# Patient Record
Sex: FEMALE | Race: WHITE | NOT HISPANIC OR LATINO | Employment: OTHER | URBAN - METROPOLITAN AREA
[De-identification: names, ages, dates, MRNs, and addresses within clinical notes are randomized per-mention and may not be internally consistent; named-entity substitution may affect disease eponyms.]

---

## 2017-01-03 ENCOUNTER — AMBULATORY - HEALTHEAST (OUTPATIENT)
Dept: LAB | Facility: CLINIC | Age: 82
End: 2017-01-03

## 2017-01-03 ENCOUNTER — COMMUNICATION - HEALTHEAST (OUTPATIENT)
Dept: FAMILY MEDICINE | Facility: CLINIC | Age: 82
End: 2017-01-03

## 2017-01-03 DIAGNOSIS — E87.6 HYPOKALEMIA: ICD-10-CM

## 2017-01-20 ENCOUNTER — COMMUNICATION - HEALTHEAST (OUTPATIENT)
Dept: FAMILY MEDICINE | Facility: CLINIC | Age: 82
End: 2017-01-20

## 2017-01-20 DIAGNOSIS — G47.00 INSOMNIA: ICD-10-CM

## 2017-02-17 ENCOUNTER — COMMUNICATION - HEALTHEAST (OUTPATIENT)
Dept: FAMILY MEDICINE | Facility: CLINIC | Age: 82
End: 2017-02-17

## 2017-02-17 DIAGNOSIS — I10 HTN (HYPERTENSION): ICD-10-CM

## 2017-02-20 ENCOUNTER — COMMUNICATION - HEALTHEAST (OUTPATIENT)
Dept: FAMILY MEDICINE | Facility: CLINIC | Age: 82
End: 2017-02-20

## 2017-02-20 DIAGNOSIS — G47.00 INSOMNIA: ICD-10-CM

## 2017-03-06 ENCOUNTER — OFFICE VISIT - HEALTHEAST (OUTPATIENT)
Dept: FAMILY MEDICINE | Facility: CLINIC | Age: 82
End: 2017-03-06

## 2017-03-06 DIAGNOSIS — J11.1 INFLUENZA-LIKE ILLNESS: ICD-10-CM

## 2017-03-06 ASSESSMENT — MIFFLIN-ST. JEOR: SCORE: 1133.59

## 2017-03-14 ENCOUNTER — COMMUNICATION - HEALTHEAST (OUTPATIENT)
Dept: FAMILY MEDICINE | Facility: CLINIC | Age: 82
End: 2017-03-14

## 2017-03-16 ENCOUNTER — OFFICE VISIT - HEALTHEAST (OUTPATIENT)
Dept: FAMILY MEDICINE | Facility: CLINIC | Age: 82
End: 2017-03-16

## 2017-03-16 DIAGNOSIS — I10 UNSPECIFIED ESSENTIAL HYPERTENSION: ICD-10-CM

## 2017-03-16 DIAGNOSIS — J32.9 SINUSITIS: ICD-10-CM

## 2017-03-16 DIAGNOSIS — Z09 HOSPITAL DISCHARGE FOLLOW-UP: ICD-10-CM

## 2017-03-16 ASSESSMENT — MIFFLIN-ST. JEOR: SCORE: 1134.84

## 2017-04-04 ENCOUNTER — RECORDS - HEALTHEAST (OUTPATIENT)
Dept: ADMINISTRATIVE | Facility: OTHER | Age: 82
End: 2017-04-04

## 2017-04-18 ENCOUNTER — COMMUNICATION - HEALTHEAST (OUTPATIENT)
Dept: FAMILY MEDICINE | Facility: CLINIC | Age: 82
End: 2017-04-18

## 2017-04-18 ENCOUNTER — AMBULATORY - HEALTHEAST (OUTPATIENT)
Dept: FAMILY MEDICINE | Facility: CLINIC | Age: 82
End: 2017-04-18

## 2017-04-18 DIAGNOSIS — R10.2 PELVIC PAIN: ICD-10-CM

## 2017-04-19 ENCOUNTER — HOSPITAL ENCOUNTER (OUTPATIENT)
Dept: ULTRASOUND IMAGING | Facility: HOSPITAL | Age: 82
Discharge: HOME | End: 2017-04-19
Attending: FAMILY MEDICINE

## 2017-04-19 DIAGNOSIS — R10.2 PELVIC PAIN: ICD-10-CM

## 2017-04-20 ENCOUNTER — COMMUNICATION - HEALTHEAST (OUTPATIENT)
Dept: FAMILY MEDICINE | Facility: CLINIC | Age: 82
End: 2017-04-20

## 2017-04-25 ENCOUNTER — OFFICE VISIT - HEALTHEAST (OUTPATIENT)
Dept: FAMILY MEDICINE | Facility: CLINIC | Age: 82
End: 2017-04-25

## 2017-04-25 DIAGNOSIS — R10.31 RLQ ABDOMINAL PAIN: ICD-10-CM

## 2017-04-25 DIAGNOSIS — R10.2 PELVIC PAIN: ICD-10-CM

## 2017-04-25 DIAGNOSIS — R10.2 VAGINAL PAIN: ICD-10-CM

## 2017-04-27 ENCOUNTER — RECORDS - HEALTHEAST (OUTPATIENT)
Dept: ADMINISTRATIVE | Facility: OTHER | Age: 82
End: 2017-04-27

## 2017-04-27 ENCOUNTER — HOSPITAL ENCOUNTER (OUTPATIENT)
Dept: CT IMAGING | Facility: HOSPITAL | Age: 82
Discharge: HOME | End: 2017-04-27
Attending: FAMILY MEDICINE

## 2017-04-27 DIAGNOSIS — R10.31 RLQ ABDOMINAL PAIN: ICD-10-CM

## 2017-04-27 DIAGNOSIS — R10.2 PELVIC PAIN: ICD-10-CM

## 2017-05-09 ENCOUNTER — OFFICE VISIT - HEALTHEAST (OUTPATIENT)
Dept: FAMILY MEDICINE | Facility: CLINIC | Age: 82
End: 2017-05-09

## 2017-05-09 DIAGNOSIS — R10.2 PELVIC PAIN: ICD-10-CM

## 2017-05-09 DIAGNOSIS — N32.81 OVERACTIVE BLADDER: ICD-10-CM

## 2017-05-09 DIAGNOSIS — I10 UNSPECIFIED ESSENTIAL HYPERTENSION: ICD-10-CM

## 2017-05-09 ASSESSMENT — MIFFLIN-ST. JEOR: SCORE: 1107.62

## 2017-08-14 ENCOUNTER — COMMUNICATION - HEALTHEAST (OUTPATIENT)
Dept: FAMILY MEDICINE | Facility: CLINIC | Age: 82
End: 2017-08-14

## 2017-08-14 DIAGNOSIS — G47.00 INSOMNIA: ICD-10-CM

## 2017-08-22 ENCOUNTER — COMMUNICATION - HEALTHEAST (OUTPATIENT)
Dept: FAMILY MEDICINE | Facility: CLINIC | Age: 82
End: 2017-08-22

## 2017-08-22 DIAGNOSIS — I10 HTN (HYPERTENSION): ICD-10-CM

## 2017-09-08 ENCOUNTER — RECORDS - HEALTHEAST (OUTPATIENT)
Dept: ADMINISTRATIVE | Facility: OTHER | Age: 82
End: 2017-09-08

## 2017-10-09 ENCOUNTER — OFFICE VISIT - HEALTHEAST (OUTPATIENT)
Dept: FAMILY MEDICINE | Facility: CLINIC | Age: 82
End: 2017-10-09

## 2017-10-09 DIAGNOSIS — Z00.00 ANNUAL PHYSICAL EXAM: ICD-10-CM

## 2017-10-09 DIAGNOSIS — E87.6 HYPOKALEMIA: ICD-10-CM

## 2017-10-09 DIAGNOSIS — R10.2 PELVIC PAIN: ICD-10-CM

## 2017-10-09 DIAGNOSIS — N32.81 OVERACTIVE BLADDER: ICD-10-CM

## 2017-10-09 DIAGNOSIS — G47.00 INSOMNIA: ICD-10-CM

## 2017-10-09 DIAGNOSIS — E55.9 VITAMIN D DEFICIENCY: ICD-10-CM

## 2017-10-09 DIAGNOSIS — I10 ESSENTIAL HYPERTENSION: ICD-10-CM

## 2017-10-09 ASSESSMENT — MIFFLIN-ST. JEOR: SCORE: 1097.19

## 2017-10-10 ENCOUNTER — COMMUNICATION - HEALTHEAST (OUTPATIENT)
Dept: FAMILY MEDICINE | Facility: CLINIC | Age: 82
End: 2017-10-10

## 2017-11-09 ENCOUNTER — RECORDS - HEALTHEAST (OUTPATIENT)
Dept: ADMINISTRATIVE | Facility: OTHER | Age: 82
End: 2017-11-09

## 2018-01-08 ENCOUNTER — COMMUNICATION - HEALTHEAST (OUTPATIENT)
Dept: FAMILY MEDICINE | Facility: CLINIC | Age: 83
End: 2018-01-08

## 2018-01-08 DIAGNOSIS — G47.00 INSOMNIA: ICD-10-CM

## 2018-01-30 ENCOUNTER — RECORDS - HEALTHEAST (OUTPATIENT)
Dept: ADMINISTRATIVE | Facility: OTHER | Age: 83
End: 2018-01-30

## 2018-02-27 ENCOUNTER — RECORDS - HEALTHEAST (OUTPATIENT)
Dept: ADMINISTRATIVE | Facility: OTHER | Age: 83
End: 2018-02-27

## 2018-02-27 ENCOUNTER — RECORDS - HEALTHEAST (OUTPATIENT)
Dept: LAB | Facility: HOSPITAL | Age: 83
End: 2018-02-27

## 2018-02-27 LAB
C REACTIVE PROTEIN LHE: 3 MG/DL (ref 0–0.8)
ERYTHROCYTE [DISTWIDTH] IN BLOOD BY AUTOMATED COUNT: 13.6 % (ref 11–14.5)
ERYTHROCYTE [SEDIMENTATION RATE] IN BLOOD BY WESTERGREN METHOD: 53 MM/HR (ref 0–20)
HCT VFR BLD AUTO: 44.1 % (ref 35–47)
HGB BLD-MCNC: 14.8 G/DL (ref 12–16)
MCH RBC QN AUTO: 30.1 PG (ref 27–34)
MCHC RBC AUTO-ENTMCNC: 33.6 G/DL (ref 32–36)
MCV RBC AUTO: 90 FL (ref 80–100)
PLATELET # BLD AUTO: 239 THOU/UL (ref 140–440)
PMV BLD AUTO: 11.1 FL (ref 8.5–12.5)
RBC # BLD AUTO: 4.91 MILL/UL (ref 3.8–5.4)
WBC: 7.4 THOU/UL (ref 4–11)

## 2018-03-05 ENCOUNTER — RECORDS - HEALTHEAST (OUTPATIENT)
Dept: LAB | Facility: HOSPITAL | Age: 83
End: 2018-03-05

## 2018-03-05 LAB
C REACTIVE PROTEIN LHE: 0.8 MG/DL (ref 0–0.8)
ERYTHROCYTE [DISTWIDTH] IN BLOOD BY AUTOMATED COUNT: 13.3 % (ref 11–14.5)
ERYTHROCYTE [SEDIMENTATION RATE] IN BLOOD BY WESTERGREN METHOD: 38 MM/HR (ref 0–20)
HCT VFR BLD AUTO: 43.9 % (ref 35–47)
HGB BLD-MCNC: 14.7 G/DL (ref 12–16)
MCH RBC QN AUTO: 30.1 PG (ref 27–34)
MCHC RBC AUTO-ENTMCNC: 33.5 G/DL (ref 32–36)
MCV RBC AUTO: 90 FL (ref 80–100)
PLATELET # BLD AUTO: 270 THOU/UL (ref 140–440)
PMV BLD AUTO: 11 FL (ref 8.5–12.5)
RBC # BLD AUTO: 4.88 MILL/UL (ref 3.8–5.4)
WBC: 6.4 THOU/UL (ref 4–11)

## 2018-04-30 ENCOUNTER — RECORDS - HEALTHEAST (OUTPATIENT)
Dept: ADMINISTRATIVE | Facility: OTHER | Age: 83
End: 2018-04-30

## 2018-04-30 ENCOUNTER — COMMUNICATION - HEALTHEAST (OUTPATIENT)
Dept: FAMILY MEDICINE | Facility: CLINIC | Age: 83
End: 2018-04-30

## 2018-04-30 DIAGNOSIS — G47.00 INSOMNIA: ICD-10-CM

## 2018-05-04 ENCOUNTER — RECORDS - HEALTHEAST (OUTPATIENT)
Dept: ADMINISTRATIVE | Facility: OTHER | Age: 83
End: 2018-05-04

## 2018-05-04 ENCOUNTER — COMMUNICATION - HEALTHEAST (OUTPATIENT)
Dept: FAMILY MEDICINE | Facility: CLINIC | Age: 83
End: 2018-05-04

## 2018-07-05 ENCOUNTER — COMMUNICATION - HEALTHEAST (OUTPATIENT)
Dept: FAMILY MEDICINE | Facility: CLINIC | Age: 83
End: 2018-07-05

## 2018-07-23 ENCOUNTER — COMMUNICATION - HEALTHEAST (OUTPATIENT)
Dept: FAMILY MEDICINE | Facility: CLINIC | Age: 83
End: 2018-07-23

## 2018-07-23 DIAGNOSIS — G47.00 INSOMNIA: ICD-10-CM

## 2018-08-23 ENCOUNTER — RECORDS - HEALTHEAST (OUTPATIENT)
Dept: ADMINISTRATIVE | Facility: OTHER | Age: 83
End: 2018-08-23

## 2018-08-23 ENCOUNTER — OFFICE VISIT - HEALTHEAST (OUTPATIENT)
Dept: FAMILY MEDICINE | Facility: CLINIC | Age: 83
End: 2018-08-23

## 2018-08-23 ENCOUNTER — COMMUNICATION - HEALTHEAST (OUTPATIENT)
Dept: FAMILY MEDICINE | Facility: CLINIC | Age: 83
End: 2018-08-23

## 2018-08-23 DIAGNOSIS — R79.89 ELEVATED SERUM CREATININE: ICD-10-CM

## 2018-08-23 DIAGNOSIS — N95.2 VAGINAL ATROPHY: ICD-10-CM

## 2018-08-23 DIAGNOSIS — I10 ESSENTIAL HYPERTENSION: ICD-10-CM

## 2018-08-23 DIAGNOSIS — R35.1 NOCTURIA: ICD-10-CM

## 2018-08-23 DIAGNOSIS — R19.7 DIARRHEA: ICD-10-CM

## 2018-08-23 DIAGNOSIS — N32.81 OVERACTIVE BLADDER: ICD-10-CM

## 2018-08-23 DIAGNOSIS — R39.15 URINARY URGENCY: ICD-10-CM

## 2018-08-23 DIAGNOSIS — G47.00 INSOMNIA: ICD-10-CM

## 2018-08-23 DIAGNOSIS — Z09 HOSPITAL DISCHARGE FOLLOW-UP: ICD-10-CM

## 2018-08-23 LAB
ANION GAP SERPL CALCULATED.3IONS-SCNC: 12 MMOL/L (ref 5–18)
BUN SERPL-MCNC: 17 MG/DL (ref 8–28)
CALCIUM SERPL-MCNC: 10.4 MG/DL (ref 8.5–10.5)
CHLORIDE BLD-SCNC: 106 MMOL/L (ref 98–107)
CO2 SERPL-SCNC: 22 MMOL/L (ref 22–31)
CREAT SERPL-MCNC: 0.92 MG/DL (ref 0.6–1.1)
GFR SERPL CREATININE-BSD FRML MDRD: 58 ML/MIN/1.73M2
GLUCOSE BLD-MCNC: 112 MG/DL (ref 70–125)
POTASSIUM BLD-SCNC: 4 MMOL/L (ref 3.5–5)
SODIUM SERPL-SCNC: 140 MMOL/L (ref 136–145)

## 2018-08-23 RX ORDER — ESTRADIOL 0.1 MG/G
CREAM VAGINAL
Qty: 42.5 G | Refills: 1 | Status: SHIPPED
Start: 2018-08-23 | End: 2021-08-17

## 2018-08-23 ASSESSMENT — MIFFLIN-ST. JEOR: SCORE: 1109.89

## 2018-08-27 ENCOUNTER — COMMUNICATION - HEALTHEAST (OUTPATIENT)
Dept: FAMILY MEDICINE | Facility: CLINIC | Age: 83
End: 2018-08-27

## 2018-09-18 ENCOUNTER — RECORDS - HEALTHEAST (OUTPATIENT)
Dept: ADMINISTRATIVE | Facility: OTHER | Age: 83
End: 2018-09-18

## 2018-10-11 ENCOUNTER — OFFICE VISIT - HEALTHEAST (OUTPATIENT)
Dept: FAMILY MEDICINE | Facility: CLINIC | Age: 83
End: 2018-10-11

## 2018-10-11 DIAGNOSIS — N95.2 VAGINAL ATROPHY: ICD-10-CM

## 2018-10-11 DIAGNOSIS — G47.00 INSOMNIA: ICD-10-CM

## 2018-10-11 DIAGNOSIS — Z00.00 ROUTINE GENERAL MEDICAL EXAMINATION AT A HEALTH CARE FACILITY: ICD-10-CM

## 2018-10-11 DIAGNOSIS — M19.079 ARTHROSIS OF FOOT: ICD-10-CM

## 2018-10-11 DIAGNOSIS — I10 ESSENTIAL HYPERTENSION: ICD-10-CM

## 2018-10-11 DIAGNOSIS — Z23 NEED FOR VACCINATION: ICD-10-CM

## 2018-10-11 DIAGNOSIS — Z66 DNR (DO NOT RESUSCITATE): ICD-10-CM

## 2018-10-11 ASSESSMENT — MIFFLIN-ST. JEOR: SCORE: 1112.73

## 2018-10-29 ENCOUNTER — OFFICE VISIT - HEALTHEAST (OUTPATIENT)
Dept: PODIATRY | Facility: CLINIC | Age: 83
End: 2018-10-29

## 2018-10-29 DIAGNOSIS — M19.079: ICD-10-CM

## 2018-10-29 ASSESSMENT — MIFFLIN-ST. JEOR: SCORE: 1108.64

## 2018-11-14 ENCOUNTER — COMMUNICATION - HEALTHEAST (OUTPATIENT)
Dept: FAMILY MEDICINE | Facility: CLINIC | Age: 83
End: 2018-11-14

## 2018-11-14 ENCOUNTER — COMMUNICATION - HEALTHEAST (OUTPATIENT)
Dept: SCHEDULING | Facility: CLINIC | Age: 83
End: 2018-11-14

## 2018-11-14 DIAGNOSIS — M19.079 ARTHROSIS OF FOOT: ICD-10-CM

## 2018-12-05 ENCOUNTER — COMMUNICATION - HEALTHEAST (OUTPATIENT)
Dept: FAMILY MEDICINE | Facility: CLINIC | Age: 83
End: 2018-12-05

## 2018-12-05 DIAGNOSIS — I10 ESSENTIAL HYPERTENSION: ICD-10-CM

## 2018-12-05 DIAGNOSIS — G47.00 INSOMNIA: ICD-10-CM

## 2018-12-05 DIAGNOSIS — R39.15 URINARY URGENCY: ICD-10-CM

## 2018-12-05 DIAGNOSIS — M19.079 ARTHROSIS OF FOOT: ICD-10-CM

## 2018-12-07 ENCOUNTER — AMBULATORY - HEALTHEAST (OUTPATIENT)
Dept: FAMILY MEDICINE | Facility: CLINIC | Age: 83
End: 2018-12-07

## 2018-12-07 DIAGNOSIS — G47.00 INSOMNIA: ICD-10-CM

## 2018-12-07 DIAGNOSIS — R39.15 URINARY URGENCY: ICD-10-CM

## 2018-12-09 ENCOUNTER — COMMUNICATION - HEALTHEAST (OUTPATIENT)
Dept: FAMILY MEDICINE | Facility: CLINIC | Age: 83
End: 2018-12-09

## 2018-12-09 DIAGNOSIS — G47.00 INSOMNIA: ICD-10-CM

## 2018-12-09 DIAGNOSIS — R39.15 URINARY URGENCY: ICD-10-CM

## 2019-01-03 ENCOUNTER — RECORDS - HEALTHEAST (OUTPATIENT)
Dept: ADMINISTRATIVE | Facility: OTHER | Age: 84
End: 2019-01-03

## 2019-01-07 ENCOUNTER — RECORDS - HEALTHEAST (OUTPATIENT)
Dept: ADMINISTRATIVE | Facility: OTHER | Age: 84
End: 2019-01-07

## 2019-01-07 ENCOUNTER — COMMUNICATION - HEALTHEAST (OUTPATIENT)
Dept: FAMILY MEDICINE | Facility: CLINIC | Age: 84
End: 2019-01-07

## 2019-01-07 DIAGNOSIS — M19.079 ARTHROSIS OF FOOT: ICD-10-CM

## 2019-01-08 ENCOUNTER — OFFICE VISIT - HEALTHEAST (OUTPATIENT)
Dept: FAMILY MEDICINE | Facility: CLINIC | Age: 84
End: 2019-01-08

## 2019-01-08 DIAGNOSIS — I10 ESSENTIAL HYPERTENSION: ICD-10-CM

## 2019-01-08 DIAGNOSIS — Z79.899 CONTROLLED SUBSTANCE AGREEMENT SIGNED: ICD-10-CM

## 2019-01-08 DIAGNOSIS — Z09 HOSPITAL DISCHARGE FOLLOW-UP: ICD-10-CM

## 2019-01-08 DIAGNOSIS — R04.0 FREQUENT NOSEBLEEDS: ICD-10-CM

## 2019-01-08 DIAGNOSIS — I49.9 IRREGULAR HEART RHYTHM: ICD-10-CM

## 2019-01-08 DIAGNOSIS — M19.071 ARTHRITIS OF MIDTARSAL JOINT OF RIGHT FOOT: ICD-10-CM

## 2019-01-08 DIAGNOSIS — G89.29 OTHER CHRONIC PAIN: ICD-10-CM

## 2019-01-08 DIAGNOSIS — M19.079 ARTHROSIS OF FOOT: ICD-10-CM

## 2019-01-08 DIAGNOSIS — R60.0 LOWER EXTREMITY EDEMA: ICD-10-CM

## 2019-01-08 LAB
ALBUMIN SERPL-MCNC: 3.5 G/DL (ref 3.5–5)
ALBUMIN UR-MCNC: NEGATIVE MG/DL
ALP SERPL-CCNC: 154 U/L (ref 45–120)
ALT SERPL W P-5'-P-CCNC: 31 U/L (ref 0–45)
AMPHETAMINES UR QL SCN: NORMAL
ANION GAP SERPL CALCULATED.3IONS-SCNC: 10 MMOL/L (ref 5–18)
APPEARANCE UR: ABNORMAL
AST SERPL W P-5'-P-CCNC: 26 U/L (ref 0–40)
ATRIAL RATE - MUSE: 58 BPM
BARBITURATES UR QL: NORMAL
BASOPHILS # BLD AUTO: 0 THOU/UL (ref 0–0.2)
BASOPHILS NFR BLD AUTO: 1 % (ref 0–2)
BENZODIAZ UR QL: NORMAL
BILIRUB SERPL-MCNC: 0.7 MG/DL (ref 0–1)
BILIRUB UR QL STRIP: ABNORMAL
BUN SERPL-MCNC: 14 MG/DL (ref 8–28)
CALCIUM SERPL-MCNC: 9.6 MG/DL (ref 8.5–10.5)
CANNABINOIDS UR QL SCN: NORMAL
CHLORIDE BLD-SCNC: 107 MMOL/L (ref 98–107)
CO2 SERPL-SCNC: 25 MMOL/L (ref 22–31)
COCAINE UR QL: NORMAL
COLOR UR AUTO: YELLOW
CREAT SERPL-MCNC: 0.88 MG/DL (ref 0.6–1.1)
CREAT UR-MCNC: 295.3 MG/DL
DIASTOLIC BLOOD PRESSURE - MUSE: NORMAL MMHG
EOSINOPHIL # BLD AUTO: 0.2 THOU/UL (ref 0–0.4)
EOSINOPHIL NFR BLD AUTO: 2 % (ref 0–6)
ERYTHROCYTE [DISTWIDTH] IN BLOOD BY AUTOMATED COUNT: 11.9 % (ref 11–14.5)
GFR SERPL CREATININE-BSD FRML MDRD: >60 ML/MIN/1.73M2
GLUCOSE BLD-MCNC: 97 MG/DL (ref 70–125)
GLUCOSE UR STRIP-MCNC: NEGATIVE MG/DL
HCT VFR BLD AUTO: 40.3 % (ref 35–47)
HGB BLD-MCNC: 13.5 G/DL (ref 12–16)
HGB UR QL STRIP: NEGATIVE
INTERPRETATION ECG - MUSE: NORMAL
KETONES UR STRIP-MCNC: ABNORMAL MG/DL
LEUKOCYTE ESTERASE UR QL STRIP: ABNORMAL
LYMPHOCYTES # BLD AUTO: 1.7 THOU/UL (ref 0.8–4.4)
LYMPHOCYTES NFR BLD AUTO: 22 % (ref 20–40)
MCH RBC QN AUTO: 30.3 PG (ref 27–34)
MCHC RBC AUTO-ENTMCNC: 33.5 G/DL (ref 32–36)
MCV RBC AUTO: 90 FL (ref 80–100)
MONOCYTES # BLD AUTO: 0.9 THOU/UL (ref 0–0.9)
MONOCYTES NFR BLD AUTO: 11 % (ref 2–10)
NEUTROPHILS # BLD AUTO: 5.2 THOU/UL (ref 2–7.7)
NEUTROPHILS NFR BLD AUTO: 65 % (ref 50–70)
NITRATE UR QL: NEGATIVE
OPIATES UR QL SCN: NORMAL
OXYCODONE UR QL: NORMAL
P AXIS - MUSE: 68 DEGREES
PCP UR QL SCN: NORMAL
PH UR STRIP: 6 [PH] (ref 5–8)
PLATELET # BLD AUTO: 291 THOU/UL (ref 140–440)
PMV BLD AUTO: 8.6 FL (ref 7–10)
POTASSIUM BLD-SCNC: 3.9 MMOL/L (ref 3.5–5)
PR INTERVAL - MUSE: 144 MS
PROT SERPL-MCNC: 6.8 G/DL (ref 6–8)
QRS DURATION - MUSE: 78 MS
QT - MUSE: 424 MS
QTC - MUSE: 416 MS
R AXIS - MUSE: 29 DEGREES
RBC # BLD AUTO: 4.46 MILL/UL (ref 3.8–5.4)
SODIUM SERPL-SCNC: 142 MMOL/L (ref 136–145)
SP GR UR STRIP: 1.02 (ref 1–1.03)
SYSTOLIC BLOOD PRESSURE - MUSE: NORMAL MMHG
T AXIS - MUSE: 23 DEGREES
UROBILINOGEN UR STRIP-ACNC: ABNORMAL
VENTRICULAR RATE- MUSE: 58 BPM
WBC: 8.1 THOU/UL (ref 4–11)

## 2019-01-08 ASSESSMENT — MIFFLIN-ST. JEOR: SCORE: 1069.53

## 2019-01-09 LAB — BACTERIA SPEC CULT: NO GROWTH

## 2019-01-10 ENCOUNTER — RECORDS - HEALTHEAST (OUTPATIENT)
Dept: ADMINISTRATIVE | Facility: OTHER | Age: 84
End: 2019-01-10

## 2019-01-12 LAB
O-DESMETHYLTRAMADOL (CONVERSION): NORMAL NG/ML
TRAMADOL (CONVERSION): NORMAL NG/ML

## 2019-01-14 ENCOUNTER — HOSPITAL ENCOUNTER (OUTPATIENT)
Dept: CARDIOLOGY | Facility: HOSPITAL | Age: 84
Discharge: HOME | End: 2019-01-14
Attending: FAMILY MEDICINE

## 2019-01-14 DIAGNOSIS — R60.0 LOWER EXTREMITY EDEMA: ICD-10-CM

## 2019-01-14 DIAGNOSIS — I49.9 IRREGULAR HEART RHYTHM: ICD-10-CM

## 2019-01-14 ASSESSMENT — MIFFLIN-ST. JEOR: SCORE: 1069.53

## 2019-01-15 LAB
AORTIC ROOT: 2.6 CM
ASCENDING AORTA: 2.9 CM
BSA FOR ECHO PROCEDURE: 1.75 M2
CV ECHO HEIGHT: 61 IN
CV ECHO WEIGHT: 157 LBS
DOP CALC LVOT AREA: 2.27 CM2
DOP CALC LVOT DIAMETER: 1.7 CM
DOP CALC LVOT PEAK VEL: 82.8 CM/S
DOP CALC LVOT STROKE VOLUME: 47.2 CM3
DOP CALCLVOT PEAK VEL VTI: 20.8 CM
ECHO EJECTION FRACTION ESTIMATED: 55 %
EJECTION FRACTION: 46 % (ref 55–75)
FRACTIONAL SHORTENING: 29 % (ref 28–44)
INTERVENTRICULAR SEPTUM IN END DIASTOLE: 1.01 CM (ref 0.6–0.9)
IVS/PW RATIO: 0.8
LA AREA 1: 26 CM2
LA AREA 2: 23 CM2
LEFT ATRIUM LENGTH: 5.8 CM
LEFT ATRIUM SIZE: 3.7 CM
LEFT ATRIUM VOLUME INDEX: 50.1 ML/M2
LEFT ATRIUM VOLUME: 87.6 ML
LEFT VENTRICLE DIASTOLIC VOLUME INDEX: 37.3 CM3/M2 (ref 29–61)
LEFT VENTRICLE DIASTOLIC VOLUME: 65.3 CM3 (ref 46–106)
LEFT VENTRICLE HEART RATE: 51 BPM
LEFT VENTRICLE HEART RATE: 51 BPM
LEFT VENTRICLE MASS INDEX: 107.7 G/M2
LEFT VENTRICLE SYSTOLIC VOLUME INDEX: 20.1 CM3/M2 (ref 8–24)
LEFT VENTRICLE SYSTOLIC VOLUME: 35.1 CM3 (ref 14–42)
LEFT VENTRICULAR INTERNAL DIMENSION IN DIASTOLE: 4.55 CM (ref 3.8–5.2)
LEFT VENTRICULAR INTERNAL DIMENSION IN SYSTOLE: 3.23 CM (ref 2.2–3.5)
LEFT VENTRICULAR MASS: 188.5 G
LEFT VENTRICULAR OUTFLOW TRACT MEAN GRADIENT: 1 MMHG
LEFT VENTRICULAR OUTFLOW TRACT MEAN VELOCITY: 54.6 CM/S
LEFT VENTRICULAR OUTFLOW TRACT PEAK GRADIENT: 3 MMHG
LEFT VENTRICULAR POSTERIOR WALL IN END DIASTOLE: 1.28 CM (ref 0.6–0.9)
LV STROKE VOLUME INDEX: 27 ML/M2
MITRAL REGURGITANT VELOCITY TIME INTEGRAL: 128 CM
MITRAL VALVE DECELERATION SLOPE: 7740 MM/S2
MITRAL VALVE E/A RATIO: 1.6
MITRAL VALVE PRESSURE HALF-TIME: 40 MS
MR FLOW: 26 CM3
MR MEAN GRADIENT: 33 MMHG
MR MEAN VELOCITY: 268 CM/S
MR PEAK GRADIENT: 49 MMHG
MR PISA EROA: 0.2 CM2
MR PISA RADIUS: 0.6 CM
MR PISA VN NYQUIST: 30.8 CM/S
MV AVERAGE E/E' RATIO: 17.5 CM/S
MV DECELERATION TIME: 137 MS
MV E'TISSUE VEL-LAT: 6.48 CM/S
MV E'TISSUE VEL-MED: 5.61 CM/S
MV LATERAL E/E' RATIO: 16.4
MV MEDIAL E/E' RATIO: 18.9
MV PEAK A VELOCITY: 67.4 CM/S
MV PEAK E VELOCITY: 106 CM/S
MV REGURGITANT VOLUME: 25.5 CC
MV VALVE AREA PRESSURE 1/2 METHOD: 5.5 CM2
NUC REST DIASTOLIC VOLUME INDEX: 2512 LBS
NUC REST SYSTOLIC VOLUME INDEX: 61 IN
PISA MR PEAK VEL: 350 CM/S
PR MAX PG: 3 MMHG
PR PEAK VELOCITY: 85.8 CM/S
TRICUSPID REGURGITATION PEAK PRESSURE GRADIENT: 28.3 MMHG
TRICUSPID VALVE ANULAR PLANE SYSTOLIC EXCURSION: 2 CM
TRICUSPID VALVE PEAK REGURGITANT VELOCITY: 266 CM/S

## 2019-01-16 ENCOUNTER — RECORDS - HEALTHEAST (OUTPATIENT)
Dept: ADMINISTRATIVE | Facility: OTHER | Age: 84
End: 2019-01-16

## 2019-01-17 ENCOUNTER — RECORDS - HEALTHEAST (OUTPATIENT)
Dept: ADMINISTRATIVE | Facility: OTHER | Age: 84
End: 2019-01-17

## 2019-01-17 ENCOUNTER — OFFICE VISIT - HEALTHEAST (OUTPATIENT)
Dept: FAMILY MEDICINE | Facility: CLINIC | Age: 84
End: 2019-01-17

## 2019-01-17 DIAGNOSIS — R60.0 LOWER EXTREMITY EDEMA: ICD-10-CM

## 2019-01-17 DIAGNOSIS — M19.071 ARTHRITIS OF MIDTARSAL JOINT OF RIGHT FOOT: ICD-10-CM

## 2019-01-17 DIAGNOSIS — I10 ESSENTIAL HYPERTENSION: ICD-10-CM

## 2019-01-17 DIAGNOSIS — R04.0 FREQUENT NOSEBLEEDS: ICD-10-CM

## 2019-01-17 DIAGNOSIS — R74.8 ELEVATED ALKALINE PHOSPHATASE LEVEL: ICD-10-CM

## 2019-01-17 DIAGNOSIS — E55.9 VITAMIN D DEFICIENCY: ICD-10-CM

## 2019-01-17 DIAGNOSIS — I50.32 CHRONIC DIASTOLIC CONGESTIVE HEART FAILURE (H): ICD-10-CM

## 2019-01-17 DIAGNOSIS — M19.079 ARTHROSIS OF FOOT, UNSPECIFIED LATERALITY: ICD-10-CM

## 2019-01-17 DIAGNOSIS — R00.1 SINUS BRADYCARDIA: ICD-10-CM

## 2019-01-17 ASSESSMENT — MIFFLIN-ST. JEOR: SCORE: 1084.95

## 2019-01-18 LAB — 25(OH)D3 SERPL-MCNC: 22 NG/ML (ref 30–80)

## 2019-01-21 ENCOUNTER — AMBULATORY - HEALTHEAST (OUTPATIENT)
Dept: FAMILY MEDICINE | Facility: CLINIC | Age: 84
End: 2019-01-21

## 2019-01-21 ENCOUNTER — COMMUNICATION - HEALTHEAST (OUTPATIENT)
Dept: FAMILY MEDICINE | Facility: CLINIC | Age: 84
End: 2019-01-21

## 2019-01-21 DIAGNOSIS — E55.9 VITAMIN D DEFICIENCY: ICD-10-CM

## 2019-01-22 ENCOUNTER — RECORDS - HEALTHEAST (OUTPATIENT)
Dept: ADMINISTRATIVE | Facility: OTHER | Age: 84
End: 2019-01-22

## 2019-01-24 ENCOUNTER — OFFICE VISIT - HEALTHEAST (OUTPATIENT)
Dept: FAMILY MEDICINE | Facility: CLINIC | Age: 84
End: 2019-01-24

## 2019-01-24 DIAGNOSIS — R04.0 FREQUENT NOSEBLEEDS: ICD-10-CM

## 2019-01-24 DIAGNOSIS — I50.32 CHRONIC DIASTOLIC CONGESTIVE HEART FAILURE (H): ICD-10-CM

## 2019-01-24 DIAGNOSIS — I10 ESSENTIAL HYPERTENSION: ICD-10-CM

## 2019-01-24 DIAGNOSIS — Z09 HOSPITAL DISCHARGE FOLLOW-UP: ICD-10-CM

## 2019-01-24 DIAGNOSIS — R93.5 ABNORMAL ULTRASOUND OF ABDOMEN: ICD-10-CM

## 2019-01-24 ASSESSMENT — MIFFLIN-ST. JEOR: SCORE: 1072.37

## 2019-01-29 ENCOUNTER — OFFICE VISIT - HEALTHEAST (OUTPATIENT)
Dept: CARDIOLOGY | Facility: CLINIC | Age: 84
End: 2019-01-29

## 2019-01-29 ENCOUNTER — HOSPITAL ENCOUNTER (OUTPATIENT)
Dept: CT IMAGING | Facility: CLINIC | Age: 84
Discharge: HOME | End: 2019-01-29
Attending: FAMILY MEDICINE

## 2019-01-29 DIAGNOSIS — E78.00 PURE HYPERCHOLESTEROLEMIA: ICD-10-CM

## 2019-01-29 DIAGNOSIS — Z09 HOSPITAL DISCHARGE FOLLOW-UP: ICD-10-CM

## 2019-01-29 DIAGNOSIS — I10 ESSENTIAL HYPERTENSION: ICD-10-CM

## 2019-01-29 ASSESSMENT — MIFFLIN-ST. JEOR: SCORE: 1073.76

## 2019-01-31 ENCOUNTER — OFFICE VISIT - HEALTHEAST (OUTPATIENT)
Dept: FAMILY MEDICINE | Facility: CLINIC | Age: 84
End: 2019-01-31

## 2019-01-31 DIAGNOSIS — N28.0 RENAL ARTERY THROMBOSIS (H): ICD-10-CM

## 2019-01-31 DIAGNOSIS — I50.32 CHRONIC DIASTOLIC CONGESTIVE HEART FAILURE (H): ICD-10-CM

## 2019-01-31 DIAGNOSIS — M19.079 ARTHROSIS OF FOOT, UNSPECIFIED LATERALITY: ICD-10-CM

## 2019-01-31 DIAGNOSIS — E55.9 VITAMIN D DEFICIENCY: ICD-10-CM

## 2019-01-31 DIAGNOSIS — I72.2 RENAL ARTERIAL ANEURYSM (H): ICD-10-CM

## 2019-01-31 DIAGNOSIS — I10 ESSENTIAL HYPERTENSION: ICD-10-CM

## 2019-01-31 ASSESSMENT — MIFFLIN-ST. JEOR: SCORE: 1077.82

## 2019-02-05 ENCOUNTER — COMMUNICATION - HEALTHEAST (OUTPATIENT)
Dept: FAMILY MEDICINE | Facility: CLINIC | Age: 84
End: 2019-02-05

## 2019-02-11 ENCOUNTER — COMMUNICATION - HEALTHEAST (OUTPATIENT)
Dept: FAMILY MEDICINE | Facility: CLINIC | Age: 84
End: 2019-02-11

## 2019-02-11 DIAGNOSIS — R93.5 ABNORMAL ULTRASOUND OF ABDOMEN: ICD-10-CM

## 2019-02-11 DIAGNOSIS — I10 ESSENTIAL HYPERTENSION: ICD-10-CM

## 2019-02-11 DIAGNOSIS — Z09 HOSPITAL DISCHARGE FOLLOW-UP: ICD-10-CM

## 2019-02-18 ENCOUNTER — OFFICE VISIT - HEALTHEAST (OUTPATIENT)
Dept: VASCULAR SURGERY | Facility: CLINIC | Age: 84
End: 2019-02-18

## 2019-02-18 DIAGNOSIS — I72.2 ANEURYSM OF LEFT RENAL ARTERY (H): ICD-10-CM

## 2019-02-27 ENCOUNTER — COMMUNICATION - HEALTHEAST (OUTPATIENT)
Dept: FAMILY MEDICINE | Facility: CLINIC | Age: 84
End: 2019-02-27

## 2019-02-27 DIAGNOSIS — G47.00 INSOMNIA: ICD-10-CM

## 2019-02-27 DIAGNOSIS — R39.15 URINARY URGENCY: ICD-10-CM

## 2019-03-01 ENCOUNTER — AMBULATORY - HEALTHEAST (OUTPATIENT)
Dept: CARE COORDINATION | Facility: CLINIC | Age: 84
End: 2019-03-01

## 2019-03-01 DIAGNOSIS — I82.4Z9 DEEP VEIN THROMBOSIS (DVT) OF DISTAL VEIN OF LOWER EXTREMITY, UNSPECIFIED CHRONICITY, UNSPECIFIED LATERALITY (H): ICD-10-CM

## 2019-03-01 DIAGNOSIS — I26.99 OTHER PULMONARY EMBOLISM WITHOUT ACUTE COR PULMONALE, UNSPECIFIED CHRONICITY (H): ICD-10-CM

## 2019-03-01 DIAGNOSIS — J18.9 PNEUMONIA: ICD-10-CM

## 2019-03-04 ENCOUNTER — HOSPITAL ENCOUNTER (OUTPATIENT)
Dept: CT IMAGING | Facility: HOSPITAL | Age: 84
Discharge: HOME | End: 2019-03-04
Attending: FAMILY MEDICINE

## 2019-03-04 ENCOUNTER — COMMUNICATION - HEALTHEAST (OUTPATIENT)
Dept: NURSING | Facility: CLINIC | Age: 84
End: 2019-03-04

## 2019-03-04 ENCOUNTER — OFFICE VISIT - HEALTHEAST (OUTPATIENT)
Dept: FAMILY MEDICINE | Facility: CLINIC | Age: 84
End: 2019-03-04

## 2019-03-04 DIAGNOSIS — I82.4Z9 DEEP VEIN THROMBOSIS (DVT) OF DISTAL VEIN OF LOWER EXTREMITY, UNSPECIFIED CHRONICITY, UNSPECIFIED LATERALITY (H): ICD-10-CM

## 2019-03-04 DIAGNOSIS — R07.9 CHEST PAIN, UNSPECIFIED TYPE: ICD-10-CM

## 2019-03-04 DIAGNOSIS — Z09 HOSPITAL DISCHARGE FOLLOW-UP: ICD-10-CM

## 2019-03-04 DIAGNOSIS — M19.079 ARTHROSIS OF FOOT: ICD-10-CM

## 2019-03-04 DIAGNOSIS — M19.079 ARTHROSIS OF FOOT, UNSPECIFIED LATERALITY: ICD-10-CM

## 2019-03-04 DIAGNOSIS — M19.071 ARTHRITIS OF MIDTARSAL JOINT OF RIGHT FOOT: ICD-10-CM

## 2019-03-04 DIAGNOSIS — Z79.899 CONTROLLED SUBSTANCE AGREEMENT SIGNED: ICD-10-CM

## 2019-03-04 DIAGNOSIS — G47.00 PERSISTENT INSOMNIA: ICD-10-CM

## 2019-03-04 DIAGNOSIS — I26.90 ACUTE SEPTIC PULMONARY EMBOLISM WITHOUT ACUTE COR PULMONALE (H): ICD-10-CM

## 2019-03-04 DIAGNOSIS — I10 ESSENTIAL HYPERTENSION: ICD-10-CM

## 2019-03-04 DIAGNOSIS — R04.2 HEMOPTYSIS: ICD-10-CM

## 2019-03-04 DIAGNOSIS — E83.42 HYPOMAGNESEMIA: ICD-10-CM

## 2019-03-04 LAB
ALBUMIN SERPL-MCNC: 3.4 G/DL (ref 3.5–5)
ALP SERPL-CCNC: 130 U/L (ref 45–120)
ALT SERPL W P-5'-P-CCNC: 32 U/L (ref 0–45)
ANION GAP SERPL CALCULATED.3IONS-SCNC: 15 MMOL/L (ref 5–18)
AST SERPL W P-5'-P-CCNC: 18 U/L (ref 0–40)
BASOPHILS # BLD AUTO: 0.1 THOU/UL (ref 0–0.2)
BASOPHILS NFR BLD AUTO: 1 % (ref 0–2)
BILIRUB SERPL-MCNC: 0.3 MG/DL (ref 0–1)
BUN SERPL-MCNC: 33 MG/DL (ref 8–28)
CALCIUM SERPL-MCNC: 10.2 MG/DL (ref 8.5–10.5)
CHLORIDE BLD-SCNC: 102 MMOL/L (ref 98–107)
CO2 SERPL-SCNC: 19 MMOL/L (ref 22–31)
CREAT SERPL-MCNC: 1.07 MG/DL (ref 0.6–1.1)
EOSINOPHIL # BLD AUTO: 0.2 THOU/UL (ref 0–0.4)
EOSINOPHIL NFR BLD AUTO: 2 % (ref 0–6)
ERYTHROCYTE [DISTWIDTH] IN BLOOD BY AUTOMATED COUNT: 12.2 % (ref 11–14.5)
GFR SERPL CREATININE-BSD FRML MDRD: 48 ML/MIN/1.73M2
GLUCOSE BLD-MCNC: 97 MG/DL (ref 70–125)
HCT VFR BLD AUTO: 41 % (ref 35–47)
HGB BLD-MCNC: 13.5 G/DL (ref 12–16)
LYMPHOCYTES # BLD AUTO: 2.4 THOU/UL (ref 0.8–4.4)
LYMPHOCYTES NFR BLD AUTO: 23 % (ref 20–40)
MAGNESIUM SERPL-MCNC: 2.1 MG/DL (ref 1.8–2.6)
MCH RBC QN AUTO: 28.9 PG (ref 27–34)
MCHC RBC AUTO-ENTMCNC: 32.9 G/DL (ref 32–36)
MCV RBC AUTO: 88 FL (ref 80–100)
MONOCYTES # BLD AUTO: 1 THOU/UL (ref 0–0.9)
MONOCYTES NFR BLD AUTO: 10 % (ref 2–10)
NEUTROPHILS # BLD AUTO: 6.6 THOU/UL (ref 2–7.7)
NEUTROPHILS NFR BLD AUTO: 65 % (ref 50–70)
PHOSPHATE SERPL-MCNC: 4.3 MG/DL (ref 2.5–4.5)
PLATELET # BLD AUTO: 409 THOU/UL (ref 140–440)
PMV BLD AUTO: 7.9 FL (ref 7–10)
POTASSIUM BLD-SCNC: 4.6 MMOL/L (ref 3.5–5)
PROT SERPL-MCNC: 7 G/DL (ref 6–8)
RBC # BLD AUTO: 4.67 MILL/UL (ref 3.8–5.4)
SODIUM SERPL-SCNC: 136 MMOL/L (ref 136–145)
WBC: 10.3 THOU/UL (ref 4–11)

## 2019-03-04 ASSESSMENT — MIFFLIN-ST. JEOR: SCORE: 1071.79

## 2019-03-05 ENCOUNTER — COMMUNICATION - HEALTHEAST (OUTPATIENT)
Dept: NURSING | Facility: CLINIC | Age: 84
End: 2019-03-05

## 2019-03-05 ENCOUNTER — OFFICE VISIT - HEALTHEAST (OUTPATIENT)
Dept: FAMILY MEDICINE | Facility: CLINIC | Age: 84
End: 2019-03-05

## 2019-03-05 DIAGNOSIS — E83.42 HYPOMAGNESEMIA: ICD-10-CM

## 2019-03-05 DIAGNOSIS — I26.99 OTHER PULMONARY EMBOLISM WITHOUT ACUTE COR PULMONALE, UNSPECIFIED CHRONICITY (H): ICD-10-CM

## 2019-03-05 DIAGNOSIS — R91.8 PULMONARY NODULES: ICD-10-CM

## 2019-03-05 DIAGNOSIS — J18.9 COMMUNITY ACQUIRED PNEUMONIA OF RIGHT LOWER LOBE OF LUNG: ICD-10-CM

## 2019-03-05 DIAGNOSIS — R07.89 ATYPICAL CHEST PAIN: ICD-10-CM

## 2019-03-05 DIAGNOSIS — Z09 HOSPITAL DISCHARGE FOLLOW-UP: ICD-10-CM

## 2019-03-05 DIAGNOSIS — I82.4Z9 DEEP VEIN THROMBOSIS (DVT) OF DISTAL VEIN OF LOWER EXTREMITY, UNSPECIFIED CHRONICITY, UNSPECIFIED LATERALITY (H): ICD-10-CM

## 2019-03-05 LAB
ATRIAL RATE - MUSE: 66 BPM
DIASTOLIC BLOOD PRESSURE - MUSE: NORMAL MMHG
INTERPRETATION ECG - MUSE: NORMAL
P AXIS - MUSE: 16 DEGREES
PR INTERVAL - MUSE: 136 MS
QRS DURATION - MUSE: 78 MS
QT - MUSE: 402 MS
QTC - MUSE: 421 MS
R AXIS - MUSE: 32 DEGREES
SYSTOLIC BLOOD PRESSURE - MUSE: NORMAL MMHG
T AXIS - MUSE: 28 DEGREES
VENTRICULAR RATE- MUSE: 66 BPM

## 2019-03-05 ASSESSMENT — MIFFLIN-ST. JEOR: SCORE: 1075.42

## 2019-03-19 ENCOUNTER — OFFICE VISIT - HEALTHEAST (OUTPATIENT)
Dept: FAMILY MEDICINE | Facility: CLINIC | Age: 84
End: 2019-03-19

## 2019-03-19 DIAGNOSIS — R05.9 COUGH: ICD-10-CM

## 2019-03-19 DIAGNOSIS — M94.0 COSTOCHONDRITIS: ICD-10-CM

## 2019-03-19 DIAGNOSIS — I26.99 OTHER PULMONARY EMBOLISM WITHOUT ACUTE COR PULMONALE, UNSPECIFIED CHRONICITY (H): ICD-10-CM

## 2019-03-19 DIAGNOSIS — R91.8 PULMONARY NODULES: ICD-10-CM

## 2019-03-19 DIAGNOSIS — J18.9 COMMUNITY ACQUIRED PNEUMONIA OF RIGHT LOWER LOBE OF LUNG: ICD-10-CM

## 2019-03-19 DIAGNOSIS — R04.0 BLOODY NOSE: ICD-10-CM

## 2019-03-19 DIAGNOSIS — R07.89 ATYPICAL CHEST PAIN: ICD-10-CM

## 2019-03-19 ASSESSMENT — MIFFLIN-ST. JEOR: SCORE: 1079.18

## 2019-04-23 ENCOUNTER — COMMUNICATION - HEALTHEAST (OUTPATIENT)
Dept: FAMILY MEDICINE | Facility: CLINIC | Age: 84
End: 2019-04-23

## 2019-04-23 DIAGNOSIS — R39.15 URINARY URGENCY: ICD-10-CM

## 2019-04-23 DIAGNOSIS — M19.079 ARTHROSIS OF FOOT: ICD-10-CM

## 2019-04-23 DIAGNOSIS — Z09 HOSPITAL DISCHARGE FOLLOW-UP: ICD-10-CM

## 2019-04-23 DIAGNOSIS — G47.00 INSOMNIA: ICD-10-CM

## 2019-04-23 DIAGNOSIS — M19.071 ARTHRITIS OF MIDTARSAL JOINT OF RIGHT FOOT: ICD-10-CM

## 2019-04-29 ENCOUNTER — OFFICE VISIT - HEALTHEAST (OUTPATIENT)
Dept: FAMILY MEDICINE | Facility: CLINIC | Age: 84
End: 2019-04-29

## 2019-04-29 DIAGNOSIS — M19.079 ARTHROSIS OF FOOT, UNSPECIFIED LATERALITY: ICD-10-CM

## 2019-04-29 DIAGNOSIS — I26.99 OTHER PULMONARY EMBOLISM WITHOUT ACUTE COR PULMONALE, UNSPECIFIED CHRONICITY (H): ICD-10-CM

## 2019-04-29 DIAGNOSIS — I10 ESSENTIAL HYPERTENSION: ICD-10-CM

## 2019-04-29 DIAGNOSIS — M19.071 ARTHRITIS OF MIDTARSAL JOINT OF RIGHT FOOT: ICD-10-CM

## 2019-04-29 DIAGNOSIS — Z79.899 CONTROLLED SUBSTANCE AGREEMENT SIGNED: ICD-10-CM

## 2019-04-29 ASSESSMENT — MIFFLIN-ST. JEOR: SCORE: 1083.71

## 2019-05-03 ENCOUNTER — COMMUNICATION - HEALTHEAST (OUTPATIENT)
Dept: FAMILY MEDICINE | Facility: CLINIC | Age: 84
End: 2019-05-03

## 2019-05-21 ENCOUNTER — COMMUNICATION - HEALTHEAST (OUTPATIENT)
Dept: FAMILY MEDICINE | Facility: CLINIC | Age: 84
End: 2019-05-21

## 2019-05-21 DIAGNOSIS — G47.00 INSOMNIA: ICD-10-CM

## 2019-05-21 DIAGNOSIS — M19.079 ARTHROSIS OF FOOT: ICD-10-CM

## 2019-05-21 DIAGNOSIS — Z09 HOSPITAL DISCHARGE FOLLOW-UP: ICD-10-CM

## 2019-05-21 DIAGNOSIS — M19.071 ARTHRITIS OF MIDTARSAL JOINT OF RIGHT FOOT: ICD-10-CM

## 2019-05-21 DIAGNOSIS — R39.15 URINARY URGENCY: ICD-10-CM

## 2019-05-24 ENCOUNTER — HOSPITAL ENCOUNTER (OUTPATIENT)
Dept: PALLIATIVE MEDICINE | Facility: OTHER | Age: 84
Discharge: HOME | End: 2019-05-24
Attending: FAMILY MEDICINE

## 2019-05-24 DIAGNOSIS — M79.672 FOOT PAIN, BILATERAL: ICD-10-CM

## 2019-05-24 DIAGNOSIS — M79.671 FOOT PAIN, BILATERAL: ICD-10-CM

## 2019-05-24 DIAGNOSIS — G89.4 CHRONIC PAIN SYNDROME: ICD-10-CM

## 2019-05-24 DIAGNOSIS — F11.90 CHRONIC, CONTINUOUS USE OF OPIOIDS: ICD-10-CM

## 2019-05-24 ASSESSMENT — MIFFLIN-ST. JEOR: SCORE: 1071.79

## 2019-05-27 LAB
6MAM UR QL: NOT DETECTED
7AMINOCLONAZEPAM UR QL: NOT DETECTED
A-OH ALPRAZ UR QL: NOT DETECTED
ALPRAZ UR QL: NOT DETECTED
AMPHET UR QL SCN: NOT DETECTED
BARBITURATES UR QL: NOT DETECTED
BUPRENORPHINE UR QL: NOT DETECTED
BZE UR QL: NOT DETECTED
CARBOXYTHC UR QL: NOT DETECTED
CARISOPRODOL UR QL: NOT DETECTED
CLONAZEPAM UR QL: NOT DETECTED
CODEINE UR QL: NOT DETECTED
CREAT UR-MCNC: 95.4 MG/DL (ref 20–400)
DIAZEPAM UR QL: NOT DETECTED
ETHYL GLUCURONIDE UR QL: NOT DETECTED
FENTANYL UR QL: NOT DETECTED
HYDROCODONE UR QL: NOT DETECTED
HYDROMORPHONE UR QL: NOT DETECTED
LORAZEPAM UR QL: NOT DETECTED
MDA UR QL: NOT DETECTED
MDEA UR QL: NOT DETECTED
MDMA UR QL: NOT DETECTED
ME-PHENIDATE UR QL: NOT DETECTED
MEPERIDINE UR QL: NOT DETECTED
METHADONE UR QL: NOT DETECTED
METHAMPHET UR QL: NOT DETECTED
MIDAZOLAM UR QL SCN: NOT DETECTED
MORPHINE UR QL: NOT DETECTED
NORBUPRENORPHINE UR QL CFM: NOT DETECTED
NORDIAZEPAM UR QL: NOT DETECTED
NORFENTANYL UR QL: NOT DETECTED
NORHYDROCODONE UR QL CFM: NOT DETECTED
NOROXYCODONE UR QL CFM: NOT DETECTED
NOROXYMORPHONE UR QL SCN: NOT DETECTED
OXAZEPAM UR QL: NOT DETECTED
OXYCODONE UR QL: NOT DETECTED
OXYMORPHONE UR QL: NOT DETECTED
PATHOLOGY STUDY: NORMAL
PCP UR QL: NOT DETECTED
PHENTERMINE UR QL: NOT DETECTED
PROPOXYPH UR QL: NOT DETECTED
SERVICE CMNT-IMP: NORMAL
TAPENTADOL UR QL SCN: NOT DETECTED
TAPENTADOL UR QL SCN: NOT DETECTED
TEMAZEPAM UR QL: NOT DETECTED
TRAMADOL UR QL: PRESENT
ZOLPIDEM UR QL: PRESENT

## 2019-05-28 ENCOUNTER — AMBULATORY - HEALTHEAST (OUTPATIENT)
Dept: PALLIATIVE MEDICINE | Facility: OTHER | Age: 84
End: 2019-05-28

## 2019-05-28 ENCOUNTER — COMMUNICATION - HEALTHEAST (OUTPATIENT)
Dept: PALLIATIVE MEDICINE | Facility: OTHER | Age: 84
End: 2019-05-28

## 2019-05-28 DIAGNOSIS — M79.672 PAIN IN BOTH FEET: ICD-10-CM

## 2019-05-28 DIAGNOSIS — M79.672 FOOT PAIN, BILATERAL: ICD-10-CM

## 2019-05-28 DIAGNOSIS — M79.671 FOOT PAIN, BILATERAL: ICD-10-CM

## 2019-05-28 DIAGNOSIS — M79.671 PAIN IN BOTH FEET: ICD-10-CM

## 2019-05-29 ENCOUNTER — COMMUNICATION - HEALTHEAST (OUTPATIENT)
Dept: PALLIATIVE MEDICINE | Facility: OTHER | Age: 84
End: 2019-05-29

## 2019-05-30 ENCOUNTER — OFFICE VISIT - HEALTHEAST (OUTPATIENT)
Dept: FAMILY MEDICINE | Facility: CLINIC | Age: 84
End: 2019-05-30

## 2019-05-30 DIAGNOSIS — M89.8X9 BONE PAIN: ICD-10-CM

## 2019-05-30 DIAGNOSIS — Z79.899 CONTROLLED SUBSTANCE AGREEMENT SIGNED: ICD-10-CM

## 2019-05-30 DIAGNOSIS — E55.9 VITAMIN D DEFICIENCY: ICD-10-CM

## 2019-05-30 DIAGNOSIS — M79.621 PAIN IN BOTH UPPER ARMS: ICD-10-CM

## 2019-05-30 DIAGNOSIS — I26.99 OTHER PULMONARY EMBOLISM WITHOUT ACUTE COR PULMONALE, UNSPECIFIED CHRONICITY (H): ICD-10-CM

## 2019-05-30 DIAGNOSIS — R07.89 CHEST WALL PAIN: ICD-10-CM

## 2019-05-30 DIAGNOSIS — M19.071 ARTHRITIS OF MIDTARSAL JOINT OF RIGHT FOOT: ICD-10-CM

## 2019-05-30 DIAGNOSIS — I10 ESSENTIAL HYPERTENSION: ICD-10-CM

## 2019-05-30 DIAGNOSIS — R52 BODY ACHES: ICD-10-CM

## 2019-05-30 DIAGNOSIS — M19.079 ARTHROSIS OF FOOT, UNSPECIFIED LATERALITY: ICD-10-CM

## 2019-05-30 DIAGNOSIS — R91.8 PULMONARY NODULES: ICD-10-CM

## 2019-05-30 DIAGNOSIS — M79.622 PAIN IN BOTH UPPER ARMS: ICD-10-CM

## 2019-05-30 ASSESSMENT — MIFFLIN-ST. JEOR: SCORE: 1076.46

## 2019-05-31 LAB — 25(OH)D3 SERPL-MCNC: 33.3 NG/ML (ref 30–80)

## 2019-06-03 ENCOUNTER — COMMUNICATION - HEALTHEAST (OUTPATIENT)
Dept: FAMILY MEDICINE | Facility: CLINIC | Age: 84
End: 2019-06-03

## 2019-06-05 ENCOUNTER — COMMUNICATION - HEALTHEAST (OUTPATIENT)
Dept: PALLIATIVE MEDICINE | Facility: CLINIC | Age: 84
End: 2019-06-05

## 2019-06-05 DIAGNOSIS — G89.4 CHRONIC PAIN SYNDROME: ICD-10-CM

## 2019-06-06 ENCOUNTER — COMMUNICATION - HEALTHEAST (OUTPATIENT)
Dept: FAMILY MEDICINE | Facility: CLINIC | Age: 84
End: 2019-06-06

## 2019-06-13 ENCOUNTER — HOSPITAL ENCOUNTER (OUTPATIENT)
Dept: PHARMACY | Facility: OTHER | Age: 84
Discharge: HOME | End: 2019-06-13
Attending: PHARMACIST

## 2019-06-13 DIAGNOSIS — I10 ESSENTIAL HYPERTENSION: ICD-10-CM

## 2019-06-13 DIAGNOSIS — G47.00 PERSISTENT INSOMNIA: ICD-10-CM

## 2019-06-13 DIAGNOSIS — I50.32 CHRONIC DIASTOLIC CONGESTIVE HEART FAILURE (H): ICD-10-CM

## 2019-06-13 DIAGNOSIS — M17.10 PRIMARY LOCALIZED OSTEOARTHROSIS OF LOWER LEG, UNSPECIFIED LATERALITY: ICD-10-CM

## 2019-06-13 DIAGNOSIS — I82.4Z9 DEEP VEIN THROMBOSIS (DVT) OF DISTAL VEIN OF LOWER EXTREMITY, UNSPECIFIED CHRONICITY, UNSPECIFIED LATERALITY (H): ICD-10-CM

## 2019-06-13 DIAGNOSIS — N95.2 VAGINAL ATROPHY: ICD-10-CM

## 2019-06-13 DIAGNOSIS — E55.9 VITAMIN D DEFICIENCY: ICD-10-CM

## 2019-06-13 ASSESSMENT — MIFFLIN-ST. JEOR: SCORE: 1062.72

## 2019-06-17 ENCOUNTER — COMMUNICATION - HEALTHEAST (OUTPATIENT)
Dept: FAMILY MEDICINE | Facility: CLINIC | Age: 84
End: 2019-06-17

## 2019-06-17 DIAGNOSIS — M19.079 ARTHROSIS OF FOOT: ICD-10-CM

## 2019-06-17 DIAGNOSIS — Z09 HOSPITAL DISCHARGE FOLLOW-UP: ICD-10-CM

## 2019-06-17 DIAGNOSIS — G47.00 INSOMNIA: ICD-10-CM

## 2019-06-17 DIAGNOSIS — M19.071 ARTHRITIS OF MIDTARSAL JOINT OF RIGHT FOOT: ICD-10-CM

## 2019-06-17 DIAGNOSIS — R39.15 URINARY URGENCY: ICD-10-CM

## 2019-06-19 ENCOUNTER — HOSPITAL ENCOUNTER (OUTPATIENT)
Dept: CT IMAGING | Facility: HOSPITAL | Age: 84
Discharge: HOME | End: 2019-06-19
Attending: FAMILY MEDICINE

## 2019-06-19 DIAGNOSIS — I26.99 OTHER PULMONARY EMBOLISM WITHOUT ACUTE COR PULMONALE, UNSPECIFIED CHRONICITY (H): ICD-10-CM

## 2019-06-20 ENCOUNTER — COMMUNICATION - HEALTHEAST (OUTPATIENT)
Dept: FAMILY MEDICINE | Facility: CLINIC | Age: 84
End: 2019-06-20

## 2019-07-02 ENCOUNTER — HOSPITAL ENCOUNTER (OUTPATIENT)
Dept: PALLIATIVE MEDICINE | Facility: OTHER | Age: 84
Discharge: HOME | End: 2019-07-02
Attending: PHYSICIAN ASSISTANT

## 2019-07-02 DIAGNOSIS — F11.90 CHRONIC, CONTINUOUS USE OF OPIOIDS: ICD-10-CM

## 2019-07-02 DIAGNOSIS — M19.079 ARTHROSIS OF FOOT, UNSPECIFIED LATERALITY: ICD-10-CM

## 2019-07-02 DIAGNOSIS — G89.4 CHRONIC PAIN SYNDROME: ICD-10-CM

## 2019-07-02 ASSESSMENT — MIFFLIN-ST. JEOR: SCORE: 1062.72

## 2019-07-10 ENCOUNTER — COMMUNICATION - HEALTHEAST (OUTPATIENT)
Dept: FAMILY MEDICINE | Facility: CLINIC | Age: 84
End: 2019-07-10

## 2019-07-10 DIAGNOSIS — I82.4Z9 DEEP VEIN THROMBOSIS (DVT) OF DISTAL VEIN OF LOWER EXTREMITY, UNSPECIFIED CHRONICITY, UNSPECIFIED LATERALITY (H): ICD-10-CM

## 2019-07-10 DIAGNOSIS — Z09 HOSPITAL DISCHARGE FOLLOW-UP: ICD-10-CM

## 2019-07-15 ENCOUNTER — COMMUNICATION - HEALTHEAST (OUTPATIENT)
Dept: FAMILY MEDICINE | Facility: CLINIC | Age: 84
End: 2019-07-15

## 2019-07-15 DIAGNOSIS — Z09 HOSPITAL DISCHARGE FOLLOW-UP: ICD-10-CM

## 2019-07-15 DIAGNOSIS — M19.071 ARTHRITIS OF MIDTARSAL JOINT OF RIGHT FOOT: ICD-10-CM

## 2019-07-15 DIAGNOSIS — G47.00 INSOMNIA: ICD-10-CM

## 2019-07-15 DIAGNOSIS — R39.15 URINARY URGENCY: ICD-10-CM

## 2019-07-15 DIAGNOSIS — M19.079 ARTHROSIS OF FOOT: ICD-10-CM

## 2019-07-29 ENCOUNTER — OFFICE VISIT - HEALTHEAST (OUTPATIENT)
Dept: FAMILY MEDICINE | Facility: CLINIC | Age: 84
End: 2019-07-29

## 2019-07-29 DIAGNOSIS — I10 ESSENTIAL HYPERTENSION: ICD-10-CM

## 2019-07-29 DIAGNOSIS — E55.9 VITAMIN D DEFICIENCY: ICD-10-CM

## 2019-07-29 DIAGNOSIS — M19.071 ARTHRITIS OF MIDTARSAL JOINT OF RIGHT FOOT: ICD-10-CM

## 2019-07-29 DIAGNOSIS — M19.079 ARTHROSIS OF FOOT, UNSPECIFIED LATERALITY: ICD-10-CM

## 2019-07-29 DIAGNOSIS — I26.99 OTHER PULMONARY EMBOLISM WITHOUT ACUTE COR PULMONALE, UNSPECIFIED CHRONICITY (H): ICD-10-CM

## 2019-07-29 DIAGNOSIS — R91.8 PULMONARY NODULES: ICD-10-CM

## 2019-07-29 ASSESSMENT — MIFFLIN-ST. JEOR: SCORE: 1087.29

## 2019-08-12 ENCOUNTER — COMMUNICATION - HEALTHEAST (OUTPATIENT)
Dept: PALLIATIVE MEDICINE | Facility: OTHER | Age: 84
End: 2019-08-12

## 2019-08-12 ENCOUNTER — COMMUNICATION - HEALTHEAST (OUTPATIENT)
Dept: FAMILY MEDICINE | Facility: CLINIC | Age: 84
End: 2019-08-12

## 2019-08-12 DIAGNOSIS — Z09 HOSPITAL DISCHARGE FOLLOW-UP: ICD-10-CM

## 2019-08-12 DIAGNOSIS — G47.00 INSOMNIA: ICD-10-CM

## 2019-08-12 DIAGNOSIS — M17.10 PRIMARY LOCALIZED OSTEOARTHROSIS OF LOWER LEG, UNSPECIFIED LATERALITY: ICD-10-CM

## 2019-08-12 DIAGNOSIS — M19.071 ARTHRITIS OF MIDTARSAL JOINT OF RIGHT FOOT: ICD-10-CM

## 2019-08-12 DIAGNOSIS — M19.079 ARTHROSIS OF FOOT: ICD-10-CM

## 2019-08-12 DIAGNOSIS — R39.15 URINARY URGENCY: ICD-10-CM

## 2019-09-09 ENCOUNTER — COMMUNICATION - HEALTHEAST (OUTPATIENT)
Dept: FAMILY MEDICINE | Facility: CLINIC | Age: 84
End: 2019-09-09

## 2019-09-09 ENCOUNTER — AMBULATORY - HEALTHEAST (OUTPATIENT)
Dept: FAMILY MEDICINE | Facility: CLINIC | Age: 84
End: 2019-09-09

## 2019-09-09 DIAGNOSIS — M19.079 ARTHROSIS OF FOOT: ICD-10-CM

## 2019-09-09 DIAGNOSIS — Z09 HOSPITAL DISCHARGE FOLLOW-UP: ICD-10-CM

## 2019-09-09 DIAGNOSIS — M19.071 ARTHRITIS OF MIDTARSAL JOINT OF RIGHT FOOT: ICD-10-CM

## 2019-09-09 DIAGNOSIS — G47.00 INSOMNIA: ICD-10-CM

## 2019-09-09 DIAGNOSIS — R39.15 URINARY URGENCY: ICD-10-CM

## 2019-09-10 ENCOUNTER — OFFICE VISIT - HEALTHEAST (OUTPATIENT)
Dept: CARDIOLOGY | Facility: CLINIC | Age: 84
End: 2019-09-10

## 2019-09-10 ENCOUNTER — COMMUNICATION - HEALTHEAST (OUTPATIENT)
Dept: CARDIOLOGY | Facility: CLINIC | Age: 84
End: 2019-09-10

## 2019-09-10 DIAGNOSIS — R00.2 PALPITATIONS: ICD-10-CM

## 2019-09-10 DIAGNOSIS — I49.9 IRREGULAR HEART RHYTHM: ICD-10-CM

## 2019-09-10 DIAGNOSIS — I10 ESSENTIAL HYPERTENSION: ICD-10-CM

## 2019-09-10 DIAGNOSIS — I50.32 CHRONIC DIASTOLIC CONGESTIVE HEART FAILURE (H): ICD-10-CM

## 2019-09-10 DIAGNOSIS — E78.00 PURE HYPERCHOLESTEROLEMIA: ICD-10-CM

## 2019-09-10 ASSESSMENT — MIFFLIN-ST. JEOR: SCORE: 1085.4

## 2019-09-13 ENCOUNTER — HOSPITAL ENCOUNTER (OUTPATIENT)
Dept: CARDIOLOGY | Facility: HOSPITAL | Age: 84
Discharge: HOME | End: 2019-09-13
Attending: INTERNAL MEDICINE

## 2019-09-13 DIAGNOSIS — I49.9 IRREGULAR HEART RHYTHM: ICD-10-CM

## 2019-09-13 DIAGNOSIS — R00.2 PALPITATIONS: ICD-10-CM

## 2019-09-19 ENCOUNTER — COMMUNICATION - HEALTHEAST (OUTPATIENT)
Dept: CARDIOLOGY | Facility: CLINIC | Age: 84
End: 2019-09-19

## 2019-09-19 DIAGNOSIS — I49.9 IRREGULAR HEART RHYTHM: ICD-10-CM

## 2019-10-01 ENCOUNTER — HOSPITAL ENCOUNTER (OUTPATIENT)
Dept: PALLIATIVE MEDICINE | Facility: OTHER | Age: 84
Discharge: HOME | End: 2019-10-01
Attending: PHYSICIAN ASSISTANT

## 2019-10-01 DIAGNOSIS — F11.90 CHRONIC, CONTINUOUS USE OF OPIOIDS: ICD-10-CM

## 2019-10-01 DIAGNOSIS — M79.672 FOOT PAIN, BILATERAL: ICD-10-CM

## 2019-10-01 DIAGNOSIS — G89.4 CHRONIC PAIN SYNDROME: ICD-10-CM

## 2019-10-01 DIAGNOSIS — M17.10 PRIMARY LOCALIZED OSTEOARTHROSIS OF LOWER LEG, UNSPECIFIED LATERALITY: ICD-10-CM

## 2019-10-01 DIAGNOSIS — M79.671 FOOT PAIN, BILATERAL: ICD-10-CM

## 2019-10-01 ASSESSMENT — MIFFLIN-ST. JEOR: SCORE: 1085.4

## 2019-10-07 ENCOUNTER — COMMUNICATION - HEALTHEAST (OUTPATIENT)
Dept: PALLIATIVE MEDICINE | Facility: OTHER | Age: 84
End: 2019-10-07

## 2019-10-07 ENCOUNTER — COMMUNICATION - HEALTHEAST (OUTPATIENT)
Dept: FAMILY MEDICINE | Facility: CLINIC | Age: 84
End: 2019-10-07

## 2019-10-07 DIAGNOSIS — M19.071 ARTHRITIS OF MIDTARSAL JOINT OF RIGHT FOOT: ICD-10-CM

## 2019-10-07 DIAGNOSIS — M19.079 ARTHROSIS OF FOOT: ICD-10-CM

## 2019-10-07 DIAGNOSIS — M17.10 PRIMARY LOCALIZED OSTEOARTHROSIS OF LOWER LEG, UNSPECIFIED LATERALITY: ICD-10-CM

## 2019-10-07 DIAGNOSIS — R39.15 URINARY URGENCY: ICD-10-CM

## 2019-10-07 DIAGNOSIS — G47.00 INSOMNIA: ICD-10-CM

## 2019-10-08 ENCOUNTER — OFFICE VISIT - HEALTHEAST (OUTPATIENT)
Dept: CARDIOLOGY | Facility: CLINIC | Age: 84
End: 2019-10-08

## 2019-10-08 ENCOUNTER — COMMUNICATION - HEALTHEAST (OUTPATIENT)
Dept: FAMILY MEDICINE | Facility: CLINIC | Age: 84
End: 2019-10-08

## 2019-10-08 DIAGNOSIS — I49.9 IRREGULAR HEART RHYTHM: ICD-10-CM

## 2019-10-08 DIAGNOSIS — I10 ESSENTIAL HYPERTENSION: ICD-10-CM

## 2019-10-08 DIAGNOSIS — R00.1 SINUS BRADYCARDIA: ICD-10-CM

## 2019-10-08 DIAGNOSIS — E78.00 PURE HYPERCHOLESTEROLEMIA: ICD-10-CM

## 2019-10-08 DIAGNOSIS — I26.99 OTHER PULMONARY EMBOLISM WITHOUT ACUTE COR PULMONALE, UNSPECIFIED CHRONICITY (H): ICD-10-CM

## 2019-10-08 DIAGNOSIS — I50.32 CHRONIC DIASTOLIC CONGESTIVE HEART FAILURE (H): ICD-10-CM

## 2019-10-08 ASSESSMENT — MIFFLIN-ST. JEOR: SCORE: 1085.4

## 2019-10-14 ENCOUNTER — OFFICE VISIT - HEALTHEAST (OUTPATIENT)
Dept: FAMILY MEDICINE | Facility: CLINIC | Age: 84
End: 2019-10-14

## 2019-10-14 DIAGNOSIS — R91.8 PULMONARY NODULES: ICD-10-CM

## 2019-10-14 DIAGNOSIS — I50.32 CHRONIC DIASTOLIC CONGESTIVE HEART FAILURE (H): ICD-10-CM

## 2019-10-14 DIAGNOSIS — I49.9 IRREGULAR HEART RHYTHM: ICD-10-CM

## 2019-10-14 DIAGNOSIS — Z79.899 CONTROLLED SUBSTANCE AGREEMENT SIGNED: ICD-10-CM

## 2019-10-14 DIAGNOSIS — R00.1 SINUS BRADYCARDIA: ICD-10-CM

## 2019-10-14 DIAGNOSIS — I10 ESSENTIAL HYPERTENSION: ICD-10-CM

## 2019-10-14 DIAGNOSIS — Z23 NEED FOR TD VACCINE: ICD-10-CM

## 2019-10-14 DIAGNOSIS — Z23 NEED FOR IMMUNIZATION AGAINST INFLUENZA: ICD-10-CM

## 2019-10-14 DIAGNOSIS — E55.9 VITAMIN D DEFICIENCY: ICD-10-CM

## 2019-10-14 ASSESSMENT — MIFFLIN-ST. JEOR: SCORE: 1095.96

## 2019-10-28 ENCOUNTER — HOSPITAL ENCOUNTER (OUTPATIENT)
Dept: CT IMAGING | Facility: HOSPITAL | Age: 84
Discharge: HOME | End: 2019-10-28
Attending: FAMILY MEDICINE

## 2019-10-28 DIAGNOSIS — R91.8 PULMONARY NODULES: ICD-10-CM

## 2019-10-29 ENCOUNTER — COMMUNICATION - HEALTHEAST (OUTPATIENT)
Dept: FAMILY MEDICINE | Facility: CLINIC | Age: 84
End: 2019-10-29

## 2019-11-04 ENCOUNTER — COMMUNICATION - HEALTHEAST (OUTPATIENT)
Dept: FAMILY MEDICINE | Facility: CLINIC | Age: 84
End: 2019-11-04

## 2019-11-04 DIAGNOSIS — R39.15 URINARY URGENCY: ICD-10-CM

## 2019-11-04 DIAGNOSIS — G47.00 INSOMNIA: ICD-10-CM

## 2019-11-05 ENCOUNTER — COMMUNICATION - HEALTHEAST (OUTPATIENT)
Dept: FAMILY MEDICINE | Facility: CLINIC | Age: 84
End: 2019-11-05

## 2019-11-05 DIAGNOSIS — M19.079 ARTHROSIS OF FOOT: ICD-10-CM

## 2019-11-05 DIAGNOSIS — M19.071 ARTHRITIS OF MIDTARSAL JOINT OF RIGHT FOOT: ICD-10-CM

## 2019-11-18 ENCOUNTER — COMMUNICATION - HEALTHEAST (OUTPATIENT)
Dept: PALLIATIVE MEDICINE | Facility: OTHER | Age: 84
End: 2019-11-18

## 2019-11-18 DIAGNOSIS — M79.671 FOOT PAIN, BILATERAL: ICD-10-CM

## 2019-11-18 DIAGNOSIS — M79.672 FOOT PAIN, BILATERAL: ICD-10-CM

## 2019-11-19 ENCOUNTER — COMMUNICATION - HEALTHEAST (OUTPATIENT)
Dept: SCHEDULING | Facility: CLINIC | Age: 84
End: 2019-11-19

## 2019-11-19 ENCOUNTER — OFFICE VISIT - HEALTHEAST (OUTPATIENT)
Dept: FAMILY MEDICINE | Facility: CLINIC | Age: 84
End: 2019-11-19

## 2019-11-19 DIAGNOSIS — R19.7 DIARRHEA, UNSPECIFIED TYPE: ICD-10-CM

## 2019-11-19 ASSESSMENT — MIFFLIN-ST. JEOR: SCORE: 1081.9

## 2019-11-20 ENCOUNTER — AMBULATORY - HEALTHEAST (OUTPATIENT)
Dept: LAB | Facility: CLINIC | Age: 84
End: 2019-11-20

## 2019-11-20 DIAGNOSIS — R19.7 DIARRHEA, UNSPECIFIED TYPE: ICD-10-CM

## 2019-11-20 LAB
C DIFF TOX B STL QL: NEGATIVE
RIBOTYPE 027/NAP1/BI: NORMAL

## 2019-11-21 ENCOUNTER — COMMUNICATION - HEALTHEAST (OUTPATIENT)
Dept: SCHEDULING | Facility: CLINIC | Age: 84
End: 2019-11-21

## 2019-11-21 LAB
SHIGA TOXIN 1: NEGATIVE
SHIGA TOXIN 2: NEGATIVE

## 2019-11-23 LAB — BACTERIA SPEC CULT: NORMAL

## 2019-11-25 ENCOUNTER — COMMUNICATION - HEALTHEAST (OUTPATIENT)
Dept: CARE COORDINATION | Facility: CLINIC | Age: 84
End: 2019-11-25

## 2019-12-02 ENCOUNTER — RECORDS - HEALTHEAST (OUTPATIENT)
Dept: ADMINISTRATIVE | Facility: OTHER | Age: 84
End: 2019-12-02

## 2019-12-02 ENCOUNTER — COMMUNICATION - HEALTHEAST (OUTPATIENT)
Dept: FAMILY MEDICINE | Facility: CLINIC | Age: 84
End: 2019-12-02

## 2019-12-02 ENCOUNTER — OFFICE VISIT - HEALTHEAST (OUTPATIENT)
Dept: FAMILY MEDICINE | Facility: CLINIC | Age: 84
End: 2019-12-02

## 2019-12-02 DIAGNOSIS — T50.905A ADVERSE EFFECT OF DRUG, INITIAL ENCOUNTER: ICD-10-CM

## 2019-12-02 DIAGNOSIS — Z79.899 CONTROLLED SUBSTANCE AGREEMENT SIGNED: ICD-10-CM

## 2019-12-02 DIAGNOSIS — I25.119 ATHEROSCLEROSIS OF NATIVE CORONARY ARTERY WITH ANGINA PECTORIS, UNSPECIFIED WHETHER NATIVE OR TRANSPLANTED HEART (H): ICD-10-CM

## 2019-12-02 DIAGNOSIS — R19.7 DIARRHEA, UNSPECIFIED TYPE: ICD-10-CM

## 2019-12-02 DIAGNOSIS — E87.6 HYPOKALEMIA: ICD-10-CM

## 2019-12-02 DIAGNOSIS — M19.071 ARTHRITIS OF MIDTARSAL JOINT OF RIGHT FOOT: ICD-10-CM

## 2019-12-02 DIAGNOSIS — Z09 HOSPITAL DISCHARGE FOLLOW-UP: ICD-10-CM

## 2019-12-02 DIAGNOSIS — M19.079 ARTHROSIS OF FOOT: ICD-10-CM

## 2019-12-02 DIAGNOSIS — G47.00 INSOMNIA: ICD-10-CM

## 2019-12-02 LAB
ALBUMIN SERPL-MCNC: 3.5 G/DL (ref 3.5–5)
ALP SERPL-CCNC: 150 U/L (ref 45–120)
ALT SERPL W P-5'-P-CCNC: 30 U/L (ref 0–45)
ANION GAP SERPL CALCULATED.3IONS-SCNC: 9 MMOL/L (ref 5–18)
AST SERPL W P-5'-P-CCNC: 21 U/L (ref 0–40)
BILIRUB SERPL-MCNC: 0.5 MG/DL (ref 0–1)
BUN SERPL-MCNC: 30 MG/DL (ref 8–28)
CALCIUM SERPL-MCNC: 9.9 MG/DL (ref 8.5–10.5)
CHLORIDE BLD-SCNC: 107 MMOL/L (ref 98–107)
CO2 SERPL-SCNC: 25 MMOL/L (ref 22–31)
CREAT SERPL-MCNC: 1.35 MG/DL (ref 0.6–1.1)
GFR SERPL CREATININE-BSD FRML MDRD: 37 ML/MIN/1.73M2
GLUCOSE BLD-MCNC: 99 MG/DL (ref 70–125)
POTASSIUM BLD-SCNC: 4.8 MMOL/L (ref 3.5–5)
PROT SERPL-MCNC: 6.8 G/DL (ref 6–8)
SODIUM SERPL-SCNC: 141 MMOL/L (ref 136–145)

## 2019-12-02 ASSESSMENT — MIFFLIN-ST. JEOR: SCORE: 1071.01

## 2019-12-16 ENCOUNTER — RECORDS - HEALTHEAST (OUTPATIENT)
Dept: ADMINISTRATIVE | Facility: OTHER | Age: 84
End: 2019-12-16

## 2019-12-30 ENCOUNTER — COMMUNICATION - HEALTHEAST (OUTPATIENT)
Dept: FAMILY MEDICINE | Facility: CLINIC | Age: 84
End: 2019-12-30

## 2019-12-30 DIAGNOSIS — G47.00 INSOMNIA: ICD-10-CM

## 2019-12-30 DIAGNOSIS — M19.071 ARTHRITIS OF MIDTARSAL JOINT OF RIGHT FOOT: ICD-10-CM

## 2019-12-30 DIAGNOSIS — Z79.899 CONTROLLED SUBSTANCE AGREEMENT SIGNED: ICD-10-CM

## 2019-12-30 DIAGNOSIS — M19.079 ARTHROSIS OF FOOT: ICD-10-CM

## 2020-01-27 ENCOUNTER — COMMUNICATION - HEALTHEAST (OUTPATIENT)
Dept: FAMILY MEDICINE | Facility: CLINIC | Age: 85
End: 2020-01-27

## 2020-01-27 DIAGNOSIS — M19.071 ARTHRITIS OF MIDTARSAL JOINT OF RIGHT FOOT: ICD-10-CM

## 2020-01-27 DIAGNOSIS — Z79.899 CONTROLLED SUBSTANCE AGREEMENT SIGNED: ICD-10-CM

## 2020-01-27 DIAGNOSIS — M19.079 ARTHROSIS OF FOOT: ICD-10-CM

## 2020-01-30 ENCOUNTER — OFFICE VISIT - HEALTHEAST (OUTPATIENT)
Dept: FAMILY MEDICINE | Facility: CLINIC | Age: 85
End: 2020-01-30

## 2020-01-30 DIAGNOSIS — I25.119 ATHEROSCLEROSIS OF NATIVE CORONARY ARTERY WITH ANGINA PECTORIS, UNSPECIFIED WHETHER NATIVE OR TRANSPLANTED HEART (H): ICD-10-CM

## 2020-01-30 DIAGNOSIS — I10 BENIGN ESSENTIAL HYPERTENSION: ICD-10-CM

## 2020-01-30 DIAGNOSIS — Z00.01 ENCOUNTER FOR GENERAL ADULT MEDICAL EXAMINATION WITH ABNORMAL FINDINGS: ICD-10-CM

## 2020-01-30 DIAGNOSIS — M19.079 ARTHROSIS OF FOOT, UNSPECIFIED LATERALITY: ICD-10-CM

## 2020-01-30 DIAGNOSIS — Z66 DNR (DO NOT RESUSCITATE): ICD-10-CM

## 2020-01-30 DIAGNOSIS — B35.4 TINEA CORPORIS: ICD-10-CM

## 2020-01-30 DIAGNOSIS — I48.0 PAROXYSMAL ATRIAL FIBRILLATION (H): ICD-10-CM

## 2020-01-30 DIAGNOSIS — E55.9 VITAMIN D DEFICIENCY: ICD-10-CM

## 2020-01-30 DIAGNOSIS — Z79.899 CONTROLLED SUBSTANCE AGREEMENT SIGNED: ICD-10-CM

## 2020-01-30 DIAGNOSIS — F51.04 PSYCHOPHYSIOLOGICAL INSOMNIA: ICD-10-CM

## 2020-01-30 DIAGNOSIS — E04.1 THYROID NODULE: ICD-10-CM

## 2020-01-30 DIAGNOSIS — I50.32 CHRONIC DIASTOLIC CONGESTIVE HEART FAILURE (H): ICD-10-CM

## 2020-01-30 DIAGNOSIS — I26.99 OTHER PULMONARY EMBOLISM WITHOUT ACUTE COR PULMONALE, UNSPECIFIED CHRONICITY (H): ICD-10-CM

## 2020-01-30 ASSESSMENT — PATIENT HEALTH QUESTIONNAIRE - PHQ9: SUM OF ALL RESPONSES TO PHQ QUESTIONS 1-9: 4

## 2020-01-30 ASSESSMENT — ANXIETY QUESTIONNAIRES
1. FEELING NERVOUS, ANXIOUS, OR ON EDGE: NOT AT ALL
2. NOT BEING ABLE TO STOP OR CONTROL WORRYING: NOT AT ALL

## 2020-01-30 ASSESSMENT — MIFFLIN-ST. JEOR: SCORE: 1095.48

## 2020-02-24 ENCOUNTER — COMMUNICATION - HEALTHEAST (OUTPATIENT)
Dept: FAMILY MEDICINE | Facility: CLINIC | Age: 85
End: 2020-02-24

## 2020-02-24 DIAGNOSIS — G47.00 INSOMNIA: ICD-10-CM

## 2020-02-24 DIAGNOSIS — I10 ESSENTIAL HYPERTENSION: ICD-10-CM

## 2020-02-24 DIAGNOSIS — Z09 HOSPITAL DISCHARGE FOLLOW-UP: ICD-10-CM

## 2020-02-24 DIAGNOSIS — Z79.899 CONTROLLED SUBSTANCE AGREEMENT SIGNED: ICD-10-CM

## 2020-03-02 ENCOUNTER — COMMUNICATION - HEALTHEAST (OUTPATIENT)
Dept: FAMILY MEDICINE | Facility: CLINIC | Age: 85
End: 2020-03-02

## 2020-03-02 DIAGNOSIS — M19.079 ARTHROSIS OF FOOT: ICD-10-CM

## 2020-03-02 DIAGNOSIS — Z79.899 CONTROLLED SUBSTANCE AGREEMENT SIGNED: ICD-10-CM

## 2020-03-02 DIAGNOSIS — M19.071 ARTHRITIS OF MIDTARSAL JOINT OF RIGHT FOOT: ICD-10-CM

## 2020-03-18 ENCOUNTER — COMMUNICATION - HEALTHEAST (OUTPATIENT)
Dept: FAMILY MEDICINE | Facility: CLINIC | Age: 85
End: 2020-03-18

## 2020-03-18 DIAGNOSIS — G47.00 INSOMNIA: ICD-10-CM

## 2020-03-18 DIAGNOSIS — Z79.899 CONTROLLED SUBSTANCE AGREEMENT SIGNED: ICD-10-CM

## 2020-03-31 ENCOUNTER — COMMUNICATION - HEALTHEAST (OUTPATIENT)
Dept: FAMILY MEDICINE | Facility: CLINIC | Age: 85
End: 2020-03-31

## 2020-03-31 DIAGNOSIS — M19.071 ARTHRITIS OF MIDTARSAL JOINT OF RIGHT FOOT: ICD-10-CM

## 2020-03-31 DIAGNOSIS — M19.079 ARTHROSIS OF FOOT: ICD-10-CM

## 2020-03-31 DIAGNOSIS — Z79.899 CONTROLLED SUBSTANCE AGREEMENT SIGNED: ICD-10-CM

## 2020-04-22 ENCOUNTER — COMMUNICATION - HEALTHEAST (OUTPATIENT)
Dept: FAMILY MEDICINE | Facility: CLINIC | Age: 85
End: 2020-04-22

## 2020-04-22 DIAGNOSIS — G47.00 INSOMNIA: ICD-10-CM

## 2020-04-22 DIAGNOSIS — Z79.899 CONTROLLED SUBSTANCE AGREEMENT SIGNED: ICD-10-CM

## 2020-04-30 ENCOUNTER — COMMUNICATION - HEALTHEAST (OUTPATIENT)
Dept: FAMILY MEDICINE | Facility: CLINIC | Age: 85
End: 2020-04-30

## 2020-04-30 DIAGNOSIS — Z79.899 CONTROLLED SUBSTANCE AGREEMENT SIGNED: ICD-10-CM

## 2020-04-30 DIAGNOSIS — M19.071 ARTHRITIS OF MIDTARSAL JOINT OF RIGHT FOOT: ICD-10-CM

## 2020-04-30 DIAGNOSIS — M19.079 ARTHROSIS OF FOOT: ICD-10-CM

## 2020-05-20 ENCOUNTER — COMMUNICATION - HEALTHEAST (OUTPATIENT)
Dept: FAMILY MEDICINE | Facility: CLINIC | Age: 85
End: 2020-05-20

## 2020-05-20 DIAGNOSIS — G47.00 INSOMNIA: ICD-10-CM

## 2020-05-20 DIAGNOSIS — Z79.899 CONTROLLED SUBSTANCE AGREEMENT SIGNED: ICD-10-CM

## 2020-05-28 ENCOUNTER — COMMUNICATION - HEALTHEAST (OUTPATIENT)
Dept: FAMILY MEDICINE | Facility: CLINIC | Age: 85
End: 2020-05-28

## 2020-05-28 DIAGNOSIS — Z79.899 CONTROLLED SUBSTANCE AGREEMENT SIGNED: ICD-10-CM

## 2020-05-28 DIAGNOSIS — M19.079 ARTHROSIS OF FOOT: ICD-10-CM

## 2020-05-28 DIAGNOSIS — M19.071 ARTHRITIS OF MIDTARSAL JOINT OF RIGHT FOOT: ICD-10-CM

## 2020-06-18 ENCOUNTER — COMMUNICATION - HEALTHEAST (OUTPATIENT)
Dept: FAMILY MEDICINE | Facility: CLINIC | Age: 85
End: 2020-06-18

## 2020-06-18 DIAGNOSIS — G47.00 INSOMNIA: ICD-10-CM

## 2020-06-18 DIAGNOSIS — Z79.899 CONTROLLED SUBSTANCE AGREEMENT SIGNED: ICD-10-CM

## 2020-06-25 ENCOUNTER — COMMUNICATION - HEALTHEAST (OUTPATIENT)
Dept: FAMILY MEDICINE | Facility: CLINIC | Age: 85
End: 2020-06-25

## 2020-06-25 DIAGNOSIS — M19.071 ARTHRITIS OF MIDTARSAL JOINT OF RIGHT FOOT: ICD-10-CM

## 2020-06-25 DIAGNOSIS — M19.079 ARTHROSIS OF FOOT: ICD-10-CM

## 2020-06-25 DIAGNOSIS — Z79.899 CONTROLLED SUBSTANCE AGREEMENT SIGNED: ICD-10-CM

## 2020-07-16 ENCOUNTER — OFFICE VISIT - HEALTHEAST (OUTPATIENT)
Dept: FAMILY MEDICINE | Facility: CLINIC | Age: 85
End: 2020-07-16

## 2020-07-16 DIAGNOSIS — J32.9 SINUSITIS, UNSPECIFIED CHRONICITY, UNSPECIFIED LOCATION: ICD-10-CM

## 2020-07-16 DIAGNOSIS — M17.12 PRIMARY OSTEOARTHRITIS OF LEFT KNEE: ICD-10-CM

## 2020-07-16 DIAGNOSIS — M19.079 ARTHROSIS OF FOOT, UNSPECIFIED LATERALITY: ICD-10-CM

## 2020-07-16 DIAGNOSIS — Z79.899 CONTROLLED SUBSTANCE AGREEMENT SIGNED: ICD-10-CM

## 2020-07-16 DIAGNOSIS — M17.12 PRIMARY LOCALIZED OSTEOARTHROSIS OF LEFT LOWER LEG: ICD-10-CM

## 2020-07-16 DIAGNOSIS — G89.29 OTHER CHRONIC PAIN: ICD-10-CM

## 2020-07-16 DIAGNOSIS — G47.00 INSOMNIA: ICD-10-CM

## 2020-07-24 ENCOUNTER — COMMUNICATION - HEALTHEAST (OUTPATIENT)
Dept: FAMILY MEDICINE | Facility: CLINIC | Age: 85
End: 2020-07-24

## 2020-07-24 DIAGNOSIS — M19.079 ARTHROSIS OF FOOT: ICD-10-CM

## 2020-07-24 DIAGNOSIS — Z79.899 CONTROLLED SUBSTANCE AGREEMENT SIGNED: ICD-10-CM

## 2020-07-24 DIAGNOSIS — M19.071 ARTHRITIS OF MIDTARSAL JOINT OF RIGHT FOOT: ICD-10-CM

## 2020-07-31 ENCOUNTER — COMMUNICATION - HEALTHEAST (OUTPATIENT)
Dept: FAMILY MEDICINE | Facility: CLINIC | Age: 85
End: 2020-07-31

## 2020-07-31 DIAGNOSIS — M19.079 ARTHROSIS OF FOOT: ICD-10-CM

## 2020-07-31 DIAGNOSIS — Z79.899 CONTROLLED SUBSTANCE AGREEMENT SIGNED: ICD-10-CM

## 2020-07-31 DIAGNOSIS — M19.071 ARTHRITIS OF MIDTARSAL JOINT OF RIGHT FOOT: ICD-10-CM

## 2020-08-25 ENCOUNTER — COMMUNICATION - HEALTHEAST (OUTPATIENT)
Dept: FAMILY MEDICINE | Facility: CLINIC | Age: 85
End: 2020-08-25

## 2020-08-25 DIAGNOSIS — M19.071 ARTHRITIS OF MIDTARSAL JOINT OF RIGHT FOOT: ICD-10-CM

## 2020-08-25 DIAGNOSIS — M19.079 ARTHROSIS OF FOOT, UNSPECIFIED LATERALITY: ICD-10-CM

## 2020-08-25 DIAGNOSIS — Z79.899 CONTROLLED SUBSTANCE AGREEMENT SIGNED: ICD-10-CM

## 2020-08-25 DIAGNOSIS — M19.079 ARTHROSIS OF FOOT: ICD-10-CM

## 2020-08-27 ENCOUNTER — COMMUNICATION - HEALTHEAST (OUTPATIENT)
Dept: FAMILY MEDICINE | Facility: CLINIC | Age: 85
End: 2020-08-27

## 2020-08-27 DIAGNOSIS — Z79.899 CONTROLLED SUBSTANCE AGREEMENT SIGNED: ICD-10-CM

## 2020-08-27 DIAGNOSIS — M19.071 ARTHRITIS OF MIDTARSAL JOINT OF RIGHT FOOT: ICD-10-CM

## 2020-08-27 DIAGNOSIS — M19.079 ARTHROSIS OF FOOT: ICD-10-CM

## 2020-08-27 RX ORDER — DULOXETIN HYDROCHLORIDE 60 MG/1
CAPSULE, DELAYED RELEASE ORAL
Qty: 180 CAPSULE | Refills: 3 | Status: SHIPPED | OUTPATIENT
Start: 2020-08-27 | End: 2021-10-12

## 2020-09-02 ENCOUNTER — COMMUNICATION - HEALTHEAST (OUTPATIENT)
Dept: CARDIOLOGY | Facility: CLINIC | Age: 85
End: 2020-09-02

## 2020-09-03 ENCOUNTER — OFFICE VISIT - HEALTHEAST (OUTPATIENT)
Dept: CARDIOLOGY | Facility: CLINIC | Age: 85
End: 2020-09-03

## 2020-09-03 DIAGNOSIS — I10 BENIGN ESSENTIAL HYPERTENSION: ICD-10-CM

## 2020-09-03 DIAGNOSIS — I48.0 PAROXYSMAL ATRIAL FIBRILLATION (H): ICD-10-CM

## 2020-09-03 DIAGNOSIS — I26.99 OTHER PULMONARY EMBOLISM WITHOUT ACUTE COR PULMONALE, UNSPECIFIED CHRONICITY (H): ICD-10-CM

## 2020-09-03 DIAGNOSIS — I50.33 ACUTE ON CHRONIC HEART FAILURE WITH PRESERVED EJECTION FRACTION (H): ICD-10-CM

## 2020-09-03 DIAGNOSIS — E78.00 PURE HYPERCHOLESTEROLEMIA: ICD-10-CM

## 2020-09-03 ASSESSMENT — MIFFLIN-ST. JEOR: SCORE: 1126.22

## 2020-09-14 ENCOUNTER — COMMUNICATION - HEALTHEAST (OUTPATIENT)
Dept: CARDIOLOGY | Facility: CLINIC | Age: 85
End: 2020-09-14

## 2020-09-14 DIAGNOSIS — Z09 HOSPITAL DISCHARGE FOLLOW-UP: ICD-10-CM

## 2020-09-14 DIAGNOSIS — I10 ESSENTIAL HYPERTENSION: ICD-10-CM

## 2020-09-20 ENCOUNTER — COMMUNICATION - HEALTHEAST (OUTPATIENT)
Dept: FAMILY MEDICINE | Facility: CLINIC | Age: 85
End: 2020-09-20

## 2020-09-20 DIAGNOSIS — I10 ESSENTIAL HYPERTENSION: ICD-10-CM

## 2020-10-21 ENCOUNTER — COMMUNICATION - HEALTHEAST (OUTPATIENT)
Dept: FAMILY MEDICINE | Facility: CLINIC | Age: 85
End: 2020-10-21

## 2020-10-21 DIAGNOSIS — Z79.899 CONTROLLED SUBSTANCE AGREEMENT SIGNED: ICD-10-CM

## 2020-10-21 DIAGNOSIS — M19.079 ARTHROSIS OF FOOT: ICD-10-CM

## 2020-10-21 DIAGNOSIS — M19.071 ARTHRITIS OF MIDTARSAL JOINT OF RIGHT FOOT: ICD-10-CM

## 2020-10-22 ENCOUNTER — AMBULATORY - HEALTHEAST (OUTPATIENT)
Dept: FAMILY MEDICINE | Facility: CLINIC | Age: 85
End: 2020-10-22

## 2020-10-22 DIAGNOSIS — K21.9 GASTROESOPHAGEAL REFLUX DISEASE WITHOUT ESOPHAGITIS: ICD-10-CM

## 2020-10-22 RX ORDER — OMEPRAZOLE 20 MG/1
20 TABLET, DELAYED RELEASE ORAL DAILY PRN
Qty: 30 TABLET | Refills: 3 | Status: SHIPPED | OUTPATIENT
Start: 2020-10-22 | End: 2021-10-04 | Stop reason: ALTCHOICE

## 2020-11-05 ENCOUNTER — COMMUNICATION - HEALTHEAST (OUTPATIENT)
Dept: FAMILY MEDICINE | Facility: CLINIC | Age: 85
End: 2020-11-05

## 2020-11-05 DIAGNOSIS — G47.00 INSOMNIA: ICD-10-CM

## 2020-11-05 DIAGNOSIS — Z79.899 CONTROLLED SUBSTANCE AGREEMENT SIGNED: ICD-10-CM

## 2020-11-10 ENCOUNTER — COMMUNICATION - HEALTHEAST (OUTPATIENT)
Dept: FAMILY MEDICINE | Facility: CLINIC | Age: 85
End: 2020-11-10

## 2020-11-10 DIAGNOSIS — Z09 HOSPITAL DISCHARGE FOLLOW-UP: ICD-10-CM

## 2020-11-10 DIAGNOSIS — I10 ESSENTIAL HYPERTENSION: ICD-10-CM

## 2020-11-14 RX ORDER — LISINOPRIL 40 MG/1
TABLET ORAL
Qty: 90 TABLET | Refills: 2 | Status: SHIPPED | OUTPATIENT
Start: 2020-11-14 | End: 2021-08-09

## 2020-11-14 RX ORDER — HYDROCHLOROTHIAZIDE 25 MG/1
25 TABLET ORAL DAILY
Qty: 90 TABLET | Refills: 2 | Status: SHIPPED | OUTPATIENT
Start: 2020-11-14 | End: 2021-07-25

## 2020-11-27 ENCOUNTER — COMMUNICATION - HEALTHEAST (OUTPATIENT)
Dept: FAMILY MEDICINE | Facility: CLINIC | Age: 85
End: 2020-11-27

## 2020-11-27 DIAGNOSIS — M19.079 ARTHROSIS OF FOOT: ICD-10-CM

## 2020-11-27 DIAGNOSIS — M19.071 ARTHRITIS OF MIDTARSAL JOINT OF RIGHT FOOT: ICD-10-CM

## 2020-11-27 DIAGNOSIS — Z79.899 CONTROLLED SUBSTANCE AGREEMENT SIGNED: ICD-10-CM

## 2020-12-07 ENCOUNTER — COMMUNICATION - HEALTHEAST (OUTPATIENT)
Dept: ADMINISTRATIVE | Facility: CLINIC | Age: 85
End: 2020-12-07

## 2020-12-07 ENCOUNTER — COMMUNICATION - HEALTHEAST (OUTPATIENT)
Dept: FAMILY MEDICINE | Facility: CLINIC | Age: 85
End: 2020-12-07

## 2020-12-07 DIAGNOSIS — Z79.899 CONTROLLED SUBSTANCE AGREEMENT SIGNED: ICD-10-CM

## 2020-12-07 DIAGNOSIS — G47.00 INSOMNIA: ICD-10-CM

## 2020-12-08 ENCOUNTER — COMMUNICATION - HEALTHEAST (OUTPATIENT)
Dept: FAMILY MEDICINE | Facility: CLINIC | Age: 85
End: 2020-12-08

## 2020-12-09 ENCOUNTER — COMMUNICATION - HEALTHEAST (OUTPATIENT)
Dept: FAMILY MEDICINE | Facility: CLINIC | Age: 85
End: 2020-12-09

## 2020-12-17 ENCOUNTER — COMMUNICATION - HEALTHEAST (OUTPATIENT)
Dept: CARDIOLOGY | Facility: CLINIC | Age: 85
End: 2020-12-17

## 2020-12-17 DIAGNOSIS — I49.9 IRREGULAR HEART RHYTHM: ICD-10-CM

## 2020-12-17 RX ORDER — APIXABAN 2.5 MG/1
TABLET, FILM COATED ORAL
Qty: 180 TABLET | Refills: 1 | Status: SHIPPED | OUTPATIENT
Start: 2020-12-17 | End: 2021-08-17

## 2020-12-30 ENCOUNTER — COMMUNICATION - HEALTHEAST (OUTPATIENT)
Dept: FAMILY MEDICINE | Facility: CLINIC | Age: 85
End: 2020-12-30

## 2020-12-30 DIAGNOSIS — M19.079 ARTHROSIS OF FOOT: ICD-10-CM

## 2020-12-30 DIAGNOSIS — Z79.899 CONTROLLED SUBSTANCE AGREEMENT SIGNED: ICD-10-CM

## 2020-12-30 DIAGNOSIS — M19.071 ARTHRITIS OF MIDTARSAL JOINT OF RIGHT FOOT: ICD-10-CM

## 2021-01-07 ENCOUNTER — COMMUNICATION - HEALTHEAST (OUTPATIENT)
Dept: FAMILY MEDICINE | Facility: CLINIC | Age: 86
End: 2021-01-07

## 2021-01-07 DIAGNOSIS — G47.00 INSOMNIA: ICD-10-CM

## 2021-01-07 DIAGNOSIS — Z79.899 CONTROLLED SUBSTANCE AGREEMENT SIGNED: ICD-10-CM

## 2021-01-20 ENCOUNTER — AMBULATORY - HEALTHEAST (OUTPATIENT)
Dept: PALLIATIVE MEDICINE | Facility: OTHER | Age: 86
End: 2021-01-20

## 2021-01-20 ENCOUNTER — HOSPITAL ENCOUNTER (OUTPATIENT)
Dept: PALLIATIVE MEDICINE | Facility: OTHER | Age: 86
Discharge: HOME | End: 2021-01-20
Attending: PHYSICIAN ASSISTANT

## 2021-01-20 ENCOUNTER — COMMUNICATION - HEALTHEAST (OUTPATIENT)
Dept: PALLIATIVE MEDICINE | Facility: OTHER | Age: 86
End: 2021-01-20

## 2021-01-20 ENCOUNTER — HOSPITAL ENCOUNTER (OUTPATIENT)
Dept: RADIOLOGY | Facility: HOSPITAL | Age: 86
Discharge: HOME | End: 2021-01-20
Attending: PHYSICIAN ASSISTANT

## 2021-01-20 DIAGNOSIS — M17.11 PRIMARY OSTEOARTHRITIS OF RIGHT KNEE: ICD-10-CM

## 2021-01-20 DIAGNOSIS — M25.561 CHRONIC PAIN OF RIGHT KNEE: ICD-10-CM

## 2021-01-20 DIAGNOSIS — M25.552 HIP PAIN, LEFT: ICD-10-CM

## 2021-01-20 DIAGNOSIS — G89.29 CHRONIC PAIN OF RIGHT KNEE: ICD-10-CM

## 2021-01-20 DIAGNOSIS — G89.4 CHRONIC PAIN SYNDROME: ICD-10-CM

## 2021-01-20 ASSESSMENT — MIFFLIN-ST. JEOR: SCORE: 1126.22

## 2021-01-27 ENCOUNTER — COMMUNICATION - HEALTHEAST (OUTPATIENT)
Dept: CARDIOLOGY | Facility: CLINIC | Age: 86
End: 2021-01-27

## 2021-01-27 DIAGNOSIS — I10 ESSENTIAL HYPERTENSION: ICD-10-CM

## 2021-01-29 RX ORDER — AMLODIPINE BESYLATE 5 MG/1
2.5 TABLET ORAL DAILY
Qty: 90 TABLET | Refills: 3 | Status: SHIPPED
Start: 2021-01-29 | End: 2022-01-11

## 2021-02-08 ENCOUNTER — COMMUNICATION - HEALTHEAST (OUTPATIENT)
Dept: PALLIATIVE MEDICINE | Facility: OTHER | Age: 86
End: 2021-02-08

## 2021-02-08 ENCOUNTER — COMMUNICATION - HEALTHEAST (OUTPATIENT)
Dept: FAMILY MEDICINE | Facility: CLINIC | Age: 86
End: 2021-02-08

## 2021-02-08 DIAGNOSIS — G47.00 INSOMNIA: ICD-10-CM

## 2021-02-08 DIAGNOSIS — Z79.899 CONTROLLED SUBSTANCE AGREEMENT SIGNED: ICD-10-CM

## 2021-02-09 ENCOUNTER — HOSPITAL ENCOUNTER (OUTPATIENT)
Dept: PALLIATIVE MEDICINE | Facility: OTHER | Age: 86
Discharge: HOME | End: 2021-02-09
Attending: PHYSICIAN ASSISTANT | Admitting: PAIN MEDICINE

## 2021-02-09 DIAGNOSIS — M25.561 RIGHT KNEE PAIN: ICD-10-CM

## 2021-02-09 DIAGNOSIS — M17.11 PRIMARY OSTEOARTHRITIS OF RIGHT KNEE: ICD-10-CM

## 2021-02-10 ENCOUNTER — COMMUNICATION - HEALTHEAST (OUTPATIENT)
Dept: PALLIATIVE MEDICINE | Facility: OTHER | Age: 86
End: 2021-02-10

## 2021-02-15 ENCOUNTER — COMMUNICATION - HEALTHEAST (OUTPATIENT)
Dept: PALLIATIVE MEDICINE | Facility: OTHER | Age: 86
End: 2021-02-15

## 2021-02-16 ENCOUNTER — HOSPITAL ENCOUNTER (OUTPATIENT)
Dept: PALLIATIVE MEDICINE | Facility: OTHER | Age: 86
Discharge: HOME | End: 2021-02-16
Attending: PAIN MEDICINE | Admitting: PAIN MEDICINE

## 2021-02-16 DIAGNOSIS — M25.561 RIGHT KNEE PAIN: ICD-10-CM

## 2021-02-16 ASSESSMENT — MIFFLIN-ST. JEOR: SCORE: 1126.22

## 2021-02-17 ENCOUNTER — COMMUNICATION - HEALTHEAST (OUTPATIENT)
Dept: PALLIATIVE MEDICINE | Facility: OTHER | Age: 86
End: 2021-02-17

## 2021-02-23 ENCOUNTER — HOSPITAL ENCOUNTER (OUTPATIENT)
Dept: PALLIATIVE MEDICINE | Facility: OTHER | Age: 86
Discharge: HOME | End: 2021-02-23
Attending: PAIN MEDICINE | Admitting: PAIN MEDICINE

## 2021-02-23 DIAGNOSIS — M25.561 RIGHT KNEE PAIN: ICD-10-CM

## 2021-02-24 ENCOUNTER — COMMUNICATION - HEALTHEAST (OUTPATIENT)
Dept: ADMINISTRATIVE | Facility: CLINIC | Age: 86
End: 2021-02-24

## 2021-03-02 ENCOUNTER — COMMUNICATION - HEALTHEAST (OUTPATIENT)
Dept: CARDIOLOGY | Facility: CLINIC | Age: 86
End: 2021-03-02

## 2021-03-03 ENCOUNTER — OFFICE VISIT - HEALTHEAST (OUTPATIENT)
Dept: CARDIOLOGY | Facility: CLINIC | Age: 86
End: 2021-03-03

## 2021-03-03 DIAGNOSIS — I48.0 PAROXYSMAL ATRIAL FIBRILLATION (H): ICD-10-CM

## 2021-03-03 DIAGNOSIS — I50.32 CHRONIC DIASTOLIC CONGESTIVE HEART FAILURE (H): ICD-10-CM

## 2021-03-03 DIAGNOSIS — I25.119 ATHEROSCLEROSIS OF NATIVE CORONARY ARTERY WITH ANGINA PECTORIS, UNSPECIFIED WHETHER NATIVE OR TRANSPLANTED HEART (H): ICD-10-CM

## 2021-03-03 DIAGNOSIS — I26.99 OTHER PULMONARY EMBOLISM WITHOUT ACUTE COR PULMONALE, UNSPECIFIED CHRONICITY (H): ICD-10-CM

## 2021-03-03 DIAGNOSIS — E78.00 PURE HYPERCHOLESTEROLEMIA: ICD-10-CM

## 2021-03-03 DIAGNOSIS — I10 BENIGN ESSENTIAL HYPERTENSION: ICD-10-CM

## 2021-03-03 LAB — BNP SERPL-MCNC: 177 PG/ML (ref 0–167)

## 2021-03-08 ENCOUNTER — COMMUNICATION - HEALTHEAST (OUTPATIENT)
Dept: FAMILY MEDICINE | Facility: CLINIC | Age: 86
End: 2021-03-08

## 2021-03-08 DIAGNOSIS — Z79.899 CONTROLLED SUBSTANCE AGREEMENT SIGNED: ICD-10-CM

## 2021-03-08 DIAGNOSIS — G47.00 INSOMNIA: ICD-10-CM

## 2021-03-10 ENCOUNTER — HOSPITAL ENCOUNTER (OUTPATIENT)
Dept: CARDIOLOGY | Facility: HOSPITAL | Age: 86
Discharge: HOME | End: 2021-03-10
Attending: INTERNAL MEDICINE

## 2021-03-10 DIAGNOSIS — I48.0 PAROXYSMAL ATRIAL FIBRILLATION (H): ICD-10-CM

## 2021-03-13 ENCOUNTER — COMMUNICATION - HEALTHEAST (OUTPATIENT)
Dept: FAMILY MEDICINE | Facility: CLINIC | Age: 86
End: 2021-03-13

## 2021-03-13 DIAGNOSIS — Z79.899 CONTROLLED SUBSTANCE AGREEMENT SIGNED: ICD-10-CM

## 2021-03-13 DIAGNOSIS — G47.00 INSOMNIA: ICD-10-CM

## 2021-03-16 ENCOUNTER — COMMUNICATION - HEALTHEAST (OUTPATIENT)
Dept: CARDIOLOGY | Facility: CLINIC | Age: 86
End: 2021-03-16

## 2021-03-25 ENCOUNTER — HOSPITAL ENCOUNTER (OUTPATIENT)
Dept: PALLIATIVE MEDICINE | Facility: OTHER | Age: 86
Discharge: HOME | End: 2021-03-25
Attending: PHYSICIAN ASSISTANT

## 2021-03-25 DIAGNOSIS — M19.079 ARTHROSIS OF FOOT, UNSPECIFIED LATERALITY: ICD-10-CM

## 2021-03-25 DIAGNOSIS — G89.4 CHRONIC PAIN SYNDROME: ICD-10-CM

## 2021-03-25 DIAGNOSIS — M17.11 PRIMARY OSTEOARTHRITIS OF RIGHT KNEE: ICD-10-CM

## 2021-03-25 DIAGNOSIS — M16.12 PRIMARY OSTEOARTHRITIS OF LEFT HIP: ICD-10-CM

## 2021-03-25 ASSESSMENT — MIFFLIN-ST. JEOR: SCORE: 1112.61

## 2021-03-26 ENCOUNTER — COMMUNICATION - HEALTHEAST (OUTPATIENT)
Dept: CARDIOLOGY | Facility: CLINIC | Age: 86
End: 2021-03-26

## 2021-03-26 DIAGNOSIS — I48.0 PAROXYSMAL ATRIAL FIBRILLATION (H): ICD-10-CM

## 2021-04-12 ENCOUNTER — COMMUNICATION - HEALTHEAST (OUTPATIENT)
Dept: FAMILY MEDICINE | Facility: CLINIC | Age: 86
End: 2021-04-12

## 2021-04-12 DIAGNOSIS — M19.079 ARTHROSIS OF FOOT: ICD-10-CM

## 2021-04-12 DIAGNOSIS — G47.00 INSOMNIA: ICD-10-CM

## 2021-04-12 DIAGNOSIS — Z79.899 CONTROLLED SUBSTANCE AGREEMENT SIGNED: ICD-10-CM

## 2021-04-12 DIAGNOSIS — M19.071 ARTHRITIS OF MIDTARSAL JOINT OF RIGHT FOOT: ICD-10-CM

## 2021-04-12 RX ORDER — TRAMADOL HYDROCHLORIDE 50 MG/1
TABLET ORAL
Qty: 90 TABLET | Refills: 0 | Status: SHIPPED | OUTPATIENT
Start: 2021-04-12 | End: 2021-07-26

## 2021-04-19 ENCOUNTER — OFFICE VISIT - HEALTHEAST (OUTPATIENT)
Dept: CARDIOLOGY | Facility: CLINIC | Age: 86
End: 2021-04-19

## 2021-04-19 DIAGNOSIS — E78.00 PURE HYPERCHOLESTEROLEMIA: ICD-10-CM

## 2021-04-19 DIAGNOSIS — I50.32 CHRONIC DIASTOLIC CONGESTIVE HEART FAILURE (H): ICD-10-CM

## 2021-04-19 DIAGNOSIS — I26.99 OTHER PULMONARY EMBOLISM WITHOUT ACUTE COR PULMONALE, UNSPECIFIED CHRONICITY (H): ICD-10-CM

## 2021-04-19 DIAGNOSIS — I82.4Z9 DEEP VEIN THROMBOSIS (DVT) OF DISTAL VEIN OF LOWER EXTREMITY, UNSPECIFIED CHRONICITY, UNSPECIFIED LATERALITY (H): ICD-10-CM

## 2021-04-19 DIAGNOSIS — I48.0 PAROXYSMAL ATRIAL FIBRILLATION (H): ICD-10-CM

## 2021-04-19 DIAGNOSIS — I10 BENIGN ESSENTIAL HYPERTENSION: ICD-10-CM

## 2021-04-19 LAB
ANION GAP SERPL CALCULATED.3IONS-SCNC: 9 MMOL/L (ref 5–18)
ATRIAL RATE - MUSE: 63 BPM
BUN SERPL-MCNC: 26 MG/DL (ref 8–28)
CALCIUM SERPL-MCNC: 9.3 MG/DL (ref 8.5–10.5)
CHLORIDE BLD-SCNC: 104 MMOL/L (ref 98–107)
CO2 SERPL-SCNC: 28 MMOL/L (ref 22–31)
CREAT SERPL-MCNC: 0.97 MG/DL (ref 0.6–1.1)
DIASTOLIC BLOOD PRESSURE - MUSE: NORMAL
GFR SERPL CREATININE-BSD FRML MDRD: 54 ML/MIN/1.73M2
GLUCOSE BLD-MCNC: 86 MG/DL (ref 70–125)
INTERPRETATION ECG - MUSE: NORMAL
P AXIS - MUSE: 72 DEGREES
POTASSIUM BLD-SCNC: 4.1 MMOL/L (ref 3.5–5)
PR INTERVAL - MUSE: 154 MS
QRS DURATION - MUSE: 140 MS
QT - MUSE: 456 MS
QTC - MUSE: 466 MS
R AXIS - MUSE: -42 DEGREES
SODIUM SERPL-SCNC: 141 MMOL/L (ref 136–145)
SYSTOLIC BLOOD PRESSURE - MUSE: NORMAL
T AXIS - MUSE: 74 DEGREES
VENTRICULAR RATE- MUSE: 63 BPM

## 2021-04-19 RX ORDER — ACETAMINOPHEN 325 MG/1
650 TABLET ORAL EVERY 6 HOURS PRN
Status: SHIPPED | COMMUNITY
Start: 2021-04-19 | End: 2023-03-04

## 2021-04-19 ASSESSMENT — MIFFLIN-ST. JEOR: SCORE: 1130.76

## 2021-04-21 ENCOUNTER — COMMUNICATION - HEALTHEAST (OUTPATIENT)
Dept: PALLIATIVE MEDICINE | Facility: OTHER | Age: 86
End: 2021-04-21

## 2021-04-22 LAB — FLECAINIDE SERPL-MCNC: 0.21 UG/ML (ref 0.2–1)

## 2021-04-27 ENCOUNTER — HOSPITAL ENCOUNTER (OUTPATIENT)
Dept: PALLIATIVE MEDICINE | Facility: OTHER | Age: 86
Discharge: HOME | End: 2021-04-27
Attending: PHYSICIAN ASSISTANT | Admitting: PAIN MEDICINE

## 2021-04-27 DIAGNOSIS — M16.12 PRIMARY OSTEOARTHRITIS OF LEFT HIP: ICD-10-CM

## 2021-04-27 DIAGNOSIS — M19.90 OSTEOARTHRITIS: ICD-10-CM

## 2021-04-27 ASSESSMENT — MIFFLIN-ST. JEOR: SCORE: 1121.69

## 2021-04-28 ENCOUNTER — COMMUNICATION - HEALTHEAST (OUTPATIENT)
Dept: PALLIATIVE MEDICINE | Facility: OTHER | Age: 86
End: 2021-04-28

## 2021-05-13 ENCOUNTER — COMMUNICATION - HEALTHEAST (OUTPATIENT)
Dept: FAMILY MEDICINE | Facility: CLINIC | Age: 86
End: 2021-05-13

## 2021-05-13 DIAGNOSIS — G47.00 INSOMNIA: ICD-10-CM

## 2021-05-13 DIAGNOSIS — Z79.899 CONTROLLED SUBSTANCE AGREEMENT SIGNED: ICD-10-CM

## 2021-05-13 RX ORDER — ZOLPIDEM TARTRATE 6.25 MG/1
TABLET, FILM COATED, EXTENDED RELEASE ORAL
Qty: 45 TABLET | Refills: 0 | Status: SHIPPED | OUTPATIENT
Start: 2021-05-13 | End: 2021-08-17

## 2021-05-17 ENCOUNTER — COMMUNICATION - HEALTHEAST (OUTPATIENT)
Dept: PALLIATIVE MEDICINE | Facility: OTHER | Age: 86
End: 2021-05-17

## 2021-05-18 ENCOUNTER — HOSPITAL ENCOUNTER (OUTPATIENT)
Dept: PALLIATIVE MEDICINE | Facility: OTHER | Age: 86
Discharge: HOME | End: 2021-05-18
Attending: PAIN MEDICINE | Admitting: PAIN MEDICINE
Payer: COMMERCIAL

## 2021-05-18 ENCOUNTER — RECORDS - HEALTHEAST (OUTPATIENT)
Dept: ADMINISTRATIVE | Facility: OTHER | Age: 86
End: 2021-05-18

## 2021-05-18 DIAGNOSIS — M19.90 OSTEOARTHRITIS: ICD-10-CM

## 2021-05-18 ASSESSMENT — MIFFLIN-ST. JEOR: SCORE: 1110.8

## 2021-05-19 ENCOUNTER — COMMUNICATION - HEALTHEAST (OUTPATIENT)
Dept: PALLIATIVE MEDICINE | Facility: OTHER | Age: 86
End: 2021-05-19

## 2021-05-19 ENCOUNTER — COMMUNICATION - HEALTHEAST (OUTPATIENT)
Dept: CARDIOLOGY | Facility: CLINIC | Age: 86
End: 2021-05-19

## 2021-05-19 DIAGNOSIS — I48.0 PAROXYSMAL ATRIAL FIBRILLATION (H): ICD-10-CM

## 2021-05-19 RX ORDER — FLECAINIDE ACETATE 50 MG/1
25 TABLET ORAL 2 TIMES DAILY
Qty: 30 TABLET | Refills: 1 | Status: SHIPPED | OUTPATIENT
Start: 2021-05-19 | End: 2021-07-15

## 2021-05-27 ENCOUNTER — RECORDS - HEALTHEAST (OUTPATIENT)
Dept: ADMINISTRATIVE | Facility: CLINIC | Age: 86
End: 2021-05-27

## 2021-05-27 VITALS — HEIGHT: 62 IN | BODY MASS INDEX: 29.92 KG/M2 | WEIGHT: 162.6 LBS

## 2021-05-27 ASSESSMENT — PATIENT HEALTH QUESTIONNAIRE - PHQ9: SUM OF ALL RESPONSES TO PHQ QUESTIONS 1-9: 4

## 2021-05-28 NOTE — TELEPHONE ENCOUNTER
She has a referral for the pain clinic as we discussed at her last appt. She just needs to call the pain clinic to make an appt.     Please give her the  pain clinic number.       Thanks,   Dr. Lore Martin  5/6/2019

## 2021-05-28 NOTE — TELEPHONE ENCOUNTER
Controlled Substance Refill Request  Medication:   Requested Prescriptions     Pending Prescriptions Disp Refills     zolpidem (AMBIEN CR) 6.25 MG CR tablet [Pharmacy Med Name: Zolpidem Tartrate ER Oral Tablet Extended Release 6.25 MG] 30 tablet 0     Sig: Take 1 tablet (6.25 mg total) by mouth at bedtime as needed for sleep.     traMADol (ULTRAM) 50 mg tablet [Pharmacy Med Name: traMADol HCl Oral Tablet 50 MG] 90 tablet 0     Sig: TAKE 2 TABLETS BY MOUTH IN THE MORNING AND 1 TABLET THE AFTERNOON AND  BEFORE BED     Date Last Fill: 3/4/19  [facility]   Submit electronically to pharmacy  Controlled Substance Agreement on File:   Encounter-Level CSA Scan Date:    There are no encounter-level csa scan date.       Last office visit: Last office visit pertaining to requested medication was 3/19/19.

## 2021-05-28 NOTE — TELEPHONE ENCOUNTER
Pain clinic mailed out In-take packet yesterday after speaking to patient.  Once patient returns it to them they will review and schedule patient.  Pain clinic # is [phone]

## 2021-05-28 NOTE — TELEPHONE ENCOUNTER
CMT left detailed message for the patient to advise her per MD note below. Please check status of referral and help patient coordinate appointment. Thank you.

## 2021-05-28 NOTE — PROGRESS NOTES
"HPI - 89 yo female here with bilateral foot pain     She has seen several podiatrist and told that multiple midfoot arthrosis.   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      apixaban (ELIQUIS) 5 mg Tab tablet Take 1 tablet (5 mg total) by mouth 2 (two) times a day.      estradiol (ESTRACE) 0.01 % (0.1 mg/gram) vaginal cream 2 x per week per OB/Gyn (Patient taking differently: Insert 2 g into the vagina 2 (two) times a week. 2 x per week per OB/Gyn      ) 2/21/2019: Wednesday and Sunday     hydroCHLOROthiazide (HYDRODIURIL) 25 MG tablet Take 1 tablet (25 mg total) by mouth daily.      lisinopril (PRINIVIL,ZESTRIL) 40 MG tablet Take 1 tablet (40 mg total) by mouth daily.      melatonin 3 mg Tab tablet Take 3 mg by mouth at bedtime as needed. 10/11/2018: Patient reports taking qHS.      traMADol (ULTRAM) 50 mg tablet TAKE 2 TABLETS BY MOUTH IN THE MORNING AND 1 TABLET THE AFTERNOON AND  BEFORE BED      zolpidem (AMBIEN CR) 6.25 MG CR tablet Take 1 tablet (6.25 mg total) by mouth at bedtime as needed for sleep.      Vitals:    04/29/19 1052   BP: 100/60   Pulse: 77   Resp: 16   Temp: 97.1  F (36.2  C)   TempSrc: Oral   SpO2: 97%   Weight: 156 lb 9.6 oz (71 kg)   Height: 5' 2.01\" (1.575 m)     PHYSICAL EXAM   General Appearance: Awake and alert, in no acute " distress  HEENT: neck is supple  CV: regular rate  Resp: No respiratory distress. 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     Lung nodule < 6cm on CT  - CT Chest Without Contrast; Future    Recurrent nose bleeds resolved after cauterization    Spent 25 min face to face with patient with more the 50% spent in counseling, reviewing chart, and coordination of care and discussing problems listed above.

## 2021-05-28 NOTE — TELEPHONE ENCOUNTER
Question following Office Visit  When did you see your provider: 4/29/19  What is your question: Sister Glad[facility] would like to know, if Dr. Martin has mailed paperwork to he. Paper work is to see a podiatrist I believe.   Okay to leave a detailed message: Yes, please call patient to update her on status.

## 2021-05-29 ENCOUNTER — RECORDS - HEALTHEAST (OUTPATIENT)
Dept: ADMINISTRATIVE | Facility: CLINIC | Age: 86
End: 2021-05-29

## 2021-05-29 NOTE — TELEPHONE ENCOUNTER
Called and spoke with pt. She is aware  that she won't be able to start new medication until after her appt and she's ok with that.

## 2021-05-29 NOTE — TELEPHONE ENCOUNTER
Who is calling:  patient  Reason for Call:  Patient states she was supposed to call in today to speak with Dr Martin regarding her CT results. Please call patient when available, thank you.  Date of last appointment with primary care: 5-30-19  Okay to leave a detailed message: Yes

## 2021-05-29 NOTE — TELEPHONE ENCOUNTER
EXAM: CT CHEST WO CONTRAST  LOCATION: [facility]  DATE/TIME: 6/19/2019 10:24 AM     INDICATION: Lung nodule, <1cm recent PE (2/2019) and new lung nodule 4mm on 3/4/19 - needs f/u assessment  COMPARISON: CT chest 03/04/2019, 02/21/2019  IMPRESSION:   CONCLUSION:   1.  Near interval resolution of the previously seen right lower lobe infiltrates. A small residual right lower lobe subpleural nodular opacity measuring 9 mm may reflect scarring. As a precaution, suggest 3-6 month follow-up.  2.  Previously noted 4 mm right lower lobe nodule is stable and likely benign.

## 2021-05-29 NOTE — PATIENT INSTRUCTIONS - HE
Recommendations from today's MTM visit:                                                    MTM (medication therapy management) is a service provided by a clinical pharmacist designed to help you get the most of out of your medicines. and Today we reviewed what your medicines are for, how to know if they are working, that your medicines are safe and how to make your medicine regimen as easy as possible.     1. Start Duloxetine 30 mg once daily at bedtime. In 2 weeks (June 27) increase to 60 mg at bedtime.     2. Continue using Tramadol as you have been. If you notice some pain relief from Duloxetine, you can try to reduce the dose, as you feel comfortable.     3. Continue using the topical cream for pain and all of your other medication as you have been.     Next MTM visit: Schedule as needed     To schedule another MTM appointment, please call the clinic directly or you may call the MTM scheduling line at [phone] or toll-free at [phone].     My Clinical Pharmacist's contact information:                                                      It was a pleasure seeing you today!  Please feel free to contact me with any questions or concerns you have.      Abdifatah Clark, Arturo  Medication Therapy Management (MTM) Pharmacist  [facility]     You may receive a survey about the MTM services you received by email and/or US Mail.  I would appreciate your feedback to help me serve you better in the future. Your comments will be anonymous.

## 2021-05-29 NOTE — TELEPHONE ENCOUNTER
JODY - Status Update  Who is Calling: Patient  Update: Patient stated her pain doctor gave her Butrans patch. Patient stated she had this patch on for 2 days and her heart started racing. Patient stated she went to the ER at Midnight on Tuesday. Patient stated as soon the ER doctor took the patch off, her heart stopped racing. Patient also stated she had red blotches on her feet and now since being off the patches, the red blotches had disappeared. Please update the patient's allergy list.  Okay to leave a detailed message?:  No return call needed

## 2021-05-29 NOTE — TELEPHONE ENCOUNTER
Controlled Substance Refill Request  Medication:   Requested Prescriptions     Pending Prescriptions Disp Refills     zolpidem (AMBIEN CR) 6.25 MG CR tablet [Pharmacy Med Name: Zolpidem Tartrate ER Oral Tablet Extended Release 6.25 MG] 30 tablet 0     Sig: Take 1 tablet (6.25 mg total) by mouth at bedtime as needed for sleep.     traMADol (ULTRAM) 50 mg tablet [Pharmacy Med Name: traMADol HCl Oral Tablet 50 MG] 90 tablet 0     Sig: TAKE 2 TABLETS BY MOUTH IN THE MORNING AND 1 TABLET IN THE AFTERNOON AND BEFORE BED     Date Last Fill: 4/25/19  [facility]   Submit electronically to pharmacy  Controlled Substance Agreement on File:   Encounter-Level CSA Scan Date:    There are no encounter-level csa scan date.       Last office visit: Last office visit pertaining to requested medication was 4/29/19.

## 2021-05-29 NOTE — PATIENT INSTRUCTIONS - HE
Plan recommended today:    Follow up in 4 weeks with Aleja ARCEO    Continue your current regimen of alleviating factors as reviewed today    Please see your current provider for any refill of an opioid prescription; they may choose to provide a refill until we have your urine screen back. They will continue to manage your general health and have requested you see the pain center for pain management. Please discuss any health concerns with your primary care physician.    [place] Precautions are taken with every patient which includes a  report and drug screen. A urine drug screen will be taken today for baseline screening.  Once this returns as expected (soonest will be next Tuesday, 05/28) we will send the Butrans patch 5 mcg/hr dose to your pharmacy.  Apply 1 patch for 7 days, then remove patch and change skin site for a new patch.  Patient brochure given today. During this time, you may still need to take tramadol 50 mg up to 3 times a day.  If your pain is improved, you can reduce dose of Tramadol to 50 mg 1-2 times a day as needed.  I would expect to increase the dose of the patch as needed for pain in follow-up and then discontinue tramadol.    Physical and Occupational Therapy- referral to [facility] for evaluation and treatment for warm pool and land recommendations.  They can help with balance and finding exercises that are tolerated with your foot pain.    MTM visit with the pharmacist may be useful in the future to evaluate pain medications.  Non-opoid medical management includes- Trial of topical compounded medication with lidocaine 5%, ibuprofen 10%, and diclofenac 5% apply 1-2 grams to painful feet 3-4 times a day.  [facility] (previously [facility]  [address] [place] [address]  Phone # [phone]  [email or web address]   Make sure to ask them about out of pocket cost and delivery options.    May consider trial of CBD oil or medical cannabis at the dispensary in  the future.    Continue use of ice and orthotics    Education was provided today in regards to the patient's specific diagnosis

## 2021-05-29 NOTE — TELEPHONE ENCOUNTER
Prior Authorization Request  Who s requesting:  Aleja Pitts PA-C/ Tressa Rodriguez CMA  Pharmacy Name and Location: [facility]  Medication Name: Buprenorphine 5mcg patch  Insurance Plan: [employer]  Insurance Member ID Number:  [id number]  Informed patient that prior authorizations can take up to 10 business days for response:   Yes  Okay to leave a detailed message: Yes

## 2021-05-29 NOTE — TELEPHONE ENCOUNTER
Noted.  I cannot advise new medication until seen in visit.  She did the right thing by having urgent evaluation.  She may check to see if Abdifatah MayD has any sooner appointments to discuss other options.  Routing to scheduling if she would like to do that, referral placed for MTM.  Thanks.

## 2021-05-29 NOTE — TELEPHONE ENCOUNTER
Patient put butrans patch on Sunday June2. Last pm she developed a racing heart. Called paramedics. Went to ER last Pm at 11:30. Butrans patch removed at midnight and pulse returned to normal.l. Discharged at 4am to home. Contacted patient at 2 pm today and she is free of symptoms. Wondering what she can take for pain in the future. Does not have appointment to be seen until 7/2. Please advise.

## 2021-05-29 NOTE — TELEPHONE ENCOUNTER
Central PA team  [phone]  Pool: [facility] PA MED ([id number])          PA has been initiated.     Form manually faxed to [facility] [phone]  Marked for urgent review     Response will be received via fax and may take up to 5-10 business days depending on plan

## 2021-05-29 NOTE — TELEPHONE ENCOUNTER
Controlled Substance Refill Request  Medication:   Requested Prescriptions     Pending Prescriptions Disp Refills     zolpidem (AMBIEN CR) 6.25 MG CR tablet [Pharmacy Med Name: Zolpidem Tartrate ER Oral Tablet Extended Release 6.25 MG] 30 tablet 0     Sig: Take 1 tablet (6.25 mg total) by mouth at bedtime as needed for sleep.     traMADol (ULTRAM) 50 mg tablet [Pharmacy Med Name: traMADol HCl Oral Tablet 50 MG] 90 tablet 0     Sig: TAKE 2 TABLETS BY MOUTH IN THE MORNING AND 1 TABLET IN THE AFTERNOON AND BEFORE BED     Date Last Fill: 5/22/19  [facility]   Submit electronically to pharmacy  Controlled Substance Agreement on File:   Encounter-Level CSA Scan Date:    There are no encounter-level csa scan date.       Last office visit: Last office visit pertaining to requested medication was 5/30/19.

## 2021-05-29 NOTE — PROGRESS NOTES
MTM Initial Encounter  Assessment & Plan                                                     Medication Adherence/Access: Pt presented with all oral medications, forgot topicals at home. Has a good system for her medication and able to explain when she takes them throughout the day.     Chronic Pain/Arthritis: Needs improvement - pt has found some pain relief with orthotics and topical cream, but continues to have pain that makes it difficult to walk. Given timing of tachycardia after patch placement, likely adverse effect of Butrans. Will discontinue at this time. Witnessed diposal of patches in clinic. Given arthritis picture, unable to take NSAIDs with Eliquis, will trial Duloxetine which may also be beneficial for sleep. Pt to continue topical and tramadol at this time, okay to try decreasing tramadol if able.   -Removed Butrans from list   -Start Duloxetine 30 mg daily x 2 weeks, then increase to 60 mg daily    DVT/PE: Improved - pt continuing on anticoagulation. Typical duration of 3-6 months, so August would be appropriate per reported plan for discontinuing.   -Continue current therapy    CHF/Hypertension: BP at goal <130/80. Denies adverse effects of medications.   -Continue current therapy     Sleep: Improved- sleeping okay. Discussed safety concerns with opioids plus sedative hypnotics in older adults. Pt deneis any adverse effects. May benefit from addition of Duloxetine as noted above. Could discuss trial hold of Zolpidem once pt is at stable, therapeutic dose of Duloxetine.   -Continue current therapy      Low Vitamin D: Improved- Vit D level at low end of normal. Is appropriately using weekly supplements.   -Continue current therapy     Vaginal Atrophy: Improved - finds good pain relief from topical use, likely helping to reduce urinary frequency as well.   -Continue current therapy       Follow Up  No follow-ups on file.  Schedule as needed with PharmD     Subjective & Objective                                                      Gladys Ramirez is a 88 y.o. female coming in for an initial visit for Medication Therapy Management. She was referred to me from   Aleja Pitts PA-C.    Chief Complaint: Arthritis Pain     Medication Adherence/Access: Pt presented with all oral medications, forgot topicals at home. Has a good system for her medication and able to explain when she takes them throughout the day.     Chronic Pain/Arthritis: Patient currently prescribed Tramadol 50 mg 2 tabs AM/1 tab PM and bedtime, Lidocaine-ibuprofen-diclofenac 5/10/5% cream apply 1-2 g topically three times a day, and Butrans 5 mcg/hr patch weekly. Start Butrans on 6/2, presented to the ED on 6/5 with tachycardia which resolved after she removed the Butrans.     Per ED, thought to be either related to Butrans use or an episode of occult tachydysrhytmia.     Reports difficulty walking. Since last visit with Aleja Pitts PA-C, got orthotics which she found very helpful. Tried to go off Tramadol for a few days, found increased pain. States the tramadol does take the edge off and helps with sleep.     Describes pain as a big toothache in the feet. Ice packs are helpful. Is using the topical and thinks that is pretty helpful also.     DVT/PE: Using Eliquis 5 mg two times a day. Started on this in February. Going in next week for repeat CAT scan. Thinks doctor wants to continue medication until August. Denies bloody nose/bleeding/dark tarry stools.     CHF/Hypertension: Using amlodipine 5 mg daily, Hydrochlorothiazide 25 mg daily, lisinopril 40 mg daily. Does have some dizziness once every 3 weeks if getting up quickly.     1/14/19 Echo: The estimated left ventricular ejection fraction is 55%.     BP Readings from Last 3 Encounters:   06/13/19 130/69   06/05/19 127/65   05/30/19 104/56     Pulse Readings from Last 3 Encounters:   06/13/19 69   06/05/19 (!) 59   05/30/19 (!) 59       Results for orders placed or performed during  "the hospital encounter of 06/05/19   Basic Metabolic Panel   Result Value Ref Range    Sodium 139 136 - 145 mmol/L    Potassium 3.5 3.5 - 5.0 mmol/L    Chloride 105 98 - 107 mmol/L    CO2 23 22 - 31 mmol/L    Anion Gap, Calculation 11 5 - 18 mmol/L    Glucose 113 70 - 125 mg/dL    Calcium 9.7 8.5 - 10.5 mg/dL    BUN 37 (H) 8 - 28 mg/dL    Creatinine 1.71 (H) 0.60 - 1.10 mg/dL    GFR MDRD Af Amer 34 (L) >60 mL/min/1.73m2    GFR MDRD Non Af Amer 28 (L) >60 mL/min/1.73m2       Sleep: Using Zolpidem CR 6.25 mg daily and melatonin 3 mg daily. Sleeps pretty well. Gets up a few times at night to go to the bathroom. Does sit at edge of bed for a while before getting up. Denies any dizziness/drowsiness/morning grogginess. If having trouble sleeping, will heat milk and have a piece of toast and that is also very helpful.     Low Vitamin D: Currently using Vitamin D2 50,000 units once weekly.     Vitamin D, Total (25-Hydroxy)   Date Value Ref Range Status   05/30/2019 33.3 30.0 - 80.0 ng/mL Final     Vaginal Atrophy: Using estradiol 0.01% vaginal cream 2 g vagainally twice weekly. Had a lot of pain, finds the cream very helpful.     PMH: reviewed in EPIC   Allergies/ADRs: reviewed in EPIC   Alcohol:   Social History     Substance and Sexual Activity   Alcohol Use Yes    Comment: Rarely a glass of wine     Tobacco:   Social History     Tobacco Use   Smoking Status Never Smoker   Smokeless Tobacco Never Used   Tobacco Comment    no passive exposure     Today's Vitals:   Vitals:    06/13/19 1033   BP: 130/69   Patient Site: Right Arm   Patient Position: Sitting   Pulse: 69   Resp: 16   Weight: 152 lb (68.9 kg)   Height: 5' 2\" (1.575 m)     ----------------    Much or all of the text in this note was generated through the use of Dragon Dictate voice-to-text software. Errors in spelling or words which seem out of context are unintentional. Sound alike errors, in particular, may have escaped editing.    The patient was given a " summary of these recommendations as an after visit summary    I spent 30 minutes with this patient today.   All changes were made via collaborative practice agreement with   Aleja Pitts PA-C. A copy of the visit note was provided to the patient's provider.     Abdifatah Clark, YadiraD  Medication Therapy Management (MTM) Pharmacist  [facility]        Current Outpatient Medications   Medication Sig Dispense Refill     amLODIPine (NORVASC) 10 MG tablet Take 1 tablet (10 mg total) by mouth daily. 90 tablet 3     apixaban (ELIQUIS) 5 mg Tab tablet Take 1 tablet (5 mg total) by mouth 2 (two) times a day. 60 tablet 3     ergocalciferol (ERGOCALCIFEROL) 50,000 unit capsule Take 1 capsule (50,000 Units total) by mouth once a week. 4 capsule 3     hydroCHLOROthiazide (HYDRODIURIL) 25 MG tablet Take 1 tablet (25 mg total) by mouth daily. 90 tablet 3     lidocaine (bulk) 5 %, ibuprofen (bulk) 100 % 10 %, diclofenac sod, micro (bulk) 100 % 5 % Apply 1-2 g topically 3 (three) times a day. 60 g 1     lisinopril (PRINIVIL,ZESTRIL) 40 MG tablet Take 1 tablet (40 mg total) by mouth daily. 90 tablet 3     melatonin 3 mg Tab tablet Take 3 mg by mouth at bedtime as needed.       traMADol (ULTRAM) 50 mg tablet TAKE 2 TABLETS BY MOUTH IN THE MORNING AND 1 TABLET IN THE AFTERNOON AND BEFORE BED 90 tablet 0     zolpidem (AMBIEN CR) 6.25 MG CR tablet Take 1 tablet (6.25 mg total) by mouth at bedtime as needed for sleep. 30 tablet 0     DULoxetine (CYMBALTA) 30 MG capsule Take 1 capsule (30 mg total) by mouth daily. For 2 weeks, then increase to 60 mg daily. 60 capsule 1     estradiol (ESTRACE) 0.01 % (0.1 mg/gram) vaginal cream 2 x per week per OB/Gyn (Patient taking differently: Insert 2 g into the vagina 2 (two) times a week. 2 x per week per OB/Gyn      ) 42.5 g 1     No current facility-administered medications for this encounter.

## 2021-05-29 NOTE — PROGRESS NOTES
Destruction and discard of medication completed with and witnessed by:  The patient, Tressa Rodriguez, RAHEL and Abdifatah MayD  Medication discarded: Butran's  Dose of medication discarded 5mcg  Reason for discarding medication patient had reaction to the medication- palpatations  Appearance of medication consistent with the description on the medication bottle/package. yes  Number of pills/patches: 3 patches  Label removed from the medication bottle and destroyed yes  Discard Bag provided, instructions followed, the patient discarded the medication. yes

## 2021-05-29 NOTE — PROGRESS NOTES
HPI - 87 yo female here to f/u on several issue.       pulmonary embolism without acute cor pulmonale (H)  Related to RLL pneumonia  Asx  Repeat CT Chest Without Contrast in June 2019  Continue Eliquis for a total of 3-6 months     Lung nodule < 6cm on CT  - CT Chest Without Contrast - scheduled for 6/19/19  She will call to discuss the results the following day.     Chest wall pain on right  H/o costochondritis that resolved and pain returned about 3 weeks ago  Pain to touch is both upper arm, right breast, and right side chest wall  Per chart review:  In March she described:   Upper Right sided chest pain with constant and nagging that radiates to right arm and the Pain was worse with moving right arm. Per ER workup: No evidence cardiac or lung etiology on CT and EKG  In April, the pain had resolved.     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      apixaban (ELIQUIS) 5 mg Tab tablet Take 1 tablet (5 mg total) by mouth 2 (two) times a day.      buprenorphine (BUTRANS) 5 mcg/hour PTWK patch Place 1 patch on the skin every 7 days.      estradiol (ESTRACE) 0.01 % (0.1 mg/gram) vaginal cream 2 x per week per OB/Gyn (Patient taking differently: Insert 2 g into the vagina 2 (two) times a week. 2 x per week per OB/Gyn      ) 2/21/2019: Wednesday and Sunday     hydroCHLOROthiazide (HYDRODIURIL) 25 MG tablet Take 1 tablet (25 mg total) by mouth daily.      lidocaine (bulk) 5 %, ibuprofen (bulk) 100 % 10 %, diclofenac sod, micro (bulk) 100 % 5 % Apply 1-2 g topically 3  "(three) times a day.      lisinopril (PRINIVIL,ZESTRIL) 40 MG tablet Take 1 tablet (40 mg total) by mouth daily.      melatonin 3 mg Tab tablet Take 3 mg by mouth at bedtime as needed. 10/11/2018: Patient reports taking qHS.      traMADol (ULTRAM) 50 mg tablet TAKE 2 TABLETS BY MOUTH IN THE MORNING AND 1 TABLET IN THE AFTERNOON AND BEFORE BED      zolpidem (AMBIEN CR) 6.25 MG CR tablet Take 1 tablet (6.25 mg total) by mouth at bedtime as needed for sleep.      Vitals:    05/30/19 1341   BP: 104/56   Pulse: (!) 59   Resp: 14   Temp: 97  F (36.1  C)   TempSrc: Oral   SpO2: 97%   Weight: 155 lb (70.3 kg)   Height: 5' 2.01\" (1.575 m)     PHYSICAL EXAM   General Appearance: Awake and alert, in no acute distress  HEENT: neck is supple  CV: regular rate  Resp: No respiratory distress. Breathing comfortably  Musculoskeletal: diffuse tenderness to palpate - both arms, legs, ribs, chest wall  Skin: no rashes noted    A/P    H/o pulmonary embolism without acute cor pulmonale (H)  Repeat CT Chest Without Contrast in June 2019  Continue Eliquis for a total of 3-6 months     Lung nodule  - CT Chest Without Contrast - scheduled for 6/19/19  She will call to discuss the results the following day.     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     "

## 2021-05-30 ENCOUNTER — RECORDS - HEALTHEAST (OUTPATIENT)
Dept: ADMINISTRATIVE | Facility: CLINIC | Age: 86
End: 2021-05-30

## 2021-05-30 VITALS — WEIGHT: 168.5 LBS | HEIGHT: 62 IN | BODY MASS INDEX: 31.01 KG/M2

## 2021-05-30 VITALS — HEIGHT: 62 IN | BODY MASS INDEX: 29.79 KG/M2 | WEIGHT: 161.9 LBS

## 2021-05-30 VITALS — BODY MASS INDEX: 30.9 KG/M2 | HEIGHT: 62 IN | WEIGHT: 167.9 LBS

## 2021-05-30 VITALS — BODY MASS INDEX: 30.36 KG/M2 | WEIGHT: 166 LBS

## 2021-05-30 NOTE — TELEPHONE ENCOUNTER
RN cannot approve Refill Request    RN can NOT refill this medication med is not covered by policy/route to provider.       Kika Johnson, [facility] Triage/Med Refill 7/10/2019    Requested Prescriptions   Pending Prescriptions Disp Refills     ELIQUIS 5 mg Tab tablet [Pharmacy Med Name: Eliquis Oral Tablet 5 MG] 60 tablet 2     Sig: Take 1 tablet (5 mg total) by mouth 2 (two) times a day.       Apixaban/Rivaroxaban/Dabigatran Refill Protocol Failed - 7/10/2019  7:01 AM        Failed - Route to appropriate pool/provider     Last Anticoagulation Summary:           Passed - Renal function test in last year     Creatinine   Date Value Ref Range Status   06/05/2019 1.71 (H) 0.60 - 1.10 mg/dL Final             Passed - PCP or prescribing provider visit in past 12 months       Last office visit with prescriber/PCP: 5/30/2019 Lore Martin MD OR same dept: 5/30/2019 Lore Martin MD OR same specialty: 5/30/2019 Lore Martin MD  Last physical: 10/11/2018 Last MTM visit: Visit date not found   Next visit within 3 mo: Visit date not found  Next physical within 3 mo: Visit date not found  Prescriber OR PCP: Lore Martin MD  Last diagnosis associated with med order: 1. Deep vein thrombosis (DVT) of distal vein of lower extremity, unspecified chronicity, unspecified laterality (H)  - ELIQUIS 5 mg Tab tablet [Pharmacy Med Name: Eliquis Oral Tablet 5 MG]; Take 1 tablet (5 mg total) by mouth 2 (two) times a day.  Dispense: 60 tablet; Refill: 2    2. Hospital discharge follow-up  - ELIQUIS 5 mg Tab tablet [Pharmacy Med Name: Eliquis Oral Tablet 5 MG]; Take 1 tablet (5 mg total) by mouth 2 (two) times a day.  Dispense: 60 tablet; Refill: 2    If protocol passes may refill for 12 months if within 3 months of last provider visit (or a total of 15 months).

## 2021-05-30 NOTE — TELEPHONE ENCOUNTER
Controlled Substance Refill Request  Medication:   Requested Prescriptions     Pending Prescriptions Disp Refills     traMADol (ULTRAM) 50 mg tablet [Pharmacy Med Name: traMADol HCl Oral Tablet 50 MG] 90 tablet 0     Sig: TAKE 2 TABLETS BY MOUTH IN THE MORNING AND 1 TABLET IN THE AFTERNOON AND BEFORE BED     zolpidem (AMBIEN CR) 6.25 MG CR tablet [Pharmacy Med Name: Zolpidem Tartrate ER Oral Tablet Extended Release 6.25 MG] 30 tablet 0     Sig: TAKE 1 TABLET (6.25 MG TOTAL) BY MOUTH AT BEDTIME AS NEEDED FOR SLEEP.     Date Last Fill: 6/18/19  [facility]   Submit electronically to pharmacy  Controlled Substance Agreement on File:   Encounter-Level CSA Scan Date:    There are no encounter-level csa scan date.       Last office visit: Last office visit pertaining to requested medication was 5/30/19.

## 2021-05-30 NOTE — PATIENT INSTRUCTIONS - HE
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

## 2021-05-30 NOTE — TELEPHONE ENCOUNTER
Medication Question or Clarification  Who is calling: Pharmacy: [facility]  What medication are you calling about? (include dose and sig)    Disp Refills Start End   traMADol (ULTRAM) 50 mg tablet 90 tablet 0 7/16/2019    Sig: TAKE 2 TABLETS BY MOUTH IN THE MORNING AND 1 TABLET IN THE AFTERNOON AND BEFORE BED   Sent to pharmacy as: traMADol (ULTRAM) 50 mg tablet   E-Prescribing Status: Receipt confirmed by pharmacy (7/16/2019 12:25 PM CDT)     Who prescribed the medication?: Lore Martin MD  What is your question/concern?: [facility] is trying to determine if patient's foot arthritis is chronic or acute. Reviewed pain clinic note from 7/2/19. Patient has chronic arthritis of her feet with a painful right midtarsal joint, that she had tried and failed multiple treatments. Relayed this is a chronic condition after many years on her feet. No further questions at this time.  Pharmacy: [facility]  Okay to leave a detailed message?: No call back requested  Site CMT - Please call the pharmacy to obtain any additional needed information.    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

## 2021-05-30 NOTE — PATIENT INSTRUCTIONS - HE
Continue Tramadol doses 100 mg in the afternoon and 50 mg before bedtime - refills through Dr. Martin at this time.  Patient attempting to take less as pain improves.      Continue duloxetine 30 mg try taking 1 dose in the morning and 1 in the evening to avoid side effects. Discuss if this is not tolerated, call our nurse line and we may consider a different dosing schedule such as 20 mg two times a day or 40 mg in the morning.  Request refills through your pharmacy when needed.  Continue topical compound cream three times a day as this is helpful - refill at [facility].  Continue use of inserts/new shoes for support.   Attempt to restart walking program for exercise - discussed starting with 1/3 to 1/2 a mile distance and increasing as tolerated.  Follow-up in 3 months with Aleja ARCEO to evaluate the above plan of care.  You may return sooner if any concerns or pain is not well managed.

## 2021-05-30 NOTE — PROGRESS NOTES
HPI - 89 yo female here to f/u.       H/o pulmonary embolism without acute cor pulmonale (H)  Repeat CT Chest Without Contrast in June 2019 -   PE resolved  Still on Eliquis for a total 6 months     Lung nodule  - CT Chest Without Contrast -6/19/19  1.  Near interval resolution of the previously seen right lower lobe infiltrates. A small residual right lower lobe subpleural nodular opacity measuring 9 mm may reflect scarring. As a precaution, suggest 3-6 month follow-up.  2.  Previously noted 4 mm right lower lobe nodule is stable and likely benign.       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      DULoxetine (CYMBALTA) 30 MG capsule Take 1 capsule (30 mg total) by mouth daily. For 2 weeks, then increase to 60 mg daily. 7/29/2019: Reports taking one in AM and one in PM     ELIQUIS 5 mg Tab tablet Take 1 tablet (5 mg total) by mouth 2 (two) times a day.      ergocalciferol (ERGOCALCIFEROL) 50,000 unit capsule Take 1 capsule (50,000 Units total) by mouth once a week.      estradiol (ESTRACE) 0.01 % (0.1 mg/gram) vaginal cream 2 x per week per OB/Gyn (Patient taking differently: Insert 2 g into the vagina 2 (two) times a week. 2 x per week per OB/Gyn      ) 7/29/2019: Patient denies symptoms and requests to stop or reduce use.     hydroCHLOROthiazide (HYDRODIURIL) 25 MG tablet Take 1 tablet (25 mg total) by mouth daily.      lisinopril  "(PRINIVIL,ZESTRIL) 40 MG tablet Take 1 tablet (40 mg total) by mouth daily.      melatonin 3 mg Tab tablet Take 3 mg by mouth at bedtime as needed. 7/29/2019: Reports taking every night.     traMADol (ULTRAM) 50 mg tablet TAKE 2 TABLETS BY MOUTH IN THE MORNING AND 1 TABLET IN THE AFTERNOON AND BEFORE BED 7/29/2019: Taking differently.  Two at 2 PM and one at HS     zolpidem (AMBIEN CR) 6.25 MG CR tablet TAKE 1 TABLET (6.25 MG TOTAL) BY MOUTH AT BEDTIME AS NEEDED FOR SLEEP. 7/29/2019: Reporting taking every HS     lidocaine (bulk) 5 %, ibuprofen (bulk) 100 % 10 %, diclofenac sod, micro (bulk) 100 % 5 % Apply 1-2 g topically 3 (three) times a day.      Vitals:    07/29/19 1337 07/29/19 1345 07/29/19 1351   BP: 94/54 (!) 88/58 (!) 84/48   Patient Site: Left Arm Right Arm    Patient Position: Sitting Sitting    Cuff Size: Adult Regular Adult Regular    Pulse: (!) 109     Temp: 97.1  F (36.2  C)     TempSrc: Oral     SpO2: 98%     Weight: 156 lb 11.2 oz (71.1 kg)     Height: 5' 2.21\" (1.58 m)       OBJECTIVE:  Vitals listed above within normal limits  General appearance: well groomed, pleasant, well hydrated, nontoxic appearing  ENT: PERRL, throat clear  Neck: neck supple, no lymphadenopathy, no thyromegaly  Lungs: lungs clear to auscultation bilaterally, no wheezes or rhonchi  Heart: regular rate and rhythm, no murmurs, rubs or gallops  Abdomen: soft, nontender  Neuro: no focal deficits, CN II-XII grossly intact, alert and oriented  Psych:  mood stable, appears to have good insight and judgment    A/P  H/o pulmonary embolism without acute cor pulmonale (H)  PE resolved  Stop Eliquis     Lung nodule  - CT Chest Without Contrast -repeat in 3-6 months (Sept- Dec 2019)     Diffuse body aches and bone pain  Sx much improved     H/o Vit D deficiency - wnl  Change dose from ergo weekly to daily Vit D      Hypertension - low BP today  BP meds:    Continue HCTZ, lisinopril  Amlodipine dereased to 1/2 tab for 5 mg /day  Given BP " log     Arthrosis of foot bilaterally  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

## 2021-05-30 NOTE — PROGRESS NOTES
PAIN CENTER PROGRESS NOTE    Subjective:   Gladys Ramirez is a 88 y.o. 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  Consult on 05/24/2019.    Consult on Major issues:  1. Chronic pain syndrome    2. Chronic, continuous use of opioids    3. Arthrosis of foot, unspecified laterality      Pain location and description: 5-6/10 like a toothache in bilateral feet.  At worst, pain rated 8/10.  Function rated 5.  Radiation of pain: Denies  Exacerbating factors: walking, stairs  Alleviating factors: medication, new orthotics/shoes  Associated symptoms: Denies pain at night, numbness, weakness, bowel or bladder incontinence, fever/chills, unexplained weight loss.  Adverse effects of medications: States Butrans caused palpitations (see below) so discontinued.  Diarrhea on higher dose of duloxetine 60 mg.  Current treatment efficacy: Good.  States improvement with compound cream and duloxetine 30 mg daily.  Current treatment compliance: New to pain center.  Following recommendations and compliant with medications.      Patient was started on Butrans 5 mcg/hr patch last visit for around the clock pain control.  Reports it caused palpitations and shortness of breath and presented to ED on  06/05/19:    MEDICAL DECISION MAKING    88-year-old female with a history of CHF, DVT, PE on Eliquis, chronic pain on buprenorphine patches and irregular heart rhythm but no report of atrial fibrillation who presents the emergency department today with acute palpitations.  Patient denies any significant chest pain shortness of breath or presyncope.  Etiology concerning for an A. fib RVR episode     EKG on arrival shows this rhythm with PACs and no ischemic evidence     Screening laboratory evaluation shows no significant electrolyte derangements and negative troponin.  In the absence of chest pain or shortness of breath and do not suspect an acute cardiogenic concerning cause.     Patient otherwise  "remained stable hemodynamics through ED course without significant tachycardia no hypotension or fever.  Patient concerned this may be related to buprenorphine adverse reaction.  This may be the case.  Additionally this may have been an episode of an occult tachydysrhythmia.  As the patient is asymptomatic with reassuring lab studies and sinus rhythm with out evidence of dysrhythmia in the ED and she desires to go home I do believe she is safe to do so.     Patient otherwise asymptomatic and given reassuring lab work-up at this time she is comfortable with the plan for discharge with interval follow-up to primary care clinician.     At the conclusion of the encounter I discussed the results of all of the tests and the disposition. All questions were answered. The patient acknowledged understanding and was involved in the decision making regarding the overall care plan. I discussed with patient the utility, limitations and findings of the exam/interventions/studies done during this visit as well as the list of differential diagnosis and symptoms for which to monitor/return to ED. Verbalizes understanding.      FINAL IMPRESSION    1. Palpitations       She discontinued patch and symptoms resolved.  She had our staff dispose of remaining patches at MTM visit.  She was encouraged to have MTM visit with Abdifatah Clark PharmD in follow-up on 06/13/19:  \"MTM Initial Encounter  Assessment & Plan                                                     Medication Adherence/Access: Pt presented with all oral medications, forgot topicals at home. Has a good system for her medication and able to explain when she takes them throughout the day.      Chronic Pain/Arthritis: Needs improvement - pt has found some pain relief with orthotics and topical cream, but continues to have pain that makes it difficult to walk. Given timing of tachycardia after patch placement, likely adverse effect of Butrans. Will discontinue at this time. Witnessed " "diposal of patches in clinic. Given arthritis picture, unable to take NSAIDs with Eliquis, will trial Duloxetine which may also be beneficial for sleep. Pt to continue topical and tramadol at this time, okay to try decreasing tramadol if able.   -Removed Марина from list   -Start Duloxetine 30 mg daily x 2 weeks, then increase to 60 mg daily     DVT/PE: Improved - pt continuing on anticoagulation. Typical duration of 3-6 months, so August would be appropriate per reported plan for discontinuing.   -Continue current therapy     CHF/Hypertension: BP at goal <130/80. Denies adverse effects of medications.   -Continue current therapy      Sleep: Improved- sleeping okay. Discussed safety concerns with opioids plus sedative hypnotics in older adults. Pt deneis any adverse effects. May benefit from addition of Duloxetine as noted above. Could discuss trial hold of Zolpidem once pt is at stable, therapeutic dose of Duloxetine.   -Continue current therapy       Low Vitamin D: Improved- Vit D level at low end of normal. Is appropriately using weekly supplements.   -Continue current therapy      Vaginal Atrophy: Improved - finds good pain relief from topical use, likely helping to reduce urinary frequency as well.   -Continue current therapy      Follow Up  No follow-ups on file.  Schedule as needed with PharmD\"     She reports duloxetine 30 mg was tolerating well in the morning and then increased to 60 mg she started last Friday 06/28/19.  She states she thought 1 hour after taking she was sweating and she called her pharmacist Saturday and was advised to take again.  She did take the same way and had diarrhea which she describes as \"explosive for 3 hours.\"  She states since then she reduced back to 30 mg in the morning until she could speak with provider today.  States seems to help with current dose.      Recommended warm pool PT and she states the locations in [facility] were too far.  She hasn't started walking " "again but would like to as she is able to walk the halls of her apartment 3 times is 1 mile. Had orthotics readjusted and went to [employer] and purchased good shoes which helps a lot.  Thinks the compounded cream is helping a lot, applying three times a day.  She states she is not up at night anymore and once in awhile she can go up steps regularly.  Pain is not as severe and states she can handle current pain level better. She states her foot pain is improved and she is sleeping through the night.      Tramadol 50 mg was taking 4 a day, reduced to 3 a day.  Takes 100 mg dose at 1400 and 50 mg dose at bedtime.  She reports Dr. Martin continues to refill this and she calls every 30 days for refill.  Does not want our pain center to take over at this time.    Review of Systems  Constitutional: Denies fever, chills, night sweats, lethargy, weight loss, weight gain, sleep disturbance.  Musculoskeletal: Positive for muscle pain, weakness, spine pain, joint pain.    Gastrointestional: Denies difficulty swallowing, change in appetite, abdominal pain, constipation, nausea, vomiting, diarrhea, GERD, fecal incontinence.  Genitourinary: Denies urinary incontinence, dysuria, hematuria, UTI, frequency, hesitancy, change in libido.  Neurologic: Denies confusion, seizure, weakness, changes in balance, changes in speech.  Psychiatric: Denies depression, anxiety, memory loss, psychoses, suicidal ideation, substance use/abuse.     Objective:     Vitals:    07/02/19 0811   BP: 103/57   Pulse: 66   Resp: 16   Weight: 152 lb (68.9 kg)   Height: 5' 2\" (1.575 m)   PainSc:   6     Physical Exam  Constitutional- General appearance: Normal.  Well developed, comfortable, overweight and appearance reflects stated age.  No acute distress or pain behaviors noted.  Presents alone today.  Psychiatric- Judgment and insight: Normal  Speech: Normal rhythm.  Thought process: Normal.  No abnormal thoughts reported. Alert & Oriented to person, " place, and time.  Recent and remote memory: Normal.  Mood and affect: Normal.  Observed mood: pleasant, appropriate.   Respiratory- Breathing is non-labored; normal rhythm and rate.  Cardiovascular- Extremities warm and well perfused, no peripheral edema or varicosities.  Dermatologic- Exposed skin is clean, dry, and intact to inspection and palpation.  Musculoskeletal- Gait and station: Normal.  Gait evaluation demonstrates ambulating independently, wearing tennis shoes.  Patient does not have difficulty rising from a seated position.     Lab:  Last UDS on 05/24/19 was reviewed and was appropriate.    [place]  Reviewed on 07/02/19 as expected    Imaging:  None new    Assessment:   Gladys Ramirez is a 88 y.o. 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 She has had numerous injections and surgical consults with several podiatrists and is not pursuing surgery at this time.  She has had improvement in pain with topical compounded cream three times a day, reduction in Tramadol, and start of duloxetine.  She was having difficulty tolerating 60 mg dose (caused diarrhea) but she is advised to trial 30 mg two times a day to see if more tolerable.  Otherwise, will reduce dose to 20 mg to take two times a day or 40 mg daily may be a smaller dose increase that would be better tolerated.  She will continue compound as prescribed.  She has lessened her tramadol daily dose from 200 mg to 150 mg daily, goal to continue reduction as tolerated - Dr. Martin is currently prescribing, we can manage but patient prefers not to change at this time.  She failed Butrans patch due to tachycardia requiring ED visit, now listed as allergy.  She is encouraged to restart walking program for exercise as her pain has improved.  She will return in 3 months or sooner as needed for pain recommendations.      Plan:   Continue Tramadol doses 100 mg in the afternoon and 50 mg before bedtime - refills  through Dr. Martin at this time.  Patient attempting to take less as pain improves.      Continue duloxetine 30 mg try taking 1 dose in the morning and 1 in the evening to avoid side effects. Discuss if this is not tolerated, call our nurse line and we may consider a different dosing schedule such as 20 mg two times a day or 40 mg in the morning.  Request refills through your pharmacy when needed.  Continue topical compound cream three times a day as this is helpful - refill at [facility].  Continue use of inserts/new shoes for support.   Attempt to restart walking program for exercise - discussed starting with 1/3 to 1/2 a mile distance and increasing as tolerated.  Follow-up in 3 months with Aleja ARCEO to evaluate the above plan of care.  You may return sooner if any concerns or pain is not well managed.    Aleja Pitts PA-C  [facility]  [address]  [place] [address]  Ph: [phone]  Fax: [phone]

## 2021-05-31 VITALS — WEIGHT: 159.6 LBS | BODY MASS INDEX: 29.37 KG/M2 | HEIGHT: 62 IN

## 2021-05-31 NOTE — TELEPHONE ENCOUNTER
Controlled Substance Refill Request  Medication:   Requested Prescriptions     Pending Prescriptions Disp Refills     zolpidem (AMBIEN CR) 6.25 MG CR tablet [Pharmacy Med Name: Zolpidem Tartrate ER Oral Tablet Extended Release 6.25 MG] 30 tablet 0     Sig: TAKE 1 TABLET (6.25 MG TOTAL) BY MOUTH AT BEDTIME AS NEEDED FOR SLEEP.     traMADol (ULTRAM) 50 mg tablet [Pharmacy Med Name: traMADol HCl Oral Tablet 50 MG] 90 tablet 0     Sig: TAKE 2 TABLETS BY MOUTH IN THE MORNING AND 1 TABLET IN THE AFTERNOON AND BEFORE BED     Date Last Fill: 7.16.19  [facility]   Submit electronically to pharmacy  Controlled Substance Agreement on File:   Encounter-Level CSA Scan Date:    There are no encounter-level csa scan date.       Last office visit: Last office visit pertaining to requested medication was 7.29.19.

## 2021-05-31 NOTE — TELEPHONE ENCOUNTER
Medication being requested: cymbalta 60mg a day  Last visit date: 7/2/19 with Aleja  Next visit date: 10/1/19 with Aleja  Expected follow up: 3 months  Pertinent between visit information about requested medication (telephone, mychart, prior authorization, concerns, comments): med due now  Script being sent to provider by nurse- dates and quantity:   Requested Prescriptions     Pending Prescriptions Disp Refills     DULoxetine (CYMBALTA) 30 MG capsule [Pharmacy Med Name: DULoxetine HCl Oral Capsule Delayed Release Particles 30 MG] 60 capsule 1     Sig: Take 1 capsule (30 mg total) by mouth daily. Take 2 capsules daily.     [facility]acy cued: [facility] [place]  Standing orders for withdrawal protocol implemented: fredis

## 2021-06-01 VITALS — HEIGHT: 62 IN | BODY MASS INDEX: 29.88 KG/M2 | WEIGHT: 162.4 LBS

## 2021-06-01 NOTE — TELEPHONE ENCOUNTER
Medication Question or Clarification  Who is calling: [facility]: [facility]  What medication are you calling about? (include dose and sig) Tramadol 50 mg, 2 in the morning, 1 at midway and 1 at bedtime.  Who prescribed the medication?: Lore Martin MD   What is your question/concern?: Is this for chronic pain?  Please send in a new prescription with the use noted on the prescription.  Ph[facility]: [facility]  Okay to leave a detailed message?: Yes  Site CMT - Please call the pharmacy to obtain any additional needed information.

## 2021-06-01 NOTE — TELEPHONE ENCOUNTER
Sister Gladys called back. Explained purpose of event monitor. Transferred to scheduling to have arranged. -[facility]

## 2021-06-01 NOTE — PATIENT INSTRUCTIONS - HE
Continue Tramadol doses 100 mg in the afternoon and 50 mg before bedtime - refills through Dr. Martin at this time.  Patient attempting to take less as pain improves.      Continue duloxetine 60 mg in the morning.  Finish out your 30 mg capsules by taking 2 in the morning, then when ready for refill I have sent a 60 mg capsule to the pharmacy for 90 day supply so just take ONE capsule.  Continue topical compound cream three times a day as this is helpful - refills at [facility].  You may ask Dr. Martin if she would like to manage all of the above refills - she can reach out to me by staff message or phone call with any questions.  Continue use of inserts/new shoes for support.   Continue use of CBD topical and oil as discussed  May repeat right knee synvisc series with [facility] (they offer SynviscOne now) or may see Dr. Oliva here for series of 3 injections.  Continue walking program for exercise - up to 1 mile daily, great job!  Follow-up in 1 year with Aleja ARCEO to evaluate the above plan of care.  You may return sooner if any concerns or pain is not well managed.  Happy belated birthday!  Enjoyed seeing you today!

## 2021-06-01 NOTE — TELEPHONE ENCOUNTER
Controlled Substance Refill Request x 2  Medication:   Requested Prescriptions     Pending Prescriptions Disp Refills     zolpidem (AMBIEN CR) 6.25 MG CR tablet [Pharmacy Med Name: Zolpidem Tartrate ER Oral Tablet Extended Release 6.25 MG] 30 tablet 0     Sig: TAKE 1 TABLET (6.25 MG TOTAL) BY MOUTH AT BEDTIME AS NEEDED FOR SLEEP.     traMADol (ULTRAM) 50 mg tablet [Pharmacy Med Name: traMADol HCl Oral Tablet 50 MG] 90 tablet 0     Sig: TAKE 2 TABLETS BY MOUTH IN THE MORNING AND 1 TABLET IN THE AFTERNOON AND BEFORE BED     Date Last Fill: 8/13/19  [facility]acy[facility]   Submit electronically to pharmacy  Controlled Substance Agreement on File:   Encounter-Level CSA Scan Date:    There are no encounter-level csa scan date.       Last office visit: 7/29/2019 Lore Martin MD Jill Thielen, RN  Triage Nurse Advisor

## 2021-06-01 NOTE — PROGRESS NOTES
[facility] Follow-up Note    Assessment & Plan        1. Hypertension -under excellent control on lisinopril, lower dose amlodipine 5 mg, and HCTZ.  Given her age if she developed some lightheadedness or syncope or fatigue would lower her lisinopril down to 20 mg.   2. Pure hypercholesterolemia -no recent lipids on chart.   3. Irregular heart rhythm -occasional brief skips according to patient, in sinus rhythm when I examined her.  If multiple skips are noted would consider ACT monitor and if atrial fibrillation seen then anticoagulant.   4. Chronic diastolic congestive heart failure (H) - EF 55%, Grade II (moderate) left ventricular diastolic dysfunction per echo 1/ 2018 -no significant signs or symptoms or recurrence noted.   5. Lung nodule < 6cm on CT -1 and right side and one on left side and getting repeat CT scan.     Plan  1.  We will arrange for ACT monitor.  2.  Follow-up with me in about 9 months or sooner if needed.    Subjective  CC: 88-year-old white female being seen with her friends Doreen, personal friend of my family, for follow-up today.  She still lives alone independently, is having 12 gas for dinner and 2 nights, is still driving.  States she occasionally feels a skipped when she feels her pulse but there is no chest pains, significant internal palpitations, shortness breath, PND, orthopnea or peripheral edema.    Medications  Current Outpatient Medications   Medication Sig Note     amLODIPine (NORVASC) 10 MG tablet Take 0.5 tablets (5 mg total) by mouth daily.      cholecalciferol, vitamin D3, (VITAMIN D3) 2,000 unit Tab Take 1 tablet (2,000 Units total) by mouth daily.      DULoxetine (CYMBALTA) 30 MG capsule Take 1 capsule (30 mg total) by mouth daily. Take 2 capsules daily.      estradiol (ESTRACE) 0.01 % (0.1 mg/gram) vaginal cream 2 x per week per OB/Gyn (Patient taking differently: Insert 2 g into the vagina 2 (two) times a week. 2 x per week per OB/Gyn      ) 7/29/2019:  "Patient denies symptoms and requests to stop or reduce use.     hydroCHLOROthiazide (HYDRODIURIL) 25 MG tablet Take 1 tablet (25 mg total) by mouth daily.      lisinopril (PRINIVIL,ZESTRIL) 40 MG tablet Take 1 tablet (40 mg total) by mouth daily.      melatonin 3 mg Tab tablet Take 3 mg by mouth at bedtime as needed. 7/29/2019: Reports taking every night.     traMADol (ULTRAM) 50 mg tablet TAKE 2 TABLETS BY MOUTH IN THE MORNING AND 1 TABLET IN THE AFTERNOON AND BEFORE BED DX: midfoot arthrosis and arthritis joint pain      zolpidem (AMBIEN CR) 6.25 MG CR tablet TAKE 1 TABLET (6.25 MG TOTAL) BY MOUTH AT BEDTIME AS NEEDED FOR SLEEP.      lidocaine (bulk) 5 %, ibuprofen (bulk) 100 % 10 %, diclofenac sod, micro (bulk) 100 % 5 % Apply 1-2 g topically 3 (three) times a day.        Objective  /68 (Patient Site: Left Arm, Patient Position: Sitting)   Pulse 80   Ht 5' 2\" (1.575 m)   Wt 157 lb (71.2 kg)   BMI 28.72 kg/m      General Appearance:    Alert, cooperative, no distress, appears stated age   Head:    Normocephalic, without obvious abnormality, atraumatic   Throat:   Lips, mucosa, and tongue normal; teeth and gums normal   Neck:   Supple, symmetrical, trachea midline, no adenopathy;        thyroid:  No enlargement/tenderness/nodules; no carotid    bruit or JVD   Back:     Symmetric, no curvature, ROM normal, no CVA tenderness   Lungs:     Clear to auscultation bilaterally, respirations unlabored   Chest wall:    No tenderness or deformity   Heart:    Regular rate and rhythm, S1 and S2 normal, no murmur, rub   or gallop   Abdomen:     Soft, non-tender, bowel sounds active all four quadrants,     no masses, no organomegaly   Extremities:   Normal, atraumatic, no cyanosis or edema   Pulses:   2+ and symmetric all extremities   Skin:   Skin color, texture, turgor normal, no rashes or lesions     Results    Lab Results personally reviewed   Lab Results   Component Value Date    CHOL 179 03/12/2015    CHOL 179 " 09/30/2013     Lab Results   Component Value Date    HDL 64 03/12/2015    HDL 64 09/30/2013     Lab Results   Component Value Date    LDLCALC 80 03/12/2015    LDLCALC 84 09/30/2013     Lab Results   Component Value Date    TRIG 176 (H) 03/12/2015    TRIG 155 (H) 09/30/2013     Lab Results   Component Value Date    WBC 8.5 06/05/2019    HGB 12.3 06/05/2019    HCT 35.5 06/05/2019     06/05/2019     Lab Results   Component Value Date    CREATININE 1.71 (H) 06/05/2019    BUN 37 (H) 06/05/2019     06/05/2019    K 3.5 06/05/2019    CO2 23 06/05/2019     Review of Systems:   General: WNL  Eyes: WNL  Ears/Nose/Throat: WNL  Lungs: Cough  Heart: WNL  Stomach: WNL  Bladder: WNL  Muscle/Joints: WNL  Skin: WNL  Nervous System: WNL  Mental Health: WNL     Blood: WNL

## 2021-06-01 NOTE — TELEPHONE ENCOUNTER
----- Message from GUY Huston sent at 9/19/2019 10:41 AM CDT -----  Regarding: Abelardo RAND Notify  Contact: [phone]  Hello,    We received an MD Notify for a pt of Dr. Holley at 2:52 this morning for A Fib at a rate of 109.  The EKG strips have been posted to the [facility].

## 2021-06-01 NOTE — PROGRESS NOTES
PAIN CENTER PROGRESS NOTE    Subjective:   Gladys Ramirez is a 89 y.o. 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  Consult on 05/24/2019.    Consult on Major issues:  1. Chronic pain syndrome    2. Primary localized osteoarthrosis of lower leg, unspecified laterality    3. Chronic, continuous use of opioids    4. Foot pain, bilateral      Pain location and description: 4-5/10 sore like a toothache in bilateral feet (top).  Function rated 4.  At best, pain rated 4/10.  At worst, pain rated 5/10.  On average pain rated 4-5/10.     Radiation of pain: Denies  Exacerbating factors: walking, stairs, taking shoes off.  Alleviating factors: medication, new orthotics/shoes, elevating feet.  Associated symptoms: Denies pain at night, numbness, weakness, bowel or bladder incontinence, fever/chills, unexplained weight loss.  Functional Symptoms: Pain interferes with sleep and walking.  Function has improved with current pain treatments.  Adverse effects of medications:  None currently.  Current treatment efficacy: Good.  States improvement with compound cream and duloxetine 60 mg daily.  Current treatment compliance: New to pain center.  Following recommendations and compliant with medications.      Since the last Pain Center visit, the patient denies any use of anticoagulant medications. Denies any new diagnostic testing, new treatments or new medical conditions, any changes in medications, or any ED/urgent care visits.  Patient denies the chance of being pregnant or wanting to become pregnant.  She reports pain is 75% better with current plan.  She denies new pain symptoms, falls or injury.  She denies questions today.     She is now duloxetine 60 mg in morning.  Denies side effects, previously dose caused diarrhea but has not experienced this since waiting longer to titrate up.      Applying topical 3 times a day - states sometimes when out and about misses her afternoon dose  but always using morning and night.  Denies skin irritation, rash, itching or side effects.  Thinks this is a good combination for her pain.     CBD oil concentration 500 mg topical two times a day and oil 250 once a day.  She reports she stopped for awhile when on Eliquis, but has since restarted it and has not been advised by cardiology that she must stop.  She knows there is not research available on some drug interactions and weighing risks/benefits.      Walking the halls of her apartment for exercise 3 times is 1 mile. States she walks at least 2,000 feet per day.  Had orthotics readjusted and went to [employer] and purchased good shoes which helps a lot.  She states her foot pain is improved and she is sleeping through the night.      Continues tramadol 50 mg was taking 4 a day, reduced to 3 a day.  Takes 100 mg dose at 1400 and 50 mg dose at bedtime.  Sometimes able to skip a tablet but not consistently, still refilling #90 tabs every 30 days.  She reports Dr. Martin continues to refill this and she calls every 30 days for refill.  Does not want our pain center to take over at this time.     She does reporting right knee OA.  She states sometimes she takes tramadol for this and it is also helpful for knee pain.  Had left knee replaced in 2007, reports PT afterwards was very difficult and not looking to do another surgery.  Had synvisc series of 3 in January, may consider repeating this.  Knee surgeon Dr. Andrew [facility].    She states all week she has celebrated her 89th birthday. She met her sister in [place] for lunch this weekend and did a fall color tour with her niece.    Review of Systems  Constitutional: Denies fever, chills, night sweats, lethargy, weight loss, weight gain, sleep disturbance.  Musculoskeletal: Positive for joint pain.  Denies spine pain, muscle spasm/pain, weakness.  Denies recent falls.  Gastrointestional: Denies difficulty swallowing, change in appetite, abdominal  "pain, constipation, nausea, vomiting, diarrhea, GERD, fecal incontinence.  Genitourinary: Denies urinary incontinence, dysuria, hematuria, UTI, frequency, hesitancy, change in libido.  Neurologic: Denies confusion, seizure, weakness, changes in balance, changes in speech.  Psychiatric: Denies depression, anxiety, memory loss, psychoses, suicidal ideation, substance use/abuse.     Objective:     Vitals:    10/01/19 0941   BP: 103/55   Pulse: 68   Weight: 157 lb (71.2 kg)   Height: 5' 2\" (1.575 m)   PainSc:   5   PainLoc: Foot     Physical Exam  Constitutional- General appearance: Normal.  Well developed, comfortable, overweight and appearance reflects stated age.  No acute distress or pain behaviors noted.  Presents alone today.  Psychiatric- Judgment and insight: Normal  Speech: Normal rhythm.  Thought process: Normal.  No abnormal thoughts reported. Alert & Oriented to person, place, and time.  Recent and remote memory: Normal.  Mood and affect: Normal.  Observed mood: pleasant, appropriate.   Respiratory- Breathing is non-labored; normal rhythm and rate.  Cardiovascular- Extremities warm and well perfused, no peripheral edema or varicosities.  Dermatologic- Exposed skin is clean, dry, and intact to inspection and palpation.  Musculoskeletal- Gait and station: Normal.  Gait evaluation demonstrates ambulating independently, wearing tennis shoes.  Patient does not have difficulty rising from a seated position.     Lab:  Last UDS on 05/24/19 was appropriate.    [place]  Reviewed on 10/01/19 as expected    Imaging:  None new    Assessment:   Gladys Ramirez is a 89 y.o. 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 She has had numerous injections and surgical consults with several podiatrists and is not pursuing surgery at this time.  She has had improvement in pain with topical compounded cream three times a day, reduction in Tramadol, and titration of duloxetine.  She " is now tolerating 60 mg daily, discuss ordering this way for 90 day increments for convenience/cost benefits for the patient.  She has lessened her tramadol daily dose from 200 mg to 150 mg daily, goal to continue reduction as tolerated - Dr. Martin is currently prescribing, we can manage but patient prefers not to change at this time.  She failed Butrans patch due to tachycardia requiring ED visit, now listed as allergy.  She is encouraged to continue walking program for exercise as her pain has improved.  She will return in 12 months or sooner as needed for pain recommendations.  She may also discuss having PCP take over her pain plan as it is non narcotic/non controlled and she is tolerating well.      Plan:   Continue Tramadol doses 100 mg in the afternoon and 50 mg before bedtime - refills through Dr. Martin at this time.  Patient attempting to take less as pain improves.      Continue duloxetine 60 mg in the morning.  Finish out your 30 mg capsules by taking 2 in the morning, then when ready for refill I have sent a 60 mg capsule to the pharmacy for 90 day supply so just take ONE capsule.  Continue topical compound cream three times a day as this is helpful - refills at [facility].  You may ask Dr. Martin if she would like to manage all of the above refills - she can reach out to me by staff message or phone call with any questions.  Continue use of inserts/new shoes for support.   Continue use of CBD topical and oil as discussed  May repeat right knee synvisc series with [facility] (they offer SynviscOne now) or may see Dr. Oliva here for series of 3 injections.  Continue walking program for exercise - up to 1 mile daily, great job!  Follow-up in 1 year with Aleja ARCEO to evaluate the above plan of care.  You may return sooner if any concerns or pain is not well managed.  Happy belated birthday!  Enjoyed seeing you today!    Aleja Pitts PA-C  [facility]  [address]  [place] [address]  Ph: [phone]  Fax: [phone]

## 2021-06-01 NOTE — TELEPHONE ENCOUNTER
"Dr. Holley, received a notification from [facility] [employer] [facility].  Pt went into a fib early this morning with a rate of 109. Strip available in [address] under \"Masood Frost. (they misspelled her last name on the folder title). Any new recommendations?   "

## 2021-06-01 NOTE — PROGRESS NOTES
"Pt is being seen today by Aleja Pitts, PAC, for refill and f/u of 5-intermittent \"sore like a toothache', bilat foot pain.   F4  "

## 2021-06-01 NOTE — TELEPHONE ENCOUNTER
Got in touch with Sister Gladys. She received VM messages and has picked up the Eliquis from her pharmacy. Pt denies any palpitations, shortness of breath, or any a fib related symptoms. Pt will wear heart monitor for another week. Will call pt with results and if no issues with Eliquis send in 90 day supply for pt. Pt verbalized understanding, advised her to call if with any new symptoms. No further questions. -kcl

## 2021-06-01 NOTE — TELEPHONE ENCOUNTER
From: Elizabeth Holley MD  Sent: 9/19/2019   1:00 PM  To: Kathleen Chavez, RN  Please call sister and tell her we have seen abnormal heartbeats, given this would want to start blood thinners prevent stroke, tell her would like to use Eliquis 2.5 mg by mouth twice a day if not too expensive please.  Can restart it for about 10 days with maybe 1 refill to make sure she tolerates it without side effects and then give her a 90-day supply.  She did not come in and see me again to discuss further.        Called and LM for pt to call back to discuss findings and recommendations. -kcl    Called again and left second message. Informed pt that her heart monitor did show some abnormal beats and Dr. Holley would like her to be on a new prescription that was sent to her pharmacy. Advised her to call back when she receives this message to make sure she understands and to ask any questions she may have. Med ordered per protocol and sent to listed pharmacy. -kcl

## 2021-06-01 NOTE — TELEPHONE ENCOUNTER
----- Message from Elizabeth Holley MD sent at 9/10/2019  2:47 PM CDT -----  Let patient go, reviewing her records I think I would like a 14-day ACT monitor to rule out A. fib.  Can you please contact her, apologize for us having chit chating and me forgetting to order ACT monitor, explained I just want to make sure she is not going in and out of atrial fibrillation, at risk for stroke and possibly needing anticoagulation.  LF          Called patient and left a detailed message outlining that need for 14 day heart monitor. Apologized for any inconvenience. Left scheduling and my call back number with questions. Msg also sent to scheduling to arrange. -ramon

## 2021-06-01 NOTE — PATIENT INSTRUCTIONS - HE
Ms Gladys Ramirez,  I enjoyed visiting with you again today.  I am glad to hear you are doing well.  Per our conversation if blood pressure low or fatigue consider cutting the LISINOPRIL in 1/2 for only 20 mg a day or as you get older would cut this and HCTZ.  I will plan on seeing you 9 months or sooner if needed.  Estrada Holley

## 2021-06-02 ENCOUNTER — RECORDS - HEALTHEAST (OUTPATIENT)
Dept: ADMINISTRATIVE | Facility: CLINIC | Age: 86
End: 2021-06-02

## 2021-06-02 VITALS — WEIGHT: 157 LBS | HEIGHT: 61 IN | BODY MASS INDEX: 29.64 KG/M2

## 2021-06-02 VITALS — WEIGHT: 155.3 LBS | HEIGHT: 62 IN | BODY MASS INDEX: 28.58 KG/M2

## 2021-06-02 VITALS — WEIGHT: 154.4 LBS | HEIGHT: 62 IN | BODY MASS INDEX: 28.41 KG/M2

## 2021-06-02 VITALS — WEIGHT: 155.6 LBS | BODY MASS INDEX: 28.63 KG/M2 | HEIGHT: 62 IN

## 2021-06-02 VITALS — HEIGHT: 62 IN | WEIGHT: 163 LBS | BODY MASS INDEX: 30 KG/M2

## 2021-06-02 VITALS — WEIGHT: 154 LBS | HEIGHT: 62 IN | BODY MASS INDEX: 28.34 KG/M2

## 2021-06-02 VITALS — HEIGHT: 62 IN | WEIGHT: 163.9 LBS | BODY MASS INDEX: 30.16 KG/M2

## 2021-06-02 VITALS — WEIGHT: 160.4 LBS | HEIGHT: 61 IN | BODY MASS INDEX: 30.29 KG/M2

## 2021-06-02 VITALS — WEIGHT: 154.1 LBS | HEIGHT: 62 IN | BODY MASS INDEX: 28.36 KG/M2

## 2021-06-02 VITALS — BODY MASS INDEX: 28.49 KG/M2 | HEIGHT: 62 IN | WEIGHT: 154.8 LBS

## 2021-06-02 NOTE — TELEPHONE ENCOUNTER
Controlled Substance Refill Request  Medication:   Requested Prescriptions     Pending Prescriptions Disp Refills     zolpidem (AMBIEN CR) 6.25 MG CR tablet [Pharmacy Med Name: Zolpidem Tartrate ER Oral Tablet Extended Release 6.25 MG] 30 tablet 0     Sig: TAKE 1 TABLET (6.25 MG TOTAL) BY MOUTH AT BEDTIME AS NEEDED FOR SLEEP.     traMADol (ULTRAM) 50 mg tablet [Pharmacy Med Name: traMADol HCl Oral Tablet 50 MG] 90 tablet 0     Sig: TAKE 2 TABLETS BY MOUTH IN THE MORNING AND 1 TABLET IN THE AFTERNOON AND BEFORE BED.     Date Last Fill: 9.9.19  Pharmacy: [facility]   Submit electronically to pharmacy  Controlled Substance Agreement on File:   Encounter-Level CSA Scan Date:    There are no encounter-level csa scan date.       Last office visit: Last office visit pertaining to requested medication was 7.29.19.

## 2021-06-02 NOTE — PROGRESS NOTES
[facility] Follow-up Note    Assessment & Plan        1. Sinus bradycardia -stable at this point time, asymptomatic, possibly an element of sick sinus syndrome.  Briefly discussed need for pacemaker at this point time not needed but might need one in the future.   2. Irregular heart rhythm -atrial fibrillation, asymptomatic paroxysmal and up to 38 hours or 15% on her ACT monitor.  Now on Eliquis 2.5 twice daily secondary to advanced CHADS 2 VASC score of 4.   3. Other pulmonary embolism without acute cor pulmonale, unspecified chronicity (H) -now on Eliquis.   4. Hypertension -under good control but if tends to run a little low and she gets orthostatic will need to back off on amlodipine.   5. Pure hypercholesterolemia -no recent lipids in chart.   6. Chronic diastolic congestive heart failure (H) - EF 55%, Grade II (moderate) left ventricular diastolic dysfunction per echo 1/ 2018 -no significant signs or symptoms currently.  Echo shows ejection fraction of 55%.     Plan  1.  Continue current medications.  2.  Follow-up with me in about 6 months or sooner if symptoms dictate.    Subjective  CC: 89-year-old white female here for follow-up with her friends Doreen torres.  She is still living independently, getting along well with an occasional skip but no major profound skips, syncope, dizziness, chest discomfort, shortness breath, PND, orthopnea or peripheral edema.    Medications  Current Outpatient Medications   Medication Sig Note     amLODIPine (NORVASC) 10 MG tablet Take 0.5 tablets (5 mg total) by mouth daily.      apixaban (ELIQUIS) 2.5 mg Tab tablet Take 1 tablet (2.5 mg total) by mouth 2 (two) times a day.      cholecalciferol, vitamin D3, (VITAMIN D3) 2,000 unit Tab Take 1 tablet (2,000 Units total) by mouth daily.      [START ON 10/12/2019] DULoxetine (CYMBALTA) 60 MG capsule Take 1 capsule (60 mg total) by mouth daily.      estradiol (ESTRACE) 0.01 % (0.1 mg/gram) vaginal cream 2 x per  "week per OB/Gyn (Patient taking differently: Insert 2 g into the vagina once a week. 2 x per week per OB/Gyn      ) 7/29/2019: Patient denies symptoms and requests to stop or reduce use.     hydroCHLOROthiazide (HYDRODIURIL) 25 MG tablet Take 1 tablet (25 mg total) by mouth daily.      lidocaine (bulk) 5 %, ibuprofen (bulk) 100 % 10 %, diclofenac sod, micro (bulk) 100 % 5 % Apply 1-2 g topically 3 (three) times a day.      lisinopril (PRINIVIL,ZESTRIL) 40 MG tablet Take 1 tablet (40 mg total) by mouth daily.      traMADol (ULTRAM) 50 mg tablet TAKE 2 TABLETS BY MOUTH IN THE MORNING AND 1 TABLET IN THE AFTERNOON AND BEFORE BED DX: midfoot arthrosis and arthritis joint pain      zolpidem (AMBIEN CR) 6.25 MG CR tablet TAKE 1 TABLET (6.25 MG TOTAL) BY MOUTH AT BEDTIME AS NEEDED FOR SLEEP.      melatonin 3 mg Tab tablet Take 3 mg by mouth at bedtime as needed. 7/29/2019: Reports taking every night.       Objective  /68 (Patient Site: Left Arm, Patient Position: Sitting, Cuff Size: Adult Regular)   Pulse 64   Resp 16   Ht 5' 2\" (1.575 m)   Wt 157 lb (71.2 kg)   BMI 28.72 kg/m      General Appearance:    Alert, cooperative, no distress, appears stated age   Head:    Normocephalic, without obvious abnormality, atraumatic   Skin:   Skin color, texture, turgor normal, no rashes or lesions     Results    Lab Results personally reviewed   Lab Results   Component Value Date    CHOL 179 03/12/2015    CHOL 179 09/30/2013     Lab Results   Component Value Date    HDL 64 03/12/2015    HDL 64 09/30/2013     Lab Results   Component Value Date    LDLCALC 80 03/12/2015    LDLCALC 84 09/30/2013     Lab Results   Component Value Date    TRIG 176 (H) 03/12/2015    TRIG 155 (H) 09/30/2013     Lab Results   Component Value Date    WBC 8.5 06/05/2019    HGB 12.3 06/05/2019    HCT 35.5 06/05/2019     06/05/2019     Lab Results   Component Value Date    CREATININE 1.71 (H) 06/05/2019    BUN 37 (H) 06/05/2019     " 06/05/2019    K 3.5 06/05/2019    CO2 23 06/05/2019     Review of Systems:   General: WNL  Eyes: WNL  Ears/Nose/Throat: WNL  Lungs: Cough  Heart: Shortness of Breath with activity, Irregular Heartbeat  Stomach: WNL  Bladder: Frequent Urination at Night  Muscle/Joints: WNL  Skin: WNL  Nervous System: WNL  Mental Health: WNL     Blood: WNL

## 2021-06-02 NOTE — TELEPHONE ENCOUNTER
Medication Question or Clarification  Who is calling: Pharmacy: Robert from [facility] in [place]  What medication are you calling about? (include dose and sig)     traMADol (ULTRAM) 50 mg tablet 90 tablet 0 10/8/2019     Sig: TAKE 2 TABLETS BY MOUTH IN THE MORNING AND 1 TABLET IN THE AFTERNOON AND BEFORE BED.      Who prescribed the medication?: pcp  What is your question/concern?: Pharmacy asking if this medication is for chronic pain? Please advise!  Pharmacy: [facility] in [place]  Okay to leave a detailed message?: Yes  Site CMT - Please call the pharmacy to obtain any additional needed information.

## 2021-06-02 NOTE — PATIENT INSTRUCTIONS - HE
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  I am glad to hear you are doing well.  Per our conversation continue the ELIQUIS but if bleeding let me know.  I will plan on seeing you as scheduled.  Estrada Holley

## 2021-06-02 NOTE — TELEPHONE ENCOUNTER
CMT reviewed chart. Please see recent prescriber notes below. Thanks.   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

## 2021-06-02 NOTE — PROGRESS NOTES
HPI - 89 yo female here to f/u on BP and flu shot    Flu shot given       Hypertension  BP meds: amlodipine 5mg, HCTZ 25, lisinopril 40mg  Home -137/66- 95       H/o pulmonary embolism without acute cor pulmonale (H)  Repeat CT Chest Without Contrast in June 2019 -   on Eliquis     Lung nodule  Repeat CT scheduled for 10/28/19     Sinus bradycardia and paroxysmal A fib and CHF  On eliquis for stroke prevention  Chronic diastolic congestive heart failure (H) - EF 55%, Grade II (moderate) left ventricular diastolic dysfunction per echo 1/ 2018 -no significant signs or symptoms currently.  Echo shows ejection fraction of 55%.  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      apixaban (ELIQUIS) 2.5 mg Tab tablet Take 1 tablet (2.5 mg total) by mouth 2 (two) times a day.      cholecalciferol, vitamin D3, (VITAMIN D3) 2,000 unit Tab Take 1 tablet (2,000 Units total) by mouth daily.      DULoxetine (CYMBALTA) 60 MG capsule Take 1 capsule (60 mg total) by mouth daily.      estradiol (ESTRACE) 0.01 % (0.1 mg/gram) vaginal cream 2 x per week per OB/Gyn (Patient taking differently: Insert 2 g into the vagina once a week. 2 x per week per OB/Gyn      ) 10/14/2019: Taking once a week per GYN     hydroCHLOROthiazide (HYDRODIURIL) 25 MG tablet Take 1 tablet (25 mg total) by mouth daily.      lidocaine (bulk) 5 %, ibuprofen (bulk)  "100 % 10 %, diclofenac sod, micro (bulk) 100 % 5 % Apply 1-2 g topically 3 (three) times a day.      lisinopril (PRINIVIL,ZESTRIL) 40 MG tablet Take 1 tablet (40 mg total) by mouth daily.      melatonin 3 mg Tab tablet Take 3 mg by mouth at bedtime as needed. 7/29/2019: Reports taking every night.     traMADol (ULTRAM) 50 mg tablet TAKE 2 TABLETS BY MOUTH IN THE MORNING AND 1 TABLET IN THE AFTERNOON AND BEFORE BED.      zolpidem (AMBIEN CR) 6.25 MG CR tablet TAKE 1 TABLET (6.25 MG TOTAL) BY MOUTH AT BEDTIME AS NEEDED FOR SLEEP.      Vitals:    10/14/19 1118   BP: 130/60   Pulse: 73   Resp: 14   Temp: 97.4  F (36.3  C)   TempSrc: Oral   SpO2: 97%   Weight: 159 lb 4.8 oz (72.3 kg)   Height: 5' 2.01\" (1.575 m)     PHYSICAL EXAM   General Appearance: Awake and alert, in no acute distress  HEENT: neck is supple  CV: regular rate  Resp: No respiratory distress. Breathing comfortably  Musculoskeletal: moving limbs comfortably with not deficits or deformities  Skin: no rashes noted    A/P  HTN- currently well controlled with current regimen  New rx for amlodipine 5mg (rather then cutting pills in 1/2)  Continue HCTZ 25, lisinopril 40mg  Continue to monitor    H/o pulmonary embolism without acute cor pulmonale (H)  Resolved and still Eliquis     Lung nodule  Repeat CT scheduled for 10/28/19     Sinus bradycardia and paroxysmal A fib and CHF  Continue eliquis  F/u with cardiology as scheduled       H/o Vit D deficiency -   continue ergo weekly        Arthrosis of foot bilaterally  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

## 2021-06-03 VITALS
BODY MASS INDEX: 28.34 KG/M2 | HEIGHT: 62 IN | HEIGHT: 62 IN | WEIGHT: 154 LBS | BODY MASS INDEX: 28.52 KG/M2 | WEIGHT: 155 LBS

## 2021-06-03 VITALS — BODY MASS INDEX: 27.97 KG/M2 | WEIGHT: 152 LBS | HEIGHT: 62 IN

## 2021-06-03 VITALS
HEIGHT: 62 IN | HEART RATE: 86 BPM | OXYGEN SATURATION: 97 % | RESPIRATION RATE: 14 BRPM | SYSTOLIC BLOOD PRESSURE: 120 MMHG | WEIGHT: 156.2 LBS | BODY MASS INDEX: 28.74 KG/M2 | TEMPERATURE: 98.1 F | DIASTOLIC BLOOD PRESSURE: 70 MMHG

## 2021-06-03 VITALS
OXYGEN SATURATION: 97 % | TEMPERATURE: 97.4 F | BODY MASS INDEX: 29.32 KG/M2 | RESPIRATION RATE: 14 BRPM | WEIGHT: 159.3 LBS | HEIGHT: 62 IN | SYSTOLIC BLOOD PRESSURE: 130 MMHG | HEART RATE: 73 BPM | DIASTOLIC BLOOD PRESSURE: 60 MMHG

## 2021-06-03 VITALS
SYSTOLIC BLOOD PRESSURE: 124 MMHG | BODY MASS INDEX: 28.89 KG/M2 | WEIGHT: 157 LBS | RESPIRATION RATE: 16 BRPM | HEART RATE: 64 BPM | DIASTOLIC BLOOD PRESSURE: 68 MMHG | HEIGHT: 62 IN

## 2021-06-03 VITALS — HEIGHT: 62 IN | WEIGHT: 152 LBS | BODY MASS INDEX: 27.97 KG/M2

## 2021-06-03 VITALS — WEIGHT: 156.6 LBS | HEIGHT: 62 IN | BODY MASS INDEX: 28.82 KG/M2

## 2021-06-03 VITALS
DIASTOLIC BLOOD PRESSURE: 68 MMHG | WEIGHT: 157 LBS | BODY MASS INDEX: 28.89 KG/M2 | SYSTOLIC BLOOD PRESSURE: 104 MMHG | HEIGHT: 62 IN | HEART RATE: 80 BPM

## 2021-06-03 VITALS — BODY MASS INDEX: 28.89 KG/M2 | HEIGHT: 62 IN | WEIGHT: 157 LBS

## 2021-06-03 VITALS — BODY MASS INDEX: 28.84 KG/M2 | WEIGHT: 156.7 LBS | HEIGHT: 62 IN

## 2021-06-03 NOTE — TELEPHONE ENCOUNTER
"    Call from pt       CC:   Diarrhea / loose stools X 14 days       > At least 5 times / day   > No blood / nothing black in BMs   > No fever   > No vomiting   > Appetite down   > \"terribel cramps\"      > Has been working to stay hydrated with 7up and gatorade  And taking pepto and imodium      > No recent ABX   > Last ABX prob >1 yrs ago      > Recalls something similar after clindamycin after knee surgery         A/P:   > Agree with at home care thus far       Did look at appts - nothing at usual clinic       She asked that we message PCP to see if able to fit her in today       Pt tele# [phone]      Will message provider urgently - call back if not hear from them within 30-40 min        Sebastien Dowell, RN   Triage and Medication Refills      Reason for Disposition    MODERATE diarrhea (e.g., 4-6 times / day more than normal) and present > 48 hours (2 days)    Protocols used: DIARRHEA-A-OH      "

## 2021-06-03 NOTE — TELEPHONE ENCOUNTER
"According to last ov with RW:  Continue duloxetine 60 mg in the morning.  Finish out your 30 mg capsules by taking 2 in the morning, then when ready for refill I have sent a 60 mg capsule to the pharmacy for 90 day supply so just take ONE capsule.  Call placed to patient:  Currently taking 60 mg daily, been taking this for several months.  Patient would like to try going off this medication to see if really needed.  Patient reports that she would like to try just using the topical ointment   \"Maybe this will be enough for me\"  Patient is also requesting a refill of lidocaine compounded cream.  Will cue this.  Requested Prescriptions     Pending Prescriptions Disp Refills     lidocaine (bulk) 5 %, ibuprofen (bulk) 100 % 10 %, diclofenac sod, micro (bulk) 100 % 5 % 60 g 3     Sig: Apply 1-2 g topically 3 (three) times a day.     [facility]  "

## 2021-06-03 NOTE — TELEPHONE ENCOUNTER
Spoke with PCP who can DB pt today at 120 pm.  .  Called pt to relay and she may need to wait.  Pt ok with waiting. Apt scheduled. Thanks.

## 2021-06-03 NOTE — PATIENT INSTRUCTIONS - HE
Diarrhea -   Taper off cymbalta:  Tapering off from 60mg:   Take 40 mg (2 tabs) daily x 1 week   Then 20 mg daily for 1 week   Then 20mg daily every other day   Then stop    Also - take azithromycin   Day 1 take 2 tabs  Then 1 tab daily for 4 days

## 2021-06-03 NOTE — TELEPHONE ENCOUNTER
Medication Question or Clarification  Who is calling: Patient  What medication are you calling about? (include dose and sig)   azithromycin (ZITHROMAX Z-KELLEY) 250 MG tablet           Summary: Take 2 tablets (500 mg) on Day 1, followed by 1 tablet (250 mg) once daily on Days 2 through 5., Normal   Start: 12/2/2019  End: 12/7/2019  Ord/Sold: 12/2/2019 (O)  Report  Adh:   Taking:   Long-term        DULoxetine (CYMBALTA) 20 MG capsule           Summary: Tapering off from 60mg: Take 40 mg (2 tabs) daily x 1 week Then 20 mg daily for 1 week Then 20mg daily every other day Then stop, Normal        zolpidem (AMBIEN CR) 6.25 MG CR tablet  6.25 mg, Oral, Bedtime PRN         Summary: Take 1 tablet (6.25 mg total) by mouth at bedtime as needed for sleep., Starting Mon 12/2/2019, Normal        traMADol (ULTRAM) 50 mg tablet           Summary: TAKE 2 TABLETS IN THE AFTERNOON AND ONE BEFORE BED., Normal      Who prescribed the medication?: Dr Martin  What is your question/concern?: The pharmacy is telling patient they have no orders when three were sent with confirmation.  Writer internally faxed orders to pharmacy with request to fill.   Please note..   Pharmacy: [facility]  Okay to leave a detailed message?: Yes [phone]     Site CMT - Please call the pharmacy to obtain any additional needed information.

## 2021-06-03 NOTE — TELEPHONE ENCOUNTER
Previously doing well with ambien with Control substance contract  The last few months the ambien is not working, falling a sleep at 2am until 8am - NOTED from last OV note.  Thanks.

## 2021-06-03 NOTE — TELEPHONE ENCOUNTER
Left message for provider, wondering about going off of Duloxatine. Foot pain is less than #3, so wondering if should try not taking med.  Will need to call to discuss.

## 2021-06-03 NOTE — PROGRESS NOTES
"HPI - 88 yo female with diarrhea that started 2 weeks ago    Diarrhea that is water and \"like a blast\" and not able to make it to the bathroom  She can have diarrhea 5-7 x per day  No blood  No recent travel, no ew meds  Feels like c diff that she had years ago when she had clindamycin  Last antibiotics Feb 2019 (9 months ago)   Sick contacts - none  Tried pepto-bismol, loperamide 4x per day  Trying to stay hydrated with water and 7up and gatorade  Lost 3# since last month      Current Outpatient Medications   Medication Sig Note     amLODIPine (NORVASC) 5 MG tablet Take 1 tablet (5 mg total) by mouth daily.      apixaban (ELIQUIS) 2.5 mg Tab tablet Take 1 tablet (2.5 mg total) by mouth 2 (two) times a day.      cholecalciferol, vitamin D3, (VITAMIN D3) 2,000 unit Tab Take 1 tablet (2,000 Units total) by mouth daily.      DULoxetine (CYMBALTA) 60 MG capsule Take 1 capsule (60 mg total) by mouth daily.      estradiol (ESTRACE) 0.01 % (0.1 mg/gram) vaginal cream 2 x per week per OB/Gyn (Patient taking differently: Insert 2 g into the vagina once a week. 2 x per week per OB/Gyn      ) 10/14/2019: Taking once a week per GYN     hydroCHLOROthiazide (HYDRODIURIL) 25 MG tablet Take 1 tablet (25 mg total) by mouth daily.      lidocaine (bulk) 5 %, ibuprofen (bulk) 100 % 10 %, diclofenac sod, micro (bulk) 100 % 5 % Apply 1-2 g topically 3 (three) times a day.      lisinopril (PRINIVIL,ZESTRIL) 40 MG tablet Take 1 tablet (40 mg total) by mouth daily.      melatonin 3 mg Tab tablet Take 3 mg by mouth at bedtime as needed. 7/29/2019: Reports taking every night.     traMADol (ULTRAM) 50 mg tablet TAKE 2 TABLETS BY MOUTH IN THE MORNING AND 1 TABLET IN THE AFTERNOON AND BEFORE BED.      zolpidem (AMBIEN CR) 6.25 MG CR tablet TAKE 1 TABLET (6.25 MG TOTAL) BY MOUTH AT BEDTIME AS NEEDED FOR SLEEP.      Vitals:    11/19/19 1348   BP: 120/70   Pulse: 86   Resp: 14   Temp: 98.1  F (36.7  C)   TempSrc: Oral   SpO2: 97%   Weight: 156 lb " "3.2 oz (70.9 kg)   Height: 5' 2.01\" (1.575 m)     OBJECTIVE:  Vitals listed above within normal limits  General appearance: well groomed, pleasant, well hydrated, nontoxic appearing  ENT: PERRL, throat clear  Neck: neck supple, no lymphadenopathy, no thyromegaly  Lungs: lungs clear to auscultation bilaterally, no wheezes or rhonchi  Heart: regular rate and rhythm, no murmurs, rubs or gallops  Abdomen: soft, nontender  Neuro: no focal deficits, CN II-XII grossly intact, alert and oriented  Psych:  mood stable, appears to have good insight and judgment    A/P  Diarrhea  Check C diff and stool cx  Restart probiotic with Kefir            "

## 2021-06-03 NOTE — TELEPHONE ENCOUNTER
Controlled Substance Refill Request  Medication:   Requested Prescriptions     Pending Prescriptions Disp Refills     zolpidem (AMBIEN CR) 6.25 MG CR tablet [Pharmacy Med Name: Zolpidem Tartrate ER Oral Tablet Extended Release 6.25 MG] 30 tablet 0     Sig: TAKE 1 TABLET (6.25 MG TOTAL) BY MOUTH AT BEDTIME AS NEEDED FOR SLEEP.     Date Last Fill: 10/8/19  Pharmacy:  [facility]  Submit electronically to pharmacy  Controlled Substance Agreement on File:   Encounter-Level CSA Scan Date:    There are no encounter-level csa scan date.       Last office visit: Last office visit pertaining to requested medication was 10/14/19.

## 2021-06-03 NOTE — TELEPHONE ENCOUNTER
Approved topical refill.  If she wants to wean off duloxetine, will need reduction to 30 mg x 7 days and then can stop.  Does she have enough to do that?  Will need to monitor nerve pain and notify me if it worsens off oral medications.

## 2021-06-03 NOTE — PROGRESS NOTES
"TCM DISCHARGE FOLLOW UP CALL    Discharge Date:  11/21/2019  Reason for hospital stay (discharge diagnosis)::  Chest pain  Are you feeling better, the same or worse since your discharge?:  Patient is feeling better (Tired.  \"Explosive diarrhea\" continued through yesterday evening, last bout was at 10:30pm; slept through the night w/o waking w/diarrhea.  Denies CPs.)  Do you feel like you have a plan in the event of a health emergency?: Yes (has a Health Alert button, 911, 'Sister Yandy')    As part of your discharge plan, were  home care services ordered for you?: No    Did you receive any new medications, or was there a change to your medications?: Yes    Are you taking those medications, or do you have any established regiment?:  Pt states she's taking new medications, omeprazole 20 mg a half hour before supper and potassium 1 tab (10 mEq) twice a day, as prescribed.  States the estradiol vaginal cream she uses once a week (on Sundays), dose was decreased a couple of weeks ago by her OB.  She applies the combination topical cream to her feet twice a day, morning & night, instead of three times a day; aware she can use it three times a day but usually forgets it in the afternoon and has good pain relief w/twice a day.  She states she's cut down her tramadol from 4 tabs to 1-3 tabs a day, doesn't take it in the morning, if needed will take 2 tabs ~ 2pm, and always takes 1 tab at HS, scheduled; \"I take it when I need it.\"  States she plans to discuss stopping duloxetine, as her pain has improved and questions if she still needs it.  RN advised pt continue taking duloxetine as prescribed until f/u appt w/PCP, 12/2/19, at which time she can discuss changes; pt agrees.  Do you have any follow up visits scheduled with your PCP or Specialist?:  Yes, with PCP  (RN) Is PCP appt scheduled soon enough (within 14 days of discharge date)?: Yes (Dr. Martin 12/2/19)        Natalya Moreno RN Care Manager, Population Health    "

## 2021-06-03 NOTE — PROGRESS NOTES
HPI - 89 yo female here for hospital f/u and diarrhea.       Admitted 11/20/19 - 11/21/19 for atypical chest pain likely due to GERD  Per D/C summary:  Serial troponins were negative  EKG was unremarkable  Echocardiogram was unchanged  Cta Chest [place]  Tx: omeprazole x 4 weeks  Hypokalemia and elevated CR noted    Diarrhea persists -   Watery stool that started 11/5/19  She has been having watery stools 4-6 x per day  Tried imodium, kefir with probiotic, kayopectake, peptobismal, loperamide   Stool culture and c diff neg  meds reviewed   cymbalta - 30mg fille 6/12/19 and 8/12/19 and then 60mg 10/1/19  Colonoscopy 7/16/2007      Foot pain -  Seen at pain clinic and started on cymbalta    Current Outpatient Medications   Medication Sig Note     amLODIPine (NORVASC) 5 MG tablet Take 1 tablet (5 mg total) by mouth daily.      apixaban (ELIQUIS) 2.5 mg Tab tablet Take 1 tablet (2.5 mg total) by mouth 2 (two) times a day.      cholecalciferol, vitamin D3, (VITAMIN D3) 2,000 unit Tab Take 1 tablet (2,000 Units total) by mouth daily.      DULoxetine (CYMBALTA) 60 MG capsule Take 1 capsule (60 mg total) by mouth daily.      estradiol (ESTRACE) 0.01 % (0.1 mg/gram) vaginal cream 2 x per week per OB/Gyn (Patient taking differently: Insert 2 g into the vagina once a week. 2 x per week per OB/Gyn      ) 11/20/2019: Sundays     hydroCHLOROthiazide (HYDRODIURIL) 25 MG tablet Take 1 tablet (25 mg total) by mouth daily.      lidocaine (bulk) 5 %, ibuprofen (bulk) 100 % 10 %, diclofenac sod, micro (bulk) 100 % 5 % Apply 1-2 g topically 3 (three) times a day. (Patient taking differently: Apply 1-2 g topically 3 (three) times a day as needed.       ) 11/20/2019: Just represcribed, not filled yet     lisinopril (PRINIVIL,ZESTRIL) 40 MG tablet Take 1 tablet (40 mg total) by mouth daily.      melatonin 3 mg Tab tablet Take 3 mg by mouth at bedtime.             omeprazole (PRILOSEC) 20 MG capsule Take 1 capsule (20 mg total) by mouth  "daily before supper.      traMADol (ULTRAM) 50 mg tablet TAKE 2 TABLETS BY MOUTH IN THE MORNING AND 1 TABLET IN THE AFTERNOON AND BEFORE BED. (Patient taking differently: Take  mg by mouth 3 (three) times a day as needed for pain. TAKE 2 TABLETS BY MOUTH IN THE MORNING AND 1 TABLET IN THE AFTERNOON AND BEFORE BED.      ) 11/20/2019: Not taking scheduled, only bedtime and sometimes afternoon.     zolpidem (AMBIEN CR) 6.25 MG CR tablet TAKE 1 TABLET (6.25 MG TOTAL) BY MOUTH AT BEDTIME AS NEEDED FOR SLEEP.      Vitals:    12/02/19 0851   BP: 106/60   Pulse: 71   Resp: 14   Temp: 97.7  F (36.5  C)   TempSrc: Oral   SpO2: 96%   Weight: 153 lb 12.8 oz (69.8 kg)   Height: 5' 2.01\" (1.575 m)     PHYSICAL EXAM   General Appearance: Awake and alert, in no acute distress  HEENT: neck is supple  CV: regular rate  Resp: No respiratory distress. Breathing comfortably  Musculoskeletal: moving limbs comfortably with not deficits or deformities  Skin: no rashes noted    A/P   Hospital discharge follow-up  - Comprehensive Metabolic Panel  D/c summary and labs reviewed with patient     Hypokalemia  - Comprehensive Metabolic Panel    Diarrhea, unspecified type  ---Will treat empirically for infectious diarrhea with azithromycin, despite negative stool samples, b/c of duration of 4 weeks of extensive diarrhea despite multiple OTC meds.   --wean off cymbalta as possible cause  -encouraged staying hydyrated with gatorade and continuing kefir probiotics  ---referral for GI for further w/u as indicated  - azithromycin (ZITHROMAX Z-KELLEY) 250 MG tablet; Take 2 tablets (500 mg) on  Day 1,  followed by 1 tablet (250 mg) once daily on Days 2 through 5.  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  Dispense: 90 tablet; Refill: 0     Insomnia  Continue melatonin and ambien  - zolpidem (AMBIEN CR) 6.25 MG CR tablet; Take 1 tablet (6.25 mg total) by mouth at bedtime as needed for sleep.  Dispense: 30 tablet; Refill: 0       Controlled substance agreement signed

## 2021-06-03 NOTE — TELEPHONE ENCOUNTER
[facility] calls, diclofenac refill request.  Chart review: appears there are remaining refills on this compounded cream.  Call placed to pharmacy, voicemail to call back.

## 2021-06-03 NOTE — TELEPHONE ENCOUNTER
Called pharmacy who did not receive meds.  Called in meds to pharmacy. Called pt to relay meds called in.  Thanks.

## 2021-06-04 VITALS
DIASTOLIC BLOOD PRESSURE: 60 MMHG | BODY MASS INDEX: 29.3 KG/M2 | RESPIRATION RATE: 20 BRPM | SYSTOLIC BLOOD PRESSURE: 116 MMHG | WEIGHT: 159.19 LBS | TEMPERATURE: 97.5 F | HEIGHT: 62 IN | HEART RATE: 64 BPM

## 2021-06-04 VITALS
DIASTOLIC BLOOD PRESSURE: 60 MMHG | SYSTOLIC BLOOD PRESSURE: 106 MMHG | WEIGHT: 153.8 LBS | TEMPERATURE: 97.7 F | HEIGHT: 62 IN | HEART RATE: 71 BPM | OXYGEN SATURATION: 96 % | BODY MASS INDEX: 28.3 KG/M2 | RESPIRATION RATE: 14 BRPM

## 2021-06-04 VITALS
HEART RATE: 77 BPM | BODY MASS INDEX: 30.55 KG/M2 | RESPIRATION RATE: 14 BRPM | WEIGHT: 166 LBS | DIASTOLIC BLOOD PRESSURE: 73 MMHG | HEIGHT: 62 IN | SYSTOLIC BLOOD PRESSURE: 118 MMHG

## 2021-06-04 NOTE — TELEPHONE ENCOUNTER
Controlled Substance Refill Request  Medication:   Requested Prescriptions     Pending Prescriptions Disp Refills     zolpidem (AMBIEN CR) 6.25 MG CR tablet [Pharmacy Med Name: Zolpidem Tartrate ER Oral Tablet Extended Release 6.25 MG] 30 tablet 0     Sig: TAKE ONE TABLET BY MOUTH AT BEDTIME AS NEEDED FOR SLEEP     traMADol (ULTRAM) 50 mg tablet [Pharmacy Med Name: traMADol HCl Oral Tablet 50 MG] 90 tablet 0     Sig: TAKE 2 TABLETS BY MOUTH AT NOON, THEN 1 TABLET BY MOUTH AT BEDTIME     Date Last Fill: 12.2.19  Pharmacy: [facility]   Submit electronically to pharmacy  Controlled Substance Agreement on File:   Encounter-Level CSA Scan Date - 01/08/2019:    Scan on 1/14/2019 10:21 AM       Last office visit: Last office visit pertaining to requested medication was 12.2.19.

## 2021-06-05 VITALS
RESPIRATION RATE: 14 BRPM | HEIGHT: 62 IN | HEART RATE: 78 BPM | BODY MASS INDEX: 30.73 KG/M2 | DIASTOLIC BLOOD PRESSURE: 70 MMHG | WEIGHT: 167 LBS | SYSTOLIC BLOOD PRESSURE: 122 MMHG

## 2021-06-05 VITALS — BODY MASS INDEX: 30.55 KG/M2 | HEIGHT: 62 IN | WEIGHT: 166 LBS

## 2021-06-05 VITALS — BODY MASS INDEX: 30.36 KG/M2 | WEIGHT: 165 LBS | HEIGHT: 62 IN

## 2021-06-05 VITALS
SYSTOLIC BLOOD PRESSURE: 128 MMHG | WEIGHT: 163.4 LBS | RESPIRATION RATE: 16 BRPM | OXYGEN SATURATION: 94 % | BODY MASS INDEX: 29.89 KG/M2 | HEART RATE: 52 BPM | DIASTOLIC BLOOD PRESSURE: 68 MMHG

## 2021-06-05 VITALS — HEIGHT: 62 IN | BODY MASS INDEX: 30 KG/M2 | WEIGHT: 163 LBS

## 2021-06-05 VITALS — WEIGHT: 166 LBS | BODY MASS INDEX: 30.55 KG/M2 | HEIGHT: 62 IN

## 2021-06-05 VITALS — WEIGHT: 166 LBS | BODY MASS INDEX: 30.36 KG/M2

## 2021-06-05 NOTE — TELEPHONE ENCOUNTER
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  Dispense: 90 tablet; Refill: 0

## 2021-06-05 NOTE — TELEPHONE ENCOUNTER
Controlled Substance Refill Request  Medication Name:   Requested Prescriptions     Pending Prescriptions Disp Refills     traMADol (ULTRAM) 50 mg tablet [Pharmacy Med Name: traMADol HCl Oral Tablet 50 MG] 90 tablet 0     Sig: TAKE 2 TABLETS (100 MG) BY MOUTH AT NOON, THEN 1 TABLET (50 MG) BY MOUTH AT BEDTIME     Date Last Fill: 1/2/20  [facility]ed [facility]  Submit electronically to pharmacy  Controlled Substance Agreement on file:   Encounter-Level CSA Scan Date - 01/08/2019:    Scan on 1/14/2019 10:21 AM        Last office visit:  12/2/19

## 2021-06-05 NOTE — PATIENT INSTRUCTIONS - HE
Advance Directive  Patient s advance directive was discussed and I am comfortable with the patient s wishes.  Patient Education   Personalized Prevention Plan  You are due for the preventive services outlined below.  Your care team is available to assist you in scheduling these services.  If you have already completed any of these items, please share that information with your care team to update in your medical record.  Health Maintenance   Topic Date Due     HEART FAILURE ACTION PLAN  09/29/1930     DXA SCAN  09/29/1995     ZOSTER VACCINES (2 of 3) 11/19/2012     LIPID  03/12/2016     MEDICARE ANNUAL WELLNESS VISIT  10/11/2019     BMP  06/02/2020     FALL RISK ASSESSMENT  10/14/2020     CBC  11/20/2020     ALT  12/02/2020     ADVANCE CARE PLANNING  10/11/2023     TD 18+ HE  10/14/2029     TSH  Completed     PNEUMOCOCCAL IMMUNIZATION 65+ LOW/MEDIUM RISK  Completed     INFLUENZA VACCINE RULE BASED  Completed

## 2021-06-05 NOTE — PROGRESS NOTES
Assessment and Plan:   Annual wellness visit      tinea corporis -   rx clotrimazole      H/o diarrhea from mir - colitis  Resolved    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, unspecified whether native or transplanted heart (H)  A Fib  F/u with cardiology in April 2020  Continue current regimen       Arthrosis of foot - bilateral  Advised to restart cymbalta 60mg daily  Advised tylenol for pain   tramadol for break through pain, trying to minimize  Control substance contract on file and updated today  She does not escalate use      Insomnia  Continue melatonin and ambien  Tried many other sleep remedies and seen by sleep clinic, and low dose ambien is the only med that works. She does not escalate use and control substance contract on file.     Vaginal atrophy -sx improved with estrace once weekly    The patient's current medical problems were reviewed.    I have had an Advance Directives discussion with the patient.  The following health maintenance schedule was reviewed with the patient and provided in printed form in the after visit summary:   Health Maintenance   Topic Date Due     HEART FAILURE ACTION PLAN  09/29/1930     DXA SCAN  09/29/1995     ZOSTER VACCINES (2 of 3) 11/19/2012     LIPID  03/12/2016     MEDICARE ANNUAL WELLNESS VISIT  10/11/2019     BMP  06/02/2020     FALL RISK ASSESSMENT  10/14/2020     CBC  11/20/2020     ALT  12/02/2020     ADVANCE CARE PLANNING  10/11/2023     TD 18+ HE  10/14/2029     TSH  Completed     PNEUMOCOCCAL IMMUNIZATION 65+ LOW/MEDIUM RISK  Completed     INFLUENZA VACCINE RULE BASED  Completed        Subjective:   Chief Complaint: Gladys Ramirez is an 89 y.o. female here for an Annual Wellness visit.   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 Sinus bradycardia -stable at this point time, asymptomatic, possibly an element of sick sinus syndrome.  Briefly discussed need for pacemaker at this point time not needed but might need one in the future.   2. Irregular heart rhythm -atrial fibrillation, asymptomatic paroxysmal and up to 38 hours or 15% on her ACT monitor.  Now on Eliquis 2.5 twice daily secondary to advanced CHADS 2 VASC score of 4.   3. Other pulmonary embolism without acute cor pulmonale, unspecified chronicity (H) -now on Eliquis.   4. Hypertension -under good control but if tends to run a little low and she gets orthostatic will need to back off on amlodipine.   5. Pure hypercholesterolemia -no recent lipids in chart.   6. Chronic diastolic congestive heart failure (H) - EF 55%, Grade II (moderate) left ventricular diastolic dysfunction per echo 1/ 2018 -no significant signs or symptoms currently.  Echo shows ejection fraction of 55%.           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 She has now restarted it with her prior rx.   She not using tylenol for pain b/c worried about eliquis and tramadol for break through pain     Insomnia  Continue melatonin and ambien    Vaginal atrophy   No itching or irritation  On estrace once weekly    Thyroid nodules on CT   No change bilateral thyroid lobe nodules. These should be further evaluated with ultrasound if not previously performed.  TSH wnl 2.05 on 2/21/19    Stable lung nodules on chest CTA 11/20/19    Review of Systems:    Please see above.  The rest of the review of systems are negative for all systems.    Patient Care Team:  Mario  Lore Mack MD as PCP - General  Lore Martin MD as Assigned PCP  Abdifatah Clark, PharmD as Pharmacist (Pharmacist)     Patient Active Problem List   Diagnosis     Localized Primary Osteoarthritis Of The Left Knee     Benign essential hypertension     Serum Enzyme Levels - Alkaline Phosphatase Elevated     Hyperlipidemia     Cataract     Insomnia     Hypokalemia     BCC (basal cell carcinoma of skin)     Sinus bradycardia     Left knee DJD     DNR (do not resuscitate)     Arthrosis of foot - bilateral     Lower extremity edema     Frequent nosebleeds     Irregular heart rhythm     Controlled substance agreement signed - for tramadole and ambien     Chronic diastolic congestive heart failure (H) - EF 55%, Grade II (moderate) left ventricular diastolic dysfunction per echo 1/ 2018     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     Vitamin D deficiency     Elevated alkaline phosphatase level     Deep vein thrombosis (DVT) of distal vein of lower extremity, unspecified chronicity, unspecified laterality (H)     Other pulmonary embolism without acute cor pulmonale, unspecified chronicity (H)     Hemoptysis     Hypomagnesemia     Lung nodule < 6cm on CT     Chest pain     (HFpEF) heart failure with preserved ejection fraction (H)     Paroxysmal atrial fibrillation (H)     Diarrhea     Chronic pain     Normocytic anemia     Gastroesophageal reflux disease without esophagitis     Past Medical History:   Diagnosis Date     Angina pectoris (H)      Chest pain 3/9/2017     Cough      Hyperlipidemia      Hypertension      Osteoarthritis      Pneumonia 2/21/2019      Past Surgical History:   Procedure Laterality Date     APPENDECTOMY       BASAL CELL CARCINOMA EXCISION       DIAGNOSTIC LAPAROSCOPY N/A 11/4/2014    Procedure: LAPAROSCOPY BILATERAL SALPINGO-OOPHORECTOMY ;  Surgeon: Sofia Harper MD;  Location: [facility];  Service:      JOINT REPLACEMENT Left     TKA     Laparoscopy Salpingo-oophorectomy  Bilateral 11/04/2014     [place] APPENDECTOMY      Description: Appendectomy;  Recorded: 01/18/2012;     [place] REMOVAL OF TONSILS,<11 Y/O      Description: Tonsillectomy;  Recorded: 01/18/2012;     [place] TOTAL KNEE ARTHROPLASTY      Description: Total Knee Arthroplasty;  Recorded: 11/18/2013;     TONSILLECTOMY        Family History   Problem Relation Age of Onset     Heart disease Mother      Rheumatic Heart Disease Mother      No Medical Problems Father      Cancer Sister      Cancer Brother       Social History     Socioeconomic History     Marital status: Single     Spouse name: Not on file     Number of children: Not on file     Years of education: Not on file     Highest education level: Not on file   Occupational History     Not on file   Social Needs     Financial resource strain: Not on file     Food insecurity:     Worry: Not on file     Inability: Not on file     Transportation needs:     Medical: Not on file     Non-medical: Not on file   Tobacco Use     Smoking status: Never Smoker     Smokeless tobacco: Never Used     Tobacco comment: no passive exposure   Substance and Sexual Activity     Alcohol use: Yes     Comment: Rarely a glass of wine     Drug use: No     Sexual activity: Not on file   Lifestyle     Physical activity:     Days per week: Not on file     Minutes per session: Not on file     Stress: Not on file   Relationships     Social connections:     Talks on phone: Not on file     Gets together: Not on file     Attends Baptism service: Not on file     Active member of club or organization: Not on file     Attends meetings of clubs or organizations: Not on file     Relationship status: Not on file     Intimate partner violence:     Fear of current or ex partner: Not on file     Emotionally abused: Not on file     Physically abused: Not on file     Forced sexual activity: Not on file   Other Topics Concern     Not on file   Social History Narrative    The patient is a nun.      Current Outpatient  "Medications   Medication Sig Dispense Refill     amLODIPine (NORVASC) 5 MG tablet Take 1 tablet (5 mg total) by mouth daily. 30 tablet 11     apixaban (ELIQUIS) 2.5 mg Tab tablet Take 1 tablet (2.5 mg total) by mouth 2 (two) times a day. 180 tablet 4     cholecalciferol, vitamin D3, (VITAMIN D3) 2,000 unit Tab Take 1 tablet (2,000 Units total) by mouth daily. 90 tablet 3     DULoxetine (CYMBALTA) 20 MG capsule Tapering off from 60mg: Take 40 mg (2 tabs) daily x 1 week Then 20 mg daily for 1 week Then 20mg daily every other day Then stop 60 capsule 2     estradiol (ESTRACE) 0.01 % (0.1 mg/gram) vaginal cream 2 x per week per OB/Gyn (Patient taking differently: Insert 2 g into the vagina once a week. 2 x per week per OB/Gyn      ) 42.5 g 1     hydroCHLOROthiazide (HYDRODIURIL) 25 MG tablet Take 1 tablet (25 mg total) by mouth daily. 90 tablet 3     lidocaine (bulk) 5 %, ibuprofen (bulk) 100 % 10 %, diclofenac sod, micro (bulk) 100 % 5 % Apply 1-2 g topically 3 (three) times a day. (Patient taking differently: Apply 1-2 g topically 3 (three) times a day as needed.       ) 60 g 3     lisinopril (PRINIVIL,ZESTRIL) 40 MG tablet Take 1 tablet (40 mg total) by mouth daily. 90 tablet 3     melatonin 3 mg Tab tablet Take 3 mg by mouth at bedtime.              omeprazole (PRILOSEC) 20 MG capsule Take 1 capsule (20 mg total) by mouth daily before supper. 30 capsule 0     traMADol (ULTRAM) 50 mg tablet TAKE 2 TABLETS (100 MG) BY MOUTH AT NOON, THEN 1 TABLET (50 MG) BY MOUTH AT BEDTIME 90 tablet 0     zolpidem (AMBIEN CR) 6.25 MG CR tablet TAKE ONE TABLET BY MOUTH AT BEDTIME AS NEEDED FOR SLEEP 30 tablet 0     No current facility-administered medications for this visit.       Objective:   Vital Signs:   Visit Vitals  /60   Pulse 64   Temp 97.5  F (36.4  C) (Oral)   Resp 20   Ht 5' 2.01\" (1.575 m)   Wt 159 lb 3 oz (72.2 kg)   BMI 29.11 kg/m         VisionScreening:  No exam data present     PHYSICAL EXAM  PHYSICAL EXAM "   General Appearance: Awake and alert, in no acute distress  HEENT: neck is supple  CV: regular rate  Resp: No respiratory distress. Breathing comfortably  Musculoskeletal: moving limbs comfortably with not deficits or deformities  Skin: 2 patches with raised edges and central clearing on each side of spine of lower back      Assessment Results 1/30/2020   Activities of Daily Living No help needed   Instrumental Activities of Daily Living No help needed   Get Up and Go Score -   Mini Cog Total Score 5   Some recent data might be hidden     A Mini-Cog score of 0-2 suggests the possibility of dementia, score of 3-5 suggests no dementia    Identified Health Risks:     Patient's advanced directive was discussed and I am comfortable with the patient's wishes.

## 2021-06-06 NOTE — TELEPHONE ENCOUNTER
Controlled Substance Refill Request  Medication Name:   Requested Prescriptions     Pending Prescriptions Disp Refills     zolpidem (AMBIEN CR) 6.25 MG CR tablet [Pharmacy Med Name: Zolpidem Tartrate ER Oral Tablet Extended Release 6.25 MG] 30 tablet 0     Sig: TAKE ONE TABLET BY MOUTH AT BEDTIME AS NEEDED FOR SLEEP     hydroCHLOROthiazide (HYDRODIURIL) 25 MG tablet [Pharmacy Med Name: hydroCHLOROthiazide Oral Tablet 25 MG] 90 tablet 2     Sig: Take 1 tablet (25 mg total) by mouth daily.     lisinopriL (PRINIVIL,ZESTRIL) 40 MG tablet [Pharmacy Med Name: Lisinopril Oral Tablet 40 MG] 90 tablet 2     Sig: Take 1 tablet (40 mg total) by mouth daily.     Date Last Fill: 1/2/20  [facility]  Submit electronically to pharmacy  Controlled Substance Agreement on file:   Encounter-Level CSA Scan Date - 01/08/2019:    Scan on 1/14/2019 10:21 AM        Last office visit:  1/30/20         Rx renewed per Medication Renewal policy  Hctz, lisinopril  Lab Results   Component Value Date    CREATININE 1.35 (H) 12/02/2019    BUN 30 (H) 12/02/2019     12/02/2019    K 4.8 12/02/2019     12/02/2019    CO2 25 12/02/2019

## 2021-06-06 NOTE — TELEPHONE ENCOUNTER
Insomnia addressed 12/2019. See below.     Insomnia  Continue melatonin and ambien  - zolpidem (AMBIEN CR) 6.25 MG CR tablet; Take 1 tablet (6.25 mg total) by mouth at bedtime as needed for sleep.  Dispense: 30 tablet; Refill: 0

## 2021-06-06 NOTE — TELEPHONE ENCOUNTER
Controlled Substance Refill Request  Medication Name:   Requested Prescriptions     Pending Prescriptions Disp Refills     traMADoL (ULTRAM) 50 mg tablet [Pharmacy Med Name: traMADol HCl Oral Tablet 50 MG] 90 tablet 0     Sig: TAKE 2 TABLETS (100 MG) BY MOUTH AT NOON, THEN 1 TABLET (50 MG) BY MOUTH AT BEDTIME     Date Last Fill: 1/28/20  [facility]  Submit electronically to pharmacy  Controlled Substance Agreement on file:   Encounter-Level CSA Scan Date - 01/08/2019:    Scan on 1/14/2019 10:21 AM        Last office visit:  1/30/20

## 2021-06-07 NOTE — TELEPHONE ENCOUNTER
Controlled Substance Refill Request  Medication Name:   Requested Prescriptions     Pending Prescriptions Disp Refills     traMADoL (ULTRAM) 50 mg tablet [Pharmacy Med Name: traMADol HCl Oral Tablet 50 MG] 90 tablet 0     Sig: TAKE 2 TABLETS (100 MG) BY MOUTH AT NOON, THEN 1 TABLET (50 MG) BY MOUTH AT BEDTIME.     Date Last Fill: 4/2/20  [facility]  Submit electronically to pharmacy  Controlled Substance Agreement on file:   Encounter-Level CSA Scan Date - 01/08/2019:    Scan on 1/14/2019 10:21 AM        Last office visit:  1/30/20

## 2021-06-07 NOTE — TELEPHONE ENCOUNTER
Last office visit: 12/02/2019  Last refill: 03/19/2020 #30  Last lab check: N/A   Next appointment: 07/16/2020 Dr. Martin     Chart reviewed. Please review findings below.     Insomnia  Continue melatonin and ambien  - zolpidem (AMBIEN CR) 6.25 MG CR tablet; Take 1 tablet (6.25 mg total) by mouth at bedtime as needed for sleep.  Dispense: 30 tablet; Refill: 0

## 2021-06-07 NOTE — TELEPHONE ENCOUNTER
Last office visit: 12/02/2019  Last refill: 04/02/2020  Last lab check: N/A   Next appointment: 07/16/2020    Chart reviewed. Please review findings below.     A/P   Hospital discharge follow-up  - Comprehensive Metabolic Panel  D/c summary and labs reviewed with patient      Hypokalemia  - Comprehensive Metabolic Panel     Diarrhea, unspecified type  ---Will treat empirically for infectious diarrhea with azithromycin, despite negative stool samples, b/c of duration of 4 weeks of extensive diarrhea despite multiple OTC meds.   --wean off cymbalta as possible cause  -encouraged staying hydyrated with gatorade and continuing kefir probiotics  ---referral for GI for further w/u as indicated  - azithromycin (ZITHROMAX Z-KELLEY) 250 MG tablet; Take 2 tablets (500 mg) on  Day 1,  followed by 1 tablet (250 mg) once daily on Days 2 through 5.  Dispense: 6 tablet; Refill: 0  - DULoxetine (CYMBALTA) 20 MG capsule; Tapering off from 60mg: Take 40 mg (2 tabs) daily x 1 week Then 20 mg daily for 1 week Then 20mg daily every other day Then stop  Dispense: 60 capsule; Refill: 2  - Ambulatory referral to Gastroenterology

## 2021-06-07 NOTE — TELEPHONE ENCOUNTER
Called #   Telephone Information:   Mobile [phone]     Pt was advised ok to take pain relievers with allopurinol. Per Micromedex there are no interactions.  Patient stated an understanding and agreed with plan.      Micky Daly RN   [facility]     Controlled Substance Refill Request  Medication Name:   Requested Prescriptions     Pending Prescriptions Disp Refills     traMADoL (ULTRAM) 50 mg tablet [Pharmacy Med Name: traMADol HCl Oral Tablet 50 MG] 90 tablet 0     Sig: TAKE 2 TABLETS (100 MG) BY MOUTH AT NOON, THEN 1 TABLET (50 MG) BY MOUTH AT BEDTIME     Date Last Fill: 3/3/20  [facility]  Submit electronically to pharmacy  Controlled Substance Agreement on file:   Encounter-Level CSA Scan Date - 01/08/2019:    Scan on 1/14/2019 10:21 AM        Last office visit:  1/30/20

## 2021-06-07 NOTE — TELEPHONE ENCOUNTER
Controlled Substance Refill Request  Medication Name:   Requested Prescriptions     Pending Prescriptions Disp Refills     zolpidem (AMBIEN CR) 6.25 MG CR tablet [Pharmacy Med Name: Zolpidem Tartrate ER Oral Tablet Extended Release 6.25 MG] 30 tablet 0     Sig: TAKE ONE TABLET BY MOUTH AT BEDTIME AS NEEDED FOR SLEEP     Date Last Fill: 2/25/20  [facility]  Submit electronically to pharmacy  Controlled Substance Agreement on file:   Encounter-Level CSA Scan Date - 01/08/2019:    Scan on 1/14/2019 10:21 AM        Last office visit:  1/30/20

## 2021-06-08 NOTE — TELEPHONE ENCOUNTER
Controlled Substance Refill Request  Medication Name:   Requested Prescriptions     Pending Prescriptions Disp Refills     traMADoL (ULTRAM) 50 mg tablet [Pharmacy Med Name: traMADol HCl Oral Tablet 50 MG] 90 tablet 0     Sig: TAKE 2 TABLETS (100 MG) BY MOUTH AT NOON, THEN 1 TABLET (50 MG) BY MOUTH AT BEDTIME.     Date Last Fill: 5/1/20  [facility]  Submit electronically to pharmacy  Controlled Substance Agreement on file:   Encounter-Level CSA Scan Date - 01/08/2019:    Scan on 1/14/2019 10:21 AM        Last office visit:  1/30/20

## 2021-06-08 NOTE — TELEPHONE ENCOUNTER
Controlled Substance Refill Request  Medication Name:   Requested Prescriptions     Pending Prescriptions Disp Refills     zolpidem (AMBIEN CR) 6.25 MG CR tablet [Pharmacy Med Name: Zolpidem Tartrate ER Oral Tablet Extended Release 6.25 MG] 30 tablet 0     Sig: TAKE ONE TABLET BY MOUTH AT BEDTIME AS NEEDED for sleep     Date Last Fill: 4/22/20  [facility]  Submit electronically to pharmacy  Controlled Substance Agreement on file:   Encounter-Level CSA Scan Date - 01/08/2019:    Scan on 1/14/2019 10:21 AM        Last office visit:  1/30/20

## 2021-06-09 NOTE — PROGRESS NOTES
"  Chief Complaint   Patient presents with     Diarrhea     Cough         HPI:   Gladys Ramirez is a 86 y.o. female c/o cough for the last week, productive.  One day of diarrhea. No vomiting  Two days of fever-5 days ago.  Had sweats and chills, now resolved.  Has had sore throat.  Has had noisy breathing with laying down.  Poor appetite.  Drinking fluids.  Has used cheratussin.  Quite fatigued.  Had flu and pneumononia vaccinations.    ROS:  Constitutional: as per HPI  Eyes: negative   ENT: as per HPI  Respiratory: as per HPI   CV: no exertional chest pain, orthopnea, PND  GI: as per HPI  : negative   SKIN: negative   MS: unchanged  NEURO: negative      Medications:  Current Outpatient Prescriptions on File Prior to Visit   Medication Sig Dispense Refill     NIFEDICAL XL 60 mg 24 hr tablet TAKE 1 TABLET BY MOUTH EVERY MORNING 90 tablet 2     zolpidem (AMBIEN) 5 MG tablet TAKE 1 TABLET BY MOUTH EVERY NIGHT AT BEDTIME FOR SLEEP. 30 tablet 5     [DISCONTINUED] amoxicillin (AMOXIL) 500 MG capsule   2     No current facility-administered medications on file prior to visit.          Social History:  Social History   Substance Use Topics     Smoking status: Never Smoker     Smokeless tobacco: Never Used     Alcohol use Yes      Comment: Rarely         Physical Exam:   Vitals:    03/06/17 1338   BP: 120/78   Patient Site: Left Arm   Patient Position: Sitting   Cuff Size: Adult Regular   Pulse: 72   Resp: 24   Temp: 97.7  F (36.5  C)   TempSrc: Oral   SpO2: 95%   Weight: 168 lb 8 oz (76.4 kg)   Height: 5' 1.75\" (1.568 m)       GENERAL:   Alert. Oriented.  EYES: Clear  HENT:  Ears: R TM pearly gray. Normal landmarks. L TM pearly gray.  Normal landmarks  Nose: Clear.  Sinuses: Nontender.  Oropharynx:  No erythema. No exudate.  NECK: Supple. No adenopathy.  LUNGS: Clear to ascultation.  No crackles.  No wheezing  HEART: RRR  SKIN:  No rash.         Assessment/Plan:    1. Influenza-like illness  benzonatate (TESSALON) 100 " MG capsule      Patient is improving.  Has normal lung exam and no respiratory distress.  May use cheratussin as needed and/or benzonatate.  Good fluid intake.  Activity as tolerated.  Not contagious as this time.  Recheck if develops fever, increasing cough or shortness of breath.  Expect continued improvement over the next 1-2 weeks.      The following portions of the patient's history were reviewed and updated as appropriate: allergies, current medications, past family history, past medical history, past social history, past surgical history and problem list.    Anya Del Cid MD      3/6/2017

## 2021-06-09 NOTE — TELEPHONE ENCOUNTER
PDMP reviewed, appropriate. Last tramadol refilled on 5/29/2020.   Chart reviewed, CSA on file.   Patient last seen in clinic 1/3/2020.      Tramadol refilled.         Estrada Forte MD

## 2021-06-09 NOTE — TELEPHONE ENCOUNTER
Controlled Substance Refill Request  Medication Name:   Requested Prescriptions     Pending Prescriptions Disp Refills     zolpidem (AMBIEN CR) 6.25 MG CR tablet [Pharmacy Med Name: Zolpidem Tartrate ER Oral Tablet Extended Release 6.25 MG] 30 tablet 0     Sig: TAKE ONE TABLET BY MOUTH AT BEDTIME AS NEEDED for sleep.     Date Last Fill: 5/23/20  [facility]  Submit electronically to pharmacy  Controlled Substance Agreement on file:   Encounter-Level CSA Scan Date - 01/08/2019:    Scan on 1/14/2019 10:21 AM        Last office visit:  1/30/20

## 2021-06-09 NOTE — PROGRESS NOTES
HPI - 87 yo female here for hospital followup.     She was admitted 3/9-3/10/17.     She is breathing better and coughing less but still coughing. She is feeling really fatigued and dizzy with position changes.   She has sinus pressure, postnasal drip and yellow phlegm for 2 weeks.   Chest pain resolved.   Eating and drinking ok.     Per call from hospitalist:  Shelli Wilson dictating a quick note to Dr. Lore Martin with [facility] on Gladys Ramirez.  1930. MRN # [id number]. 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 It sounded possibly anginal as she helped push friend in her wheel chair quite some distance in their apartment building, and upon returning back to her apartment, had this episode of pain. Serial troponin's were negative and her stress test was negative for ischemia as well. Her blood pressure is fairly well controlled. Not overly controlled. She did not have hypotension. Her heart rate at times was dipping into the 50's, and occassionally the 40's, but she had absolutely no symptoms with this. She did not want to have her calcium channel blocker dose decreased, and I advised she follow up with you to discuss. She also declined an event monitor. I've asked her to follow up with you in the next week or so, and my discharge summary if available for your review. Feel friend to send me a staff message through Epic with any concerns or feedback. She speaks very highly of you, and appreciates your care a great deal. Thanks for the chance to care for your patients, and I hope you are well.         Current Outpatient Prescriptions   Medication Sig     NIFEdipine (PROCARDIA XL) 60 MG 24 hr tablet Take 60 mg by mouth daily.     VITAMIN D2 50,000 unit capsule Take 50,000 Units by mouth once a week. Thursday     zolpidem (AMBIEN) 5 MG tablet Take 5 mg by mouth  "bedtime.     guaiFENesin ER (MUCINEX) 600 mg 12 hr tablet Take 600 mg by mouth 2 (two) times a day as needed for congestion. congestion     MV-MN/VITC/ASBNA/GLU/CORAL/ (AIRBORNE, ASCORBATE SODIUM, ORAL) Take 1 tablet by mouth daily as needed. Vitamin supplement, Airborne     Vitals:    03/16/17 1108   BP: 120/64   Pulse: 60   Resp: 14   Temp: 97.4  F (36.3  C)   TempSrc: Oral   SpO2: 97%   Weight: 167 lb 14.4 oz (76.2 kg)   Height: 5' 2\" (1.575 m)     OBJECTIVE:  Vitals listed above within normal limits  General appearance: well groomed, pleasant, well hydrated, nontoxic appearing  ENT: PERRL, throat clear, mild tenderness to palpate frontal and maxillary sinus  Neck: neck supple, mild lymphadenopathy, no thyromegaly  Lungs: lungs clear to auscultation bilaterally, no wheezes or rhonchi  Heart: regular rate and rhythm, no murmurs, rubs or gallops  Abdomen: soft, nontender  Neuro: no focal deficits, CN II-XII grossly intact, alert and oriented  Psych:  mood stable, appears to have good insight and judgment    A/P  Hospital followup  Doing better but still symptomatic  Reviewed d/c summary, CXR, EKG and labs    HTN - BP well controlled    Sinusitis -   rx azithromycin  Given h/o yeast infections with antibiotics, encouraged eating yogurt/kefir while on antibiotics and to call if she gets vaginal sx.   "

## 2021-06-09 NOTE — PROGRESS NOTES
"Gladys Ramirez is a 89 y.o. female who is being evaluated via a billable telephone visit.      The patient has been notified of following:     \"This telephone visit will be conducted via a call between you and your physician/provider. We have found that certain health care needs can be provided without the need for a physical exam.  This service lets us provide the care you need with a short phone conversation.  If a prescription is necessary we can send it directly to your pharmacy.  If lab work is needed we can place an order for that and you can then stop by our lab to have the test done at a later time.    Telephone visits are billed at different rates depending on your insurance coverage. During this emergency period, for some insurers they may be billed the same as an in-person visit.  Please reach out to your insurance provider with any questions.    If during the course of the call the physician/provider feels a telephone visit is not appropriate, you will not be charged for this service.\"    Patient has given verbal consent to a Telephone visit? Yes    What phone number would you like to be contacted at? [phone]    Patient would like to receive their AVS by none    HPI - 88 yo female with phone visit to f/u.     She has concerns of sinus infection and cough up \"globs of yellow mucus)  Tried flonase and mucinex and delsym and saline nasal spray     Insomnia  Takes ambien 6.25 x 1 +  1/4 tabs (b/c 6.25 is not helping enough)  Stopped melatonin  Tried many other sleep remedies and seen by sleep clinic, and low dose ambien is the only med that works. She does not escalate use and control substance contract on file.        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, unspecified whether native or transplanted heart (H)  A Fib  F/u with cardiology in April 2020  Continue current regimen         Arthrosis of foot - bilateral  Advised to restart cymbalta 60mg daily  Advised tylenol for " pain   tramadol for break through pain, trying to minimize  Control substance contract on file and updated today  She does not escalate use     Left hip and knee - are painful     Vaginal atrophy -sx improved with estrace once weekly      Patient Active Problem List   Diagnosis     Localized Primary Osteoarthritis Of The Left Knee     Benign essential hypertension     Serum Enzyme Levels - Alkaline Phosphatase Elevated     Hyperlipidemia     Cataract     Insomnia     BCC (basal cell carcinoma of skin)     Sinus bradycardia     Left knee DJD     DNR (do not resuscitate)     Arthrosis of foot - bilateral     Lower extremity edema     Frequent nosebleeds     Irregular heart rhythm     Controlled substance agreement signed - for tramadol and ambien signed 1/30/20     Chronic diastolic congestive heart failure (H) - EF 55%, Grade II (moderate) left ventricular diastolic dysfunction per echo 1/ 2018     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     Vitamin D deficiency     Elevated alkaline phosphatase level     Deep vein thrombosis (DVT) of distal vein of lower extremity, unspecified chronicity, unspecified laterality (H)     Other pulmonary embolism without acute cor pulmonale, unspecified chronicity (H)     Hemoptysis     Hypomagnesemia     Lung nodule < 6cm on CT     (HFpEF) heart failure with preserved ejection fraction (H)     Paroxysmal atrial fibrillation (H)     Chronic pain     Normocytic anemia     Gastroesophageal reflux disease without esophagitis     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, unspecified whether native or transplanted heart (H)     Thyroid nodule     Current Outpatient Medications   Medication Sig Note     amLODIPine (NORVASC) 5 MG tablet Take 1 tablet (5 mg total) by mouth daily.      apixaban (ELIQUIS) 2.5 mg Tab tablet Take 1 tablet (2.5 mg total) by mouth 2 (two) times a day.      cholecalciferol, vitamin D3, (VITAMIN D3) 2,000 unit Tab Take 1 tablet (2,000 Units  total) by mouth daily.      DULoxetine (CYMBALTA) 60 MG capsule Take 1 capsule (60 mg total) by mouth daily.      hydroCHLOROthiazide (HYDRODIURIL) 25 MG tablet Take 1 tablet (25 mg total) by mouth daily.      lisinopriL (PRINIVIL,ZESTRIL) 40 MG tablet Take 1 tablet (40 mg total) by mouth daily.      traMADoL (ULTRAM) 50 mg tablet TAKE 2 TABLETS (100 MG) BY MOUTH AT NOON, THEN 1 TABLET (50 MG) BY MOUTH AT BEDTIME.      zolpidem (AMBIEN CR) 6.25 MG CR tablet TAKE ONE TABLET BY MOUTH AT BEDTIME AS NEEDED for sleep.      clotrimazole (LOTRIMIN) 1 % cream Apply two times a day to rash lower back as needed      estradiol (ESTRACE) 0.01 % (0.1 mg/gram) vaginal cream 2 x per week per OB/Gyn (Patient taking differently: Insert 2 g into the vagina once a week. 2 x per week per OB/Gyn      ) 11/20/2019: Sundays     lidocaine (bulk) 5 %, ibuprofen (bulk) 100 % 10 %, diclofenac sod, micro (bulk) 100 % 5 % Apply 1-2 g topically 3 (three) times a day. (Patient taking differently: Apply 1-2 g topically 3 (three) times a day as needed.       ) 11/20/2019: Just represcribed, not filled yet     melatonin 3 mg Tab tablet Take 3 mg by mouth at bedtime.             omeprazole (PRILOSEC) 20 MG capsule Take 1 capsule (20 mg total) by mouth daily before supper.          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 Lore Martin  7/16/2020

## 2021-06-10 NOTE — PROGRESS NOTES
HPI -  85 yo female with worsening RLQ/pelvic pain/pressure and vaginal stabbing sharp pain    Right pelvic pain that has worsened over the last 6 weeks.   Using old vicodin pills (from surgery in the past) b/c pain is bad at time  Tylenol, ibuprofen, naproxen not helping  S/p oophroectomy  She is also reporting the feeling of razors cutting/blade burning feeling in vagina  Normal BM's  Gyn/OB  3 weeks ago - at [facility] - JADA signed and told not gyn issue  Given replens cream and previously given lidocaine gel    US PELVIS WITH TRANSVAGINAL NON OB 4/19/2017   INDICATION: pelvic pain   CONCLUSION: 1.  Multiple small masses are seen in the uterus most consistent with benign fibroids. 2.  Neither ovary could be visualized.    Abdo/Pelvic CT 11/12/14  CONCLUSION:  1. Patient has findings of a port site infection. Fluid and air is limited to the subcutaneous fat. Findings discussed with Sandro Ma at 1758. I and D. is suggested.  2. Interval resection of the right dermoid. No pelvic hemorrhage or abscess.  3. Extensive sigmoid diverticulosis noted.  4. 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    Frequent nocturia  Gyn did UA and was neg      Current Outpatient Prescriptions   Medication Sig     NIFEdipine (PROCARDIA XL) 60 MG 24 hr tablet Take 60 mg by mouth daily.     zolpidem (AMBIEN) 5 MG tablet Take 5 mg by mouth bedtime.     Vitals:    04/25/17 1129   BP: 146/80   Pulse: 80   Weight: 166 lb (75.3 kg)     PHYSICAL EXAM   General Appearance: Awake and alert, in no acute distress  HEENT: neck is supple  CV: regular rate  Resp: No respiratory distress. Breathing comfortably  Musculoskeletal: moving limbs comfortably with not deficits or deformities  Skin: no rashes noted    A/P  1. Pelvic pain/RLQ paiin  Unclear etiology. Nothing on pelvic exam by myself last visit and recent Gyn visit.   No GI sx, s/p LAPAROSCOPY BILATERAL  SALPINGO-OOPHORECTOMY and s/p appy  - CT Abdomen Pelvis With Oral With IV Contrast; Future  - naproxen (EC NAPROSYN) 500 MG EC tablet; Take 1 tablet (500 mg total) by mouth 2 (two) times a day with meals.  Dispense: 60 tablet; Refill: 0  - HYDROcodone-acetaminophen (NORCO )  mg per tablet; Take 1 tablet by mouth every 8 (eight) hours as needed for pain (break through pain).  Dispense: 5 tablet; Refill: 0  - acetaminophen (TYLENOL) 500 MG tablet; Take 2 tablets (1,000 mg total) by mouth 2 (two) times a day as needed for pain.  Dispense: 100 tablet; Refill: 3      2. Vaginal pain  - hydrocortisone 0.5 % cream; Apply to vagina daily as needed  Dispense: 30 g; Refill: 0    DATA REVIEWED:  Additional History from Old Records Summarized (2): reviewed old imaging, chart notes, labs  Decision to Obtain Records (1): JADA sign for [facility]  Radiology Tests Summarized or Ordered (1): abdo/pelvi CT  Labs Reviewed or Ordered (1): reviewed recent labs  Medicine Test Summarized or Ordered (1):discussed recent u/s  Independent Review of EKG or X-RAY(2 each): none    Spent 25 min face to face with patient with more the 50% spent in counseling, reviewing chart, and coordination of care and discussing problems listed above.

## 2021-06-10 NOTE — TELEPHONE ENCOUNTER
Controlled Substance Refill Request  Medication Name:   Requested Prescriptions     Pending Prescriptions Disp Refills     traMADoL (ULTRAM) 50 mg tablet [Pharmacy Med Name: traMADol HCl Oral Tablet 50 MG] 90 tablet 0     Sig: Take 2 tablets (100 mg) by mouth at noon, 1 tablet (50 mg) by mouth at bedtime.     Date Last Fill: 7/31/20  [facility]  Submit electronically to pharmacy  Controlled Substance Agreement on file:   Encounter-Level CSA Scan Date - 01/08/2019:    Scan on 1/14/2019 10:21 AM        Last office visit:  7/16/20

## 2021-06-10 NOTE — TELEPHONE ENCOUNTER
Last office visit: 07/16/23020  Last ordered: 07/31/2020  Last lab check: N/A  Next appointment: None    Chart reviewed. Please review findings below.     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 Lore Martin  7/16/2020

## 2021-06-10 NOTE — PROGRESS NOTES
"HPI - 85 yo female here for f/u.     abdo pain and pressure resolved.   CT and U/s unrevealing    Us Pelvis With Transvaginal Non Ob  Result Date: 4/19/2017  CONCLUSION: 1.  Multiple small masses are seen in the uterus most consistent with benign fibroids. 2.  Neither ovary could be visualized.    Ct Abdomen Pelvis With Oral With Iv Contrast  CONCLUSION: 1.  No specific abnormality to explain the patient's pain. 2.  Diastasis of the rectus abdominis muscle with infraumbilical hernia containing fat.       HTN - BP good with nifedipine    H/o interstitial cystitis  Avoiding food as advised by Gyn  Significant nocturia      Current Outpatient Prescriptions   Medication Sig     acetaminophen (TYLENOL) 500 MG tablet Take 2 tablets (1,000 mg total) by mouth 2 (two) times a day as needed for pain.     HYDROcodone-acetaminophen (NORCO )  mg per tablet Take 1 tablet by mouth every 8 (eight) hours as needed for pain (break through pain).     hydrocortisone 0.5 % cream Apply to vagina daily as needed     naproxen (EC NAPROSYN) 500 MG EC tablet Take 1 tablet (500 mg total) by mouth 2 (two) times a day with meals.     NIFEdipine (PROCARDIA XL) 60 MG 24 hr tablet Take 60 mg by mouth daily.     zolpidem (AMBIEN) 5 MG tablet Take 5 mg by mouth bedtime.     Vitals:    05/09/17 1119   BP: 130/80   Pulse: 61   Resp: 14   Temp: 97.4  F (36.3  C)   TempSrc: Oral   SpO2: 96%   Weight: 161 lb 14.4 oz (73.4 kg)   Height: 5' 2\" (1.575 m)     PHYSICAL EXAM   General Appearance: Awake and alert, in no acute distress  HEENT: neck is supple  CV: regular rate  Resp: No respiratory distress. Breathing comfortably  Musculoskeletal: moving limbs comfortably with not deficits or deformities  Skin: no rashes noted    A/P  Abdo pain - resolved    HTN - BP good with nifedipine    H/o interstitial cystitis   advised to f/u with  Gyn  Significant nocturia  rx for ditropan XL for overactive bladder      "

## 2021-06-10 NOTE — TELEPHONE ENCOUNTER
Controlled Substance Refill Request  Medication Name:   Requested Prescriptions     Pending Prescriptions Disp Refills     traMADoL (ULTRAM) 50 mg tablet [Pharmacy Med Name: traMADol HCl Oral Tablet 50 MG] 90 tablet 0     Sig: TAKE 2 TABLETS (100 MG) BY MOUTH AT NOON, THEN 1 TABLET (50 MG) BY MOUTH AT BEDTIME.     Date Last Fill: 6/25/20  [facility]  Submit electronically to pharmacy  Controlled Substance Agreement on file:   Encounter-Level CSA Scan Date - 01/08/2019:    Scan on 1/14/2019 10:21 AM        Last office visit:  7/16/20

## 2021-06-10 NOTE — TELEPHONE ENCOUNTER
Refill Approved    Rx renewed per Medication Renewal Policy. Medication was last renewed on 7/16/20.    Kika Johnson, [facility] Triage/Med Refill 8/27/2020     Requested Prescriptions   Pending Prescriptions Disp Refills     DULoxetine (CYMBALTA) 60 MG capsule [Pharmacy Med Name: DULoxetine HCl Oral Capsule Delayed Release Particles 60 MG] 30 capsule 0     Sig: TAKE ONE CAPSULE BY MOUTH TWICE DAILY       Tricyclics/Misc Antidepressant/Antianxiety Meds Refill Protocol Passed - 8/25/2020  2:01 AM        Passed - PCP or prescribing provider visit in last year     Last office visit with prescriber/PCP: 12/2/2019 Lore Martin MD OR same dept: 12/2/2019 Lore Martin MD OR same specialty: 12/2/2019 Lore Martin MD  Last physical: 1/30/2020 Last MTM visit: Visit date not found   Next visit within 3 mo: Visit date not found  Next physical within 3 mo: Visit date not found  Prescriber OR PCP: Lore Martin MD  Last diagnosis associated with med order: 1. Arthrosis of foot, unspecified laterality  - DULoxetine (CYMBALTA) 60 MG capsule [Pharmacy Med Name: DULoxetine HCl Oral Capsule Delayed Release Particles 60 MG]; TAKE ONE CAPSULE BY MOUTH TWICE DAILY   Dispense: 30 capsule; Refill: 0    If protocol passes may refill for 12 months if within 3 months of last provider visit (or a total of 15 months).

## 2021-06-11 NOTE — PATIENT INSTRUCTIONS - HE
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  I am glad to hear you are doing well.  Per our conversation cut the LISINOPRIL in 1/2 for a 20 mg tablet a day.  Check some blood pressures after a week or so and call to me at [phone].  I will plan on seeing you 6 months or so.  Estrada Holley

## 2021-06-11 NOTE — TELEPHONE ENCOUNTER
----- Message -----  From: Elizabeth Holley MD  Sent: 9/14/2020   3:15 PM CDT  To: Sissy Interiano RN    If patient is symptomatically improved with less lightheadedness, we can call in a 20 mg prescription for her to replace the 40 mg tablets if she so desires.  LF  -------------------------------------------------------------------------------------------------------------    Return call to patient. She notes that she has plenty of Lisinopril 40 mg tablets and does not mind splitting the pills.   Med list updated.   Patient aware of plan for follow-up with [facility] in March 2021. -ejb

## 2021-06-11 NOTE — TELEPHONE ENCOUNTER
"Dr. Holley please review update from patient below. -ejb  -------------------------------------------------------------------------------------------------------------    Patient calling in with status update after decreasing her Lisinopril by half. Patient monitored BP as instructed by Dr. Holley. She reports that her average BP has been 117/77 mmHg. Patient states she is feeling \"really good\".  "

## 2021-06-11 NOTE — TELEPHONE ENCOUNTER
RN cannot approve Refill Request    RN can NOT refill this medication med is not covered by policy/route to provider. Last office visit: Visit date not found Last Physical: Visit date not found Last MTM visit: Visit date not found Last visit same specialty: 12/2/2019 Lore Martin MD.  Next visit within 3 mo: Visit date not found  Next physical within 3 mo: Visit date not found      Eulalia Mahajan, [facility] Triage/Med Refill 8/31/2020    Requested Prescriptions   Pending Prescriptions Disp Refills     traMADoL (ULTRAM) 50 mg tablet [Pharmacy Med Name: traMADol HCl Oral Tablet 50 MG] 90 tablet 0     Sig: Take 2 tablets (100 mg) by mouth at noon, 1 tablet (50 mg) by mouth at bedtime.       Controlled Substances Refill Protocol Failed - 8/27/2020  3:57 PM        Failed - Route all Controlled Substance Requests to Provider        Passed - Patient has controlled substance agreement in past 12 months     Encounter-Level CSA Scan Date - 01/08/2019:    Scan on 1/14/2019 10:21 AM               Passed - Visit with PCP or prescribing provider visit in past 12 months      Last office visit with prescriber/PCP: Visit date not found OR same dept: 12/2/2019 Lore Martin MD OR same specialty: 12/2/2019 Lore Martin MD Last physical: Visit date not found Last MTM visit: Visit date not found    Next visit within 3 mo: Visit date not found  Next physical within 3 mo: Visit date not found  Prescriber OR PCP: Alee Dia MD  Last diagnosis associated with med order: 1. Arthrosis of foot - bilateral  - traMADoL (ULTRAM) 50 mg tablet [Pharmacy Med Name: traMADol HCl Oral Tablet 50 MG]; Take 2 tablets (100 mg) by mouth at noon, 1 tablet (50 mg) by mouth at bedtime.  Dispense: 90 tablet; Refill: 0    2. 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  Dispense: 90 tablet; Refill: 0    3. Controlled substance agreement signed  - traMADoL (ULTRAM) 50 mg tablet [Pharmacy Med Name: traMADol HCl Oral Tablet 50 MG]; Take 2 tablets (100 mg) by mouth at noon, 1 tablet (50 mg) by mouth at bedtime.  Dispense: 90 tablet; Refill: 0

## 2021-06-11 NOTE — TELEPHONE ENCOUNTER
Wellness Screening Tool  Symptom Screening:  Do you have one of the following NEW symptoms:    Fever (subjective or >100.0)?  No    A new cough?  No    Shortness of breath?  No     Chills? No     New loss of taste or smell? No     Generalized body aches? No     New persistent headache? No     New sore throat? No     Nausea, vomiting, or diarrhea?  No    Within the past 2 weeks, have you been exposed to someone with a known positive illness below:    COVID-19 (known or suspected)?  No    Chicken pox?  No    Mealses?  No    Pertussis?  No    Patient notified of visitor policy- They may have one person accompany them to their appointment, but they will need to wear a mask and will be screened upon arrival for symptoms: Yes  Pt informed to wear a mask: Yes  Pt notified if they develop any symptoms listed above, prior to their appointment, they are to call the clinic directly at [phone] for further instructions.  Yes  Patient's appointment status: Patient will be seen in clinic as scheduled on 9/3/20

## 2021-06-11 NOTE — TELEPHONE ENCOUNTER
Refill Approved    Rx renewed per Medication Renewal Policy. Medication was last renewed on 10/14/19.    Mari North, [facility] Triage/Med Refill 9/21/2020     Requested Prescriptions   Pending Prescriptions Disp Refills     amLODIPine (NORVASC) 5 MG tablet [Pharmacy Med Name: amLODIPine Besylate Oral Tablet 5 MG] 30 tablet 0     Sig: Take 1 tablet (5 mg total) by mouth daily.       Calcium-Channel Blockers Protocol Passed - 9/20/2020  2:00 AM        Passed - PCP or prescribing provider visit in past 12 months or next 3 months     Last office visit with prescriber/PCP: 12/2/2019 Lore Martin MD OR same dept: 12/2/2019 Lore Martin MD OR same specialty: 12/2/2019 Lore Martin MD  Last physical: 1/30/2020 Last MTM visit: Visit date not found   Next visit within 3 mo: Visit date not found  Next physical within 3 mo: Visit date not found  Prescriber OR PCP: Lore Martin MD  Last diagnosis associated with med order: 1. Hypertension  - amLODIPine (NORVASC) 5 MG tablet [Pharmacy Med Name: amLODIPine Besylate Oral Tablet 5 MG]; Take 1 tablet (5 mg total) by mouth daily.  Dispense: 30 tablet; Refill: 0    If protocol passes may refill for 12 months if within 3 months of last provider visit (or a total of 15 months).             Passed - Blood pressure filed in past 12 months     BP Readings from Last 1 Encounters:   09/03/20 118/73

## 2021-06-12 NOTE — TELEPHONE ENCOUNTER
Controlled Substance Refill Request  Medication Name:   Requested Prescriptions     Pending Prescriptions Disp Refills     zolpidem (AMBIEN CR) 6.25 MG CR tablet [Pharmacy Med Name: Zolpidem Tartrate ER Oral Tablet Extended Release 6.25 MG] 45 tablet 0     Sig: take 1 and 1/4 tablet by mouth at bedtime     Date Last Fill: 7/16/20  [facility]  Submit electronically to pharmacy  Controlled Substance Agreement on file:   Encounter-Level CSA Scan Date - 01/08/2019:    Scan on 1/14/2019 10:21 AM        Last office visit:  7/16/20

## 2021-06-12 NOTE — TELEPHONE ENCOUNTER
The patient is notified that the medication was sent. The patient expressed her gratitude for the provider assistance. Thanks.

## 2021-06-12 NOTE — TELEPHONE ENCOUNTER
Controlled Substance Refill Request  Medication Name:   Requested Prescriptions     Pending Prescriptions Disp Refills     traMADoL (ULTRAM) 50 mg tablet [Pharmacy Med Name: traMADol HCl Oral Tablet 50 MG] 90 tablet 0     Sig: Take 2 tablets (100 mg) by mouth at noon and 1 tablet (50 mg) by mouth at bedtime.     Date Last Fill: 8/31/20  [facility]ed [facility]  Submit electronically to pharmacy  Controlled Substance Agreement on file:   Encounter-Level CSA Scan Date - 01/08/2019:    Scan on 1/14/2019 10:21 AM        Last office visit:  7/16/20

## 2021-06-13 NOTE — TELEPHONE ENCOUNTER
Refill Approved    Rx renewed per Medication Renewal Policy. Medication was last renewed on 9/14/20, last OV 7/16/20.    Iesha Lux, [facility] Triage/Med Refill 11/14/2020     Requested Prescriptions   Pending Prescriptions Disp Refills     lisinopriL (PRINIVIL,ZESTRIL) 40 MG tablet [Pharmacy Med Name: Lisinopril Oral Tablet 40 MG] 90 tablet 0     Sig: Take 1 tablet (40 mg total) by mouth daily.       Ace Inhibitors Refill Protocol Passed - 11/10/2020  2:00 AM        Passed - PCP or prescribing provider visit in past 12 months       Last office visit with prescriber/PCP: 12/2/2019 Lore Martin MD OR same dept: 12/2/2019 Lore Martin MD OR same specialty: 12/2/2019 Lore Martin MD  Last physical: 1/30/2020 Last MTM visit: Visit date not found   Next visit within 3 mo: Visit date not found  Next physical within 3 mo: Visit date not found  Prescriber OR PCP: Lore Martin MD  Last diagnosis associated with med order: 1. Hypertension  - lisinopriL (PRINIVIL,ZESTRIL) 40 MG tablet [Pharmacy Med Name: Lisinopril Oral Tablet 40 MG]; Take 1 tablet (40 mg total) by mouth daily.  Dispense: 90 tablet; Refill: 0  - hydroCHLOROthiazide (HYDRODIURIL) 25 MG tablet [Pharmacy Med Name: hydroCHLOROthiazide Oral Tablet 25 MG]; Take 1 tablet (25 mg total) by mouth daily.  Dispense: 90 tablet; Refill: 0    2. Hospital discharge follow-up  - lisinopriL (PRINIVIL,ZESTRIL) 40 MG tablet [Pharmacy Med Name: Lisinopril Oral Tablet 40 MG]; Take 1 tablet (40 mg total) by mouth daily.  Dispense: 90 tablet; Refill: 0  - hydroCHLOROthiazide (HYDRODIURIL) 25 MG tablet [Pharmacy Med Name: hydroCHLOROthiazide Oral Tablet 25 MG]; Take 1 tablet (25 mg total) by mouth daily.  Dispense: 90 tablet; Refill: 0    If protocol passes may refill for 12 months if within 3 months of last provider visit (or a total of 15 months).             Passed - Serum Potassium in past 12 months     Lab Results   Component Value Date     Potassium 4.8 12/02/2019             Passed - Blood pressure filed in past 12 months     BP Readings from Last 1 Encounters:   09/03/20 118/73             Passed - Serum Creatinine in past 12 months     Creatinine   Date Value Ref Range Status   12/02/2019 1.35 (H) 0.60 - 1.10 mg/dL Final                hydroCHLOROthiazide (HYDRODIURIL) 25 MG tablet [Pharmacy Med Name: hydroCHLOROthiazide Oral Tablet 25 MG] 90 tablet 0     Sig: Take 1 tablet (25 mg total) by mouth daily.       Diuretics/Combination Diuretics Refill Protocol  Passed - 11/10/2020  2:00 AM        Passed - Visit with PCP or prescribing provider visit in past 12 months     Last office visit with prescriber/PCP: 12/2/2019 Lore Martin MD OR same dept: 12/2/2019 Lore Martin MD OR same specialty: 12/2/2019 Lore Martin MD  Last physical: 1/30/2020 Last MTM visit: Visit date not found   Next visit within 3 mo: Visit date not found  Next physical within 3 mo: Visit date not found  Prescriber OR PCP: Lore Martin MD  Last diagnosis associated with med order: 1. Hypertension  - lisinopriL (PRINIVIL,ZESTRIL) 40 MG tablet [Pharmacy Med Name: Lisinopril Oral Tablet 40 MG]; Take 1 tablet (40 mg total) by mouth daily.  Dispense: 90 tablet; Refill: 0  - hydroCHLOROthiazide (HYDRODIURIL) 25 MG tablet [Pharmacy Med Name: hydroCHLOROthiazide Oral Tablet 25 MG]; Take 1 tablet (25 mg total) by mouth daily.  Dispense: 90 tablet; Refill: 0    2. Hospital discharge follow-up  - lisinopriL (PRINIVIL,ZESTRIL) 40 MG tablet [Pharmacy Med Name: Lisinopril Oral Tablet 40 MG]; Take 1 tablet (40 mg total) by mouth daily.  Dispense: 90 tablet; Refill: 0  - hydroCHLOROthiazide (HYDRODIURIL) 25 MG tablet [Pharmacy Med Name: hydroCHLOROthiazide Oral Tablet 25 MG]; Take 1 tablet (25 mg total) by mouth daily.  Dispense: 90 tablet; Refill: 0    If protocol passes may refill for 12 months if within 3 months of last provider visit (or a total of 15 months).              Passed - Serum Potassium in past 12 months      Lab Results   Component Value Date    Potassium 4.8 12/02/2019             Passed - Serum Sodium in past 12 months      Lab Results   Component Value Date    Sodium 141 12/02/2019             Passed - Blood pressure on file in past 12 months     BP Readings from Last 1 Encounters:   09/03/20 118/73             Passed - Serum Creatinine in past 12 months      Creatinine   Date Value Ref Range Status   12/02/2019 1.35 (H) 0.60 - 1.10 mg/dL Final

## 2021-06-13 NOTE — TELEPHONE ENCOUNTER
Chart reviewed. Please review findings below.     Last order is 11/6/2020 for #45 with no refills filled by Dr. Villasenor in Dr. Martin absence.,

## 2021-06-13 NOTE — TELEPHONE ENCOUNTER
Controlled Substance Refill Request  Medication Name:   Requested Prescriptions     Pending Prescriptions Disp Refills     traMADoL (ULTRAM) 50 mg tablet [Pharmacy Med Name: traMADol HCl Oral Tablet 50 MG] 90 tablet 0     Sig: Take 2 tablets (100 mg) by mouth at noon and 1 tablet (50 mg) by mouth at bedtime.     Date Last Fill: 10/26/20  [facility]  Submit electronically to pharmacy  Controlled Substance Agreement on file:   Encounter-Level CSA Scan Date - 01/08/2019:    Scan on 1/14/2019 10:21 AM        Last office visit:  7/16/20  Iesha Lux RN, MA  [facility]    Triage Nurse Advisor

## 2021-06-13 NOTE — TELEPHONE ENCOUNTER
Pt LM on specialty 's voicemail regarding med refill for: zolpidem (AMBIEN CR) 6.25 MG CR tablet. States her pharmacy only gave her 7 tablets and Dr. Martin usually orders her 45 tablets. Pt states Dr. Martin might need to send more to [facility]. She only has 2 tablets left. Please call pt to clarify.

## 2021-06-13 NOTE — PROGRESS NOTES
Subjective:      Gladys Ramirez is a 87 y.o. female who presents for an annual exam.     Concerns:   Vaginal/pelvic pain -   She was seen by ObGyn and given estrace and vistaril resolved sx in a few days.    Leg cramps   Tonic water with quinine nightly is helping    Chronic Insomnia -   On Ambien 5mg (max dose appropriate for age) only workly partially  Tried multiple meds and sleep consult  No caffeine    Nocturia - seen by gyn and told to avoid acid, diary,  and potassium (avoiding bananas, nuts, tomatoes) via Interstitial cystitis Diet  Monitor K+     H/o Vit D deficiency - no longer on vitatm  Elevated alk phos - likely related to Vit D deficiency     GI sx resolfed and Kefir probiotic works well      HTN - BP good with nifedical    Immunization History   Administered Date(s) Administered     Influenza high dose, seasonal 2015, 10/10/2016     Influenza, inj, historic 2011, 2013, 2014     Influenza, seasonal,quad inj 6-35 mos 2012, 2014     Pneumo Conj 13-V (2010&after) 10/06/2015     Pneumo Polysac 23-V 10/29/2003     Tdap 10/30/2009     ZOSTER 2012         Gynecologic History  No LMP recorded. Patient is postmenopausal.  Last Pap: never sexually active and past age for screening  Last mammogram: past age of screening    OB History      Para Term  AB Living    0 0 0 0 0 0    SAB TAB Ectopic Multiple Live Births    0 0 0 0 0          Current Outpatient Prescriptions   Medication Sig Dispense Refill     NIFEdipine (PROCARDIA XL) 60 MG 24 hr tablet Take 60 mg by mouth daily.       oxybutynin (DITROPAN XL) 5 MG ER tablet Take 1 tablet (5 mg total) by mouth daily. 30 tablet 3     zolpidem (AMBIEN) 5 MG tablet TAKE ONE TABLET BY MOUTH AT BEDTIME AS NEEDED FOR SLEEP 30 tablet 4     acetaminophen (TYLENOL) 500 MG tablet Take 2 tablets (1,000 mg total) by mouth 2 (two) times a day as needed for pain. 100 tablet 3     hydrocortisone 0.5 % cream Apply to  vagina daily as needed 30 g 0     No current facility-administered medications for this visit.      Past Medical History:   Diagnosis Date     Angina pectoris      Hyperlipidemia      Hypertension      Osteoarthritis      Past Surgical History:   Procedure Laterality Date     APPENDECTOMY       BASAL CELL CARCINOMA EXCISION       DIAGNOSTIC LAPAROSCOPY N/A 11/4/2014    Procedure: LAPAROSCOPY BILATERAL SALPINGO-OOPHORECTOMY ;  Surgeon: Sofia Harper MD;  [place] [facility] [place]  Service:      JOINT REPLACEMENT Left     TKA     Laparoscopy Salpingo-oophorectomy Bilateral 11/04/2014     CA APPENDECTOMY      Description: Appendectomy;  Recorded: 01/18/2012;     CA REMOVAL OF TONSILS,<11 Y/O      Description: Tonsillectomy;  Recorded: 01/18/2012;     CA TOTAL KNEE ARTHROPLASTY      Description: Total Knee Arthroplasty;  Recorded: 11/18/2013;     TONSILLECTOMY       Trazodone; Clindamycin; and Temazepam  Family History   Problem Relation Age of Onset     Heart disease Mother      Rheumatic Heart Disease Mother      No Medical Problems Father      Cancer Sister      Cancer Brother      Social History     Social History     Marital status: Single     Spouse name: N/A     Number of children: N/A     Years of education: N/A     Occupational History     Not on file.     Social History Main Topics     Smoking status: Never Smoker     Smokeless tobacco: Never Used     Alcohol use Yes      Comment: Rarely     Drug use: No     Sexual activity: Not on file     Other Topics Concern     Not on file     Social History Narrative    The patient is a nun.         REVIEW OF SYSTEMS  General: no weight changes, fatigue  HEENT:  no HA,  no vision changes, URI sx  Respiratory:  no cough, dyspnea  Cardiovascular: no chest pain, palpitations  Gastrointestinal: no nausea/vomiting, diarrhea/constipation, melena  : no dysuria, no vaginal d/c  Neurologic: no seizures, focal weakness, tremors  Skin: no rashes        Objective:     "     Vitals:    10/09/17 1037   BP: 132/80   Pulse: 64   Resp: 16   Temp: 97.3  F (36.3  C)   TempSrc: Oral   SpO2: 98%   Weight: 159 lb 9.6 oz (72.4 kg)   Height: 5' 2\" (1.575 m)         OBJECTIVE:  /80  Pulse 64  Temp 97.3  F (36.3  C) (Oral)   Resp 16  Ht 5' 2\" (1.575 m)  Wt 159 lb 9.6 oz (72.4 kg)  SpO2 98%  BMI 29.19 kg/m2  Body mass index is 29.19 kg/(m^2).  General appearance: well groomed, pleasant, well hydrated, nontoxic appearing  ENT: PERRL, throat clear  Neck: neck supple, no lymphadenopathy, no thyromegaly  Lungs: lungs clear to auscultation bilaterally, no wheezes or rhonchi  Heart: regular rate and rhythm, no murmurs, rubs or gallops  Abdomen: soft, nontender  Neuro: no focal deficits, CN II-XII grossly intact, alert and oriented  Psych:  mood stable, appears to have good insight and judgment  Pelvic exam:   Deferred since seen by ob/gyn        Assessment / Plan:      Healthy female exam.       Px   Shots - flu shot will get at pharmacy    Leg cramps  check CMP  Continue quinine prn    Vaginal/pelvic pain -   She was seen by ObGyn and given estrace and vistaril resolved sx in a few days.     Chronic insomnia -   On Ambien 5mg (max dose appropriate for age per FDA) only workly partially  Advised to try melatonin to augment   Nocturia - try oxybutin at night    H/o Vit D deficiency - no longer on vitatm  Elevated alk phos - likely related to Vit D deficiency     GI sx resolfed and Kefir probiotic works well      HTN - BP good with nifedical    Orders Placed This Encounter   Procedures     Vitamin D, Total (25-Hydroxy)     Comprehensive Metabolic Panel       "

## 2021-06-14 NOTE — PATIENT INSTRUCTIONS - HE
Continue Tramadol doses 100 mg in the afternoon - refills through Dr. Martin at this time.    Ok to continue Tylenol 1,000 mg at bedtime.  Max daily dose is 3,000 mg per day  Continue duloxetine 120 mg daily as prescribed   Trial anti-inflammatory supplement for pain.  You can obtain turmeric or curcumin supplement to take 2-3 times per day for 1 month to see if helpful.  Other options include take Infla-Guard, A.I. Formula, or Zyflamend two tablets daily which can be purchased online or through a health store.  Refill of topical compound cream apply 4 grams to knee and hip three times a day as this is helpful - refills sent for 120 grams to [facility].  Ordered right knee and left hip x-rays to evaluate pain - can do at [place].  Schedule right knee Euflexxa series with Dr. Oliva here for series of 3 injections.  This may improve the left hip also if it is a compensatory pain - otherwise will consider left hip bursa injection or joint injection if the x-ray shows arthritis.  Continue walking program for exercise - also discussed having PT evaluation as needed to strengthen muscles that support your joints.  Also may consider obtaining a custom knee joint as needed for additional support during activities.  Follow-up with Aleja in 10 weeks OVL to evaluate above plan.

## 2021-06-14 NOTE — TELEPHONE ENCOUNTER
Controlled Substance Refill Request  Medication Name:   Requested Prescriptions     Pending Prescriptions Disp Refills     traMADoL (ULTRAM) 50 mg tablet [Pharmacy Med Name: traMADol HCl Oral Tablet 50 MG] 90 tablet 0     Sig: Take 2 tablets (100 mg) by mouth at noon and 1 tablet (50 mg) by mouth at bedtime.     Date Last Fill: 11/30/20  [facility]  Submit electronically to pharmacy  Controlled Substance Agreement on file:   Encounter-Level CSA Scan Date - 01/08/2019:    Scan on 1/14/2019 10:21 AM        Last office visit:  7/16/20  Iesha Lux RN, MA  [facility]    Triage Nurse Advisor

## 2021-06-14 NOTE — PROGRESS NOTES
Patient presents to the clinic today for a follow up with Aleja Pitts PA-C.    Pain score: 7  What does your pain feel like: constant sore  Does the pain interfere with:  Work: no  Walking/distance: yes  Sleep: yes  Daily activities: yes  Relationships/social life: no  Mood: yes  F= 7

## 2021-06-14 NOTE — TELEPHONE ENCOUNTER
Last fill noted below.     zolpidem (AMBIEN CR) 6.25 MG CR tablet 45 tablet 0 12/8/2020  No   Sig: take 1 and 1/4 tablet by mouth at bedtime   Sent to pharmacy as: zolpidem ER 6.25 mg tablet,extended release,multiphase (AMBIEN CR)   E-Prescribing Status: Receipt confirmed by pharmacy (12/8/2020 10:01 AM CST)     Next visit: None

## 2021-06-14 NOTE — TELEPHONE ENCOUNTER
I contacted the patient. She states that she does not need a refill yet. She has a half bottle but requested due to it being the end of the month.

## 2021-06-14 NOTE — PROGRESS NOTES
"PAIN CENTER PROGRESS NOTE    Subjective:   Gladys Ramirez is a 89 y.o. 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  Consult on 05/24/2019.    Consult on Major issues:  1. Chronic pain of right knee    2. Chronic pain syndrome    3. Hip pain, left      Pain location and description: 7/10 constant sore pain.  Function rated 7.   Radiation of pain: Denies  Exacerbating factors: walking, stairs, taking shoes off.  Alleviating factors: medication, new orthotics/shoes, elevating feet.  Associated symptoms: Denies pain at night, numbness, weakness, bowel or bladder incontinence, fever/chills, unexplained weight loss.  Functional Symptoms: See CMA note  Adverse effects of medications:  None currently.  Current treatment efficacy: Good.  States improvement with compound cream and duloxetine 120 mg daily.  Current treatment compliance:  Following recommendations and compliant with medications.      Returning today regarding increased pain in right knee and left hip.   She reports the pain in her feet has greatly improved since last seen and she is able to walk 1500 steps per day.  Prior to treatment, was unable to walk 1/4 block to the [place].     She reports her knee pain is without new injury or trauma.  Has been increasing over past year  She reports right lateral knee and posterior knee most painful. Sometimes feeling the bones are \"cracking\" and \"shifting\" with weight bearing, states feels her leg will give out but hasn't fallen.  She states over past 2 weeks her left hip also is painful.  Some pain into groin.  She denies other joint pain including left knee (history of total knee arthroplasty), right hip, bilateral ankles, shoulders, elbows.    Denies using knee brace, tried one from [employer] was a knee sleeve and didn't fit well.  She denies PT evaluation or treatment.  Denies chiropractor, massage, acupuncture.  Uses hot packs which help the most.  She denies use of " "supplements for joint pain, unable to take oral NSAIDS.  She denies knee swelling, erythema, warmth.  Denies fevers, chills, weight loss, other systemic symptoms.  She saw Dr. Bautista 2 years ago at [facility] but hasn't been recently.  Had steroid injections in the past which never worked and had both synvisc and Euflexxa was helpful for awhile, January 2020 started again and she discussed with PCP returning to pain center.  Left knee replacement 2012.  Joint X-rays 5 years ago    Increased duloxetine to 120 mg states hard to stay if helpful yet, denies side effects.  Takes 2 tramadol 50 mg around 1400 and 2 tylenol 1,000 mg at bedtime.  She states the pain relief is good.  Would like to take less oral medication if possible.      She states she doesn't sleep well, gets up at night and uses heating pad.  She wakes 1-2 times per night.  Takes 2 hours to fall asleep with Ambien.  She gets around 5 hours of sleep.  She was using topical on knee and hip, didn't have much left.  She wants to refill.  Hasnt used on feet in about a month as pain has lessened there.    She can dress and shower with bench independently.  She states standing prolonged is difficult.  Housework causes her to rest in between.  Emptying  has to sit down half way through.  Sitting down cannot get back up.  Has fall alert system.    She has meals on wheels twice a week and has meal delivered every Monday.      Since the last Pain Center visit, saw cardiologist Dr. Holley on 09/3/2020 as she had cardiac hospitalization:  \"  [facility]  Heart Care Clinic Follow-up Note     Assessment & Plan        1. Paroxysmal atrial fibrillation (H) -atrial fibrillation, asymptomatic paroxysmal and up to 38 hours or 15% on her ACT monitor.  Now on Eliquis 2.5 twice daily secondary to advanced CHADS 2 VASC score of 4.  Does not feel it internally, she feels her pulse she can feel it racing.  No antiarrhythmic therapy just yet.  She probably " has an element of sick sinus syndrome and we have discussed pacemaker yet again, if symptoms persist despite lower blood pressure pills I may need to have her wear heart monitor to document rhythm disturbance yet again.   2. Other pulmonary embolism without acute cor pulmonale, unspecified chronicity (H) -on chronic Eliquis 2.5 mg p.o. twice daily, in addition has had DVTs in the past.  Creatinine is not greater than 1.5 but she is older than 80 and thus a lower dose.   3. Pure hypercholesterolemia -cholesterol 179 with LDL of 80 and we have chosen dietary therapy given her age.   4. Benign essential hypertension -under good control and if anything possibly orthostatic.  We will have her lower dose of lisinopril to 20 mg a day, she will call blood pressures into us, and if do not go up too much we will then give her 20 mg lisinopril prescription.   5. Acute on chronic heart failure with preserved ejection fraction (H) -no significant signs or symptoms currently in the face of preserved ejection fraction of 58%.   6.  Near syncopal spell-she feels this was due to the fact that she got up quickly and was half asleep.  She will not get up to eat anymore at nighttime.  I am concerned about the orthostasis and will back off on lisinopril to 20 mg a day.  In addition, could potentially be an element of sick sinus syndrome with sinus bradycardia and if symptoms persist despite lower dose of lisinopril I will arrange for 30-day event monitor or Holter monitor.     Plan  1.  Lower dose of lisinopril to 20 mg a day, she will call and let us know what blood pressures are.  If good will give her a 20 mg prescription so she does not have to continue cutting her 40 mg in half.  2.  If symptoms persist of lightheadedness despite good blood pressure, will obtain an event monitor, if symptomatic bradycardia will need pacemaker.  3.  She will call blood pressures in a week to 2 weeks but plan on follow-up with me in 6  "months.\"    Will see again in March.       She saw Dr. Martin on 07/16/2020 and discussed worsened pain, advised:  \" Arthrosis of foot - bilateral  Advised to restart cymbalta 60mg daily  Advised tylenol for pain   tramadol for break through pain, trying to minimize  Control substance contract on file and updated today  She does not escalate use   Left hip and knee - are painful\"    Review of Systems  Constitutional: Sleep interrupted.  Denies fever, chills, night sweats, lethargy, weight loss, weight gain  Musculoskeletal: Positive for joint pain.  Denies spine pain, muscle spasm/pain, weakness.  Denies recent falls.  Gastrointestional: Denies difficulty swallowing, change in appetite, abdominal pain, constipation, nausea, vomiting, diarrhea, GERD, fecal incontinence.  Genitourinary: Denies urinary incontinence, dysuria, hematuria, UTI, frequency, hesitancy, change in libido.  Neurologic: Denies confusion, seizure, weakness, changes in balance, changes in speech.  Psychiatric: Denies depression, anxiety, memory loss, psychoses, suicidal ideation, substance use/abuse.     Objective:     Vitals:    01/20/21 0851   BP: 120/66   Pulse: 87   Resp: 14   Weight: 166 lb (75.3 kg)   Height: 5' 2\" (1.575 m)   PainSc:   7     Physical Exam  Constitutional- General appearance: Normal.  Well developed, comfortable, overweight and appearance reflects stated age.  No acute distress or pain behaviors noted.  Presents alone today.  Psychiatric- Judgment and insight: Normal  Speech: Normal rhythm.  Thought process: Normal.  No abnormal thoughts reported. Alert & Oriented to person, place, and time.  Recent and remote memory: Normal.  Mood and affect: Normal.  Observed mood: pleasant, appropriate.   Respiratory- Breathing is non-labored; normal rhythm and rate.  Cardiovascular- Extremities warm and well perfused, no peripheral edema or varicosities.  Dermatologic- Exposed skin is clean, dry, and intact to inspection and " palpation.  Musculoskeletal- Gait and station: Normal.  Gait evaluation demonstrates ambulating independently, wearing tennis shoes.  Patient does not have difficulty rising from a seated position. She is able to tip toe and heel walk, tandem walk with slight difficulty.  She has normal AROM of right and left hip; abduction of left hip is painful.  Has tenderness to palpation to left greater trochanteric bursa, denies to pain to palpation left SI joint, right SI joint, or right hip joint.  Right knee is mildly swollen, has pain to palpation right lateral/posterior knee, some crepitus.  Normal AROM with bilateral knees, has pain with internal rotation right knee.  Strength +5/5 equal bilaterally in LE.  Normal sensation to light touch equal bilaterally.    [place]  Reviewed on 01/20/2021 as expected.     Imaging:  None new    Assessment:   Gladys Ramirez is a 89 y.o. 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  She is having increased pain in right knee with history of DJD x 1 year, has had good success in the past with Euflexxa injections.  Had left knee arthroplasty, doesn't want to return to see surgeon at this point.  She also has left hip pain x 2 weeks without injury, able to weight bear, good AROM.  May be bursitis developing due to knee pain and compensation. Will check x-rays for both joints to evaluate and pursue injections as indicated. Discussed repeating Euflexxa with possible risks/benefits of the injection reviewed today as she is failing home conservative measures (heat/ice/topicals/bracing) and also oral medications.  She had over 1 year of pain relief with this was done 2 years ago through [facility]. She is also to continue walking for exercise and discussed a PT/bracing program as needed.  She will continue medication plan with renewal of topical to use more aggressively to hip and knee  joint and also consider herbal anti-inflammatories and anti-inflammatory diet discussed today as she is unable to take oral NSAIDs.  She is in agreement with the plan of care and denies further questions.     Plan:   Continue Tramadol doses 100 mg in the afternoon - refills through Dr. Martin at this time.    Ok to continue Tylenol 1,000 mg at bedtime.  Max daily dose is 3,000 mg per day  Continue duloxetine 120 mg daily as prescribed   Trial anti-inflammatory supplement for pain.  You can obtain turmeric or curcumin supplement to take 2-3 times per day for 1 month to see if helpful.  Other options include take Infla-Guard, A.I. Formula, or Zyflamend two tablets daily which can be purchased online or through a health store.  Refill of topical compound cream apply 4 grams to knee and hip three times a day as this is helpful - refills sent for 120 grams to [facility].  Ordered right knee and left hip x-rays to evaluate pain - can do at [facility].  Schedule right knee Euflexxa series with Dr. Oliva here for series of 3 injections.  This may improve the left hip also if it is a compensatory pain - otherwise will consider left hip bursa injection or joint injection if the x-ray shows arthritis.  Continue walking program for exercise - also discussed having PT evaluation as needed to strengthen muscles that support your joints.  Also may consider obtaining a custom knee joint as needed for additional support during activities.  Follow-up with Aleja in 10 weeks OVL to evaluate above plan.    Aleja Pitts PA-C  [facility]  [address]  [place] [address]  Ph: [phone]  Fax: [phone]    52 minutes spent on the date of the encounter doing chart review, history and exam, documentation, and further activities.

## 2021-06-14 NOTE — TELEPHONE ENCOUNTER
Patient notified that MD will refill. The patient verbalizes understanding of provider/CSS instructions for follow-up and continued care per provider message.

## 2021-06-14 NOTE — TELEPHONE ENCOUNTER
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 She states that she has been on the lower dose of Lisinopril, 20 mg, since seeing [facility] in September. She hadn't been checking her blood pressure that often but lately had been feeling lightheaded so she has been checking it for the last 3 weeks. She usually ranges around 110-117 systolic. She wonders if her dose should be even lower or if she needs to cut back on Amlodipine. She usually runs a higher blood pressure so this normal blood pressure causes her to feel more lightheaded. Will send to [facility] and update Sister Gladys with his recommendations. -[facility] [place]      Dr. 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 Systolic range was provided and she states that she is usually between 110-117. She wonders if she should lower her lisinopril further or cut Norvasc. Any recommendations?  Thanks,  Mal

## 2021-06-14 NOTE — TELEPHONE ENCOUNTER
----- Message from Vicky Marin sent at 1/27/2021 11:44 AM CST -----    Caller: Patient  Primary cardiologist: Dr. Holley    Detailed reason for call: Patient is still having low BP what are some suggestions?    Best phone number: [phone]  Best time to contact: today  Ok to leave a detailed message? yes  Device? No    Additional Info:        Attempted to call x2-line busy.Will try again. -[facility] [place] [place]

## 2021-06-14 NOTE — TELEPHONE ENCOUNTER
Controlled Substance Refill Request  Medication Name:   Requested Prescriptions     Pending Prescriptions Disp Refills     zolpidem (AMBIEN CR) 6.25 MG CR tablet [Pharmacy Med Name: Zolpidem Tartrate ER Oral Tablet Extended Release 6.25 MG] 45 tablet 0     Sig: take 1 and 1/4 tablet by mouth at bedtime     Date Last Fill: 12/8/20  [facility]  Submit electronically to pharmacy  Controlled Substance Agreement on file:   Encounter-Level CSA Scan Date - 01/08/2019:    Scan on 1/14/2019 10:21 AM        Last office visit:  7/16/20  Iesha Lux RN, MA  [facility]    Triage Nurse Advisor

## 2021-06-15 ENCOUNTER — OFFICE VISIT - HEALTHEAST (OUTPATIENT)
Dept: FAMILY MEDICINE | Facility: CLINIC | Age: 86
End: 2021-06-15

## 2021-06-15 DIAGNOSIS — Z00.00 ENCOUNTER FOR ANNUAL WELLNESS VISIT (AWV) IN MEDICARE PATIENT: ICD-10-CM

## 2021-06-15 DIAGNOSIS — Z79.899 CONTROLLED SUBSTANCE AGREEMENT SIGNED: ICD-10-CM

## 2021-06-15 DIAGNOSIS — Z86.2 HISTORY OF ANEMIA: ICD-10-CM

## 2021-06-15 DIAGNOSIS — E55.9 VITAMIN D DEFICIENCY: ICD-10-CM

## 2021-06-15 DIAGNOSIS — I48.0 PAROXYSMAL ATRIAL FIBRILLATION (H): ICD-10-CM

## 2021-06-15 DIAGNOSIS — Z86.39 HISTORY OF ELEVATED GLUCOSE: ICD-10-CM

## 2021-06-15 DIAGNOSIS — G89.29 OTHER CHRONIC PAIN: ICD-10-CM

## 2021-06-15 DIAGNOSIS — Z66 DNR (DO NOT RESUSCITATE): ICD-10-CM

## 2021-06-15 DIAGNOSIS — L98.9 LESION OF SKIN OF NOSE: ICD-10-CM

## 2021-06-15 LAB
ERYTHROCYTE [DISTWIDTH] IN BLOOD BY AUTOMATED COUNT: 14.3 % (ref 11–14.5)
HBA1C MFR BLD: 6 %
HCT VFR BLD AUTO: 37.4 % (ref 35–47)
HGB BLD-MCNC: 12.5 G/DL (ref 12–16)
MCH RBC QN AUTO: 30.1 PG (ref 27–34)
MCHC RBC AUTO-ENTMCNC: 33.4 G/DL (ref 32–36)
MCV RBC AUTO: 90 FL (ref 80–100)
PLATELET # BLD AUTO: 289 THOU/UL (ref 140–440)
PMV BLD AUTO: 10.1 FL (ref 7–10)
RBC # BLD AUTO: 4.15 MILL/UL (ref 3.8–5.4)
WBC: 7 THOU/UL (ref 4–11)

## 2021-06-15 ASSESSMENT — MIFFLIN-ST. JEOR: SCORE: 1096.64

## 2021-06-15 ASSESSMENT — ANXIETY QUESTIONNAIRES
2. NOT BEING ABLE TO STOP OR CONTROL WORRYING: NOT AT ALL
1. FEELING NERVOUS, ANXIOUS, OR ON EDGE: NOT AT ALL

## 2021-06-15 NOTE — TELEPHONE ENCOUNTER
Controlled Substance Refill Request  Medication Name:   Requested Prescriptions     Pending Prescriptions Disp Refills     zolpidem (AMBIEN CR) 6.25 MG CR tablet [Pharmacy Med Name: Zolpidem Tartrate ER Oral Tablet Extended Release 6.25 MG] 45 tablet 0     Sig: take 1 and 1/4 tablet by mouth at bedtime.     Date Last Fill: 2/8/21  [facility]  Submit electronically to pharmacy  Controlled Substance Agreement on file:   Encounter-Level CSA Scan Date - 01/08/2019:    Scan on 1/14/2019 10:21 AM        Last office visit:  7/16/20

## 2021-06-15 NOTE — TELEPHONE ENCOUNTER

## 2021-06-15 NOTE — PATIENT INSTRUCTIONS - HE
POST PROCEDURE INSTRUCTIONS      PAIN CENTER  PROCEDURE DONE TODAY: RIGHT KNEE INJECTION WITH ORTHOVISC #3  Rest today. Resume activity tomorrow as able.  Patient demonstrates the ability to ambulate independently with a steady gait at the time of discharge.      It is not unusual to have a Temporary increase in your pain after a procedure.  You can use ice to the area 24-48 hrs for 15 minutes at a time.    You did not receive a steroid.    Low back or SI joint(sacroiliac) injection:  You may experience numbness in one or both legs.  Please walk with help.  This is temporary and will wear off in a few hours. Do not drive if you have tingling, numbness or weakness in your hands/arms or legs/feet.    Fever : call if fever 100 or over, redness/warmth/swelling over injections site.    No public hot tubs/pools for a couple days.    Physical therapy:  Check with Pain Center provider regarding resuming therapy.    Monitor you response to the injection and report this at your next appointment.    If you have been referred for a procedure only, please call your referring provider for a follow up.    Radio Frequency: may take up to 6 weeks before you will feel the full effects of this procedure.    CALL IF ANY QUESTIONS [phone]

## 2021-06-15 NOTE — TELEPHONE ENCOUNTER
Controlled Substance Refill Request  Medication Name:   Requested Prescriptions     Pending Prescriptions Disp Refills     zolpidem (AMBIEN CR) 6.25 MG CR tablet [Pharmacy Med Name: Zolpidem Tartrate ER Oral Tablet Extended Release 6.25 MG] 45 tablet 0     Sig: take 1 and 1/4 tablet by mouth at bedtime     Date Last Fill: 1/8/21  [facility]  Submit electronically to pharmacy  Controlled Substance Agreement on file:   Encounter-Level CSA Scan Date - 01/08/2019:    Scan on 1/14/2019 10:21 AM        Last office visit:  7/16/20

## 2021-06-15 NOTE — TELEPHONE ENCOUNTER
Controlled Substance Refill Request  Medication Name:   Requested Prescriptions     Pending Prescriptions Disp Refills     zolpidem (AMBIEN CR) 6.25 MG CR tablet [Pharmacy Med Name: Zolpidem Tartrate ER Oral Tablet Extended Release 6.25 MG] 45 tablet 0     Sig: take 1 and 1/4 tablet by mouth at bedtime.     Date Last Fill: 3/8/21  [facility]  Submit electronically to pharmacy  Controlled Substance Agreement on file:   Encounter-Level CSA Scan Date - 01/08/2019:    Scan on 1/14/2019 10:21 AM        Last office visit:  7/16/20  Iesha Lux RN, MA  [facility]    Triage Nurse Advisor

## 2021-06-15 NOTE — PATIENT INSTRUCTIONS - HE
POST PROCEDURE INSTRUCTIONS      PAIN CENTER  PROCEDURE DONE TODAY: RIGHT KNEE INJECTION WITH ORTHOVISC #2  Rest today. Resume activity tomorrow as able.  Patient demonstrates the ability to ambulate independently with a steady gait at the time of discharge.      It is not unusual to have a Temporary increase in your pain after a procedure.  You can use ice to the area 24-48 hrs for 15 minutes at a time.    You did not receive a steroid.    Low back or SI joint(sacroiliac) injection:  You may experience numbness in one or both legs.  Please walk with help.  This is temporary and will wear off in a few hours. Do not drive if you have tingling, numbness or weakness in your hands/arms or legs/feet.    Fever : call if fever 100 or over, redness/warmth/swelling over injections site.    No public hot tubs/pools for a couple days.    Physical therapy:  Check with Pain Center provider regarding resuming therapy.    Monitor you response to the injection and report this at your next appointment.    If you have been referred for a procedure only, please call your referring provider for a follow up.    Radio Frequency: may take up to 6 weeks before you will feel the full effects of this procedure.    CALL IF ANY QUESTIONS [phone]

## 2021-06-15 NOTE — TELEPHONE ENCOUNTER
[facility]  1st dose schedule: 2/26  2nd dose: not schedule yet  Lot #: unknown

## 2021-06-15 NOTE — PATIENT INSTRUCTIONS - HE
POST PROCEDURE INSTRUCTIONS      PAIN CENTER  PROCEDURE DONE TODAY: RIGHT KNEE INJECTION WITH ORTHOVISC #1  Rest today. Resume activity tomorrow as able.  Patient demonstrates the ability to ambulate independently with a steady gait at the time of discharge.      It is not unusual to have a Temporary increase in your pain after a procedure.  You can use ice to the area 24-48 hrs for 15 minutes at a time.    You did not receive a steroid.    Low back or SI joint(sacroiliac) injection:  You may experience numbness in one or both legs.  Please walk with help.  This is temporary and will wear off in a few hours. Do not drive if you have tingling, numbness or weakness in your hands/arms or legs/feet.    Fever : call if fever 100 or over, redness/warmth/swelling over injections site.    No public hot tubs/pools for a couple days.    Physical therapy:  Check with Pain Center provider regarding resuming therapy.    Monitor you response to the injection and report this at your next appointment.    If you have been referred for a procedure only, please call your referring provider for a follow up.    Radio Frequency: may take up to 6 weeks before you will feel the full effects of this procedure.    CALL IF ANY QUESTIONS [phone]

## 2021-06-15 NOTE — PATIENT INSTRUCTIONS - HE
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  I am a little concerned about the shortness of breath 3 times a week.    Per our conversation I will check the blood work for fluid as well as the heart monitor.  We will let you know the results.  I will plan on seeing you 6 months or sooner if needed.  Tell Doreen I sent my wishes.  Estrada Holley

## 2021-06-16 PROBLEM — R60.0 LOWER EXTREMITY EDEMA: Status: ACTIVE | Noted: 2019-01-08

## 2021-06-16 PROBLEM — E04.1 THYROID NODULE: Status: ACTIVE | Noted: 2020-01-30

## 2021-06-16 PROBLEM — R74.8 ELEVATED ALKALINE PHOSPHATASE LEVEL: Status: ACTIVE | Noted: 2019-01-17

## 2021-06-16 PROBLEM — E55.9 VITAMIN D DEFICIENCY: Status: ACTIVE | Noted: 2019-01-17

## 2021-06-16 PROBLEM — M19.071 ARTHRITIS OF MIDTARSAL JOINT OF RIGHT FOOT: Status: ACTIVE | Noted: 2019-01-17

## 2021-06-16 PROBLEM — R04.0 FREQUENT NOSEBLEEDS: Status: ACTIVE | Noted: 2019-01-08

## 2021-06-16 PROBLEM — M19.079 ARTHROSIS OF FOOT: Status: ACTIVE | Noted: 2018-10-11

## 2021-06-16 PROBLEM — D64.9 NORMOCYTIC ANEMIA: Status: ACTIVE | Noted: 2019-11-20

## 2021-06-16 PROBLEM — I48.0 PAROXYSMAL ATRIAL FIBRILLATION (H): Status: ACTIVE | Noted: 2019-11-20

## 2021-06-16 PROBLEM — Z79.899 CONTROLLED SUBSTANCE AGREEMENT SIGNED: Status: ACTIVE | Noted: 2019-01-08

## 2021-06-16 PROBLEM — I50.32 CHRONIC DIASTOLIC CONGESTIVE HEART FAILURE (H): Status: ACTIVE | Noted: 2019-01-15

## 2021-06-16 PROBLEM — Z66 DNR (DO NOT RESUSCITATE): Status: ACTIVE | Noted: 2018-10-11

## 2021-06-16 PROBLEM — G89.29 CHRONIC PAIN: Status: ACTIVE | Noted: 2019-11-20

## 2021-06-16 PROBLEM — E83.42 HYPOMAGNESEMIA: Status: ACTIVE | Noted: 2019-03-04

## 2021-06-16 LAB — 25(OH)D3 SERPL-MCNC: 52.7 NG/ML (ref 30–80)

## 2021-06-16 NOTE — TELEPHONE ENCOUNTER
Controlled Substance Refill Request  Medication Name:   Requested Prescriptions     Pending Prescriptions Disp Refills     traMADoL (ULTRAM) 50 mg tablet [Pharmacy Med Name: traMADol HCl Oral Tablet 50 MG] 90 tablet 0     Sig: Take 2 tablets (100 mg) by mouth at noon and 1 tablet (50 mg) by mouth at bedtime.     zolpidem (AMBIEN CR) 6.25 MG CR tablet [Pharmacy Med Name: Zolpidem Tartrate ER Oral Tablet Extended Release 6.25 MG] 45 tablet 0     Sig: take 1 and 1/4 tablet by mouth at bedtime.     Date Last Fill: 12/31/20, 3/8/21  [facility]  Submit electronically to pharmacy  Controlled Substance Agreement on file:   Encounter-Level CSA Scan Date - 01/08/2019:    Scan on 1/14/2019 10:21 AM        Last office visit:  7/16/20

## 2021-06-16 NOTE — PATIENT INSTRUCTIONS - HE
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  I am glad to hear you are doing well.  Per our conversation keep up the same meds ELIQUIS and TAMBOCOR two times a day. We will check those levels today.  I will plan on seeing you 6 months.  Estrada Holley

## 2021-06-16 NOTE — TELEPHONE ENCOUNTER
Last office visit: 07/16/2020 virtual visit   Last ordered: 12/31/20, 03/08/2021  Last lab check:   Next appointment: NONE, appointed with other specialties

## 2021-06-16 NOTE — PROGRESS NOTES
"PAIN CENTER PROGRESS NOTE    Subjective:   Gladys Ramirez is a 89 y.o. 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  Consult on 05/24/2019.    Major issues:  1. Primary osteoarthritis of right knee    2. Chronic pain syndrome    3. Primary osteoarthritis of left hip    4. Arthrosis of foot, unspecified laterality      Pain location and description: See CMA note  Radiation of pain: Denies  Exacerbating factors: walking, stairs, taking shoes off.  Alleviating factors: medication, new orthotics/shoes, elevating feet.  Associated symptoms: Denies pain at night, numbness, weakness, bowel or bladder incontinence, fever/chills, unexplained weight loss.  Functional Symptoms: See CMA note  Adverse effects of medications:  None currently.  Current treatment efficacy: Good.  States improvement with compound cream and duloxetine 120 mg daily.  Current treatment compliance:  Following recommendations and compliant with medications.      Returning since last visit regarding increased pain in right knee and left hip.   She reports the pain in her feet has greatly improved since last seen and she is able to walk 1500 steps per day.  Prior to treatment, was unable to walk 1/4 block to the [employer].  Ordered right knee and hip x-rays which are reviewed today with patient (see imaging below), also consideration for Euflexxa repeat for right knee.  She had these injections completed 02/09/21, 02/16/21, and 02/23/21 and reports a great deal of relief over 75% and is very happy with her progress.  She states she almost didn't keep appointment today as she felt so good.  States she doesn't think she needs a left hip injection yet but wants to have ordered in case.  She is also wondering about reducing her duloxetine back to 60 mg daily.    Since last visit, had follow-up with cardiologist Dr. Holley on 03/03/21:  \"Assessment & Plan        1. Chronic diastolic congestive heart failure (H) - EF " 55%, Grade II (moderate) left ventricular diastolic dysfunction per echo 1/ 2018-having some shortness of breath about 3 times a week when getting up walking.  Do not hear anything major on exam but will check BNP today and if elevated will switch HCTZ over to furosemide.   2. Paroxysmal atrial fibrillation (H) - asymptomatic paroxysmal and up to 38 hours or 15% on her ACT monitor.  Now on Eliquis 2.5 twice daily secondary to advanced CHADS 2 VASC score of 4.  Does not feel it internally, she feels her pulse racing.  No antiarrhythmic therapy just yet.  She probably has an element of sick sinus syndrome and we have discussed pacemaker yet again.given the shortness of breath on arising, 3 times a week, wonder if this is paroxysmal atrial fibrillation and will have her wear the event monitor for 2 weeks again, if symptoms correlate with atrial fibrillation will then need antiarrhythmic therapy.     3. Other pulmonary embolism without acute cor pulmonale, unspecified chronicity (H)  -on chronic Eliquis 2.5 mg p.o. twice daily, in addition has had DVTs in the past.  Creatinine is not greater than 1.5 but she is older than 80 and thus a lower dose   4. Pure hypercholesterolemia-cholesterol 179 from 2015 and given age we are not starting specific meds for this.   5. Benign essential hypertension-under good control with lower dose of amlodipine and lisinopril.   6. 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, unspecified whether native or transplanted heart (H)-nuclear stress test March 2017 normal and thus no obvious epicardial coronary artery disease.      Plan  1.  Check BNP and if elevated switch HCTZ over to furosemide.  2.  Check 14-day ACT monitor to see if atrial fibrillation recurs and correlates with symptoms, in which case we will use antiarrhythmic such as Tambocor.  3.  Follow-up me in about 6 months or sooner if needed.     She states she doesn't sleep well but this is not due to her pain;  "she feels she is just too \"hyper.\"  Takes 2 hours to fall asleep with Ambien.  She gets around 5 hours of sleep. She does take 2 tylenol before bedtime.  Has cut down on tramadol quite a bit; took 1-2 weeks ago took for hip pain.  Hasn't taken daily for weeks.  She states she doesn't get drowsy from it.  For sleep, has tried aromatherapy at night, black out curtain over window. Has covered up lights/clock and doesn't have TV in bedroom.  She likes to use adult coloring books at night to relax her. Denies naps during the day.  She went to sleep clinic and no diagnosis.  She is taking Vitamin D only 1,000 and was advised to increase back to 2,000 units.    She can dress and shower with bench independently.  She states standing prolonged is difficult.  Housework causes her to rest in between.  Emptying  has to sit down half way through.  Sitting down cannot get back up.  Has fall alert system.  She has meals on wheels twice a week and has meal delivered every Monday.       Review of Systems  Constitutional: Sleep interrupted not due to pain.  Denies fever, chills, night sweats, lethargy, weight loss, weight gain  Musculoskeletal: Positive for joint pain.  Denies spine pain, muscle spasm/pain, weakness.  Denies recent falls.  Gastrointestional: Denies difficulty swallowing, change in appetite, abdominal pain, constipation, nausea, vomiting, diarrhea, GERD, fecal incontinence.  Genitourinary: Denies urinary incontinence, dysuria, hematuria, UTI, frequency, hesitancy, change in libido.  Neurologic: Denies confusion, seizure, weakness, changes in balance, changes in speech.  Psychiatric: Denies depression, anxiety, memory loss, psychoses, suicidal ideation, substance use/abuse.     Objective:     Vitals:    03/25/21 1508   BP: 119/69   Pulse: 68   Resp: 14   Weight: 163 lb (73.9 kg)   Height: 5' 2\" (1.575 m)   PainSc:   3     Physical Exam  Constitutional- General appearance: Normal.  Well developed, comfortable, " overweight and appearance reflects stated age.  No acute distress or pain behaviors noted.  Presents alone today.  Psychiatric- Judgment and insight: Normal  Speech: Normal rhythm.  Thought process: Normal.  No abnormal thoughts reported. Alert & Oriented to person, place, and time.  Recent and remote memory: Normal.  Mood and affect: Normal.  Observed mood: pleasant, appropriate.   Respiratory- Breathing is non-labored; normal rhythm and rate.  Cardiovascular- Extremities warm and well perfused, no peripheral edema or varicosities.  Dermatologic- Exposed skin is clean, dry, and intact to inspection and palpation.  Musculoskeletal- Gait and station: Normal.  Gait evaluation demonstrates ambulating independently    [place]  Reviewed on 03/25/2021 as expected.     Imaging:  EXAM: XR KNEE RIGHT 1 OR 2 VWS  LOCATION: [facility]  DATE/TIME: 1/20/2021 2:24 PM     INDICATION: right knee pain  COMPARISON: None.     IMPRESSION:   Moderate primary osteoarthritis all 3 compartments but most prominent in the lateral compartment. Knee otherwise negative. No fractures. No joint effusion.    EXAM: XR HIP LEFT 2 OR MORE VWS  LOCATION: [facility]  DATE/TIME: 1/20/2021 2:24 PM     INDICATION: left hip pain, rule out DJD  COMPARISON: None.     IMPRESSION:   Mild primary degenerative narrowing both hip joints. Mild generalized degenerative change base of the spine and both SI joints.     Pelvis and left hip otherwise negative. No fractures. No dislocations    Assessment:   Gladys Ramirez is a 89 y.o. 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  She is having improved pain in right knee with history of DJD x 1 with over 75% pain reduction with Orthovisc injections. She is aware she may repeat this every 6 months as needed. Had left knee arthroplasty, doesn't want to return to see  surgeon at this point.  She also has left hip pain without injury, x-rays demonstrate mild DJD.  She is interested in left hip steroid injection only if symptoms worsen; she has been educated on this procedure including risks and benefits and will order in case of need.  She is also to continue walking for exercise and discussed a PT/bracing program as needed.  She will continue medication plan with attempt to reduce duloxetine back to previous dosing of 60 mg daily as her pain is well controlled.     Plan:   Continue Tramadol doses 100 mg as needed in the afternoon - ok that you have reduced dose as you are feeling better.    Ok to continue Tylenol 1,000 mg at bedtime.  Max daily dose is 3,000 mg per day  Continue duloxetine with attempt at reducing dose back to 60 mg a day - try to alternate days taking 120 mg (2 capsule) in the morning and then the next day take 60 mg (1 capsule) for 2 weeks.  If you don't notice any change, ok to then reduce just to 60 mg daily.  Continue use of topical compound cream apply 4 grams to knee and hip three times a day as this is helpful - 1 refill available at [facility]. When that is gone, Please call our phone # [phone] and choose option #4 to request a medication refill seven days in advance for your refill to be sent electronically to your pharmacy.  We will not return a phone call when the refill is sent to your pharmacy, but expect you to communicate with your pharmacy to ensure it is received and ready by the date needed.  Continue walking program for exercise   I have ordered a left hip steroid injection with Dr. Oliva to use as needed.  You also may repeat orthovisc series of 3 injections for right knee as needed in 6 months (end of August) if pain returns.  You will call the  to schedule these, orders stay in the chart for 1 year.  Follow-up with Aleja in 6 months OVL to evaluate above plan.  If you are not needing a visit at that time, ok to  cancel.  Also if symptoms worsen ok to come in sooner.  As always, great to see you!    Aleja Pitts PA-C  [facility]  [address]  [place] [address]  Ph: [phone]  Fax: [phone] 31 minutes spent on the date of the encounter doing chart review, history and exam, documentation, and further activities.

## 2021-06-16 NOTE — PATIENT INSTRUCTIONS - HE
Continue Tramadol doses 100 mg as needed in the afternoon - ok that you have reduced dose as you are feeling better.    Ok to continue Tylenol 1,000 mg at bedtime.  Max daily dose is 3,000 mg per day  Continue duloxetine with attempt at reducing dose back to 60 mg a day - try to alternate days taking 120 mg (2 capsule) in the morning and then the next day take 60 mg (1 capsule) for 2 weeks.  If you don't notice any change, ok to then reduce just to 60 mg daily.  Continue use of topical compound cream apply 4 grams to knee and hip three times a day as this is helpful - 1 refill available at [facility]. When that is gone, Please call our phone # [phone] and choose option #4 to request a medication refill seven days in advance for your refill to be sent electronically to your pharmacy.  We will not return a phone call when the refill is sent to your pharmacy, but expect you to communicate with your pharmacy to ensure it is received and ready by the date needed.  Continue walking program for exercise   I have ordered a left hip steroid injection with Dr. Oliva to use as needed.  You also may repeat orthovisc series of 3 injections for right knee as needed in 6 months (end of August) if pain returns.  You will call the  to schedule these, orders stay in the chart for 1 year.  Follow-up with Aleja in 6 months OVL to evaluate above plan.  If you are not needing a visit at that time, ok to cancel.  Also if symptoms worsen ok to come in sooner.  As always, great to see you!

## 2021-06-16 NOTE — TELEPHONE ENCOUNTER
Telephone Encounter by Aleja Pitts PA-C at 5/28/2019  1:00 PM     Author: Aleja Pitts PA-C Service: -- Author Type: Physician Assistant    Filed: 5/28/2019  1:00 PM Encounter Date: 5/28/2019 Status: Signed    : Aleja Pitts PA-C (Physician Assistant)       Refused as med list has this sent to [facility]:

## 2021-06-16 NOTE — PROGRESS NOTES
Patient presents to the clinic today for a follow up with Aleaj Pitts PA-C.     Pain score: 3  What does your pain feel like: intermittent sore  Does the pain interfere with:  Work: no  Walking/distance: yes  Sleep: yes  Daily activities: yes  Relationships/social life: no  Mood: yes  F= 7

## 2021-06-16 NOTE — TELEPHONE ENCOUNTER
Telephone Encounter by Katerina Garcia at 5/29/2019  2:41 PM     Author: Katerina Garcia Service: -- Author Type: --    Filed: 5/29/2019  2:41 PM Encounter Date: 5/29/2019 Status: Signed    : Katerina Garcia APPROVED:    Approval start date:2/28/19  Approval end date: 5/28/2020    Pharmacy has been notified of approval and will contact patient when medication is ready for pickup.

## 2021-06-16 NOTE — TELEPHONE ENCOUNTER
----- Message from Elizabeth Holley MD sent at 3/25/2021  4:24 PM CDT -----  I believe I reviewed this lady's transmissions while I was on vacation a week or 2 ago.  Please confirm with sister that she does have atrial fibrillation, upwards of half a day, with 27% of her heartbeats atrial fibrillation.  Given that, we need to continue anticoagulation.  In addition, I wanted to make sure she started Tambocor 25 mg p.o. twice daily.  Can we confirm that sister did this?  Hopefully with the Tambocor her symptoms have improved.  LF        Called patient and updated on test results. She verbalized understanding and we discussed how her symptoms correlate with Afib with rapid rates. She will start her medication- previously had discussed with another RN and she denied symptoms of Afib. She documented instances of dizziness and lighteheadness and shortness of breath with her event diary. Therefore, Tambocor 25 mg two times a day was sent in to pharmacy on file and we discussed medication instructions and potential side effects. Result letter mailed for her record. Attempted to transfer patient to scheduling to arrange follow up appt at her request. She wants to see LBF to make sure the medication is working for her and discuss it further. Reassured patient that this is perfectly acceptable. Long wait times and patient hung up- message sent to  pool to please arranged. -[facility]

## 2021-06-17 ENCOUNTER — COMMUNICATION - HEALTHEAST (OUTPATIENT)
Dept: FAMILY MEDICINE | Facility: CLINIC | Age: 86
End: 2021-06-17

## 2021-06-17 NOTE — PATIENT INSTRUCTIONS - HE
Patient Instructions by Lore Martin MD at 3/19/2019 11:20 AM     Author: Lore Martin MD Service: -- Author Type: Physician    Filed: 3/19/2019 12:18 PM Encounter Date: 3/19/2019 Status: Signed    : Lore Martin MD (Physician)       Patient Education     Costochondritis    Costochondritis is inflammation of a rib or the cartilage that connects a rib to your breastbone (sternum). It causes tenderness, and sometimes chest pain may be sharp or aching, or it may feel like pressure. Pain may get worse with deep breathing, movement, or exercise. In some cases, the pain is mistaken for a heart attack. Despite this, the condition is not serious. Read on to learn more about the condition and how it can be treated.  What causes costochondritis?  The cause of costochondritis is not completely clear, but it may happen after a chest injury, chest infection, or coughing episode. Some physical activities can sometimes lead to costochondritis. Large-breasted women may be more likely to have the condition. Often, the reason for the inflammation is unknown.  Diagnosing costochondritis  There is no test for costochondritis. The condition is diagnosed by the symptoms you have. Your healthcare provider will perform a physical exam. He or she will ask you about your symptoms and examine your chest for tenderness. In some cases, tests are done to rule out more serious problems. These tests may include imaging tests such as chest X-ray, CT scan, or an ECG.  Treating costochondritis  If an underlying cause is found, treatment for that will likely relieve the problem. Costochondritis often goes away on its own. The course of the condition varies from person to person. It usually lasts from weeks to months. In some cases, mild symptoms continue for months to years. To ease symptoms:    Take medicine as directed. These relieve pain and swelling. Ibuprofen or other NSAIDs are often recommended. In some cases, you  may be given prescription medicine, such as muscle relaxants.    Avoid activities that put stress on the chest or spine.    Apply a heating pad (set to warm, not too high, heat) to the breastbone several times a day.    Perform stretching exercises as directed.  Call the healthcare provider right away if you have any of the following:    Pain that is not relieved by medicine    Shortness of breath    Lightheadedness, dizziness, or fainting    Feeling of irregular heartbeat or fast pulse  Anyone with chest pain should see a healthcare provider, especially those who are older and may be at risk for heart disease.   Date Last Reviewed: 10/1/2016    0974-6207 [employer]. [address] [place] [address], [place] [address]. All rights reserved. This information is not intended as a substitute for professional medical care. Always follow your healthcare professional's instructions.

## 2021-06-17 NOTE — TELEPHONE ENCOUNTER
Telephone Encounter by Kathleen Chavez RN at 3/16/2021 11:16 AM     Author: Kathleen Chavez RN Service: -- Author Type: Registered Nurse    Filed: 3/16/2021 11:20 AM Encounter Date: 3/16/2021 Status: Addendum    : Kathleen Chavez RN (Registered Nurse)    Related Notes: Original Note by Kathleen Chavez RN (Registered Nurse) filed at 3/16/2021 11:16 AM       Elizabeth Holley MD Larsen, Kellie C, RN   Caller: Unspecified (Today,  7:13 AM)             If she trulely did not have shortness of breath with the a fib then no changes in tx, however if does have shortness of breath then will need to start tambocor 25 mg two times a day.   LF      Spoke with patient, she did not have any symptoms of a fib. She states she did have some diarrhea last night. But denies shortness of breath, palpitations or chest pain. She will continue to monitor and return call if she develops any new symptoms. No medication changes made at this time. Will follow-up once results of monitor are made available-kcl

## 2021-06-17 NOTE — TELEPHONE ENCOUNTER
Telephone Encounter by Chelo Lopez at 12/9/2020  3:48 PM     Author: Chelo Lopez Service: -- Author Type: Patient Access    Filed: 12/9/2020  3:50 PM Encounter Date: 12/9/2020 Status: Signed    : Chelo Lopez (Patient Access)     Summary: Quantity limit Approval      [place] APPROVED:    Approval start date: 11/09/20  Approval end date:  12/09/21    Pharmacy has been notified of approval and will ship the medication out to the patient.

## 2021-06-17 NOTE — TELEPHONE ENCOUNTER
Telephone Encounter by Kathleen Chavez RN at 3/16/2021  8:40 AM     Author: Kathleen Chavez RN Service: -- Author Type: Registered Nurse    Filed: 3/16/2021  8:49 AM Encounter Date: 3/16/2021 Status: Addendum    : Kathleen Chavez RN (Registered Nurse)    Related Notes: Original Note by Kathleen Chavez RN (Registered Nurse) filed at 3/16/2021  8:49 AM       ----- Message from GUY Parr sent at 3/16/2021  7:13 AM CDT -----  Regarding: MD Notify  MD Notify saved to [employer]. On 03/16/2021 at 00:04:05, an auto-triggered event recorded New Onset Atrial Fibrillation with IVCD, .        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 She confirms she is taking Eliquis as prescribed.     Dr. Holley, I copied just the first page of the MD notification above. Pt denies any palpitations or shortness of breath. She confirms she is taking her Eliquis medication. Any further recs?

## 2021-06-17 NOTE — PATIENT INSTRUCTIONS - HE
POST PROCEDURE INSTRUCTIONS  PROCEDURE DONE TODAY: RIGHT KNEE INJECTION    Rest today. Resume activity tomorrow as able.  Patient demonstrates the ability to ambulate independently with a steady gait at the time of discharge.      It is not unusual to have a temporary increase in your pain after a procedure. You can ice the area 24-48 hrs. 15 min at a time.      You received a steroid injection. This medication can take 2-7 days to take effect. Some temporary side effects include:    Heartburn    Flushed-red face    Insomnia-trouble sleeping-jitters    Diabetics may see a rise in blood sugar for a few days    Low back or SI joint (sacroiliac) injection: you may experience numbness in one or both legs. Please walk with help. This is temporary and will wear off in a few hours. Do not drive if you are tingling, numbness or weakness in your hands/arms or legs/feet.    Headache:  If you experience a headache after a lumbar epidural injection, lie down and drink fluids (especially caffeine). The headache will go away gradually. If it persists notify our clinic.    Fever: call if fever 100 or over, redness/warmth/swelling over the injection site.    No public hot tubs/pools for a couple days    Physical therapy: check with pain center provider regarding resuming therapy.    Monitor your response to the injection and report this at your next visit.    If you have been referred for a procedure only, please call your referring provider for a follow up.    IF YOU PLAN TO GET A FLU SHOT YOU MUST WAIT 2 WEEKS AFTER THIS INJECTION.      CALL IF ANY QUESTIONS [phone]

## 2021-06-17 NOTE — TELEPHONE ENCOUNTER
Telephone Encounter by Chelo Lopez at 12/8/2020  2:32 PM     Author: Chelo Lopez Service: -- Author Type: Patient Access    Filed: 12/8/2020  2:34 PM Encounter Date: 12/8/2020 Status: Signed    : Chelo Lopez (Patient Access)       Central PA team  [phone]  Pool: HE PA MED ([id number])          PA has been initiated.       PA form completed and faxed insurance via Cover My Meds     Key:  Question set received.     Medication:  Zolpidem CR 6.25 mg    Insurance:  [employer]        Response will be received via fax and may take up to 5-10 business days depending on plan

## 2021-06-17 NOTE — PATIENT INSTRUCTIONS - HE
POST PROCEDURE INSTRUCTIONS  PROCEDURE DONE TODAY: LEFT HIP INJECTION    Rest today. Resume activity tomorrow as able.  Patient demonstrates the ability to ambulate independently with a steady gait at the time of discharge.      It is not unusual to have a temporary increase in your pain after a procedure. You can ice the area 24-48 hrs. 15 min at a time.      You received a steroid injection. This medication can take 2-7 days to take effect. Some temporary side effects include:    Heartburn    Flushed-red face    Insomnia-trouble sleeping-jitters    Diabetics may see a rise in blood sugar for a few days    Low back or SI joint (sacroiliac) injection: you may experience numbness in one or both legs. Please walk with help. This is temporary and will wear off in a few hours. Do not drive if you are tingling, numbness or weakness in your hands/arms or legs/feet.    Headache:  If you experience a headache after a lumbar epidural injection, lie down and drink fluids (especially caffeine). The headache will go away gradually. If it persists notify our clinic.    Fever: call if fever 100 or over, redness/warmth/swelling over the injection site.    No public hot tubs/pools for a couple days    Physical therapy: check with pain center provider regarding resuming therapy.    Monitor your response to the injection and report this at your next visit.    If you have been referred for a procedure only, please call your referring provider for a follow up.    IF YOU PLAN TO GET A FLU SHOT YOU MUST WAIT 2 WEEKS AFTER THIS INJECTION.      CALL IF ANY QUESTIONS [phone]

## 2021-06-17 NOTE — TELEPHONE ENCOUNTER
Telephone Encounter by Stacy Noriega RN at 1/29/2021 10:13 AM     Author: Stacy Noriega RN Service: -- Author Type: Registered Nurse    Filed: 1/29/2021 10:33 AM Encounter Date: 1/27/2021 Status: Signed    : Stacy Noriega RN (Registered Nurse)       Message  Received: Today  Message Contents   Elizabeth Holley MD Caswell, Mallory J, RN   Caller: Unspecified (2 days ago, 12:15 PM)             I suspect she is still taking lisinopril 20 mg as well as amlodipine 5 mg as well as HCTZ 25 mg?  With that said and not cutting anything in half currently I would suggest she cut the amlodipine in half and only take 200 mg a day.  Have her call us in about a week to 2 and let us know blood pressures are running and if still running below 140 I would back off on amlodipine and discontinue altogether.            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 She is able to split them herself- rx list simply updated. She will call back directly if this does not help. -[facility] [place]

## 2021-06-17 NOTE — TELEPHONE ENCOUNTER
Telephone Encounter by Chelo Lopez at 12/9/2020  3:01 PM     Author: Chelo Lopez Service: -- Author Type: Patient Access    Filed: 12/9/2020  3:03 PM Encounter Date: 12/9/2020 Status: Signed    : Chelo Lopez (Patient Access)       Central PA team  [phone]  Pool:  PA MED ([id number])          PA has been initiated.       PA form completed and faxed insurance via Cover My Meds     Key:  [id number]     Medication:  Zolpidem CR 6.25mg quantity limit, plan covers 1 tablet daily, 45/30    Insurance:  [employer]        Response will be received via fax and may take up to 5-10 business days depending on plan

## 2021-06-17 NOTE — TELEPHONE ENCOUNTER
Telephone Encounter by Chelo Lopez at 12/9/2020  2:47 PM     Author: Chelo Lopez Service: -- Author Type: Patient Access    Filed: 12/9/2020  3:03 PM Encounter Date: 12/8/2020 Status: Signed    : Chelo Lopez (Patient Access)     Summary: DRUG APPROVAL       [place] APPROVED: DRUG APPROVAL    Approval start date: 11/08/2020  Approval end date: 12/08/2021     Pharmacy has been notified of approval and will contact patient when medication is ready for pickup.

## 2021-06-18 NOTE — LETTER
Letter by Lore Martin MD at      Author: Lore Martin MD Service: -- Author Type: --    Filed:  Encounter Date: 3/4/2019 Status: (Other)         [facility] MEDICINE/OB   03/04/19    Patient: Gladys Ramirez  YOB: 1930  Medical Record Number: [id number]                                                                  Opioid / Opioid Plus Controlled Substance Agreement    I understand that my care provider has prescribed an opioid (narcotic) controlled substance to help manage my condition(s). I am taking this medicine to help me function or work. I know this is strong medicine, and that it can cause serious side effects. Opioid medicine can be sedating, addicting and may cause a dependency on the drug. They can affect my ability to drive or think, and cause depression. They need to be taken exactly as prescribed. Combining opioids with certain medicines or chemicals (such as cocaine, sedatives and tranquilizers, sleeping pills, meth) can be dangerous or even fatal. Also, if I stop opioids suddenly, I may have severe withdrawal symptoms. Last, I understand that opioids do not work for all types of pain nor for all patients. If not helpful, I may be asked to stop them.        The risks, benefits, and side effects of these medicine(s) were explained to me. I agree that:    1. I will take part in other treatments as advised by my care team. This may be psychiatry or counseling, physical therapy, behavioral therapy, group treatment or a referral to a pain clinic. I will reduce or stop my medicine when my care team tells me to do so.  2. I will take my medicines as prescribed. I will not change the dose or schedule unless my care team tells me to. There will be no refills if I run out early.  I may be contactedwithout warning and asked to complete a urine drug test or pill count at any time.   3. I will keep all my appointments, and understand this is part of the monitoring of opioids. My  care team may require an office visit for EVERY opioid/controlled substance refill. If I miss appointments or dont follow instructions, my care team may stop my medicine.  4. I will not ask other providers to prescribe controlled substances, and I will not accept controlled substances from other people. If I need another prescribed controlled substance for a new reason, I will tell my care team within 1 business day.  5. I will use one pharmacy to fill all of my controlled substance prescriptions, and it is up to me to make sure that I do not run out of my medicines on weekends or holidays. If my care team is willing to refill my opioid prescription without a visit, I must request refills only during office hours, refills may take up to 3 days to process, and it may take up to 5 to 7 days for my medicine to be mailed and ready at my pharmacy. Prescriptions will not be mailed anywhere except my pharmacy.        [id number]  Rev 12/18         Registration to scan to EHR                             Page 1 of 2               Controlled Substance Agreement Opioid        [facility] MEDICINE/OB   03/04/19  Patient: Gladys Ramirez  YOB: 1930  Medical Record Number: [id number]                                                                  6. I am responsible for my prescriptions. If the medicine/prescription is lost or stolen, it will not be replaced. I also agree not to share controlled substance medicines with anyone.  7. I agree to not use ANY illegal or recreational drugs. This includes marijuana, cocaine, bath salts or other drugs. I agree not to use alcohol unless my care team says I may.          I agree to give urine samples whenever asked. If I dont give a urine sample, the care team may stop my medicine.    8. If I enroll in the [place] Medical Marijuana program, I will tell my care team. I will also sign an agreement to share my medical records with my care team.   9. I will bring in my list of  medicines (or my medicine bottles) each time I come to the clinic.   10. I will tell my care team right away if I become pregnant or have a new medical problem treated outside of my regular clinic.  11. I understand that this medicine can affect my thinking and judgment. It may be unsafe for me to drive, use machinery and do dangerous tasks. I will not do any of these things until I know how the medicine affects me. If my dose changes, I will wait to see how it affects me. I will contact my care team if I have concerns about medicine side effects.    I understand that if I do not follow any of the conditions above, my prescriptions or treatment may be stopped.      I agree that my provider, clinic care team, and pharmacy may work with any city, state or federal law enforcement agency that investigates the misuse, sale, or other diversion of my controlled medicine. I will allow my provider to discuss my care with or share a copy of this agreement with any other treating provider, pharmacy or emergency room where I receive care. I agree to give up (waive) any right of privacy or confidentiality with respect to these consents.     I have read this agreement and have asked questions about anything I did not understand.      ________________________________________________________________________  Patient signature - Date/Time -  Gladys Ramirez                                      ________________________________________________________________________  Witness signature                                                            ________________________________________________________________________  Provider signature - Lore Martin MD      [id number]  Rev 12/18         Registration to scan to EHR                         Page 2 of 2                   Controlled Substance Agreement Opioid           Page 1 of 2  Opioid Pain Medicines (also known as Narcotics)  What You Need to Know    What are opioids?   Opioids  are pain medicines that must be prescribed by a doctor.  They are also known as narcotics.    Examples are:     morphine (MS Contin, Jessie)    oxycodone (Oxycontin)    oxycodone and acetaminophen (Percocet)    hydrocodone and acetaminophen (Vicodin, Norco)     fentanyl patch (Duragesic)     hydromorphone (Dilaudid)     methadone     What do opioids do well?   Opioids are best for short-term pain after a surgery or injury. They also work well for cancer pain. Unlike other pain medicines, they do not cause liver or kidney failure or ulcers. They may help some people with long-lasting (chronic) pain.     What do opioids NOT do well?   Opioids never get rid of pain entirely, and they do not work well for most patients with chronic pain. Opioids do not reduce swelling, one of the causes of pain. They also dont work well for nerve pain.                           For informational purposes only.  Not to replace the advice of your care provider.  Copyright [facility]. All right reserved. [employer] 733629-Gsf 02/18.      Page 2 of 2    Risks and side effects   Talk to your doctor before you start or decide to keep taking one of these medicines. Side effects include:    Lowering your breathing rate enough to cause death    Overdose, including death, especially if taking higher than prescribed doses    Long-term opioid use    Worse depression symptoms; less pleasure in things you usually enjoy    Feeling tired or sluggish    Slower thoughts or cloudy thinking    Being more sensitive to pain over time; pain is harder to control    Trouble sleeping or restless sleep    Changes in hormone levels (for example, less testosterone)    Changes in sex drive or ability to have sex    Constipation    Unsafe driving    Itching and sweating    Feeling dizzy    Nausea, vomiting and dry mouth    What else should I know about opioids?  When someone takes opioids for too long or too often, they become dependent. This means  that if you stop or reduce the medicine too quickly, you will have withdrawal symptoms.    Dependence is not the same as addiction. Addiction is when people keep using a substance that harms their body, their mind or their relations with others. If you have a history of drug or alcohol abuse, taking opioids can cause a relapse.    Over time, opioids dont work as well. Most people will need higher and higher doses. The higher the dose, the more serious the side effects. We dont know the long-term effects of opioids.      Prescribed opioids aren't the best way to manage chronic pain    Other ways to manage pain include:      Ibuprofen or acetaminophen.  You should always try this first.      Treat health problems that may be causing pain.      acupuncture or massage, deep breathing, meditation, visual imagery, aromatherapy.      Use heat or ice at the pain site      Physical therapy and exercise      Stop smoking      See a counselor or therapist                                                  People who have used opioids for a long time may have a lower quality of life, worse depression, higher levels of pain and more visits to doctors.    Never share your opioids with others. Be sure to store opioids in a secure place, locked if possible.Young children can easily swallow them and overdose.     You can overdose on opioids.  Signs of overdose include decrease or loss of consciousness, slowed breathing, trouble waking and blue lips.  If someone is worried about overdose, they should call 911.    If you are at risk for overdose, you may get naloxone (Narcan, a medicine that reverses the effects of opioids.  If you overdose, a friend or family member can give you Narcan while waiting for the ambulance.  They need to know the signs of overdose and how to give Narcan.    While you're taking opioids:    Don't use alcohol or street drugs. Taking them together can cause death.    Don't take any of these medicines unless your  doctor says its okay.  Taking these with opioids can cause death.    Benzodiazepines (such as lorazepam         or diazepam)    Muscle relaxers (such as cyclobenzaprine)    sleeping pills    other opioids    Safe disposal of opioids  Find your area drug take-back program, your pharmacy mail-back program, buy a special disposal bag (such as Deterra) from your pharmacy or flush them down the toilet.  Use the guidelines at:  www.fda.gov/drugs/resourcesforyou

## 2021-06-18 NOTE — LETTER
Letter by Lore Martin MD at      Author: Lore Martin MD Service: -- Author Type: --    Filed:  Encounter Date: 3/4/2019 Status: (Other)         [facility] MEDICINE/OB   03/04/19    Patient: Gladys Ramirez  YOB: 1930  Medical Record Number: [id number]  CSN: [id number]                                                                              Non-opioid Controlled Substance Agreement    I understand that my care provider has prescribed a controlled substance to help manage my condition(s). I am taking this medicine to help me function or work. I know this is strong medicine, and that it can cause serious side effects. Controlled substances can be sedating, addicting and may cause a dependency on the drug. They can affect my ability to drive or think, and cause depression. They need to be taken exactly as prescribed. Combining controlled substances with certain medicines or chemicals (such as cocaine, sedatives and tranquilizers, sleeping pills, meth) can be dangerous or even fatal. Also, if I stop controlled substances suddenly, I may have severe withdrawal symptoms.  If not helpful, I may be asked to stop them.    The risks, benefits, and side effects of these medicine(s) were explained to me. I agree that:    1. I will take part in other treatments as advised by my care team. This may be psychiatry or counseling, physical therapy, behavioral therapy, group treatment or a referral to a pain clinic. I will reduce or stop my medicine when my care team tells me to do so.  2. I will take my medicines as prescribed. I will not change the dose or schedule unless my care team tells me to. There will be no refills if I run out early.  I may be contactedwithout warning and asked to complete a urine drug test or pill count at any time.   3. I will keep all my appointments, and understand this is part of the monitoring of controlled substances. My care team may require an office visit for  EVERY controlled substance refill. If I miss appointments or dont follow instructions, my care team may stop my medicine.  4. I will not ask other providers to prescribe controlled substances, and I will not accept controlled substances from other people. If I need another prescribed controlled substance for a new reason, I will tell my care team within 1 business day.  5. I will use one pharmacy to fill all of my controlled substance prescriptions, and it is up to me to make sure that I do not run out of my medicines on weekends or holidays. If my care team is willing to refill my controlled substance prescription without a visit, I must request refills only during office hours, refills may take up to 3 days to process, and it may take up to 5 to 7 days for my medicine to be mailed and ready at my pharmacy. Prescriptions will not be mailed anywhere except my pharmacy.    6. I am responsible for my prescriptions. If the medicine/prescription is lost or stolen, it will not be replaced. I also agree not to share controlled substance medicines with anyone.          [facility] MEDICINE/OB   03/04/19  Patient:  Gladys Ramirez  YOB: 1930  Medical Record Number: [id number]  CSN: [id number]    7. I agree to not use ANY illegal or recreational drugs. This includes marijuana, cocaine, bath salts or other drugs. I agree not to use alcohol unless my care team says I may. I agree to give urine samples whenever asked. If I dont give a urine sample, the care team may stop my medicine.    8. If I enroll in the [place] Medical Marijuana program, I will tell my care team. I will also sign an agreement to share my medical records with my care team.    9. I will bring in my list of medicines (or my medicine bottles) each time I come to the clinic.   10. I will tell my care team right away if I become pregnant or have a new medical problem treated outside of my regular clinic.  11. I understand that this medicine can  affect my thinking and judgment. It may be unsafe for me to drive, use machinery and do dangerous tasks. I will not do any of these things until I know how the medicine affects me. If my dose changes, I will wait to see how it affects me. I will contact my care team if I have concerns about medicine side effects.    I understand that if I do not follow any of the conditions above, my prescriptions or treatment may be stopped.      I agree that my provider, clinic care team, and pharmacy may work with any city, state or federal law enforcement agency that investigates the misuse, sale, or other diversion of my controlled medicine. I will allow my provider to discuss my care with or share a copy of this agreement with any other treating provider, pharmacy or emergency room where I receive care. I agree to give up (waive) any right of privacy or confidentiality with respect to these consents.   I have read this agreement and have asked questions about anything I did not understand.    ___________________________________________________________________________  Patient signature - Date/Time  -Gladys Ramirez                                      ___________________________________________________________________________  Witness signature                                                                    ___________________________________________________________________________  Provider signature- Lore Martin MD

## 2021-06-19 NOTE — LETTER
Letter by Lore Martin MD at      Author: Lore Martin MD Service: -- Author Type: --    Filed:  Encounter Date: 10/29/2019 Status: Signed         Gladys Ramirez  [address]  [place] [address]             October 29, 2019         Dear MsJeffrey Ramirez,    Below are the results from your recent visit:    Resulted Orders   CT Chest Without Contrast    Narrative    EXAM: CT CHEST WO CONTRAST  LOCATION: [facility]  DATE/TIME: 10/28/2019 11:05 AM    INDICATION: Lung nodule, < 1 cm, low risk F/U nodule seen on CT 6/2019  in 3-6 months.  COMPARISON: 06/19/2019.  TECHNIQUE: CT chest without IV contrast. Multiplanar reformats were obtained. Dose reduction techniques were used.  CONTRAST: None.    FINDINGS:   LUNGS AND PLEURA: Similar appearance of the biapical pleural and parenchymal scarring. Slight decrease in the residual area of nodular airspace opacity in the right costophrenic angle on image 92 of series 3 as seen laterally measuring 0.7 x 0.8 cm   today, previously 0.9 x 0.9 cm. Stable subpleural 4 mm right lower lobe nodule on image 91 of series 3. Benign-appearing calcified granuloma right lower lobe image 84. Stable intrafissural lymph node right middle lobe on image 67. No new or enlarging   suspicious lung lesion. No infiltrate or pleural effusion.    MEDIASTINUM/AXILLAE: Probable cyst in the right thyroid lobe is unchanged. Normal caliber thoracic aorta. The main pulmonary artery is prominent. The heart is stable in size. No pericardial effusion. There is some mild coronary arterial calcifications.   There is a small hiatal hernia. No suspicious mediastinal or hilar lymphadenopathy.    UPPER ABDOMEN: No significant finding.    MUSCULOSKELETAL: Mild degenerative change in the spine. There is a scoliosis.      Impression    1.  Slight decrease in the previously seen residual area of nodular airspace opacity in the right lateral costophrenic angle. No new or enlarging  suspicious lung lesion. Consider follow-up CT scan of the chest without contrast in June 2020.         Your CT results are stable/normal from previous studies.     Please plan to have a follow up reading in June 2020.     Please call with questions or contact us using [employer].    Sincerely,        Electronically signed by Lore Martin MD

## 2021-06-19 NOTE — LETTER
Letter by Lore Martin MD at      Author: Lore Martin MD Service: -- Author Type: --    Filed:  Encounter Date: 6/3/2019 Status: (Other)         Gladys REINALDO Ramirez  [address]  [place] [address]             Luz 3, 2019         Dear Ms. Ramirez,    Below are the results from your recent visit:    Resulted Orders   Vitamin D, Total (25-Hydroxy)   Result Value Ref Range    Vitamin D, Total (25-Hydroxy) 33.3 30.0 - 80.0 ng/mL    Narrative    Deficiency <10.0 ng/mL  Insufficiency 10.0-29.9 ng/mL  Sufficiency 30.0-80.0 ng/mL  Toxicity (possible) >100.0 ng/mL       Please call with questions or contact us using [employer].    Sincerely,        Electronically signed by Lore Martin MD

## 2021-06-20 NOTE — LETTER
Letter by Lore Martin MD at      Author: Lore Martin MD Service: -- Author Type: --    Filed:  Encounter Date: 1/30/2020 Status: (Other)         [facility] MEDICINE/OB   01/30/20    Patient: Gladys Ramirez  YOB: 1930  Medical Record Number: [id number]                                                                  Opioid / Opioid Plus Controlled Substance Agreement    I understand that my care provider has prescribed an opioid (narcotic) controlled substance to help manage my condition(s). I am taking this medicine to help me function or work. I know this is strong medicine, and that it can cause serious side effects. Opioid medicine can be sedating, addicting and may cause a dependency on the drug. They can affect my ability to drive or think, and cause depression. They need to be taken exactly as prescribed. Combining opioids with certain medicines or chemicals (such as cocaine, sedatives and tranquilizers, sleeping pills, meth) can be dangerous or even fatal. Also, if I stop opioids suddenly, I may have severe withdrawal symptoms. Last, I understand that opioids do not work for all types of pain nor for all patients. If not helpful, I may be asked to stop them.    Tramadol and Ambien      The risks, benefits, and side effects of these medicine(s) were explained to me. I agree that:    1. I will take part in other treatments as advised by my care team. This may be psychiatry or counseling, physical therapy, behavioral therapy, group treatment or a referral to a pain clinic. I will reduce or stop my medicine when my care team tells me to do so.  2. I will take my medicines as prescribed. I will not change the dose or schedule unless my care team tells me to. There will be no refills if I run out early.  I may be contactedwithout warning and asked to complete a urine drug test or pill count at any time.   3. I will keep all my appointments, and understand this is part of the  monitoring of opioids. My care team may require an office visit for EVERY opioid/controlled substance refill. If I miss appointments or dont follow instructions, my care team may stop my medicine.  4. I will not ask other providers to prescribe controlled substances, and I will not accept controlled substances from other people. If I need another prescribed controlled substance for a new reason, I will tell my care team within 1 business day.  5. I will use one pharmacy to fill all of my controlled substance prescriptions, and it is up to me to make sure that I do not run out of my medicines on weekends or holidays. If my care team is willing to refill my opioid prescription without a visit, I must request refills only during office hours, refills may take up to 3 days to process, and it may take up to 5 to 7 days for my medicine to be mailed and ready at my pharmacy. Prescriptions will not be mailed anywhere except my pharmacy.        [id number]  Rev 12/18         Registration to scan to EHR                             Page 1 of 2               Controlled Substance Agreement Opioid        [facility] MEDICINE/OB   01/30/20  Patient: Gladys Ramirez  YOB: 1930  Medical Record Number: [id number]                                                                  6. I am responsible for my prescriptions. If the medicine/prescription is lost or stolen, it will not be replaced. I also agree not to share controlled substance medicines with anyone.  7. I agree to not use ANY illegal or recreational drugs. This includes marijuana, cocaine, bath salts or other drugs. I agree not to use alcohol unless my care team says I may.          I agree to give urine samples whenever asked. If I dont give a urine sample, the care team may stop my medicine.    8. If I enroll in the [place] Medical Marijuana program, I will tell my care team. I will also sign an agreement to share my medical records with my care team.   9. I  will bring in my list of medicines (or my medicine bottles) each time I come to the clinic.   10. I will tell my care team right away if I become pregnant or have a new medical problem treated outside of my regular clinic.  11. I understand that this medicine can affect my thinking and judgment. It may be unsafe for me to drive, use machinery and do dangerous tasks. I will not do any of these things until I know how the medicine affects me. If my dose changes, I will wait to see how it affects me. I will contact my care team if I have concerns about medicine side effects.    I understand that if I do not follow any of the conditions above, my prescriptions or treatment may be stopped.      I agree that my provider, clinic care team, and pharmacy may work with any city, state or federal law enforcement agency that investigates the misuse, sale, or other diversion of my controlled medicine. I will allow my provider to discuss my care with or share a copy of this agreement with any other treating provider, pharmacy or emergency room where I receive care. I agree to give up (waive) any right of privacy or confidentiality with respect to these consents.     I have read this agreement and have asked questions about anything I did not understand.      ________________________________________________________________________  Patient signature - Date/Time -  Gladys Ramirez                                      ________________________________________________________________________  Witness signature                                                            ________________________________________________________________________  Provider signature - Lore Martin MD      [id number]  Rev 12/18         Registration to scan to EHR                         Page 2 of 2                   Controlled Substance Agreement Opioid           Page 1 of 2  Opioid Pain Medicines (also known as Narcotics)  What You Need to Know    What  are opioids?   Opioids are pain medicines that must be prescribed by a doctor.  They are also known as narcotics.    Examples are:     morphine (MS Contin, Jessie)    oxycodone (Oxycontin)    oxycodone and acetaminophen (Percocet)    hydrocodone and acetaminophen (Vicodin, Norco)     fentanyl patch (Duragesic)     hydromorphone (Dilaudid)     methadone     What do opioids do well?   Opioids are best for short-term pain after a surgery or injury. They also work well for cancer pain. Unlike other pain medicines, they do not cause liver or kidney failure or ulcers. They may help some people with long-lasting (chronic) pain.     What do opioids NOT do well?   Opioids never get rid of pain entirely, and they do not work well for most patients with chronic pain. Opioids do not reduce swelling, one of the causes of pain. They also dont work well for nerve pain.                           For informational purposes only.  Not to replace the advice of your care provider.  Copyright 201 [facility]. All right reserved. [employer] [id number]-Etr 02/18.      Page 2 of 2    Risks and side effects   Talk to your doctor before you start or decide to keep taking one of these medicines. Side effects include:    Lowering your breathing rate enough to cause death    Overdose, including death, especially if taking higher than prescribed doses    Long-term opioid use    Worse depression symptoms; less pleasure in things you usually enjoy    Feeling tired or sluggish    Slower thoughts or cloudy thinking    Being more sensitive to pain over time; pain is harder to control    Trouble sleeping or restless sleep    Changes in hormone levels (for example, less testosterone)    Changes in sex drive or ability to have sex    Constipation    Unsafe driving    Itching and sweating    Feeling dizzy    Nausea, vomiting and dry mouth    What else should I know about opioids?  When someone takes opioids for too long or too often, they become  dependent. This means that if you stop or reduce the medicine too quickly, you will have withdrawal symptoms.    Dependence is not the same as addiction. Addiction is when people keep using a substance that harms their body, their mind or their relations with others. If you have a history of drug or alcohol abuse, taking opioids can cause a relapse.    Over time, opioids dont work as well. Most people will need higher and higher doses. The higher the dose, the more serious the side effects. We dont know the long-term effects of opioids.      Prescribed opioids aren't the best way to manage chronic pain    Other ways to manage pain include:      Ibuprofen or acetaminophen.  You should always try this first.      Treat health problems that may be causing pain.      acupuncture or massage, deep breathing, meditation, visual imagery, aromatherapy.      Use heat or ice at the pain site      Physical therapy and exercise      Stop smoking      See a counselor or therapist                                                  People who have used opioids for a long time may have a lower quality of life, worse depression, higher levels of pain and more visits to doctors.    Never share your opioids with others. Be sure to store opioids in a secure place, locked if possible.Young children can easily swallow them and overdose.     You can overdose on opioids.  Signs of overdose include decrease or loss of consciousness, slowed breathing, trouble waking and blue lips.  If someone is worried about overdose, they should call 911.    If you are at risk for overdose, you may get naloxone (Narcan, a medicine that reverses the effects of opioids.  If you overdose, a friend or family member can give you Narcan while waiting for the ambulance.  They need to know the signs of overdose and how to give Narcan.    While you're taking opioids:    Don't use alcohol or street drugs. Taking them together can cause death.    Don't take any of these  medicines unless your doctor says its okay.  Taking these with opioids can cause death.    Benzodiazepines (such as lorazepam         or diazepam)    Muscle relaxers (such as cyclobenzaprine)    sleeping pills    other opioids    Safe disposal of opioids  Find your area drug take-back program, your pharmacy mail-back program, buy a special disposal bag (such as Deterra) from your pharmacy or flush them down the toilet.  Use the guidelines at:  www.fda.gov/drugs/resourcesforyou

## 2021-06-20 NOTE — PROGRESS NOTES
Assessment and Plan:   Annual exam  Shots up to date, except Flu shot given today and Shingrix discussed to get at pharmacy    Insomnia - new dose of zolpidem 6.25mg is helping with melatonin, sleeping much better    HTN - on nifedipine for years and controls BP well    Foot pain - mid foot arthrosis  She has orthotics  Advised to limit tylenol to 3000mg daily  Refill tramadol for 1 month  Referral for podiatry    Overactive bladder -   Nocturia has improved from 8-> 4x per night with zolpidem    Vaginal atrophy  On estrace by ob/gyn      The patient's current medical problems were reviewed.    I have had an Advance Directives discussion with the patient.  The following health maintenance schedule was reviewed with the patient and provided in printed form in the after visit summary:   Health Maintenance   Topic Date Due     DXA SCAN  09/29/1995     ADVANCE DIRECTIVES DISCUSSED WITH PATIENT  01/18/2017     INFLUENZA VACCINE RULE BASED (1) 08/01/2018     FALL RISK ASSESSMENT  08/23/2019     TD 18+ HE  10/30/2019     PNEUMOCOCCAL POLYSACCHARIDE VACCINE AGE 65 AND OVER  Completed     PNEUMOCOCCAL CONJUGATE VACCINE FOR ADULTS (PCV13 OR PREVNAR)  Completed     ZOSTER VACCINE  Completed        Subjective:   Chief Complaint: Gladys Ramirez is an 88 y.o. female here for an Annual Wellness visit.   HPI:      Insomnia - new dose of zolpidem 6.25mg is helping with melatonin, sleeping much better    HTN - on nifedipine for years and controls BP well    Foot pain - mid foot arthrosis  Seen by [facility] several times, last apt was 9/18/18 dx'd with mid food arthrosis, tried several corticosteroid injections. The celebrex two times a day (stopped this 4 weeks ago) and tylenol (taking 1300mg three times a day) did not help and the tramadol 2-4 x per day does help  Tried gabapentin, naproxen, tylenol, several topical creams, cold packs and hot packs that  did not help       Overactive bladder -   Nocturia has improved from  8-> 4x per night with zolpidem      Vaginal atrophy  On estrace by ob/gyn    Review of Systems:    Please see above.  The rest of the review of systems are negative for all systems.    Patient Care Team:  Lore Martin MD as PCP - General     Patient Active Problem List   Diagnosis     Localized Primary Osteoarthritis Of The Left Knee     Hypertension     Serum Enzyme Levels - Alkaline Phosphatase Elevated     Hyperlipidemia     Cataract     Insomnia     BCC (basal cell carcinoma of skin)     Chest pain     Cough     Sinus bradycardia     Past Medical History:   Diagnosis Date     Angina pectoris (H)      Hyperlipidemia      Hypertension      Osteoarthritis       Past Surgical History:   Procedure Laterality Date     APPENDECTOMY       BASAL CELL CARCINOMA EXCISION       DIAGNOSTIC LAPAROSCOPY N/A 11/4/2014    Procedure: LAPAROSCOPY BILATERAL SALPINGO-OOPHORECTOMY ;  Surgeon: Sofia Harper MD;  Location: [facility];  Service:      JOINT REPLACEMENT Left     TKA     Laparoscopy Salpingo-oophorectomy Bilateral 11/04/2014     MS APPENDECTOMY      Description: Appendectomy;  Recorded: 01/18/2012;     MS REMOVAL OF TONSILS,<13 Y/O      Description: Tonsillectomy;  Recorded: 01/18/2012;     MS TOTAL KNEE ARTHROPLASTY      Description: Total Knee Arthroplasty;  Recorded: 11/18/2013;     TONSILLECTOMY        Family History   Problem Relation Age of Onset     Heart disease Mother      Rheumatic Heart Disease Mother      No Medical Problems Father      Cancer Sister      Cancer Brother       Social History     Social History     Marital status: Single     Spouse name: N/A     Number of children: N/A     Years of education: N/A     Occupational History     Not on file.     Social History Main Topics     Smoking status: Never Smoker     Smokeless tobacco: Never Used     Alcohol use Yes      Comment: Rarely     Drug use: No     Sexual activity: Not on file     Other Topics Concern     Not on file  "    Social History Narrative    The patient is a nun.      Current Outpatient Prescriptions   Medication Sig Dispense Refill     melatonin 3 mg Tab tablet Take 3 mg by mouth at bedtime as needed.       NIFEdipine (PROCARDIA XL) 60 MG 24 hr tablet Take 1 tablet (60 mg total) by mouth daily. 90 tablet 4     traMADol (ULTRAM) 50 mg tablet Take 50 mg by mouth. Take 1 -2 tabs every 4-6 hours as needed for pain       zolpidem (AMBIEN CR) 6.25 MG CR tablet Take 1 tablet (6.25 mg total) by mouth at bedtime as needed for sleep. 30 tablet 3     celecoxib (CELEBREX) 100 MG capsule Take 100 mg by mouth 2 (two) times a day.       estradiol (ESTRACE) 0.01 % (0.1 mg/gram) vaginal cream 2 x per week per OB/Gyn 42.5 g 1     hydrocortisone 0.5 % cream Apply to vagina daily as needed 30 g 0     oxybutynin (DITROPAN XL) 5 MG ER tablet Take 1 tablet (5 mg total) by mouth daily. (Patient taking differently: Take 5 mg by mouth daily. ) 30 tablet 11     No current facility-administered medications for this visit.       Objective:   Vital Signs:   Visit Vitals     /74 (Patient Site: Left Arm)     Pulse 76     Temp 97.5  F (36.4  C) (Oral)     Ht 5' 1.75\" (1.568 m)     Wt 163 lb 14.4 oz (74.3 kg)     SpO2 97%     BMI 30.22 kg/m2        VisionScreening:  No exam data present     PHYSICAL EXAM  OBJECTIVE:  Vitals listed above within normal limits  General appearance: well groomed, pleasant, well hydrated, nontoxic appearing  ENT: PERRL, throat clear  Neck: neck supple, no lymphadenopathy, no thyromegaly  Lungs: lungs clear to auscultation bilaterally, no wheezes or rhonchi  Heart: regular rate and rhythm, no murmurs, rubs or gallops  Abdomen: soft, nontender  Neuro: no focal deficits, CN II-XII grossly intact, alert and oriented  Psych:  mood stable, appears to have good insight and judgment        Assessment Results 10/11/2018   Activities of Daily Living No help needed   Instrumental Activities of Daily Living 2-4 - Moderate impairment "   Get Up and Go Score Less than 12 seconds   Mini Cog Total Score 3   Some recent data might be hidden     A Mini-Cog score of 0-2 suggests the possibility of dementia, score of 3-5 suggests no dementia    Identified Health Risks:     The patient reports that she has difficulty with instrumental activities of daily living.  She has access to support services, especially help with cleaning. She has groceries delivered for the heavy foods.   She is at risk for falling and has been provided with information to reduce the risk of falling at home.  Patient's advanced directive was discussed and I am comfortable with the patient's wishes.

## 2021-06-20 NOTE — PROGRESS NOTES
HPI - 86 yo female here for ER followup.     She had explosive diarrhea for hours and went to ER on 8/20/18.     Per ER note 8/20/18 -   Dx'd with diarrhea and colitis   Likely viral but given patient's age and comorbidities  CT scan consistent with mild colitis  given Imodium in the emergency department   BMP - wnl except CR 1.24  Lactic acid 2.6  UA neg  CBC wnl       Today, diarrhea gone and no BM x 3 days  No abdo pain  Drinking 6-8 glasses of fluids   Eating small amount with rice, potatoe, oatmeal  Avoiding acids, spicy foods, chocolate, caffeine    Overactive bladder -   Tried oxybuyntin - did not help  She has significant nocturia - 8 x per night  She tried higher dose of ambien 10mg and only needed to get up once    Insomnia -   Using ambien 5mg and melatonin    HTN - on nifedipine    Foot pain - mid foot arthrosis  Seen by [facility] 4/30/18 dx'd with mid food arthrosis, tried corticosteroid injections  Currently using celebrex two times a day, and tramadol 2-4 x per day now mostly two times a day, tylenol prn    Vaginal atrophy  On estrace      Current Outpatient Prescriptions   Medication Sig     celecoxib (CELEBREX) 100 MG capsule Take 100 mg by mouth 2 (two) times a day.     melatonin 3 mg Tab tablet Take 3 mg by mouth at bedtime as needed.     NIFEdipine (PROCARDIA XL) 60 MG 24 hr tablet Take 1 tablet (60 mg total) by mouth daily.     traMADol (ULTRAM) 50 mg tablet Take 50 mg by mouth. Take 1 -2 tabs every 4-6 hours as needed for pain     zolpidem (AMBIEN) 5 MG tablet TAKE ONE TABLET BY MOUTH EVERY NIGHT AT BEDTIME AS NEEDED FOR SLEEP     hydrocortisone 0.5 % cream Apply to vagina daily as needed     oxybutynin (DITROPAN XL) 5 MG ER tablet Take 1 tablet (5 mg total) by mouth daily. (Patient taking differently: Take 5 mg by mouth daily. )     Vitals:    08/23/18 1122   BP: 116/70   Pulse: 69   Resp: 16   Temp: 97.9  F (36.6  C)   TempSrc: Oral   SpO2: 97%   Weight: 162 lb 6.4 oz (73.7 kg)   Height:  "5' 2\" (1.575 m)     OBJECTIVE:  Vitals listed above within normal limits  General appearance: well groomed, pleasant, well hydrated, nontoxic appearing  ENT: PERRL, throat clear  Neck: neck supple, no lymphadenopathy, no thyromegaly  Lungs: lungs clear to auscultation bilaterally, no wheezes or rhonchi  Heart: regular rate and rhythm, no murmurs, rubs or gallops  Abdomen: soft, nontender  Neuro: no focal deficits, CN II-XII grossly intact, alert and oriented  Psych:  mood stable, appears to have good insight and judgment    A/P  ER follow up -Dx'd with diarrhea and colitis  Sx resolved    Elevated CR - likely dehydration  Will recheck    Overactive bladder -   Possibly related to atrophy of urethra and increased urge to urination  Stop oxybutin  Try extended release ambien for nocturia       Insomnia -   Using ambien 5mg and melatonin    HTN - on nifedipine    Foot pain - mid foot arthrosis  Seen by [facility] 4/30/18 dx'd with mid food arthrosis, tried corticosteroid injections and suggested [id number]  Advised the following regimen:  celebrex two times a day  Tylenol 650mg three times a day   Tramadol for breakthrough   F/u with Ortho next month       Spent 25 min face to face with patient with more the 50% spent in counseling, reviewing chart, and coordination of care and discussing problems listed above.     "

## 2021-06-21 ENCOUNTER — COMMUNICATION - HEALTHEAST (OUTPATIENT)
Dept: FAMILY MEDICINE | Facility: CLINIC | Age: 86
End: 2021-06-21

## 2021-06-21 DIAGNOSIS — Z79.899 CONTROLLED SUBSTANCE AGREEMENT SIGNED: ICD-10-CM

## 2021-06-21 DIAGNOSIS — G47.00 INSOMNIA: ICD-10-CM

## 2021-06-21 NOTE — LETTER
Letter by Aleja Pitts PA-C at      Author: Aleja Pitts PA-C Service: -- Author Type: --    Filed:  Encounter Date: 1/20/2021 Status: (Other)         Gladys Ramirez  [address]  [place] [address]             January 20, 2021         Dear MsJeffrey Ramirez,    Below are the results from your recent visit:    Resulted Orders   XR Knee Right 1 or 2 VWS    Narrative    EXAM: XR KNEE RIGHT 1 OR 2 VWS  LOCATION: [facility]  DATE/TIME: 1/20/2021 2:24 PM    INDICATION: right knee pain  COMPARISON: None.      Impression    Moderate primary osteoarthritis all 3 compartments but most prominent in the lateral compartment. Knee otherwise negative. No fractures. No joint effusion.     The test results show that your knee joint has arthritis in all 3 compartments but the worst on the outside (lateral) as we discussed in office.  Plan to move forward with the Euflexxa injections with Dr. Oliva.    Please call with questions or contact us using [employer].    Sincerely,        Electronically signed by Aleja Pitts PA-C

## 2021-06-21 NOTE — LETTER
Letter by Stacy Noriega RN at      Author: Stacy Noriega RN Service: -- Author Type: --    Filed:  Encounter Date: 3/26/2021 Status: (Other)         Gladys Ramirez  [address]  [place] [facility] [place] [address]              March 26, 2021         Dear Ms. Ramirez,    Below are the results from your recent visit:    Resulted Orders   JANI Monitor Hook-Up    Narrative    Cardiac event monitor report:    Indication: Paroxysmal atrial fibrillation.  Ordering physician: Elizabeth Holley MD.  Prescribed monitoring time: 14 days.  Effective monitoring times: 13 days and 1 hours.    Patient was monitored for 15 days and 1 hour between March 10 and March 23.   Baseline rhythm: Sinus rhythm with intermittent and rate dependent   left bundle branch block.   The average heart rate was 85 beats minute,   the lowest heart rate was 42 and the max was 156 beats minute on March 17   at 1309.       No prolonged pauses.   While the AV conduction per se is normal,   His-Purkinje conduction and dysfunction suggested by intermittent and rate   dependent left bundle block.   No high-grade AV block seen.     Moderately increased burden of atrial ectopy estimated 30% of total beats.    Significantly increased burden of atrial fibrillation estimated to 27% of   total beats.  The ventricular rates during atrial fibrillation were mildly   increased averaged 99 bpm and the longest atrial fibrillation episode   lasted for 12 hours and 45 minutes with a maximal heart rate of 106 beats   minute on March 17.         In addition to atrial fibrillation episodes, several atrial runs noted.    The interpretation software estimation for the PVC burden was 20%.  This   is probably an overestimation due to frequent beats with left bundle   aberrancy mislabeled as PVCs.   Based on review of available rhythm   strips, the PVCs burden appears to be low.     Patient reported's 6 symptomatic transmissions with symptoms of   lightheadedness,  shortness of breath, dizziness and symptoms of skipped   beats consistently correlating with atrial fibrillation with rapid   ventricular rates ranging between 115 to 140 bpm.       Conclusion:    Highly abnormal event monitor demonstrating intermittent and rate   dependent left bundle-branch block with high burden of atrial ectopy and   high burden of atrial fibrillation with mildly increased ventricular   rates.    Patient reported symptoms correlated consistently with AF with RVR.        The test results show that your event monitor demonstrated intermittent Atrial Fibrillation with high heart rates. Based on this, Dr. Holley prescribed Tambocor to help control your heart rate and try to keep you in a normal sinus rhythm. I will include his message below.      Elizabeth Holley MD [place], Stacy NAJERA RN             I believe I reviewed sister's transmissions while I was on vacation a week or 2 ago.   Please confirm with sister that she does have atrial fibrillation, upwards of half a day, with 27% of her heartbeats atrial fibrillation.   Given that, we need to continue anticoagulation.   In addition, I wanted to make sure she started Tambocor 25 mg p.o. twice daily.  Can we confirm that sister did this?   Hopefully with the Tambocor her symptoms have improved.   LF            Please call with questions or contact us using [employer].    Sincerely,    Stacy GUALLPA

## 2021-06-21 NOTE — PROGRESS NOTES
Admission History & Physical  Gladys Ramirez, 9/29/1930, [id number]    [facility]  Lore Martina Martin MD, [phone]    Extended Emergency Contact Information  Primary Emergency Contact: Yandy Guido   [facility]  Home Phone: [phone]  Relation: Friend  Secondary Emergency Contact: Father Edgardo Boles   [facility]  Home Phone: [phone]  Relation: Other     Assessment and Plan:   Assessment: Degenerative joint disease bilateral feet  Plan: I recommended OTC anti-inflammatory medication and continued use of her orthotics.   Active Problems:    * No active hospital problems. *      Chief Complaint:  Bilateral foot pain     HPI:    Gladys Ramirez is a 88 y.o. old female who presented to the clinic today complaining of pain on the top of both feet.  The patient stated that her right foot is more painful than the left.  She has had this problem for several months.  She was seen by orthopedics.  Orthopedics informed her that she had arthritis.  They recommended orthotics.  The patient stated that the pain is an aching type pain which is aggravated with weightbearing and ambulation.  She stated that her orthotics to give her some relief.  She denies any redness or swelling with her pain.  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     Hypertension     Serum Enzyme Levels - Alkaline Phosphatase Elevated     Hyperlipidemia     Cataract     Insomnia     Left knee DJD     Past Medical History:   Diagnosis Date     Angina pectoris (H)      Hyperlipidemia      Hypertension      Osteoarthritis      Patient Active Problem List    Diagnosis Date Noted     DNR (do not resuscitate) 10/11/2018     Arthrosis of  foot - bilateral 10/11/2018     Sinus bradycardia      Chest pain 03/09/2017     Cough      BCC (basal cell carcinoma of skin) 08/06/2015     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   Past Surgical History:   Procedure Laterality Date     APPENDECTOMY       BASAL CELL CARCINOMA EXCISION       DIAGNOSTIC LAPAROSCOPY N/A 11/4/2014    Procedure: LAPAROSCOPY BILATERAL SALPINGO-OOPHORECTOMY ;  Surgeon: Sofia Harper MD;  Location: [facility];  Service:      JOINT REPLACEMENT Left     TKA     Laparoscopy Salpingo-oophorectomy Bilateral 11/04/2014     [place] APPENDECTOMY      Description: Appendectomy;  Recorded: 01/18/2012;     [place] REMOVAL OF TONSILS,<11 Y/O      Description: Tonsillectomy;  Recorded: 01/18/2012;     [place] TOTAL KNEE ARTHROPLASTY      Description: Total Knee Arthroplasty;  Recorded: 11/18/2013;     TONSILLECTOMY      Social History  Reviewed, and she  reports that she has never smoked. She has never used smokeless tobacco. She reports that she drinks alcohol. She reports that she does not use illicit drugs.  Social History   Substance Use Topics     Smoking status: Never Smoker     Smokeless tobacco: Never Used     Alcohol use Yes      Comment: Rarely      Allergies  Allergies   Allergen Reactions     Trazodone Shortness Of Breath and Unknown     Allergic to trazodone and deriv.  Other: trouble swallowing       Clindamycin Diarrhea     C-diff     Temazepam Other (See Comments)     Annotation: Nightmares    "   Family History  Reviewed, and family history includes Cancer in her brother and sister; Heart disease in her mother; No Medical Problems in her father; Rheumatic Heart Disease in her mother.   Psychosocial Needs  Social History     Social History Narrative    The patient is a [employer].     Additional psychosocial needs reviewed per nursing assessment.       Prior to Admission Medications     (Not in a hospital admission)        Review of Systems - Negative     /62 (Patient Site: Right Arm, Patient Position: Sitting, Cuff Size: Adult Large)  Pulse 78  Ht 5' 1.75\" (1.568 m)  Wt 163 lb (73.9 kg)  SpO2 95%  Breastfeeding? No  BMI 30.05 kg/m2    Objective findings: General: The patient is alert and in no acute distress      Integument: Nails bilateral feet are normal length and color.  The skin bilateral feet warm supple and intact.      Vascular: DP and PT pulses palpable bilaterally.  Capillary refill less than 2 seconds bilaterally.      Neurologic: Negative clonus, negative Babinski bilateral feet.        Musculoskeletal: Range of motion within normal limits bilateral feet.  Muscle power +5/5 bilaterally in all compartments.  There is a large firm subcutaneous mass on the dorsal aspect of the base of the first metatarsocuneiform joint right foot.  There is pain on range of motion of the midtarsal joint bilateral feet.      Assessment: Degenerative joint disease bilateral feet      Plan: I informed the patient that with this type of deformity she would have some moderate to severe pain.  Her options were to continue to take anti-inflammatory medication with orthotics.  I told the patient that if the pain was severe she would require an arthrodesis particularly of the base of the first metatarsocuneiform joint right foot.  The patient stated she was not interested in the surgical option.  She is to return to the clinic as needed.  "

## 2021-06-21 NOTE — LETTER
Letter by Epic User at      Author: Epic, User Service: -- Author Type: --    Filed:  Encounter Date: 1/20/2021 Status: (Other)         Gladys Ramirez  [address]  [place] [address]         January 20, 2021         Dear Ms. Ramirez,    Below are the results from your recent visit:  EXAM: XR HIP LEFT 2 OR MORE VWS  LOCATION: [facility]  DATE/TIME: 1/20/2021 2:24 PM     INDICATION: left hip pain, rule out DJD  COMPARISON: None.     IMPRESSION:   Mild primary degenerative narrowing both hip joints. Mild generalized degenerative change base of the spine and both SI joints.     Pelvis and left hip otherwise negative. No fractures. No dislocations.     The x-ray shows mild arthritis in the left hip.  I would first treat the knee pain as we discussed, and if the hip pain persists we can try an injection there as well but it may improve with the topical and getting the pain under control in the right knee.      Please call with questions or contact us using [employer].    Sincerely,        Electronically signed by Aleja Pitts PA-C

## 2021-06-22 RX ORDER — ZOLPIDEM TARTRATE 6.25 MG/1
TABLET, FILM COATED, EXTENDED RELEASE ORAL
Qty: 45 TABLET | Refills: 0 | Status: SHIPPED | OUTPATIENT
Start: 2021-06-22 | End: 2021-08-17

## 2021-06-22 NOTE — PATIENT INSTRUCTIONS - HE
Nosebleeds:  Check CBC  Start nasal saline spray and vaseline  If nosebleed -used gauze soaked with afrin and clamp and referral to ENT    HTN -   stop nifedipine  Start lisinopril 40mg   Check CMP  RTC 1 week    LE edema /leg swelling -   Check CMP, UA, cardiac echo  Start compression stocking  Continue elevating legs     mid foot arthrosis - foot pain   Daily diclofenac once daily  Daily tylenol 625mg two times a day  Tramadol - 4 tabs per day  Ice as needed    Irregular heart rate - EKG

## 2021-06-22 NOTE — TELEPHONE ENCOUNTER
Controlled Substance Refill Request  Medication:   Requested Prescriptions     Pending Prescriptions Disp Refills     traMADol (ULTRAM) 50 mg tablet [Pharmacy Med Name: TraMADol HCl Oral Tablet 50 MG] 90 tablet 0     Sig: TAKE 2 TABLETS BY MOUTH IN THE MORNING AND 1 TABLET BEFORE BED.     Date Last Fill: 12/6/18  [facility]: [facility]   Submit electronically to pharmacy  Controlled Substance Agreement on File:   Encounter-Level CSA Scan Date:    There are no encounter-level csa scan date.       Last office visit: Last office visit pertaining to requested medication was 10/11/18.

## 2021-06-22 NOTE — PROGRESS NOTES
HPI - 89 yo female here for ER f/u.      Seen in ER on 12/31/18, 1/5/19  and 1/7/19 for epistaxis  Per ER records:  12/31/18 - rightsided nose bleed and HTN - /93 and 182/84  Tx/d with Affrin   (now has a humidifier)    1/5/19 - epistaxis and HTN - /90  ER med phenylephrine 0.5 % nasal spray 2 spray (SHANTELL-SYNEPHRINE) and cautery and applied silver nitrate after topical anesthesia which stopped the bleeding altogether    1/7/19 - ED at [facility] for edema, hypokalemia  She was evaluated by her orthopedic doctor and was referred for ultrasound to rule out DVT  Laboratory:  BMP: K - 3.2 (L), GLUC RAND - 112 (H), GFR NOT AF - 59 (L), o/w WNL (CREAT - 0.90).   Protime- INR: 1.1 (L)  Imaging:   US Venous Lower Extremity Bilateral:  CONCLUSION:  1.  Bilateral leg veins are negative for DVT.        Foot pain - mid foot arthrosis  She has orthotics  Seen by podiatry and surgery best treatment but would be complicated  Stopped tylenol and ibuprofen b/c not helping  tramado 2 tabs at 10am and 1 at bedtime but or safes the night dose to have 2 tabs to take at 2pm  Using ice packs and elevating legs  Tried chiropractor and tried acupuncture - not helpful    Overactive bladder -   Nocturia has improved from 8-> 4x per night with zolpidem     Vaginal atrophy  On estrace by ob/gyn    Current Outpatient Medications   Medication Sig Note     estradiol (ESTRACE) 0.01 % (0.1 mg/gram) vaginal cream 2 x per week per OB/Gyn      melatonin 3 mg Tab tablet Take 3 mg by mouth at bedtime as needed. 10/11/2018: Patient reports taking qHS.      NIFEdipine (ADALAT CC) 60 MG 24 hr tablet Take 1 tablet (60 mg total) by mouth daily.      traMADol (ULTRAM) 50 mg tablet TAKE 2 TABLETS BY MOUTH IN THE MORNING AND 1 TABLET BEFORE BED.      hydrocortisone 0.5 % cream Apply to vagina daily as needed      zolpidem (AMBIEN CR) 6.25 MG CR tablet Take 1 tablet (6.25 mg total) by mouth at bedtime as needed for sleep.      Vitals:    01/08/19 0900  "01/08/19 0919   BP: 148/88 146/80   Patient Site: Right Arm Right Arm   Patient Position: Sitting Sitting   Cuff Size: Adult Large Adult Large   Pulse: 87    Temp: 97.4  F (36.3  C)    TempSrc: Oral    SpO2: 97%    Weight: 157 lb (71.2 kg)    Height: 5' 1\" (1.549 m)      OBJECTIVE:  Vitals listed above within normal limits  General appearance: well groomed, pleasant, well hydrated, nontoxic appearing  ENT: PERRL, throat clear  Neck: neck supple, no lymphadenopathy, no thyromegaly  Lungs: lungs clear to auscultation bilaterally, no wheezes or rhonchi  Heart: irregular HR, no murmurs, rubs or gallops  Abdomen: soft, nontender  Neuro: no focal deficits, CN II-XII grossly intact, alert and oriented  Psych:  mood stable, appears to have good insight and judgment  Le EDEMA 2+ R>L    A/P  Hospital f/u  Reviewed ER records x 3    Nosebleeds:  Check CBC  Start nasal saline spray and vaseline  If nosebleed -used gauze soaked with afrin and clamp and referral to ENT    HTN -   stop nifedipine  Start lisinopril 40mg   Check CMP  RTC 1 week    LE edema /leg swelling -   Check CMP, UA, cardiac echo  Start compression stocking  Continue elevating legs    mid foot arthrosis - foot pain   Daily diclofenac  Daily tylenol 500mg two times a day  Tramadol - 4 tabs per day  Ice as needed    Irregular heart rate - EKG    Control substance contract for tramadol signed today and utox ordered    Spent 40 min face to face with patient with more the 50% spent in counseling, reviewing chart, and coordination of care and discussing problems listed above.           "

## 2021-06-23 NOTE — TELEPHONE ENCOUNTER
Please help pt schedule apt for same day or hospital f/u apt?     Thanks,   Dr. Lore Martin  1/21/2019

## 2021-06-23 NOTE — TELEPHONE ENCOUNTER
I think 2/25/19 should be fine.   That f/u is not urgent, so one more will to review the consult will be ok.   I have needed those reserve slots for urgent sick folks.     Can you help her make the apt for 2/25?     Thanks,   Dr. Lore Martin  2/5/2019

## 2021-06-23 NOTE — PROGRESS NOTES
HPI - 87 yo female     HTN - home bp   /60-92 but mostly    Vit D deficiency -   Level was 22.0 on 1/17/19  Started ergo weekly       Cardiology consult on 10/29/19  1. Hypertension -under excellent control on lisinopril, amlodipine, and HCTZ.  Continue current medications.   2. Pure hypercholesterolemia -LDL has not been checked since 2015 when he was 80.  Given lack of coronary artery disease and her age probably would not address.   3.  Significant left atrial chamber enlargement-most likely secondary to age and advanced diastolic dysfunction.  Did tell her most likely blood pressure control it would not get any worse but did warn her there is a chance for atrial fibrillation at some time in the future.  Given this plan on seeing her again in 6 months or sooner if needed.      CTA abdo /pelvis - 1/29/19 for concerns about renal stenosis  CONCLUSION:  1.  No significant renal artery stenosis is seen. 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 This is unchanged from 4/27/2017. There   is a very small amount of thrombus noted along the superior aspect of the aneurysm. Given the mild areas of renal cortical thinning, which are not changed, the aneurysm itself may be a source of thromboembolic disease involving the left kidney.  2.  Hepatic steatosis.  3.  Diverticulosis.  4.  Hiatal hernia.      Foot Arthrosis -   Seen by [facility] and given injection   Using tramadol prn    Current Outpatient Medications   Medication Sig Note     amLODIPine (NORVASC) 5 MG tablet Take 1 tablet (5 mg total) by mouth every evening.      ergocalciferol (ERGOCALCIFEROL) 50,000 unit capsule Take 1 capsule (50,000 Units total) by mouth once a week for 12 doses.      estradiol (ESTRACE) 0.01 % (0.1 mg/gram) vaginal cream 2 x per week per OB/Gyn      hydroCHLOROthiazide (HYDRODIURIL) 25 MG tablet Take 1 tablet  "(25 mg total) by mouth daily.      lisinopril (PRINIVIL,ZESTRIL) 40 MG tablet Take 1 tablet (40 mg total) by mouth daily.      melatonin 3 mg Tab tablet Take 3 mg by mouth at bedtime as needed. 10/11/2018: Patient reports taking qHS.      traMADol (ULTRAM) 50 mg tablet TAKE 2 TABLETS BY MOUTH IN THE MORNING AND 1 TABLET THE AFTERNOON AND  BEFORE BED      zolpidem (AMBIEN CR) 6.25 MG CR tablet Take 1 tablet (6.25 mg total) by mouth at bedtime as needed for sleep.      hydrocortisone 0.5 % cream Apply to vagina daily as needed (Patient not taking: Reported on 1/31/2019      ) 1/17/2019: Hasn't used in a long time      Vitals:    01/31/19 1108   BP: 110/60   Pulse: 78   Resp: 14   Temp: 97.5  F (36.4  C)   SpO2: 98%   Weight: 155 lb 4.8 oz (70.4 kg)   Height: 5' 2.01\" (1.575 m)     PHYSICAL EXAM   General Appearance: Awake and alert, in no acute distress  HEENT: neck is supple  CV: regular rate  Resp: No respiratory distress. Breathing comfortably  Musculoskeletal: moving limbs comfortably with not deficits or deformities  Skin: no rashes noted    A/P  Renal artery thrombosis and aneurysm  Referral to vascular surgery for evaluation and recommendations    HTN - well controlled with current regimen  Continue to monitor      Significant left atrial chamber enlargement   Monitor for A fib      Vit D deficiency -   Started ergo weekly       Foot Arthrosis -   Seen by [facility] and given injection   Using tramadol prn    Insomnia - using Ambien to help with sleep    Spent 25 min face to face with patient with more the 50% spent in counseling, reviewing chart, and coordination of care and discussing problems listed above.     "

## 2021-06-23 NOTE — TELEPHONE ENCOUNTER
Patient asking to schedule appointment 1 week after vascular surgery consult on 02/11/19. PCP has same day on 02/18/19 at 08:40 AM or 03:40 PM. CMT asking if it is okay to use one of these slots to get the patient in, otherwise 02/25/2019 is the first real opening in schedule. Thank you.

## 2021-06-23 NOTE — PROGRESS NOTES
[facility] Heart Care Office Consult     Assessment:     1. Hypertension -under excellent control on lisinopril, amlodipine, and HCTZ.  Continue current medications.   2. Pure hypercholesterolemia -LDL has not been checked since 2015 when he was 80.  Given lack of coronary artery disease and her age probably would not address.   3.  Significant left atrial chamber enlargement-most likely secondary to age and advanced diastolic dysfunction.  Did tell her most likely blood pressure control it would not get any worse but did warn her there is a chance for atrial fibrillation at some time in the future.  Given this plan on seeing her again in 6 months or sooner if needed.     Plan:   1.  Follow-up with me in 6 months or sooner if needed.  2.  Await results of abdominal CT scan looking for renal artery stenosis.    History of Present Illness:   Thank you for asking the [facility] Heart Care team to see Gladys Ramirez a 88 y.o.  female  in consultation  to evaluate concerning systemic hypertension.   Patient was in her usual state of health until about a month or so ago when she started developing nosebleeds.  She presented to the emergency department was noted significant systemic hypertension.  She had amlodipine added to her HCTZ and lisinopril and since then has been doing very well.  She denies any chest discomfort, palpitations, shortness of breath, PND, orthopnea, headaches or significant increased peripheral edema.  Past Medical History:     Past Medical History:   Diagnosis Date     Angina pectoris (H)      Hyperlipidemia      Hypertension      Osteoarthritis      Past history is negative for cancer, tuberculosis, diabetes mellitus, myocardial infarction,  rheumatic fever,  cerebrovascular accident, chronic kidney disease, peptic ulcer disease, chronic obstructive pulmonary disease, or thyroid disorder.    Past Surgical History:     Past Surgical History:   Procedure Laterality Date     APPENDECTOMY        BASAL CELL CARCINOMA EXCISION       DIAGNOSTIC LAPAROSCOPY N/A 11/4/2014    Procedure: LAPAROSCOPY BILATERAL SALPINGO-OOPHORECTOMY ;  Surgeon: Sofia Harper MD;  Location: [facility];  Service:      JOINT REPLACEMENT Left     TKA     Laparoscopy Salpingo-oophorectomy Bilateral 11/04/2014     AR APPENDECTOMY      Description: Appendectomy;  Recorded: 01/18/2012;     AR REMOVAL OF TONSILS,<11 Y/O      Description: Tonsillectomy;  Recorded: 01/18/2012;     AR TOTAL KNEE ARTHROPLASTY      Description: Total Knee Arthroplasty;  Recorded: 11/18/2013;     TONSILLECTOMY       Family History:   Negative for coronary artery disease.    Social History:   She is a [employer], used to work as had a school, reports that  has never smoked. she has never used smokeless tobacco. She reports that she drinks alcohol. She reports that she does not use drugs. The primary care physician is Lore Martin MD    Meds:   Scheduled Meds:  Current Outpatient Medications   Medication Sig Dispense Refill     amLODIPine (NORVASC) 5 MG tablet Take 1 tablet (5 mg total) by mouth every evening. 14 tablet 0     ergocalciferol (ERGOCALCIFEROL) 50,000 unit capsule Take 1 capsule (50,000 Units total) by mouth once a week for 12 doses. 12 capsule 0     estradiol (ESTRACE) 0.01 % (0.1 mg/gram) vaginal cream 2 x per week per OB/Gyn 42.5 g 1     hydroCHLOROthiazide (HYDRODIURIL) 25 MG tablet Take 1 tablet (25 mg total) by mouth daily. 30 tablet 1     hydrocortisone 0.5 % cream Apply to vagina daily as needed 30 g 0     lisinopril (PRINIVIL,ZESTRIL) 40 MG tablet Take 1 tablet (40 mg total) by mouth daily. 60 tablet 1     melatonin 3 mg Tab tablet Take 3 mg by mouth at bedtime as needed.       zolpidem (AMBIEN CR) 6.25 MG CR tablet Take 1 tablet (6.25 mg total) by mouth at bedtime as needed for sleep. 30 tablet 2     traMADol (ULTRAM) 50 mg tablet TAKE 2 TABLETS BY MOUTH IN THE MORNING AND 1 TABLET THE AFTERNOON AND  BEFORE BED 90  "tablet 0     No current facility-administered medications for this visit.      PRN Meds:.    Allergies:   Trazodone; Clindamycin; Gabapentin; and Temazepam    Objective:      Physical Exam  154 lb 6.4 oz (70 kg)  5' 2.01\" (1.575 m)  Body mass index is 28.23 kg/m .  /60 (Patient Site: Left Arm, Patient Position: Sitting, Cuff Size: Adult Large)   Pulse 80   Resp 20   Ht 5' 2.01\" (1.575 m)   Wt 154 lb 6.4 oz (70 kg)   BMI 28.23 kg/m      General Appearance:   Alert, cooperative and in no acute distress.   HEENT:  No scleral icterus; the mucous membranes were pink and moist.   Neck: JVP normal. No thyromegaly. No HJR   Chest: The spine was straight. The chest was symmetric.   Lungs:   Respirations unlabored; the lungs are clear to auscultation.   Cardiovascular:   S1 and S2 without murmur, clicks or rubs. Brachial, radial, carotid and posterior tibial pulses are intact and symetrical.  No carotid bruits noted   Abdomen:  No organomegaly, masses, bruits, or tenderness. Bowels sounds are present   Extremities: No cyanosis, clubbing, or edema.   Skin: No xanthelasma.   Neurologic: Mood and affect are appropriate.         Lab Reviewed Personally by myself  Lab Results   Component Value Date     01/19/2019    K 3.9 01/19/2019     (H) 01/19/2019    CO2 20 (L) 01/19/2019    BUN 18 01/19/2019    CREATININE 0.89 01/19/2019    CALCIUM 9.6 01/19/2019     Lab Results   Component Value Date    WBC 12.1 (H) 01/19/2019    WBC 7.9 01/17/2015    HGB 14.4 01/19/2019    HCT 42.0 01/19/2019    MCV 89 01/19/2019     01/19/2019     Lab Results   Component Value Date    CHOL 179 03/12/2015    TRIG 176 (H) 03/12/2015    HDL 64 03/12/2015     No results found for: BNP    ECG personally reviewed by myself shows sinus rhythm with occasional PAC and no acute changes.     Review of Systems:     Review of Systems:   General: WNL  Eyes: WNL  Ears/Nose/Throat: Nosebleeds  Lungs: WNL  Heart: Shortness of Breath with " activity, Irregular Heartbeat, Leg Swelling  Stomach: WNL  Bladder: Frequent Urination at Night  Muscle/Joints: WNL  Skin: WNL  Nervous System: WNL  Mental Health: WNL     Blood: WNL

## 2021-06-23 NOTE — PATIENT INSTRUCTIONS - HE
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  Per our conversation you have high blood pressure and we will await the cat scan of the kidneys to make sure they are not causing it.   I will plan on seeing you in 6 months or sooner if need be.  Estrada Holley

## 2021-06-23 NOTE — TELEPHONE ENCOUNTER
New Appointment Needed  What is the reason for the visit:    follow up after consult about procedure  Provider Preference: PCP only  How soon do you need to be seen?: 2/18  Waitlist offered?: No  Okay to leave a detailed message:  Yes

## 2021-06-23 NOTE — PATIENT INSTRUCTIONS - HE
Renal artery thrombosis and aneurysm  Referral to vascular surgery for evaluation and recommendations    HTN - well controlled with current regimen  Continue to monitor      Significant left cardiac atrial chamber enlargement   Monitor for A fib - irregular heart beat      Vit D deficiency -   Started ergocalciferol weekly       Foot Arthrosis -   Seen by [facility] and given injections 1/15/19  Using tramadol prn    Insomnia - using Ambien to help with sleep

## 2021-06-23 NOTE — TELEPHONE ENCOUNTER
Called pt and relayed PCP message but no apt open now on 2/25 other than reserved and SD.  Scheduled for 2/26/19 at 1 pm.  Thanks.

## 2021-06-23 NOTE — PROGRESS NOTES
HPI - 89 yo female here for hospital f/u.       Seen in ER on 19/19 for epitaxis and HTN.   Per ER note:  -Started norvasc 5mg   -Ultrasound of the kidneys were obtained to rule out renal artery stenosis but this was equivocal.  Patient may follow-up with her primary care physician for hypertension management and CT angiogram.    Seen 2 days ago by [place] [facility] Dr. Kasper at [facility] for cauterization    HTN -   12/31/18 - rightsided nose bleed and HTN - /93 and 182/84  BP 1/8/19 =146/88 stop nifedipine b/c LE edema and Start lisinopril 40mg   1/17/19 = 180/60 and 160/90 added HCTZ 25mg to lisinopril 40mg  1/19/19= 183/83 in ER started on Norvasc mg   Today 1/24/19 = 106 /60      Grade II (moderate) left ventricular diastolic dysfunction.   New dx on echo 1/8/19  Apt with cardiology 1/29/19    Current Outpatient Medications   Medication Sig Note     amLODIPine (NORVASC) 5 MG tablet Take 1 tablet (5 mg total) by mouth every evening.      ergocalciferol (ERGOCALCIFEROL) 50,000 unit capsule Take 1 capsule (50,000 Units total) by mouth once a week for 12 doses.      estradiol (ESTRACE) 0.01 % (0.1 mg/gram) vaginal cream 2 x per week per OB/Gyn      hydroCHLOROthiazide (HYDRODIURIL) 25 MG tablet Take 1 tablet (25 mg total) by mouth daily.      lisinopril (PRINIVIL,ZESTRIL) 40 MG tablet Take 1 tablet (40 mg total) by mouth daily.      melatonin 3 mg Tab tablet Take 3 mg by mouth at bedtime as needed. 10/11/2018: Patient reports taking qHS.      traMADol (ULTRAM) 50 mg tablet TAKE 2 TABLETS BY MOUTH IN THE MORNING AND 1 TABLET THE AFTERNOON AND  BEFORE BED      zolpidem (AMBIEN CR) 6.25 MG CR tablet Take 1 tablet (6.25 mg total) by mouth at bedtime as needed for sleep.      hydrocortisone 0.5 % cream Apply to vagina daily as needed (Patient not taking: Reported on 1/24/2019      ) 1/17/2019: Hasn't used in a long time      Vitals:    01/24/19 0945   BP: 106/60   Pulse: 99   Resp: 16   Temp: 97.2  F (36.2  C)  "  TempSrc: Oral   SpO2: 92%   Weight: 154 lb 1.6 oz (69.9 kg)   Height: 5' 2.01\" (1.575 m)     PHYSICAL EXAM   General Appearance: Awake and alert, in no acute distress  HEENT: neck is supple  CV: regular rate  Resp: No respiratory distress. Breathing comfortably  Musculoskeletal: moving limbs comfortably with not deficits or deformities  Skin: no rashes noted    A/P  ER follow up - reviewed chart, images, labs    Recurrent epitaxis  Seen 2 days ago by [place] [facility] Dr. Kasper at [facility] for cauterization  JADA signed    HTN -   Several med changes in the last few weeks  Continue lisinopril (started 1/8/19)  and HCTZ(started 1/17/19) and amlodipine (started 1/19/19)    Track BP daily x 1 week with chart and bring to clinic to review trends  Check abdo CT Angio to eval renal arteries given equivocal u/s    Grade II (moderate) left ventricular diastolic dysfunction.   New dx on echo 1/8/19  Apt with cardiology 1/29/19      Spent 25 min face to face with patient with more the 50% spent in counseling, reviewing chart, and coordination of care and discussing problems listed above.                   "

## 2021-06-23 NOTE — TELEPHONE ENCOUNTER
New Appointment Needed  What is the reason for the visit:    Inpatient/ED Follow Up Appt Request  At what hospital or facility were you seen?: [facility]   What is the reason you were seen?: RENAL ARTERY WITH KIDNEY BILATERAL   What date were you admitted?: date: 1/19/19  What date were you discharged?: date: 1/19/19  What was the recommended timeframe for your follow up appointment?: By Wednesday 1/23/19  Provider Preference: PCP only  How soon do you need to be seen?: today or tomorrow  Waitlist offered?: No  Okay to leave a detailed message:  Yes

## 2021-06-23 NOTE — PROGRESS NOTES
HPI - 87yo female here to f/u on labs and tests.       Nosebleeds: none since last visit   CBC wnl , plts wnl  Started nasal saline spray and vaseline /aquaphor     HTN -   Stopped last week nifedipine b/c LE edema  Started lisinopril 40mg   CMP wnl except alk phos elevated ( possibly related to Vit D deficiency_       LE edema /leg swelling -   CMP wnl  UA = no protein and small bili   Start compression stocking  Continue elevating legs  cardiac echo 1/14/19:     When compared to the previous study dated 4/20/2012,    Normal central venous pressure.    Normal left ventricular size.The estimated left ventricular ejection fraction is 55%. This represents a normal ejection fraction. Mild hypertrophy noted. Grade II (moderate) left ventricular diastolic dysfunction. E/e' > 15, suggesting elevated LV filling pressures.    Left atrial volume is severely increased.    Normal right ventricular size and systolic function.    No hemodynamically significant valvular heart abnormalities.     mid foot arthrosis - foot pain   Last week given rx for  Diclofenac, tylenol 500mg two times a day, and Tramadol - 4 tabs per day    Seen yesterday by [facility] - given injection in feet  and advised to stop naproxen and tramadol    Knee arthritis -   Seen by [place] [facility] - given intraarticular shot with Euflexxa on 1/10/19      Irregular heart rate - EKG 1/8/19   Sinus bradycardia with sinus arrhythmia   Normal ECG   When compared with ECG of 15-NOV-2014 08:17,   No significant change was found           Insomnia -   Doing well with ambien 6.25mg is working     Reviewed labs:  Results for YNES BHARAT J (MRN [id number]) as of 1/17/2019 17:40   Ref. Range 1/8/2019 10:30   Sodium Latest Ref Range: 136 - 145 mmol/L 142   Potassium Latest Ref Range: 3.5 - 5.0 mmol/L 3.9   Chloride Latest Ref Range: 98 - 107 mmol/L 107   CO2 Latest Ref Range: 22 - 31 mmol/L 25   Anion Gap, Calculation Latest Ref Range: 5 - 18 mmol/L 10   BUN Latest  Ref Range: 8 - 28 mg/dL 14   Creatinine Latest Ref Range: 0.60 - 1.10 mg/dL 0.88   GFR MDRD Af Amer Latest Ref Range: >60 mL/min/1.73m2 >60   GFR MDRD Non Af Amer Latest Ref Range: >60 mL/min/1.73m2 >60   Calcium Latest Ref Range: 8.5 - 10.5 mg/dL 9.6   AST Latest Ref Range: 0 - 40 U/L 26   ALT Latest Ref Range: 0 - 45 U/L 31   ALBUMIN Latest Ref Range: 3.5 - 5.0 g/dL 3.5   Protein, Total Latest Ref Range: 6.0 - 8.0 g/dL 6.8   Alkaline Phosphatase Latest Ref Range: 45 - 120 U/L 154 (H)   Bilirubin, Total Latest Ref Range: 0.0 - 1.0 mg/dL 0.7   Glucose Latest Ref Range: 70 - 125 mg/dL 97   WBC Latest Ref Range: 4.0 - 11.0 thou/uL 8.1   RBC Latest Ref Range: 3.80 - 5.40 mill/uL 4.46   Hemoglobin Latest Ref Range: 12.0 - 16.0 g/dL 13.5   Hematocrit Latest Ref Range: 35.0 - 47.0 % 40.3   MCV Latest Ref Range: 80 - 100 fL 90   MCH Latest Ref Range: 27.0 - 34.0 pg 30.3   MCHC Latest Ref Range: 32.0 - 36.0 g/dL 33.5   RDW Latest Ref Range: 11.0 - 14.5 % 11.9   Platelets Latest Ref Range: 140 - 440 thou/uL 291   MPV Latest Ref Range: 7.0 - 10.0 fL 8.6   Neutrophils % Latest Ref Range: 50 - 70 % 65   Lymphocytes % Latest Ref Range: 20 - 40 % 22   Monocytes % Latest Ref Range: 2 - 10 % 11 (H)   Eosinophils % Latest Ref Range: 0 - 6 % 2   Basophils % Latest Ref Range: 0 - 2 % 1   Neutrophils Absolute Latest Ref Range: 2.0 - 7.7 thou/uL 5.2   Lymphocytes Absolute Latest Ref Range: 0.8 - 4.4 thou/uL 1.7   Monocytes Absolute Latest Ref Range: 0.0 - 0.9 thou/uL 0.9   Eosinophils Absolute Latest Ref Range: 0.0 - 0.4 thou/uL 0.2   Basophils Absolute Latest Ref Range: 0.0 - 0.2 thou/uL 0.0     Results for BHARAT QUICK (MRN [id number]) as of 1/17/2019 17:40   Ref. Range 1/8/2019 10:29   Color, UA Latest Ref Range: Colorless, Yellow, Straw, Light Yellow  Yellow   Clarity, UA Latest Ref Range: Clear  Slightly Cloudy (!)   Blood, UA Latest Ref Range: Negative  Negative   Bilirubin, UA Latest Ref Range: Negative  Small (!)  "  Urobilinogen, UA Latest Ref Range: 0.2 E.U./dL, 1.0 E.U./dL  0.2 E.U./dL   Ketones, UA Latest Ref Range: Negative  Trace (!)   Glucose, UA Latest Ref Range: Negative  Negative   Protein, UA Latest Ref Range: Negative mg/dL Negative   Nitrite, UA Latest Ref Range: Negative  Negative   Leukocytes, UA Latest Ref Range: Negative  Trace (!)   pH, UA Latest Ref Range: 5.0 - 8.0  6.0   Specific Gravity, UA Latest Ref Range: 1.005 - 1.030  1.020     Current Outpatient Medications   Medication Sig Note     acetaminophen (TYLENOL EXTRA STRENGTH) 500 MG tablet Take 1 tablet (500 mg total) by mouth 2 (two) times a day for 10 days.      diclofenac (VOLTAREN) 75 MG EC tablet Take 1 tablet (75 mg total) by mouth daily.      estradiol (ESTRACE) 0.01 % (0.1 mg/gram) vaginal cream 2 x per week per OB/Gyn      lisinopril (PRINIVIL,ZESTRIL) 40 MG tablet Take 1 tablet (40 mg total) by mouth daily.      melatonin 3 mg Tab tablet Take 3 mg by mouth at bedtime as needed. 10/11/2018: Patient reports taking qHS.      traMADol (ULTRAM) 50 mg tablet TAKE 2 TABLETS BY MOUTH IN THE MORNING AND 1 TABLET THE AFTERNOON AND  BEFORE BED      zolpidem (AMBIEN CR) 6.25 MG CR tablet Take 1 tablet (6.25 mg total) by mouth at bedtime as needed for sleep.      hydrocortisone 0.5 % cream Apply to vagina daily as needed 1/17/2019: Hasn't used in a long time      Vitals:    01/17/19 1418 01/17/19 1421   BP: 180/80 160/90   Pulse: 78    Resp: 16    Temp: 97.4  F (36.3  C)    TempSrc: Oral    SpO2: 97%    Weight: 160 lb 6.4 oz (72.8 kg)    Height: 5' 1\" (1.549 m)      PHYSICAL EXAM   General Appearance: Awake and alert, in no acute distress  HEENT: neck is supple  CV: regular rate  Resp: No respiratory distress. Breathing comfortably  Musculoskeletal: moving limbs comfortably with not deficits or deformities  Skin: no rashes noted    A/P  Nosebleeds: resolved and continuenasal saline spray and vaseline /aquaphor     HTN -   Continue lisinopril 40mg   Start " HCTZ 25mg   Recheck in 2-3 weeks and consider combined pill if BP well controlled     LE edema /leg swelling - improved    CHF with preserved EF  cardiac echo 1/14/19:   On ACEI as of last week  Will treat BP  Referral  CHF clinic consult     mid foot arthrosis - foot pain   Hold for now the pain meds: Diclofenac, tylenol , and Tramadol   Seen yesterday by [facility] - given injection in feet  and advised to stop naproxen and tramadol    Knee arthritis -   Seen by [place] [facility] - given intraarticular shot with Euflexxa on 1/10/19      Spent 25 min face to face with patient with more the 50% spent in counseling, reviewing chart, and coordination of care and discussing problems listed above.

## 2021-06-24 NOTE — PROGRESS NOTES
VASCULAR SURGERY CLINIC CONSULTATION    VASCULAR SURGEON: Antione Long MD    LOCATION:  [facility]    Gladys Ramirez   Medical Record #:  [id number]  YOB: 1930  Age:  88 y.o.     Date of Service: 2/18/2019    PRIMARY CARE PROVIDER: Lore Martin MD      Reason for visit:  Left renal artery aneuyrsm    IMPRESSION: 88-year-old female with a known centimeter in size and unchanged past 5 years.    RECOMMENDATION: Today in clinic we discussed the findings of the CT scan in detail and I reviewed the images with the patient in clinic.  We compared it to her scan from 2014 which shows that the aneurysm was the same side with calcification and is unchanged from that timeframe.  I discussed with her that there is no indication for treatment at this time the aneurysm has not changed in growth and is not causing her any pain or discomfort.  Recommendations are just continue close management of her blood pressure with a target systolic blood pressure less than 130 mmHg.  I have also recommended that at this time there is no indication for further follow-up given the aneurysm has not changed over the last 5 years.  She has any significant abdominal pain in the suspect this aneurysm to change his history.  Indication for treatment would be if the aneurysm grew to 2.0 - 2.5 cm or was symptomatic.  Follow up is as needed with vascular surgery.    HPI:  Gladys Ramirez is a 88 y.o. female who was seen today in consultation as requested by Dr. 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  The patient has at least a 5-year history when reviewing the imaging of identification of this aneurysm which is at the bifurcation of the main renal artery and to the anterior and posterior divisions.  It is unchanged in size over the last 5 years as measured at 1 cm in size 5 years ago.  There is a calcified rim and only a small amount of mural thrombus noted on review of the  imaging today.  She is otherwise asymptomatic denies any abdominal or flank pain at this time and she is outside childbearing years.  Her only complaints are what is described as urge incontinence and this is primarily a urologic issue but not related to the kidney.     PHH:    Past Medical History:   Diagnosis Date     Angina pectoris (H)      Hyperlipidemia      Hypertension      Osteoarthritis         Past Surgical History:   Procedure Laterality Date     APPENDECTOMY       BASAL CELL CARCINOMA EXCISION       DIAGNOSTIC LAPAROSCOPY N/A 11/4/2014    Procedure: LAPAROSCOPY BILATERAL SALPINGO-OOPHORECTOMY ;  Surgeon: Sofia Harper MD;  Location: [facility];  Service:      JOINT REPLACEMENT Left     TKA     Laparoscopy Salpingo-oophorectomy Bilateral 11/04/2014     FL APPENDECTOMY      Description: Appendectomy;  Recorded: 01/18/2012;     FL REMOVAL OF TONSILS,<11 Y/O      Description: Tonsillectomy;  Recorded: 01/18/2012;     FL TOTAL KNEE ARTHROPLASTY      Description: Total Knee Arthroplasty;  Recorded: 11/18/2013;     TONSILLECTOMY         ALLERGIES:  Trazodone; Clindamycin; Gabapentin; and Temazepam    MEDS:    Current Outpatient Medications:      amLODIPine (NORVASC) 5 MG tablet, Take 1 tablet (5 mg total) by mouth every evening., Disp: 30 tablet, Rfl: 11     ergocalciferol (ERGOCALCIFEROL) 50,000 unit capsule, Take 1 capsule (50,000 Units total) by mouth once a week for 12 doses., Disp: 12 capsule, Rfl: 0     estradiol (ESTRACE) 0.01 % (0.1 mg/gram) vaginal cream, 2 x per week per OB/Gyn, Disp: 42.5 g, Rfl: 1     hydrocortisone 0.5 % cream, Apply to vagina daily as needed, Disp: 30 g, Rfl: 0     lisinopril (PRINIVIL,ZESTRIL) 40 MG tablet, Take 1 tablet (40 mg total) by mouth daily., Disp: 60 tablet, Rfl: 1     melatonin 3 mg Tab tablet, Take 3 mg by mouth at bedtime as needed., Disp: , Rfl:      traMADol (ULTRAM) 50 mg tablet, TAKE 2 TABLETS BY MOUTH IN THE MORNING AND 1 TABLET THE AFTERNOON  AND  BEFORE BED, Disp: 90 tablet, Rfl: 0     zolpidem (AMBIEN CR) 6.25 MG CR tablet, Take 1 tablet (6.25 mg total) by mouth at bedtime as needed for sleep., Disp: 30 tablet, Rfl: 2     hydroCHLOROthiazide (HYDRODIURIL) 25 MG tablet, Take 1 tablet (25 mg total) by mouth daily., Disp: 30 tablet, Rfl: 1    SOCIAL HABITS:    Social History     Tobacco Use   Smoking Status Never Smoker   Smokeless Tobacco Never Used   Tobacco Comment    no passive exposure       Social History     Substance and Sexual Activity   Alcohol Use Yes    Comment: Rarely       Social History     Substance and Sexual Activity   Drug Use No       FAMILY HISTORY:    Family History   Problem Relation Age of Onset     Heart disease Mother      Rheumatic Heart Disease Mother      No Medical Problems Father      Cancer Sister      Cancer Brother        REVIEW OF SYSTEMS:    A 12 point ROS was reviewed and except for what is listed in the HPI above, all others are negative    PE:  /75   Pulse 62   Resp 14   Wt Readings from Last 1 Encounters:   01/31/19 155 lb 4.8 oz (70.4 kg)     There is no height or weight on file to calculate BMI.    EXAM:  GENERAL: This is a well-developed 88 y.o. female who appears her stated age  EYES: Grossly normal.  MOUTH: Buccal mucosa normal   CARDIAC:  NS1 S2, No Murmur  CHEST/LUNG:  Clear lung fields bilaterally   GASTROINTESINAL (ABDOMEN): Soft, non-tender, B/S present, no pulsatile mass  MUSCULOSKELETAL: Grossly normal and both lower extremities are intact.  HEME/LYMPH: No lymphedema  NEUROLOGIC: Focally intact, Alert and oriented x 3.   PSYCH: appropriate affect  INTEGUMENT: No open lesions or ulcers  Pulse Exam:     Radial: Left +2   Right  +2    DP: Left +2   Right  +2     PT:   Left +2   Right  +2          DIAGNOSTIC STUDIES:     Images:  Us Renal Artery With Kidney Bilateral    Result Date: 1/19/2019  US RENAL ARTERY WITH KIDNEY BILATERAL 1/19/2019 7:52 PM INDICATION: HTN. Evaluate for renal artery stenosis.  TECHNIQUE: Routine kidneys and bladder with grayscale and duplex including color and spectral imaging. . COMPARISON: CT 08/21/2018. FINDINGS: Right kidney measures 9.8 x 4.1 x 4.3 cm. No hydronephrosis. No mass. Normal renal echotexture. Left kidney measures 8.8 x 4.6 x 4.3 cm. No hydronephrosis. No mass. Normal renal echotexture. Bladder is normal. Dense hepatic steatosis. RENAL VELOCITIES (cm/s) RIGHT SYSTEM Proximal Renal Artery: Velocity 236, Ratio 2.0 Mid Renal Artery: Velocity 187, Ratio 1.6 Distal Renal Artery: Velocity 115, Ratio 1 Upper Arc[place] Artery: Velocity 33.2, RI 0.80,  Middle Arc[place] Artery: Velocity 21.8, RI 0.58 Lower [place] Artery: Velocity 32.8, RI 0.77,  Normal Ratio <3.5, RI <0.75 LEFT SYSTEM Mid Renal Artery: Velocity 103, Ratio 0.9 Distal Renal Artery: Velocity 94.1, Ratio 0.8 Upper [place] Artery: Velocity 38.8, RI 0.81,  Middle Arc[place] Artery: Velocity 19.2, RI 0.74,  Lower Arcuate Artery: Velocity 31.2, RI 0.79,  Normal Ratio <3.5, RI <0.75     CONCLUSION: 1.  Normal ultrasound of kidneys. 2.  Findings equivocal for a significant renal artery stenosis with borderline mildly abnormal resistive indices bilaterally. CT angiogram would be helpful for further evaluation. 3.  Dense hepatic steatosis.    Cta Abdomen Pelvis    Result Date: 1/29/2019  CTA ABDOMEN PELVIS 1/29/2019 1:10 PM INDICATION: Arterial stricture / occlusion, aorta, hypertension with concern for renal artery stenosis TECHNIQUE: Helical acquisition through the abdomen and pelvis was performed during the arterial phase of contrast enhancement. 2D and 3D reconstructions were performed by the CT technologist. Dose reduction techniques were used. IV CONTRAST: Iohexol (Omni) 100 mL COMPARISON: 8/21/2018, 4/27/2017 FINDINGS: ANGIOGRAM ABDOMEN/PELVIS: Abdominal aorta and pelvic arteries appear widely patent with normal caliber. No significant stenosis or dissection. There is a single right renal artery  which appears widely patent without significant stenosis. There is a single left renal artery without significant stenosis seen although there is a complex peripherally calcified aneurysm measures 9 x 10 mm. The aneurysm demonstrates a small amount of mural thrombus along the superior margin. The aneurysm arises from the distal renal artery at the level of the anterior/posterior divisions bifurcation. This finding appears unchanged from 4/27/2017. Some areas of mild left renal cortical thinning suggests potential thromboembolic disease from which this aneurysm is a likely source. LUNG BASES: Heart is not enlarged. Trace pericardial effusion. Mild bibasilar atelectasis. No pleural effusion or pneumothorax. Unchanged right basilar nodules from 8/21/2018. ABDOMEN: Suboptimal evaluation of solid organs due to the arterial phase of contrast enhancement. Hepatic steatosis. Hypodensity lower pole the right kidney, likely a simple renal cyst is unchanged from the prior study. Hiatal hernia. Remainder of the abdomen is grossly unremarkable. PELVIS: Diverticulosis. No abnormally dilated loops of bowel. No free fluid or free air. MUSCULOSKELETAL: Degenerative disease of the hips bilaterally. Unchanged mixed sclerotic/lucent lesion involving the right initial tuberosity. Significant degenerative disease of the lower lumbar spine.     CONCLUSION: 1.  No significant renal artery stenosis is seen. 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 This is unchanged from 4/27/2017. There  is a very small amount of thrombus noted along the superior aspect of the aneurysm. Given the mild areas of renal cortical thinning, which are not changed, the aneurysm itself may be a source of thromboembolic disease involving the left kidney. 2.  Hepatic steatosis. 3.  Diverticulosis. 4.  Hiatal hernia.      I personally reviewed the  images and my interpretation is a stable left renal artery 1.0 cm x 0.9 cm aneurysm at the first bifurcation.  It has a calcified rim and is unchanged in size as compared to her imaging from 2014.       LABS:      Sodium   Date Value Ref Range Status   01/19/2019 140 136 - 145 mmol/L Final   01/08/2019 142 136 - 145 mmol/L Final   08/23/2018 140 136 - 145 mmol/L Final     Potassium   Date Value Ref Range Status   01/19/2019 3.9 3.5 - 5.0 mmol/L Final   01/08/2019 3.9 3.5 - 5.0 mmol/L Final   08/23/2018 4.0 3.5 - 5.0 mmol/L Final     Chloride   Date Value Ref Range Status   01/19/2019 108 (H) 98 - 107 mmol/L Final   01/08/2019 107 98 - 107 mmol/L Final   08/23/2018 106 98 - 107 mmol/L Final     BUN   Date Value Ref Range Status   01/19/2019 18 8 - 28 mg/dL Final   01/08/2019 14 8 - 28 mg/dL Final   08/23/2018 17 8 - 28 mg/dL Final     Creatinine   Date Value Ref Range Status   01/19/2019 0.89 0.60 - 1.10 mg/dL Final   01/08/2019 0.88 0.60 - 1.10 mg/dL Final   08/23/2018 0.92 0.60 - 1.10 mg/dL Final     Hemoglobin   Date Value Ref Range Status   01/19/2019 14.4 12.0 - 16.0 g/dL Final   01/08/2019 13.5 12.0 - 16.0 g/dL Final   08/21/2018 15.0 12.0 - 16.0 g/dL Final     Platelets   Date Value Ref Range Status   01/19/2019 299 140 - 440 thou/uL Final   01/08/2019 291 140 - 440 thou/uL Final   08/21/2018 263 140 - 440 thou/uL Final     INR   Date Value Ref Range Status   01/19/2019 1.03 0.90 - 1.10 Final   11/13/2014 1.29 (H) 0.90 - 1.10 Final   01/17/2011 1.07 0.90 - 1.10 Final     Comment:                                                     INR Therapeutic Ranges                                                  Mech. Valve 2.5-3.5                                                  Post surg.  2.0-3.0                                                  DVT/PE      2.0-3.0       Antione Long MD  VASCULAR SURGERY

## 2021-06-24 NOTE — PROGRESS NOTES
HPI -  87 yo female here for on CT and labs.     She had a hospital followup apt yesterday after admission for PE and pneumonia.   She was having new rightsided chest pain, so labs, EKG, and CT were done and she is here to discuss results.       EXAM: CTA CHEST PE RUN  LOCATION: [facility] DATE/TIME: 3/4/2019 4:49 PM  CONCLUSION:  1.  No evidence for pulmonary emboli.  2.  Right lower lobe infiltrates have markedly improved. There remains a focal area of residual airspace opacity within the peripheral aspect of the right lower lobe. Continued follow-up recommended to ensure resolution.   3. 4 mm nodule right patient should also be reassessed at that time.    EKG official report:  Sinus rhythm with Premature atrial complexes   Otherwise normal ECG   When compared with ECG of 21-FEB-2019     Recent Results (from the past 1008 hour(s))   Comprehensive Metabolic Panel    Collection Time: 02/21/19  6:48 AM   Result Value Ref Range    Sodium 133 (L) 136 - 145 mmol/L    Potassium 3.4 (L) 3.5 - 5.0 mmol/L    Chloride 99 98 - 107 mmol/L    CO2 22 22 - 31 mmol/L    Anion Gap, Calculation 12 5 - 18 mmol/L    Glucose 155 (H) 70 - 125 mg/dL    BUN 17 8 - 28 mg/dL    Creatinine 0.98 0.60 - 1.10 mg/dL    GFR MDRD Af Amer >60 >60 mL/min/1.73m2    GFR MDRD Non Af Amer 54 (L) >60 mL/min/1.73m2    Bilirubin, Total 0.8 0.0 - 1.0 mg/dL    Calcium 9.4 8.5 - 10.5 mg/dL    Protein, Total 6.6 6.0 - 8.0 g/dL    Albumin 2.9 (L) 3.5 - 5.0 g/dL    Alkaline Phosphatase 120 45 - 120 U/L    AST 12 0 - 40 U/L    ALT 15 0 - 45 U/L   D-dimer, Quantitative    Collection Time: 02/21/19  6:48 AM   Result Value Ref Range    D-Dimer, Quant 1.30 (H) <=0.50 FEU ug/mL   Lipase    Collection Time: 02/21/19  6:48 AM   Result Value Ref Range    Lipase <9 0 - 52 U/L   Magnesium    Collection Time: 02/21/19  6:48 AM   Result Value Ref Range    Magnesium 1.6 (L) 1.8 - 2.6 mg/dL   Thyroid Stimulating Hormone (TSH)    Collection Time: 02/21/19  6:48 AM    Result Value Ref Range    TSH 2.05 0.30 - 5.00 uIU/mL   Troponin I    Collection Time: 02/21/19  6:48 AM   Result Value Ref Range    Troponin I <0.01 0.00 - 0.29 ng/mL   INR    Collection Time: 02/21/19  6:48 AM   Result Value Ref Range    INR 1.20 (H) 0.90 - 1.10   HM1 (CBC with Diff)    Collection Time: 02/21/19  6:48 AM   Result Value Ref Range    WBC 13.8 (H) 4.0 - 11.0 thou/uL    RBC 4.31 3.80 - 5.40 mill/uL    Hemoglobin 12.9 12.0 - 16.0 g/dL    Hematocrit 37.3 35.0 - 47.0 %    MCV 87 80 - 100 fL    MCH 29.9 27.0 - 34.0 pg    MCHC 34.6 32.0 - 36.0 g/dL    RDW 13.0 11.0 - 14.5 %    Platelets 242 140 - 440 thou/uL    MPV 10.6 8.5 - 12.5 fL    Neutrophils % 78 (H) 50 - 70 %    Lymphocytes % 10 (L) 20 - 40 %    Monocytes % 11 (H) 2 - 10 %    Eosinophils % 0 0 - 6 %    Basophils % 0 0 - 2 %    Neutrophils Absolute 10.8 (H) 2.0 - 7.7 thou/uL    Lymphocytes Absolute 1.4 0.8 - 4.4 thou/uL    Monocytes Absolute 1.5 (H) 0.0 - 0.9 thou/uL    Eosinophils Absolute 0.1 0.0 - 0.4 thou/uL    Basophils Absolute 0.0 0.0 - 0.2 thou/uL   ECG 12 lead nursing unit performed    Collection Time: 02/21/19  6:52 AM   Result Value Ref Range    SYSTOLIC BLOOD PRESSURE  mmHg    DIASTOLIC BLOOD PRESSURE  mmHg    VENTRICULAR RATE 79 BPM    ATRIAL RATE 79 BPM    P-R INTERVAL 148 ms    QRS DURATION 82 ms    Q-T INTERVAL 362 ms    QTC CALCULATION (BEZET) 415 ms    P Axis 16 degrees    R AXIS 13 degrees    T AXIS 5 degrees    MUSE DIAGNOSIS       Sinus rhythm with Premature atrial complexes  Otherwise normal ECG  When compared with ECG of 19-JAN-2019 18:25,  No significant change was found  Confirmed by LY TINOCO MD LOC: ([id number]) on 2/21/2019 2:50:30 PM     BNP(B-type Natriuretic Peptide)    Collection Time: 02/21/19  7:13 AM   Result Value Ref Range    BNP 95 0 - 167 pg/mL   Basic Metabolic Panel    Collection Time: 02/21/19  8:02 AM   Result Value Ref Range    Sodium 134 (L) 136 - 145 mmol/L    Potassium 3.4 (L) 3.5 - 5.0 mmol/L     Chloride 99 98 - 107 mmol/L    CO2 23 22 - 31 mmol/L    Anion Gap, Calculation 12 5 - 18 mmol/L    Glucose 156 (H) 70 - 125 mg/dL    Calcium 9.5 8.5 - 10.5 mg/dL    BUN 17 8 - 28 mg/dL    Creatinine 0.93 0.60 - 1.10 mg/dL    GFR MDRD Af Amer >60 >60 mL/min/1.73m2    GFR MDRD Non Af Amer 57 (L) >60 mL/min/1.73m2   Magnesium    Collection Time: 02/21/19  8:02 AM   Result Value Ref Range    Magnesium 1.8 1.8 - 2.6 mg/dL   Phosphorus    Collection Time: 02/21/19  8:02 AM   Result Value Ref Range    Phosphorus 2.6 2.5 - 4.5 mg/dL   Procalcitonin    Collection Time: 02/21/19  8:47 AM   Result Value Ref Range    Procalcitonin 0.09 0.00 - 0.49 ng/mL   Creatinine    Collection Time: 02/21/19  8:47 AM   Result Value Ref Range    Creatinine 0.91 0.60 - 1.10 mg/dL    GFR MDRD Af Amer >60 >60 mL/min/1.73m2    GFR MDRD Non Af Amer 58 (L) >60 mL/min/1.73m2   Urinalysis-UC if Indicated    Collection Time: 02/21/19 10:08 AM   Result Value Ref Range    Color, UA Yellow Colorless, Yellow, Straw, Light Yellow    Clarity, UA Cloudy (!) Clear    Glucose, UA Negative Negative    Bilirubin, UA Negative Negative    Ketones, UA Negative Negative, 60 mg/dL    Specific Gravity, UA 1.019 1.001 - 1.030    Blood, UA Negative Negative    pH, UA 6.0 4.5 - 8.0    Protein, UA 70 mg/dL (!) Negative mg/dL    Urobilinogen, UA <2.0 E.U./dL <2.0 E.U./dL, 2.0 E.U./dL    Nitrite, UA Negative Negative    Leukocytes, UA Negative Negative    Bacteria, UA Moderate (!) None Seen hpf    RBC, UA 0-2 None Seen, 0-2 hpf    WBC, UA 0-5 None Seen, 0-5 hpf    Squam Epithel, UA >100 (!) None Seen, 0-5 lpf    Amorphous, UA Few (!) None Seen    Mucus, UA Moderate (!) None Seen lpf    Granular Casts, UA 0-5 (!) None Seen lpf    Hyaline Casts, UA 10-25 (!) 0-5, None Seen lpf   Culture, Urine    Collection Time: 02/21/19 10:08 AM   Result Value Ref Range    Culture Mixture of urogenital organisms    Influenza A/B Rapid Test    Collection Time: 02/21/19 11:33 AM   Result  Value Ref Range    Influenza  A, Rapid Antigen No Influenza A antigen detected No Influenza A antigen detected    Influenza B, Rapid Antigen No Influenza B antigen detected No Influenza B antigen detected   Troponin I    Collection Time: 02/21/19 12:31 PM   Result Value Ref Range    Troponin I <0.01 0.00 - 0.29 ng/mL   Culture, Blood    Collection Time: 02/21/19 12:31 PM   Result Value Ref Range    Anaerobic Blood Culture Bottle No Growth No Growth, No organisms seen, bottle returned to instrument, Specimen not received    Aerobic Blood Culture Bottle No Growth No Growth, No organisms seen, bottle returned to instrument, Specimen not received   Platelet Count    Collection Time: 02/21/19 12:31 PM   Result Value Ref Range    Platelets 230 140 - 440 thou/uL   Culture, Blood    Collection Time: 02/21/19 12:31 PM   Result Value Ref Range    Anaerobic Blood Culture Bottle No Growth No Growth, No organisms seen, bottle returned to instrument, Specimen not received    Aerobic Blood Culture Bottle No Growth No Growth, No organisms seen, bottle returned to instrument, Specimen not received   HM2 (CBC W/O DIFF)    Collection Time: 02/21/19  1:01 PM   Result Value Ref Range    WBC 14.1 (H) 4.0 - 11.0 thou/uL    RBC 4.26 3.80 - 5.40 mill/uL    Hemoglobin 12.6 12.0 - 16.0 g/dL    Hematocrit 37.2 35.0 - 47.0 %    MCV 87 80 - 100 fL    MCH 29.6 27.0 - 34.0 pg    MCHC 33.9 32.0 - 36.0 g/dL    RDW 13.1 11.0 - 14.5 %    Platelets 240 140 - 440 thou/uL    MPV 10.5 8.5 - 12.5 fL   aPTT    Collection Time: 02/21/19  1:01 PM   Result Value Ref Range    PTT 32 24 - 37 seconds   Troponin I    Collection Time: 02/21/19  7:33 PM   Result Value Ref Range    Troponin I <0.01 0.00 - 0.29 ng/mL   Anti-Xa Heparin Level    Collection Time: 02/21/19  7:33 PM   Result Value Ref Range    Anti-Xa Heparin Assay >=1.10 (HH) 0.30 - 0.70 IU/mL   Potassium    Collection Time: 02/21/19  7:33 PM   Result Value Ref Range    Potassium 3.7 3.5 - 5.0 mmol/L    Anti-Xa Heparin Level    Collection Time: 02/22/19  3:43 AM   Result Value Ref Range    Anti-Xa Heparin Assay 0.66 0.30 - 0.70 IU/mL   Potassium    Collection Time: 02/22/19  3:43 AM   Result Value Ref Range    Potassium 3.7 3.5 - 5.0 mmol/L   Anti-Xa Heparin Level    Collection Time: 02/22/19 10:25 AM   Result Value Ref Range    Anti-Xa Heparin Assay 0.53 0.30 - 0.70 IU/mL   C. difficile Toxigenic by PCR    Collection Time: 02/22/19  8:30 PM   Result Value Ref Range    C.Difficile Toxigenic by PCR Negative Negative    Ribotype 027/NAP1/B1 Presumptive Negative Presumptive Negative   Culture, Stool    Collection Time: 02/22/19  8:30 PM   Result Value Ref Range    Culture       No Salmonella, Shigella, Yersinia, or Campylobacter.   Potassium - Next AM    Collection Time: 02/23/19  6:08 AM   Result Value Ref Range    Potassium 3.9 3.5 - 5.0 mmol/L   White Blood Count (WBC)    Collection Time: 02/23/19  6:08 AM   Result Value Ref Range    WBC 9.3 4.0 - 11.0 thou/uL   Magnesium    Collection Time: 02/23/19  6:08 AM   Result Value Ref Range    Magnesium 1.7 (L) 1.8 - 2.6 mg/dL   Hemoglobin    Collection Time: 02/23/19  6:08 AM   Result Value Ref Range    Hemoglobin 11.6 (L) 12.0 - 16.0 g/dL   Platelet Count - every other day x 3    Collection Time: 02/23/19  6:08 AM   Result Value Ref Range    Platelets 259 140 - 440 thou/uL   Potassium - Next AM    Collection Time: 02/24/19  6:20 AM   Result Value Ref Range    Potassium 4.0 3.5 - 5.0 mmol/L   Hemoglobin    Collection Time: 02/24/19  6:20 AM   Result Value Ref Range    Hemoglobin 11.9 (L) 12.0 - 16.0 g/dL   Creatinine    Collection Time: 02/24/19  3:03 PM   Result Value Ref Range    Creatinine 1.06 0.60 - 1.10 mg/dL    GFR MDRD Af Amer 59 (L) >60 mL/min/1.73m2    GFR MDRD Non Af Amer 49 (L) >60 mL/min/1.73m2   Magnesium    Collection Time: 02/24/19  3:03 PM   Result Value Ref Range    Magnesium 1.7 (L) 1.8 - 2.6 mg/dL   Potassium - Next AM    Collection Time: 02/25/19   5:56 AM   Result Value Ref Range    Potassium 4.1 3.5 - 5.0 mmol/L   Platelet Count - every other day x 3    Collection Time: 02/25/19  5:56 AM   Result Value Ref Range    Platelets 305 140 - 440 thou/uL   HM1 (CBC with Diff)    Collection Time: 02/25/19  5:56 AM   Result Value Ref Range    WBC 9.7 4.0 - 11.0 thou/uL    RBC 4.11 3.80 - 5.40 mill/uL    Hemoglobin 12.3 12.0 - 16.0 g/dL    Hematocrit 36.2 35.0 - 47.0 %    MCV 88 80 - 100 fL    MCH 29.9 27.0 - 34.0 pg    MCHC 34.0 32.0 - 36.0 g/dL    RDW 13.3 11.0 - 14.5 %    Platelets 308 140 - 440 thou/uL    MPV 10.5 8.5 - 12.5 fL    Neutrophils % 70 50 - 70 %    Lymphocytes % 18 (L) 20 - 40 %    Monocytes % 9 2 - 10 %    Eosinophils % 3 0 - 6 %    Basophils % 1 0 - 2 %    Neutrophils Absolute 6.7 2.0 - 7.7 thou/uL    Lymphocytes Absolute 1.8 0.8 - 4.4 thou/uL    Monocytes Absolute 0.9 0.0 - 0.9 thou/uL    Eosinophils Absolute 0.2 0.0 - 0.4 thou/uL    Basophils Absolute 0.1 0.0 - 0.2 thou/uL   Magnesium    Collection Time: 02/25/19 10:03 AM   Result Value Ref Range    Magnesium 1.9 1.8 - 2.6 mg/dL   Renal Function Profile    Collection Time: 02/25/19 10:03 AM   Result Value Ref Range    Albumin 2.6 (L) 3.5 - 5.0 g/dL    Calcium 9.7 8.5 - 10.5 mg/dL    Phosphorus 3.6 2.5 - 4.5 mg/dL    Glucose 186 (H) 70 - 125 mg/dL    BUN 16 8 - 28 mg/dL    Creatinine 0.85 0.60 - 1.10 mg/dL    Sodium 138 136 - 145 mmol/L    Potassium 4.0 3.5 - 5.0 mmol/L    Chloride 102 98 - 107 mmol/L    CO2 25 22 - 31 mmol/L    Anion Gap, Calculation 11 5 - 18 mmol/L    GFR MDRD Af Amer >60 >60 mL/min/1.73m2    GFR MDRD Non Af Amer >60 >60 mL/min/1.73m2   Procalcitonin    Collection Time: 02/25/19 10:03 AM   Result Value Ref Range    Procalcitonin 0.12 0.00 - 0.49 ng/mL   Renal Function Profile    Collection Time: 02/26/19  5:34 AM   Result Value Ref Range    Albumin 2.5 (L) 3.5 - 5.0 g/dL    Calcium 9.5 8.5 - 10.5 mg/dL    Phosphorus 3.7 2.5 - 4.5 mg/dL    Glucose 98 70 - 125 mg/dL    BUN 20 8 - 28  mg/dL    Creatinine 0.81 0.60 - 1.10 mg/dL    Sodium 139 136 - 145 mmol/L    Potassium 4.2 3.5 - 5.0 mmol/L    Chloride 104 98 - 107 mmol/L    CO2 26 22 - 31 mmol/L    Anion Gap, Calculation 9 5 - 18 mmol/L    GFR MDRD Af Amer >60 >60 mL/min/1.73m2    GFR MDRD Non Af Amer >60 >60 mL/min/1.73m2   Magnesium    Collection Time: 02/26/19  5:34 AM   Result Value Ref Range    Magnesium 2.0 1.8 - 2.6 mg/dL   Procalcitonin    Collection Time: 02/26/19  5:34 AM   Result Value Ref Range    Procalcitonin 0.10 0.00 - 0.49 ng/mL   HM1 (CBC with Diff)    Collection Time: 02/26/19  5:34 AM   Result Value Ref Range    WBC 9.3 4.0 - 11.0 thou/uL    RBC 4.21 3.80 - 5.40 mill/uL    Hemoglobin 12.5 12.0 - 16.0 g/dL    Hematocrit 37.4 35.0 - 47.0 %    MCV 89 80 - 100 fL    MCH 29.7 27.0 - 34.0 pg    MCHC 33.4 32.0 - 36.0 g/dL    RDW 13.3 11.0 - 14.5 %    Platelets 332 140 - 440 thou/uL    MPV 10.2 8.5 - 12.5 fL    Neutrophils % 71 (H) 50 - 70 %    Lymphocytes % 17 (L) 20 - 40 %    Monocytes % 9 2 - 10 %    Eosinophils % 3 0 - 6 %    Basophils % 1 0 - 2 %    Neutrophils Absolute 6.5 2.0 - 7.7 thou/uL    Lymphocytes Absolute 1.6 0.8 - 4.4 thou/uL    Monocytes Absolute 0.8 0.0 - 0.9 thou/uL    Eosinophils Absolute 0.2 0.0 - 0.4 thou/uL    Basophils Absolute 0.1 0.0 - 0.2 thou/uL   Comprehensive Metabolic Panel    Collection Time: 03/04/19  1:01 PM   Result Value Ref Range    Sodium 136 136 - 145 mmol/L    Potassium 4.6 3.5 - 5.0 mmol/L    Chloride 102 98 - 107 mmol/L    CO2 19 (L) 22 - 31 mmol/L    Anion Gap, Calculation 15 5 - 18 mmol/L    Glucose 97 70 - 125 mg/dL    BUN 33 (H) 8 - 28 mg/dL    Creatinine 1.07 0.60 - 1.10 mg/dL    GFR MDRD Af Amer 59 (L) >60 mL/min/1.73m2    GFR MDRD Non Af Amer 48 (L) >60 mL/min/1.73m2    Bilirubin, Total 0.3 0.0 - 1.0 mg/dL    Calcium 10.2 8.5 - 10.5 mg/dL    Protein, Total 7.0 6.0 - 8.0 g/dL    Albumin 3.4 (L) 3.5 - 5.0 g/dL    Alkaline Phosphatase 130 (H) 45 - 120 U/L    AST 18 0 - 40 U/L    ALT 32 0  - 45 U/L   Magnesium    Collection Time: 03/04/19  1:01 PM   Result Value Ref Range    Magnesium 2.1 1.8 - 2.6 mg/dL   Phosphorus    Collection Time: 03/04/19  1:01 PM   Result Value Ref Range    Phosphorus 4.3 2.5 - 4.5 mg/dL   HM1 (CBC with Diff)    Collection Time: 03/04/19  1:01 PM   Result Value Ref Range    WBC 10.3 4.0 - 11.0 thou/uL    RBC 4.67 3.80 - 5.40 mill/uL    Hemoglobin 13.5 12.0 - 16.0 g/dL    Hematocrit 41.0 35.0 - 47.0 %    MCV 88 80 - 100 fL    MCH 28.9 27.0 - 34.0 pg    MCHC 32.9 32.0 - 36.0 g/dL    RDW 12.2 11.0 - 14.5 %    Platelets 409 140 - 440 thou/uL    MPV 7.9 7.0 - 10.0 fL    Neutrophils % 65 50 - 70 %    Lymphocytes % 23 20 - 40 %    Monocytes % 10 2 - 10 %    Eosinophils % 2 0 - 6 %    Basophils % 1 0 - 2 %    Neutrophils Absolute 6.6 2.0 - 7.7 thou/uL    Lymphocytes Absolute 2.4 0.8 - 4.4 thou/uL    Monocytes Absolute 1.0 (H) 0.0 - 0.9 thou/uL    Eosinophils Absolute 0.2 0.0 - 0.4 thou/uL    Basophils Absolute 0.1 0.0 - 0.2 thou/uL     Current Outpatient Medications   Medication Sig Note     amLODIPine (NORVASC) 10 MG tablet Take 1 tablet (10 mg total) by mouth daily.      apixaban (ELIQUIS) 5 mg Tab tablet Take 1 tablet (5 mg total) by mouth 2 (two) times a day.      ergocalciferol (ERGOCALCIFEROL) 50,000 unit capsule Take 1 capsule (50,000 Units total) by mouth once a week for 12 doses. 2/21/2019: Tuesdays     estradiol (ESTRACE) 0.01 % (0.1 mg/gram) vaginal cream 2 x per week per OB/Gyn (Patient taking differently: Insert 2 g into the vagina 2 (two) times a week. 2 x per week per OB/Gyn      ) 2/21/2019: Wednesday and Nacho     hydroCHLOROthiazide (HYDRODIURIL) 25 MG tablet Take 1 tablet (25 mg total) by mouth daily.      lisinopril (PRINIVIL,ZESTRIL) 40 MG tablet Take 1 tablet (40 mg total) by mouth daily.      melatonin 3 mg Tab tablet Take 3 mg by mouth at bedtime as needed. 10/11/2018: Patient reports taking qHS.      traMADol (ULTRAM) 50 mg tablet TAKE 2 TABLETS BY MOUTH IN  "THE MORNING AND 1 TABLET THE AFTERNOON AND  BEFORE BED      zolpidem (AMBIEN CR) 6.25 MG CR tablet Take 1 tablet (6.25 mg total) by mouth at bedtime as needed for sleep.      Vitals:    03/05/19 0940   BP: 118/56   Pulse: 83   Resp: 18   Temp: 97.2  F (36.2  C)   TempSrc: Oral   SpO2: 98%   Weight: 154 lb 12.8 oz (70.2 kg)   Height: 5' 2\" (1.575 m)     OBJECTIVE:  Vitals listed above within normal limits  General appearance: well groomed, pleasant, well hydrated, nontoxic appearing  ENT: PERRL, throat clear  Neck: neck supple, no lymphadenopathy, no thyromegaly  Lungs: lungs clear to auscultation bilaterally, no wheezes or rhonchi  Heart: regular rate and rhythm, no murmurs, rubs or gallops  Abdomen: soft, nontender  Neuro: no focal deficits, CN II-XII grossly intact, alert and oriented  Psych:  mood stable, appears to have good insight and judgment    A/P  Reviewed labs/CT/EKG personally and with patient    PE - continue eliquis   Resolved on CT    Pneumonia - improving on CT    Lung nodule 4mm seen on CT 3/4/19 and not seen on CT on 2/21/19  Repeat chest CT in 3 months    Labs improved and reviewed with patient      rightsided chest pain - sx improved after taking pain med  No evidence cardiac or lung etiology  Will continue to monitor    Spent 25 min face to face with patient with more the 50% spent in counseling, reviewing chart, and coordination of care and discussing problems listed above.         "

## 2021-06-24 NOTE — PROGRESS NOTES
HPI - 87 yo female here for hospital f/u.       Admitted from 2/21/19-2/26/19 for pneumonia and PE  Per chart review:   CT scan=Small amount of right lower lobe subsegmental pulmonary artery embolism.   EKG was normal (echo 1/15/19)  Started on Eliquis - 10mg two times a day x 3 days and then 5mg two times a day  HTN - increased norvasc 10mg   F/u needed per d/c summary: CBC, CMP, mag, phos and f/u abnl imaging and anticoag    Today reporting:  She has new Right sided chest pain that radiates to shoulder, upper back and right upper arm that is an achey pain not the sharp pain she had while in the hospital - this started the day after discharge   This achiness comes and goes and last for 2 hours  Hemoptysis now resolved    Current Outpatient Medications   Medication Sig Note     amLODIPine (NORVASC) 10 MG tablet Take 1 tablet (10 mg total) by mouth every evening.      apixaban (ELIQUIS) 5 mg Tab tablet Take 1 tablet (5 mg total) by mouth 2 (two) times a day.      ergocalciferol (ERGOCALCIFEROL) 50,000 unit capsule Take 1 capsule (50,000 Units total) by mouth once a week for 12 doses. 2/21/2019: Tuesdays     estradiol (ESTRACE) 0.01 % (0.1 mg/gram) vaginal cream 2 x per week per OB/Gyn (Patient taking differently: Insert 2 g into the vagina 2 (two) times a week. 2 x per week per OB/Gyn      ) 2/21/2019: Wednesday and Sunday     hydroCHLOROthiazide (HYDRODIURIL) 25 MG tablet Take 1 tablet (25 mg total) by mouth daily.      lisinopril (PRINIVIL,ZESTRIL) 40 MG tablet Take 1 tablet (40 mg total) by mouth daily.      melatonin 3 mg Tab tablet Take 3 mg by mouth at bedtime as needed. 10/11/2018: Patient reports taking qHS.      traMADol (ULTRAM) 50 mg tablet TAKE 2 TABLETS BY MOUTH IN THE MORNING AND 1 TABLET THE AFTERNOON AND  BEFORE BED (Patient taking differently: Take 50 mg by mouth every 6 (six) hours as needed for pain.       ) 2/21/2019: Was taking just in AM and Bedtime but more recently taking 4x/day.     zolpidem  "(AMBIEN CR) 6.25 MG CR tablet Take 1 tablet (6.25 mg total) by mouth at bedtime as needed for sleep.      apixaban (ELIQUIS) 5 mg Tab tablet Take 2 tablets (10 mg total) by mouth 2 (two) times a day for 3 days.      Vitals:    03/04/19 1139   BP: 118/68   Pulse: 67   Resp: 18   Temp: 98.2  F (36.8  C)   TempSrc: Oral   SpO2: 97%   Weight: 154 lb (69.9 kg)   Height: 5' 2\" (1.575 m)     OBJECTIVE:  Vitals listed above within normal limits  General appearance: well groomed, pleasant, well hydrated, nontoxic appearing  ENT: PERRL, throat clear  Neck: neck supple, no lymphadenopathy, no thyromegaly  Lungs: lungs clear to auscultation bilaterally, no wheezes or rhonchi  Heart: regular rate and rhythm, no murmurs, rubs or gallops  Abdomen: soft, nontender  Neuro: no focal deficits, CN II-XII grossly intact, alert and oriented  Psych:  mood stable, appears to have good insight and judgment    A/P  Hospital f/u for Admission  from 2/21/19-2/26/19 for pneumonia and PE  Reviewed chart and d/c summary with patient.   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

## 2021-06-24 NOTE — PROGRESS NOTES
Attempt 1: Care Guide placed a call to the patient and left a message regarding enrolling in the [facility] program and requested a call back at the provided phone number. If this patient is returning my call please send to ext [phone].

## 2021-06-24 NOTE — PROGRESS NOTES
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 This is unchanged from 4/27/2017.Dr. Mario tidwell. [facility].

## 2021-06-24 NOTE — TELEPHONE ENCOUNTER
Controlled Substance Refill Request  Medication:   Requested Prescriptions     Pending Prescriptions Disp Refills     zolpidem (AMBIEN CR) 6.25 MG CR tablet [Pharmacy Med Name: Zolpidem Tartrate ER Oral Tablet Extended Release 6.25 MG] 30 tablet 1     Sig: Take 1 tablet (6.25 mg total) by mouth at bedtime as needed for sleep.     Date Last Fill: 12/10/18  Pharmacy: [facility]   Submit electronically to pharmacy  Controlled Substance Agreement on File:   Encounter-Level CSA Scan Date:    There are no encounter-level csa scan date.       Last office visit: Last office visit pertaining to requested medication was 1/31/19.

## 2021-06-24 NOTE — PROGRESS NOTES
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 She reports she is aware of [facility] [place].     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    No further follow up will be completed by [facility] at this time.  "

## 2021-06-24 NOTE — TELEPHONE ENCOUNTER
Patient Returning Call  Reason for call:  Patient  Information relayed to patient:  Informed that this has not been reviewed by the provider as of yet.  Patient states she is out of the medication and needs this done today.  Patient has additional questions:  Yes  If YES, what are your questions/concerns:  As above.  Okay to leave a detailed message?: Yes

## 2021-06-25 NOTE — PROGRESS NOTES
HPI - 89 yo female here for f/u on right chest wall pain.    Upper Right sided chest pain -   No evidence cardiac or lung etiology on CT and EKG  Constant and nagging that radiates to right arm  Pain is worse with moving right arm    Episodes of dizziness, weakness and cold sweat -  Occurring daily and lasts a few minutes and self resolves or with a cold pack behind her neck  No clear trigger  Started last week, daily for 8 days  TSH, BMP, CBC, EKG wnl within last few months    PE in [facility] related to pnemonia - continue eliquis for 6months  Resolved on CT     Pneumonia - improving on CT     Lung nodule 4mm seen on CT 3/4/19 and not seen on CT on 2/21/19  Repeat chest CT in 3 months     Cough -   Postnasal drip    Blood noses  S/p cauterization by ENT  8 weeks ago       Current Outpatient Medications   Medication Sig Note     amLODIPine (NORVASC) 10 MG tablet Take 1 tablet (10 mg total) by mouth daily.      apixaban (ELIQUIS) 5 mg Tab tablet Take 1 tablet (5 mg total) by mouth 2 (two) times a day.      ergocalciferol (ERGOCALCIFEROL) 50,000 unit capsule Take 1 capsule (50,000 Units total) by mouth once a week for 12 doses. 2/21/2019: Tuesdays     estradiol (ESTRACE) 0.01 % (0.1 mg/gram) vaginal cream 2 x per week per OB/Gyn (Patient taking differently: Insert 2 g into the vagina 2 (two) times a week. 2 x per week per OB/Gyn      ) 2/21/2019: Wednesday and Sunday     hydroCHLOROthiazide (HYDRODIURIL) 25 MG tablet Take 1 tablet (25 mg total) by mouth daily.      lisinopril (PRINIVIL,ZESTRIL) 40 MG tablet Take 1 tablet (40 mg total) by mouth daily.      melatonin 3 mg Tab tablet Take 3 mg by mouth at bedtime as needed. 10/11/2018: Patient reports taking qHS.      traMADol (ULTRAM) 50 mg tablet TAKE 2 TABLETS BY MOUTH IN THE MORNING AND 1 TABLET THE AFTERNOON AND  BEFORE BED      zolpidem (AMBIEN CR) 6.25 MG CR tablet Take 1 tablet (6.25 mg total) by mouth at bedtime as needed for sleep.      Vitals:    03/19/19 1132   BP:  "102/60   Pulse: 76   Resp: 16   Temp: 97  F (36.1  C)   TempSrc: Oral   SpO2: 97%   Weight: 155 lb 9.6 oz (70.6 kg)   Height: 5' 2.01\" (1.575 m)     OBJECTIVE:  Vitals listed above within normal limits  General appearance: well groomed, pleasant, well hydrated, nontoxic appearing  ENT: PERRL, throat clear  Neck: neck supple, no lymphadenopathy, no thyromegaly  Lungs: lungs clear to auscultation bilaterally, no wheezes or rhonchi  Heart: regular rate and rhythm, no murmurs, rubs or gallops, reproducible chest wall pain on right sternal border  Abdomen: soft, nontender  Neuro: no focal deficits, CN II-XII grossly intact, alert and oriented  Psych:  mood stable, appears to have good insight and judgment       A/P  Costochondritis  Handout given  Reassurance that cardiac and lung tests were wnl  Unable to treat with NSAID b/c on Eliquis  Advised tylenol and/or tramadol prn for pain and to continue hot packs    Episodes of dizziness, weakness and cold sweat -  Advised keeping a symptom journal and to bring at next appt    PE in [facility] related to pnemonia - continue eliquis for 6months  Resolved on CT        Lung nodule 4mm seen on CT 3/4/19 and not seen on CT on 2/21/19  Repeat chest CT in 3 months     Cough - likely related to Postnasal drip for which she uses nasal saline    Blood noses resolved after cauterization by ENT  8 weeks ago     Spent 25 min face to face with patient with more the 50% spent in counseling, reviewing chart, and coordination of care and discussing problems listed above.         "

## 2021-06-26 NOTE — TELEPHONE ENCOUNTER
Controlled Substance Refill Request  Medication Name:   Requested Prescriptions     Pending Prescriptions Disp Refills     zolpidem (AMBIEN CR) 6.25 MG CR tablet [Pharmacy Med Name: Zolpidem Tartrate ER Oral Tablet Extended Release 6.25 MG] 45 tablet 0     Sig: take 1 and 1/4 tablet by mouth at bedtime.     Date Last Fill: 5/13/21  [facility]ed [facility]  Submit electronically to pharmacy  Controlled Substance Agreement on file:   Encounter-Level CSA Scan Date - 01/08/2019:    Scan on 1/14/2019 10:21 AM        Last office visit:  6/15/21           Kayla Anderson RN, BSN Nurse Triage Advisor 12:36 PM 6/21/2021

## 2021-06-26 NOTE — PROGRESS NOTES
Assessment and Plan:   1. Encounter for annual wellness visit (AWV) in Medicare patient  She is doing remarkably well and very cognitively intact and very mobile and living independently.  She is doing amazingly well for being 89 years old.  Vaccines are all up-to-date including her Covid vaccine.  She has had her Zostrix but has not had Shingrix.  She is not sure if she wants to pursue this but she has the information and knows to check insurance about getting it at the pharmacy if she decides to pursue this.    2. History of anemia  We will check CBC to see if her anemia has resolved  - HM2(CBC w/o Differential)    3. Vitamin D deficiency  We will check vitamin D levels  - Vitamin D, Total (25-Hydroxy)    4. History of elevated glucose  We will check A1c given her history of borderline elevated A1c a few years ago.  I do not anticipate any aggressive treatment even if she does qualify for diabetes but want to make sure we are aware of any risks of hypoglycemic or hyperglycemic episodes given her age  - Glycosylated Hemoglobin A1c    5. Other chronic pain  Pain is improved dramatically with injections from the pain clinic in multiple joints as well as current pain regimen.  She is a follow-up pain clinic appointment in a few months.    6. Controlled substance agreement signed  Controlled substance contract has been signed for her use of tramadol that she uses at night and as needed.  This has not escalated for years and helps with her sleeping.    7. Paroxysmal atrial fibrillation (H)  Audiology consult note reviewed.  Patient on flecainide and Eliquis.  She has a follow-up appointment with cardiology in October.  She is currently in normal sinus rhythm.    8. Lesion of skin of nose  The new lesion on the right side of her nares that she has had for couple of weeks.  She has a history of basal cell carcinoma status post Mohs surgery but says that this looks different.  She will try warm compresses and keeping it  clean.  She will continue to monitor.  She is not sure she wants to pursue dermatology consult given her age.    9. DNR (do not resuscitate)  Reviewed that she is DO NOT RESUSCITATE DO NOT INTUBATE which is consistent with her prior wishes and is already documented in the chart    The patient's current medical problems were reviewed.    I have had an Advance Directives discussion with the patient.  The following health maintenance schedule was reviewed with the patient and provided in printed form in the after visit summary:   Health Maintenance   Topic Date Due     HEART FAILURE ACTION PLAN  Never done     ZOSTER VACCINES (2 of 3) 11/19/2012     LIPID  03/12/2016     CBC  11/20/2020     ALT  12/02/2020     INFLUENZA VACCINE RULE BASED (Season Ended) 08/01/2021     BMP  10/19/2021     MEDICARE ANNUAL WELLNESS VISIT  06/15/2022     FALL RISK ASSESSMENT  06/15/2022     ADVANCE CARE PLANNING  01/30/2025     TD 18+ HE  10/14/2029     TSH  Completed     Pneumococcal Vaccine: Pediatrics (0 to 5 Years) and At-Risk Patients (6 to 64 Years)  Completed     Pneumococcal Vaccine: 65+ Years  Completed     COVID-19 Vaccine  Completed     HEPATITIS B VACCINES  Aged Out       Subjective:   Chief Complaint: Gladys Ramirez is an 89 y.o. female here for an Annual Wellness visit.   HPI:    Annual Wellness  Vaccines up to date - covid vaccine done  shingrix is only one due    Fell yesterday when she was trying to get something on her shelf and she was on her tippy toes.  She reports no injuries.       Afib   Card consult 4/19/21  Her cardiologist started flecainide for rhythm control and she is also on Eliquis.  She has a follow-up appointment in 6 months for routine check of flecainide levels and BMP which were normal in April 2021    HTN-   BP meds: amlodipine, hydrochlorothiazide, lisinopril      Pain Clinic consult:   Pain meds: duloxetine two times a day, tylenol  and tramadol at bedtime and prn   Pain is very well controlled  with current medications after the joint injections.    1. Primary osteoarthritis of right knee    2. Chronic pain syndrome    3. Primary osteoarthritis of left hip    4. Arthrosis of foot, unspecified laterality      Left hip injection 4/27/21  Right knee injection 5/18/21  She is staying active and going for walks with walking stick     Vaginal dryness and on estrace cream - using every 2-3 weeks  She feels that this is now resolved and would like to cut back on the use of the cream as she has no longer has any symptoms    Insomnia -   Managed with ambien 6.25 mg at 1-1/4 tablets  She has tried multiple other sleep aids and been to a sleep clinic and this is the only medication that helps her.  She has had no complications with Ambien.  She is aware of the risks associated with this medication particular than the in the elderly but feels like this is the only thing that really helps her sleep so we have maintained this medication.    H/o anemia  hgb 11.9 on 11/2019    H/o Vit D deficiency  And borderline A1c at 6.2 a few years ago    New skin lesion on left side of nares for 2 weeks that is firm  Derm consult 5/2018 s/p Moh's surgery for basal cell carcinoma      Review of Systems:   Please see above.  The rest of the review of systems are negative for all systems.    Patient Care Team:  Lore Martin MD as PCP - General  Lore Martin MD as Assigned PCP  Abdifatah Clark, PharmD as Pharmacist (Pharmacist)  Elizabeth Holley MD as Assigned Heart and Vascular Provider     Patient Active Problem List   Diagnosis     Localized Primary Osteoarthritis Of The Left Knee     Benign essential hypertension     Serum Enzyme Levels - Alkaline Phosphatase Elevated     Hyperlipidemia     Cataract     Insomnia     BCC (basal cell carcinoma of skin)     Sinus bradycardia     DNR (do not resuscitate)     Arthrosis of foot - bilateral     Lower extremity edema     Frequent nosebleeds     Controlled substance agreement  signed - for tramadol and ambien signed 1/30/20     Chronic diastolic congestive heart failure (H) - EF 55%, Grade II (moderate) left ventricular diastolic dysfunction per echo 1/ 2018     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     Vitamin D deficiency     Elevated alkaline phosphatase level     Deep vein thrombosis (DVT) of distal vein of lower extremity, unspecified chronicity, unspecified laterality (H)     Other pulmonary embolism without acute cor pulmonale, unspecified chronicity (H)     Hemoptysis     Hypomagnesemia     Lung nodule < 6cm on CT     Paroxysmal atrial fibrillation (H)     Chronic pain     Normocytic anemia     Gastroesophageal reflux disease without esophagitis     Thyroid nodule     Past Medical History:   Diagnosis Date     Angina pectoris (H)      Chest pain 3/9/2017     Cough      Diarrhea 11/20/2019     Hyperlipidemia      Hypertension      Hypokalemia     Created by Conversion      Osteoarthritis      Pneumonia 2/21/2019      Past Surgical History:   Procedure Laterality Date     APPENDECTOMY       BASAL CELL CARCINOMA EXCISION       DIAGNOSTIC LAPAROSCOPY N/A 11/04/2014    Procedure: LAPAROSCOPY BILATERAL SALPINGO-OOPHORECTOMY ;  Surgeon: Sofia Harper MD;  [place] [facility] [place] [place]:      [place] TOTAL KNEE ARTHROPLASTY Left      TONSILLECTOMY        Family History   Problem Relation Age of Onset     Heart disease Mother      Rheumatic Heart Disease Mother      No Medical Problems Father      Cancer Sister      Cancer Brother       Social History     Socioeconomic History     Marital status: Single     Spouse name: Not on file     Number of children: Not on file     Years of education: Not on file     Highest education level: Not on file   Occupational History     Not on file   Social Needs     Financial resource strain: Not on file     Food insecurity     Worry: Not on file     Inability: Not on file     Transportation needs     Medical: Not on file     Non-medical:  Not on file   Tobacco Use     Smoking status: Never Smoker     Smokeless tobacco: Never Used     Tobacco comment: no passive exposure   Substance and Sexual Activity     Alcohol use: Yes     Comment: Rarely a glass of wine     Drug use: No     Sexual activity: Not on file   Lifestyle     Physical activity     Days per week: Not on file     Minutes per session: Not on file     Stress: Not on file   Relationships     Social connections     Talks on phone: Not on file     Gets together: Not on file     Attends Zoroastrian service: Not on file     Active member of club or organization: Not on file     Attends meetings of clubs or organizations: Not on file     Relationship status: Not on file     Intimate partner violence     Fear of current or ex partner: Not on file     Emotionally abused: Not on file     Physically abused: Not on file     Forced sexual activity: Not on file   Other Topics Concern     Not on file   Social History Narrative    The patient is a nun.      Current Outpatient Medications   Medication Sig Dispense Refill     acetaminophen (TYLENOL) 325 MG tablet Take 650 mg by mouth every 6 (six) hours as needed for pain.       amLODIPine (NORVASC) 5 MG tablet Take 0.5 tablets (2.5 mg total) by mouth daily. 90 tablet 3     cholecalciferol, vitamin D3, (VITAMIN D3) 2,000 unit Tab Take 1 tablet (2,000 Units total) by mouth daily. 90 tablet 3     diclofenac sod, micro (bulk) 100 % 5 %, ibuprofen (bulk) 100 % 10 %, lidocaine (bulk) 5 % Apply 4 g topically 4 (four) times a day as needed. 120 g 1     DULoxetine (CYMBALTA) 60 MG capsule TAKE ONE CAPSULE BY MOUTH TWICE DAILY  180 capsule 3     ELIQUIS 2.5 mg Tab tablet Take 1 tablet (2.5 mg total) by mouth 2 (two) times a day. 180 tablet 1     estradiol (ESTRACE) 0.01 % (0.1 mg/gram) vaginal cream 2 x per week per OB/Gyn (Patient taking differently: Insert 2 g into the vagina once a week. 2 x per week per OB/Gyn      ) 42.5 g 1     flecainide (TAMBOCOR) 50 MG tablet  "Take 0.5 tablets (25 mg total) by mouth 2 (two) times a day. 30 tablet 1     hydroCHLOROthiazide (HYDRODIURIL) 25 MG tablet Take 1 tablet (25 mg total) by mouth daily. 90 tablet 2     lisinopriL (PRINIVIL,ZESTRIL) 40 MG tablet Take 1 tablet (40 mg total) by mouth daily. 90 tablet 2     omeprazole (PRILOSEC OTC) 20 MG tablet Take 1 tablet (20 mg total) by mouth daily as needed. 30 tablet 3     traMADoL (ULTRAM) 50 mg tablet Take 2 tablets (100 mg) by mouth at noon and 1 tablet (50 mg) by mouth at bedtime. 90 tablet 0     zolpidem (AMBIEN CR) 6.25 MG CR tablet take 1 and 1/4 tablet by mouth at bedtime. 45 tablet 0     No current facility-administered medications for this visit.       Objective:   Vital Signs:   Visit Vitals  /60   Pulse 69   Temp 97.9  F (36.6  C) (Oral)   Resp 12   Ht 5' 0.39\" (1.534 m)   Wt 165 lb 1.6 oz (74.9 kg)   SpO2 96%   BMI 31.82 kg/m           VisionScreening:  No exam data present     PHYSICAL EXAM  OBJECTIVE:  Vitals listed above within normal limits  General appearance: well groomed, pleasant, well hydrated, nontoxic appearing  ENT: PERRL, throat clear  Neck: neck supple, no lymphadenopathy, no thyromegaly  Lungs: lungs clear to auscultation bilaterally, no wheezes or rhonchi  Heart: regular rate and rhythm, no murmurs, rubs or gallops  Abdomen: soft, nontender  Neuro: no focal deficits, CN II-XII grossly intact, alert and oriented  Psych:  mood stable, appears to have good insight and judgment        Assessment Results 6/15/2021   Activities of Daily Living No help needed   Instrumental Activities of Daily Living 1 - Full function   Get Up and Go Score -   Mini Cog Total Score 5   Some recent data might be hidden     A Mini-Cog score of 0-2 suggests the possibility of dementia, score of 3-5 suggests no dementia    Identified Health Risks:     The patient was counseled and encouraged to consider modifying their diet and eating habits. She was provided with information on recommended " healthy diet options.  The patient reports that she has difficulty with instrumental activities of daily living.    The patient was provided with written information regarding signs of hearing loss.  She is at risk for falling and has been provided with information to reduce the risk of falling at home.  Patient's advanced directive was discussed and I am comfortable with the patient's wishes.

## 2021-06-27 NOTE — PROGRESS NOTES
"Progress Notes by Aleja Pitts PA-C at 5/24/2019 11:59 PM     Author: Aleja Pitts PA-C Service: -- Author Type: Physician Assistant    Filed: 5/31/2019  7:52 AM Date of Service: 5/24/2019 11:59 PM Status: Signed    : Aleja Pitts PA-C (Physician Assistant)       [facility]  New Patient Consultation        HPI  Gladys Ramirez 88 y.o. is here today, sent to me by  to discuss   Chief Complaint   Patient presents with   ? Foot Pain     Bilateral- On top      Comorbid conditions include PE currently on Eliquis x 6 months (started 02/2019), HTN, lung nodule monitor with CT scans    Pain Story:     She reports bones on top of feet are \"filled with arthritis\" She reports has been gradually progressive since Fall of 2016 at which time she started injections until 12/2018 when they were no longer effective.  She reports bilateral feet are affected equally.  Denies joint erythema, warmth, redness, color or temperature changes.   Reports foot swelling later in the day. She does have right knee OA and was treated with Euflexxa injection on 01/17/2019.  Denies other joint pains or spine pain.    Reports pain as constant burning, aching, localized, grinding and interferes with daily activities, sleep, work, recreational activities. States pain sometimes radiates into toes or ankles but that is \"Not the main problem.\"  She denies any pain in her heel or plantar feet.  She obtained new orthotics by [employer] last week, slightly better as it gives more cushion.  She states this helps her to walk.  She denies falls.  She states she ties ice pack on her feet when she goes to bed at night.      Follows Ellis Peters DPM at [facility] most recent visit 01/16/19:      She has also seen Dr. Karon Hughes at [facility] on 01/07/19:      She also saw Dr. Castillo podiatrist who advised surgery and Dr. Ruiz and was advised no surgery.  She bought CBD oil but was told not to take it with " "her Eliquis.    Was walking 1 hour every day until January and cannot do any more.  She walks about 2,000 feet broken up through the day with her walking stick.      Alleviating factors: medications, cold, elevating feet with ice packs on them  Aggravating factors: standing, walking, going up and down stairs, weather changes, getting out of the car.  Pain level today: On a scale of 1-10, the patient rates their pain at a 8.  Pain ratings vary between 7/10 and 8/10.  Function Ratin meaning pain affects the following:     3 - Enjoy     4 - Work, Enjoy     5 - Active, Mood, Work, Enjoy     6 - Sleep, Active, Mood, Work, Enjoy     7 - Walk, Sleep Active, Mood, Work, Enjoy     8 - Relate, Walk, Sleep, Active, Mood, Work, Enjoy  Associated Symptoms: Denies weight loss, weight gain, fever/chills, rash, swelling.  Previous Diagnostics & Treatments for Pain:  Surgery - Reports she had mixed opinion on surgery from 2 different podiatrists, has not had surgery  Injections - [facility] injections x 8 from 2016 - 2018  She states the first 5 injections helped and the last 4 didn't help at all.    Imaging - see below x-ray 2016  Physical Therapy - Denies  Chiropractor/Acupuncture - Chiropractor 5 treatments in 2018 which \"augmented the pain.\"  Group Acupuncture once but \"couldn't stand it\" as she had to lay for too long  Massage - Denies  TENS or bracing - Denies  Pain Psychology or biofeedback - Denies   Other - cold packs, orthotics, heat makes it worse.    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 She lives alone in an apartment.  She states support from Yandy Guido and Jemma He.  She reports she has help with housework and heavy shopping for groceries.  She states she uses a walking stick as it is painful to walk and she is fearful of falling.  She uses stairs slowly and carefully.      Patient set goals today in the office to achieve with the pain centers help. They " "are:    1. \"I would love it if I cold just have less pain.  I would like to be able to stay on my feet and not be confined to a wheelchair.\"  2. \"I would love to be able to walk without difficulty of pain I used to walk every day.  Now I'm [place] to be able to walk up and down the apartment halls as my bad feet make me fearful of falling.\"    Past Medical History:   Diagnosis Date   ? Angina pectoris (H)    ? Chest pain 3/9/2017   ? Cough    ? Hyperlipidemia    ? Hypertension    ? Osteoarthritis    ? Pneumonia 2/21/2019     Past Surgical History:   Procedure Laterality Date   ? APPENDECTOMY     ? BASAL CELL CARCINOMA EXCISION     ? DIAGNOSTIC LAPAROSCOPY N/A 11/4/2014    Procedure: LAPAROSCOPY BILATERAL SALPINGO-OOPHORECTOMY ;  Surgeon: Sofia Harper MD;  Location: [facility];  Service:    ? JOINT REPLACEMENT Left     TKA   ? Laparoscopy Salpingo-oophorectomy Bilateral 11/04/2014   ? CA APPENDECTOMY      Description: Appendectomy;  Recorded: 01/18/2012;   ? CA REMOVAL OF TONSILS,<13 Y/O      Description: Tonsillectomy;  Recorded: 01/18/2012;   ? CA TOTAL KNEE ARTHROPLASTY      Description: Total Knee Arthroplasty;  Recorded: 11/18/2013;   ? TONSILLECTOMY         Social  Family History   Problem Relation Age of Onset   ? Heart disease Mother    ? Rheumatic Heart Disease Mother    ? No Medical Problems Father    ? Cancer Sister    ? Cancer Brother      Social History     Tobacco Use   Smoking Status Never Smoker   Smokeless Tobacco Never Used   Tobacco Comment    no passive exposure     Social History     Substance and Sexual Activity   Alcohol Use Yes    Comment: Rarely a glass of wine     Social History     Substance and Sexual Activity   Drug Use No     Social History     Substance and Sexual Activity   Sexual Activity Not on file       Chemical Dependency History: Patient Denies tobacco use, alcohol use, illicit substance use including THC.  Denies any chemical dependency evaluation or treatment " in the past.  Denies any legal issues related to substance use.    Psychiatric History:  Patient reports no current psychiatrist or psychologist.  Denies any suicidal ideation.  Denies any hospitalizations for mental illness.    Pertinent Pain Medications:  She currently takes Tramadol 50 mg 2 tabs with breakfast, 1 at 1400 and 1 before bedtime.  One does nothing and 2 helps a bit.  Ambien 6.25 mg at bedtime, melatonin 3 mg at bedtime.     Previously tried medications:    NSAIDs: Ibuprofen, Aleve, Celebrex, Diclofenac, Naproxen 500 mg BID not helpful, Toradol 30 mg IM    Acetaminophen: Tylenol 500 mg not helpful    Antidepressants: Amitriptyline 25 mg at bedtime     Gabapentinoids: Gabapentin caused internal tremors    Opioids: Vicodin 5/325 mg in 2014, Percocet 5/325 mg in 2014    Topicals: Salon Pas and topical creams OTC, [place] Dream    Supplements: Denies    Muscle Relaxants: Flexeril 10 mg, Tizanidine 2 mg    Other: Vitamin D 50,000 units      Current Outpatient Medications:   ?  amLODIPine (NORVASC) 10 MG tablet, Take 1 tablet (10 mg total) by mouth daily., Disp: 90 tablet, Rfl: 3  ?  apixaban (ELIQUIS) 5 mg Tab tablet, Take 1 tablet (5 mg total) by mouth 2 (two) times a day., Disp: 60 tablet, Rfl: 3  ?  estradiol (ESTRACE) 0.01 % (0.1 mg/gram) vaginal cream, 2 x per week per OB/Gyn (Patient taking differently: Insert 2 g into the vagina 2 (two) times a week. 2 x per week per OB/Gyn   ), Disp: 42.5 g, Rfl: 1  ?  hydroCHLOROthiazide (HYDRODIURIL) 25 MG tablet, Take 1 tablet (25 mg total) by mouth daily., Disp: 90 tablet, Rfl: 3  ?  lisinopril (PRINIVIL,ZESTRIL) 40 MG tablet, Take 1 tablet (40 mg total) by mouth daily., Disp: 90 tablet, Rfl: 3  ?  melatonin 3 mg Tab tablet, Take 3 mg by mouth at bedtime as needed., Disp: , Rfl:   ?  traMADol (ULTRAM) 50 mg tablet, TAKE 2 TABLETS BY MOUTH IN THE MORNING AND 1 TABLET IN THE AFTERNOON AND BEFORE BED, Disp: 90 tablet, Rfl: 0  ?  zolpidem (AMBIEN CR) 6.25 MG CR  "tablet, Take 1 tablet (6.25 mg total) by mouth at bedtime as needed for sleep., Disp: 30 tablet, Rfl: 0    Review of Systems:  12 point systems were reviewed with pt as documented on pt health form of 5/24/2019. ROS was positive for fatigue, difficulty sleeping due to pain, decreased hearing, glasses, sinus pain, dry mouth, cough, urinary frequency, swelling in legs  the rest of the systems were pertinent negative.  (General, Cardiac, Pulmonary, Integumentary, Musculoskeletal, Neurological,GI, , GYN, Endocrine, Hematological and Psychological)    Exam  Vitals:    05/24/19 1335   BP: 117/63   Patient Site: Right Arm   Patient Position: Sitting   Pulse: 75   Resp: 16   Weight: 154 lb (69.9 kg)   Height: 5' 2\" (1.575 m)       Constitutional-General:  Well nourished, well developed, well groomed, healthy appearing individual.  Weight is over weight, appears younger than stated age and presents alone.  Psych-Mental Status: A & O in no acute distress. Speech is fluent. Thought process normal. Recent and remote memory are intact.  Attention span and concentration are normal. Displays appropriate mood and affect.   H,E,N,T- Symmetrical, Eyes- Perrla, Nares- patents bilaterally, throat- trachea is midline, airway is patent, unlabored respiratory effort.  Lymph-cervical lymph system (anterior and posterior) without palpable nodules or tenderness throughout. Supraclavicular chain without palpable nodules or tenderness throughout.   Cardiovascular- Bilateral legs and feet are warm.  Pulses are palpable and grade 2 at the posterior tibial arteries bilaterally, no edema noted.   Musckuloskeletal  Gait:  Gait is normal.  Ambulates independently.    Gross Motor: Toe walking unable due to pain, heel walking ok, and tandem walk and Romberg tests for balance are normal.   Strength:  Bilateral upper and lower extremity strength testing (see below). No atrophy or tone abnormalities noted.   Muscles   Right  Left   Hip Flex   5 5  Hip " Abd   5 5    Quads    5 5   Hamstrings   5 5  Dorsiflex   5 5  Plantarflex  5 5  Toe ext   5 5  Toe flex   5 5    Sensation:  No loss of sensation noted to light touch in both upper and lower extremities. No dermatomal abnormal distributions noted.  Joint Palpation: Inspection and palpation examination of the bilateral feet. Tenderness is noted in the dorsal aspect bilateral feet.  No bony deformities, swelling, ecchymosis noted.   Neuro:  Bilateral upper and lower extremity coordination and muscle stretch reflexes are physiologic and symmetric 2/4.  Babinski responses are downgoing.     Integumentary: no rashes or breaks in the skin, no open wounds. Exposed skin is clean, dry, intact to inspection and palpation.      CRPS exam Negative for bilateral foot:  Changes in skin temperature (warmer or cooler)  Changes in skin color   Hypersensitivity to touch   Changes in hair/nail growth.  Bone and muscle loss/changes, atrophy/weakness.   Swelling and stiffness in effected joints.  Tremors.  Problems moving the effected extremity/body part.     Lab:  Last UDS on 5/23/2019 was taken today and is pending.      Imaging:    :  Dated 5/23/2019 was reviewed with the patient    ORT: 0    Assessment :  After reviewing the patients chart and physical findings I agree that the patient would benefit from our pain center multidisciplinary treatment approach.   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  She has had numerous injections and surgical consults with several podiatrists and is not pursuing surgery at this time. We reviewed the natural progression of this diagnosis.  We discussed possible benefits and detriments of physical therapy, integrative care such as acupuncture/chiropractor, medication management, behavorial therapy, and other alternative treatments including supplements and diet.       Interventions: No further recommended as she has had  multiple foot injections under fluoroscopy which initially provided relief but reduced benefit over time.  Physical Medicine and rehabilitations: I am referring the patient for a physical therapy evaluation and treatment for pain control, improvement of function, and assisting the patient to develop a daily routine to support achievement and, where necessary, readjustment of habits and roles according to the patient capacity and life situation.  Will trial warm pool as she is looking for a tolerable exercise that does not aggravate her foot pain as walking does.  Medication Management: We discussed medication options to manage the patient. After discussion of potential adverse effects versus benefits, the patient agreed to proceed with a trial of topical compound with lidocaine and anti-inflammatory medications. Also discussed other options for extended release pain control including Butrans patch for 24 hour pain coverage, safety with scheduled 3 opioid less risk of respiratory depression and renal concerns and safer for elderly population.  She is educated in detail about this medication and proper use, disposal, and possible risks and side effects.  The patient understands that opiate/controlled pain medication is not prescribed during the initial patient consultation at the pain center. If the patient is on pain medications for chronic pain, the PCP or prescribing provider may choose  to prescribe until the patient presents for follow up at the pain center. Medication prescriptions provided by the pain center will depend on the UA results, safe prescribing practices established by the CDC guidelines and patient response to their current treatment regimen at the follow up visit.      Plan recommended today:    Follow up in 4 weeks with Aleja ARCEO    Continue your current regimen of alleviating factors as reviewed today    Please see your current provider for any refill of an opioid prescription; they may choose  to provide a refill until we have your urine screen back. They will continue to manage your general health and have requested you see the pain center for pain management. Please discuss any health concerns with your primary care physician.    [place] Precautions are taken with every patient which includes a  report and drug screen. A urine drug screen will be taken today for baseline screening.  Once this returns as expected (soonest will be next Tuesday, 05/28) we will send the Butrans patch 5 mcg/hr dose to your pharmacy.  Apply 1 patch for 7 days, then remove patch and change skin site for a new patch.  Patient brochure given today. During this time, you may still need to take tramadol 50 mg up to 3 times a day.  If your pain is improved, you can reduce dose of Tramadol to 50 mg 1-2 times a day as needed.  I would expect to increase the dose of the patch as needed for pain in follow-up and then discontinue tramadol.    Physical and Occupational Therapy- referral to [facility] for evaluation and treatment for warm pool and land recommendations.  They can help with balance and finding exercises that are tolerated with your foot pain.    MTM visit with the pharmacist may be useful in the future to evaluate pain medications.  Non-opoid medical management includes- Trial of topical compounded medication with lidocaine 5%, ibuprofen 10%, and diclofenac 5% apply 1-2 grams to painful feet 3-4 times a day.  [facility] (previously named [facility])  [address] [place] [address]  Phone # [phone]  [email or web address]   Make sure to ask them about out of pocket cost and delivery options.    May consider trial of CBD oil or medical cannabis at the dispensary in the future.    Continue use of ice and orthotics    Education was provided today in regards to the patient's specific diagnosis     Diagnosis  1. Chronic pain syndrome    2. Foot pain, bilateral    3. Chronic, continuous use of  opioids      Patient reminders:   Diagnostics: UDT/SWAB collected 5/24/2019 and results are pending.  UDT/SWAB:  Patient required a random Urine Drug Testing, due to the need to comply with [place] Policy Guidelines and CDC Guideline for the use of any controlled substances. This is to ensure that patient is compliant with treatment, and monitor for risks such as diversion, abuse, or any other aberrant behaviors. Patient is either being considered for or taking a controlled substance. Unexpected findings will be discussed and treatment decision may be adjusted. Testing is being implemented across the board randomly w/o bias related to age, race, gender, socioeconomic status or Protestant affiliation.     SAFETY REMINDERS  No alcohol while taking controlled substances. Alcohol is not an illegal substance, it is unsafe to use in combination. It is a build up of substances in the body that can be extremely hazardous and may cause respirations to slow to a dangerous rate resulting in hospitalization, brain damage, or death.    Opioid medications have been associated with sharp rise in unintentional overdose and death.  Overdose is a condition characterized by the consumption in excess of a particular drug causing adverse effects. This can happen b/c you are sick, accidentally or intentionally took an extra dose, are on multiple medication that can interact. Someone took your medication and they are not use to the medication.  Symptoms of overdose include:   !breathing slow and shallow, erratic or not at all  !pinpoint pupils, hallucinations  !confusion  !muscle jerks, slack muscles   !extreme sleepiness or loss of alertness   !awake but not able to talk   !face pale or clammy, vomiting, for lighter skinned people, the skin tone turns bluish purple, for darker skinned people, it turns grayish or ashen   If in a situation where overdose is a concern engage the emergency response system (dial 911).    In one study  it was noted that 80% of unintentional overdoses occurred in people who were taking a combination of opioids and benzodiazepines.    Do not sell, loan, borrow or share your opioid medication with anyone. Deaths have occurred as a result of this practice. It is illegal and patients are being prosecuted.     Prevent unexpected access/loss of medication: Keep medication locked. Only carry what you need with you.    The patient agrees to the plan and has no further questions, if questions arise the patient knows to call [phone].     Thank you for this consult and opportunity to assist with this patients care.    Greater than 50% of this 45 minute visit spent in face to face discussion of patient pain symptoms, pain history and treatments, and recommendations for multidisciplinary care approaches including medications, PT/OT, Behavioral Health, integrative care.    Aleja Pitts PA-C  [facility]  [address]  [place] [address]  Ph: [phone]  Fax: [phone]

## 2021-06-29 NOTE — PROGRESS NOTES
Progress Notes by Elizabeth Holley MD at 9/3/2020 11:10 AM     Author: Elizabeth Holely MD Service: -- Author Type: Physician    Filed: 9/3/2020 11:51 AM Encounter Date: 9/3/2020 Status: Signed    : Elizabeth Holley MD (Physician)           [facility]  Heart Care Clinic Follow-up Note    Assessment & Plan        1. Paroxysmal atrial fibrillation (H) -atrial fibrillation, asymptomatic paroxysmal and up to 38 hours or 15% on her ACT monitor.  Now on Eliquis 2.5 twice daily secondary to advanced CHADS 2 VASC score of 4.  Does not feel it internally, she feels her pulse she can feel it racing.  No antiarrhythmic therapy just yet.  She probably has an element of sick sinus syndrome and we have discussed pacemaker yet again, if symptoms persist despite lower blood pressure pills I may need to have her wear heart monitor to document rhythm disturbance yet again.   2. Other pulmonary embolism without acute cor pulmonale, unspecified chronicity (H) -on chronic Eliquis 2.5 mg p.o. twice daily, in addition has had DVTs in the past.  Creatinine is not greater than 1.5 but she is older than 80 and thus a lower dose.   3. Pure hypercholesterolemia -cholesterol 179 with LDL of 80 and we have chosen dietary therapy given her age.   4. Benign essential hypertension -under good control and if anything possibly orthostatic.  We will have her lower dose of lisinopril to 20 mg a day, she will call blood pressures into us, and if do not go up too much we will then give her 20 mg lisinopril prescription.   5. Acute on chronic heart failure with preserved ejection fraction (H) -no significant signs or symptoms currently in the face of preserved ejection fraction of 58%.   6.  Near syncopal spell-she feels this was due to the fact that she got up quickly and was half asleep.  She will not get up to eat anymore at nighttime.  I am concerned about the orthostasis and will back off on lisinopril to 20 mg a day.  In addition,  could potentially be an element of sick sinus syndrome with sinus bradycardia and if symptoms persist despite lower dose of lisinopril I will arrange for 30-day event monitor or Holter monitor.    Plan  1.  Lower dose of lisinopril to 20 mg a day, she will call and let us know what blood pressures are.  If good will give her a 20 mg prescription so she does not have to continue cutting her 40 mg in half.  2.  If symptoms persist of lightheadedness despite good blood pressure, will obtain an event monitor, if symptomatic bradycardia will need pacemaker.  3.  She will call blood pressures in a week to 2 weeks but plan on follow-up with me in 6 months.    Subjective  CC: 89-year-old white female being seen in follow-up today.  Since I saw her she was hospitalized for chest discomfort and felt to have reflux which is doing well with omeprazole.  This past Saturday night, she got up from sleep to get something to eat, called herself extremely lightheaded and slumped to the ground.  There was no true syncope.  She states that may be once every 2 months she does have an orthostatic episode where she needs to sit down immediately.  There is no true syncope.  There is no true palpitation or shortness of breath or PND or orthopnea.  She states that she feels her pulse sometimes she will get palpitations.    Medications  Current Outpatient Medications   Medication Sig Note   ? amLODIPine (NORVASC) 5 MG tablet Take 1 tablet (5 mg total) by mouth daily.    ? apixaban (ELIQUIS) 2.5 mg Tab tablet Take 1 tablet (2.5 mg total) by mouth 2 (two) times a day.    ? cholecalciferol, vitamin D3, (VITAMIN D3) 2,000 unit Tab Take 1 tablet (2,000 Units total) by mouth daily.    ? DULoxetine (CYMBALTA) 60 MG capsule TAKE ONE CAPSULE BY MOUTH TWICE DAILY     ? estradiol (ESTRACE) 0.01 % (0.1 mg/gram) vaginal cream 2 x per week per OB/Gyn (Patient taking differently: Insert 2 g into the vagina once a week. 2 x per week per OB/Gyn      )  "11/20/2019: Sundays   ? hydroCHLOROthiazide (HYDRODIURIL) 25 MG tablet Take 1 tablet (25 mg total) by mouth daily.    ? lisinopriL (PRINIVIL,ZESTRIL) 40 MG tablet Take 1 tablet (40 mg total) by mouth daily.    ? traMADoL (ULTRAM) 50 mg tablet Take 2 tablets (100 mg) by mouth at noon, 1 tablet (50 mg) by mouth at bedtime.    ? zolpidem (AMBIEN CR) 6.25 MG CR tablet Take 1 full tab and 1/4 tab at bedtime        Objective  /73 (Patient Site: Left Arm, Patient Position: Sitting, Cuff Size: Adult Regular)   Pulse 77   Resp 14   Ht 5' 2\" (1.575 m)   Wt 166 lb (75.3 kg)   BMI 30.36 kg/m      General Appearance:    Alert, cooperative, no distress, appears stated age   Head:    Normocephalic, without obvious abnormality, atraumatic   Throat:   Lips, mucosa, and tongue normal; teeth and gums normal   Neck:   Supple, symmetrical, trachea midline, no adenopathy;        thyroid:  No enlargement/tenderness/nodules; no carotid    bruit or JVD   Back:     Symmetric, no curvature, ROM normal, no CVA tenderness   Lungs:     Clear to auscultation bilaterally, respirations unlabored   Chest wall:    No tenderness or deformity   Heart:    Regular rate and rhythm, S1 and S2 normal, no murmur, rub   or gallop   Abdomen:     Soft, non-tender, bowel sounds active all four quadrants,     no masses, no organomegaly   Extremities:   Normal, atraumatic, no cyanosis or edema   Pulses:   2+ and symmetric all extremities   Skin:   Skin color, texture, turgor normal, no rashes or lesions     Results    Lab Results personally reviewed   Lab Results   Component Value Date    CHOL 179 03/12/2015    CHOL 179 09/30/2013     Lab Results   Component Value Date    HDL 64 03/12/2015    HDL 64 09/30/2013     Lab Results   Component Value Date    LDLCALC 80 03/12/2015    LDLCALC 84 09/30/2013     Lab Results   Component Value Date    TRIG 176 (H) 03/12/2015    TRIG 155 (H) 09/30/2013     Lab Results   Component Value Date    WBC 10.2 11/20/2019    " HGB 11.9 (L) 11/20/2019    HCT 35.3 11/20/2019     11/20/2019     Lab Results   Component Value Date    CREATININE 1.35 (H) 12/02/2019    BUN 30 (H) 12/02/2019     12/02/2019    K 4.8 12/02/2019    CO2 25 12/02/2019     Review of Systems:   General: WNL  Eyes: WNL  Ears/Nose/Throat: WNL  Lungs: Cough  Heart: Irregular Heartbeat  Stomach: Diarrhea, Heartburn  Bladder: WNL  Muscle/Joints: WNL  Skin: WNL  Nervous System: WNL  Mental Health: WNL     Blood: WNL

## 2021-06-30 ENCOUNTER — RECORDS - HEALTHEAST (OUTPATIENT)
Dept: ADMINISTRATIVE | Facility: OTHER | Age: 86
End: 2021-06-30

## 2021-06-30 DIAGNOSIS — I49.9 IRREGULAR HEART RHYTHM: ICD-10-CM

## 2021-06-30 RX ORDER — APIXABAN 2.5 MG/1
TABLET, FILM COATED ORAL
Qty: 180 TABLET | Refills: 1 | Status: SHIPPED | OUTPATIENT
Start: 2021-06-30 | End: 2021-12-15

## 2021-06-30 NOTE — PROGRESS NOTES
Progress Notes by Elizabeth Holley MD at 3/3/2021 11:30 AM     Author: Elizabeth Holley MD Service: -- Author Type: Physician    Filed: 3/3/2021 12:30 PM Encounter Date: 3/3/2021 Status: Signed    : Elizabeth Holley MD (Physician)           [facility]  Heart Care Clinic Follow-up Note    Assessment & Plan        1. Chronic diastolic congestive heart failure (H) - EF 55%, Grade II (moderate) left ventricular diastolic dysfunction per echo 1/ 2018-having some shortness of breath about 3 times a week when getting up walking.  Do not hear anything major on exam but will check BNP today and if elevated will switch HCTZ over to furosemide.   2. Paroxysmal atrial fibrillation (H) - asymptomatic paroxysmal and up to 38 hours or 15% on her ACT monitor.  Now on Eliquis 2.5 twice daily secondary to advanced CHADS 2 VASC score of 4.  Does not feel it internally, she feels her pulse racing.  No antiarrhythmic therapy just yet.  She probably has an element of sick sinus syndrome and we have discussed pacemaker yet again.given the shortness of breath on arising, 3 times a week, wonder if this is paroxysmal atrial fibrillation and will have her wear the event monitor for 2 weeks again, if symptoms correlate with atrial fibrillation will then need antiarrhythmic therapy.     3. Other pulmonary embolism without acute cor pulmonale, unspecified chronicity (H)  -on chronic Eliquis 2.5 mg p.o. twice daily, in addition has had DVTs in the past.  Creatinine is not greater than 1.5 but she is older than 80 and thus a lower dose   4. Pure hypercholesterolemia-cholesterol 179 from 2015 and given age we are not starting specific meds for this.   5. Benign essential hypertension-under good control with lower dose of amlodipine and lisinopril.   6. 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, unspecified whether native or transplanted heart (H)-nuclear stress test March 2017 normal and thus no obvious  epicardial coronary artery disease.     Plan  1.  Check BNP and if elevated switch HCTZ over to furosemide.  2.  Check 14-day ACT monitor to see if atrial fibrillation recurs and correlates with symptoms, in which case we will use antiarrhythmic such as Tambocor.  3.  Follow-up me in about 6 months or sooner if needed.    Subjective  CC: 89-year-old white female being seen in follow-up today.  She is usually here with our friend Doreen who apparently is at home sick.  The patient tells me maybe 3 times a week or so when she gets up to start walking she does feel short of breath and this eventually gets better.  There is no PND, orthopnea, chest discomfort, sustained palpitations, syncope, dizziness or peripheral edema or increased weight.    Medications  Current Outpatient Medications   Medication Sig Note   ? amLODIPine (NORVASC) 5 MG tablet Take 0.5 tablets (2.5 mg total) by mouth daily.    ? cholecalciferol, vitamin D3, (VITAMIN D3) 2,000 unit Tab Take 1 tablet (2,000 Units total) by mouth daily.    ? diclofenac sod, micro (bulk) 100 % 5 %, ibuprofen (bulk) 100 % 10 %, lidocaine (bulk) 5 % Apply 4 g topically 4 (four) times a day as needed.    ? DULoxetine (CYMBALTA) 60 MG capsule TAKE ONE CAPSULE BY MOUTH TWICE DAILY     ? ELIQUIS 2.5 mg Tab tablet Take 1 tablet (2.5 mg total) by mouth 2 (two) times a day.    ? estradiol (ESTRACE) 0.01 % (0.1 mg/gram) vaginal cream 2 x per week per OB/Gyn (Patient taking differently: Insert 2 g into the vagina once a week. 2 x per week per OB/Gyn      ) 11/20/2019: Sundays   ? hydroCHLOROthiazide (HYDRODIURIL) 25 MG tablet Take 1 tablet (25 mg total) by mouth daily.    ? lisinopriL (PRINIVIL,ZESTRIL) 40 MG tablet Take 1 tablet (40 mg total) by mouth daily. (Patient taking differently: 20 mg. )    ? omeprazole (PRILOSEC OTC) 20 MG tablet Take 1 tablet (20 mg total) by mouth daily as needed.    ? traMADoL (ULTRAM) 50 mg tablet Take 2 tablets (100 mg) by mouth at noon and 1 tablet  (50 mg) by mouth at bedtime.    ? zolpidem (AMBIEN CR) 6.25 MG CR tablet take 1 and 1/4 tablet by mouth at bedtime        Objective  /68 (Patient Site: Left Arm, Patient Position: Sitting, Cuff Size: Adult Regular)   Pulse (!) 52   Resp 16   Wt 163 lb 6.4 oz (74.1 kg)   SpO2 94%   BMI 29.89 kg/m      General Appearance:    Alert, cooperative, no distress, appears stated age   Head:    Normocephalic, without obvious abnormality, atraumatic   Throat:   Lips, mucosa, and tongue normal; teeth and gums normal   Neck:   Supple, symmetrical, trachea midline, no adenopathy;        thyroid:  No enlargement/tenderness/nodules; no carotid    bruit or JVD   Back:     Symmetric, no curvature, ROM normal, no CVA tenderness   Lungs:     Clear to auscultation bilaterally, respirations unlabored   Chest wall:    No tenderness or deformity   Heart:    Regular rate and rhythm, S1 and S2 normal, no murmur, rub   or gallop   Abdomen:     Soft, non-tender, bowel sounds active all four quadrants,     no masses, no organomegaly   Extremities:   Normal, atraumatic, no cyanosis or edema   Pulses:   2+ and symmetric all extremities   Skin:   Skin color, texture, turgor normal, no rashes or lesions     Results    Lab Results personally reviewed   Lab Results   Component Value Date    CHOL 179 03/12/2015    CHOL 179 09/30/2013     Lab Results   Component Value Date    HDL 64 03/12/2015    HDL 64 09/30/2013     Lab Results   Component Value Date    LDLCALC 80 03/12/2015    LDLCALC 84 09/30/2013     Lab Results   Component Value Date    TRIG 176 (H) 03/12/2015    TRIG 155 (H) 09/30/2013     Lab Results   Component Value Date    WBC 10.2 11/20/2019    HGB 11.9 (L) 11/20/2019    HCT 35.3 11/20/2019     11/20/2019     Lab Results   Component Value Date    CREATININE 1.35 (H) 12/02/2019    BUN 30 (H) 12/02/2019     12/02/2019    K 4.8 12/02/2019    CO2 25 12/02/2019       Review of Systems:   General: WNL  Eyes:  WNL  Ears/Nose/Throat: WNL  Lungs: Shortness of Breath  Heart: Shortness of Breath with activity, Irregular Heartbeat  Stomach: WNL  Bladder: WNL  Muscle/Joints: WNL  Skin: WNL  Nervous System: WNL  Mental Health: WNL     Blood: WNL

## 2021-06-30 NOTE — PROGRESS NOTES
Progress Notes by Elizabeth Holley MD at 4/19/2021 10:30 AM     Author: Elizabeth Holley MD Service: -- Author Type: Physician    Filed: 4/19/2021 11:58 AM Encounter Date: 4/19/2021 Status: Signed    : Elizabeth Holley MD (Physician)           [facility]  [facility] Follow-up Note    Assessment & Plan        1. Paroxysmal atrial fibrillation (H) -symptomatic, not valvular, advanced [id number] score of 4 giving her a 4.8% chance of stroke per year.  Currently on Eliquis 2.5 mg p.o. twice daily given her age of 89 and renal dysfunction, also on Tambocor low-dose, 25 mg p.o. twice daily.  Given her age and renal dysfunction concerned about toxicity and checking Tambocor blood level today as well as creatinine.  Would like these tested every 6 months.  If she has breakthrough arrhythmias might need to consider other therapy but a little hesitant to pursue ablation in a 89-year-old woman.   2. Benign essential hypertension-under good control currently.   3. Chronic diastolic congestive heart failure (H) - EF 55%, Grade II (moderate) left ventricular diastolic dysfunction per echo 1/ 2018-no significant signs or symptoms currently.   4. Deep vein thrombosis (DVT) of distal vein of lower extremity, unspecified chronicity, unspecified laterality (H)-on Eliquis due to this.   5. Pure hypercholesterolemia-179 total with 80 LDL and this is from 2015 and not currently on treatment given her age.   6. Other pulmonary embolism without acute cor pulmonale, unspecified chronicity (H)-anticoagulation as above.     Plan  1.  Check creatinine given that she has chronic kidney disease and not checked in over a year as well as Tambocor blood level.  Address if needed.  2.  Follow-up with me in 6 months given these issues with blood levels or sooner if needed.    Subjective  CC: 89-year-old white female here for follow-up today.  Since I saw her her event monitor has come back with 27% atrial fibrillation.   "This was symptomatic with shortness of breath and heart racing.  Since on the Tambocor she has had none of this, getting along well living independently in senior housing still cooking.  No significant palpitations, chest discomfort, shortness of breath, PND, orthopnea, syncope, dizziness or peripheral edema.    Medications  Current Outpatient Medications   Medication Sig Note   ? acetaminophen (TYLENOL) 325 MG tablet Take 650 mg by mouth every 6 (six) hours as needed for pain.    ? amLODIPine (NORVASC) 5 MG tablet Take 0.5 tablets (2.5 mg total) by mouth daily.    ? cholecalciferol, vitamin D3, (VITAMIN D3) 2,000 unit Tab Take 1 tablet (2,000 Units total) by mouth daily.    ? diclofenac sod, micro (bulk) 100 % 5 %, ibuprofen (bulk) 100 % 10 %, lidocaine (bulk) 5 % Apply 4 g topically 4 (four) times a day as needed.    ? DULoxetine (CYMBALTA) 60 MG capsule TAKE ONE CAPSULE BY MOUTH TWICE DAILY     ? ELIQUIS 2.5 mg Tab tablet Take 1 tablet (2.5 mg total) by mouth 2 (two) times a day.    ? estradiol (ESTRACE) 0.01 % (0.1 mg/gram) vaginal cream 2 x per week per OB/Gyn (Patient taking differently: Insert 2 g into the vagina once a week. 2 x per week per OB/Gyn      ) 11/20/2019: Sundays   ? flecainide (TAMBOCOR) 50 MG tablet Take 0.5 tablets (25 mg total) by mouth 2 (two) times a day.    ? hydroCHLOROthiazide (HYDRODIURIL) 25 MG tablet Take 1 tablet (25 mg total) by mouth daily.    ? lisinopriL (PRINIVIL,ZESTRIL) 40 MG tablet Take 1 tablet (40 mg total) by mouth daily.    ? omeprazole (PRILOSEC OTC) 20 MG tablet Take 1 tablet (20 mg total) by mouth daily as needed.    ? traMADoL (ULTRAM) 50 mg tablet Take 2 tablets (100 mg) by mouth at noon and 1 tablet (50 mg) by mouth at bedtime.    ? zolpidem (AMBIEN CR) 6.25 MG CR tablet take 1 and 1/4 tablet by mouth at bedtime.        Objective  /70 (Patient Site: Left Arm, Patient Position: Sitting, Cuff Size: Adult Large)   Pulse 78   Resp 14   Ht 5' 2\" (1.575 m)   Wt " 167 lb (75.8 kg)   BMI 30.54 kg/m      General Appearance:    Alert, cooperative, no distress, appears stated age   Head:    Normocephalic, without obvious abnormality, atraumatic   Throat:   Lips, mucosa, and tongue normal; teeth and gums normal   Neck:   Supple, symmetrical, trachea midline, no adenopathy;        thyroid:  No enlargement/tenderness/nodules; no carotid    bruit or JVD   Back:     Symmetric, no curvature, ROM normal, no CVA tenderness   Lungs:     Clear to auscultation bilaterally, respirations unlabored   Chest wall:    No tenderness or deformity   Heart:    Regular rate and rhythm, S1 and S2 normal, no murmur, rub   or gallop   Abdomen:     Soft, non-tender, bowel sounds active all four quadrants,     no masses, no organomegaly   Extremities:   Normal, atraumatic, no cyanosis or edema   Pulses:   2+ and symmetric all extremities   Skin:   Skin color, texture, turgor normal, no rashes or lesions     Results    Lab Results personally reviewed   Lab Results   Component Value Date    CHOL 179 03/12/2015    CHOL 179 09/30/2013     Lab Results   Component Value Date    HDL 64 03/12/2015    HDL 64 09/30/2013     Lab Results   Component Value Date    LDLCALC 80 03/12/2015    LDLCALC 84 09/30/2013     Lab Results   Component Value Date    TRIG 176 (H) 03/12/2015    TRIG 155 (H) 09/30/2013     Lab Results   Component Value Date    WBC 10.2 11/20/2019    HGB 11.9 (L) 11/20/2019    HCT 35.3 11/20/2019     11/20/2019     Lab Results   Component Value Date    CREATININE 1.35 (H) 12/02/2019    BUN 30 (H) 12/02/2019     12/02/2019    K 4.8 12/02/2019    CO2 25 12/02/2019     Reviewed electrocardiogram sinus rhythm with a left bundle branch block pattern, corrected QT interval of only 466 ms.    Review of Systems:   General: WNL  Eyes: WNL  Ears/Nose/Throat: WNL  Lungs: Shortness of Breath  Heart: Irregular Heartbeat  Stomach: WNL  Bladder: WNL  Muscle/Joints: Joint Pain  Skin: WNL  Nervous System:  WNL  Mental Health: WNL     Blood: WNL

## 2021-07-03 NOTE — ADDENDUM NOTE
Addendum Note by Hannah Blunt CMA at 8/17/2017  9:27 AM     Author: Hannah Blunt CMA Service: -- Author Type: Certified Medical Assistant    Filed: 8/17/2017  9:27 AM Encounter Date: 8/14/2017 Status: Signed    : Hannah Blunt CMA (Certified Medical Assistant)    Addended by: HANNAH BLUNT on: 8/17/2017 09:27 AM        Modules accepted: Orders

## 2021-07-03 NOTE — ADDENDUM NOTE
Addendum Note by Pretty Jarrell MD at 1/8/2019  9:00 AM     Author: Pretty Jarrell MD Service: -- Author Type: Physician    Filed: 1/8/2019  5:12 PM Encounter Date: 1/8/2019 Status: Signed    : Pretty Jarrell MD (Physician)    Addended by: PRETTY JARRELL on: 1/8/2019 05:12 PM        Modules accepted: Orders

## 2021-07-03 NOTE — ADDENDUM NOTE
Addendum Note by Aleja Brandt LPN at 8/16/2017  1:53 PM     Author: Aleja Brandt LPN Service: -- Author Type: Licensed Nurse    Filed: 8/16/2017  1:53 PM Encounter Date: 8/14/2017 Status: Signed    : Aleja Brandt LPN (Licensed Nurse)    Addended by: ALEJA BRANDT on: 8/16/2017 01:53 PM        Modules accepted: Orders

## 2021-07-03 NOTE — ADDENDUM NOTE
Addendum Note by Prasanth Hill, RN at 9/14/2020  3:29 PM     Author: Prasanth Hill RN Service: -- Author Type: Registered Nurse    Filed: 9/14/2020  3:29 PM Encounter Date: 9/14/2020 Status: Signed    : Prasanth Hill RN (Registered Nurse)    Addended by: PRASANTH HILL on: 9/14/2020 03:29 PM        Modules accepted: Orders

## 2021-07-04 NOTE — LETTER
Letter by Lore Martin MD at      Author: Lore Martin MD Service: -- Author Type: --    Filed:  Encounter Date: 6/17/2021 Status: (Other)         Gladys Ramirez  [address]  [facility] [address]       June 17, 2021       Dear Ms. Ramirez,    Below are the results from your recent visit:    Resulted Orders   HM2(CBC w/o Differential)   Result Value Ref Range    WBC 7.0 4.0 - 11.0 thou/uL    RBC 4.15 3.80 - 5.40 mill/uL    Hemoglobin 12.5 12.0 - 16.0 g/dL    Hematocrit 37.4 35.0 - 47.0 %    MCV 90 80 - 100 fL    MCH 30.1 27.0 - 34.0 pg    MCHC 33.4 32.0 - 36.0 g/dL    RDW 14.3 11.0 - 14.5 %    Platelets 289 140 - 440 thou/uL    MPV 10.1 (H) 7.0 - 10.0 fL   Vitamin D, Total (25-Hydroxy)   Result Value Ref Range    Vitamin D, Total (25-Hydroxy) 52.7 30.0 - 80.0 ng/mL    Narrative    Deficiency <10.0 ng/mL  Insufficiency 10.0-29.9 ng/mL  Sufficiency 30.0-80.0 ng/mL  Toxicity (possible) >100.0 ng/mL   Glycosylated Hemoglobin A1c   Result Value Ref Range    Hemoglobin A1c 6.0 (H) <=5.6 %     Your labs look great!  Take care and have a good summer.     Please call with questions or contact us using Pull.    Sincerely,    Electronically signed by Lore Martin MD

## 2021-07-04 NOTE — PATIENT INSTRUCTIONS - HE
Patient Instructions by Lore Martin MD at 6/15/2021  8:00 AM     Author: Lore Martin MD Service: -- Author Type: Physician    Filed: 6/15/2021  8:24 AM Encounter Date: 6/15/2021 Status: Signed    : Lore Martin MD (Physician)         Patient Education   Understanding USDA MyPlate  The USDA (US Department of Agriculture) has guidelines to help you make healthy food choices. These are called MyPlate. MyPlate shows the food groups that make up healthy meals using the image of a place setting. Before you eat, think about the healthiest choices for what to put onto your plate or into your cup or bowl. To learn more about building a healthy plate, visit www.choosemyplate.gov.       The Food Groups    Fruits: Any fruit or 100% fruit juice counts as part of the Fruit Group. Fruits may be fresh, canned, frozen, or dried, and may be whole, cut-up, or pureed. Make half your plate fruits and vegetables.    Vegetables: Any vegetable or 100% vegetable juice counts as a member of the Vegetable Group. Vegetables may be fresh, frozen, canned, or dried. They can be served raw or cooked and may be whole, cut-up, or mashed. Make half your plate fruits and vegetables.     Grains: All foods made from grains are part of the Grains Group. These include wheat, rice, oats, cornmeal, and barley such as bread, pasta, oatmeal, cereal, tortillas, and grits. Grains should be no more than a quarter of your plate. At least half of your grains should be whole grains.    Protein: This group includes meat, poultry, seafood, beans and peas, eggs, processed soy products (like tofu), nuts (including nut butters), and seeds. Make protein choices no more than a quarter of your plate. Meat and poultry choices should be lean or low fat.    Dairy: All fluid milk products and foods made from milk that contain calcium, like yogurt and cheese are part of the Dairy Group. (Foods that have little calcium, such as cream, butter, and  cream cheese, are not part of the group.) Most dairy choices should be low-fat or fat-free.    Oils: These are fats that are liquid at room temperature. They include canola, corn, olive, soybean, and sunflower oil. Foods that are mainly oil include mayonnaise, certain salad dressings, and soft margarines. You should have only 5 to 7 teaspoons of oils a day. You probably already get this much from the food you eat.  Use Med Access to Help Build Your Meals  The [employer] can help you plan and track your meals and activity. You can look up individual foods to see or compare their nutritional value. You can get guidelines for what and how much you should eat. You can compare your food choices. And you can assess personal physical activities and see ways you can improve. Go to www.[employer].gov/[employer]/.    1147-7389 The [employer]. [address] [place] [address]. All rights reserved. This information is not intended as a substitute for professional medical care. Always follow your healthcare professional's instructions.           Patient Education   Instrumental Activities of Daily Living  Your Health Risk Assessment indicates you have difficulties with instrumental activities of daily living which include laundry, housekeeping, banking, shopping, using the telephone, food preparation, transportation, or taking your own medications. Please make a follow up appointment for us to address this issue in more detail.    [employer] has resources available on the following website: https://www.healthAdCamp.org/caregivers.html     Also, here is a local agency that provides help with meals and other assistance:   [facility] Line: [phone]     Patient Education   Signs of Hearing Loss  Hearing loss is a problem shared by many people. In fact, it is one of the most common health conditions, particularly as people age. Most people over age 65 have some hearing loss, and by age 80, almost  everyone does. Because hearing loss usually occurs slowly over the years, you may not realize your hearing ability has gotten worse.       Have your hearing checked  Contact your [facility] care provider if you:    Have to strain to hear normal conversation.    Have to watch other peoples faces very carefully to follow what theyre saying.    Need to ask people to repeat what theyve said.    Often misunderstand what people are saying.    Turn the volume of the television or radio up so high that others complain.    Feel that people are mumbling when theyre talking to you.    Find that the effort to hear leaves you feeling tired and irritated.    Notice, when using the phone, that you hear better with 1 ear than the other.    1960-2703 [employer]. [address] [place] [address], [place] [address]. All rights reserved. This information is not intended as a substitute for professional medical care. Always follow your healthcare professional's instructions.         Patient Education   Preventing Falls in the Home  As you get older, falls are more likely. Thats because your reaction time slows. Your muscles and joints may also get stiffer, making them less flexible. Illness, medications, and vision changes can also affect your balance. A fall could leave you unable to live on your own. To make your home safer, follow these tips:    Floors    Put nonskid pads under area rugs.    Remove throw rugs.    Replace worn floor coverings.    Tack carpets firmly to each step on carpeted stairs. Put nonskid strips on the edges of uncarpeted stairs.    Keep floors and stairs free of clutter and cords.    Arrange furniture so there are clear pathways.    Clean up any spills right away.    Bathrooms    Install grab bars in the tub or shower.    Apply nonskid strips or put a nonskid rubber mat in the tub or shower.    Sit on a bath chair to bathe.    Use bathmats with nonskid backing.    Lighting    Keep a flashlight in each  room.    Put a nightlight along the pathway between the bedroom and the bathroom.    4866-6473 [employer]. [address] [place] [address], [place] [address]. All rights reserved. This information is not intended as a substitute for professional medical care. Always follow your healthcare professional's instructions.           Advance Directive  Patients advance directive was discussed and I am comfortable with the patients wishes.  Patient Education   Personalized Prevention Plan  You are due for the preventive services outlined below.  Your care team is available to assist you in scheduling these services.  If you have already completed any of these items, please share that information with your care team to update in your medical record.  Health Maintenance Due   Topic Date Due   ? HEART FAILURE ACTION PLAN  Never done   ? ZOSTER VACCINES (2 of 3) 11/19/2012   ? LIPID  03/12/2016   ? CBC  11/20/2020   ? ALT  12/02/2020

## 2021-07-06 VITALS
TEMPERATURE: 97.9 F | HEART RATE: 69 BPM | BODY MASS INDEX: 32.41 KG/M2 | DIASTOLIC BLOOD PRESSURE: 60 MMHG | WEIGHT: 165.1 LBS | RESPIRATION RATE: 12 BRPM | HEIGHT: 60 IN | OXYGEN SATURATION: 96 % | SYSTOLIC BLOOD PRESSURE: 120 MMHG

## 2021-07-14 PROBLEM — I25.119 ATHEROSCLEROSIS OF NATIVE CORONARY ARTERY WITH ANGINA PECTORIS, UNSPECIFIED WHETHER NATIVE OR TRANSPLANTED HEART (H): Status: RESOLVED | Noted: 2020-01-30 | Resolved: 2021-04-19

## 2021-07-14 PROBLEM — I50.30 (HFPEF) HEART FAILURE WITH PRESERVED EJECTION FRACTION (H): Status: RESOLVED | Noted: 2019-11-20 | Resolved: 2021-03-03

## 2021-07-15 DIAGNOSIS — I48.0 PAROXYSMAL ATRIAL FIBRILLATION (H): ICD-10-CM

## 2021-07-15 RX ORDER — FLECAINIDE ACETATE 50 MG/1
25 TABLET ORAL 2 TIMES DAILY
Qty: 90 TABLET | Refills: 1 | Status: SHIPPED | OUTPATIENT
Start: 2021-07-15 | End: 2021-12-15

## 2021-07-21 DIAGNOSIS — I10 ESSENTIAL HYPERTENSION: ICD-10-CM

## 2021-07-21 DIAGNOSIS — Z09 HOSPITAL DISCHARGE FOLLOW-UP: ICD-10-CM

## 2021-07-24 DIAGNOSIS — M19.071 ARTHRITIS OF MIDTARSAL JOINT OF RIGHT FOOT: ICD-10-CM

## 2021-07-24 DIAGNOSIS — M79.673 PAIN OF FOOT, UNSPECIFIED LATERALITY: Primary | ICD-10-CM

## 2021-07-24 DIAGNOSIS — M19.079 ARTHROSIS OF FOOT: ICD-10-CM

## 2021-07-24 DIAGNOSIS — Z79.899 CONTROLLED SUBSTANCE AGREEMENT SIGNED: ICD-10-CM

## 2021-07-25 RX ORDER — HYDROCHLOROTHIAZIDE 25 MG/1
25 TABLET ORAL DAILY
Qty: 90 TABLET | Refills: 3 | Status: SHIPPED | OUTPATIENT
Start: 2021-07-25 | End: 2022-07-20

## 2021-07-25 NOTE — TELEPHONE ENCOUNTER
"Last Written Prescription Date:  11/14/20  Last Fill Quantity: 90,  # refills: 2   Last office visit provider:  6/15/21     Requested Prescriptions   Pending Prescriptions Disp Refills     hydrochlorothiazide (HYDRODIURIL) 25 MG tablet [Pharmacy Med Name: hydroCHLOROthiazide Oral Tablet 25 MG] 90 tablet 0     Sig: TAKE ONE TABLET BY MOUTH ONE TIME DAILY       Diuretics (Including Combos) Protocol Passed - 7/21/2021  2:01 AM        Passed - Blood pressure under 140/90 in past 12 months     BP Readings from Last 3 Encounters:   06/15/21 120/60   04/19/21 122/70   03/03/21 128/68                 Passed - Recent (12 mo) or future (30 days) visit within the authorizing provider's specialty     Patient has had an office visit with the authorizing provider or a provider within the authorizing providers department within the previous 12 mos or has a future within next 30 days. See \"Patient Info\" tab in inbasket, or \"Choose Columns\" in Meds & Orders section of the refill encounter.              Passed - Medication is active on med list        Passed - Patient is age 18 or older        Passed - No active pregancy on record        Passed - Normal serum creatinine on file in past 12 months     Recent Labs   Lab Test 04/19/21  1211   CR 0.97              Passed - Normal serum potassium on file in past 12 months     Recent Labs   Lab Test 04/19/21  1211   POTASSIUM 4.1                    Passed - Normal serum sodium on file in past 12 months     Recent Labs   Lab Test 04/19/21  1211                 Passed - No positive pregnancy test in past 12 months             onur saeed RN 07/25/21 1:11 PM  "

## 2021-07-25 NOTE — TELEPHONE ENCOUNTER
Routing refill request to provider for review/approval because:  Drug not on the [facility] refill protocol   ___________________________________________________________    Copy of Last Rx:        Last office visit with provider:  6/15/21   ___________________________________________________________    Requested Prescriptions   Pending Prescriptions Disp Refills     traMADol (ULTRAM) 50 MG tablet [Pharmacy Med Name: traMADol HCl Oral Tablet 50 MG] 90 tablet 0     Sig: Take 2 tablets (100 mg) by mouth at noon and 1 tablet (50 mg) by mouth at bedtime.       There is no refill protocol information for this order        zolpidem ER (AMBIEN CR) 6.25 MG CR tablet [Pharmacy Med Name: Zolpidem Tartrate ER Oral Tablet Extended Release 6.25 MG] 45 tablet 0     Sig: take 1 and 1/4 tablet by mouth at bedtime.       There is no refill protocol information for this order          Evi Tafoya RN 07/25/21 11:47 AM

## 2021-07-26 RX ORDER — TRAMADOL HYDROCHLORIDE 50 MG/1
TABLET ORAL
Qty: 90 TABLET | Refills: 0 | Status: SHIPPED | OUTPATIENT
Start: 2021-07-26 | End: 2021-10-15

## 2021-07-30 RX ORDER — ZOLPIDEM TARTRATE 6.25 MG/1
TABLET, FILM COATED, EXTENDED RELEASE ORAL
Qty: 45 TABLET | Refills: 0 | Status: SHIPPED | OUTPATIENT
Start: 2021-07-30 | End: 2021-09-08

## 2021-08-05 DIAGNOSIS — M16.12 PRIMARY OSTEOARTHRITIS OF LEFT HIP: Primary | ICD-10-CM

## 2021-08-08 DIAGNOSIS — Z09 HOSPITAL DISCHARGE FOLLOW-UP: ICD-10-CM

## 2021-08-08 DIAGNOSIS — I10 ESSENTIAL HYPERTENSION: ICD-10-CM

## 2021-08-09 RX ORDER — LISINOPRIL 40 MG/1
TABLET ORAL
Qty: 90 TABLET | Refills: 0 | Status: SHIPPED | OUTPATIENT
Start: 2021-08-09 | End: 2022-04-28

## 2021-08-12 ENCOUNTER — TELEPHONE (OUTPATIENT)
Dept: PALLIATIVE MEDICINE | Facility: OTHER | Age: 86
End: 2021-08-12

## 2021-08-17 ENCOUNTER — ANCILLARY PROCEDURE (OUTPATIENT)
Dept: PALLIATIVE MEDICINE | Facility: OTHER | Age: 86
End: 2021-08-17
Attending: PHYSICIAN ASSISTANT
Payer: COMMERCIAL

## 2021-08-17 VITALS
DIASTOLIC BLOOD PRESSURE: 70 MMHG | HEART RATE: 77 BPM | TEMPERATURE: 96.8 F | BODY MASS INDEX: 31.81 KG/M2 | WEIGHT: 165 LBS | RESPIRATION RATE: 16 BRPM | SYSTOLIC BLOOD PRESSURE: 138 MMHG

## 2021-08-17 DIAGNOSIS — M16.12 PRIMARY OSTEOARTHRITIS OF LEFT HIP: ICD-10-CM

## 2021-08-17 DIAGNOSIS — M25.561 RIGHT KNEE PAIN, UNSPECIFIED CHRONICITY: Primary | ICD-10-CM

## 2021-08-17 PROCEDURE — 250N000009 HC RX 250: Performed by: PAIN MEDICINE

## 2021-08-17 PROCEDURE — 250N000011 HC RX IP 250 OP 636: Performed by: PAIN MEDICINE

## 2021-08-17 PROCEDURE — 20610 DRAIN/INJ JOINT/BURSA W/O US: CPT | Mod: LT | Performed by: PAIN MEDICINE

## 2021-08-17 PROCEDURE — 77002 NEEDLE LOCALIZATION BY XRAY: CPT | Mod: 26 | Performed by: PAIN MEDICINE

## 2021-08-17 RX ORDER — BETAMETHASONE SODIUM PHOSPHATE AND BETAMETHASONE ACETATE 3; 3 MG/ML; MG/ML
INJECTION, SUSPENSION INTRA-ARTICULAR; INTRALESIONAL; INTRAMUSCULAR; SOFT TISSUE
Status: COMPLETED | OUTPATIENT
Start: 2021-08-17 | End: 2021-08-17

## 2021-08-17 RX ORDER — LIDOCAINE HYDROCHLORIDE 10 MG/ML
INJECTION, SOLUTION EPIDURAL; INFILTRATION; INTRACAUDAL; PERINEURAL
Status: COMPLETED | OUTPATIENT
Start: 2021-08-17 | End: 2021-08-17

## 2021-08-17 RX ORDER — BUPIVACAINE HYDROCHLORIDE 2.5 MG/ML
INJECTION, SOLUTION EPIDURAL; INFILTRATION; INTRACAUDAL
Status: COMPLETED | OUTPATIENT
Start: 2021-08-17 | End: 2021-08-17

## 2021-08-17 RX ADMIN — LIDOCAINE HYDROCHLORIDE 5 ML: 10 INJECTION, SOLUTION EPIDURAL; INFILTRATION; INTRACAUDAL; PERINEURAL at 13:38

## 2021-08-17 RX ADMIN — BUPIVACAINE HYDROCHLORIDE 3 ML: 2.5 INJECTION, SOLUTION EPIDURAL; INFILTRATION; INTRACAUDAL at 13:38

## 2021-08-17 RX ADMIN — BETAMETHASONE SODIUM PHOSPHATE AND BETAMETHASONE ACETATE 6 MG: 3; 3 INJECTION, SUSPENSION INTRA-ARTICULAR; INTRALESIONAL; INTRAMUSCULAR at 13:39

## 2021-08-17 ASSESSMENT — PAIN SCALES - GENERAL
PAINLEVEL: NO PAIN (0)
PAINLEVEL: EXTREME PAIN (8)

## 2021-08-17 NOTE — PATIENT INSTRUCTIONS
POST PROCEDURE INSTRUCTIONS  PROCEDURE DONE TODAY: LEFT HIP INJECTION    Rest today. Resume activity tomorrow as able.  Patient demonstrates the ability to ambulate independently with a steady gait at the time of discharge.      It is not unusual to have a temporary increase in your pain after a procedure. You can ice the area 24-48 hrs. 15 min at a time.      You received a steroid injection. This medication can take 2-7 days to take effect. Some temporary side effects include:    Heartburn    Flushed-red face    Insomnia-trouble sleeping-jitters    Diabetics may see a rise in blood sugar for a few days    Low back or SI joint (sacroiliac) injection: you may experience numbness in one or both legs. Please walk with help. This is temporary and will wear off in a few hours. Do not drive if you are tingling, numbness or weakness in your hands/arms or legs/feet.    Headache:  If you experience a headache after a lumbar epidural injection, lie down and drink fluids (especially caffeine). The headache will go away gradually. If it persists notify our clinic.    Fever: call if fever 100 or over, redness/warmth/swelling over the injection site.    No public hot tubs/pools for a couple days    Physical therapy: check with pain center provider regarding resuming therapy.    Monitor your response to the injection and report this at your next visit.    If you have been referred for a procedure only, please call your referring provider for a follow up.    IF YOU PLAN TO GET A FLU SHOT YOU MUST WAIT 2 WEEKS AFTER THIS INJECTION.      CALL IF ANY QUESTIONS [phone]

## 2021-08-18 ENCOUNTER — TELEPHONE (OUTPATIENT)
Dept: PALLIATIVE MEDICINE | Facility: OTHER | Age: 86
End: 2021-08-18

## 2021-08-31 ENCOUNTER — MEDICAL CORRESPONDENCE (OUTPATIENT)
Dept: HEALTH INFORMATION MANAGEMENT | Facility: CLINIC | Age: 86
End: 2021-08-31

## 2021-08-31 ENCOUNTER — ANCILLARY PROCEDURE (OUTPATIENT)
Dept: PALLIATIVE MEDICINE | Facility: OTHER | Age: 86
End: 2021-08-31
Attending: PAIN MEDICINE
Payer: COMMERCIAL

## 2021-08-31 VITALS
TEMPERATURE: 96 F | WEIGHT: 161.2 LBS | DIASTOLIC BLOOD PRESSURE: 68 MMHG | HEART RATE: 83 BPM | BODY MASS INDEX: 29.66 KG/M2 | HEIGHT: 62 IN | SYSTOLIC BLOOD PRESSURE: 115 MMHG

## 2021-08-31 DIAGNOSIS — M25.561 RIGHT KNEE PAIN, UNSPECIFIED CHRONICITY: ICD-10-CM

## 2021-08-31 PROCEDURE — 250N000011 HC RX IP 250 OP 636: Performed by: PAIN MEDICINE

## 2021-08-31 PROCEDURE — 255N000002 HC RX 255 OP 636: Performed by: PAIN MEDICINE

## 2021-08-31 PROCEDURE — 20610 DRAIN/INJ JOINT/BURSA W/O US: CPT | Mod: RT | Performed by: PAIN MEDICINE

## 2021-08-31 PROCEDURE — 77002 NEEDLE LOCALIZATION BY XRAY: CPT | Mod: 26 | Performed by: PAIN MEDICINE

## 2021-08-31 PROCEDURE — 250N000009 HC RX 250: Mod: JW | Performed by: PAIN MEDICINE

## 2021-08-31 RX ORDER — LIDOCAINE HYDROCHLORIDE 10 MG/ML
INJECTION, SOLUTION EPIDURAL; INFILTRATION; INTRACAUDAL; PERINEURAL
Status: COMPLETED | OUTPATIENT
Start: 2021-08-31 | End: 2021-08-31

## 2021-08-31 RX ORDER — BETAMETHASONE SODIUM PHOSPHATE AND BETAMETHASONE ACETATE 3; 3 MG/ML; MG/ML
INJECTION, SUSPENSION INTRA-ARTICULAR; INTRALESIONAL; INTRAMUSCULAR; SOFT TISSUE
Status: COMPLETED | OUTPATIENT
Start: 2021-08-31 | End: 2021-08-31

## 2021-08-31 RX ORDER — BUPIVACAINE HYDROCHLORIDE 2.5 MG/ML
INJECTION, SOLUTION EPIDURAL; INFILTRATION; INTRACAUDAL
Status: COMPLETED | OUTPATIENT
Start: 2021-08-31 | End: 2021-08-31

## 2021-08-31 RX ADMIN — IOHEXOL 3 ML: 300 INJECTION, SOLUTION INTRAVENOUS at 11:03

## 2021-08-31 RX ADMIN — LIDOCAINE HYDROCHLORIDE 3 ML: 10 INJECTION, SOLUTION EPIDURAL; INFILTRATION; INTRACAUDAL; PERINEURAL at 10:59

## 2021-08-31 RX ADMIN — BETAMETHASONE SODIUM PHOSPHATE AND BETAMETHASONE ACETATE 6 MG: 3; 3 INJECTION, SUSPENSION INTRA-ARTICULAR; INTRALESIONAL; INTRAMUSCULAR at 11:01

## 2021-08-31 RX ADMIN — BUPIVACAINE HYDROCHLORIDE 3 ML: 2.5 INJECTION, SOLUTION EPIDURAL; INFILTRATION; INTRACAUDAL at 11:00

## 2021-08-31 ASSESSMENT — PAIN SCALES - GENERAL
PAINLEVEL: NO PAIN (0)
PAINLEVEL: MILD PAIN (2)

## 2021-08-31 ASSESSMENT — MIFFLIN-ST. JEOR: SCORE: 1104.45

## 2021-08-31 NOTE — PATIENT INSTRUCTIONS
POST PROCEDURE INSTRUCTIONS  PROCEDURE DONE TODAY: RIGHT KNEE INJECTION    Rest today. Resume activity tomorrow as able.  Patient demonstrates the ability to ambulate independently with a steady gait at the time of discharge.      It is not unusual to have a temporary increase in your pain after a procedure. You can ice the area 24-48 hrs. 15 min at a time.      You received a steroid injection. This medication can take 2-7 days to take effect. Some temporary side effects include:    Heartburn    Flushed-red face    Insomnia-trouble sleeping-jitters    Diabetics may see a rise in blood sugar for a few days    Low back or SI joint (sacroiliac) injection: you may experience numbness in one or both legs. Please walk with help. This is temporary and will wear off in a few hours. Do not drive if you are tingling, numbness or weakness in your hands/arms or legs/feet.    Headache:  If you experience a headache after a lumbar epidural injection, lie down and drink fluids (especially caffeine). The headache will go away gradually. If it persists notify our clinic.    Fever: call if fever 100 or over, redness/warmth/swelling over the injection site.    No public hot tubs/pools for a couple days    Physical therapy: check with pain center provider regarding resuming therapy.    Monitor your response to the injection and report this at your next visit.    If you have been referred for a procedure only, please call your referring provider for a follow up.    IF YOU PLAN TO GET A FLU SHOT YOU MUST WAIT 2 WEEKS AFTER THIS INJECTION.      CALL IF ANY QUESTIONS [phone]

## 2021-09-08 DIAGNOSIS — Z79.899 CONTROLLED SUBSTANCE AGREEMENT SIGNED: ICD-10-CM

## 2021-09-08 NOTE — TELEPHONE ENCOUNTER
Last office visit: 06/05/2021 DR JARRELL   Applicable meds (last ordered): 07/30/2021 #45, R-00  Last lab check: N/A   Next Appt  With Family Practice (Lore Jarrell MD)  10/04/2021 at 11:40 AM    Chart reviewed. Please review findings below.     6. Controlled substance agreement signed  Controlled substance contract has been signed for her use of tramadol that she uses at night and as needed.  This has not escalated for years and helps with her sleeping.

## 2021-09-09 RX ORDER — ZOLPIDEM TARTRATE 6.25 MG/1
TABLET, FILM COATED, EXTENDED RELEASE ORAL
Qty: 45 TABLET | Refills: 0 | Status: SHIPPED | OUTPATIENT
Start: 2021-09-09 | End: 2021-10-12

## 2021-09-21 NOTE — PROGRESS NOTES
PAIN CENTER PROGRESS NOTE    Subjective:   Gladys Ramirez presents for evaluation of chronic pain due to arthritis in bilateral feet, knees, and hip.  Consult on 05/24/2019.    Major issues:  1. Primary osteoarthritis of right knee    2. Chronic pain syndrome    3. Primary osteoarthritis of left hip    4. Arthrosis of foot, unspecified laterality      Pain location and description: See CMA note  Radiation of pain: Denies  Exacerbating factors: walking, stairs, taking shoes off.  Alleviating factors: medication, new orthotics/shoes, elevating feet.  Associated symptoms: Denies pain at night, numbness, weakness, bowel or bladder incontinence, fever/chills, unexplained weight loss.  Functional Symptoms: See CMA note  Adverse effects of medications:  None currently.  Current treatment efficacy: Good.  States improvement with compound cream and duloxetine 120 mg daily.  Current treatment compliance:  Following recommendations and compliant with medications.      Since last visit, she had right knee injection with Dr. Oliva on 08/31/2021 and reports 100% pain relief, now feels it is wearing off and having some pain especially at night.  She states she gets up to take APAP and use compounded cream.  She states in the morning she takes 2 duloxetine and around 2 pm she takes tylenol 1,000 mg again.  She asks about taking APAP 650 for arthritis.  She takes 100 mg Tramadol prn up to 3 times a week depending.  Helpful and denies side effects.  She refills #90 tabs and lasts her at last 3 months.  She also had left hip joint injection on 08/17/2021 80% she states this was helpful but wears off, she is trying to wait 3 months in between.   She denies any steroid side effects or any complications from the procedures.    She saw Dr. Hannah and ordering a wrap for the knee.  She hasn't received it yet.  She plans to wear it with activity.  She denies seeing orthopedics as the injections here are helpful.  Previous Euflexxa  "for right knee completed 02/09/21, 02/16/21, and 02/23/21 and reports a great deal of relief over 75% and is very happy with her progress    She reports the pain in her feet has greatly improved since last seen and she is able to walk 1500 steps per day.  Prior to treatment, was unable to walk 1/4 block to the [facility].  States regarding her foot pain, its \"almost perfect, my feet are great.\" Compound cream use is always at night and sometimes once during the day, requesting a refill.      Since last visit she had annual wellness visit with Dr. Martin on 06/15/2021:  \"   Assessment and Plan:   1. Encounter for annual wellness visit (AWV) in Medicare patient  She is doing remarkably well and very cognitively intact and very mobile and living independently.  She is doing amazingly well for being 89 years old.  Vaccines are all up-to-date including her Covid vaccine.  She has had her Zostrix but has not had Shingrix.  She is not sure if she wants to pursue this but she has the information and knows to check insurance about getting it at the pharmacy if she decides to pursue this.     2. History of anemia  We will check CBC to see if her anemia has resolved  - HM2(CBC w/o Differential)     3. Vitamin D deficiency  We will check vitamin D levels  - Vitamin D, Total (25-Hydroxy)     4. History of elevated glucose  We will check A1c given her history of borderline elevated A1c a few years ago.  I do not anticipate any aggressive treatment even if she does qualify for diabetes but want to make sure we are aware of any risks of hypoglycemic or hyperglycemic episodes given her age  - Glycosylated Hemoglobin A1c     5. Other chronic pain  Pain is improved dramatically with injections from the pain clinic in multiple joints as well as current pain regimen.  She is a follow-up pain clinic appointment in a few months.     6. Controlled substance agreement signed  Controlled substance contract has been signed for her use of " "tramadol that she uses at night and as needed.  This has not escalated for years and helps with her sleeping.     7. Paroxysmal atrial fibrillation (H)  Audiology consult note reviewed.  Patient on flecainide and Eliquis.  She has a follow-up appointment with cardiology in October.  She is currently in normal sinus rhythm.     8. Lesion of skin of nose  The new lesion on the right side of her nares that she has had for couple of weeks.  She has a history of basal cell carcinoma status post Mohs surgery but says that this looks different.  She will try warm compresses and keeping it clean.  She will continue to monitor.  She is not sure she wants to pursue dermatology consult given her age.     9. DNR (do not resuscitate)  Reviewed that she is DO NOT RESUSCITATE DO NOT INTUBATE which is consistent with her prior wishes and is already documented in the chart     The patient's current medical problems were reviewed.\"      She states she doesn't sleep well but this is not due to her pain; she feels she is just too \"hyper.\"  Takes 2 hours to fall asleep with Ambien.  She gets around 5 hours of sleep. She does take 2 tylenol before bedtime.  For sleep, has tried aromatherapy at night, black out curtain over window. Has covered up lights/clock and doesn't have TV in bedroom.  She likes to use adult coloring books at night to relax her. Denies naps during the day.  She went to sleep clinic and no diagnosis. She is taking Vitamin D only 1,000 and was advised to increase back to 2,000 units.    She is still independent in ADLs.  She reports later this month the [employer] is hosting a party for her birthday and years of service to the [employer] along with their .         Review of Systems  Constitutional: Sleep interrupted not due to pain.  Denies fever, chills, night sweats, lethargy, weight loss, weight gain  Musculoskeletal: Positive for joint pain.  Denies spine pain, muscle spasm/pain, weakness.  Denies recent " "falls.  Gastrointestional: Denies difficulty swallowing, change in appetite, abdominal pain, constipation, nausea, vomiting, diarrhea, GERD, fecal incontinence.  Genitourinary: Denies urinary incontinence, dysuria, hematuria, UTI, frequency, hesitancy, change in libido.  Neurologic: Denies confusion, seizure, weakness, changes in balance, changes in speech.  Psychiatric: Denies depression, anxiety, memory loss, psychoses, suicidal ideation, substance use/abuse.     Objective:   /70   Pulse 82   Ht 1.575 m (5' 2\")   Wt 73 kg (161 lb)   BMI 29.45 kg/m      Physical Exam  Constitutional- General appearance: Normal.  Well developed, comfortable, overweight and appearance reflects stated age.  No acute distress or pain behaviors noted.  Presents alone today.  Psychiatric- Judgment and insight: Normal  Speech: Normal rhythm.  Thought process: Normal.  No abnormal thoughts reported. Alert & Oriented to person, place, and time.  Recent and remote memory: Normal.  Mood and affect: Normal.  Observed mood: pleasant, appropriate.   Respiratory- Breathing is non-labored; normal rhythm and rate.  Cardiovascular- Extremities warm and well perfused, no peripheral edema or varicosities.  Dermatologic- Exposed skin is clean, dry, and intact to inspection and palpation.  Musculoskeletal- Gait and station: Normal.  Gait evaluation demonstrates ambulating independently    [place]  Reviewed on 09/22/2021 as expected.     Imaging:  EXAM: XR KNEE RIGHT 1 OR 2 VWS  LOCATION: [facility]  DATE/TIME: 1/20/2021 2:24 PM     INDICATION: right knee pain  COMPARISON: None.     IMPRESSION:   Moderate primary osteoarthritis all 3 compartments but most prominent in the lateral compartment. Knee otherwise negative. No fractures. No joint effusion.    EXAM: XR HIP LEFT 2 OR MORE VWS  LOCATION: [facility]  DATE/TIME: 1/20/2021 2:24 PM     INDICATION: left hip pain, rule out DJD  COMPARISON: " None.     IMPRESSION:   Mild primary degenerative narrowing both hip joints. Mild generalized degenerative change base of the spine and both SI joints.     Pelvis and left hip otherwise negative. No fractures. 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  She is having improved pain in right knee with history of DJD x 1 with over 75% pain reduction with Orthovisc injections and up to 100% pain reduction with steroid, lasted about a month.  She will continue to repeat steroid every 3 months as needed and orthovisc every 6 months as needed.  Had left knee arthroplasty, doesn't want to return to see surgeon at this point.  She also has left hip pain without injury, x-rays demonstrate mild DJD.  She is interested in left hip steroid injection every 3 months as needed; she has been educated on this procedure including risks and benefits and will order for future.  She is also to continue walking for exercise and discussed she is obtaining a knee brace from [facility].  She will continue medication plan as prescribed as her pain is well controlled and good daily function.  Her PCP may continue to manage Tramadol as low MME/risk and she is only refilling about every 3 months.     Plan:   Continue Tramadol 100 mg as needed in the afternoon - ok that you have reduced dose as you are feeling better.  Continue refills as needed through primary doctor.   Ok to continue Tylenol 1,000 mg twice a day as needed.  Ok if you want to try tylenol arthritis which is 650 mg. Max daily dose is 3,000 mg per day  Continue duloxetine 120 mg daily    Continue use of topical compound cream apply 4 grams to knee and hip three times a day as this is helpful - will send a refill to the pharmacy of lidocaine 5%, ibuprofen 10%, and diclofenac 5% apply 1-2 grams to painful feet 3-4 times a day.  [facility]  (previously named [facility])  [address] [place] [address]  Phone # [phone]  [email or web address]   Will order repeat right knee and left hip joint injections to do as needed with Dr. Oliva at the end of the year.  Follow-up with Aleja in 6 months OVL to evaluate above plan.  If you are not needing a visit at that time, ok to cancel.  Also if symptoms worsen ok to come in sooner.  As always, great to see you and enjoy your birthday celebration!    Aleja Pitts PA-C  [facility]  [address]  [place] [address]  Ph: [phone]  Fax: [phone]    30 minutes spent on the date of the encounter doing chart review, history and exam, documentation, and further activities.

## 2021-09-22 ENCOUNTER — OFFICE VISIT (OUTPATIENT)
Dept: PALLIATIVE MEDICINE | Facility: OTHER | Age: 86
End: 2021-09-22
Payer: COMMERCIAL

## 2021-09-22 VITALS
WEIGHT: 161 LBS | DIASTOLIC BLOOD PRESSURE: 70 MMHG | HEART RATE: 82 BPM | SYSTOLIC BLOOD PRESSURE: 139 MMHG | HEIGHT: 62 IN | BODY MASS INDEX: 29.63 KG/M2

## 2021-09-22 DIAGNOSIS — M17.11 PRIMARY OSTEOARTHRITIS OF RIGHT KNEE: Primary | ICD-10-CM

## 2021-09-22 DIAGNOSIS — M16.12 PRIMARY OSTEOARTHRITIS OF LEFT HIP: ICD-10-CM

## 2021-09-22 DIAGNOSIS — G89.4 CHRONIC PAIN SYNDROME: ICD-10-CM

## 2021-09-22 DIAGNOSIS — M79.671 BILATERAL FOOT PAIN: ICD-10-CM

## 2021-09-22 DIAGNOSIS — M17.11 PRIMARY OSTEOARTHRITIS OF RIGHT KNEE: ICD-10-CM

## 2021-09-22 DIAGNOSIS — M79.672 BILATERAL FOOT PAIN: ICD-10-CM

## 2021-09-22 PROCEDURE — G0463 HOSPITAL OUTPT CLINIC VISIT: HCPCS

## 2021-09-22 PROCEDURE — 99214 OFFICE O/P EST MOD 30 MIN: CPT | Performed by: PHYSICIAN ASSISTANT

## 2021-09-22 ASSESSMENT — MIFFLIN-ST. JEOR: SCORE: 1103.54

## 2021-09-22 ASSESSMENT — PAIN SCALES - GENERAL: PAINLEVEL: MODERATE PAIN (5)

## 2021-09-22 NOTE — PATIENT INSTRUCTIONS
Continue Tramadol 100 mg as needed in the afternoon - ok that you have reduced dose as you are feeling better.  Continue refills as needed through primary doctor.   Ok to continue Tylenol 1,000 mg twice a day as needed.  Ok if you want to try tylenol arthritis which is 650 mg. Max daily dose is 3,000 mg per day  Continue duloxetine 120 mg daily    Continue use of topical compound cream apply 4 grams to knee and hip three times a day as this is helpful - will send a refill to the pharmacy of lidocaine 5%, ibuprofen 10%, and diclofenac 5% apply 1-2 grams to painful feet 3-4 times a day.  [facility] (previously named [facility]  [address] [place] [address]  Phone # [phone]  [email or web address]   Will order repeat right knee and left hip joint injections to do as needed with Dr. Oliva at the end of the year.  Follow-up with Aleja in 6 months OVL to evaluate above plan.  If you are not needing a visit at that time, ok to cancel.  Also if symptoms worsen ok to come in sooner.  As always, great to see you and enjoy your birthday celebration!

## 2021-09-22 NOTE — PROGRESS NOTES
Pt is scheduled for follow-up for her bilateral knee pain, Rt > Lt.    Pain score: 5  Constant   What does your pain feel like: Aching at lateral right knee. Tacos at times, 4 times since 06/2021  Does the pain interfere with:  Work: N/A  Walking/distance: Yes  Sleep:Yes  Daily activities: Yes  Relationships/social life: No  Mood: No  F= 6

## 2021-09-22 NOTE — LETTER
9/22/2021         RE: Gladys Ramirez  [address]  [facility] [address]        Dear Colleague,    Thank you for referring your patient, Gladys Ramirez, to the [facility]. Please see a copy of my visit note below.    PAIN CENTER PROGRESS NOTE    Subjective:   Gladys Ramirez presents for evaluation of chronic pain due to arthritis in bilateral feet, knees, and hip.  Consult on 05/24/2019.    Major issues:  1. Primary osteoarthritis of right knee    2. Chronic pain syndrome    3. Primary osteoarthritis of left hip    4. Arthrosis of foot, unspecified laterality      Pain location and description: See CMA note  Radiation of pain: Denies  Exacerbating factors: walking, stairs, taking shoes off.  Alleviating factors: medication, new orthotics/shoes, elevating feet.  Associated symptoms: Denies pain at night, numbness, weakness, bowel or bladder incontinence, fever/chills, unexplained weight loss.  Functional Symptoms: See CMA note  Adverse effects of medications:  None currently.  Current treatment efficacy: Good.  States improvement with compound cream and duloxetine 120 mg daily.  Current treatment compliance:  Following recommendations and compliant with medications.      Since last visit, she had right knee injection with Dr. Oliva on 08/31/2021 and reports 100% pain relief, now feels it is wearing off and having some pain especially at night.  She states she gets up to take APAP and use compounded cream.  She states in the morning she takes 2 duloxetine and around 2 pm she takes tylenol 1,000 mg again.  She asks about taking APAP 650 for arthritis.  She takes 100 mg Tramadol prn up to 3 times a week depending.  Helpful and denies side effects.  She refills #90 tabs and lasts her at last 3 months.  She also had left hip joint injection on 08/17/2021 80% she states this was helpful but wears off, she is trying to wait 3 months in between.   She denies any steroid side  "effects or any complications from the procedures.    She saw Dr. Hannah and ordering a wrap for the knee.  She hasn't received it yet.  She plans to wear it with activity.  She denies seeing orthopedics as the injections here are helpful.  Previous Euflexxa for right knee completed 02/09/21, 02/16/21, and 02/23/21 and reports a great deal of relief over 75% and is very happy with her progress    She reports the pain in her feet has greatly improved since last seen and she is able to walk 1500 steps per day.  Prior to treatment, was unable to walk 1/4 block to the [employer].  States regarding her foot pain, its \"almost perfect, my feet are great.\" Compound cream use is always at night and sometimes once during the day, requesting a refill.      Since last visit she had annual wellness visit with Dr. Martin on 06/15/2021:  \"   Assessment and Plan:   1. Encounter for annual wellness visit (AWV) in Medicare patient  She is doing remarkably well and very cognitively intact and very mobile and living independently.  She is doing amazingly well for being 89 years old.  Vaccines are all up-to-date including her Covid vaccine.  She has had her Zostrix but has not had Shingrix.  She is not sure if she wants to pursue this but she has the information and knows to check insurance about getting it at the pharmacy if she decides to pursue this.     2. History of anemia  We will check CBC to see if her anemia has resolved  - HM2(CBC w/o Differential)     3. Vitamin D deficiency  We will check vitamin D levels  - Vitamin D, Total (25-Hydroxy)     4. History of elevated glucose  We will check A1c given her history of borderline elevated A1c a few years ago.  I do not anticipate any aggressive treatment even if she does qualify for diabetes but want to make sure we are aware of any risks of hypoglycemic or hyperglycemic episodes given her age  - Glycosylated Hemoglobin A1c     5. Other chronic pain  Pain is improved dramatically with " "injections from the pain clinic in multiple joints as well as current pain regimen.  She is a follow-up pain clinic appointment in a few months.     6. Controlled substance agreement signed  Controlled substance contract has been signed for her use of tramadol that she uses at night and as needed.  This has not escalated for years and helps with her sleeping.     7. Paroxysmal atrial fibrillation (H)  Audiology consult note reviewed.  Patient on flecainide and Eliquis.  She has a follow-up appointment with cardiology in October.  She is currently in normal sinus rhythm.     8. Lesion of skin of nose  The new lesion on the right side of her nares that she has had for couple of weeks.  She has a history of basal cell carcinoma status post Mohs surgery but says that this looks different.  She will try warm compresses and keeping it clean.  She will continue to monitor.  She is not sure she wants to pursue dermatology consult given her age.     9. DNR (do not resuscitate)  Reviewed that she is DO NOT RESUSCITATE DO NOT INTUBATE which is consistent with her prior wishes and is already documented in the chart     The patient's current medical problems were reviewed.\"      She states she doesn't sleep well but this is not due to her pain; she feels she is just too \"hyper.\"  Takes 2 hours to fall asleep with Ambien.  She gets around 5 hours of sleep. She does take 2 tylenol before bedtime.  For sleep, has tried aromatherapy at night, black out curtain over window. Has covered up lights/clock and doesn't have TV in bedroom.  She likes to use adult coloring books at night to relax her. Denies naps during the day.  She went to sleep clinic and no diagnosis. She is taking Vitamin D only 1,000 and was advised to increase back to 2,000 units.    She is still independent in ADLs.  She reports later this month the [employer] is hosting a party for her birthday and years of service to the [employer] along with their .         Review " "of Systems  Constitutional: Sleep interrupted not due to pain.  Denies fever, chills, night sweats, lethargy, weight loss, weight gain  Musculoskeletal: Positive for joint pain.  Denies spine pain, muscle spasm/pain, weakness.  Denies recent falls.  Gastrointestional: Denies difficulty swallowing, change in appetite, abdominal pain, constipation, nausea, vomiting, diarrhea, GERD, fecal incontinence.  Genitourinary: Denies urinary incontinence, dysuria, hematuria, UTI, frequency, hesitancy, change in libido.  Neurologic: Denies confusion, seizure, weakness, changes in balance, changes in speech.  Psychiatric: Denies depression, anxiety, memory loss, psychoses, suicidal ideation, substance use/abuse.     Objective:   /70   Pulse 82   Ht 1.575 m (5' 2\")   Wt 73 kg (161 lb)   BMI 29.45 kg/m      Physical Exam  Constitutional- General appearance: Normal.  Well developed, comfortable, overweight and appearance reflects stated age.  No acute distress or pain behaviors noted.  Presents alone today.  Psychiatric- Judgment and insight: Normal  Speech: Normal rhythm.  Thought process: Normal.  No abnormal thoughts reported. Alert & Oriented to person, place, and time.  Recent and remote memory: Normal.  Mood and affect: Normal.  Observed mood: pleasant, appropriate.   Respiratory- Breathing is non-labored; normal rhythm and rate.  Cardiovascular- Extremities warm and well perfused, no peripheral edema or varicosities.  Dermatologic- Exposed skin is clean, dry, and intact to inspection and palpation.  Musculoskeletal- Gait and station: Normal.  Gait evaluation demonstrates ambulating independently    [place]  Reviewed on 09/22/2021 as expected.     Imaging:  EXAM: XR KNEE RIGHT 1 OR 2 VWS  LOCATION: [facility]  DATE/TIME: 1/20/2021 2:24 PM     INDICATION: right knee pain  COMPARISON: None.     IMPRESSION:   Moderate primary osteoarthritis all 3 compartments but most prominent in the lateral " compartment. Knee otherwise negative. No fractures. No joint effusion.    EXAM: XR HIP LEFT 2 OR MORE VWS  LOCATION: [facility]  DATE/TIME: 1/20/2021 2:24 PM     INDICATION: left hip pain, rule out DJD  COMPARISON: None.     IMPRESSION:   Mild primary degenerative narrowing both hip joints. Mild generalized degenerative change base of the spine and both SI joints.     Pelvis and left hip otherwise negative. No fractures. 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  She is having improved pain in right knee with history of DJD x 1 with over 75% pain reduction with Orthovisc injections and up to 100% pain reduction with steroid, lasted about a month.  She will continue to repeat steroid every 3 months as needed and orthovisc every 6 months as needed.  Had left knee arthroplasty, doesn't want to return to see surgeon at this point.  She also has left hip pain without injury, x-rays demonstrate mild DJD.  She is interested in left hip steroid injection every 3 months as needed; she has been educated on this procedure including risks and benefits and will order for future.  She is also to continue walking for exercise and discussed she is obtaining a knee brace from [facility].  She will continue medication plan as prescribed as her pain is well controlled and good daily function.  Her PCP may continue to manage Tramadol as low MME/risk and she is only refilling about every 3 months.     Plan:   Continue Tramadol 100 mg as needed in the afternoon - ok that you have reduced dose as you are feeling better.  Continue refills as needed through primary doctor.   Ok to continue Tylenol 1,000 mg twice a day as needed.  Ok if you want to try tylenol arthritis which is 650 mg. Max daily dose is 3,000 mg per day  Continue duloxetine 120 mg daily    Continue use of  topical compound cream apply 4 grams to knee and hip three times a day as this is helpful - will send a refill to the pharmacy of lidocaine 5%, ibuprofen 10%, and diclofenac 5% apply 1-2 grams to painful feet 3-4 times a day.  [facility] (previously named [facility])  [address] [place] [address]  Phone # [phone]  [email or web address]   Will order repeat right knee and left hip joint injections to do as needed with Dr. Oliva at the end of the year.  Follow-up with Aleja in 6 months OVL to evaluate above plan.  If you are not needing a visit at that time, ok to cancel.  Also if symptoms worsen ok to come in sooner.  As always, great to see you and enjoy your birthday celebration!    Aleja Pitts PA-C  [facility]  [address]  [place] [address]  Ph: [phone]  Fax: [phone]    30 minutes spent on the date of the encounter doing chart review, history and exam, documentation, and further activities.      Pt is scheduled for follow-up for her bilateral knee pain, Rt > Lt.    Pain score: 5  Constant   What does your pain feel like: Aching at lateral right knee. Tacos at times, 4 times since 06/2021  Does the pain interfere with:  Work: N/A  Walking/distance: Yes  Sleep:Yes  Daily activities: Yes  Relationships/social life: No  Mood: No  F= 6            Again, thank you for allowing me to participate in the care of your patient.        Sincerely,        Aleja Pitts PA-C

## 2021-10-01 ENCOUNTER — TELEPHONE (OUTPATIENT)
Dept: CARDIOLOGY | Facility: CLINIC | Age: 86
End: 2021-10-01

## 2021-10-01 ENCOUNTER — OFFICE VISIT (OUTPATIENT)
Dept: CARDIOLOGY | Facility: CLINIC | Age: 86
End: 2021-10-01
Payer: COMMERCIAL

## 2021-10-01 VITALS
SYSTOLIC BLOOD PRESSURE: 122 MMHG | BODY MASS INDEX: 30.54 KG/M2 | WEIGHT: 167 LBS | HEART RATE: 83 BPM | RESPIRATION RATE: 16 BRPM | DIASTOLIC BLOOD PRESSURE: 80 MMHG

## 2021-10-01 DIAGNOSIS — I48.0 PAROXYSMAL ATRIAL FIBRILLATION (H): Primary | ICD-10-CM

## 2021-10-01 DIAGNOSIS — E78.00 PURE HYPERCHOLESTEROLEMIA: ICD-10-CM

## 2021-10-01 DIAGNOSIS — I26.99 OTHER PULMONARY EMBOLISM WITHOUT ACUTE COR PULMONALE, UNSPECIFIED CHRONICITY (H): ICD-10-CM

## 2021-10-01 DIAGNOSIS — I10 BENIGN ESSENTIAL HYPERTENSION: ICD-10-CM

## 2021-10-01 DIAGNOSIS — I50.32 CHRONIC DIASTOLIC CONGESTIVE HEART FAILURE (H): ICD-10-CM

## 2021-10-01 PROCEDURE — 99214 OFFICE O/P EST MOD 30 MIN: CPT | Performed by: INTERNAL MEDICINE

## 2021-10-01 NOTE — PROGRESS NOTES
[facility]  Heart Care Clinic Follow-up Note    Assessment & Plan        (I48.0) Paroxysmal atrial fibrillation (H)  (primary encounter diagnosis)  Comment: symptomatic, not valvular, advanced [id number] score of 4 giving her a 4.8% chance of stroke per year.  Currently on Eliquis 2.5 mg p.o. twice daily given her age of 89 and renal dysfunction, also on Tambocor low-dose, 25 mg p.o. twice daily.  Given her age and renal dysfunction concerned about toxicity and checking   If she has breakthrough arrhythmias might need to consider other therapy but a little hesitant to pursue ablation in a 89-year-old woman.    (I10) Benign essential hypertension  Comment: under good control currently.  If anything a little low, given her age I would like a high systolic and will back off on the amlodipine altogether when it runs out to discontinue.    (E78.00) Pure hypercholesterolemia  Comment: So noted, total cholesterol of 179 with LDL of 80 and not on treatment given her age.    (I50.32) Chronic diastolic congestive heart failure (H) - EF 55%, Grade II (moderate) left ventricular diastolic dysfunction per echo 1/ 2018  Comment: No significant signs or symptoms currently.    (I26.99) Other pulmonary embolism without acute cor pulmonale, unspecified chronicity (H)  Comment: So noted with no recurrences and no cor pulmonale recently and on anticoagulation.    Left bundle branch block pattern-on prior EKG, new since prior his ECG.  We will go ahead and recheck ECG.    Plan  1.  Let amlodipine run out and do not fill.  2.  Follow-up with me in 6 to 8 months or sooner if needed.  3.  Recheck ECG in [facility] blood level.    Subjective  CC: 89-year-old white female being seen in follow-up today.  She is still independently living with seniors around her, she is also still driving.  Gets along well without any syncope, presyncope, chest discomfort, palpitations, shortness of breath, PND, softener peripheral  edema.    Medications  Current Outpatient Medications   Medication Sig Dispense Refill     acetaminophen (TYLENOL) 325 MG tablet [ACETAMINOPHEN (TYLENOL) 325 MG TABLET] Take 650 mg by mouth every 6 (six) hours as needed for pain.       amLODIPine (NORVASC) 5 MG tablet [AMLODIPINE (NORVASC) 5 MG TABLET] Take 0.5 tablets (2.5 mg total) by mouth daily. 90 tablet 3     cholecalciferol, vitamin D3, (VITAMIN D3) 2,000 unit Tab [CHOLECALCIFEROL, VITAMIN D3, (VITAMIN D3) 2,000 UNIT TAB] Take 1 tablet (2,000 Units total) by mouth daily. 90 tablet 3     COMPOUNDED NON-CONTROLLED SUBSTANCE (CMPD RX) - PHARMACY TO MIX COMPOUNDED MEDICATION lidocaine 5%, ibuprofen 10%, and diclofenac 5% apply 1-2 grams to painful feet 3-4 times a day 60 g 3     DULoxetine (CYMBALTA) 60 MG capsule [DULOXETINE (CYMBALTA) 60 MG CAPSULE] TAKE ONE CAPSULE BY MOUTH TWICE DAILY  180 capsule 3     ELIQUIS 2.5 mg Tab tablet [ELIQUIS 2.5 MG TAB TABLET] TAKE ONE TABLET BY MOUTH TWICE DAILY  180 tablet 1     flecainide (TAMBOCOR) 50 MG tablet Take 0.5 tablets (25 mg) by mouth 2 times daily 90 tablet 1     hydrochlorothiazide (HYDRODIURIL) 25 MG tablet Take 1 tablet (25 mg) by mouth daily 90 tablet 3     lisinopril (ZESTRIL) 40 MG tablet TAKE ONE TABLET BY MOUTH ONE TIME DAILY  90 tablet 0     omeprazole (PRILOSEC OTC) 20 MG tablet [OMEPRAZOLE (PRILOSEC OTC) 20 MG TABLET] Take 1 tablet (20 mg total) by mouth daily as needed. 30 tablet 3     traMADol (ULTRAM) 50 MG tablet Take 2 tablets (100 mg) by mouth at noon and 1 tablet (50 mg) by mouth at bedtime. 90 tablet 0     zolpidem ER (AMBIEN CR) 6.25 MG CR tablet take 1 and 1/4 tablet by mouth at bedtime. 45 tablet 0       Objective  /80 (BP Location: Left arm, Patient Position: Sitting, Cuff Size: Adult Large)   Pulse 83   Resp 16   Wt 75.8 kg (167 lb)   BMI 30.54 kg/m      General Appearance:    Alert, cooperative, no distress, appears stated age   Head:    Normocephalic, without obvious abnormality,  atraumatic   Throat:   Lips, mucosa, and tongue normal; teeth and gums normal   Neck:   Supple, symmetrical, trachea midline, no adenopathy;        thyroid:  No enlargement/tenderness/nodules; no carotid    bruit or JVD   Back:     Symmetric, no curvature, ROM normal, no CVA tenderness   Lungs:     Clear to auscultation bilaterally, respirations unlabored   Chest wall:    No tenderness or deformity   Heart:    Regular rate and rhythm, S1 and S2 normal, no murmur, rub   or gallop   Abdomen:     Soft, non-tender, bowel sounds active all four quadrants,     no masses, no organomegaly   Extremities:   Normal, atraumatic, no cyanosis or edema   Pulses:   2+ and symmetric all extremities   Skin:   Skin color, texture, turgor normal, no rashes or lesions     Results    Lab Results personally reviewed   Lab Results   Component Value Date    CHOL 179 03/12/2015    CHOL 179 09/30/2013     Lab Results   Component Value Date    HDL 64 03/12/2015    HDL 64 09/30/2013     No components found for: LDLCALC  Lab Results   Component Value Date    TRIG 176 (H) 03/12/2015    TRIG 155 (H) 09/30/2013     Lab Results   Component Value Date    WBC 7.0 06/15/2021    HGB 12.5 06/15/2021    HCT 37.4 06/15/2021     06/15/2021     Lab Results   Component Value Date    BUN 26 04/19/2021     04/19/2021    CO2 28 04/19/2021       Reviewed electrocardiogram sinus rhythm with a left bundle branch block pattern.

## 2021-10-01 NOTE — TELEPHONE ENCOUNTER
----- Message from Estrada Holley MD sent at 10/1/2021 12:04 PM CDT -----  Little help, I got carried away discussing things with sister and did not order an ECG 12-lead.  Can we bring her back for ECG 12-lead and Tambocor blood level please?LF          Order placed for EKG and FLEC level; added to appt notes and routed to PMD.Pt was called and made aware of post-appt order request from [facility] and that we will try to combine these orders to save her a trip. She verbalized understanding. -[facility]

## 2021-10-01 NOTE — LETTER
10/1/2021    Lore Martin MD  [address] [place] [address] 1  [place] [address]    RE: Gladys Ramirez       Dear Colleague,    I had the pleasure of seeing Gladys Ramirez in the [facility].        [facility]  Heart Care Clinic Follow-up Note    Assessment & Plan        (I48.0) Paroxysmal atrial fibrillation (H)  (primary encounter diagnosis)  Comment: symptomatic, not valvular, advanced [id number] score of 4 giving her a 4.8% chance of stroke per year.  Currently on Eliquis 2.5 mg p.o. twice daily given her age of 89 and renal dysfunction, also on Tambocor low-dose, 25 mg p.o. twice daily.  Given her age and renal dysfunction concerned about toxicity and checking   If she has breakthrough arrhythmias might need to consider other therapy but a little hesitant to pursue ablation in a 89-year-old woman.    (I10) Benign essential hypertension  Comment: under good control currently.  If anything a little low, given her age I would like a high systolic and will back off on the amlodipine altogether when it runs out to discontinue.    (E78.00) Pure hypercholesterolemia  Comment: So noted, total cholesterol of 179 with LDL of 80 and not on treatment given her age.    (I50.32) Chronic diastolic congestive heart failure (H) - EF 55%, Grade II (moderate) left ventricular diastolic dysfunction per echo 1/ 2018  Comment: No significant signs or symptoms currently.    (I26.99) Other pulmonary embolism without acute cor pulmonale, unspecified chronicity (H)  Comment: So noted with no recurrences and no cor pulmonale recently and on anticoagulation.    Left bundle branch block pattern-on prior EKG, new since prior his ECG.  We will go ahead and recheck ECG.    Plan  1.  Let amlodipine run out and do not fill.  2.  Follow-up with me in 6 to 8 months or sooner if needed.  3.  Recheck ECG in Tambocor blood level.    Subjective  CC: 89-year-old white female being  seen in follow-up today.  She is still independently living with seniors around her, she is also still driving.  Gets along well without any syncope, presyncope, chest discomfort, palpitations, shortness of breath, PND, softener peripheral edema.    Medications  Current Outpatient Medications   Medication Sig Dispense Refill     acetaminophen (TYLENOL) 325 MG tablet [ACETAMINOPHEN (TYLENOL) 325 MG TABLET] Take 650 mg by mouth every 6 (six) hours as needed for pain.       amLODIPine (NORVASC) 5 MG tablet [AMLODIPINE (NORVASC) 5 MG TABLET] Take 0.5 tablets (2.5 mg total) by mouth daily. 90 tablet 3     cholecalciferol, vitamin D3, (VITAMIN D3) 2,000 unit Tab [CHOLECALCIFEROL, VITAMIN D3, (VITAMIN D3) 2,000 UNIT TAB] Take 1 tablet (2,000 Units total) by mouth daily. 90 tablet 3     COMPOUNDED NON-CONTROLLED SUBSTANCE (CMPD RX) - PHARMACY TO MIX COMPOUNDED MEDICATION lidocaine 5%, ibuprofen 10%, and diclofenac 5% apply 1-2 grams to painful feet 3-4 times a day 60 g 3     DULoxetine (CYMBALTA) 60 MG capsule [DULOXETINE (CYMBALTA) 60 MG CAPSULE] TAKE ONE CAPSULE BY MOUTH TWICE DAILY  180 capsule 3     ELIQUIS 2.5 mg Tab tablet [ELIQUIS 2.5 MG TAB TABLET] TAKE ONE TABLET BY MOUTH TWICE DAILY  180 tablet 1     flecainide (TAMBOCOR) 50 MG tablet Take 0.5 tablets (25 mg) by mouth 2 times daily 90 tablet 1     hydrochlorothiazide (HYDRODIURIL) 25 MG tablet Take 1 tablet (25 mg) by mouth daily 90 tablet 3     lisinopril (ZESTRIL) 40 MG tablet TAKE ONE TABLET BY MOUTH ONE TIME DAILY  90 tablet 0     omeprazole (PRILOSEC OTC) 20 MG tablet [OMEPRAZOLE (PRILOSEC OTC) 20 MG TABLET] Take 1 tablet (20 mg total) by mouth daily as needed. 30 tablet 3     traMADol (ULTRAM) 50 MG tablet Take 2 tablets (100 mg) by mouth at noon and 1 tablet (50 mg) by mouth at bedtime. 90 tablet 0     zolpidem ER (AMBIEN CR) 6.25 MG CR tablet take 1 and 1/4 tablet by mouth at bedtime. 45 tablet 0       Objective  /80 (BP Location: Left arm, Patient  Position: Sitting, Cuff Size: Adult Large)   Pulse 83   Resp 16   Wt 75.8 kg (167 lb)   BMI 30.54 kg/m      General Appearance:    Alert, cooperative, no distress, appears stated age   Head:    Normocephalic, without obvious abnormality, atraumatic   Throat:   Lips, mucosa, and tongue normal; teeth and gums normal   Neck:   Supple, symmetrical, trachea midline, no adenopathy;        thyroid:  No enlargement/tenderness/nodules; no carotid    bruit or JVD   Back:     Symmetric, no curvature, ROM normal, no CVA tenderness   Lungs:     Clear to auscultation bilaterally, respirations unlabored   Chest wall:    No tenderness or deformity   Heart:    Regular rate and rhythm, S1 and S2 normal, no murmur, rub   or gallop   Abdomen:     Soft, non-tender, bowel sounds active all four quadrants,     no masses, no organomegaly   Extremities:   Normal, atraumatic, no cyanosis or edema   Pulses:   2+ and symmetric all extremities   Skin:   Skin color, texture, turgor normal, no rashes or lesions     Results    Lab Results personally reviewed   Lab Results   Component Value Date    CHOL 179 03/12/2015    CHOL 179 09/30/2013     Lab Results   Component Value Date    HDL 64 03/12/2015    HDL 64 09/30/2013     No components found for: LDLCALC  Lab Results   Component Value Date    TRIG 176 (H) 03/12/2015    TRIG 155 (H) 09/30/2013     Lab Results   Component Value Date    WBC 7.0 06/15/2021    HGB 12.5 06/15/2021    HCT 37.4 06/15/2021     06/15/2021     Lab Results   Component Value Date    BUN 26 04/19/2021     04/19/2021    CO2 28 04/19/2021       Reviewed electrocardiogram sinus rhythm with a left bundle branch block pattern.              Thank you for allowing me to participate in the care of your patient.      Sincerely,     MARISA SANZ MD     [facility]  cc:   Lore Martin MD  [address] [place] [address]

## 2021-10-01 NOTE — PATIENT INSTRUCTIONS
Ms Gladys Ramirez,  I enjoyed visiting with you again today.  I am glad to hear you are doing well.  Per our conversation finish the AMLODIPINE and when runs out do not reill.  I will plan on seeing you 6-7 months.  Estrada Holley

## 2021-10-04 ENCOUNTER — OFFICE VISIT (OUTPATIENT)
Dept: FAMILY MEDICINE | Facility: CLINIC | Age: 86
End: 2021-10-04
Payer: COMMERCIAL

## 2021-10-04 VITALS
WEIGHT: 163.8 LBS | RESPIRATION RATE: 14 BRPM | SYSTOLIC BLOOD PRESSURE: 112 MMHG | HEART RATE: 83 BPM | HEIGHT: 62 IN | OXYGEN SATURATION: 96 % | DIASTOLIC BLOOD PRESSURE: 70 MMHG | TEMPERATURE: 98.2 F | BODY MASS INDEX: 30.14 KG/M2

## 2021-10-04 DIAGNOSIS — G89.4 CHRONIC PAIN SYNDROME: ICD-10-CM

## 2021-10-04 DIAGNOSIS — Z23 NEED FOR IMMUNIZATION AGAINST INFLUENZA: ICD-10-CM

## 2021-10-04 DIAGNOSIS — M19.079 ARTHROSIS OF FOOT, UNSPECIFIED LATERALITY: ICD-10-CM

## 2021-10-04 DIAGNOSIS — K21.9 GASTROESOPHAGEAL REFLUX DISEASE WITHOUT ESOPHAGITIS: ICD-10-CM

## 2021-10-04 DIAGNOSIS — E55.9 VITAMIN D DEFICIENCY: ICD-10-CM

## 2021-10-04 DIAGNOSIS — F51.01 PRIMARY INSOMNIA: ICD-10-CM

## 2021-10-04 DIAGNOSIS — I10 BENIGN ESSENTIAL HYPERTENSION: ICD-10-CM

## 2021-10-04 DIAGNOSIS — I48.0 PAROXYSMAL ATRIAL FIBRILLATION (H): Primary | ICD-10-CM

## 2021-10-04 DIAGNOSIS — M17.12 PRIMARY OSTEOARTHRITIS OF LEFT KNEE: ICD-10-CM

## 2021-10-04 DIAGNOSIS — I10 PRIMARY HYPERTENSION: ICD-10-CM

## 2021-10-04 PROCEDURE — 93005 ELECTROCARDIOGRAM TRACING: CPT | Performed by: FAMILY MEDICINE

## 2021-10-04 PROCEDURE — 93010 ELECTROCARDIOGRAM REPORT: CPT | Mod: 77 | Performed by: INTERNAL MEDICINE

## 2021-10-04 PROCEDURE — 99214 OFFICE O/P EST MOD 30 MIN: CPT | Performed by: FAMILY MEDICINE

## 2021-10-04 PROCEDURE — 36415 COLL VENOUS BLD VENIPUNCTURE: CPT | Performed by: FAMILY MEDICINE

## 2021-10-04 PROCEDURE — 99000 SPECIMEN HANDLING OFFICE-LAB: CPT | Performed by: FAMILY MEDICINE

## 2021-10-04 PROCEDURE — 80181 DRUG ASSAY FLECAINIDE: CPT | Mod: 90 | Performed by: FAMILY MEDICINE

## 2021-10-04 PROCEDURE — 90662 IIV NO PRSV INCREASED AG IM: CPT | Performed by: FAMILY MEDICINE

## 2021-10-04 PROCEDURE — G0008 ADMIN INFLUENZA VIRUS VAC: HCPCS | Performed by: FAMILY MEDICINE

## 2021-10-04 PROCEDURE — 93000 ELECTROCARDIOGRAM COMPLETE: CPT | Performed by: FAMILY MEDICINE

## 2021-10-04 ASSESSMENT — MIFFLIN-ST. JEOR: SCORE: 1111.37

## 2021-10-04 NOTE — TELEPHONE ENCOUNTER
Lore Martin MD  You 11 minutes ago (2:06 PM)     SS    No problem.   Got them both done today.   Thanks for taking care of her.   Dr. Lore Martin   10/4/2021    Message text      You  Lore Martin MD 4 hours ago (10:07 AM)     [facility] Dr. Kishan Jones ordered a flecainide level and EKG to be done that he thought of in hindsight after Sister Gladys's appointment on Friday. Would your office be able to complete this orders at her appointment today to save her a trip? I have placed orders and added to appt notes.   Thank you,   Stacy GUALLPA for Dr. Holley

## 2021-10-04 NOTE — PROGRESS NOTES
Assessment & Plan     Paroxysmal atrial fibrillation (H)  Reviewed cardiology consult note from last week.  Order flecainide level and EKG Per cardiology recommendation  Continue current medications  - Flecainide level  - EKG 12-lead complete w/read - Clinics    Primary hypertension  Benign essential hypertension  Blood pressure well controlled.  Per cardiology consult notes she is to finish out the bottle of amlodipine that she has and then just not refill it any longer    Primary insomnia  Doing ell and stable with the Ambien    Chronic pain syndrome  Arthrosis of foot, unspecified laterality  Primary osteoarthritis of left knee  Awaiting the new brace for her knee.      Vitamin D deficiency  Continue daily supplements    Gastroesophageal reflux disease without esophagitis  Omeprazole no longer needed so was removed from the medication list.  She uses Tums just as needed which is not very often    Need for immunization against influenza  - INFLUENZA, QUAD, HD, PF, 65+ (FLUZONE HD)    Lore Martin MD  [facility]     Gladys Ramirez is a 89 year old female who presents today for the following   health issues:    Paroxysmal atrial fibrillation (H)  Cardiology  consult note reviewed. Needs EKG and labs from cardiology  - Paroxysmal atrial fibrillation (H)  (primary encounter diagnosis)  Comment: symptomatic, not valvular, advanced [id number] score of 4 giving her a 4.8% chance of stroke per year.  Currently on Eliquis 2.5 mg p.o. twice daily given her age of 89 and renal dysfunction, also on Tambocor low-dose, 25 mg p.o. twice daily.  Given her age and renal dysfunction concerned about toxicity and checking   If she has breakthrough arrhythmias might need to consider other therapy but a little hesitant to pursue ablation in a 89-year-old woman.  -Benign essential hypertension  Comment: under good control currently.  If anything a little low, given her age I would like a  high systolic and will back off on the amlodipine altogether when it runs out to discontinue.  - Pure hypercholesterolemia  Comment: So noted, total cholesterol of 179 with LDL of 80 and not on treatment given her age.  -Chronic diastolic congestive heart failure (H) - EF 55%, Grade II (moderate) left ventricular diastolic dysfunction per echo 1/ 2018  Comment: No significant signs or symptoms currently.   --Other pulmonary embolism without acute cor pulmonale, unspecified chronicity (H)  Comment: So noted with no recurrences and no cor pulmonale recently and on anticoagulation.  Plan from Cardiology  1.  Let amlodipine run out and do not fill.  2.  Follow-up with me in 6 to 8 months or sooner if needed.  3.  Recheck ECG in [facility] blood level.    History of anemia  hgb 12.5 on 6/15/21      Vitamin D deficiency  Last level 52.7 on 6/15/21   Taking 2000 units daily      History of elevated glucose  A1c 6.0 on 6/15/21 = prediabetes      Other chronic pain - left knee pain and mid foot arthrosis  Pain is improved dramatically with injections from the pain clinic in multiple joints as well as current pain regimen.  pain clinic consult note 9/22/21 reviewed:   She saw Dr. Hannah and ordering a wrap for the knee.  Compounded topical cream as needed: lidocaine 5%, ibuprofen 10%, and diclofenac 5% apply 1-2 grams to painful feet 3-4 times a day     Post nasal drip and cough  Ran out of flonase      GERD -   No longer need omeprazole and only uses TUMS as needed    Insomnia  Doing well with ambien. She tried other meds and tx and met with a sleep doctor but this is the only that works and she has no side effects.     Review of Systems     Please see above.  The rest of the review of systems are negative for all systems.    Current Outpatient Medications   Medication Instructions     acetaminophen (TYLENOL) 650 mg, EVERY 6 HOURS PRN     amLODIPine (NORVASC) 2.5 mg, Oral, DAILY     cholecalciferol, vitamin D3, (VITAMIN D3)  "2,000 unit Tab 1 tablet, Oral, DAILY     COMPOUNDED NON-CONTROLLED SUBSTANCE (CMPD RX) - PHARMACY TO MIX COMPOUNDED MEDICATION lidocaine 5%, ibuprofen 10%, and diclofenac 5% apply 1-2 grams to painful feet 3-4 times a day     DULoxetine (CYMBALTA) 60 MG capsule [DULOXETINE (CYMBALTA) 60 MG CAPSULE] TAKE ONE CAPSULE BY MOUTH TWICE DAILY      ELIQUIS 2.5 mg Tab tablet [ELIQUIS 2.5 MG TAB TABLET] TAKE ONE TABLET BY MOUTH TWICE DAILY      flecainide (TAMBOCOR) 25 mg, Oral, 2 TIMES DAILY     hydrochlorothiazide (HYDRODIURIL) 25 mg, Oral, DAILY     lisinopril (ZESTRIL) 40 MG tablet TAKE ONE TABLET BY MOUTH ONE TIME DAILY      omeprazole (PRILOSEC OTC) 20 mg, Oral, DAILY PRN     traMADol (ULTRAM) 50 MG tablet Take 2 tablets (100 mg) by mouth at noon and 1 tablet (50 mg) by mouth at bedtime.     zolpidem ER (AMBIEN CR) 6.25 MG CR tablet take 1 and 1/4 tablet by mouth at bedtime.         Objective   Vitals:    10/04/21 1142   BP: 112/70   Pulse: 83   Resp: 14   Temp: 98.2  F (36.8  C)   TempSrc: Oral   SpO2: 96%   Weight: 74.3 kg (163 lb 12.8 oz)   Height: 1.575 m (5' 2.01\")       Body mass index is 29.95 kg/m .    Physical Exam    OBJECTIVE:  Vitals listed above within normal limits  General appearance: well groomed, pleasant, well hydrated, nontoxic appearing  ENT: PERRL, throat clear  Neck: neck supple, no lymphadenopathy, no thyromegaly  Lungs: lungs clear to auscultation bilaterally, no wheezes or rhonchi  Heart: regular rate and rhythm, no murmurs, rubs or gallops  Abdomen: soft, nontender  Neuro: no focal deficits, CN II-XII grossly intact, alert and oriented  Psych:  mood stable, appears to have good insight and judgment             "

## 2021-10-05 NOTE — TELEPHONE ENCOUNTER
===View-only below this line===  ----- Message -----  From: Estrada Holley MD  Sent: 10/4/2021   2:59 PM CDT  To: Stacy Noriega RN    Thank you, please let sister know that ECG looks unchanged, she does have a new left bundle branch block which is there for about a year or 2 now.  I await results of Tambocor blood level.  LF        Noted; await FLEC level. -[facility]

## 2021-10-07 LAB
ATRIAL RATE - MUSE: 73 BPM
DIASTOLIC BLOOD PRESSURE - MUSE: NORMAL MMHG
INTERPRETATION ECG - MUSE: NORMAL
P AXIS - MUSE: 65 DEGREES
PR INTERVAL - MUSE: 152 MS
QRS DURATION - MUSE: 140 MS
QT - MUSE: 454 MS
QTC - MUSE: 500 MS
R AXIS - MUSE: -15 DEGREES
SYSTOLIC BLOOD PRESSURE - MUSE: NORMAL MMHG
T AXIS - MUSE: 63 DEGREES
VENTRICULAR RATE- MUSE: 73 BPM

## 2021-10-08 LAB — FLECAINIDE SERPL-MCNC: 0.27 UG/ML

## 2021-10-11 DIAGNOSIS — M19.079 ARTHROSIS OF FOOT, UNSPECIFIED LATERALITY: ICD-10-CM

## 2021-10-11 DIAGNOSIS — Z79.899 CONTROLLED SUBSTANCE AGREEMENT SIGNED: ICD-10-CM

## 2021-10-12 RX ORDER — DULOXETIN HYDROCHLORIDE 60 MG/1
60 CAPSULE, DELAYED RELEASE ORAL 2 TIMES DAILY
Qty: 180 CAPSULE | Refills: 3 | Status: ON HOLD | OUTPATIENT
Start: 2021-10-12 | End: 2022-08-28

## 2021-10-12 RX ORDER — ZOLPIDEM TARTRATE 6.25 MG/1
TABLET, FILM COATED, EXTENDED RELEASE ORAL
Qty: 45 TABLET | Refills: 0 | Status: SHIPPED | OUTPATIENT
Start: 2021-10-12 | End: 2021-11-17

## 2021-10-12 NOTE — TELEPHONE ENCOUNTER
"Routing refill request to provider for review/approval because:  Drug interaction warning  High dose warning    Last Written Prescription Date:  8/27/20  Last Fill Quantity: 180,  # refills: 3   Last office visit provider:  10/4/21     Requested Prescriptions   Pending Prescriptions Disp Refills     DULoxetine (CYMBALTA) 60 MG capsule [Pharmacy Med Name: DULoxetine HCl Oral Capsule Delayed Release Particles 60 MG] 180 capsule 0     Sig: TAKE ONE CAPSULE BY MOUTH TWICE DAILY       Serotonin-Norepinephrine Reuptake Inhibitors  Passed - 10/11/2021  8:54 AM        Passed - Blood pressure under 140/90 in past 12 months     BP Readings from Last 3 Encounters:   10/04/21 112/70   10/01/21 122/80   09/22/21 139/70                 Passed - Recent (12 mo) or future (30 days) visit within the authorizing provider's specialty     Patient has had an office visit with the authorizing provider or a provider within the authorizing providers department within the previous 12 mos or has a future within next 30 days. See \"Patient Info\" tab in inbasket, or \"Choose Columns\" in Meds & Orders section of the refill encounter.              Passed - Medication is active on med list        Passed - Patient is age 18 or older        Passed - No active pregnancy on record        Passed - No positive pregnancy test in past 12 months           zolpidem ER (AMBIEN CR) 6.25 MG CR tablet [Pharmacy Med Name: Zolpidem Tartrate ER Oral Tablet Extended Release 6.25 MG] 45 tablet 0     Sig: take 1 and 1/4 tablet by mouth at bedtime.       There is no refill protocol information for this order          Praneeth Jade RN 10/12/21 7:33 AM  "

## 2021-10-12 NOTE — TELEPHONE ENCOUNTER
"Routing refill request to provider for review/approval because:  Controlled substance request    Last Written Prescription Date:  9/9/21  Last Fill Quantity: 45,  # refills: 0   Last office visit provider:  10/4/21     Requested Prescriptions   Pending Prescriptions Disp Refills     DULoxetine (CYMBALTA) 60 MG capsule [Pharmacy Med Name: DULoxetine HCl Oral Capsule Delayed Release Particles 60 MG] 180 capsule 3     Sig: Take 1 capsule (60 mg) by mouth 2 times daily       Serotonin-Norepinephrine Reuptake Inhibitors  Passed - 10/11/2021  8:54 AM        Passed - Blood pressure under 140/90 in past 12 months     BP Readings from Last 3 Encounters:   10/04/21 112/70   10/01/21 122/80   09/22/21 139/70                 Passed - Recent (12 mo) or future (30 days) visit within the authorizing provider's specialty     Patient has had an office visit with the authorizing provider or a provider within the authorizing providers department within the previous 12 mos or has a future within next 30 days. See \"Patient Info\" tab in inbasket, or \"Choose Columns\" in Meds & Orders section of the refill encounter.              Passed - Medication is active on med list        Passed - Patient is age 18 or older        Passed - No active pregnancy on record        Passed - No positive pregnancy test in past 12 months           zolpidem ER (AMBIEN CR) 6.25 MG CR tablet [Pharmacy Med Name: Zolpidem Tartrate ER Oral Tablet Extended Release 6.25 MG] 45 tablet 0     Sig: take 1 and 1/4 tablet by mouth at bedtime.       There is no refill protocol information for this order          Praneeth Jade RN 10/12/21 7:34 AM  "

## 2021-10-15 DIAGNOSIS — M19.071 ARTHRITIS OF MIDTARSAL JOINT OF RIGHT FOOT: ICD-10-CM

## 2021-10-15 DIAGNOSIS — Z79.899 CONTROLLED SUBSTANCE AGREEMENT SIGNED: ICD-10-CM

## 2021-10-15 DIAGNOSIS — Z76.0 ENCOUNTER FOR MEDICATION REFILL: Primary | ICD-10-CM

## 2021-10-15 DIAGNOSIS — M79.673 PAIN OF FOOT, UNSPECIFIED LATERALITY: ICD-10-CM

## 2021-10-19 RX ORDER — TRAMADOL HYDROCHLORIDE 50 MG/1
TABLET ORAL
Qty: 90 TABLET | Refills: 0 | Status: SHIPPED | OUTPATIENT
Start: 2021-10-19 | End: 2021-12-15

## 2021-11-15 DIAGNOSIS — Z79.899 CONTROLLED SUBSTANCE AGREEMENT SIGNED: ICD-10-CM

## 2021-11-15 DIAGNOSIS — Z76.0 ENCOUNTER FOR MEDICATION REFILL: Primary | ICD-10-CM

## 2021-11-17 ENCOUNTER — TELEPHONE (OUTPATIENT)
Dept: PALLIATIVE MEDICINE | Facility: OTHER | Age: 86
End: 2021-11-17
Payer: COMMERCIAL

## 2021-11-17 RX ORDER — ZOLPIDEM TARTRATE 6.25 MG/1
TABLET, FILM COATED, EXTENDED RELEASE ORAL
Qty: 45 TABLET | Refills: 0 | Status: SHIPPED | OUTPATIENT
Start: 2021-11-17 | End: 2021-12-16

## 2021-11-18 ENCOUNTER — TELEPHONE (OUTPATIENT)
Dept: PALLIATIVE MEDICINE | Facility: OTHER | Age: 86
End: 2021-11-18
Payer: COMMERCIAL

## 2021-11-18 ENCOUNTER — ANCILLARY PROCEDURE (OUTPATIENT)
Dept: PALLIATIVE MEDICINE | Facility: OTHER | Age: 86
End: 2021-11-18
Payer: COMMERCIAL

## 2021-11-18 VITALS — TEMPERATURE: 96 F | SYSTOLIC BLOOD PRESSURE: 120 MMHG | HEART RATE: 94 BPM | DIASTOLIC BLOOD PRESSURE: 66 MMHG

## 2021-11-18 DIAGNOSIS — M16.12 PRIMARY OSTEOARTHRITIS OF LEFT HIP: Primary | ICD-10-CM

## 2021-11-18 PROCEDURE — 20610 DRAIN/INJ JOINT/BURSA W/O US: CPT | Mod: LT | Performed by: PAIN MEDICINE

## 2021-11-18 PROCEDURE — 77002 NEEDLE LOCALIZATION BY XRAY: CPT | Mod: 26 | Performed by: PAIN MEDICINE

## 2021-11-18 PROCEDURE — 255N000002 HC RX 255 OP 636: Performed by: PAIN MEDICINE

## 2021-11-18 PROCEDURE — 250N000011 HC RX IP 250 OP 636: Performed by: PAIN MEDICINE

## 2021-11-18 PROCEDURE — 250N000009 HC RX 250: Performed by: PAIN MEDICINE

## 2021-11-18 RX ORDER — BUPIVACAINE HYDROCHLORIDE 2.5 MG/ML
INJECTION, SOLUTION EPIDURAL; INFILTRATION; INTRACAUDAL
Status: COMPLETED | OUTPATIENT
Start: 2021-11-18 | End: 2021-11-18

## 2021-11-18 RX ORDER — BETAMETHASONE SODIUM PHOSPHATE AND BETAMETHASONE ACETATE 3; 3 MG/ML; MG/ML
INJECTION, SUSPENSION INTRA-ARTICULAR; INTRALESIONAL; INTRAMUSCULAR; SOFT TISSUE
Status: COMPLETED | OUTPATIENT
Start: 2021-11-18 | End: 2021-11-18

## 2021-11-18 RX ORDER — LIDOCAINE HYDROCHLORIDE 10 MG/ML
INJECTION, SOLUTION EPIDURAL; INFILTRATION; INTRACAUDAL; PERINEURAL
Status: COMPLETED | OUTPATIENT
Start: 2021-11-18 | End: 2021-11-18

## 2021-11-18 RX ADMIN — LIDOCAINE HYDROCHLORIDE 3 ML: 10 INJECTION, SOLUTION EPIDURAL; INFILTRATION; INTRACAUDAL; PERINEURAL at 14:20

## 2021-11-18 RX ADMIN — BETAMETHASONE SODIUM PHOSPHATE AND BETAMETHASONE ACETATE 6 MG: 3; 3 INJECTION, SUSPENSION INTRA-ARTICULAR; INTRALESIONAL; INTRAMUSCULAR at 14:22

## 2021-11-18 RX ADMIN — BUPIVACAINE HYDROCHLORIDE 3 ML: 2.5 INJECTION, SOLUTION EPIDURAL; INFILTRATION; INTRACAUDAL at 14:21

## 2021-11-18 RX ADMIN — IOHEXOL 5 ML: 300 INJECTION, SOLUTION INTRAVENOUS at 14:24

## 2021-11-18 ASSESSMENT — PAIN SCALES - GENERAL
PAINLEVEL: WORST PAIN (10)
PAINLEVEL: NO PAIN (0)

## 2021-11-18 NOTE — LETTER
11/18/2021         RE: Gladys Ramirez  [address]  [facility] [address]        Dear Colleague,    Thank you for referring your patient, Gladys Ramirez, to the [facility]. Please see a copy of my visit note below.    Patient here for left hip injection, she is rating her pain a 10/10.      Again, thank you for allowing me to participate in the care of your patient.        Sincerely,        Corey PAN MD

## 2021-12-02 ENCOUNTER — TELEPHONE (OUTPATIENT)
Dept: FAMILY MEDICINE | Facility: CLINIC | Age: 86
End: 2021-12-02
Payer: COMMERCIAL

## 2021-12-02 NOTE — TELEPHONE ENCOUNTER
Prior Authorization Approval- Renewal     Authorization Effective Date: 11/2/2021  Authorization Expiration Date: 12/2/2022  Medication: Zolpidem Tartrate ER 6.25MG er tablets  Approved Dose/Quantity:    Reference #:     Insurance Company: [employer] - Phone [phone] Fax [phone]  Expected CoPay:       CoPay Card Available:      Foundation Assistance Needed:    Which Pharmacy is filling the prescription (Not needed for infusion/clinic administered): [id number] [[address]  Pharmacy Notified: No  Patient Notified: No

## 2021-12-02 NOTE — TELEPHONE ENCOUNTER
Central Prior Authorization Team   Phone: [phone]    PA Initiation    Medication: Zolpidem Tartrate ER 6.25MG er tablets  Insurance Company: [employer] - Phone [phone] Fax [phone]  Pharmacy Filling the Rx: [id number] [[address]  Filling Pharmacy Phone: [phone]  Filling Pharmacy Fax:    Start Date: 12/2/2021

## 2021-12-14 ENCOUNTER — ANCILLARY PROCEDURE (OUTPATIENT)
Dept: PALLIATIVE MEDICINE | Facility: OTHER | Age: 86
End: 2021-12-14
Attending: PHYSICIAN ASSISTANT
Payer: COMMERCIAL

## 2021-12-14 VITALS
DIASTOLIC BLOOD PRESSURE: 75 MMHG | WEIGHT: 163.5 LBS | OXYGEN SATURATION: 95 % | HEART RATE: 92 BPM | BODY MASS INDEX: 29.9 KG/M2 | SYSTOLIC BLOOD PRESSURE: 140 MMHG

## 2021-12-14 DIAGNOSIS — M17.11 PRIMARY OSTEOARTHRITIS OF RIGHT KNEE: ICD-10-CM

## 2021-12-14 PROCEDURE — 20610 DRAIN/INJ JOINT/BURSA W/O US: CPT | Mod: RT | Performed by: PAIN MEDICINE

## 2021-12-14 PROCEDURE — 250N000009 HC RX 250: Mod: JW | Performed by: PAIN MEDICINE

## 2021-12-14 PROCEDURE — 255N000002 HC RX 255 OP 636: Performed by: PAIN MEDICINE

## 2021-12-14 PROCEDURE — 77002 NEEDLE LOCALIZATION BY XRAY: CPT | Mod: 26 | Performed by: PAIN MEDICINE

## 2021-12-14 PROCEDURE — 250N000011 HC RX IP 250 OP 636: Performed by: PAIN MEDICINE

## 2021-12-14 RX ORDER — BETAMETHASONE SODIUM PHOSPHATE AND BETAMETHASONE ACETATE 3; 3 MG/ML; MG/ML
INJECTION, SUSPENSION INTRA-ARTICULAR; INTRALESIONAL; INTRAMUSCULAR; SOFT TISSUE
Status: COMPLETED | OUTPATIENT
Start: 2021-12-14 | End: 2021-12-14

## 2021-12-14 RX ORDER — BUPIVACAINE HYDROCHLORIDE 2.5 MG/ML
INJECTION, SOLUTION EPIDURAL; INFILTRATION; INTRACAUDAL
Status: COMPLETED | OUTPATIENT
Start: 2021-12-14 | End: 2021-12-14

## 2021-12-14 RX ORDER — LIDOCAINE HYDROCHLORIDE 10 MG/ML
INJECTION, SOLUTION EPIDURAL; INFILTRATION; INTRACAUDAL; PERINEURAL
Status: COMPLETED | OUTPATIENT
Start: 2021-12-14 | End: 2021-12-14

## 2021-12-14 RX ADMIN — IOHEXOL 4 ML: 300 INJECTION, SOLUTION INTRAVENOUS at 14:34

## 2021-12-14 RX ADMIN — BUPIVACAINE HYDROCHLORIDE 3 ML: 2.5 INJECTION, SOLUTION EPIDURAL; INFILTRATION; INTRACAUDAL at 14:31

## 2021-12-14 RX ADMIN — BETAMETHASONE SODIUM PHOSPHATE AND BETAMETHASONE ACETATE 6 MG: 3; 3 INJECTION, SUSPENSION INTRA-ARTICULAR; INTRALESIONAL; INTRAMUSCULAR at 14:32

## 2021-12-14 RX ADMIN — LIDOCAINE HYDROCHLORIDE 3 ML: 10 INJECTION, SOLUTION EPIDURAL; INFILTRATION; INTRACAUDAL; PERINEURAL at 14:31

## 2021-12-14 ASSESSMENT — PAIN SCALES - GENERAL
PAINLEVEL: MODERATE PAIN (4)
PAINLEVEL: NO PAIN (0)

## 2021-12-14 NOTE — PATIENT INSTRUCTIONS
[facility]   Procedure Discharge Instructions    Today you saw:    Dr. Oliva    You had a Right Knee Injection  Medications used:  Lidocaine   Bupivacaine   Omnipaque  Kenalog            If you were holding your blood thinning medication, please restart taking it: Not applicable    Be cautious when walking. Numbness and/or weakness in the lower extremities may occur for up to 6-8 hours after the procedure due to effect of the local anesthetic    Do not drive for 6 hours. The effect of the local anesthetic could slow your reflexes.     You may resume your regular activities after 24 hours    Avoid strenuous activity for the first 24 hours    You may shower, however avoid swimming, tub baths or hot tubs for 24 hours following your procedure    You may have a mild to moderate increase in pain for several days following the injection.    It may take up to 14 days for the steroid medication to start working although you may feel the effect as early as a few days after the procedure.       You may use ice packs for 10-15 minutes, 3 to 4 times a day at the injection site for comfort    Do not use heat to painful areas for 6 to 8 hours. This will give the local anesthetic time to wear off and prevent you from accidentally burning your skin.     Unless you have been directed to avoid the use of anti-inflammatory medications (NSAIDS), you may use medications such as ibuprofen, Aleve or Tylenol for pain control if needed.     If you were fasting, you may resume your normal diet and medications after the procedure    If you have diabetes, check your blood sugar more frequently than usual as your blood sugar may be higher than normal for 10-14 days following a steroid injection. Contact your doctor who manages your diabetes if your blood sugar is higher than usual    Possible side effects of steroids that you may experience include flushing, elevated blood pressure, increased  appetite, mild headaches and restlessness.  All of these symptoms will get better with time.    If you experience any of the following, call the Pain Clinic at [phone]:  -Fever over 100 degree F  -Swelling, bleeding, redness, drainage, warmth at the injection site  -Progressive weakness or numbness in your legs or arms  -Loss of bowel or bladder function  -Unusual headache that is not relieved by Tylenol or other pain reliever  -Unusual new onset of pain that is not improving

## 2021-12-14 NOTE — PROGRESS NOTES
Pre-procedure Intake  If YES to any questions or NO to having a   Please complete laminated checklist and leave on the computer keyboard for Provider, verbally inform provider if able.    For SCS Trial, RFA's or any sedation procedure:  Have you been fasting? No     If yes, for how long?     Are you taking any any blood thinners such as Coumadin, Warfarin, Jantoven, Pradaxa Xarelto, Eliquis, Edoxaban, Enoxaparin, Lovenox, Heparin, Arixtra, Fondaparinux, or Fragmin? OR Antiplatelet medication such as Plavix, Brilinta, or Effient?   Yes -   Other blood thinners     If yes, when did you take your last dose? Elaquest at 9:00 am    Do you take aspirin?  No    If cervical procedure, have you held aspirin for 6 days?       Do you have any allergies to contrast dye, iodine, steroid and/or numbing medications?  NO    Are you currently taking antibiotics or have an active infection?  NO    Have you had a fever/elevated temperature within the past week? Not Applicable    Are you currently taking oral steroids? NO    Do you have a ? NO    Are you pregnant or breastfeeding?  NO    Have you received the COVID-19 vaccine? Yes    If yes, was it your 1st, 2nd or only dose needed? 11/28/2021, 3/26/2021, 2/26/2021    Date of most recent vaccine: 3/26/2021    Notify provider and RNs if systolic BP >170, diastolic BP >100, P >100 or O2 sats < 90%    2

## 2021-12-14 NOTE — LETTER
12/14/2021         RE: lGadys Ramirez  [address]  [facility] [address]        Dear Colleague,    Thank you for referring your patient, Gladys Ramirez, to the [facility]. Please see a copy of my visit note below.      Pre-procedure Intake  If YES to any questions or NO to having a   Please complete laminated checklist and leave on the computer keyboard for Provider, verbally inform provider if able.    For SCS Trial, RFA's or any sedation procedure:  Have you been fasting? No     If yes, for how long?     Are you taking any any blood thinners such as Coumadin, Warfarin, Jantoven, Pradaxa Xarelto, Eliquis, Edoxaban, Enoxaparin, Lovenox, Heparin, Arixtra, Fondaparinux, or Fragmin? OR Antiplatelet medication such as Plavix, Brilinta, or Effient?   Yes -   Other blood thinners     If yes, when did you take your last dose? Elaquest at 9:00 am    Do you take aspirin?  No    If cervical procedure, have you held aspirin for 6 days?       Do you have any allergies to contrast dye, iodine, steroid and/or numbing medications?  NO    Are you currently taking antibiotics or have an active infection?  NO    Have you had a fever/elevated temperature within the past week? Not Applicable    Are you currently taking oral steroids? NO    Do you have a ? NO    Are you pregnant or breastfeeding?  NO    Have you received the COVID-19 vaccine? Yes    If yes, was it your 1st, 2nd or only dose needed? 11/28/2021, 3/26/2021, 2/26/2021    Date of most recent vaccine: 3/26/2021    Notify provider and RNs if systolic BP >170, diastolic BP >100, P >100 or O2 sats < 90%    2      Again, thank you for allowing me to participate in the care of your patient.        Sincerely,        Corey PAN MD

## 2021-12-15 DIAGNOSIS — Z76.0 ENCOUNTER FOR MEDICATION REFILL: Primary | ICD-10-CM

## 2021-12-16 RX ORDER — ZOLPIDEM TARTRATE 6.25 MG/1
TABLET, FILM COATED, EXTENDED RELEASE ORAL
Qty: 45 TABLET | Refills: 0 | Status: SHIPPED | OUTPATIENT
Start: 2021-12-16 | End: 2022-01-20

## 2021-12-16 RX ORDER — TRAMADOL HYDROCHLORIDE 50 MG/1
TABLET ORAL
Qty: 90 TABLET | Refills: 0 | Status: SHIPPED | OUTPATIENT
Start: 2021-12-16 | End: 2022-02-23

## 2021-12-20 ENCOUNTER — TELEPHONE (OUTPATIENT)
Dept: CARDIOLOGY | Facility: CLINIC | Age: 86
End: 2021-12-20
Payer: COMMERCIAL

## 2021-12-20 NOTE — TELEPHONE ENCOUNTER
----- Message from Leslie Domínguez sent at 12/20/2021  1:15 PM CST -----  Regarding: [facility]  General phone call:    Caller: Gladys  Primary cardiologist: SMITHA  Detailed reason for call: Pt is calling and states she is fatigued all the time. Pt is currently scheduled to see SMITHA on 03/04    Best phone number: [phone]  Best time to contact: anytime  Ok to leave a detailedmessage? yes  Device? 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 She states that she has felt a lot of fatigue since about November. She denies weight gain or LE swelling. She denies feeling like her heart is racing. She denies dizziness or lightheadedness but reports that her blood pressure has been on the lower side- low 100's systolic. She chart note from [facility] 10/1. She was supposed to stop her Amlodipine when she runs out. She has a lot of supply. Writer encouraged her to consider just stopping it now or holding it for a few days and see if she feels better as [facility] wanted this stopped anyway. It could be the cause of her symptoms- low blood pressure causing her significant fatigue.     She realized this could be and will do this. She will monitor her blood pressure and update writer on numbers. Will check in on patient in a week to see if this has helped. -[facility] [place]

## 2022-01-05 ENCOUNTER — NURSE TRIAGE (OUTPATIENT)
Dept: NURSING | Facility: CLINIC | Age: 87
End: 2022-01-05
Payer: COMMERCIAL

## 2022-01-05 NOTE — TELEPHONE ENCOUNTER
Triage Call:     Pt calling to report that she has a red spot in the middle of her shoulder blades since 12/23/2021  Itches, throbs, hurts and is slightly swollen  Red-purple in color  Size of a quarter    Has not changed in size since she first noticed it    Pain: Can not lay on this area because it is painful    Pt unsure of cause and it is in a location that is hard for patient to reach and see    Disposition: Go to office now. Pt was given care advice and transferred to scheduling to see if an appt is available for today. If no appts, patient will go to the [facility].     Dyan Lezama RN  [facility] Nurse Advisor 2:54 PM 1/5/2022    Reason for Disposition    Looks like a boil, infected sore, deep ulcer, or other infected rash (spreading redness, pus)    Additional Information    Negative: Sounds like a life-threatening emergency to the triager    Negative: Possible contact with poison ivy or oak    Negative: Insect bite(s) suspected    Negative: Athlete's Foot suspected (i.e., itchy rash between the toes)    Negative: Jock Itch suspected (i.e., itchy rash on inner thighs near genital area)    Negative: Wound infection suspected (i.e., pain, spreading redness, or pus; in a cut, puncture, scrape or sutured wound)    Negative: Rash of external female genital area (vulva)    Negative: Rash of penis or scrotum    Negative: Small spot, skin growth, or mole    Negative: Fever and localized purple or blood-colored spots or dots that are not from injury or friction    Negative: Fever and localized rash is very painful    Negative: Patient sounds very sick or weak to the triager    Protocols used: RASH OR REDNESS - [id number]    COVID 19 Nurse Triage Plan/Patient Instructions    Please be aware that novel coronavirus (COVID-19) may be circulating in the community. If you develop symptoms such as fever, cough, or SOB or if you have concerns about the presence of another infection including coronavirus  (COVID-19), please contact your health care provider or visit [email or web address].     Disposition/Instructions    In-Person Visit with provider recommended. Reference Visit Selection Guide.    Thank you for taking steps to prevent the spread of this virus.  o Limit your contact with others.  o Wear a simple mask to cover your cough.  o Wash your hands well and often.    Resources    [facility]: About COVID-19: [email or web address]    CDC: What to Do If You're Sick: www.cdc.gov/coronavirus/2019-ncov/about/steps-when-sick.html    CDC: Ending Home Isolation: www.cdc.gov/coronavirus/2019-ncov/hcp/disposition-in-home-patients.html     CDC: Caring for Someone: www.cdc.gov/coronavirus/2019-ncov/if-you-are-sick/care-for-someone.html     [facility]: Interim Guidance for Hospital Discharge to Home: www.[facility].mn./diseases/coronavirus/hcp/hospdischarge.pdf    [facility] clinical trials (COVID-19 research studies): [email or web address].[facility]-clinical-trials     Below are the COVID-19 hotlines at the [place] [facility] ([facility]). Interpreters are available.   o For health questions: Call 628-099-7933 or 1-951.903.6567 (7 a.m. to 7 p.m.)  o For questions about schools and childcare: Call 353-560-3974 or 1-119.301.8795 (7 a.m. to 7 p.m.)

## 2022-01-11 ENCOUNTER — TELEPHONE (OUTPATIENT)
Dept: CARDIOLOGY | Facility: CLINIC | Age: 87
End: 2022-01-11
Payer: COMMERCIAL

## 2022-01-11 NOTE — TELEPHONE ENCOUNTER
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   Best phone number: [phone]  Best time to contact: any  Ok to leave a detailedmessage? yes  Device? no    Additional Info:          ==called back Sister Gladys to address her concerns. She states that her blood pressure is averaging around 117/70 with taking Amlodipine every other night.     See visit note from SMITHA on 10/1/2021:  (I10) Benign essential hypertension  Comment: under good control currently.  If anything a little low, given her age I would like a high systolic and will back off on the amlodipine altogether when it runs out to discontinue.     Reminded Sister that she can just stop it and keep an eye on her pressures. She will do this and medication was removed from her medication list. She was just continuing it because she had recently refilled in back in the Fall. -[facility] [place]

## 2022-01-17 ENCOUNTER — OFFICE VISIT (OUTPATIENT)
Dept: FAMILY MEDICINE | Facility: CLINIC | Age: 87
End: 2022-01-17
Payer: COMMERCIAL

## 2022-01-17 VITALS
DIASTOLIC BLOOD PRESSURE: 76 MMHG | BODY MASS INDEX: 31.72 KG/M2 | TEMPERATURE: 98 F | HEIGHT: 61 IN | SYSTOLIC BLOOD PRESSURE: 120 MMHG | HEART RATE: 83 BPM | OXYGEN SATURATION: 98 % | WEIGHT: 168 LBS

## 2022-01-17 DIAGNOSIS — L02.91 ABSCESS: Primary | ICD-10-CM

## 2022-01-17 PROCEDURE — 99213 OFFICE O/P EST LOW 20 MIN: CPT | Performed by: FAMILY MEDICINE

## 2022-01-17 ASSESSMENT — MIFFLIN-ST. JEOR: SCORE: 1118.38

## 2022-01-17 NOTE — PROGRESS NOTES
"OUTPATIENT VISIT NOTE                                                   Date of Visit: 1/17/2022     Chief Complaint   Patient presents with:  Back Problem: THINKS ITS A BOIL MIDDLE OF BACK            History of Present Illness   Gladys Ramirez is a 89 year old female HAS sore on her back sinceChristmas.  Large, swollen, painful.  Drained itself a week ago.  Still having some drainage  No fever       MEDICATIONS   Current Outpatient Medications   Medication     acetaminophen (TYLENOL) 325 MG tablet     cholecalciferol, vitamin D3, (VITAMIN D3) 2,000 unit Tab     COMPOUNDED NON-CONTROLLED SUBSTANCE (CMPD RX) - PHARMACY TO MIX COMPOUNDED MEDICATION     DULoxetine (CYMBALTA) 60 MG capsule     ELIQUIS ANTICOAGULANT 2.5 MG tablet     flecainide (TAMBOCOR) 50 MG tablet     hydrochlorothiazide (HYDRODIURIL) 25 MG tablet     lisinopril (ZESTRIL) 40 MG tablet     traMADol (ULTRAM) 50 MG tablet     zolpidem ER (AMBIEN CR) 6.25 MG CR tablet     No current facility-administered medications for this visit.         SOCIAL HISTORY   Social History     Tobacco Use     Smoking status: Never Smoker     Smokeless tobacco: Never Used     Tobacco comment: no passive exposure   Substance Use Topics     Alcohol use: Yes     Comment: Alcoholic Drinks/day: Rarely a glass of wine           Physical Exam   Vitals:    01/17/22 1333   BP: 120/76   BP Location: Left arm   Patient Position: Sitting   Cuff Size: Adult Large   Pulse: 83   Temp: 98  F (36.7  C)   TempSrc: Oral   SpO2: 98%   Weight: 76.2 kg (168 lb)   Height: 1.556 m (5' 1.25\")        GEN:  NAD  BACK:  Area of hyperpigmentation left upper back.  Nontender.  No noduled         Assessment and Plan     Abscess  Has drained and is healing.  Dress as necessary.  Discussed signs/symptoms warranting recheck                   Discussed signs / symptoms that warrant urgent / emergent medical attention.     Recheck if worsening or not improving.       Anya Del Cid MD          Pertinent " History     The following portions of the patient's history were reviewed and updated as appropriate: allergies, current medications, past family history, past medical history, past social history, past surgical history and problem list.

## 2022-01-20 DIAGNOSIS — Z76.0 ENCOUNTER FOR MEDICATION REFILL: Primary | ICD-10-CM

## 2022-01-20 RX ORDER — ZOLPIDEM TARTRATE 6.25 MG/1
TABLET, FILM COATED, EXTENDED RELEASE ORAL
Qty: 45 TABLET | Refills: 0 | Status: SHIPPED | OUTPATIENT
Start: 2022-01-20 | End: 2022-02-24

## 2022-02-23 DIAGNOSIS — Z76.0 ENCOUNTER FOR MEDICATION REFILL: Primary | ICD-10-CM

## 2022-02-24 RX ORDER — ZOLPIDEM TARTRATE 6.25 MG/1
TABLET, FILM COATED, EXTENDED RELEASE ORAL
Qty: 45 TABLET | Refills: 0 | Status: SHIPPED | OUTPATIENT
Start: 2022-02-24 | End: 2022-03-29

## 2022-02-24 RX ORDER — TRAMADOL HYDROCHLORIDE 50 MG/1
TABLET ORAL
Qty: 90 TABLET | Refills: 0 | Status: SHIPPED | OUTPATIENT
Start: 2022-02-24 | End: 2022-05-27

## 2022-03-02 ENCOUNTER — TELEPHONE (OUTPATIENT)
Dept: PALLIATIVE MEDICINE | Facility: OTHER | Age: 87
End: 2022-03-02
Payer: COMMERCIAL

## 2022-03-02 ENCOUNTER — TELEPHONE (OUTPATIENT)
Dept: FAMILY MEDICINE | Facility: CLINIC | Age: 87
End: 2022-03-02
Payer: COMMERCIAL

## 2022-03-02 DIAGNOSIS — M79.673 PAIN OF FOOT, UNSPECIFIED LATERALITY: Primary | ICD-10-CM

## 2022-03-02 DIAGNOSIS — M19.071 ARTHRITIS OF MIDTARSAL JOINT OF RIGHT FOOT: ICD-10-CM

## 2022-03-02 DIAGNOSIS — M19.079 ARTHROSIS OF FOOT, UNSPECIFIED LATERALITY: ICD-10-CM

## 2022-03-02 DIAGNOSIS — G89.4 CHRONIC PAIN SYNDROME: ICD-10-CM

## 2022-03-02 DIAGNOSIS — M17.12 PRIMARY OSTEOARTHRITIS OF LEFT KNEE: ICD-10-CM

## 2022-03-02 NOTE — TELEPHONE ENCOUNTER
Reason for Call: Request for an order or referral:    Order or referral being requested: Pain clinic    Date needed: before 3/10/22    Has the patient been seen by the PCP for this problem? YES    Additional comments: Patient states she is seeing a new provider at pain clinic - Dr. Corey Person on 3/10/22 for right knee injection. Her previous provider Dr. Corey Oliva is no longer there. Patient was told to call PCP to enter a new order for the new provider.    Can Dr. Martni accommodate patient request?    Phone number Patient can be reached at:  Home number on file [phone] (home)    Best Time:  any    Can we leave a detailed message on this number?  YES    Call taken on 3/2/2022 at 4:05 PM by Quiana Cottrell

## 2022-03-04 ENCOUNTER — OFFICE VISIT (OUTPATIENT)
Dept: CARDIOLOGY | Facility: CLINIC | Age: 87
End: 2022-03-04
Payer: COMMERCIAL

## 2022-03-04 VITALS
WEIGHT: 162 LBS | BODY MASS INDEX: 30.58 KG/M2 | DIASTOLIC BLOOD PRESSURE: 70 MMHG | HEIGHT: 61 IN | SYSTOLIC BLOOD PRESSURE: 116 MMHG | HEART RATE: 80 BPM | RESPIRATION RATE: 16 BRPM

## 2022-03-04 DIAGNOSIS — E78.00 PURE HYPERCHOLESTEROLEMIA: ICD-10-CM

## 2022-03-04 DIAGNOSIS — I82.4Z9 DEEP VEIN THROMBOSIS (DVT) OF DISTAL VEIN OF LOWER EXTREMITY, UNSPECIFIED CHRONICITY, UNSPECIFIED LATERALITY (H): ICD-10-CM

## 2022-03-04 DIAGNOSIS — I48.0 PAROXYSMAL ATRIAL FIBRILLATION (H): Primary | ICD-10-CM

## 2022-03-04 DIAGNOSIS — I10 BENIGN ESSENTIAL HYPERTENSION: ICD-10-CM

## 2022-03-04 DIAGNOSIS — I50.32 CHRONIC DIASTOLIC CONGESTIVE HEART FAILURE (H): ICD-10-CM

## 2022-03-04 PROCEDURE — 99214 OFFICE O/P EST MOD 30 MIN: CPT | Performed by: INTERNAL MEDICINE

## 2022-03-04 NOTE — LETTER
3/4/2022    Lore Martin MD  [address] [place] [address] 1  [place] [address]    RE: Gladys Ramirez       Dear Colleague,     I had the pleasure of seeing Gladys Ramirez in the [facility].      [facility]  Heart Care Clinic Follow-up Note    Assessment & Plan        (I48.0) Paroxysmal atrial fibrillation (H)  (primary encounter diagnosis)  Comment: Symptomatic, not valvular, advanced [id number] score of 4 giving her a 4.8% chance of stroke per year.  Currently on Eliquis 2.5 mg p.o. twice daily given her age of 89 and renal dysfunction, also on Tambocor low-dose, 25 mg p.o. twice daily with a low normal level.  Given vague shortness of breath will check event monitor, if she has breakthrough arrhythmias might need to consider other therapy or increased dose of flecainide but a little hesitant to pursue ablation in a 89-year-old woman.    (E78.00) Pure hypercholesterolemia  Comment: Total cholesterol of 179 with LDL of 80 and not on treatment given her age.    (I10) Benign essential hypertension  Comment: Under good control with less orthostasis of amlodipine.  Given that she is still orthostatic at times will lower lisinopril which she thinks she is taking half a 40 mg tablet for 20 mg down to 10 mg when she runs out.    (I50.32) Chronic diastolic congestive heart failure (H) - EF 55%, Grade II (moderate) left ventricular diastolic dysfunction per echo 1/ 2018  Comment: No significant signs or symptoms currently.  On HCTZ.    (I82.4Z9) Deep vein thrombosis (DVT) of distal vein of lower extremity, unspecified chronicity, unspecified laterality (H)  Comment: History of pulmonary embolism and with DVT on chronic Eliquis.    Left bundle branch block-chronic.    Plan  1.  7-day event monitor given shortness of breath after eating and if breakthrough atrial fibrillation seen will increase dose of Tambocor to 50 mg twice a day.  2.  Follow-up with me in about 8 months or sooner if  needed.  3.  When lisinopril 40 mg tablets which she is cutting in half or 20 mg runs out we will renew her 10 mg prescription.    Subjective  CC: 89-year-old white female here for follow-up visit.  She is here alone.  Still living independently in senior housing.  Tells me occasionally when she gets up after dinner to get some dessert she is short of breath.  Otherwise no fatigue, syncope, major orthostasis, chest discomfort, palpitations, PND, orthopnea or peripheral edema.    Medications  Current Outpatient Medications   Medication Sig Dispense Refill     acetaminophen (TYLENOL) 325 MG tablet [ACETAMINOPHEN (TYLENOL) 325 MG TABLET] Take 650 mg by mouth every 6 (six) hours as needed for pain.       cholecalciferol, vitamin D3, (VITAMIN D3) 2,000 unit Tab [CHOLECALCIFEROL, VITAMIN D3, (VITAMIN D3) 2,000 UNIT TAB] Take 1 tablet (2,000 Units total) by mouth daily. 90 tablet 3     COMPOUNDED NON-CONTROLLED SUBSTANCE (CMPD RX) - PHARMACY TO MIX COMPOUNDED MEDICATION lidocaine 5%, ibuprofen 10%, and diclofenac 5% apply 1-2 grams to painful feet 3-4 times a day 60 g 3     DULoxetine (CYMBALTA) 60 MG capsule Take 1 capsule (60 mg) by mouth 2 times daily 180 capsule 3     ELIQUIS ANTICOAGULANT 2.5 MG tablet TAKE ONE TABLET BY MOUTH TWICE DAILY 180 tablet 1     flecainide (TAMBOCOR) 50 MG tablet Take 0.5 tablets (25 mg) by mouth 2 times daily 90 tablet 1     hydrochlorothiazide (HYDRODIURIL) 25 MG tablet Take 1 tablet (25 mg) by mouth daily 90 tablet 3     lisinopril (ZESTRIL) 40 MG tablet TAKE ONE TABLET BY MOUTH ONE TIME DAILY  90 tablet 0     traMADol (ULTRAM) 50 MG tablet Take 2 tablets (100 mg) by mouth at noon and 1 tablet (50 mg) by mouth at bedtime (Patient taking differently: daily as needed Take 2 tablets (100 mg) by mouth at noon and 1 tablet (50 mg) by mouth at bedtime.) 90 tablet 0     zolpidem ER (AMBIEN CR) 6.25 MG CR tablet take 1 and 1/4 tablet by mouth at bedtime 45 tablet 0       Objective  /70 (BP  "Location: Left arm, Patient Position: Sitting, Cuff Size: Adult Large)   Pulse 80   Resp 16   Ht 1.556 m (5' 1.25\")   Wt 73.5 kg (162 lb)   BMI 30.36 kg/m      General Appearance:    Alert, cooperative, no distress, appears stated age   Head:    Normocephalic, without obvious abnormality, atraumatic   Throat:   Lips, mucosa, and tongue normal; teeth and gums normal   Neck:   Supple, symmetrical, trachea midline, no adenopathy;        thyroid:  No enlargement/tenderness/nodules; no carotid    bruit or JVD   Back:     Symmetric, no curvature, ROM normal, no CVA tenderness   Lungs:     Clear to auscultation bilaterally, respirations unlabored   Chest wall:    No tenderness or deformity   Heart:    Regular rate and rhythm, S1 and S2 normal, no murmur, rub   or gallop   Abdomen:     Soft, non-tender, bowel sounds active all four quadrants,     no masses, no organomegaly   Extremities:   Normal, atraumatic, no cyanosis or edema   Pulses:   2+ and symmetric all extremities   Skin:   Skin color, texture, turgor normal, no rashes or lesions     Results    Lab Results personally reviewed   Lab Results   Component Value Date    CHOL 179 03/12/2015    CHOL 179 09/30/2013     Lab Results   Component Value Date    HDL 64 03/12/2015    HDL 64 09/30/2013     No components found for: LDLCALC  Lab Results   Component Value Date    TRIG 176 (H) 03/12/2015    TRIG 155 (H) 09/30/2013     Lab Results   Component Value Date    WBC 7.0 06/15/2021    HGB 12.5 06/15/2021    HCT 37.4 06/15/2021     06/15/2021     Lab Results   Component Value Date    BUN 26 04/19/2021     04/19/2021    CO2 28 04/19/2021       Reviewed electrocardiogram sinus rhythm occasional PAC, left bundle branch block pattern. October 2021.              Thank you for allowing me to participate in the care of your patient.      Sincerely,   MARISA SANZ MD   [facility]  cc: No referring provider " defined for this encounter.

## 2022-03-04 NOTE — PROGRESS NOTES
[facility]  Heart Care Clinic Follow-up Note    Assessment & Plan        (I48.0) Paroxysmal atrial fibrillation (H)  (primary encounter diagnosis)  Comment: Symptomatic, not valvular, advanced [id number] score of 4 giving her a 4.8% chance of stroke per year.  Currently on Eliquis 2.5 mg p.o. twice daily given her age of 89 and renal dysfunction, also on Tambocor low-dose, 25 mg p.o. twice daily with a low normal level.  Given vague shortness of breath will check event monitor, if she has breakthrough arrhythmias might need to consider other therapy or increased dose of flecainide but a little hesitant to pursue ablation in a 89-year-old woman.    (E78.00) Pure hypercholesterolemia  Comment: Total cholesterol of 179 with LDL of 80 and not on treatment given her age.    (I10) Benign essential hypertension  Comment: Under good control with less orthostasis of amlodipine.  Given that she is still orthostatic at times will lower lisinopril which she thinks she is taking half a 40 mg tablet for 20 mg down to 10 mg when she runs out.    (I50.32) Chronic diastolic congestive heart failure (H) - EF 55%, Grade II (moderate) left ventricular diastolic dysfunction per echo 1/ 2018  Comment: No significant signs or symptoms currently.  On HCTZ.    (I82.4Z9) Deep vein thrombosis (DVT) of distal vein of lower extremity, unspecified chronicity, unspecified laterality (H)  Comment: History of pulmonary embolism and with DVT on chronic Eliquis.    Left bundle branch block-chronic.    Plan  1.  7-day event monitor given shortness of breath after eating and if breakthrough atrial fibrillation seen will increase dose of Tambocor to 50 mg twice a day.  2.  Follow-up with me in about 8 months or sooner if needed.  3.  When lisinopril 40 mg tablets which she is cutting in half or 20 mg runs out we will renew her 10 mg prescription.    Subjective  CC: 89-year-old white female here for follow-up visit.  She is here alone.   "Still living independently in senior housing.  Tells me occasionally when she gets up after dinner to get some dessert she is short of breath.  Otherwise no fatigue, syncope, major orthostasis, chest discomfort, palpitations, PND, orthopnea or peripheral edema.    Medications  Current Outpatient Medications   Medication Sig Dispense Refill     acetaminophen (TYLENOL) 325 MG tablet [ACETAMINOPHEN (TYLENOL) 325 MG TABLET] Take 650 mg by mouth every 6 (six) hours as needed for pain.       cholecalciferol, vitamin D3, (VITAMIN D3) 2,000 unit Tab [CHOLECALCIFEROL, VITAMIN D3, (VITAMIN D3) 2,000 UNIT TAB] Take 1 tablet (2,000 Units total) by mouth daily. 90 tablet 3     COMPOUNDED NON-CONTROLLED SUBSTANCE (CMPD RX) - PHARMACY TO MIX COMPOUNDED MEDICATION lidocaine 5%, ibuprofen 10%, and diclofenac 5% apply 1-2 grams to painful feet 3-4 times a day 60 g 3     DULoxetine (CYMBALTA) 60 MG capsule Take 1 capsule (60 mg) by mouth 2 times daily 180 capsule 3     ELIQUIS ANTICOAGULANT 2.5 MG tablet TAKE ONE TABLET BY MOUTH TWICE DAILY 180 tablet 1     flecainide (TAMBOCOR) 50 MG tablet Take 0.5 tablets (25 mg) by mouth 2 times daily 90 tablet 1     hydrochlorothiazide (HYDRODIURIL) 25 MG tablet Take 1 tablet (25 mg) by mouth daily 90 tablet 3     lisinopril (ZESTRIL) 40 MG tablet TAKE ONE TABLET BY MOUTH ONE TIME DAILY  90 tablet 0     traMADol (ULTRAM) 50 MG tablet Take 2 tablets (100 mg) by mouth at noon and 1 tablet (50 mg) by mouth at bedtime (Patient taking differently: daily as needed Take 2 tablets (100 mg) by mouth at noon and 1 tablet (50 mg) by mouth at bedtime.) 90 tablet 0     zolpidem ER (AMBIEN CR) 6.25 MG CR tablet take 1 and 1/4 tablet by mouth at bedtime 45 tablet 0       Objective  /70 (BP Location: Left arm, Patient Position: Sitting, Cuff Size: Adult Large)   Pulse 80   Resp 16   Ht 1.556 m (5' 1.25\")   Wt 73.5 kg (162 lb)   BMI 30.36 kg/m      General Appearance:    Alert, cooperative, no " distress, appears stated age   Head:    Normocephalic, without obvious abnormality, atraumatic   Throat:   Lips, mucosa, and tongue normal; teeth and gums normal   Neck:   Supple, symmetrical, trachea midline, no adenopathy;        thyroid:  No enlargement/tenderness/nodules; no carotid    bruit or JVD   Back:     Symmetric, no curvature, ROM normal, no CVA tenderness   Lungs:     Clear to auscultation bilaterally, respirations unlabored   Chest wall:    No tenderness or deformity   Heart:    Regular rate and rhythm, S1 and S2 normal, no murmur, rub   or gallop   Abdomen:     Soft, non-tender, bowel sounds active all four quadrants,     no masses, no organomegaly   Extremities:   Normal, atraumatic, no cyanosis or edema   Pulses:   2+ and symmetric all extremities   Skin:   Skin color, texture, turgor normal, no rashes or lesions     Results    Lab Results personally reviewed   Lab Results   Component Value Date    CHOL 179 03/12/2015    CHOL 179 09/30/2013     Lab Results   Component Value Date    HDL 64 03/12/2015    HDL 64 09/30/2013     No components found for: LDLCALC  Lab Results   Component Value Date    TRIG 176 (H) 03/12/2015    TRIG 155 (H) 09/30/2013     Lab Results   Component Value Date    WBC 7.0 06/15/2021    HGB 12.5 06/15/2021    HCT 37.4 06/15/2021     06/15/2021     Lab Results   Component Value Date    BUN 26 04/19/2021     04/19/2021    CO2 28 04/19/2021       Reviewed electrocardiogram sinus rhythm occasional PAC, left bundle branch block pattern. October 2021.

## 2022-03-04 NOTE — PATIENT INSTRUCTIONS
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  I am glad to hear you are doing well.  Per our conversation make sure you are not taking the AMLODIPINE.  Finish out the LISINOPRIL and confirm that you are cutting in 1/2 the 40 mg tablets.  When getting close to needing LISINOPRIL refill call me at [phone] and we will get you 10 mg tablets.  I will plan on seeing you 6-8 months.  Estrada Holley

## 2022-03-09 ENCOUNTER — TELEPHONE (OUTPATIENT)
Dept: PALLIATIVE MEDICINE | Facility: OTHER | Age: 87
End: 2022-03-09
Payer: COMMERCIAL

## 2022-03-09 NOTE — TELEPHONE ENCOUNTER
Writer called patient and there was no answer and VM at all.      Call complete.     Natalya.RAHEL Carter ([facility])............. March 9, 2022  4:30 PM

## 2022-03-10 ENCOUNTER — RADIOLOGY INJECTION OFFICE VISIT (OUTPATIENT)
Dept: PALLIATIVE MEDICINE | Facility: OTHER | Age: 87
End: 2022-03-10
Attending: PAIN MEDICINE
Payer: COMMERCIAL

## 2022-03-10 VITALS
HEART RATE: 75 BPM | OXYGEN SATURATION: 98 % | DIASTOLIC BLOOD PRESSURE: 71 MMHG | SYSTOLIC BLOOD PRESSURE: 130 MMHG | TEMPERATURE: 97.5 F

## 2022-03-10 DIAGNOSIS — M25.561 CHRONIC PAIN OF RIGHT KNEE: ICD-10-CM

## 2022-03-10 DIAGNOSIS — M17.12 PRIMARY OSTEOARTHRITIS OF LEFT KNEE: ICD-10-CM

## 2022-03-10 DIAGNOSIS — G89.29 CHRONIC PAIN OF RIGHT KNEE: ICD-10-CM

## 2022-03-10 DIAGNOSIS — G89.4 CHRONIC PAIN SYNDROME: ICD-10-CM

## 2022-03-10 DIAGNOSIS — M19.079 ARTHROSIS OF FOOT, UNSPECIFIED LATERALITY: ICD-10-CM

## 2022-03-10 DIAGNOSIS — M79.673 PAIN OF FOOT, UNSPECIFIED LATERALITY: ICD-10-CM

## 2022-03-10 DIAGNOSIS — M19.071 ARTHRITIS OF MIDTARSAL JOINT OF RIGHT FOOT: ICD-10-CM

## 2022-03-10 DIAGNOSIS — M71.21 BAKER'S CYST OF KNEE, RIGHT: ICD-10-CM

## 2022-03-10 DIAGNOSIS — M17.11 ARTHRITIS OF RIGHT KNEE: Primary | ICD-10-CM

## 2022-03-10 PROCEDURE — 77002 NEEDLE LOCALIZATION BY XRAY: CPT | Mod: 26 | Performed by: PHYSICAL MEDICINE & REHABILITATION

## 2022-03-10 PROCEDURE — 250N000011 HC RX IP 250 OP 636: Performed by: PHYSICAL MEDICINE & REHABILITATION

## 2022-03-10 PROCEDURE — 20610 DRAIN/INJ JOINT/BURSA W/O US: CPT | Mod: RT | Performed by: PHYSICAL MEDICINE & REHABILITATION

## 2022-03-10 PROCEDURE — 255N000002 HC RX 255 OP 636: Performed by: PHYSICAL MEDICINE & REHABILITATION

## 2022-03-10 RX ORDER — TRIAMCINOLONE ACETONIDE 40 MG/ML
40 INJECTION, SUSPENSION INTRA-ARTICULAR; INTRAMUSCULAR ONCE
Status: COMPLETED | OUTPATIENT
Start: 2022-03-10 | End: 2022-03-10

## 2022-03-10 RX ADMIN — TRIAMCINOLONE ACETONIDE 40 MG: 40 INJECTION, SUSPENSION INTRA-ARTICULAR; INTRAMUSCULAR at 13:16

## 2022-03-10 RX ADMIN — IOHEXOL 3 ML: 300 INJECTION, SOLUTION INTRAVENOUS at 11:36

## 2022-03-10 ASSESSMENT — PAIN SCALES - GENERAL
PAINLEVEL: WORST PAIN (10)
PAINLEVEL: NO PAIN (0)

## 2022-03-10 NOTE — PROGRESS NOTES
Pre-procedure Intake  If YES to any questions or NO to having a   Please complete laminated checklist and leave on the computer keyboard for Provider, verbally inform provider if able.    For SCS Trial, RFA's or any sedation procedure:  Have you been fasting? NA    If yes, for how long?     Are you taking any any blood thinners such as Coumadin, Warfarin, Jantoven, Pradaxa Xarelto, Eliquis, Edoxaban, Enoxaparin, Lovenox, Heparin, Arixtra, Fondaparinux, or Fragmin? OR Antiplatelet medication such as Plavix, Brilinta, or Effient?   Yes -   Other blood thinners     If yes, when did you take your last dose? ELIQUIS ANTICOAGULANT 2.5 MG tablet- last taken at 9:00 am   flecainide (TAMBOCOR) 50 MG tablet- last taken at 9:00 am     Do you take aspirin?  No    If cervical procedure, have you held aspirin for 6 days?   NA    Do you have any allergies to contrast dye, iodine, steroid and/or numbing medications?  NO    Are you currently taking antibiotics or have an active infection?  NO    Have you had a fever/elevated temperature within the past week? NO    Are you currently taking oral steroids? NO    Do you have a ? No     Are you pregnant or breastfeeding?  Not Applicable    Have you received the COVID-19 vaccine? Yes    If yes, was it your 1st, 2nd or only dose needed? 2 dose + booster    Date of most recent vaccine: 11/28/2021    Notify provider and RNs if systolic BP >170, diastolic BP >100, P >100 or O2 sats < 90%    Tressa Trevino MA  [facility] [place]

## 2022-03-10 NOTE — PROGRESS NOTES
"[facility] - Procedure Note    Date of Visit: 3/10/2022    Procedure performed:  Right intra-articular knee injection  Diagnosis: Right Knee osteoarthritis  : Corey Person DO  Anesthesia: none  Complications: none    Indications: Gladys Ramirez is a 89 year old female who is seen at the request of Dr. Dia for a intraarticular knee injection. The patient describes chronic right sided knee pain and swelling. Symptoms have been persistent, disabling, and intermittently severe. The patient reports minimal improvement with conservative treatment, including medications and PT.    Allergies:      Allergies   Allergen Reactions     Trazodone Shortness Of Breath and Unknown     Allergic to trazodone and deriv., Other: trouble swallowing       Clindamycin Diarrhea     C-diff     Gabapentin Other (See Comments)     \"Internal tremors\"     Temazepam Other (See Comments)     Annotation: Nightmares       Buprenorphine Palpitations     Tried patch        Vitals:  /71   Pulse 75   Temp 97.5  F (36.4  C)   SpO2 98%     Review of Systems: The patient denies recent fever, chills, illness, use of antibiotics or anticoagulants. All other 10-point review of systems negative.     Procedure:  The procedure and risks were explained, and informed written consent was obtained from the patient. Risks include but are not limited to: infection, bleeding, increased pain, and damage to soft tissue, nerve, muscle, and vasculature structures. After getting informed consent, the patient was taken to the fluoroscopy suite.    The patient was placed supine on the x-ray table.  The right knee was prepped sterilely. The right knee joint space was visualized on fluoroscopy. 3ml of lidocaine 1% was administered as local anesthetic in the lateral portion of the knee. Next a 18 gauge 1 1/2 inch needle was inserted.  This was advanced into the joint space.  Upon entering the joint space, 1 ml of " Omnipaque 300 was injected.  There was good spread seen in the joint space. 3cc of clear straw color joint fluid was aspirated.    After negative aspiration for blood a solution containing 40mg of kenolog and 3mL of 0.25% Bupivicaine was injected.  There were no complications. The patient tolerated the procedure well. There was negligible bleeding. A band aid was placed over the injection site.     Pre-procedure pain score: 10/10   Post-procedure pain score: 0/10    Assessment/Plan: Gladys Ramirez is a 89 year old female s/p right intra-articular knee steroid injection today for knee pain/osteoarthritis.     1. Following today's procedure, the patient was advised to contact the [facility] for any of the following:   Fever, chills, or night sweats   New onset of pain, swelling, redness, numbness, or weakness   Any questions/concerns regarding the procedure  If unable to contact the Pain Center, the patient was instructed to go to a local Emergency Room for any complications.   2. The patient was instructed to ice the knees and refrain from overuse over the next 3 days.    3. Follow-up with their referring provider in 2-4 weeks for post-procedure evaluation.      Corey Person DO  [facility]

## 2022-03-10 NOTE — PATIENT INSTRUCTIONS
[facility] [place]   Procedure Discharge Instructions    Today you saw:  Dr. Person,     You had a right knee injection    Medications used:  Lidocaine   Bupivacaine Omnipaque  Kenalog              If you were holding your blood thinning medication, please restart taking it: N/A    Be cautious when walking. Numbness and/or weakness in the lower extremities may occur for up to 6-8 hours after the procedure due to effect of the local anesthetic    Do not drive for 6 hours. The effect of the local anesthetic could slow your reflexes.     You may resume your regular activities after 24 hours    Avoid strenuous activity for the first 24 hours    You may shower, however avoid swimming, tub baths or hot tubs for 24 hours following your procedure    You may have a mild to moderate increase in pain for several days following the injection.    It may take up to 14 days for the steroid medication to start working although you may feel the effect as early as a few days after the procedure.       You may use ice packs for 10-15 minutes, 3 to 4 times a day at the injection site for comfort    Do not use heat to painful areas for 6 to 8 hours. This will give the local anesthetic time to wear off and prevent you from accidentally burning your skin.     Unless you have been directed to avoid the use of anti-inflammatory medications (NSAIDS), you may use medications such as ibuprofen, Aleve or Tylenol for pain control if needed.     If you were fasting, you may resume your normal diet and medications after the procedure    If you have diabetes, check your blood sugar more frequently than usual as your blood sugar may be higher than normal for 10-14 days following a steroid injection. Contact your doctor who manages your diabetes if your blood sugar is higher than usual    Possible side effects of steroids that you may experience include flushing, elevated blood pressure, increased appetite, mild  headaches and restlessness.  All of these symptoms will get better with time.    If you experience any of the following, call the Pain Clinic at [phone]:  -Fever over 100 degree F  -Swelling, bleeding, redness, drainage, warmth at the injection site  -Progressive weakness or numbness in your legs or arms  -Loss of bowel or bladder function  -Unusual headache that is not relieved by Tylenol or other pain reliever  -Unusual new onset of pain that is not improving

## 2022-03-11 ENCOUNTER — HOSPITAL ENCOUNTER (OUTPATIENT)
Dept: CARDIOLOGY | Facility: HOSPITAL | Age: 87
Discharge: HOME | End: 2022-03-11
Attending: INTERNAL MEDICINE | Admitting: INTERNAL MEDICINE
Payer: COMMERCIAL

## 2022-03-11 DIAGNOSIS — I48.0 PAROXYSMAL ATRIAL FIBRILLATION (H): ICD-10-CM

## 2022-03-11 PROCEDURE — 93270 REMOTE 30 DAY ECG REV/REPORT: CPT

## 2022-03-14 ENCOUNTER — TELEPHONE (OUTPATIENT)
Dept: CARDIOLOGY | Facility: CLINIC | Age: 87
End: 2022-03-14
Payer: COMMERCIAL

## 2022-03-14 NOTE — TELEPHONE ENCOUNTER
----- Message from SUKHWINDER Dudley sent at 3/14/2022  8:10 AM CDT -----  MD Notify for your review. Thanks!

## 2022-03-14 NOTE — TELEPHONE ENCOUNTER
Noted Jeane RAND notification occurred 3-12-22 @ 0823 - AF @ 110 bpm.    Please review Jeane RAND notification transmission below - follow-up pending 11/2022 - any new orders at this time?  mg

## 2022-03-14 NOTE — TELEPHONE ENCOUNTER
Per Dr. Holley's 3-4-22 visit note:  Paroxysmal atrial fibrillation (H)  (primary encounter diagnosis)  Comment: Symptomatic, not valvular, advanced [id number] score of 4 giving her a 4.8% chance of stroke per year.  Currently on Eliquis 2.5 mg p.o. twice daily given her age of 89 and renal dysfunction, also on Tambocor low-dose, 25 mg p.o. twice daily with a low normal level.  Given vague shortness of breath will check event monitor, if she has breakthrough arrhythmias might need to consider other therapy or increased dose of flecainide but a little hesitant to pursue ablation in a 89-year-old woman.

## 2022-03-15 NOTE — TELEPHONE ENCOUNTER
Msg rec'd 3-14-22 @ 1704:  Estrada Holley MD Gorshe, Maureen, RN  I can not see these strips all too well, but if they show a fib confirm with a partner since I am off and if a fib increase tambocor to 50 bid and check tambocor level after 2 weeks of higher dose.   LF

## 2022-03-15 NOTE — TELEPHONE ENCOUNTER
"Reviewed rhythm strips with Jed ESPITIA CNP in clinic who stated transmission \"inconclusive and she would not recommend increasing Tambocor based on rhythm strips\" - update forwarded to Dr. Holley as JUDY  mg  "

## 2022-03-21 PROCEDURE — 93272 ECG/REVIEW INTERPRET ONLY: CPT | Performed by: INTERNAL MEDICINE

## 2022-03-22 ENCOUNTER — TELEPHONE (OUTPATIENT)
Dept: CARDIOLOGY | Facility: CLINIC | Age: 87
End: 2022-03-22
Payer: COMMERCIAL

## 2022-03-22 DIAGNOSIS — I48.0 PAROXYSMAL ATRIAL FIBRILLATION (H): Primary | ICD-10-CM

## 2022-03-22 RX ORDER — FLECAINIDE ACETATE 50 MG/1
50 TABLET ORAL 2 TIMES DAILY
Qty: 180 TABLET | Refills: 3 | Status: SHIPPED | OUTPATIENT
Start: 2022-03-22 | End: 2022-08-24

## 2022-03-22 NOTE — TELEPHONE ENCOUNTER
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 She verbalized understanding of increased Afib on monitor with higher rates. It was explained that we are going to increase her medication, Flecainide, to get this under control. Blood test also ordered for in 2 weeks. Transferred to schedulers to have this arranged. Rx sent to [facility]. [facility] [place]

## 2022-03-23 ENCOUNTER — LAB (OUTPATIENT)
Dept: LAB | Facility: HOSPITAL | Age: 87
End: 2022-03-23
Payer: COMMERCIAL

## 2022-03-23 DIAGNOSIS — I48.0 PAROXYSMAL ATRIAL FIBRILLATION (H): ICD-10-CM

## 2022-03-23 PROCEDURE — 80181 DRUG ASSAY FLECAINIDE: CPT

## 2022-03-23 PROCEDURE — 36415 COLL VENOUS BLD VENIPUNCTURE: CPT

## 2022-03-26 LAB — FLECAINIDE SERPL-MCNC: 0.26 UG/ML

## 2022-03-28 DIAGNOSIS — Z76.0 ENCOUNTER FOR MEDICATION REFILL: Primary | ICD-10-CM

## 2022-03-29 RX ORDER — ZOLPIDEM TARTRATE 6.25 MG/1
TABLET, FILM COATED, EXTENDED RELEASE ORAL
Qty: 45 TABLET | Refills: 0 | Status: SHIPPED | OUTPATIENT
Start: 2022-03-29 | End: 2022-05-27

## 2022-03-30 ENCOUNTER — TELEPHONE (OUTPATIENT)
Dept: CARDIOLOGY | Facility: CLINIC | Age: 87
End: 2022-03-30
Payer: COMMERCIAL

## 2022-03-30 DIAGNOSIS — I48.0 PAROXYSMAL ATRIAL FIBRILLATION (H): ICD-10-CM

## 2022-03-30 DIAGNOSIS — Z51.81 ENCOUNTER FOR THERAPEUTIC DRUG MONITORING: Primary | ICD-10-CM

## 2022-03-30 NOTE — TELEPHONE ENCOUNTER
----- Message from Estrada Holley MD sent at 3/30/2022 11:22 AM CDT -----  Yes, thank you, which certainly scheduled to be redrawn in about 2 weeks.  ----- Message -----  From: Stacy Noriega RN  Sent: 3/30/2022  10:35 AM CDT  To: Estrada Holley MD    Called sister and passed along message and update from [facility]. Noted that this was ordered to be drawn in 2 weeks after increased dose of Flecainide and it was drawn entirely too early despite the order reflecting the appropriate draw date. Will update [facility]. -[facility] [place] [facility]      Dr. Holley,.  Sister thanks you for your message. I updated her on the results, however, she was scheduled to have this drawn 2 weeks too early. This was supposed to be drawn to reflect recent increase in flecainide and it was drawn 2 days later! I transferred her out to scheduling myself and told them in 2 weeks, so unsure as to how it got arranged for 2 days later, but either way, this is too early and I apologize. Can we/should we reorder this and arrange for appropriate timeline?  Thanks,  Mal

## 2022-03-31 NOTE — TELEPHONE ENCOUNTER
Flecainide level ordered. Will update patient and have her come in during out 2 week phone update. -[facility] [place]

## 2022-04-26 ENCOUNTER — TELEPHONE (OUTPATIENT)
Dept: FAMILY MEDICINE | Facility: CLINIC | Age: 87
End: 2022-04-26
Payer: COMMERCIAL

## 2022-04-26 NOTE — TELEPHONE ENCOUNTER
Please let patient know that the pain clinic does their own orders.   She needs to talk with the pain clinic about this request.     Dr. Lore Martin  4/26/2022

## 2022-04-26 NOTE — TELEPHONE ENCOUNTER
Reason for Call: Request for an order or referral:    Order or referral being requested: knee injection and left hip injection    Date needed: as soon as possible    Has the patient been seen by the PCP for this problem? Not Applicable    Additional comments: Patient called to request for Dr. Martin to put in order for left hip and right knee injection and fax order to Pain Clinic. Writer check and there is a knee injection order, but nothing for left hip injection.  Patient request for a return call when orders are put in and fax to pain clinic to inform patient so patient can schedule for her injections.    Phone number Patient can be reached at:  Home number on file [phone] (home)    Best Time:  Anytime    Can we leave a detailed message on this number?  YES    Call taken on 4/26/2022 at 10:01 AM by Radha Olvera

## 2022-04-26 NOTE — TELEPHONE ENCOUNTER
Can we leave a detailed message on this number?  YES    CMA left detailed message with MD response as noted below.

## 2022-04-27 NOTE — TELEPHONE ENCOUNTER
Patient returned call and states she just spoke to Pain clinic and was told to call clinic back as they don't have the orders. Writer relay provider's message below. Patient verbalizes understanding and will call pain clinic back.

## 2022-04-27 NOTE — TELEPHONE ENCOUNTER
Pain clinic Called, The provider that use to place order at the Pain Clinic is no longer there and since [facility] took over (former  Facility), protocol has changed. PCP of Pt now has to place order.

## 2022-04-28 ENCOUNTER — TELEPHONE (OUTPATIENT)
Dept: CARDIOLOGY | Facility: CLINIC | Age: 87
End: 2022-04-28
Payer: COMMERCIAL

## 2022-04-28 ENCOUNTER — TELEPHONE (OUTPATIENT)
Dept: NURSING | Facility: CLINIC | Age: 87
End: 2022-04-28
Payer: COMMERCIAL

## 2022-04-28 DIAGNOSIS — Z09 HOSPITAL DISCHARGE FOLLOW-UP: ICD-10-CM

## 2022-04-28 DIAGNOSIS — M16.12 PRIMARY OSTEOARTHRITIS OF LEFT HIP: Primary | ICD-10-CM

## 2022-04-28 DIAGNOSIS — I10 ESSENTIAL HYPERTENSION: ICD-10-CM

## 2022-04-28 RX ORDER — LISINOPRIL 10 MG/1
10 TABLET ORAL DAILY
Qty: 90 TABLET | Refills: 3 | Status: SHIPPED | OUTPATIENT
Start: 2022-04-28 | End: 2022-07-07

## 2022-04-28 NOTE — TELEPHONE ENCOUNTER
msg to schedulers to arrange repeat flec level as previous was drawn too soon. Noted patient is scheduled. -[facility] [place]

## 2022-04-28 NOTE — TELEPHONE ENCOUNTER
Clinic Action Needed:Yes, Please call patient when referral has been placed [phone] (home)     Reason for Call:  Patient calling requesting status of referral to Pain Clinic.    Patient stating she will need referral to receive 2nd injection for Left Hip Pain. Reporting protocol has changed and referral is now required.     Leslie Moctezuma RN  [facility]

## 2022-04-28 NOTE — TELEPHONE ENCOUNTER
Per 3/4/22 OV note with LBF:    (I10) Benign essential hypertension  Comment: Under good control with less orthostasis of amlodipine.  Given that she is still orthostatic at times will lower lisinopril which she thinks she is taking half a 40 mg tablet for 20 mg down to 10 mg when she runs out.           Rx sent in at updated dose of 10 mg daily. LM with sister that this was done. [place] [place]

## 2022-04-28 NOTE — TELEPHONE ENCOUNTER
----- Message from Arianna Carpio sent at 4/28/2022 10:34 AM CDT -----  Regarding: RE: lab  She is scheduled - she has a question though.    Lisinopril when it gets to a lower amount left (she has 6 pills left) he will prescribe 10 mg. - she has been taking 20 mg. - [facility]     ([id number])          ----- Message -----  From: Stacy Noriega RN  Sent: 4/28/2022   9:38 AM CDT  To: [facility] Scheduling Registration Pool - [facility]  Subject: lab                                              Please call sister and arrange for repeat flecainide level- lab only appt. Order in.  Efren Pineda

## 2022-05-02 ENCOUNTER — LAB (OUTPATIENT)
Dept: CARDIOLOGY | Facility: CLINIC | Age: 87
End: 2022-05-02
Payer: COMMERCIAL

## 2022-05-02 DIAGNOSIS — I48.0 PAROXYSMAL ATRIAL FIBRILLATION (H): ICD-10-CM

## 2022-05-02 DIAGNOSIS — Z51.81 ENCOUNTER FOR THERAPEUTIC DRUG MONITORING: ICD-10-CM

## 2022-05-02 PROCEDURE — 36415 COLL VENOUS BLD VENIPUNCTURE: CPT | Performed by: INTERNAL MEDICINE

## 2022-05-02 PROCEDURE — 99000 SPECIMEN HANDLING OFFICE-LAB: CPT | Performed by: INTERNAL MEDICINE

## 2022-05-02 PROCEDURE — 80181 DRUG ASSAY FLECAINIDE: CPT | Mod: 90 | Performed by: INTERNAL MEDICINE

## 2022-05-05 LAB — FLECAINIDE SERPL-MCNC: 0.52 UG/ML

## 2022-05-11 ENCOUNTER — VIRTUAL VISIT (OUTPATIENT)
Dept: FAMILY MEDICINE | Facility: CLINIC | Age: 87
End: 2022-05-11
Payer: COMMERCIAL

## 2022-05-11 DIAGNOSIS — U07.1 INFECTION DUE TO 2019 NOVEL CORONAVIRUS: Primary | ICD-10-CM

## 2022-05-11 DIAGNOSIS — I26.99 OTHER PULMONARY EMBOLISM WITHOUT ACUTE COR PULMONALE, UNSPECIFIED CHRONICITY (H): ICD-10-CM

## 2022-05-11 DIAGNOSIS — I48.0 PAROXYSMAL ATRIAL FIBRILLATION (H): ICD-10-CM

## 2022-05-11 DIAGNOSIS — I10 BENIGN ESSENTIAL HYPERTENSION: ICD-10-CM

## 2022-05-11 DIAGNOSIS — I50.32 CHRONIC DIASTOLIC CONGESTIVE HEART FAILURE (H): ICD-10-CM

## 2022-05-11 PROCEDURE — 99214 OFFICE O/P EST MOD 30 MIN: CPT | Mod: 95 | Performed by: INTERNAL MEDICINE

## 2022-05-11 NOTE — PROGRESS NOTES
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      What phone number would you like to be contacted at? [phone]  How would you like to obtain your AVS? MyChart    Assessment & Plan     Infection due to 2019 novel coronavirus  Filled out MNRAP questionnaire with the patient however she did not qualify due to duration of symptoms 7 days.   Patient reports her symptoms are improving as well.     Other pulmonary embolism without acute cor pulmonale, unspecified chronicity (H)  On anticoagulation.    Benign essential hypertension  On medication    Chronic diastolic congestive heart failure (H) - EF 55%, Grade II (moderate) left ventricular diastolic dysfunction per echo 1/ 2018  Did not address this problem    Paroxysmal atrial fibrillation (H)  On medication.        BMI:   Estimated body mass index is 30.36 kg/m  as calculated from the following:    Height as of 3/4/22: 1.556 m (5' 1.25\").    Weight as of 3/4/22: 73.5 kg (162 lb).   Weight management plan: Patient was referred to their PCP to discuss a diet and exercise plan.    COVID-19 positive patient.  Encounter for consideration of medication intervention. Patient does not qualify for a prescription. Full discussion with patient including medication options, risks and benefits. Potential drug interactions reviewed with patient.     Treatment Planned does not qualify for paxlovid or monoclonal antibodies due to duration of symptoms 7 days      Estimated body mass index is 30.36 kg/m  as calculated from the following:    Height as of 3/4/22: 1.556 m (5' 1.25\").    Weight as of 3/4/22: 73.5 kg (162 lb).  GFR Estimate   Date Value Ref Range Status   04/19/2021 54 (L) >60 mL/min/1.73m2 Final     No results found for: [id number]    Return in about 3 months (around 8/11/2022) for Follow up, Routine preventive, with any available provider.    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   Not coughing, sneezing, and sore throat has improved  Sob - not new   Symptoms started 5/5/2022    COVID-19 Symptom Review  How many days ago did these symptoms start? Last Thursday, tested negative on Friday and positive on Tuesday  Are any of the following symptoms significant for you?    New or worsening difficulty breathing? No    Worsening cough? No    Fever or chills? No    Headache: YES- on friday    Sore throat: YES- better now    Chest pain: no    Diarrhea: no    Body aches? no    What treatments has patient tried? Acetaminophen   Does patient live in a nursing home, group home, or shelter? Senior apartment  Does patient have a way to get food/medications during quarantined? Yes, I have a friend or family member who can help me.    Review of Systems       Objective         Vitals:  No vitals were obtained today due to virtual visit.    Physical Exam   GEN: No acute distress  RESP: No audible increased work of breathing. Patient speaking in full sentences without distress.  PSYCH: pleasant  Exam otherwise limited due to virtual platform'    Phone call duration: 20 minutes

## 2022-05-23 ENCOUNTER — TRANSFERRED RECORDS (OUTPATIENT)
Dept: HEALTH INFORMATION MANAGEMENT | Facility: CLINIC | Age: 87
End: 2022-05-23
Payer: COMMERCIAL

## 2022-05-23 DIAGNOSIS — Z76.0 ENCOUNTER FOR MEDICATION REFILL: Primary | ICD-10-CM

## 2022-05-27 RX ORDER — TRAMADOL HYDROCHLORIDE 50 MG/1
100 TABLET ORAL DAILY PRN
Qty: 90 TABLET | Refills: 0 | Status: SHIPPED | OUTPATIENT
Start: 2022-05-27 | End: 2022-07-20

## 2022-05-27 RX ORDER — ZOLPIDEM TARTRATE 6.25 MG/1
TABLET, FILM COATED, EXTENDED RELEASE ORAL
Qty: 45 TABLET | Refills: 0 | Status: SHIPPED | OUTPATIENT
Start: 2022-05-27 | End: 2022-06-27

## 2022-06-15 ENCOUNTER — TRANSFERRED RECORDS (OUTPATIENT)
Dept: HEALTH INFORMATION MANAGEMENT | Facility: CLINIC | Age: 87
End: 2022-06-15
Payer: COMMERCIAL

## 2022-06-24 ENCOUNTER — MEDICAL CORRESPONDENCE (OUTPATIENT)
Dept: HEALTH INFORMATION MANAGEMENT | Facility: CLINIC | Age: 87
End: 2022-06-24

## 2022-06-27 ENCOUNTER — RADIOLOGY INJECTION OFFICE VISIT (OUTPATIENT)
Dept: PALLIATIVE MEDICINE | Facility: OTHER | Age: 87
End: 2022-06-27
Attending: FAMILY MEDICINE
Payer: COMMERCIAL

## 2022-06-27 ENCOUNTER — TELEPHONE (OUTPATIENT)
Dept: PALLIATIVE MEDICINE | Facility: OTHER | Age: 87
End: 2022-06-27

## 2022-06-27 VITALS — DIASTOLIC BLOOD PRESSURE: 66 MMHG | OXYGEN SATURATION: 97 % | HEART RATE: 82 BPM | SYSTOLIC BLOOD PRESSURE: 122 MMHG

## 2022-06-27 DIAGNOSIS — G47.00 INSOMNIA: ICD-10-CM

## 2022-06-27 DIAGNOSIS — Z76.0 ENCOUNTER FOR MEDICATION REFILL: Primary | ICD-10-CM

## 2022-06-27 DIAGNOSIS — M16.9 OSTEOARTHRITIS OF HIP: Primary | ICD-10-CM

## 2022-06-27 DIAGNOSIS — M16.12 PRIMARY OSTEOARTHRITIS OF LEFT HIP: Primary | ICD-10-CM

## 2022-06-27 DIAGNOSIS — M16.9 OSTEOARTHRITIS OF HIP: ICD-10-CM

## 2022-06-27 PROCEDURE — 255N000002 HC RX 255 OP 636: Performed by: PHYSICAL MEDICINE & REHABILITATION

## 2022-06-27 PROCEDURE — 77002 NEEDLE LOCALIZATION BY XRAY: CPT | Mod: 26 | Performed by: PHYSICAL MEDICINE & REHABILITATION

## 2022-06-27 PROCEDURE — 20610 DRAIN/INJ JOINT/BURSA W/O US: CPT | Mod: LT | Performed by: PHYSICAL MEDICINE & REHABILITATION

## 2022-06-27 PROCEDURE — 77002 NEEDLE LOCALIZATION BY XRAY: CPT | Performed by: PHYSICAL MEDICINE & REHABILITATION

## 2022-06-27 PROCEDURE — 250N000011 HC RX IP 250 OP 636: Performed by: PHYSICAL MEDICINE & REHABILITATION

## 2022-06-27 RX ORDER — ZOLPIDEM TARTRATE 6.25 MG/1
TABLET, FILM COATED, EXTENDED RELEASE ORAL
Qty: 45 TABLET | Refills: 0 | Status: SHIPPED | OUTPATIENT
Start: 2022-06-27 | End: 2022-07-20

## 2022-06-27 RX ADMIN — TRIAMCINOLONE ACETONIDE 40 MG: 40 INJECTION, SUSPENSION INTRA-ARTICULAR; INTRAMUSCULAR at 10:00

## 2022-06-27 RX ADMIN — IOHEXOL 0.5 ML: 300 INJECTION, SOLUTION INTRAVENOUS at 10:13

## 2022-06-27 ASSESSMENT — PAIN SCALES - GENERAL
PAINLEVEL: SEVERE PAIN (6)
PAINLEVEL: NO PAIN (0)

## 2022-06-27 NOTE — PROGRESS NOTES
"[facility] - Procedure Note    Date of Service: 6/27/2022    Pre procedure Diagnosis: Hip arthropathy   Post procedure Diagnosis: Same  Procedure performed: Left intra-articular hip injection  Anesthesia: None  Operators: Duyen Neal MD    Indications:   Gladys Ramirez is a 89 year old female was sent by Dr.Shana Martin for a  hip joint injection. They have a history of hip pain and arthropathy. The patient describes chronic left sided hip pain. This is a repeat injection. Last one completed on 11/18/2021.    Allergies:      Allergies   Allergen Reactions     Trazodone Shortness Of Breath and Unknown     Allergic to trazodone and deriv., Other: trouble swallowing       Clindamycin Diarrhea     C-diff     Gabapentin Other (See Comments)     \"Internal tremors\"     Temazepam Other (See Comments)     Annotation: Nightmares       Buprenorphine Palpitations     Tried patch        Vitals:  /61   Pulse 84   SpO2 97%     Medications were reviewed.    Procedure:  Options/alternatives, benefits and risks were discussed with the patient. Risks include but are not limited to: infection, bleeding, increased pain, and damage to soft tissue, nerve, muscle, and vasculature structures. After getting informed consent, patient was brought into the procedure suite and was placed in a lateral position on the procedure table.   A Pause for the Cause was performed.  Patient was prepped and draped in sterile fashion.   The hip joint was identified using AP fluoroscopy. A 22 gauge 5 inch spinal needle was advanced under intermittent fluoroscopy until it was felt to enter the hip joint   A total of 0.5 mL of Omnipaque-300 was injected, showing appropriate location, with spread into the intraarticular space and no intravascular uptake noted. 9.5mL of contrast was wasted.  A mixture containing, 3 mL of 0.25% bupivacaine and 40 mg of triamcinolone was injected. The needle was removed. Hemostasis was " achieved, the area was cleaned, and bandaids were placed when appropriate. Images were saved to PACS.  The patient tolerated the procedure well, and was taken to the recovery room, and there was no evidence of procedural complications. No new sensory or motor deficits were noted following the procedure. The patient was stable and able to ambulate on discharge home. Post-procedure instructions were provided.     Pre-procedure pain score: 6/10  Post-procedure pain score: 0/10    Assessment/Plan: Gladys Ramirez is a 89 year old female s/p left hip joint injection today for hip pain and arthropathy.     1. Following today's procedure, the patient was advised to contact the [facility] for any of the following:   Fever, chills, or night sweats   New onset of pain, numbness, or weakness   Any questions/concerns regarding the procedure  If unable to contact the Pain Center, the patient was instructed to go to a local Emergency Room for any complications.   2. The patient should follow-up with the referring provider for post-procedure evaluation.      MD ANTONETTE Bradshaw [facility] [place]

## 2022-06-27 NOTE — PATIENT INSTRUCTIONS
[facility]   Procedure Discharge Instructions    Today you saw:    Dr. Duyen Neal,         You had a Left Hip Injection      Medications used:  Lidocaine   Bupivacaine  Omnipaque  Kenalog         If you were holding your blood thinning medication, please restart taking it: N/A  Be cautious when walking. Numbness and/or weakness in the lower extremities may occur for up to 6-8 hours after the procedure due to effect of the local anesthetic  Do not drive for 6 hours. The effect of the local anesthetic could slow your reflexes.   You may resume your regular activities after 24 hours  Avoid strenuous activity for the first 24 hours  You may shower, however avoid swimming, tub baths or hot tubs for 24 hours following your procedure  You may have a mild to moderate increase in pain for several days following the injection.  It may take up to 14 days for the steroid medication to start working although you may feel the effect as early as a few days after the procedure.     You may use ice packs for 10-15 minutes, 3 to 4 times a day at the injection site for comfort  Do not use heat to painful areas for 6 to 8 hours. This will give the local anesthetic time to wear off and prevent you from accidentally burning your skin.   Unless you have been directed to avoid the use of anti-inflammatory medications (NSAIDS), you may use medications such as ibuprofen, Aleve or Tylenol for pain control if needed.   If you were fasting, you may resume your normal diet and medications after the procedure  If you have diabetes, check your blood sugar more frequently than usual as your blood sugar may be higher than normal for 10-14 days following a steroid injection. Contact your doctor who manages your diabetes if your blood sugar is higher than usual  Possible side effects of steroids that you may experience include flushing, elevated blood pressure, increased appetite, mild headaches and restlessness.  All  of these symptoms will get better with time.  If you experience any of the following, call the Pain Clinic during work hours (Mon-Friday 8-4:30 pm) at [phone] or the Provider Line after hours at [phone]:  -Fever over 100 degree F  -Swelling, bleeding, redness, drainage, warmth at the injection site  -Progressive weakness or numbness in your legs or arms  -Loss of bowel or bladder function  -Unusual headache that is not relieved by Tylenol or other pain reliever  -Unusual new onset of pain that is not improving

## 2022-06-27 NOTE — NURSING NOTE
Pre-procedure Intake  If YES to any questions or NO to having a   Please complete laminated checklist and leave on the computer keyboard for Provider, verbally inform provider if able.    For SCS Trial, RFA's or any sedation procedure:  Have you been fasting? NA    If yes, for how long?     Are you taking any any blood thinners such as Coumadin, Warfarin, Jantoven, Pradaxa Xarelto, Eliquis, Edoxaban, Enoxaparin, Lovenox, Heparin, Arixtra, Fondaparinux, or Fragmin? OR Antiplatelet medication such as Plavix, Brilinta, or Effient?   Yes -   Other blood thinners     If yes, when did you take your last dose? 9:00 am     Do you take aspirin?  No    If cervical procedure, have you held aspirin for 6 days?   NA    Do you have any allergies to contrast dye, iodine, steroid and/or numbing medications?  NO    Are you currently taking antibiotics or have an active infection?  NO    Have you had a fever/elevated temperature within the past week? NO    Are you currently taking oral steroids? NO    Do you have a ? No     Are you pregnant or breastfeeding?  NO    Have you received the COVID-19 vaccine? Yes    If yes, was it your 1st, 2nd or only dose needed? 2nd dose and booster  Date of most recent vaccine: 04/27/2022  Notify provider and RNs if systolic BP >170, diastolic BP >100, P >100 or O2 sats < 90%    Tressa Salvador MA  [facility] [place]

## 2022-06-28 ENCOUNTER — TELEPHONE (OUTPATIENT)
Dept: CARDIOLOGY | Facility: CLINIC | Age: 87
End: 2022-06-28

## 2022-06-28 DIAGNOSIS — Z09 HOSPITAL DISCHARGE FOLLOW-UP: ICD-10-CM

## 2022-06-28 DIAGNOSIS — I10 ESSENTIAL HYPERTENSION: ICD-10-CM

## 2022-06-28 RX ORDER — TRIAMCINOLONE ACETONIDE 40 MG/ML
40 INJECTION, SUSPENSION INTRA-ARTICULAR; INTRAMUSCULAR ONCE
Status: COMPLETED | OUTPATIENT
Start: 2022-06-27 | End: 2022-06-27

## 2022-07-07 RX ORDER — LISINOPRIL 5 MG/1
10 TABLET ORAL DAILY
Qty: 30 TABLET | Refills: 0 | Status: ON HOLD
Start: 2022-07-07 | End: 2022-08-28

## 2022-07-07 NOTE — TELEPHONE ENCOUNTER
Health Call Center    Phone Message    May a detailed message be left on voicemail: yes     Reason for Call: Other: Patient called today stating her dentist from [facility] needs to speak with  about some upcoming dental work. Please call  at [facility] to discuss further, thank you.    Dental office: [phone]    Action Taken: Message routed to:  Other: Cardiology    Travel Screening: Not Applicable                                                                ==Attempted to call the Dentist- the Dentist was in an office with a patient. Writer will try again later. Called Sister Gladys and she stated that the Dentist   
----- Message -----  From: Estrada Holley MD  Sent: 7/6/2022   2:45 PM CDT  To: Sissy Interiano RN    Yes, can hold it, might need to hold the evening afterwards if significant bleeding.  LF    ==  Reviewed response and recommendations with patient. She verbalized understanding and agreement with plan. Patient mentioned that her Lisinopril dose had been decreased by Dr. Holley and BP still running on the lower side of normal but never about 120 systolic. Patient wonders if Dr. Holley thinks she should cut the dose in half (to 5 mg). -joseb  
----- Message -----  From: Estrada Holley MD  Sent: 7/6/2022   4:57 PM CDT  To: Sissy Interiano RN    Yes, would decrease dose of lisinopril further to 5 mg and monitor blood pressures.  LF      ==  Response and recommendations reviewed with patient. He verbalized understanding and agreement with plan.   Med list updated. -ejb    
Left message with dental office that patient is on DOAC and INR is not affected by this medication. INR will have no value.   Provided direct contact information for dentist to call if questions/concerns. -bradley    
Sister Gladys called back to inform Dr Holley that her dentist is asking that she has INR labs done two days before her tooth extraction on 07.28.22. She is scheduled for labs on 07.26.22 at 9:30. Please place any necessary orders and call Sister Gladys with any further questions or concerns. Thank you!  
jose maria Henry for patient to hold Eliquis on morning of tooth extraction? -ejb    ==  Received call back from dental office. Request from dentist for patient to hold Eliquis on morning of extraction.   No need for INR.   
(2) malignancy (present or previous)

## 2022-07-08 ENCOUNTER — MEDICAL CORRESPONDENCE (OUTPATIENT)
Dept: HEALTH INFORMATION MANAGEMENT | Facility: CLINIC | Age: 87
End: 2022-07-08

## 2022-07-16 ENCOUNTER — HEALTH MAINTENANCE LETTER (OUTPATIENT)
Age: 87
End: 2022-07-16

## 2022-07-20 DIAGNOSIS — Z76.0 ENCOUNTER FOR MEDICATION REFILL: Primary | ICD-10-CM

## 2022-07-20 DIAGNOSIS — Z09 HOSPITAL DISCHARGE FOLLOW-UP: ICD-10-CM

## 2022-07-20 DIAGNOSIS — G47.00 INSOMNIA: ICD-10-CM

## 2022-07-20 DIAGNOSIS — I10 ESSENTIAL HYPERTENSION: ICD-10-CM

## 2022-07-20 RX ORDER — TRAMADOL HYDROCHLORIDE 50 MG/1
TABLET ORAL
Qty: 90 TABLET | Refills: 0 | Status: ON HOLD | OUTPATIENT
Start: 2022-07-20 | End: 2022-08-28

## 2022-07-20 RX ORDER — HYDROCHLOROTHIAZIDE 25 MG/1
TABLET ORAL
Qty: 90 TABLET | Refills: 3 | Status: SHIPPED | OUTPATIENT
Start: 2022-07-20 | End: 2022-08-17

## 2022-07-20 RX ORDER — ZOLPIDEM TARTRATE 6.25 MG/1
TABLET, FILM COATED, EXTENDED RELEASE ORAL
Qty: 45 TABLET | Refills: 0 | Status: SHIPPED | OUTPATIENT
Start: 2022-07-20 | End: 2022-08-29

## 2022-07-21 ENCOUNTER — TELEPHONE (OUTPATIENT)
Dept: CARDIOLOGY | Facility: CLINIC | Age: 87
End: 2022-07-21

## 2022-07-21 NOTE — TELEPHONE ENCOUNTER
[facility]    Phone Message    May a detailed message be left on voicemail: yes     Reason for Call: Other: Patient BP in now in the 120s over 78/76 since starting lisinopril     Action Taken: Other: routed to cardiology    Travel Screening: Not Applicable                                                                    Noted- BP looks good. See telephone encounter dated 6/28. She is down to 5 mg of Lisinopril and BP is holding steady; she was updated that numbers look good and she will continue her current treatment plan. -[facility] [place]

## 2022-08-15 ENCOUNTER — NURSE TRIAGE (OUTPATIENT)
Dept: CARDIOLOGY | Facility: CLINIC | Age: 87
End: 2022-08-15

## 2022-08-15 DIAGNOSIS — R06.09 DYSPNEA ON EXERTION: ICD-10-CM

## 2022-08-15 DIAGNOSIS — I48.0 PAROXYSMAL ATRIAL FIBRILLATION (H): Primary | ICD-10-CM

## 2022-08-15 NOTE — TELEPHONE ENCOUNTER
Called Sister to address her concerns. She notes over the last few weeks that she has had more episodes of shortness of breath. Occasionally it will happen just with conversation. She was with family up in [place] and they noticed she was more short of breath with activity. She says she just gets so fatigued going from her parking lot to her apartment.    She denies chest pain or the sensation of a racing heart beat at the time when she is short of breath. She denies weight gain or leg swelling. She has noticed more erratic heart rates lately but most days it is well-controlled however. She believes the episodes only last a minute or two.     We discussed that Sister should be seen in the ER should the episodes occur again. She says she was feeling pretty good today and has been calm. Will update LBF and also secure RAC slot on Wednesday as there is only one left. She agreed to the plan and knows when to seek out medical attention. Transferred to scheduling. -[facility] [place]      Dr. Holley,  Sister has had more shortness of breath lately- seems like more with exertion but sometimes just with conversation. She denies feeling like her heart is racing during these episodes and feels this has been fairly well-controlled lately. This has been on-going for 2-3 weeks. I got her into RAC Wednesday with Dr. Cannon and encouraged her to go to the Er should her symptoms worsen or persist. Any other recs?  Thanks,  Mal

## 2022-08-15 NOTE — TELEPHONE ENCOUNTER
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 She said they come on all of a sudden. Sometimes she feels like she is going to faint. She will sit down and have some OJ. She said these episodes last a short period of time. She said other times she will be SOB and get a cold sweat, then she has to lie down and it takes longer for the episode to go away. She said her BP has been good in the 120's. Her pulse is at times normal and other times it is so erratic that she can't count it at all. She says she mainly sits and prays. She walks in the house with her walking stick and does a little dusting or cleans a bathroom. She thinks her balance is a little worse.  She denies any swelling or pain. When we were talking I could here that she was SOB but she could speak in complete sentences. She would like someone from Dr. Holley's office to call her today.    Reason for Disposition    MODERATE difficulty breathing (e.g., speaks in phrases, SOB even at rest, pulse 100-120) of new-onset or worse than normal    Longstanding difficulty breathing (e.g., CHF, COPD, emphysema) and worse than normal    Longstanding difficulty breathing and not responding to usual therapy    Additional Information    Negative: SEVERE difficulty breathing (e.g., struggling for each breath, speaks in single words, pulse > 120)    Negative: Breathing stopped and hasn't returned    Negative: Choking on something    Negative: Bluish (or gray) lips or face    Negative: Difficult to awaken or acting confused (e.g., disoriented, slurred speech)    Negative: Passed out (i.e., fainted, collapsed and was not responding)    Negative: Wheezing started suddenly after medicine, an allergic food, or bee sting    Negative: Stridor    Negative: Slow, shallow and weak breathing    Negative: Sounds like a life-threatening emergency to the triager    Negative: Chest pain    Negative: Wheezing (high pitched whistling sound) and  "previous asthma attacks or use of asthma medicines    Negative: Difficulty breathing and within 14 days of COVID-19 Exposure    Negative: Difficulty breathing and only present when coughing    Negative: Difficulty breathing and only from stuffy nose    Negative: Difficulty breathing and only from stuffy nose or runny nose from common cold    Negative: Oxygen level (e.g., pulse oximetry) 90 percent or lower    Negative: Wheezing can be heard across the room    Negative: Drooling or spitting out saliva (because can't swallow)    Negative: Any history of prior \"blood clot\" in leg or lungs    Negative: Illness requiring prolonged bedrest in past month (e.g., immobilization, long hospital stay)    Negative: Hip or leg fracture (broken bone) in past month (or had cast on leg or ankle in past month)    Negative: Major surgery in the past month    Negative: Long-distance travel in past month (e.g., car, bus, train, plane; with trip lasting 6 or more hours)    Negative: Cancer treatment in past six months (or has cancer now)    Negative: Extra heart beats OR irregular heart beating (i.e., \"palpitations\")    Negative: Fever > 103 F (39.4 C)    Negative: Fever > 101 F (38.3 C) and over 60 years of age    Negative: Fever > 100.0 F (37.8 C) and bedridden (e.g., nursing home patient, stroke, chronic illness, recovering from surgery)    Negative: Fever > 100.0 F (37.8 C) and diabetes mellitus or weak immune system (e.g., HIV positive, cancer chemo, splenectomy, organ transplant, chronic steroids)    Negative: Periods where breathing stops and then resumes normally and bedridden (e.g., nursing home patient, CVA)    Negative: Pregnant or postpartum (from 0 to 6 weeks after delivery)    Negative: Patient sounds very sick or weak to the triager    Negative: MILD difficulty breathing (e.g., minimal/no SOB at rest, SOB with walking, pulse < 100) of new-onset or worse than normal    Negative: Continuous (nonstop) coughing    Negative: " "Patient wants to be seen    Answer Assessment - Initial Assessment Questions  1. RESPIRATORY STATUS: \"Describe your breathing?\" (e.g., wheezing, shortness of breath, unable to speak, severe coughing)       Increasing episodes of SOB  2. ONSET: \"When did this breathing problem begin?\"       Past couple of weeks  3. PATTERN \"Does the difficult breathing come and go, or has it been constant since it started?\"      Comes and goes  4. SEVERITY: \"How bad is your breathing?\" (e.g., mild, moderate, severe)     - MILD: No SOB at rest, mild SOB with walking, speaks normally in sentences, can lie down, no retractions, pulse < 100.     - MODERATE: SOB at rest, SOB with minimal exertion and prefers to sit, cannot lie down flat, speaks in phrases, mild retractions, audible wheezing, pulse 100-120.     - SEVERE: Very SOB at rest, speaks in single words, struggling to breathe, sitting hunched forward, retractions, pulse > 120     Moderate  5. RECURRENT SYMPTOM: \"Have you had difficulty breathing before?\" If Yes, ask: \"When was the last time?\" and \"What happened that time?\"     Yes  6. CARDIAC HISTORY: \"Do you have any history of heart disease?\" (e.g., heart attack, angina, bypass surgery, angioplasty)      A-fib  7. LUNG HISTORY: \"Do you have any history of lung disease?\"  (e.g., pulmonary embolus, asthma, emphysema)      no  8. CAUSE: \"What do you think is causing the breathing problem?\"      Not sure  9. OTHER SYMPTOMS: \"Do you have any other symptoms? (e.g., dizziness, runny nose, cough, chest pain, fever)      Nearly faints, sometimes gets a cold sweat and has to lie down  10. O2 SATURATION MONITOR:  \"Do you use an oxygen saturation monitor (pulse oximeter) at home?\" If Yes, \"What is your reading (oxygen level) today?\" \"What is your usual oxygen saturation reading?\" (e.g., 95%)      no    Protocols used: BREATHING DIFFICULTY-A-OH      "

## 2022-08-16 NOTE — TELEPHONE ENCOUNTER
Estrada Holley MD Caswell, Mallory J, RN  Caller: Unspecified (Yesterday,  2:19 PM)  Given that she has a history of atrial fibrillation as well as heart failure with preserved ejection fraction would strongly recommend rapid access clinic on Wednesday, if unable to come in can we please get ECG as well as BNP and BMP today?   LF         ==noted- appt is already set for Wednesday. Orders place for labs as above + ecg. -[facility] [place]

## 2022-08-17 ENCOUNTER — OFFICE VISIT (OUTPATIENT)
Dept: CARDIOLOGY | Facility: CLINIC | Age: 87
End: 2022-08-17
Payer: COMMERCIAL

## 2022-08-17 VITALS
HEART RATE: 61 BPM | RESPIRATION RATE: 16 BRPM | HEIGHT: 61 IN | BODY MASS INDEX: 29.64 KG/M2 | SYSTOLIC BLOOD PRESSURE: 104 MMHG | WEIGHT: 157 LBS | DIASTOLIC BLOOD PRESSURE: 80 MMHG

## 2022-08-17 DIAGNOSIS — I48.0 PAROXYSMAL ATRIAL FIBRILLATION (H): Primary | ICD-10-CM

## 2022-08-17 DIAGNOSIS — R06.09 DYSPNEA ON EXERTION: ICD-10-CM

## 2022-08-17 DIAGNOSIS — R00.2 PALPITATIONS: Primary | ICD-10-CM

## 2022-08-17 DIAGNOSIS — R00.1 SINUS BRADYCARDIA: ICD-10-CM

## 2022-08-17 LAB
ALBUMIN SERPL-MCNC: 3.5 G/DL (ref 3.5–5)
ALP SERPL-CCNC: 110 U/L (ref 45–120)
ALT SERPL W P-5'-P-CCNC: 17 U/L (ref 0–45)
ANION GAP SERPL CALCULATED.3IONS-SCNC: 8 MMOL/L (ref 5–18)
AST SERPL W P-5'-P-CCNC: 19 U/L (ref 0–40)
BILIRUB SERPL-MCNC: 0.5 MG/DL (ref 0–1)
BUN SERPL-MCNC: 24 MG/DL (ref 8–28)
CALCIUM SERPL-MCNC: 10 MG/DL (ref 8.5–10.5)
CHLORIDE BLD-SCNC: 104 MMOL/L (ref 98–107)
CO2 SERPL-SCNC: 29 MMOL/L (ref 22–31)
CREAT SERPL-MCNC: 0.98 MG/DL (ref 0.6–1.1)
D DIMER PPP FEU-MCNC: 0.78 UG/ML FEU (ref 0–0.5)
ERYTHROCYTE [DISTWIDTH] IN BLOOD BY AUTOMATED COUNT: 13.3 % (ref 10–15)
GFR SERPL CREATININE-BSD FRML MDRD: 54 ML/MIN/1.73M2
GLUCOSE BLD-MCNC: 106 MG/DL (ref 70–125)
HCT VFR BLD AUTO: 41.2 % (ref 35–47)
HGB BLD-MCNC: 13.6 G/DL (ref 11.7–15.7)
MCH RBC QN AUTO: 31.2 PG (ref 26.5–33)
MCHC RBC AUTO-ENTMCNC: 33 G/DL (ref 31.5–36.5)
MCV RBC AUTO: 95 FL (ref 78–100)
NT-PROBNP SERPL-MCNC: 5101 PG/ML (ref 0–450)
PLATELET # BLD AUTO: 298 10E3/UL (ref 150–450)
POTASSIUM BLD-SCNC: 3.4 MMOL/L (ref 3.5–5)
PROT SERPL-MCNC: 6.9 G/DL (ref 6–8)
RBC # BLD AUTO: 4.36 10E6/UL (ref 3.8–5.2)
SODIUM SERPL-SCNC: 141 MMOL/L (ref 136–145)
TROPONIN I SERPL-MCNC: 0.04 NG/ML (ref 0–0.29)
WBC # BLD AUTO: 8 10E3/UL (ref 4–11)

## 2022-08-17 PROCEDURE — 99214 OFFICE O/P EST MOD 30 MIN: CPT | Performed by: INTERNAL MEDICINE

## 2022-08-17 PROCEDURE — 85027 COMPLETE CBC AUTOMATED: CPT | Performed by: INTERNAL MEDICINE

## 2022-08-17 PROCEDURE — 36415 COLL VENOUS BLD VENIPUNCTURE: CPT | Performed by: INTERNAL MEDICINE

## 2022-08-17 PROCEDURE — 80053 COMPREHEN METABOLIC PANEL: CPT | Performed by: INTERNAL MEDICINE

## 2022-08-17 PROCEDURE — 85379 FIBRIN DEGRADATION QUANT: CPT | Performed by: INTERNAL MEDICINE

## 2022-08-17 PROCEDURE — 84484 ASSAY OF TROPONIN QUANT: CPT | Performed by: INTERNAL MEDICINE

## 2022-08-17 PROCEDURE — 93000 ELECTROCARDIOGRAM COMPLETE: CPT | Performed by: INTERNAL MEDICINE

## 2022-08-17 PROCEDURE — 83880 ASSAY OF NATRIURETIC PEPTIDE: CPT | Performed by: INTERNAL MEDICINE

## 2022-08-17 RX ORDER — AMOXICILLIN 500 MG/1
2000 CAPSULE ORAL PRN
COMMUNITY
Start: 2022-05-16 | End: 2022-10-26

## 2022-08-17 NOTE — LETTER
"8/17/2022    Lore Martin MD  [address] [place] [address]    RE: Gladys Ramirez       Dear Colleague,     I had the pleasure of seeing Gladys Ramirez in the [facility].    Thank you, Dr. Holley, for asking the [facility] Heart Care team to see Ms. Gladys Ramirez to evaluate       Assessment/Recommendations   Assessment/Plan:  1. Dyspnea and dizzy spells on exertion - obtain baseline labs today CBC, CMP,BNP,  d-dimer, troponin, holter 24 hours, echo (given recent covid infection).  ECG today sinus [id number] with LBBB (no change inLBBB), sat 94%,          History of Present Illness/Subjective    Ms. Gladys Ramirez is a 89 year old female with afib, with dyspnea on exertion, dizzy spells and palpitaiotns in last couple weeks, sits down and had cold sweat, often out of breath, had covid in July,symptoms all throughout the day, no GI/ bleeding, no PND/orthopnea, she is most concerned about irregular heart beat with dizzy spells and dyspnea forcing her sit down.   With minimal exertion she has dyspnea and dizzy spells that has gotten worse.  Event monitor in March noted symptom correlated to atrial fibrillation        Physical Examination Review of Systems   /80 (BP Location: Right arm, Patient Position: Sitting, Cuff Size: Adult Regular)   Pulse 61   Resp 16   Ht 1.556 m (5' 1.25\")   Wt 71.2 kg (157 lb)   BMI 29.42 kg/m    Body mass index is 29.42 kg/m .  Wt Readings from Last 3 Encounters:   08/17/22 71.2 kg (157 lb)   03/04/22 73.5 kg (162 lb)   01/17/22 76.2 kg (168 lb)     [unfilled]  General Appearance:   no distress, normal body habitus   ENT/Mouth: membranes moist, no oral lesions or bleeding gums.      EYES:  no scleral icterus, normal conjunctivae   Neck: no carotid bruits or thyromegaly   Chest/Lungs:   lungs are clear to auscultation, no rales or wheezing,  sternal scar, equal chest wall expansion    Cardiovascular:   Regular. Normal first " and second heart sounds with no murmurs, rubs, or gallops; the carotid, radial and posterior tibial pulses are intact, Jugular venous pressure , edema bilaterally    Abdomen:  no organomegaly, masses, bruits, or tenderness; bowel sounds are present   Extremities: no cyanosis or clubbing   Skin: no xanthelasma, warm.    Neurologic: normal  bilateral, no tremors     Psychiatric: alert and oriented x3, calm     Review of Systems - 12 points nega other than above      Medical History  Surgical History Family History Social History   Past Medical History:   Diagnosis Date     Angina pectoris (H)      Chest pain 3/9/2017     Cough      Diarrhea 11/20/2019     Hyperlipidemia      Hypertension      Hypokalemia     Created by Conversion      Osteoarthritis      Pneumonia 2/21/2019    Past Surgical History:   Procedure Laterality Date     APPENDECTOMY       BASAL CELL CARCINOMA EXCISION       LAPAROSCOPY DIAGNOSTIC (GENERAL) N/A 11/04/2014    Procedure: LAPAROSCOPY BILATERAL SALPINGO-OOPHORECTOMY ;  Surgeon: Sofia Harper MD;  Location: [facility] [place];  Service:      TONSILLECTOMY       ZZC TOTAL KNEE ARTHROPLASTY Left     Family History   Problem Relation Age of Onset     Heart Disease Mother      Rheumatic Heart Disease Mother      No Known Problems Father      Cancer Sister      Cancer Brother     Social History     Socioeconomic History     Marital status: Single     Spouse name: Not on file     Number of children: Not on file     Years of education: Not on file     Highest education level: Not on file   Occupational History     Not on file   Tobacco Use     Smoking status: Never Smoker     Smokeless tobacco: Never Used     Tobacco comment: no passive exposure   Substance and Sexual Activity     Alcohol use: Yes     Comment: Alcoholic Drinks/day: Rarely a glass of wine     Drug use: No     Sexual activity: Not on file   Other Topics Concern     Not on file   Social History Narrative    The patient is a  "nun.     Social Determinants of Health     Financial Resource Strain: Not on file   Food Insecurity: Not on file   Transportation Needs: Not on file   Physical Activity: Not on file   Stress: Not on file   Social Connections: Not on file   Intimate Partner Violence: Not on file   Housing Stability: Not on file          Medications  Allergies   Scheduled Meds:  Continuous Infusions:  PRN Meds:. Allergies   Allergen Reactions     Trazodone Shortness Of Breath and Unknown     Allergic to trazodone and deriv., Other: trouble swallowing       Clindamycin Diarrhea     C-diff     Gabapentin Other (See Comments)     \"Internal tremors\"     Temazepam Other (See Comments)     Annotation: Nightmares       Buprenorphine Palpitations     Tried patch         Lab Results    Chemistry/lipid CBC Cardiac Enzymes/BNP/TSH/INR   Lab Results   Component Value Date    CHOL 179 03/12/2015    HDL 64 03/12/2015    TRIG 176 (H) 03/12/2015    BUN 26 04/19/2021     04/19/2021    CO2 28 04/19/2021    Lab Results   Component Value Date    WBC 7.0 06/15/2021    HGB 12.5 06/15/2021    HCT 37.4 06/15/2021    MCV 90 06/15/2021     06/15/2021    Lab Results   Component Value Date    TROPONINI 0.01 11/21/2019     (H) 03/03/2021    TSH 2.05 02/21/2019    INR 1.50 (H) 06/05/2019              Aayush Cannon MD  Interventional Cardiology  [facility]    Thank you for allowing me to participate in the care of your patient.      Sincerely,     Aayush Cannon MD     [facility]  cc:   Estrada Holley MD  [address], [address]  [place] [address]        "

## 2022-08-17 NOTE — PROGRESS NOTES
"  Thank you, Dr. Holley, for asking the [facility] Heart Care team to see Ms. Gladys Ramirez to evaluate       Assessment/Recommendations   Assessment/Plan:  1. Dyspnea and dizzy spells on exertion - obtain baseline labs today CBC, CMP,BNP,  d-dimer, troponin, holter 24 hours, echo (given recent covid infection).  ECG today sinus [id number] with LBBB (no change inLBBB), sat 94%,          History of Present Illness/Subjective    Ms. Gladys Ramirez is a 89 year old female with afib, with dyspnea on exertion, dizzy spells and palpitaiotns in last couple weeks, sits down and had cold sweat, often out of breath, had covid in July,symptoms all throughout the day, no GI/ bleeding, no PND/orthopnea, she is most concerned about irregular heart beat with dizzy spells and dyspnea forcing her sit down.   With minimal exertion she has dyspnea and dizzy spells that has gotten worse.  Event monitor in March noted symptom correlated to atrial fibrillation        Physical Examination Review of Systems   /80 (BP Location: Right arm, Patient Position: Sitting, Cuff Size: Adult Regular)   Pulse 61   Resp 16   Ht 1.556 m (5' 1.25\")   Wt 71.2 kg (157 lb)   BMI 29.42 kg/m    Body mass index is 29.42 kg/m .  Wt Readings from Last 3 Encounters:   08/17/22 71.2 kg (157 lb)   03/04/22 73.5 kg (162 lb)   01/17/22 76.2 kg (168 lb)     [unfilled]  General Appearance:   no distress, normal body habitus   ENT/Mouth: membranes moist, no oral lesions or bleeding gums.      EYES:  no scleral icterus, normal conjunctivae   Neck: no carotid bruits or thyromegaly   Chest/Lungs:   lungs are clear to auscultation, no rales or wheezing,  sternal scar, equal chest wall expansion    Cardiovascular:   Regular. Normal first and second heart sounds with no murmurs, rubs, or gallops; the carotid, radial and posterior tibial pulses are intact, Jugular venous pressure , edema bilaterally    Abdomen:  no organomegaly, masses, bruits, or " tenderness; bowel sounds are present   Extremities: no cyanosis or clubbing   Skin: no xanthelasma, warm.    Neurologic: normal  bilateral, no tremors     Psychiatric: alert and oriented x3, calm     Review of Systems - 12 points nega other than above      Medical History  Surgical History Family History Social History   Past Medical History:   Diagnosis Date     Angina pectoris (H)      Chest pain 3/9/2017     Cough      Diarrhea 11/20/2019     Hyperlipidemia      Hypertension      Hypokalemia     Created by Conversion      Osteoarthritis      Pneumonia 2/21/2019    Past Surgical History:   Procedure Laterality Date     APPENDECTOMY       BASAL CELL CARCINOMA EXCISION       LAPAROSCOPY DIAGNOSTIC (GENERAL) N/A 11/04/2014    Procedure: LAPAROSCOPY BILATERAL SALPINGO-OOPHORECTOMY ;  Surgeon: Sofia Harper MD;  Location: [facility];  Service:      TONSILLECTOMY       ZZC TOTAL KNEE ARTHROPLASTY Left     Family History   Problem Relation Age of Onset     Heart Disease Mother      Rheumatic Heart Disease Mother      No Known Problems Father      Cancer Sister      Cancer Brother     Social History     Socioeconomic History     Marital status: Single     Spouse name: Not on file     Number of children: Not on file     Years of education: Not on file     Highest education level: Not on file   Occupational History     Not on file   Tobacco Use     Smoking status: Never Smoker     Smokeless tobacco: Never Used     Tobacco comment: no passive exposure   Substance and Sexual Activity     Alcohol use: Yes     Comment: Alcoholic Drinks/day: Rarely a glass of wine     Drug use: No     Sexual activity: Not on file   Other Topics Concern     Not on file   Social History Narrative    The patient is a nun.     Social Determinants of Health     Financial Resource Strain: Not on file   Food Insecurity: Not on file   Transportation Needs: Not on file   Physical Activity: Not on file   Stress: Not on file   Social  "Connections: Not on file   Intimate Partner Violence: Not on file   Housing Stability: Not on file          Medications  Allergies   Scheduled Meds:  Continuous Infusions:  PRN Meds:. Allergies   Allergen Reactions     Trazodone Shortness Of Breath and Unknown     Allergic to trazodone and deriv., Other: trouble swallowing       Clindamycin Diarrhea     C-diff     Gabapentin Other (See Comments)     \"Internal tremors\"     Temazepam Other (See Comments)     Annotation: Nightmares       Buprenorphine Palpitations     Tried patch         Lab Results    Chemistry/lipid CBC Cardiac Enzymes/BNP/TSH/INR   Lab Results   Component Value Date    CHOL 179 03/12/2015    HDL 64 03/12/2015    TRIG 176 (H) 03/12/2015    BUN 26 04/19/2021     04/19/2021    CO2 28 04/19/2021    Lab Results   Component Value Date    WBC 7.0 06/15/2021    HGB 12.5 06/15/2021    HCT 37.4 06/15/2021    MCV 90 06/15/2021     06/15/2021    Lab Results   Component Value Date    TROPONINI 0.01 11/21/2019     (H) 03/03/2021    TSH 2.05 02/21/2019    INR 1.50 (H) 06/05/2019              Aayush Cannon MD  Interventional Cardiology  [facility]            "

## 2022-08-18 ENCOUNTER — TELEPHONE (OUTPATIENT)
Dept: CARDIOLOGY | Facility: CLINIC | Age: 87
End: 2022-08-18

## 2022-08-18 DIAGNOSIS — M79.89 LEG SWELLING: ICD-10-CM

## 2022-08-18 DIAGNOSIS — R79.89 ELEVATED D-DIMER: Primary | ICD-10-CM

## 2022-08-18 DIAGNOSIS — R06.09 DYSPNEA ON EXERTION: ICD-10-CM

## 2022-08-18 LAB
ATRIAL RATE - MUSE: 115 BPM
DIASTOLIC BLOOD PRESSURE - MUSE: NORMAL MMHG
INTERPRETATION ECG - MUSE: NORMAL
P AXIS - MUSE: NORMAL DEGREES
PR INTERVAL - MUSE: NORMAL MS
QRS DURATION - MUSE: 168 MS
QT - MUSE: 402 MS
QTC - MUSE: 538 MS
R AXIS - MUSE: -28 DEGREES
SYSTOLIC BLOOD PRESSURE - MUSE: NORMAL MMHG
T AXIS - MUSE: 127 DEGREES
VENTRICULAR RATE- MUSE: 108 BPM

## 2022-08-18 RX ORDER — POTASSIUM CHLORIDE 1500 MG/1
20 TABLET, EXTENDED RELEASE ORAL 2 TIMES DAILY
Qty: 30 TABLET | Refills: 0 | Status: SHIPPED | OUTPATIENT
Start: 2022-08-18 | End: 2022-08-22

## 2022-08-18 RX ORDER — FUROSEMIDE 20 MG
20 TABLET ORAL DAILY
Qty: 30 TABLET | Refills: 0 | Status: SHIPPED | OUTPATIENT
Start: 2022-08-18 | End: 2022-09-23

## 2022-08-18 NOTE — TELEPHONE ENCOUNTER
----- Message from Estrada Holley MD sent at 8/18/2022 12:28 PM CDT -----  Start Lasix 20 mg a day as well as potassium 20 mEq a day, do this for about 3 days and have her check in with us on Monday.  May also be needed to see heart failure nurse practitioners in 1 to 2 weeks.  LF  ----- Message -----  From: Stacy Noriega RN  Sent: 8/18/2022  11:49 AM CDT  To: Estrada Holley MD    Called Sister to discuss labs. We went over her pro-type BNP + her other lab results. She will increase her K in her diet. We went over these foods. Writer will order the ultrasound of LE to rule out DVT. We discuss rationale there. Holter is scheduled tomorrow but we yet need echo scheduled so will also coordinate that. Pt is not on a diuretic. Her hydrochlorothiazide is on hold as of yesterday but that was all she was on for primarily bp. Will update LBF. -[facility]    [place]. Holley.  I addressed the other labs +cjp recommendation for ultrasound of LE to rule out dvt. She is not on a diuretic. Updated recommendations?  Thanks,  Efren Cannon MD   8/17/2022  3:05 PM CDT         Age adjusted d-dimer normal 0.78, noted K 3.4, would ask eat a banana and orange juice in the next couple days, would order US lower legs to screen for DVT given sinus tachycardia, await echo and holter  thanks

## 2022-08-18 NOTE — TELEPHONE ENCOUNTER
Called Sister and we discussed continuing to hold hydrochlorothiazide. She will start Furosemide 20 mg + KCL 20 meq tomorrow at this point. Writer will call her on Monday to check in on this and update LBF accordingly. Transferred to schedulers to arrange for ultrasound LE to rule out dvt + echo as this is not yet scheduled + HF NP in 1-2 weeks. She repeated back instruction. -[facility] [place]

## 2022-08-19 ENCOUNTER — HOSPITAL ENCOUNTER (OUTPATIENT)
Dept: CARDIOLOGY | Facility: HOSPITAL | Age: 87
Discharge: HOME | End: 2022-08-19
Attending: INTERNAL MEDICINE
Payer: COMMERCIAL

## 2022-08-19 ENCOUNTER — HOSPITAL ENCOUNTER (OUTPATIENT)
Dept: ULTRASOUND IMAGING | Facility: HOSPITAL | Age: 87
Discharge: HOME | End: 2022-08-19
Attending: INTERNAL MEDICINE
Payer: COMMERCIAL

## 2022-08-19 DIAGNOSIS — R79.89 ELEVATED D-DIMER: ICD-10-CM

## 2022-08-19 DIAGNOSIS — M79.89 LEG SWELLING: ICD-10-CM

## 2022-08-19 DIAGNOSIS — R06.09 DYSPNEA ON EXERTION: ICD-10-CM

## 2022-08-19 DIAGNOSIS — R00.1 SINUS BRADYCARDIA: ICD-10-CM

## 2022-08-19 DIAGNOSIS — I48.0 PAROXYSMAL ATRIAL FIBRILLATION (H): ICD-10-CM

## 2022-08-19 PROCEDURE — 93970 EXTREMITY STUDY: CPT

## 2022-08-19 PROCEDURE — 93226 XTRNL ECG REC<48 HR SCAN A/R: CPT

## 2022-08-22 RX ORDER — POTASSIUM CHLORIDE 1500 MG/1
20 TABLET, EXTENDED RELEASE ORAL DAILY
Qty: 30 TABLET | Refills: 0
Start: 2022-08-22 | End: 2022-09-23

## 2022-08-22 NOTE — TELEPHONE ENCOUNTER
Received a call back from Sister. She states that she thinks she is doing better on Furosemide overall. She is down a pound or two by her report. Today she has felt well but yesterday she still had moments of breathlessness. She felt her heart was erratic at that time. She is on the furosemide 20 mg daily +KCL 20 meq. She will continue this unless she hears otherwise. Will route to [facility] for review + update.HF NP appt is scheduled out to 9/12. We will see about getting this in earlier. -[facility]          Dr. Holley,  Above update on Sister. Continue on furosemide 20 + KCL? She is aware you are out of the office and I will update and run this by Dr. Cannon if needed. HF appt is not until 9/12 unfortunately- I will see about getting her in sooner.  Thanks,  Efren

## 2022-08-23 ENCOUNTER — TELEPHONE (OUTPATIENT)
Dept: CARDIOLOGY | Facility: CLINIC | Age: 87
End: 2022-08-23

## 2022-08-23 ENCOUNTER — HOSPITAL ENCOUNTER (INPATIENT)
Facility: HOSPITAL | Age: 87
LOS: 4 days | Discharge: HOME | DRG: 291 | End: 2022-08-28
Attending: EMERGENCY MEDICINE | Admitting: INTERNAL MEDICINE
Payer: COMMERCIAL

## 2022-08-23 ENCOUNTER — APPOINTMENT (OUTPATIENT)
Dept: CT IMAGING | Facility: HOSPITAL | Age: 87
DRG: 291 | End: 2022-08-23
Attending: EMERGENCY MEDICINE
Payer: COMMERCIAL

## 2022-08-23 DIAGNOSIS — R06.09 EXERTIONAL DYSPNEA: ICD-10-CM

## 2022-08-23 DIAGNOSIS — G47.00 INSOMNIA: ICD-10-CM

## 2022-08-23 DIAGNOSIS — Z76.0 ENCOUNTER FOR MEDICATION REFILL: ICD-10-CM

## 2022-08-23 DIAGNOSIS — I50.9 ACUTE ON CHRONIC CONGESTIVE HEART FAILURE, UNSPECIFIED HEART FAILURE TYPE (H): ICD-10-CM

## 2022-08-23 DIAGNOSIS — Z09 HOSPITAL DISCHARGE FOLLOW-UP: ICD-10-CM

## 2022-08-23 DIAGNOSIS — I10 ESSENTIAL HYPERTENSION: ICD-10-CM

## 2022-08-23 DIAGNOSIS — M19.079 ARTHROSIS OF FOOT, UNSPECIFIED LATERALITY: ICD-10-CM

## 2022-08-23 DIAGNOSIS — G47.00 PERSISTENT INSOMNIA: ICD-10-CM

## 2022-08-23 DIAGNOSIS — Z79.01 ANTICOAGULATED BY ANTICOAGULATION TREATMENT: ICD-10-CM

## 2022-08-23 DIAGNOSIS — I48.91 ATRIAL FIBRILLATION WITH RVR (H): ICD-10-CM

## 2022-08-23 DIAGNOSIS — I48.0 PAROXYSMAL ATRIAL FIBRILLATION (H): Primary | ICD-10-CM

## 2022-08-23 LAB
ANION GAP SERPL CALCULATED.3IONS-SCNC: 9 MMOL/L (ref 5–18)
ATRIAL RATE - MUSE: 174 BPM
BASOPHILS # BLD AUTO: 0 10E3/UL (ref 0–0.2)
BASOPHILS NFR BLD AUTO: 1 %
BNP SERPL-MCNC: 1933 PG/ML (ref 0–167)
BUN SERPL-MCNC: 18 MG/DL (ref 8–28)
CALCIUM SERPL-MCNC: 9.5 MG/DL (ref 8.5–10.5)
CHLORIDE BLD-SCNC: 107 MMOL/L (ref 98–107)
CO2 SERPL-SCNC: 25 MMOL/L (ref 22–31)
CREAT SERPL-MCNC: 1.08 MG/DL (ref 0.6–1.1)
D DIMER PPP FEU-MCNC: 0.76 UG/ML FEU (ref 0–0.5)
DIASTOLIC BLOOD PRESSURE - MUSE: NORMAL MMHG
EOSINOPHIL # BLD AUTO: 0.1 10E3/UL (ref 0–0.7)
EOSINOPHIL NFR BLD AUTO: 1 %
ERYTHROCYTE [DISTWIDTH] IN BLOOD BY AUTOMATED COUNT: 14.1 % (ref 10–15)
FLUAV RNA SPEC QL NAA+PROBE: NEGATIVE
FLUBV RNA RESP QL NAA+PROBE: NEGATIVE
GFR SERPL CREATININE-BSD FRML MDRD: 48 ML/MIN/1.73M2
GLUCOSE BLD-MCNC: 133 MG/DL (ref 70–125)
HCT VFR BLD AUTO: 38.8 % (ref 35–47)
HGB BLD-MCNC: 12.7 G/DL (ref 11.7–15.7)
HOLD SPECIMEN: NORMAL
IMM GRANULOCYTES # BLD: 0 10E3/UL
IMM GRANULOCYTES NFR BLD: 0 %
INR PPP: 1.37 (ref 0.85–1.15)
INTERPRETATION ECG - MUSE: NORMAL
LYMPHOCYTES # BLD AUTO: 1.2 10E3/UL (ref 0.8–5.3)
LYMPHOCYTES NFR BLD AUTO: 18 %
MAGNESIUM SERPL-MCNC: 1.8 MG/DL (ref 1.8–2.6)
MCH RBC QN AUTO: 31.4 PG (ref 26.5–33)
MCHC RBC AUTO-ENTMCNC: 32.7 G/DL (ref 31.5–36.5)
MCV RBC AUTO: 96 FL (ref 78–100)
MONOCYTES # BLD AUTO: 0.6 10E3/UL (ref 0–1.3)
MONOCYTES NFR BLD AUTO: 9 %
NEUTROPHILS # BLD AUTO: 5 10E3/UL (ref 1.6–8.3)
NEUTROPHILS NFR BLD AUTO: 71 %
NRBC # BLD AUTO: 0 10E3/UL
NRBC BLD AUTO-RTO: 0 /100
P AXIS - MUSE: NORMAL DEGREES
PLATELET # BLD AUTO: 281 10E3/UL (ref 150–450)
POTASSIUM BLD-SCNC: 4.6 MMOL/L (ref 3.5–5)
PR INTERVAL - MUSE: NORMAL MS
QRS DURATION - MUSE: 160 MS
QT - MUSE: 418 MS
QTC - MUSE: 570 MS
R AXIS - MUSE: 81 DEGREES
RBC # BLD AUTO: 4.05 10E6/UL (ref 3.8–5.2)
RSV RNA SPEC NAA+PROBE: NEGATIVE
SARS-COV-2 RNA RESP QL NAA+PROBE: NEGATIVE
SODIUM SERPL-SCNC: 141 MMOL/L (ref 136–145)
SYSTOLIC BLOOD PRESSURE - MUSE: NORMAL MMHG
T AXIS - MUSE: 22 DEGREES
TROPONIN I SERPL-MCNC: 0.16 NG/ML (ref 0–0.29)
VENTRICULAR RATE- MUSE: 112 BPM
WBC # BLD AUTO: 6.9 10E3/UL (ref 4–11)

## 2022-08-23 PROCEDURE — 85025 COMPLETE CBC W/AUTO DIFF WBC: CPT | Performed by: EMERGENCY MEDICINE

## 2022-08-23 PROCEDURE — 83880 ASSAY OF NATRIURETIC PEPTIDE: CPT | Performed by: EMERGENCY MEDICINE

## 2022-08-23 PROCEDURE — 71275 CT ANGIOGRAPHY CHEST: CPT

## 2022-08-23 PROCEDURE — 250N000013 HC RX MED GY IP 250 OP 250 PS 637: Performed by: INTERNAL MEDICINE

## 2022-08-23 PROCEDURE — C9803 HOPD COVID-19 SPEC COLLECT: HCPCS

## 2022-08-23 PROCEDURE — 96374 THER/PROPH/DIAG INJ IV PUSH: CPT

## 2022-08-23 PROCEDURE — 93005 ELECTROCARDIOGRAM TRACING: CPT | Performed by: EMERGENCY MEDICINE

## 2022-08-23 PROCEDURE — 250N000011 HC RX IP 250 OP 636: Performed by: EMERGENCY MEDICINE

## 2022-08-23 PROCEDURE — 99285 EMERGENCY DEPT VISIT HI MDM: CPT | Mod: 25

## 2022-08-23 PROCEDURE — 36415 COLL VENOUS BLD VENIPUNCTURE: CPT | Performed by: EMERGENCY MEDICINE

## 2022-08-23 PROCEDURE — 99222 1ST HOSP IP/OBS MODERATE 55: CPT | Mod: 25 | Performed by: INTERNAL MEDICINE

## 2022-08-23 PROCEDURE — 87637 SARSCOV2&INF A&B&RSV AMP PRB: CPT | Performed by: EMERGENCY MEDICINE

## 2022-08-23 PROCEDURE — 83735 ASSAY OF MAGNESIUM: CPT | Performed by: EMERGENCY MEDICINE

## 2022-08-23 PROCEDURE — 84484 ASSAY OF TROPONIN QUANT: CPT | Performed by: EMERGENCY MEDICINE

## 2022-08-23 PROCEDURE — G0378 HOSPITAL OBSERVATION PER HR: HCPCS

## 2022-08-23 PROCEDURE — 82310 ASSAY OF CALCIUM: CPT | Performed by: EMERGENCY MEDICINE

## 2022-08-23 PROCEDURE — 85379 FIBRIN DEGRADATION QUANT: CPT | Performed by: EMERGENCY MEDICINE

## 2022-08-23 PROCEDURE — 99220 PR INITIAL OBSERVATION CARE,LEVEL III: CPT | Performed by: INTERNAL MEDICINE

## 2022-08-23 PROCEDURE — 85610 PROTHROMBIN TIME: CPT | Performed by: EMERGENCY MEDICINE

## 2022-08-23 PROCEDURE — 250N000009 HC RX 250: Performed by: INTERNAL MEDICINE

## 2022-08-23 RX ORDER — ZOLPIDEM TARTRATE 5 MG/1
5 TABLET ORAL
Status: DISCONTINUED | OUTPATIENT
Start: 2022-08-23 | End: 2022-08-28 | Stop reason: HOSPADM

## 2022-08-23 RX ORDER — IOPAMIDOL 755 MG/ML
75 INJECTION, SOLUTION INTRAVASCULAR ONCE
Status: COMPLETED | OUTPATIENT
Start: 2022-08-23 | End: 2022-08-23

## 2022-08-23 RX ORDER — AMOXICILLIN 250 MG
2 CAPSULE ORAL 2 TIMES DAILY PRN
Status: DISCONTINUED | OUTPATIENT
Start: 2022-08-23 | End: 2022-08-28 | Stop reason: HOSPADM

## 2022-08-23 RX ORDER — ACETAMINOPHEN 325 MG/1
650 TABLET ORAL EVERY 6 HOURS PRN
Status: DISCONTINUED | OUTPATIENT
Start: 2022-08-23 | End: 2022-08-28 | Stop reason: HOSPADM

## 2022-08-23 RX ORDER — LISINOPRIL 5 MG/1
5 TABLET ORAL DAILY
Status: DISCONTINUED | OUTPATIENT
Start: 2022-08-24 | End: 2022-08-28 | Stop reason: HOSPADM

## 2022-08-23 RX ORDER — FUROSEMIDE 10 MG/ML
20 INJECTION INTRAMUSCULAR; INTRAVENOUS EVERY 12 HOURS
Status: DISCONTINUED | OUTPATIENT
Start: 2022-08-24 | End: 2022-08-24

## 2022-08-23 RX ORDER — POTASSIUM CHLORIDE 1500 MG/1
20 TABLET, EXTENDED RELEASE ORAL DAILY
Status: DISCONTINUED | OUTPATIENT
Start: 2022-08-24 | End: 2022-08-28 | Stop reason: HOSPADM

## 2022-08-23 RX ORDER — LIDOCAINE 40 MG/G
CREAM TOPICAL
Status: DISCONTINUED | OUTPATIENT
Start: 2022-08-23 | End: 2022-08-28 | Stop reason: HOSPADM

## 2022-08-23 RX ORDER — FUROSEMIDE 10 MG/ML
20 INJECTION INTRAMUSCULAR; INTRAVENOUS ONCE
Status: COMPLETED | OUTPATIENT
Start: 2022-08-23 | End: 2022-08-23

## 2022-08-23 RX ORDER — FUROSEMIDE 20 MG
20 TABLET ORAL DAILY
Status: DISCONTINUED | OUTPATIENT
Start: 2022-08-24 | End: 2022-08-23

## 2022-08-23 RX ORDER — METOPROLOL TARTRATE 25 MG/1
25 TABLET, FILM COATED ORAL 2 TIMES DAILY
Status: DISCONTINUED | OUTPATIENT
Start: 2022-08-23 | End: 2022-08-23

## 2022-08-23 RX ORDER — FLECAINIDE ACETATE 50 MG/1
50 TABLET ORAL 2 TIMES DAILY
Status: DISCONTINUED | OUTPATIENT
Start: 2022-08-23 | End: 2022-08-24

## 2022-08-23 RX ORDER — AMOXICILLIN 250 MG
1 CAPSULE ORAL 2 TIMES DAILY PRN
Status: DISCONTINUED | OUTPATIENT
Start: 2022-08-23 | End: 2022-08-28 | Stop reason: HOSPADM

## 2022-08-23 RX ORDER — METOPROLOL TARTRATE 1 MG/ML
5 INJECTION, SOLUTION INTRAVENOUS EVERY 6 HOURS PRN
Status: DISCONTINUED | OUTPATIENT
Start: 2022-08-23 | End: 2022-08-28 | Stop reason: HOSPADM

## 2022-08-23 RX ORDER — DULOXETIN HYDROCHLORIDE 60 MG/1
60 CAPSULE, DELAYED RELEASE ORAL DAILY
Status: DISCONTINUED | OUTPATIENT
Start: 2022-08-24 | End: 2022-08-28 | Stop reason: HOSPADM

## 2022-08-23 RX ORDER — TRAMADOL HYDROCHLORIDE 50 MG/1
100 TABLET ORAL AT BEDTIME
Status: DISCONTINUED | OUTPATIENT
Start: 2022-08-23 | End: 2022-08-28 | Stop reason: HOSPADM

## 2022-08-23 RX ORDER — METOPROLOL TARTRATE 25 MG/1
25 TABLET, FILM COATED ORAL EVERY 6 HOURS
Status: DISCONTINUED | OUTPATIENT
Start: 2022-08-23 | End: 2022-08-24

## 2022-08-23 RX ORDER — CALCIUM CARBONATE 500 MG/1
1000 TABLET, CHEWABLE ORAL 4 TIMES DAILY PRN
Status: DISCONTINUED | OUTPATIENT
Start: 2022-08-23 | End: 2022-08-28 | Stop reason: HOSPADM

## 2022-08-23 RX ADMIN — APIXABAN 2.5 MG: 2.5 TABLET, FILM COATED ORAL at 21:46

## 2022-08-23 RX ADMIN — FLECAINIDE ACETATE 50 MG: 50 TABLET ORAL at 21:45

## 2022-08-23 RX ADMIN — ZOLPIDEM TARTRATE 5 MG: 5 TABLET ORAL at 21:46

## 2022-08-23 RX ADMIN — METOPROLOL TARTRATE 25 MG: 25 TABLET, FILM COATED ORAL at 21:45

## 2022-08-23 RX ADMIN — TRAMADOL HYDROCHLORIDE 100 MG: 50 TABLET, COATED ORAL at 21:45

## 2022-08-23 RX ADMIN — IOPAMIDOL 75 ML: 755 INJECTION, SOLUTION INTRAVENOUS at 14:41

## 2022-08-23 RX ADMIN — METOPROLOL TARTRATE 25 MG: 25 TABLET, FILM COATED ORAL at 15:42

## 2022-08-23 RX ADMIN — FUROSEMIDE 20 MG: 10 INJECTION, SOLUTION INTRAMUSCULAR; INTRAVENOUS at 14:02

## 2022-08-23 ASSESSMENT — ACTIVITIES OF DAILY LIVING (ADL)
ADLS_ACUITY_SCORE: 35
DEPENDENT_IADLS:: INDEPENDENT

## 2022-08-23 ASSESSMENT — ENCOUNTER SYMPTOMS
DIARRHEA: 0
SHORTNESS OF BREATH: 1
LIGHT-HEADEDNESS: 1
CHILLS: 0
FEVER: 0
HEMATURIA: 0
DYSURIA: 0
VOMITING: 0
NAUSEA: 0
ABDOMINAL PAIN: 0

## 2022-08-23 NOTE — ED NOTES
Bed: JNEDP-03  Expected date:   Expected time:   Means of arrival:   Comments:  Hold for possible boarder

## 2022-08-23 NOTE — CONSULTS
HEART CARE CONSULTATON NOTE        Assessment/Recommendations   Assessment:  1.  Atrial fibrillation: Now persistent and symptomatic.  Elevated ventricular response and will work on rate control at this time.  Hesitant to try sotalol given prolonged QT and kidney disease.  2.  Anticoagulation: On Eliquis for anticoagulation  3.  Hypertension: Poorly controlled.  Increasing dose of metoprolol for improved rate control  4.  Heart failure with preserved ejection fraction      Plan:  1.  Continue Eliquis for anticoagulation  2.  Increase dose of metoprolol for improved rate control  3.  Continue IV Lasix  4.  Echocardiogram pending  Primary cardiologist Dr. Holley     History of Present Illness/Subjective    HPI: Gladys Ramirez is a 89 year old female with history of atrial fibrillation, left bundle branch block, hypertension, chronic kidney disease, DVT, chronic heart failure with preserved ejection fraction who was admitted to the hospital with shortness of breath.  She had COVID in July.  More recently started noticing racing heart with palpitations and worsening shortness of breath for the past couple weeks.  Breathing has significantly worsened and now presented to the ED.  No complaints of chest pain or edema.  BNP markedly elevated at 1933.  Twelve-lead EKG shows atrial fibrillation at 112 bpm with left bundle branch block.  She had been maintained on a low-dose of flecainide for rhythm control previously.    Echocardiogram 11/21/2019  Narrative & Impression    The calculated left ventricular ejection fraction is 58%. This represents a normal ejection fraction. Mild hypertrophy noted.    The right ventricle is mildly dilated. The systolic function is mildly reduced.    No hemodynamically significant valvular heart abnormalities.    When compared to the previous study dated 1/14/2019, no significant change    Cardiac event monitoring from 3/11/2022 to 3/17/2022 (monitored duration 6d 6h 34m).  Predominant  "underlying rhythm was sinus rhythm.  Overall heart rate range 50 to 144bpm, average 84bpm  Paroxysmal atrial fibrillation recorded with rapid ventricular rates.  There were no pauses noted.  Symptom triggers (1, dyspnea) correlated to atrial fibrillation.     Impression  Predominantly sinus rhythm with paroxysmal atrial fibrillation associated with rapid ventricular rates.  This monitoring modality does not allow for further atrial fibrillation characterization.        Electronically signed by Saray Coyle MD  3/21/2022  1:32 PM         Physical Examination  Review of Systems   VITALS: BP (!) 160/90   Pulse 91   Temp 98.3  F (36.8  C) (Oral)   Resp (!) 34   Ht 1.588 m (5' 2.5\")   Wt 70.8 kg (156 lb)   SpO2 94%   BMI 28.08 kg/m    BMI: Body mass index is 28.08 kg/m .  Wt Readings from Last 3 Encounters:   08/23/22 70.8 kg (156 lb)   08/17/22 71.2 kg (157 lb)   03/04/22 73.5 kg (162 lb)     No intake or output data in the 24 hours ending 08/23/22 1743  General Appearance:   no distress, normal body habitus   ENT/Mouth: membranes moist, no oral lesions or bleeding gums.      EYES:  no scleral icterus, normal conjunctivae   Neck: no carotid bruits or thyromegaly   Chest/Lungs:   lungs are clear to auscultation   Cardiovascular:   irregular. Normal first and second heart sounds with no murmurs   no edema bilaterally    Abdomen:  no organomegaly, masses, bruits, or tenderness; bowel sounds are present   Extremities: no cyanosis or clubbing   Skin: no xanthelasma, warm.    Neurologic: normal  bilateral, no tremors     Psychiatric: alert and oriented x3, calm     Review Of Systems  Skin: negative  Eyes: negative  Ears/Nose/Throat: negative  Respiratory: Shortness of breath  Cardiovascular: negative  Gastrointestinal: negative  Genitourinary: negative  Musculoskeletal: negative  Neurologic: negative  Psychiatric: negative  Hematologic/Lymphatic/Immunologic: negative  Endocrine: negative          Lab " Results    Chemistry/lipid CBC Cardiac Enzymes/BNP/TSH/INR   Recent Labs   Lab Test 03/12/15  1230   CHOL 179   HDL 64   LDL 80   TRIG 176*     Recent Labs   Lab Test 03/12/15  1230   LDL 80     Recent Labs   Lab Test 08/23/22  1154      POTASSIUM 4.6   CHLORIDE 107   CO2 25   *   BUN 18   CR 1.08   GFRESTIMATED 48*   MARLENE 9.5     Recent Labs   Lab Test 08/23/22  1154 08/17/22  1215 04/19/21  1211   CR 1.08 0.98 0.97     Recent Labs   Lab Test 06/15/21  0910   A1C 6.0*          Recent Labs   Lab Test 08/23/22  1154   WBC 6.9   HGB 12.7   HCT 38.8   MCV 96        Recent Labs   Lab Test 08/23/22  1154 08/17/22  1215 06/15/21  0910   HGB 12.7 13.6 12.5    Recent Labs   Lab Test 08/23/22  1154 08/17/22  1215 11/21/19  0451   TROPONINI 0.16 0.04 0.01     Recent Labs   Lab Test 08/23/22  1154 08/17/22  1215 03/03/21  1222 02/21/19  0713   BNP 1,933*  --  177* 95   NTBNP  --  5,101*  --   --      Recent Labs   Lab Test 02/21/19  0648   TSH 2.05     Recent Labs   Lab Test 08/23/22  1154 06/05/19  0052 02/21/19  0648   INR 1.37* 1.50* 1.20*        Medical History  Surgical History Family History Social History   Past Medical History:   Diagnosis Date     Angina pectoris (H)      Chest pain 3/9/2017     Cough      Diarrhea 11/20/2019     Hyperlipidemia      Hypertension      Hypokalemia     Created by Conversion      Osteoarthritis      Pneumonia 2/21/2019     Past Surgical History:   Procedure Laterality Date     APPENDECTOMY       BASAL CELL CARCINOMA EXCISION       LAPAROSCOPY DIAGNOSTIC (GENERAL) N/A 11/04/2014    Procedure: LAPAROSCOPY BILATERAL SALPINGO-OOPHORECTOMY ;  Surgeon: Sofia Harper MD;  Location: [facility] [place];  Service:      TONSILLECTOMY       ZZC TOTAL KNEE ARTHROPLASTY Left      Family History   Problem Relation Age of Onset     Heart Disease Mother      Rheumatic Heart Disease Mother      No Known Problems Father      Cancer Sister      Cancer Brother         Social  History     Socioeconomic History     Marital status: Single     Spouse name: Not on file     Number of children: Not on file     Years of education: Not on file     Highest education level: Not on file   Occupational History     Not on file   Tobacco Use     Smoking status: Never Smoker     Smokeless tobacco: Never Used     Tobacco comment: no passive exposure   Substance and Sexual Activity     Alcohol use: Yes     Comment: Alcoholic Drinks/day: Rarely a glass of wine     Drug use: No     Sexual activity: Not on file   Other Topics Concern     Not on file   Social History Narrative    The patient is a nun.     Social Determinants of Health     Financial Resource Strain: Not on file   Food Insecurity: Not on file   Transportation Needs: Not on file   Physical Activity: Not on file   Stress: Not on file   Social Connections: Not on file   Intimate Partner Violence: Not on file   Housing Stability: Not on file         Medications  Allergies   Current Outpatient Medications   Medication Sig Dispense Refill     acetaminophen (TYLENOL) 325 MG tablet [ACETAMINOPHEN (TYLENOL) 325 MG TABLET] Take 650 mg by mouth every 6 (six) hours as needed for pain.       amoxicillin (AMOXIL) 500 MG capsule Take 2,000 mg by mouth as needed Prior to dental procedures.       cholecalciferol, vitamin D3, (VITAMIN D3) 2,000 unit Tab [CHOLECALCIFEROL, VITAMIN D3, (VITAMIN D3) 2,000 UNIT TAB] Take 1 tablet (2,000 Units total) by mouth daily. 90 tablet 3     COMPOUNDED NON-CONTROLLED SUBSTANCE (CMPD RX) - PHARMACY TO MIX COMPOUNDED MEDICATION lidocaine 5%, ibuprofen 10%, and diclofenac 5% apply 1-2 grams to painful feet 3-4 times a day (Patient taking differently: Apply 1-2 g topically 4 times daily as needed lidocaine 5%, ibuprofen 10%, and diclofenac 5% apply 1-2 grams to painful feet/knee 3-4 times a day) 60 g 3     DULoxetine (CYMBALTA) 60 MG capsule Take 1 capsule (60 mg) by mouth 2 times daily (Patient taking differently: Take 60 mg by  "mouth daily) 180 capsule 3     ELIQUIS ANTICOAGULANT 2.5 MG tablet TAKE ONE TABLET BY MOUTH TWICE DAILY 180 tablet 1     flecainide (TAMBOCOR) 50 MG tablet Take 1 tablet (50 mg) by mouth 2 times daily 180 tablet 3     furosemide (LASIX) 20 MG tablet Take 1 tablet (20 mg) by mouth daily 30 tablet 0     lisinopril (ZESTRIL) 5 MG tablet Take 2 tablets (10 mg) by mouth daily (Patient taking differently: Take 5 mg by mouth daily) 30 tablet 0     potassium chloride ER (KLOR-CON M) 20 MEQ CR tablet Take 1 tablet (20 mEq) by mouth daily 30 tablet 0     traMADol (ULTRAM) 50 MG tablet TAKE AS NEEDED 2 TABLETS (100 MG) BY MOUTH AT NOON AND 1 TABLET (50 MG) BY MOUTH AT BEDTIME. (Patient taking differently: Take 100 mg by mouth At Bedtime) 90 tablet 0     zolpidem ER (AMBIEN CR) 6.25 MG CR tablet take 1 and 1/4 tablet by mouth at bedtime (Patient taking differently: Take 6.25 mg by mouth At Bedtime) 45 tablet 0        Allergies   Allergen Reactions     Trazodone Shortness Of Breath and Unknown     Allergic to trazodone and deriv., Other: trouble swallowing       Clindamycin Diarrhea     C-diff     Gabapentin Other (See Comments)     \"Internal tremors\"     Temazepam Other (See Comments)     Annotation: Nightmares       Buprenorphine Palpitations     Tried patch         Kristina Velasquez MD    "

## 2022-08-23 NOTE — CONSULTS
Care Management Initial Consult    General Information  Assessment completed with: Patient,    Type of CM/SW Visit: Initial Assessment    Primary Care Provider verified and updated as needed: Yes   Readmission within the last 30 days: no previous admission in last 30 days      Reason for Consult: discharge planning  Advance Care Planning:            Communication Assessment  Patient's communication style: spoken language (English or Bilingual)             Cognitive  Cognitive/Neuro/Behavioral: WDL                      Living Environment:   People in home: alone     Current living Arrangements: apartment      Able to return to prior arrangements: yes       Family/Social Support:  Care provided by: self  Provides care for: no one  Marital Status: Single             Description of Support System:           Current Resources:   Patient receiving home care services: No     Community Resources: None  Equipment currently used at home: cane, straight  Supplies currently used at home: Incontinence Supplies    Employment/Financial:  Employment Status:          Financial Concerns:             Lifestyle & Psychosocial Needs:  Social Determinants of Health     Tobacco Use: Low Risk      Smoking Tobacco Use: Never Smoker     Smokeless Tobacco Use: Never Used   Alcohol Use: Not on file   Financial Resource Strain: Not on file   Food Insecurity: Not on file   Transportation Needs: Not on file   Physical Activity: Not on file   Stress: Not on file   Social Connections: Not on file   Intimate Partner Violence: Not on file   Depression: Not at risk     PHQ-2 Score: 0   Housing Stability: Not on file       Functional Status:  Prior to admission patient needed assistance:   Dependent ADLs:: Ambulation-cane  Dependent IADLs:: Independent  Assesssment of Functional Status: At functional baseline    Mental Health Status:          Chemical Dependency Status:                Values/Beliefs:  Spiritual, Cultural Beliefs, Adventist Practices,  "Values that affect care:                 Additional Information:  AIDET completed. Writer met with Pt. Pt lives alone in apartment. 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 She has food delivered X1 a month from [employer]. She is also working with [employer] care coordinator to arrange a cleaning service.    MOON and Observation status given and explained, pt and family verbalized understanding.      Anticipate pt will DC back home without needs vs HC depending on progression of care.     Friend will transport upon DC. Informed that CM will follow progression of care.   Shelbi Cheung RN, CM, JULIAN        "

## 2022-08-23 NOTE — TELEPHONE ENCOUNTER
Estrada Holley MD [place], Mallory J RN  Caller: Unspecified (5 days ago,  1:29 PM)  Given her age can we get her into rac? Sept is too late.   LF               ==called Sister to try to set up earlier appt. She reports that she is still having such shortness of breath and feeling like her heart is racing. She wonders if she should go to the ER. We discussed that if this is her gut reaction, she should go with this. We have tried oral furosemide with KCL replacement and it has helped minimally. She notes that she feels weak. Writer reassured Sister and we decided that she should call EMS and present to [facility]. She agrees with plan and will do this. Update to [facility]. -[facility] Dr. Holley,  I spoke with Sister this morning and now she feels weak and keeps having episodes of shortness of breath. She sounded more breathless this morning and actually sounded a lot better yesterday. She had a gut reaction that she needs to present to the ER and I think this is best based on how she sounds. She will call 911 and present to [facility]. I will keep an eye on her chart.  Mal

## 2022-08-23 NOTE — H&P
Admission History and Physical   Gladys Ramirez,  1930, MRN [id number]    [facility]  No admission diagnoses are documented for this encounter.    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 fib, chronic LBBB, COVID-19 2022, hypertension, hyperlipidemia presenting for evaluation of dyspnea  exertion and near syncope    1.  Paroxysmal Afib RVR.  Suspect the main cause of patient's symptoms.  Resume flecainide.  Metoprolol 25 mg twice daily for rate control.  Cardiology consult for further recommendations.  Patient is on Eliquis for CVA prevention  2.  Dyspnea.  Suspect due to A. fib RVR, possible acute CHF component.  CTA chest is pending although doubt PE given that patient has been anticoagulated.  S/p Lasix 20 mg IV x1 in the ER.  Resume PTA oral Lasix 20 mg daily tomorrow.  Monitor daily weights and I's and O's  3.  Prolonged QTC.  Avoid QTC prolonging medications  4.  History of PE/DVT on Eliquis   5.  COVID-19 infection in 2022  6.  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 fib, chronic LBBB, COVID-19 2022, hypertension, hyperlipidemia presenting for evaluation of dyspnea with exertion and near syncope  History is provided by patient and medical records  Patient reports episodes of palpitations, near syncope and dyspnea with exertion over the  "past 3 weeks, worsening over the last couple of days.  Patient denies having any chest pain.  She denies any lower extremity edema or weight gain.  She was started on Lasix 20 mg daily 3 days ago however has not noted any change in her symptoms.  She had event monitor on 8/19/2022, however results are not available yet.  On initial evaluation in the ER /65, pulse 119, O2 sats 93 to 95% on room air.  Labs significant for elevated BNP 1933, elevated D-dimer 1.76, normal troponin 0.16.  Telemetry shows A. fib with heart rates going up to 150s to 160s while patient is up and ambulating.       Medical History  Past Medical History:   Diagnosis Date     Angina pectoris (H)      Chest pain 3/9/2017     Cough      Diarrhea 11/20/2019     Hyperlipidemia      Hypertension      Hypokalemia     Created by Conversion      Osteoarthritis      Pneumonia 2/21/2019        Surgical History  She  has a past surgical history that includes TOTAL KNEE ARTHROPLASTY (Left); appendectomy; Tonsillectomy; Laparoscopy diagnostic (general) (N/A, 11/04/2014); and Basal Cell Carcinoma Excision.       Social History  Reviewed, and she  reports that she has never smoked. She has never used smokeless tobacco. She reports current alcohol use. She reports that she does not use drugs.   Social History     Tobacco Use     Smoking status: Never Smoker     Smokeless tobacco: Never Used     Tobacco comment: no passive exposure   Substance Use Topics     Alcohol use: Yes     Comment: Alcoholic Drinks/day: Rarely a glass of wine          Allergies  Allergies   Allergen Reactions     Trazodone Shortness Of Breath and Unknown     Allergic to trazodone and deriv., Other: trouble swallowing       Clindamycin Diarrhea     C-diff     Gabapentin Other (See Comments)     \"Internal tremors\"     Temazepam Other (See Comments)     Annotation: Nightmares       Buprenorphine Palpitations     Tried patch    Family History  Reviewed, and   Family History   Problem " "Relation Age of Onset     Heart Disease Mother      Rheumatic Heart Disease Mother      No Known Problems Father      Cancer Sister      Cancer Brother              Prior to Admission Medications   (Not in a hospital admission)         Review of Systems:  A 12 point comprehensive review of systems was negative except as noted. Physical Exam:  Temp:  [98.3  F (36.8  C)] 98.3  F (36.8  C)  Pulse:  [119] 119  Resp:  [18] 18  BP: (123)/(65) 123/65  SpO2:  [93 %] 93 %    /65   Pulse 119   Temp 98.3  F (36.8  C) (Oral)   Resp 18   Ht 1.588 m (5' 2.5\")   Wt 70.8 kg (156 lb)   SpO2 93%   BMI 28.08 kg/m      General Appearance:   No acute distress   Head:    Normocephalic, without obvious abnormality, atraumatic   Eyes:    PERRL, conjunctiva/corneas clear, EOM's intact,both eyes    Ears:    Normal external ear canals no drainage or erythema bilat.   Nose:   Nares normal by gross inspection,  mucosa normal, no drainage or sinus tenderness   Throat:   Lips, mucosa, and tongue normal; teeth and gums normal   Neck:   Supple, symmetrical, trachea midline, no adenopathy;        thyroid:  No enlargement/tenderness/nodules   Back:     Symmetric, no curvature, ROM normal, no CVA tenderness   Lungs:    Coarse breath sounds bilaterally   Chest wall:    No tenderness or deformity   Heart:   Irregularly irregular, S1 and S2 normal, no murmur, no rubs, no JVD, no edema   Abdomen:     Soft, non-tender, bowel sounds active all four quadrants,     no masses, no hepatosplenomegaly   Musculoskeletal:   Extremities are warm and non-tender, atraumatic, no joint swelling or tenderness   Pulses:   2+ and symmetric all extremities   Skin:   no rashes or lesions on exposed areas, please see nursing assessment for full skin assessment   Neurologic:  Awake, alert, grossly nonfocal        Pertinent Labs  Lab Results: personally reviewed.   Recent Labs   Lab 08/23/22  1154 08/17/22  1215    141   CO2 25 29   BUN 18 24   ALBUMIN  --  " 3.5   ALKPHOS  --  110   ALT  --  17   AST  --  19     Recent Labs   Lab 08/23/22  1154 08/17/22  1215   WBC 6.9 8.0   HGB 12.7 13.6   HCT 38.8 41.2    298     Recent Labs   Lab 08/23/22  1154 08/17/22  1215   TROPONINI 0.16 0.04       MOST RECENT A1c, Iron, TIBC, Coags, TFTs  Lab Results   Component Value Date    INR 1.37 (H) 08/23/2022    PTT 32 02/21/2019     No results found for: IRON  Lab Results   Component Value Date    TSH 2.05 02/21/2019         Pertinent Radiology  No results found for this visit on 08/23/22.    EKG Results: A. fib 112 bpm, RBBB, prolonged  ms  Telemetry: A. Fib with RVR up to 150s 160s    Advanced Care Planning  Treatment and discharge Planning discussed with patient  Anticipated Length of Stay in midnights (including a midnight in the Emergency Department after triage if applicable): At least 1 midnight for evaluation and treatment of acute CHF      Dominique Navarro MD  Internal Medicine Hospitalist  8/23/2022

## 2022-08-23 NOTE — PHARMACY-ADMISSION MEDICATION HISTORY
Pharmacy Note - Admission Medication History    Pertinent Provider Information: N/A     ______________________________________________________________________    Prior To Admission (PTA) med list completed and updated in EMR.       PTA Med List   Medication Sig Last Dose     acetaminophen (TYLENOL) 325 MG tablet [ACETAMINOPHEN (TYLENOL) 325 MG TABLET] Take 650 mg by mouth every 6 (six) hours as needed for pain. Unknown at Unknown time     amoxicillin (AMOXIL) 500 MG capsule Take 2,000 mg by mouth as needed Prior to dental procedures. Unknown at Unknown time     cholecalciferol, vitamin D3, (VITAMIN D3) 2,000 unit Tab [CHOLECALCIFEROL, VITAMIN D3, (VITAMIN D3) 2,000 UNIT TAB] Take 1 tablet (2,000 Units total) by mouth daily. 8/23/2022 at AM     COMPOUNDED NON-CONTROLLED SUBSTANCE (CMPD RX) - PHARMACY TO MIX COMPOUNDED MEDICATION lidocaine 5%, ibuprofen 10%, and diclofenac 5% apply 1-2 grams to painful feet 3-4 times a day (Patient taking differently: Apply 1-2 g topically 4 times daily as needed lidocaine 5%, ibuprofen 10%, and diclofenac 5% apply 1-2 grams to painful feet/knee 3-4 times a day) 8/22/2022 at can bring     DULoxetine (CYMBALTA) 60 MG capsule Take 1 capsule (60 mg) by mouth 2 times daily (Patient taking differently: Take 60 mg by mouth daily) 8/23/2022 at AM     ELIQUIS ANTICOAGULANT 2.5 MG tablet TAKE ONE TABLET BY MOUTH TWICE DAILY 8/23/2022 at AM     flecainide (TAMBOCOR) 50 MG tablet Take 1 tablet (50 mg) by mouth 2 times daily 8/23/2022 at AM     furosemide (LASIX) 20 MG tablet Take 1 tablet (20 mg) by mouth daily 8/23/2022 at AM     lisinopril (ZESTRIL) 5 MG tablet Take 2 tablets (10 mg) by mouth daily (Patient taking differently: Take 5 mg by mouth daily) 8/23/2022 at AM     potassium chloride ER (KLOR-CON M) 20 MEQ CR tablet Take 1 tablet (20 mEq) by mouth daily 8/23/2022 at AM     traMADol (ULTRAM) 50 MG tablet TAKE AS NEEDED 2 TABLETS (100 MG) BY MOUTH AT NOON AND 1 TABLET (50 MG) BY MOUTH AT  BEDTIME. (Patient taking differently: Take 100 mg by mouth At Bedtime) 8/22/2022 at PM     zolpidem ER (AMBIEN CR) 6.25 MG CR tablet take 1 and 1/4 tablet by mouth at bedtime (Patient taking differently: Take 6.25 mg by mouth At Bedtime) 8/22/2022 at PM       Information source(s): Patient and CareEverywhere/SureScripts  Method of interview communication: in-person    Summary of Changes to PTA Med List  New: N/A  Discontinued: N/A  Changed: compounded cream prn, duloxetine freq, tramadol freq, Ambien dose    Patient was asked about OTC/herbal products specifically.  PTA med list reflects this.    In the past week, patient estimated taking medication this percent of the time:  greater than 90%.    Allergies were reviewed, assessed, and updated with the patient.      Patient does not use any multi-dose medications prior to admission.    The information provided in this note is only as accurate as the sources available at the time of the update(s).    Thank you for the opportunity to participate in the care of this patient.    Sandro Zelaya [facility]  8/23/2022 2:20 PM

## 2022-08-23 NOTE — ED TRIAGE NOTES
Presents via [facility] from alone private living (senior apt) for c/o SOB and known Afib.  Hx of Afib, on Eliquis.  Had 12 episodes of palpitations yesterday, 4 today.  Called PMD office, advised to call 911.  Having 3 days of SOB.  LSCTA, sats are 94-98%.  HR range .  JONES.  Pt met FIRE at door.  Steady on feet.  Denies CP.         Triage Assessment     Row Name 08/23/22 1117       Triage Assessment (Adult)    Airway WDL WDL       Respiratory WDL    Respiratory WDL WDL       Skin Circulation/Temperature WDL    Skin Circulation/Temperature WDL WDL       Cardiac WDL    Cardiac WDL X;rhythm    Cardiac Rhythm apical pulse irregular       Peripheral/Neurovascular WDL    Peripheral Neurovascular WDL WDL       Cognitive/Neuro/Behavioral WDL    Cognitive/Neuro/Behavioral WDL WDL

## 2022-08-23 NOTE — TELEPHONE ENCOUNTER
----- Message from Lilia Metzger sent at 8/23/2022 10:14 AM CDT -----  Regarding: LBF PATIENT  General phone call:    Caller: PATIENT    Primary cardiologist: SMITHA    Detailed reason for call: PLEASE CALL PATIENT, SHE WANTS TO TALK TO YOU RE: LASIX AND HEART MONITOR RESULTS.     Best phone number: [phone]    Best time to contact: ANY    Ok to leave a detailedmessage? YES    Device? NO    Additional Info:          =see 8/18/22 telephone encounter-shahnaz

## 2022-08-23 NOTE — ED PROVIDER NOTES
EMERGENCY DEPARTMENT ENCOUNTER      NAME: Gladys Ramirez  AGE: 89 year old female  YOB: 1930  MRN: [id number]  EVALUATION DATE & TIME: 8/23/2022 11:16 AM    PCP: Lore Martin    ED PROVIDER: Stacy Ocasio M.D.      CHIEF COMPLAINT     Chief Complaint   Patient presents with     Atrial Fib     Shortness of Breath         FINAL IMPRESSION:     1. Acute on chronic congestive heart failure, unspecified heart failure type (H)    2. Exertional dyspnea    3. Atrial fibrillation with RVR (H)    4. Anticoagulated by anticoagulation treatment          MEDICAL DECISION MAKING:       Pertinent Labs & Imaging studies reviewed. (See chart for details)    89 year old female presents to the Emergency Department for evaluation of exertional dyspnea near syncope atrial fibrillation    ED Course as of 08/23/22 1427   Tue Aug 23, 2022   1414 Mrs. Ramirez is a 89-year-old female present via EMS.  She has noted progressively shortness of breath associated with near syncope over the last 3 days.  She states while she is at rest she has no symptoms but when she walks she feels quite short of breath.  This is not associated with chest pain.  She is being followed by Dr. Holley and has seen Dr. Cannon.  She has a previous history of A. fib and is not anticoagulated.  Denies any recent falls.   1414 She denies any recent illnesses no vomiting diarrhea dysuria hematuria had a history of cervical swelling on the    1414 19th this month ultrasound of the lower extremities of was done no evidence of DVT.   1415 On examination she appears younger than stated age.  Cooperative and pleasant.  Slightly hard of hearing.  She appears to have an irregular rhythm.  Appreciate any murmurs.  No significant crackles on auscultation and she does not have any evidence of lower extremity edema.   1415 Differential diagnoses include but not limited to acute coronary syndrome CHF pneumonia pneumothorax COVID among others.   1415 EKG  reveals a left bundle branch block.  Patient has had this before.   1416   The rhythm is irregular.  Rate of 112.  QTc is 570.  No STEMI criteria by EKG.   1416 Troponin sent hyperemotional more 0.16.  Magnesium is normal at 1.8 and potassium is 4.6.   1416 Given the exertional nature of her symptoms there is an evaluation for lower extremity DVT because of elevated D-dimer a D-dimer was done this is mildly elevated given age but she has no evidence of volume overload therefore after discussion with hospitalist a CT chest was ordered   1416 Patient recently had an Holter monitor.  Unable to review those records.   1417 BNP elevated at 1933.  Most recent 1 was last year and it was less than 200.  We will give 20 mg of Lasix IV.   1417 Patient updated.  She is in agreement with plan of staying.  Recommending symptomatic was just laying down.  CT chest pending at the time of the dictation and signout to Dr. Lobato   1422 D-Dimer Quantitative(!): 0.76   1422 % Lymphocytes: 18   1426 It is noted the patient have a pro terminal and BNP and that was greater than 5000.  The ranges are different.    Not Sure how to compare this to test but if they are the same somewhat her numbers are improving but I think there are different units.          Component Ref Range & Units 6 d ago     N Terminal Pro BNP Outpatient 0 - 450 pg/mL 5,101 High     Comment: Reference range shown and results flagged as abnormal are for the outpatient, non acute settings. Establishing a baseline value for each individual patient is useful for follow-up.     Suggested inpatient cut points for confirming diagnosis of CHF in an acute setting are:   >450 pg/mL (age 18 to less than 50)   >900 pg/mL (age 50 to less than 75)   >1800 pg/mL (75 yrs and older)     An inpatient or emergency department NT-proPBNP <300 pg/mL effectively rules out acute CHF, with 99% negative predictive value.             Vital Signs: Tachycardia  EKG: Left bundle branch block A.  fib  Imaging: CT chest pending  Home Meds: Reviewed  ED meds/abx: Lasix 20 mg  Fluids: None    Labs  K 4.6  Cr 1.08  Wbc 6.9  Hgb 12.7  Platelets 281    Troponin 0.16 D-dimer 0.76 BNP 1933  It is noted that patient had in terminal proBNP on the 17 and this was 5100.  The range for that test is 0-450.  Our test the range is 0-167    Review of Previous Records    8/17/2022   Cardiology Appointment  1. Dyspnea and dizzy spells on exertion - obtain baseline labs today CBC, CMP,BNP,  d-dimer, troponin, holter 24 hours, echo (given recent covid infection).  ECG today sinus [id number] with LBBB (no change inLBBB), sat 94%. The patient's D-dimer was slightly elevated at 0.78, and her BNP was 5,101. The patient was prescribed lasix, and scheduled to wear a halter monitor for 24 hours.     8/19/2022  EXAM: US LOWER EXTREMITY VENOUS DUPLEX BILATERAL  IMPRESSION:  1.  No deep venous thrombosis in the bilateral lower extremities.  2.  Small left popliteal fossa cyst.    Consults  Hospitalist: Dr. Navarro      ED COURSE     11:39 AM I met with the patient for the initial interview and physical examination. Discussed plan for treatment and workup in the ED. PPE: Provider wore gloves, N95 mask, and surgical cap.     1:30 PM I rechecked and updated the patient.      1:34 PM Paged Hospitalist.     1:48 PM I rechecked and updated the patient.     1:50 PM I discussed the case with hospitalist, Dr Navarro, who accepts the patient CardiacTele Observation.      2:08 PM patient in agreement with plan.  All questions answered.  Awaiting CT Dr. Lobato  to follow.      At the conclusion of the encounter I discussed the results of all of the tests and the disposition. The questions were answered. The patient acknowledged understanding and was agreeable with the care plan.         MEDICATIONS GIVEN IN THE EMERGENCY:     Medications   furosemide (LASIX) injection 20 mg (20 mg Intravenous Given 8/23/22 1402)       NEW PRESCRIPTIONS  STARTED AT TODAY'S ER VISIT     New Prescriptions    No medications on file          =================================================================    HPI     Patient information was obtained from: the patient     Use of : N/A      Gladys Ramirez is a 89 year old female with a history of hyperlipidemia, hypertension, diastolic congestive heart failure, DVT, and PE who presents by EMS for evaluation of shortness of breath.     The patient reports the onset of shortness of breath and palpitations with exertion for the last three weeks. She states that she becomes short of breath after taking a couple of steps. She endorses lightheadedness with this as well. A palliating factor is laying down.     She states that she has been working on this issue with her cardiologist Dr. Holley, and did a 24 hour heart monitor, which she turned in on 8/20 and has not gotten the report on. She was started on lasix on 8/18, and potassium chloride supplements yesterday (8/22). She has been taking her eliquis as prescribed.     The patient denies chest pain, syncope, fever, chills, ear/nose/throat problems, abdominal pain, nausea, vomiting, diarrhea, dysuria, hematuria, and any other symptoms or complaints at this time.     REVIEW OF SYSTEMS   Review of Systems   Constitutional: Negative for chills and fever.   HENT: Negative.    Respiratory: Positive for shortness of breath.    Cardiovascular: Positive for leg swelling. Negative for chest pain.   Gastrointestinal: Negative for abdominal pain, diarrhea, nausea and vomiting.   Genitourinary: Negative for dysuria and hematuria.   Neurological: Positive for light-headedness.   All other systems reviewed and are negative.      PAST MEDICAL HISTORY:     Past Medical History:   Diagnosis Date     Angina pectoris (H)      Chest pain 3/9/2017     Cough      Diarrhea 11/20/2019     Hyperlipidemia      Hypertension      Hypokalemia     Created by Conversion      Osteoarthritis   "    Pneumonia 2/21/2019       PAST SURGICAL HISTORY:     Past Surgical History:   Procedure Laterality Date     APPENDECTOMY       BASAL CELL CARCINOMA EXCISION       LAPAROSCOPY DIAGNOSTIC (GENERAL) N/A 11/04/2014    Procedure: LAPAROSCOPY BILATERAL SALPINGO-OOPHORECTOMY ;  Surgeon: Sofia Harper MD;  Location: [facility];  Service:      TONSILLECTOMY       ZZC TOTAL KNEE ARTHROPLASTY Left          CURRENT MEDICATIONS:   acetaminophen (TYLENOL) 325 MG tablet  amoxicillin (AMOXIL) 500 MG capsule  cholecalciferol, vitamin D3, (VITAMIN D3) 2,000 unit Tab  COMPOUNDED NON-CONTROLLED SUBSTANCE (CMPD RX) - PHARMACY TO MIX COMPOUNDED MEDICATION  DULoxetine (CYMBALTA) 60 MG capsule  ELIQUIS ANTICOAGULANT 2.5 MG tablet  flecainide (TAMBOCOR) 50 MG tablet  furosemide (LASIX) 20 MG tablet  lisinopril (ZESTRIL) 5 MG tablet  potassium chloride ER (KLOR-CON M) 20 MEQ CR tablet  traMADol (ULTRAM) 50 MG tablet  zolpidem ER (AMBIEN CR) 6.25 MG CR tablet         ALLERGIES:     Allergies   Allergen Reactions     Trazodone Shortness Of Breath and Unknown     Allergic to trazodone and deriv., Other: trouble swallowing       Clindamycin Diarrhea     C-diff     Gabapentin Other (See Comments)     \"Internal tremors\"     Temazepam Other (See Comments)     Annotation: Nightmares       Buprenorphine Palpitations     Tried patch       FAMILY HISTORY:     Family History   Problem Relation Age of Onset     Heart Disease Mother      Rheumatic Heart Disease Mother      No Known Problems Father      Cancer Sister      Cancer Brother        SOCIAL HISTORY:     Social History     Socioeconomic History     Marital status: Single   Tobacco Use     Smoking status: Never Smoker     Smokeless tobacco: Never Used     Tobacco comment: no passive exposure   Substance and Sexual Activity     Alcohol use: Yes     Comment: Alcoholic Drinks/day: Rarely a glass of wine     Drug use: No   Social History Narrative    The patient is a nun. " "      VITALS:   BP (!) 135/101   Pulse 107   Temp 98.3  F (36.8  C) (Oral)   Resp 26   Ht 1.588 m (5' 2.5\")   Wt 70.8 kg (156 lb)   SpO2 95%   BMI 28.08 kg/m      PHYSICAL EXAM     Physical Exam  Vitals and nursing note reviewed.   Constitutional:       Appearance: She is well-developed.   Cardiovascular:      Rate and Rhythm: Tachycardia present. Rhythm irregular.   Pulmonary:      Effort: Pulmonary effort is normal.      Breath sounds: Normal breath sounds.   Musculoskeletal:      Right lower leg: No edema.      Left lower leg: No edema.   Neurological:      Mental Status: She is alert.         Physical Exam   Constitutional: Well appearing, cooperative, pleasant    Head: Atraumatic.     Nose: Nose normal.     Mouth/Throat: Oropharynx is clear and moist.     Eyes: EOM are normal. Pupils are equal, round, and reactive to light.     Ears: TMs pearly white    Neck: Normal range of motion. Neck supple.     Cardiovascular: Normal rate, irregular rhythm and normal heart sounds.      Pulmonary/Chest: Normal effort  and breath sounds normal.     Abdominal: Belly protuberant.     Musculoskeletal: Normal range of motion.     Neurological: Alert and oriented x 3     Lymphatics: No edema    Skin: Skin is warm and dry.     Psychiatric: Normal mood and affect. Behavior is normal.       LAB:     All pertinent labs reviewed and interpreted.  Labs Ordered and Resulted from Time of ED Arrival to Time of ED Departure   BASIC METABOLIC PANEL - Abnormal       Result Value    Sodium 141      Potassium 4.6      Chloride 107      Carbon Dioxide (CO2) 25      Anion Gap 9      Urea Nitrogen 18      Creatinine 1.08      Calcium 9.5      Glucose 133 (*)     GFR Estimate 48 (*)    INR - Abnormal    INR 1.37 (*)    B-TYPE NATRIURETIC PEPTIDE ([facility] ONLY) - Abnormal    BNP 1,933 (*)    D DIMER QUANTITATIVE - Abnormal    D-Dimer Quantitative 0.76 (*)    MAGNESIUM - Normal    Magnesium 1.8     TROPONIN I - Normal    Troponin I 0.16   "   CBC WITH PLATELETS AND DIFFERENTIAL    WBC Count 6.9      RBC Count 4.05      Hemoglobin 12.7      Hematocrit 38.8      MCV 96      MCH 31.4      MCHC 32.7      RDW 14.1      Platelet Count 281      % Neutrophils 71      % Lymphocytes 18      % Monocytes 9      % Eosinophils 1      % Basophils 1      % Immature Granulocytes 0      NRBCs per 100 WBC 0      Absolute Neutrophils 5.0      Absolute Lymphocytes 1.2      Absolute Monocytes 0.6      Absolute Eosinophils 0.1      Absolute Basophils 0.0      Absolute Immature Granulocytes 0.0      Absolute NRBCs 0.0     INFLUENZA A/B & SARS-COV2 PCR MULTIPLEX        RADIOLOGY:     Reviewed all pertinent imaging. Please see official radiology report.  CT Chest Pulmonary Embolism w Contrast    (Results Pending)        EKG:     EKG #1  Atrial fibrillation with rapid ventricular response poor anterior progression normal axis left bundle branch block.    Time:780862    Ventricular rate 112 bmp  Axis normal  DC interval ms  QRS duration 160 ms  QT/ZWM364/570  ms    Compared to previous EKG on August 17, 2022    I have independently reviewed and interpreted the EKG(s) documented above.      PROCEDURES:     Procedures      I, Leslie Shikha, am serving as a scribe to document services personally performed by Dr. Ocasio based on my observation and the provider's statements to me. I, Stacy Ocasio MD attest that Leslie Shikha is acting in a scribe capacity, has observed my performance of the services and has documented them in accordance with my direction.    Stacy Ocasio M.D.  Emergency Medicine  [facility] EMERGENCY DEPARTMENT  [facility] [facility] [id number] [phone]  Dept: [phone]      Stacy Ocasio MD  08/23/22 7199

## 2022-08-24 ENCOUNTER — APPOINTMENT (OUTPATIENT)
Dept: CARDIOLOGY | Facility: HOSPITAL | Age: 87
DRG: 291 | End: 2022-08-24
Attending: INTERNAL MEDICINE
Payer: COMMERCIAL

## 2022-08-24 ENCOUNTER — APPOINTMENT (OUTPATIENT)
Dept: OCCUPATIONAL THERAPY | Facility: HOSPITAL | Age: 87
DRG: 291 | End: 2022-08-24
Attending: INTERNAL MEDICINE
Payer: COMMERCIAL

## 2022-08-24 LAB
ANION GAP SERPL CALCULATED.3IONS-SCNC: 11 MMOL/L (ref 5–18)
ATRIAL RATE - MUSE: 65 BPM
BI-PLANE LVEF ECHO: NORMAL
BUN SERPL-MCNC: 18 MG/DL (ref 8–28)
CALCIUM SERPL-MCNC: 9.5 MG/DL (ref 8.5–10.5)
CHLORIDE BLD-SCNC: 101 MMOL/L (ref 98–107)
CO2 SERPL-SCNC: 27 MMOL/L (ref 22–31)
CREAT SERPL-MCNC: 0.96 MG/DL (ref 0.6–1.1)
DIASTOLIC BLOOD PRESSURE - MUSE: 96 MMHG
ERYTHROCYTE [DISTWIDTH] IN BLOOD BY AUTOMATED COUNT: 14.2 % (ref 10–15)
GFR SERPL CREATININE-BSD FRML MDRD: 56 ML/MIN/1.73M2
GLUCOSE BLD-MCNC: 118 MG/DL (ref 70–125)
HCT VFR BLD AUTO: 39.4 % (ref 35–47)
HGB BLD-MCNC: 12.8 G/DL (ref 11.7–15.7)
HOLD SPECIMEN: NORMAL
INTERPRETATION ECG - MUSE: NORMAL
MCH RBC QN AUTO: 31.1 PG (ref 26.5–33)
MCHC RBC AUTO-ENTMCNC: 32.5 G/DL (ref 31.5–36.5)
MCV RBC AUTO: 96 FL (ref 78–100)
P AXIS - MUSE: 81 DEGREES
PLATELET # BLD AUTO: 302 10E3/UL (ref 150–450)
POTASSIUM BLD-SCNC: 3.7 MMOL/L (ref 3.5–5)
PR INTERVAL - MUSE: 186 MS
QRS DURATION - MUSE: 158 MS
QT - MUSE: 512 MS
QTC - MUSE: 532 MS
R AXIS - MUSE: 90 DEGREES
RBC # BLD AUTO: 4.11 10E6/UL (ref 3.8–5.2)
SODIUM SERPL-SCNC: 139 MMOL/L (ref 136–145)
SYSTOLIC BLOOD PRESSURE - MUSE: 164 MMHG
T AXIS - MUSE: -34 DEGREES
VENTRICULAR RATE- MUSE: 65 BPM
WBC # BLD AUTO: 7.7 10E3/UL (ref 4–11)

## 2022-08-24 PROCEDURE — 250N000013 HC RX MED GY IP 250 OP 250 PS 637: Performed by: INTERNAL MEDICINE

## 2022-08-24 PROCEDURE — 250N000011 HC RX IP 250 OP 636: Performed by: INTERNAL MEDICINE

## 2022-08-24 PROCEDURE — 80048 BASIC METABOLIC PNL TOTAL CA: CPT | Performed by: INTERNAL MEDICINE

## 2022-08-24 PROCEDURE — 99233 SBSQ HOSP IP/OBS HIGH 50: CPT | Mod: 25 | Performed by: INTERNAL MEDICINE

## 2022-08-24 PROCEDURE — 97166 OT EVAL MOD COMPLEX 45 MIN: CPT | Mod: GO

## 2022-08-24 PROCEDURE — 93005 ELECTROCARDIOGRAM TRACING: CPT | Performed by: INTERNAL MEDICINE

## 2022-08-24 PROCEDURE — G0378 HOSPITAL OBSERVATION PER HR: HCPCS

## 2022-08-24 PROCEDURE — 99233 SBSQ HOSP IP/OBS HIGH 50: CPT | Performed by: INTERNAL MEDICINE

## 2022-08-24 PROCEDURE — 97535 SELF CARE MNGMENT TRAINING: CPT | Mod: GO

## 2022-08-24 PROCEDURE — 210N000001 HC R&B IMCU HEART CARE

## 2022-08-24 PROCEDURE — 93306 TTE W/DOPPLER COMPLETE: CPT | Mod: 26 | Performed by: GENERAL ACUTE CARE HOSPITAL

## 2022-08-24 PROCEDURE — 255N000002 HC RX 255 OP 636: Performed by: INTERNAL MEDICINE

## 2022-08-24 PROCEDURE — 85027 COMPLETE CBC AUTOMATED: CPT | Performed by: INTERNAL MEDICINE

## 2022-08-24 PROCEDURE — 96376 TX/PRO/DX INJ SAME DRUG ADON: CPT

## 2022-08-24 PROCEDURE — 36415 COLL VENOUS BLD VENIPUNCTURE: CPT | Performed by: INTERNAL MEDICINE

## 2022-08-24 RX ORDER — METOPROLOL TARTRATE 25 MG/1
25 TABLET, FILM COATED ORAL 2 TIMES DAILY
Status: DISCONTINUED | OUTPATIENT
Start: 2022-08-24 | End: 2022-08-24

## 2022-08-24 RX ORDER — FUROSEMIDE 20 MG
20 TABLET ORAL
Status: DISCONTINUED | OUTPATIENT
Start: 2022-08-24 | End: 2022-08-28 | Stop reason: HOSPADM

## 2022-08-24 RX ORDER — AMIODARONE HYDROCHLORIDE 200 MG/1
200 TABLET ORAL 2 TIMES DAILY
Status: DISCONTINUED | OUTPATIENT
Start: 2022-08-24 | End: 2022-08-26

## 2022-08-24 RX ADMIN — TRAMADOL HYDROCHLORIDE 100 MG: 50 TABLET, COATED ORAL at 21:32

## 2022-08-24 RX ADMIN — LISINOPRIL 5 MG: 5 TABLET ORAL at 10:05

## 2022-08-24 RX ADMIN — DULOXETINE HYDROCHLORIDE 60 MG: 60 CAPSULE, DELAYED RELEASE PELLETS ORAL at 10:05

## 2022-08-24 RX ADMIN — APIXABAN 2.5 MG: 2.5 TABLET, FILM COATED ORAL at 10:05

## 2022-08-24 RX ADMIN — APIXABAN 2.5 MG: 2.5 TABLET, FILM COATED ORAL at 21:30

## 2022-08-24 RX ADMIN — POTASSIUM CHLORIDE 20 MEQ: 1500 TABLET, EXTENDED RELEASE ORAL at 10:04

## 2022-08-24 RX ADMIN — METOPROLOL TARTRATE 25 MG: 25 TABLET, FILM COATED ORAL at 04:44

## 2022-08-24 RX ADMIN — PERFLUTREN 2 ML: 6.52 INJECTION, SUSPENSION INTRAVENOUS at 12:00

## 2022-08-24 RX ADMIN — AMIODARONE HYDROCHLORIDE 200 MG: 200 TABLET ORAL at 21:29

## 2022-08-24 RX ADMIN — FLECAINIDE ACETATE 50 MG: 50 TABLET ORAL at 10:04

## 2022-08-24 RX ADMIN — FUROSEMIDE 20 MG: 20 TABLET ORAL at 18:47

## 2022-08-24 RX ADMIN — FUROSEMIDE 20 MG: 10 INJECTION, SOLUTION INTRAMUSCULAR; INTRAVENOUS at 04:45

## 2022-08-24 ASSESSMENT — ACTIVITIES OF DAILY LIVING (ADL)
ADLS_ACUITY_SCORE: 35
CONCENTRATING,_REMEMBERING_OR_MAKING_DECISIONS_DIFFICULTY: NO
ADLS_ACUITY_SCORE: 35
TOILETING_ISSUES: NO
WALKING_OR_CLIMBING_STAIRS_DIFFICULTY: NO
WEAR_GLASSES_OR_BLIND: YES
DOING_ERRANDS_INDEPENDENTLY_DIFFICULTY: OTHER (SEE COMMENTS)
ADLS_ACUITY_SCORE: 35
ADLS_ACUITY_SCORE: 37
ADLS_ACUITY_SCORE: 35
ADLS_ACUITY_SCORE: 35
EQUIPMENT_CURRENTLY_USED_AT_HOME: CANE, QUAD;OTHER (SEE COMMENTS)
VISION_MANAGEMENT: GLASSES
ADLS_ACUITY_SCORE: 35
ADLS_ACUITY_SCORE: 26
DIFFICULTY_EATING/SWALLOWING: NO
CHANGE_IN_FUNCTIONAL_STATUS_SINCE_ONSET_OF_CURRENT_ILLNESS/INJURY: YES
FALL_HISTORY_WITHIN_LAST_SIX_MONTHS: YES
DRESSING/BATHING_DIFFICULTY: NO
ADLS_ACUITY_SCORE: 35
ADLS_ACUITY_SCORE: 35
NUMBER_OF_TIMES_PATIENT_HAS_FALLEN_WITHIN_LAST_SIX_MONTHS: 4
ADLS_ACUITY_SCORE: 37
ADLS_ACUITY_SCORE: 35

## 2022-08-24 NOTE — PROGRESS NOTES
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 fib, chronic LBBB, COVID-19 5/2022, hypertension, hyperlipidemia presenting for evaluation of dyspnea  exertion and near syncope.  She was found to be in A. fib RVR and new decline in LV systolic function     Assessment and plan:  1.  Paroxysmal Afib RVR.   Back to sinus rhythm.  Flecainide switched to amiodarone by cardiology.  Metoprolol added 8/23 for ventricular rate control - dose decreased today due to symptomatic sinus bradycardia.  Cardiology consult appreciated.  Patient is on Eliquis for CVA prevention  2.  Dyspnea.  Suspect due to A. fib RVR and acute CHF.  CTA chest does show pulmonary venous hypertension but no PE.  Continue IV Lasix.    Monitor daily weights and I's and O's  3.  Severe cardiomyopathy: New decline in LVEF 28% from 58% in 2019.  Could be related to chronic LBBB however cannot exclude ischemia.  Will likely need ischemia evaluation prior to discharge   4.  History of PE/DVT on Eliquis   5.  COVID-19 infection in 5/2022      Disposition Plan   Expected discharge in 2  days to prior living arrangement once euvolemic and ischemia evaluation completed     Entered: Dominique Navarro MD 08/24/2022, 9:24 AM         Subjective  Patient reports having another 2 \"episodes\" while walking to the bathroom and another one while trying to sit up on the edge of the bed when she became lightheaded, diaphoretic.  Episodes quickly resolved and patient has been in normal sinus rhythm heart rate 60s to 70s.  At this time patient denies having any chest discomfort or dyspnea.    Objective    Vital signs in last 24 hours  Temp:  [97.9  F (36.6  C)-98.4  F (36.9  C)] 97.9  F (36.6  C)  Pulse:  [] 66  Resp:  [18-38] 24  BP: (106-202)/() 164/96  SpO2:  [75 %-95 %] 95 % @LASTSAO2(12)@ O2 Device: None (Room air)    Weight:   Wt Readings from Last 3 Encounters: "   08/23/22 70.8 kg (156 lb)   08/17/22 71.2 kg (157 lb)   03/04/22 73.5 kg (162 lb)      Weight change:     Intake/Output last 3 shifts  I/O last 3 completed shifts:  In: -   Out: 250 [Urine:250]  Body mass index is 28.08 kg/m .    Physical Exam    General Appearance:    Alert, cooperative, no distress, appears stated age   Lungs:     Clear bilaterally    Cardiovascular:    Regular rate ands rhythm.  Normal S1, S2.  No murmur, rub or gallop.  No edema   Abdomen:     Soft, non-tender, bowel sounds active all four quadrants,     no masses, no organomegaly   Neurologic:   Awake, alert, oriented x 3.  Grossly nonfocal      Pertinent Labs   Lab Results: personally reviewed.   Recent Labs   Lab 08/24/22 0718 08/23/22  1154 08/17/22  1215    141 141   CO2 27 25 29   BUN 18 18 24   ALBUMIN  --   --  3.5   ALKPHOS  --   --  110   ALT  --   --  17   AST  --   --  19     Recent Labs   Lab 08/24/22 0718 08/23/22  1154 08/17/22  1215   WBC 7.7 6.9 8.0   HGB 12.8 12.7 13.6   HCT 39.4 38.8 41.2    281 298     Recent Labs   Lab 08/23/22  1154 08/17/22  1215   TROPONINI 0.16 0.04     Invalid input(s): POCGLUFGR    Medications  Current Facility-Administered Medications   Medication     acetaminophen (TYLENOL) tablet 650 mg     apixaban ANTICOAGULANT (ELIQUIS) tablet 2.5 mg     calcium carbonate (TUMS) chewable tablet 1,000 mg     DULoxetine (CYMBALTA) DR capsule 60 mg     flecainide (TAMBOCOR) tablet 50 mg     furosemide (LASIX) injection 20 mg     lidocaine (LMX4) cream     lidocaine 1 % 0.1-1 mL     lisinopril (ZESTRIL) tablet 5 mg     melatonin tablet 1 mg     metoprolol (LOPRESSOR) injection 5 mg     metoprolol tartrate (LOPRESSOR) tablet 25 mg     Patient is already receiving anticoagulation with heparin, enoxaparin (LOVENOX), warfarin (COUMADIN)  or other anticoagulant medication     potassium chloride ER (KLOR-CON M) CR tablet 20 mEq     senna-docusate (SENOKOT-S/PERICOLACE) 8.6-50 MG per tablet 1 tablet    Or      senna-docusate (SENOKOT-S/PERICOLACE) 8.6-50 MG per tablet 2 tablet     sodium chloride (PF) 0.9% PF flush 3 mL     sodium chloride (PF) 0.9% PF flush 3 mL     traMADol (ULTRAM) tablet 100 mg     zolpidem (AMBIEN) tablet 5 mg     Current Outpatient Medications   Medication     acetaminophen (TYLENOL) 325 MG tablet     amoxicillin (AMOXIL) 500 MG capsule     cholecalciferol, vitamin D3, (VITAMIN D3) 2,000 unit Tab     COMPOUNDED NON-CONTROLLED SUBSTANCE (CMPD RX) - PHARMACY TO MIX COMPOUNDED MEDICATION     DULoxetine (CYMBALTA) 60 MG capsule     ELIQUIS ANTICOAGULANT 2.5 MG tablet     flecainide (TAMBOCOR) 50 MG tablet     furosemide (LASIX) 20 MG tablet     lisinopril (ZESTRIL) 5 MG tablet     potassium chloride ER (KLOR-CON M) 20 MEQ CR tablet     traMADol (ULTRAM) 50 MG tablet     zolpidem ER (AMBIEN CR) 6.25 MG CR tablet       Pertinent Radiology   Radiology Results: Personally reviewed   Results for orders placed or performed during the hospital encounter of 08/23/22   CT Chest Pulmonary Embolism w Contrast    Impression    IMPRESSION:  1.  Pulmonary arteries are normal caliber with no pulmonary emboli. Thoracic aorta is negative for dissection.  2.  Mild generalized cardiac enlargement, mild interstitial edema, small bilateral pleural effusions and mild posterior bibasilar atelectasis compatible with pulmonary venous hypertension secondary to CHF in the proper clinical setting.       Telemetry: Normal sinus rhythm with episodes of sinus bradycardia with heart rates in 40s    Advanced Care Planning:  Discharge Planning discussed with patient and nursing staff        Dominique Navarro MD  Internal Medicine Hospitalist  8/24/2022

## 2022-08-24 NOTE — PLAN OF CARE
Goal Outcome Evaluation:    Problem: Plan of Care - These are the overarching goals to be used throughout the patient stay.    Goal: Plan of Care Review/Shift Note  Description: The Plan of Care Review/Shift note should be completed every shift.  The Outcome Evaluation is a brief statement about your assessment that the patient is improving, declining, or no change.  This information will be displayed automatically on your shift note.  Outcome: Ongoing, Progressing     Pt has been resting comfortably through NOC. Voiding without difficulty. Unable to measure because pure wic failed  and pt was incontinent. Transferred to [facility] without difficulty. Pt was SOB on pm shift during ambulation to bathroom. No episodes of tachycardia noted.

## 2022-08-24 NOTE — ED NOTES
"[facility] ED Handoff Report    ED Chief Complaint:     Triage note:    \"Presents via MW FIRE from alone private living (senior apt) for c/o SOB and known Afib.  Hx of Afib, on Eliquis.  Had 12 episodes of palpitations yesterday, 4 today.  Called PMD office, advised to call 911.  Having 3 days of SOB.  LSCTA, sats are 94-98%.  HR range .  JONES.  Pt met FIRE at door.  Steady on feet.  Denies CP.\"       Triage Assessment     Row Name 08/23/22 1117       Triage Assessment (Adult)    Airway WDL WDL       Respiratory WDL    Respiratory WDL WDL       Skin Circulation/Temperature WDL    Skin Circulation/Temperature WDL WDL       Cardiac WDL    Cardiac WDL X;rhythm    Cardiac Rhythm apical pulse irregular       Peripheral/Neurovascular WDL    Peripheral Neurovascular WDL WDL       Cognitive/Neuro/Behavioral WDL    Cognitive/Neuro/Behavioral WDL WDL                  ED Diagnosis:  (I50.9) Acute on chronic congestive heart failure, unspecified heart failure type (H)  Comment:  ECHO completed, see results  Plan: IV lasix    (R06.00) Exertional dyspnea  Comment:   Plan:     (I48.91) Atrial fibrillation with RVR (H)  Comment: now in a NSR, rate controlled in the 70's but has episodes of bradycardia.  Metoprolol was initially increased and then decreased again due to bradycardia  Plan: possibility of getting a pacemaker, see's Dr. Holley outpatient    (Z79.01) Anticoagulated by anticoagulation treatment  Comment: on Eliquis  Plan:        PMH:    Past Medical History:   Diagnosis Date     Angina pectoris (H)      Chest pain 3/9/2017     Cough      Diarrhea 11/20/2019     Hyperlipidemia      Hypertension      Hypokalemia     Created by Conversion      Osteoarthritis      Pneumonia 2/21/2019        Code Status:  Full Code     Falls Risk: Yes Band: Not applicable    Current Living Situation/Residence: lives alone, apartment    Elimination Status: Continent: Yes     Activity Level: SBA to BSC, patient gets " "lightheaded/dizzy with activity related to bradycardia.       Patients Preferred Language:  English     Needed: No    Vital Signs:  /68 (BP Location: Left arm)   Pulse 63   Temp 97.7  F (36.5  C) (Oral)   Resp 21   Ht 1.588 m (5' 2.5\")   Wt 70.8 kg (156 lb)   SpO2 94%   BMI 28.08 kg/m       Cardiac Rhythm: NSR with BBB and occasional PVCs    Pain Score: 0/10    Is the Patient Confused:  No    Last Food or Drink: 08/24/22 at now, late lunch    Focused Assessment:  Patient is A&Ox4.  She is very [place] and doesn't have her hearing aids.  She has a history of CHF and came in with SOB, patient still endorses JONES.  Lungs are clear but diminished in the bases.  She is pain free at this time.  She is on RA and vitally she is stable.      Tests Performed: Done: Labs and Imaging    Treatments Provided:  Medications, see MAR    Family Dynamics/Concerns: No    Family Updated On Visitor Policy: Yes    Plan of Care Communicated to Family: Yes    Who Was Updated about Plan of Care: Patient    Belongings Checklist Done and Signed by Patient: No    Medications sent with patient: none    Covid: symptomatic, negative    Additional Information:     RN: Jessie Perea RN   8/24/2022 4:14 PM       "

## 2022-08-24 NOTE — UTILIZATION REVIEW
Admission Status; Secondary Review Determination   Under the authority of the Utilization Management Committee, the utilization review process indicated a secondary review on Gladys Ramirez. The review outcome is based on review of the medical records, discussions with staff, and applying clinical experience noted on the date of the review.   (x) Inpatient Status Appropriate - This patient's medical care is consistent with medical management for inpatient care and reasonable inpatient medical practice.     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  Found in Afib with RVR.  HR now improved but also at times very bradycardic.  IV diuresis continues.  New noted EF reduced at 28% which is new.     At the time of admission with the information available to the attending physician more than 2 nights Hospital complex care was anticipated, based on patient risk of adverse outcome if treated as outpatient and complex care required. Inpatient admission is appropriate based on the Medicare guidelines.   The information on this document is developed by the utilization review team in order for the business office to ensure compliance. This only denotes the appropriateness of proper admission status and does not reflect the quality of care rendered.   The definitions of Inpatient Status and Observation Status used in making the determination above are those provided in the CMS Coverage Manual, Chapter 1 and Chapter 6, section 70.4.   Sincerely,   Joann Ahmadi MD  Utilization Review  Physician Advisor  [facility]

## 2022-08-24 NOTE — PROGRESS NOTES
HEART CARE CONSULTATON NOTE        Assessment/Recommendations   Assessment:  1.  Atrial fibrillation: Now persistent and symptomatic.    She feels better in normal sinus rhythm.  She converted overnight.  Due to her heart failure with reduced ejection fraction will change her antiarrhythmic therapy to amiodarone.  2.  Anticoagulation: On Eliquis for anticoagulation  3.  Hypertension: Well-controlled  4.    Acute on chronic heart failure with reduced ejection fraction responding well to IV diuresis  5.  Cardiomyopathy: Left ventricular ejection fraction now reduced to 28% most likely secondary to the development of left bundle branch block since 2019 although cannot exclude ischemic disease  6.  Left bundle branch block  7.  Sinus bradycardia    Plan:  1.  Decrease metoprolol dosing to 12.5 mg twice daily with holding parameters  2.  Continue diuresis today and may be able to change to p.o. tomorrow she is feeling better with improved breathing  3.  Continue on Eliquis for anticoagulation   4.  Stop flecainide and start on low-dose amiodarone for rhythm control  5.  We will need to consider further ischemic work-up given decline in left ventricular ejection fraction  6.  May be candidate for CRT device placement       History of Present Illness/Subjective    Patient converted to normal sinus rhythm overnight.  She did have a period where her heart rate dipped to the 40s and she felt mildly lightheaded this morning.  Breathing improved compared to yesterday.  No complaints of chest pain.    Echocardiogram 8/24/2022  Interpretation Summary     1. Left ventricular size is mildly enlarged. Wall thickness is normal.  Systolic function is severely reduced with global hypokinesis and  intraventricular dyssynchrony due to the presence of a left bundle branch  block. The calculated left ventricular ejection fraction is 28%.  2. Right ventricular size is normal. Systolic function is mildly reduced.  3. Severe left atrial  "enlargement.  4. No hemodynamically significant valvular abnormalities.  5. Compared to the prior study dated 11/21/2019, a left bundle branch block is  present and the left ventricular ejection fraction is now severely reduced.       Physical Examination  Review of Systems   VITALS: /68 (BP Location: Left arm)   Pulse 63   Temp 97.7  F (36.5  C) (Oral)   Resp 21   Ht 1.588 m (5' 2.5\")   Wt 70.8 kg (156 lb)   SpO2 94%   BMI 28.08 kg/m    BMI: Body mass index is 28.08 kg/m .  Wt Readings from Last 3 Encounters:   08/23/22 70.8 kg (156 lb)   08/17/22 71.2 kg (157 lb)   03/04/22 73.5 kg (162 lb)       Intake/Output Summary (Last 24 hours) at 8/24/2022 1430  Last data filed at 8/24/2022 1300  Gross per 24 hour   Intake 420 ml   Output 700 ml   Net -280 ml     General Appearance:   no distress, normal body habitus   ENT/Mouth: membranes moist, no oral lesions or bleeding gums.      EYES:  no scleral icterus, normal conjunctivae   Neck: no carotid bruits or thyromegaly   Chest/Lungs:   lungs are clear to auscultation   Cardiovascular:   Regular. Normal first and second heart sounds with no murmurs  no edema bilaterally    Abdomen:  no organomegaly, masses, bruits, or tenderness; bowel sounds are present   Extremities: no cyanosis or clubbing   Skin: no xanthelasma, warm.    Neurologic: normal  bilateral, no tremors     Psychiatric: alert and oriented x3, calm     Review Of Systems  Skin: negative  Eyes: negative  Ears/Nose/Throat: negative  Respiratory: Shortness of breath-   Cardiovascular: negative  Gastrointestinal: negative  Genitourinary: negative  Musculoskeletal: negative  Neurologic: negative  Psychiatric: negative  Hematologic/Lymphatic/Immunologic: negative  Endocrine: negative          Lab Results    Chemistry/lipid CBC Cardiac Enzymes/BNP/TSH/INR   Recent Labs   Lab Test 03/12/15  1230   CHOL 179   HDL 64   LDL 80   TRIG 176*     Recent Labs   Lab Test 03/12/15  1230   LDL 80     Recent Labs "   Lab Test 08/24/22  0718      POTASSIUM 3.7   CHLORIDE 101   CO2 27      BUN 18   CR 0.96   GFRESTIMATED 56*   MARLENE 9.5     Recent Labs   Lab Test 08/24/22  0718 08/23/22  1154 08/17/22  1215   CR 0.96 1.08 0.98     Recent Labs   Lab Test 06/15/21  0910   A1C 6.0*          Recent Labs   Lab Test 08/24/22  0718   WBC 7.7   HGB 12.8   HCT 39.4   MCV 96        Recent Labs   Lab Test 08/24/22  0718 08/23/22  1154 08/17/22  1215   HGB 12.8 12.7 13.6    Recent Labs   Lab Test 08/23/22  1154 08/17/22  1215 11/21/19  0451   TROPONINI 0.16 0.04 0.01     Recent Labs   Lab Test 08/23/22  1154 08/17/22  1215 03/03/21  1222 02/21/19  0713   BNP 1,933*  --  177* 95   NTBNP  --  5,101*  --   --      Recent Labs   Lab Test 02/21/19  0648   TSH 2.05     Recent Labs   Lab Test 08/23/22  1154 06/05/19  0052 02/21/19  0648   INR 1.37* 1.50* 1.20*        Medical History  Surgical History Family History Social History   Past Medical History:   Diagnosis Date     Angina pectoris (H)      Chest pain 3/9/2017     Cough      Diarrhea 11/20/2019     Hyperlipidemia      Hypertension      Hypokalemia     Created by Conversion      Osteoarthritis      Pneumonia 2/21/2019     Past Surgical History:   Procedure Laterality Date     APPENDECTOMY       BASAL CELL CARCINOMA EXCISION       LAPAROSCOPY DIAGNOSTIC (GENERAL) N/A 11/04/2014    Procedure: LAPAROSCOPY BILATERAL SALPINGO-OOPHORECTOMY ;  Surgeon: Sofia Harper MD;  Location: [facility] OR;  Service:      TONSILLECTOMY       ZZC TOTAL KNEE ARTHROPLASTY Left      Family History   Problem Relation Age of Onset     Heart Disease Mother      Rheumatic Heart Disease Mother      No Known Problems Father      Cancer Sister      Cancer Brother         Social History     Socioeconomic History     Marital status: Single     Spouse name: Not on file     Number of children: Not on file     Years of education: Not on file     Highest education level: Not on file    Occupational History     Not on file   Tobacco Use     Smoking status: Never Smoker     Smokeless tobacco: Never Used     Tobacco comment: no passive exposure   Substance and Sexual Activity     Alcohol use: Yes     Comment: Alcoholic Drinks/day: Rarely a glass of wine     Drug use: No     Sexual activity: Not on file   Other Topics Concern     Not on file   Social History Narrative    The patient is a nun.     Social Determinants of Health     Financial Resource Strain: Not on file   Food Insecurity: Not on file   Transportation Needs: Not on file   Physical Activity: Not on file   Stress: Not on file   Social Connections: Not on file   Intimate Partner Violence: Not on file   Housing Stability: Not on file         Medications  Allergies   Current Outpatient Medications   Medication Sig Dispense Refill     acetaminophen (TYLENOL) 325 MG tablet [ACETAMINOPHEN (TYLENOL) 325 MG TABLET] Take 650 mg by mouth every 6 (six) hours as needed for pain.       amoxicillin (AMOXIL) 500 MG capsule Take 2,000 mg by mouth as needed Prior to dental procedures.       cholecalciferol, vitamin D3, (VITAMIN D3) 2,000 unit Tab [CHOLECALCIFEROL, VITAMIN D3, (VITAMIN D3) 2,000 UNIT TAB] Take 1 tablet (2,000 Units total) by mouth daily. 90 tablet 3     COMPOUNDED NON-CONTROLLED SUBSTANCE (CMPD RX) - PHARMACY TO MIX COMPOUNDED MEDICATION lidocaine 5%, ibuprofen 10%, and diclofenac 5% apply 1-2 grams to painful feet 3-4 times a day (Patient taking differently: Apply 1-2 g topically 4 times daily as needed lidocaine 5%, ibuprofen 10%, and diclofenac 5% apply 1-2 grams to painful feet/knee 3-4 times a day) 60 g 3     DULoxetine (CYMBALTA) 60 MG capsule Take 1 capsule (60 mg) by mouth 2 times daily (Patient taking differently: Take 60 mg by mouth daily) 180 capsule 3     ELIQUIS ANTICOAGULANT 2.5 MG tablet TAKE ONE TABLET BY MOUTH TWICE DAILY 180 tablet 1     flecainide (TAMBOCOR) 50 MG tablet Take 1 tablet (50 mg) by mouth 2 times daily 180  "tablet 3     furosemide (LASIX) 20 MG tablet Take 1 tablet (20 mg) by mouth daily 30 tablet 0     lisinopril (ZESTRIL) 5 MG tablet Take 2 tablets (10 mg) by mouth daily (Patient taking differently: Take 5 mg by mouth daily) 30 tablet 0     potassium chloride ER (KLOR-CON M) 20 MEQ CR tablet Take 1 tablet (20 mEq) by mouth daily 30 tablet 0     traMADol (ULTRAM) 50 MG tablet TAKE AS NEEDED 2 TABLETS (100 MG) BY MOUTH AT NOON AND 1 TABLET (50 MG) BY MOUTH AT BEDTIME. (Patient taking differently: Take 100 mg by mouth At Bedtime) 90 tablet 0     zolpidem ER (AMBIEN CR) 6.25 MG CR tablet take 1 and 1/4 tablet by mouth at bedtime (Patient taking differently: Take 6.25 mg by mouth At Bedtime) 45 tablet 0        Allergies   Allergen Reactions     Trazodone Shortness Of Breath and Unknown     Allergic to trazodone and deriv., Other: trouble swallowing       Clindamycin Diarrhea     C-diff     Gabapentin Other (See Comments)     \"Internal tremors\"     Temazepam Other (See Comments)     Annotation: Nightmares       Buprenorphine Palpitations     Tried patch         Kristina Velasquez MD    "

## 2022-08-24 NOTE — ED NOTES
"Pts call light on and pt wanting to have a bowel movement. Pt asked if feeling ok to walk across the andre to the bathroom. pt feeling comfortable to walk to bathroom.  Pt held onto tech while walking and feeling \"fine\".  Tech into bathroom with pt as stand by, pt feeling \"fine\" then pt completed and washed hands while tech opened door pt saying \"I need to sit down.\" pt finding shower chair and sat down. Pt appearing pale and complaining of shortness of breath and rapid heart rate. Pt told to sit and take time to recover. Pt feeling like she had caught her breath and pt asked how she was feeling and pt feeling she needed more time to sit.  Pt then feeling comfortable sitting while tech went to get bed to put outside of bathroom.  Pt able to walk to bed and assisted into bed. Primary nurse notified that pt would benefit from either a commode or bed pan for future bowel movements.  "

## 2022-08-24 NOTE — PROGRESS NOTES
08/24/22 0901   Quick Adds   Quick Adds Certification   Type of Visit Initial Occupational Therapy Evaluation   Living Environment   People in Home alone   Current Living Arrangements apartment  (senior)   Home Accessibility no concerns   Transportation Anticipated family or friend will provide   Self-Care   Current Activity Tolerance fair   Equipment Currently Used at Home cane, straight   Activity/Exercise/Self-Care Comment has been having episodes of feeling faint and sweating   Instrumental Activities of Daily Living (IADL)   IADL Comments indep. all ADLs/I ADLs, drives,   General Information   Onset of Illness/Injury or Date of Surgery 08/23/22   Referring Physician Dr. Dominique Navarro   Patient/Family Therapy Goal Statement (OT) none stated   Additional Occupational Profile Info/Pertinent History of Current Problem 89 year old old female with past medical history of PE/DVT on anticoagulation, HFpEF, paroxysmal A. fib, chronic LBBB, COVID-19 5/2022, hypertension, hyperlipidemia presenting for evaluation of dyspnea  exertion and near syncope   Existing Precautions/Restrictions fall   Limitations/Impairments safety/cognitive   Cognitive Status Examination   Orientation Status person;place;time   Cognitive Status Comments rec. futther screening   Visual Perception   Visual Impairment/Limitations corrective lenses full-time   Range of Motion Comprehensive   General Range of Motion no range of motion deficits identified   Strength Comprehensive (MMT)   General Manual Muscle Testing (MMT) Assessment no strength deficits identified  (WFL for ADLs)   Coordination   Upper Extremity Coordination No deficits were identified   Bed Mobility   Bed Mobility supine-sit;sit-supine;scooting/bridging;rolling left;rolling right   Rolling Left [place] (Bed Mobility) supervision   Rolling Right [place] (Bed Mobility) contact guard   Scooting/Bridging [place] (Bed Mobility) maximum assist (25% patient effort);2  person assist   Supine-Sit [place] (Bed Mobility) minimum assist (75% patient effort)   Sit-Supine [place] (Bed Mobility) minimum assist (75% patient effort)   Transfers   Transfers toilet transfer  (commode)   Toilet Transfer   Type (Toilet Transfer) sit-stand;stand-sit   [place] Level (Toilet Transfer) contact guard   Assistive Device (Toilet Transfer) commode chair   Balance   Balance Assessment standing balance: dynamic   Balance Comments WFL   Lower Body Dressing Assessment/Training   [place] Level (Lower Body Dressing) supervision   Comment, (Lower Body Dressing) doffed sock SBA, donned max A   Clinical Impression   Criteria for Skilled Therapeutic Interventions Met (OT) Yes, treatment indicated   OT Diagnosis decreased ADLs   OT Problem List-Impairments impacting ADL cognition;activity tolerance impaired;mobility   Assessment of Occupational Performance 1-3 Performance Deficits   Identified Performance Deficits trsfs, drsg,   Planned Therapy Interventions (OT) ADL retraining;cognition;transfer training   Clinical Decision Making Complexity (OT) moderate complexity   Anticipated Equipment Needs Upon Discharge (OT)   (cont.to assess)   Risk & Benefits of therapy have been explained evaluation/treatment results reviewed;care plan/treatment goals reviewed;risks/benefits reviewed;patient   OT Discharge Planning   OT Discharge Recommendation (DC Rec) home with assist;Transitional Care Facility   OT Rationale for DC Rec pending activity tolerance/heart rate, if d/c home may need daily checks for safety, Ax1 for mobility and ADLs   Therapy Certification   Start of Care Date 08/23/22   Certification date from 08/23/22   Certification date to 08/30/22   Medical Diagnosis a fib   Total Evaluation Time (Minutes)   Total Evaluation Time (Minutes) 7   OT Goals   Therapy Frequency (OT) Daily   OT Predicted Duration/Target Date for Goal Attainment 08/29/22   OT Goals Lower Body  Dressing;Transfers;Cognition   OT: Lower Body Dressing Supervision/stand-by assist   OT: Transfer Supervision/stand-by assist;with assistive device   OT: Cognitive Patient/caregiver will verbalize understanding of cognitive assessment results/recommendations as needed for safe discharge planning

## 2022-08-24 NOTE — PLAN OF CARE
[facility]      OUTPATIENT OCCUPATIONAL THERAPY  EVALUATION  PLAN OF TREATMENT FOR OUTPATIENT REHABILITATION  (COMPLETE FOR INITIAL CLAIMS ONLY)  Patient's Last Name, First Name, M.I.  YOB: 1930  Gladys Ramirez                          Provider's Name  [facility] Medical Record No.  [id number]                               Onset Date:  08/23/22   Start of Care Date:  08/23/22     Type:     ___PT   _X_OT   ___SLP Medical Diagnosis:  a fib                        OT Diagnosis:  decreased ADLs   Visits from SOC:  1   _________________________________________________________________________________  Plan of Treatment/Functional Goals    Planned Interventions: ADL retraining, cognition, transfer training   Goals: See Occupational Therapy Goals on Care Plan in [employer] electronic health record.    Therapy Frequency: Daily  Predicted Duration of Therapy Intervention: 08/29/22  _________________________________________________________________________________    I CERTIFY THE NEED FOR THESE SERVICES FURNISHED UNDER        THIS PLAN OF TREATMENT AND WHILE UNDER MY CARE     (Physician co-signature of this document indicates review and certification of the therapy plan).                Certification date from: 08/23/22, Certification date to: 08/30/22    Referring Physician: Dr. Dominique Navarro            Initial Assessment        See Occupational Therapy evaluation dated 08/23/22 in Epic electronic health record.              Goal Outcome Evaluation:

## 2022-08-24 NOTE — PROVIDER NOTIFICATION
"Patient's heart rate down to 45 on the monitor.  Went to check on patient and she stated she just had another \"episode\".  Patient said she was trying to sit up on the side of the bed and she became lightheaded, weak, and diaphoretic.  Resolved quickly and heart rate back in the 60-70's, NSR.  Dr. Velasquez and Dr. Navarro updated.    "

## 2022-08-24 NOTE — PLAN OF CARE
Problem: Dysrhythmia  Goal: Normalized Cardiac Rhythm  Outcome: Ongoing, Progressing   Goal Outcome Evaluation:  NSR on tele.  Denies pain.  Complaining of dyspnea with activity.  When patient was working with OT she had another episode where her heart rate went down to the 40's and the patient complained of getting light headed, weak, and diaphoretic.  Echo completed.

## 2022-08-25 ENCOUNTER — APPOINTMENT (OUTPATIENT)
Dept: PHYSICAL THERAPY | Facility: HOSPITAL | Age: 87
DRG: 291 | End: 2022-08-25
Attending: INTERNAL MEDICINE
Payer: COMMERCIAL

## 2022-08-25 ENCOUNTER — APPOINTMENT (OUTPATIENT)
Dept: CARDIOLOGY | Facility: HOSPITAL | Age: 87
DRG: 291 | End: 2022-08-25
Attending: INTERNAL MEDICINE
Payer: COMMERCIAL

## 2022-08-25 ENCOUNTER — APPOINTMENT (OUTPATIENT)
Dept: NUCLEAR MEDICINE | Facility: HOSPITAL | Age: 87
DRG: 291 | End: 2022-08-25
Attending: INTERNAL MEDICINE
Payer: COMMERCIAL

## 2022-08-25 ENCOUNTER — APPOINTMENT (OUTPATIENT)
Dept: OCCUPATIONAL THERAPY | Facility: HOSPITAL | Age: 87
DRG: 291 | End: 2022-08-25
Payer: COMMERCIAL

## 2022-08-25 LAB
ANION GAP SERPL CALCULATED.3IONS-SCNC: 7 MMOL/L (ref 5–18)
BUN SERPL-MCNC: 20 MG/DL (ref 8–28)
CALCIUM SERPL-MCNC: 9 MG/DL (ref 8.5–10.5)
CHLORIDE BLD-SCNC: 104 MMOL/L (ref 98–107)
CO2 SERPL-SCNC: 28 MMOL/L (ref 22–31)
CREAT SERPL-MCNC: 0.82 MG/DL (ref 0.6–1.1)
CV STRESS CURRENT BP HE: NORMAL
CV STRESS CURRENT HR HE: 66
CV STRESS CURRENT HR HE: 75
CV STRESS CURRENT HR HE: 79
CV STRESS CURRENT HR HE: 79
CV STRESS CURRENT HR HE: 82
CV STRESS CURRENT HR HE: 82
CV STRESS CURRENT HR HE: 83
CV STRESS CURRENT HR HE: 83
CV STRESS CURRENT HR HE: 84
CV STRESS CURRENT HR HE: 84
CV STRESS CURRENT HR HE: 85
CV STRESS CURRENT HR HE: 86
CV STRESS CURRENT HR HE: 87
CV STRESS DEVIATION TIME HE: NORMAL
CV STRESS ECHO PERCENT HR HE: NORMAL
CV STRESS EXERCISE STAGE HE: NORMAL
CV STRESS FINAL RESTING BP HE: NORMAL
CV STRESS FINAL RESTING HR HE: 84
CV STRESS MAX HR HE: 90
CV STRESS MAX TREADMILL GRADE HE: 0
CV STRESS MAX TREADMILL SPEED HE: 0
CV STRESS PEAK DIA BP HE: NORMAL
CV STRESS PEAK SYS BP HE: NORMAL
CV STRESS PHASE HE: NORMAL
CV STRESS PROTOCOL HE: NORMAL
CV STRESS RESTING PT POSITION HE: NORMAL
CV STRESS ST DEVIATION AMOUNT HE: NORMAL
CV STRESS ST DEVIATION ELEVATION HE: NORMAL
CV STRESS ST EVELATION AMOUNT HE: NORMAL
CV STRESS TEST TYPE HE: NORMAL
CV STRESS TOTAL STAGE TIME MIN 1 HE: NORMAL
GFR SERPL CREATININE-BSD FRML MDRD: 67 ML/MIN/1.73M2
GLUCOSE BLD-MCNC: 103 MG/DL (ref 70–125)
HOLD SPECIMEN: NORMAL
NUC STRESS EJECTION FRACTION: 52 %
POTASSIUM BLD-SCNC: 3.4 MMOL/L (ref 3.5–5)
POTASSIUM BLD-SCNC: 3.8 MMOL/L (ref 3.5–5)
RATE PRESSURE PRODUCT: NORMAL
SODIUM SERPL-SCNC: 139 MMOL/L (ref 136–145)
STRESS ECHO BASELINE DIASTOLIC HE: 64
STRESS ECHO BASELINE HR: 79
STRESS ECHO BASELINE SYSTOLIC BP: 116
STRESS ECHO CALCULATED PERCENT HR: 70 %
STRESS ECHO LAST STRESS DIASTOLIC BP: 70
STRESS ECHO LAST STRESS HR: 86
STRESS ECHO LAST STRESS SYSTOLIC BP: 145
STRESS ECHO TARGET HR: 129

## 2022-08-25 PROCEDURE — 84132 ASSAY OF SERUM POTASSIUM: CPT | Performed by: STUDENT IN AN ORGANIZED HEALTH CARE EDUCATION/TRAINING PROGRAM

## 2022-08-25 PROCEDURE — A9500 TC99M SESTAMIBI: HCPCS | Performed by: INTERNAL MEDICINE

## 2022-08-25 PROCEDURE — 36415 COLL VENOUS BLD VENIPUNCTURE: CPT | Performed by: INTERNAL MEDICINE

## 2022-08-25 PROCEDURE — 93018 CV STRESS TEST I&R ONLY: CPT | Performed by: GENERAL ACUTE CARE HOSPITAL

## 2022-08-25 PROCEDURE — 93017 CV STRESS TEST TRACING ONLY: CPT

## 2022-08-25 PROCEDURE — 210N000001 HC R&B IMCU HEART CARE

## 2022-08-25 PROCEDURE — 36415 COLL VENOUS BLD VENIPUNCTURE: CPT | Performed by: STUDENT IN AN ORGANIZED HEALTH CARE EDUCATION/TRAINING PROGRAM

## 2022-08-25 PROCEDURE — 99233 SBSQ HOSP IP/OBS HIGH 50: CPT | Mod: 25 | Performed by: INTERNAL MEDICINE

## 2022-08-25 PROCEDURE — 78452 HT MUSCLE IMAGE SPECT MULT: CPT | Mod: 26 | Performed by: GENERAL ACUTE CARE HOSPITAL

## 2022-08-25 PROCEDURE — 250N000013 HC RX MED GY IP 250 OP 250 PS 637: Performed by: STUDENT IN AN ORGANIZED HEALTH CARE EDUCATION/TRAINING PROGRAM

## 2022-08-25 PROCEDURE — 343N000001 HC RX 343: Performed by: INTERNAL MEDICINE

## 2022-08-25 PROCEDURE — 97116 GAIT TRAINING THERAPY: CPT | Mod: GP

## 2022-08-25 PROCEDURE — 99232 SBSQ HOSP IP/OBS MODERATE 35: CPT | Performed by: STUDENT IN AN ORGANIZED HEALTH CARE EDUCATION/TRAINING PROGRAM

## 2022-08-25 PROCEDURE — 80048 BASIC METABOLIC PNL TOTAL CA: CPT | Performed by: INTERNAL MEDICINE

## 2022-08-25 PROCEDURE — 250N000013 HC RX MED GY IP 250 OP 250 PS 637: Performed by: INTERNAL MEDICINE

## 2022-08-25 PROCEDURE — 250N000011 HC RX IP 250 OP 636: Performed by: INTERNAL MEDICINE

## 2022-08-25 PROCEDURE — 78452 HT MUSCLE IMAGE SPECT MULT: CPT

## 2022-08-25 PROCEDURE — 97535 SELF CARE MNGMENT TRAINING: CPT | Mod: GO

## 2022-08-25 PROCEDURE — 97162 PT EVAL MOD COMPLEX 30 MIN: CPT | Mod: GP

## 2022-08-25 PROCEDURE — 93016 CV STRESS TEST SUPVJ ONLY: CPT | Performed by: INTERNAL MEDICINE

## 2022-08-25 RX ORDER — POTASSIUM CHLORIDE 1500 MG/1
40 TABLET, EXTENDED RELEASE ORAL ONCE
Status: COMPLETED | OUTPATIENT
Start: 2022-08-25 | End: 2022-08-25

## 2022-08-25 RX ORDER — AMINOPHYLLINE 25 MG/ML
50 INJECTION, SOLUTION INTRAVENOUS
Status: ACTIVE | OUTPATIENT
Start: 2022-08-25 | End: 2022-08-25

## 2022-08-25 RX ORDER — REGADENOSON 0.08 MG/ML
0.4 INJECTION, SOLUTION INTRAVENOUS ONCE
Status: COMPLETED | OUTPATIENT
Start: 2022-08-25 | End: 2022-08-25

## 2022-08-25 RX ADMIN — APIXABAN 2.5 MG: 2.5 TABLET, FILM COATED ORAL at 09:25

## 2022-08-25 RX ADMIN — Medication 32 MILLICURIE: at 15:12

## 2022-08-25 RX ADMIN — ZOLPIDEM TARTRATE 5 MG: 5 TABLET ORAL at 21:21

## 2022-08-25 RX ADMIN — AMINOPHYLLINE 50 MG: 25 INJECTION, SOLUTION INTRAVENOUS at 13:37

## 2022-08-25 RX ADMIN — Medication 8.5 MCI.: at 12:36

## 2022-08-25 RX ADMIN — REGADENOSON 0.4 MG: 0.08 INJECTION, SOLUTION INTRAVENOUS at 13:33

## 2022-08-25 RX ADMIN — FUROSEMIDE 20 MG: 20 TABLET ORAL at 09:26

## 2022-08-25 RX ADMIN — POTASSIUM CHLORIDE 20 MEQ: 1500 TABLET, EXTENDED RELEASE ORAL at 09:25

## 2022-08-25 RX ADMIN — POTASSIUM CHLORIDE 40 MEQ: 1500 TABLET, EXTENDED RELEASE ORAL at 16:58

## 2022-08-25 RX ADMIN — TRAMADOL HYDROCHLORIDE 100 MG: 50 TABLET, COATED ORAL at 21:18

## 2022-08-25 RX ADMIN — AMIODARONE HYDROCHLORIDE 200 MG: 200 TABLET ORAL at 09:25

## 2022-08-25 RX ADMIN — AMIODARONE HYDROCHLORIDE 200 MG: 200 TABLET ORAL at 21:18

## 2022-08-25 RX ADMIN — FUROSEMIDE 20 MG: 20 TABLET ORAL at 16:58

## 2022-08-25 RX ADMIN — LISINOPRIL 5 MG: 5 TABLET ORAL at 09:28

## 2022-08-25 RX ADMIN — DULOXETINE HYDROCHLORIDE 60 MG: 60 CAPSULE, DELAYED RELEASE PELLETS ORAL at 09:25

## 2022-08-25 RX ADMIN — APIXABAN 5 MG: 5 TABLET, FILM COATED ORAL at 21:18

## 2022-08-25 ASSESSMENT — ACTIVITIES OF DAILY LIVING (ADL)
ADLS_ACUITY_SCORE: 28
ADLS_ACUITY_SCORE: 26
ADLS_ACUITY_SCORE: 28
ADLS_ACUITY_SCORE: 26
ADLS_ACUITY_SCORE: 28
ADLS_ACUITY_SCORE: 26

## 2022-08-25 NOTE — PLAN OF CARE
Goal Outcome Evaluation:    Plan of Care Reviewed With: patient     Overall Patient Progress: improving     Heart Failure Care Map  GOALS TO BE MET BEFORE DISCHARGE:    1. Decrease congestion and/or edema with diuretic therapy to achieve near optimal volume status.     Dyspnea improved: Yes, satisfactory for discharge.   Edema improved: Yes, satisfactory for discharge.        Net I/O and Weights since admission:   07/26 1500 - 08/25 1459  In: 1075 [P.O.:1075]  Out: 1100 [Urine:1100]  Net: -25     Vitals:    08/23/22 1127 08/24/22 1657 08/25/22 0624   Weight: 70.8 kg (156 lb) 70.7 kg (155 lb 14.4 oz) 70.3 kg (155 lb)       2.  O2 sats > 90% on room air, or at prior home O2 therapy level.      Able to wean O2 this shift to keep sats above 90%?: Yes, satisfactory for discharge.   Does patient use Home O2? No          Current oxygenation status:   SpO2: 92 %     O2 Device: None (Room air),      3.  Tolerates ambulation and mobility near baseline.     Ambulation:    Times patient ambulated with staff this shift: 1    Please review the Heart Failure Care Map for additional HF goal outcomes.    Magui Garrido RN  8/25/2022

## 2022-08-25 NOTE — PROGRESS NOTES
"  HEART CARE CONSULTATON NOTE        Assessment/Recommendations   Assessment:  1.  Atrial fibrillation: Now persistent and symptomatic.    She feels better in normal sinus rhythm.  She converted overnight.  Due to her heart failure with reduced ejection fraction will change her antiarrhythmic therapy to amiodarone.  2.  Anticoagulation: On Eliquis for anticoagulation  3.  Hypertension: Well-controlled  4.    Acute on chronic heart failure with reduced ejection fraction responded well to IV diuresis  5.  Cardiomyopathy: Left ventricular ejection fraction now reduced to 28% most likely secondary to the development of left bundle branch block since 2019 although cannot exclude ischemic disease  6.  Left bundle branch block  7.  Sinus bradycardia: Metoprolol held and no further episodes of bradycardia     Plan:  1.  Continue amiodarone 200 mg twice daily for the next few weeks and then decrease to once daily  2.    Continue p.o. Lasix  3.  Continue on Eliquis for anticoagulation   4.    Lexiscan nuclear stress test and if not high risk may discharge and follow-up with Dr. Jolie camejo.  If left ventricular ejection fraction remains depressed and she is symptomatic then would be candidate for CRT device       History of Present Illness/Subjective      Feeling much better today.  Remains in sinus rhythm without further episodes of bradycardia.  No chest pain or breathing difficulty     Physical Examination  Review of Systems   VITALS: /63 (BP Location: Left arm)   Pulse 81   Temp 97.7  F (36.5  C) (Oral)   Resp 20   Ht 1.588 m (5' 2.5\")   Wt 70.3 kg (155 lb)   SpO2 91%   BMI 27.90 kg/m    BMI: Body mass index is 27.9 kg/m .  Wt Readings from Last 3 Encounters:   08/25/22 70.3 kg (155 lb)   08/17/22 71.2 kg (157 lb)   03/04/22 73.5 kg (162 lb)       Intake/Output Summary (Last 24 hours) at 8/25/2022 1347  Last data filed at 8/25/2022 1145  Gross per 24 hour   Intake 1135 ml   Output 500 ml   Net 635 ml     General " Appearance:   no distress, normal body habitus   ENT/Mouth: membranes moist, no oral lesions or bleeding gums.      EYES:  no scleral icterus, normal conjunctivae   Neck: no carotid bruits or thyromegaly   Chest/Lungs:   lungs are clear to auscultation   Cardiovascular:   Regular. Normal first and second heart sounds with no murmurs no edema bilaterally    Abdomen:  no organomegaly, masses, bruits, or tenderness; bowel sounds are present   Extremities: no cyanosis or clubbing   Skin: no xanthelasma, warm.    Neurologic: normal  bilateral, no tremors     Psychiatric: alert and oriented x3, calm     Review Of Systems  Skin: negative  Eyes: negative  Ears/Nose/Throat: negative  Respiratory: No shortness of breath, dyspnea on exertion, cough, or hemoptysis  Cardiovascular: negative  Gastrointestinal: negative  Genitourinary: negative  Musculoskeletal: negative  Neurologic: negative  Psychiatric: negative  Hematologic/Lymphatic/Immunologic: negative  Endocrine: negative          Lab Results    Chemistry/lipid CBC Cardiac Enzymes/BNP/TSH/INR   Recent Labs   Lab Test 03/12/15  1230   CHOL 179   HDL 64   LDL 80   TRIG 176*     Recent Labs   Lab Test 03/12/15  1230   LDL 80     Recent Labs   Lab Test 08/25/22  0853      POTASSIUM 3.4*   CHLORIDE 104   CO2 28      BUN 20   CR 0.82   GFRESTIMATED 67   MARLENE 9.0     Recent Labs   Lab Test 08/25/22  0853 08/24/22  0718 08/23/22  1154   CR 0.82 0.96 1.08     Recent Labs   Lab Test 06/15/21  0910   A1C 6.0*          Recent Labs   Lab Test 08/24/22  0718   WBC 7.7   HGB 12.8   HCT 39.4   MCV 96        Recent Labs   Lab Test 08/24/22  0718 08/23/22  1154 08/17/22  1215   HGB 12.8 12.7 13.6    Recent Labs   Lab Test 08/23/22  1154 08/17/22  1215 11/21/19  0451   TROPONINI 0.16 0.04 0.01     Recent Labs   Lab Test 08/23/22  1154 08/17/22  1215 03/03/21  1222 02/21/19  0713   BNP 1,933*  --  177* 95   NTBNP  --  5,101*  --   --      Recent Labs   Lab Test  02/21/19  0648   TSH 2.05     Recent Labs   Lab Test 08/23/22  1154 06/05/19  0052 02/21/19  0648   INR 1.37* 1.50* 1.20*        Medical History  Surgical History Family History Social History   Past Medical History:   Diagnosis Date     Angina pectoris (H)      Chest pain 3/9/2017     Cough      Diarrhea 11/20/2019     Hyperlipidemia      Hypertension      Hypokalemia     Created by Conversion      Osteoarthritis      Pneumonia 2/21/2019     Past Surgical History:   Procedure Laterality Date     APPENDECTOMY       BASAL CELL CARCINOMA EXCISION       LAPAROSCOPY DIAGNOSTIC (GENERAL) N/A 11/04/2014    Procedure: LAPAROSCOPY BILATERAL SALPINGO-OOPHORECTOMY ;  Surgeon: Sofia Harper MD;  Location: [facility];  Service:      TONSILLECTOMY       ZZC TOTAL KNEE ARTHROPLASTY Left      Family History   Problem Relation Age of Onset     Heart Disease Mother      Rheumatic Heart Disease Mother      No Known Problems Father      Cancer Sister      Cancer Brother         Social History     Socioeconomic History     Marital status: Single     Spouse name: Not on file     Number of children: Not on file     Years of education: Not on file     Highest education level: Not on file   Occupational History     Not on file   Tobacco Use     Smoking status: Never Smoker     Smokeless tobacco: Never Used     Tobacco comment: no passive exposure   Substance and Sexual Activity     Alcohol use: Yes     Comment: Alcoholic Drinks/day: Rarely a glass of wine     Drug use: No     Sexual activity: Not on file   Other Topics Concern     Not on file   Social History Narrative    The patient is a nun.     Social Determinants of Health     Financial Resource Strain: Not on file   Food Insecurity: Not on file   Transportation Needs: Not on file   Physical Activity: Not on file   Stress: Not on file   Social Connections: Not on file   Intimate Partner Violence: Not on file   Housing Stability: Not on file         Medications   "Allergies   No current outpatient medications on file.        Allergies   Allergen Reactions     Trazodone Shortness Of Breath and Unknown     Allergic to trazodone and deriv., Other: trouble swallowing       Clindamycin Diarrhea     C-diff     Gabapentin Other (See Comments)     \"Internal tremors\"     Temazepam Other (See Comments)     Annotation: Nightmares       Buprenorphine Palpitations     Tried patch         Kristina Velasquez MD    "

## 2022-08-25 NOTE — PLAN OF CARE
Problem: Plan of Care - These are the overarching goals to be used throughout the patient stay.    Goal: Plan of Care Review/Shift Note  Description: The Plan of Care Review/Shift note should be completed every shift.  The Outcome Evaluation is a brief statement about your assessment that the patient is improving, declining, or no change.  This information will be displayed automatically on your shift note.  Outcome: Ongoing, Progressing   Goal Outcome Evaluation:      Pt alert and orientated X4, up with SBA in room. Denies pain or discomfort. Denies chest pain. Does have mild SOB when first getting up however tolerated walk in  andre without dyspnea. Appetite good. Voiding well. Pt down for stress test. Has been NSR with BBB.

## 2022-08-25 NOTE — PROGRESS NOTES
Care Management Follow Up    Length of Stay (days): 1    Expected Discharge Date: 08/26/2022     Concerns to be Addressed: discharge planning      Patient plan of care discussed at interdisciplinary rounds: Yes    Anticipated Discharge Disposition: home      Anticipated Discharge Services: none   Anticipated Discharge DME: walker     Additional Information:  SW met and introduced self to Pt to discuss discharge plan. Therapy is recommending home with assist. Pt lives alone in a senior living apartment. Pt states she does not feel she needs supervision or extra assistance. SW asked Pt if she had anyone that could check on her everyday. Pt states that her neighbor is a nurse and would be able to check on her. Pt denies any further needs from CM.  Pt's friend will transport. CM to follow.    ARNALDO Solo

## 2022-08-25 NOTE — PROGRESS NOTES
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 fib, chronic LBBB, COVID-19 5/2022, hypertension, hyperlipidemia presenting for evaluation of dyspnea  exertion and near syncope.  She was found to be in A. fib RVR and new decline in LV systolic function       Paroxysmal Afib RVR.     - Now in sinus rhythm.  Flecainide switched to amiodarone by cardiology.    - decrease Metoprolol to 12.5 mg twice daily with holding parameters   - Cardiology consult appreciated.    - Increased dose of eliquis to 5 mg po twice daily    Acute on chronic heart failure  - Severe cardiomyopathy: New decline in LVEF 28% from 58% in 2019.    - Could be related to chronic LBBB    -Nuclear stress test done on 8/25 is negative for inducible myocardial ischemia or infarction.  -Patient could likely be a candidate for CRT device placement     History of PE/DVT on Eliquis    COVID-19 infection in 5/2022       DVT PPX : Eliquis    Barriers to discharge: SOB    Anticipated Discharge date  : Likely tomorrow    Subjective  Patient new to me today.  Chart, labs and imaging reviewed.  Patient endorses mild shortness of breath.  Is on room air.  Management plan discussed with the patient and she expressed understanding.    Objective  Vital signs in last 24 hours  Temp:  [97.4  F (36.3  C)-98.2  F (36.8  C)] 97.7  F (36.5  C)  Pulse:  [71-83] 81  Resp:  [20-24] 20  BP: (104-134)/(51-73) 126/63  SpO2:  [91 %-94 %] 91 %    Input and Output in 24 hrs     Intake/Output Summary (Last 24 hours) at 8/25/2022 1642  Last data filed at 8/25/2022 1145  Gross per 24 hour   Intake 1135 ml   Output 500 ml   Net 635 ml       Physical Exam:  GEN: Alert and oriented. Not in acute distress  HEENT: Atraumatic    Sclera- anicteric   Mucous membrane- moist and pink  CHEST: Clear to auscultation bilaterally  HEART: S1S2 heard   ABDOMEN:  Soft. Non-tender, non-distended. No organomegaly. No guarding or rigidity. Bowel sounds- active  Extremities: trace pedal oedema  CNS: No focal neurological deficit. No involuntary movements  SKIN: No skin rash, no cyanosis or clubbing      Pertinent Labs   Lab Results: Personally Reviewed    Recent Results (from the past 24 hour(s))   Basic metabolic panel    Collection Time: 08/25/22  8:53 AM   Result Value Ref Range    Sodium 139 136 - 145 mmol/L    Potassium 3.4 (L) 3.5 - 5.0 mmol/L    Chloride 104 98 - 107 mmol/L    Carbon Dioxide (CO2) 28 22 - 31 mmol/L    Anion Gap 7 5 - 18 mmol/L    Urea Nitrogen 20 8 - 28 mg/dL    Creatinine 0.82 0.60 - 1.10 mg/dL    Calcium 9.0 8.5 - 10.5 mg/dL    Glucose 103 70 - 125 mg/dL    GFR Estimate 67 >60 mL/min/1.73m2   Extra Purple Top Tube    Collection Time: 08/25/22  9:03 AM   Result Value Ref Range    Hold Specimen JIC    NM Lexiscan stress test    Collection Time: 08/25/22  3:49 PM   Result Value Ref Range    Pharmacologic Protocol Lexiscan     Test Type Pharmacological     Baseline HR 79     Baseline Systolic      Baseline Diastolic BP 64     Last Stress HR 86     Last Stress Systolic      Last Stress Diastolic BP 70     Target      PERCENT HR 85%     ST Deviation Elevation V2 0.8mm     Deviation Time III -0.8mm     ST Elevation Amount V2 2.9mm     ST Deviation Amount he III -3.2mm     Final Resting /66     Final Resting HR 84     Max Treadmill Speed 0.0     Max Treadmill Grade 0.0     Peak Systolic /70     Peak Diastolic /70     Max HR  90     Stress Phase Resting     Stress Resting Pt Position MANUAL EVENT     Current HR 87     Current /70     Stress Phase Stress     Stage Minute EXE 00:00     Exercise Stage STAGE 2     Current HR 75     Current /64     Stress Phase Stress     Stage Minute EXE 01:00     Exercise Stage STAGE 3     Current HR 79     Current /64     Stress Phase Stress     Stage Minute EXE 01:03      Exercise Stage STAGE 3     Current HR 79     Current /64     Stress Phase Stress     Stage Minute EXE 01:18     Exercise Stage STAGE 3     Current HR 66     Current /64     Stress Phase Stress     Stage Minute EXE 01:42     Exercise Stage STAGE 3     Current HR 83     Current /70     Stress Phase Stress     Stage Minute EXE 02:00     Exercise Stage STAGE 4     Current HR 83     Current /70     Stress Phase Stress     Stage Minute EXE 02:50     Exercise Stage STAGE 4     Current HR 86     Current /70     Stress Phase Stress     Stage Minute EXE 03:00     Exercise Stage STAGE 5     Current HR 86     Current /70     Stress Phase Stress     Stage Minute EXE 04:00     Exercise Stage STAGE 6     Current HR 86     Current /70     Stress Phase Stress     Stage Minute EXE 04:00     Exercise Stage STAGE 6     Current HR 86     Current /70     Stress Phase Recovery     Stage Minute REC 00:24     Exercise Stage Recovery     Current HR 85     Current /70     Stress Phase Recovery     Stage Minute REC 00:59     Exercise Stage Recovery     Current HR 85     Current /70     Stress Phase Recovery     Stage Minute REC 01:01     Exercise Stage Recovery     Current HR 85     Current /70     Stress Phase Recovery     Stage Minute REC 01:59     Exercise Stage Recovery     Current HR 85     Current /70     Stress Phase Recovery     Stage Minute REC 02:30     Exercise Stage Recovery     Current HR 87     Current /70     Stress Phase Recovery     Stage Minute REC 02:34     Exercise Stage Recovery     Current HR 87     Current /70     Stress Phase Recovery     Stage Minute REC 02:54     Exercise Stage Recovery     Current HR 82     Current /66     Stress Phase Recovery     Stage Minute REC 02:59     Exercise Stage Recovery     Current HR 82     Current /66     Stress Phase Recovery     Stage Minute REC 03:59     Exercise Stage Recovery     Current  HR 84     Current /66     Stress Phase Recovery     Stage Minute REC 04:01     Exercise Stage Recovery     Current HR 84     Current /66     Max Predicted HR  70 %    Rate Pressure Product 13,050.0     Left Ventricular EF 52 %       Pertinent Radiology   Radiology Results reviewed      EKG Results reviewed       Advanced Care Planning      Farhan Neil MD   [facility]

## 2022-08-25 NOTE — PLAN OF CARE
Goal Outcome Evaluation:    Alert, oriented, appropriate and engaged in care.  Cardiac rhythm sinus rhythm to sinus dysrhythmia and inverted T waves.  On oral Amiodarone. Patient states feeling better than when she first came to hospital.  On diuretics. Room air. No falls. Call light in reach, bed alarm on and toileted.     Problem: Risk for Delirium  Goal: Improved Attention and Thought Clarity  Outcome: Ongoing, Progressing     Problem: Dysrhythmia  Goal: Normalized Cardiac Rhythm  Outcome: Ongoing, Progressing    Problem: Dysrhythmia (Heart Failure)  Goal: Stable Heart Rate and Rhythm  Outcome: Ongoing, Progressing     Problem: Plan of Care - These are the overarching goals to be used throughout the patient stay.    Goal: Absence of Hospital-Acquired Illness or Injury  Intervention: Identify and Manage Fall Risk  Recent Flowsheet Documentation  Taken 8/24/2022 2000 by Tammy Meza, RN  Safety Promotion/Fall Prevention:   bed alarm on   activity supervised   mobility aid in reach   lighting adjusted   nonskid shoes/slippers when out of bed   patient and family education  Taken 8/24/2022 1700 by Tammy Meza, RN  Safety Promotion/Fall Prevention:   bed alarm on   activity supervised   mobility aid in reach   lighting adjusted   nonskid shoes/slippers when out of bed   patient and family education

## 2022-08-25 NOTE — PROGRESS NOTES
Nuclear Lexiscan Stress Test  Lexiscan 0.4mg IV was injected over 15 seconds   Typical vasodilator side effects noted, including headache and stomach upset that was resolved by administration of aminophylline 50 mg IV. Brief period of both asymptomatic bradycardia (HR 40s) and tachycardia (HR 150s) noted.  Interpretation pending.

## 2022-08-25 NOTE — PLAN OF CARE
Problem: Plan of Care - These are the overarching goals to be used throughout the patient stay.    Goal: Plan of Care Review/Shift Note  Description: The Plan of Care Review/Shift note should be completed every shift.  The Outcome Evaluation is a brief statement about your assessment that the patient is improving, declining, or no change.  This information will be displayed automatically on your shift note.  8/25/2022 1811 by Mary Rey RN  Outcome: Ongoing, Progressing  8/25/2022 1313 by Mary Rey RN  Outcome: Ongoing, Progressing   Goal Outcome Evaluation:             Pt alert and orientated X4, up with SBA, gait steady. Pt reports improvement in SOB with activity. Denies chest pain/discomfort. Pt denies pain. Appetite good. Pt had stress test and tolerated well. Pt ok to discharge home tomorrow per CARDS. Pt continues in NSR with BBB and prolonged Qtc, Dr. Velasquez aware. Will continue to monitor.

## 2022-08-25 NOTE — PROGRESS NOTES
08/25/22 0951   Quick Adds   Type of Visit Initial PT Evaluation   Living Environment   Living Environment Comments see prior notes this section   Self-Care   Equipment Currently Used at Home   (furniture walks in apt; walking stick or cane out of apt.)   Fall history within last six months yes  (fell on curb, bending over, missed car step)   Number of times patient has fallen within last six months 4   Activity/Exercise/Self-Care Comment starting housekeeping help; some meals on wheels   General Information   Onset of Illness/Injury or Date of Surgery 08/23/22   Referring Physician amos reyes   Patient/Family Therapy Goals Statement (PT) breathe and go home   Existing Precautions/Restrictions   (tele)   Cognition   Orientation Status (Cognition) oriented x 4   Pain Assessment   Patient Currently in Pain   (bakers cyst R knee and severe OA; had just started PT before admission here)   Posture    Posture Comments slight incr. kyphosis; sl. R knee valgus   Range of Motion (ROM)   ROM Comment R knee ext. limited about 15   Strength (Manual Muscle Testing)   Strength (Manual Muscle Testing)   (LE's WFL except R Quad slightly.)   Bed Mobility   Comment, (Bed Mobility) indep with rail (has at home)   Transfers   Comment, (Transfers) indep.   Gait/Stairs (Locomotion)   Distance in Feet (Required for LE Total Joints) 140  (cues after first 20')   Comment, (Gait/Stairs) see treatment sheet   Balance   Balance Comments pedro luis. nudge, limited PF and DF, tight ANNELIESE insecure   Sensory Examination   Sensory Perception patient reports no sensory changes   Clinical Impression   Criteria for Skilled Therapeutic Intervention Yes, treatment indicated   PT Diagnosis (PT) impaired functional mobiilty   Influenced by the following impairments activity tolerance; unsteadiness, severe OA knee   Functional limitations due to impairments amb.   Clinical Presentation (PT Evaluation Complexity) Stable/Uncomplicated   Clinical Presentation  Rationale presents as medically diagnosed   Clinical Decision Making (Complexity) low complexity   Planned Therapy Interventions (PT) balance training;gait training   Anticipated Equipment Needs at Discharge (PT)   (possibly a walker)   Risk & Benefits of therapy have been explained evaluation/treatment results reviewed;patient   PT Discharge Planning   PT Discharge Recommendation (DC Rec) home with assist   PT Rationale for DC Rec has services; cog. intact; feels confident   Plan of Care Review   Plan of Care Reviewed With patient   Total Evaluation Time   Total Evaluation Time (Minutes) 22   Physical Therapy Goals   PT Frequency Daily   PT Predicted Duration/Target Date for Goal Attainment 08/29/22   PT Goals Gait   PT: Gait Modified independent;Greater than 200 feet  (SEC/walk stick or walker need?)

## 2022-08-25 NOTE — PROVIDER NOTIFICATION
Dr. Velasquez notified regarding prolonged Qtc and slight ST depression in lead II. Appears unchanged. Pt denies sx.

## 2022-08-26 ENCOUNTER — APPOINTMENT (OUTPATIENT)
Dept: OCCUPATIONAL THERAPY | Facility: HOSPITAL | Age: 87
DRG: 291 | End: 2022-08-26
Payer: COMMERCIAL

## 2022-08-26 LAB
ANION GAP SERPL CALCULATED.3IONS-SCNC: 9 MMOL/L (ref 5–18)
BUN SERPL-MCNC: 21 MG/DL (ref 8–28)
CALCIUM SERPL-MCNC: 9 MG/DL (ref 8.5–10.5)
CHLORIDE BLD-SCNC: 104 MMOL/L (ref 98–107)
CO2 SERPL-SCNC: 25 MMOL/L (ref 22–31)
CREAT SERPL-MCNC: 0.86 MG/DL (ref 0.6–1.1)
ERYTHROCYTE [DISTWIDTH] IN BLOOD BY AUTOMATED COUNT: 13.7 % (ref 10–15)
GFR SERPL CREATININE-BSD FRML MDRD: 63 ML/MIN/1.73M2
GLUCOSE BLD-MCNC: 97 MG/DL (ref 70–125)
HCT VFR BLD AUTO: 38 % (ref 35–47)
HGB BLD-MCNC: 12.6 G/DL (ref 11.7–15.7)
MAGNESIUM SERPL-MCNC: 1.7 MG/DL (ref 1.8–2.6)
MCH RBC QN AUTO: 31.6 PG (ref 26.5–33)
MCHC RBC AUTO-ENTMCNC: 33.2 G/DL (ref 31.5–36.5)
MCV RBC AUTO: 95 FL (ref 78–100)
PLATELET # BLD AUTO: 275 10E3/UL (ref 150–450)
POTASSIUM BLD-SCNC: 3.5 MMOL/L (ref 3.5–5)
RBC # BLD AUTO: 3.99 10E6/UL (ref 3.8–5.2)
SODIUM SERPL-SCNC: 138 MMOL/L (ref 136–145)
WBC # BLD AUTO: 6.7 10E3/UL (ref 4–11)

## 2022-08-26 PROCEDURE — 36415 COLL VENOUS BLD VENIPUNCTURE: CPT | Performed by: STUDENT IN AN ORGANIZED HEALTH CARE EDUCATION/TRAINING PROGRAM

## 2022-08-26 PROCEDURE — 83735 ASSAY OF MAGNESIUM: CPT | Performed by: STUDENT IN AN ORGANIZED HEALTH CARE EDUCATION/TRAINING PROGRAM

## 2022-08-26 PROCEDURE — 250N000011 HC RX IP 250 OP 636: Performed by: INTERNAL MEDICINE

## 2022-08-26 PROCEDURE — 258N000003 HC RX IP 258 OP 636: Performed by: INTERNAL MEDICINE

## 2022-08-26 PROCEDURE — 97110 THERAPEUTIC EXERCISES: CPT | Mod: GO

## 2022-08-26 PROCEDURE — 250N000013 HC RX MED GY IP 250 OP 250 PS 637: Performed by: INTERNAL MEDICINE

## 2022-08-26 PROCEDURE — 99233 SBSQ HOSP IP/OBS HIGH 50: CPT | Performed by: STUDENT IN AN ORGANIZED HEALTH CARE EDUCATION/TRAINING PROGRAM

## 2022-08-26 PROCEDURE — 97535 SELF CARE MNGMENT TRAINING: CPT | Mod: GO

## 2022-08-26 PROCEDURE — 97129 THER IVNTJ 1ST 15 MIN: CPT | Mod: GO

## 2022-08-26 PROCEDURE — 99233 SBSQ HOSP IP/OBS HIGH 50: CPT | Performed by: INTERNAL MEDICINE

## 2022-08-26 PROCEDURE — 80048 BASIC METABOLIC PNL TOTAL CA: CPT | Performed by: STUDENT IN AN ORGANIZED HEALTH CARE EDUCATION/TRAINING PROGRAM

## 2022-08-26 PROCEDURE — 85014 HEMATOCRIT: CPT | Performed by: STUDENT IN AN ORGANIZED HEALTH CARE EDUCATION/TRAINING PROGRAM

## 2022-08-26 PROCEDURE — 250N000013 HC RX MED GY IP 250 OP 250 PS 637: Performed by: STUDENT IN AN ORGANIZED HEALTH CARE EDUCATION/TRAINING PROGRAM

## 2022-08-26 PROCEDURE — 210N000001 HC R&B IMCU HEART CARE

## 2022-08-26 RX ADMIN — AMIODARONE HYDROCHLORIDE 150 MG: 1.5 INJECTION, SOLUTION INTRAVENOUS at 11:00

## 2022-08-26 RX ADMIN — TRAMADOL HYDROCHLORIDE 100 MG: 50 TABLET, COATED ORAL at 21:15

## 2022-08-26 RX ADMIN — DULOXETINE HYDROCHLORIDE 60 MG: 60 CAPSULE, DELAYED RELEASE PELLETS ORAL at 08:18

## 2022-08-26 RX ADMIN — ACETAMINOPHEN 650 MG: 325 TABLET ORAL at 21:15

## 2022-08-26 RX ADMIN — APIXABAN 5 MG: 5 TABLET, FILM COATED ORAL at 20:34

## 2022-08-26 RX ADMIN — APIXABAN 5 MG: 5 TABLET, FILM COATED ORAL at 08:18

## 2022-08-26 RX ADMIN — POTASSIUM CHLORIDE 20 MEQ: 1500 TABLET, EXTENDED RELEASE ORAL at 08:18

## 2022-08-26 RX ADMIN — ZOLPIDEM TARTRATE 5 MG: 5 TABLET ORAL at 21:15

## 2022-08-26 RX ADMIN — FUROSEMIDE 20 MG: 20 TABLET ORAL at 16:07

## 2022-08-26 RX ADMIN — LISINOPRIL 5 MG: 5 TABLET ORAL at 08:17

## 2022-08-26 RX ADMIN — FUROSEMIDE 20 MG: 20 TABLET ORAL at 08:18

## 2022-08-26 RX ADMIN — AMIODARONE HYDROCHLORIDE 1 MG/MIN: 50 INJECTION, SOLUTION INTRAVENOUS at 12:50

## 2022-08-26 RX ADMIN — AMIODARONE HYDROCHLORIDE 200 MG: 200 TABLET ORAL at 08:18

## 2022-08-26 ASSESSMENT — ACTIVITIES OF DAILY LIVING (ADL)
ADLS_ACUITY_SCORE: 28

## 2022-08-26 NOTE — PROGRESS NOTES
"  HEART CARE CONSULTATON NOTE        Assessment/Recommendations   Assessment:  1.  Atrial fibrillation: Admitted with persistent atrial fibrillation which eventually converted to normal sinus rhythm.  She is switched to amiodarone due to reduced ejection fraction and heart failure.  She went back into atrial fibrillation last night and does feel more short of breath this morning.  Heart rates running 110-120 bpm.  She never had a good amiodarone load and will give IV amiodarone with infusion today and attempt for conversion..  2.  Anticoagulation: On Eliquis for anticoagulation  3.  Hypertension: Well-controlled  4.    Acute on chronic heart failure with reduced ejection fraction responded well to IV diuresis  5.  Cardiomyopathy: Left ventricular ejection fraction now reduced to 28% on echocardiogram.  Lexiscan nuclear stress test was negative for inducible ischemia and showed an LVEF of 53%.  6.  Left bundle branch block  7.  Sinus bradycardia: Metoprolol held and no further episodes of bradycardia     Plan:  1.   Hold p.o. amiodarone and give an IV amiodarone bolus and infusion today  2.    Continue p.o. Lasix  3.  Continue on Eliquis for anticoagulation   4.      Will attempt rhythm control.  If heart rates are better controlled and she is feeling better can consider discharge over the weekend with close follow-up in our A. fib clinic.     History of Present Illness/Subjective    Feels more short of breath now that she is back in atrial fibrillation.  No complaints of chest pain       Physical Examination  Review of Systems   VITALS: /55 (BP Location: Left arm)   Pulse 94   Temp 97.6  F (36.4  C) (Oral)   Resp 18   Ht 1.588 m (5' 2.5\")   Wt 70.3 kg (155 lb)   SpO2 93%   BMI 27.90 kg/m    BMI: Body mass index is 27.9 kg/m .  Wt Readings from Last 3 Encounters:   08/26/22 70.3 kg (155 lb)   08/17/22 71.2 kg (157 lb)   03/04/22 73.5 kg (162 lb)       Intake/Output Summary (Last 24 hours) at 8/26/2022 " 1220  Last data filed at 8/25/2022 2333  Gross per 24 hour   Intake 720 ml   Output 200 ml   Net 520 ml     General Appearance:   no distress, normal body habitus   ENT/Mouth: membranes moist, no oral lesions or bleeding gums.      EYES:  no scleral icterus, normal conjunctivae   Neck: no carotid bruits or thyromegaly   Chest/Lungs:   lungs are clear to auscultation   Cardiovascular:   irregular. Normal first and second heart sounds with no murmurs   Abdomen:  no organomegaly, masses, bruits, or tenderness; bowel sounds are present   Extremities: no cyanosis or clubbing   Skin: no xanthelasma, warm.    Neurologic: normal  bilateral, no tremors     Psychiatric: alert and oriented x3, calm     Review Of Systems  Skin: negative  Eyes: negative  Ears/Nose/Throat: negative  Respiratory: Shortness of breath  Cardiovascular: negative  Gastrointestinal: negative  Genitourinary: negative  Musculoskeletal: negative  Neurologic: negative  Psychiatric: negative  Hematologic/Lymphatic/Immunologic: negative  Endocrine: negative          Lab Results    Chemistry/lipid CBC Cardiac Enzymes/BNP/TSH/INR   Recent Labs   Lab Test 03/12/15  1230   CHOL 179   HDL 64   LDL 80   TRIG 176*     Recent Labs   Lab Test 03/12/15  1230   LDL 80     Recent Labs   Lab Test 08/26/22  0723      POTASSIUM 3.5   CHLORIDE 104   CO2 25   GLC 97   BUN 21   CR 0.86   GFRESTIMATED 63   MARLENE 9.0     Recent Labs   Lab Test 08/26/22  0723 08/25/22  0853 08/24/22  0718   CR 0.86 0.82 0.96     Recent Labs   Lab Test 06/15/21  0910   A1C 6.0*          Recent Labs   Lab Test 08/26/22  0723   WBC 6.7   HGB 12.6   HCT 38.0   MCV 95        Recent Labs   Lab Test 08/26/22  0723 08/24/22  0718 08/23/22  1154   HGB 12.6 12.8 12.7    Recent Labs   Lab Test 08/23/22  1154 08/17/22  1215 11/21/19  0451   TROPONINI 0.16 0.04 0.01     Recent Labs   Lab Test 08/23/22  1154 08/17/22  1215 03/03/21  1222 02/21/19  0713   BNP 1,933*  --  177* 95   NTBNP  --   5,101*  --   --      Recent Labs   Lab Test 02/21/19  0648   TSH 2.05     Recent Labs   Lab Test 08/23/22  1154 06/05/19  0052 02/21/19  0648   INR 1.37* 1.50* 1.20*        Medical History  Surgical History Family History Social History   Past Medical History:   Diagnosis Date     Angina pectoris (H)      Chest pain 3/9/2017     Cough      Diarrhea 11/20/2019     Hyperlipidemia      Hypertension      Hypokalemia     Created by Conversion      Osteoarthritis      Pneumonia 2/21/2019     Past Surgical History:   Procedure Laterality Date     APPENDECTOMY       BASAL CELL CARCINOMA EXCISION       LAPAROSCOPY DIAGNOSTIC (GENERAL) N/A 11/04/2014    Procedure: LAPAROSCOPY BILATERAL SALPINGO-OOPHORECTOMY ;  Surgeon: Sofia Harper MD;  [place] [facility] [place]  Service:      TONSILLECTOMY       ZZC TOTAL KNEE ARTHROPLASTY Left      Family History   Problem Relation Age of Onset     Heart Disease Mother      Rheumatic Heart Disease Mother      No Known Problems Father      Cancer Sister      Cancer Brother         Social History     Socioeconomic History     Marital status: Single     Spouse name: Not on file     Number of children: Not on file     Years of education: Not on file     Highest education level: Not on file   Occupational History     Not on file   Tobacco Use     Smoking status: Never Smoker     Smokeless tobacco: Never Used     Tobacco comment: no passive exposure   Substance and Sexual Activity     Alcohol use: Yes     Comment: Alcoholic Drinks/day: Rarely a glass of wine     Drug use: No     Sexual activity: Not on file   Other Topics Concern     Not on file   Social History Narrative    The patient is a nun.     Social Determinants of Health     Financial Resource Strain: Not on file   Food Insecurity: Not on file   Transportation Needs: Not on file   Physical Activity: Not on file   Stress: Not on file   Social Connections: Not on file   Intimate Partner Violence: Not on file   Housing  "Stability: Not on file         Medications  Allergies   No current outpatient medications on file.        Allergies   Allergen Reactions     Trazodone Shortness Of Breath and Unknown     Allergic to trazodone and deriv., Other: trouble swallowing       Clindamycin Diarrhea     C-diff     Gabapentin Other (See Comments)     \"Internal tremors\"     Temazepam Other (See Comments)     Annotation: Nightmares       Buprenorphine Palpitations     Tried patch         Kristina Velasquez MD  "

## 2022-08-26 NOTE — PLAN OF CARE
Problem: Dysrhythmia (Heart Failure)  Goal: Stable Heart Rate and Rhythm  Outcome: Ongoing, Progressing   Denies palpitations nor shortness of breath. Ambien given per patient request. Excited to discharge home tomorrow.

## 2022-08-26 NOTE — PROGRESS NOTES
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 fib, chronic LBBB, COVID-19 5/2022, hypertension, hyperlipidemia presenting for evaluation of dyspnea  exertion and near syncope.  She was found to be in A. fib RVR and new decline in LV systolic function       Paroxysmal Afib RVR.     - 08/26: Patient got into A. fib with RVR in the morning.  Discharge plan to be cancelled.  - s/p amiodarone IV bolus given.  -Keep patient on telemetry monitor.  Continue amiodarone 200 mg oral twice daily.  - Flecainide switched to amiodarone by cardiology.    - Cardiology consult appreciated.    - continue eliquis to 5 mg po twice daily    Acute on chronic heart failure  - Severe cardiomyopathy: New decline in LVEF 28% from 58% in 2019.    - Could be related to chronic LBBB    -Nuclear stress test done on 8/25 is negative for inducible myocardial ischemia or infarction.  -Patient could likely be a candidate for CRT device placement     History of PE/DVT on Eliquis    COVID-19 infection in 5/2022     DVT PPX : Eliquis    Barriers to discharge: A. fib with RVR    Anticipated Discharge date  : 1-2 days    Subjective  No distress noted.  Patient prepared a list of questions about her medication and her illness which I tried to answer to the best of my ability.  Patient later on developed A. fib with RVR.  Management plan discussed with the patient and she expressed understanding.    Objective  Vital signs in last 24 hours  Temp:  [97.5  F (36.4  C)-98.1  F (36.7  C)] 97.6  F (36.4  C)  Pulse:  [74-93] 77  Resp:  [18-24] 18  BP: (106-127)/(52-64) 112/59  SpO2:  [90 %-95 %] 95 %    Input and Output in 24 hrs     Intake/Output Summary (Last 24 hours) at 8/25/2022 1642  Last data filed at 8/25/2022 1145  Gross per 24 hour   Intake 1135 ml   Output 500 ml   Net 635 ml       Physical Exam:  GEN: Alert and  oriented. Not in acute distress  HEENT: Atraumatic    Sclera- anicteric   Mucous membrane- moist and pink  CHEST: Clear to auscultation bilaterally  HEART: S1S2 heard   ABDOMEN: Soft. Non-tender, non-distended. No organomegaly. No guarding or rigidity. Bowel sounds- active  Extremities: trace pedal oedema  CNS: No focal neurological deficit. No involuntary movements  SKIN: No skin rash, no cyanosis or clubbing      Pertinent Labs   Lab Results: Personally Reviewed    Recent Results (from the past 24 hour(s))   NM Lexiscan stress test    Collection Time: 08/25/22  3:49 PM   Result Value Ref Range    Pharmacologic Protocol Lexiscan     Test Type Pharmacological     Baseline HR 79     Baseline Systolic      Baseline Diastolic BP 64     Last Stress HR 86     Last Stress Systolic      Last Stress Diastolic BP 70     Target      PERCENT HR 85%     ST Deviation Elevation V2 0.8mm     Deviation Time III -0.8mm     ST Elevation Amount V2 2.9mm     ST Deviation Amount he III -3.2mm     Final Resting /66     Final Resting HR 84     Max Treadmill Speed 0.0     Max Treadmill Grade 0.0     Peak Systolic /70     Peak Diastolic /70     Max HR  90     Stress Phase Resting     Stress Resting Pt Position MANUAL EVENT     Current HR 87     Current /70     Stress Phase Stress     Stage Minute EXE 00:00     Exercise Stage STAGE 2     Current HR 75     Current /64     Stress Phase Stress     Stage Minute EXE 01:00     Exercise Stage STAGE 3     Current HR 79     Current /64     Stress Phase Stress     Stage Minute EXE 01:03     Exercise Stage STAGE 3     Current HR 79     Current /64     Stress Phase Stress     Stage Minute EXE 01:18     Exercise Stage STAGE 3     Current HR 66     Current /64     Stress Phase Stress     Stage Minute EXE 01:42     Exercise Stage STAGE 3     Current HR 83     Current /70     Stress Phase Stress     Stage Minute EXE 02:00     Exercise  Stage STAGE 4     Current HR 83     Current /70     Stress Phase Stress     Stage Minute EXE 02:50     Exercise Stage STAGE 4     Current HR 86     Current /70     Stress Phase Stress     Stage Minute EXE 03:00     Exercise Stage STAGE 5     Current HR 86     Current /70     Stress Phase Stress     Stage Minute EXE 04:00     Exercise Stage STAGE 6     Current HR 86     Current /70     Stress Phase Stress     Stage Minute EXE 04:00     Exercise Stage STAGE 6     Current HR 86     Current /70     Stress Phase Recovery     Stage Minute REC 00:24     Exercise Stage Recovery     Current HR 85     Current /70     Stress Phase Recovery     Stage Minute REC 00:59     Exercise Stage Recovery     Current HR 85     Current /70     Stress Phase Recovery     Stage Minute REC 01:01     Exercise Stage Recovery     Current HR 85     Current /70     Stress Phase Recovery     Stage Minute REC 01:59     Exercise Stage Recovery     Current HR 85     Current /70     Stress Phase Recovery     Stage Minute REC 02:30     Exercise Stage Recovery     Current HR 87     Current /70     Stress Phase Recovery     Stage Minute REC 02:34     Exercise Stage Recovery     Current HR 87     Current /70     Stress Phase Recovery     Stage Minute REC 02:54     Exercise Stage Recovery     Current HR 82     Current /66     Stress Phase Recovery     Stage Minute REC 02:59     Exercise Stage Recovery     Current HR 82     Current /66     Stress Phase Recovery     Stage Minute REC 03:59     Exercise Stage Recovery     Current HR 84     Current /66     Stress Phase Recovery     Stage Minute REC 04:01     Exercise Stage Recovery     Current HR 84     Current /66     Max Predicted HR  70 %    Rate Pressure Product 13,050.0     Left Ventricular EF 52 %   Potassium    Collection Time: 08/25/22 10:42 PM   Result Value Ref Range    Potassium 3.8 3.5 - 5.0 mmol/L   CBC with  platelets    Collection Time: 08/26/22  7:23 AM   Result Value Ref Range    WBC Count 6.7 4.0 - 11.0 10e3/uL    RBC Count 3.99 3.80 - 5.20 10e6/uL    Hemoglobin 12.6 11.7 - 15.7 g/dL    Hematocrit 38.0 35.0 - 47.0 %    MCV 95 78 - 100 fL    MCH 31.6 26.5 - 33.0 pg    MCHC 33.2 31.5 - 36.5 g/dL    RDW 13.7 10.0 - 15.0 %    Platelet Count 275 150 - 450 10e3/uL   Basic metabolic panel    Collection Time: 08/26/22  7:23 AM   Result Value Ref Range    Sodium 138 136 - 145 mmol/L    Potassium 3.5 3.5 - 5.0 mmol/L    Chloride 104 98 - 107 mmol/L    Carbon Dioxide (CO2) 25 22 - 31 mmol/L    Anion Gap 9 5 - 18 mmol/L    Urea Nitrogen 21 8 - 28 mg/dL    Creatinine 0.86 0.60 - 1.10 mg/dL    Calcium 9.0 8.5 - 10.5 mg/dL    Glucose 97 70 - 125 mg/dL    GFR Estimate 63 >60 mL/min/1.73m2   Magnesium    Collection Time: 08/26/22  7:23 AM   Result Value Ref Range    Magnesium 1.7 (L) 1.8 - 2.6 mg/dL       Pertinent Radiology   Radiology Results reviewed      EKG Results reviewed       Advanced Care Planning      Farhan Neil MD   [facility]

## 2022-08-26 NOTE — PLAN OF CARE
Heart Failure Care Map  GOALS TO BE MET BEFORE DISCHARGE:    1. Decrease congestion and/or edema with diuretic therapy to achieve near optimal volume status.     Dyspnea improved: Yes, satisfactory for discharge.   Edema improved: Yes, satisfactory for discharge.        Net I/O and Weights since admission:   07/27 0700 - 08/26 0659  In: 2275 [P.O.:2275]  Out: 1400 [Urine:1400]  Net: 875     Vitals:    08/23/22 1127 08/24/22 1657 08/25/22 0624   Weight: 70.8 kg (156 lb) 70.7 kg (155 lb 14.4 oz) 70.3 kg (155 lb)       2.  O2 sats > 90% on room air, or at prior home O2 therapy level.      Able to wean O2 this shift to keep sats above 90%?: Yes, satisfactory for discharge.   Does patient use Home O2? No          Current oxygenation status:   SpO2: 90 %     O2 Device: None (Room air),      3.  Tolerates ambulation and mobility near baseline.     Ambulation: Yes, satisfactory for discharge.   Times patient ambulated with staff this shift: 2 to the bathroom    Please review the Heart Failure Care Map for additional HF goal outcomes.    Betzaida Andres RN  8/26/2022

## 2022-08-26 NOTE — PLAN OF CARE
Problem: Functional Ability Impaired (Heart Failure)  Goal: Optimal Functional Ability  Intervention: Optimize Functional Ability  Recent Flowsheet Documentation  Taken 8/26/2022 0900 by Kika Clemente, RN  Activity Management: activity adjusted per tolerance   Goal Outcome Evaluation:    Converted to Afib RVR HR up to 145 at 0910.  Texted Cardiologist.  Gave amiodarone bolus at 1100.  Pt still in afib HR better  at 1145. Started amiodarone IV continuous at 1400.  BP 99/52 HR 98.  Pt voided yellow urine with some red blood in it.  Bladder scan was 0.

## 2022-08-27 ENCOUNTER — APPOINTMENT (OUTPATIENT)
Dept: PHYSICAL THERAPY | Facility: HOSPITAL | Age: 87
DRG: 291 | End: 2022-08-27
Payer: COMMERCIAL

## 2022-08-27 LAB
HOLD SPECIMEN: NORMAL
MAGNESIUM SERPL-MCNC: 1.7 MG/DL (ref 1.8–2.6)
POTASSIUM BLD-SCNC: 3.5 MMOL/L (ref 3.5–5)

## 2022-08-27 PROCEDURE — 250N000013 HC RX MED GY IP 250 OP 250 PS 637: Performed by: INTERNAL MEDICINE

## 2022-08-27 PROCEDURE — 250N000013 HC RX MED GY IP 250 OP 250 PS 637: Performed by: STUDENT IN AN ORGANIZED HEALTH CARE EDUCATION/TRAINING PROGRAM

## 2022-08-27 PROCEDURE — 83735 ASSAY OF MAGNESIUM: CPT | Performed by: STUDENT IN AN ORGANIZED HEALTH CARE EDUCATION/TRAINING PROGRAM

## 2022-08-27 PROCEDURE — 97530 THERAPEUTIC ACTIVITIES: CPT | Mod: GP

## 2022-08-27 PROCEDURE — 210N000001 HC R&B IMCU HEART CARE

## 2022-08-27 PROCEDURE — 99233 SBSQ HOSP IP/OBS HIGH 50: CPT | Performed by: INTERNAL MEDICINE

## 2022-08-27 PROCEDURE — 36415 COLL VENOUS BLD VENIPUNCTURE: CPT | Performed by: STUDENT IN AN ORGANIZED HEALTH CARE EDUCATION/TRAINING PROGRAM

## 2022-08-27 PROCEDURE — 99232 SBSQ HOSP IP/OBS MODERATE 35: CPT | Performed by: STUDENT IN AN ORGANIZED HEALTH CARE EDUCATION/TRAINING PROGRAM

## 2022-08-27 PROCEDURE — 97116 GAIT TRAINING THERAPY: CPT | Mod: GP

## 2022-08-27 PROCEDURE — 84132 ASSAY OF SERUM POTASSIUM: CPT | Performed by: STUDENT IN AN ORGANIZED HEALTH CARE EDUCATION/TRAINING PROGRAM

## 2022-08-27 RX ORDER — MAGNESIUM OXIDE 400 MG/1
400 TABLET ORAL 2 TIMES DAILY
Status: DISCONTINUED | OUTPATIENT
Start: 2022-08-27 | End: 2022-08-28 | Stop reason: HOSPADM

## 2022-08-27 RX ORDER — AMIODARONE HYDROCHLORIDE 200 MG/1
200 TABLET ORAL 2 TIMES DAILY
Status: DISCONTINUED | OUTPATIENT
Start: 2022-08-27 | End: 2022-08-28 | Stop reason: HOSPADM

## 2022-08-27 RX ADMIN — TRAMADOL HYDROCHLORIDE 100 MG: 50 TABLET, COATED ORAL at 21:38

## 2022-08-27 RX ADMIN — ZOLPIDEM TARTRATE 5 MG: 5 TABLET ORAL at 21:39

## 2022-08-27 RX ADMIN — FUROSEMIDE 20 MG: 20 TABLET ORAL at 09:14

## 2022-08-27 RX ADMIN — LISINOPRIL 5 MG: 5 TABLET ORAL at 09:14

## 2022-08-27 RX ADMIN — APIXABAN 5 MG: 5 TABLET, FILM COATED ORAL at 09:14

## 2022-08-27 RX ADMIN — POTASSIUM CHLORIDE 20 MEQ: 1500 TABLET, EXTENDED RELEASE ORAL at 09:14

## 2022-08-27 RX ADMIN — AMIODARONE HYDROCHLORIDE 200 MG: 200 TABLET ORAL at 20:24

## 2022-08-27 RX ADMIN — ACETAMINOPHEN 650 MG: 325 TABLET ORAL at 21:39

## 2022-08-27 RX ADMIN — AMIODARONE HYDROCHLORIDE 200 MG: 200 TABLET ORAL at 12:25

## 2022-08-27 RX ADMIN — APIXABAN 5 MG: 5 TABLET, FILM COATED ORAL at 19:31

## 2022-08-27 RX ADMIN — DULOXETINE HYDROCHLORIDE 60 MG: 60 CAPSULE, DELAYED RELEASE PELLETS ORAL at 09:14

## 2022-08-27 RX ADMIN — Medication 400 MG: at 20:24

## 2022-08-27 RX ADMIN — FUROSEMIDE 20 MG: 20 TABLET ORAL at 16:09

## 2022-08-27 ASSESSMENT — ACTIVITIES OF DAILY LIVING (ADL)
ADLS_ACUITY_SCORE: 28

## 2022-08-27 NOTE — PLAN OF CARE
Problem: Dysrhythmia  Goal: Normalized Cardiac Rhythm  8/27/2022 0239 by Laura Carrasco RN  Outcome: Ongoing, Progressing      Problem: Cardiac Output Decreased (Heart Failure)  Goal: Optimal Cardiac Output  8/27/2022 0239 by Laura Carrasco RN  Outcome: Ongoing, Progressing     Problem: Fluid Imbalance (Heart Failure)  Goal: Fluid Balance  8/27/2022 0239 by Laura Carrasco RN  Outcome: Ongoing, Progressing      Problem: Oral Intake Inadequate (Heart Failure)  Goal: Optimal Nutrition Intake  Outcome: Ongoing, Progressing     Problem: Heart Failure Comorbidity  Goal: Maintenance of Heart Failure Symptom Control  Intervention: Maintain Heart Failure-Management  Recent Flowsheet Documentation  Taken 8/27/2022 0015 by Laura Carrasco RN  Medication Review/Management: medications reviewed       Goal Outcome Evaluation:      Pt is alert and oriented x 4, denies pain or SOB. Lung sounds clear, diminished, on RA. HR in the 80's to 90's, at rest, low 100's with activity, Afib with LBBB, with inverted TW. On Amiodarone drip ar 0.5 mg/mi.         Heart Failure Care Map  GOALS TO BE MET BEFORE DISCHARGE:    1. Decrease congestion and/or edema with diuretic therapy to achieve near optimal volume status.     Dyspnea improved: Yes, satisfactory for discharge.   Edema improved: Yes, satisfactory for discharge.        Net I/O and Weights since admission:   07/28 0700 - 08/27 0659  In: 3144.7 [P.O.:2755; I.V.:389.7]  Out: 2150 [Urine:2150]  Net: 994.7     Vitals:    08/23/22 1127 08/24/22 1657 08/25/22 0624 08/26/22 0325   Weight: 70.8 kg (156 lb) 70.7 kg (155 lb 14.4 oz) 70.3 kg (155 lb) 70.3 kg (155 lb)    08/27/22 0254   Weight: 70.4 kg (155 lb 3.2 oz)       2.  O2 sats > 90% on room air, or at prior home O2 therapy level.      Able to wean O2 this shift to keep sats above 90%?: Yes, satisfactory for discharge.   Does patient use Home O2? No          Current oxygenation status:   SpO2: 92 %      O2 Device: None (Room air),      3.  Tolerates ambulation and mobility near baseline.     Ambulation: No, further care required to meet this goal. Please explain pt sleeping   Times patient ambulated with staff this shift: 0    Please review the Heart Failure Care Map for additional HF goal outcomes.    Laura Carrasco RN  8/27/2022

## 2022-08-27 NOTE — PROGRESS NOTES
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 fib, chronic LBBB, COVID-19 5/2022, hypertension, hyperlipidemia presenting for evaluation of dyspnea  exertion and near syncope.  She was found to be in A. fib RVR and new decline in LV systolic function       Paroxysmal Afib RVR.     - 08/26: Patient got into A. fib with RVR in the morning of 08/26.    - s/p amiodarone IV bolus given followed by amiodarone gtt.  -Keep patient on telemetry monitor.  Continue amiodarone 200 mg oral twice daily.  - Flecainide switched to amiodarone by cardiology.    - Cardiology consult appreciated.    - continue eliquis to 5 mg po twice daily    Acute on chronic heart failure  - Severe cardiomyopathy: New decline in LVEF 28% from 58% in 2019.    - Could be related to chronic LBBB    -Nuclear stress test done on 8/25 is negative for inducible myocardial ischemia or infarction.  -Patient could likely be a candidate for CRT device placement  -Patient is to follow with Dr. Holley in a month     History of PE/DVT on Eliquis     Hypomagnesemia  -Replace per protocol     COVID-19 infection in 5/2022     DVT PPX : Eliquis    Barriers to discharge: A. fib with RVR    Anticipated Discharge date  : Likely tomorrow    Subjective  No distress noted.  Patient denies having shortness of breath , chest pain or palpitation.  management plan discussed with the patient and she expressed understanding.    Objective  Vital signs in last 24 hours  Temp:  [97.3  F (36.3  C)-98.3  F (36.8  C)] 98.3  F (36.8  C)  Pulse:  [] 77  Resp:  [18-20] 20  BP: ()/(63-89) 121/70  SpO2:  [92 %-95 %] 95 %    Input and Output in 24 hrs     Intake/Output Summary (Last 24 hours) at 8/25/2022 1642  Last data filed at 8/25/2022 1145  Gross per 24 hour   Intake 1135 ml   Output 500 ml   Net 635 ml       Physical Exam:  GEN:  Alert and oriented. Not in acute distress  HEENT: Atraumatic    Sclera- anicteric   Mucous membrane- moist and pink  CHEST: Clear to auscultation bilaterally  HEART: S1S2 heard   ABDOMEN: Soft. Non-tender, non-distended. No organomegaly. No guarding or rigidity. Bowel sounds- active  Extremities: trace pedal oedema  CNS: No focal neurological deficit. No involuntary movements  SKIN: No skin rash, no cyanosis or clubbing      Pertinent Labs   Lab Results: Personally Reviewed    Recent Results (from the past 24 hour(s))   Potassium    Collection Time: 08/27/22  5:08 AM   Result Value Ref Range    Potassium 3.5 3.5 - 5.0 mmol/L   Magnesium    Collection Time: 08/27/22  5:08 AM   Result Value Ref Range    Magnesium 1.7 (L) 1.8 - 2.6 mg/dL   Extra Purple Top Tube    Collection Time: 08/27/22  5:08 AM   Result Value Ref Range    Hold Specimen JIC        Pertinent Radiology   Radiology Results reviewed      EKG Results reviewed       Advanced Care Planning      Farhan Neil MD   [facility] [place]

## 2022-08-27 NOTE — PLAN OF CARE
Problem: Dysrhythmia  Goal: Normalized Cardiac Rhythm  Outcome: Ongoing, Progressing   Goal Outcome Evaluation:    Afib HR 70.  Stopped amiodarone gtt at 1300.  Gave p.o. amiodarone 200 mg.  Pt walks with SBA and a walker.  No Pain.  RA.

## 2022-08-27 NOTE — PLAN OF CARE
Problem: Dysrhythmia  Goal: Normalized Cardiac Rhythm  Outcome: Ongoing, Progressing     Problem: Adjustment to Illness (Heart Failure)  Goal: Optimal Coping  Outcome: Ongoing, Progressing     Problem: Hypertension Comorbidity  Goal: Blood Pressure in Desired Range  Intervention: Maintain Blood Pressure Management  Recent Flowsheet Documentation  Taken 8/26/2022 2047 by Laura Carrasco RN  Medication Review/Management: medications reviewed  Taken 8/26/2022 1600 by Laura Carrasco RN  Medication Review/Management: medications reviewed      Problem: Fluid Imbalance (Heart Failure)  Goal: Fluid Balance  Outcome: Ongoing, Progressing   Goal Outcome Evaluation:     Pt is alert and oriented x 4, c/o pain in the right knee. On scheduled tramadol and prn tylenol, pt verbalized it takes a while to work. She denies SOB. Lung sounds clear, diminished, on RA. HR in the low 100's to 110's at rest, 130's to 140's with activity, afib with LBBB , with inverted TW. On Amiodarone drip at 0.5 mg/min (16.67 ml/hr ). Pt verbalized she that her urine has some blood in it but for the last 3 x that she voided tonight it was peace in color.Ambulated in the hallway, tolerated activity well. Will continue to monitor.    Heart Failure Care Map  GOALS TO BE MET BEFORE DISCHARGE:    1. Decrease congestion and/or edema with diuretic therapy to achieve near optimal volume status.     Dyspnea improved: Yes, satisfactory for discharge.   Edema improved: Yes, satisfactory for discharge.        Net I/O and Weights since admission:   07/27 2300 - 08/26 2259  In: 2724 [P.O.:2515; I.V.:209]  Out: 1700 [Urine:1700]  Net: 1024     Vitals:    08/23/22 1127 08/24/22 1657 08/25/22 0624 08/26/22 0325   Weight: 70.8 kg (156 lb) 70.7 kg (155 lb 14.4 oz) 70.3 kg (155 lb) 70.3 kg (155 lb)       2.  O2 sats > 90% on room air, or at prior home O2 therapy level.      Able to wean O2 this shift to keep sats above 90%?: Yes, satisfactory for  discharge.   Does patient use Home O2? No          Current oxygenation status:   SpO2: 94 %     O2 Device: None (Room air),      3.  Tolerates ambulation and mobility near baseline.     Ambulation: Yes, satisfactory for discharge.   Times patient ambulated with staff this shift: 1    Please review the Heart Failure Care Map for additional HF goal outcomes.    Laura Carrasco RN  8/26/2022

## 2022-08-27 NOTE — PROGRESS NOTES
"Care Management Follow Up    Length of Stay (days): 3    Expected Discharge Date: 08/28/2022     Concerns to be Addressed:  Medical Workup    Patient plan of care discussed at interdisciplinary rounds: Yes    Anticipated Discharge Disposition:  Home with assist      Anticipated Discharge Services:  TBD  Anticipated Discharge DME:   TBD    Education Provided on the Discharge Plan:  Per Care Team  Patient/Family in Agreement with the Plan:  Yes     Referrals Placed by CM/SW:  None at this time  Private pay costs discussed: Not applicable    Additional Information:  Chart reviewed.     Per CM notes:   \"Therapy is recommending home with assist. Pt lives alone in a senior living apartment. Pt states she does not feel she needs supervision or extra assistance. SW asked Pt if she had anyone that could check on her everyday. Pt states that her neighbor is a nurse and would be able to check on her. Pt denies any further needs from CM.  Pt's friend will transport.\"    Newest therapy flowsheet from 8/25 stated, \"home with assist/no svcs, cog intact, pt feels confident.\"    CM will continue to follow plan of care, review recommendations, and assist with any discharge needs anticipated.     Doreen Carpio RN        "

## 2022-08-27 NOTE — PROGRESS NOTES
"  HEART CARE CONSULTATON NOTE        Assessment/Recommendations   Assessment:  1.  Atrial fibrillation: Admitted with persistent atrial fibrillation which eventually converted to normal sinus rhythm.  She is switched to amiodarone due to reduced ejection fraction and heart failure.  She went back into atrial fibrillation that is now well rate controlled after being given IV amiodarone with infusion.  2.  Anticoagulation: On Eliquis for anticoagulation  3.  Hypertension: Well-controlled  4.    Acute on chronic heart failure with reduced ejection fraction responded well to IV diuresis  5.  Cardiomyopathy: Left ventricular ejection fraction now reduced to 28% on echocardiogram.  Lexiscan nuclear stress test was negative for inducible ischemia and showed an LVEF of 53%.  6.  Left bundle branch block  7.  Sinus bradycardia: Metoprolol held and no further episodes of bradycardia    Plan:  1.  Stop IV amiodarone drip and start back on p.o. amiodarone 200 mg twice daily.  Plan for 2 more weeks of twice daily amiodarone followed by once daily.  2.  Follow-up in A. fib clinic in a week  3.  Follow-up with Dr. Holley in 1 to 2 months  4.  Continue Eliquis  5.  Possible discharge either later today or tomorrow       History of Present Illness/Subjective    Telemetry: Rate controlled atrial fibrillation  She reports overall feeling fairly well although she has not been out of bed yet today.  Breathing improved compared to admission no chest pain or palpitation       Physical Examination  Review of Systems   VITALS: /70 (BP Location: Right arm)   Pulse 77   Temp 98.3  F (36.8  C) (Oral)   Resp 20   Ht 1.588 m (5' 2.5\")   Wt 70.4 kg (155 lb 3.2 oz)   SpO2 95%   BMI 27.93 kg/m    BMI: Body mass index is 27.93 kg/m .  Wt Readings from Last 3 Encounters:   08/27/22 70.4 kg (155 lb 3.2 oz)   08/17/22 71.2 kg (157 lb)   03/04/22 73.5 kg (162 lb)       Intake/Output Summary (Last 24 hours) at 8/27/2022 1143  Last data " filed at 8/27/2022 0900  Gross per 24 hour   Intake 1109.7 ml   Output 850 ml   Net 259.7 ml     General Appearance:   no distress, normal body habitus   ENT/Mouth: membranes moist, no oral lesions or bleeding gums.      EYES:  no scleral icterus, normal conjunctivae   Neck: no carotid bruits or thyromegaly   Chest/Lungs:   lungs are clear to auscultation   Cardiovascular:   irregular. Normal first and second heart sounds with no murmursno edema bilaterally    Abdomen:  no organomegaly, masses, bruits, or tenderness; bowel sounds are present   Extremities: no cyanosis or clubbing   Skin: no xanthelasma, warm.    Neurologic: normal  bilateral, no tremors     Psychiatric: alert and oriented x3, calm     Review Of Systems  Skin: negative  Eyes: negative  Ears/Nose/Throat: negative  Respiratory: No shortness of breath, dyspnea on exertion, cough, or hemoptysis  Cardiovascular: negative  Gastrointestinal: negative  Genitourinary: negative  Musculoskeletal: negative  Neurologic: negative  Psychiatric: negative  Hematologic/Lymphatic/Immunologic: negative  Endocrine: negative          Lab Results    Chemistry/lipid CBC Cardiac Enzymes/BNP/TSH/INR   Recent Labs   Lab Test 03/12/15  1230   CHOL 179   HDL 64   LDL 80   TRIG 176*     Recent Labs   Lab Test 03/12/15  1230   LDL 80     Recent Labs   Lab Test 08/27/22  0508 08/26/22  0723   NA  --  138   POTASSIUM 3.5 3.5   CHLORIDE  --  104   CO2  --  25   GLC  --  97   BUN  --  21   CR  --  0.86   GFRESTIMATED  --  63   MARLENE  --  9.0     Recent Labs   Lab Test 08/26/22  0723 08/25/22  0853 08/24/22  0718   CR 0.86 0.82 0.96     Recent Labs   Lab Test 06/15/21  0910   A1C 6.0*          Recent Labs   Lab Test 08/26/22  0723   WBC 6.7   HGB 12.6   HCT 38.0   MCV 95        Recent Labs   Lab Test 08/26/22  0723 08/24/22  0718 08/23/22  1154   HGB 12.6 12.8 12.7    Recent Labs   Lab Test 08/23/22  1154 08/17/22  1215 11/21/19  0451   TROPONINI 0.16 0.04 0.01     Recent Labs    Lab Test 08/23/22  1154 08/17/22  1215 03/03/21  1222 02/21/19  0713   BNP 1,933*  --  177* 95   NTBNP  --  5,101*  --   --      Recent Labs   Lab Test 02/21/19  0648   TSH 2.05     Recent Labs   Lab Test 08/23/22  1154 06/05/19  0052 02/21/19  0648   INR 1.37* 1.50* 1.20*        Medical History  Surgical History Family History Social History   Past Medical History:   Diagnosis Date     Angina pectoris (H)      Chest pain 3/9/2017     Cough      Diarrhea 11/20/2019     Hyperlipidemia      Hypertension      Hypokalemia     Created by Conversion      Osteoarthritis      Pneumonia 2/21/2019     Past Surgical History:   Procedure Laterality Date     APPENDECTOMY       BASAL CELL CARCINOMA EXCISION       LAPAROSCOPY DIAGNOSTIC (GENERAL) N/A 11/04/2014    Procedure: LAPAROSCOPY BILATERAL SALPINGO-OOPHORECTOMY ;  Surgeon: Sofia Harper MD;  Location: [facility] [place];  Service:      TONSILLECTOMY       ZZC TOTAL KNEE ARTHROPLASTY Left      Family History   Problem Relation Age of Onset     Heart Disease Mother      Rheumatic Heart Disease Mother      No Known Problems Father      Cancer Sister      Cancer Brother         Social History     Socioeconomic History     Marital status: Single     Spouse name: Not on file     Number of children: Not on file     Years of education: Not on file     Highest education level: Not on file   Occupational History     Not on file   Tobacco Use     Smoking status: Never Smoker     Smokeless tobacco: Never Used     Tobacco comment: no passive exposure   Substance and Sexual Activity     Alcohol use: Yes     Comment: Alcoholic Drinks/day: Rarely a glass of wine     Drug use: No     Sexual activity: Not on file   Other Topics Concern     Not on file   Social History Narrative    The patient is a nun.     Social Determinants of Health     Financial Resource Strain: Not on file   Food Insecurity: Not on file   Transportation Needs: Not on file   Physical Activity: Not on file  "  Stress: Not on file   Social Connections: Not on file   Intimate Partner Violence: Not on file   Housing Stability: Not on file         Medications  Allergies   No current outpatient medications on file.        Allergies   Allergen Reactions     Trazodone Shortness Of Breath and Unknown     Allergic to trazodone and deriv., Other: trouble swallowing       Clindamycin Diarrhea     C-diff     Gabapentin Other (See Comments)     \"Internal tremors\"     Temazepam Other (See Comments)     Annotation: Nightmares       Buprenorphine Palpitations     Tried patch         Kristina Velasquez MD    "

## 2022-08-28 VITALS
HEIGHT: 63 IN | WEIGHT: 153.8 LBS | HEART RATE: 69 BPM | OXYGEN SATURATION: 91 % | BODY MASS INDEX: 27.25 KG/M2 | SYSTOLIC BLOOD PRESSURE: 139 MMHG | TEMPERATURE: 97.8 F | RESPIRATION RATE: 18 BRPM | DIASTOLIC BLOOD PRESSURE: 69 MMHG

## 2022-08-28 DIAGNOSIS — G47.00 INSOMNIA: ICD-10-CM

## 2022-08-28 DIAGNOSIS — Z76.0 ENCOUNTER FOR MEDICATION REFILL: ICD-10-CM

## 2022-08-28 LAB
ANION GAP SERPL CALCULATED.3IONS-SCNC: 10 MMOL/L (ref 5–18)
BUN SERPL-MCNC: 16 MG/DL (ref 8–28)
CALCIUM SERPL-MCNC: 9.1 MG/DL (ref 8.5–10.5)
CHLORIDE BLD-SCNC: 103 MMOL/L (ref 98–107)
CO2 SERPL-SCNC: 27 MMOL/L (ref 22–31)
CREAT SERPL-MCNC: 0.81 MG/DL (ref 0.6–1.1)
ERYTHROCYTE [DISTWIDTH] IN BLOOD BY AUTOMATED COUNT: 13.6 % (ref 10–15)
GFR SERPL CREATININE-BSD FRML MDRD: 68 ML/MIN/1.73M2
GLUCOSE BLD-MCNC: 95 MG/DL (ref 70–125)
HCT VFR BLD AUTO: 36.1 % (ref 35–47)
HGB BLD-MCNC: 12.1 G/DL (ref 11.7–15.7)
MAGNESIUM SERPL-MCNC: 1.8 MG/DL (ref 1.8–2.6)
MCH RBC QN AUTO: 31.1 PG (ref 26.5–33)
MCHC RBC AUTO-ENTMCNC: 33.5 G/DL (ref 31.5–36.5)
MCV RBC AUTO: 93 FL (ref 78–100)
PLATELET # BLD AUTO: 270 10E3/UL (ref 150–450)
POTASSIUM BLD-SCNC: 3.1 MMOL/L (ref 3.5–5)
RBC # BLD AUTO: 3.89 10E6/UL (ref 3.8–5.2)
SODIUM SERPL-SCNC: 140 MMOL/L (ref 136–145)
WBC # BLD AUTO: 6.2 10E3/UL (ref 4–11)

## 2022-08-28 PROCEDURE — 99239 HOSP IP/OBS DSCHRG MGMT >30: CPT | Performed by: STUDENT IN AN ORGANIZED HEALTH CARE EDUCATION/TRAINING PROGRAM

## 2022-08-28 PROCEDURE — 83735 ASSAY OF MAGNESIUM: CPT | Performed by: STUDENT IN AN ORGANIZED HEALTH CARE EDUCATION/TRAINING PROGRAM

## 2022-08-28 PROCEDURE — 250N000013 HC RX MED GY IP 250 OP 250 PS 637: Performed by: INTERNAL MEDICINE

## 2022-08-28 PROCEDURE — 250N000013 HC RX MED GY IP 250 OP 250 PS 637: Performed by: STUDENT IN AN ORGANIZED HEALTH CARE EDUCATION/TRAINING PROGRAM

## 2022-08-28 PROCEDURE — 36415 COLL VENOUS BLD VENIPUNCTURE: CPT | Performed by: STUDENT IN AN ORGANIZED HEALTH CARE EDUCATION/TRAINING PROGRAM

## 2022-08-28 PROCEDURE — 85014 HEMATOCRIT: CPT | Performed by: STUDENT IN AN ORGANIZED HEALTH CARE EDUCATION/TRAINING PROGRAM

## 2022-08-28 PROCEDURE — 80048 BASIC METABOLIC PNL TOTAL CA: CPT | Performed by: STUDENT IN AN ORGANIZED HEALTH CARE EDUCATION/TRAINING PROGRAM

## 2022-08-28 RX ORDER — MAGNESIUM OXIDE 400 MG/1
400 TABLET ORAL DAILY
Qty: 30 TABLET | Refills: 0 | Status: SHIPPED | OUTPATIENT
Start: 2022-08-28 | End: 2022-09-28

## 2022-08-28 RX ORDER — AMIODARONE HYDROCHLORIDE 200 MG/1
200 TABLET ORAL 2 TIMES DAILY
Qty: 60 TABLET | Refills: 0 | Status: SHIPPED | OUTPATIENT
Start: 2022-08-28 | End: 2022-08-28

## 2022-08-28 RX ORDER — LISINOPRIL 5 MG/1
5 TABLET ORAL DAILY
DISCHARGE
Start: 2022-08-28 | End: 2022-10-26

## 2022-08-28 RX ORDER — DULOXETIN HYDROCHLORIDE 60 MG/1
60 CAPSULE, DELAYED RELEASE ORAL DAILY
DISCHARGE
Start: 2022-08-28 | End: 2023-08-14

## 2022-08-28 RX ORDER — POTASSIUM CHLORIDE 1500 MG/1
40 TABLET, EXTENDED RELEASE ORAL ONCE
Status: COMPLETED | OUTPATIENT
Start: 2022-08-28 | End: 2022-08-28

## 2022-08-28 RX ORDER — AMIODARONE HYDROCHLORIDE 200 MG/1
TABLET ORAL
Qty: 60 TABLET | Refills: 0 | Status: SHIPPED | OUTPATIENT
Start: 2022-08-28 | End: 2022-09-23

## 2022-08-28 RX ADMIN — POTASSIUM CHLORIDE 20 MEQ: 1500 TABLET, EXTENDED RELEASE ORAL at 08:50

## 2022-08-28 RX ADMIN — FUROSEMIDE 20 MG: 20 TABLET ORAL at 08:50

## 2022-08-28 RX ADMIN — APIXABAN 5 MG: 5 TABLET, FILM COATED ORAL at 08:51

## 2022-08-28 RX ADMIN — AMIODARONE HYDROCHLORIDE 200 MG: 200 TABLET ORAL at 08:50

## 2022-08-28 RX ADMIN — DULOXETINE HYDROCHLORIDE 60 MG: 60 CAPSULE, DELAYED RELEASE PELLETS ORAL at 08:50

## 2022-08-28 RX ADMIN — LISINOPRIL 5 MG: 5 TABLET ORAL at 08:50

## 2022-08-28 RX ADMIN — POTASSIUM CHLORIDE 40 MEQ: 1500 TABLET, EXTENDED RELEASE ORAL at 08:51

## 2022-08-28 RX ADMIN — Medication 400 MG: at 08:50

## 2022-08-28 ASSESSMENT — ACTIVITIES OF DAILY LIVING (ADL)
ADLS_ACUITY_SCORE: 28

## 2022-08-28 NOTE — PLAN OF CARE
Physical Therapy Discharge Summary    Reason for therapy discharge:    Discharged to home with home therapy.    Progress towards therapy goal(s). See goals on Care Plan in [facility] electronic health record for goal details.  Goals partially met.  Barriers to achieving goals:   discharge from facility.    Therapy recommendation(s):    Continued therapy is recommended.  Rationale/Recommendations:  home care PT.

## 2022-08-28 NOTE — PLAN OF CARE
Problem: Dysrhythmia (Heart Failure)  Goal: Stable Heart Rate and Rhythm  Outcome: Adequate for Care Transition   Goal Outcome Evaluation:    NSR.  No pain. Went over discharge instructions with pt.

## 2022-08-28 NOTE — PLAN OF CARE
Occupational Therapy Discharge Summary    Reason for therapy discharge:    Discharged to home.    Progress towards therapy goal(s). See goals on Care Plan in [facility] electronic health record for goal details.  Goals met    Therapy recommendation(s):    No further therapy is recommended.

## 2022-08-28 NOTE — DISCHARGE SUMMARY
[facility] MEDICINE  DISCHARGE SUMMARY     Primary Care Physician: Lore Martin  Admission Date: 8/23/2022   Discharge Provider: Farhan Neil MD Discharge Date: 8/28/2022   Diet:   Active Diet and Nourishment Order   Procedures     Room Service     Combination Diet Low Saturated Fat Na <2400mg Diet, No Caffeine Diet     Diet       Code Status: Full Code   Activity: DCACTIVITY: Activity as tolerated        Condition at Discharge: Good     REASON FOR PRESENTATION(See Admission Note for Details)   Dyspnea     PRINCIPAL & ACTIVE DISCHARGE DIAGNOSES     Principal Problem:    Atrial fibrillation with RVR (H)  Active Problems:    Paroxysmal atrial fibrillation (H)    Exertional dyspnea    Acute on chronic congestive heart failure, unspecified heart failure type (H)    Anticoagulated by anticoagulation treatment      PENDING LABS     Unresulted Labs Ordered in the Past 30 Days of this Admission     No orders found from 7/24/2022 to 8/24/2022.            PROCEDURES ( this hospitalization only)          RECOMMENDATIONS TO OUTPATIENT PROVIDER FOR F/U VISIT     Follow-up Appointments     Follow-up and recommended labs and tests       Follow up with primary care provider, Lore Martin, within 7 days for   hospital follow- up.  The following labs/tests are recommended: bmp,   magnesium level in a week.  Follow-up in A. fib clinic in a week.    Follow-up with Dr. 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 fib, chronic LBBB, COVID-19 5/2022, hypertension, hyperlipidemia presenting for evaluation of dyspnea  exertion and near syncope.  She was found to be in A. fib RVR and new decline in LV systolic function     Paroxysmal Afib RVR.     -Patient did get into episodes of A. fib with RVR which was treated  with amiodarone gtt.  At around changed to oral therapy  - continue eliquis to 5 mg po twice daily   Acute on chronic heart failure  - Severe cardiomyopathy: New decline in LVEF 28% from 58% in 2019.    - Could be related to chronic LBBB    -Nuclear stress test done on 8/25 is negative for inducible myocardial ischemia or infarction.  -Patient could likely be a candidate for CRT device placement  -Patient is to follow with Dr. Holley in a month    History of PE/DVT on Eliquis    Hypomagnesemia  -Replace per protocol    Patient is stable enough to be discharged home today.  Cleared by cardiology for discharge.  Discharge Medications with Med changes:     Current Discharge Medication List      START taking these medications    Details   amiodarone (PACERONE) 200 MG tablet Take 1 tablet (200 mg) by mouth 2 times daily for 30 days  Qty: 60 tablet, Refills: 0    Associated Diagnoses: Paroxysmal atrial fibrillation (H)      magnesium oxide (MAG-OX) 400 MG tablet Take 1 tablet (400 mg) by mouth daily for 30 days  Qty: 30 tablet, Refills: 0    Associated Diagnoses: Atrial fibrillation with RVR (H)         CONTINUE these medications which have CHANGED    Details   apixaban ANTICOAGULANT (ELIQUIS) 5 MG tablet Take 1 tablet (5 mg) by mouth 2 times daily for 30 days  Qty: 60 tablet, Refills: 0    Associated Diagnoses: Paroxysmal atrial fibrillation (H)         CONTINUE these medications which have NOT CHANGED    Details   acetaminophen (TYLENOL) 325 MG tablet [ACETAMINOPHEN (TYLENOL) 325 MG TABLET] Take 650 mg by mouth every 6 (six) hours as needed for pain.      amoxicillin (AMOXIL) 500 MG capsule Take 2,000 mg by mouth as needed Prior to dental procedures.      cholecalciferol, vitamin D3, (VITAMIN D3) 2,000 unit Tab [CHOLECALCIFEROL, VITAMIN D3, (VITAMIN D3) 2,000 UNIT TAB] Take 1 tablet (2,000 Units total) by mouth daily.  Qty: 90 tablet, Refills: 3    Comments: Replaces weekly ergo 50,000 units  Associated Diagnoses:  Vitamin D deficiency      COMPOUNDED NON-CONTROLLED SUBSTANCE (CMPD RX) - PHARMACY TO MIX COMPOUNDED MEDICATION lidocaine 5%, ibuprofen 10%, and diclofenac 5% apply 1-2 grams to painful feet 3-4 times a day  Qty: 60 g, Refills: 3    Comments: Faxed to [facility] 9/22  Associated Diagnoses: Primary osteoarthritis of right knee; Chronic pain syndrome; Primary osteoarthritis of left hip      DULoxetine (CYMBALTA) 60 MG capsule Take 1 capsule (60 mg) by mouth 2 times daily  Qty: 180 capsule, Refills: 3    Associated Diagnoses: Arthrosis of foot, unspecified laterality      furosemide (LASIX) 20 MG tablet Take 1 tablet (20 mg) by mouth daily  Qty: 30 tablet, Refills: 0    Associated Diagnoses: Dyspnea on exertion      lisinopril (ZESTRIL) 5 MG tablet Take 2 tablets (10 mg) by mouth daily  Qty: 30 tablet, Refills: 0    Associated Diagnoses: Essential hypertension; Hospital discharge follow-up      potassium chloride ER (KLOR-CON M) 20 MEQ CR tablet Take 1 tablet (20 mEq) by mouth daily  Qty: 30 tablet, Refills: 0    Associated Diagnoses: Dyspnea on exertion      traMADol (ULTRAM) 50 MG tablet TAKE AS NEEDED 2 TABLETS (100 MG) BY MOUTH AT NOON AND 1 TABLET (50 MG) BY MOUTH AT BEDTIME.  Qty: 90 tablet, Refills: 0    Associated Diagnoses: Encounter for medication refill      zolpidem ER (AMBIEN CR) 6.25 MG CR tablet take 1 and 1/4 tablet by mouth at bedtime  Qty: 45 tablet, Refills: 0    Associated Diagnoses: Encounter for medication refill; Insomnia                   Rationale for medication changes:              Consults       CARDIOLOGY IP CONSULT  SOCIAL WORK IP CONSULT  PHYSICAL THERAPY ADULT IP CONSULT  OCCUPATIONAL THERAPY ADULT IP CONSULT    Immunizations given this encounter     Most Recent Immunizations   Administered Date(s) Administered     COVID-19,PF,Moderna 11/28/2021     COVID-19,PF,Pfizer 12+ Yrs (2022 and After) 04/27/2022     Flu, Unspecified 09/30/2014     Influenza (High Dose) 3 valent vaccine  10/14/2019     Influenza (IIV3) PF 09/30/2014     Influenza Vaccine, 6+MO IM (QUADRIVALENT W/PRESERVATIVES) 09/30/2014     Influenza, Quad, High Dose, Pf, 65yr+ (Fluzone HD) 10/04/2021     Pneumo Conj 13-V (2010&after) 10/06/2015     Pneumococcal 23 valent 10/29/2003     TD (ADULT, 7+) 10/14/2019     Tdap (Adacel,Boostrix) 10/30/2009     Zoster vaccine, live 09/24/2012           Anticoagulation Information      Recent INR results:   Recent Labs   Lab 08/23/22  1154   INR 1.37*     Warfarin doses (if applicable) or name of other anticoagulant:       SIGNIFICANT IMAGING FINDINGS     Results for orders placed or performed during the hospital encounter of 08/23/22   CT Chest Pulmonary Embolism w Contrast    Impression    IMPRESSION:  1.  Pulmonary arteries are normal caliber with no pulmonary emboli. Thoracic aorta is negative for dissection.  2.  Mild generalized cardiac enlargement, mild interstitial edema, small bilateral pleural effusions and mild posterior bibasilar atelectasis compatible with pulmonary venous hypertension secondary to CHF in the proper clinical setting.   Echocardiogram Complete   Result Value Ref Range    Biplane LVEF 28%    NM Lexiscan stress test   Result Value Ref Range    Pharmacologic Protocol Lexiscan     Test Type Pharmacological     Baseline HR 79     Baseline Systolic      Baseline Diastolic BP 64     Last Stress HR 86     Last Stress Systolic      Last Stress Diastolic BP 70     Target      PERCENT HR 85%     ST Deviation Elevation V2 0.8mm     Deviation Time III -0.8mm     ST Elevation Amount V2 2.9mm     ST Deviation Amount he III -3.2mm     Final Resting /66     Final Resting HR 84     Max Treadmill Speed 0.0     Max Treadmill Grade 0.0     Peak Systolic /70     Peak Diastolic /70     Max HR  90     Stress Phase Resting     Stress Resting Pt Position MANUAL EVENT     Current HR 87     Current /70     Stress Phase Stress     Stage Minute EXE  00:00     Exercise Stage STAGE 2     Current HR 75     Current /64     Stress Phase Stress     Stage Minute EXE 01:00     Exercise Stage STAGE 3     Current HR 79     Current /64     Stress Phase Stress     Stage Minute EXE 01:03     Exercise Stage STAGE 3     Current HR 79     Current /64     Stress Phase Stress     Stage Minute EXE 01:18     Exercise Stage STAGE 3     Current HR 66     Current /64     Stress Phase Stress     Stage Minute EXE 01:42     Exercise Stage STAGE 3     Current HR 83     Current /70     Stress Phase Stress     Stage Minute EXE 02:00     Exercise Stage STAGE 4     Current HR 83     Current /70     Stress Phase Stress     Stage Minute EXE 02:50     Exercise Stage STAGE 4     Current HR 86     Current /70     Stress Phase Stress     Stage Minute EXE 03:00     Exercise Stage STAGE 5     Current HR 86     Current /70     Stress Phase Stress     Stage Minute EXE 04:00     Exercise Stage STAGE 6     Current HR 86     Current /70     Stress Phase Stress     Stage Minute EXE 04:00     Exercise Stage STAGE 6     Current HR 86     Current /70     Stress Phase Recovery     Stage Minute REC 00:24     Exercise Stage Recovery     Current HR 85     Current /70     Stress Phase Recovery     Stage Minute REC 00:59     Exercise Stage Recovery     Current HR 85     Current /70     Stress Phase Recovery     Stage Minute REC 01:01     Exercise Stage Recovery     Current HR 85     Current /70     Stress Phase Recovery     Stage Minute REC 01:59     Exercise Stage Recovery     Current HR 85     Current /70     Stress Phase Recovery     Stage Minute REC 02:30     Exercise Stage Recovery     Current HR 87     Current /70     Stress Phase Recovery     Stage Minute REC 02:34     Exercise Stage Recovery     Current HR 87     Current /70     Stress Phase Recovery     Stage Minute REC 02:54     Exercise Stage Recovery      Current HR 82     Current /66     Stress Phase Recovery     Stage Minute REC 02:59     Exercise Stage Recovery     Current HR 82     Current /66     Stress Phase Recovery     Stage Minute REC 03:59     Exercise Stage Recovery     Current HR 84     Current /66     Stress Phase Recovery     Stage Minute REC 04:01     Exercise Stage Recovery     Current HR 84     Current /66     Max Predicted HR  70 %    Rate Pressure Product 13,050.0     Left Ventricular EF 52 %       SIGNIFICANT LABORATORY FINDINGS     Most Recent 3 CBC's:Recent Labs   Lab Test 08/28/22  0442 08/26/22  0723 08/24/22  0718   WBC 6.2 6.7 7.7   HGB 12.1 12.6 12.8   MCV 93 95 96    275 302     Most Recent 3 BMP's:Recent Labs   Lab Test 08/28/22 0442 08/27/22  0508 08/26/22  0723 08/25/22  2242 08/25/22  0853     --  138  --  139   POTASSIUM 3.1* 3.5 3.5   < > 3.4*   CHLORIDE 103  --  104  --  104   CO2 27  --  25  --  28   BUN 16  --  21  --  20   CR 0.81  --  0.86  --  0.82   ANIONGAP 10  --  9  --  7   MARLENE 9.1  --  9.0  --  9.0   GLC 95  --  97  --  103    < > = values in this interval not displayed.     Most Recent 2 LFT's:Recent Labs   Lab Test 08/17/22  1215 12/02/19  0942   AST 19 21   ALT 17 30   ALKPHOS 110 150*   BILITOTAL 0.5 0.5     Most Recent 3 INR's:Recent Labs   Lab Test 08/23/22  1154 06/05/19  0052 02/21/19  0648   INR 1.37* 1.50* 1.20*     Most Recent INR's and Anticoagulation Dosing History:  Anticoagulation Dose History     Recent Dosing and Labs Latest Ref Rng & Units 1/19/2019 2/21/2019 6/5/2019 8/23/2022    INR 0.85 - 1.15 1.03 1.20(H) 1.50(H) 1.37(H)        Most Recent 3 Creatinines:Recent Labs   Lab Test 08/28/22  0442 08/26/22  0723 08/25/22  0853   CR 0.81 0.86 0.82     Most Recent 3 Hemoglobins:Recent Labs   Lab Test 08/28/22  0442 08/26/22  0723 08/24/22  0718   HGB 12.1 12.6 12.8     Most Recent 3 Troponin's:No lab results found.        Discharge Orders        Reason for your hospital  stay    dyspnea     Follow-up and recommended labs and tests     Follow up with primary care provider, Lore Martin, within 7 days for hospital follow- up.  The following labs/tests are recommended: bmp, magnesium level in a week.  Follow-up in A. fib clinic in a week.  Follow-up with Dr. Holley clinic in 1- 2 months     Activity    Your activity upon discharge: activity as tolerated     Diet    Follow this diet upon discharge: Orders Placed This Encounter      Room Service      Combination Diet Low Saturated Fat Na <2400mg Diet, No Caffeine Diet       Examination   Physical Exam   Temp:  [97.5  F (36.4  C)-98.3  F (36.8  C)] 97.8  F (36.6  C)  Pulse:  [69-85] 69  Resp:  [18-20] 18  BP: (121-139)/(65-72) 139/69  SpO2:  [90 %-95 %] 91 %  Wt Readings from Last 1 Encounters:   08/28/22 69.8 kg (153 lb 12.8 oz)     GEN: Alert and oriented. Not in acute distress  HEENT: Atraumatic               Sclera- anicteric              Mucous membrane- moist and pink  CHEST: Clear to auscultation bilaterally  HEART: S1S2 heard   ABDOMEN: Soft. Non-tender, non-distended. No organomegaly. No guarding or rigidity. Bowel sounds- active  Extremities: trace pedal oedema  CNS: No focal neurological deficit. No involuntary movements  SKIN: No skin rash, no cyanosis or clubbing           Please see EMR for more detailed significant labs, imaging, consultant notes etc.    IFarhan MD, personally saw the patient today and spent greater than 30 minutes discharging this patient.    Farhan Neil MD  [facility]    CC:Sniffen, Lore L

## 2022-08-28 NOTE — PLAN OF CARE
Problem: Adjustment to Illness (Heart Failure)  Goal: Optimal Coping  Outcome: Ongoing, Progressing     Problem: Cardiac Output Decreased (Heart Failure)  Goal: Optimal Cardiac Output  Outcome: Ongoing, Progressing     Problem: Dysrhythmia (Heart Failure)  Goal: Stable Heart Rate and Rhythm  Outcome: Ongoing, Progressing     Problem: Heart Failure Comorbidity  Goal: Maintenance of Heart Failure Symptom Control  Intervention: Maintain Heart Failure-Management  Recent Flowsheet Documentation  Taken 8/27/2022 1934 by Laura Carrasco RN  Medication Review/Management: medications reviewed  Taken 8/27/2022 1615 by Laura Carrasco RN  Medication Review/Management: medications reviewed     Problem: Fall Injury Risk  Goal: Absence of Fall and Fall-Related Injury  Intervention: Identify and Manage Contributors  Recent Flowsheet Documentation  Taken 8/27/2022 1934 by Laura Carrasco RN  Medication Review/Management: medications reviewed  Taken 8/27/2022 1615 by Laura Carrasco RN  Medication Review/Management: medications reviewed  Intervention: Promote Injury-Free Environment  Recent Flowsheet Documentation  Taken 8/27/2022 1934 by Laura Carrasco RN  Safety Promotion/Fall Prevention:    activity supervised    bed alarm on    nonskid shoes/slippers when out of bed    safety round/check completed    fall prevention program maintained  Taken 8/27/2022 1615 by Laura Carrasco RN  Safety Promotion/Fall Prevention:    activity supervised    bed alarm on    nonskid shoes/slippers when out of bed    safety round/check completed    fall prevention program maintained   Goal Outcome Evaluation:    Pt is alert and oriented x 4, uses call light appropriately. Lung sounds clear, diminished, on RA. She converted to NSR around 1315 H and still in NSR with LBBB with inverted TW. Pt ambulated in the hallway x 1, tolerated about 250 ft, she denies SOB during the activity. Amiodarone drip was  discontinued this morning and started on oral Amiodarone. Offered shower this evening but pt refuse, she is hoping she can go home tomorrow. Possible discharge in AM.      Heart Failure Care Map  GOALS TO BE MET BEFORE DISCHARGE:    1. Decrease congestion and/or edema with diuretic therapy to achieve near optimal volume status.     Dyspnea improved: Yes, satisfactory for discharge.   Edema improved: Yes, satisfactory for discharge.        Net I/O and Weights since admission:   07/28 2300 - 08/27 2259  In: 3627.7 [P.O.:3235; I.V.:392.7]  Out: 2650 [Urine:2650]  Net: 977.7     Vitals:    08/23/22 1127 08/24/22 1657 08/25/22 0624 08/26/22 0325   Weight: 70.8 kg (156 lb) 70.7 kg (155 lb 14.4 oz) 70.3 kg (155 lb) 70.3 kg (155 lb)    08/27/22 0254   Weight: 70.4 kg (155 lb 3.2 oz)       2.  O2 sats > 90% on room air, or at prior home O2 therapy level.      Able to wean O2 this shift to keep sats above 90%?: Yes, satisfactory for discharge.   Does patient use Home O2? No          Current oxygenation status:   SpO2: 95 %     O2 Device: None (Room air),      3.  Tolerates ambulation and mobility near baseline.     Ambulation: Yes, satisfactory for discharge.   Times patient ambulated with staff this shift: 1    Please review the Heart Failure Care Map for additional HF goal outcomes.    Laura Carrasco RN  8/27/2022

## 2022-08-28 NOTE — PLAN OF CARE
Goal Outcome Evaluation:    Plan of Care Reviewed With: patient     Overall Patient Progress: improving    Outcome Evaluation: Pt reports no pain this shift. VSS on room air. NSR with BBB on tele. anticipated discharge today back to senior living. SBA up to the bathroom.

## 2022-08-29 DIAGNOSIS — I48.0 PAROXYSMAL ATRIAL FIBRILLATION (H): Primary | ICD-10-CM

## 2022-08-29 DIAGNOSIS — R00.1 SINUS BRADYCARDIA: ICD-10-CM

## 2022-08-29 DIAGNOSIS — R06.09 DYSPNEA ON EXERTION: ICD-10-CM

## 2022-08-29 RX ORDER — ZOLPIDEM TARTRATE 6.25 MG/1
TABLET, FILM COATED, EXTENDED RELEASE ORAL
Qty: 45 TABLET | Refills: 0 | Status: SHIPPED | OUTPATIENT
Start: 2022-08-29 | End: 2022-09-28

## 2022-08-30 ENCOUNTER — PATIENT OUTREACH (OUTPATIENT)
Dept: CARE COORDINATION | Facility: CLINIC | Age: 87
End: 2022-08-30

## 2022-08-30 NOTE — PROGRESS NOTES
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 fib, chronic LBBB, COVID-19 5/2022, hypertension, hyperlipidemia presenting for evaluation of dyspnea with exertion and near syncope  History is provided by patient and medical records  Patient reports episodes of palpitations, near syncope and dyspnea with exertion over the past 3 weeks, worsening over the last couple of days.  Patient denies having any chest pain.  She denies any lower extremity edema or weight gain.  She was started on Lasix 20 mg daily 3 days ago however has not noted any change in her symptoms.  She had event monitor on 8/19/2022, however results are not available yet.  On initial evaluation in the ER /65, pulse 119, O2 sats 93 to 95% on room air.  Labs significant for elevated BNP 1933, elevated D-dimer 1.76, normal troponin 0.16.  Telemetry shows A. fib with heart rates going up to 150s to 160s while patient is up and ambulating.    ASSESSMENT           Discharge Assessment  How are you doing now that you are home?: I am tired but I am doing okay. I am eating okay.  How are your symptoms? (Red Flag symptoms escalate to triage hotline per guidelines): Improved  Do you feel your condition is stable enough to be safe at home until your provider visit?: Yes  Does the patient have their discharge instructions? : Yes  Does the patient have questions regarding their discharge instructions? : No  Were you started on any new medications or were there changes to any of your  previous medications? : Yes  Does the patient have all of their medications?: Yes  Do you have questions regarding any of your medications? : No  Discharge follow-up appointment scheduled within 14 calendar days? : No (Patient is waiting to hear from PCP clinic about follow up.)  Is patient agreeable to assistance with scheduling? : No                  PLAN                                                      Outpatient Plan: Follow up with primary care provider, Lore Martin, within 7 days for  hospital follow- up. The following labs/tests are recommended: bmp,  magnesium level in a week. Follow-up in A. fib clinic in a week. Followup  with Dr. Kishan rosado in 1- 2 months    Future Appointments   Date Time Provider Department Center   9/9/2022  8:00 AM JN HC ECHO STAFF [facility]   9/12/2022  8:30 AM Karen Alvarez NP [facility]RS[facility]   9/28/2022  1:20 PM Margret Flores MD [facility]         For any urgent concerns, please contact our 24 hour nurse triage line: [phone] ([phone])         JUAN Fraser  [phone]  [facility]

## 2022-09-01 ENCOUNTER — TELEPHONE (OUTPATIENT)
Dept: CARDIOLOGY | Facility: CLINIC | Age: 87
End: 2022-09-01

## 2022-09-01 NOTE — TELEPHONE ENCOUNTER
[facility] Call Center    Phone Message    May a detailed message be left on voicemail: yes     Reason for Call: Other: Patient returning a call regarding her afib. Please call patient back to further discuss, thank you.     Action Taken: Message routed to:  Other: Cardiology    Travel Screening: Not Applicable                                                                    ==call was regarding scheduling. She was returning a call to schedulers. Transferred to have needs arranged.-[facility]

## 2022-09-06 ENCOUNTER — OFFICE VISIT (OUTPATIENT)
Dept: CARDIOLOGY | Facility: CLINIC | Age: 87
End: 2022-09-06
Attending: INTERNAL MEDICINE
Payer: COMMERCIAL

## 2022-09-06 VITALS
HEART RATE: 80 BPM | BODY MASS INDEX: 29.07 KG/M2 | DIASTOLIC BLOOD PRESSURE: 58 MMHG | WEIGHT: 154 LBS | SYSTOLIC BLOOD PRESSURE: 112 MMHG | RESPIRATION RATE: 18 BRPM | HEIGHT: 61 IN

## 2022-09-06 DIAGNOSIS — I44.7 LBBB (LEFT BUNDLE BRANCH BLOCK): ICD-10-CM

## 2022-09-06 DIAGNOSIS — R06.09 DYSPNEA ON EXERTION: ICD-10-CM

## 2022-09-06 DIAGNOSIS — R00.1 SINUS BRADYCARDIA: ICD-10-CM

## 2022-09-06 DIAGNOSIS — I42.9 SECONDARY CARDIOMYOPATHY (H): Primary | ICD-10-CM

## 2022-09-06 DIAGNOSIS — I50.22 CHRONIC SYSTOLIC HEART FAILURE (H): ICD-10-CM

## 2022-09-06 DIAGNOSIS — I48.19 PERSISTENT ATRIAL FIBRILLATION (H): ICD-10-CM

## 2022-09-06 PROCEDURE — 99215 OFFICE O/P EST HI 40 MIN: CPT | Performed by: INTERNAL MEDICINE

## 2022-09-06 NOTE — PATIENT INSTRUCTIONS
It was a pleasure to meet with you today.      Below is a summary of your visit.   Continue your medications without changes  Cancel your echocardiogram scheduled for later this week.  Schedule a MUGA scan to assess the pump function of your heart.  You have an appointment with Karen Callejas on 9/12.  Follow up with Dr. Holley in about a month or the next appointment available after that.    We will call you to inform you of your test or procedure results within 3 business days of the test being performed.  If you do not hear from our office with the test results within 1 week please do not hesitate to call asking for these results.     Please do not hesitate to call the [facility] with any questions or concerns at [phone].     Sincerely,

## 2022-09-06 NOTE — LETTER
9/6/2022    Lore Martin MD  [address] [place] [address] [address]  [place] [address]    RE: Gladys Ramirez       Dear Colleague,     I had the pleasure of seeing Gladys Ramirez in the [facility].    [facility]   [address], [place] [address]   [email or web address]   OFFICE: [phone]     CARDIOLOGY CLINIC NOTE     Thank you, Dr. Martin, Lore OLIVIA, for asking the [facility] Heart Care team to see Ms. Gladys Ramirez to evaluate Follow Up (Hospitalization)         Assessment/Recommendations   Assessment:    1. Paroxysmal atrial fibrillation - with RVR, on amiodarone and now in a regular rhythm. On eliquis for stroke prevention.  2. Cardiomyopathy - likely related to afib with RVR and LV dyssynchrony from her LBBB  3. Chronic systolic heart failure - currently compensated. LVEF assessment by NM stress test and echo is quite discrepant. The echo also was very technically limited so will need to re-evaluate the LVEF with another modality.  4. LBBB causing dyssynchrony.    Plan:  1. Continue medications without changes. Orthostatic light headedness and fall risk precludes medication titration.  2. Cancel upcoming echo and evaluate LV function with a MUGA scan  3. Follow up in HF clinic as scheduled and with Dr. Holley in 4-6 weeks.    Primary Cardiologist: Dr. Estrada Holley MD    A total of 45 minutes were spent on today's encounter.         History of Present Illness   Ms. Gladys Ramirez is a 89 year old female who presents for follow-up. She has intermittent atrial fibrillation and has been experiencing intermittent dyspnea on exertion, palpitations and dizziness for the past month. She was evaluated with an echocardiogram which revealed an LVEF of 28% and a stress test that was negative for ischemia or infarct with an LVEF of 52%. She was hospitalized from 8/23-8/28 for afib with RVR that was treated with amiodarone.     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 Her weight is down a couple of pounds. She still has some JONES, but no longer has the pre-syncope and sweating that she had when she presented. She is tolerating her medications. She has many questions regarding her medications, what they do for her and her heart related diagnoses.       Other than noted above, Ms. Ramirez denies any chest pain/pressure/tightness, shortness of breath at rest or with exertion, light headedness/dizziness, pre-syncope, syncope, lower extremity swelling, palpitations, paroxysmal nocturnal dyspnea (PND), or orthopnea.     Cardiac Problems and Cardiac Diagnostics     Most Recent Cardiac testing:  ECG dated 8/24/22 (personaly reviewed and interpreted): sinus rhythm with right axis deviation and LBBB    Holter monitor 8/19/22  Holter monitoring from 8/19/2022 to 8/20/2022 (duration 24hrs).   Predominant underlying rhythm was sinus rhythm.   Overall heart rate range 72 to 124bpm, average 89bpm.   Multiple brief episodes of nonsustained atrial tachycardia - longest recorded 7 beats. Most of these clustered around 1:40am.   Paroxysmal atrial fibrillation accounting for 7% of the monitored interval is reported - however, no recordings documenting atrial fibrillation are present.   IVCD present.   There were no pauses of greater than 3 seconds.   Occasional supraventricular ectopic beats (2%). Rare premature ventricular contractions (<1%). No symptoms recorded       ECHO (report reviewed):   TTE 8/24/22  Interpretation Summary     1. Left ventricular size is mildly enlarged. Wall thickness is normal.  Systolic function is severely reduced with global hypokinesis and  intraventricular dyssynchrony due to the presence of a left bundle branch  block. The calculated left ventricular ejection fraction is 28%.  2. Right ventricular size is normal. Systolic function is mildly reduced.  3. Severe left atrial enlargement.  4. No hemodynamically significant valvular  abnormalities.  5. Compared to the prior study dated 11/21/2019, a left bundle branch block is  present and the left ventricular ejection fraction is now severely reduced.    Stress test: dated 8/25/22 revealed        The regadenoson nuclear stress test is negative for inducible myocardial ischemia or infarction.     The left ventricular ejection fraction at stress is low normal at 52%.     The patient is at a low risk of future cardiac ischemic events.     A prior study was conducted on 3/10/2017.  The left ventricular ejection fraction is lower on the current study.         Medications  Allergies   Current Outpatient Medications   Medication Sig Dispense Refill     acetaminophen (TYLENOL) 325 MG tablet [ACETAMINOPHEN (TYLENOL) 325 MG TABLET] Take 650 mg by mouth every 6 (six) hours as needed for pain.       amiodarone (PACERONE) 200 MG tablet Take 1 tablet (200 mg) by mouth 2 times daily for 14 days, THEN 1 tablet (200 mg) daily for 14 days. 60 tablet 0     amoxicillin (AMOXIL) 500 MG capsule Take 2,000 mg by mouth as needed Prior to dental procedures.       apixaban ANTICOAGULANT (ELIQUIS) 5 MG tablet Take 1 tablet (5 mg) by mouth 2 times daily for 30 days 60 tablet 0     cholecalciferol, vitamin D3, (VITAMIN D3) 2,000 unit Tab [CHOLECALCIFEROL, VITAMIN D3, (VITAMIN D3) 2,000 UNIT TAB] Take 1 tablet (2,000 Units total) by mouth daily. 90 tablet 3     COMPOUNDED NON-CONTROLLED SUBSTANCE (CMPD RX) - PHARMACY TO MIX COMPOUNDED MEDICATION lidocaine 5%, ibuprofen 10%, and diclofenac 5% apply 1-2 grams to painful feet 3-4 times a day (Patient taking differently: Apply 1-2 g topically 4 times daily as needed lidocaine 5%, ibuprofen 10%, and diclofenac 5% apply 1-2 grams to painful feet/knee 3-4 times a day) 60 g 3     DULoxetine (CYMBALTA) 60 MG capsule Take 1 capsule (60 mg) by mouth daily       furosemide (LASIX) 20 MG tablet Take 1 tablet (20 mg) by mouth daily 30 tablet 0     lisinopril (ZESTRIL) 5 MG tablet Take 1  "tablet (5 mg) by mouth daily       magnesium oxide (MAG-OX) 400 MG tablet Take 1 tablet (400 mg) by mouth daily for 30 days 30 tablet 0     potassium chloride ER (KLOR-CON M) 20 MEQ CR tablet Take 1 tablet (20 mEq) by mouth daily 30 tablet 0     zolpidem ER (AMBIEN CR) 6.25 MG CR tablet take 1 and 1/4 tablet by mouth at bedtime 45 tablet 0      Allergies   Allergen Reactions     Trazodone Shortness Of Breath and Unknown     Allergic to trazodone and deriv., Other: trouble swallowing       Clindamycin Diarrhea     C-diff     Gabapentin Other (See Comments)     \"Internal tremors\"     Temazepam Other (See Comments)     Annotation: Nightmares       Buprenorphine Palpitations     Tried patch        Physical Examination Review of Systems   Vitals: /58 (BP Location: Right arm, Patient Position: Sitting, Cuff Size: Adult Regular)   Pulse 80   Resp 18   Ht 1.556 m (5' 1.25\")   Wt 69.9 kg (154 lb)   BMI 28.86 kg/m    BMI= Body mass index is 28.86 kg/m .  Wt Readings from Last 3 Encounters:   09/06/22 69.9 kg (154 lb)   08/28/22 69.8 kg (153 lb 12.8 oz)   08/17/22 71.2 kg (157 lb)       General Appearance:   Pleasant elderly female, appears stated age. no acute distress, overweight body habitus   ENT/Mouth: membranes moist, no apparent gingival bleeding.      EYES:  no scleral icterus, normal conjunctivae   Neck: no carotid bruits. supple   Respiratory:   lungs are clear to auscultation, no rales or wheezing, equal chest wall expansion    Cardiovascular:   Regular rhythm, normal rate. Normal first and second heart sounds with no murmurs, rubs, or gallops; Jugular venous pressure normal, no edema bilaterally    Extremities: no cyanosis or clubbing   Skin: no xanthelasma, warm.    Heme/lymph/ Immunology No apparent bleeding noted.   Neurologic: Alert and oriented. no tremors   Psychiatric: Pleasant, calm, appropriate affect.         Please refer above for cardiac ROS details.       Past History   Past Medical History: "   Past Medical History:   Diagnosis Date     Angina pectoris (H)      Chest pain 3/9/2017     Cough      Diarrhea 11/20/2019     Hyperlipidemia      Hypertension      Hypokalemia     Created by Conversion      Osteoarthritis      Pneumonia 2/21/2019        Past Surgical History:   Past Surgical History:   Procedure Laterality Date     APPENDECTOMY       BASAL CELL CARCINOMA EXCISION       LAPAROSCOPY DIAGNOSTIC (GENERAL) N/A 11/04/2014    Procedure: LAPAROSCOPY BILATERAL SALPINGO-OOPHORECTOMY ;  Surgeon: Sofia Harper MD;  Location: [facility];  Service:      TONSILLECTOMY       ZZC TOTAL KNEE ARTHROPLASTY Left         Family History:   Family History   Problem Relation Age of Onset     Heart Disease Mother      Rheumatic Heart Disease Mother      No Known Problems Father      Cancer Sister      Cancer Brother         Social History:   Social History     Socioeconomic History     Marital status: Single     Spouse name: Not on file     Number of children: Not on file     Years of education: Not on file     Highest education level: Not on file   Occupational History     Not on file   Tobacco Use     Smoking status: Never Smoker     Smokeless tobacco: Never Used     Tobacco comment: no passive exposure   Substance and Sexual Activity     Alcohol use: Yes     Comment: Alcoholic Drinks/day: Rarely a glass of wine     Drug use: No     Sexual activity: Not on file   Other Topics Concern     Not on file   Social History Narrative    The patient is a nun.     Social Determinants of Health     Financial Resource Strain: Not on file   Food Insecurity: Not on file   Transportation Needs: Not on file   Physical Activity: Not on file   Stress: Not on file   Social Connections: Not on file   Intimate Partner Violence: Not on file   Housing Stability: Not on file            Lab Results    Chemistry/lipid CBC Cardiac Enzymes/BNP/TSH/INR   Lab Results   Component Value Date    CHOL 179 03/12/2015    HDL 64  03/12/2015    TRIG 176 (H) 03/12/2015    BUN 16 08/28/2022     08/28/2022    CO2 27 08/28/2022    Lab Results   Component Value Date    WBC 6.2 08/28/2022    HGB 12.1 08/28/2022    HCT 36.1 08/28/2022    MCV 93 08/28/2022     08/28/2022    Lab Results   Component Value Date    TROPONINI 0.16 08/23/2022    BNP 1,933 (H) 08/23/2022    TSH 2.05 02/21/2019    INR 1.37 (H) 08/23/2022                Thank you for allowing me to participate in the care of your patient.      Sincerely,     Robert Corral MD     [facility]  cc:   Kristina Velasquez MD  [address] [facility] [address]  [place] [address]

## 2022-09-06 NOTE — PROGRESS NOTES
[facility]   [address], [place] [address]   [email or web address]   OFFICE: [phone]     CARDIOLOGY CLINIC NOTE     Thank you, Lore Jones, for asking the [facility] team to see Ms. Gladys Ramirez to evaluate Follow Up (Hospitalization)         Assessment/Recommendations   Assessment:    Paroxysmal atrial fibrillation - with RVR, on amiodarone and now in a regular rhythm. On eliquis for stroke prevention.  Cardiomyopathy - likely related to afib with RVR and LV dyssynchrony from her LBBB  Chronic systolic heart failure - currently compensated. LVEF assessment by NM stress test and echo is quite discrepant. The echo also was very technically limited so will need to re-evaluate the LVEF with another modality.  LBBB causing dyssynchrony.    Plan:  Continue medications without changes. Orthostatic light headedness and fall risk precludes medication titration.  Cancel upcoming echo and evaluate LV function with a MUGA scan  Follow up in HF clinic as scheduled and with Dr. Holley in 4-6 weeks.    Primary Cardiologist: Dr. Estrada Holley MD    A total of 45 minutes were spent on today's encounter.         History of Present Illness   Ms. Gladys Ramirez is a 89 year old female who presents for follow-up. She has intermittent atrial fibrillation and has been experiencing intermittent dyspnea on exertion, palpitations and dizziness for the past month. She was evaluated with an echocardiogram which revealed an LVEF of 28% and a stress test that was negative for ischemia or infarct with an LVEF of 52%. She was hospitalized from 8/23-8/28 for afib with RVR that was treated with amiodarone.     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 Her weight is down a couple of pounds. She still has some JONES, but no longer has the pre-syncope and sweating that she had when she presented. She is tolerating her medications. She has many  questions regarding her medications, what they do for her and her heart related diagnoses.       Other than noted above, Ms. Ramirez denies any chest pain/pressure/tightness, shortness of breath at rest or with exertion, light headedness/dizziness, pre-syncope, syncope, lower extremity swelling, palpitations, paroxysmal nocturnal dyspnea (PND), or orthopnea.     Cardiac Problems and Cardiac Diagnostics     Most Recent Cardiac testing:  ECG dated 8/24/22 (personaly reviewed and interpreted): sinus rhythm with right axis deviation and LBBB    Holter monitor 8/19/22  Holter monitoring from 8/19/2022 to 8/20/2022 (duration 24hrs).   Predominant underlying rhythm was sinus rhythm.   Overall heart rate range 72 to 124bpm, average 89bpm.   Multiple brief episodes of nonsustained atrial tachycardia - longest recorded 7 beats. Most of these clustered around 1:40am.   Paroxysmal atrial fibrillation accounting for 7% of the monitored interval is reported - however, no recordings documenting atrial fibrillation are present.   IVCD present.   There were no pauses of greater than 3 seconds.   Occasional supraventricular ectopic beats (2%). Rare premature ventricular contractions (<1%). No symptoms recorded       ECHO (report reviewed):   TTE 8/24/22  Interpretation Summary     1. Left ventricular size is mildly enlarged. Wall thickness is normal.  Systolic function is severely reduced with global hypokinesis and  intraventricular dyssynchrony due to the presence of a left bundle branch  block. The calculated left ventricular ejection fraction is 28%.  2. Right ventricular size is normal. Systolic function is mildly reduced.  3. Severe left atrial enlargement.  4. No hemodynamically significant valvular abnormalities.  5. Compared to the prior study dated 11/21/2019, a left bundle branch block is  present and the left ventricular ejection fraction is now severely reduced.    Stress test: dated 8/25/22 revealed        The  regadenoson nuclear stress test is negative for inducible myocardial ischemia or infarction.     The left ventricular ejection fraction at stress is low normal at 52%.     The patient is at a low risk of future cardiac ischemic events.     A prior study was conducted on 3/10/2017.  The left ventricular ejection fraction is lower on the current study.         Medications  Allergies   Current Outpatient Medications   Medication Sig Dispense Refill     acetaminophen (TYLENOL) 325 MG tablet [ACETAMINOPHEN (TYLENOL) 325 MG TABLET] Take 650 mg by mouth every 6 (six) hours as needed for pain.       amiodarone (PACERONE) 200 MG tablet Take 1 tablet (200 mg) by mouth 2 times daily for 14 days, THEN 1 tablet (200 mg) daily for 14 days. 60 tablet 0     amoxicillin (AMOXIL) 500 MG capsule Take 2,000 mg by mouth as needed Prior to dental procedures.       apixaban ANTICOAGULANT (ELIQUIS) 5 MG tablet Take 1 tablet (5 mg) by mouth 2 times daily for 30 days 60 tablet 0     cholecalciferol, vitamin D3, (VITAMIN D3) 2,000 unit Tab [CHOLECALCIFEROL, VITAMIN D3, (VITAMIN D3) 2,000 UNIT TAB] Take 1 tablet (2,000 Units total) by mouth daily. 90 tablet 3     COMPOUNDED NON-CONTROLLED SUBSTANCE (CMPD RX) - PHARMACY TO MIX COMPOUNDED MEDICATION lidocaine 5%, ibuprofen 10%, and diclofenac 5% apply 1-2 grams to painful feet 3-4 times a day (Patient taking differently: Apply 1-2 g topically 4 times daily as needed lidocaine 5%, ibuprofen 10%, and diclofenac 5% apply 1-2 grams to painful feet/knee 3-4 times a day) 60 g 3     DULoxetine (CYMBALTA) 60 MG capsule Take 1 capsule (60 mg) by mouth daily       furosemide (LASIX) 20 MG tablet Take 1 tablet (20 mg) by mouth daily 30 tablet 0     lisinopril (ZESTRIL) 5 MG tablet Take 1 tablet (5 mg) by mouth daily       magnesium oxide (MAG-OX) 400 MG tablet Take 1 tablet (400 mg) by mouth daily for 30 days 30 tablet 0     potassium chloride ER (KLOR-CON M) 20 MEQ CR tablet Take 1 tablet (20 mEq) by  "mouth daily 30 tablet 0     zolpidem ER (AMBIEN CR) 6.25 MG CR tablet take 1 and 1/4 tablet by mouth at bedtime 45 tablet 0      Allergies   Allergen Reactions     Trazodone Shortness Of Breath and Unknown     Allergic to trazodone and deriv., Other: trouble swallowing       Clindamycin Diarrhea     C-diff     Gabapentin Other (See Comments)     \"Internal tremors\"     Temazepam Other (See Comments)     Annotation: Nightmares       Buprenorphine Palpitations     Tried patch        Physical Examination Review of Systems   Vitals: /58 (BP Location: Right arm, Patient Position: Sitting, Cuff Size: Adult Regular)   Pulse 80   Resp 18   Ht 1.556 m (5' 1.25\")   Wt 69.9 kg (154 lb)   BMI 28.86 kg/m    BMI= Body mass index is 28.86 kg/m .  Wt Readings from Last 3 Encounters:   09/06/22 69.9 kg (154 lb)   08/28/22 69.8 kg (153 lb 12.8 oz)   08/17/22 71.2 kg (157 lb)       General Appearance:   Pleasant elderly female, appears stated age. no acute distress, overweight body habitus   ENT/Mouth: membranes moist, no apparent gingival bleeding.      EYES:  no scleral icterus, normal conjunctivae   Neck: no carotid bruits. supple   Respiratory:   lungs are clear to auscultation, no rales or wheezing, equal chest wall expansion    Cardiovascular:   Regular rhythm, normal rate. Normal first and second heart sounds with no murmurs, rubs, or gallops; Jugular venous pressure normal, no edema bilaterally    Extremities: no cyanosis or clubbing   Skin: no xanthelasma, warm.    Heme/lymph/ Immunology No apparent bleeding noted.   Neurologic: Alert and oriented. no tremors   Psychiatric: Pleasant, calm, appropriate affect.         Please refer above for cardiac ROS details.       Past History   Past Medical History:   Past Medical History:   Diagnosis Date     Angina pectoris (H)      Chest pain 3/9/2017     Cough      Diarrhea 11/20/2019     Hyperlipidemia      Hypertension      Hypokalemia     Created by Conversion      " Osteoarthritis      Pneumonia 2/21/2019        Past Surgical History:   Past Surgical History:   Procedure Laterality Date     APPENDECTOMY       BASAL CELL CARCINOMA EXCISION       LAPAROSCOPY DIAGNOSTIC (GENERAL) N/A 11/04/2014    Procedure: LAPAROSCOPY BILATERAL SALPINGO-OOPHORECTOMY ;  Surgeon: Sofia Harper MD;  Location: [facility];  Service:      TONSILLECTOMY       ZZC TOTAL KNEE ARTHROPLASTY Left         Family History:   Family History   Problem Relation Age of Onset     Heart Disease Mother      Rheumatic Heart Disease Mother      No Known Problems Father      Cancer Sister      Cancer Brother         Social History:   Social History     Socioeconomic History     Marital status: Single     Spouse name: Not on file     Number of children: Not on file     Years of education: Not on file     Highest education level: Not on file   Occupational History     Not on file   Tobacco Use     Smoking status: Never Smoker     Smokeless tobacco: Never Used     Tobacco comment: no passive exposure   Substance and Sexual Activity     Alcohol use: Yes     Comment: Alcoholic Drinks/day: Rarely a glass of wine     Drug use: No     Sexual activity: Not on file   Other Topics Concern     Not on file   Social History Narrative    The patient is a nun.     Social Determinants of Health     Financial Resource Strain: Not on file   Food Insecurity: Not on file   Transportation Needs: Not on file   Physical Activity: Not on file   Stress: Not on file   Social Connections: Not on file   Intimate Partner Violence: Not on file   Housing Stability: Not on file            Lab Results    Chemistry/lipid CBC Cardiac Enzymes/BNP/TSH/INR   Lab Results   Component Value Date    CHOL 179 03/12/2015    HDL 64 03/12/2015    TRIG 176 (H) 03/12/2015    BUN 16 08/28/2022     08/28/2022    CO2 27 08/28/2022    Lab Results   Component Value Date    WBC 6.2 08/28/2022    HGB 12.1 08/28/2022    HCT 36.1 08/28/2022    MCV 93  08/28/2022     08/28/2022    Lab Results   Component Value Date    TROPONINI 0.16 08/23/2022    BNP 1,933 (H) 08/23/2022    TSH 2.05 02/21/2019    INR 1.37 (H) 08/23/2022

## 2022-09-07 ENCOUNTER — OFFICE VISIT (OUTPATIENT)
Dept: FAMILY MEDICINE | Facility: CLINIC | Age: 87
End: 2022-09-07
Payer: COMMERCIAL

## 2022-09-07 VITALS
WEIGHT: 157 LBS | DIASTOLIC BLOOD PRESSURE: 68 MMHG | HEART RATE: 64 BPM | HEIGHT: 63 IN | BODY MASS INDEX: 27.82 KG/M2 | RESPIRATION RATE: 16 BRPM | SYSTOLIC BLOOD PRESSURE: 104 MMHG | OXYGEN SATURATION: 97 % | TEMPERATURE: 98 F

## 2022-09-07 DIAGNOSIS — I48.0 PAROXYSMAL ATRIAL FIBRILLATION (H): ICD-10-CM

## 2022-09-07 DIAGNOSIS — I10 BENIGN ESSENTIAL HYPERTENSION: ICD-10-CM

## 2022-09-07 DIAGNOSIS — E83.42 HYPOMAGNESEMIA: ICD-10-CM

## 2022-09-07 DIAGNOSIS — I50.9 ACUTE ON CHRONIC CONGESTIVE HEART FAILURE, UNSPECIFIED HEART FAILURE TYPE (H): ICD-10-CM

## 2022-09-07 DIAGNOSIS — Z79.899 ENCOUNTER FOR LONG-TERM (CURRENT) USE OF MEDICATIONS: Primary | ICD-10-CM

## 2022-09-07 LAB
ANION GAP SERPL CALCULATED.3IONS-SCNC: 11 MMOL/L (ref 7–15)
BUN SERPL-MCNC: 26.2 MG/DL (ref 8–23)
CALCIUM SERPL-MCNC: 10.3 MG/DL (ref 8.2–9.6)
CHLORIDE SERPL-SCNC: 100 MMOL/L (ref 98–107)
CREAT SERPL-MCNC: 1.14 MG/DL (ref 0.51–0.95)
DEPRECATED HCO3 PLAS-SCNC: 27 MMOL/L (ref 22–29)
GFR SERPL CREATININE-BSD FRML MDRD: 45 ML/MIN/1.73M2
GLUCOSE SERPL-MCNC: 75 MG/DL (ref 70–99)
MAGNESIUM SERPL-MCNC: 2.2 MG/DL (ref 1.7–2.3)
POTASSIUM SERPL-SCNC: 4.5 MMOL/L (ref 3.4–5.3)
SODIUM SERPL-SCNC: 138 MMOL/L (ref 136–145)

## 2022-09-07 PROCEDURE — 80048 BASIC METABOLIC PNL TOTAL CA: CPT | Performed by: FAMILY MEDICINE

## 2022-09-07 PROCEDURE — 36415 COLL VENOUS BLD VENIPUNCTURE: CPT | Performed by: FAMILY MEDICINE

## 2022-09-07 PROCEDURE — 83735 ASSAY OF MAGNESIUM: CPT | Performed by: FAMILY MEDICINE

## 2022-09-07 PROCEDURE — 99495 TRANSJ CARE MGMT MOD F2F 14D: CPT | Performed by: FAMILY MEDICINE

## 2022-09-07 NOTE — PROGRESS NOTES
Post Discharge Outreach 8/30/2022   Admission Date 8/23/2022   Reason for Admission dyspnea   Discharge Date 8/28/2022   Discharge Diagnosis dyspnea   How are you doing now that you are home? I am tired but I am doing okay. I am eating okay.   How are your symptoms? (Red Flag symptoms escalate to triage hotline per guidelines) Improved   Do you feel your condition is stable enough to be safe at home until your provider visit? Yes   Does the patient have their discharge instructions?  Yes   Does the patient have questions regarding their discharge instructions?  No   Were you started on any new medications or were there changes to any of your previous medications?  Yes   Does the patient have all of their medications? Yes   Do you have questions regarding any of your medications?  No   Discharge follow-up appointment scheduled within 14 calendar days?  No     Hospital Follow-up Visit:    Hospital/Nursing Home/ Rehab Facility: [facility]  Date of Admission: 8/23/2022  Date of Discharge: 8/28/2022  Reason(s) for Admission:   Dyspnea on exertion, paroxysmal atrial fibrillation, acute on chronic systolic heart failure weakness    Was your hospitalization related to COVID-19? No   Problems taking medications regularly:  None  Medication changes since discharge: None  Problems adhering to non-medication therapy:  None    Summary of hospitalization:  [facility] discharge summary reviewed  Diagnostic Tests/Treatments reviewed.  Follow up needed: none  Other Healthcare Providers Involved in Patient s Care:         Specialist appointment - cardiology  Update since discharge: improved.         Post Medication Reconciliation Status:        Plan of care communicated with patient           ASSESSMENT:  1. Encounter for long-term (current) use of medications      - Basic metabolic panel  (Ca, Cl, CO2, Creat, Gluc, K, Na, BUN); Future    2. Benign essential hypertension    Blood pressure  is well controlled currently no dizziness noted.  - Basic metabolic panel  (Ca, Cl, CO2, Creat, Gluc, K, Na, BUN); Future    3. Acute on chronic congestive heart failure, unspecified heart failure type (H)    Discussed recent test results from her hospitalization 1 echocardiogram showed a decreased LVEF of 28%, Lexiscan scan test done at 8/25/2022 showed an EF of 52% nuclear scan has been scheduled for next week  - Basic metabolic panel  (Ca, Cl, CO2, Creat, Gluc, K, Na, BUN); Future    4. Paroxysmal atrial fibrillation (H)    No shortness of breath no dyspnea noted she is asymptomatic and currently feels well  - Basic metabolic panel  (Ca, Cl, CO2, Creat, Gluc, K, Na, BUN); Future    5. Hypomagnesemia    On magnesium supplementation rechecking level today  - Magnesium; Future      There are no Patient Instructions on file for this visit.    Orders Placed This Encounter   Procedures     REVIEW OF HEALTH MAINTENANCE PROTOCOL ORDERS     Basic metabolic panel  (Ca, Cl, CO2, Creat, Gluc, K, Na, BUN)     Magnesium     There are no discontinued medications.    No follow-ups on file.    CHIEF COMPLAINT:  chief complaint follow-up from hospitalization for paroxysmal atrial fibrillation weakness and dyspnea on exertion with reduced heart function    HISTORY OF PRESENT ILLNESS:  Gladys is a 89 year old female   Who is here for follow-up she was hospitalized from 8/23/2028 at [facility].  She initially presented with weakness shortness of breath and irregular heartbeat.  She was treated in hospital with started on amiodarone and repeated test showed a worsening then improvement of her EF.  She is reports that she does not feel tired sweaty or fatigue.  She says she feels like she has more energy appetite is described as being normal she is already seen cardiology.  She has another cardiology visit scheduled in upcoming weeks and has a nuclear medicine study study  scheduled for next week.  She denies any  "headaches.    REVIEW OF SYSTEMS:     According to patient 10 point review of  All other systems are negative.    PFSH:    Social history reviewed    TOBACCO USE:  History   Smoking Status     Never Smoker   Smokeless Tobacco     Never Used     Comment: no passive exposure       VITALS:  Vitals:    09/07/22 1036   BP: 104/68   Pulse: 64   Resp: 16   Temp: 98  F (36.7  C)   TempSrc: Oral   SpO2: 97%   Weight: 71.2 kg (157 lb)   Height: 1.588 m (5' 2.5\")     Wt Readings from Last 3 Encounters:   09/07/22 71.2 kg (157 lb)   09/06/22 69.9 kg (154 lb)   08/28/22 69.8 kg (153 lb 12.8 oz)       PHYSICAL EXAM:    Interactive elderly female sitting comfortably exam room no acute distress  HEENT neck supple mucous members moist oral cavity shows no exudate no erythema  Respiratory system clear to auscultation equal breath sounds no wheezes no crackles  CVS regular rate and rhythm no murmurs no rubs no gallops no pedal edema  Musculoskeletal has no back pain noted  CNS cranial nerves II to XII intact gait is normal reflexes are brisk  Psych oriented x3 not agitated well-groomed  GI the abdomen soft there is no focal tenderness.    DATA REVIEWED:  Additional History from Old Records Summarized (2): 0  Decision to Obtain Records (1): 0  Radiology Tests Summarized or Ordered (1): 0  Labs Reviewed or Ordered (1): 0  Medicine Test Summarized or Ordered (1): 0  Independent Review of EKG or X-RAY(2 each): 0      MEDICATIONS:  Current Outpatient Medications   Medication Sig Dispense Refill     acetaminophen (TYLENOL) 325 MG tablet [ACETAMINOPHEN (TYLENOL) 325 MG TABLET] Take 650 mg by mouth every 6 (six) hours as needed for pain.       amiodarone (PACERONE) 200 MG tablet Take 1 tablet (200 mg) by mouth 2 times daily for 14 days, THEN 1 tablet (200 mg) daily for 14 days. 60 tablet 0     amoxicillin (AMOXIL) 500 MG capsule Take 2,000 mg by mouth as needed Prior to dental procedures.       apixaban ANTICOAGULANT (ELIQUIS) 5 MG tablet Take " 1 tablet (5 mg) by mouth 2 times daily for 30 days 60 tablet 0     cholecalciferol, vitamin D3, (VITAMIN D3) 2,000 unit Tab [CHOLECALCIFEROL, VITAMIN D3, (VITAMIN D3) 2,000 UNIT TAB] Take 1 tablet (2,000 Units total) by mouth daily. 90 tablet 3     COMPOUNDED NON-CONTROLLED SUBSTANCE (CMPD RX) - PHARMACY TO MIX COMPOUNDED MEDICATION lidocaine 5%, ibuprofen 10%, and diclofenac 5% apply 1-2 grams to painful feet 3-4 times a day (Patient taking differently: Apply 1-2 g topically 4 times daily as needed lidocaine 5%, ibuprofen 10%, and diclofenac 5% apply 1-2 grams to painful feet/knee 3-4 times a day) 60 g 3     DULoxetine (CYMBALTA) 60 MG capsule Take 1 capsule (60 mg) by mouth daily       furosemide (LASIX) 20 MG tablet Take 1 tablet (20 mg) by mouth daily 30 tablet 0     lisinopril (ZESTRIL) 5 MG tablet Take 1 tablet (5 mg) by mouth daily       magnesium oxide (MAG-OX) 400 MG tablet Take 1 tablet (400 mg) by mouth daily for 30 days 30 tablet 0     potassium chloride ER (KLOR-CON M) 20 MEQ CR tablet Take 1 tablet (20 mEq) by mouth daily 30 tablet 0     zolpidem ER (AMBIEN CR) 6.25 MG CR tablet take 1 and 1/4 tablet by mouth at bedtime 45 tablet 0

## 2022-09-08 NOTE — RESULT ENCOUNTER NOTE
Please inform patient that magnesium level is now normal she is still mildly dehydrated is she drinking enough water?

## 2022-09-12 ENCOUNTER — OFFICE VISIT (OUTPATIENT)
Dept: CARDIOLOGY | Facility: CLINIC | Age: 87
End: 2022-09-12
Attending: INTERNAL MEDICINE
Payer: COMMERCIAL

## 2022-09-12 VITALS
BODY MASS INDEX: 27.46 KG/M2 | HEIGHT: 63 IN | HEART RATE: 61 BPM | RESPIRATION RATE: 16 BRPM | WEIGHT: 155 LBS | DIASTOLIC BLOOD PRESSURE: 68 MMHG | SYSTOLIC BLOOD PRESSURE: 122 MMHG

## 2022-09-12 DIAGNOSIS — I48.91 ATRIAL FIBRILLATION WITH RVR (H): ICD-10-CM

## 2022-09-12 DIAGNOSIS — I48.0 PAROXYSMAL ATRIAL FIBRILLATION (H): ICD-10-CM

## 2022-09-12 DIAGNOSIS — I50.20 HFREF (HEART FAILURE WITH REDUCED EJECTION FRACTION) (H): Primary | ICD-10-CM

## 2022-09-12 DIAGNOSIS — R06.09 DYSPNEA ON EXERTION: ICD-10-CM

## 2022-09-12 PROCEDURE — 99215 OFFICE O/P EST HI 40 MIN: CPT | Performed by: NURSE PRACTITIONER

## 2022-09-12 NOTE — LETTER
9/12/2022    Lore Martin MD  [address] [place] [address]    RE: Gladys Ramirez       Dear Colleague,     I had the pleasure of seeing Gladys Ramirez in the [facility].  HEART CARE PROGRESS NOTE      [facility]  [phone]      Assessment/Recommendations   Assessment:    1.  Heart failure with reduced ejection fraction: She has a history of heart failure with preserved ejection fraction.  Echocardiogram showed ejection fraction of 28% and stress test 52%.  Decreased ejection fraction is thought to be related to atrial fibrillation or left bundle branch block.  She has no symptoms of acute heart failure. We reviewed heart failure diagnosis, medications, treatment plan, low sodium diet, weight monitoring, and symptom monitoring.      2.  Paroxysmal atrial fibrillation: Heart rate is regular today.  She is now taking amiodarone 200 mg daily and Eliquis.    3.  Hypertension: Blood pressure 122/68.    Plan:  1.   Heart failure medications:  - Beta blocker therapy with metoprolol was stopped during hospitalization due to bradycardia.  Heart rate 61 bpm today  - ACEI therapy with lisinopril 5 mg daily  - Diuretic therapy with furosemide  20 mg daily  2.  BMP and magnesium checked last week.  Reviewed  3.  Low-sodium diet and daily weights    Sr. Gladys will have Muga scan tomorrow.  If ejection fraction remains low, follow-up in heart failure clinic.  Follow-up with Dr. Holley on October 26.     History of Present Illness/Subjective    Ms. Gladys aRmirez is a 89 year old female seen at [facility] today for hospital follow-up.  She has a history of PE/DVT, heart failure with preserved ejection fraction, paroxysmal atrial fibrillation, left bundle branch block, hypertension, dyslipidemia, and COVID in May 2022.  She was hospitalized August 23 to August 28, 2022 with dyspnea on exertion and near syncope.  She was diagnosed with  "atrial fibrillation with rapid ventricular response and new heart failure with reduced ejection fraction.  Stress test was negative for ischemia and showed ejection fraction of 52%.  Echocardiogram showed ejection fraction of 28%.  Decreased ejection fraction is felt to be from atrial fibrillation or left bundle branch block.    She has no symptoms of acute heart failure.  She has some dyspnea on exertion but this has improved significantly.  She denies fatigue, lightheadedness, chest pain and lower extremity edema.      Her weight has been stable since discharge.  She is following a low-sodium diet.      ECHO:   Echo result w/o MOPS: Interpretation Summary 1. Left ventricular size is mildly enlarged. Wall thickness is normal.Systolic function is severely reduced with global hypokinesis andintraventricular dyssynchrony due to the presence of a left bundle branchblock. The calculated left ventricular ejection fraction is 28%.2. Right ventricular size is normal. Systolic function is mildly reduced.3. Severe left atrial enlargement.4. No hemodynamically significant valvular abnormalities.5. Compared to the prior study dated 11/21/2019, a left bundle branch block ispresent and the left ventricular ejection fraction is now severely reduced.          Physical Examination Review of Systems   Vitals: /68 (BP Location: Left arm, Patient Position: Sitting, Cuff Size: Adult Regular)   Pulse 61   Resp 16   Ht 1.588 m (5' 2.5\")   Wt 70.3 kg (155 lb)   BMI 27.90 kg/m    BMI= Body mass index is 27.9 kg/m .  Wt Readings from Last 3 Encounters:   09/12/22 70.3 kg (155 lb)   09/07/22 71.2 kg (157 lb)   09/06/22 69.9 kg (154 lb)       General Appearance:     Alert, cooperative and in no acute distress.   ENT/Mouth: membranes moist, no oral lesions or bleeding gums.      EYES:  no scleral icterus, normal conjunctivae   Chest/Lungs:   lungs are clear to auscultation, no rales or wheezing, respirations unlabored "   Cardiovascular:   Regular. Normal first and second heart sounds, no edema bilateral lower extremities    Abdomen:  Soft, nontender, nondistended, bowel sounds present   Extremities: no cyanosis or clubbing   Skin: warm, dry.    Neurologic: mood and affect are appropriate, alert and oriented x3         Please refer above for cardiac ROS details.      Medical History  Surgical History Family History Social History   Past Medical History:   Diagnosis Date     Angina pectoris (H)      Chest pain 3/9/2017     Cough      Diarrhea 11/20/2019     Hyperlipidemia      Hypertension      Hypokalemia     Created by Conversion      Osteoarthritis      Pneumonia 2/21/2019     Past Surgical History:   Procedure Laterality Date     APPENDECTOMY       BASAL CELL CARCINOMA EXCISION       LAPAROSCOPY DIAGNOSTIC (GENERAL) N/A 11/04/2014    Procedure: LAPAROSCOPY BILATERAL SALPINGO-OOPHORECTOMY ;  Surgeon: Sofia Harper MD;  [facility];  Service:      TONSILLECTOMY       ZZC TOTAL KNEE ARTHROPLASTY Left      Family History   Problem Relation Age of Onset     Heart Disease Mother      Rheumatic Heart Disease Mother      No Known Problems Father      Cancer Sister      Cancer Brother     Social History     Socioeconomic History     Marital status: Single     Spouse name: Not on file     Number of children: Not on file     Years of education: Not on file     Highest education level: Not on file   Occupational History     Not on file   Tobacco Use     Smoking status: Never Smoker     Smokeless tobacco: Never Used     Tobacco comment: no passive exposure   Substance and Sexual Activity     Alcohol use: Yes     Comment: Alcoholic Drinks/day: Rarely a glass of wine     Drug use: No     Sexual activity: Not on file   Other Topics Concern     Not on file   Social History Narrative    The patient is a nun.     Social Determinants of Health     Financial Resource Strain: Not on file   Food Insecurity: Not on file    Transportation Needs: Not on file   Physical Activity: Not on file   Stress: Not on file   Social Connections: Not on file   Intimate Partner Violence: Not on file   Housing Stability: Not on file          Medications  Allergies   Current Outpatient Medications   Medication Sig Dispense Refill     acetaminophen (TYLENOL) 325 MG tablet [ACETAMINOPHEN (TYLENOL) 325 MG TABLET] Take 650 mg by mouth every 6 (six) hours as needed for pain.       amiodarone (PACERONE) 200 MG tablet Take 1 tablet (200 mg) by mouth 2 times daily for 14 days, THEN 1 tablet (200 mg) daily for 14 days. 60 tablet 0     amoxicillin (AMOXIL) 500 MG capsule Take 2,000 mg by mouth as needed Prior to dental procedures.       apixaban ANTICOAGULANT (ELIQUIS) 5 MG tablet Take 1 tablet (5 mg) by mouth 2 times daily for 30 days 60 tablet 0     cholecalciferol, vitamin D3, (VITAMIN D3) 2,000 unit Tab [CHOLECALCIFEROL, VITAMIN D3, (VITAMIN D3) 2,000 UNIT TAB] Take 1 tablet (2,000 Units total) by mouth daily. 90 tablet 3     COMPOUNDED NON-CONTROLLED SUBSTANCE (CMPD RX) - PHARMACY TO MIX COMPOUNDED MEDICATION lidocaine 5%, ibuprofen 10%, and diclofenac 5% apply 1-2 grams to painful feet 3-4 times a day (Patient taking differently: Apply 1-2 g topically 4 times daily as needed lidocaine 5%, ibuprofen 10%, and diclofenac 5% apply 1-2 grams to painful feet/knee 3-4 times a day) 60 g 3     DULoxetine (CYMBALTA) 60 MG capsule Take 1 capsule (60 mg) by mouth daily       furosemide (LASIX) 20 MG tablet Take 1 tablet (20 mg) by mouth daily 30 tablet 0     lisinopril (ZESTRIL) 5 MG tablet Take 1 tablet (5 mg) by mouth daily       magnesium oxide (MAG-OX) 400 MG tablet Take 1 tablet (400 mg) by mouth daily for 30 days 30 tablet 0     potassium chloride ER (KLOR-CON M) 20 MEQ CR tablet Take 1 tablet (20 mEq) by mouth daily 30 tablet 0     zolpidem ER (AMBIEN CR) 6.25 MG CR tablet take 1 and 1/4 tablet by mouth at bedtime 45 tablet 0    Allergies   Allergen Reactions  "    Trazodone Shortness Of Breath and Unknown     Allergic to trazodone and deriv., Other: trouble swallowing       Clindamycin Diarrhea     C-diff     Gabapentin Other (See Comments)     \"Internal tremors\"     Temazepam Other (See Comments)     Annotation: Nightmares       Buprenorphine Palpitations     Tried patch         Lab Results    Chemistry/lipid CBC Cardiac Enzymes/BNP/TSH/INR   Recent Labs   Lab Test 03/12/15  1230   CHOL 179   HDL 64   LDL 80   TRIG 176*     Recent Labs   Lab Test 03/12/15  1230   LDL 80     Recent Labs   Lab Test 09/07/22  1112      POTASSIUM 4.5   CHLORIDE 100   CO2 27   GLC 75   BUN 26.2*   CR 1.14*   GFRESTIMATED 45*   MARLENE 10.3*     Recent Labs   Lab Test 09/07/22  1112 08/28/22  0442 08/26/22  0723   CR 1.14* 0.81 0.86     Recent Labs   Lab Test 06/15/21  0910   A1C 6.0*    Recent Labs   Lab Test 08/28/22  0442   WBC 6.2   HGB 12.1   HCT 36.1   MCV 93        Recent Labs   Lab Test 08/28/22  0442 08/26/22  0723 08/24/22  0718   HGB 12.1 12.6 12.8    Recent Labs   Lab Test 08/23/22  1154 08/17/22  1215 11/21/19  0451   TROPONINI 0.16 0.04 0.01     Recent Labs   Lab Test 08/23/22  1154 08/17/22  1215 03/03/21  1222 02/21/19  0713   BNP 1,933*  --  177* 95   NTBNP  --  5,101*  --   --      Recent Labs   Lab Test 02/21/19  0648   TSH 2.05     Recent Labs   Lab Test 08/23/22  1154 06/05/19  0052 02/21/19  0648   INR 1.37* 1.50* 1.20*        40 minutes spent on the date of encounter doing chart review, review of test results, patient visit and documentation.          Thank you for allowing me to participate in the care of your patient.      Sincerely,     Karen Alvarez NP     [facility]  cc:   Estrada Holley MD  [address] [place], [address]  [place] [address]        "

## 2022-09-12 NOTE — PATIENT INSTRUCTIONS
Gladys Ramirez,    It was a pleasure to see you today at the [facility].     My recommendations after this visit include:  - No changes to your medications.    - Continue to monitor for signs of retaining fluid (increasing weights, shortness of breath, swelling) and call with any concerns.   - If you have any questions or concerns, please call [phone] to talk with my nurse.    Karen Callejas, CNP

## 2022-09-12 NOTE — PROGRESS NOTES
HEART CARE PROGRESS NOTE      [facility]  [phone]      Assessment/Recommendations   Assessment:    1.  Heart failure with reduced ejection fraction: She has a history of heart failure with preserved ejection fraction.  Echocardiogram showed ejection fraction of 28% and stress test 52%.  Decreased ejection fraction is thought to be related to atrial fibrillation or left bundle branch block.  She has no symptoms of acute heart failure. We reviewed heart failure diagnosis, medications, treatment plan, low sodium diet, weight monitoring, and symptom monitoring.      2.  Paroxysmal atrial fibrillation: Heart rate is regular today.  She is now taking amiodarone 200 mg daily and Eliquis.    3.  Hypertension: Blood pressure 122/68.    Plan:  1.   Heart failure medications:  - Beta blocker therapy with metoprolol was stopped during hospitalization due to bradycardia.  Heart rate 61 bpm today  - ACEI therapy with lisinopril 5 mg daily  - Diuretic therapy with furosemide  20 mg daily  2.  BMP and magnesium checked last week.  Reviewed  3.  Low-sodium diet and daily weights    Sr. Gladys will have Muga scan tomorrow.  If ejection fraction remains low, follow-up in heart failure clinic.  Follow-up with Dr. Holley on October 26.     History of Present Illness/Subjective    Ms. Gladys Ramirez is a 89 year old female seen at [facility] today for hospital follow-up.  She has a history of PE/DVT, heart failure with preserved ejection fraction, paroxysmal atrial fibrillation, left bundle branch block, hypertension, dyslipidemia, and COVID in May 2022.  She was hospitalized August 23 to August 28, 2022 with dyspnea on exertion and near syncope.  She was diagnosed with atrial fibrillation with rapid ventricular response and new heart failure with reduced ejection fraction.  Stress test was negative for ischemia and showed ejection fraction of 52%.  Echocardiogram showed ejection  "fraction of 28%.  Decreased ejection fraction is felt to be from atrial fibrillation or left bundle branch block.    She has no symptoms of acute heart failure.  She has some dyspnea on exertion but this has improved significantly.  She denies fatigue, lightheadedness, chest pain and lower extremity edema.      Her weight has been stable since discharge.  She is following a low-sodium diet.      ECHO:   Echo result w/o MOPS: Interpretation Summary 1. Left ventricular size is mildly enlarged. Wall thickness is normal.Systolic function is severely reduced with global hypokinesis andintraventricular dyssynchrony due to the presence of a left bundle branchblock. The calculated left ventricular ejection fraction is 28%.2. Right ventricular size is normal. Systolic function is mildly reduced.3. Severe left atrial enlargement.4. No hemodynamically significant valvular abnormalities.5. Compared to the prior study dated 11/21/2019, a left bundle branch block ispresent and the left ventricular ejection fraction is now severely reduced.          Physical Examination Review of Systems   Vitals: /68 (BP Location: Left arm, Patient Position: Sitting, Cuff Size: Adult Regular)   Pulse 61   Resp 16   Ht 1.588 m (5' 2.5\")   Wt 70.3 kg (155 lb)   BMI 27.90 kg/m    BMI= Body mass index is 27.9 kg/m .  Wt Readings from Last 3 Encounters:   09/12/22 70.3 kg (155 lb)   09/07/22 71.2 kg (157 lb)   09/06/22 69.9 kg (154 lb)       General Appearance:     Alert, cooperative and in no acute distress.   ENT/Mouth: membranes moist, no oral lesions or bleeding gums.      EYES:  no scleral icterus, normal conjunctivae   Chest/Lungs:   lungs are clear to auscultation, no rales or wheezing, respirations unlabored   Cardiovascular:   Regular. Normal first and second heart sounds, no edema bilateral lower extremities    Abdomen:  Soft, nontender, nondistended, bowel sounds present   Extremities: no cyanosis or clubbing   Skin: warm, dry.  "   Neurologic: mood and affect are appropriate, alert and oriented x3         Please refer above for cardiac ROS details.      Medical History  Surgical History Family History Social History   Past Medical History:   Diagnosis Date     Angina pectoris (H)      Chest pain 3/9/2017     Cough      Diarrhea 11/20/2019     Hyperlipidemia      Hypertension      Hypokalemia     Created by Conversion      Osteoarthritis      Pneumonia 2/21/2019     Past Surgical History:   Procedure Laterality Date     APPENDECTOMY       BASAL CELL CARCINOMA EXCISION       LAPAROSCOPY DIAGNOSTIC (GENERAL) N/A 11/04/2014    Procedure: LAPAROSCOPY BILATERAL SALPINGO-OOPHORECTOMY ;  Surgeon: Sofia Harper MD;  Location: [facility];  Service:      TONSILLECTOMY       ZZC TOTAL KNEE ARTHROPLASTY Left      Family History   Problem Relation Age of Onset     Heart Disease Mother      Rheumatic Heart Disease Mother      No Known Problems Father      Cancer Sister      Cancer Brother     Social History     Socioeconomic History     Marital status: Single     Spouse name: Not on file     Number of children: Not on file     Years of education: Not on file     Highest education level: Not on file   Occupational History     Not on file   Tobacco Use     Smoking status: Never Smoker     Smokeless tobacco: Never Used     Tobacco comment: no passive exposure   Substance and Sexual Activity     Alcohol use: Yes     Comment: Alcoholic Drinks/day: Rarely a glass of wine     Drug use: No     Sexual activity: Not on file   Other Topics Concern     Not on file   Social History Narrative    The patient is a nun.     Social Determinants of Health     Financial Resource Strain: Not on file   Food Insecurity: Not on file   Transportation Needs: Not on file   Physical Activity: Not on file   Stress: Not on file   Social Connections: Not on file   Intimate Partner Violence: Not on file   Housing Stability: Not on file          Medications  Allergies  "  Current Outpatient Medications   Medication Sig Dispense Refill     acetaminophen (TYLENOL) 325 MG tablet [ACETAMINOPHEN (TYLENOL) 325 MG TABLET] Take 650 mg by mouth every 6 (six) hours as needed for pain.       amiodarone (PACERONE) 200 MG tablet Take 1 tablet (200 mg) by mouth 2 times daily for 14 days, THEN 1 tablet (200 mg) daily for 14 days. 60 tablet 0     amoxicillin (AMOXIL) 500 MG capsule Take 2,000 mg by mouth as needed Prior to dental procedures.       apixaban ANTICOAGULANT (ELIQUIS) 5 MG tablet Take 1 tablet (5 mg) by mouth 2 times daily for 30 days 60 tablet 0     cholecalciferol, vitamin D3, (VITAMIN D3) 2,000 unit Tab [CHOLECALCIFEROL, VITAMIN D3, (VITAMIN D3) 2,000 UNIT TAB] Take 1 tablet (2,000 Units total) by mouth daily. 90 tablet 3     COMPOUNDED NON-CONTROLLED SUBSTANCE (CMPD RX) - PHARMACY TO MIX COMPOUNDED MEDICATION lidocaine 5%, ibuprofen 10%, and diclofenac 5% apply 1-2 grams to painful feet 3-4 times a day (Patient taking differently: Apply 1-2 g topically 4 times daily as needed lidocaine 5%, ibuprofen 10%, and diclofenac 5% apply 1-2 grams to painful feet/knee 3-4 times a day) 60 g 3     DULoxetine (CYMBALTA) 60 MG capsule Take 1 capsule (60 mg) by mouth daily       furosemide (LASIX) 20 MG tablet Take 1 tablet (20 mg) by mouth daily 30 tablet 0     lisinopril (ZESTRIL) 5 MG tablet Take 1 tablet (5 mg) by mouth daily       magnesium oxide (MAG-OX) 400 MG tablet Take 1 tablet (400 mg) by mouth daily for 30 days 30 tablet 0     potassium chloride ER (KLOR-CON M) 20 MEQ CR tablet Take 1 tablet (20 mEq) by mouth daily 30 tablet 0     zolpidem ER (AMBIEN CR) 6.25 MG CR tablet take 1 and 1/4 tablet by mouth at bedtime 45 tablet 0    Allergies   Allergen Reactions     Trazodone Shortness Of Breath and Unknown     Allergic to trazodone and deriv., Other: trouble swallowing       Clindamycin Diarrhea     C-diff     Gabapentin Other (See Comments)     \"Internal tremors\"     Temazepam Other " (See Comments)     Annotation: Nightmares       Buprenorphine Palpitations     Tried patch         Lab Results    Chemistry/lipid CBC Cardiac Enzymes/BNP/TSH/INR   Recent Labs   Lab Test 03/12/15  1230   CHOL 179   HDL 64   LDL 80   TRIG 176*     Recent Labs   Lab Test 03/12/15  1230   LDL 80     Recent Labs   Lab Test 09/07/22  1112      POTASSIUM 4.5   CHLORIDE 100   CO2 27   GLC 75   BUN 26.2*   CR 1.14*   GFRESTIMATED 45*   MARLENE 10.3*     Recent Labs   Lab Test 09/07/22  1112 08/28/22  0442 08/26/22  0723   CR 1.14* 0.81 0.86     Recent Labs   Lab Test 06/15/21  0910   A1C 6.0*    Recent Labs   Lab Test 08/28/22  0442   WBC 6.2   HGB 12.1   HCT 36.1   MCV 93        Recent Labs   Lab Test 08/28/22  0442 08/26/22  0723 08/24/22  0718   HGB 12.1 12.6 12.8    Recent Labs   Lab Test 08/23/22  1154 08/17/22  1215 11/21/19  0451   TROPONINI 0.16 0.04 0.01     Recent Labs   Lab Test 08/23/22  1154 08/17/22  1215 03/03/21  1222 02/21/19  0713   BNP 1,933*  --  177* 95   NTBNP  --  5,101*  --   --      Recent Labs   Lab Test 02/21/19  0648   TSH 2.05     Recent Labs   Lab Test 08/23/22  1154 06/05/19  0052 02/21/19  0648   INR 1.37* 1.50* 1.20*        40 minutes spent on the date of encounter doing chart review, review of test results, patient visit and documentation.

## 2022-09-13 ENCOUNTER — HOSPITAL ENCOUNTER (OUTPATIENT)
Dept: NUCLEAR MEDICINE | Facility: HOSPITAL | Age: 87
Discharge: HOME | End: 2022-09-13
Attending: INTERNAL MEDICINE | Admitting: INTERNAL MEDICINE
Payer: COMMERCIAL

## 2022-09-13 DIAGNOSIS — I44.7 LBBB (LEFT BUNDLE BRANCH BLOCK): ICD-10-CM

## 2022-09-13 DIAGNOSIS — I48.0 PAROXYSMAL ATRIAL FIBRILLATION (H): Primary | ICD-10-CM

## 2022-09-13 DIAGNOSIS — I42.9 SECONDARY CARDIOMYOPATHY (H): ICD-10-CM

## 2022-09-13 LAB — MUGA LV EJECTION FRACTION: 37 %

## 2022-09-13 PROCEDURE — 78472 GATED HEART PLANAR SINGLE: CPT | Mod: 26 | Performed by: GENERAL ACUTE CARE HOSPITAL

## 2022-09-13 PROCEDURE — 343N000001 HC RX 343: Performed by: INTERNAL MEDICINE

## 2022-09-13 PROCEDURE — A9560 TC99M LABELED RBC: HCPCS | Performed by: INTERNAL MEDICINE

## 2022-09-13 PROCEDURE — 250N000011 HC RX IP 250 OP 636: Performed by: INTERNAL MEDICINE

## 2022-09-13 PROCEDURE — 78472 GATED HEART PLANAR SINGLE: CPT

## 2022-09-13 RX ORDER — HEPARIN SODIUM (PORCINE) LOCK FLUSH IV SOLN 100 UNIT/ML 100 UNIT/ML
30 SOLUTION INTRAVENOUS ONCE
Status: COMPLETED | OUTPATIENT
Start: 2022-09-13 | End: 2022-09-13

## 2022-09-13 RX ADMIN — Medication 30 UNITS: at 11:04

## 2022-09-13 RX ADMIN — Medication 27 MILLICURIE: at 11:04

## 2022-09-21 ENCOUNTER — TELEPHONE (OUTPATIENT)
Dept: CARDIOLOGY | Facility: CLINIC | Age: 87
End: 2022-09-21

## 2022-09-21 NOTE — TELEPHONE ENCOUNTER
Encounter created for documentation purposes. MD aware of message; pt to be on 5 mg of Eliquis BID. -[facility]

## 2022-09-21 NOTE — TELEPHONE ENCOUNTER
----- Message from Maryanne Bowens [facility] sent at 9/15/2022  2:21 PM CDT -----  Regarding: RE: Eliqiuis dose  Patient's recent labs and vitals:    sCr is 1.14 mg/dL   Estimated Creatinine Clearance: 29.9 mL/min (A) (based on SCr of 1.14 mg/dL (H)).    Wt Readings from Last 4 Encounters:  09/12/22 : 155 lb (70.3 kg)  09/07/22 : 157 lb (71.2 kg)  09/06/22 : 154 lb (69.9 kg)  08/28/22 : 153 lb 12.8 oz (69.8 kg)      If patient is 80 years of age or older AND either weighs 60 kg or less, OR has a serum creatinine 1.5 mg/dL or greater --> then reduce dose to 2.5 mg twice daily.         Recommendation:  Patient may continue on 5 mg twice daily based on her weight and creatinine.     Maryanne Bowens, PharmD  Medication Therapy Management Pharmacist      ----- Message -----  From: Stacy Noriega RN  Sent: 9/15/2022   2:12 PM CDT  To: [facility] Pharmacist Consult  Subject: Eliqiuis dose                                    Hi!  This patient of Dr. Holley's is 89 and is on Eliquis 5 mg BID. He wonders if she should be on the lower dose of 2.5 mg twice daily. Could anyone assist and I can pass along to him please?  Thanks!  Stacy GUALLPA - heart care       Estrada Holley MD   9/14/2022  2:33 PM CDT Back to Top    I called sister and let her know that EF by MUGA is 91%, better than the echo of 28% but warned her that this is comparing different imaging modalities.  I would like to keep on amiodarone 200 mg a day until she sees me, I like her on Eliquis 5 mg twice a day although given her age can we check a weight and if she is meeting the weight criteria we may want to lowered to 2.5 mg twice a day.  Can you please make sure she has enough of these as well as the Lasix and potassium every day until she sees me in October?  She has some questions aware she should send her prescriptions i.e. [facility] or [facility] and I defer that to you or her.  LF

## 2022-09-23 ENCOUNTER — TELEPHONE (OUTPATIENT)
Dept: CARDIOLOGY | Facility: CLINIC | Age: 87
End: 2022-09-23

## 2022-09-23 DIAGNOSIS — I48.0 PAROXYSMAL ATRIAL FIBRILLATION (H): ICD-10-CM

## 2022-09-23 DIAGNOSIS — R06.09 DYSPNEA ON EXERTION: ICD-10-CM

## 2022-09-23 RX ORDER — FUROSEMIDE 20 MG
20 TABLET ORAL DAILY
Qty: 90 TABLET | Refills: 0 | Status: SHIPPED | OUTPATIENT
Start: 2022-09-23 | End: 2022-12-16

## 2022-09-23 RX ORDER — AMIODARONE HYDROCHLORIDE 200 MG/1
200 TABLET ORAL DAILY
Qty: 90 TABLET | Refills: 0 | Status: SHIPPED | OUTPATIENT
Start: 2022-09-23 | End: 2022-12-07

## 2022-09-23 RX ORDER — POTASSIUM CHLORIDE 1500 MG/1
20 TABLET, EXTENDED RELEASE ORAL DAILY
Qty: 90 TABLET | Refills: 0 | Status: SHIPPED | OUTPATIENT
Start: 2022-09-23 | End: 2022-12-16

## 2022-09-23 NOTE — TELEPHONE ENCOUNTER
Please refer to MUGA scan note:                     Estrada Holley MD   9/14/2022  2:33 PM CDT        Back to Top     I called sister and let her know that EF by MUGA is 91%, better than the echo of 28% but warned her that this is comparing different imaging modalities.  I would like to keep on amiodarone 200 mg a day until she sees me, I like her on Eliquis 5 mg twice a day although given her age can we check a weight and if she is meeting the weight criteria we may want to lowered to 2.5 mg twice a day.  Can you please make sure she has enough of these as well as the Lasix and potassium every day until she sees me in October?  She has some questions aware she should send her prescriptions i.e. [facility] or [facility] and I defer that to you or her.  LF            ==called back Sister. We discussed continuing her current medicines and making sure Eliquis is 5 mg BID, Amiodarone 200 mg daily until she sees LBF and determines further. She was given refills for her Furosemide + KCL supplement. Everything was sent to [facility]. She verbalized understanding and had no further questions or concerns. -[facility] [place]

## 2022-09-23 NOTE — TELEPHONE ENCOUNTER
Health Call Center    Phone Message    May a detailed message be left on voicemail: no     Reason for Call: Other: Gladys called to speak with Stacy Noriega RN regarding her current Rx medications, please reach out to her at [phone].     Action Taken: Other: [facility]    Travel Screening: Not Applicable

## 2022-09-28 ENCOUNTER — OFFICE VISIT (OUTPATIENT)
Dept: FAMILY MEDICINE | Facility: CLINIC | Age: 87
End: 2022-09-28
Payer: COMMERCIAL

## 2022-09-28 VITALS
RESPIRATION RATE: 16 BRPM | SYSTOLIC BLOOD PRESSURE: 120 MMHG | BODY MASS INDEX: 27.33 KG/M2 | OXYGEN SATURATION: 96 % | HEART RATE: 62 BPM | TEMPERATURE: 97.9 F | HEIGHT: 63 IN | DIASTOLIC BLOOD PRESSURE: 80 MMHG | WEIGHT: 154.25 LBS

## 2022-09-28 DIAGNOSIS — M17.11 PRIMARY OSTEOARTHRITIS OF RIGHT KNEE: ICD-10-CM

## 2022-09-28 DIAGNOSIS — I50.32 CHRONIC DIASTOLIC CONGESTIVE HEART FAILURE (H): ICD-10-CM

## 2022-09-28 DIAGNOSIS — G89.29 OTHER CHRONIC PAIN: ICD-10-CM

## 2022-09-28 DIAGNOSIS — Z00.00 ENCOUNTER FOR MEDICAL EXAMINATION TO ESTABLISH CARE: ICD-10-CM

## 2022-09-28 DIAGNOSIS — Z79.899 ENCOUNTER FOR LONG-TERM (CURRENT) USE OF MEDICATIONS: Primary | ICD-10-CM

## 2022-09-28 DIAGNOSIS — I48.0 PAROXYSMAL ATRIAL FIBRILLATION (H): ICD-10-CM

## 2022-09-28 DIAGNOSIS — Z76.0 ENCOUNTER FOR MEDICATION REFILL: ICD-10-CM

## 2022-09-28 DIAGNOSIS — N18.2 CHRONIC KIDNEY DISEASE, STAGE 2 (MILD): ICD-10-CM

## 2022-09-28 DIAGNOSIS — I10 BENIGN ESSENTIAL HYPERTENSION: ICD-10-CM

## 2022-09-28 DIAGNOSIS — F51.01 PRIMARY INSOMNIA: ICD-10-CM

## 2022-09-28 DIAGNOSIS — C44.91 BASAL CELL CARCINOMA (BCC), UNSPECIFIED SITE: ICD-10-CM

## 2022-09-28 PROCEDURE — 99397 PER PM REEVAL EST PAT 65+ YR: CPT | Mod: 25 | Performed by: FAMILY MEDICINE

## 2022-09-28 PROCEDURE — 99213 OFFICE O/P EST LOW 20 MIN: CPT | Mod: 25 | Performed by: FAMILY MEDICINE

## 2022-09-28 PROCEDURE — 90662 IIV NO PRSV INCREASED AG IM: CPT | Performed by: FAMILY MEDICINE

## 2022-09-28 PROCEDURE — G0008 ADMIN INFLUENZA VIRUS VAC: HCPCS | Performed by: FAMILY MEDICINE

## 2022-09-28 RX ORDER — NYSTATIN 100000 [USP'U]/G
POWDER TOPICAL
COMMUNITY
Start: 2022-09-23 | End: 2023-01-19

## 2022-09-28 RX ORDER — ZOLPIDEM TARTRATE 6.25 MG/1
TABLET, FILM COATED, EXTENDED RELEASE ORAL
Qty: 45 TABLET | Refills: 5 | Status: ON HOLD | OUTPATIENT
Start: 2022-09-28 | End: 2023-03-08

## 2022-09-28 NOTE — PROGRESS NOTES
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      Are you in the first 12 months of your Medicare coverage?  No    Here today to establish care  PCP is no longer at [facility]    Hx HFrEF, paroxysmal afib, HTN- follows with cardiology. Recent hospitalization 8/23-8/28.    She lives independently- she does receive meals on wheels, but otherwise does her own shopping and cooking. She has noticed that she is more fatigued and dyspneic- this limits her ability to do things.      Hx of osteoarthritis R knee. Severe pain. Makes it difficult to sleep at night. She has seen orthopedics and pain clinic. She has tried many medications, including tramadol but nothing has helped. She has tried topical treatments and physical therapy and knee brace. Has tried steroid injections. She is not candidate for surgery. She is wondering what she can try to help manage her pain. She is specifically wondering about possible medical marijuana or a stronger opioid.     Healthy Habits:   PHQ-2 Total Score: 0    Do you feel safe in your environment? Yes    Have you ever done Advance Care Planning? (For example, a Health Directive, POLST, or a discussion with a medical provider or your loved ones about your wishes): Yes, advance care planning is on file.    Cognitive Screening   1) Repeat 3 items (Leader, Season, Table)    2) Clock draw: NORMAL  3) 3 item recall: Recalls 3 objects  Results: NORMAL clock, 1-2 items recalled: COGNITIVE IMPAIRMENT LESS LIKELY    Mini-CogTM Copyright GARRETT Bravo. Licensed by the author for use in [facility]; reprinted with permission (keiko@.[facility]). All rights reserved.      Do you have sleep apnea, excessive snoring or daytime drowsiness?: no    Reviewed and updated as needed this visit by clinical staff   Tobacco  Allergies  Meds                Reviewed and updated as needed this visit by Provider   Tobacco  Allergies  Meds  Problems  Med Hx  Surg Hx   "Fam Hx           Social History     Tobacco Use     Smoking status: Never Smoker     Smokeless tobacco: Never Used     Tobacco comment: no passive exposure   Substance Use Topics     Alcohol use: Yes     Comment: Alcoholic Drinks/day: Rarely a glass of wine         Current providers sharing in care for this patient include:   Patient Care Team:  Margret Flores MD as PCP - General (Family Medicine)  Abdifatah Clark, PharmD as Pharmacist (Pharmacist)  Estrada Holley MD as Assigned Heart and Vascular Provider  Aleja Pitts PA-C as Assigned Surgical Provider  Virgil Mcelroy MD as Assigned PCP    The following health maintenance items are reviewed in Epic and correct as of today:  Health Maintenance   Topic Date Due     HF ACTION PLAN  Never done     MICROALBUMIN  Never done     ZOSTER IMMUNIZATION (2 of 3) 11/19/2012     URINE DRUG SCREEN  05/24/2020     COVID-19 Vaccine (5 - Booster for Moderna series) 06/22/2022     BMP  03/07/2023     ALT  08/17/2023     CBC  08/28/2023     ANNUAL REVIEW OF HM ORDERS  09/07/2023     MEDICARE ANNUAL WELLNESS VISIT  09/28/2023     FALL RISK ASSESSMENT  09/28/2023     ADVANCE CARE PLANNING  09/28/2027     DTAP/TDAP/TD IMMUNIZATION (3 - Td or Tdap) 10/14/2029     TSH W/FREE T4 REFLEX  Completed     PHQ-2 (once per calendar year)  Completed     INFLUENZA VACCINE  Completed     Pneumococcal Vaccine: 65+ Years  Completed     URINALYSIS  Completed     IPV IMMUNIZATION  Aged Out     MENINGITIS IMMUNIZATION  Aged Out     HEPATITIS B IMMUNIZATION  Aged Out     LIPID  Discontinued       Review of Systems      OBJECTIVE:   /80 (BP Location: Left arm, Patient Position: Sitting, Cuff Size: Adult Regular)   Pulse 62   Temp 97.9  F (36.6  C) (Oral)   Resp 16   Ht 1.588 m (5' 2.5\")   Wt 70 kg (154 lb 4 oz)   SpO2 96%   BMI 27.76 kg/m   Estimated body mass index is 27.76 kg/m  as calculated from the following:    Height as of this encounter: 1.588 m (5' 2.5\").    Weight as of " this encounter: 70 kg (154 lb 4 oz).  Physical Exam  Gen: well appearing  Eyes: sclera clear  CV: RRR, no m/r/g  Resp: CTAB  Ext: no edema  MSK: R knee with deformity, decreased range of motion, pain with passive range of motion and joint line tenderness. Posterior tenderness. +crepitus.      ASSESSMENT / PLAN:     Gladys was seen today for physical.    Diagnoses and all orders for this visit:    Encounter for medical examination to establish care    Chronic kidney disease, stage 2 (mild)    Paroxysmal atrial fibrillation (H)  Benign essential hypertension  Chronic diastolic congestive heart failure (H) - EF 55%, Grade II (moderate) left ventricular diastolic dysfunction per echo 1/ 2018: Follows with cardiology. Reviewed recent note. Continue current medications.     Basal cell carcinoma (BCC), unspecified site: Follows with dermatology.    Primary insomnia: Longstanding use of ambien- tolerating well. I reviewed this is high risk medication and on [facility] list; however, due to patient being stable on this for years and has completed CSA with previous PCP, did agree to continue at current dose.   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 History of TKA of left knee with significant postoperative complications with delayed healing and infection. She has tried multiple modalities for pain management, including medications (tylenol, tramadol), topical treatments, physical therapy, injections. Due to comorbidities and patient's age, I referred to pain clinic for further recommendations regarding chronic pain management.    -     Pain Management  Referral; Future    Other orders  -     INFLUENZA, QUAD, HIGH DOSE, PF, 65YR + (FLUZONE HD)              COUNSELING:  Reviewed preventive health  "counseling, as reflected in patient instructions    Estimated body mass index is 27.76 kg/m  as calculated from the following:    Height as of this encounter: 1.588 m (5' 2.5\").    Weight as of this encounter: 70 kg (154 lb 4 oz).      She reports that she has never smoked. She has never used smokeless tobacco.      Appropriate preventive services were discussed with this patient, including applicable screening as appropriate for cardiovascular disease, diabetes, osteopenia/osteoporosis, and glaucoma.  As appropriate for age/gender, discussed screening for colorectal cancer, prostate cancer, breast cancer, and cervical cancer. Checklist reviewing preventive services available has been given to the patient.    Reviewed patients plan of care and provided an AVS. The Basic Care Plan (routine screening as documented in Health Maintenance) for Gladys meets the Care Plan requirement. This Care Plan has been established and reviewed with the Patient.    Counseling Resources:  ATP IV Guidelines  Pooled Cohorts Equation Calculator  Breast Cancer Risk Calculator  Breast Cancer: Medication to Reduce Risk  FRAX Risk Assessment  ICSI Preventive Guidelines  Dietary Guidelines for Americans, 2010  USDA's MyPlate  ASA Prophylaxis  Lung CA Screening    Margret Flores MD  [facility]    Identified Health Risks:  "

## 2022-10-04 PROBLEM — R91.1 SOLITARY PULMONARY NODULE: Status: ACTIVE | Noted: 2019-04-29

## 2022-10-26 ENCOUNTER — OFFICE VISIT (OUTPATIENT)
Dept: CARDIOLOGY | Facility: CLINIC | Age: 87
End: 2022-10-26
Payer: COMMERCIAL

## 2022-10-26 VITALS
HEIGHT: 63 IN | RESPIRATION RATE: 16 BRPM | WEIGHT: 159 LBS | DIASTOLIC BLOOD PRESSURE: 60 MMHG | SYSTOLIC BLOOD PRESSURE: 134 MMHG | HEART RATE: 82 BPM | BODY MASS INDEX: 28.17 KG/M2

## 2022-10-26 DIAGNOSIS — I10 BENIGN ESSENTIAL HYPERTENSION: ICD-10-CM

## 2022-10-26 DIAGNOSIS — I42.9 SECONDARY CARDIOMYOPATHY (H): ICD-10-CM

## 2022-10-26 DIAGNOSIS — I50.22 CHRONIC SYSTOLIC HEART FAILURE (H): ICD-10-CM

## 2022-10-26 DIAGNOSIS — I44.7 LBBB (LEFT BUNDLE BRANCH BLOCK): ICD-10-CM

## 2022-10-26 DIAGNOSIS — R00.1 SINUS BRADYCARDIA: ICD-10-CM

## 2022-10-26 DIAGNOSIS — M17.12 PRIMARY LOCALIZED OSTEOARTHROSIS OF LEFT LOWER LEG: ICD-10-CM

## 2022-10-26 DIAGNOSIS — N18.2 CHRONIC KIDNEY DISEASE, STAGE 2 (MILD): ICD-10-CM

## 2022-10-26 DIAGNOSIS — I48.19 PERSISTENT ATRIAL FIBRILLATION (H): Primary | ICD-10-CM

## 2022-10-26 PROBLEM — I48.91 ATRIAL FIBRILLATION WITH RVR (H): Status: RESOLVED | Noted: 2022-08-23 | Resolved: 2022-10-26

## 2022-10-26 PROBLEM — I48.0 PAROXYSMAL ATRIAL FIBRILLATION (H): Status: RESOLVED | Noted: 2019-11-20 | Resolved: 2022-10-26

## 2022-10-26 PROCEDURE — 99214 OFFICE O/P EST MOD 30 MIN: CPT | Performed by: INTERNAL MEDICINE

## 2022-10-26 RX ORDER — HYDROCHLOROTHIAZIDE 25 MG/1
25 TABLET ORAL DAILY
COMMUNITY
Start: 2022-10-17 | End: 2022-10-26

## 2022-10-26 RX ORDER — LISINOPRIL 2.5 MG/1
2.5 TABLET ORAL DAILY
Qty: 90 TABLET | Refills: 4 | Status: SHIPPED | OUTPATIENT
Start: 2022-10-26 | End: 2023-10-27

## 2022-10-26 NOTE — LETTER
10/26/2022    Margret Flores MD  [address] [place] [id number]    RE: Gladys Ramirez       Dear Colleague,     I had the pleasure of seeing Gladys Ramirez in the [facility].      [facility]  Heart Care Clinic Follow-up Note    Assessment & Plan        (I48.19) Persistent atrial fibrillation (H)  (primary encounter diagnosis)  Comment:  Symptomatic, not valvular, advanced [id number]-GWFb score of 4 giving her a 4.8% chance of stroke per year.  Currently on Eliquis 2.5 mg p.o. twice daily given her age of 89 and renal dysfunction, he had breakthrough on Tambocor twice a day with resultant heart failure, was placed on amiodarone August 2022.  Amiodarone blood work looks good but now having some visual issues.  Getting seen by eye doctor next week, concerned about optic neuritis and will let me know results.     (R00.1) Sinus bradycardia  Comment: Secondary to amiodarone, might consider backing off on dose to every other day.    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  We will recheck echo in about a month.    (I44.7) LBBB (left bundle branch block)  Comment: So noted to be chronic.    (I50.22) Chronic systolic heart failure (H)  Comment: No significant signs or symptoms currently.    (I10) Benign essential hypertension  Comment: Under good control, but given orthostasis we will back off on lisinopril to 2.5 mg a day.  Prescription sent.    (M17.12) Primary localized osteoarthrosis of left lower leg  Comment: Has not been seen by [place] [facility], told she has a Baker's cyst, takes acetaminophen to no records, told her she can try daily Advil 200 mg once a day with food.    (N18.2) Chronic kidney disease, stage 2 (mild)  Comment: GFR estimated at 45 and thus lower dose of Eliquis and thus watch kidney function with Advil.    Plan  1.  Lower dose of lisinopril to 2.5 mg a day.  2.  Arrange for  repeat limited echo for ejection fraction about a month.  3.  See eye doctor and rule out optic neuritis or other amiodarone issues.  4.  Consider lowering dose of amiodarone 200 mg a day.  5.  Taking occasional Advil 200 mg with food.  6.  Get second opinion on orthopedic procedure.  7.  Follow-up with me in several months given all these issues.    Subjective  CC: 89-year-old white female being seen in follow-up.  Since I seen her she has been seen by Dr. Valladares, Dr. Cannon and Dr. Corral heart failure and atrial fibrillation.  This morning she has some orthostasis to go urinate after midnight, was unable to get up after falling to the ground, paramedics were summoned, they noticed to have a blood pressure of 125/65 with a heart rate of 76 and 93% sat on room air with a sinus rhythm with left bundle branch block pattern with occasional PVC.  He was referred to the hospital but she declined.  She comes in tell me that her vision is a little to the left, she has been on amiodarone for 2 months.  She tells me she is a little abdominal discomfort most likely orthostasis.  She does not have any shortness of breath, PND, orthopnea, edema, chest pains, palpitations.    Medications  Current Outpatient Medications   Medication Sig Dispense Refill     acetaminophen (TYLENOL) 325 MG tablet [ACETAMINOPHEN (TYLENOL) 325 MG TABLET] Take 650 mg by mouth every 6 (six) hours as needed for pain.       amiodarone (PACERONE) 200 MG tablet Take 1 tablet (200 mg) by mouth daily 90 tablet 0     apixaban ANTICOAGULANT (ELIQUIS) 5 MG tablet Take 1 tablet (5 mg) by mouth 2 times daily 180 tablet 3     cholecalciferol, vitamin D3, (VITAMIN D3) 2,000 unit Tab [CHOLECALCIFEROL, VITAMIN D3, (VITAMIN D3) 2,000 UNIT TAB] Take 1 tablet (2,000 Units total) by mouth daily. 90 tablet 3     COMPOUNDED NON-CONTROLLED SUBSTANCE (CMPD RX) - PHARMACY TO MIX COMPOUNDED MEDICATION lidocaine 5%, ibuprofen 10%, and diclofenac 5% apply 1-2 grams to painful  "feet 3-4 times a day (Patient taking differently: Apply 1-2 g topically 4 times daily as needed lidocaine 5%, ibuprofen 10%, and diclofenac 5% apply 1-2 grams to painful feet/knee 3-4 times a day) 60 g 3     DULoxetine (CYMBALTA) 60 MG capsule Take 1 capsule (60 mg) by mouth daily       furosemide (LASIX) 20 MG tablet Take 1 tablet (20 mg) by mouth daily 90 tablet 0     lisinopril (ZESTRIL) 2.5 MG tablet Take 1 tablet (2.5 mg) by mouth daily 90 tablet 4     NYAMYC 933263 UNIT/GM external powder Apply topically to affected area(s) in the groin area twice daily       potassium chloride ER (KLOR-CON M) 20 MEQ CR tablet Take 1 tablet (20 mEq) by mouth daily 90 tablet 0     zolpidem ER (AMBIEN CR) 6.25 MG CR tablet Take 1 and 1/4 tablet by mouth at bedtime 45 tablet 5       Objective  /60 (BP Location: Right arm, Patient Position: Sitting, Cuff Size: Adult Regular)   Pulse 82   Resp 16   Ht 1.588 m (5' 2.5\")   Wt 72.1 kg (159 lb)   BMI 28.62 kg/m      General Appearance:    Alert, cooperative, no distress, appears stated age   Head:    Normocephalic, without obvious abnormality, atraumatic   Throat:   Lips, mucosa, and tongue normal; teeth and gums normal   Neck:   Supple, symmetrical, trachea midline, no adenopathy;        thyroid:  No enlargement/tenderness/nodules; no carotid    bruit or JVD   Back:     Symmetric, no curvature, ROM normal, no CVA tenderness   Lungs:     Clear to auscultation bilaterally, respirations unlabored   Chest wall:    No tenderness or deformity   Heart:    Regular rate and rhythm, S1 and S2 normal, no murmur, rub   or gallop   Abdomen:     Soft, non-tender, bowel sounds active all four quadrants,     no masses, no organomegaly   Extremities:   Normal, atraumatic, no cyanosis or edema   Pulses:   2+ and symmetric all extremities   Skin:   Skin color, texture, turgor normal, no rashes or lesions     Results    Lab Results personally reviewed   Lab Results   Component Value Date    " CHOL 179 03/12/2015    CHOL 179 09/30/2013     Lab Results   Component Value Date    HDL 64 03/12/2015    HDL 64 09/30/2013     No components found for: LDLCALC  Lab Results   Component Value Date    TRIG 176 (H) 03/12/2015    TRIG 155 (H) 09/30/2013     Lab Results   Component Value Date    WBC 6.2 08/28/2022    HGB 12.1 08/28/2022    HCT 36.1 08/28/2022     08/28/2022     Lab Results   Component Value Date    BUN 26.2 (H) 09/07/2022     09/07/2022    CO2 27 09/07/2022       Reviewed electrocardiogram sinus bradycardia, occasional PVC, left bundle branch block pattern.              Thank you for allowing me to participate in the care of your patient.      Sincerely,     MARISA SANZ MD     [facility]  cc:   Robert Corral MD  [address], [address]  [place] [address]

## 2022-10-26 NOTE — PATIENT INSTRUCTIONS
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  I am glad to hear you are doing well.  Per our conversation I will send off a lower dose of the LISINOPRIL for 2.5 mg a day. This should help with the lite headed.  Given age and kidney cut the ELIQUIS in 1/2 for 2.5 mg twice a day.  Let the eye doctor know I am worried about OPTIC NEURITIS and if issues call me at [phone].  Lastly let us check the heart function in about a month.  If vertigo comes back call me for head scan.  I will plan on seeing you 3-4 months.  Estrada Holley

## 2022-10-26 NOTE — PROGRESS NOTES
[facility]  Heart Care Clinic Follow-up Note    Assessment & Plan        (I48.19) Persistent atrial fibrillation (H)  (primary encounter diagnosis)  Comment:  Symptomatic, not valvular, advanced KCV3TR0-EHAv score of 4 giving her a 4.8% chance of stroke per year.  Currently on Eliquis 2.5 mg p.o. twice daily given her age of 89 and renal dysfunction, he had breakthrough on Tambocor twice a day with resultant heart failure, was placed on amiodarone August 2022.  Amiodarone blood work looks good but now having some visual issues.  Getting seen by eye doctor next week, concerned about optic neuritis and will let me know results.     (R00.1) Sinus bradycardia  Comment: Secondary to amiodarone, might consider backing off on dose to every other day.    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  We will recheck echo in about a month.    (I44.7) LBBB (left bundle branch block)  Comment: So noted to be chronic.    (I50.22) Chronic systolic heart failure (H)  Comment: No significant signs or symptoms currently.    (I10) Benign essential hypertension  Comment: Under good control, but given orthostasis we will back off on lisinopril to 2.5 mg a day.  Prescription sent.    (M17.12) Primary localized osteoarthrosis of left lower leg  Comment: Has not been seen by [place] [facility], told she has a Baker's cyst, takes acetaminophen to no records, told her she can try daily Advil 200 mg once a day with food.    (N18.2) Chronic kidney disease, stage 2 (mild)  Comment: GFR estimated at 45 and thus lower dose of Eliquis and thus watch kidney function with Advil.    Plan  1.  Lower dose of lisinopril to 2.5 mg a day.  2.  Arrange for repeat limited echo for ejection fraction about a month.  3.  See eye doctor and rule out optic neuritis or other amiodarone issues.  4.  Consider lowering dose of amiodarone 200 mg a day.  5.  Taking occasional  Advil 200 mg with food.  6.  Get second opinion on orthopedic procedure.  7.  Follow-up with me in several months given all these issues.    Subjective  CC: 89-year-old white female being seen in follow-up.  Since I seen her she has been seen by Dr. Valladares, Dr. Cannon and Dr. Corral heart failure and atrial fibrillation.  This morning she has some orthostasis to go urinate after midnight, was unable to get up after falling to the ground, paramedics were summoned, they noticed to have a blood pressure of 125/65 with a heart rate of 76 and 93% sat on room air with a sinus rhythm with left bundle branch block pattern with occasional PVC.  He was referred to the hospital but she declined.  She comes in tell me that her vision is a little to the left, she has been on amiodarone for 2 months.  She tells me she is a little abdominal discomfort most likely orthostasis.  She does not have any shortness of breath, PND, orthopnea, edema, chest pains, palpitations.    Medications  Current Outpatient Medications   Medication Sig Dispense Refill     acetaminophen (TYLENOL) 325 MG tablet [ACETAMINOPHEN (TYLENOL) 325 MG TABLET] Take 650 mg by mouth every 6 (six) hours as needed for pain.       amiodarone (PACERONE) 200 MG tablet Take 1 tablet (200 mg) by mouth daily 90 tablet 0     apixaban ANTICOAGULANT (ELIQUIS) 5 MG tablet Take 1 tablet (5 mg) by mouth 2 times daily 180 tablet 3     cholecalciferol, vitamin D3, (VITAMIN D3) 2,000 unit Tab [CHOLECALCIFEROL, VITAMIN D3, (VITAMIN D3) 2,000 UNIT TAB] Take 1 tablet (2,000 Units total) by mouth daily. 90 tablet 3     COMPOUNDED NON-CONTROLLED SUBSTANCE (CMPD RX) - PHARMACY TO MIX COMPOUNDED MEDICATION lidocaine 5%, ibuprofen 10%, and diclofenac 5% apply 1-2 grams to painful feet 3-4 times a day (Patient taking differently: Apply 1-2 g topically 4 times daily as needed lidocaine 5%, ibuprofen 10%, and diclofenac 5% apply 1-2 grams to painful feet/knee 3-4 times a day) 60 g 3      "DULoxetine (CYMBALTA) 60 MG capsule Take 1 capsule (60 mg) by mouth daily       furosemide (LASIX) 20 MG tablet Take 1 tablet (20 mg) by mouth daily 90 tablet 0     lisinopril (ZESTRIL) 2.5 MG tablet Take 1 tablet (2.5 mg) by mouth daily 90 tablet 4     NYAMYC 722304 UNIT/GM external powder Apply topically to affected area(s) in the groin area twice daily       potassium chloride ER (KLOR-CON M) 20 MEQ CR tablet Take 1 tablet (20 mEq) by mouth daily 90 tablet 0     zolpidem ER (AMBIEN CR) 6.25 MG CR tablet Take 1 and 1/4 tablet by mouth at bedtime 45 tablet 5       Objective  /60 (BP Location: Right arm, Patient Position: Sitting, Cuff Size: Adult Regular)   Pulse 82   Resp 16   Ht 1.588 m (5' 2.5\")   Wt 72.1 kg (159 lb)   BMI 28.62 kg/m      General Appearance:    Alert, cooperative, no distress, appears stated age   Head:    Normocephalic, without obvious abnormality, atraumatic   Throat:   Lips, mucosa, and tongue normal; teeth and gums normal   Neck:   Supple, symmetrical, trachea midline, no adenopathy;        thyroid:  No enlargement/tenderness/nodules; no carotid    bruit or JVD   Back:     Symmetric, no curvature, ROM normal, no CVA tenderness   Lungs:     Clear to auscultation bilaterally, respirations unlabored   Chest wall:    No tenderness or deformity   Heart:    Regular rate and rhythm, S1 and S2 normal, no murmur, rub   or gallop   Abdomen:     Soft, non-tender, bowel sounds active all four quadrants,     no masses, no organomegaly   Extremities:   Normal, atraumatic, no cyanosis or edema   Pulses:   2+ and symmetric all extremities   Skin:   Skin color, texture, turgor normal, no rashes or lesions     Results    Lab Results personally reviewed   Lab Results   Component Value Date    CHOL 179 03/12/2015    CHOL 179 09/30/2013     Lab Results   Component Value Date    HDL 64 03/12/2015    HDL 64 09/30/2013     No components found for: LDLCALC  Lab Results   Component Value Date    TRIG 176 " (H) 03/12/2015    TRIG 155 (H) 09/30/2013     Lab Results   Component Value Date    WBC 6.2 08/28/2022    HGB 12.1 08/28/2022    HCT 36.1 08/28/2022     08/28/2022     Lab Results   Component Value Date    BUN 26.2 (H) 09/07/2022     09/07/2022    CO2 27 09/07/2022       Reviewed electrocardiogram sinus bradycardia, occasional PVC, left bundle branch block pattern.

## 2022-11-06 NOTE — PROGRESS NOTES
Gladys Ramirez is a 89 year old female     This patient is being seen in consultation for evaluation of pain issues and recommendations for management. They are referred by Margret Flores MD from the primary care clinic for the following pain condition: Right severe osteoarthritis with knee deformity    Current controlled substance medications, if any, are being prescribed by PCP       No flowsheet data found.      Primary Care Provider is Margret Flores      CHIEF COMPLAINT:  Severe knee pain    HISTORY OF PRESENT ILLNESS:  Gladys Ramirez is a 89 year old female with history of knee pain   Onset/Progression: Has OA of right knee and a baker's cyst. Has had a steroid injection into the cyst with [facility]. Wears an OTC brace most of the time. Also has a custom [place] knee brace but too painful to wear more than two hours a day. Has had mild OA pain but in May 2022 pain became very severe. Sends shooting pain at lateral and medial knee down calf  Pain quality: Aching, Miserable, Shooting and Stabbing   Pain timing: Constant    Pain score today: Worst Pain (10)   Pain rating: intensity ranges from 10/10 to 10/10, and averages 10/10 on a 0-10 scale.  Aggravating factors include: Walking, stairs, getting up from a chair, getting in and out of a car.  Relieving factors include: Tylenol, ice/heat      Past Pain Treatments:   Pain Clinic:   Yes  Has seen Aleja Landmaria isabeljanice in this clinic from [id number]. Then care was transferred to PCP.    Physical therapy: Yes  Did not help and made pain worse   Psychologist: No   Chiropractor: No   Acupuncture: No   Pharmacotherapy:    Opioids: Yes     Non-opioids:  Yes  TENs Unit/electric stim: No   Injections/clinic procedures: Yes   6/27/22: Left hip intraarticular steroid injection: H 100% (Val)   3/10/22: Right knee steroid injection: H 60%  (Raza)   8/31/21: Right knee steroid injection: H, 100% (Pj)   8/17/21: Left hip intraarticular steroid injection:H  80%  21, 21, 21 Euflexxa for right knee H 75%    [place]: two injections 3 weeks apart: NH    Surgeries related to pain: Yes     : Left knee TKA    Annual Requirements last collected:  2022     Current Pain Relevant Medications:    Acetaminophen 325 M tabs QID every day  Compounded cream: Lidocaine, ibuprofen, diclofenac  Duloxetine 60 mg daily     Current Controlled Substance Medications:   Ambien 6.25 m.25 tabs at HS    Previous Pain Relevant Medications: (H--helped; HI--Helped initially; [facility]--Somewhat helpful; NH--No help; W--worse; SE--side effects; ?--Unsure if helpful)   Opiates: Tramadol: [facility], Buprenorphine:Allergic  NSAIDS: Can't take, on blood thinner  Migraine medications: N/A  Muscle Relaxants: none  Neuropathics: Gabapentin: SE  Anti-depressants for pain: Duloxetine: NH for pain  Anxiety medications: N/A  Topicals: Compounded cream: Lidocaine, ibuprofen, diclofenac:  OTC medications: Tylenol: takes 4000 mg/day  Sleep Medications: Temazepam: Allergy, Ambien:H  Other medications not covered above:     SUBSTANCE HISTORY:   Past or current illegal drug use: none  Past or current ETOH use: Rarely, glass of wine  Nicotine/tobacco use: none  Daily Caffeine intake: never    CURRENT FAMILY/SOCIAL SITUATION:  Past/Present occupation: Rastafari nun:   Housing status: apartment alone  Emotional/Physical support: Sister Yandy Reyes, neighbor who is an RN  Safety Concerns: falls risk   Current stressors: Pain    THE 4 As OF OPIOID MAINTENANCE ANALGESIA    Analgesia: Is pain relief clinically significant? NO   Activity: Is patient functional and able to perform Activities of Daily Living? N/A   Adverse effects: Is patient free from adverse side effects from opiates? N/A   Adherence to Rx protocol: Is patient adhering to Controlled Substance Agreement and taking medications ONLY as ordered? N/A    Is Narcan prescribed for opiate use >50 MME daily or concurrent use of opiates  and benzodiazepines? N/A    [place] Board of Pharmacy Data Base Reviewed:    YES; As expected, no concern for misuse/abuse of controlled medications based on this report. Reviewed [facility] November 6, 2022- no concerning fills.    PHYSICAL EXAM    BP (!) 156/72   Pulse 65   Wt 72.1 kg (159 lb)   SpO2 97%   BMI 28.62 kg/m       Appearance:   A&O. Patient is appropriate.   Patient is in NAD.      Gait pattern:   Patient has an antalgic gait pattern:YES  Gait favors the right side.  Mobility and/or assistive devices? YES  Uses a cane.     Physical exam: Right knee swollen large area of perhaps fluid cllected     DIRE Score for ongoing opioid management is calculated as follows:    Diagnosis = 2 pts (slowly progressive; moderate pain/objective findings)    Intractability = 3 pts (patient fully engaged but inadequate response to treatments)    Risk        Psych = 3 pts (no significant personality dysfunction/mental illness; good communication with clinic)         Chem Hlth = 3 pts (no history of chemical dependency; not drug-focused)       Reliability = 3 pts (highly reliable with meds, appointments, treatments)       Social = 2 pts (reduction in some relationships/life rolls)       (Psych + Chem hlth + Reliability + Social) = 16    Efficacy = 2 pts (moderate benefit/function; low med dose; too early/not tried meds)    DIRE Score = 18        7-13: likely NOT suitable candidate for long-term opioid analgesia       14-21: may be a suitable candidate for long-term opioid analgesia    DIAGNOSTIC RESULTS:    1/20/21: Left hip x-ray  IMPRESSION:  Mild primary degenerative narrowing both hip joints. Mild generalized degenerative change base of the spine and both SI joints.Pelvis and left hip otherwise negative. No fractures. No dislocations.    1/2021: XR KNEE RIGHT 1 OR 2 VWS   IMPRESSION:  Moderate primary osteoarthritis all 3 compartments but most prominent in the lateral compartment. Knee otherwise negative. No  fractures. No joint effusion.    PAIN RELAVENT CONDITIONS:   1.  OA: severe right knee, Baker's cyst.  2.  PMH: CKD Stage 2, A fib, CHF, primary insomnia    Hypertension: Sister Gladys to follow up with Primary Care provider regarding elevated blood pressure.       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 She has been told that she has a Baker's Cyst and that nothing more can be done. Her knee is very swollen to the point of deformity. She rates the pain at a constant 10/10. We will get an MRI to determine next steps.   Plan: MR Knee Right w/o Contrast,         HYDROcodone-acetaminophen (NORCO) 5-325 MG         tablet    (Z79.891) Long term (current) use of opiate analgesic    Comment: Consideration of prescribing controlled medications  Plan: Drug Confirmation Panel Urine with Creat       PATIENT INSTRUCTIONS:     Diagnosis reviewed, treatment option addressed, and risk/benefits discussed.  Self-care instructions given.  I am recommending a multidisciplinary treatment plan to help this patient better manage pain.    Remember to request ALL medication refills 5 days before you run out.     1. 30 minutes Clinic follow-up with SHASHA Simpson NP-SAMUEL on 12/15/22 at 8 am  2. Imaging: MRI of right knee: [facility]): [phone]   3. Procedures recommended: We will discuss after MRI   4. Medication Management :   1. Hydrocodone-APAP 5/325 mg: take 1/2 tablet three times a day if needed. If the pain is too severe, you can take a whole tablet.   2. Tylenol 650 mg: MAX of 4 tabs per day.     I have reviewed the note as documented above.  This accurately captures the substance of my conversation with the patient.  A total of 50 minutes of preparation, care, and consultation were spent on this visit today.     Valentine  SHASHA Kendall, NP-C  [facility]     (Information in italics and blue color are taken from previous pain and consulting medical providers notes and are documented as such)      [facility]): [phone]

## 2022-11-07 ENCOUNTER — MEDICAL CORRESPONDENCE (OUTPATIENT)
Dept: HEALTH INFORMATION MANAGEMENT | Facility: CLINIC | Age: 87
End: 2022-11-07

## 2022-11-08 ENCOUNTER — TELEPHONE (OUTPATIENT)
Dept: CARDIOLOGY | Facility: CLINIC | Age: 87
End: 2022-11-08

## 2022-11-08 NOTE — TELEPHONE ENCOUNTER
[facility] Call Center    Phone Message    May a detailed message be left on voicemail: yes     Reason for Call: Medication Question or concern regarding medication   Prescription Clarification  Name of Medication: furosemide (LASIX) 20 MG tablet  Prescribing Provider: Kishan   Pharmacy:   [id number] - [place] - [address] [place] [address] B   What on the order needs clarification? Patient called wanting to know if she should still be taking this medication. Please call patient back to further discuss, thank you.    Action Taken: Message routed to:  Other: Cardiology    Travel Screening: Not Applicable     Thank you!  [facility]

## 2022-11-09 NOTE — TELEPHONE ENCOUNTER
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 She verbalized understanding and does not need a refill at this time. She feels she is doing well heart-wise and is pleased about this. She will have her echo on 11/28 and follow up with [facility] in February. -[facility]

## 2022-11-11 ENCOUNTER — OFFICE VISIT (OUTPATIENT)
Dept: PALLIATIVE MEDICINE | Facility: OTHER | Age: 87
End: 2022-11-11
Attending: FAMILY MEDICINE
Payer: COMMERCIAL

## 2022-11-11 VITALS
HEART RATE: 65 BPM | OXYGEN SATURATION: 97 % | SYSTOLIC BLOOD PRESSURE: 156 MMHG | WEIGHT: 159 LBS | BODY MASS INDEX: 28.62 KG/M2 | DIASTOLIC BLOOD PRESSURE: 72 MMHG

## 2022-11-11 DIAGNOSIS — M17.11 PRIMARY OSTEOARTHRITIS OF RIGHT KNEE: Primary | ICD-10-CM

## 2022-11-11 DIAGNOSIS — G89.29 OTHER CHRONIC PAIN: ICD-10-CM

## 2022-11-11 DIAGNOSIS — M71.21 BAKER'S CYST, UNRUPTURED, RIGHT: ICD-10-CM

## 2022-11-11 DIAGNOSIS — Z79.891 LONG TERM (CURRENT) USE OF OPIATE ANALGESIC: ICD-10-CM

## 2022-11-11 LAB — CREAT UR-MCNC: 68 MG/DL

## 2022-11-11 PROCEDURE — 99215 OFFICE O/P EST HI 40 MIN: CPT | Performed by: NURSE PRACTITIONER

## 2022-11-11 PROCEDURE — 80307 DRUG TEST PRSMV CHEM ANLYZR: CPT | Performed by: NURSE PRACTITIONER

## 2022-11-11 PROCEDURE — G0463 HOSPITAL OUTPT CLINIC VISIT: HCPCS

## 2022-11-11 RX ORDER — HYDROCODONE BITARTRATE AND ACETAMINOPHEN 5; 325 MG/1; MG/1
.5-1 TABLET ORAL 3 TIMES DAILY PRN
Qty: 30 TABLET | Refills: 0 | Status: SHIPPED | OUTPATIENT
Start: 2022-11-11 | End: 2022-11-21

## 2022-11-11 ASSESSMENT — PAIN SCALES - GENERAL: PAINLEVEL: WORST PAIN (10)

## 2022-11-11 NOTE — PATIENT INSTRUCTIONS
After Visit Instructions:     Thank you for coming to [facility] [place] for your care. It is my goal to partner with you to help you reach your optimal state of health.   Continue daily self-care, identifying contributing factors, and monitoring variations in pain level. Continue to integrate self-care into your life.      30 minutes Clinic follow-up with SHASHA Simpson NP-C on 12/15/22 at 8 am  Imaging: MRI of right knee: [facility] ([place]): [phone]   Procedures recommended: We will discuss after MRI   Medication Management :   Hydrocodone-APAP 5/325 mg: take 1/2 tablet three times a day if needed. If the pain is too severe, you can take a whole tablet.   Tylenol 650 mg: MAX of 4 tabs per day.       SHASHA Domínguez NP-C  [facility] - Monday and Friday  [facility] - Tuesday  [place] - Thursday    Be sure to request ALL medication refills 5 days prior to the due date unless you will see your medical provider in an appointment before the due date.  This is YOUR responsibility. Do not expect same day refills. If you do not plan ahead you may run out of medications.   NO early refills are allowed. It is your responsibility to manage your medications responsibility and keep them safely stored. Lost or destroyed medications WILL NOT be replaced    Scheduling/Clinic telephone Number for ALL locations:  [phone]    After Hours On-Call Service:  [phone]    Call with any questions about your care and for scheduling assistance.   Calls are returned Monday through Friday between 8 AM and 4:00 PM. We usually get back to you within 2 business days depending on the issue/request.    If we are prescribing your medications:  For opioid medication refills, call the clinic or send a [employer] message 7 days in advance.  Please include:  Your name and date of birth.   Name of requested medication  Name of the pharmacy.  For non-opioid  medications, call your pharmacy directly to request a refill. Please allow 3-4 days to be processed.   Per [place] State Law:  All controlled substance prescriptions must be filled within 30 days of being written.    For those controlled substances allowing refills, pickup must occur within 30 days of last fill.      We believe regular attendance is key to your success in our program!    Any time you are unable to keep your appointment we ask that you call us at least 24 hours in advance to cancel.This will allow us to offer the appointment time to another patient.   Multiple missed appointments may lead to dismissal from the clinic.

## 2022-11-15 ENCOUNTER — HOSPITAL ENCOUNTER (OUTPATIENT)
Dept: MRI IMAGING | Facility: HOSPITAL | Age: 87
Discharge: HOME | End: 2022-11-15
Attending: NURSE PRACTITIONER | Admitting: NURSE PRACTITIONER
Payer: COMMERCIAL

## 2022-11-15 DIAGNOSIS — M71.21 BAKER'S CYST, UNRUPTURED, RIGHT: ICD-10-CM

## 2022-11-15 DIAGNOSIS — M17.11 PRIMARY OSTEOARTHRITIS OF RIGHT KNEE: ICD-10-CM

## 2022-11-15 PROCEDURE — 73721 MRI JNT OF LWR EXTRE W/O DYE: CPT | Mod: RT

## 2022-11-18 ENCOUNTER — TELEPHONE (OUTPATIENT)
Dept: FAMILY MEDICINE | Facility: CLINIC | Age: 87
End: 2022-11-18

## 2022-11-21 ENCOUNTER — TELEPHONE (OUTPATIENT)
Dept: PALLIATIVE MEDICINE | Facility: CLINIC | Age: 87
End: 2022-11-21

## 2022-11-21 DIAGNOSIS — M76.892 LEFT KNEE TENDONITIS: Primary | ICD-10-CM

## 2022-11-21 DIAGNOSIS — M71.21 BAKER'S CYST, UNRUPTURED, RIGHT: ICD-10-CM

## 2022-11-21 DIAGNOSIS — M15.0 PRIMARY OSTEOARTHRITIS INVOLVING MULTIPLE JOINTS: ICD-10-CM

## 2022-11-21 DIAGNOSIS — M17.11 PRIMARY OSTEOARTHRITIS OF RIGHT KNEE: ICD-10-CM

## 2022-11-21 DIAGNOSIS — S83.205A: ICD-10-CM

## 2022-11-21 DIAGNOSIS — M71.21 BAKER'S CYST OF KNEE, RIGHT: ICD-10-CM

## 2022-11-21 DIAGNOSIS — M16.12 PRIMARY OSTEOARTHRITIS OF LEFT HIP: ICD-10-CM

## 2022-11-21 NOTE — TELEPHONE ENCOUNTER
Central Prior Authorization Team   Phone: [phone]      PA Initiation    Medication: zolpidem ER (AMBIEN CR) 6.25 MG CR tablet  Insurance Company: [employer] - Phone [phone] Fax [phone]  Pharmacy Filling the Rx: [id number] - [place] [[address]  Filling Pharmacy Phone: [phone]  Filling Pharmacy Fax:    Start Date: 11/21/2022

## 2022-11-21 NOTE — TELEPHONE ENCOUNTER
Prior Authorization Approval    Authorization Effective Date: 10/22/2022  Authorization Expiration Date: 11/21/2023  Medication: zolpidem ER (AMBIEN CR) 6.25 MG CR tablet-APPROVED  Approved Dose/Quantity:   Reference #:     Insurance Company: [employer] - Phone [phone] Fax [phone]  Expected CoPay:       CoPay Card Available:      Foundation Assistance Needed:    Which Pharmacy is filling the prescription (Not needed for infusion/clinic administered): [id number] [[address]  Pharmacy Notified: No  Patient Notified: No

## 2022-11-21 NOTE — TELEPHONE ENCOUNTER
[facility] Call Center    Phone Message    May a detailed message be left on voicemail: yes     Reason for Call: Other: Patient is wondering if she can get something a bit stronger to help with her pain. Please call to discuss with patient.      Action Taken: Other: Pain    Travel Screening: Not Applicable

## 2022-11-21 NOTE — TELEPHONE ENCOUNTER
11/11/22 plan    1. 30 minutes Clinic follow-up with SHASHA Simpson NP-C on 12/15/22 at 8 am  2. Imaging: MRI of right knee: [facility] ([place]): [phone]   3. Procedures recommended: We will discuss after MRI   4. Medication Management :   1. Hydrocodone-APAP 5/325 mg: take 1/2 tablet three times a day if needed. If the pain is too severe, you can take a whole tablet.   2. Tylenol 650 mg: MAX of 4 tabs per day.   -------------------    - Follow up is scheduled for 12/15/22.  - MRI is completed. She would like the results of her MRI and next steps. Please review.  - The Norco provides 0% pain relief. Recommended she not refill it. She would like a stronger pain medication. Please review.  - The Tylenol is providing 0% pain relief. Recommended she discontinue it. She can't take NSAIDs orally but believes a topical would be okay. Recommended she try OTC Voltaren on the knee and continue rest, ice, compression, elevation.     We reviewed that Valentine Howard will be back in clinic next week and I would be in touch then with her response.    FRANCOISE CastañedaN, RN  Care Coordinator  [facility] [place]

## 2022-11-28 ENCOUNTER — HOSPITAL ENCOUNTER (OUTPATIENT)
Dept: CARDIOLOGY | Facility: HOSPITAL | Age: 87
Discharge: HOME | End: 2022-11-28
Attending: INTERNAL MEDICINE | Admitting: INTERNAL MEDICINE
Payer: COMMERCIAL

## 2022-11-28 DIAGNOSIS — I50.22 CHRONIC SYSTOLIC HEART FAILURE (H): ICD-10-CM

## 2022-11-28 LAB — LVEF ECHO: NORMAL

## 2022-11-28 PROCEDURE — 93325 DOPPLER ECHO COLOR FLOW MAPG: CPT | Mod: 26 | Performed by: INTERNAL MEDICINE

## 2022-11-28 PROCEDURE — 93308 TTE F-UP OR LMTD: CPT | Mod: 26 | Performed by: INTERNAL MEDICINE

## 2022-11-28 PROCEDURE — 93321 DOPPLER ECHO F-UP/LMTD STD: CPT

## 2022-11-28 PROCEDURE — 93321 DOPPLER ECHO F-UP/LMTD STD: CPT | Mod: 26 | Performed by: INTERNAL MEDICINE

## 2022-11-28 PROCEDURE — 93325 DOPPLER ECHO COLOR FLOW MAPG: CPT

## 2022-11-28 RX ORDER — HYDROCODONE BITARTRATE AND ACETAMINOPHEN 5; 325 MG/1; MG/1
.5-1 TABLET ORAL 3 TIMES DAILY PRN
Qty: 30 TABLET | Refills: 0 | Status: CANCELLED | OUTPATIENT
Start: 2022-11-28

## 2022-11-28 RX ORDER — OXYCODONE HYDROCHLORIDE 5 MG/1
5 TABLET ORAL 3 TIMES DAILY PRN
Qty: 21 TABLET | Refills: 0 | Status: SHIPPED | OUTPATIENT
Start: 2022-11-28 | End: 2022-12-05

## 2022-11-28 NOTE — TELEPHONE ENCOUNTER
Patient is requesting something stronger for pain  Patient will now also be out of hydrocodone 5/325 mg    Call placed to patient:  Reviewed status with patient  Reports pain has increased  She is taking tylenol which only helps somewhat and does not want to take too much of this.      Received call from patient requesting refill  Hydrocodone 5/325 mg     Last dispensed from pharmacy on     Patient's last office/virtual visit by prescribing provider on 11/11/22  1. Imaging: MRI of right knee: [facility]): [phone]   2. Procedures recommended: We will discuss after MRI   3. Medication Management :   1. Hydrocodone-APAP 5/325 mg: take 1/2 tablet three times a day if needed. If the pain is too severe, you can take a whole tablet.   2. Tylenol 650 mg: MAX of 4 tabs per day.   Next office/virtual appointment scheduled for     UDT/CSA: 11/11/22    Please review recent visit recommendations:  Hydrocodone was not effective for patient-this is not cued  Tramadol was stopped in the past due respiratory concerns  Tylenol is somewhat helpful however patient does not want to be taking high doses of this consistently  provider: Are the MRI results in and could patient have a procedure  Covering provider: could something be ordered in the meantime while provider is out

## 2022-11-28 NOTE — TELEPHONE ENCOUNTER
[facility] Call Center    Phone Message    May a detailed message be left on voicemail: yes     Reason for Call: Other: Patient called inquiring on the status of this request. Patient states she is now out of Hydrocodone-APAP 5/325 mg and is not sure what to do. Patient is requesting a call back ASAP     Action Taken: Other: Routed to Pain Support Pool     Travel Screening: Not Applicable

## 2022-11-30 NOTE — TELEPHONE ENCOUNTER
Spoke with patient and gave above message to her. Also printed this information and mailed it to her home address.  Kika Thao LPN  [facility]

## 2022-12-07 ENCOUNTER — TELEPHONE (OUTPATIENT)
Dept: CARDIOLOGY | Facility: CLINIC | Age: 87
End: 2022-12-07

## 2022-12-07 ENCOUNTER — TELEPHONE (OUTPATIENT)
Dept: PALLIATIVE MEDICINE | Facility: CLINIC | Age: 87
End: 2022-12-07

## 2022-12-07 DIAGNOSIS — I48.0 PAROXYSMAL ATRIAL FIBRILLATION (H): ICD-10-CM

## 2022-12-07 RX ORDER — AMIODARONE HYDROCHLORIDE 200 MG/1
100 TABLET ORAL DAILY
Qty: 45 TABLET | Refills: 3 | Status: SHIPPED | OUTPATIENT
Start: 2022-12-07 | End: 2024-02-20

## 2022-12-07 NOTE — TELEPHONE ENCOUNTER
Stacy Noriega RN   12/7/2022  4:18 PM CST Back to Top      See telephone encounter dated 12/7. [place]    Stacy Noriega RN   12/1/2022  1:31 PM CST       LM for sister with direct line for call back. -[facility]     Stacy Noriega RN   11/30/2022  2:18 PM CST       ----- Message -----  From: Estrada Holley MD  Sent: 11/28/2022   4:54 PM CST  To: Stacy Noriega RN     Please inform sister that ejection fraction is still low, as she heard back from eye doctor concerning possible optic neuritis?  In any event, I would favor decreasing dose of amiodarone to 100 mg a day or 200 mg every other day.  Does she have any fluid retention, just tell her to be careful with extra salt and water.  LF

## 2022-12-07 NOTE — TELEPHONE ENCOUNTER
[facility] Call Center    Phone Message    May a detailed message be left on voicemail: yes     Reason for Call: Other: Patient called stating she has been waiting for her MRI results, patient requests to be contacted as soon as possible for results stating her and her family are feeling concerned.     Action Taken: Message routed to:  Other: [facility]    Travel Screening: Not Applicable

## 2022-12-07 NOTE — TELEPHONE ENCOUNTER
Received a call back from Sister. She is not experiencing any symptoms of fluid retention and education was provided regarding fluid and salt restriction. She saw her eye doctor for a regular check up recently and has a film over the eye she had cataracts. She has a consult for this in January.     She will go down on her Amiodarone as ordered by Dr. Holley. She thinks she will do better with Amiodarone 100 mg daily as opposed to taking it every other day. She will split the tablet. New Rx sent in for her with updated instructions. She will follow up as scheduled with him in February. [place] [place]

## 2022-12-08 NOTE — TELEPHONE ENCOUNTER
From encounter on 11.30.22:  Oxycodone 5 mg 1 tab TID PRN ordered. Ok to take Tylenol 1000 mg between doses of Oxycodone. Last liver labs 8/2022 were normal.     - MRI show NO baker's cyst. If there was a cyst in the past, it likely ruptured on it's own and cyst contents reabsorbed.  She has extensive degenerative disease throughout the right knee including a meniscus tear, full thickness loss of cartilage in parts of the knee joint, tendonitis, full thickness tear of ACL (likely chronic). Recommend a second opinion from Ortho and urgent referral placed.    Representative [phone]     SHASHA Domínguez, NP-C  [facility]    Patient was notified of the above results and results also sent via mail to the patient.    Kika Thao     4:35 PM  Note     Spoke with patient and gave above message to her. Also printed this information and mailed it to her home address.  Kika Thao LPN  [facility]        Provider: were there further MRI results/more imaging ordered

## 2022-12-15 ENCOUNTER — OFFICE VISIT (OUTPATIENT)
Dept: PALLIATIVE MEDICINE | Facility: OTHER | Age: 87
End: 2022-12-15
Payer: COMMERCIAL

## 2022-12-15 VITALS — WEIGHT: 155 LBS | SYSTOLIC BLOOD PRESSURE: 176 MMHG | BODY MASS INDEX: 27.9 KG/M2 | DIASTOLIC BLOOD PRESSURE: 76 MMHG

## 2022-12-15 DIAGNOSIS — G89.29 CHRONIC PAIN OF RIGHT KNEE: Primary | ICD-10-CM

## 2022-12-15 DIAGNOSIS — S83.512D RUPTURE OF ANTERIOR CRUCIATE LIGAMENT OF LEFT KNEE, SUBSEQUENT ENCOUNTER: ICD-10-CM

## 2022-12-15 DIAGNOSIS — M15.0 PRIMARY OSTEOARTHRITIS INVOLVING MULTIPLE JOINTS: ICD-10-CM

## 2022-12-15 DIAGNOSIS — Z79.899 HIGH RISK MEDICATION USE: ICD-10-CM

## 2022-12-15 DIAGNOSIS — M25.561 CHRONIC PAIN OF RIGHT KNEE: Primary | ICD-10-CM

## 2022-12-15 DIAGNOSIS — M17.11 PRIMARY OSTEOARTHRITIS OF RIGHT KNEE: ICD-10-CM

## 2022-12-15 PROCEDURE — 99214 OFFICE O/P EST MOD 30 MIN: CPT | Performed by: NURSE PRACTITIONER

## 2022-12-15 PROCEDURE — G0463 HOSPITAL OUTPT CLINIC VISIT: HCPCS | Performed by: NURSE PRACTITIONER

## 2022-12-15 RX ORDER — DICLOFENAC EPOLAMINE 0.01 G/1
1 SYSTEM TOPICAL 2 TIMES DAILY
Qty: 60 PATCH | Refills: 3 | Status: SHIPPED | OUTPATIENT
Start: 2022-12-15 | End: 2023-01-19

## 2022-12-15 RX ORDER — OXYCODONE HYDROCHLORIDE 5 MG/1
5-10 TABLET ORAL 3 TIMES DAILY PRN
Qty: 150 TABLET | Refills: 0 | Status: SHIPPED | OUTPATIENT
Start: 2022-12-15 | End: 2023-01-19

## 2022-12-15 NOTE — PROGRESS NOTES
[facility]    CHIEF COMPLAINT: Chronic Pain.    INTERVAL HISTORY:  Last seen on 22.       Recommendations/plan at the last visit included:  1. 30 minutes Clinic follow-up with SHASHA Simpson NP-C on 12/15/22 at 8 am  2. Imaging: MRI of right knee: [facility]): [phone]   3. Procedures recommended: We will discuss after MRI   4. Medication Management :   1. Hydrocodone-APAP 5/325 mg: take 1/2 tablet three times a day if needed. If the pain is too severe, you can take a whole tablet.   2. Tylenol 650 mg: MAX of 4 tabs per day.     Since last visit:   - Continues to have severe right knee pain. Oxycodone helps low back and left knee chronic pain. Does not help severe right knee pain much at all.   - Has questions about recent MRIs    Pain Information today: 10/10. Location of pain: right knee.    Current Pain Relevant Medications:    Acetaminophen 325 M tabs QID every day  Compounded cream: Lidocaine, ibuprofen, diclofenac  Duloxetine 60 mg daily      Current Controlled Substance Medications:   Ambien 6.25 m.25 tabs at HS     Previous Pain Relevant Medications: (H--helped; HI--Helped initially; SWH--Somewhat helpful; NH--No help; W--worse; SE--side effects; ?--Unsure if helpful)   Opiates: Tramadol: [facility], Buprenorphine:Allergic  NSAIDS: Can't take, on blood thinner  Migraine medications: N/A  Muscle Relaxants: none  Neuropathics: Gabapentin: SE  Anti-depressants for pain: Duloxetine: NH for pain  Anxiety medications: N/A  Topicals: Compounded cream: Lidocaine, ibuprofen, diclofenac:  OTC medications: Tylenol: takes 4000 mg/day  Sleep Medications: Temazepam: Allergy, Ambien:H  Other medications not covered above:      SUBSTANCE HISTORY:   Past or current illegal drug use: none  Past or current ETOH use: Rarely, glass of wine  Nicotine/tobacco use: none  Daily Caffeine intake: never     CURRENT FAMILY/SOCIAL SITUATION:  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 NO   Activity: Is patient functional and able to perform Activities of Daily Living? N/A   Adverse effects: Is patient free from adverse side effects from opiates? N/A   Adherence to Rx protocol: Is patient adhering to Controlled Substance Agreement and taking medications ONLY as ordered? N/A     Is Narcan prescribed for opiate use >50 MME daily or concurrent use of opiates and benzodiazepines? N/A    [place] Board of Pharmacy Data Base Reviewed:    YES; No concern for abuse or misuse of controlled medications based on this report. Reviewed [facility] December 15, 2022- no concerning fills.      PHYSICAL EXAM    Vitals:    12/15/22 0757   BP: (!) 176/76   Weight: 70.3 kg (155 lb)       Constitutional: healthy, alert and mild distress A&O.   Patient is appropriate.  Psychiatric/mental status: Alert, without lethargy or stupor. Appropriate affect.     Neurologic exam:  CN:  Cranial nerves 2-12 are grossly normal.    MUSCULOSKELETAL:     Posture: Upright, shoulders and pelvis are leveled. No  Gait pattern: Yes,       Diagnosis = 2 pts (slowly progressive; moderate pain/objective findings)    Intractability = 3 pts (patient fully engaged but inadequate response to treatments)    Risk        Psych = 3 pts (no significant personality dysfunction/mental illness; good communication with clinic)         Chem Hlth = 3 pts (no history of chemical dependency; not drug-focused)       Reliability = 3 pts (highly reliable with meds, appointments, treatments)       Social = 2 pts (reduction in some relationships/life rolls)       (Psych + Chem hlth + Reliability + Social) = 16    Efficacy = 2 pts (moderate benefit/function; low med dose; too early/not tried meds)     DIRE Score = 18        7-13: likely NOT suitable candidate for long-term opioid analgesia       14-21: may be a suitable candidate for long-term opioid analgesia     DIAGNOSTIC RESULTS:     1/20/21: Left hip x-ray  IMPRESSION:  Mild primary degenerative narrowing both hip joints. Mild generalized degenerative change base of the spine and both SI joints.Pelvis and left hip otherwise negative. No fractures. No dislocations.     1/2021: XR KNEE RIGHT 1 OR 2 VWS   IMPRESSION:  Moderate primary osteoarthritis all 3 compartments but most prominent in the lateral compartment. Knee otherwise negative. No fractures. No joint effusion.     PAIN RELAVENT CONDITIONS:   1.  OA: severe right knee, Baker's cyst.  2.  PMH: CKD Stage 2, A fib, CHF, primary insomnia    DIAGNOSIS AND PLAN:     (M25.561,  G89.29) Chronic pain of right knee  (primary encounter diagnosis)  (M17.11) Primary osteoarthritis of right knee  (S83.512D) Rupture of anterior cruciate ligament of left knee, subsequent encounter  (M15.9) Primary osteoarthritis involving multiple joints  Comment: as noted, Oxycodone alone has not been effective for her severe left knee pain. Reviewed MRI and referring to Orthopedics, Dr Carlson. Will try Flector patch to see if this provides some site specific pain relief.   Plan: Orthopedic  Referral,   diclofenac (FLECTOR) 1.3 % patch  oxyCODONE (ROXICODONE) 5 MG tablet    (Z79.899) High risk medication use  Comment: Required for opiate use   Plan: naloxone (NARCAN) 4 MG/0.1ML nasal spray           Hypertension: Sister Gladys to follow up with Primary Care provider regarding elevated blood pressure.     PATIENT INSTRUCTIONS:     Diagnosis reviewed, treatment option addressed, and risk/benefits discussed.  Self-care instructions given.  I am recommending a multidisciplinary treatment plan to help this patient better manage pain.    Remember to request ALL medication refills 5 BUSINESS days before you run out.       1. 30  minutes Video or Clinic follow-up with SHASHA Simpson NP-C in 1 month . Ok to schedule up to three follow up appts at a time.   2. Procedures recommended: We will discuss after your appt with Dr Carlson   3. Referrals: Dr Carlson at the [facility]. [phone]  4. Medication Management :   1. Oxycodone 5 mg: continue 1-2 tabs up to 4 times per day. MAX 5 tabs per day.  2. Flector patch: Apply 1 patch to painful area, change patch 1 time per day.   3. Narcan nasal spray: NARCAN NASAL SPRAY: ANYTIME  NARCAN IS USED 911 MUST BE CALLED!  This medication is too be used in the case of accidental opiate overdose. If you are found unresponsive for ANY reason, the person who finds you should give one spray into your nostril AND CALL 911. We don't know why you are unresponsive so you must have medical attention. Narcan will also cause opiate withdrawal which also requires immediate medical attention. They should continue giving you another spray, alternating nostrils until you are conscious OR EMS ARRIVES. Everyone who is in your home regularly should know where this medication is kept and how it is to be used. Narcan should NEVER be locked away and should always be accessible to anyone. It can be helpful to put a note on the cabinet or drawer where it is kept so others can find it easily and help you.     Call [facility] as soon as you are safe to let us know that you required Narcan.     I have reviewed the note as documented above.  This accurately captures the substance of my conversation with the patient.  A total of 35 minutes of preparation, care, and consultation were spent on this visit today.     SHASHA Domínguez NP-C  [facility]    (Information in italics and blue color are taken from previous pain and consulting medical providers notes and are documented as such)

## 2022-12-15 NOTE — PATIENT INSTRUCTIONS
After Visit Instructions:     Thank you for coming to [facility] for your care. It is my goal to partner with you to help you reach your optimal state of health.   Continue daily self-care, identifying contributing factors, and monitoring variations in pain level. Continue to integrate self-care into your life.      30 minutes Video or Clinic follow-up with SHASHA Simpson NP-C in 1 month . Ok to schedule up to three follow up appts at a time.   Procedures recommended: We will discuss after your appt with Dr Carlson   Referrals: Dr Carlson at the [facility]. [phone]  Medication Management :   Oxycodone 5 mg: continue 1-2 tabs up to 4 times per day. MAX 5 tabs per day.  Flector patch: Apply 1 patch to painful area, change patch 1 time per day.   Narcan nasal spray: NARCAN NASAL SPRAY: ANYTIME  NARCAN IS USED 911 MUST BE CALLED!  This medication is too be used in the case of accidental opiate overdose. If you are found unresponsive for ANY reason, the person who finds you should give one spray into your nostril AND CALL 911. We don't know why you are unresponsive so you must have medical attention. Narcan will also cause opiate withdrawal which also requires immediate medical attention. They should continue giving you another spray, alternating nostrils until you are conscious OR EMS ARRIVES. Everyone who is in your home regularly should know where this medication is kept and how it is to be used. Narcan should NEVER be locked away and should always be accessible to anyone. It can be helpful to put a note on the cabinet or drawer where it is kept so others can find it easily and help you.     Call [facility] as soon as you are safe to let us know that you required Narcan.         SHASHA Domínguez, NP-C  [facility]  [facility] - Monday and Friday  [place] ([facility]) - Tuesday  [place] - Thursday    Be sure to request  ALL medication refills 5 days prior to the due date unless you will see your medical provider in an appointment before the due date.  This is YOUR responsibility. Do not expect same day refills. If you do not plan ahead you may run out of medications.   NO early refills are allowed. It is your responsibility to manage your medications responsibility and keep them safely stored. Lost or destroyed medications WILL NOT be replaced    Scheduling/Clinic telephone Number for ALL locations:  [phone]    After Hours On-Call Service:  [phone]    Call with any questions about your care and for scheduling assistance.   Calls are returned Monday through Friday between 8 AM and 4:00 PM. We usually get back to you within 2 business days depending on the issue/request.    If we are prescribing your medications:  For opioid medication refills, call the clinic or send a [employer] message 7 days in advance.  Please include:  Your name and date of birth.   Name of requested medication  Name of the pharmacy.  For non-opioid medications, call your pharmacy directly to request a refill. Please allow 3-4 days to be processed.   Per [place] State Law:  All controlled substance prescriptions must be filled within 30 days of being written.    For those controlled substances allowing refills, pickup must occur within 30 days of last fill.      We believe regular attendance is key to your success in our program!    Any time you are unable to keep your appointment we ask that you call us at least 24 hours in advance to cancel.This will allow us to offer the appointment time to another patient.   Multiple missed appointments may lead to dismissal from the clinic.

## 2022-12-16 DIAGNOSIS — R06.09 DYSPNEA ON EXERTION: ICD-10-CM

## 2022-12-16 RX ORDER — FUROSEMIDE 20 MG
20 TABLET ORAL DAILY
Qty: 90 TABLET | Refills: 0 | Status: SHIPPED | OUTPATIENT
Start: 2022-12-16 | End: 2023-04-04

## 2022-12-16 RX ORDER — POTASSIUM CHLORIDE 1500 MG/1
TABLET, EXTENDED RELEASE ORAL
Qty: 90 TABLET | Refills: 0 | Status: SHIPPED | OUTPATIENT
Start: 2022-12-16 | End: 2023-04-13

## 2022-12-20 ENCOUNTER — TELEPHONE (OUTPATIENT)
Dept: PALLIATIVE MEDICINE | Facility: CLINIC | Age: 87
End: 2022-12-20

## 2022-12-20 NOTE — TELEPHONE ENCOUNTER
Central Prior Authorization Team  Phone: [phone]    PA Initiation    Medication: Diclofenac Epolamine 1.3% patch   Insurance Company: [employer] - Phone [phone] Fax [phone]  Pharmacy Filling the Rx: [id number] [[address]  Filling Pharmacy Phone: [phone]  Filling Pharmacy Fax:    Start Date: 12/20/2022

## 2022-12-20 NOTE — TELEPHONE ENCOUNTER
PA Initiation : 12/20/2022  Medication: Diclofenac Epolamine 1.3% patch   Insurance Company: [employer]  Pharmacy Filling the Rx: [facility]  Filling Pharmacy Phone: [phone]  Filling Pharmacy Fax: [phone]  Start Date: 12/15/2022

## 2022-12-20 NOTE — TELEPHONE ENCOUNTER
Prior Authorization Approval    Authorization Effective Date: 11/20/2022  Authorization Expiration Date: 12/20/2023  Medication: Diclofenac Epolamine 1.3% patch- APPROVED   Approved Dose/Quantity:   Reference #:     Insurance Company: [employer] - Phone [phone] Fax [phone]  Expected CoPay:       CoPay Card Available:      Foundation Assistance Needed:    Which Pharmacy is filling the prescription (Not needed for infusion/clinic administered): [facility] [address] [place] [address] [[address]  Pharmacy Notified: Yes  Patient Notified:  **Instructed pharmacy to notify patient when script is ready to /ship.**

## 2022-12-22 ENCOUNTER — TELEPHONE (OUTPATIENT)
Dept: ORTHOPEDICS | Facility: CLINIC | Age: 87
End: 2022-12-22
Payer: COMMERCIAL

## 2022-12-22 DIAGNOSIS — M17.11 OSTEOARTHRITIS OF RIGHT KNEE, UNSPECIFIED OSTEOARTHRITIS TYPE: Primary | ICD-10-CM

## 2022-12-22 NOTE — TELEPHONE ENCOUNTER
Patient scheduled for appointment on 1/11/23 for discussion of viscosupplementation injection vs steroid injection of right knee.      Steroid  injection last completed 6/15/22 @ [facility].       Prior authorization referral for SynviscOne injection pended.     Please advise.    Jose Roberto Anderson ATC

## 2023-01-11 ENCOUNTER — OFFICE VISIT (OUTPATIENT)
Dept: ORTHOPEDICS | Facility: CLINIC | Age: 88
End: 2023-01-11
Payer: COMMERCIAL

## 2023-01-11 VITALS
SYSTOLIC BLOOD PRESSURE: 124 MMHG | BODY MASS INDEX: 27.9 KG/M2 | HEIGHT: 63 IN | DIASTOLIC BLOOD PRESSURE: 76 MMHG | HEART RATE: 80 BPM

## 2023-01-11 DIAGNOSIS — S83.511S RUPTURE OF ANTERIOR CRUCIATE LIGAMENT OF RIGHT KNEE, SEQUELA: ICD-10-CM

## 2023-01-11 DIAGNOSIS — G89.29 CHRONIC PAIN OF RIGHT KNEE: Primary | ICD-10-CM

## 2023-01-11 DIAGNOSIS — M17.11 PRIMARY OSTEOARTHRITIS OF RIGHT KNEE: ICD-10-CM

## 2023-01-11 DIAGNOSIS — M21.061 GENU VALGUM, ACQUIRED, RIGHT: ICD-10-CM

## 2023-01-11 DIAGNOSIS — M25.561 CHRONIC PAIN OF RIGHT KNEE: Primary | ICD-10-CM

## 2023-01-11 PROCEDURE — 99203 OFFICE O/P NEW LOW 30 MIN: CPT | Mod: 25 | Performed by: FAMILY MEDICINE

## 2023-01-11 PROCEDURE — 20611 DRAIN/INJ JOINT/BURSA W/US: CPT | Mod: RT | Performed by: FAMILY MEDICINE

## 2023-01-11 NOTE — PATIENT INSTRUCTIONS
"We tried a \"gel\" or \"rooster comb\" injection today, to try to help with pain  These injections typically give relief in 2-3 weeks, but can take up to one month to have their maximum effect  If not improved after one month, contact me or follow up in clinic  We would typically return to steroid injection if needed, which has worked for you in the past  If the gel injection is helpful, it can be repeated every 6 months, but not sooner based on insurance coverage  We can always perform a steroid injection in-between gel injections if needed  We placed an order today to meet with the orthotist to trial a lateral  knee brace, this can help with knee alignment and reduce pain  We also discussed the option for return to physical therapy in the future if needed  We also discussed the use of Voltaren gel up to 4 times daily if helpful, this is a safe medicine  We lastly discussed talking with a surgeon about knee replacement if nothing is helpful, but I am hopeful that this is a conversation that is not needed, and the surgeon may not offer surgery  If you are doing well from today's injection for many months, just reach out to us again when pain is returning  Let us know if you have any other questions any time!  "

## 2023-01-11 NOTE — LETTER
2023         RE: Gladys Ramirez  [address]  [facility] [id number]        Dear Colleague,    Thank you for referring your patient, Gladys Ramirez, to the [facility]. Please see a copy of my visit note below.    Gladys Ramirez  :  1930  DOS: 2023  MRN: [id number]    Sports Medicine Clinic Visit    PCP: Margret Flores    Gladys Ramirez is a 89 year old female who is seen in consultation at the request of  Valentine Howard C.N.P. presenting with right knee pain.     Injury: Gradual onset of pain over the past several year(s).  Pain located over right posterior and anterior knee, nonradiating.  Additional Features:  Positive: swelling.  Symptoms are better with rest, sitting.  Symptoms are worse with: anymovment.  Other evaluation and/or treatments so far consists of: oxycodone, tylenol, compounded cream (lidocaine/ibuprofen, diclofenac), flector patch, steroid injection 6/15/22 at [facility], Right knee steroid injection 3/10/22 by Dr. Person (pain), [place] in 2022.  Recent imaging completed: MRI completed 11/15/22, bilateral LE US completed 22, right knee xrays 21.  Prior History of related problems: chronic knee pain     Social History: [employer]     Review of Systems  Musculoskeletal: as above  Remainder of review of systems is negative including constitutional, CV, pulmonary, GI, Skin and Neurologic except as noted in HPI or medical history.    Past Medical History:   Diagnosis Date     Angina pectoris (H)      Chest pain 2017     Cough      Hyperlipidemia      Hypertension      Osteoarthritis      Past Surgical History:   Procedure Laterality Date     APPENDECTOMY       BASAL CELL CARCINOMA EXCISION      nose     LAPAROSCOPY DIAGNOSTIC (GENERAL) N/A 2014    Procedure: LAPAROSCOPY BILATERAL SALPINGO-OOPHORECTOMY ;  Surgeon: Sofia Harper MD;  Location: [facility];   "Service:      TONSILLECTOMY      10 years old     ZZC TOTAL KNEE ARTHROPLASTY Left     2011     Family History   Problem Relation Age of Onset     Heart Disease Mother      Rheumatic Heart Disease Mother      No Known Problems Father      Cancer Brother         brain     Lung Cancer Brother      Lung Cancer Brother      Cancer Sister         lung     Lung Cancer Sister        Objective  /76   Pulse 80   Ht 1.588 m (5' 2.5\")   BMI 27.90 kg/m        General: healthy, alert and in no distress      HEENT: no scleral icterus or conjunctival erythema     Skin: no suspicious lesions or rash. No jaundice.     CV: regular rhythm by palpation, 2+ distal pulses, no pedal edema      Resp: normal respiratory effort without conversational dyspnea     Psych: normal mood and affect      Gait: antalgic, appropriate coordination and balance     Neuro: normal light touch sensory exam of the extremities. Motor strength as noted below     Right Knee exam    ROM:        Mildly limited terminal active and passive ROM with flexion and extension    Inspection:       no visible ecchymosis        effusion noted small       Genu valgus on the right    Skin:       no visible deformities       well perfused       capillary refill brisk    Patellar Motion:        Lateral tilt noted in patella       Crepitus noted in the patellofemoral joint    Tender:        medial patellar border       lateral patellar border       medial joint line       lateral joint line most focal    Non Tender:         remainder of knee area    Special Tests:        neg (-) varus at 0 deg and 30 deg       neg (-) valgus at 0 deg and 30 deg    Evaluation of ipsilateral kinetic chain       Baseline modest strength with hip extension and abduction      Radiology  EXAM: MR KNEE RIGHT W/O CONTRAST  LOCATION: [facility]  DATE/TIME: 11/15/2022 11:38 AM     INDICATION:  Primary osteoarthritis of right knee, Baker's cyst, unruptured, " right.  COMPARISON: None.  TECHNIQUE: Unenhanced.     FINDINGS:     MEDIAL COMPARTMENT:   -Meniscus: Horizontal cleavage tear of the posteromedial corner of the medial meniscus.  -Cartilage: Grade 2 cartilage loss both sides of the joint.     LATERAL COMPARTMENT:  -Meniscus: Large portions of the lateral meniscal body are not identified and may be completely degenerated. Anterior and posterior subluxation of the anterior and posterior horn, respectively.   -Cartilage: Full-thickness cartilage loss on both sides of the compartment with bony remodeling of the lateral tibial plateau and extensive reactive subchondral edema.     PATELLOFEMORAL COMPARTMENT:   -Alignment: Patella midline. No subluxation or tilting.   -Cartilage: Grade 2 cartilage loss both retropatellar facets. Grade 2 cartilage loss both sides of the femoral trochlear sulcus.     CRUCIATE LIGAMENTS:   -ACL: Full-thickness disruption of the ACL but this is likely chronic.  -PCL: Grade 1 sprain of the PCL without tearing.     COLLATERAL LIGAMENTS:   -Medial collateral ligament: Superficial and deep fibers are normal.  -Lateral collateral ligament: Normal.     POSTEROMEDIAL CORNER:  -Semimembranosus and medial gastrocnemius tendinopathy without tearing.   -The pes anserinus tendons are intact.     POSTEROLATERAL CORNER:   -Popliteus tendinopathy without tearing.  -Biceps femoris tendon and posterolateral corner complex ligaments are intact.     EXTENSOR MECHANISM:   -Quadriceps tendon: Mild tendinopathy.  -Patellar tendon: Mild tendinopathy.  -Patellofemoral ligaments and retinacula: Intact.     JOINT:   -Small effusion.     BONES:  -No fracture or concerning marrow replacing lesion.     SOFT TISSUES:   -No popliteal cyst. No acute muscular injury or soft tissue mass.                                                                       IMPRESSION:  1.  Horizontal cleavage tear of the posteromedial corner of the meniscus.  2.  Grade 2 cartilage loss both  sides of the medial compartment.  3.  Full-thickness cartilage loss along the majority of both sides of the lateral compartment with bony remodeling of the lateral tibial plateau and extensive reactive edema.  4.  Large portions of the lateral meniscal body are not identified and may be completely degenerated. Anterior and posterior subluxation of the anterior and posterior horn, respectively.  5.  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  6.  Semimembranosus and medial gastrocnemius tendinopathy without tearing.  7.  Popliteus tendinopathy without tearing.  8.  Mild quadriceps and patellar tendinopathy without tearing.  9.  Small effusion.  10.  No evidence for acute fracture.    Large Joint Injection/Arthocentesis: R knee joint    Date/Time: 1/11/2023 11:00 AM  Performed by: Jose J Carlson DO  Authorized by: Jose J Carlson DO     Indications:  Osteoarthritis  Needle Size:  21 G  Guidance: ultrasound    Approach:  Superolateral  Location:  Knee      Medications:  48 mg hylan 48 MG/6ML  Aspirate amount (mL):  20  Aspirate:  Serous and yellow  Outcome:  Tolerated well, no immediate complications  Procedure discussed: discussed risks, benefits, and alternatives    Consent Given by:  Patient  Timeout: timeout called immediately prior to procedure    Prep: patient was prepped and draped in usual sterile fashion     Ultrasound images of procedure were permanently stored.        Assessment:  1. Chronic pain of right knee    2. Primary osteoarthritis of right knee    3. Genu valgum, acquired, right        Plan:  Discussed the assessment with the patient.  Follow up: prn based on clinical progress  Reviewed care so far, multiple CSI with good but temporary relief  Known severe DJD in lateral compartment, milder elsewhere, chronic ACL tear, meniscus tear  MR and XR images independently visualized and reviewed with patient today in clinic  Had good relief from Synvisc in the past on left knee  before TKA in 2012  Trial of US guided SynviscOne injection today, with aspiration, some initial relief from aspiration noted today  Oral Tylenol and topical Voltaren gel reviewed as safe OTC options, reviewed safe dosing strategies  Bracing options reviewed, has compression sleeves, order placed for O&P for lateral  trial  PT options reviewed as well as low impact activity strategies, order available anytime  Could offer orthopedic surgery referral for TKA discussion, but not an ideal candidate due to age/comorbidities  Expectations and limitations of synvisc were reviewed in detail  Often 4-6 weeks before full effect may be noticed  Usually covered up to every 6 months by insurance, but does not need to be repeated unless pain returns, at which point we would re-evaluate  Potential use of CSI in future for flares of pain reviewed in detail  Encouraged modified progressive pain-free activity as tolerated  HEP and Supportive care reviewed  All questions were answered today  Contact us with additional questions or concerns  Signs and sx of concern reviewed      Jose J Carlson DO, CAQ  Sports Medicine Physician  [facility]      Time spent in chart review, one-on-one evaluation, discussion with patient regarding: nature of problem, clinical course, prior treatments, therapeutic options, shared-decision making, potential procedures and referrals, and charting related to the visit: 33 minutes.  If applicable, time does not include time spent performing any procedure.              Disclaimer: This note consists of symbols derived from keyboarding, dictation and/or voice recognition software. As a result, there may be errors in the script that have gone undetected. Please consider this when interpreting information found in this chart.        Again, thank you for allowing me to participate in the care of your patient.        Sincerely,        Jose J Carlson DO

## 2023-01-11 NOTE — PROGRESS NOTES
Gladys Ramirez  :  1930  DOS: 2023  MRN: [id number]    Sports Medicine Clinic Visit    PCP: Margret Flores    Gladys Ramirez is a 89 year old female who is seen in consultation at the request of  Valentine Howard C.N.P. presenting with right knee pain.     Injury: Gradual onset of pain over the past several year(s).  Pain located over right posterior and anterior knee, nonradiating.  Additional Features:  Positive: swelling.  Symptoms are better with rest, sitting.  Symptoms are worse with: anymovment.  Other evaluation and/or treatments so far consists of: oxycodone, tylenol, compounded cream (lidocaine/ibuprofen, diclofenac), flector patch, steroid injection 6/15/22 at [facility], Right knee steroid injection 3/10/22 by Dr. Person (pain), [place] in 2022.  Recent imaging completed: MRI completed 11/15/22, bilateral LE US completed 22, right knee xrays 21.  Prior History of related problems: chronic knee pain     Social History: [employer]     Review of Systems  Musculoskeletal: as above  Remainder of review of systems is negative including constitutional, CV, pulmonary, GI, Skin and Neurologic except as noted in HPI or medical history.    Past Medical History:   Diagnosis Date     Angina pectoris (H)      Chest pain 2017     Cough      Hyperlipidemia      Hypertension      Osteoarthritis      Past Surgical History:   Procedure Laterality Date     APPENDECTOMY       BASAL CELL CARCINOMA EXCISION      nose     LAPAROSCOPY DIAGNOSTIC (GENERAL) N/A 2014    Procedure: LAPAROSCOPY BILATERAL SALPINGO-OOPHORECTOMY ;  Surgeon: Sofia Harper MD;  Location: [facility];  Service:      TONSILLECTOMY      10 years old     RUST TOTAL KNEE ARTHROPLASTY Left          Family History   Problem Relation Age of Onset     Heart Disease Mother      Rheumatic Heart Disease Mother      No Known Problems Father      Cancer Brother         brain     Lung  "Cancer Brother      Lung Cancer Brother      Cancer Sister         lung     Lung Cancer Sister        Objective  /76   Pulse 80   Ht 1.588 m (5' 2.5\")   BMI 27.90 kg/m        General: healthy, alert and in no distress      HEENT: no scleral icterus or conjunctival erythema     Skin: no suspicious lesions or rash. No jaundice.     CV: regular rhythm by palpation, 2+ distal pulses, no pedal edema      Resp: normal respiratory effort without conversational dyspnea     Psych: normal mood and affect      Gait: antalgic, appropriate coordination and balance     Neuro: normal light touch sensory exam of the extremities. Motor strength as noted below     Right Knee exam    ROM:        Mildly limited terminal active and passive ROM with flexion and extension    Inspection:       no visible ecchymosis        effusion noted small       Genu valgus on the right    Skin:       no visible deformities       well perfused       capillary refill brisk    Patellar Motion:        Lateral tilt noted in patella       Crepitus noted in the patellofemoral joint    Tender:        medial patellar border       lateral patellar border       medial joint line       lateral joint line most focal    Non Tender:         remainder of knee area    Special Tests:        neg (-) varus at 0 deg and 30 deg       neg (-) valgus at 0 deg and 30 deg    Evaluation of ipsilateral kinetic chain       Baseline modest strength with hip extension and abduction      Radiology  EXAM: MR KNEE RIGHT W/O CONTRAST  LOCATION: [facility]  DATE/TIME: 11/15/2022 11:38 AM     INDICATION:  Primary osteoarthritis of right knee, Baker's cyst, unruptured, right.  COMPARISON: None.  TECHNIQUE: Unenhanced.     FINDINGS:     MEDIAL COMPARTMENT:   -Meniscus: Horizontal cleavage tear of the posteromedial corner of the medial meniscus.  -Cartilage: Grade 2 cartilage loss both sides of the joint.     LATERAL COMPARTMENT:  -Meniscus: Large portions " of the lateral meniscal body are not identified and may be completely degenerated. Anterior and posterior subluxation of the anterior and posterior horn, respectively.   -Cartilage: Full-thickness cartilage loss on both sides of the compartment with bony remodeling of the lateral tibial plateau and extensive reactive subchondral edema.     PATELLOFEMORAL COMPARTMENT:   -Alignment: Patella midline. No subluxation or tilting.   -Cartilage: Grade 2 cartilage loss both retropatellar facets. Grade 2 cartilage loss both sides of the femoral trochlear sulcus.     CRUCIATE LIGAMENTS:   -ACL: Full-thickness disruption of the ACL but this is likely chronic.  -PCL: Grade 1 sprain of the PCL without tearing.     COLLATERAL LIGAMENTS:   -Medial collateral ligament: Superficial and deep fibers are normal.  -Lateral collateral ligament: Normal.     POSTEROMEDIAL CORNER:  -Semimembranosus and medial gastrocnemius tendinopathy without tearing.   -The pes anserinus tendons are intact.     POSTEROLATERAL CORNER:   -Popliteus tendinopathy without tearing.  -Biceps femoris tendon and posterolateral corner complex ligaments are intact.     EXTENSOR MECHANISM:   -Quadriceps tendon: Mild tendinopathy.  -Patellar tendon: Mild tendinopathy.  -Patellofemoral ligaments and retinacula: Intact.     JOINT:   -Small effusion.     BONES:  -No fracture or concerning marrow replacing lesion.     SOFT TISSUES:   -No popliteal cyst. No acute muscular injury or soft tissue mass.                                                                       IMPRESSION:  1.  Horizontal cleavage tear of the posteromedial corner of the meniscus.  2.  Grade 2 cartilage loss both sides of the medial compartment.  3.  Full-thickness cartilage loss along the majority of both sides of the lateral compartment with bony remodeling of the lateral tibial plateau and extensive reactive edema.  4.  Large portions of the lateral meniscal body are not identified and may be  completely degenerated. Anterior and posterior subluxation of the anterior and posterior horn, respectively.  5.  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  6.  Semimembranosus and medial gastrocnemius tendinopathy without tearing.  7.  Popliteus tendinopathy without tearing.  8.  Mild quadriceps and patellar tendinopathy without tearing.  9.  Small effusion.  10.  No evidence for acute fracture.    Large Joint Injection/Arthocentesis: R knee joint    Date/Time: 1/11/2023 11:00 AM  Performed by: Jose J Carlson DO  Authorized by: Jose J Carlson DO     Indications:  Osteoarthritis  Needle Size:  21 G  Guidance: ultrasound    Approach:  Superolateral  Location:  Knee      Medications:  48 mg hylan 48 MG/6ML  Aspirate amount (mL):  20  Aspirate:  Serous and yellow  Outcome:  Tolerated well, no immediate complications  Procedure discussed: discussed risks, benefits, and alternatives    Consent Given by:  Patient  Timeout: timeout called immediately prior to procedure    Prep: patient was prepped and draped in usual sterile fashion     Ultrasound images of procedure were permanently stored.        Assessment:  1. Chronic pain of right knee    2. Primary osteoarthritis of right knee    3. Genu valgum, acquired, right        Plan:  Discussed the assessment with the patient.  Follow up: prn based on clinical progress  Reviewed care so far, multiple CSI with good but temporary relief  Known severe DJD in lateral compartment, milder elsewhere, chronic ACL tear, meniscus tear  MR and XR images independently visualized and reviewed with patient today in clinic  Had good relief from Synvisc in the past on left knee before TKA in 2012  Trial of US guided SynviscOne injection today, with aspiration, some initial relief from aspiration noted today  Oral Tylenol and topical Voltaren gel reviewed as safe OTC options, reviewed safe dosing strategies  Bracing options reviewed, has compression sleeves,  order placed for O&P for lateral  trial  PT options reviewed as well as low impact activity strategies, order available anytime  Could offer orthopedic surgery referral for TKA discussion, but not an ideal candidate due to age/comorbidities  Expectations and limitations of synvisc were reviewed in detail  Often 4-6 weeks before full effect may be noticed  Usually covered up to every 6 months by insurance, but does not need to be repeated unless pain returns, at which point we would re-evaluate  Potential use of CSI in future for flares of pain reviewed in detail  Encouraged modified progressive pain-free activity as tolerated  HEP and Supportive care reviewed  All questions were answered today  Contact us with additional questions or concerns  Signs and sx of concern reviewed      Jose J Carlson DO, AURY  Sports Medicine Physician  [facility]      Time spent in chart review, one-on-one evaluation, discussion with patient regarding: nature of problem, clinical course, prior treatments, therapeutic options, shared-decision making, potential procedures and referrals, and charting related to the visit: 33 minutes.  If applicable, time does not include time spent performing any procedure.              Disclaimer: This note consists of symbols derived from keyboarding, dictation and/or voice recognition software. As a result, there may be errors in the script that have gone undetected. Please consider this when interpreting information found in this chart.

## 2023-01-17 NOTE — PROGRESS NOTES
[facility]    CHIEF COMPLAINT: Chronic Pain.    INTERVAL HISTORY:  Last seen on 12/15/22.       Recommendations/plan at the last visit included:  1. 30 minutes Video or Clinic follow-up with SHASHA Simpson NP-C in 1 month . Ok to schedule up to three follow up appts at a time.   2. Procedures recommended: We will discuss after your appt with Dr Carlson   3. Referrals: Dr Carlson at the [facility]. [phone]  4. Medication Management :   1. Oxycodone 5 mg: continue 1-2 tabs up to 4 times per day. MAX 5 tabs per day.  2. Flector patch: Apply 1 patch to painful area, change patch 1 time per day.   3. Narcan nasal spray: NARCAN NASAL SPRAY: ANYTIME  NARCAN IS USED 911 MUST BE CALLED!  This medication is too be used in the case of accidental opiate overdose. If you are found unresponsive for ANY reason, the person who finds you should give one spray into your nostril AND CALL 911. We don't know why you are unresponsive so you must have medical attention. Narcan will also cause opiate withdrawal which also requires immediate medical attention. They should continue giving you another spray, alternating nostrils until you are conscious OR EMS ARRIVES. Everyone who is in your home regularly should know where this medication is kept and how it is to be used. Narcan should NEVER be locked away and should always be accessible to anyone. It can be helpful to put a note on the cabinet or drawer where it is kept so others can find it easily and help you.      Call [facility] as soon as you are safe to let us know that you required Narcan.     Since last visit:   - 23: Saw Dr Carlson in Sports med. Did a fluid aspiration and Synvisc injection. She is supposed to follow up in 1 month for a custom brace     Pain Information today: Severe Pain (7)/10. Location of pain: right  knee .    Current Pain Relevant Medications:    Acetaminophen 325 M  tabs QID every day  Compounded cream: Lidocaine, ibuprofen, diclofenac  Duloxetine 60 mg daily      Current Controlled Substance Medications:   Ambien 6.25 m.25 tabs at HS     Previous Pain Relevant Medications: (H--helped; HI--Helped initially; SWH--Somewhat helpful; NH--No help; W--worse; SE--side effects; ?--Unsure if helpful)   Opiates: Tramadol: SWH, Buprenorphine:Allergic  NSAIDS: Can't take, on blood thinner  Migraine medications: N/A  Muscle Relaxants: none  Neuropathics: Gabapentin: SE  Anti-depressants for pain: Duloxetine: NH for pain  Anxiety medications: N/A  Topicals: Compounded cream: Lidocaine, ibuprofen, diclofenac:  OTC medications: Tylenol: takes 4000 mg/day  Sleep Medications: Temazepam: Allergy, Ambien:H  Other medications not covered above:      SUBSTANCE HISTORY:   Past or current illegal drug use: none  Past or current ETOH use: Rarely, glass of wine  Nicotine/tobacco use: none  Daily Caffeine intake: never     CURRENT FAMILY/SOCIAL SITUATION:  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 NO   Activity: Is patient functional and able to perform Activities of Daily Living? N/A   Adverse effects: Is patient free from adverse side effects from opiates? N/A   Adherence to Rx protocol: Is patient adhering to Controlled Substance Agreement and taking medications ONLY as ordered? N/A     Is Narcan prescribed for opiate use >50 MME daily or concurrent use of opiates and benzodiazepines? N/A    [place] Board of Pharmacy Data Base Reviewed:    YES; No concern for abuse or misuse of controlled medications based on this report. Reviewed [facility] 2023- no concerning fills.      PHYSICAL EXAM    Vitals:    23 1052   BP: (!) 144/78    Pulse: 79   Weight: 71.7 kg (158 lb)       Constitutional: healthy, alert and no distress A&O.   Patient is appropriate.  Psychiatric/mental status: Alert, without lethargy or stupor. Appropriate affect.     Neurologic exam:  CN:  Cranial nerves 2-12 are grossly normal.    MUSCULOSKELETAL:     Posture: Upright, shoulders and pelvis are leveled. No  Antalgic Gait Pattern?: Yes       DIRE Score for ongoing opioid management is calculated as follows:    Diagnosis = 2 pts (slowly progressive; moderate pain/objective findings)    Intractability = 3 pts (patient fully engaged but inadequate response to treatments)    Risk        Psych = 3 pts (no significant personality dysfunction/mental illness; good communication with clinic)         Chem Hlth = 3 pts (no history of chemical dependency; not drug-focused)       Reliability = 3 pts (highly reliable with meds, appointments, treatments)       Social = 2 pts (reduction in some relationships/life rolls)       (Psych + Chem hlth + Reliability + Social) = 16    Efficacy = 2 pts (moderate benefit/function; low med dose; too early/not tried meds)    DIRE Score = 18        7-13: likely NOT suitable candidate for long-term opioid analgesia       14-21: may be a suitable candidate for long-term opioid analgesia     DIAGNOSTIC RESULTS:    11/15/22: MRI right knew w/o contrast   IMPRESSION:  1.  Horizontal cleavage tear of the posteromedial corner of the meniscus.  2.  Grade 2 cartilage loss both sides of the medial compartment.  3.  Full-thickness cartilage loss along the majority of both sides of the lateral compartment with bony remodeling of the lateral tibial plateau and extensive reactive edema.  4.  Large portions of the lateral meniscal body are not identified and may be completely degenerated. Anterior and posterior subluxation of the anterior and posterior horn, respectively.  5.  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  6.  Semimembranosus and medial  gastrocnemius tendinopathy without tearing.  7.  Popliteus tendinopathy without tearing.  8.  Mild quadriceps and patellar tendinopathy without tearing.  9.  Small effusion.  10.  No evidence for acute fracture.     1/20/21: Left hip x-ray  IMPRESSION:  Mild primary degenerative narrowing both hip joints. Mild generalized degenerative change base of the spine and both SI joints.Pelvis and left hip otherwise negative. No fractures. No dislocations.     1/2021: XR KNEE RIGHT 1 OR 2 VWS   IMPRESSION:  Moderate primary osteoarthritis all 3 compartments but most prominent in the lateral compartment. Knee otherwise negative. No fractures. No joint effusion.     PAIN RELAVENT CONDITIONS:   1.  OA: severe right knee, Baker's cyst.  2.  PMH: CKD Stage 2, A fib, CHF, primary insomnia    DIAGNOSIS AND PLAN:     (G89.29) Chronic intractable pain  (primary encounter diagnosis)  (M15.9) Primary osteoarthritis involving multiple joints  (S83.511S) Rupture of anterior cruciate ligament of right knee, sequela  (M17.11) Primary osteoarthritis of right knee  Comment: Ortho plan is slowing providing pain relief. Will continue injections and  custom brace from Dr Carlson's office. We will add gentle pain PT.   Plan:   diclofenac (FLECTOR) 1.3 % patch   oxyCODONE (ROXICODONE) 5 MG tablet   Physical Therapy Referral     Hypertension: Sister Gladys to follow up with Primary Care provider regarding elevated blood pressure.       PATIENT INSTRUCTIONS:     Diagnosis reviewed, treatment option addressed, and risk/benefits discussed.  Self-care instructions given.  I am recommending a multidisciplinary treatment plan to help this patient better manage pain.    Remember to request ALL medication refills 5 BUSINESS days before you run out.     1. Physical therapy: YES They will call you to schedule an appointment   2. 30 minutes Clinic follow-up with SHASHA Simpson NP-C in 1-2 months . Ok to schedule up to three follow up appts at a  time.   3. Referrals: Please call Dr Carlson to make an appt for the 2nd week of February. [phone]  4. Medication Management :   1. Oxycodone 5 m-2 tablet 3-4 times per day. MAX 5 tabs per day.   2. OK to use two Flector patches per day. Can apply two at a time or can do 1 patch twice a day.     I have reviewed the note as documented above.  This accurately captures the substance of my conversation with the patient.  A total of 31 minutes of preparation, care, and consultation were spent on this visit today.     SHASHA Domínguez, NP-C  [facility]    (Information in italics and blue color are taken from previous pain and consulting medical providers notes and are documented as such)

## 2023-01-19 ENCOUNTER — OFFICE VISIT (OUTPATIENT)
Dept: PALLIATIVE MEDICINE | Facility: OTHER | Age: 88
End: 2023-01-19
Payer: COMMERCIAL

## 2023-01-19 VITALS
DIASTOLIC BLOOD PRESSURE: 78 MMHG | WEIGHT: 158 LBS | SYSTOLIC BLOOD PRESSURE: 144 MMHG | HEART RATE: 79 BPM | BODY MASS INDEX: 28.44 KG/M2

## 2023-01-19 DIAGNOSIS — G89.29 CHRONIC INTRACTABLE PAIN: Primary | ICD-10-CM

## 2023-01-19 DIAGNOSIS — S83.511S RUPTURE OF ANTERIOR CRUCIATE LIGAMENT OF RIGHT KNEE, SEQUELA: ICD-10-CM

## 2023-01-19 DIAGNOSIS — M17.11 PRIMARY OSTEOARTHRITIS OF RIGHT KNEE: ICD-10-CM

## 2023-01-19 DIAGNOSIS — M15.0 PRIMARY OSTEOARTHRITIS INVOLVING MULTIPLE JOINTS: ICD-10-CM

## 2023-01-19 PROCEDURE — 99214 OFFICE O/P EST MOD 30 MIN: CPT | Performed by: NURSE PRACTITIONER

## 2023-01-19 PROCEDURE — G0463 HOSPITAL OUTPT CLINIC VISIT: HCPCS

## 2023-01-19 RX ORDER — OXYCODONE HYDROCHLORIDE 5 MG/1
5-10 TABLET ORAL 3 TIMES DAILY PRN
Qty: 150 TABLET | Refills: 0 | Status: SHIPPED | OUTPATIENT
Start: 2023-01-19 | End: 2023-02-16

## 2023-01-19 RX ORDER — DICLOFENAC EPOLAMINE 0.01 G/1
1 SYSTEM TOPICAL 2 TIMES DAILY
Qty: 180 PATCH | Refills: 1 | Status: SHIPPED | OUTPATIENT
Start: 2023-01-19 | End: 2023-11-29

## 2023-01-19 ASSESSMENT — PAIN SCALES - GENERAL: PAINLEVEL: SEVERE PAIN (7)

## 2023-01-19 NOTE — PATIENT INSTRUCTIONS
After Visit Instructions:     Thank you for coming to [facility] for your care. It is my goal to partner with you to help you reach your optimal state of health.   Continue daily self-care, identifying contributing factors, and monitoring variations in pain level. Continue to integrate self-care into your life.      Physical therapy: YES They will call you to schedule an appointment   30 minutes Clinic follow-up with SHASHA Simpson NP-C in 1-2 months . Ok to schedule up to three follow up appts at a time.   Referrals: Please call Dr Carlson to make an appt for the  week of February. [phone]  Medication Management :   Oxycodone 5 m-2 tablet 3-4 times per day. MAX 5 tabs per day.   OK to use two Flector patches per day. Can apply two at a time or can do 1 patch twice a day.       SHASHA Domínguez NP-C  [facility] - Monday and Friday  [facility] - Tuesday  [place] - Thursday    Be sure to request ALL medication refills 5 days prior to the due date unless you will see your medical provider in an appointment before the due date.  This is YOUR responsibility. Do not expect same day refills. If you do not plan ahead you may run out of medications.   NO early refills are allowed. It is your responsibility to manage your medications responsibility and keep them safely stored. Lost or destroyed medications WILL NOT be replaced    Scheduling/Clinic telephone Number for ALL locations:  [phone]    After Hours On-Call Service:  [phone]    Call with any questions about your care and for scheduling assistance.   Calls are returned Monday through Friday between 8 AM and 4:00 PM. We usually get back to you within 2 business days depending on the issue/request.    If we are prescribing your medications:  For opioid medication refills, call the clinic or send a [employer] message 7 days in advance.  Please include:  Your name and date of  birth.   Name of requested medication  Name of the pharmacy.  For non-opioid medications, call your pharmacy directly to request a refill. Please allow 3-4 days to be processed.   Per [place] State Law:  All controlled substance prescriptions must be filled within 30 days of being written.    For those controlled substances allowing refills, pickup must occur within 30 days of last fill.      We believe regular attendance is key to your success in our program!    Any time you are unable to keep your appointment we ask that you call us at least 24 hours in advance to cancel.This will allow us to offer the appointment time to another patient.   Multiple missed appointments may lead to dismissal from the clinic.

## 2023-01-19 NOTE — PROGRESS NOTES
Patient presents to the clinic today for a follow up with SHASHA iSms CNP regarding Pain Management.      UDS/CSA-11.11.2022    Medications- Oxycodone     QUESTIONS:    Jemma WHITMAN [facility] Visit Facilitator

## 2023-02-08 ENCOUNTER — OFFICE VISIT (OUTPATIENT)
Dept: CARDIOLOGY | Facility: CLINIC | Age: 88
End: 2023-02-08
Attending: INTERNAL MEDICINE
Payer: COMMERCIAL

## 2023-02-08 ENCOUNTER — TELEPHONE (OUTPATIENT)
Dept: CARDIOLOGY | Facility: CLINIC | Age: 88
End: 2023-02-08

## 2023-02-08 VITALS
BODY MASS INDEX: 27.72 KG/M2 | HEART RATE: 69 BPM | WEIGHT: 154 LBS | SYSTOLIC BLOOD PRESSURE: 115 MMHG | DIASTOLIC BLOOD PRESSURE: 77 MMHG | RESPIRATION RATE: 20 BRPM

## 2023-02-08 DIAGNOSIS — I48.19 PERSISTENT ATRIAL FIBRILLATION (H): Primary | ICD-10-CM

## 2023-02-08 DIAGNOSIS — I26.99 OTHER PULMONARY EMBOLISM WITHOUT ACUTE COR PULMONALE, UNSPECIFIED CHRONICITY (H): ICD-10-CM

## 2023-02-08 DIAGNOSIS — I10 BENIGN ESSENTIAL HYPERTENSION: ICD-10-CM

## 2023-02-08 DIAGNOSIS — N18.2 CHRONIC KIDNEY DISEASE, STAGE 2 (MILD): ICD-10-CM

## 2023-02-08 DIAGNOSIS — E78.00 PURE HYPERCHOLESTEROLEMIA: ICD-10-CM

## 2023-02-08 DIAGNOSIS — I48.19 PERSISTENT ATRIAL FIBRILLATION (H): ICD-10-CM

## 2023-02-08 DIAGNOSIS — I50.20 HFREF (HEART FAILURE WITH REDUCED EJECTION FRACTION) (H): Primary | ICD-10-CM

## 2023-02-08 DIAGNOSIS — I42.9 SECONDARY CARDIOMYOPATHY (H): ICD-10-CM

## 2023-02-08 PROBLEM — I50.32 CHRONIC DIASTOLIC CONGESTIVE HEART FAILURE (H): Status: RESOLVED | Noted: 2019-01-15 | Resolved: 2023-02-08

## 2023-02-08 LAB
ANION GAP SERPL CALCULATED.3IONS-SCNC: 10 MMOL/L (ref 7–15)
BUN SERPL-MCNC: 20.6 MG/DL (ref 8–23)
CALCIUM SERPL-MCNC: 9.8 MG/DL (ref 8.2–9.6)
CHLORIDE SERPL-SCNC: 105 MMOL/L (ref 98–107)
CREAT SERPL-MCNC: 1.16 MG/DL (ref 0.51–0.95)
DEPRECATED HCO3 PLAS-SCNC: 27 MMOL/L (ref 22–29)
GFR SERPL CREATININE-BSD FRML MDRD: 44 ML/MIN/1.73M2
GLUCOSE SERPL-MCNC: 82 MG/DL (ref 70–99)
NT-PROBNP SERPL-MCNC: 5373 PG/ML (ref 0–1800)
POTASSIUM SERPL-SCNC: 4.2 MMOL/L (ref 3.4–5.3)
SODIUM SERPL-SCNC: 142 MMOL/L (ref 136–145)

## 2023-02-08 PROCEDURE — 99214 OFFICE O/P EST MOD 30 MIN: CPT | Performed by: INTERNAL MEDICINE

## 2023-02-08 PROCEDURE — 36415 COLL VENOUS BLD VENIPUNCTURE: CPT | Performed by: INTERNAL MEDICINE

## 2023-02-08 PROCEDURE — 80048 BASIC METABOLIC PNL TOTAL CA: CPT | Performed by: INTERNAL MEDICINE

## 2023-02-08 PROCEDURE — 83880 ASSAY OF NATRIURETIC PEPTIDE: CPT | Performed by: INTERNAL MEDICINE

## 2023-02-08 NOTE — TELEPHONE ENCOUNTER
----- Message from Estrada Holley MD sent at 2/8/2023  1:43 PM CST -----  Can we check with EP, 89-year-old with bad AF that is persistent, essentially asymptomatic left bundle branch block, what we think about possible CRT implant?  LF          ==noted message. Will route to EP team and see if chart review warranted or if we should just set up consult. -[facility] pamela,  Dr. Holley inquires about a chart review for this patient in regards to CRT. Is this possible or should I arrange for a consult?  Thanks,  Mal

## 2023-02-08 NOTE — PROGRESS NOTES
[facility]  [facility] Clinic Follow-up Note    Assessment & Plan        (I50.20) HFrEF (heart failure with reduced ejection fraction) (H)  (primary encounter diagnosis)  Comment: No significant signs or symptoms currently with ejection fraction of 25 to 30%.  However given orthopedics concerned with the right lower extremity edema we will check renal profile and BNP today.  If able we will then go up on dose of Lasix.  We will plan to recheck echo in about 6 months.    (I48.19) Persistent atrial fibrillation (H)  Comment: Symptomatic, not valvular, advanced [id number] score of 4 giving her a 4.8% chance of stroke per year.  Currently on Eliquis 2.5 mg p.o. twice daily given her age of 89 and renal dysfunction, she had breakthrough on Tambocor twice a day with resultant heart failure, was placed on amiodarone August 2022.  Amiodarone blood work looks good but had some visual issues.  Seen by eye doctor yesterday and no obvious optic neuritis.      (I10) Benign essential hypertension  Comment: Under good control currently.    (E78.00) Pure hypercholesterolemia  Comment: Total cholesterol 179 with an LDL from 2015 but given age we are not checking or treating.    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  Given her left bundle branch block we will follow-up with EP about possibly CRT device.    (I26.99) Other pulmonary embolism without acute cor pulmonale, unspecified chronicity (H)  Comment: Chronic anticoagulation, now on Eliquis 2.5 twice a day.    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  We will recheck creatinine today as has not been checked in 5 months.    Plan  1.  Check BNP and renal profile and address with increased diuretics if needed.  2.  Recheck echo in about 6 months.  3.  Connect with EP to see if she would  qualify for CRT device.  4.  Follow-up with me in 6 months or sooner if needed.    Subjective  CC: 89-year-old white female being seen in follow-up today.  Since I seen her she has seen orthopedics, they were concerned about right lower extremity edema being due to heart failure.  She has seen eye doctor and had no optic neuritis.  She comes in heart wise feeling well without any chest pains, palpitations, shortness of breath, PND, orthopnea, syncope, dizziness.  She does have some mild bipedal edema but by the end of the day her right knee is extremely swollen.    Medications  Current Outpatient Medications   Medication Sig Dispense Refill     acetaminophen (TYLENOL) 325 MG tablet [ACETAMINOPHEN (TYLENOL) 325 MG TABLET] Take 650 mg by mouth every 6 (six) hours as needed for pain.       amiodarone (PACERONE) 200 MG tablet Take 0.5 tablets (100 mg) by mouth daily 45 tablet 3     apixaban ANTICOAGULANT (ELIQUIS) 5 MG tablet Take 1 tablet (5 mg) by mouth 2 times daily (Patient taking differently: Take 5 mg by mouth 2 times daily Takes 2.5 (cuts pill in half)) 180 tablet 3     cholecalciferol, vitamin D3, (VITAMIN D3) 2,000 unit Tab [CHOLECALCIFEROL, VITAMIN D3, (VITAMIN D3) 2,000 UNIT TAB] Take 1 tablet (2,000 Units total) by mouth daily. 90 tablet 3     diclofenac (FLECTOR) 1.3 % patch Externally apply 1 patch topically 2 times daily 180 patch 1     DULoxetine (CYMBALTA) 60 MG capsule Take 1 capsule (60 mg) by mouth daily       furosemide (LASIX) 20 MG tablet Take 1 tablet (20 mg) by mouth daily. 90 tablet 0     KLOR-CON 20 MEQ CR tablet Take 1 tablet (20 mEq) by mouth daily. 90 tablet 0     lisinopril (ZESTRIL) 2.5 MG tablet Take 1 tablet (2.5 mg) by mouth daily 90 tablet 4     oxyCODONE (ROXICODONE) 5 MG tablet Take 1-2 tablets (5-10 mg) by mouth 3 times daily as needed for moderate to severe pain (MAX 5 tabs per day) Ok to fill 1/20/23 to start 1/21/23 150 tablet 0     zolpidem ER (AMBIEN CR) 6.25 MG CR tablet Take  1 and 1/4 tablet by mouth at bedtime 45 tablet 5     naloxone (NARCAN) 4 MG/0.1ML nasal spray Spray 1 spray (4 mg) into one nostril alternating nostrils as needed for opioid reversal every 2-3 minutes until assistance arrives (Patient not taking: Reported on 2/8/2023) 0.2 mL 0       Objective  /77 (BP Location: Left arm, Patient Position: Sitting, Cuff Size: Adult Large)   Pulse 69   Resp 20   Wt 69.9 kg (154 lb)   BMI 27.72 kg/m      General Appearance:    Alert, cooperative, no distress, appears stated age   Head:    Normocephalic, without obvious abnormality, atraumatic   Throat:   Lips, mucosa, and tongue normal; teeth and gums normal   Neck:   Supple, symmetrical, trachea midline, no adenopathy;        thyroid:  No enlargement/tenderness/nodules; no carotid    bruit or JVD   Back:     Symmetric, no curvature, ROM normal, no CVA tenderness   Lungs:     Clear to auscultation bilaterally, respirations unlabored   Chest wall:    No tenderness or deformity   Heart:    Regular rate and rhythm, S1 and S2 normal, no murmur, rub   or gallop   Abdomen:     Soft, non-tender, bowel sounds active all four quadrants,     no masses, no organomegaly   Extremities:   Normal, atraumatic, no cyanosis, bipedal right knee worse than left edema   Pulses:   2+ and symmetric all extremities   Skin:   Skin color, texture, turgor normal, no rashes or lesions     Results    Lab Results personally reviewed   Lab Results   Component Value Date    CHOL 179 03/12/2015    CHOL 179 09/30/2013     Lab Results   Component Value Date    HDL 64 03/12/2015    HDL 64 09/30/2013     No components found for: LDLCALC  Lab Results   Component Value Date    TRIG 176 (H) 03/12/2015    TRIG 155 (H) 09/30/2013     Lab Results   Component Value Date    WBC 6.2 08/28/2022    HGB 12.1 08/28/2022    HCT 36.1 08/28/2022     08/28/2022     Lab Results   Component Value Date    BUN 26.2 (H) 09/07/2022     09/07/2022    CO2 27 09/07/2022

## 2023-02-08 NOTE — LETTER
2/8/2023    Margret Flores MD  [address] [place] [id number]    RE: Gladys Ramirez       Dear Colleague,     I had the pleasure of seeing Gladys Ramirez in the [facility].      [facility]  Heart Care Clinic Follow-up Note    Assessment & Plan        (I50.20) HFrEF (heart failure with reduced ejection fraction) (H)  (primary encounter diagnosis)  Comment: No significant signs or symptoms currently with ejection fraction of 25 to 30%.  However given orthopedics concerned with the right lower extremity edema we will check renal profile and BNP today.  If able we will then go up on dose of Lasix.  We will plan to recheck echo in about 6 months.    (I48.19) Persistent atrial fibrillation (H)  Comment: Symptomatic, not valvular, advanced [id number] score of 4 giving her a 4.8% chance of stroke per year.  Currently on Eliquis 2.5 mg p.o. twice daily given her age of 89 and renal dysfunction, she had breakthrough on Tambocor twice a day with resultant heart failure, was placed on amiodarone August 2022.  Amiodarone blood work looks good but had some visual issues.  Seen by eye doctor yesterday and no obvious optic neuritis.      (I10) Benign essential hypertension  Comment: Under good control currently.    (E78.00) Pure hypercholesterolemia  Comment: Total cholesterol 179 with an LDL from 2015 but given age we are not checking or treating.    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  Given her left bundle branch block we will follow-up with EP about possibly CRT device.    (I26.99) Other pulmonary embolism without acute cor pulmonale, unspecified chronicity (H)  Comment: Chronic anticoagulation, now on Eliquis 2.5 twice a day.    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  We will recheck creatinine today  as has not been checked in 5 months.    Plan  1.  Check BNP and renal profile and address with increased diuretics if needed.  2.  Recheck echo in about 6 months.  3.  Connect with EP to see if she would qualify for CRT device.  4.  Follow-up with me in 6 months or sooner if needed.    Subjective  CC: 89-year-old white female being seen in follow-up today.  Since I seen her she has seen orthopedics, they were concerned about right lower extremity edema being due to heart failure.  She has seen eye doctor and had no optic neuritis.  She comes in heart wise feeling well without any chest pains, palpitations, shortness of breath, PND, orthopnea, syncope, dizziness.  She does have some mild bipedal edema but by the end of the day her right knee is extremely swollen.    Medications  Current Outpatient Medications   Medication Sig Dispense Refill     acetaminophen (TYLENOL) 325 MG tablet [ACETAMINOPHEN (TYLENOL) 325 MG TABLET] Take 650 mg by mouth every 6 (six) hours as needed for pain.       amiodarone (PACERONE) 200 MG tablet Take 0.5 tablets (100 mg) by mouth daily 45 tablet 3     apixaban ANTICOAGULANT (ELIQUIS) 5 MG tablet Take 1 tablet (5 mg) by mouth 2 times daily (Patient taking differently: Take 5 mg by mouth 2 times daily Takes 2.5 (cuts pill in half)) 180 tablet 3     cholecalciferol, vitamin D3, (VITAMIN D3) 2,000 unit Tab [CHOLECALCIFEROL, VITAMIN D3, (VITAMIN D3) 2,000 UNIT TAB] Take 1 tablet (2,000 Units total) by mouth daily. 90 tablet 3     diclofenac (FLECTOR) 1.3 % patch Externally apply 1 patch topically 2 times daily 180 patch 1     DULoxetine (CYMBALTA) 60 MG capsule Take 1 capsule (60 mg) by mouth daily       furosemide (LASIX) 20 MG tablet Take 1 tablet (20 mg) by mouth daily. 90 tablet 0     KLOR-CON 20 MEQ CR tablet Take 1 tablet (20 mEq) by mouth daily. 90 tablet 0     lisinopril (ZESTRIL) 2.5 MG tablet Take 1 tablet (2.5 mg) by mouth daily 90 tablet 4     oxyCODONE (ROXICODONE) 5 MG tablet Take  1-2 tablets (5-10 mg) by mouth 3 times daily as needed for moderate to severe pain (MAX 5 tabs per day) Ok to fill 1/20/23 to start 1/21/23 150 tablet 0     zolpidem ER (AMBIEN CR) 6.25 MG CR tablet Take 1 and 1/4 tablet by mouth at bedtime 45 tablet 5     naloxone (NARCAN) 4 MG/0.1ML nasal spray Spray 1 spray (4 mg) into one nostril alternating nostrils as needed for opioid reversal every 2-3 minutes until assistance arrives (Patient not taking: Reported on 2/8/2023) 0.2 mL 0       Objective  /77 (BP Location: Left arm, Patient Position: Sitting, Cuff Size: Adult Large)   Pulse 69   Resp 20   Wt 69.9 kg (154 lb)   BMI 27.72 kg/m      General Appearance:    Alert, cooperative, no distress, appears stated age   Head:    Normocephalic, without obvious abnormality, atraumatic   Throat:   Lips, mucosa, and tongue normal; teeth and gums normal   Neck:   Supple, symmetrical, trachea midline, no adenopathy;        thyroid:  No enlargement/tenderness/nodules; no carotid    bruit or JVD   Back:     Symmetric, no curvature, ROM normal, no CVA tenderness   Lungs:     Clear to auscultation bilaterally, respirations unlabored   Chest wall:    No tenderness or deformity   Heart:    Regular rate and rhythm, S1 and S2 normal, no murmur, rub   or gallop   Abdomen:     Soft, non-tender, bowel sounds active all four quadrants,     no masses, no organomegaly   Extremities:   Normal, atraumatic, no cyanosis, bipedal right knee worse than left edema   Pulses:   2+ and symmetric all extremities   Skin:   Skin color, texture, turgor normal, no rashes or lesions     Results    Lab Results personally reviewed   Lab Results   Component Value Date    CHOL 179 03/12/2015    CHOL 179 09/30/2013     Lab Results   Component Value Date    HDL 64 03/12/2015    HDL 64 09/30/2013     No components found for: LDLCALC  Lab Results   Component Value Date    TRIG 176 (H) 03/12/2015    TRIG 155 (H) 09/30/2013     Lab Results   Component Value  Date    WBC 6.2 08/28/2022    HGB 12.1 08/28/2022    HCT 36.1 08/28/2022     08/28/2022     Lab Results   Component Value Date    BUN 26.2 (H) 09/07/2022     09/07/2022    CO2 27 09/07/2022               Thank you for allowing me to participate in the care of your patient.      Sincerely,     MARISA HOLLEY MD     [facility]  cc:   Marisa Holley MD  [address], [address]  [place] [address]

## 2023-02-08 NOTE — PATIENT INSTRUCTIONS
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  I am glad to hear you are doing well except for the pain involving the right knee.  Per our conversation I will try to make this better and will check blood work to see if we need to go up on the dose of the furosemide and will let you know the results.  I will plan on seeing you 6 months with a repeat echo.  Estrada Holley

## 2023-02-09 DIAGNOSIS — I50.20 HFREF (HEART FAILURE WITH REDUCED EJECTION FRACTION) (H): Primary | ICD-10-CM

## 2023-02-09 NOTE — PROGRESS NOTES
----- Message -----  From: Estrada Holley MD  Sent: 2/9/2023  12:49 PM CST  To: Stacy Noriega RN    Excellent work, at that time let us get renal profile.  No need to be seen by heart failure nurse practitioners until then.  DINORAH KRISHNAMURTHY ordered for appt on 2/17. -[facility]

## 2023-02-09 NOTE — PROGRESS NOTES
Gladys Ramirez  :  1930  DOS: 2/10/2023  MRN: [id number]    Sports Medicine Clinic Visit    PCP: Margret Flores    Gladys Ramirez is a 89 year old female who is seen in consultation at the request of  Valentine Howard C.N.P. presenting with right knee pain.     Injury: Gradual onset of pain over the past several year(s).  Pain located over right posterior and anterior knee, nonradiating.  Additional Features:  Positive: swelling.  Symptoms are better with rest, sitting.  Symptoms are worse with: anymovment.  Other evaluation and/or treatments so far consists of: oxycodone, tylenol, compounded cream (lidocaine/ibuprofen, diclofenac), flector patch, steroid injection 6/15/22 at [facility], Right knee steroid injection 3/10/22 by Dr. Person (pain), [facility] in 2022.  Recent imaging completed: MRI completed 11/15/22, bilateral LE US completed 22, right knee xrays 21.  Prior History of related problems: chronic knee pain     Social History: [employer]      Interim History - February 10, 2023  Since last visit on 2023 patient has ongoing right knee pain.  Right knee SynviscOne injection completed on 2023 provided minimal relief.  No new injury in the interim.      Review of Systems  Musculoskeletal: as above  Remainder of review of systems is negative including constitutional, CV, pulmonary, GI, Skin and Neurologic except as noted in HPI or medical history.    Past Medical History:   Diagnosis Date     Angina pectoris (H)      Chest pain 2017     Cough      Hyperlipidemia      Hypertension      Osteoarthritis      Past Surgical History:   Procedure Laterality Date     APPENDECTOMY       BASAL CELL CARCINOMA EXCISION      nose     LAPAROSCOPY DIAGNOSTIC (GENERAL) N/A 2014    Procedure: LAPAROSCOPY BILATERAL SALPINGO-OOPHORECTOMY ;  Surgeon: Sofia Harper MD;  [facility];  Service:      TONSILLECTOMY      10 years old     [facility]  TOTAL KNEE ARTHROPLASTY Left     2011     Family History   Problem Relation Age of Onset     Heart Disease Mother      Rheumatic Heart Disease Mother      No Known Problems Father      Cancer Brother         brain     Lung Cancer Brother      Lung Cancer Brother      Cancer Sister         lung     Lung Cancer Sister        Objective  Pulse 73   Wt 69.9 kg (154 lb)   SpO2 96%   BMI 27.72 kg/m        General: healthy, alert and in no distress      HEENT: no scleral icterus or conjunctival erythema     Skin: no suspicious lesions or rash. No jaundice.     CV: regular rhythm by palpation, 2+ distal pulses, no pedal edema      Resp: normal respiratory effort without conversational dyspnea     Psych: normal mood and affect      Gait: antalgic, appropriate coordination and balance     Neuro: normal light touch sensory exam of the extremities. Motor strength as noted below     Right Knee exam    ROM:        Mildly limited terminal active and passive ROM with flexion and extension    Inspection:       no visible ecchymosis        effusion noted trace       Genu valgus on the right    Skin:       no visible deformities       well perfused       capillary refill brisk    Patellar Motion:        Lateral tilt noted in patella       Crepitus noted in the patellofemoral joint    Tender:        medial patellar border       lateral patellar border       medial joint line       lateral joint line most focal    Non Tender:         remainder of knee area    Special Tests:        neg (-) varus at 0 deg and 30 deg       neg (-) valgus at 0 deg and 30 deg    Evaluation of ipsilateral kinetic chain       Baseline modest strength with hip extension and abduction      Radiology  EXAM: MR KNEE RIGHT W/O CONTRAST  LOCATION: [facility]  DATE/TIME: 11/15/2022 11:38 AM     INDICATION:  Primary osteoarthritis of right knee, Baker's cyst, unruptured, right.  COMPARISON: None.  TECHNIQUE:  Unenhanced.     FINDINGS:     MEDIAL COMPARTMENT:   -Meniscus: Horizontal cleavage tear of the posteromedial corner of the medial meniscus.  -Cartilage: Grade 2 cartilage loss both sides of the joint.     LATERAL COMPARTMENT:  -Meniscus: Large portions of the lateral meniscal body are not identified and may be completely degenerated. Anterior and posterior subluxation of the anterior and posterior horn, respectively.   -Cartilage: Full-thickness cartilage loss on both sides of the compartment with bony remodeling of the lateral tibial plateau and extensive reactive subchondral edema.     PATELLOFEMORAL COMPARTMENT:   -Alignment: Patella midline. No subluxation or tilting.   -Cartilage: Grade 2 cartilage loss both retropatellar facets. Grade 2 cartilage loss both sides of the femoral trochlear sulcus.     CRUCIATE LIGAMENTS:   -ACL: Full-thickness disruption of the ACL but this is likely chronic.  -PCL: Grade 1 sprain of the PCL without tearing.     COLLATERAL LIGAMENTS:   -Medial collateral ligament: Superficial and deep fibers are normal.  -Lateral collateral ligament: Normal.     POSTEROMEDIAL CORNER:  -Semimembranosus and medial gastrocnemius tendinopathy without tearing.   -The pes anserinus tendons are intact.     POSTEROLATERAL CORNER:   -Popliteus tendinopathy without tearing.  -Biceps femoris tendon and posterolateral corner complex ligaments are intact.     EXTENSOR MECHANISM:   -Quadriceps tendon: Mild tendinopathy.  -Patellar tendon: Mild tendinopathy.  -Patellofemoral ligaments and retinacula: Intact.     JOINT:   -Small effusion.     BONES:  -No fracture or concerning marrow replacing lesion.     SOFT TISSUES:   -No popliteal cyst. No acute muscular injury or soft tissue mass.                                                                       IMPRESSION:  1.  Horizontal cleavage tear of the posteromedial corner of the meniscus.  2.  Grade 2 cartilage loss both sides of the medial compartment.  3.   Full-thickness cartilage loss along the majority of both sides of the lateral compartment with bony remodeling of the lateral tibial plateau and extensive reactive edema.  4.  Large portions of the lateral meniscal body are not identified and may be completely degenerated. Anterior and posterior subluxation of the anterior and posterior horn, respectively.  5.  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  6.  Semimembranosus and medial gastrocnemius tendinopathy without tearing.  7.  Popliteus tendinopathy without tearing.  8.  Mild quadriceps and patellar tendinopathy without tearing.  9.  Small effusion.  10.  No evidence for acute fracture.    Large Joint Injection/Arthocentesis: R knee joint    Date/Time: 2/10/2023 2:44 PM  Performed by: Jose J Carlson DO  Authorized by: Jose J Carlson DO     Indications:  Osteoarthritis and pain  Needle Size:  22 G  Guidance: landmark guided    Approach:  Anteromedial  Location:  Knee      Medications:  40 mg triamcinolone 40 MG/ML; 3 mL ropivacaine 5 MG/ML  Outcome:  Tolerated well, no immediate complications  Procedure discussed: discussed risks, benefits, and alternatives    Consent Given by:  Patient  Timeout: timeout called immediately prior to procedure    Prep: patient was prepped and draped in usual sterile fashion     Ultrasound images of procedure were permanently stored.         Assessment:  1. Primary osteoarthritis of right knee    2. Chronic pain of right knee    3. Genu valgum, acquired, right        Plan:  Discussed the assessment with the patient.  Follow up: prn based on clinical progress  Reviewed care so far, multiple CSI with good but temporary relief  Known severe DJD in lateral compartment, milder elsewhere, chronic ACL tear, meniscus tear  MR and XR images independently visualized and reviewed with patient today in clinic  Had good relief from Synvisc in the past on left knee before TKA in 2012  Trial of US guided SynviscOne  injection last visit not helpful, some initial relief from aspiration noted today  Oral Tylenol and topical Voltaren gel reviewed as safe OTC options, reviewed safe dosing strategies  Bracing options reviewed, has compression sleeves, order placed previously for O&P for lateral  trial  PT options reviewed as well as low impact activity strategies, order available anytime  Could offer orthopedic surgery referral for TKA discussion, but not an ideal candidate due to age/comorbidities  Expectations and goals of CSI reviewed  Often 2-3 days for steroid effect, and can take up to two weeks for maximum effect  We discussed modified progressive pain-free activity as tolerated  Do not overuse in first two weeks if feeling better due to concern for vulnerability while steroid is working  Supportive care reviewed  All questions were answered today  Contact us with additional questions or concerns  Signs and sx of concern reviewed      Jose J Carlson DO, AURY  Sports Medicine Physician  [facility]                Disclaimer: This note consists of symbols derived from keyboarding, dictation and/or voice recognition software. As a result, there may be errors in the script that have gone undetected. Please consider this when interpreting information found in this chart.

## 2023-02-10 ENCOUNTER — OFFICE VISIT (OUTPATIENT)
Dept: ORTHOPEDICS | Facility: CLINIC | Age: 88
End: 2023-02-10
Payer: COMMERCIAL

## 2023-02-10 VITALS — OXYGEN SATURATION: 96 % | BODY MASS INDEX: 27.72 KG/M2 | HEART RATE: 73 BPM | WEIGHT: 154 LBS

## 2023-02-10 DIAGNOSIS — M21.061 GENU VALGUM, ACQUIRED, RIGHT: ICD-10-CM

## 2023-02-10 DIAGNOSIS — M17.11 PRIMARY OSTEOARTHRITIS OF RIGHT KNEE: Primary | ICD-10-CM

## 2023-02-10 DIAGNOSIS — G89.29 CHRONIC PAIN OF RIGHT KNEE: ICD-10-CM

## 2023-02-10 DIAGNOSIS — M25.561 CHRONIC PAIN OF RIGHT KNEE: ICD-10-CM

## 2023-02-10 PROCEDURE — 20610 DRAIN/INJ JOINT/BURSA W/O US: CPT | Mod: RT | Performed by: FAMILY MEDICINE

## 2023-02-10 RX ADMIN — ROPIVACAINE HYDROCHLORIDE 3 ML: 5 INJECTION, SOLUTION EPIDURAL; INFILTRATION; PERINEURAL at 14:44

## 2023-02-10 RX ADMIN — TRIAMCINOLONE ACETONIDE 40 MG: 40 INJECTION, SUSPENSION INTRA-ARTICULAR; INTRAMUSCULAR at 14:44

## 2023-02-10 NOTE — LETTER
2/10/2023         RE: Gladys Ramirez  [address]  [facility] [address]        Dear Colleague,    Thank you for referring your patient, Gladys Ramirez, to the [facility] [place]. Please see a copy of my visit note below.    Gladys Ramirez  :  1930  DOS: 2/10/2023  MRN: [id number]    Sports Medicine Clinic Visit    PCP: Margret Flores    Gladys Ramirez is a 89 year old female who is seen in consultation at the request of  Valentine Howard C.N.P. presenting with right knee pain.     Injury: Gradual onset of pain over the past several year(s).  Pain located over right posterior and anterior knee, nonradiating.  Additional Features:  Positive: swelling.  Symptoms are better with rest, sitting.  Symptoms are worse with: anymovment.  Other evaluation and/or treatments so far consists of: oxycodone, tylenol, compounded cream (lidocaine/ibuprofen, diclofenac), flector patch, steroid injection 6/15/22 at [facility], Right knee steroid injection 3/10/22 by Dr. Person (pain), [facility] in 2022.  Recent imaging completed: MRI completed 11/15/22, bilateral LE US completed 22, right knee xrays 21.  Prior History of related problems: chronic knee pain     Social History: [facility] [employer] [facility]      Interim History - February 10, 2023  Since last visit on 2023 patient has ongoing right knee pain.  Right knee SynviscOne injection completed on 2023 provided minimal relief.  No new injury in the interim.      Review of Systems  Musculoskeletal: as above  Remainder of review of systems is negative including constitutional, CV, pulmonary, GI, Skin and Neurologic except as noted in HPI or medical history.    Past Medical History:   Diagnosis Date     Angina pectoris (H)      Chest pain 2017     Cough      Hyperlipidemia      Hypertension      Osteoarthritis      Past Surgical History:   Procedure Laterality Date     APPENDECTOMY       BASAL  CELL CARCINOMA EXCISION      nose     LAPAROSCOPY DIAGNOSTIC (GENERAL) N/A 11/04/2014    Procedure: LAPAROSCOPY BILATERAL SALPINGO-OOPHORECTOMY ;  Surgeon: Sofia Harper MD;  Location: [facility];  Service:      TONSILLECTOMY      10 years old     ZZC TOTAL KNEE ARTHROPLASTY Left     2011     Family History   Problem Relation Age of Onset     Heart Disease Mother      Rheumatic Heart Disease Mother      No Known Problems Father      Cancer Brother         brain     Lung Cancer Brother      Lung Cancer Brother      Cancer Sister         lung     Lung Cancer Sister        Objective  Pulse 73   Wt 69.9 kg (154 lb)   SpO2 96%   BMI 27.72 kg/m        General: healthy, alert and in no distress      HEENT: no scleral icterus or conjunctival erythema     Skin: no suspicious lesions or rash. No jaundice.     CV: regular rhythm by palpation, 2+ distal pulses, no pedal edema      Resp: normal respiratory effort without conversational dyspnea     Psych: normal mood and affect      Gait: antalgic, appropriate coordination and balance     Neuro: normal light touch sensory exam of the extremities. Motor strength as noted below     Right Knee exam    ROM:        Mildly limited terminal active and passive ROM with flexion and extension    Inspection:       no visible ecchymosis        effusion noted trace       Genu valgus on the right    Skin:       no visible deformities       well perfused       capillary refill brisk    Patellar Motion:        Lateral tilt noted in patella       Crepitus noted in the patellofemoral joint    Tender:        medial patellar border       lateral patellar border       medial joint line       lateral joint line most focal    Non Tender:         remainder of knee area    Special Tests:        neg (-) varus at 0 deg and 30 deg       neg (-) valgus at 0 deg and 30 deg    Evaluation of ipsilateral kinetic chain       Baseline modest strength with hip extension and  abduction      Radiology  EXAM: MR KNEE RIGHT W/O CONTRAST  LOCATION: [facility]  DATE/TIME: 11/15/2022 11:38 AM     INDICATION:  Primary osteoarthritis of right knee, Baker's cyst, unruptured, right.  COMPARISON: None.  TECHNIQUE: Unenhanced.     FINDINGS:     MEDIAL COMPARTMENT:   -Meniscus: Horizontal cleavage tear of the posteromedial corner of the medial meniscus.  -Cartilage: Grade 2 cartilage loss both sides of the joint.     LATERAL COMPARTMENT:  -Meniscus: Large portions of the lateral meniscal body are not identified and may be completely degenerated. Anterior and posterior subluxation of the anterior and posterior horn, respectively.   -Cartilage: Full-thickness cartilage loss on both sides of the compartment with bony remodeling of the lateral tibial plateau and extensive reactive subchondral edema.     PATELLOFEMORAL COMPARTMENT:   -Alignment: Patella midline. No subluxation or tilting.   -Cartilage: Grade 2 cartilage loss both retropatellar facets. Grade 2 cartilage loss both sides of the femoral trochlear sulcus.     CRUCIATE LIGAMENTS:   -ACL: Full-thickness disruption of the ACL but this is likely chronic.  -PCL: Grade 1 sprain of the PCL without tearing.     COLLATERAL LIGAMENTS:   -Medial collateral ligament: Superficial and deep fibers are normal.  -Lateral collateral ligament: Normal.     POSTEROMEDIAL CORNER:  -Semimembranosus and medial gastrocnemius tendinopathy without tearing.   -The pes anserinus tendons are intact.     POSTEROLATERAL CORNER:   -Popliteus tendinopathy without tearing.  -Biceps femoris tendon and posterolateral corner complex ligaments are intact.     EXTENSOR MECHANISM:   -Quadriceps tendon: Mild tendinopathy.  -Patellar tendon: Mild tendinopathy.  -Patellofemoral ligaments and retinacula: Intact.     JOINT:   -Small effusion.     BONES:  -No fracture or concerning marrow replacing lesion.     SOFT TISSUES:   -No popliteal cyst. No acute muscular  injury or soft tissue mass.                                                                       IMPRESSION:  1.  Horizontal cleavage tear of the posteromedial corner of the meniscus.  2.  Grade 2 cartilage loss both sides of the medial compartment.  3.  Full-thickness cartilage loss along the majority of both sides of the lateral compartment with bony remodeling of the lateral tibial plateau and extensive reactive edema.  4.  Large portions of the lateral meniscal body are not identified and may be completely degenerated. Anterior and posterior subluxation of the anterior and posterior horn, respectively.  5.  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  6.  Semimembranosus and medial gastrocnemius tendinopathy without tearing.  7.  Popliteus tendinopathy without tearing.  8.  Mild quadriceps and patellar tendinopathy without tearing.  9.  Small effusion.  10.  No evidence for acute fracture.    Large Joint Injection/Arthocentesis: R knee joint    Date/Time: 2/10/2023 2:44 PM  Performed by: Jose J Carlson DO  Authorized by: Jose J Carlson DO     Indications:  Osteoarthritis and pain  Needle Size:  22 G  Guidance: landmark guided    Approach:  Anteromedial  Location:  Knee      Medications:  40 mg triamcinolone 40 MG/ML; 3 mL ropivacaine 5 MG/ML  Outcome:  Tolerated well, no immediate complications  Procedure discussed: discussed risks, benefits, and alternatives    Consent Given by:  Patient  Timeout: timeout called immediately prior to procedure    Prep: patient was prepped and draped in usual sterile fashion     Ultrasound images of procedure were permanently stored.         Assessment:  1. Primary osteoarthritis of right knee    2. Chronic pain of right knee    3. Genu valgum, acquired, right        Plan:  Discussed the assessment with the patient.  Follow up: prn based on clinical progress  Reviewed care so far, multiple CSI with good but temporary relief  Known severe DJD in  lateral compartment, milder elsewhere, chronic ACL tear, meniscus tear  MR and XR images independently visualized and reviewed with patient today in clinic  Had good relief from Synvisc in the past on left knee before TKA in 2012  Trial of US guided SynviscOne injection last visit not helpful, some initial relief from aspiration noted today  Oral Tylenol and topical Voltaren gel reviewed as safe OTC options, reviewed safe dosing strategies  Bracing options reviewed, has compression sleeves, order placed previously for O&P for lateral  trial  PT options reviewed as well as low impact activity strategies, order available anytime  Could offer orthopedic surgery referral for TKA discussion, but not an ideal candidate due to age/comorbidities  Expectations and goals of CSI reviewed  Often 2-3 days for steroid effect, and can take up to two weeks for maximum effect  We discussed modified progressive pain-free activity as tolerated  Do not overuse in first two weeks if feeling better due to concern for vulnerability while steroid is working  Supportive care reviewed  All questions were answered today  Contact us with additional questions or concerns  Signs and sx of concern reviewed      Jose J Carlson DO, AURY  Sports Medicine Physician  [facility]                Disclaimer: This note consists of symbols derived from keyboarding, dictation and/or voice recognition software. As a result, there may be errors in the script that have gone undetected. Please consider this when interpreting information found in this chart.        Again, thank you for allowing me to participate in the care of your patient.        Sincerely,        Jose J Carlson DO

## 2023-02-16 ENCOUNTER — OFFICE VISIT (OUTPATIENT)
Dept: PALLIATIVE MEDICINE | Facility: OTHER | Age: 88
End: 2023-02-16
Payer: COMMERCIAL

## 2023-02-16 VITALS
HEART RATE: 77 BPM | DIASTOLIC BLOOD PRESSURE: 80 MMHG | OXYGEN SATURATION: 95 % | SYSTOLIC BLOOD PRESSURE: 160 MMHG | WEIGHT: 153 LBS | BODY MASS INDEX: 27.54 KG/M2

## 2023-02-16 DIAGNOSIS — M15.0 PRIMARY OSTEOARTHRITIS INVOLVING MULTIPLE JOINTS: ICD-10-CM

## 2023-02-16 DIAGNOSIS — S83.511S RUPTURE OF ANTERIOR CRUCIATE LIGAMENT OF RIGHT KNEE, SEQUELA: ICD-10-CM

## 2023-02-16 DIAGNOSIS — M17.11 PRIMARY OSTEOARTHRITIS OF RIGHT KNEE: ICD-10-CM

## 2023-02-16 DIAGNOSIS — G89.29 CHRONIC INTRACTABLE PAIN: Primary | ICD-10-CM

## 2023-02-16 PROCEDURE — G0463 HOSPITAL OUTPT CLINIC VISIT: HCPCS

## 2023-02-16 PROCEDURE — 99215 OFFICE O/P EST HI 40 MIN: CPT | Performed by: NURSE PRACTITIONER

## 2023-02-16 PROCEDURE — G0463 HOSPITAL OUTPT CLINIC VISIT: HCPCS | Performed by: NURSE PRACTITIONER

## 2023-02-16 RX ORDER — OXYCODONE HYDROCHLORIDE 5 MG/1
5-10 TABLET ORAL 3 TIMES DAILY PRN
Qty: 150 TABLET | Refills: 0 | Status: ON HOLD | OUTPATIENT
Start: 2023-02-16 | End: 2023-03-08

## 2023-02-16 ASSESSMENT — PAIN SCALES - GENERAL: PAINLEVEL: SEVERE PAIN (6)

## 2023-02-16 NOTE — PATIENT INSTRUCTIONS
After Visit Instructions:     Thank you for coming to [facility] for your care. It is my goal to partner with you to help you reach your optimal state of health.   Continue daily self-care, identifying contributing factors, and monitoring variations in pain level. Continue to integrate self-care into your life.      Physical therapy: YES [phone], call to set up an appointment   30 minutes Clinic follow-up with SHASHA Simpson NP-C as scheduled on 3/23/23 @ 10:15 AM  Procedures recommended: Valentine will look into a procedure called a Genicular Block. If Dr Carlson thinks it is a good idea to try, Valentine will order the procedure and let your know.   Medication Management : Oxycodone 5 mg sent to be filled on 2/19/22 and begin using on 2/20/22      SHASHA Domínguez, NP-C  [facility]  [facility] - Monday and Friday  [facility] - Tuesday  [place] - Thursday    Be sure to request ALL medication refills 5 days prior to the due date unless you will see your medical provider in an appointment before the due date.  This is YOUR responsibility. Do not expect same day refills. If you do not plan ahead you may run out of medications.   NO early refills are allowed. It is your responsibility to manage your medications responsibility and keep them safely stored. Lost or destroyed medications WILL NOT be replaced    Scheduling/Clinic telephone Number for ALL locations:  [phone]    After Hours On-Call Service:  [phone]    Call with any questions about your care and for scheduling assistance.   Calls are returned Monday through Friday between 8 AM and 4:00 PM. We usually get back to you within 2 business days depending on the issue/request.    If we are prescribing your medications:  For opioid medication refills, call the clinic or send a [employer] message 7 days in advance.  Please include:  Your name and date of birth.   Name of requested medication  Name of  the pharmacy.  For non-opioid medications, call your pharmacy directly to request a refill. Please allow 3-4 days to be processed.   Per [place] State Law:  All controlled substance prescriptions must be filled within 30 days of being written.    For those controlled substances allowing refills, pickup must occur within 30 days of last fill.      We believe regular attendance is key to your success in our program!    Any time you are unable to keep your appointment we ask that you call us at least 24 hours in advance to cancel.This will allow us to offer the appointment time to another patient.   Multiple missed appointments may lead to dismissal from the clinic.

## 2023-02-16 NOTE — PROGRESS NOTES
Patient presents to the clinic today for a follow up with SHASHA Sims CNP regarding Pain Management.          UDS/CSA- 11.11.2022    Medications- Oxy 5mg  02.16.2023 @9am  QUESTIONS:    Jemma WHITMAN [facility] Visit Facilitator

## 2023-02-16 NOTE — PROGRESS NOTES
[facility]    CHIEF COMPLAINT: Chronic Pain.    INTERVAL HISTORY:  Last seen on 23.       Recommendations/plan at the last visit included:  1. Physical therapy: YES They will call you to schedule an appointment   2. 30 minutes Clinic follow-up with SHASHA Simpson NP-C in 1-2 months . Ok to schedule up to three follow up appts at a time.   3. Referrals: Please call Dr Carlson to make an appt for the  week of February. [phone]  4. Medication Management :   1. Oxycodone 5 m-2 tablet 3-4 times per day. MAX 5 tabs per day.   2. OK to use two Flector patches per day. Can apply two at a time or can do 1 patch twice a day.        Since last visit:   - Loves her new leg brace, helps a great deal. When sitting has no pain. When up, walking pain escalates to 5-6/10. Has been walking 40 minutes per day!   - Takes Oxycodone 5 mg 4-5 tabs per day, occasionally adds a Tylenol .     Pain Information today: .Severe Pain (6)/10. Location of pain: right knee when she walks.    Annual requirements last collected:  22    Current Pain Relevant Medications:    Acetaminophen 325 M tabs QID every day  Compounded cream: Lidocaine, ibuprofen, diclofenac  Duloxetine 60 mg daily      Current Controlled Substance Medications:   Ambien 6.25 m.25 tabs at HS     Previous Pain Relevant Medications: (H--helped; HI--Helped initially; SWH--Somewhat helpful; NH--No help; W--worse; SE--side effects; ?--Unsure if helpful)   Opiates: Tramadol: S[facility], Buprenorphine:Allergic  NSAIDS: Can't take, on blood thinner  Migraine medications: N/A  Muscle Relaxants: none  Neuropathics: Gabapentin: SE  Anti-depressants for pain: Duloxetine: NH for pain  Anxiety medications: N/A  Topicals: Compounded cream: Lidocaine, ibuprofen, diclofenac:  OTC medications: Tylenol: takes 4000 mg/day  Sleep Medications: Temazepam: Allergy, Ambien:H  Other medications not covered above:      SUBSTANCE HISTORY:   Past or  current illegal drug use: none  Past or current ETOH use: Rarely, glass of wine  Nicotine/tobacco use: none  Daily Caffeine intake: never     CURRENT FAMILY/SOCIAL SITUATION:  Past/Present occupation: [employer]:   Housing status: apartment alone  Emotional/Physical support: Sister Yandy Reyes, neighbor who is an RN  Safety Concerns: falls risk   Current stressors: Pain     THE 4 As OF OPIOID MAINTENANCE ANALGESIA    Analgesia: Is pain relief clinically significant? NO   Activity: Is patient functional and able to perform Activities of Daily Living? N/A   Adverse effects: Is patient free from adverse side effects from opiates? N/A   Adherence to Rx protocol: Is patient adhering to Controlled Substance Agreement and taking medications ONLY as ordered? N/A     Is Narcan prescribed for opiate use >50 MME daily or concurrent use of opiates and benzodiazepines? N/A  [place] Board of Pharmacy Data Base Reviewed:    YES; No concern for abuse or misuse of controlled medications based on this report. Reviewed [facility] February 16, 2023- no concerning fills.      PHYSICAL EXAM    Vitals:    02/16/23 1048   BP: (!) 160/80   Pulse: 77   SpO2: 95%   Weight: 69.4 kg (153 lb)       Constitutional: healthy, alert and no distress A&O.   Patient is appropriate.  Psychiatric/mental status: Alert, without lethargy or stupor. Appropriate affect.      Neurologic exam:  CN:  Cranial nerves 2-12 are grossly normal.     MUSCULOSKELETAL:      Posture: Upright, shoulders and pelvis are leveled. No  Antalgic Gait Pattern?: Yes         DIRE Score for ongoing opioid management is calculated as follows:    Diagnosis = 2 pts (slowly progressive; moderate pain/objective findings)    Intractability = 3 pts (patient fully engaged but inadequate response to treatments)    Risk        Psych = 3 pts (no significant personality dysfunction/mental illness; good communication with clinic)         Chem Hlth = 3 pts (no history of chemical dependency;  not drug-focused)       Reliability = 3 pts (highly reliable with meds, appointments, treatments)       Social = 2 pts (reduction in some relationships/life rolls)       (Psych + Chem hlth + Reliability + Social) = 16    Efficacy = 2 pts (moderate benefit/function; low med dose; too early/not tried meds)    DIRE Score = 18        7-13: likely NOT suitable candidate for long-term opioid analgesia       14-21: may be a suitable candidate for long-term opioid analgesia     DIAGNOSTIC RESULTS:     11/15/22: MRI right knew w/o contrast   IMPRESSION:  1.  Horizontal cleavage tear of the posteromedial corner of the meniscus.  2.  Grade 2 cartilage loss both sides of the medial compartment.  3.  Full-thickness cartilage loss along the majority of both sides of the lateral compartment with bony remodeling of the lateral tibial plateau and extensive reactive edema.  4.  Large portions of the lateral meniscal body are not identified and may be completely degenerated. Anterior and posterior subluxation of the anterior and posterior horn, respectively.  5.  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  6.  Semimembranosus and medial gastrocnemius tendinopathy without tearing.  7.  Popliteus tendinopathy without tearing.  8.  Mild quadriceps and patellar tendinopathy without tearing.  9.  Small effusion.  10.  No evidence for acute fracture.     1/20/21: Left hip x-ray  IMPRESSION:  Mild primary degenerative narrowing both hip joints. Mild generalized degenerative change base of the spine and both SI joints.Pelvis and left hip otherwise negative. No fractures. No dislocations.     1/2021: XR KNEE RIGHT 1 OR 2 VWS   IMPRESSION:  Moderate primary osteoarthritis all 3 compartments but most prominent in the lateral compartment. Knee otherwise negative. No fractures. No joint effusion.     PAIN RELAVENT CONDITIONS:   1.  OA: severe right knee, Baker's cyst.  2.  PMH: CKD Stage 2, A fib, CHF, primary insomnia    DIAGNOSIS AND  PLAN:     (G89.29) Chronic intractable pain  (primary encounter diagnosis)  (M17.11) Primary osteoarthritis of right knee  (M15.9) Primary osteoarthritis involving multiple joints  (S83.511S) Rupture of anterior cruciate ligament of right knee, sequela  Comment: Doing VERY well with current medications and the new brace.   Plan: oxyCODONE (ROXICODONE) 5 MG tablet    Hypertension: Sister Gladys to follow up with Primary Care provider regarding elevated blood pressure.     PATIENT INSTRUCTIONS:     Diagnosis reviewed, treatment option addressed, and risk/benefits discussed.  Self-care instructions given.  I am recommending a multidisciplinary treatment plan to help this patient better manage pain.    Remember to request ALL medication refills 5 BUSINESS days before you run out.       1. Physical therapy: YES [phone], call to set up an appointment   2. 30 minutes Clinic follow-up with SHASHA Simpson NP-C as scheduled on 3/23/23 @ 10:15 AM  3. Procedures recommended: Valentine will look into a procedure called a Genicular Block. If Dr Carlson thinks it is a good idea to try, Valentine will order the procedure and let your know. Message sent to Dr Carlson.   4. Medication Management : Oxycodone 5 mg sent to be filled on 2/19/22 and begin using on 2/20/22    I have reviewed the note as documented above.  This accurately captures the substance of my conversation with the patient.  A total of 40 minutes of preparation, care, and consultation were spent on this visit today.     SHASHA Domínguez NPMontseC  [facility]    (Information in italics and blue color are taken from previous pain and consulting medical providers notes and are documented as such)

## 2023-02-17 ENCOUNTER — DOCUMENTATION ONLY (OUTPATIENT)
Dept: CARDIOLOGY | Facility: CLINIC | Age: 88
End: 2023-02-17

## 2023-02-17 ENCOUNTER — OFFICE VISIT (OUTPATIENT)
Dept: CARDIOLOGY | Facility: CLINIC | Age: 88
End: 2023-02-17
Payer: COMMERCIAL

## 2023-02-17 ENCOUNTER — PREP FOR PROCEDURE (OUTPATIENT)
Dept: CARDIOLOGY | Facility: CLINIC | Age: 88
End: 2023-02-17

## 2023-02-17 VITALS
HEART RATE: 62 BPM | RESPIRATION RATE: 16 BRPM | BODY MASS INDEX: 27.11 KG/M2 | HEIGHT: 63 IN | SYSTOLIC BLOOD PRESSURE: 126 MMHG | DIASTOLIC BLOOD PRESSURE: 64 MMHG | WEIGHT: 153 LBS

## 2023-02-17 DIAGNOSIS — I50.22 CHRONIC SYSTOLIC HEART FAILURE (H): ICD-10-CM

## 2023-02-17 DIAGNOSIS — I50.20 HFREF (HEART FAILURE WITH REDUCED EJECTION FRACTION) (H): Primary | ICD-10-CM

## 2023-02-17 DIAGNOSIS — I42.9 SECONDARY CARDIOMYOPATHY (H): ICD-10-CM

## 2023-02-17 DIAGNOSIS — I50.32 CHRONIC DIASTOLIC CONGESTIVE HEART FAILURE (H): ICD-10-CM

## 2023-02-17 DIAGNOSIS — I44.7 LBBB (LEFT BUNDLE BRANCH BLOCK): ICD-10-CM

## 2023-02-17 DIAGNOSIS — I44.7 LBBB (LEFT BUNDLE BRANCH BLOCK): Primary | ICD-10-CM

## 2023-02-17 DIAGNOSIS — R00.1 SINUS BRADYCARDIA: ICD-10-CM

## 2023-02-17 DIAGNOSIS — I26.99 OTHER PULMONARY EMBOLISM WITHOUT ACUTE COR PULMONALE, UNSPECIFIED CHRONICITY (H): ICD-10-CM

## 2023-02-17 DIAGNOSIS — I10 BENIGN ESSENTIAL HYPERTENSION: ICD-10-CM

## 2023-02-17 LAB
ANION GAP SERPL CALCULATED.3IONS-SCNC: 8 MMOL/L (ref 7–15)
BUN SERPL-MCNC: 21.5 MG/DL (ref 8–23)
CALCIUM SERPL-MCNC: 9.6 MG/DL (ref 8.2–9.6)
CHLORIDE SERPL-SCNC: 104 MMOL/L (ref 98–107)
CREAT SERPL-MCNC: 1.16 MG/DL (ref 0.51–0.95)
DEPRECATED HCO3 PLAS-SCNC: 29 MMOL/L (ref 22–29)
GFR SERPL CREATININE-BSD FRML MDRD: 44 ML/MIN/1.73M2
GLUCOSE SERPL-MCNC: 94 MG/DL (ref 70–99)
POTASSIUM SERPL-SCNC: 4.4 MMOL/L (ref 3.4–5.3)
SODIUM SERPL-SCNC: 141 MMOL/L (ref 136–145)

## 2023-02-17 PROCEDURE — 99205 OFFICE O/P NEW HI 60 MIN: CPT | Performed by: INTERNAL MEDICINE

## 2023-02-17 PROCEDURE — 80048 BASIC METABOLIC PNL TOTAL CA: CPT | Performed by: INTERNAL MEDICINE

## 2023-02-17 PROCEDURE — 36415 COLL VENOUS BLD VENIPUNCTURE: CPT | Performed by: INTERNAL MEDICINE

## 2023-02-17 RX ORDER — SODIUM CHLORIDE 9 MG/ML
100 INJECTION, SOLUTION INTRAVENOUS CONTINUOUS
Status: CANCELLED | OUTPATIENT
Start: 2023-02-17

## 2023-02-17 RX ORDER — CEFAZOLIN SODIUM 2 G/100ML
2 INJECTION, SOLUTION INTRAVENOUS
Status: CANCELLED | OUTPATIENT
Start: 2023-02-17

## 2023-02-17 RX ORDER — LIDOCAINE 40 MG/G
CREAM TOPICAL
Status: CANCELLED | OUTPATIENT
Start: 2023-02-17

## 2023-02-17 RX ORDER — DEXMEDETOMIDINE HYDROCHLORIDE 4 UG/ML
.1-1.5 INJECTION, SOLUTION INTRAVENOUS CONTINUOUS
Status: CANCELLED | OUTPATIENT
Start: 2023-02-17

## 2023-02-17 NOTE — PATIENT INSTRUCTIONS
[facility]  Cardiac Electrophysiology  [address]  [place] [address]   Office: [phone]  Fax: [phone]       Thank you for seeing us in clinic today - it is a pleasure to be a part of your care team.  Below is a summary of our plan from today's visit.      Cardiomyopathy  - CR- pacemaker discussed and offered  - Would like to get pacemaker in about 8 weeks    Please do not hesitate to be in touch with our office at [phone] with any questions that may arise.      Thank you for trusting us with your care,    Jeannie Rivera MD  Clinical Cardiac Electrophysiology  [facility]  [address]  [place] [address]   Office: [phone]  Fax: [phone]

## 2023-02-17 NOTE — LETTER
2/17/2023    Margret Flores MD  [address] [place] [id number]    RE: Gladys Ramirez       Dear Colleague,     I had the pleasure of seeing Gladys Ramirez in the [facility].    HEART CARE ENCOUNTER CONSULTATON NOTE      [facility]  [phone]      Assessment/Recommendations   Assessment/Plan:    Gladys Ramirez is a very pleasant 89 year old female with non ischemic cardiomyopathy(LVEF 25-30%), HTN, h/o PE, LBBB who presents today to discuss device therapy.    1. Non ischemic cardiomyopathy  - On GDMT  -  ms with LBBB  - Discussed indications for cardiac resynchronization therapy with a pacemaker, procedural details, possible complications and follow up.  - We also discussed possibility of defibrillator therapy as an option in detail but she would not like to pursue that.  - As she is currently recovering from her knee issues, she would like to schedule her pacemaker in about 8 weeks from now.    2. Atrial fibrillation  - On Amiodarone and Eliquis for anticoagulation  - On chronic anticoagulation for her h/p PE also, so Watchman not offered          Time spent: 62 minutes spent on the date of the encounter doing chart review, history and exam, documentation and further activities as noted above.       History of Present Illness/Subjective    HPI: Gladys Ramirez is a very pleasant 89 year old female with non ischemic cardiomyopathy(LVEF 25-30%), HTN, h/o PE, LBBB who presents today to discuss device therapy.    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    Discussed indications for cardiac resynchronization therapy with a pacemaker, procedural details, possible complications and follow up. We also discussed possibility of defibrillator therapy as an option but the she would not like to pursue that. As she is currently recovering from her knee issues,  "she would like to schedule her pacemaker in about 8 weeks from now.      Recent ECG(personally reviewed):  8/24/2022  NSR with LBBB    Recent Echocardiogram Results (personally reviewed):    November 2022    There is mild to moderate concentric left ventricular hypertrophy.  The visual ejection fraction is 25-30%.  There is severe global hypokinesia of the left ventricle.  Septal motion is consistent with conduction abnormality.  The right ventricle is normal in size and function.  No significant valve disease.  Compared to the prior study dated 8/24/2022, there have been no changes.      Labs below reviewed personally     Physical Examination  Review of Systems   Vitals: /64 (BP Location: Right arm, Patient Position: Sitting, Cuff Size: Adult Regular)   Pulse 62   Resp 16   Ht 1.588 m (5' 2.5\")   Wt 69.4 kg (153 lb)   BMI 27.54 kg/m    BMI= Body mass index is 27.54 kg/m .  Wt Readings from Last 3 Encounters:   02/17/23 69.4 kg (153 lb)   02/16/23 69.4 kg (153 lb)   02/10/23 69.9 kg (154 lb)       General Appearance:   no distress, normal body habitus   ENT/Mouth: membranes moist, no oral lesions or bleeding gums.      EYES:  no scleral icterus, normal conjunctivae   Neck: no carotid bruits or thyromegaly   Chest/Lungs:   lungs are clear to auscultation, no rales or wheezing, no sternal scar, equal chest wall expansion    Cardiovascular:   Regular. Normal first and second heart sounds with no murmurs, rubs, or gallops; the carotid, radial and posterior tibial pulses are intact, no edema bilaterally    Abdomen:  no organomegaly, masses, bruits, or tenderness; bowel sounds are present   Extremities: no cyanosis or clubbing. Left knee in brace   Skin: no xanthelasma, warm.    Neurologic: normal  bilateral, no tremors     Psychiatric: alert and oriented x3, calm        Please refer above for cardiac ROS details.        Medical History  Surgical History Family History Social History   Past Medical History: "   Diagnosis Date     Angina pectoris (H)      Chest pain 03/09/2017     Cough      Hyperlipidemia      Hypertension      Osteoarthritis      Past Surgical History:   Procedure Laterality Date     APPENDECTOMY       BASAL CELL CARCINOMA EXCISION      nose     LAPAROSCOPY DIAGNOSTIC (GENERAL) N/A 11/04/2014    Procedure: LAPAROSCOPY BILATERAL SALPINGO-OOPHORECTOMY ;  Surgeon: Sofia Harper MD;  Location: [facility];  Service:      TONSILLECTOMY      10 years old     ZZC TOTAL KNEE ARTHROPLASTY Left     2011     Family History   Problem Relation Age of Onset     Heart Disease Mother      Rheumatic Heart Disease Mother      No Known Problems Father      Cancer Brother         brain     Lung Cancer Brother      Lung Cancer Brother      Cancer Sister         lung     Lung Cancer Sister         Social History     Socioeconomic History     Marital status: Single     Spouse name: Not on file     Number of children: Not on file     Years of education: Not on file     Highest education level: Not on file   Occupational History     Not on file   Tobacco Use     Smoking status: Never     Smokeless tobacco: Never     Tobacco comments:     no passive exposure   Substance and Sexual Activity     Alcohol use: Yes     Comment: Alcoholic Drinks/day: Rarely a glass of wine     Drug use: No     Sexual activity: Not on file   Other Topics Concern     Not on file   Social History Narrative    The patient is a nun.     Social Determinants of Health     Financial Resource Strain: Not on file   Food Insecurity: Not on file   Transportation Needs: Not on file   Physical Activity: Not on file   Stress: Not on file   Social Connections: Not on file   Intimate Partner Violence: Not on file   Housing Stability: Not on file           Medications  Allergies   Current Outpatient Medications   Medication Sig Dispense Refill     acetaminophen (TYLENOL) 325 MG tablet [ACETAMINOPHEN (TYLENOL) 325 MG TABLET] Take 650 mg by mouth every 6  "(six) hours as needed for pain.       amiodarone (PACERONE) 200 MG tablet Take 0.5 tablets (100 mg) by mouth daily 45 tablet 3     apixaban ANTICOAGULANT (ELIQUIS) 5 MG tablet Take 1 tablet (5 mg) by mouth 2 times daily (Patient taking differently: Take 5 mg by mouth 2 times daily Takes 2.5 (cuts pill in half)) 180 tablet 3     cholecalciferol, vitamin D3, (VITAMIN D3) 2,000 unit Tab [CHOLECALCIFEROL, VITAMIN D3, (VITAMIN D3) 2,000 UNIT TAB] Take 1 tablet (2,000 Units total) by mouth daily. 90 tablet 3     diclofenac (FLECTOR) 1.3 % patch Externally apply 1 patch topically 2 times daily 180 patch 1     DULoxetine (CYMBALTA) 60 MG capsule Take 1 capsule (60 mg) by mouth daily       furosemide (LASIX) 20 MG tablet Take 1 tablet (20 mg) by mouth daily. 90 tablet 0     KLOR-CON 20 MEQ CR tablet Take 1 tablet (20 mEq) by mouth daily. 90 tablet 0     lisinopril (ZESTRIL) 2.5 MG tablet Take 1 tablet (2.5 mg) by mouth daily 90 tablet 4     naloxone (NARCAN) 4 MG/0.1ML nasal spray Spray 1 spray (4 mg) into one nostril alternating nostrils as needed for opioid reversal every 2-3 minutes until assistance arrives 0.2 mL 0     oxyCODONE (ROXICODONE) 5 MG tablet Take 1-2 tablets (5-10 mg) by mouth 3 times daily as needed for moderate to severe pain (MAX 5 tabs per day) Ok to fill 2/19/23 to start 2/20/23 150 tablet 0     zolpidem ER (AMBIEN CR) 6.25 MG CR tablet Take 1 and 1/4 tablet by mouth at bedtime 45 tablet 5       Allergies   Allergen Reactions     Trazodone Shortness Of Breath and Unknown     Allergic to trazodone and deriv., Other: trouble swallowing       Clindamycin Diarrhea     C-diff     Gabapentin Other (See Comments)     \"Internal tremors\"     Temazepam Other (See Comments)     Annotation: Nightmares       Buprenorphine Palpitations     Tried patch          Lab Results    Chemistry/lipid CBC Cardiac Enzymes/BNP/TSH/INR   Recent Labs   Lab Test 03/12/15  1230   CHOL 179   HDL 64   LDL 80   TRIG 176*     Recent Labs "   Lab Test 03/12/15  1230   LDL 80     Recent Labs   Lab Test 02/08/23  1345      POTASSIUM 4.2   CHLORIDE 105   CO2 27   GLC 82   BUN 20.6   CR 1.16*   GFRESTIMATED 44*   MARLENE 9.8*     Recent Labs   Lab Test 02/08/23  1345 09/07/22  1112 08/28/22  0442   CR 1.16* 1.14* 0.81     Recent Labs   Lab Test 06/15/21  0910   A1C 6.0*          Recent Labs   Lab Test 08/28/22  0442   WBC 6.2   HGB 12.1   HCT 36.1   MCV 93        Recent Labs   Lab Test 08/28/22  0442 08/26/22  0723 08/24/22  0718   HGB 12.1 12.6 12.8    Recent Labs   Lab Test 08/23/22  1154 08/17/22  1215 11/21/19  0451   TROPONINI 0.16 0.04 0.01     Recent Labs   Lab Test 02/08/23  1345 08/23/22  1154 08/17/22  1215 03/03/21  1222 02/21/19  0713   BNP  --  1,933*  --  177* 95   NTBNP 5,373*  --  5,101*  --   --      Recent Labs   Lab Test 02/21/19  0648   TSH 2.05     Recent Labs   Lab Test 08/23/22  1154 06/05/19  0052 02/21/19  0648   INR 1.37* 1.50* 1.20*        Jeannie Rivera MD                Thank you for allowing me to participate in the care of your patient.      Sincerely,     Jeannie Rivera MD     [facility]  cc:   No referring provider defined for this encounter.

## 2023-02-17 NOTE — PROGRESS NOTES
HEART CARE ENCOUNTER CONSULTATON NOTE      [facility]  [phone]      Assessment/Recommendations   Assessment/Plan:    Gladys Ramirez is a very pleasant 89 year old female with non ischemic cardiomyopathy(LVEF 25-30%), HTN, h/o PE, LBBB who presents today to discuss device therapy.    1. Non ischemic cardiomyopathy  - On GDMT  -  ms with LBBB  - Discussed indications for cardiac resynchronization therapy with a pacemaker, procedural details, possible complications and follow up.  - We also discussed possibility of defibrillator therapy as an option in detail but she would not like to pursue that.  - As she is currently recovering from her knee issues, she would like to schedule her pacemaker in about 8 weeks from now.    2. Atrial fibrillation  - On Amiodarone and Eliquis for anticoagulation  - On chronic anticoagulation for her h/p PE also, so Watchman not offered          Time spent: 62 minutes spent on the date of the encounter doing chart review, history and exam, documentation and further activities as noted above.       History of Present Illness/Subjective    HPI: Gladys Ramirez is a very pleasant 89 year old female with non ischemic cardiomyopathy(LVEF 25-30%), HTN, h/o PE, LBBB who presents today to discuss device therapy.    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    Discussed indications for cardiac resynchronization therapy with a pacemaker, procedural details, possible complications and follow up. We also discussed possibility of defibrillator therapy as an option but the she would not like to pursue that. As she is currently recovering from her knee issues, she would like to schedule her pacemaker in about 8 weeks from now.      Recent ECG(personally reviewed):  8/24/2022  NSR with LBBB    Recent Echocardiogram Results (personally reviewed):    November 2022    There  "is mild to moderate concentric left ventricular hypertrophy.  The visual ejection fraction is 25-30%.  There is severe global hypokinesia of the left ventricle.  Septal motion is consistent with conduction abnormality.  The right ventricle is normal in size and function.  No significant valve disease.  Compared to the prior study dated 8/24/2022, there have been no changes.      Labs below reviewed personally     Physical Examination  Review of Systems   Vitals: /64 (BP Location: Right arm, Patient Position: Sitting, Cuff Size: Adult Regular)   Pulse 62   Resp 16   Ht 1.588 m (5' 2.5\")   Wt 69.4 kg (153 lb)   BMI 27.54 kg/m    BMI= Body mass index is 27.54 kg/m .  Wt Readings from Last 3 Encounters:   02/17/23 69.4 kg (153 lb)   02/16/23 69.4 kg (153 lb)   02/10/23 69.9 kg (154 lb)       General Appearance:   no distress, normal body habitus   ENT/Mouth: membranes moist, no oral lesions or bleeding gums.      EYES:  no scleral icterus, normal conjunctivae   Neck: no carotid bruits or thyromegaly   Chest/Lungs:   lungs are clear to auscultation, no rales or wheezing, no sternal scar, equal chest wall expansion    Cardiovascular:   Regular. Normal first and second heart sounds with no murmurs, rubs, or gallops; the carotid, radial and posterior tibial pulses are intact, no edema bilaterally    Abdomen:  no organomegaly, masses, bruits, or tenderness; bowel sounds are present   Extremities: no cyanosis or clubbing. Left knee in brace   Skin: no xanthelasma, warm.    Neurologic: normal  bilateral, no tremors     Psychiatric: alert and oriented x3, calm        Please refer above for cardiac ROS details.        Medical History  Surgical History Family History Social History   Past Medical History:   Diagnosis Date     Angina pectoris (H)      Chest pain 03/09/2017     Cough      Hyperlipidemia      Hypertension      Osteoarthritis      Past Surgical History:   Procedure Laterality Date     APPENDECTOMY   "     BASAL CELL CARCINOMA EXCISION      nose     LAPAROSCOPY DIAGNOSTIC (GENERAL) N/A 11/04/2014    Procedure: LAPAROSCOPY BILATERAL SALPINGO-OOPHORECTOMY ;  Surgeon: Sofia Harper MD;  Location: [facility];  Service:      TONSILLECTOMY      10 years old     ZZC TOTAL KNEE ARTHROPLASTY Left     2011     Family History   Problem Relation Age of Onset     Heart Disease Mother      Rheumatic Heart Disease Mother      No Known Problems Father      Cancer Brother         brain     Lung Cancer Brother      Lung Cancer Brother      Cancer Sister         lung     Lung Cancer Sister         Social History     Socioeconomic History     Marital status: Single     Spouse name: Not on file     Number of children: Not on file     Years of education: Not on file     Highest education level: Not on file   Occupational History     Not on file   Tobacco Use     Smoking status: Never     Smokeless tobacco: Never     Tobacco comments:     no passive exposure   Substance and Sexual Activity     Alcohol use: Yes     Comment: Alcoholic Drinks/day: Rarely a glass of wine     Drug use: No     Sexual activity: Not on file   Other Topics Concern     Not on file   Social History Narrative    The patient is a nun.     Social Determinants of Health     Financial Resource Strain: Not on file   Food Insecurity: Not on file   Transportation Needs: Not on file   Physical Activity: Not on file   Stress: Not on file   Social Connections: Not on file   Intimate Partner Violence: Not on file   Housing Stability: Not on file           Medications  Allergies   Current Outpatient Medications   Medication Sig Dispense Refill     acetaminophen (TYLENOL) 325 MG tablet [ACETAMINOPHEN (TYLENOL) 325 MG TABLET] Take 650 mg by mouth every 6 (six) hours as needed for pain.       amiodarone (PACERONE) 200 MG tablet Take 0.5 tablets (100 mg) by mouth daily 45 tablet 3     apixaban ANTICOAGULANT (ELIQUIS) 5 MG tablet Take 1 tablet (5 mg) by mouth 2  "times daily (Patient taking differently: Take 5 mg by mouth 2 times daily Takes 2.5 (cuts pill in half)) 180 tablet 3     cholecalciferol, vitamin D3, (VITAMIN D3) 2,000 unit Tab [CHOLECALCIFEROL, VITAMIN D3, (VITAMIN D3) 2,000 UNIT TAB] Take 1 tablet (2,000 Units total) by mouth daily. 90 tablet 3     diclofenac (FLECTOR) 1.3 % patch Externally apply 1 patch topically 2 times daily 180 patch 1     DULoxetine (CYMBALTA) 60 MG capsule Take 1 capsule (60 mg) by mouth daily       furosemide (LASIX) 20 MG tablet Take 1 tablet (20 mg) by mouth daily. 90 tablet 0     KLOR-CON 20 MEQ CR tablet Take 1 tablet (20 mEq) by mouth daily. 90 tablet 0     lisinopril (ZESTRIL) 2.5 MG tablet Take 1 tablet (2.5 mg) by mouth daily 90 tablet 4     naloxone (NARCAN) 4 MG/0.1ML nasal spray Spray 1 spray (4 mg) into one nostril alternating nostrils as needed for opioid reversal every 2-3 minutes until assistance arrives 0.2 mL 0     oxyCODONE (ROXICODONE) 5 MG tablet Take 1-2 tablets (5-10 mg) by mouth 3 times daily as needed for moderate to severe pain (MAX 5 tabs per day) Ok to fill 2/19/23 to start 2/20/23 150 tablet 0     zolpidem ER (AMBIEN CR) 6.25 MG CR tablet Take 1 and 1/4 tablet by mouth at bedtime 45 tablet 5       Allergies   Allergen Reactions     Trazodone Shortness Of Breath and Unknown     Allergic to trazodone and deriv., Other: trouble swallowing       Clindamycin Diarrhea     C-diff     Gabapentin Other (See Comments)     \"Internal tremors\"     Temazepam Other (See Comments)     Annotation: Nightmares       Buprenorphine Palpitations     Tried patch          Lab Results    Chemistry/lipid CBC Cardiac Enzymes/BNP/TSH/INR   Recent Labs   Lab Test 03/12/15  1230   CHOL 179   HDL 64   LDL 80   TRIG 176*     Recent Labs   Lab Test 03/12/15  1230   LDL 80     Recent Labs   Lab Test 02/08/23  1345      POTASSIUM 4.2   CHLORIDE 105   CO2 27   GLC 82   BUN 20.6   CR 1.16*   GFRESTIMATED 44*   MARLENE 9.8*     Recent Labs   Lab " Test 02/08/23  1345 09/07/22  1112 08/28/22  0442   CR 1.16* 1.14* 0.81     Recent Labs   Lab Test 06/15/21  0910   A1C 6.0*          Recent Labs   Lab Test 08/28/22  0442   WBC 6.2   HGB 12.1   HCT 36.1   MCV 93        Recent Labs   Lab Test 08/28/22  0442 08/26/22  0723 08/24/22  0718   HGB 12.1 12.6 12.8    Recent Labs   Lab Test 08/23/22  1154 08/17/22  1215 11/21/19  0451   TROPONINI 0.16 0.04 0.01     Recent Labs   Lab Test 02/08/23  1345 08/23/22  1154 08/17/22  1215 03/03/21  1222 02/21/19  0713   BNP  --  1,933*  --  177* 95   NTBNP 5,373*  --  5,101*  --   --      Recent Labs   Lab Test 02/21/19  0648   TSH 2.05     Recent Labs   Lab Test 08/23/22  1154 06/05/19  0052 02/21/19  0648   INR 1.37* 1.50* 1.20*        Jeannie Rivera MD

## 2023-02-17 NOTE — PROGRESS NOTES
"H&P  PMD: [x]  Received [] Card OV: []  Date:  Teach  []   Orders  I [x] P  [x]  Letter []   AC: Eliquis- Hold 1 day prior All other AM Meds: Take All     9/29/1930  Home:[phone] (home) Cell:There is no such number on file (mobile).  Emergency Contact: YUDI العراقي   PCP: Margret Flores, [phone]      Important patient information for CSC/Cath Lab staff : None    [facility] EP Cath Lab Procedure Order     Device Implant/Revision:  Procedure: New Implant  Current Device/Device Co Needed for Procedure: [employer]IV [employer]  Ordering Provider: Dr Rivera  Ordering Date: 2/17/2023  Diagnosis:  ICM, LBBB  Scheduling Timeframe:  Per Dr Rivera schedule in 8 wks  Scheduling Restrictions: None  Scheduling Contact: Please contact pt to schedule, if you are unable to schedule date within the next 24 hours please contact pt to update on scheduling process  Scheduling Follow-up apt for NEW HIS/CRT Implants: Device follow-up with HF DEON, EF < 50%, &/or has a hx/dx of HF or CM  Pre-Procedural Testing needed: None  Anesthesia:  Conscious Sedation- CV RN to administer    [facility] EP Cath Lab Prep   H&P:  Pt to schedule with PMD to complete  Pre-op Labs: CBC, BMP, Beta HcG if appropriate, and INR if on Warfarin will be ordered AM of procedure and Review of most recent labs, WEL for procedure  T&S Pre-Procedure Review: T&S is not required for procedure  Medical Records Pertinent for Procedure:  Echo 11/28 EF 25-30%  Allergies: Reviewed allergies, no concerns regarding orders for procedure    Allergies   Allergen Reactions     Trazodone Shortness Of Breath and Unknown     Allergic to trazodone and deriv., Other: trouble swallowing       Clindamycin Diarrhea     C-diff     Gabapentin Other (See Comments)     \"Internal tremors\"     Temazepam Other (See Comments)     Annotation: Nightmares       Buprenorphine Palpitations     Tried patch       Current Outpatient Medications:      acetaminophen (TYLENOL) 325 MG tablet, " [ACETAMINOPHEN (TYLENOL) 325 MG TABLET] Take 650 mg by mouth every 6 (six) hours as needed for pain., Disp: , Rfl:      amiodarone (PACERONE) 200 MG tablet, Take 0.5 tablets (100 mg) by mouth daily, Disp: 45 tablet, Rfl: 3     apixaban ANTICOAGULANT (ELIQUIS) 5 MG tablet, Take 1 tablet (5 mg) by mouth 2 times daily (Patient taking differently: Take 5 mg by mouth 2 times daily Takes 2.5 (cuts pill in half)), Disp: 180 tablet, Rfl: 3     cholecalciferol, vitamin D3, (VITAMIN D3) 2,000 unit Tab, [CHOLECALCIFEROL, VITAMIN D3, (VITAMIN D3) 2,000 UNIT TAB] Take 1 tablet (2,000 Units total) by mouth daily., Disp: 90 tablet, Rfl: 3     diclofenac (FLECTOR) 1.3 % patch, Externally apply 1 patch topically 2 times daily, Disp: 180 patch, Rfl: 1     DULoxetine (CYMBALTA) 60 MG capsule, Take 1 capsule (60 mg) by mouth daily, Disp: , Rfl:      furosemide (LASIX) 20 MG tablet, Take 1 tablet (20 mg) by mouth daily., Disp: 90 tablet, Rfl: 0     KLOR-CON 20 MEQ CR tablet, Take 1 tablet (20 mEq) by mouth daily., Disp: 90 tablet, Rfl: 0     lisinopril (ZESTRIL) 2.5 MG tablet, Take 1 tablet (2.5 mg) by mouth daily, Disp: 90 tablet, Rfl: 4     naloxone (NARCAN) 4 MG/0.1ML nasal spray, Spray 1 spray (4 mg) into one nostril alternating nostrils as needed for opioid reversal every 2-3 minutes until assistance arrives, Disp: 0.2 mL, Rfl: 0     oxyCODONE (ROXICODONE) 5 MG tablet, Take 1-2 tablets (5-10 mg) by mouth 3 times daily as needed for moderate to severe pain (MAX 5 tabs per day) Ok to fill 2/19/23 to start 2/20/23, Disp: 150 tablet, Rfl: 0     zolpidem ER (AMBIEN CR) 6.25 MG CR tablet, Take 1 and 1/4 tablet by mouth at bedtime, Disp: 45 tablet, Rfl: 5    Documentation Date:2/17/2023 9:12 AM  Larissa Aguila RN

## 2023-02-21 RX ORDER — ROPIVACAINE HYDROCHLORIDE 5 MG/ML
3 INJECTION, SOLUTION EPIDURAL; INFILTRATION; PERINEURAL
Status: DISCONTINUED | OUTPATIENT
Start: 2023-02-10 | End: 2023-03-08

## 2023-02-21 RX ORDER — TRIAMCINOLONE ACETONIDE 40 MG/ML
40 INJECTION, SUSPENSION INTRA-ARTICULAR; INTRAMUSCULAR
Status: DISCONTINUED | OUTPATIENT
Start: 2023-02-10 | End: 2023-03-08

## 2023-02-27 ENCOUNTER — TELEPHONE (OUTPATIENT)
Dept: CARDIOLOGY | Facility: CLINIC | Age: 88
End: 2023-02-27
Payer: COMMERCIAL

## 2023-02-27 NOTE — TELEPHONE ENCOUNTER
[facility] Call Center    Phone Message    May a detailed message be left on voicemail: yes     Reason for Call: Other: Pt states she is having a pacemaker implanted prior to the request date on current echocardiogram order and she is wondering if that will still need to be scheduled being that she is going to have a pacemaker. Please return call to advise.      Action Taken: Other: Cardiology    Travel Screening: Not Applicable     Thank you!  [facility]

## 2023-03-04 ENCOUNTER — APPOINTMENT (OUTPATIENT)
Dept: RADIOLOGY | Facility: HOSPITAL | Age: 88
DRG: 522 | End: 2023-03-04
Attending: EMERGENCY MEDICINE
Payer: COMMERCIAL

## 2023-03-04 ENCOUNTER — APPOINTMENT (OUTPATIENT)
Dept: MRI IMAGING | Facility: HOSPITAL | Age: 88
DRG: 522 | End: 2023-03-04
Attending: EMERGENCY MEDICINE
Payer: COMMERCIAL

## 2023-03-04 ENCOUNTER — APPOINTMENT (OUTPATIENT)
Dept: CT IMAGING | Facility: HOSPITAL | Age: 88
DRG: 522 | End: 2023-03-04
Attending: EMERGENCY MEDICINE
Payer: COMMERCIAL

## 2023-03-04 ENCOUNTER — ANESTHESIA EVENT (OUTPATIENT)
Dept: SURGERY | Facility: HOSPITAL | Age: 88
DRG: 522 | End: 2023-03-04
Payer: COMMERCIAL

## 2023-03-04 ENCOUNTER — APPOINTMENT (OUTPATIENT)
Dept: CT IMAGING | Facility: HOSPITAL | Age: 88
DRG: 522 | End: 2023-03-04
Attending: FAMILY MEDICINE
Payer: COMMERCIAL

## 2023-03-04 ENCOUNTER — HOSPITAL ENCOUNTER (INPATIENT)
Facility: HOSPITAL | Age: 88
LOS: 4 days | Discharge: NURSING, REHAB OR LONG-TERM CARE | DRG: 522 | End: 2023-03-08
Attending: EMERGENCY MEDICINE | Admitting: FAMILY MEDICINE
Payer: COMMERCIAL

## 2023-03-04 DIAGNOSIS — R07.89 CHEST WALL PAIN: ICD-10-CM

## 2023-03-04 DIAGNOSIS — K59.03 THERAPEUTIC OPIOID INDUCED CONSTIPATION: ICD-10-CM
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
## 2023-03-04 DIAGNOSIS — Z76.0 ENCOUNTER FOR MEDICATION REFILL: ICD-10-CM

## 2023-03-04 DIAGNOSIS — I48.0 PAROXYSMAL ATRIAL FIBRILLATION (H): ICD-10-CM

## 2023-03-04 DIAGNOSIS — Z79.01 ANTICOAGULATED: ICD-10-CM

## 2023-03-04 DIAGNOSIS — T40.2X5A THERAPEUTIC OPIOID INDUCED CONSTIPATION: ICD-10-CM

## 2023-03-04 DIAGNOSIS — S72.001A HIP FRACTURE, RIGHT, CLOSED, INITIAL ENCOUNTER (H): Primary | ICD-10-CM

## 2023-03-04 LAB
ABO/RH(D): NORMAL
ALBUMIN SERPL BCG-MCNC: 3.5 G/DL (ref 3.5–5.2)
ALP SERPL-CCNC: 151 U/L (ref 35–104)
ALT SERPL W P-5'-P-CCNC: 16 U/L (ref 10–35)
ANION GAP SERPL CALCULATED.3IONS-SCNC: 9 MMOL/L (ref 7–15)
ANTIBODY SCREEN: NEGATIVE
AST SERPL W P-5'-P-CCNC: ABNORMAL U/L
BASOPHILS # BLD AUTO: 0 10E3/UL (ref 0–0.2)
BASOPHILS NFR BLD AUTO: 0 %
BILIRUB DIRECT SERPL-MCNC: <0.2 MG/DL (ref 0–0.3)
BILIRUB SERPL-MCNC: 0.4 MG/DL
BUN SERPL-MCNC: 15.5 MG/DL (ref 8–23)
CALCIUM SERPL-MCNC: 9.3 MG/DL (ref 8.2–9.6)
CHLORIDE SERPL-SCNC: 101 MMOL/L (ref 98–107)
CREAT SERPL-MCNC: 1.01 MG/DL (ref 0.51–0.95)
DEPRECATED HCO3 PLAS-SCNC: 28 MMOL/L (ref 22–29)
EOSINOPHIL # BLD AUTO: 0.1 10E3/UL (ref 0–0.7)
EOSINOPHIL NFR BLD AUTO: 1 %
ERYTHROCYTE [DISTWIDTH] IN BLOOD BY AUTOMATED COUNT: 13.2 % (ref 10–15)
GFR SERPL CREATININE-BSD FRML MDRD: 52 ML/MIN/1.73M2
GLUCOSE SERPL-MCNC: 103 MG/DL (ref 70–99)
HCT VFR BLD AUTO: 37.8 % (ref 35–47)
HGB BLD-MCNC: 12.5 G/DL (ref 11.7–15.7)
IMM GRANULOCYTES # BLD: 0 10E3/UL
IMM GRANULOCYTES NFR BLD: 0 %
INR PPP: 1.33 (ref 0.85–1.15)
LYMPHOCYTES # BLD AUTO: 0.9 10E3/UL (ref 0.8–5.3)
LYMPHOCYTES NFR BLD AUTO: 9 %
MCH RBC QN AUTO: 30 PG (ref 26.5–33)
MCHC RBC AUTO-ENTMCNC: 33.1 G/DL (ref 31.5–36.5)
MCV RBC AUTO: 91 FL (ref 78–100)
MONOCYTES # BLD AUTO: 0.6 10E3/UL (ref 0–1.3)
MONOCYTES NFR BLD AUTO: 6 %
NEUTROPHILS # BLD AUTO: 8.4 10E3/UL (ref 1.6–8.3)
NEUTROPHILS NFR BLD AUTO: 84 %
NRBC # BLD AUTO: 0 10E3/UL
NRBC BLD AUTO-RTO: 0 /100
PLATELET # BLD AUTO: 225 10E3/UL (ref 150–450)
POTASSIUM SERPL-SCNC: 4.2 MMOL/L (ref 3.4–5.3)
PROT SERPL-MCNC: 6.2 G/DL (ref 6.4–8.3)
RBC # BLD AUTO: 4.17 10E6/UL (ref 3.8–5.2)
SODIUM SERPL-SCNC: 138 MMOL/L (ref 136–145)
SPECIMEN EXPIRATION DATE: NORMAL
WBC # BLD AUTO: 10 10E3/UL (ref 4–11)

## 2023-03-04 PROCEDURE — 70450 CT HEAD/BRAIN W/O DYE: CPT | Mod: 77

## 2023-03-04 PROCEDURE — 82248 BILIRUBIN DIRECT: CPT | Performed by: EMERGENCY MEDICINE

## 2023-03-04 PROCEDURE — 70450 CT HEAD/BRAIN W/O DYE: CPT

## 2023-03-04 PROCEDURE — 73562 X-RAY EXAM OF KNEE 3: CPT | Mod: RT

## 2023-03-04 PROCEDURE — 73721 MRI JNT OF LWR EXTRE W/O DYE: CPT | Mod: RT

## 2023-03-04 PROCEDURE — 73552 X-RAY EXAM OF FEMUR 2/>: CPT | Mod: RT

## 2023-03-04 PROCEDURE — 36415 COLL VENOUS BLD VENIPUNCTURE: CPT | Performed by: EMERGENCY MEDICINE

## 2023-03-04 PROCEDURE — 120N000001 HC R&B MED SURG/OB

## 2023-03-04 PROCEDURE — 250N000013 HC RX MED GY IP 250 OP 250 PS 637: Performed by: FAMILY MEDICINE

## 2023-03-04 PROCEDURE — 250N000013 HC RX MED GY IP 250 OP 250 PS 637: Performed by: EMERGENCY MEDICINE

## 2023-03-04 PROCEDURE — 86901 BLOOD TYPING SEROLOGIC RH(D): CPT | Performed by: EMERGENCY MEDICINE

## 2023-03-04 PROCEDURE — 85610 PROTHROMBIN TIME: CPT | Performed by: EMERGENCY MEDICINE

## 2023-03-04 PROCEDURE — 72170 X-RAY EXAM OF PELVIS: CPT

## 2023-03-04 PROCEDURE — 93005 ELECTROCARDIOGRAM TRACING: CPT | Performed by: EMERGENCY MEDICINE

## 2023-03-04 PROCEDURE — 86850 RBC ANTIBODY SCREEN: CPT | Performed by: EMERGENCY MEDICINE

## 2023-03-04 PROCEDURE — 85025 COMPLETE CBC W/AUTO DIFF WBC: CPT | Performed by: EMERGENCY MEDICINE

## 2023-03-04 PROCEDURE — 99222 1ST HOSP IP/OBS MODERATE 55: CPT | Performed by: FAMILY MEDICINE

## 2023-03-04 PROCEDURE — 84155 ASSAY OF PROTEIN SERUM: CPT | Performed by: EMERGENCY MEDICINE

## 2023-03-04 PROCEDURE — 99285 EMERGENCY DEPT VISIT HI MDM: CPT

## 2023-03-04 PROCEDURE — 80048 BASIC METABOLIC PNL TOTAL CA: CPT | Performed by: EMERGENCY MEDICINE

## 2023-03-04 RX ORDER — HYDROMORPHONE HCL IN WATER/PF 6 MG/30 ML
0.2 PATIENT CONTROLLED ANALGESIA SYRINGE INTRAVENOUS
Status: DISCONTINUED | OUTPATIENT
Start: 2023-03-04 | End: 2023-03-08 | Stop reason: HOSPADM

## 2023-03-04 RX ORDER — FUROSEMIDE 20 MG
20 TABLET ORAL DAILY
Status: DISCONTINUED | OUTPATIENT
Start: 2023-03-05 | End: 2023-03-08 | Stop reason: HOSPADM

## 2023-03-04 RX ORDER — NALOXONE HYDROCHLORIDE 0.4 MG/ML
0.2 INJECTION, SOLUTION INTRAMUSCULAR; INTRAVENOUS; SUBCUTANEOUS
Status: DISCONTINUED | OUTPATIENT
Start: 2023-03-04 | End: 2023-03-08 | Stop reason: HOSPADM

## 2023-03-04 RX ORDER — BISACODYL 10 MG
10 SUPPOSITORY, RECTAL RECTAL DAILY PRN
Status: DISCONTINUED | OUTPATIENT
Start: 2023-03-04 | End: 2023-03-05

## 2023-03-04 RX ORDER — LIDOCAINE 40 MG/G
CREAM TOPICAL
Status: DISCONTINUED | OUTPATIENT
Start: 2023-03-04 | End: 2023-03-05

## 2023-03-04 RX ORDER — ZOLPIDEM TARTRATE 5 MG/1
5 TABLET ORAL
Status: DISCONTINUED | OUTPATIENT
Start: 2023-03-04 | End: 2023-03-08 | Stop reason: HOSPADM

## 2023-03-04 RX ORDER — HYDROMORPHONE HCL IN WATER/PF 6 MG/30 ML
0.2 PATIENT CONTROLLED ANALGESIA SYRINGE INTRAVENOUS
Status: DISCONTINUED | OUTPATIENT
Start: 2023-03-04 | End: 2023-03-06

## 2023-03-04 RX ORDER — LISINOPRIL 2.5 MG/1
2.5 TABLET ORAL DAILY
Status: DISCONTINUED | OUTPATIENT
Start: 2023-03-05 | End: 2023-03-08 | Stop reason: HOSPADM

## 2023-03-04 RX ORDER — OXYCODONE HYDROCHLORIDE 5 MG/1
10 TABLET ORAL EVERY 4 HOURS PRN
Status: DISCONTINUED | OUTPATIENT
Start: 2023-03-04 | End: 2023-03-08 | Stop reason: HOSPADM

## 2023-03-04 RX ORDER — ONDANSETRON 2 MG/ML
4 INJECTION INTRAMUSCULAR; INTRAVENOUS EVERY 6 HOURS PRN
Status: DISCONTINUED | OUTPATIENT
Start: 2023-03-04 | End: 2023-03-05

## 2023-03-04 RX ORDER — ONDANSETRON 4 MG/1
4 TABLET, ORALLY DISINTEGRATING ORAL EVERY 6 HOURS PRN
Status: DISCONTINUED | OUTPATIENT
Start: 2023-03-04 | End: 2023-03-05

## 2023-03-04 RX ORDER — OXYCODONE HYDROCHLORIDE 5 MG/1
5 TABLET ORAL EVERY 4 HOURS PRN
Status: DISCONTINUED | OUTPATIENT
Start: 2023-03-04 | End: 2023-03-08 | Stop reason: HOSPADM

## 2023-03-04 RX ORDER — PROCHLORPERAZINE 25 MG
12.5 SUPPOSITORY, RECTAL RECTAL EVERY 12 HOURS PRN
Status: DISCONTINUED | OUTPATIENT
Start: 2023-03-04 | End: 2023-03-04

## 2023-03-04 RX ORDER — NALOXONE HYDROCHLORIDE 0.4 MG/ML
0.4 INJECTION, SOLUTION INTRAMUSCULAR; INTRAVENOUS; SUBCUTANEOUS
Status: DISCONTINUED | OUTPATIENT
Start: 2023-03-04 | End: 2023-03-08 | Stop reason: HOSPADM

## 2023-03-04 RX ORDER — LIDOCAINE 4 G/G
1 PATCH TOPICAL ONCE
Status: COMPLETED | OUTPATIENT
Start: 2023-03-04 | End: 2023-03-05

## 2023-03-04 RX ORDER — ACETAMINOPHEN 500 MG
1000 TABLET ORAL DAILY PRN
Status: ON HOLD | COMMUNITY
End: 2023-03-08

## 2023-03-04 RX ORDER — AMIODARONE HYDROCHLORIDE 100 MG/1
100 TABLET ORAL DAILY
Status: DISCONTINUED | OUTPATIENT
Start: 2023-03-05 | End: 2023-03-08 | Stop reason: HOSPADM

## 2023-03-04 RX ORDER — PROCHLORPERAZINE MALEATE 5 MG
5 TABLET ORAL EVERY 6 HOURS PRN
Status: DISCONTINUED | OUTPATIENT
Start: 2023-03-04 | End: 2023-03-05

## 2023-03-04 RX ORDER — ACETAMINOPHEN 325 MG/1
975 TABLET ORAL EVERY 8 HOURS
Status: DISCONTINUED | OUTPATIENT
Start: 2023-03-04 | End: 2023-03-05

## 2023-03-04 RX ORDER — POTASSIUM CHLORIDE 1500 MG/1
20 TABLET, EXTENDED RELEASE ORAL DAILY
Status: DISCONTINUED | OUTPATIENT
Start: 2023-03-05 | End: 2023-03-08 | Stop reason: HOSPADM

## 2023-03-04 RX ORDER — PROCHLORPERAZINE MALEATE 5 MG
5 TABLET ORAL EVERY 6 HOURS PRN
Status: DISCONTINUED | OUTPATIENT
Start: 2023-03-04 | End: 2023-03-04

## 2023-03-04 RX ORDER — LANOLIN ALCOHOL/MO/W.PET/CERES
3 CREAM (GRAM) TOPICAL
Status: DISCONTINUED | OUTPATIENT
Start: 2023-03-04 | End: 2023-03-08 | Stop reason: HOSPADM

## 2023-03-04 RX ORDER — PROCHLORPERAZINE 25 MG
12.5 SUPPOSITORY, RECTAL RECTAL EVERY 12 HOURS PRN
Status: DISCONTINUED | OUTPATIENT
Start: 2023-03-04 | End: 2023-03-05

## 2023-03-04 RX ORDER — OXYCODONE HYDROCHLORIDE 5 MG/1
10 TABLET ORAL ONCE
Status: COMPLETED | OUTPATIENT
Start: 2023-03-04 | End: 2023-03-04

## 2023-03-04 RX ORDER — POLYETHYLENE GLYCOL 3350 17 G/17G
17 POWDER, FOR SOLUTION ORAL DAILY
Status: DISCONTINUED | OUTPATIENT
Start: 2023-03-05 | End: 2023-03-05

## 2023-03-04 RX ORDER — DIPHENHYDRAMINE HCL 12.5MG/5ML
12.5 LIQUID (ML) ORAL EVERY 6 HOURS PRN
Status: DISCONTINUED | OUTPATIENT
Start: 2023-03-04 | End: 2023-03-08 | Stop reason: HOSPADM

## 2023-03-04 RX ORDER — HYDROMORPHONE HYDROCHLORIDE 1 MG/ML
0.5 INJECTION, SOLUTION INTRAMUSCULAR; INTRAVENOUS; SUBCUTANEOUS
Status: DISCONTINUED | OUTPATIENT
Start: 2023-03-04 | End: 2023-03-08 | Stop reason: HOSPADM

## 2023-03-04 RX ORDER — DULOXETIN HYDROCHLORIDE 60 MG/1
60 CAPSULE, DELAYED RELEASE ORAL DAILY
Status: DISCONTINUED | OUTPATIENT
Start: 2023-03-05 | End: 2023-03-08 | Stop reason: HOSPADM

## 2023-03-04 RX ORDER — ACETAMINOPHEN 325 MG/1
975 TABLET ORAL ONCE
Status: COMPLETED | OUTPATIENT
Start: 2023-03-04 | End: 2023-03-04

## 2023-03-04 RX ORDER — DICLOFENAC EPOLAMINE 0.01 G/1
1 SYSTEM TOPICAL EVERY 12 HOURS
Status: DISCONTINUED | OUTPATIENT
Start: 2023-03-04 | End: 2023-03-04

## 2023-03-04 RX ADMIN — OXYCODONE HYDROCHLORIDE 10 MG: 5 TABLET ORAL at 20:54

## 2023-03-04 RX ADMIN — LIDOCAINE 1 PATCH: 246 PATCH TOPICAL at 14:58

## 2023-03-04 RX ADMIN — OXYCODONE HYDROCHLORIDE 10 MG: 5 TABLET ORAL at 14:54

## 2023-03-04 RX ADMIN — ACETAMINOPHEN 975 MG: 325 TABLET ORAL at 14:54

## 2023-03-04 RX ADMIN — ACETAMINOPHEN 975 MG: 325 TABLET ORAL at 20:55

## 2023-03-04 RX ADMIN — ZOLPIDEM TARTRATE 5 MG: 5 TABLET ORAL at 22:40

## 2023-03-04 ASSESSMENT — ENCOUNTER SYMPTOMS
SHORTNESS OF BREATH: 0
DIZZINESS: 0
NECK PAIN: 0
VOMITING: 0

## 2023-03-04 ASSESSMENT — ACTIVITIES OF DAILY LIVING (ADL)
ADLS_ACUITY_SCORE: 39
ADLS_ACUITY_SCORE: 35
ADLS_ACUITY_SCORE: 39

## 2023-03-04 NOTE — ED PROVIDER NOTES
"EMERGENCY DEPARTMENT ENCOUNTER      NAME: Gladys Ramirez  AGE: 89 year old female  YOB: 1930  MRN: [id number]  EVALUATION DATE & TIME: 3/4/2023  2:21 PM    PCP: Margret Flores    ED PROVIDER: Felix Gonzales M.D.      Chief Complaint   Patient presents with     Fall         IMPRESSION  1. Hip fracture, right, closed, initial encounter (H)    2. Fall from ground level    3. Anticoagulated        PLAN  - admit to hospitalist for further care with Orthopedic Surgery; med/surg admit    ED COURSE & MEDICAL DECISION MAKING    ED Course as of 03/04/23 1834   Sat Mar 04, 2023   1428 89yoF with history of a-fib (takes Eliquis), chronic right knee pain (torn meniscus/ACL), DNR code status, lives alone at home presenting per EMS for evaluation of right hip/thigh pain & head injury after fall. Reports chronic right knee pain & intermittent instability issues; today knee \"gave out\" when trying to get up from bed and patient fell on right hip. Hit head with no LOC. Resulting right hip/thigh pain so EMS called. No meds given en route. States she has been doing fine at home recently; no new symptoms or concerns.    BP 200s/90s with otherwise unremarkable vitals on presentation. Calm on exam with no scalp tenderness or visual evidence of trauma to head, no c-spine tenderness with painless active ROM intact, clear lungs bilaterally, no chest wall tenderness, no abdominal tenderness, no midline spinal tenderness. Does have right hip tenderness and pain with ROM attempts as well as mild right mid anterior thigh tenderness and mild diffuse knee tenderness;/swelling; distal CMS intact to RLE.    Most concern for right hip fracture clinically. Suspect knee findings are chronic for head. Will obtain CT head as well given head injury while on Eliquis. Will give oxycodone, Tylenol, Lidoderm, ice pack in the meantime for symptoms. Patient comfortable with this plan; no further questions at this time.   1832 CT head with no " ICH, skull fracture, acute abnormality, explanatory pathology. X-rays suggestive of femoral neck fracture and concern for this clinically; discussed with Orthopedic Surgery who recommends obtaining confirmatory MRI.    MRI obtained and confirms femoral neck fracture of right hip; no other injury noted. Patient calm on recheck with no ongoing pain while still in bed. Normal vitals. Consulted hospitalist for admission; they agreed. They did request preop EKG & blood work be obtained. Patient understood and agreed with the plan; no further questions at the time of admission.       --------------------------------------------------------------------------------   --------------------------------------------------------------------------------     2:29 PM I met with the patient for the initial interview and physical examination. Discussed plan for treatment and workup in the ED.  4:36 PM I spoke to Dr. Hughes with [facility] [place] regarding plan for care. Plan for MRI.  6:45 PM I spoke with Dr. Hughes of [facility] [place]; plans for OR tomorrow morning.      This patient involved a high degree of complexity in medical decision making, as significant risks were present and assessed. Recent encounters & results in medical record reviewed by me.    Broad differential considered for this patient presenting, including but not limited to:  Right hip fracture, skull fracture, ICH, other traumatic injury    I wore the following PPE during this patient encounter:  N95 mask, face shield w/ eye protection, gloves    See additional MDM below if interested.      MEDICATIONS GIVEN IN THE EMERGENCY DEPARTMENT  Medications   Lidocaine (LIDOCARE) 4 % Patch 1 patch (1 patch Transdermal $Patch/Med Applied 3/4/23 3876)   oxyCODONE (ROXICODONE) tablet 10 mg (10 mg Oral $Given 3/4/23 4721)   acetaminophen (TYLENOL) tablet 975 mg (975 mg Oral $Given 3/4/23 4137)           =================================================================      HPI  Patient  information was obtained from: Patient, EMS    Use of : N/A        Gladys Ramirez is a 89 year old female with a pertinent history of HFrEF, Afib on Eliquis, GERD, HLD, who presents to this ED via EMS for evaluation of fall.    Patient reports she had a fall while trying to get out of bed at her apartment earlier this morning. She attributes this fall to her chronic right knee issues which include chronic ACL disruption, medial meniscus tear, and cartilage loss. She states that she did hit her head during this fall, but denies any LOC. Patient is currently on Eliquis. Patient was reportedly able to bear weight following this fall but reported pain with some movements. Patient states she ambulates with a cane at baseline. In the ED, patient reports shooting pains to her right thigh. Denies any neck pain. Denies any recent sickness, vomiting, chest pain, shortness of breath, dizziness, or any other complaints.      REVIEW OF SYSTEMS   Review of Systems   Respiratory: Negative for shortness of breath.    Cardiovascular: Negative for chest pain.   Gastrointestinal: Negative for vomiting.   Musculoskeletal: Negative for neck pain.        Positive for right thigh pain   Neurological: Negative for dizziness and syncope.   All other systems reviewed and are negative.    All other systems reviewed and are negative except as noted above in HPI.        --------------- MEDICAL HISTORY ---------------  PAST MEDICAL HISTORY:  Reviewed by me.  Past Medical History:   Diagnosis Date     Angina pectoris (H)      Chest pain 03/09/2017     Cough      Hyperlipidemia      Hypertension      Osteoarthritis      Patient Active Problem List   Diagnosis     Localized Primary Osteoarthritis Of The Left Knee     Benign essential hypertension     Serum Enzyme Levels - Alkaline Phosphatase Elevated     Hyperlipidemia     Cataract     Insomnia     BCC (basal cell carcinoma of skin)     Sinus bradycardia     DNR (do not resuscitate)      Arthrosis of foot - bilateral     Lower extremity edema     Frequent nosebleeds     Controlled substance agreement signed - for tramadol and ambien signed 1/30/20     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     Vitamin D deficiency     Elevated alkaline phosphatase level     Deep vein thrombosis (DVT) of distal vein of lower extremity, unspecified chronicity, unspecified laterality (H)     Other pulmonary embolism without acute cor pulmonale, unspecified chronicity (H)     Hypomagnesemia     Lung nodule < 6cm on CT     Chronic pain     Normocytic anemia     Gastroesophageal reflux disease without esophagitis     Thyroid nodule     Infection due to 2019 novel coronavirus     Exertional dyspnea     Anticoagulated by anticoagulation treatment     HFrEF (heart failure with reduced ejection fraction) (H)     Chronic kidney disease, stage 2 (mild)     Persistent atrial fibrillation (H)       PAST SURGICAL HISTORY:  Reviewed by me.  Past Surgical History:   Procedure Laterality Date     APPENDECTOMY       BASAL CELL CARCINOMA EXCISION      nose     LAPAROSCOPY DIAGNOSTIC (GENERAL) N/A 11/04/2014    Procedure: LAPAROSCOPY BILATERAL SALPINGO-OOPHORECTOMY ;  Surgeon: Sofia Harper MD;  Location: [facility];  Service:      TONSILLECTOMY      10 years old     [facility] TOTAL KNEE ARTHROPLASTY Left     2011       CURRENT MEDICATIONS:    Reviewed by me.    Current Facility-Administered Medications:      Lidocaine (LIDOCARE) 4 % Patch 1 patch, 1 patch, Transdermal, Once, Felix Gonzales MD, 1 patch at 03/04/23 1458     ropivacaine (NAROPIN) injection 3 mL, 3 mL, , , Jose J Carlson DO, 3 mL at 02/10/23 1444     triamcinolone (KENALOG-40) injection 40 mg, 40 mg, , , Jose J Carlson DO, 40 mg at 02/10/23 1444    Current Outpatient Medications:      acetaminophen (TYLENOL) 325 MG tablet, [ACETAMINOPHEN (TYLENOL) 325 MG TABLET] Take 650 mg by mouth every 6 (six) hours as needed for pain., Disp:  ", Rfl:      amiodarone (PACERONE) 200 MG tablet, Take 0.5 tablets (100 mg) by mouth daily, Disp: 45 tablet, Rfl: 3     apixaban ANTICOAGULANT (ELIQUIS) 5 MG tablet, Take 1 tablet (5 mg) by mouth 2 times daily (Patient taking differently: Take 5 mg by mouth 2 times daily Takes 2.5 (cuts pill in half)), Disp: 180 tablet, Rfl: 3     cholecalciferol, vitamin D3, (VITAMIN D3) 2,000 unit Tab, [CHOLECALCIFEROL, VITAMIN D3, (VITAMIN D3) 2,000 UNIT TAB] Take 1 tablet (2,000 Units total) by mouth daily., Disp: 90 tablet, Rfl: 3     diclofenac (FLECTOR) 1.3 % patch, Externally apply 1 patch topically 2 times daily, Disp: 180 patch, Rfl: 1     DULoxetine (CYMBALTA) 60 MG capsule, Take 1 capsule (60 mg) by mouth daily, Disp: , Rfl:      furosemide (LASIX) 20 MG tablet, Take 1 tablet (20 mg) by mouth daily., Disp: 90 tablet, Rfl: 0     KLOR-CON 20 MEQ CR tablet, Take 1 tablet (20 mEq) by mouth daily., Disp: 90 tablet, Rfl: 0     lisinopril (ZESTRIL) 2.5 MG tablet, Take 1 tablet (2.5 mg) by mouth daily, Disp: 90 tablet, Rfl: 4     naloxone (NARCAN) 4 MG/0.1ML nasal spray, Spray 1 spray (4 mg) into one nostril alternating nostrils as needed for opioid reversal every 2-3 minutes until assistance arrives, Disp: 0.2 mL, Rfl: 0     oxyCODONE (ROXICODONE) 5 MG tablet, Take 1-2 tablets (5-10 mg) by mouth 3 times daily as needed for moderate to severe pain (MAX 5 tabs per day) Ok to fill 2/19/23 to start 2/20/23, Disp: 150 tablet, Rfl: 0     zolpidem ER (AMBIEN CR) 6.25 MG CR tablet, Take 1 and 1/4 tablet by mouth at bedtime, Disp: 45 tablet, Rfl: 5    ALLERGIES:  Reviewed by me.  Allergies   Allergen Reactions     Trazodone Shortness Of Breath and Unknown     Allergic to trazodone and deriv., Other: trouble swallowing       Clindamycin Diarrhea     C-diff     Gabapentin Other (See Comments)     \"Internal tremors\"     Temazepam Other (See Comments)     Annotation: Nightmares       Buprenorphine Palpitations     Tried patch       FAMILY " "HISTORY:  Reviewed by me.  Family History   Problem Relation Age of Onset     Heart Disease Mother      Rheumatic Heart Disease Mother      No Known Problems Father      Cancer Brother         brain     Lung Cancer Brother      Lung Cancer Brother      Cancer Sister         lung     Lung Cancer Sister        SOCIAL HISTORY:   Reviewed by me.  Social History     Socioeconomic History     Marital status: Single   Tobacco Use     Smoking status: Never     Smokeless tobacco: Never     Tobacco comments:     no passive exposure   Substance and Sexual Activity     Alcohol use: Yes     Comment: Alcoholic Drinks/day: Rarely a glass of wine     Drug use: No   Social History Narrative    The patient is a nun.       --------------- PHYSICAL EXAM ---------------  Nursing notes and vitals reviewed by me.  VITALS:  Vitals:    03/04/23 1433 03/04/23 1500 03/04/23 1630 03/04/23 1645   BP:  (!) 187/88 (!) 157/74    Pulse:  87     Resp:       Temp: 97.7  F (36.5  C)      TempSrc: Axillary      SpO2:  93%     Weight:    69.4 kg (153 lb)   Height:    1.588 m (5' 2.5\")       PHYSICAL EXAM:    General:  alert, interactive, no distress  Eyes:  conjunctivae clear, conjugate gaze, PERRL @ 3mm with EOMI  HENT:  atraumatic, nose with no rhinorrhea, oropharynx clear  Neck:  no meningismus, no c-spine tenderness with painless active ROM intact  Cardiovascular:  HR 80s during exam, regular rhythm, no murmurs, brisk cap refill  Chest:  no chest wall tenderness  Pulmonary:  no stridor, normal phonation, normal work of breathing, clear lungs bilaterally  Abdomen:  soft, nondistended, nontender  :  no CVA tenderness  Back:  no midline spinal tenderness  Musculoskeletal:    BUE:  atraumatic with painless active ROM intact, overlying skin intact, distal CMS intact  RLE: right hip pain with ROM, mild mid right femur tenderness, mild diffuse right knee tenderness with mild effusion and valgus deformity, distal CMS intact  LLE:  atraumatic with painless " active ROM intact, overlying skin intact, distal CMS intact  Skin:  warm, dry, no rash  Neuro:  awake, alert, answers questions appropriately, follows commands, moves all limbs, CN 2-12 intact, negative pronator drift, 5/5 strength to all extremities with sensation to light touch intact   Psych:  calm, normal affect      --------------- RESULTS ---------------  EKG:    Reviewed and interpreted by me.  - NSR at 72bpm, no ST or T wave changes, unchanged LBBB, QTc 521 (prior 532)  - unchanged from prior on 8/24/22  My read.      LAB:  Reviewed and interpreted by me.  Results for orders placed or performed during the hospital encounter of 03/04/23   CT Head w/o Contrast    Impression    IMPRESSION:     1. Senescent changes and sequelae of chronic microangiopathy without acute intracranial abnormality.   XR Femur Right 2 Views    Impression    IMPRESSION:     Subtle cortical irregularity at the lateral aspect of the femoral head neck junction, which could reflect a nondisplaced femoral neck fracture. MRI is recommended for further evaluation given osteopenia. Mild to moderate degenerative changes of the hips.   Additional degenerative changes of the lower lumbar spine and sacroiliac joints.      NOTE: ABNORMAL REPORT    THE DICTATION ABOVE DESCRIBES AN ABNORMALITY FOR WHICH FOLLOW-UP IS NEEDED.    XR Knee Right 3 Views    Impression    IMPRESSION: Moderate tricompartmental osteoarthrosis. Intra-articular loose bodies. No definite acute fracture within the limitations of osteopenia. Chondrocalcinosis.   XR Pelvis 1/2 Views    Impression    IMPRESSION:     Subtle cortical irregularity at the lateral aspect of the femoral head neck junction, which could reflect a nondisplaced femoral neck fracture. MRI is recommended for further evaluation given osteopenia. Mild to moderate degenerative changes of the hips.   Additional degenerative changes of the lower lumbar spine and sacroiliac joints.      NOTE: ABNORMAL REPORT    THE  DICTATION ABOVE DESCRIBES AN ABNORMALITY FOR WHICH FOLLOW-UP IS NEEDED.    MR Hip Right w/o Contrast    Impression    IMPRESSION:  1.  Acute nondisplaced right femoral neck fracture centered in the subcapital region.  2.  Moderate right hip joint effusion.  3.  Mild degenerative changes of the right hip.       Blood work:  pending at time of admission      RADIOLOGY:  Reviewed by me. Please see official radiology report.  Recent Results (from the past 24 hour(s))   CT Head w/o Contrast    Narrative    EXAM: CT HEAD W/O CONTRAST  LOCATION: [facility]  DATE/TIME: 3/4/2023 3:18 PM    INDICATION: Headache after trauma on blood thinners  COMPARISON: MRI brain dated 04/10/2015  TECHNIQUE: Routine CT Head without IV contrast. Multiplanar reformats. Dose reduction techniques were used.    FINDINGS:   INTRACRANIAL CONTENTS: No acute intracranial hemorrhage. No CT evidence of acute infarct. Sequelae of mild to moderate chronic microangiopathy. Mild cerebral volume loss without hydrocephalus. No extra-axial fluid collections.  Patent basal cisterns.     VISUALIZED ORBITS/SINUSES/MASTOIDS: Postoperative change of bilateral lenses, otherwise the orbits are unremarkable. The visualized paranasal sinuses and temporal bone structures are well-aerated. Pneumatized anterior clinoid processes. Bowing/spurring   of the nasal septum.    BONES/SOFT TISSUES: The calvarium and skull base are unremarkable.       Impression    IMPRESSION:     1. Senescent changes and sequelae of chronic microangiopathy without acute intracranial abnormality.   XR Femur Right 2 Views    Narrative    EXAM: XR PELVIS 1/2 VIEWS, XR FEMUR RIGHT 2 VIEWS  LOCATION: [facility]  DATE/TIME: 3/4/2023 3:42 PM    INDICATION: fall, right hip pain  COMPARISON: None.      Impression    IMPRESSION:     Subtle cortical irregularity at the lateral aspect of the femoral head neck junction, which could reflect a nondisplaced  femoral neck fracture. MRI is recommended for further evaluation given osteopenia. Mild to moderate degenerative changes of the hips.   Additional degenerative changes of the lower lumbar spine and sacroiliac joints.      NOTE: ABNORMAL REPORT    THE DICTATION ABOVE DESCRIBES AN ABNORMALITY FOR WHICH FOLLOW-UP IS NEEDED.    XR Pelvis 1/2 Views    Narrative    EXAM: XR PELVIS 1/2 VIEWS, XR FEMUR RIGHT 2 VIEWS  LOCATION: [facility]  DATE/TIME: 3/4/2023 3:42 PM    INDICATION: fall, right hip pain  COMPARISON: None.      Impression    IMPRESSION:     Subtle cortical irregularity at the lateral aspect of the femoral head neck junction, which could reflect a nondisplaced femoral neck fracture. MRI is recommended for further evaluation given osteopenia. Mild to moderate degenerative changes of the hips.   Additional degenerative changes of the lower lumbar spine and sacroiliac joints.      NOTE: ABNORMAL REPORT    THE DICTATION ABOVE DESCRIBES AN ABNORMALITY FOR WHICH FOLLOW-UP IS NEEDED.    XR Knee Right 3 Views    Narrative    EXAM: XR KNEE RIGHT 3 VIEWS  LOCATION: [facility]  DATE/TIME: 3/4/2023 3:43 PM    INDICATION: fall, right knee pain  COMPARISON: None.      Impression    IMPRESSION: Moderate tricompartmental osteoarthrosis. Intra-articular loose bodies. No definite acute fracture within the limitations of osteopenia. Chondrocalcinosis.   MR Hip Right w/o Contrast    Narrative    EXAM: MR HIP RIGHT W/O CONTRAST  LOCATION: [facility]  DATE/TIME: 3/4/2023 6:05 PM    INDICATION: Fall, right hip pain, question fracture.  COMPARISON: Same-day radiograph.  TECHNIQUE: Unenhanced.    FINDINGS:  BONES:     -There is acute nondisplaced right femoral neck fracture centered in the subcapital region with slight impaction.   -There is no additional fracture about the right hip.    RIGHT HIP:   -Labrum: Labral degeneration where there is probably a  superimposed degenerative anterosuperior labral tear.   -Cartilage: There is some low-grade cartilage loss along the posterior aspect of the hip and some mild cartilage changes along the anterior superior acetabulum. No full-thickness cartilage defects.  -Joint space: Moderate joint effusion.     MUSCLES AND TENDONS:   -There is no acute tendon abnormality involving the gluteal, proximal hamstring or distal iliopsoas tendons.    SOFT TISSUES:   -There is some mild surrounding edema about the femoral neck fracture without an intramuscular hematoma. Small amount of blood products in the lateral subcutaneous tissues of the right hip.    INTRA-PELVIC CONTENTS:  -Extensive colonic diverticulosis sigmoid colon.      Impression    IMPRESSION:  1.  Acute nondisplaced right femoral neck fracture centered in the subcapital region.  2.  Moderate right hip joint effusion.  3.  Mild degenerative changes of the right hip.                 --------------- ADDITIONAL MDM ---------------  History:  - Supplemental history from:       -- see above charting, if applicable: patient, EMS  - External Record(s) reviewed:       -- see above charting, if applicable       -- Inpatient/outpatient record (most recent clinic visit), prior labs (most recent creatinine), prior imaging (most recent knee joint injection imaging)    Workup:  - Chart documentation above includes differential considered and any EKGs or imaging independently interpreted by provider.  - In additional to work up documented, I considered the following work up:       -- see above charting, if applicable    External Consultation:  - Discussion of management with another provider:       -- see above charting, if applicable    Complicating Factors:  - Care impacted by chronic illness:       -- see above MDM, past medical history, & problem list  - Care affected by social determinants of health:       -- see above social history    Disposition Considerations:  - Admit         I,  Joel Medrano, am serving as a scribe to document services personally performed by Dr. Felix Gonzales based on my observation and the provider's statements to me. I, Felix Gonzales MD attest that Joel Medrano is acting in a scribe capacity, has observed my performance of the services and has documented them in accordance with my direction.      Felix Gonzales MD  03/04/23  Emergency Medicine  [facility] EMERGENCY DEPARTMENT  [address] [place] [id number] [phone]  Dept: [phone]       Felix Gonzales MD  03/04/23 2561       Felix Gonzales MD  03/04/23 5825

## 2023-03-04 NOTE — ED TRIAGE NOTES
Patient arrives from apartment where she lives alone.  Reported that she was trying to get out bed this AM- has chronic right knee issues which caused her to fall today.  Denies any dizziness or syncope prior to the fall.  Patient states she did strike her head- on Eliquis.  Trauma alert initiated.  Patient denies any LOC.  Denies any neck pain- no c-spine tenderness upon palpation.  Was able to bear weight onto right lower extremity but c/o right hip and leg pain with some movements.  Moves all other extremities.  GCS 15.      Triage Assessment       Row Name 03/04/23 1425       Triage Assessment (Adult)    Airway WDL WDL       Respiratory WDL    Respiratory WDL WDL       Skin Circulation/Temperature WDL    Skin Circulation/Temperature WDL WDL       Cardiac WDL    Cardiac WDL WDL       Peripheral/Neurovascular WDL    Peripheral Neurovascular WDL neurovascular assessment lower;X;capillary refill    Capillary Refill, General less than/equal to 3 secs    Capillary Refill, LUE less than/equal to 3 secs    Capillary Refill, RUE less than/equal to 3 secs    Capillary Refill, LLE less than/equal to 3 secs    Capillary Refill, RLE less than/equal to 3 secs       RLE Neurovascular Assessment    Temperature RLE temperature;warm    Color RLE no discoloration    Sensation RLE no numbness;no tingling       Cognitive/Neuro/Behavioral WDL    Cognitive/Neuro/Behavioral WDL WDL       [place] Coma Scale    Best Eye Response 4-->(E4) spontaneous    Best Motor Response 6-->(M6) obeys commands    Best Verbal Response 5-->(V5) oriented    [place] Coma Scale Score 15

## 2023-03-05 ENCOUNTER — APPOINTMENT (OUTPATIENT)
Dept: RADIOLOGY | Facility: HOSPITAL | Age: 88
DRG: 522 | End: 2023-03-05
Attending: PHYSICIAN ASSISTANT
Payer: COMMERCIAL

## 2023-03-05 ENCOUNTER — ANESTHESIA (OUTPATIENT)
Dept: SURGERY | Facility: HOSPITAL | Age: 88
DRG: 522 | End: 2023-03-05
Payer: COMMERCIAL

## 2023-03-05 ENCOUNTER — ANCILLARY PROCEDURE (OUTPATIENT)
Dept: ULTRASOUND IMAGING | Facility: HOSPITAL | Age: 88
End: 2023-03-05
Attending: ANESTHESIOLOGY
Payer: COMMERCIAL

## 2023-03-05 PROBLEM — I48.19 PERSISTENT ATRIAL FIBRILLATION (H): Status: ACTIVE | Noted: 2022-10-26

## 2023-03-05 PROBLEM — N18.31 STAGE 3A CHRONIC KIDNEY DISEASE (CKD) (H): Status: ACTIVE | Noted: 2022-09-28

## 2023-03-05 PROBLEM — U07.1 INFECTION DUE TO 2019 NOVEL CORONAVIRUS: Status: RESOLVED | Noted: 2022-05-11 | Resolved: 2023-03-05

## 2023-03-05 PROBLEM — I50.20 HFREF (HEART FAILURE WITH REDUCED EJECTION FRACTION) (H): Status: ACTIVE | Noted: 2022-09-12

## 2023-03-05 LAB
ANION GAP SERPL CALCULATED.3IONS-SCNC: 12 MMOL/L (ref 7–15)
BUN SERPL-MCNC: 15.9 MG/DL (ref 8–23)
CALCIUM SERPL-MCNC: 8.8 MG/DL (ref 8.2–9.6)
CHLORIDE SERPL-SCNC: 104 MMOL/L (ref 98–107)
CREAT SERPL-MCNC: 0.98 MG/DL (ref 0.51–0.95)
DEPRECATED HCO3 PLAS-SCNC: 22 MMOL/L (ref 22–29)
ERYTHROCYTE [DISTWIDTH] IN BLOOD BY AUTOMATED COUNT: 13.5 % (ref 10–15)
GFR SERPL CREATININE-BSD FRML MDRD: 54 ML/MIN/1.73M2
GLUCOSE SERPL-MCNC: 92 MG/DL (ref 70–99)
HCT VFR BLD AUTO: 42.9 % (ref 35–47)
HGB BLD-MCNC: 12.7 G/DL (ref 11.7–15.7)
MCH RBC QN AUTO: 30.2 PG (ref 26.5–33)
MCHC RBC AUTO-ENTMCNC: 29.6 G/DL (ref 31.5–36.5)
MCV RBC AUTO: 102 FL (ref 78–100)
PLATELET # BLD AUTO: 163 10E3/UL (ref 150–450)
POTASSIUM SERPL-SCNC: 3.9 MMOL/L (ref 3.4–5.3)
RBC # BLD AUTO: 4.21 10E6/UL (ref 3.8–5.2)
SODIUM SERPL-SCNC: 138 MMOL/L (ref 136–145)
WBC # BLD AUTO: 6 10E3/UL (ref 4–11)

## 2023-03-05 PROCEDURE — 258N000003 HC RX IP 258 OP 636: Performed by: ANESTHESIOLOGY

## 2023-03-05 PROCEDURE — 85014 HEMATOCRIT: CPT | Performed by: FAMILY MEDICINE

## 2023-03-05 PROCEDURE — C1776 JOINT DEVICE (IMPLANTABLE): HCPCS | Performed by: ORTHOPAEDIC SURGERY

## 2023-03-05 PROCEDURE — 0SRR019 REPLACEMENT OF RIGHT HIP JOINT, FEMORAL SURFACE WITH METAL SYNTHETIC SUBSTITUTE, CEMENTED, OPEN APPROACH: ICD-10-PCS | Performed by: ORTHOPAEDIC SURGERY

## 2023-03-05 PROCEDURE — 250N000011 HC RX IP 250 OP 636: Performed by: NURSE ANESTHETIST, CERTIFIED REGISTERED

## 2023-03-05 PROCEDURE — 258N000001 HC RX 258: Performed by: ORTHOPAEDIC SURGERY

## 2023-03-05 PROCEDURE — 360N000077 HC SURGERY LEVEL 4, PER MIN: Performed by: ORTHOPAEDIC SURGERY

## 2023-03-05 PROCEDURE — 250N000011 HC RX IP 250 OP 636: Performed by: ORTHOPAEDIC SURGERY

## 2023-03-05 PROCEDURE — 250N000011 HC RX IP 250 OP 636: Performed by: ANESTHESIOLOGY

## 2023-03-05 PROCEDURE — 999N000141 HC STATISTIC PRE-PROCEDURE NURSING ASSESSMENT: Performed by: ORTHOPAEDIC SURGERY

## 2023-03-05 PROCEDURE — 36415 COLL VENOUS BLD VENIPUNCTURE: CPT | Performed by: FAMILY MEDICINE

## 2023-03-05 PROCEDURE — 120N000001 HC R&B MED SURG/OB

## 2023-03-05 PROCEDURE — 250N000025 HC SEVOFLURANE, PER MIN: Performed by: ORTHOPAEDIC SURGERY

## 2023-03-05 PROCEDURE — 258N000003 HC RX IP 258 OP 636: Performed by: PHYSICIAN ASSISTANT

## 2023-03-05 PROCEDURE — C1713 ANCHOR/SCREW BN/BN,TIS/BN: HCPCS | Performed by: ORTHOPAEDIC SURGERY

## 2023-03-05 PROCEDURE — 710N000009 HC RECOVERY PHASE 1, LEVEL 1, PER MIN: Performed by: ORTHOPAEDIC SURGERY

## 2023-03-05 PROCEDURE — 99232 SBSQ HOSP IP/OBS MODERATE 35: CPT | Performed by: HOSPITALIST

## 2023-03-05 PROCEDURE — 250N000013 HC RX MED GY IP 250 OP 250 PS 637: Performed by: ORTHOPAEDIC SURGERY

## 2023-03-05 PROCEDURE — 258N000003 HC RX IP 258 OP 636: Performed by: NURSE ANESTHETIST, CERTIFIED REGISTERED

## 2023-03-05 PROCEDURE — 250N000011 HC RX IP 250 OP 636: Performed by: PHYSICIAN ASSISTANT

## 2023-03-05 PROCEDURE — 272N000001 HC OR GENERAL SUPPLY STERILE: Performed by: ORTHOPAEDIC SURGERY

## 2023-03-05 PROCEDURE — 999N000065 XR PELVIS AND HIP PORTABLE RIGHT 1 VIEW

## 2023-03-05 PROCEDURE — 250N000013 HC RX MED GY IP 250 OP 250 PS 637: Performed by: PHYSICIAN ASSISTANT

## 2023-03-05 PROCEDURE — 250N000009 HC RX 250: Performed by: ORTHOPAEDIC SURGERY

## 2023-03-05 PROCEDURE — 250N000009 HC RX 250: Performed by: NURSE ANESTHETIST, CERTIFIED REGISTERED

## 2023-03-05 PROCEDURE — 80048 BASIC METABOLIC PNL TOTAL CA: CPT | Performed by: FAMILY MEDICINE

## 2023-03-05 PROCEDURE — 370N000017 HC ANESTHESIA TECHNICAL FEE, PER MIN: Performed by: ORTHOPAEDIC SURGERY

## 2023-03-05 PROCEDURE — 250N000013 HC RX MED GY IP 250 OP 250 PS 637: Performed by: FAMILY MEDICINE

## 2023-03-05 DEVICE — ARTICUL/EZE FEMORAL HEAD DIAMETER 28MM +1.5 12/14 TAPER
Type: IMPLANTABLE DEVICE | Site: HIP | Status: FUNCTIONAL
Brand: ARTICUL/EZE

## 2023-03-05 DEVICE — SUMMIT FEMORAL STEM 12/14 TAPER BASIC CEMENTED SIZE 5 121MM
Type: IMPLANTABLE DEVICE | Site: HIP | Status: FUNCTIONAL
Brand: SUMMIT

## 2023-03-05 DEVICE — CEMENTRALIZER STEM CENTRALIZER 10.0MM CEMENTED
Type: IMPLANTABLE DEVICE | Site: HIP | Status: FUNCTIONAL
Brand: CEMENTRALIZER

## 2023-03-05 DEVICE — BONE CEMENT SIMPLEX W/TOBRAMYCIN 6197-9-001: Type: IMPLANTABLE DEVICE | Site: HIP | Status: FUNCTIONAL

## 2023-03-05 DEVICE — SELF CENTERING BI-POLAR HEAD 28MM ID 45MM OD
Type: IMPLANTABLE DEVICE | Site: HIP | Status: FUNCTIONAL
Brand: SELF CENTERING

## 2023-03-05 RX ORDER — HYDROMORPHONE HYDROCHLORIDE 1 MG/ML
0.4 INJECTION, SOLUTION INTRAMUSCULAR; INTRAVENOUS; SUBCUTANEOUS EVERY 5 MIN PRN
Status: DISCONTINUED | OUTPATIENT
Start: 2023-03-05 | End: 2023-03-05 | Stop reason: HOSPADM

## 2023-03-05 RX ORDER — CALCIUM CARBONATE 500 MG/1
500 TABLET, CHEWABLE ORAL 4 TIMES DAILY PRN
Status: DISCONTINUED | OUTPATIENT
Start: 2023-03-05 | End: 2023-03-08 | Stop reason: HOSPADM

## 2023-03-05 RX ORDER — OXYCODONE HYDROCHLORIDE 5 MG/1
5 TABLET ORAL EVERY 4 HOURS PRN
Status: DISCONTINUED | OUTPATIENT
Start: 2023-03-05 | End: 2023-03-05

## 2023-03-05 RX ORDER — SODIUM CHLORIDE, SODIUM LACTATE, POTASSIUM CHLORIDE, CALCIUM CHLORIDE 600; 310; 30; 20 MG/100ML; MG/100ML; MG/100ML; MG/100ML
INJECTION, SOLUTION INTRAVENOUS CONTINUOUS
Status: DISCONTINUED | OUTPATIENT
Start: 2023-03-05 | End: 2023-03-06

## 2023-03-05 RX ORDER — LIDOCAINE 40 MG/G
CREAM TOPICAL
Status: DISCONTINUED | OUTPATIENT
Start: 2023-03-05 | End: 2023-03-05 | Stop reason: HOSPADM

## 2023-03-05 RX ORDER — AMOXICILLIN 250 MG
1 CAPSULE ORAL 2 TIMES DAILY
Status: DISCONTINUED | OUTPATIENT
Start: 2023-03-05 | End: 2023-03-08 | Stop reason: HOSPADM

## 2023-03-05 RX ORDER — ONDANSETRON 4 MG/1
4 TABLET, ORALLY DISINTEGRATING ORAL EVERY 30 MIN PRN
Status: DISCONTINUED | OUTPATIENT
Start: 2023-03-05 | End: 2023-03-05 | Stop reason: HOSPADM

## 2023-03-05 RX ORDER — ONDANSETRON 2 MG/ML
INJECTION INTRAMUSCULAR; INTRAVENOUS PRN
Status: DISCONTINUED | OUTPATIENT
Start: 2023-03-05 | End: 2023-03-05

## 2023-03-05 RX ORDER — LIDOCAINE HYDROCHLORIDE 20 MG/ML
INJECTION, SOLUTION INFILTRATION; PERINEURAL PRN
Status: DISCONTINUED | OUTPATIENT
Start: 2023-03-05 | End: 2023-03-05

## 2023-03-05 RX ORDER — ONDANSETRON 2 MG/ML
4 INJECTION INTRAMUSCULAR; INTRAVENOUS EVERY 6 HOURS PRN
Status: DISCONTINUED | OUTPATIENT
Start: 2023-03-05 | End: 2023-03-08 | Stop reason: HOSPADM

## 2023-03-05 RX ORDER — SODIUM CHLORIDE, SODIUM LACTATE, POTASSIUM CHLORIDE, CALCIUM CHLORIDE 600; 310; 30; 20 MG/100ML; MG/100ML; MG/100ML; MG/100ML
INJECTION, SOLUTION INTRAVENOUS CONTINUOUS
Status: DISCONTINUED | OUTPATIENT
Start: 2023-03-05 | End: 2023-03-05 | Stop reason: HOSPADM

## 2023-03-05 RX ORDER — PROCHLORPERAZINE MALEATE 5 MG
5 TABLET ORAL EVERY 6 HOURS PRN
Status: DISCONTINUED | OUTPATIENT
Start: 2023-03-05 | End: 2023-03-08 | Stop reason: HOSPADM

## 2023-03-05 RX ORDER — ONDANSETRON 2 MG/ML
4 INJECTION INTRAMUSCULAR; INTRAVENOUS EVERY 30 MIN PRN
Status: DISCONTINUED | OUTPATIENT
Start: 2023-03-05 | End: 2023-03-05 | Stop reason: HOSPADM

## 2023-03-05 RX ORDER — ONDANSETRON 4 MG/1
4 TABLET, ORALLY DISINTEGRATING ORAL EVERY 6 HOURS PRN
Status: DISCONTINUED | OUTPATIENT
Start: 2023-03-05 | End: 2023-03-08 | Stop reason: HOSPADM

## 2023-03-05 RX ORDER — HYDROMORPHONE HYDROCHLORIDE 1 MG/ML
0.2 INJECTION, SOLUTION INTRAMUSCULAR; INTRAVENOUS; SUBCUTANEOUS EVERY 5 MIN PRN
Status: DISCONTINUED | OUTPATIENT
Start: 2023-03-05 | End: 2023-03-05 | Stop reason: HOSPADM

## 2023-03-05 RX ORDER — SODIUM CHLORIDE, SODIUM LACTATE, POTASSIUM CHLORIDE, CALCIUM CHLORIDE 600; 310; 30; 20 MG/100ML; MG/100ML; MG/100ML; MG/100ML
INJECTION, SOLUTION INTRAVENOUS CONTINUOUS PRN
Status: DISCONTINUED | OUTPATIENT
Start: 2023-03-05 | End: 2023-03-05

## 2023-03-05 RX ORDER — CEFAZOLIN SODIUM 1 G/3ML
1 INJECTION, POWDER, FOR SOLUTION INTRAMUSCULAR; INTRAVENOUS EVERY 8 HOURS
Status: COMPLETED | OUTPATIENT
Start: 2023-03-05 | End: 2023-03-06

## 2023-03-05 RX ORDER — FENTANYL CITRATE 50 UG/ML
50 INJECTION, SOLUTION INTRAMUSCULAR; INTRAVENOUS EVERY 5 MIN PRN
Status: DISCONTINUED | OUTPATIENT
Start: 2023-03-05 | End: 2023-03-05 | Stop reason: HOSPADM

## 2023-03-05 RX ORDER — FENTANYL CITRATE 50 UG/ML
INJECTION, SOLUTION INTRAMUSCULAR; INTRAVENOUS PRN
Status: DISCONTINUED | OUTPATIENT
Start: 2023-03-05 | End: 2023-03-05

## 2023-03-05 RX ORDER — BISACODYL 10 MG
10 SUPPOSITORY, RECTAL RECTAL DAILY PRN
Status: DISCONTINUED | OUTPATIENT
Start: 2023-03-05 | End: 2023-03-08 | Stop reason: HOSPADM

## 2023-03-05 RX ORDER — POLYETHYLENE GLYCOL 3350 17 G/17G
17 POWDER, FOR SOLUTION ORAL DAILY
Status: DISCONTINUED | OUTPATIENT
Start: 2023-03-06 | End: 2023-03-08 | Stop reason: HOSPADM

## 2023-03-05 RX ORDER — EPHEDRINE SULFATE 50 MG/ML
INJECTION, SOLUTION INTRAMUSCULAR; INTRAVENOUS; SUBCUTANEOUS PRN
Status: DISCONTINUED | OUTPATIENT
Start: 2023-03-05 | End: 2023-03-05

## 2023-03-05 RX ORDER — HYDROMORPHONE HYDROCHLORIDE 1 MG/ML
0.1 INJECTION, SOLUTION INTRAMUSCULAR; INTRAVENOUS; SUBCUTANEOUS
Status: DISCONTINUED | OUTPATIENT
Start: 2023-03-05 | End: 2023-03-05

## 2023-03-05 RX ORDER — HYDROMORPHONE HYDROCHLORIDE 1 MG/ML
0.2 INJECTION, SOLUTION INTRAMUSCULAR; INTRAVENOUS; SUBCUTANEOUS
Status: DISCONTINUED | OUTPATIENT
Start: 2023-03-05 | End: 2023-03-05

## 2023-03-05 RX ORDER — LIDOCAINE 40 MG/G
CREAM TOPICAL
Status: DISCONTINUED | OUTPATIENT
Start: 2023-03-05 | End: 2023-03-08 | Stop reason: HOSPADM

## 2023-03-05 RX ORDER — FENTANYL CITRATE 50 UG/ML
100 INJECTION, SOLUTION INTRAMUSCULAR; INTRAVENOUS
Status: DISCONTINUED | OUTPATIENT
Start: 2023-03-05 | End: 2023-03-05 | Stop reason: HOSPADM

## 2023-03-05 RX ORDER — PROPOFOL 10 MG/ML
INJECTION, EMULSION INTRAVENOUS PRN
Status: DISCONTINUED | OUTPATIENT
Start: 2023-03-05 | End: 2023-03-05

## 2023-03-05 RX ORDER — KETAMINE HYDROCHLORIDE 10 MG/ML
INJECTION INTRAMUSCULAR; INTRAVENOUS PRN
Status: DISCONTINUED | OUTPATIENT
Start: 2023-03-05 | End: 2023-03-05

## 2023-03-05 RX ORDER — ACETAMINOPHEN 325 MG/1
650 TABLET ORAL EVERY 4 HOURS PRN
Status: DISCONTINUED | OUTPATIENT
Start: 2023-03-08 | End: 2023-03-08 | Stop reason: HOSPADM

## 2023-03-05 RX ORDER — BUPIVACAINE HYDROCHLORIDE AND EPINEPHRINE 2.5; 5 MG/ML; UG/ML
INJECTION, SOLUTION EPIDURAL; INFILTRATION; INTRACAUDAL; PERINEURAL PRN
Status: DISCONTINUED | OUTPATIENT
Start: 2023-03-05 | End: 2023-03-05 | Stop reason: HOSPADM

## 2023-03-05 RX ORDER — ACETAMINOPHEN 325 MG/1
650 TABLET ORAL EVERY 4 HOURS PRN
Qty: 100 TABLET | Refills: 0 | Status: SHIPPED | OUTPATIENT
Start: 2023-03-05 | End: 2023-12-30

## 2023-03-05 RX ORDER — FENTANYL CITRATE 50 UG/ML
25 INJECTION, SOLUTION INTRAMUSCULAR; INTRAVENOUS EVERY 5 MIN PRN
Status: DISCONTINUED | OUTPATIENT
Start: 2023-03-05 | End: 2023-03-05 | Stop reason: HOSPADM

## 2023-03-05 RX ORDER — CEFAZOLIN SODIUM/WATER 2 G/20 ML
2 SYRINGE (ML) INTRAVENOUS
Status: COMPLETED | OUTPATIENT
Start: 2023-03-05 | End: 2023-03-05

## 2023-03-05 RX ORDER — CEFAZOLIN SODIUM/WATER 2 G/20 ML
2 SYRINGE (ML) INTRAVENOUS SEE ADMIN INSTRUCTIONS
Status: DISCONTINUED | OUTPATIENT
Start: 2023-03-05 | End: 2023-03-05 | Stop reason: HOSPADM

## 2023-03-05 RX ORDER — HYDRALAZINE HYDROCHLORIDE 20 MG/ML
5 INJECTION INTRAMUSCULAR; INTRAVENOUS EVERY 6 HOURS PRN
Status: DISCONTINUED | OUTPATIENT
Start: 2023-03-05 | End: 2023-03-08 | Stop reason: HOSPADM

## 2023-03-05 RX ORDER — OXYCODONE HYDROCHLORIDE 5 MG/1
2.5-5 TABLET ORAL EVERY 4 HOURS PRN
Qty: 26 TABLET | Refills: 0 | Status: SHIPPED | OUTPATIENT
Start: 2023-03-05 | End: 2023-07-20

## 2023-03-05 RX ORDER — TRANEXAMIC ACID 650 MG/1
1950 TABLET ORAL ONCE
Status: COMPLETED | OUTPATIENT
Start: 2023-03-05 | End: 2023-03-05

## 2023-03-05 RX ORDER — HYDROXYZINE HYDROCHLORIDE 10 MG/1
10 TABLET, FILM COATED ORAL EVERY 6 HOURS PRN
Status: DISCONTINUED | OUTPATIENT
Start: 2023-03-05 | End: 2023-03-08 | Stop reason: HOSPADM

## 2023-03-05 RX ORDER — AMOXICILLIN 250 MG
1-2 CAPSULE ORAL 2 TIMES DAILY PRN
Qty: 30 TABLET | Refills: 0 | Status: SHIPPED | OUTPATIENT
Start: 2023-03-05 | End: 2023-07-19

## 2023-03-05 RX ORDER — ACETAMINOPHEN 325 MG/1
975 TABLET ORAL EVERY 8 HOURS
Status: COMPLETED | OUTPATIENT
Start: 2023-03-05 | End: 2023-03-08

## 2023-03-05 RX ORDER — BUPIVACAINE HYDROCHLORIDE 2.5 MG/ML
INJECTION, SOLUTION EPIDURAL; INFILTRATION; INTRACAUDAL
Status: COMPLETED | OUTPATIENT
Start: 2023-03-05 | End: 2023-03-05

## 2023-03-05 RX ADMIN — ROCURONIUM BROMIDE 40 MG: 50 INJECTION, SOLUTION INTRAVENOUS at 13:58

## 2023-03-05 RX ADMIN — SUGAMMADEX 200 MG: 100 INJECTION, SOLUTION INTRAVENOUS at 15:29

## 2023-03-05 RX ADMIN — SODIUM CHLORIDE, POTASSIUM CHLORIDE, SODIUM LACTATE AND CALCIUM CHLORIDE: 600; 310; 30; 20 INJECTION, SOLUTION INTRAVENOUS at 18:00

## 2023-03-05 RX ADMIN — LIDOCAINE HYDROCHLORIDE 3 ML: 20 INJECTION, SOLUTION INFILTRATION; PERINEURAL at 13:58

## 2023-03-05 RX ADMIN — FENTANYL CITRATE 25 MCG: 50 INJECTION, SOLUTION INTRAMUSCULAR; INTRAVENOUS at 16:29

## 2023-03-05 RX ADMIN — ACETAMINOPHEN 975 MG: 325 TABLET ORAL at 08:03

## 2023-03-05 RX ADMIN — KETAMINE HYDROCHLORIDE 30 MG: 10 INJECTION, SOLUTION INTRAMUSCULAR; INTRAVENOUS at 13:58

## 2023-03-05 RX ADMIN — Medication 2 G: at 13:53

## 2023-03-05 RX ADMIN — ZOLPIDEM TARTRATE 5 MG: 5 TABLET ORAL at 22:31

## 2023-03-05 RX ADMIN — POTASSIUM CHLORIDE 20 MEQ: 1500 TABLET, EXTENDED RELEASE ORAL at 08:01

## 2023-03-05 RX ADMIN — SENNOSIDES AND DOCUSATE SODIUM 1 TABLET: 50; 8.6 TABLET ORAL at 19:35

## 2023-03-05 RX ADMIN — ACETAMINOPHEN 975 MG: 325 TABLET ORAL at 19:35

## 2023-03-05 RX ADMIN — DULOXETINE HYDROCHLORIDE 60 MG: 60 CAPSULE, DELAYED RELEASE PELLETS ORAL at 08:00

## 2023-03-05 RX ADMIN — SODIUM CHLORIDE, POTASSIUM CHLORIDE, SODIUM LACTATE AND CALCIUM CHLORIDE: 600; 310; 30; 20 INJECTION, SOLUTION INTRAVENOUS at 16:52

## 2023-03-05 RX ADMIN — FENTANYL CITRATE 25 MCG: 50 INJECTION, SOLUTION INTRAMUSCULAR; INTRAVENOUS at 16:38

## 2023-03-05 RX ADMIN — OXYCODONE HYDROCHLORIDE 5 MG: 5 TABLET ORAL at 02:32

## 2023-03-05 RX ADMIN — FENTANYL CITRATE 25 MCG: 50 INJECTION, SOLUTION INTRAMUSCULAR; INTRAVENOUS at 16:21

## 2023-03-05 RX ADMIN — CEFAZOLIN 1 G: 1 INJECTION, POWDER, FOR SOLUTION INTRAMUSCULAR; INTRAVENOUS at 22:08

## 2023-03-05 RX ADMIN — SODIUM CHLORIDE, POTASSIUM CHLORIDE, SODIUM LACTATE AND CALCIUM CHLORIDE: 600; 310; 30; 20 INJECTION, SOLUTION INTRAVENOUS at 13:53

## 2023-03-05 RX ADMIN — FENTANYL CITRATE 25 MCG: 50 INJECTION, SOLUTION INTRAMUSCULAR; INTRAVENOUS at 16:13

## 2023-03-05 RX ADMIN — FENTANYL CITRATE 50 MCG: 50 INJECTION, SOLUTION INTRAMUSCULAR; INTRAVENOUS at 14:34

## 2023-03-05 RX ADMIN — FUROSEMIDE 20 MG: 20 TABLET ORAL at 08:01

## 2023-03-05 RX ADMIN — PROPOFOL 50 MG: 10 INJECTION, EMULSION INTRAVENOUS at 13:58

## 2023-03-05 RX ADMIN — PHENYLEPHRINE HYDROCHLORIDE 200 MCG: 10 INJECTION INTRAVENOUS at 13:58

## 2023-03-05 RX ADMIN — OXYCODONE HYDROCHLORIDE 10 MG: 5 TABLET ORAL at 17:57

## 2023-03-05 RX ADMIN — ONDANSETRON 4 MG: 2 INJECTION INTRAMUSCULAR; INTRAVENOUS at 14:29

## 2023-03-05 RX ADMIN — AMIODARONE HYDROCHLORIDE 100 MG: 100 TABLET ORAL at 08:00

## 2023-03-05 RX ADMIN — Medication 25 MG: at 13:58

## 2023-03-05 RX ADMIN — LISINOPRIL 2.5 MG: 2.5 TABLET ORAL at 08:00

## 2023-03-05 RX ADMIN — FENTANYL CITRATE 50 MCG: 50 INJECTION, SOLUTION INTRAMUSCULAR; INTRAVENOUS at 13:58

## 2023-03-05 RX ADMIN — PHENYLEPHRINE HYDROCHLORIDE 100 MCG: 10 INJECTION INTRAVENOUS at 14:55

## 2023-03-05 RX ADMIN — FENTANYL CITRATE 50 MCG: 50 INJECTION, SOLUTION INTRAMUSCULAR; INTRAVENOUS at 12:12

## 2023-03-05 RX ADMIN — BUPIVACAINE HYDROCHLORIDE 30 ML: 2.5 INJECTION, SOLUTION EPIDURAL; INFILTRATION; INTRACAUDAL at 12:09

## 2023-03-05 RX ADMIN — OXYCODONE HYDROCHLORIDE 10 MG: 5 TABLET ORAL at 08:56

## 2023-03-05 RX ADMIN — TRANEXAMIC ACID 1950 MG: 650 TABLET ORAL at 11:54

## 2023-03-05 RX ADMIN — OXYCODONE HYDROCHLORIDE 10 MG: 5 TABLET ORAL at 22:31

## 2023-03-05 ASSESSMENT — ENCOUNTER SYMPTOMS
SEIZURES: 0
DYSRHYTHMIAS: 1

## 2023-03-05 ASSESSMENT — ACTIVITIES OF DAILY LIVING (ADL)
ADLS_ACUITY_SCORE: 32
ADLS_ACUITY_SCORE: 39
ADLS_ACUITY_SCORE: 39
ADLS_ACUITY_SCORE: 32
ADLS_ACUITY_SCORE: 39
ADLS_ACUITY_SCORE: 39
ADLS_ACUITY_SCORE: 32
ADLS_ACUITY_SCORE: 39
ADLS_ACUITY_SCORE: 43
ADLS_ACUITY_SCORE: 32

## 2023-03-05 NOTE — H&P
"[facility]    History and Physical - Hospitalist Service       Date of Admission:  3/4/2023    Assessment & Plan      Gladys Ramirez is a 89 year old female admitted on 3/4/2023.  With right subcapital hip fracture    Right femoral neck fracture  --- Secondary to fall  --- Patient is a moderate to high risk to undergo a moderate risk procedure  --- Hold apixaban  --- N.p.o. after midnight  --- Volume status closely secondary to cardiomyopathy with reduced EF  --- Orthopedic surgery consulted    Closed head injury  --- Initial CT negative  --- Repeat head CT in 6 hours    Atrial fibrillation  --- Continue amiodarone  --- Holding apixaban    Nonischemic cardiomyopathy  --- Follows with Dr. Jolie camejo  ----Recently saw EP and planning to schedule ICD placement  --- EF 25 to 30%  --- Monitor volume status closely after surgery  --- Telemetry bed requested  --- Continue home lisinopril, lasix  ---monitor I/O and weights CLOSELY after surgery    Osteoarthritis with chronic right knee pain  --- Monitor for sedation given oxycodone usage  ----Continue Cymbalta, diclofenac    Previous PE swathi  ---holdin apixaban per above     Diet: NPO per Anesthesia Guidelines for Procedure/Surgery Except for: Meds  Regular Diet Adult    DVT Prophylaxis: Pneumatic Compression Devices  Reyes Catheter: Not present  Lines: None     Cardiac Monitoring: None  Code Status:  Discussed CODE STATUS with her at length.  She wishes to remain full code    Clinically Significant Risk Factors Present on Admission               # Drug Induced Coagulation Defect: home medication list includes an anticoagulant medication    # Hypertension: home medication list includes antihypertensive(s)  # Chronic systolic heart failure: echo within the past year with EF <40%      # Overweight: Estimated body mass index is 27.54 kg/m  as calculated from the following:    Height as of this encounter: 1.588 m (5' 2.5\").    Weight as of this " encounter: 69.4 kg (153 lb).           Disposition Plan      Expected Discharge Date: 03/06/2023                  Sunshine Arnett MD  Hospitalist Service  [facility]  Securely message with [email or web address] (more info)  Text page via [employer] Paging/Directory     ______________________________________________________________________    Chief Complaint   Right leg pain    History is obtained from the patient    History of Present Illness   Gladys Ramirez is a 89 year old female with a history of nonischemic cardiomyopathy awaiting ICD placement as well as chronic right knee pain who came into the emergency room department after falling due to her right knee buckling.  She tells me that she has a history of significant arthritis and changes involving her right knee.  She usually ambulates fine although does use a cane walker but fell due to her right knee buckling with immediate pain proximal to her right knee.  She also hit her head.  She takes blood thinners for atrial fibrillation and previous history of PE.  She came to the emergency room department where she was found to have a femoral neck fracture and she is being admitted    Denies difficulty with anesthesia.  Denies recent chest pain or shortness of breath.  Although mobility mildly limited by arthritis she does get about 2700 steps in a day.  Ambulates with assistance of cane and walker per above.  No recent hospitalizations for CHF.  Has known nonischemic cardiomyopathy and follows with cardiology.      Past Medical History    Past Medical History:   Diagnosis Date     Angina pectoris (H)      Chest pain 03/09/2017     Cough      Hyperlipidemia      Hypertension      Osteoarthritis        Past Surgical History   Past Surgical History:   Procedure Laterality Date     APPENDECTOMY       BASAL CELL CARCINOMA EXCISION      nose     LAPAROSCOPY DIAGNOSTIC (GENERAL) N/A 11/04/2014    Procedure: LAPAROSCOPY BILATERAL SALPINGO-OOPHORECTOMY ;  Surgeon:  Sofia Harper MD;  Location: [facility];  Service:      TONSILLECTOMY      10 years old     ZZC TOTAL KNEE ARTHROPLASTY Left     2011       Prior to Admission Medications   Prior to Admission Medications   Prescriptions Last Dose Informant Patient Reported? Taking?   DULoxetine (CYMBALTA) 60 MG capsule   No No   Sig: Take 1 capsule (60 mg) by mouth daily   KLOR-CON 20 MEQ CR tablet   No No   Sig: Take 1 tablet (20 mEq) by mouth daily.   acetaminophen (TYLENOL) 325 MG tablet   Yes No   Sig: [ACETAMINOPHEN (TYLENOL) 325 MG TABLET] Take 650 mg by mouth every 6 (six) hours as needed for pain.   amiodarone (PACERONE) 200 MG tablet   No No   Sig: Take 0.5 tablets (100 mg) by mouth daily   apixaban ANTICOAGULANT (ELIQUIS) 5 MG tablet   No No   Sig: Take 1 tablet (5 mg) by mouth 2 times daily   Patient taking differently: Take 5 mg by mouth 2 times daily Takes 2.5 (cuts pill in half)   cholecalciferol, vitamin D3, (VITAMIN D3) 2,000 unit Tab   No No   Sig: [CHOLECALCIFEROL, VITAMIN D3, (VITAMIN D3) 2,000 UNIT TAB] Take 1 tablet (2,000 Units total) by mouth daily.   diclofenac (FLECTOR) 1.3 % patch   No No   Sig: Externally apply 1 patch topically 2 times daily   furosemide (LASIX) 20 MG tablet   No No   Sig: Take 1 tablet (20 mg) by mouth daily.   lisinopril (ZESTRIL) 2.5 MG tablet   No No   Sig: Take 1 tablet (2.5 mg) by mouth daily   naloxone (NARCAN) 4 MG/0.1ML nasal spray   No No   Sig: Spray 1 spray (4 mg) into one nostril alternating nostrils as needed for opioid reversal every 2-3 minutes until assistance arrives   oxyCODONE (ROXICODONE) 5 MG tablet   No No   Sig: Take 1-2 tablets (5-10 mg) by mouth 3 times daily as needed for moderate to severe pain (MAX 5 tabs per day) Ok to fill 2/19/23 to start 2/20/23   zolpidem ER (AMBIEN CR) 6.25 MG CR tablet   No No   Sig: Take 1 and 1/4 tablet by mouth at bedtime      Facility-Administered Medications Last Administration Doses Remaining   ropivacaine  (NAROPIN) injection 3 mL 2/10/2023  2:44 PM    triamcinolone (KENALOG-40) injection 40 mg 2/10/2023  2:44 PM            Review of Systems    The 5 point Review of Systems is negative other than noted in the HPI     Social History   I have reviewed this patient's social history and updated it with pertinent information if needed.  Social History     Tobacco Use     Smoking status: Never     Smokeless tobacco: Never     Tobacco comments:     no passive exposure   Substance Use Topics     Alcohol use: Yes     Comment: Alcoholic Drinks/day: Rarely a glass of wine     Drug use: No    Is a retired nun.  She lives by herself in an apartment    Physical Exam   Vital Signs: Temp: 97.7  F (36.5  C) Temp src: Axillary BP: (!) 157/74 Pulse: 87   Resp: 19 SpO2: 93 % O2 Device: None (Room air)    Weight: 153 lbs 0 oz    General Appearance: Pleasant female no apparent distress  Respiratory: Clear to auscultation bilaterally  Cardiovascular: Regular rate and rhythm but significant murmurs rubs or gallops  GI: Obese soft and nontender without hepatosplenomegaly  Skin: She has 1+ pitting edema on the right compared to the left  Other: Neurologically she is grossly tact without focal deficits appreciated    Medical Decision Making             Data      Admit EKG reviewed.  Shows patient to be in normal sinus rhythm with left bundle branch block which is unchanged.  QTc 521.    Echocardiogram 11/28/2022 reviewed showing LVH and EF 25 to 30% with severe global hypokinesis of the left ventricle.        I have personally reviewed the following data over the past 24 hrs:    10.0  \   12.5   / 225     138 101 15.5 /  103 (H)   4.2 28 1.01 (H) \       ALT: 16 AST: N/A AP: 151 (H) TBILI: 0.4   ALB: 3.5 TOT PROTEIN: 6.2 (L) LIPASE: N/A       INR:  1.33 (H) PTT:  N/A   D-dimer:  N/A Fibrinogen:  N/A       Imaging results reviewed over the past 24 hrs:   Recent Results (from the past 24 hour(s))   CT Head w/o Contrast    Narrative    EXAM: CT  HEAD W/O CONTRAST  LOCATION: [facility]  DATE/TIME: 3/4/2023 3:18 PM    INDICATION: Headache after trauma on blood thinners  COMPARISON: MRI brain dated 04/10/2015  TECHNIQUE: Routine CT Head without IV contrast. Multiplanar reformats. Dose reduction techniques were used.    FINDINGS:   INTRACRANIAL CONTENTS: No acute intracranial hemorrhage. No CT evidence of acute infarct. Sequelae of mild to moderate chronic microangiopathy. Mild cerebral volume loss without hydrocephalus. No extra-axial fluid collections.  Patent basal cisterns.     VISUALIZED ORBITS/SINUSES/MASTOIDS: Postoperative change of bilateral lenses, otherwise the orbits are unremarkable. The visualized paranasal sinuses and temporal bone structures are well-aerated. Pneumatized anterior clinoid processes. Bowing/spurring   of the nasal septum.    BONES/SOFT TISSUES: The calvarium and skull base are unremarkable.       Impression    IMPRESSION:     1. Senescent changes and sequelae of chronic microangiopathy without acute intracranial abnormality.   XR Femur Right 2 Views    Narrative    EXAM: XR PELVIS 1/2 VIEWS, XR FEMUR RIGHT 2 VIEWS  LOCATION: [facility]  DATE/TIME: 3/4/2023 3:42 PM    INDICATION: fall, right hip pain  COMPARISON: None.      Impression    IMPRESSION:     Subtle cortical irregularity at the lateral aspect of the femoral head neck junction, which could reflect a nondisplaced femoral neck fracture. MRI is recommended for further evaluation given osteopenia. Mild to moderate degenerative changes of the hips.   Additional degenerative changes of the lower lumbar spine and sacroiliac joints.      NOTE: ABNORMAL REPORT    THE DICTATION ABOVE DESCRIBES AN ABNORMALITY FOR WHICH FOLLOW-UP IS NEEDED.    XR Pelvis 1/2 Views    Narrative    EXAM: XR PELVIS 1/2 VIEWS, XR FEMUR RIGHT 2 VIEWS  LOCATION: [facility]  DATE/TIME: 3/4/2023 3:42 PM    INDICATION: fall, right hip  pain  COMPARISON: None.      Impression    IMPRESSION:     Subtle cortical irregularity at the lateral aspect of the femoral head neck junction, which could reflect a nondisplaced femoral neck fracture. MRI is recommended for further evaluation given osteopenia. Mild to moderate degenerative changes of the hips.   Additional degenerative changes of the lower lumbar spine and sacroiliac joints.      NOTE: ABNORMAL REPORT    THE DICTATION ABOVE DESCRIBES AN ABNORMALITY FOR WHICH FOLLOW-UP IS NEEDED.    XR Knee Right 3 Views    Narrative    EXAM: XR KNEE RIGHT 3 VIEWS  LOCATION: [facility]  DATE/TIME: 3/4/2023 3:43 PM    INDICATION: fall, right knee pain  COMPARISON: None.      Impression    IMPRESSION: Moderate tricompartmental osteoarthrosis. Intra-articular loose bodies. No definite acute fracture within the limitations of osteopenia. Chondrocalcinosis.   MR Hip Right w/o Contrast    Narrative    EXAM: MR HIP RIGHT W/O CONTRAST  LOCATION: [facility]  DATE/TIME: 3/4/2023 6:05 PM    INDICATION: Fall, right hip pain, question fracture.  COMPARISON: Same-day radiograph.  TECHNIQUE: Unenhanced.    FINDINGS:  BONES:     -There is acute nondisplaced right femoral neck fracture centered in the subcapital region with slight impaction.   -There is no additional fracture about the right hip.    RIGHT HIP:   -Labrum: Labral degeneration where there is probably a superimposed degenerative anterosuperior labral tear.   -Cartilage: There is some low-grade cartilage loss along the posterior aspect of the hip and some mild cartilage changes along the anterior superior acetabulum. No full-thickness cartilage defects.  -Joint space: Moderate joint effusion.     MUSCLES AND TENDONS:   -There is no acute tendon abnormality involving the gluteal, proximal hamstring or distal iliopsoas tendons.    SOFT TISSUES:   -There is some mild surrounding edema about the femoral neck fracture without  an intramuscular hematoma. Small amount of blood products in the lateral subcutaneous tissues of the right hip.    INTRA-PELVIC CONTENTS:  -Extensive colonic diverticulosis sigmoid colon.      Impression    IMPRESSION:  1.  Acute nondisplaced right femoral neck fracture centered in the subcapital region.  2.  Moderate right hip joint effusion.  3.  Mild degenerative changes of the right hip.

## 2023-03-05 NOTE — ANESTHESIA POSTPROCEDURE EVALUATION
All finished Dr. West.  Sorry for the delay on this! Patient: Gladys Ramirez    Procedure: Procedure(s):  HEMIARTHROPLASTY, HIP, BIPOLAR       Anesthesia Type:  General    Note:  Disposition: Inpatient   Postop Pain Control: Uneventful            Sign Out: Well controlled pain   PONV: No   Neuro/Psych: Uneventful            Sign Out: Acceptable/Baseline neuro status   Airway/Respiratory: Uneventful            Sign Out: Acceptable/Baseline resp. status   CV/Hemodynamics: Uneventful            Sign Out: Acceptable CV status; No obvious hypovolemia; No obvious fluid overload   Other NRE: NONE   DID A NON-ROUTINE EVENT OCCUR? No           Last vitals:  Vitals Value Taken Time   /72 03/05/23 1630   Temp 37.1  C (98.7  F) 03/05/23 1546   Pulse 61 03/05/23 1646   Resp 55 03/05/23 1644   SpO2 96 % 03/05/23 1646   Vitals shown include unvalidated device data.    Electronically Signed By: Umesh Dhaliwal MD  March 5, 2023  4:48 PM

## 2023-03-05 NOTE — ANESTHESIA CARE TRANSFER NOTE
Patient: Gladys Ramirez    Procedure: Procedure(s):  HEMIARTHROPLASTY, HIP, BIPOLAR       Diagnosis: Hip fracture, right, closed, initial encounter (H) [S72.001A]  Diagnosis Additional Information: No value filed.    Anesthesia Type:   General     Note:    Oropharynx: oropharynx clear of all foreign objects and spontaneously breathing  Level of Consciousness: drowsy  Oxygen Supplementation: face mask  Level of Supplemental Oxygen (L/min / FiO2): 6  Independent Airway: airway patency satisfactory and stable  Dentition: dentition unchanged  Vital Signs Stable: post-procedure vital signs reviewed and stable  Report to RN Given: handoff report given  Patient transferred to: PACU    Handoff Report: Identifed the Patient, Identified the Reponsible Provider, Reviewed the pertinent medical history, Discussed the surgical course, Reviewed Intra-OP anesthesia mangement and issues during anesthesia, Set expectations for post-procedure period and Allowed opportunity for questions and acknowledgement of understanding      Vitals:  Vitals Value Taken Time   /81 03/05/23 1546   Temp 37.1  C (98.7  F) 03/05/23 1546   Pulse 65 03/05/23 1551   Resp 22 03/05/23 1551   SpO2 100 % 03/05/23 1551   Vitals shown include unvalidated device data.    Electronically Signed By: SHASHA Reyna CRNA  March 5, 2023  3:53 PM

## 2023-03-05 NOTE — ANESTHESIA PREPROCEDURE EVALUATION
"Anesthesia Pre-Procedure Evaluation    Patient: Gladys Ramirez   MRN: [id number] : 1930        Procedure : Procedure(s):  HEMIARTHROPLASTY, HIP, BIPOLAR          Past Medical History:   Diagnosis Date     Angina pectoris (H)      Chest pain 2017     Cough      Hyperlipidemia      Hypertension      Osteoarthritis       Past Surgical History:   Procedure Laterality Date     APPENDECTOMY       BASAL CELL CARCINOMA EXCISION      nose     LAPAROSCOPY DIAGNOSTIC (GENERAL) N/A 2014    Procedure: LAPAROSCOPY BILATERAL SALPINGO-OOPHORECTOMY ;  Surgeon: Sofia Harper MD;  Location: [facility];  Service:      TONSILLECTOMY      10 years old     ZZC TOTAL KNEE ARTHROPLASTY Left           Allergies   Allergen Reactions     Trazodone Shortness Of Breath and Unknown     Allergic to trazodone and deriv., Other: trouble swallowing       Clindamycin Diarrhea     C-diff     Gabapentin Other (See Comments)     \"Internal tremors\"     Temazepam Other (See Comments)     Annotation: Nightmares       Buprenorphine Palpitations     Tried patch      Social History     Tobacco Use     Smoking status: Never     Smokeless tobacco: Never     Tobacco comments:     no passive exposure   Substance Use Topics     Alcohol use: Yes     Comment: Alcoholic Drinks/day: Rarely a glass of wine      Wt Readings from Last 1 Encounters:   23 69.4 kg (153 lb)        Anesthesia Evaluation            ROS/MED HX  ENT/Pulmonary:  - neg pulmonary ROS  (-) sleep apnea   Neurologic:  - neg neurologic ROS  (-) no seizures and no CVA   Cardiovascular: Comment: 2022 TTE  There is mild to moderate concentric left ventricular hypertrophy.  The visual ejection fraction is 25-30%.  There is severe global hypokinesia of the left ventricle.  Septal motion is consistent with conduction abnormality.  The right ventricle is normal in size and function.  No significant valve disease.  Compared to the prior study dated " 8/24/2022, there have been no changes.    (+) hypertension-----Taking blood thinners CHF dysrhythmias, a-fib,     METS/Exercise Tolerance:     Hematologic:  - neg hematologic  ROS   (+) anemia,     Musculoskeletal:  - neg musculoskeletal ROS     GI/Hepatic:     (+) GERD,     Renal/Genitourinary:     (+) renal disease, type: CRI,     Endo:     (+) thyroid problem, hypothyroidism,     Psychiatric/Substance Use:  - neg psychiatric ROS     Infectious Disease:  - neg infectious disease ROS     Malignancy:       Other:            Physical Exam    Airway  airway exam normal      Mallampati: II   TM distance: > 3 FB   Neck ROM: full   Mouth opening: > 3 cm    Respiratory Devices and Support         Dental     Comment: Lower partial    (+) Removable bridges or other hardware      Cardiovascular   cardiovascular exam normal       Rhythm and rate: irregular and normal     Pulmonary   pulmonary exam normal        breath sounds clear to auscultation           OUTSIDE LABS:  CBC:   Lab Results   Component Value Date    WBC 6.0 03/05/2023    WBC 10.0 03/04/2023    HGB 12.7 03/05/2023    HGB 12.5 03/04/2023    HCT 42.9 03/05/2023    HCT 37.8 03/04/2023     03/05/2023     03/04/2023     BMP:   Lab Results   Component Value Date     03/05/2023     03/04/2023    POTASSIUM 3.9 03/05/2023    POTASSIUM 4.2 03/04/2023    CHLORIDE 104 03/05/2023    CHLORIDE 101 03/04/2023    CO2 22 03/05/2023    CO2 28 03/04/2023    BUN 15.9 03/05/2023    BUN 15.5 03/04/2023    CR 0.98 (H) 03/05/2023    CR 1.01 (H) 03/04/2023    GLC 92 03/05/2023     (H) 03/04/2023     COAGS:   Lab Results   Component Value Date    PTT 32 02/21/2019    INR 1.33 (H) 03/04/2023     POC: No results found for: BGM, HCG, HCGS  HEPATIC:   Lab Results   Component Value Date    ALBUMIN 3.5 03/04/2023    PROTTOTAL 6.2 (L) 03/04/2023    ALT 16 03/04/2023    AST  03/04/2023      Comment:      Unsatisfactory specimen - hemolyzed    Specimen is  hemolyzed which can falsely elevate AST. Analysis of a non-hemolyzed specimen may result in a lower value.    ALKPHOS 151 (H) 03/04/2023    BILITOTAL 0.4 03/04/2023     OTHER:   Lab Results   Component Value Date    LACT 2.6 (H) 08/21/2018    A1C 6.0 (H) 06/15/2021    MARLENE 8.8 03/05/2023    PHOS 4.3 03/04/2019    MAG 2.2 09/07/2022    LIPASE <9 02/21/2019    TSH 2.05 02/21/2019    CRP 0.8 03/05/2018    SED 38 (H) 03/05/2018       Anesthesia Plan    ASA Status:  4      Anesthesia Type: General.     - Airway: ETT              Consents    Anesthesia Plan(s) and associated risks, benefits, and realistic alternatives discussed. Questions answered and patient/representative(s) expressed understanding.    - Discussed:     - Discussed with:  Patient         Postoperative Care    Pain management: Peripheral nerve block (Single Shot), Multi-modal analgesia.   PONV prophylaxis: Ondansetron (or other 5HT-3), Dexamethasone or Solumedrol     Comments:                Umesh Dhaliwal MD

## 2023-03-05 NOTE — ANESTHESIA PROCEDURE NOTES
"Fascia iliaca Procedure Note    Pre-Procedure   Staff -        Anesthesiologist:  Umesh Dhaliwal MD       Performed By: anesthesiologist       Location: pre-op       Procedure Start/Stop Times: 3/5/2023 12:09 PM and 3/5/2023 12:09 PM       Pre-Anesthestic Checklist: patient identified, IV checked, site marked, risks and benefits discussed, informed consent, monitors and equipment checked, pre-op evaluation, at physician/surgeon's request and post-op pain management  Timeout:       Correct Patient: Yes        Correct Procedure: Yes        Correct Site: Yes        Correct Position: Yes        Correct Laterality: Yes        Site Marked: Yes  Procedure Documentation  Procedure: Fascia iliaca       Laterality: right       Patient Position: supine       Skin prep: Chloraprep       Needle Type: short bevel       Needle Gauge: 21.        Needle Length (Inches): 4        Ultrasound guided       1. Ultrasound was used to identify targeted nerve, plexus, vascular marker, or fascial plane and place a needle adjacent to it in real-time.       2. Ultrasound was used to visualize the spread of anesthetic in close proximity to the above referenced structure.       3. A permanent image is entered into the patient's record.       4. The visualized anatomic structures appeared normal.       5. There were no apparent abnormal pathologic findings.    Assessment/Narrative         The placement was negative for: blood aspirated, painful injection and site bleeding       Paresthesias: No.       Bolus given via needle..        Secured via.        Insertion/Infusion Method: Single Shot       Complications: none    Medication(s) Administered   Bupivacaine 0.25% PF (Infiltration) - Infiltration   30 mL - 3/5/2023 12:09:00 PM  Medication Administration Time: 3/5/2023 12:09 PM      FOR [facility] ([facility]) ONLY:   Pain Team Contact information: please page the Pain Team Via [employer]. Search \"Pain\". During daytime hours, please page the attending " first. At night please page the resident first.

## 2023-03-05 NOTE — PHARMACY-ADMISSION MEDICATION HISTORY
Pharmacy Note - Admission Medication History    Pertinent Provider Information: Patient states current Eliquis dose is 2.5 mg BID. Patient states that she has been taking Duloxetine 60 mg but doesn't know why or if it is working for her. Last fill through insurance for #180 on 6/29/22.     Home oxycodone regimen: 10 mg in Am, 5 mg ~15:00 and 5-10 mg at bedtime. Patient states she often takes just 6.25 mg of the Zolpidem CR. A dose of 7.8125 mg is not recommended due to controlled release mechanism of tablet.    ______________________________________________________________________    Prior To Admission (PTA) med list completed and updated in EMR.       Current Facility-Administered Medications for the 3/4/23 encounter (Hospital Encounter)   Medication     ropivacaine (NAROPIN) injection 3 mL     triamcinolone (KENALOG-40) injection 40 mg     PTA Med List   Medication Sig Last Dose     acetaminophen (TYLENOL) 500 MG tablet Take 1,000 mg by mouth daily as needed for mild pain Past Week     amiodarone (PACERONE) 200 MG tablet Take 0.5 tablets (100 mg) by mouth daily 3/4/2023 at 09:00     apixaban ANTICOAGULANT (ELIQUIS) 5 MG tablet Take 1 tablet (5 mg) by mouth 2 times daily (Patient taking differently: Take 2.5 mg by mouth 2 times daily Takes 2.5 mg BID (cuts pill in half)) 3/4/2023 at 09:00     cholecalciferol, vitamin D3, (VITAMIN D3) 2,000 unit Tab [CHOLECALCIFEROL, VITAMIN D3, (VITAMIN D3) 2,000 UNIT TAB] Take 1 tablet (2,000 Units total) by mouth daily. 3/4/2023 at am     diclofenac (FLECTOR) 1.3 % patch Externally apply 1 patch topically 2 times daily 3/3/2023 at am     DULoxetine (CYMBALTA) 60 MG capsule Take 1 capsule (60 mg) by mouth daily 3/4/2023 at 09:00     furosemide (LASIX) 20 MG tablet Take 1 tablet (20 mg) by mouth daily. 3/4/2023 at 09:00     KLOR-CON 20 MEQ CR tablet Take 1 tablet (20 mEq) by mouth daily. 3/4/2023 at 09:00     lisinopril (ZESTRIL) 2.5 MG tablet Take 1 tablet (2.5 mg) by mouth daily  3/4/2023 at 09:00     oxyCODONE (ROXICODONE) 5 MG tablet Take 1-2 tablets (5-10 mg) by mouth 3 times daily as needed for moderate to severe pain (MAX 5 tabs per day) Ok to fill 2/19/23 to start 2/20/23 3/4/2023 at 10 mg taken at 10:00 am     zolpidem ER (AMBIEN CR) 6.25 MG CR tablet Take 1 and 1/4 tablet by mouth at bedtime 3/3/2023 at pm       Information source(s): Patient and CareEverywhere/SureScripts  Method of interview communication: in-person    Summary of Changes to PTA Med List  New:   Discontinued: hydrochlorothiazide   Changed: Eliquis (aj)    Patient was asked about OTC/herbal products specifically.  PTA med list reflects this.    In the past week, patient estimated taking medication this percent of the time:  greater than 90%.    Medication Affordability:  Not including over the counter (OTC) medications, was there a time in the past 12 months when you did not take your medications as prescribed because of cost?: No    Allergies were reviewed, assessed, and updated with the patient.      Patient does not use any multi-dose medications prior to admission.    The information provided in this note is only as accurate as the sources available at the time of the update(s).    Thank you for the opportunity to participate in the care of this patient.    SANTANA AGUILA, [facility]  3/4/2023 7:58 PM

## 2023-03-05 NOTE — ANESTHESIA PROCEDURE NOTES
Airway       Patient location during procedure: OR       Procedure Start/Stop Times: 3/5/2023 2:01 PM  Staff -        CRNA: Gloria Ceballos APRN CRNA       Performed By: CRNA  Consent for Airway        Urgency: elective  Indications and Patient Condition       Indications for airway management: deniz-procedural       Induction type:intravenous       Mask difficulty assessment: 1 - vent by mask    Final Airway Details       Final airway type: endotracheal airway       Successful airway: ETT - single  Endotracheal Airway Details        ETT size (mm): 7.5       Cuffed: yes       Successful intubation technique: direct laryngoscopy       DL Blade Type: MAC 3       Adjucts: stylet       Position: Right       Measured from: gums/teeth       Secured at (cm): 22       Bite block used: Soft    Post intubation assessment        Placement verified by: capnometry, equal breath sounds and chest rise        Number of attempts at approach: 1       Number of other approaches attempted: 0       Secured with: silk tape       Ease of procedure: easy       Dentition: Intact       Dental guard used and removed. Dental Guard Type: Proguard Red.    Medication(s) Administered   Medication Administration Time: 3/5/2023 2:01 PM

## 2023-03-05 NOTE — OP NOTE
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  Xrays obtained showed a displaced femoral neck fx.  The above procedure was recommended.  For full details please see my clinic notes.  The risks including, but not limited to, bleeding, infection, nerve injury, dvt, implant failure, implant wear, fracture, limb length inequality, anesthetic complications, heart attack, stroke, and even death were discussed.  Pt verbalized understanding.  Informed consent was obtained       DESCRIPTION OF PROCEDURE: The patient was seen and evaluated in the preop area. Discussion of the surgery and any further questions were answered with the patient and family using easily understandable non-medical terms. The correct site was identified with the patient, and I placed my initials at the surgical site. Pre-procedure verification was completed. Relevant information / documentation available, they were reviewed and properly matched to the patient.  I verified the consent was accurate and complete.  I verified that the proper surgical equipment and supplies were available. The patient was taken to the operating room and placed in position according to the procedure in consideration with all extremities well padded.  The patient received preoperative prophylactic antibiotics based on the patient s procedure, BMI, and allergy profile.  A Time  Out was conducted just prior to starting procedure to verify the eight required elements: 1-patient identity, 2-consent accurate and complete, 3-position, 4-correct side/site marked (if applicable), 5-procedure, 6-relevant images / results properly labeled and displayed (if applicable), 7-antibiotics / irrigation fluids (if applicable), 8-safety precautions.     The operative lower extremity was prepped and draped in sterile fashion. An  Ioban surgical drape was placed over the surgical field.  A standard mini posterior approach incision was made about the proximal femur.  Skin and subcutaneous tissues were incised with a scalpel.  Hemostasis was achieved with  electrocautery. The tensor fascia was split longitudinally with electrocautery.  Any bursa tissue was excised.  The gluteus dusty muscle was split bluntly for approximately 3 cm.  A deep east/west retractor was placed.  The hip was internally rotated.  The short external rotators and posterior hip capsule were taken down off the posterior femur.  They were tagged with #1 ethibond suture for later repair.  We then visualized the displaced femoral neck fracture.  An anterior retractor was placed over the anterior acetabulum.  I then made a new femoral neck cut with the oscillating saw.  Any bony fragments were removed.  I then removed the femoral head from the acetabulum.  The acetabulum was irrigated with antibiotic saline.   The femoral head was measured with the caliper.  The appropriate size was chosen and a trial head was placed into the acetabulum.  I felt it had the appropriate fit.  The trial head was then removed. Attention was then turned to femoral preparation.   A proximal femur retractor was placed and we placed a canal finder into the femur.  I then began my reaming.  We reamed up to a size with appropriate chatter on the cortical bone.  We then sequentially broached up to the appropriate size stem.  Once we achieved good fixation in the  proximal femur with the broach, a calcar planer was used to finalize the femoral neck.  A high offset neck was placed on the broach.  We then trialed various neck lengths and performed trial reductions of the hip.  With the appropriate neck length we put the hip through range of motion testing.   We were able to flex the hip to 90 degrees and internally rotate to 45 degrees with no instability.  Clinically the leg lengths were equal.  We felt this was the appropriate size of components.  We then removed the trial components.  The proximal femur was irrigated with antibiotic saline.  A cement restrictor was placed at the appropriate level of the femur based on the femoral stem length.  We then cemented the stem in the proximal femur using antibiotic cement, holding it at approx. 20 degrees of anteversion.  After the cement had cured, we removed any excess cement.  We then irrigated the acetabulum with antibiotic saline.  The chosen neck length and bipolar head were then placed onto the femoral stem.  The hip was reduced.  Clinically the leg lengths were equal.  We flexed the hip to 90 degrees and internally rotated 45 degrees.  There was no instability.  We felt we had a stable construct.  The hip joint was copiously irrigated with antibiotic saline.  The posterior hip capsule and short external rotators were repaired through drill holes in the proximal femur.  The Tensor fascia  and gluteus dusty split were repaired anatomically using #2 quill suture.  The subcutaneous layer was closed with 0 quill suture.  The skin was closed with skin staples.  A sterile dressing was placed on the incision.  An abductor pillow was placed on the legs.  The patient was brought to the post op recovery area in stable condition.    Post Op Plan:  1) Activity: WBAT lower extremity with a walker. Posterior Approach precautions  2) Infection prophylaxis:  Appropriate IV antibiotics x 24 hours  3) DVT Prophylaxis: OK to restart home dose  eliquis tomorrow. Mechanical devices.  4) Pain Control:  IV narcotics, transition to oral pain meds as bowel function returns  5) Incision/Dressing Care: Keep on, clean. Reniforce prn.  OK to shower.     6) PT/OT: Evaluate and treat as per Hip Fracture protocol  7) Primary medicine consult for medical cares  8)  consult for disposition.        Implant Name Type Inv. Item Serial No.  Lot No. LRB No. Used Action   BONE CEMENT SIMPLEX W/TOBRAMYCIN 6197-9-001 - [id number] Cement, Bone BONE CEMENT SIMPLEX W/TOBRAMYCIN 6197-9-001  [employer] [id number] Right 2 Implanted         @C(1)@  Jose G Martinez MD    @C(2)@  Margret Flores

## 2023-03-05 NOTE — ED NOTES
Pt's O2 sats noted to be 87-88% on room air while sleeping. Writer applied O2 at 2L per NC and will continue to monitor.

## 2023-03-05 NOTE — PLAN OF CARE
To surgery via cart.  Nurse to nurse report called.  Jacinto previously updated. Belongings sent including eye glasses, clothing, and rosery.  Luz Limon RN    Problem: Hip Fracture Medical Management  Goal: Optimal Coping with Change in Health Status  Outcome: Progressing  Goal: Absence of Bleeding  Outcome: Progressing  Goal: Effective Bowel Elimination  Outcome: Progressing  Goal: Baseline Cognitive Function Maintained  Outcome: Progressing  Goal: Absence of Embolism  Outcome: Progressing  Goal: Fracture Stability  Outcome: Progressing  Goal: Optimal Functional Performance  Outcome: Progressing  Intervention: Promote Optimal Functional Status  Recent Flowsheet Documentation  Taken 3/5/2023 0800 by Luz Limon, RN  Activity Management: (strict bedrest) --  Goal: Optimal Pain Control and Function  Outcome: Progressing  Goal: Effective Urinary Elimination  Outcome: Progressing

## 2023-03-05 NOTE — PROGRESS NOTES
[facility]    Medicine Progress Note - Hospitalist Service    Date of Admission:  3/4/2023    Assessment & Plan                Gladys Ramirez is a 89 year old female with history of nonischemic cardiomyopathy, awaiting ICD placement, chronic right knee pain presented for evaluation after a fall found to have right hip fracture. Hospital Day: 2     Right femoral neck fracture  --- Secondary to fall  -now postop day 0 from hemiarthroplasty by Dr Martinez, doing ok in PACU  --- Hold apixaban-- resume when ok by surgeon  --- Volume status closely secondary to cardiomyopathy with reduced EF     Closed head injury  --- Initial CT negative  --- Repeat head CT was ok     Atrial fibrillation  --- Continue amiodarone  --- Holding apixaban as above     Nonischemic cardiomyopathy  --- Follows with Dr. Jolie camejo  ----Recently saw EP and planning to schedule ICD placement  --- EF 25 to 30%  --- Monitor volume status closely after surgery  --- Telemetry for now  --- Continue home lisinopril, lasix  ---monitor I/O and weights CLOSELY after surgery     Osteoarthritis with chronic right knee pain  --- Monitor for sedation given oxycodone usage  ----Continue Cymbalta, diclofenac     Previous PE  ---holding apixaban per above, resume when able    CKD 3a  -avoid nephrotoxins    Postop pulmonary insufficiency  -likely due to sedation  -wean O2    HTN  -resume home BP meds when able         DVT Prophylaxis: High risk. Eliquis  Diet: Discharge Instruction - Regular Diet Adult    Reyes Catheter: Not present  Lines: None     Cardiac Monitoring: ACTIVE order. Indication: Procedural area  Code Status: Full Code      Clinically Significant Risk Factors Present on Admission               # Drug Induced Coagulation Defect: home medication list includes an anticoagulant medication    # Hypertension: home medication list includes antihypertensive(s)  # Chronic systolic heart failure: echo within the past year with EF <40%  "     # Overweight: Estimated body mass index is 27.54 kg/m  as calculated from the following:    Height as of this encounter: 1.588 m (5' 2.5\").    Weight as of this encounter: 69.4 kg (153 lb).           Disposition Plan   Disposition: TCU    Expected Discharge Date: 03/06/2023             Medically ready to discharge today: No     The patient's care was discussed with the Patient.    Shyla Babcock MD  Hospitalist Service  [facility]  Securely message with [employer] (more info)  Text page via "[employer]," Paging/Directory   ______________________________________________________________________      Physical Exam   Vital Signs: Temp: 98.7  F (37.1  C) Temp src: Temporal BP: (!) 161/72 Pulse: 60   Resp: 18 SpO2: 94 % O2 Device: Nasal cannula Oxygen Delivery: 2 LPM  Weight: 153 lbs 0 oz  General: in no apparent distress and non-toxic drowsy female lying in hospital bed, able to answer questions with brief answers, somewhat [place]  HEENT: Head normocephalic atraumatic, oral mucosa moist. Sclerae anicteric  CV: Regular rhythm, normal rate, no murmurs  Resp: No wheezes, no rales or rhonchi, no focal consolidations  GI: Belly soft, nondistended, nontender, bowel sounds present  Skin: No rashes or lesions  Extremities: Trace ankle edema bilaterally  Psych: Normal affect, mood euthymic  Neuro: CNII-XII grossly intact, moving all 4 extremities        Medical Decision Making               Data   Recent Results (from the past 12 hour(s))   Basic metabolic panel    Collection Time: 03/05/23  7:26 AM   Result Value Ref Range    Sodium 138 136 - 145 mmol/L    Potassium 3.9 3.4 - 5.3 mmol/L    Chloride 104 98 - 107 mmol/L    Carbon Dioxide (CO2) 22 22 - 29 mmol/L    Anion Gap 12 7 - 15 mmol/L    Urea Nitrogen 15.9 8.0 - 23.0 mg/dL    Creatinine 0.98 (H) 0.51 - 0.95 mg/dL    Calcium 8.8 8.2 - 9.6 mg/dL    Glucose 92 70 - 99 mg/dL    GFR Estimate 54 (L) >60 mL/min/1.73m2   CBC with platelets    Collection Time: 03/05/23  " 7:26 AM   Result Value Ref Range    WBC Count 6.0 4.0 - 11.0 10e3/uL    RBC Count 4.21 3.80 - 5.20 10e6/uL    Hemoglobin 12.7 11.7 - 15.7 g/dL    Hematocrit 42.9 35.0 - 47.0 %     (H) 78 - 100 fL    MCH 30.2 26.5 - 33.0 pg    MCHC 29.6 (L) 31.5 - 36.5 g/dL    RDW 13.5 10.0 - 15.0 %    Platelet Count 163 150 - 450 10e3/uL     Interval History   Patient seen in PACU. She is doing ok. Having some pain. Mostly wanting to keep eyes shut, not very talkative but will answer briefly to questions.

## 2023-03-05 NOTE — PLAN OF CARE
Problem: Plan of Care - These are the overarching goals to be used throughout the patient stay.    Goal: Optimal Comfort and Wellbeing  Outcome: Progressing     Problem: Hip Fracture Medical Management  Goal: Optimal Pain Control and Function  Outcome: Progressing     Problem: Pain Acute  Goal: Optimal Pain Control and Function  Outcome: Progressing   Goal Outcome Evaluation:    VSS on 2L oxygen when asleep. Prn oxycodone given for pain and ice applied on R hip. NPO for surgery today. Has external catheter in place.

## 2023-03-05 NOTE — CONSULTS
Consultation - Right subcapital hip fracture  Gladys Ramirez,  1930, MRN [id number]    Admitting Dx: Hip fracture, right, closed, initial encounter (H) [S72.001A]    PCP: Margret Flores, [phone]   Code status:  Prior        Extended Emergency Contact Information  Primary Emergency Contact: YUDI العراقي  Mobile Phone: [phone]  Relation: Sister  Secondary Emergency Contact: ELISEO LIAO  Mobile Phone: [phone]  Relation: Niece        Assessment and Plan   Right subcapital hip fracture.    Plan is to proceed with right hip hemiarthroplasty.  We did discuss the option of ORIF but given her advanced age I think that hemiarthroplasty would be appropriate.    Principal Problem:    Hip fracture, right, closed, initial encounter (H)  Active Problems:    Anticoagulated    Fall from ground level       Chief Complaint Hip fracture, right, closed, initial encounter (H)       HPI     Gladys Ramirez for  is a 89 year old year old female who suffered a ground level fall earlier today and was brought to ED complaining of right groin pain.  She denies any previous history of hip or groin pain but does have a significant history of right knee pain.  She recently obtained a brace for her right knee and that this has been helping with her pain.  [facility]  She has an elevator and no stairs.         Medical History  She  has a past medical history of Angina pectoris (H), Chest pain (2017), Cough, Hyperlipidemia, Hypertension, and Osteoarthritis.  Surgical History  She  has a past surgical history that includes TOTAL KNEE ARTHROPLASTY (Left); appendectomy; Tonsillectomy; Laparoscopy diagnostic (general) (N/A, 2014); and Basal Cell Carcinoma Excision.   Social History  Reviewed, and she  reports that she has never smoked. She has never used smokeless tobacco. She reports current alcohol use. She reports that she does not use drugs.   Allergies  Allergies  "  Allergen Reactions     Trazodone Shortness Of Breath and Unknown     Allergic to trazodone and deriv., Other: trouble swallowing       Clindamycin Diarrhea     C-diff     Gabapentin Other (See Comments)     \"Internal tremors\"     Temazepam Other (See Comments)     Annotation: Nightmares       Buprenorphine Palpitations     Tried patch    Family History  Reviewed, and family history includes Cancer in her brother and sister; Heart Disease in her mother; Lung Cancer in her brother, brother, and sister; No Known Problems in her father; Rheumatic Heart Disease in her mother.   Social History  She  reports that she has never smoked. She has never used smokeless tobacco. She reports current alcohol use. She reports that she does not use drugs.   Additional psychosocial needs reviewed per nursing assessment.     Prior to Admission Medications   (Not in a hospital admission)         Review of Systems:  Pertinent items are noted in HPI. Physical Exam:  Temp:  [97.7  F (36.5  C)] 97.7  F (36.5  C)  Pulse:  [82-87] 87  Resp:  [19] 19  BP: (157-204)/(74-93) 157/74  SpO2:  [93 %-94 %] 93 %    She is alert and oriented times three.  She is well appearing.  She has pain with ROM of right hip and effusion at the level of the right knee but minimal discomfort with rom of right knee.  No bruising or tenderness at the right knee.  No pain with ROM of left hip.  Pelvis stable to AP compression.  Abdomen soft and non tender.  NO midline spine tenderness.  NVI in both lower extremities.  +DP +PT pulses palpable.  Calves are soft and non tender.         Pertinent Labs  Lab Results: personally reviewed.   Office Visit on 02/17/2023   Component Date Value     Sodium 02/17/2023 141      Potassium 02/17/2023 4.4      Chloride 02/17/2023 104      Carbon Dioxide (CO2) 02/17/2023 29      Anion Gap 02/17/2023 8      Urea Nitrogen 02/17/2023 21.5      Creatinine 02/17/2023 1.16 (H)      Calcium 02/17/2023 9.6      Glucose 02/17/2023 94      GFR " Estimate 02/17/2023 44 (L)    Office Visit on 02/08/2023   Component Date Value     Sodium 02/08/2023 142      Potassium 02/08/2023 4.2      Chloride 02/08/2023 105      Carbon Dioxide (CO2) 02/08/2023 27      Anion Gap 02/08/2023 10      Urea Nitrogen 02/08/2023 20.6      Creatinine 02/08/2023 1.16 (H)      Calcium 02/08/2023 9.8 (H)      Glucose 02/08/2023 82      GFR Estimate 02/08/2023 44 (L)      N Terminal Pro BNP Outpa* 02/08/2023 5,373 (H)         Pertinent Radiology  Radiology Results: Right valgus impacted femoral neck fracture.      EXAM: MR HIP RIGHT W/O CONTRAST  LOCATION: [facility]  DATE/TIME: 3/4/2023 6:05 PM     INDICATION: Fall, right hip pain, question fracture.  COMPARISON: Same-day radiograph.  TECHNIQUE: Unenhanced.     FINDINGS:  BONES:      -There is acute nondisplaced right femoral neck fracture centered in the subcapital region with slight impaction.   -There is no additional fracture about the right hip.     RIGHT HIP:   -Labrum: Labral degeneration where there is probably a superimposed degenerative anterosuperior labral tear.   -Cartilage: There is some low-grade cartilage loss along the posterior aspect of the hip and some mild cartilage changes along the anterior superior acetabulum. No full-thickness cartilage defects.  -Joint space: Moderate joint effusion.      MUSCLES AND TENDONS:   -There is no acute tendon abnormality involving the gluteal, proximal hamstring or distal iliopsoas tendons.     SOFT TISSUES:   -There is some mild surrounding edema about the femoral neck fracture without an intramuscular hematoma. Small amount of blood products in the lateral subcutaneous tissues of the right hip.     INTRA-PELVIC CONTENTS:  -Extensive colonic diverticulosis sigmoid colon.                                                                      IMPRESSION:  1.  Acute nondisplaced right femoral neck fracture centered in the subcapital region.  2.  Moderate right  hip joint effusion.  3.  Mild degenerative changes of the right hip.        Karon Hughes MD

## 2023-03-05 NOTE — INTERVAL H&P NOTE
"I have reviewed the surgical (or preoperative) H&P that is linked to this encounter, and examined the patient. There are no significant changes    Clinical Conditions Present on Arrival:  Clinically Significant Risk Factors Present on Admission                  # Drug Induced Coagulation Defect: home medication list includes an anticoagulant medication   # Overweight: Estimated body mass index is 27.54 kg/m  as calculated from the following:    Height as of this encounter: 1.588 m (5' 2.5\").    Weight as of this encounter: 69.4 kg (153 lb).       "

## 2023-03-06 ENCOUNTER — APPOINTMENT (OUTPATIENT)
Dept: OCCUPATIONAL THERAPY | Facility: HOSPITAL | Age: 88
DRG: 522 | End: 2023-03-06
Payer: COMMERCIAL

## 2023-03-06 ENCOUNTER — APPOINTMENT (OUTPATIENT)
Dept: PHYSICAL THERAPY | Facility: HOSPITAL | Age: 88
DRG: 522 | End: 2023-03-06
Payer: COMMERCIAL

## 2023-03-06 LAB
GLUCOSE BLDC GLUCOMTR-MCNC: 135 MG/DL (ref 70–99)
HGB BLD-MCNC: 11.2 G/DL (ref 11.7–15.7)
HOLD SPECIMEN: NORMAL

## 2023-03-06 PROCEDURE — 97167 OT EVAL HIGH COMPLEX 60 MIN: CPT | Mod: GO

## 2023-03-06 PROCEDURE — 250N000011 HC RX IP 250 OP 636: Performed by: FAMILY MEDICINE

## 2023-03-06 PROCEDURE — 250N000011 HC RX IP 250 OP 636: Performed by: PHYSICIAN ASSISTANT

## 2023-03-06 PROCEDURE — 250N000013 HC RX MED GY IP 250 OP 250 PS 637: Performed by: PHYSICIAN ASSISTANT

## 2023-03-06 PROCEDURE — 97530 THERAPEUTIC ACTIVITIES: CPT | Mod: GP

## 2023-03-06 PROCEDURE — 97530 THERAPEUTIC ACTIVITIES: CPT | Mod: GO

## 2023-03-06 PROCEDURE — 97162 PT EVAL MOD COMPLEX 30 MIN: CPT | Mod: GP

## 2023-03-06 PROCEDURE — 120N000001 HC R&B MED SURG/OB

## 2023-03-06 PROCEDURE — 250N000013 HC RX MED GY IP 250 OP 250 PS 637: Performed by: FAMILY MEDICINE

## 2023-03-06 PROCEDURE — 36415 COLL VENOUS BLD VENIPUNCTURE: CPT | Performed by: PHYSICIAN ASSISTANT

## 2023-03-06 PROCEDURE — 85018 HEMOGLOBIN: CPT | Performed by: PHYSICIAN ASSISTANT

## 2023-03-06 PROCEDURE — 99232 SBSQ HOSP IP/OBS MODERATE 35: CPT | Performed by: HOSPITALIST

## 2023-03-06 RX ADMIN — POTASSIUM CHLORIDE 20 MEQ: 1500 TABLET, EXTENDED RELEASE ORAL at 10:00

## 2023-03-06 RX ADMIN — HYDROXYZINE HYDROCHLORIDE 10 MG: 10 TABLET ORAL at 01:09

## 2023-03-06 RX ADMIN — OXYCODONE HYDROCHLORIDE 5 MG: 5 TABLET ORAL at 18:16

## 2023-03-06 RX ADMIN — ACETAMINOPHEN 975 MG: 325 TABLET ORAL at 04:17

## 2023-03-06 RX ADMIN — POLYETHYLENE GLYCOL 3350 17 G: 17 POWDER, FOR SOLUTION ORAL at 09:59

## 2023-03-06 RX ADMIN — CEFAZOLIN 1 G: 1 INJECTION, POWDER, FOR SOLUTION INTRAMUSCULAR; INTRAVENOUS at 06:18

## 2023-03-06 RX ADMIN — OXYCODONE HYDROCHLORIDE 5 MG: 5 TABLET ORAL at 04:17

## 2023-03-06 RX ADMIN — OXYCODONE HYDROCHLORIDE 5 MG: 5 TABLET ORAL at 11:51

## 2023-03-06 RX ADMIN — ACETAMINOPHEN 975 MG: 325 TABLET ORAL at 11:51

## 2023-03-06 RX ADMIN — OXYCODONE HYDROCHLORIDE 5 MG: 5 TABLET ORAL at 18:11

## 2023-03-06 RX ADMIN — AMIODARONE HYDROCHLORIDE 100 MG: 100 TABLET ORAL at 09:59

## 2023-03-06 RX ADMIN — LISINOPRIL 2.5 MG: 2.5 TABLET ORAL at 09:59

## 2023-03-06 RX ADMIN — DULOXETINE HYDROCHLORIDE 60 MG: 60 CAPSULE, DELAYED RELEASE PELLETS ORAL at 09:59

## 2023-03-06 RX ADMIN — HYDROMORPHONE HYDROCHLORIDE 0.5 MG: 1 INJECTION, SOLUTION INTRAMUSCULAR; INTRAVENOUS; SUBCUTANEOUS at 08:11

## 2023-03-06 RX ADMIN — ACETAMINOPHEN 975 MG: 325 TABLET ORAL at 21:02

## 2023-03-06 RX ADMIN — APIXABAN 2.5 MG: 2.5 TABLET, FILM COATED ORAL at 21:02

## 2023-03-06 RX ADMIN — APIXABAN 2.5 MG: 2.5 TABLET, FILM COATED ORAL at 10:00

## 2023-03-06 RX ADMIN — SENNOSIDES AND DOCUSATE SODIUM 1 TABLET: 50; 8.6 TABLET ORAL at 10:00

## 2023-03-06 RX ADMIN — HYDROMORPHONE HYDROCHLORIDE 0.5 MG: 1 INJECTION, SOLUTION INTRAMUSCULAR; INTRAVENOUS; SUBCUTANEOUS at 01:08

## 2023-03-06 RX ADMIN — FUROSEMIDE 20 MG: 20 TABLET ORAL at 10:00

## 2023-03-06 RX ADMIN — ZOLPIDEM TARTRATE 5 MG: 5 TABLET ORAL at 21:02

## 2023-03-06 ASSESSMENT — ACTIVITIES OF DAILY LIVING (ADL)
ADLS_ACUITY_SCORE: 30
ADLS_ACUITY_SCORE: 32
ADLS_ACUITY_SCORE: 30
ADLS_ACUITY_SCORE: 30
ADLS_ACUITY_SCORE: 32
ADLS_ACUITY_SCORE: 30
ADLS_ACUITY_SCORE: 30
DEPENDENT_IADLS:: SHOPPING
ADLS_ACUITY_SCORE: 30

## 2023-03-06 NOTE — CONSULTS
Care Management Initial Consult    General Information  Assessment completed with: Patient, Sister Gladys  Type of CM/SW Visit: Initial Assessment    Primary Care Provider verified and updated as needed: Yes   Readmission within the last 30 days:        Reason for Consult: discharge planning  Advance Care Planning:            Communication Assessment  Patient's communication style: spoken language (English or Bilingual)    Hearing Difficulty or Deaf: yes   Wear Glasses or Blind: yes    Cognitive  Cognitive/Neuro/Behavioral: WDL     Arousal Level: opens eyes spontaneously  Orientation: oriented x 4        Speech: clear, logical    Living Environment:   People in home: alone     Current living Arrangements: apartment      Able to return to prior arrangements:         Family/Social Support:  Care provided by: self, other (see comments) (Jacinto Easton)  Provides care for: no one  Marital Status: Single  Other (specify) (Jacinto)          Description of Support System: Supportive, Involved         Current Resources:   Patient receiving home care services: No     Community Resources: None  Equipment currently used at home: walker, rolling, cane, straight  Supplies currently used at home: Incontinence Supplies    Employment/Financial:  Employment Status:          Financial Concerns:             Lifestyle & Psychosocial Needs:  Social Determinants of Health     Tobacco Use: Low Risk      Smoking Tobacco Use: Never     Smokeless Tobacco Use: Never     Passive Exposure: Not on file   Alcohol Use: Not on file   Financial Resource Strain: Not on file   Food Insecurity: Not on file   Transportation Needs: Not on file   Physical Activity: Not on file   Stress: Not on file   Social Connections: Not on file   Intimate Partner Violence: Not on file   Depression: Not at risk     PHQ-2 Score: 0   Housing Stability: Not on file       Functional Status:  Prior to admission patient needed assistance:   Dependent ADLs:: Ambulation-cane,  Ambulation-walker  Dependent IADLs:: Shopping       Mental Health Status:          Chemical Dependency Status:                Values/Beliefs:  Spiritual, Cultural Beliefs, Restoration Practices, Values that affect care:                 Additional Information:  RNCM met with patient at bedside. Explained Role. Initial assessment completed.     Patient lives in an apartment, lives alone. Patient uses a cane/ walker at home. Drives short distances, otherwise gets her groceries delivered to her home. Jacinto Easton aides with other errands as needed. No other services. No other DME.    RNCM explained PT/OT discharge recommendations for TCU. Patient agreeable to TCU, requests near [facility], stated [place] is too far.     Referrals sent.     CM will continue to follow care progression and aide in discharge planning as needed.      Rossana Dow RN

## 2023-03-06 NOTE — PROGRESS NOTES
03/06/23 0940   Appointment Info   Signing Clinician's Name / Credentials (OT) Cece Goss, OTR/L   Living Environment   People in Home alone   Current Living Arrangements independent living facility   Home Accessibility no concerns   Living Environment Comments Per pt, she uses an extended tub bench for showering in her tub shower.   Self-Care   Fall history within last six months yes   Number of times patient has fallen within last six months 1   Activity/Exercise/Self-Care Comment Independent with ADLs.   Instrumental Activities of Daily Living (IADL)   IADL Comments Per pt, independent with med management, cooking, cleaning, laundry. Pt does get 2-3 Meals on Wheels delivered per month and has friends that bring her meals, states she gets groceries delivered. Pt states she does drive, but very infrequently.   General Information   Onset of Illness/Injury or Date of Surgery 03/04/23   Referring Physician Naina Guzmán PA-C   Patient/Family Therapy Goal Statement (OT) to go to TCU   Additional Occupational Profile Info/Pertinent History of Current Problem Per chart review, pt is a 89 year old female with history of nonischemic cardiomyopathy, awaiting ICD placement, chronic right knee pain presented for evaluation after a fall found to have right hip fracture.   Existing Precautions/Restrictions fall;no hip IR;no active hip ABD;no hip ADD past midline;90 degree hip flexion   Right Lower Extremity (Weight-bearing Status) weight-bearing as tolerated (WBAT)   General Observations and Info Pt very [place], left hearing aids at home   Cognitive Status Examination   Orientation Status orientation to person, place and time  (A&Ox4)   Pain Assessment   Patient Currently in Pain Yes, see Vital Sign flowsheet  (8/10 pain reported with mobility)   Range of Motion Comprehensive   General Range of Motion no range of motion deficits identified   Strength Comprehensive (MMT)   General Manual Muscle Testing (MMT) Assessment no  strength deficits identified   Bed Mobility   Bed Mobility supine-sit   Supine-Sit [place] (Bed Mobility) moderate assist (50% patient effort);2 person assist   Assistive Device (Bed Mobility) draw sheet;bed rails   Transfers   Transfers bed-chair transfer;sit-stand transfer;toilet transfer   Transfer Skill: Bed to Chair/Chair to Bed   Bed-Chair [place] (Transfers) minimum assist (75% patient effort);2 person assist   Assistive Device (Bed-Chair Transfers) rolling walker   Transfer Comments extended time   Sit-Stand Transfer   Sit-Stand [place] (Transfers) minimum assist (75% patient effort);2 person assist   Assistive Device (Sit-Stand Transfers) walker, front-wheeled   Toilet Transfer   [place] Level (Toilet Transfer) not tested   Toilet Transfer Comments Based on bed-chair transfer, anticipate pt will require Ax2 for pivot transfers to [facility] for toileting at this time.   Balance   Balance Comments Pt required Min A at all times with standing/ambulation d/t impaired balance when weight placed in RLE.   Activities of Daily Living   BADL Assessment/Intervention lower body dressing;grooming   Lower Body Dressing Assessment/Training   Position (Lower Body Dressing) supine   [place] Level (Lower Body Dressing) maximum assist (25% patient effort)   Grooming Assessment/Training   Position (Grooming) supported sitting   [place] Level (Grooming) set up;supervision   Clinical Impression   Criteria for Skilled Therapeutic Interventions Met (OT) Yes, treatment indicated   OT Diagnosis Impaired ability to perform ADLs, IADLs, and functional mobility.   Influenced by the following impairments R hip fracture   OT Problem List-Impairments impacting ADL problems related to;activity tolerance impaired;balance;mobility;pain;post-surgical precautions   Assessment of Occupational Performance 5 or more Performance Deficits   Identified Performance Deficits bed mobility, transfers, toileting, functional  mobilty, standing G/H, lower body dressing   Planned Therapy Interventions (OT) ADL retraining;IADL retraining;balance training;bed mobility training;transfer training;home program guidelines;progressive activity/exercise;risk factor education;strengthening   Clinical Decision Making Complexity (OT) high complexity   Risk & Benefits of therapy have been explained evaluation/treatment results reviewed;care plan/treatment goals reviewed;risks/benefits reviewed;current/potential barriers reviewed;participants voiced agreement with care plan;participants included;patient   Clinical Impression Comments Pt would benefit from skilled OT services to promote ability to perform ADLs, IADLs, and functional mobility.   OT Total Evaluation Time   OT Eval, High Complexity Minutes (26998) 15   OT Goals   Therapy Frequency (OT) 2 times/day   OT Predicted Duration/Target Date for Goal Attainment 03/13/23   OT Goals Hygiene/Grooming;Lower Body Dressing;Bed Mobility;Transfers;Toilet Transfer/Toileting   OT: Hygiene/Grooming supervision/stand-by assist;within precautions;while standing   OT: Lower Body Dressing Supervision/stand-by assist;using adaptive equipment;within precautions;including set-up/clothing retrieval   OT: Bed Mobility Supervision/stand-by assist;supine to/from sitting;within precautions   OT: Transfer Supervision/stand-by assist;with assistive device;within precautions   OT: Toilet Transfer/Toileting Supervision/stand-by assist;toilet transfer;cleaning and garment management;using adaptive equipment;within precautions   OT Discharge Planning   OT Plan WBAT RLE and posterior precautions, lower body dressing with AE, close transfers to [facility] [place] [facility] and recliner, standing tolerance   OT Discharge Recommendation (DC Rec) Transitional Care Facility   OT Rationale for DC Rec Pt requires Min Ax1-2 for ADLs and functional mobility at this time. Pt lives alone in an ILF and does not have any daily assistance available.   OT Brief  overview of current status Min Ax2 close transfers, Mod Ax2 bed mobility, Max A lower body dressing   Total Session Time   Total Session Time (sum of timed and untimed services) 15

## 2023-03-06 NOTE — PLAN OF CARE
Problem: Plan of Care - These are the overarching goals to be used throughout the patient stay.    Goal: Absence of Hospital-Acquired Illness or Injury  Intervention: Prevent and Manage VTE (Venous Thromboembolism) Risk  Recent Flowsheet Documentation  Taken 3/5/2023 1800 by Miranda Akhtar RN  VTE Prevention/Management: SCDs (sequential compression devices) on  Goal: Optimal Comfort and Wellbeing  Outcome: Progressing    Problem: Risk for Delirium  Goal: Optimal Coping  Outcome: Progressing     Problem: Hip Fracture Medical Management  Goal: Optimal Pain Control and Function  Outcome: Progressing      Goal Outcome Evaluation:  Patient is alert and oriented x 4, patient is cooperative with all cares but hard of hearing. Patient up sitting at the edge of the bed and then to standing at the edge of the bed. Patient stated some dizziness with position change. Patient is currently on 2 LPM nasal cannula with 96% SpO2. Tried weaning her down to 1 LPM twice on shift and and SpO2 dropped to 87%. Patient has purewick in place. Bladder scanned twice, 409 and 373. PRN Ambien given before bed.

## 2023-03-06 NOTE — PROGRESS NOTES
"[facility]    Medicine Progress Note - Hospitalist Service    Date of Admission:  3/4/2023    Assessment & Plan              Gladys Ramirez is a 89 year old female with history of nonischemic cardiomyopathy, awaiting ICD placement, chronic right knee pain presented for evaluation after a fall found to have right hip fracture. Hospital Day: 3     Right femoral neck fracture  --- Secondary to fall  -now 1 Day Post-Op from hemiarthroplasty by Dr Martinez, doing well postop  --- per surgeon ok to resume apixaban  --- monitoring Volume status closely secondary to cardiomyopathy with reduced EF     Closed head injury  --- Initial CT negative  --- Repeat head CT was ok     Atrial fibrillation  --- Continue amiodarone  --- resumed apixaban as above     Nonischemic cardiomyopathy  --- Follows with Dr. Jolie camejo  ----Recently saw EP and planning to schedule ICD placement  --- EF 25 to 30%  --- Monitor volume status closely after surgery  --- ok to discontinue Telemetry  --- Continue home lisinopril, lasix  ---monitor I/O and weights CLOSELY after surgery     Osteoarthritis with chronic right knee pain  --- Monitor for sedation given oxycodone usage  ----Continue Cymbalta     Previous PE  ---apixaban per above    CKD 3a  -avoid nephrotoxins  -BMP in AM    Postop pulmonary insufficiency  -likely due to sedation  -wean O2, now down to 2L    HTN  -home lisinopril, lasix  -IV hydralazine PRN         DVT Prophylaxis: High risk. Eliquis  Diet: Advance Diet as Tolerated: Regular Diet Adult  Discharge Instruction - Regular Diet Adult    Reyes Catheter: Not present  Lines: None     Cardiac Monitoring: None  Code Status: Full Code      Clinically Significant Risk Factors                         # Overweight: Estimated body mass index is 27.54 kg/m  as calculated from the following:    Height as of this encounter: 1.588 m (5' 2.5\").    Weight as of this encounter: 69.4 kg (153 lb)., PRESENT ON ADMISSION     "     Disposition Plan   Disposition: TCU     Expected Discharge Date: 03/08/2023    Discharge Delays: OT Disposition recs needed         Medically ready to discharge today: No     The patient's care was discussed with the Patient.    Shyla Babcock MD  Hospitalist Service  [facility]  Securely message with PrintToPeer (more info)  Text page via eyefactive Paging/Directory   ______________________________________________________________________      Physical Exam   Vital Signs: Temp: 97.9  F (36.6  C) Temp src: Oral BP: (!) 140/65 Pulse: 67   Resp: 16 SpO2: 92 % O2 Device: Nasal cannula Oxygen Delivery: 2 LPM  Weight: 153 lbs 0 oz  General: in no apparent distress and non-toxic alert female lying in hospital bed, somewhat [place]  HEENT: Head normocephalic atraumatic, oral mucosa moist. Sclerae anicteric  Skin: No rashes or lesions  Extremities: Trace ankle edema bilaterally  Psych: Normal affect, mood euthymic  Neuro: CNII-XII grossly intact, moving all 4 extremities        Medical Decision Making               Data   Recent Results (from the past 12 hour(s))   Hemoglobin    Collection Time: 03/06/23  5:00 AM   Result Value Ref Range    Hemoglobin 11.2 (L) 11.7 - 15.7 g/dL   Extra Red Top Tube    Collection Time: 03/06/23  5:00 AM   Result Value Ref Range    Hold Specimen JIC    Glucose by meter    Collection Time: 03/06/23  6:18 AM   Result Value Ref Range    GLUCOSE BY METER POCT 135 (H) 70 - 99 mg/dL     Interval History   Patient is doing okay today.  Complains that she had severe pain in her right knee when she tries to move it, this is not new.  They have been working on this as an outpatient and she had been receiving injections and a brace.  Overall fairly comfortable lying in the bed.  She was able to sit at the side of the bed and stand up but not more than that.  Pleasant, no other complaints.  Not ready for discharge.

## 2023-03-06 NOTE — PROGRESS NOTES
"Orthopedic Progress Note      Assessment: 1 Day Post-Op  S/P Procedure(s):  HEMIARTHROPLASTY, HIP, BIPOLAR     Plan:   - Continue PT/OT  - Weightbearing status: WBAT  - Anticoagulation: restart home eliquis in addition to SCDs, whitney stockings and early ambulation.  - Pain management  - Discharge planning: TCU per OT, pending medical clearance.      Subjective:  Pain: moderate  Nausea, Vomiting:  No  Lightheadedness, Dizziness:  No  Neuro:  Patient denies new onset numbness or paresthesias  Fever, chills: No  Chest pain: No  SOB: No    Patient reports feeling well today. Patient reports pain is tolerable with current pain regimen. Patient eating and drinking well. Patient voiding and passing gas however no BM. Was up and ambulating with therapies, doing well. All questions/concerns answered.      Objective:  BP (!) 156/69 (BP Location: Left arm)   Pulse 58   Temp 97.9  F (36.6  C) (Oral)   Resp 16   Ht 1.588 m (5' 2.5\")   Wt 69.4 kg (153 lb)   SpO2 95%   BMI 27.54 kg/m    The patient is Alert and awake. Patient is sitting in bed and appears comfortable.   Sensation is intact.  Dorsiflexion and plantar flexion is intact.  Dorsalis pedis pulse intact. Skin warm and well perfused.   Compartments are soft and non-tender.   The incision is covered. Dressing C/D/I.    No drain in place    Pertinent Labs   Lab Results: personally reviewed.   Lab Results   Component Value Date    INR 1.33 (H) 03/04/2023    INR 1.37 (H) 08/23/2022    INR 1.50 (H) 06/05/2019     Lab Results   Component Value Date    WBC 6.0 03/05/2023    HGB 11.2 (L) 03/06/2023    HCT 42.9 03/05/2023     (H) 03/05/2023     03/05/2023     Lab Results   Component Value Date     03/05/2023    CO2 22 03/05/2023         Report completed by:  Kiana Obregon PA-C, BOLA  Date: 3/6/2023  Time: 12:41 PM  [facility]        "

## 2023-03-06 NOTE — PLAN OF CARE
Patient alert and oriented. Having some hip pain due to surgery. PRN dilaudid given x 1 and PRN oxycodone given x 1 as well as scheduled tylenol. They were effective. Patient was a 2 assist for transfers.  Used the commode x 1 and was up in the chair following PT/OT.  Larissa Ibarra RN     Problem: Plan of Care - These are the overarching goals to be used throughout the patient stay.    Goal: Absence of Hospital-Acquired Illness or Injury  Outcome: Progressing  Intervention: Identify and Manage Fall Risk  Recent Flowsheet Documentation  Taken 3/6/2023 0930 by Larissa Ibarra RN  Safety Promotion/Fall Prevention:   activity supervised   bed alarm on   chair alarm on   lighting adjusted   mobility aid in reach   nonskid shoes/slippers when out of bed  Goal: Optimal Comfort and Wellbeing  Outcome: Progressing     Problem: Risk for Delirium  Goal: Optimal Coping  Outcome: Progressing  Goal: Improved Behavioral Control  Outcome: Progressing  Intervention: Minimize Safety Risk  Recent Flowsheet Documentation  Taken 3/6/2023 0930 by Larissa Ibarra RN  Enhanced Safety Measures: bed alarm set  Goal: Improved Attention and Thought Clarity  Outcome: Progressing  Goal: Improved Sleep  Outcome: Progressing     Problem: Hip Fracture Medical Management  Goal: Optimal Coping with Change in Health Status  Outcome: Progressing  Goal: Absence of Bleeding  Outcome: Progressing  Goal: Effective Bowel Elimination  Outcome: Progressing  Goal: Baseline Cognitive Function Maintained  Outcome: Progressing  Goal: Absence of Embolism  Outcome: Progressing  Goal: Fracture Stability  Outcome: Progressing  Goal: Optimal Functional Performance  Outcome: Progressing  Intervention: Promote Optimal Functional Status  Recent Flowsheet Documentation  Taken 3/6/2023 0930 by Larissa Ibarra RN  Activity Management: activity adjusted per tolerance  Goal: Optimal Pain Control and Function  Outcome: Progressing  Goal: Effective Urinary  Elimination  Outcome: Progressing     Problem: Heart Failure Comorbidity  Goal: Maintenance of Heart Failure Symptom Control  Outcome: Progressing  Intervention: Maintain Heart Failure Management  Recent Flowsheet Documentation  Taken 3/6/2023 0930 by Larissa Ibarra RN  Medication Review/Management: medications reviewed     Problem: Hypertension Comorbidity  Goal: Blood Pressure in Desired Range  Outcome: Progressing  Intervention: Maintain Blood Pressure Management  Recent Flowsheet Documentation  Taken 3/6/2023 0930 by Larissa Ibarra RN  Medication Review/Management: medications reviewed   Goal Outcome Evaluation:

## 2023-03-06 NOTE — PLAN OF CARE
Problem: Risk for Delirium  Goal: Improved Sleep  Outcome: Progressing     Problem: Hip Fracture Medical Management  Goal: Effective Urinary Elimination  Outcome: Progressing     Problem: Pain Acute  Goal: Optimal Pain Control and Function  Outcome: Progressing  Intervention: Develop Pain Management Plan  Recent Flowsheet Documentation  Taken 3/6/2023 0417 by Toña Daley RN  Pain Management Interventions:   medication (see MAR)   repositioned  Taken 3/6/2023 0108 by Toña Daley RN  Pain Management Interventions:   medication (see MAR)   cold applied   repositioned  Intervention: Prevent or Manage Pain  Recent Flowsheet Documentation  Taken 3/6/2023 0500 by Toña Daley RN  Medication Review/Management: medications reviewed  Taken 3/6/2023 0108 by Toña Daley RN  Medication Review/Management: medications reviewed   Goal Outcome Evaluation:  Pt very painful at the start of the shift.  Reported burning in nature.  Dilaudid iv given x1 with good effect.  The rest of the night pain was tolerable and managed with prn oxycodone and tylenol.  Dressing to hip clean dry and intact.  Abductor wedge in place.  Pt voiding in small amounts with purewick, bladder scan 427.

## 2023-03-06 NOTE — PROGRESS NOTES
"Physical Therapy        03/06/23 7339   Appointment Info   Signing Clinician's Name / Credentials (PT) Margret Talbert DPDEMARCO   Living Environment   People in Home alone   Current Living Arrangements independent living facility   Home Accessibility no concerns   Living Environment Comments extended tub bench   Self-Care   Usual Activity Tolerance good   Current Activity Tolerance fair   Regular Exercise Yes   Activity/Exercise Type walking   Exercise Amount/Frequency   (\"2700 steps per day\")   Equipment Currently Used at Home walker, rolling;cane, straight  (cane in home, 4WW out of home)   Fall history within last six months yes   Number of times patient has fallen within last six months 1   Activity/Exercise/Self-Care Comment independent with ADLs, laundry, some cooking (friends also bring meals), has groceries delivered. pt still has a car, last drove about 2 months ago   General Information   Onset of Illness/Injury or Date of Surgery 03/04/23   Referring Physician Naina Guzmán PA-C   Patient/Family Therapy Goals Statement (PT) none stated   Pertinent History of Current Problem (include personal factors and/or comorbidities that impact the POC) right hip hemiarthroplasty after fracture from fall   Existing Precautions/Restrictions 90 degree hip flexion;no hip ADD past midline;no hip IR;no pivoting or twisting;fall   Weight-Bearing Status - RLE weight-bearing as tolerated   Cognition   Orientation Status (Cognition) oriented x 4   Pain Assessment   Patient Currently in Pain Yes, see Vital Sign flowsheet   Integumentary/Edema   Integumentary/Edema no deficits were identifed   Posture    Posture Protracted shoulders   Range of Motion (ROM)   Range of Motion ROM deficits secondary to surgical procedure   Strength (Manual Muscle Testing)   Strength (Manual Muscle Testing) Deficits observed during functional mobility   Bed Mobility   Comment, (Bed Mobility) modAx2 with step-by-step instructions for sequencing and hip " precautions   Transfers   Comment, (Transfers) minAx2 sit to stand   Gait/Stairs (Locomotion)   Comment, (Gait/Stairs) 1 step forward/back. pt unable to bear weight on RLE or clear left foot   Balance   Balance Comments requires UE support for standing balance   Sensory Examination   Sensory Perception patient reports no sensory changes   Coordination   Coordination no deficits were identified   Muscle Tone   Muscle Tone no deficits were identified   Clinical Impression   Criteria for Skilled Therapeutic Intervention Yes, treatment indicated   PT Diagnosis (PT) gait instability   Influenced by the following impairments pain, weakness, post-op restrictions   Functional limitations due to impairments limited household mobility   Clinical Presentation (PT Evaluation Complexity) Stable/Uncomplicated   Clinical Presentation Rationale presents as medically diagnosed   Clinical Decision Making (Complexity) moderate complexity   Planned Therapy Interventions (PT) balance training;bed mobility training;gait training;home exercise program;neuromuscular re-education;patient/family education;strengthening;transfer training;progressive activity/exercise   Anticipated Equipment Needs at Discharge (PT) walker, rolling   Risk & Benefits of therapy have been explained evaluation/treatment results reviewed;care plan/treatment goals reviewed;participants voiced agreement with care plan;participants included;patient   PT Total Evaluation Time   PT Eval, Moderate Complexity Minutes (02610) 10   Physical Therapy Goals   PT Frequency Daily   PT Predicted Duration/Target Date for Goal Attainment 03/13/23   PT Goals Bed Mobility;Transfers;Gait   PT: Bed Mobility Minimal assist;Supine to/from sit   PT: Transfers Supervision/stand-by assist;Sit to/from stand;Assistive device   PT: Gait Supervision/stand-by assist;25 feet;Rolling walker   Interventions   Interventions Quick Adds Therapeutic Activity   Therapeutic Activity   Therapeutic  Activities: dynamic activities to improve functional performance Minutes (92134) 10   Symptoms Noted During/After Treatment Increased pain   Treatment Detail/Skilled Intervention pt ed on hip precautions, positioning EOB. modA scooting forward on bed and backward on chair. minAx2 sit<>stand. Slowly pivoted from bed to chair with step-by-step cues for walker and foot placement, sequencing, WB   PT Discharge Planning   PT Plan hip protocol   PT Discharge Recommendation (DC Rec) Transitional Care Facility   PT Rationale for DC Rec assist of 2   PT Brief overview of current status minAx2, bed to chair   Total Session Time   Timed Code Treatment Minutes 10   Total Session Time (sum of timed and untimed services) 20       Pt was educated on the role of physical therapy in their care, and indicated understanding.      Margret Talbert, DPT 3/6/2023

## 2023-03-07 ENCOUNTER — APPOINTMENT (OUTPATIENT)
Dept: PHYSICAL THERAPY | Facility: HOSPITAL | Age: 88
DRG: 522 | End: 2023-03-07
Payer: COMMERCIAL

## 2023-03-07 ENCOUNTER — APPOINTMENT (OUTPATIENT)
Dept: OCCUPATIONAL THERAPY | Facility: HOSPITAL | Age: 88
DRG: 522 | End: 2023-03-07
Payer: COMMERCIAL

## 2023-03-07 LAB
ANION GAP SERPL CALCULATED.3IONS-SCNC: 6 MMOL/L (ref 7–15)
BUN SERPL-MCNC: 15.3 MG/DL (ref 8–23)
CALCIUM SERPL-MCNC: 8.9 MG/DL (ref 8.2–9.6)
CHLORIDE SERPL-SCNC: 100 MMOL/L (ref 98–107)
CREAT SERPL-MCNC: 0.84 MG/DL (ref 0.51–0.95)
DEPRECATED HCO3 PLAS-SCNC: 31 MMOL/L (ref 22–29)
GFR SERPL CREATININE-BSD FRML MDRD: 65 ML/MIN/1.73M2
GLUCOSE SERPL-MCNC: 123 MG/DL (ref 70–99)
HGB BLD-MCNC: 11.2 G/DL (ref 11.7–15.7)
POTASSIUM SERPL-SCNC: 4.3 MMOL/L (ref 3.4–5.3)
SODIUM SERPL-SCNC: 137 MMOL/L (ref 136–145)

## 2023-03-07 PROCEDURE — 250N000009 HC RX 250

## 2023-03-07 PROCEDURE — 97530 THERAPEUTIC ACTIVITIES: CPT | Mod: GO

## 2023-03-07 PROCEDURE — 250N000013 HC RX MED GY IP 250 OP 250 PS 637: Performed by: FAMILY MEDICINE

## 2023-03-07 PROCEDURE — 250N000013 HC RX MED GY IP 250 OP 250 PS 637: Performed by: PHYSICIAN ASSISTANT

## 2023-03-07 PROCEDURE — 36415 COLL VENOUS BLD VENIPUNCTURE: CPT | Performed by: PHYSICIAN ASSISTANT

## 2023-03-07 PROCEDURE — 99232 SBSQ HOSP IP/OBS MODERATE 35: CPT | Performed by: HOSPITALIST

## 2023-03-07 PROCEDURE — 80048 BASIC METABOLIC PNL TOTAL CA: CPT | Performed by: HOSPITALIST

## 2023-03-07 PROCEDURE — 120N000001 HC R&B MED SURG/OB

## 2023-03-07 PROCEDURE — 97116 GAIT TRAINING THERAPY: CPT | Mod: GP

## 2023-03-07 PROCEDURE — 85018 HEMOGLOBIN: CPT | Performed by: PHYSICIAN ASSISTANT

## 2023-03-07 PROCEDURE — 97530 THERAPEUTIC ACTIVITIES: CPT | Mod: GP

## 2023-03-07 RX ORDER — LIDOCAINE 40 MG/G
CREAM TOPICAL
Status: COMPLETED | OUTPATIENT
Start: 2023-03-07 | End: 2023-03-07

## 2023-03-07 RX ADMIN — Medication 3 MG: at 00:31

## 2023-03-07 RX ADMIN — ACETAMINOPHEN 975 MG: 325 TABLET ORAL at 11:39

## 2023-03-07 RX ADMIN — ACETAMINOPHEN 975 MG: 325 TABLET ORAL at 04:16

## 2023-03-07 RX ADMIN — OXYCODONE HYDROCHLORIDE 5 MG: 5 TABLET ORAL at 18:54

## 2023-03-07 RX ADMIN — FUROSEMIDE 20 MG: 20 TABLET ORAL at 08:47

## 2023-03-07 RX ADMIN — AMIODARONE HYDROCHLORIDE 100 MG: 100 TABLET ORAL at 08:47

## 2023-03-07 RX ADMIN — DULOXETINE HYDROCHLORIDE 60 MG: 60 CAPSULE, DELAYED RELEASE PELLETS ORAL at 08:47

## 2023-03-07 RX ADMIN — SENNOSIDES AND DOCUSATE SODIUM 1 TABLET: 50; 8.6 TABLET ORAL at 20:30

## 2023-03-07 RX ADMIN — SENNOSIDES AND DOCUSATE SODIUM 1 TABLET: 50; 8.6 TABLET ORAL at 08:47

## 2023-03-07 RX ADMIN — POTASSIUM CHLORIDE 20 MEQ: 1500 TABLET, EXTENDED RELEASE ORAL at 08:47

## 2023-03-07 RX ADMIN — ACETAMINOPHEN 975 MG: 325 TABLET ORAL at 20:30

## 2023-03-07 RX ADMIN — OXYCODONE HYDROCHLORIDE 5 MG: 5 TABLET ORAL at 00:31

## 2023-03-07 RX ADMIN — OXYCODONE HYDROCHLORIDE 5 MG: 5 TABLET ORAL at 09:38

## 2023-03-07 RX ADMIN — LIDOCAINE: 40 CREAM TOPICAL at 20:30

## 2023-03-07 RX ADMIN — OXYCODONE HYDROCHLORIDE 5 MG: 5 TABLET ORAL at 23:54

## 2023-03-07 RX ADMIN — APIXABAN 2.5 MG: 2.5 TABLET, FILM COATED ORAL at 08:47

## 2023-03-07 RX ADMIN — APIXABAN 2.5 MG: 2.5 TABLET, FILM COATED ORAL at 20:30

## 2023-03-07 RX ADMIN — LISINOPRIL 2.5 MG: 2.5 TABLET ORAL at 08:47

## 2023-03-07 RX ADMIN — POLYETHYLENE GLYCOL 3350 17 G: 17 POWDER, FOR SOLUTION ORAL at 08:47

## 2023-03-07 ASSESSMENT — ACTIVITIES OF DAILY LIVING (ADL)
ADLS_ACUITY_SCORE: 35
ADLS_ACUITY_SCORE: 34
ADLS_ACUITY_SCORE: 30
ADLS_ACUITY_SCORE: 33
ADLS_ACUITY_SCORE: 34
ADLS_ACUITY_SCORE: 35
ADLS_ACUITY_SCORE: 35
ADLS_ACUITY_SCORE: 30
ADLS_ACUITY_SCORE: 30
ADLS_ACUITY_SCORE: 32
ADLS_ACUITY_SCORE: 34
ADLS_ACUITY_SCORE: 30

## 2023-03-07 NOTE — PROGRESS NOTES
"Orthopedic Progress Note      Assessment: 2 Day Post-Op  S/P Procedure(s):  HEMIARTHROPLASTY, HIP, BIPOLAR     Plan:   - Continue PT/OT  - Weightbearing status: WBAT  - Anticoagulation: restart home eliquis in addition to SCDs, whitney stockings and early ambulation.  - Pain management  - Discharge planning: OK to discharge from orthopedic standpoint. Likely discharge to TCU today.       Subjective:  Pain: moderate  Nausea, Vomiting:  No  Lightheadedness, Dizziness:  No  Neuro:  Patient denies new onset numbness or paresthesias  Fever, chills: No  Chest pain: No  SOB: No    Patient reports feeling well today. Patient reports pain is tolerable with current pain regimen. Patient eating and drinking well. All questions/concerns answered.      Objective:  BP (!) 140/65 (BP Location: Right arm, Patient Position: Semi-Sears's, Cuff Size: Adult Regular)   Pulse 65   Temp 98  F (36.7  C) (Oral)   Resp 16   Ht 1.588 m (5' 2.5\")   Wt 72 kg (158 lb 11.7 oz)   SpO2 97%   BMI 28.57 kg/m    The patient is Alert and awake. Patient is sitting in bed and appears comfortable.   Sensation is intact.  Dorsiflexion and plantar flexion is intact.  Dorsalis pedis pulse intact. Skin warm and well perfused.   Compartments are soft and non-tender.   The incision is covered. Dressing C/D/I.    No drain in place    Pertinent Labs   Lab Results: personally reviewed.   Lab Results   Component Value Date    INR 1.33 (H) 03/04/2023    INR 1.37 (H) 08/23/2022    INR 1.50 (H) 06/05/2019     Lab Results   Component Value Date    WBC 6.0 03/05/2023    HGB 11.2 (L) 03/07/2023    HCT 42.9 03/05/2023     (H) 03/05/2023     03/05/2023     Lab Results   Component Value Date     03/07/2023    CO2 31 (H) 03/07/2023         Report completed by:  Kiana Obregon PA-C, BOLA  Date: 3/6/2023  Time: 12:41 PM  [facility]        "

## 2023-03-07 NOTE — PROGRESS NOTES
"SPIRITUAL HEALTH SERVICES Progress Note  [facility]     Saw pt Gladys Ramirez per unit staff request. Og Lauren requested.      Patient/Family Understanding of Illness and Goals of Care - Sr. Graham and her friend also from community, Sr. Kebede, are aware of the fall's injuries and the upcoming rehab.  Sr. Graham's mood is positive, ever energized and grateful.       Distress and Loss - Pt speaks of her fall as a prayer answered. She had been asking God to send someone to help her more easily walk. It wasn't Mikey or Shlomo, it was the medical team at [facility].      Strengths, Coping, and Resources - Jovanna and her Christian community are central to her hope and acceptance. She has long time companions to accompany her in physical therapy. Sr. Graham mentioned that when you get to her age and break a hip, often you never leave TCU or get home again. \"I'll just have to see how it goes.\"      Meaning, Beliefs, and Spirituality  - Sr. Graham's Jainism jovanna and her lifetime of experiencing God's presence create a solid foundation of \"Letting Go, Letting God.\"      Plan of Care - Spiritual Care is available as needed or requested.    Dominique Boudreaux      Office: [phone] (for non-urgent requests)  Please Vocera or page through [facility] for time-sensitive requests  "

## 2023-03-07 NOTE — PLAN OF CARE
Problem: Plan of Care - These are the overarching goals to be used throughout the patient stay.    Goal: Optimal Comfort and Wellbeing  Outcome: Progressing  Intervention: Monitor Pain and Promote Comfort  Recent Flowsheet Documentation  Taken 3/7/2023 0031 by Toña Daley RN  Pain Management Interventions:    medication (see MAR)    repositioned     Problem: Hip Fracture Medical Management  Goal: Effective Urinary Elimination  Outcome: Progressing     Problem: Pain Acute  Goal: Optimal Pain Control and Function  Intervention: Prevent or Manage Pain  Recent Flowsheet Documentation  Taken 3/7/2023 0032 by Toña Daley RN  Medication Review/Management: medications reviewed   Goal Outcome Evaluation:  Pt bed mobility much improved from previous night.  Pt able to help roll side to side.  Pain increases with movement but is relieved with rest.  Tylenol and oxycodone used for pain management this shift.  Pt voiding in larger amounts this shift.  Dressing clean dry and intact.  Pt on 1 liter oxygen overnight and sats were in the high 90's.  Pt inadvertently removed nasal cannula from nose while asleep.  Sats did decrease to 83% on room air while sleeping. Oximeter monitoring on for closer management of sats while asleep.

## 2023-03-07 NOTE — PROGRESS NOTES
[facility]    Medicine Progress Note - Hospitalist Service    Date of Admission:  3/4/2023    Assessment & Plan              Gladys Ramirez is a 89 year old female with history of nonischemic cardiomyopathy, awaiting ICD placement, chronic right knee pain presented for evaluation after a fall found to have right hip fracture. Hospital Day: 4     Right femoral neck fracture  --- Secondary to fall  -now 2 Days Post-Op from hemiarthroplasty by Dr Martinez, doing well postop  --- per surgeon ok to resume apixaban  --- monitoring Volume status closely secondary to cardiomyopathy with reduced EF     Closed head injury  --- Initial CT negative  --- Repeat head CT was ok     Atrial fibrillation  --- Continue amiodarone  --- back on apixaban as above     Nonischemic cardiomyopathy  --- Follows with Dr. Holley  ----Recently saw EP and planning to schedule ICD placement  --- EF 25 to 30%  --- Monitor volume status closely after surgery  --- Continue home lisinopril, lasix  ---monitor I/O and weights CLOSELY after surgery. Weight is up a bit, but edema seems stable, not hypoxic. Continue daily weights     Osteoarthritis with chronic right knee pain  --- Monitor for sedation given oxycodone usage  ----Continue Cymbalta     Previous PE  ---apixaban per above    CKD 3a  -avoid nephrotoxins  -Cr better than her usual baseline    Postop pulmonary insufficiency  -likely due to atelectasis  -wean O2, now on 1.5L NC  -Doing well with IS    HTN  -home lisinopril, lasix  -IV hydralazine PRN         DVT Prophylaxis: High risk. Eliquis  Diet: Advance Diet as Tolerated: Regular Diet Adult  Discharge Instruction - Regular Diet Adult  Diet    Reyes Catheter: Not present  Lines: None     Cardiac Monitoring: None  Code Status: Full Code      Clinically Significant Risk Factors                         # Overweight: Estimated body mass index is 28.57 kg/m  as calculated from the following:    Height as of this  "encounter: 1.588 m (5' 2.5\").    Weight as of this encounter: 72 kg (158 lb 11.7 oz)., PRESENT ON ADMISSION         Disposition Plan   Disposition: TCU     Expected Discharge Date: 03/08/2023, 11:00 AM  Discharge Delays: Insurance Authorization needed         Medically ready to discharge today: No     The patient's care was discussed with the Patient.    Shyla Babcock MD  Hospitalist Service  [facility]  Securely message with [employer] (more info)  Text page via [employer] Paging/Directory   ______________________________________________________________________      Physical Exam   Vital Signs: Temp: 98  F (36.7  C) Temp src: Oral BP: (!) 140/65 Pulse: 65   Resp: 16 SpO2: 97 % O2 Device: Nasal cannula Oxygen Delivery: 1.5 LPM  Weight: 158 lbs 11.7 oz  General: in no apparent distress and non-toxic alert female lying in hospital bed, somewhat [place]  HEENT: Head normocephalic atraumatic, oral mucosa moist. Sclerae anicteric  Skin: No rashes or lesions  Extremities: Trace ankle edema bilaterally  Psych: Normal affect, mood euthymic  Neuro: CNII-XII grossly intact, moving all 4 extremities        Medical Decision Making               Data   Recent Results (from the past 12 hour(s))   Hemoglobin    Collection Time: 03/07/23  5:06 AM   Result Value Ref Range    Hemoglobin 11.2 (L) 11.7 - 15.7 g/dL   Basic metabolic panel    Collection Time: 03/07/23  5:06 AM   Result Value Ref Range    Sodium 137 136 - 145 mmol/L    Potassium 4.3 3.4 - 5.3 mmol/L    Chloride 100 98 - 107 mmol/L    Carbon Dioxide (CO2) 31 (H) 22 - 29 mmol/L    Anion Gap 6 (L) 7 - 15 mmol/L    Urea Nitrogen 15.3 8.0 - 23.0 mg/dL    Creatinine 0.84 0.51 - 0.95 mg/dL    Calcium 8.9 8.2 - 9.6 mg/dL    Glucose 123 (H) 70 - 99 mg/dL    GFR Estimate 65 >60 mL/min/1.73m2     Interval History   Patient is doing okay today.  Complains of pain in her right hip and asked me to reposition her ice pack.  Patient has just received some Tylenol, waiting for " this to take effect.  Noting some ankle edema.  She is otherwise doing okay.  Plan is TCU tomorrow.

## 2023-03-07 NOTE — PLAN OF CARE
Problem: Plan of Care - These are the overarching goals to be used throughout the patient stay.    Goal: Plan of Care Review  Flowsheets (Taken 3/7/2023 9659)  Plan of Care Reviewed With: patient   Goal Outcome Evaluation:      Plan of Care Reviewed With: patient     Patient alert x4 with occasional periods of forgetfulness.  Transfers with the assist of (1) using walker and transfer belt. PT/OT scheduled. Right hip incision clean dry and intact. CMS+. Pain managed with scheduled tylenol and prn oxycodone.Ice packs applied. Up in chair for all meals. Regular diet with 100% intake. SAO2 95% 1.5L via nc. Lungs clear. Patient does her IS independently throughout the day=1500. Patient desats to 85% on r/a. Discharge to TCU on Wednesday at 11am.  Mari Coleman RN

## 2023-03-08 ENCOUNTER — APPOINTMENT (OUTPATIENT)
Dept: PHYSICAL THERAPY | Facility: HOSPITAL | Age: 88
DRG: 522 | End: 2023-03-08
Payer: COMMERCIAL

## 2023-03-08 VITALS
HEIGHT: 63 IN | OXYGEN SATURATION: 99 % | DIASTOLIC BLOOD PRESSURE: 80 MMHG | SYSTOLIC BLOOD PRESSURE: 175 MMHG | WEIGHT: 158.73 LBS | RESPIRATION RATE: 18 BRPM | HEART RATE: 79 BPM | BODY MASS INDEX: 28.12 KG/M2 | TEMPERATURE: 98 F

## 2023-03-08 DIAGNOSIS — F51.01 PRIMARY INSOMNIA: Primary | ICD-10-CM

## 2023-03-08 DIAGNOSIS — Z76.0 ENCOUNTER FOR MEDICATION REFILL: ICD-10-CM

## 2023-03-08 LAB
ALBUMIN UR-MCNC: 50 MG/DL
APPEARANCE UR: ABNORMAL
BACTERIA #/AREA URNS HPF: ABNORMAL /HPF
BILIRUB UR QL STRIP: NEGATIVE
COLOR UR AUTO: YELLOW
GLUCOSE UR STRIP-MCNC: NEGATIVE MG/DL
HGB UR QL STRIP: ABNORMAL
KETONES UR STRIP-MCNC: NEGATIVE MG/DL
LEUKOCYTE ESTERASE UR QL STRIP: NEGATIVE
NITRATE UR QL: NEGATIVE
PH UR STRIP: 6.5 [PH] (ref 5–7)
RBC URINE: >182 /HPF
SP GR UR STRIP: 1.01 (ref 1–1.03)
UROBILINOGEN UR STRIP-MCNC: <2 MG/DL
WBC URINE: 0 /HPF

## 2023-03-08 PROCEDURE — 97530 THERAPEUTIC ACTIVITIES: CPT | Mod: GP

## 2023-03-08 PROCEDURE — 250N000013 HC RX MED GY IP 250 OP 250 PS 637: Performed by: PHYSICIAN ASSISTANT

## 2023-03-08 PROCEDURE — 99239 HOSP IP/OBS DSCHRG MGMT >30: CPT | Performed by: HOSPITALIST

## 2023-03-08 PROCEDURE — 97116 GAIT TRAINING THERAPY: CPT | Mod: GP

## 2023-03-08 PROCEDURE — 81003 URINALYSIS AUTO W/O SCOPE: CPT | Performed by: HOSPITALIST

## 2023-03-08 PROCEDURE — 250N000013 HC RX MED GY IP 250 OP 250 PS 637: Performed by: FAMILY MEDICINE

## 2023-03-08 RX ORDER — ZOLPIDEM TARTRATE 6.25 MG/1
TABLET, FILM COATED, EXTENDED RELEASE ORAL
Qty: 10 TABLET | Refills: 0 | Status: SHIPPED | OUTPATIENT
Start: 2023-03-08 | End: 2023-03-08 | Stop reason: DRUGHIGH

## 2023-03-08 RX ORDER — ZOLPIDEM TARTRATE 6.25 MG/1
6.25 TABLET, FILM COATED, EXTENDED RELEASE ORAL AT BEDTIME
COMMUNITY
Start: 2023-03-08 | End: 2023-03-31

## 2023-03-08 RX ORDER — POLYETHYLENE GLYCOL 3350 17 G/17G
17 POWDER, FOR SOLUTION ORAL DAILY
Qty: 510 G | Refills: 0 | Status: SHIPPED | OUTPATIENT
Start: 2023-03-08 | End: 2023-09-18

## 2023-03-08 RX ORDER — LIDOCAINE 4 G/G
1 PATCH TOPICAL EVERY 24 HOURS
Qty: 10 PATCH | Refills: 0 | Status: SHIPPED | OUTPATIENT
Start: 2023-03-08 | End: 2023-12-20

## 2023-03-08 RX ADMIN — DULOXETINE HYDROCHLORIDE 60 MG: 60 CAPSULE, DELAYED RELEASE PELLETS ORAL at 08:47

## 2023-03-08 RX ADMIN — POLYETHYLENE GLYCOL 3350 17 G: 17 POWDER, FOR SOLUTION ORAL at 08:46

## 2023-03-08 RX ADMIN — ACETAMINOPHEN 975 MG: 325 TABLET ORAL at 12:59

## 2023-03-08 RX ADMIN — ACETAMINOPHEN 975 MG: 325 TABLET ORAL at 06:05

## 2023-03-08 RX ADMIN — AMIODARONE HYDROCHLORIDE 100 MG: 100 TABLET ORAL at 08:48

## 2023-03-08 RX ADMIN — FUROSEMIDE 20 MG: 20 TABLET ORAL at 08:47

## 2023-03-08 RX ADMIN — OXYCODONE HYDROCHLORIDE 5 MG: 5 TABLET ORAL at 06:05

## 2023-03-08 RX ADMIN — APIXABAN 2.5 MG: 2.5 TABLET, FILM COATED ORAL at 08:48

## 2023-03-08 RX ADMIN — LISINOPRIL 2.5 MG: 2.5 TABLET ORAL at 08:48

## 2023-03-08 RX ADMIN — POTASSIUM CHLORIDE 20 MEQ: 1500 TABLET, EXTENDED RELEASE ORAL at 08:47

## 2023-03-08 ASSESSMENT — ACTIVITIES OF DAILY LIVING (ADL)
ADLS_ACUITY_SCORE: 35
ADLS_ACUITY_SCORE: 33
ADLS_ACUITY_SCORE: 35
ADLS_ACUITY_SCORE: 33

## 2023-03-08 NOTE — PROGRESS NOTES
Care Management Discharge Note    Discharge Date: 03/08/2023       Discharge Disposition: Transitional Care    Discharge Services: Transportation Services    Discharge DME: None    Discharge Transportation: W/C transport     Private pay costs discussed: private room/amenity fees and transportation costs    PAS Confirmation Code:  ([id number])  Patient/family educated on Medicare website which has current facility and service quality ratings: no    Education Provided on the Discharge Plan:    Persons Notified of Discharge Plans: Pt, nurse, county   Patient/Family in Agreement with the Plan: yes    Handoff Referral Completed: Yes    Additional Information:  Pt medically cleared and ready to discharge.  Orders written and sent to accepting facility. Transportation set up and pt notified of costs for wheelchair transport to facility previously.  Pt will be going to [facility] at 1:30 pm today. Orders faxed via Eulalio Mc RN

## 2023-03-08 NOTE — PROGRESS NOTES
Pt ready for [place] [facility] [facility]. Discharge per Hugo Cullen at 13:30.

## 2023-03-08 NOTE — PLAN OF CARE
"  Problem: Plan of Care - These are the overarching goals to be used throughout the patient stay.    Goal: Plan of Care Review  Description: The Plan of Care Review/Shift note should be completed every shift.  The Outcome Evaluation is a brief statement about your assessment that the patient is improving, declining, or no change.  This information will be displayed automatically on your shift note.  Outcome: Progressing  Goal: Patient-Specific Goal (Individualized)  Description: You can add care plan individualizations to a care plan. Examples of Individualization might be:  \"Parent requests to be called daily at 9am for status\", \"I have a hard time hearing out of my right ear\", or \"Do not touch me to wake me up as it startles me\".  Outcome: Progressing  Goal: Absence of Hospital-Acquired Illness or Injury  Outcome: Progressing  Intervention: Identify and Manage Fall Risk  Recent Flowsheet Documentation  Taken 3/8/2023 0100 by Roxanne Gilman RN  Safety Promotion/Fall Prevention:   activity supervised   assistive device/personal items within reach   bed alarm on   lighting adjusted   nonskid shoes/slippers when out of bed   patient and family education  Intervention: Prevent Skin Injury  Recent Flowsheet Documentation  Taken 3/8/2023 0100 by Roxanne Gilman RN  Body Position: position changed independently  Goal: Optimal Comfort and Wellbeing  Outcome: Progressing  Goal: Readiness for Transition of Care  Outcome: Progressing     Problem: Risk for Delirium  Goal: Optimal Coping  Outcome: Progressing  Goal: Improved Behavioral Control  Outcome: Progressing  Intervention: Minimize Safety Risk  Recent Flowsheet Documentation  Taken 3/8/2023 0100 by Roxanne Gilman RN  Enhanced Safety Measures: bed alarm set  Goal: Improved Attention and Thought Clarity  Outcome: Progressing  Intervention: Maximize Cognitive Function  Recent Flowsheet Documentation  Taken 3/8/2023 0100 by Roxanne Gilman RN  Sensory Stimulation Regulation: " quiet environment promoted  Reorientation Measures: clock in view  Goal: Improved Sleep  Outcome: Progressing     Problem: Hip Fracture Medical Management  Goal: Optimal Coping with Change in Health Status  Outcome: Progressing  Goal: Absence of Bleeding  Outcome: Progressing  Goal: Effective Bowel Elimination  Outcome: Progressing  Goal: Baseline Cognitive Function Maintained  Outcome: Progressing  Intervention: Maximize Cognitive Function  Recent Flowsheet Documentation  Taken 3/8/2023 0100 by Roxanne Gilman RN  Reorientation Measures: clock in view  Goal: Absence of Embolism  Outcome: Progressing  Goal: Fracture Stability  Outcome: Progressing  Goal: Optimal Functional Performance  Outcome: Progressing  Intervention: Promote Optimal Functional Status  Recent Flowsheet Documentation  Taken 3/8/2023 0100 by Roxanne Gilman RN  Activity Management:   activity adjusted per tolerance   up ad thiago  Goal: Optimal Pain Control and Function  Outcome: Progressing  Goal: Effective Urinary Elimination  Outcome: Progressing     Problem: Pain Acute  Goal: Optimal Pain Control and Function  Outcome: Progressing  Intervention: Prevent or Manage Pain  Recent Flowsheet Documentation  Taken 3/8/2023 0100 by Roxanne Gilman RN  Sensory Stimulation Regulation: quiet environment promoted  Medication Review/Management: medications reviewed     Problem: Heart Failure Comorbidity  Goal: Maintenance of Heart Failure Symptom Control  Outcome: Progressing  Intervention: Maintain Heart Failure Management  Recent Flowsheet Documentation  Taken 3/8/2023 0100 by Roxanne Gilman RN  Medication Review/Management: medications reviewed     Problem: Hypertension Comorbidity  Goal: Blood Pressure in Desired Range  Outcome: Progressing  Intervention: Maintain Blood Pressure Management  Recent Flowsheet Documentation  Taken 3/8/2023 0100 by Roxanne Gilman RN  Medication Review/Management: medications reviewed     Problem: Osteoarthritis Comorbidity  Goal:  Maintenance of Osteoarthritis Symptom Control  Outcome: Progressing  Intervention: Maintain Osteoarthritis Symptom Control  Recent Flowsheet Documentation  Taken 3/8/2023 0100 by Roxanne Gilman, RN  Activity Management:   activity adjusted per tolerance   up ad thiago  Medication Review/Management: medications reviewed   Goal Outcome Evaluation:  Pt complained of pain X2 this shift, 3-4/10, prn oxycodone given along with scheduled tylenol. Pt is sleeping well at this time, no signs of pain. Pts urine output was 450cc and appeared brown in color. Pt is set to discharge today at 11am.

## 2023-03-08 NOTE — PLAN OF CARE
Physical Therapy Discharge Summary    Reason for therapy discharge:    Discharged to TCU.    Progress towards therapy goal(s). See goals on Care Plan in [facility] electronic health record for goal details.  Goals partially met.  Barriers to achieving goals:   discharge from facility.    Therapy recommendation(s):    Further recommended therapy is related to documented deficits, and is necessary to maximize functional independence in order for patient to return to prior level of function.      Margret Talbert, RC 3/8/2023

## 2023-03-08 NOTE — PLAN OF CARE
Problem: Plan of Care - These are the overarching goals to be used throughout the patient stay.    Goal: Plan of Care Review  Description: The Plan of Care Review/Shift note should be completed every shift.  The Outcome Evaluation is a brief statement about your assessment that the patient is improving, declining, or no change.  This information will be displayed automatically on your shift note.  Outcome: Progressing   Goal Outcome Evaluation:       Pt c/o frequent voiding. Urine tea colored and blood tinged. Bladder scanned pt for >800 ml's.  notified and hawkins placed and UA sent. Right leg rotating inward. PA notified. Abductor pillow applied to pt. Pt denies pain. Report given to [facility].

## 2023-03-08 NOTE — PROGRESS NOTES
"Orthopedic Progress Note      Assessment: 3 Day Post-Op  S/P Procedure(s):  HEMIARTHROPLASTY, HIP, BIPOLAR     Plan:   - Continue PT/OT per hip fracture protocol  - Weightbearing status: WBAT; posterior approach precautions  - Anticoagulation: restart home eliquis in addition to SCDs, whitney stockings and early ambulation.  - Pain management  - Discharge planning: OK to discharge from orthopedic standpoint. Likely discharge to TCU today.       Subjective:  Pain: moderate  Nausea, Vomiting:  No  Lightheadedness, Dizziness:  No  Neuro:  Patient denies new onset numbness or paresthesias  Fever, chills: No  Chest pain: No  SOB: No    Patient reports feeling well today. Patient reports pain is tolerable with current pain regimen. Patient eating and drinking well. Reyes was placed today 2/2 urinary retention. All questions/concerns answered.      Objective:  BP (!) 175/80 (BP Location: Left arm)   Pulse 79   Temp 98  F (36.7  C) (Oral)   Resp 18   Ht 1.588 m (5' 2.5\")   Wt 72 kg (158 lb 11.7 oz)   SpO2 99%   BMI 28.57 kg/m    The patient is Alert and awake. Patient is sitting in bed and appears comfortable.   Sensation is intact.  Dorsiflexion and plantar flexion is intact.  Dorsalis pedis pulse intact. Skin warm and well perfused.   Compartments are soft and non-tender.   The incision is covered. Dressing C/D/I.    No drain in place    Pertinent Labs   Lab Results: personally reviewed.   Lab Results   Component Value Date    INR 1.33 (H) 03/04/2023    INR 1.37 (H) 08/23/2022    INR 1.50 (H) 06/05/2019     Lab Results   Component Value Date    WBC 6.0 03/05/2023    HGB 11.2 (L) 03/07/2023    HCT 42.9 03/05/2023     (H) 03/05/2023     03/05/2023     Lab Results   Component Value Date     03/07/2023    CO2 31 (H) 03/07/2023         Report completed by:  Kiana Obregon PA-C, BOLA  Date: March 8, 2023  11:14 AM  [facility]        "

## 2023-03-08 NOTE — SIGNIFICANT EVENT
Significant Event Note    Time of event: 7:12 PM March 7, 2023    Description of event:  Called to pt bedside for ongoing L sided chest pain that has been present since she had surgery 3/5 (2 days ago). This pain has been located just below the L breast and worsens w/ deep inspiration. She denies any nausea, vomiting, sob, or palpitations. She is concerned that this may be another blood clot, I reassured pt that this is unlikely on Apixaban and w/ vitals all WNL. On exam pt is resting comfortably. Her pain is reproducible on palpation of the sternum and left anterior chest wall. Vitals are all reassuringly WNL. Lungs are clear to ausculation bilaterally.     Plan:  - Topical lidocaine ordered for likely msk pain    Discussed with: bedside nurse    Julio Narayanan MD

## 2023-03-08 NOTE — PLAN OF CARE
Occupational Therapy Discharge Summary    Reason for therapy discharge:    Discharged to transitional care facility.    Progress towards therapy goal(s). See goals on Care Plan in [facility] electronic health record for goal details.  Goals not met.  Barriers to achieving goals:   discharge from facility.    Therapy recommendation(s):    Continued therapy is recommended.  Rationale/Recommendations:  recommend continued OT services to promote ability to perform ADLs, IADLs, and functional mobility.

## 2023-03-08 NOTE — PROGRESS NOTES
"Patient began to complain of a \"sharp achy\" discomfort to left chest area. \"It just comes and goes\" per patient. When asked if this is new stated \"No, it began after surgery. VSS. Denies short of breath. Lung sounds clear and diminished. QO=[id number]. Notified house Officer who did come to see the patient. Medicated cream ordered. Oxycodone administered earlier this evening. Will monitor.  Mari Coleman RN  "

## 2023-03-08 NOTE — DISCHARGE SUMMARY
[facility] MEDICINE  DISCHARGE SUMMARY     Primary Care Physician: Margret Flores  Admission Date: 3/4/2023   Discharge Provider: Shyla Babcock MD Discharge Date: 3/8/2023   Diet:   Active Diet and Nourishment Order   Procedures     Advance Diet as Tolerated: Regular Diet Adult     Discharge Instruction - Regular Diet Adult     Diet       Code Status: Full Code   Activity: DCACTIVITY: Activity as tolerated        Condition at Discharge: Good     REASON FOR PRESENTATION(See Admission Note for Details)   fall    PRINCIPAL & ACTIVE DISCHARGE DIAGNOSES     Principal Problem:    Hip fracture, right, closed, initial encounter (H)  Active Problems:    Localized Primary Osteoarthritis Of The Left Knee    Benign essential hypertension    HFrEF (heart failure with reduced ejection fraction) (H)    Stage 3a chronic kidney disease (CKD) (H)    Persistent atrial fibrillation (H)    Anticoagulated    Fall from ground level      PENDING LABS     Unresulted Labs Ordered in the Past 30 Days of this Admission     No orders found from 2/2/2023 to 3/5/2023.          PROCEDURES ( this hospitalization only)      Procedure(s):  HEMIARTHROPLASTY, HIP, BIPOLAR    RECOMMENDATIONS TO OUTPATIENT PROVIDER FOR F/U VISIT     Follow-up Appointments     Follow Up Care      Follow-up with your Surgeon Team in 10-14 days as previously scheduled   for wound check. If you do not have an appointment, call [phone] to   follow up with Dr. Martinez.         Follow Up and recommended labs and tests      Follow up with [facility] physician.  The following labs/tests are   recommended: basic metabolic profile in 1 week.  Cardiology in 1 month.         Physical Therapy Instructions      Begin physical therapy within one week following surgery.             DISPOSITION     Skilled Nursing Facility    SUMMARY OF HOSPITAL COURSE:      Gladys Ramirez is a 89 year old female with history of nonischemic cardiomyopathy,  atrial fibrillation, awaiting ICD placement, chronic right knee pain on opioids, previous PE presented for evaluation after a fall found to have right hip fracture. Hospital Day: 5     Right femoral neck fracture  --- Secondary to fall  -now 3 Days Post-Op from hemiarthroplasty by Dr Martinez, doing well postop  --- on apixaban  --- Follow-up with orthopedics in 2 weeks     Nonischemic cardiomyopathy  --- Follows with Dr. Holley  ----Recently saw EP and planning to schedule ICD placement  --- EF 25 to 30%  --- Needs daily weight monitoring at TCU.  Discharge weight is 158 pounds  --- Continue home lisinopril, lasix    Rib pain  -Reproducible on palpation  -Symptoms tending to fluctuate  -Lidocaine patch     Urine retention  -Urinalysis checked, no UTI  -Was retaining 700 cc urine  -Likely due to postop status, immobility, opioids  -Placed Reyes and can attempt void trial at TCU in 1 week     Discharge Medications with Med changes:     Current Discharge Medication List      START taking these medications    Details   Lidocaine (LIDOCARE) 4 % Patch Place 1 patch onto the skin every 24 hours Apply as needed to painful area of intact skin. To prevent lidocaine toxicity, patient should be patch free for 12 hrs daily.  Qty: 10 patch, Refills: 0    Associated Diagnoses: Chest wall pain      polyethylene glycol (MIRALAX) 17 GM/Dose powder Take 17 g by mouth daily  Qty: 510 g, Refills: 0    Associated Diagnoses: Hip fracture, right, closed, initial encounter (H); Therapeutic opioid induced constipation      senna-docusate (SENOKOT-S/PERICOLACE) 8.6-50 MG tablet Take 1-2 tablets by mouth 2 times daily as needed for constipation Take while on oral narcotics to prevent or treat constipation.  Qty: 30 tablet, Refills: 0    Comments: While taking narcotics  Associated Diagnoses: Hip fracture, right, closed, initial encounter (H)         CONTINUE these medications which have CHANGED    Details   acetaminophen (TYLENOL) 325 MG  tablet Take 2 tablets (650 mg) by mouth every 4 hours as needed for other (mild pain)  Qty: 100 tablet, Refills: 0    Associated Diagnoses: Hip fracture, right, closed, initial encounter (H)      apixaban ANTICOAGULANT (ELIQUIS) 2.5 MG tablet Take 1 tablet (2.5 mg) by mouth 2 times daily  Qty: 60 tablet, Refills: 0    Associated Diagnoses: Paroxysmal atrial fibrillation (H)      oxyCODONE (ROXICODONE) 5 MG tablet Take 0.5-1 tablets (2.5-5 mg) by mouth every 4 hours as needed for moderate to severe pain (MDD 4 tabs)  Qty: 26 tablet, Refills: 0    Associated Diagnoses: Hip fracture, right, closed, initial encounter (H)      zolpidem ER (AMBIEN CR) 6.25 MG CR tablet Take 1 and 1/4 tablet by mouth at bedtime  Qty: 10 tablet, Refills: 0    Associated Diagnoses: Encounter for medication refill         CONTINUE these medications which have NOT CHANGED    Details   amiodarone (PACERONE) 200 MG tablet Take 0.5 tablets (100 mg) by mouth daily  Qty: 45 tablet, Refills: 3    Comments: Dose strength change. Please remind Sister to split tablet.  Associated Diagnoses: Paroxysmal atrial fibrillation (H)      cholecalciferol, vitamin D3, (VITAMIN D3) 2,000 unit Tab [CHOLECALCIFEROL, VITAMIN D3, (VITAMIN D3) 2,000 UNIT TAB] Take 1 tablet (2,000 Units total) by mouth daily.  Qty: 90 tablet, Refills: 3    Comments: Replaces weekly ergo 50,000 units  Associated Diagnoses: Vitamin D deficiency      diclofenac (FLECTOR) 1.3 % patch Externally apply 1 patch topically 2 times daily  Qty: 180 patch, Refills: 1    Comments: If allowed by insurance please fill as a 90 days supply, if not 30 days Silvestre ok. DV  Associated Diagnoses: Primary osteoarthritis of right knee      DULoxetine (CYMBALTA) 60 MG capsule Take 1 capsule (60 mg) by mouth daily    Associated Diagnoses: Arthrosis of foot, unspecified laterality      furosemide (LASIX) 20 MG tablet Take 1 tablet (20 mg) by mouth daily.  Qty: 90 tablet, Refills: 0    Associated Diagnoses: Dyspnea  on exertion      KLOR-CON 20 MEQ CR tablet Take 1 tablet (20 mEq) by mouth daily.  Qty: 90 tablet, Refills: 0    Associated Diagnoses: Dyspnea on exertion      lisinopril (ZESTRIL) 2.5 MG tablet Take 1 tablet (2.5 mg) by mouth daily  Qty: 90 tablet, Refills: 4      naloxone (NARCAN) 4 MG/0.1ML nasal spray Spray 1 spray (4 mg) into one nostril alternating nostrils as needed for opioid reversal every 2-3 minutes until assistance arrives  Qty: 0.2 mL, Refills: 0    Associated Diagnoses: High risk medication use               Consults     ORTHOPEDIC SURGERY IP CONSULT  RESPIRATORY CARE IP CONSULT  PHYSICAL THERAPY ADULT IP CONSULT  OCCUPATIONAL THERAPY ADULT IP CONSULT  HOSPITALIST IP CONSULT  CARE MANAGEMENT / SOCIAL WORK IP CONSULT  HOSPITALIST IP CONSULT  PHYSICAL THERAPY ADULT IP CONSULT  OCCUPATIONAL THERAPY ADULT IP CONSULT      SIGNIFICANT IMAGING FINDINGS     Results for orders placed or performed during the hospital encounter of 03/04/23   CT Head w/o Contrast    Impression    IMPRESSION:     1. Senescent changes and sequelae of chronic microangiopathy without acute intracranial abnormality.   XR Femur Right 2 Views    Impression    IMPRESSION:     Subtle cortical irregularity at the lateral aspect of the femoral head neck junction, which could reflect a nondisplaced femoral neck fracture. MRI is recommended for further evaluation given osteopenia. Mild to moderate degenerative changes of the hips.   Additional degenerative changes of the lower lumbar spine and sacroiliac joints.      NOTE: ABNORMAL REPORT    THE DICTATION ABOVE DESCRIBES AN ABNORMALITY FOR WHICH FOLLOW-UP IS NEEDED.    XR Knee Right 3 Views    Impression    IMPRESSION: Moderate tricompartmental osteoarthrosis. Intra-articular loose bodies. No definite acute fracture within the limitations of osteopenia. Chondrocalcinosis.   XR Pelvis 1/2 Views    Impression    IMPRESSION:     Subtle cortical irregularity at the lateral aspect of the femoral  "head neck junction, which could reflect a nondisplaced femoral neck fracture. MRI is recommended for further evaluation given osteopenia. Mild to moderate degenerative changes of the hips.   Additional degenerative changes of the lower lumbar spine and sacroiliac joints.      NOTE: ABNORMAL REPORT    THE DICTATION ABOVE DESCRIBES AN ABNORMALITY FOR WHICH FOLLOW-UP IS NEEDED.    MR Hip Right w/o Contrast    Impression    IMPRESSION:  1.  Acute nondisplaced right femoral neck fracture centered in the subcapital region.  2.  Moderate right hip joint effusion.  3.  Mild degenerative changes of the right hip.     CT Head w/o Contrast    Impression    IMPRESSION:  1.  No CT evidence for acute intracranial process.  2.  Brain atrophy and presumed chronic microvascular ischemic changes as above.   XR Pelvis w Hip Port Right 1 View    Impression    IMPRESSION: Postoperative changes right hip implant for treatment of a femoral neck fracture. Hardware is well seated.       SIGNIFICANT LABORATORY FINDINGS     See emr    Discharge Orders        Reason for your hospital stay    Right Hip Fracture     When to call - Contact Surgeon Team    You may experience symptoms that require follow-up before your scheduled appointment. Refer to the \"Stoplight Tool\" for instructions on when to contact your Surgeon Team if you are concerned about pain control, blood clots, constipation, or if you are unable to urinate.     When to call - Reach out to Urgent Care    If you are not able to reach your Surgeon Team and you need immediate care, go to the Orthopedic Walk-in Clinic or Urgent Care at your Surgeon's office.  Do NOT go to the Emergency Room unless you have shortness of breath, chest pain, or other signs of a medical emergency.     When to call - Reasons to Call 911    Call 911 immediately if you experience sudden-onset chest pain, arm weakness/numbness, slurred speech, or shortness of breath     Discharge Instruction - Breathing " exercises    Perform breathing exercises using your Incentive Spirometer 10 times per hour while awake for 2 weeks.     Symptoms - Fever Management    A low grade fever can be expected after surgery.  Use acetaminophen (TYLENOL) as needed for fever management.  Contact your Surgeon Team if you have a fever greater than 101.5 F, chills, and/or night sweats.     Symptoms - Constipation management    Constipation (hard, dry bowel movements) is expected after surgery due to the combination of being less active, the anesthetic, and the opioid pain medication.  You can do the following to help reduce constipation:  ~  FLUIDS:  Drink clear liquids (water or Gatorade), or juice (apple/prune).  ~  DIET:  Eat a fiber rich diet.    ~  ACTIVITY:  Get up and move around several times a day.  Increase your activity as you are able.  MEDICATIONS:  Reduce the risk of constipation by starting medications before you are constipated.  You can take Miralax   (1 packet as directed) and/or a stool softener (Senokot 1-2 tablets 1-2 times a day).  If you already have constipation and these medications are not working, you can get magnesium citrate and use as directed.  If you continue to have constipation you can try an over the counter suppository or enema.  Call your Surgeon Team if it has been greater than 3 days since your last bowel movement.     Symptoms - Reduced Urine Output    Changes in the amount of fluids you drank before and after surgery may result in problems urinating.  It is important to stay well-hydrated after surgery and drink plenty of water. If it has been greater than 8 hours since you have urinated despite drinking plenty of water, call your Surgeon Team.     Activity - Exercises to prevent blood clots    Unless otherwise directed by your Surgeon team, perform the following exercises at least three times per day for the first four weeks after surgery to prevent blood clots in your legs: 1) Point and flex your feet  "(Ankle Pumps), 2) Move your ankle around in big circles, 3) Wiggle your toes, 4) Walk, even for short distances, several times a day, will help decrease the risk of blood clots.     Comfort and Pain Management - Pain after Surgery    Pain after surgery is normal and expected.  You will have some amount of pain for several weeks after surgery.  Your pain will improve with time.  There are several things you can do to help reduce your pain including: rest, ice, elevation, and using pain medications as needed. Contact your Surgeon Team if you have pain that persists or worsens after surgery despite rest, ice, elevation, and taking your medication(s) as prescribed. Contact your Surgeon Team if you have new numbness, tingling, or weakness in your operative extremity.     Comfort and Pain Management - Swelling after Surgery    Swelling and/or bruising of the surgical extremity is common and may persist for several months after surgery. In addition to frequent icing and elevation, gentle compressive support with an ACE wrap or tubigrip may help with swelling. Apply compression regularly, removing at least twice daily to perform skin checks. Contact your Surgeon Team if your swelling increases and is NOT associated with an increase in your activity level, or if your swelling increases and is associated with redness and pain.     Comfort and Pain Management - LOWER Extremity Elevation    Swelling is expected for several months after surgery. This type of swelling is usually associated with gravity and activity, and can be improved with elevation.   The best way to do this is to get your \"toes above your nose\" by laying down and placing several pillows lengthwise under your calf and heel. When elevating your leg keep your knee completely straight. Perform this elevation as often as possible especially for the first two weeks after surgery.     Comfort and Pain Management - Cold therapy    Ice can be used to control swelling " and discomfort after surgery. Place a thin towel over your operative site and apply the ice pack overtop. Leave ice pack in place for 20 minutes, then remove for 20 minutes. Repeat this 20 minutes on/20 minutes off routine as often as tolerated.     Medication Instructions - Acetaminophen (TYLENOL) Instructions    You were discharged with acetaminophen (TYLENOL) for pain management after surgery. Acetaminophen most effectively manages pain symptoms when it is taken on a schedule without missing doses (every four, six, or eight hours). Your Provider will prescribe a safe daily dose between 3000 - 4000 mg.  Do NOT exceed this daily dose. Most patients use acetaminophen for pain control for the first four weeks after surgery.  You can wean from this medication as your pain decreases.     Medication Instructions - Opioid Instructions (Greater than or equal to 65 years)    You were discharged with an opioid medication (hydromorphone, oxycodone, hydrocodone, or tramadol). This medication should only be taken for breakthrough pain that is not controlled with acetaminophen (TYLENOL). If you rate your pain less than 3 you do not need this medication.  Pain rating 0-3:  You do not need this medication  Pain rating 4-6:  Take 1/2 tablet every 4-6 hours as needed  Pain rating 7-10:  Take 1 tablet every 4-6 hours as needed  Do not exceed 4 tablets per day     Medication Instructions - Opioids - Tapering Instructions    In the first three days following surgery, your symptoms may warrant use of the narcotic pain medication every four to six hours as prescribed. This is normal. As your pain symptoms improve, focus your efforts on decreasing (tapering) use of narcotic medications. The most successful tapering strategy is to first, decrease the number of tablets you take every 4-6 hours to the minimum prescribed. Then, increase the amount of time between doses.  For example:  First, taper to   or 1 tablet every 4-6 hours.  Then,  taper to   or 1 tablet every 6-8 hours.  Then, taper to   or 1 tablet every 8-10 hours.  Then, taper to   or 1 tablet every 10-12 hours.  Then, taper to   or 1 tablet at bedtime.  The bedtime dose can help with comfort during sleep and is typically the last dose to be discontinued after surgery.     Medication Instructions - Muscle relaxant Medication Instructions    You were discharged with a muscle relaxer medication (hydroxyzine) that can be used in conjunction with acetaminophen (TYLENOL) and the narcotic medication to manage pain symptoms. It can also be used for anxiety, itching, and sleep.     Follow Up Care    Follow-up with your Surgeon Team in 10-14 days as previously scheduled for wound check. If you do not have an appointment, call [phone] to follow up with Dr. Martinez.     Return to Driving    Return to driving - Driving is NOT permitted until directed by your provider. Under no circumstance are you permitted to drive while using narcotic pain medications.     Return to School / Work    Return to School / Work: You may return to work/school when directed by your Provider.     Weight bearing as tolerated    Weight bearing as tolerated on your operative extremity.     Dressing / Wound Care - Wound    You have a clean dressing on your surgical wound. Dressing change instructions as follows: change your dressing daily until your follow-up appointment. Contact your Surgeon Team if you have increased redness, warmth around the surgical wound, and/or drainage from the surgical wound.     Dressing / Wound care - Shower with wound/dressing covered    You must COVER your dressing or incision with saran wrap (or any other non-permeable covering) to allow the incision to remain dry while showering.  You may shower 3 days after surgery as long as the surgical wound stays dry. Continue to cover your dressing or incision for showering until your first office visit.  You are strictly prohibited from soaking   or  "submerging the surgical wound underwater.     Dressing / Wound Care - NO Tub Bathing    Tub bathing, swimming, or any other activities that will cause your incision to be submerged in water should be avoided until allowed by your Surgeon.     Medication instructions - No pharmacologic VTE prophylaxis prescribed    Your Surgeon did not prescribe medication for anticoagulation. Restarting home Eliquis therapy.     Physical Therapy Instructions    Begin physical therapy within one week following surgery.     Activity     No flexion past 90 degrees    No bending at waist past 90 degrees.     No adduction past midline    No crossing your operative leg.     No active abduction    No lifting your operative leg to the side.     No VTE Prophylaxis    Restart home eliquis     General info for SNF    Length of Stay Estimate: Short Term Care: Estimated # of Days <30 Condition at Discharge: Improving Level of care:skilled  Rehabilitation Potential: Good Admission H&P remains valid and up-to-date: Yes Recent Chemotherapy: N/A Use Nursing Home Standing Orders: N/A     Mantoux Instructions    Give two-step Mantoux (PPD) Per Facility Policy {[id number]     Incentive Spirometry    Incentive Spirometry 10 times per hour, 4 times per day.     Reason for your hospital stay    Right hip [place]     When to call - Contact Surgeon Team    You may experience symptoms that require follow-up before your scheduled appointment. Refer to the \"Stoplight Tool\" for instructions on when to contact your Surgeon Team if you are concerned about pain control, blood clots, constipation, or if you are unable to urinate.     When to call - Reach out to Urgent Care    If you are not able to reach your Surgeon Team and you need immediate care, go to the Orthopedic Walk-in Clinic or Urgent Care at your Surgeon's office.  Do NOT go to the Emergency Room unless you have shortness of breath, chest pain, or other signs of a medical emergency.     When to call - " Reasons to Call 911    Call 911 immediately if you experience sudden-onset chest pain, arm weakness/numbness, slurred speech, or shortness of breath     Symptoms - Fever Management    A low grade fever can be expected after surgery.  Use acetaminophen (TYLENOL) as needed for fever management.  Contact your Surgeon Team if you have a fever greater than 101.5 F, chills, and/or night sweats.     Symptoms - Constipation management    Constipation (hard, dry bowel movements) is expected after surgery due to the combination of being less active, the anesthetic, and the opioid pain medication.  You can do the following to help reduce constipation:  ~  FLUIDS:  Drink clear liquids (water or Gatorade), or juice (apple/prune).  ~  DIET:  Eat a fiber rich diet.    ~  ACTIVITY:  Get up and move around several times a day.  Increase your activity as you are able.  MEDICATIONS:  Reduce the risk of constipation by starting medications before you are constipated.  You can take Miralax   (1 packet as directed) and/or a stool softener (Senokot 1-2 tablets 1-2 times a day).  If you already have constipation and these medications are not working, you can get magnesium citrate and use as directed.  If you continue to have constipation you can try an over the counter suppository or enema.  Call your Surgeon Team if it has been greater than 3 days since your last bowel movement.     Symptoms - Reduced Urine Output    Changes in the amount of fluids you drank before and after surgery may result in problems urinating.  It is important to stay well-hydrated after surgery and drink plenty of water. If it has been greater than 8 hours since you have urinated despite drinking plenty of water, call your Surgeon Team.     Activity - Exercises to prevent blood clots    Unless otherwise directed by your Surgeon team, perform the following exercises at least three times per day for the first four weeks after surgery to prevent blood clots in your  legs: 1) Point and flex your feet (Ankle Pumps), 2) Move your ankle around in big circles, 3) Wiggle your toes, 4) Walk, even for short distances, several times a day, will help decrease the risk of blood clots.     Comfort and Pain Management - Pain after Surgery    Pain after surgery is normal and expected.  You will have some amount of pain for several weeks after surgery.  Your pain will improve with time.  There are several things you can do to help reduce your pain including: rest, ice, elevation, and using pain medications as needed. Contact your Surgeon Team if you have pain that persists or worsens after surgery despite rest, ice, elevation, and taking your medication(s) as prescribed. Contact your Surgeon Team if you have new numbness, tingling, or weakness in your operative extremity.     Comfort and Pain Management - Swelling after Surgery    Swelling and/or bruising of the surgical extremity is common and may persist for several months after surgery. In addition to frequent icing and elevation, gentle compressive support with an ACE wrap or tubigrip may help with swelling. Apply compression regularly, removing at least twice daily to perform skin checks. Contact your Surgeon Team if your swelling increases and is NOT associated with an increase in your activity level, or if your swelling increases and is associated with redness and pain.     Comfort and Pain Management - Cold therapy    Ice can be used to control swelling and discomfort after surgery. Place a thin towel over your operative site and apply the ice pack overtop. Leave ice pack in place for 20 minutes, then remove for 20 minutes. Repeat this 20 minutes on/20 minutes off routine as often as tolerated.     Medication Instructions - Acetaminophen (TYLENOL) Instructions    You were discharged with acetaminophen (TYLENOL) for pain management after surgery. Acetaminophen most effectively manages pain symptoms when it is taken on a schedule  without missing doses (every four, six, or eight hours). Your Provider will prescribe a safe daily dose between 3000 - 4000 mg.  Do NOT exceed this daily dose. Most patients use acetaminophen for pain control for the first four weeks after surgery.  You can wean from this medication as your pain decreases.     Medication Instructions - NSAID Instructions    You were discharged with an anti-inflammatory medication for pain management to use in combination with acetaminophen (TYLENOL) and the narcotic pain medication.  Take this medication exactly as directed.  You should only take one anti-inflammatory at a time.  Some common anti-inflammatories include: ibuprofen (ADVIL, MOTRIN), naproxen (ALEVE, NAPROSYN), celecoxib (CELEBREX), meloxicam (MOBIC), ketorolac (TORADOL).  Take this medication with food and water.     Medication Instructions - Opioids - Tapering Instructions    In the first three days following surgery, your symptoms may warrant use of the narcotic pain medication every four to six hours as prescribed. This is normal. As your pain symptoms improve, focus your efforts on decreasing (tapering) use of narcotic medications. The most successful tapering strategy is to first, decrease the number of tablets you take every 4-6 hours to the minimum prescribed. Then, increase the amount of time between doses.  For example:  First, taper to   or 1 tablet every 4-6 hours.  Then, taper to   or 1 tablet every 6-8 hours.  Then, taper to   or 1 tablet every 8-10 hours.  Then, taper to   or 1 tablet every 10-12 hours.  Then, taper to   or 1 tablet at bedtime.  The bedtime dose can help with comfort during sleep and is typically the last dose to be discontinued after surgery.     Shower with wound/dressing covered    You must COVER your dressing or incision with saran wrap (or any other non-permeable covering) to allow the incision to remain dry while showering.  You may shower 3 days after surgery as long as the  "surgical wound stays dry. Continue to cover your dressing or incision for showering until your first office visit.  You are strictly prohibited from soaking   or submerging the surgical wound underwater.     Comfort and Pain Management - LOWER Extremity Elevation    Swelling is expected for several months after surgery. This type of swelling is usually associated with gravity and activity, and can be improved with elevation.   The best way to do this is to get your \"toes above your nose\" by laying down and placing several pillows lengthwise under your calf and heel. When elevating your leg keep your knee completely straight. Perform this elevation as often as possible especially for the first two weeks after surgery.     Medication Instructions - Opioid Instructions (Less than 65 years)    You were discharged with an opioid medication (hydromorphone, oxycodone, hydrocodone, or tramadol). This medication should only be taken for breakthrough pain that is not controlled with acetaminophen (TYLENOL). If you rate your pain less than 3 you do not need this medication.  Pain rating 0-3:  You do not need this medication.  Pain rating 4-6:  Take 1 tablet every 4-6 hours as needed  Pain rating 7-10:  Take 2 tablets every 4-6 hours as needed.  Do not exceed 6 tablets per day     General info for SNF    Length of Stay Estimate: Short Term Care: Estimated # of Days <30  Condition at Discharge: Improving  Level of care:skilled   Rehabilitation Potential: Excellent  Admission H&P remains valid and up-to-date: Yes  Recent Chemotherapy: N/A  Use Nursing Home Standing Orders: Yes  Free of communicable diseases: Yes     Mantoux instructions    Give two-step Mantoux (PPD) Per Facility Policy Yes     Reason for your hospital stay    Hip fracture     Daily weights    Call Provider for weight gain of more than 2 pounds per day or 5 pounds per week.     Follow Up and recommended labs and tests    Follow up with Nursing home physician.  " The following labs/tests are recommended: basic metabolic profile in 1 week.  Cardiology in 1 month.     Hawkins catheter    To straight gravity drainage. Remove catheter in 1 week and assess ability to void spontaneously. After hawkins removal, bladder scan after first void. If PVR <100cc, no further monitoring needed. If PVR >100cc, check bladder scans QID for 24 hours. If no void after 4 hours, check bladder scan. If bladder scan <400cc, reassess every 2h until able to void or >400cc. If bladder scan >400cc and unable to void, replace Hawkins catheter and should see Urology in clinic. Change catheter every 3 weeks and PRN for leaking or decreased urine output with signs of bladder distention.     Full Code     Physical Therapy Adult Consult    Evaluate and treat as clinically indicated.    Reason: Status Post Hip Surgery     Occupational Therapy Adult Consult    Evaluate and treat as clinically indicated.    Reason: Status Post Hip Surgery     [facility]    : DME Documentation: Describe the reason for need to support medical necessity: Impaired gait status post hip surgery. I, the undersigned, certify that the above prescribed supplies are medically necessary for this patient and is both reasonable and necessary in reference to accepted standards of medical practice in the treatment of this patient's condition and is not prescribed as a convenience.     Discharge Instruction - Regular Diet Adult    Return to your pre-surgery diet unless instructed otherwise     Diet    Follow this diet upon discharge: 2g sodium       Examination   Physical Exam   Temp:  [98  F (36.7  C)-98.4  F (36.9  C)] 98  F (36.7  C)  Pulse:  [59-79] 79  Resp:  [16-20] 18  BP: (119-178)/(58-81) 175/80  SpO2:  [97 %-100 %] 99 %  Wt Readings from Last 1 Encounters:   03/07/23 72 kg (158 lb 11.7 oz)       General: in no apparent distress and non-toxic alert female sitting in chair, somewhat [place]  HEENT: Head normocephalic atraumatic, oral mucosa moist.  Sclerae anicteric  Skin: No rashes or lesions  Extremities: Trace ankle edema bilaterally  Psych: Normal affect, mood euthymic  Neuro: CNII-XII grossly intact, moving all 4 extremities    Please see EMR for more detailed significant labs, imaging, consultant notes etc.    I, Shyla Babcock MD, personally saw the patient today and spent greater than 30 minutes discharging this patient.    Shyla Babcock MD  [facility]    CC:Margret Flores

## 2023-03-09 ENCOUNTER — LAB REQUISITION (OUTPATIENT)
Dept: LAB | Facility: CLINIC | Age: 88
End: 2023-03-09
Payer: COMMERCIAL

## 2023-03-09 ENCOUNTER — TRANSITIONAL CARE UNIT VISIT (OUTPATIENT)
Dept: GERIATRICS | Facility: CLINIC | Age: 88
End: 2023-03-09
Payer: COMMERCIAL

## 2023-03-09 VITALS
WEIGHT: 158 LBS | OXYGEN SATURATION: 93 % | HEIGHT: 62 IN | BODY MASS INDEX: 29.08 KG/M2 | HEART RATE: 76 BPM | DIASTOLIC BLOOD PRESSURE: 72 MMHG | SYSTOLIC BLOOD PRESSURE: 140 MMHG | RESPIRATION RATE: 20 BRPM | TEMPERATURE: 97.9 F

## 2023-03-09 DIAGNOSIS — R53.81 PHYSICAL DECONDITIONING: ICD-10-CM

## 2023-03-09 DIAGNOSIS — R52 PAIN MANAGEMENT: ICD-10-CM

## 2023-03-09 DIAGNOSIS — S72.001A HIP FRACTURE, RIGHT, CLOSED, INITIAL ENCOUNTER (H): Primary | ICD-10-CM

## 2023-03-09 DIAGNOSIS — Z51.81 ENCOUNTER FOR THERAPEUTIC DRUG LEVEL MONITORING: ICD-10-CM

## 2023-03-09 PROCEDURE — 99310 SBSQ NF CARE HIGH MDM 45: CPT | Performed by: NURSE PRACTITIONER

## 2023-03-09 RX ORDER — ZOLPIDEM TARTRATE 6.25 MG/1
6.25 TABLET, FILM COATED, EXTENDED RELEASE ORAL AT BEDTIME
Qty: 14 TABLET | Refills: 1 | OUTPATIENT
Start: 2023-03-09

## 2023-03-09 NOTE — PROGRESS NOTES
03/09/23 7:54 AM  Writer retrieved a voicemail (received 3/8/23 at 15:47pm) from pt's niece Jemma He, asking for an update about pt's discharge plans.  Called Jemma ([phone]) and informed her that pt discharged yesterday 3/8/23 to [facility].  Jemma said she is already aware and thanked writer for the return phone call.    Ronit Andres RN
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
## 2023-03-10 ENCOUNTER — TELEPHONE (OUTPATIENT)
Dept: GERIATRICS | Facility: CLINIC | Age: 88
End: 2023-03-10

## 2023-03-10 ENCOUNTER — PATIENT OUTREACH (OUTPATIENT)
Dept: CARE COORDINATION | Facility: CLINIC | Age: 88
End: 2023-03-10

## 2023-03-10 LAB
ANION GAP SERPL CALCULATED.3IONS-SCNC: 11 MMOL/L (ref 7–15)
BASOPHILS # BLD AUTO: 0.1 10E3/UL (ref 0–0.2)
BASOPHILS NFR BLD AUTO: 1 %
BUN SERPL-MCNC: 22.1 MG/DL (ref 8–23)
CALCIUM SERPL-MCNC: 9.5 MG/DL (ref 8.2–9.6)
CHLORIDE SERPL-SCNC: 102 MMOL/L (ref 98–107)
CREAT SERPL-MCNC: 0.94 MG/DL (ref 0.51–0.95)
DEPRECATED HCO3 PLAS-SCNC: 26 MMOL/L (ref 22–29)
EOSINOPHIL # BLD AUTO: 0.3 10E3/UL (ref 0–0.7)
EOSINOPHIL NFR BLD AUTO: 4 %
ERYTHROCYTE [DISTWIDTH] IN BLOOD BY AUTOMATED COUNT: 14.3 % (ref 10–15)
GFR SERPL CREATININE-BSD FRML MDRD: 57 ML/MIN/1.73M2
GLUCOSE SERPL-MCNC: 125 MG/DL (ref 70–99)
HCT VFR BLD AUTO: 37.2 % (ref 35–47)
HGB BLD-MCNC: 11.3 G/DL (ref 11.7–15.7)
IMM GRANULOCYTES # BLD: 0.1 10E3/UL
IMM GRANULOCYTES NFR BLD: 2 %
LYMPHOCYTES # BLD AUTO: 1.4 10E3/UL (ref 0.8–5.3)
LYMPHOCYTES NFR BLD AUTO: 18 %
MAGNESIUM SERPL-MCNC: 2.2 MG/DL (ref 1.7–2.3)
MCH RBC QN AUTO: 29 PG (ref 26.5–33)
MCHC RBC AUTO-ENTMCNC: 30.4 G/DL (ref 31.5–36.5)
MCV RBC AUTO: 95 FL (ref 78–100)
MONOCYTES # BLD AUTO: 0.9 10E3/UL (ref 0–1.3)
MONOCYTES NFR BLD AUTO: 11 %
NEUTROPHILS # BLD AUTO: 5.2 10E3/UL (ref 1.6–8.3)
NEUTROPHILS NFR BLD AUTO: 64 %
NRBC # BLD AUTO: 0 10E3/UL
NRBC BLD AUTO-RTO: 0 /100
PLATELET # BLD AUTO: 337 10E3/UL (ref 150–450)
POTASSIUM SERPL-SCNC: 4.1 MMOL/L (ref 3.4–5.3)
RBC # BLD AUTO: 3.9 10E6/UL (ref 3.8–5.2)
SODIUM SERPL-SCNC: 139 MMOL/L (ref 136–145)
WBC # BLD AUTO: 8 10E3/UL (ref 4–11)

## 2023-03-10 PROCEDURE — 80048 BASIC METABOLIC PNL TOTAL CA: CPT | Mod: ORL | Performed by: FAMILY MEDICINE

## 2023-03-10 PROCEDURE — P9603 ONE-WAY ALLOW PRORATED MILES: HCPCS | Mod: ORL | Performed by: FAMILY MEDICINE

## 2023-03-10 PROCEDURE — 36415 COLL VENOUS BLD VENIPUNCTURE: CPT | Mod: ORL | Performed by: FAMILY MEDICINE

## 2023-03-10 PROCEDURE — 83735 ASSAY OF MAGNESIUM: CPT | Mod: ORL | Performed by: FAMILY MEDICINE

## 2023-03-10 PROCEDURE — 85025 COMPLETE CBC W/AUTO DIFF WBC: CPT | Mod: ORL | Performed by: FAMILY MEDICINE
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
## 2023-03-10 NOTE — PROGRESS NOTES
Clinic Care Coordination Contact  Care Coordination Transition Communication         Clinical Data: Patient was hospitalized at [facility] from 3/4/23 to 3/8/23with diagnosis of hip fracture.     Transition to Facility:              Facility Name: [facility]              Plan: RN/SW Care Coordinator will await notification from facility staff informing RN/SW Care Coordinator of patient's discharge plans/needs. RN/SW Care Coordinator will review chart and outreach to facility staff every 4 weeks and as needed.     DARIUS Knowles, SW  Social Work Care Coordinator

## 2023-03-10 NOTE — TELEPHONE ENCOUNTER
"[facility] Geriatrics Lab Note     Provider: JUAN DANIEL Connell  Facility: [facility]  Facility Type:  TCU    Allergies   Allergen Reactions     Trazodone Shortness Of Breath and Unknown     Allergic to trazodone and deriv., Other: trouble swallowing       Clindamycin Diarrhea     C-diff     Gabapentin Other (See Comments)     \"Internal tremors\"     Temazepam Other (See Comments)     Annotation: Nightmares       Buprenorphine Palpitations     Tried patch       Labs Reviewed by provider: Heme 1, BMP, Mg     Verbal Order/Direction given by Provider: No new orders.      Provider giving Order:  JUAN DANIEL Connell    Verbal Order given to: Georgette([phone])    Ricco Voss RN  "
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
## 2023-03-13 NOTE — PROGRESS NOTES
[facility]       Patient Gladys Ramirez  MRN: [id number]        Reason for Visit     Chief Complaint   Patient presents with     RECHECK     INITIAL       Code Status     DNR / DNI    Assessment     Right femoral neck fracture status post right hemiarthroplasty  Nonischemic cardiomyopathy  History of a fall  Urinary retention  TBI  A FIB  H/O OF PE  CKD 3A  HYPOXIA  HTN  Insomnia on ambien  Generalized weakness    Plan     Pt is admitted to TCU for strengthening and rehab.  Pt admitted to TCU after undergoing rt hip/femur hemiarthroplasty surgery  Date of procedure- 3/ 5 /23   Continue with incisional cares  WBAT  Follow-up with orthopedics as scheduled  Cont PT / OT for strengthening/ gait retraining  Pain management optimized  Tylenol to be  scheduled 1 g every 8 hours  Continue narcotics prn-has prn oxycodone given  Advised aggressive icing  On APIXABAN for dvt prophylaxis  Duration to be determined by orthopedics  Tubigrip stockings recommended for management of swelling of the lower extremities     No retention currently has an indwelling Reyes catheter.  After discussing with the patient she will have a Reyes removed and she will be given a voiding trial  Since has significant cardiac history and is planning for an ICD placement but now wants to defer it till she is ambulatory  We will discuss it with cardiology  Also has upcoming eye surgery and was advised to contact the surgeon   Recheck labs  Continue with PT/OT-quite weak and bedbound currently  At baseline remains a falls risk with poor mobility because of end-stage right knee osteoarthritis.  She has been given a brace and has had injections in her knee but still continues to have difficulty ambulating due to this and fell in spite of her brace being in position as per her  She is not a candidate for knee replacement surgery as per her    History     Patient is a very pleasant 89 year old female who is admitted to TCU  Patient was  admitted post fall  She had a right femoral neck fracture and underwent a right hip hemiarthroplasty  Postoperatively discharged weightbearing as tolerated  She has a history of nonischemic cardiomyopathy and A-fib and was seen by cardiology  She is scheduled to have an ICD placement done  Discharge to the TCU      Past Medical & Surgical History     PAST MEDICAL HISTORY:   Past Medical History:   Diagnosis Date     Angina pectoris (H)      Chest pain 03/09/2017     Cough      Hyperlipidemia      Hypertension      Osteoarthritis       PAST SURGICAL HISTORY:   has a past surgical history that includes TOTAL KNEE ARTHROPLASTY (Left); appendectomy; Tonsillectomy; Laparoscopy diagnostic (general) (N/A, 11/04/2014); Basal Cell Carcinoma Excision; and Open reduction internal fixation hip bipolar (Right, 3/5/2023).      Past Social History     Reviewed,  reports that she has never smoked. She has never used smokeless tobacco. She reports current alcohol use. She reports that she does not use drugs.    Family History     Reviewed, and family history includes Cancer in her brother and sister; Heart Disease in her mother; Lung Cancer in her brother, brother, and sister; No Known Problems in her father; Rheumatic Heart Disease in her mother.    Medication List     Current Outpatient Medications   Medication     acetaminophen (TYLENOL) 325 MG tablet     amiodarone (PACERONE) 200 MG tablet     apixaban ANTICOAGULANT (ELIQUIS) 2.5 MG tablet     cholecalciferol, vitamin D3, (VITAMIN D3) 2,000 unit Tab     diclofenac (FLECTOR) 1.3 % patch     DULoxetine (CYMBALTA) 60 MG capsule     furosemide (LASIX) 20 MG tablet     KLOR-CON 20 MEQ CR tablet     Lidocaine (LIDOCARE) 4 % Patch     lisinopril (ZESTRIL) 2.5 MG tablet     naloxone (NARCAN) 4 MG/0.1ML nasal spray     oxyCODONE (ROXICODONE) 5 MG tablet     polyethylene glycol (MIRALAX) 17 GM/Dose powder     senna-docusate (SENOKOT-S/PERICOLACE) 8.6-50 MG tablet     zolpidem ER (AMBIEN  "CR) 6.25 MG CR tablet     No current facility-administered medications for this visit.          Allergies     Allergies   Allergen Reactions     Trazodone Shortness Of Breath and Unknown     Allergic to trazodone and deriv., Other: trouble swallowing       Clindamycin Diarrhea     C-diff     Gabapentin Other (See Comments)     \"Internal tremors\"     Temazepam Other (See Comments)     Annotation: Nightmares       Buprenorphine Palpitations     Tried patch       Review of Systems   A comprehensive review of 14 systems was done. Pertinent findings noted here and in history of present illness. All the rest negative.  Constitutional: Negative.  Negative for fever, chills, she has  activity change, appetite change and fatigue.   HENT: Negative for congestion and facial swelling.    Eyes: Negative for photophobia, redness and visual disturbance.   Vision is impaired  Respiratory: Negative for cough and chest tightness.    Cardiovascular: Negative for chest pain, palpitations and leg swelling.   Gastrointestinal: Negative for nausea, diarrhea, constipation, blood in stool and abdominal distention.   Genitourinary: Negative.  Was discharged with a Reyes catheter  Musculoskeletal: Reports that her hip pain is stable.  At baseline has advanced bone-on-bone arthritis of the right knee and is not a candidate for knee replacement.  She has a brace as well as has been given steroid injections recently into her knee because of this reason  Skin: Negative.    Neurological: Negative for dizziness, tremors, syncope, weakness, light-headedness and headaches.   Hematological: Does not bruise/bleed easily.   Psychiatric/Behavioral: Negative.        Physical Exam   BP (!) 152/82   Pulse 75   Temp 97.5  F (36.4  C)   Resp 16   Ht 1.575 m (5' 2\")   Wt 70.3 kg (155 lb)   SpO2 95%   BMI 28.35 kg/m       Constitutional: Oriented to person, place, and time and appears well-developed.   HEENT:  Normocephalic and atraumatic.  Eyes: " Conjunctivae and EOM are normal. Pupils are equal, round, and reactive to light. No discharge.  No scleral icterus. Nose normal. Mouth/Throat: Oropharynx is clear and moist. No oropharyngeal exudate.    Has impaired vision  NECK: Normal range of motion. Neck supple. No JVD present. No tracheal deviation present. No thyromegaly present.   CARDIOVASCULAR: Normal rate, regular rhythm and intact distal pulses.  Exam reveals no gallop and no friction rub.  Systolic murmur present.  PULMONARY: Effort normal and breath sounds normal. No respiratory distress.No Wheezing or rales.  ABDOMEN: Soft. Bowel sounds are normal. No distension and no mass.  There is no tenderness. There is no rebound and no guarding. No HSM.  MUSCULOSKELETAL: Normal range of motion. Mild kyphosis, no tenderness.  Hip incision on the right is intact with dressing noted  Right knee has end-stage osteoarthritis  LYMPH NODES: Has no cervical, supraclavicular, axillary and groin adenopathy.   NEUROLOGICAL: Alert and oriented to person, place, and time. No cranial nerve deficit.  Normal muscle tone. Coordination normal.   GENITOURINARY: Deferred exam.  Has a Reyes catheter  SKIN: Skin is warm and dry. No rash noted. No erythema. No pallor.   EXTREMITIES: No cyanosis, no clubbing, no edema. No Deformity.  PSYCHIATRIC: Normal mood, affect and behavior.  Was hallucinating and confused on admission but now improved      Lab Results     Recent Results (from the past 240 hour(s))   Basic metabolic panel    Collection Time: 03/04/23  7:05 PM   Result Value Ref Range    Sodium 138 136 - 145 mmol/L    Potassium 4.2 3.4 - 5.3 mmol/L    Chloride 101 98 - 107 mmol/L    Carbon Dioxide (CO2) 28 22 - 29 mmol/L    Anion Gap 9 7 - 15 mmol/L    Urea Nitrogen 15.5 8.0 - 23.0 mg/dL    Creatinine 1.01 (H) 0.51 - 0.95 mg/dL    Calcium 9.3 8.2 - 9.6 mg/dL    Glucose 103 (H) 70 - 99 mg/dL    GFR Estimate 52 (L) >60 mL/min/1.73m2   Hepatic function panel    Collection Time:  03/04/23  7:05 PM   Result Value Ref Range    Protein Total 6.2 (L) 6.4 - 8.3 g/dL    Albumin 3.5 3.5 - 5.2 g/dL    Bilirubin Total 0.4 <=1.2 mg/dL    Alkaline Phosphatase 151 (H) 35 - 104 U/L    AST      ALT 16 10 - 35 U/L    Bilirubin Direct <0.20 0.00 - 0.30 mg/dL   INR    Collection Time: 03/04/23  7:05 PM   Result Value Ref Range    INR 1.33 (H) 0.85 - 1.15   CBC with platelets and differential    Collection Time: 03/04/23  7:05 PM   Result Value Ref Range    WBC Count 10.0 4.0 - 11.0 10e3/uL    RBC Count 4.17 3.80 - 5.20 10e6/uL    Hemoglobin 12.5 11.7 - 15.7 g/dL    Hematocrit 37.8 35.0 - 47.0 %    MCV 91 78 - 100 fL    MCH 30.0 26.5 - 33.0 pg    MCHC 33.1 31.5 - 36.5 g/dL    RDW 13.2 10.0 - 15.0 %    Platelet Count 225 150 - 450 10e3/uL    % Neutrophils 84 %    % Lymphocytes 9 %    % Monocytes 6 %    % Eosinophils 1 %    % Basophils 0 %    % Immature Granulocytes 0 %    NRBCs per 100 WBC 0 <1 /100    Absolute Neutrophils 8.4 (H) 1.6 - 8.3 10e3/uL    Absolute Lymphocytes 0.9 0.8 - 5.3 10e3/uL    Absolute Monocytes 0.6 0.0 - 1.3 10e3/uL    Absolute Eosinophils 0.1 0.0 - 0.7 10e3/uL    Absolute Basophils 0.0 0.0 - 0.2 10e3/uL    Absolute Immature Granulocytes 0.0 <=0.4 10e3/uL    Absolute NRBCs 0.0 10e3/uL   Adult Type and Screen    Collection Time: 03/04/23  7:05 PM   Result Value Ref Range    ABO/RH(D) B POS     Antibody Screen Negative Negative    SPECIMEN EXPIRATION DATE [id number]    Basic metabolic panel    Collection Time: 03/05/23  7:26 AM   Result Value Ref Range    Sodium 138 136 - 145 mmol/L    Potassium 3.9 3.4 - 5.3 mmol/L    Chloride 104 98 - 107 mmol/L    Carbon Dioxide (CO2) 22 22 - 29 mmol/L    Anion Gap 12 7 - 15 mmol/L    Urea Nitrogen 15.9 8.0 - 23.0 mg/dL    Creatinine 0.98 (H) 0.51 - 0.95 mg/dL    Calcium 8.8 8.2 - 9.6 mg/dL    Glucose 92 70 - 99 mg/dL    GFR Estimate 54 (L) >60 mL/min/1.73m2   CBC with platelets    Collection Time: 03/05/23  7:26 AM   Result Value Ref Range    WBC  Count 6.0 4.0 - 11.0 10e3/uL    RBC Count 4.21 3.80 - 5.20 10e6/uL    Hemoglobin 12.7 11.7 - 15.7 g/dL    Hematocrit 42.9 35.0 - 47.0 %     (H) 78 - 100 fL    MCH 30.2 26.5 - 33.0 pg    MCHC 29.6 (L) 31.5 - 36.5 g/dL    RDW 13.5 10.0 - 15.0 %    Platelet Count 163 150 - 450 10e3/uL   Hemoglobin    Collection Time: 03/06/23  5:00 AM   Result Value Ref Range    Hemoglobin 11.2 (L) 11.7 - 15.7 g/dL   Extra Red Top Tube    Collection Time: 03/06/23  5:00 AM   Result Value Ref Range    Hold Specimen JIC    Glucose by meter    Collection Time: 03/06/23  6:18 AM   Result Value Ref Range    GLUCOSE BY METER POCT 135 (H) 70 - 99 mg/dL   Hemoglobin    Collection Time: 03/07/23  5:06 AM   Result Value Ref Range    Hemoglobin 11.2 (L) 11.7 - 15.7 g/dL   Basic metabolic panel    Collection Time: 03/07/23  5:06 AM   Result Value Ref Range    Sodium 137 136 - 145 mmol/L    Potassium 4.3 3.4 - 5.3 mmol/L    Chloride 100 98 - 107 mmol/L    Carbon Dioxide (CO2) 31 (H) 22 - 29 mmol/L    Anion Gap 6 (L) 7 - 15 mmol/L    Urea Nitrogen 15.3 8.0 - 23.0 mg/dL    Creatinine 0.84 0.51 - 0.95 mg/dL    Calcium 8.9 8.2 - 9.6 mg/dL    Glucose 123 (H) 70 - 99 mg/dL    GFR Estimate 65 >60 mL/min/1.73m2   UA reflex to Microscopic and Culture    Collection Time: 03/08/23 10:35 AM    Specimen: Urine, Reyes Catheter   Result Value Ref Range    Color Urine Yellow Colorless, Straw, Light Yellow, Yellow    Appearance Urine Turbid (A) Clear    Glucose Urine Negative Negative mg/dL    Bilirubin Urine Negative Negative    Ketones Urine Negative Negative mg/dL    Specific Gravity Urine 1.011 1.001 - 1.030    Blood Urine >1.0 mg/dL (A) Negative    pH Urine 6.5 5.0 - 7.0    Protein Albumin Urine 50 (A) Negative mg/dL    Urobilinogen Urine <2.0 <2.0 mg/dL    Nitrite Urine Negative Negative    Leukocyte Esterase Urine Negative Negative    Bacteria Urine Few (A) None Seen /HPF    RBC Urine >182 (H) <=2 /HPF    WBC Urine 0 <=5 /HPF   Basic metabolic panel     Collection Time: 03/10/23  9:45 AM   Result Value Ref Range    Sodium 139 136 - 145 mmol/L    Potassium 4.1 3.4 - 5.3 mmol/L    Chloride 102 98 - 107 mmol/L    Carbon Dioxide (CO2) 26 22 - 29 mmol/L    Anion Gap 11 7 - 15 mmol/L    Urea Nitrogen 22.1 8.0 - 23.0 mg/dL    Creatinine 0.94 0.51 - 0.95 mg/dL    Calcium 9.5 8.2 - 9.6 mg/dL    Glucose 125 (H) 70 - 99 mg/dL    GFR Estimate 57 (L) >60 mL/min/1.73m2   Magnesium    Collection Time: 03/10/23  9:45 AM   Result Value Ref Range    Magnesium 2.2 1.7 - 2.3 mg/dL   CBC with platelets and differential    Collection Time: 03/10/23  9:45 AM   Result Value Ref Range    WBC Count 8.0 4.0 - 11.0 10e3/uL    RBC Count 3.90 3.80 - 5.20 10e6/uL    Hemoglobin 11.3 (L) 11.7 - 15.7 g/dL    Hematocrit 37.2 35.0 - 47.0 %    MCV 95 78 - 100 fL    MCH 29.0 26.5 - 33.0 pg    MCHC 30.4 (L) 31.5 - 36.5 g/dL    RDW 14.3 10.0 - 15.0 %    Platelet Count 337 150 - 450 10e3/uL    % Neutrophils 64 %    % Lymphocytes 18 %    % Monocytes 11 %    % Eosinophils 4 %    % Basophils 1 %    % Immature Granulocytes 2 %    NRBCs per 100 WBC 0 <1 /100    Absolute Neutrophils 5.2 1.6 - 8.3 10e3/uL    Absolute Lymphocytes 1.4 0.8 - 5.3 10e3/uL    Absolute Monocytes 0.9 0.0 - 1.3 10e3/uL    Absolute Eosinophils 0.3 0.0 - 0.7 10e3/uL    Absolute Basophils 0.1 0.0 - 0.2 10e3/uL    Absolute Immature Granulocytes 0.1 <=0.4 10e3/uL    Absolute NRBCs 0.0 10e3/uL                 Electronically signed by    Silvia Fernández MD

## 2023-03-14 ENCOUNTER — TRANSITIONAL CARE UNIT VISIT (OUTPATIENT)
Dept: GERIATRICS | Facility: CLINIC | Age: 88
End: 2023-03-14
Payer: COMMERCIAL

## 2023-03-14 VITALS
OXYGEN SATURATION: 95 % | DIASTOLIC BLOOD PRESSURE: 82 MMHG | BODY MASS INDEX: 28.52 KG/M2 | HEART RATE: 75 BPM | RESPIRATION RATE: 16 BRPM | HEIGHT: 62 IN | TEMPERATURE: 97.5 F | WEIGHT: 155 LBS | SYSTOLIC BLOOD PRESSURE: 152 MMHG

## 2023-03-14 DIAGNOSIS — N18.31 STAGE 3A CHRONIC KIDNEY DISEASE (CKD) (H): ICD-10-CM

## 2023-03-14 DIAGNOSIS — I10 BENIGN ESSENTIAL HYPERTENSION: ICD-10-CM

## 2023-03-14 DIAGNOSIS — I50.20 HFREF (HEART FAILURE WITH REDUCED EJECTION FRACTION) (H): ICD-10-CM

## 2023-03-14 DIAGNOSIS — Z79.01 ANTICOAGULATED BY ANTICOAGULATION TREATMENT: ICD-10-CM

## 2023-03-14 DIAGNOSIS — S72.001A HIP FRACTURE, RIGHT, CLOSED, INITIAL ENCOUNTER (H): Primary | ICD-10-CM

## 2023-03-14 DIAGNOSIS — F51.01 PRIMARY INSOMNIA: ICD-10-CM
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
## 2023-03-14 DIAGNOSIS — I48.19 PERSISTENT ATRIAL FIBRILLATION (H): ICD-10-CM

## 2023-03-14 PROCEDURE — 99305 1ST NF CARE MODERATE MDM 35: CPT | Performed by: FAMILY MEDICINE

## 2023-03-14 NOTE — LETTER
3/14/2023        RE: Gladys Ramirez  [address]  [facility] [address]        [facility] GERIATRIC SERVICES       Patient Gladys Ramirez  MRN: [id number]        Reason for Visit     Chief Complaint   Patient presents with     RECHECK     INITIAL       Code Status     DNR / DNI    Assessment     Right femoral neck fracture status post right hemiarthroplasty  Nonischemic cardiomyopathy  History of a fall  Urinary retention  TBI  A FIB  H/O OF PE  CKD 3A  HYPOXIA  HTN  Insomnia on ambien  Generalized weakness    Plan     Pt is admitted to TCU for strengthening and rehab.  Pt admitted to TCU after undergoing rt hip/femur hemiarthroplasty surgery  Date of procedure- 3/ 5 /23   Continue with incisional cares  WBAT  Follow-up with orthopedics as scheduled  Cont PT / OT for strengthening/ gait retraining  Pain management optimized  Tylenol to be  scheduled 1 g every 8 hours  Continue narcotics prn-has prn oxycodone given  Advised aggressive icing  On APIXABAN for dvt prophylaxis  Duration to be determined by orthopedics  Tubigrip stockings recommended for management of swelling of the lower extremities     No retention currently has an indwelling Reyes catheter.  After discussing with the patient she will have a Reyes removed and she will be given a voiding trial  Since has significant cardiac history and is planning for an ICD placement but now wants to defer it till she is ambulatory  We will discuss it with cardiology  Also has upcoming eye surgery and was advised to contact the surgeon   Recheck labs  Continue with PT/OT-quite weak and bedbound currently  At baseline remains a falls risk with poor mobility because of end-stage right knee osteoarthritis.  She has been given a brace and has had injections in her knee but still continues to have difficulty ambulating due to this and fell in spite of her brace being in position as per her  She is not a candidate for knee replacement surgery as per  her    History     Patient is a very pleasant 89 year old female who is admitted to TCU  Patient was admitted post fall  She had a right femoral neck fracture and underwent a right hip hemiarthroplasty  Postoperatively discharged weightbearing as tolerated  She has a history of nonischemic cardiomyopathy and A-fib and was seen by cardiology  She is scheduled to have an ICD placement done  Discharge to the TCU      Past Medical & Surgical History     PAST MEDICAL HISTORY:   Past Medical History:   Diagnosis Date     Angina pectoris (H)      Chest pain 03/09/2017     Cough      Hyperlipidemia      Hypertension      Osteoarthritis       PAST SURGICAL HISTORY:   has a past surgical history that includes TOTAL KNEE ARTHROPLASTY (Left); appendectomy; Tonsillectomy; Laparoscopy diagnostic (general) (N/A, 11/04/2014); Basal Cell Carcinoma Excision; and Open reduction internal fixation hip bipolar (Right, 3/5/2023).      Past Social History     Reviewed,  reports that she has never smoked. She has never used smokeless tobacco. She reports current alcohol use. She reports that she does not use drugs.    Family History     Reviewed, and family history includes Cancer in her brother and sister; Heart Disease in her mother; Lung Cancer in her brother, brother, and sister; No Known Problems in her father; Rheumatic Heart Disease in her mother.    Medication List     Current Outpatient Medications   Medication     acetaminophen (TYLENOL) 325 MG tablet     amiodarone (PACERONE) 200 MG tablet     apixaban ANTICOAGULANT (ELIQUIS) 2.5 MG tablet     cholecalciferol, vitamin D3, (VITAMIN D3) 2,000 unit Tab     diclofenac (FLECTOR) 1.3 % patch     DULoxetine (CYMBALTA) 60 MG capsule     furosemide (LASIX) 20 MG tablet     KLOR-CON 20 MEQ CR tablet     Lidocaine (LIDOCARE) 4 % Patch     lisinopril (ZESTRIL) 2.5 MG tablet     naloxone (NARCAN) 4 MG/0.1ML nasal spray     oxyCODONE (ROXICODONE) 5 MG tablet     polyethylene glycol (MIRALAX)  "17 GM/Dose powder     senna-docusate (SENOKOT-S/PERICOLACE) 8.6-50 MG tablet     zolpidem ER (AMBIEN CR) 6.25 MG CR tablet     No current facility-administered medications for this visit.          Allergies     Allergies   Allergen Reactions     Trazodone Shortness Of Breath and Unknown     Allergic to trazodone and deriv., Other: trouble swallowing       Clindamycin Diarrhea     C-diff     Gabapentin Other (See Comments)     \"Internal tremors\"     Temazepam Other (See Comments)     Annotation: Nightmares       Buprenorphine Palpitations     Tried patch       Review of Systems   A comprehensive review of 14 systems was done. Pertinent findings noted here and in history of present illness. All the rest negative.  Constitutional: Negative.  Negative for fever, chills, she has  activity change, appetite change and fatigue.   HENT: Negative for congestion and facial swelling.    Eyes: Negative for photophobia, redness and visual disturbance.   Vision is impaired  Respiratory: Negative for cough and chest tightness.    Cardiovascular: Negative for chest pain, palpitations and leg swelling.   Gastrointestinal: Negative for nausea, diarrhea, constipation, blood in stool and abdominal distention.   Genitourinary: Negative.  Was discharged with a Reyes catheter  Musculoskeletal: Reports that her hip pain is stable.  At baseline has advanced bone-on-bone arthritis of the right knee and is not a candidate for knee replacement.  She has a brace as well as has been given steroid injections recently into her knee because of this reason  Skin: Negative.    Neurological: Negative for dizziness, tremors, syncope, weakness, light-headedness and headaches.   Hematological: Does not bruise/bleed easily.   Psychiatric/Behavioral: Negative.        Physical Exam   BP (!) 152/82   Pulse 75   Temp 97.5  F (36.4  C)   Resp 16   Ht 1.575 m (5' 2\")   Wt 70.3 kg (155 lb)   SpO2 95%   BMI 28.35 kg/m       Constitutional: Oriented to " person, place, and time and appears well-developed.   HEENT:  Normocephalic and atraumatic.  Eyes: Conjunctivae and EOM are normal. Pupils are equal, round, and reactive to light. No discharge.  No scleral icterus. Nose normal. Mouth/Throat: Oropharynx is clear and moist. No oropharyngeal exudate.    Has impaired vision  NECK: Normal range of motion. Neck supple. No JVD present. No tracheal deviation present. No thyromegaly present.   CARDIOVASCULAR: Normal rate, regular rhythm and intact distal pulses.  Exam reveals no gallop and no friction rub.  Systolic murmur present.  PULMONARY: Effort normal and breath sounds normal. No respiratory distress.No Wheezing or rales.  ABDOMEN: Soft. Bowel sounds are normal. No distension and no mass.  There is no tenderness. There is no rebound and no guarding. No HSM.  MUSCULOSKELETAL: Normal range of motion. Mild kyphosis, no tenderness.  Hip incision on the right is intact with dressing noted  Right knee has end-stage osteoarthritis  LYMPH NODES: Has no cervical, supraclavicular, axillary and groin adenopathy.   NEUROLOGICAL: Alert and oriented to person, place, and time. No cranial nerve deficit.  Normal muscle tone. Coordination normal.   GENITOURINARY: Deferred exam.  Has a Reyes catheter  SKIN: Skin is warm and dry. No rash noted. No erythema. No pallor.   EXTREMITIES: No cyanosis, no clubbing, no edema. No Deformity.  PSYCHIATRIC: Normal mood, affect and behavior.  Was hallucinating and confused on admission but now improved      Lab Results     Recent Results (from the past 240 hour(s))   Basic metabolic panel    Collection Time: 03/04/23  7:05 PM   Result Value Ref Range    Sodium 138 136 - 145 mmol/L    Potassium 4.2 3.4 - 5.3 mmol/L    Chloride 101 98 - 107 mmol/L    Carbon Dioxide (CO2) 28 22 - 29 mmol/L    Anion Gap 9 7 - 15 mmol/L    Urea Nitrogen 15.5 8.0 - 23.0 mg/dL    Creatinine 1.01 (H) 0.51 - 0.95 mg/dL    Calcium 9.3 8.2 - 9.6 mg/dL    Glucose 103 (H) 70 - 99  mg/dL    GFR Estimate 52 (L) >60 mL/min/1.73m2   Hepatic function panel    Collection Time: 03/04/23  7:05 PM   Result Value Ref Range    Protein Total 6.2 (L) 6.4 - 8.3 g/dL    Albumin 3.5 3.5 - 5.2 g/dL    Bilirubin Total 0.4 <=1.2 mg/dL    Alkaline Phosphatase 151 (H) 35 - 104 U/L    AST      ALT 16 10 - 35 U/L    Bilirubin Direct <0.20 0.00 - 0.30 mg/dL   INR    Collection Time: 03/04/23  7:05 PM   Result Value Ref Range    INR 1.33 (H) 0.85 - 1.15   CBC with platelets and differential    Collection Time: 03/04/23  7:05 PM   Result Value Ref Range    WBC Count 10.0 4.0 - 11.0 10e3/uL    RBC Count 4.17 3.80 - 5.20 10e6/uL    Hemoglobin 12.5 11.7 - 15.7 g/dL    Hematocrit 37.8 35.0 - 47.0 %    MCV 91 78 - 100 fL    MCH 30.0 26.5 - 33.0 pg    MCHC 33.1 31.5 - 36.5 g/dL    RDW 13.2 10.0 - 15.0 %    Platelet Count 225 150 - 450 10e3/uL    % Neutrophils 84 %    % Lymphocytes 9 %    % Monocytes 6 %    % Eosinophils 1 %    % Basophils 0 %    % Immature Granulocytes 0 %    NRBCs per 100 WBC 0 <1 /100    Absolute Neutrophils 8.4 (H) 1.6 - 8.3 10e3/uL    Absolute Lymphocytes 0.9 0.8 - 5.3 10e3/uL    Absolute Monocytes 0.6 0.0 - 1.3 10e3/uL    Absolute Eosinophils 0.1 0.0 - 0.7 10e3/uL    Absolute Basophils 0.0 0.0 - 0.2 10e3/uL    Absolute Immature Granulocytes 0.0 <=0.4 10e3/uL    Absolute NRBCs 0.0 10e3/uL   Adult Type and Screen    Collection Time: 03/04/23  7:05 PM   Result Value Ref Range    ABO/RH(D) B POS     Antibody Screen Negative Negative    SPECIMEN EXPIRATION DATE [id number]    Basic metabolic panel    Collection Time: 03/05/23  7:26 AM   Result Value Ref Range    Sodium 138 136 - 145 mmol/L    Potassium 3.9 3.4 - 5.3 mmol/L    Chloride 104 98 - 107 mmol/L    Carbon Dioxide (CO2) 22 22 - 29 mmol/L    Anion Gap 12 7 - 15 mmol/L    Urea Nitrogen 15.9 8.0 - 23.0 mg/dL    Creatinine 0.98 (H) 0.51 - 0.95 mg/dL    Calcium 8.8 8.2 - 9.6 mg/dL    Glucose 92 70 - 99 mg/dL    GFR Estimate 54 (L) >60 mL/min/1.73m2    CBC with platelets    Collection Time: 03/05/23  7:26 AM   Result Value Ref Range    WBC Count 6.0 4.0 - 11.0 10e3/uL    RBC Count 4.21 3.80 - 5.20 10e6/uL    Hemoglobin 12.7 11.7 - 15.7 g/dL    Hematocrit 42.9 35.0 - 47.0 %     (H) 78 - 100 fL    MCH 30.2 26.5 - 33.0 pg    MCHC 29.6 (L) 31.5 - 36.5 g/dL    RDW 13.5 10.0 - 15.0 %    Platelet Count 163 150 - 450 10e3/uL   Hemoglobin    Collection Time: 03/06/23  5:00 AM   Result Value Ref Range    Hemoglobin 11.2 (L) 11.7 - 15.7 g/dL   Extra Red Top Tube    Collection Time: 03/06/23  5:00 AM   Result Value Ref Range    Hold Specimen JIC    Glucose by meter    Collection Time: 03/06/23  6:18 AM   Result Value Ref Range    GLUCOSE BY METER POCT 135 (H) 70 - 99 mg/dL   Hemoglobin    Collection Time: 03/07/23  5:06 AM   Result Value Ref Range    Hemoglobin 11.2 (L) 11.7 - 15.7 g/dL   Basic metabolic panel    Collection Time: 03/07/23  5:06 AM   Result Value Ref Range    Sodium 137 136 - 145 mmol/L    Potassium 4.3 3.4 - 5.3 mmol/L    Chloride 100 98 - 107 mmol/L    Carbon Dioxide (CO2) 31 (H) 22 - 29 mmol/L    Anion Gap 6 (L) 7 - 15 mmol/L    Urea Nitrogen 15.3 8.0 - 23.0 mg/dL    Creatinine 0.84 0.51 - 0.95 mg/dL    Calcium 8.9 8.2 - 9.6 mg/dL    Glucose 123 (H) 70 - 99 mg/dL    GFR Estimate 65 >60 mL/min/1.73m2   UA reflex to Microscopic and Culture    Collection Time: 03/08/23 10:35 AM    Specimen: Urine, Reyes Catheter   Result Value Ref Range    Color Urine Yellow Colorless, Straw, Light Yellow, Yellow    Appearance Urine Turbid (A) Clear    Glucose Urine Negative Negative mg/dL    Bilirubin Urine Negative Negative    Ketones Urine Negative Negative mg/dL    Specific Gravity Urine 1.011 1.001 - 1.030    Blood Urine >1.0 mg/dL (A) Negative    pH Urine 6.5 5.0 - 7.0    Protein Albumin Urine 50 (A) Negative mg/dL    Urobilinogen Urine <2.0 <2.0 mg/dL    Nitrite Urine Negative Negative    Leukocyte Esterase Urine Negative Negative    Bacteria Urine Few (A) None  Seen /HPF    RBC Urine >182 (H) <=2 /HPF    WBC Urine 0 <=5 /HPF   Basic metabolic panel    Collection Time: 03/10/23  9:45 AM   Result Value Ref Range    Sodium 139 136 - 145 mmol/L    Potassium 4.1 3.4 - 5.3 mmol/L    Chloride 102 98 - 107 mmol/L    Carbon Dioxide (CO2) 26 22 - 29 mmol/L    Anion Gap 11 7 - 15 mmol/L    Urea Nitrogen 22.1 8.0 - 23.0 mg/dL    Creatinine 0.94 0.51 - 0.95 mg/dL    Calcium 9.5 8.2 - 9.6 mg/dL    Glucose 125 (H) 70 - 99 mg/dL    GFR Estimate 57 (L) >60 mL/min/1.73m2   Magnesium    Collection Time: 03/10/23  9:45 AM   Result Value Ref Range    Magnesium 2.2 1.7 - 2.3 mg/dL   CBC with platelets and differential    Collection Time: 03/10/23  9:45 AM   Result Value Ref Range    WBC Count 8.0 4.0 - 11.0 10e3/uL    RBC Count 3.90 3.80 - 5.20 10e6/uL    Hemoglobin 11.3 (L) 11.7 - 15.7 g/dL    Hematocrit 37.2 35.0 - 47.0 %    MCV 95 78 - 100 fL    MCH 29.0 26.5 - 33.0 pg    MCHC 30.4 (L) 31.5 - 36.5 g/dL    RDW 14.3 10.0 - 15.0 %    Platelet Count 337 150 - 450 10e3/uL    % Neutrophils 64 %    % Lymphocytes 18 %    % Monocytes 11 %    % Eosinophils 4 %    % Basophils 1 %    % Immature Granulocytes 2 %    NRBCs per 100 WBC 0 <1 /100    Absolute Neutrophils 5.2 1.6 - 8.3 10e3/uL    Absolute Lymphocytes 1.4 0.8 - 5.3 10e3/uL    Absolute Monocytes 0.9 0.0 - 1.3 10e3/uL    Absolute Eosinophils 0.3 0.0 - 0.7 10e3/uL    Absolute Basophils 0.1 0.0 - 0.2 10e3/uL    Absolute Immature Granulocytes 0.1 <=0.4 10e3/uL    Absolute NRBCs 0.0 10e3/uL                 Electronically signed by    Silvia Fernández MD                             Sincerely,        AARON Thibodeaux

## 2023-03-17 ENCOUNTER — TRANSFERRED RECORDS (OUTPATIENT)
Dept: HEALTH INFORMATION MANAGEMENT | Facility: CLINIC | Age: 88
End: 2023-03-17

## 2023-03-20 NOTE — PROGRESS NOTES
Opened PC CC program and closed TCM program. Do not open TCM program until discharge from TCU.     ARNALDO Leon   Social Work [facility] Care Coordinator   [facility]

## 2023-03-22 ENCOUNTER — DISCHARGE SUMMARY NURSING HOME (OUTPATIENT)
Dept: GERIATRICS | Facility: CLINIC | Age: 88
End: 2023-03-22
Payer: COMMERCIAL

## 2023-03-22 VITALS
OXYGEN SATURATION: 94 % | TEMPERATURE: 97.5 F | DIASTOLIC BLOOD PRESSURE: 71 MMHG | HEART RATE: 87 BPM | BODY MASS INDEX: 30.04 KG/M2 | RESPIRATION RATE: 18 BRPM | HEIGHT: 60 IN | SYSTOLIC BLOOD PRESSURE: 133 MMHG | WEIGHT: 153 LBS

## 2023-03-22 DIAGNOSIS — S72.001A HIP FRACTURE, RIGHT, CLOSED, INITIAL ENCOUNTER (H): Primary | ICD-10-CM

## 2023-03-22 DIAGNOSIS — R52 PAIN MANAGEMENT: ICD-10-CM

## 2023-03-22 DIAGNOSIS — R53.81 PHYSICAL DECONDITIONING: ICD-10-CM

## 2023-03-22 PROCEDURE — 99316 NF DSCHRG MGMT 30 MIN+: CPT | Performed by: NURSE PRACTITIONER

## 2023-03-22 NOTE — PROGRESS NOTES
[facility] GERIATRIC SERVICES  Chief Complaint   Patient presents with     Discharge Summary [facility] Medical Record Number:  [id number]  Place of Service where encounter took place:  [facility] [[id number]]  Code Status:  Full Code     HISTORY:      HPI:  Gladys Ramirez  is 89 year old (9/29/1930) undergoing physical and occupational therapy.  She is with history of nonischemic cardiomyopathy, atrial fibrillation, awaiting ICD placement, chronic right knee pain on opioids, previous PE presented for evaluation after a fall found to have right hip fracture. She is S/P right  hemiarthroplasty by Dr Martinez, on apixaban.    Today she was seen to review vital signs, labs, routine visit and a face-to-face for discharge.  She will discharge to home on 3/24/2023 with current medications and treatments.  She will have home care services PT OT home health aide.  She denied chest pain shortness of breath cough congestion constipation or diarrhea.  Her pain is controlled with current medications.  When writer first met patient she was extremely confused and trying to leave the facility multiple times and had to wear a wander guard.  Today she is completely alert and oriented x4 and pleasant.  Labs reviewed last hemoglobin 11.2 BMP and mag within normal limits her right hip incision is clean dry and intact open to air.    ALLERGIES:Trazodone, Clindamycin, Gabapentin, Temazepam, and Buprenorphine    PAST MEDICAL HISTORY:   Past Medical History:   Diagnosis Date     Angina pectoris (H)      Chest pain 03/09/2017     Cough      Hyperlipidemia      Hypertension      Osteoarthritis        PAST SURGICAL HISTORY:   has a past surgical history that includes TOTAL KNEE ARTHROPLASTY (Left); appendectomy; Tonsillectomy; Laparoscopy diagnostic (general) (N/A, 11/04/2014); Basal Cell Carcinoma Excision; and Open reduction internal fixation hip bipolar (Right, 3/5/2023).    FAMILY HISTORY: family history  includes Cancer in her brother and sister; Heart Disease in her mother; Lung Cancer in her brother, brother, and sister; No Known Problems in her father; Rheumatic Heart Disease in her mother.    SOCIAL HISTORY:  reports that she has never smoked. She has never used smokeless tobacco. She reports current alcohol use. She reports that she does not use drugs.    ROS:  Constitutional: Negative for activity change, appetite change, fatigue and fever.    HENT: Negative for congestion.    Respiratory: Negative for cough, shortness of breath and wheezing.    Cardiovascular: Negative for chest pain and leg swelling.   Gastrointestinal: Negative for abdominal distention, abdominal pain, constipation, diarrhea and nausea.   Genitourinary: Negative for dysuria.   Musculoskeletal: Negative for arthralgia. Negative for back pain.   Skin: Negative for color change and wound.  Healed right hip surgical incision  Neurological: Negative for dizziness.   Psychiatric/Behavioral: Negative for agitation, behavioral problems and confusion.     Physical Exam:  Constitutional:       Appearance: Patient is well-developed.   HENT:      Head: Normocephalic.   Eyes:      Conjunctiva/sclera: Conjunctivae normal.   Neck:      Musculoskeletal: Normal range of motion.   Cardiovascular:      Rate and Rhythm: Normal rate and regular rhythm.      Heart sounds: Normal heart sounds. No murmur.   Pulmonary:      Effort: No respiratory distress.      Breath sounds: Normal breath sounds. No wheezing or rales.   Abdominal:      General: Bowel sounds are normal. There is no distension.      Palpations: Abdomen is soft.      Tenderness: There is no abdominal tenderness.   Musculoskeletal:       Normal range of motion.     Surgical incision right hip clean dry and intact open to air   skin:General:        Skin is warm.   Neurological:         Mental Status: Patient is alert and oriented to person, she was able to say the name of the facility   psychiatric:          Behavior: No longer confused alert and oriented x4, pleasant    Vitals:/71   Pulse 87   Temp 97.5  F (36.4  C)   Resp 18   Ht 1.524 m (5')   Wt 69.4 kg (153 lb)   SpO2 94%   BMI 29.88 kg/m   and Body mass index is 29.88 kg/m .    Lab/Diagnostic data:   No results found for this or any previous visit (from the past 240 hour(s)).    MEDICATIONS:     Review of your medicines          Accurate as of March 22, 2023  9:34 AM. If you have any questions, ask your nurse or doctor.            CONTINUE these medicines which have NOT CHANGED      Dose / Directions   acetaminophen 325 MG tablet  Commonly known as: TYLENOL  Used for: Hip fracture, right, closed, initial encounter (H)      Dose: 650 mg  Take 2 tablets (650 mg) by mouth every 4 hours as needed for other (mild pain)  Quantity: 100 tablet  Refills: 0     amiodarone 200 MG tablet  Commonly known as: PACERONE  Used for: Paroxysmal atrial fibrillation (H)      Dose: 100 mg  Take 0.5 tablets (100 mg) by mouth daily  Quantity: 45 tablet  Refills: 3     apixaban ANTICOAGULANT 2.5 MG tablet  Commonly known as: ELIQUIS  Indication: Atrial Fibrillation Not Caused By A Heart Valve Problem  Used for: Paroxysmal atrial fibrillation (H)      Dose: 2.5 mg  Take 1 tablet (2.5 mg) by mouth 2 times daily  Quantity: 60 tablet  Refills: 0     cholecalciferol 50 MCG (2000 UT) tablet  Used for: Vitamin D deficiency      Dose: 1 tablet  [CHOLECALCIFEROL, VITAMIN D3, (VITAMIN D3) 2,000 UNIT TAB] Take 1 tablet (2,000 Units total) by mouth daily.  Quantity: 90 tablet  Refills: 3     diclofenac 1.3 % patch  Commonly known as: FLECTOR  Used for: Primary osteoarthritis of right knee      Dose: 1 patch  Externally apply 1 patch topically 2 times daily  Quantity: 180 patch  Refills: 1     DULoxetine 60 MG capsule  Commonly known as: CYMBALTA  Used for: Arthrosis of foot, unspecified laterality      Dose: 60 mg  Take 1 capsule (60 mg) by mouth daily  Refills: 0     furosemide 20  MG tablet  Commonly known as: LASIX  Used for: Dyspnea on exertion      Dose: 20 mg  Take 1 tablet (20 mg) by mouth daily.  Quantity: 90 tablet  Refills: 0     KLOR-CON 20 MEQ CR tablet  Used for: Dyspnea on exertion  Generic drug: potassium chloride ER      Take 1 tablet (20 mEq) by mouth daily.  Quantity: 90 tablet  Refills: 0     Lidocaine 4 % Patch  Commonly known as: LIDOCARE  Used for: Chest wall pain      Dose: 1 patch  Place 1 patch onto the skin every 24 hours Apply as needed to painful area of intact skin. To prevent lidocaine toxicity, patient should be patch free for 12 hrs daily.  Quantity: 10 patch  Refills: 0     lisinopril 2.5 MG tablet  Commonly known as: ZESTRIL      Dose: 2.5 mg  Take 1 tablet (2.5 mg) by mouth daily  Quantity: 90 tablet  Refills: 4     naloxone 4 MG/0.1ML nasal spray  Commonly known as: NARCAN  Used for: High risk medication use      Dose: 4 mg  Spray 1 spray (4 mg) into one nostril alternating nostrils as needed for opioid reversal every 2-3 minutes until assistance arrives  Quantity: 0.2 mL  Refills: 0     oxyCODONE 5 MG tablet  Commonly known as: ROXICODONE  Used for: Hip fracture, right, closed, initial encounter (H)      Dose: 2.5-5 mg  Take 0.5-1 tablets (2.5-5 mg) by mouth every 4 hours as needed for moderate to severe pain (MDD 4 tabs)  Quantity: 26 tablet  Refills: 0     polyethylene glycol 17 GM/Dose powder  Commonly known as: MIRALAX  Used for: Hip fracture, right, closed, initial encounter (H), Therapeutic opioid induced constipation      Dose: 17 g  Take 17 g by mouth daily  Quantity: 510 g  Refills: 0     senna-docusate 8.6-50 MG tablet  Commonly known as: SENOKOT-S/PERICOLACE  Used for: Hip fracture, right, closed, initial encounter (H)      Dose: 1-2 tablet  Take 1-2 tablets by mouth 2 times daily as needed for constipation Take while on oral narcotics to prevent or treat constipation.  Quantity: 30 tablet  Refills: 0     zolpidem ER 6.25 MG CR tablet  Commonly  known as: AMBIEN CR      Dose: 6.25 mg  Take 6.25 mg by mouth At Bedtime  Refills: 0            ASSESSMENT/PLAN  Encounter Diagnoses   Name Primary?     Hip fracture, right, closed, initial encounter (H) Yes     Physical deconditioning      Pain management      Right hip fracture status post right hemiarthroplasty follow-up with orthopedics, pain control    Physical deconditioning she will have home care services PT OT home health aide    Pain management Tylenol every 4 hours as needed, diclofenac patch right knee osteoarthritis as needed oxycodone    Insomnia Ambien extended release 6.25 mg at bedtime    Atrial fibrillation on amiodarone 100 mg daily, apixaban 2.5 mg twice daily    Dyspnea on exertion continue Lasix 20 mg daily    Hypertension on lisinopril      DISCHARGE PLAN/FACE TO FACE:  I certify that services are/were furnished while this patient was under the care of a physician and that a physician or an allowed non-physician practitioner (NPP), had a face-to-face encounter that meets the physician face-to-face encounter requirements. The encounter was in whole, or in part, related to the primary reason for home health. The patient is confined to his/her home and needs intermittent skilled nursing, physical therapy, speech-language pathology, or the continued need for occupational therapy. A plan of care has been established by a physician and is periodically reviewed by a physician.  Date of Face-to-Face Encounter: 3/22/2023    I certify that, based on my findings, the following services are medically necessary home health services: PT OT home health aide    My clinical findings support the need for the above skilled services because: PT OT for continued strength and endurance following recent right hip total arthroplasty, home health aide to assist with activities of daily living    This patient is homebound because: She is deconditioned easily fatigued following recent total right hip hemiarthroplasty  and will continue home therapy    The patient is, or has been, under my care and I have initiated the establishment of the plan of care. This patient will be followed by a physician who will periodically review the plan of care.    Schedule follow up visit with primary care provider within 7 days to reestablish care.    Electronically signed by: Chetna Garland CNP

## 2023-03-22 NOTE — LETTER
3/22/2023        RE: Gladys Ramirez  [address]  [facility] [place] [address]         HEALTH GERIATRIC SERVICES  Chief Complaint   Patient presents with     Discharge Summary Nursing [facility] Medical Record Number:  [id number]  Place of Service where encounter took place:  [facility] ([facility]) [[id number]]  Code Status:  Full Code     HISTORY:      HPI:  Gladys Ramirez  is 89 year old (9/29/1930) undergoing physical and occupational therapy.  She is with history of nonischemic cardiomyopathy, atrial fibrillation, awaiting ICD placement, chronic right knee pain on opioids, previous PE presented for evaluation after a fall found to have right hip fracture. She is S/P right  hemiarthroplasty by Dr Martienz, on apixaban.    Today she was seen to review vital signs, labs, routine visit and a face-to-face for discharge.  She will discharge to home on 3/24/2023 with current medications and treatments.  She will have home care services PT OT home health aide.  She denied chest pain shortness of breath cough congestion constipation or diarrhea.  Her pain is controlled with current medications.  When writer first met patient she was extremely confused and trying to leave the facility multiple times and had to wear a wander guard.  Today she is completely alert and oriented x4 and pleasant.  Labs reviewed last hemoglobin 11.2 BMP and mag within normal limits her right hip incision is clean dry and intact open to air.    ALLERGIES:Trazodone, Clindamycin, Gabapentin, Temazepam, and Buprenorphine    PAST MEDICAL HISTORY:   Past Medical History:   Diagnosis Date     Angina pectoris (H)      Chest pain 03/09/2017     Cough      Hyperlipidemia      Hypertension      Osteoarthritis        PAST SURGICAL HISTORY:   has a past surgical history that includes TOTAL KNEE ARTHROPLASTY (Left); appendectomy; Tonsillectomy; Laparoscopy diagnostic (general) (N/A, 11/04/2014); Basal Cell Carcinoma Excision; and Open  reduction internal fixation hip bipolar (Right, 3/5/2023).    FAMILY HISTORY: family history includes Cancer in her brother and sister; Heart Disease in her mother; Lung Cancer in her brother, brother, and sister; No Known Problems in her father; Rheumatic Heart Disease in her mother.    SOCIAL HISTORY:  reports that she has never smoked. She has never used smokeless tobacco. She reports current alcohol use. She reports that she does not use drugs.    ROS:  Constitutional: Negative for activity change, appetite change, fatigue and fever.    HENT: Negative for congestion.    Respiratory: Negative for cough, shortness of breath and wheezing.    Cardiovascular: Negative for chest pain and leg swelling.   Gastrointestinal: Negative for abdominal distention, abdominal pain, constipation, diarrhea and nausea.   Genitourinary: Negative for dysuria.   Musculoskeletal: Negative for arthralgia. Negative for back pain.   Skin: Negative for color change and wound.  Healed right hip surgical incision  Neurological: Negative for dizziness.   Psychiatric/Behavioral: Negative for agitation, behavioral problems and confusion.     Physical Exam:  Constitutional:       Appearance: Patient is well-developed.   HENT:      Head: Normocephalic.   Eyes:      Conjunctiva/sclera: Conjunctivae normal.   Neck:      Musculoskeletal: Normal range of motion.   Cardiovascular:      Rate and Rhythm: Normal rate and regular rhythm.      Heart sounds: Normal heart sounds. No murmur.   Pulmonary:      Effort: No respiratory distress.      Breath sounds: Normal breath sounds. No wheezing or rales.   Abdominal:      General: Bowel sounds are normal. There is no distension.      Palpations: Abdomen is soft.      Tenderness: There is no abdominal tenderness.   Musculoskeletal:       Normal range of motion.     Surgical incision right hip clean dry and intact open to air   skin:General:        Skin is warm.   Neurological:         Mental Status: Patient is  alert and oriented to person, she was able to say the name of the facility   psychiatric:         Behavior: No longer confused alert and oriented x4, pleasant    Vitals:/71   Pulse 87   Temp 97.5  F (36.4  C)   Resp 18   Ht 1.524 m (5')   Wt 69.4 kg (153 lb)   SpO2 94%   BMI 29.88 kg/m   and Body mass index is 29.88 kg/m .    Lab/Diagnostic data:   No results found for this or any previous visit (from the past 240 hour(s)).    MEDICATIONS:     Review of your medicines          Accurate as of March 22, 2023  9:34 AM. If you have any questions, ask your nurse or doctor.            CONTINUE these medicines which have NOT CHANGED      Dose / Directions   acetaminophen 325 MG tablet  Commonly known as: TYLENOL  Used for: Hip fracture, right, closed, initial encounter (H)      Dose: 650 mg  Take 2 tablets (650 mg) by mouth every 4 hours as needed for other (mild pain)  Quantity: 100 tablet  Refills: 0     amiodarone 200 MG tablet  Commonly known as: PACERONE  Used for: Paroxysmal atrial fibrillation (H)      Dose: 100 mg  Take 0.5 tablets (100 mg) by mouth daily  Quantity: 45 tablet  Refills: 3     apixaban ANTICOAGULANT 2.5 MG tablet  Commonly known as: ELIQUIS  Indication: Atrial Fibrillation Not Caused By A Heart Valve Problem  Used for: Paroxysmal atrial fibrillation (H)      Dose: 2.5 mg  Take 1 tablet (2.5 mg) by mouth 2 times daily  Quantity: 60 tablet  Refills: 0     cholecalciferol 50 MCG (2000 UT) tablet  Used for: Vitamin D deficiency      Dose: 1 tablet  [CHOLECALCIFEROL, VITAMIN D3, (VITAMIN D3) 2,000 UNIT TAB] Take 1 tablet (2,000 Units total) by mouth daily.  Quantity: 90 tablet  Refills: 3     diclofenac 1.3 % patch  Commonly known as: FLECTOR  Used for: Primary osteoarthritis of right knee      Dose: 1 patch  Externally apply 1 patch topically 2 times daily  Quantity: 180 patch  Refills: 1     DULoxetine 60 MG capsule  Commonly known as: CYMBALTA  Used for: Arthrosis of foot, unspecified  laterality      Dose: 60 mg  Take 1 capsule (60 mg) by mouth daily  Refills: 0     furosemide 20 MG tablet  Commonly known as: LASIX  Used for: Dyspnea on exertion      Dose: 20 mg  Take 1 tablet (20 mg) by mouth daily.  Quantity: 90 tablet  Refills: 0     KLOR-CON 20 MEQ CR tablet  Used for: Dyspnea on exertion  Generic drug: potassium chloride ER      Take 1 tablet (20 mEq) by mouth daily.  Quantity: 90 tablet  Refills: 0     Lidocaine 4 % Patch  Commonly known as: LIDOCARE  Used for: Chest wall pain      Dose: 1 patch  Place 1 patch onto the skin every 24 hours Apply as needed to painful area of intact skin. To prevent lidocaine toxicity, patient should be patch free for 12 hrs daily.  Quantity: 10 patch  Refills: 0     lisinopril 2.5 MG tablet  Commonly known as: ZESTRIL      Dose: 2.5 mg  Take 1 tablet (2.5 mg) by mouth daily  Quantity: 90 tablet  Refills: 4     naloxone 4 MG/0.1ML nasal spray  Commonly known as: NARCAN  Used for: High risk medication use      Dose: 4 mg  Spray 1 spray (4 mg) into one nostril alternating nostrils as needed for opioid reversal every 2-3 minutes until assistance arrives  Quantity: 0.2 mL  Refills: 0     oxyCODONE 5 MG tablet  Commonly known as: ROXICODONE  Used for: Hip fracture, right, closed, initial encounter (H)      Dose: 2.5-5 mg  Take 0.5-1 tablets (2.5-5 mg) by mouth every 4 hours as needed for moderate to severe pain (MDD 4 tabs)  Quantity: 26 tablet  Refills: 0     polyethylene glycol 17 GM/Dose powder  Commonly known as: MIRALAX  Used for: Hip fracture, right, closed, initial encounter (H), Therapeutic opioid induced constipation      Dose: 17 g  Take 17 g by mouth daily  Quantity: 510 g  Refills: 0     senna-docusate 8.6-50 MG tablet  Commonly known as: SENOKOT-S/PERICOLACE  Used for: Hip fracture, right, closed, initial encounter (H)      Dose: 1-2 tablet  Take 1-2 tablets by mouth 2 times daily as needed for constipation Take while on oral narcotics to prevent or  treat constipation.  Quantity: 30 tablet  Refills: 0     zolpidem ER 6.25 MG CR tablet  Commonly known as: AMBIEN CR      Dose: 6.25 mg  Take 6.25 mg by mouth At Bedtime  Refills: 0            ASSESSMENT/PLAN  Encounter Diagnoses   Name Primary?     Hip fracture, right, closed, initial encounter (H) Yes     Physical deconditioning      Pain management      Right hip fracture status post right hemiarthroplasty follow-up with orthopedics, pain control    Physical deconditioning she will have home care services PT OT home health aide    Pain management Tylenol every 4 hours as needed, diclofenac patch right knee osteoarthritis as needed oxycodone    Insomnia Ambien extended release 6.25 mg at bedtime    Atrial fibrillation on amiodarone 100 mg daily, apixaban 2.5 mg twice daily    Dyspnea on exertion continue Lasix 20 mg daily    Hypertension on lisinopril      DISCHARGE PLAN/FACE TO FACE:  I certify that services are/were furnished while this patient was under the care of a physician and that a physician or an allowed non-physician practitioner (NPP), had a face-to-face encounter that meets the physician face-to-face encounter requirements. The encounter was in whole, or in part, related to the primary reason for home health. The patient is confined to his/her home and needs intermittent skilled nursing, physical therapy, speech-language pathology, or the continued need for occupational therapy. A plan of care has been established by a physician and is periodically reviewed by a physician.  Date of Face-to-Face Encounter: 3/22/2023    I certify that, based on my findings, the following services are medically necessary home health services: PT OT home health aide    My clinical findings support the need for the above skilled services because: PT OT for continued strength and endurance following recent right hip total arthroplasty, home health aide to assist with activities of daily living    This patient is homebound  because: She is deconditioned easily fatigued following recent total right hip hemiarthroplasty and will continue home therapy    The patient is, or has been, under my care and I have initiated the establishment of the plan of care. This patient will be followed by a physician who will periodically review the plan of care.    Schedule follow up visit with primary care provider within 7 days to reestablish care.    Electronically signed by: Chetna Garland CNP          Sincerely,        Chetna Garland CNP

## 2023-03-27 ENCOUNTER — PATIENT OUTREACH (OUTPATIENT)
Dept: CARE COORDINATION | Facility: CLINIC | Age: 88
End: 2023-03-27
Payer: COMMERCIAL

## 2023-03-27 SDOH — ECONOMIC STABILITY: TRANSPORTATION INSECURITY
IN THE PAST 12 MONTHS, HAS THE LACK OF TRANSPORTATION KEPT YOU FROM MEDICAL APPOINTMENTS OR FROM GETTING MEDICATIONS?: NO

## 2023-03-27 SDOH — HEALTH STABILITY: PHYSICAL HEALTH: ON AVERAGE, HOW MANY DAYS PER WEEK DO YOU ENGAGE IN MODERATE TO STRENUOUS EXERCISE (LIKE A BRISK WALK)?: 3 DAYS

## 2023-03-27 SDOH — ECONOMIC STABILITY: FOOD INSECURITY: WITHIN THE PAST 12 MONTHS, YOU WORRIED THAT YOUR FOOD WOULD RUN OUT BEFORE YOU GOT MONEY TO BUY MORE.: NEVER TRUE

## 2023-03-27 SDOH — ECONOMIC STABILITY: TRANSPORTATION INSECURITY
IN THE PAST 12 MONTHS, HAS LACK OF TRANSPORTATION KEPT YOU FROM MEETINGS, WORK, OR FROM GETTING THINGS NEEDED FOR DAILY LIVING?: NO

## 2023-03-27 SDOH — HEALTH STABILITY: PHYSICAL HEALTH: ON AVERAGE, HOW MANY MINUTES DO YOU ENGAGE IN EXERCISE AT THIS LEVEL?: 10 MIN

## 2023-03-27 SDOH — ECONOMIC STABILITY: FOOD INSECURITY: WITHIN THE PAST 12 MONTHS, THE FOOD YOU BOUGHT JUST DIDN'T LAST AND YOU DIDN'T HAVE MONEY TO GET MORE.: NEVER TRUE

## 2023-03-27 ASSESSMENT — LIFESTYLE VARIABLES
HOW OFTEN DO YOU HAVE A DRINK CONTAINING ALCOHOL: NEVER
HOW MANY STANDARD DRINKS CONTAINING ALCOHOL DO YOU HAVE ON A TYPICAL DAY: PATIENT DOES NOT DRINK
AUDIT-C TOTAL SCORE: 0
SKIP TO QUESTIONS 9-10: 1
HOW OFTEN DO YOU HAVE SIX OR MORE DRINKS ON ONE OCCASION: NEVER

## 2023-03-27 ASSESSMENT — SOCIAL DETERMINANTS OF HEALTH (SDOH)
DO YOU BELONG TO ANY CLUBS OR ORGANIZATIONS SUCH AS CHURCH GROUPS UNIONS, FRATERNAL OR ATHLETIC GROUPS, OR SCHOOL GROUPS?: YES
HOW OFTEN DO YOU GET TOGETHER WITH FRIENDS OR RELATIVES?: MORE THAN THREE TIMES A WEEK
ARE YOU MARRIED, WIDOWED, DIVORCED, SEPARATED, NEVER MARRIED, OR LIVING WITH A PARTNER?: NEVER MARRIED
HOW OFTEN DO YOU ATTENT MEETINGS OF THE CLUB OR ORGANIZATION YOU BELONG TO?: MORE THAN 4 TIMES PER YEAR
HOW HARD IS IT FOR YOU TO PAY FOR THE VERY BASICS LIKE FOOD, HOUSING, MEDICAL CARE, AND HEATING?: NOT HARD AT ALL
HOW OFTEN DO YOU ATTEND CHURCH OR RELIGIOUS SERVICES?: MORE THAN 4 TIMES PER YEAR
IN A TYPICAL WEEK, HOW MANY TIMES DO YOU TALK ON THE PHONE WITH FAMILY, FRIENDS, OR NEIGHBORS?: MORE THAN THREE TIMES A WEEK

## 2023-03-27 ASSESSMENT — ACTIVITIES OF DAILY LIVING (ADL): DEPENDENT_IADLS:: SHOPPING

## 2023-03-27 NOTE — PROGRESS NOTES
Clinic Care Coordination Contact  Ambulatory Care Coordination to Inpatient Care Management   Hand-In Communication    Date:  March 27, 2023  Name: Gladys Ramirez is enrolled in Ambulatory Care Coordination program and I am the Lead Care Coordinator.  CC Contact Information: Epic InBasket + phone  Payor Source: Payor: MEDICA / Plan: MEDICA [employer]/ [employer]ERS / Product Type: HMO /   Current services in place: None   Please see the CC Snaphot and Care Management Flowsheets for specific  details of this Gladys Ramirez care plan.   Additional details/specific concerns r/t this admission:    Discharge Disposition return home wiht Pt and OT    I will follow this admission in Epic. Please feel free to contact me with questions or for further collaboration in discharge planning.    CCSW reached out to pt who stated she is doing very well. Pt stated she has family around to help her. She is able to perform ADL's and has family to help with what she is unable to do for herself. Pt not enrolled in CCC as she has care coordinator through [facility].    Rina Holly, MSW, [facility]  Social Work Care Coordinator

## 2023-03-30 DIAGNOSIS — F51.01 PRIMARY INSOMNIA: Primary | ICD-10-CM

## 2023-03-31 RX ORDER — ZOLPIDEM TARTRATE 6.25 MG/1
TABLET, FILM COATED, EXTENDED RELEASE ORAL
Qty: 45 TABLET | Refills: 0 | Status: SHIPPED | OUTPATIENT
Start: 2023-03-31 | End: 2023-05-12

## 2023-04-03 ENCOUNTER — TELEPHONE (OUTPATIENT)
Dept: CARDIOLOGY | Facility: CLINIC | Age: 88
End: 2023-04-03
Payer: COMMERCIAL

## 2023-04-03 DIAGNOSIS — R06.09 DYSPNEA ON EXERTION: ICD-10-CM

## 2023-04-04 RX ORDER — FUROSEMIDE 20 MG
20 TABLET ORAL DAILY
Qty: 90 TABLET | Refills: 1 | Status: SHIPPED | OUTPATIENT
Start: 2023-04-04 | End: 2023-10-25

## 2023-04-04 NOTE — TELEPHONE ENCOUNTER
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 She thought she had a missed call from writer but writer has not called her recently. She thinks that it may have been in regards to her device implant, in hindsight. Either way, she passed along that she recently fractured her femur and requiring surgery and a TCU stay. Sympathies offered and writer is very happy that Sister is back home recovering. Refill granted on her furosemide at her request as well. She will follow up as scheduled. -[facility]

## 2023-04-05 ENCOUNTER — MEDICAL CORRESPONDENCE (OUTPATIENT)
Dept: HEALTH INFORMATION MANAGEMENT | Facility: CLINIC | Age: 88
End: 2023-04-05

## 2023-04-05 ENCOUNTER — OFFICE VISIT (OUTPATIENT)
Dept: FAMILY MEDICINE | Facility: CLINIC | Age: 88
End: 2023-04-05
Payer: COMMERCIAL

## 2023-04-05 VITALS
BODY MASS INDEX: 32 KG/M2 | HEART RATE: 91 BPM | WEIGHT: 163 LBS | DIASTOLIC BLOOD PRESSURE: 72 MMHG | SYSTOLIC BLOOD PRESSURE: 131 MMHG | TEMPERATURE: 97.7 F | OXYGEN SATURATION: 97 % | RESPIRATION RATE: 16 BRPM | HEIGHT: 60 IN

## 2023-04-05 DIAGNOSIS — I10 BENIGN ESSENTIAL HYPERTENSION: ICD-10-CM

## 2023-04-05 DIAGNOSIS — I48.19 PERSISTENT ATRIAL FIBRILLATION (H): ICD-10-CM

## 2023-04-05 DIAGNOSIS — I48.0 PAROXYSMAL ATRIAL FIBRILLATION (H): ICD-10-CM

## 2023-04-05 DIAGNOSIS — Z87.81 HISTORY OF HIP FRACTURE: Primary | ICD-10-CM

## 2023-04-05 DIAGNOSIS — I50.20 HFREF (HEART FAILURE WITH REDUCED EJECTION FRACTION) (H): ICD-10-CM

## 2023-04-05 DIAGNOSIS — N18.31 STAGE 3A CHRONIC KIDNEY DISEASE (CKD) (H): ICD-10-CM
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
## 2023-04-05 LAB
ALT SERPL W P-5'-P-CCNC: 13 U/L (ref 10–35)
TSH SERPL DL<=0.005 MIU/L-ACNC: 3.73 UIU/ML (ref 0.3–4.2)

## 2023-04-05 PROCEDURE — 36415 COLL VENOUS BLD VENIPUNCTURE: CPT | Performed by: FAMILY MEDICINE

## 2023-04-05 PROCEDURE — 99213 OFFICE O/P EST LOW 20 MIN: CPT | Performed by: FAMILY MEDICINE

## 2023-04-05 PROCEDURE — 84443 ASSAY THYROID STIM HORMONE: CPT | Performed by: FAMILY MEDICINE

## 2023-04-05 PROCEDURE — 84460 ALANINE AMINO (ALT) (SGPT): CPT | Performed by: FAMILY MEDICINE

## 2023-04-05 NOTE — PROGRESS NOTES
Gladys was seen today for follow up .    Diagnoses and all orders for this visit:    History of hip fracture: S/P right hemiarthroplasty by Dr. Martinez, on apixaban. Discharged from [facility] ([facility]) 3/24/23- she lives independently but plans for home PT/OT, home health aide. Incision well healed, no signs of infection and she has follow-up already scheduled with orthopedics. Pain well controlled- she follows with pain clinic.    Stage 3a chronic kidney disease (CKD) (H): Stable, avoid nephrotoxic agents.    Benign essential hypertension: BP at goal.    HFrEF (heart failure with reduced ejection fraction) (H)  Persistent atrial fibrillation (H): On amiodarone, apixaban- follows with cardiology. Stable with no new symptoms today. No signs of fluid overload.  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  She exercises with enough effort to increase her heart rate 3 or less days per week.   She is taking medications regularly.         Hospital Follow-up Visit:    Hospital/Nursing Home/IP Rehab Facility: [facility]  Date of Discharge: 3/24/23  Reason(s) for Admission: right hip fracture s/p right hemiarthroplasty    Was your hospitalization related to COVID-19? No   Problems taking medications regularly:  None  Medication changes since discharge: yes, reviewed.  Problems adhering to non-medication therapy:  None    Summary of hospitalization:  [facility] discharge summary reviewed. [facility] discharge summary reviewed.  Diagnostic Tests/Treatments reviewed.  Follow up needed: orthopedics, cardiology,  pain clinic  Other Healthcare Providers Involved in Patient s Care:         home care, orthopedics, pain management, cardiology  Update since discharge: improved.         Plan of care communicated with patient           Review of Systems         Objective    /72 (BP Location: Left arm, Patient Position: Sitting, Cuff Size: Adult Regular)   Pulse 91   Temp 97.7  F (36.5  C) (Oral)   Resp 16   Ht 1.524 m (5')   Wt 73.9 kg (163 lb)   SpO2 97%   BMI 31.83 kg/m    Body mass index is 31.83 kg/m .   Wt Readings from Last 3 Encounters:   04/05/23 73.9 kg (163 lb)   03/22/23 69.4 kg (153 lb)   03/14/23 70.3 kg (155 lb)     Physical Exam   Gen: well appearing  Skin: R hip incision well healed, no surrounding erythema or drainage  Ext: no peripheral edema

## 2023-04-12 DIAGNOSIS — R06.09 DYSPNEA ON EXERTION: ICD-10-CM

## 2023-04-12 DIAGNOSIS — Z53.9 DIAGNOSIS NOT YET DEFINED: Primary | ICD-10-CM

## 2023-04-12 PROCEDURE — G0180 MD CERTIFICATION HHA PATIENT: HCPCS | Performed by: FAMILY MEDICINE

## 2023-04-13 RX ORDER — POTASSIUM CHLORIDE 1500 MG/1
TABLET, EXTENDED RELEASE ORAL
Qty: 90 TABLET | Refills: 3 | Status: SHIPPED | OUTPATIENT
Start: 2023-04-13 | End: 2024-01-11

## 2023-04-14 ENCOUNTER — TRANSFERRED RECORDS (OUTPATIENT)
Dept: HEALTH INFORMATION MANAGEMENT | Facility: CLINIC | Age: 88
End: 2023-04-14
Payer: COMMERCIAL

## 2023-04-20 ENCOUNTER — OFFICE VISIT (OUTPATIENT)
Dept: PALLIATIVE MEDICINE | Facility: OTHER | Age: 88
End: 2023-04-20
Payer: COMMERCIAL

## 2023-04-20 VITALS — HEART RATE: 78 BPM | DIASTOLIC BLOOD PRESSURE: 64 MMHG | OXYGEN SATURATION: 97 % | SYSTOLIC BLOOD PRESSURE: 104 MMHG

## 2023-04-20 DIAGNOSIS — G89.29 CHRONIC INTRACTABLE PAIN: Primary | ICD-10-CM

## 2023-04-20 DIAGNOSIS — S83.511S RUPTURE OF ANTERIOR CRUCIATE LIGAMENT OF RIGHT KNEE, SEQUELA: ICD-10-CM

## 2023-04-20 DIAGNOSIS — M17.11 PRIMARY OSTEOARTHRITIS OF RIGHT KNEE: ICD-10-CM

## 2023-04-20 PROCEDURE — G0463 HOSPITAL OUTPT CLINIC VISIT: HCPCS

## 2023-04-20 PROCEDURE — 99213 OFFICE O/P EST LOW 20 MIN: CPT | Performed by: NURSE PRACTITIONER

## 2023-04-20 ASSESSMENT — PAIN SCALES - GENERAL: PAINLEVEL: MODERATE PAIN (5)

## 2023-04-20 NOTE — PATIENT INSTRUCTIONS
After Visit Instructions:     Thank you for coming to [facility] for your care. It is my goal to partner with you to help you reach your optimal state of health.   Continue daily self-care, identifying contributing factors, and monitoring variations in pain level. Continue to integrate self-care into your life.      30 minutes Video or Clinic follow-up with SHASHA Simpson NP-C in 3 months or sooner as needed..   Medication Management : Ok to continue take Oxycodone as needed, no more than 3 tabs per day. Call 5 business days before refills are due.       SHASHA Domínguez NP-C  [facility]  [facility] - Monday and Friday  [facility] - Tuesday  [place] - Thursday    Be sure to request ALL medication refills 5 days prior to the due date unless you will see your medical provider in an appointment before the due date.  This is YOUR responsibility. Do not expect same day refills. If you do not plan ahead you may run out of medications.   NO early refills are allowed. It is your responsibility to manage your medications responsibility and keep them safely stored. Lost or destroyed medications WILL NOT be replaced    Scheduling/Clinic telephone Number for ALL locations:  [phone]    After Hours On-Call Service:  [phone]    Call with any questions about your care and for scheduling assistance.   Calls are returned Monday through Friday between 8 AM and 4:00 PM. We usually get back to you within 2 business days depending on the issue/request.    If we are prescribing your medications:  For opioid medication refills, call the clinic or send a [employer] message 7 days in advance.  Please include:  Your name and date of birth.   Name of requested medication  Name of the pharmacy.  For non-opioid medications, call your pharmacy directly to request a refill. Please allow 3-4 days to be processed.   Per [place] State Law:  All controlled substance prescriptions must  be filled within 30 days of being written.    For those controlled substances allowing refills, pickup must occur within 30 days of last fill.      We believe regular attendance is key to your success in our program!    Any time you are unable to keep your appointment we ask that you call us at least 24 hours in advance to cancel.This will allow us to offer the appointment time to another patient.   Multiple missed appointments may lead to dismissal from the clinic.     I have reviewed and confirmed nurses' notes...

## 2023-04-20 NOTE — PROGRESS NOTES
4/20/2023     2:55 PM 4/20/2023     2:56 PM   PEG Score   PEG Total Score 5.67 5.67        UDT/CSA 11.11.2022  Medication = oxyCODONE 5 MG

## 2023-04-20 NOTE — PROGRESS NOTES
[facility]    CHIEF COMPLAINT: Chronic Pain.    INTERVAL HISTORY:  Last seen on 23      Recommendations/plan at the last visit included:  1. Physical therapy: YES [phone], call to set up an appointment   2. 30 minutes Clinic follow-up with SHASHA Simpson NP-C as scheduled on 3/23/23 @ 10:15 AM  3. Procedures recommended: Valentine will look into a procedure called a Genicular Block. If Dr Carlson thinks it is a good idea to try, Valentine will order the procedure and let your know. Message sent to Dr Carlson.   4. Medication Management : Oxycodone 5 mg sent to be filled on 22 and begin using on 22    Since last visit:   - 3/4/23 fell and fractured her right hip. Hit her head but no LOC. Had partial  hip replacement surgery on 3/5/23 and went to TCU from 3/27/23-4/10/23.   - Right knee is doing well. Had Synvisc 2/10/23 and it is still helping. Also had a steroid injection after hip surgery.     Pain Information today: Moderate Pain (5)/10. Location of pain: multiple areas    Annual requirements last collected:  22     Current Pain Relevant Medications:    Acetaminophen 325 M tabs QID every day  Compounded cream: Lidocaine, ibuprofen, diclofenac  Duloxetine 60 mg daily      Current Controlled Substance Medications:   Oxycodone 5 mg: Taking a few times per month    Ambien 6.25 m.25 tabs at HS     Previous Pain Relevant Medications: (H--helped; HI--Helped initially; SWH--Somewhat helpful; NH--No help; W--worse; SE--side effects; ?--Unsure if helpful)   Opiates: Tramadol: SWH, Buprenorphine:Allergic  NSAIDS: Can't take, on blood thinner  Migraine medications: N/A  Muscle Relaxants: none  Neuropathics: Gabapentin: SE  Anti-depressants for pain: Duloxetine: NH for pain  Anxiety medications: N/A  Topicals: Compounded cream: Lidocaine, ibuprofen, diclofenac:  OTC medications: Tylenol: takes 4000 mg/day  Sleep Medications: Temazepam: Allergy, Ambien:H  Other medications  not covered above:      SUBSTANCE HISTORY:   Past or current illegal drug use: none  Past or current ETOH use: Rarely, glass of wine  Nicotine/tobacco use: none  Daily Caffeine intake: never     CURRENT FAMILY/SOCIAL SITUATION:  Past/Present occupation: Hinduism nun:   Housing status: apartment alone  Emotional/Physical support: Sister Yandy Reyes, neighbor who is an RN  Safety Concerns: falls risk   Current stressors: Pain     THE 4 As OF OPIOID MAINTENANCE ANALGESIA    Analgesia: Is pain relief clinically significant? NO   Activity: Is patient functional and able to perform Activities of Daily Living? N/A   Adverse effects: Is patient free from adverse side effects from opiates? N/A   Adherence to Rx protocol: Is patient adhering to Controlled Substance Agreement and taking medications ONLY as ordered? N/A    [place] Board of Pharmacy Data Base Reviewed:    YES; No concern for abuse or misuse of controlled medications based on this report. Reviewed [facility] April 19, 2023- no concerning fills.      PHYSICAL EXAM    Vitals:    04/20/23 1455   BP: 104/64   Pulse: 78   SpO2: 97%       Constitutional: healthy, alert and no distress A&O.   Patient is appropriate.  Psychiatric/mental status: Alert, without lethargy or stupor. Appropriate affect.    Neurologic exam:  CN:  Cranial nerves 2-12 are grossly normal.    MUSCULOSKELETAL:     Posture: Upright, shoulders and pelvis are leveled. No  Antalgic Gait Pattern?: Yes, Using 4WW    DIRE Score for ongoing opioid management is calculated as follows:    Diagnosis = 2 pts (slowly progressive; moderate pain/objective findings)    Intractability = 3 pts (patient fully engaged but inadequate response to treatments)    Risk        Psych = 3 pts (no significant personality dysfunction/mental illness; good communication with clinic)         Chem Hlth = 3 pts (no history of chemical dependency; not drug-focused)       Reliability = 3 pts (highly reliable with meds, appointments,  treatments)       Social = 2 pts (reduction in some relationships/life rolls)       (Psych + Chem hlth + Reliability + Social) = 16    Efficacy = 2 pts (moderate benefit/function; low med dose; too early/not tried meds)    DIRE Score = 18        7-13: likely NOT suitable candidate for long-term opioid analgesia       14-21: may be a suitable candidate for long-term opioid analgesia     DIAGNOSTIC RESULTS:     11/15/22: MRI right knew w/o contrast   IMPRESSION:  1.  Horizontal cleavage tear of the posteromedial corner of the meniscus.  2.  Grade 2 cartilage loss both sides of the medial compartment.  3.  Full-thickness cartilage loss along the majority of both sides of the lateral compartment with bony remodeling of the lateral tibial plateau and extensive reactive edema.  4.  Large portions of the lateral meniscal body are not identified and may be completely degenerated. Anterior and posterior subluxation of the anterior and posterior horn, respectively.  5.  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  6.  Semimembranosus and medial gastrocnemius tendinopathy without tearing.  7.  Popliteus tendinopathy without tearing.  8.  Mild quadriceps and patellar tendinopathy without tearing.  9.  Small effusion.  10.  No evidence for acute fracture.     1/20/21: Left hip x-ray  IMPRESSION:  Mild primary degenerative narrowing both hip joints. Mild generalized degenerative change base of the spine and both SI joints.Pelvis and left hip otherwise negative. No fractures. No dislocations.     1/2021: XR KNEE RIGHT 1 OR 2 VWS   IMPRESSION:  Moderate primary osteoarthritis all 3 compartments but most prominent in the lateral compartment. Knee otherwise negative. No fractures. No joint effusion.     PAIN RELAVENT CONDITIONS:   1.  OA: severe right knee, Baker's cyst.  2.  PMH: CKD Stage 2, A fib, CHF, primary insomnia    DIAGNOSIS AND PLAN:     (G89.29) Chronic intractable pain  (primary encounter diagnosis)  (M17.11)  Primary osteoarthritis of right knee  (S84.654Z) Rupture of anterior cruciate ligament of right knee, sequela  Comment: Continue current POC  Plan: 3 mo follow up or sooner as needed.        PATIENT INSTRUCTIONS:     Diagnosis reviewed, treatment option addressed, and risk/benefits discussed.  Self-care instructions given.  I am recommending a multidisciplinary treatment plan to help this patient better manage pain.    Remember to request ALL medication refills 5 BUSINESS days before you run out.     1. 30 minutes Video or Clinic follow-up with SHASHA Simpson NP-C in 3 months or sooner as needed..   2. Medication Management : Ok to continue take Oxycodone as needed, no more than 3 tabs per day. Call 5 business days before refills are due.     I have reviewed the note as documented above.  This accurately captures the substance of my conversation with the patient.  A total of 26 minutes of preparation, care, and consultation were spent on this visit today.     SHASHA Domínguez, NP-C  [facility]    (Information in italics and blue color are taken from previous pain and consulting medical providers notes and are documented as such)

## 2023-05-12 DIAGNOSIS — F51.01 PRIMARY INSOMNIA: ICD-10-CM

## 2023-05-12 RX ORDER — ZOLPIDEM TARTRATE 6.25 MG/1
TABLET, FILM COATED, EXTENDED RELEASE ORAL
Qty: 45 TABLET | Refills: 5 | Status: SHIPPED | OUTPATIENT
Start: 2023-05-12 | End: 2023-11-13

## 2023-05-24 ENCOUNTER — MEDICAL CORRESPONDENCE (OUTPATIENT)
Dept: HEALTH INFORMATION MANAGEMENT | Facility: CLINIC | Age: 88
End: 2023-05-24
Payer: COMMERCIAL

## 2023-05-26 ENCOUNTER — TRANSFERRED RECORDS (OUTPATIENT)
Dept: HEALTH INFORMATION MANAGEMENT | Facility: CLINIC | Age: 88
End: 2023-05-26
Payer: COMMERCIAL

## 2023-06-01 ENCOUNTER — PREP FOR PROCEDURE (OUTPATIENT)
Dept: CARDIOLOGY | Facility: CLINIC | Age: 88
End: 2023-06-01

## 2023-06-01 ENCOUNTER — DOCUMENTATION ONLY (OUTPATIENT)
Dept: CARDIOLOGY | Facility: CLINIC | Age: 88
End: 2023-06-01

## 2023-06-01 ENCOUNTER — OFFICE VISIT (OUTPATIENT)
Dept: CARDIOLOGY | Facility: CLINIC | Age: 88
End: 2023-06-01
Payer: COMMERCIAL

## 2023-06-01 VITALS
DIASTOLIC BLOOD PRESSURE: 66 MMHG | RESPIRATION RATE: 16 BRPM | HEIGHT: 60 IN | HEART RATE: 59 BPM | SYSTOLIC BLOOD PRESSURE: 122 MMHG | WEIGHT: 155 LBS | BODY MASS INDEX: 30.43 KG/M2

## 2023-06-01 DIAGNOSIS — I50.32 CHRONIC DIASTOLIC CONGESTIVE HEART FAILURE (H): ICD-10-CM

## 2023-06-01 DIAGNOSIS — I42.9 SECONDARY CARDIOMYOPATHY (H): ICD-10-CM

## 2023-06-01 DIAGNOSIS — I50.22 CHRONIC SYSTOLIC HEART FAILURE (H): ICD-10-CM

## 2023-06-01 DIAGNOSIS — I44.7 LBBB (LEFT BUNDLE BRANCH BLOCK): Primary | ICD-10-CM

## 2023-06-01 DIAGNOSIS — I48.19 PERSISTENT ATRIAL FIBRILLATION (H): ICD-10-CM

## 2023-06-01 DIAGNOSIS — I44.7 LBBB (LEFT BUNDLE BRANCH BLOCK): ICD-10-CM

## 2023-06-01 DIAGNOSIS — I48.19 PERSISTENT ATRIAL FIBRILLATION (H): Primary | ICD-10-CM

## 2023-06-01 DIAGNOSIS — R00.1 SINUS BRADYCARDIA: ICD-10-CM

## 2023-06-01 DIAGNOSIS — I50.20 HFREF (HEART FAILURE WITH REDUCED EJECTION FRACTION) (H): ICD-10-CM

## 2023-06-01 PROCEDURE — 99214 OFFICE O/P EST MOD 30 MIN: CPT | Performed by: INTERNAL MEDICINE

## 2023-06-01 RX ORDER — SODIUM CHLORIDE 9 MG/ML
100 INJECTION, SOLUTION INTRAVENOUS CONTINUOUS
Status: CANCELLED | OUTPATIENT
Start: 2023-06-01

## 2023-06-01 RX ORDER — DEXMEDETOMIDINE HYDROCHLORIDE 4 UG/ML
.1-1.5 INJECTION, SOLUTION INTRAVENOUS CONTINUOUS
Status: CANCELLED | OUTPATIENT
Start: 2023-06-01

## 2023-06-01 RX ORDER — LIDOCAINE 40 MG/G
CREAM TOPICAL
Status: CANCELLED | OUTPATIENT
Start: 2023-06-01

## 2023-06-01 RX ORDER — CEFAZOLIN SODIUM 2 G/100ML
2 INJECTION, SOLUTION INTRAVENOUS
Status: CANCELLED | OUTPATIENT
Start: 2023-06-01

## 2023-06-01 RX ORDER — FENTANYL CITRATE 50 UG/ML
25 INJECTION, SOLUTION INTRAMUSCULAR; INTRAVENOUS
Status: CANCELLED | OUTPATIENT
Start: 2023-06-01

## 2023-06-01 NOTE — PROGRESS NOTES
HEART CARE ENCOUNTER CONSULTATON NOTE      [facility]  [phone]      Assessment/Recommendations   Assessment/Plan:    Gladys Ramirez is a very pleasant 89 year old female with non ischemic cardiomyopathy(LVEF 25-30%), HTN, h/o PE, LBBB who presents today to discuss device therapy.    1. Non ischemic cardiomyopathy  - On GDMT  -  ms with LBBB  - Discussed indications for cardiac resynchronization therapy with a pacemaker-defibrillator, procedural details, possible complications and follow up.  I personally discussed with the patient the rational for ICD placement, alternate therapies, technical aspects of the surgical procedure, risk/benefits of therapy, and long term follow up in the Device Clinic. The [place] Decision Making Tool and further ICD education completed by the Electrophysiology Registered Nurse, attached is the documentation of this. At this time the patient verbalized understanding and has agreed to proceed with ICD implantation.    [place] ICD Decision Making Tool    2. Atrial fibrillation  - On Amiodarone and Eliquis for anticoagulation  - On chronic anticoagulation for her h/p PE also, so Watchman not offered    Time spent: 30 minutes spent on the date of the encounter doing chart review, history and exam, documentation and further activities as noted above.       History of Present Illness/Subjective    HPI: Gladys Ramirez is a very pleasant 89 year old female with non ischemic cardiomyopathy(LVEF 25-30%), HTN, h/o PE, LBBB who presents today to discuss device therapy.    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    She had a fall after the last appointment oin 3/4/2023. She hit her head but no LOC. Had partial  hip replacement surgery on 3/5/23 and went to TCU from 3/27/23-4/10/23. She comes today for follow up.    Discussed indications for cardiac  resynchronization therapy with a pacemaker-defibrillator, procedural details, possible complications and follow up. We also discussed possibility of defibrillator therapy in particular as an option and the she would like to pursue that.       Recent ECG(personally reviewed):  8/24/2022  NSR with LBBB    Recent Echocardiogram Results (personally reviewed):    November 2022    There is mild to moderate concentric left ventricular hypertrophy.  The visual ejection fraction is 25-30%.  There is severe global hypokinesia of the left ventricle.  Septal motion is consistent with conduction abnormality.  The right ventricle is normal in size and function.  No significant valve disease.  Compared to the prior study dated 8/24/2022, there have been no changes.      Labs below reviewed personally     Physical Examination  Review of Systems   Vitals: /66 (BP Location: Right arm, Patient Position: Sitting, Cuff Size: Adult Regular)   Pulse 59   Resp 16   Ht 1.524 m (5')   Wt 70.3 kg (155 lb)   BMI 30.27 kg/m    BMI= Body mass index is 30.27 kg/m .  Wt Readings from Last 3 Encounters:   06/01/23 70.3 kg (155 lb)   04/05/23 73.9 kg (163 lb)   03/22/23 69.4 kg (153 lb)       General Appearance:   no distress, normal body habitus   ENT/Mouth: membranes moist, no oral lesions or bleeding gums.      EYES:  no scleral icterus, normal conjunctivae   Neck: no carotid bruits or thyromegaly   Chest/Lungs:   lungs are clear to auscultation, no rales or wheezing, no sternal scar, equal chest wall expansion    Cardiovascular:   Regular. Normal first and second heart sounds with no murmurs, rubs, or gallops; the carotid, radial and posterior tibial pulses are intact, no edema bilaterally    Abdomen:  no organomegaly, masses, bruits, or tenderness; bowel sounds are present   Extremities: no cyanosis or clubbing. Left knee in brace   Skin: no xanthelasma, warm.    Neurologic: normal  bilateral, no tremors     Psychiatric: alert  and oriented x3, calm        Please refer above for cardiac ROS details.        Medical History  Surgical History Family History Social History   Past Medical History:   Diagnosis Date     Angina pectoris (H)      Chest pain 03/09/2017     Cough      Hyperlipidemia      Hypertension      Osteoarthritis      Past Surgical History:   Procedure Laterality Date     APPENDECTOMY       BASAL CELL CARCINOMA EXCISION      nose     LAPAROSCOPY DIAGNOSTIC (GENERAL) N/A 11/04/2014    Procedure: LAPAROSCOPY BILATERAL SALPINGO-OOPHORECTOMY ;  Surgeon: Sofia Harper MD;  Location: [facility];  Service:      OPEN REDUCTION INTERNAL FIXATION HIP BIPOLAR Right 3/5/2023    Procedure: HEMIARTHROPLASTY, HIP, BIPOLAR;  Surgeon: Jose G Martinez MD;  Location: [facility]     TONSILLECTOMY      10 years old     ZZC TOTAL KNEE ARTHROPLASTY Left     2011     Family History   Problem Relation Age of Onset     Heart Disease Mother      Rheumatic Heart Disease Mother      No Known Problems Father      Cancer Brother         brain     Lung Cancer Brother      Lung Cancer Brother      Cancer Sister         lung     Lung Cancer Sister         Social History     Socioeconomic History     Marital status: Single     Spouse name: Not on file     Number of children: Not on file     Years of education: Not on file     Highest education level: Not on file   Occupational History     Not on file   Tobacco Use     Smoking status: Never     Passive exposure: Never     Smokeless tobacco: Never     Tobacco comments:     no passive exposure   Vaping Use     Vaping status: Never Used   Substance and Sexual Activity     Alcohol use: Yes     Comment: Alcoholic Drinks/day: Rarely a glass of wine     Drug use: No     Sexual activity: Not on file   Other Topics Concern     Not on file   Social History Narrative    The patient is a nun.     Social Determinants of Health     Financial Resource Strain: Low Risk  (3/27/2023)    Overall Financial  Resource Strain (CARDIA)      Difficulty of Paying Living Expenses: Not hard at all   Food Insecurity: No Food Insecurity (3/27/2023)    Hunger Vital Sign      Worried About Running Out of Food in the Last Year: Never true      Ran Out of Food in the Last Year: Never true   Transportation Needs: No Transportation Needs (3/27/2023)    PRAPARE - Transportation      Lack of Transportation (Medical): No      Lack of Transportation (Non-Medical): No   Physical Activity: Insufficiently Active (3/27/2023)    Exercise Vital Sign      Days of Exercise per Week: 3 days      Minutes of Exercise per Session: 10 min   Stress: No Stress Concern Present (3/27/2023)    [place] [employer] - Occupational Stress Questionnaire      Feeling of Stress : Not at all   Social Connections: Moderately Integrated (3/27/2023)    Social Connection and Isolation Panel [NHANES]      Frequency of Communication with Friends and Family: More than three times a week      Frequency of Social Gatherings with Friends and Family: More than three times a week      Attends Orthodox Services: More than 4 times per year      Active Member of Clubs or Organizations: Yes      Attends Club or Organization Meetings: More than 4 times per year      Marital Status: Never    Intimate Partner Violence: Not on file   Housing Stability: Not on file           Medications  Allergies   Current Outpatient Medications   Medication Sig Dispense Refill     acetaminophen (TYLENOL) 325 MG tablet Take 2 tablets (650 mg) by mouth every 4 hours as needed for other (mild pain) 100 tablet 0     amiodarone (PACERONE) 200 MG tablet Take 0.5 tablets (100 mg) by mouth daily 45 tablet 3     apixaban ANTICOAGULANT (ELIQUIS) 2.5 MG tablet Take 1 tablet (2.5 mg) by mouth 2 times daily 60 tablet 0     cholecalciferol, vitamin D3, (VITAMIN D3) 2,000 unit Tab [CHOLECALCIFEROL, VITAMIN D3, (VITAMIN D3) 2,000 UNIT TAB] Take 1 tablet (2,000 Units total) by mouth  "daily. 90 tablet 3     diclofenac (FLECTOR) 1.3 % patch Externally apply 1 patch topically 2 times daily 180 patch 1     DULoxetine (CYMBALTA) 60 MG capsule Take 1 capsule (60 mg) by mouth daily       furosemide (LASIX) 20 MG tablet Take 1 tablet (20 mg) by mouth daily 90 tablet 1     KLOR-CON 20 MEQ CR tablet TAKE ONE TABLET BY MOUTH ONE TIME DAILY 90 tablet 3     Lidocaine (LIDOCARE) 4 % Patch Place 1 patch onto the skin every 24 hours Apply as needed to painful area of intact skin. To prevent lidocaine toxicity, patient should be patch free for 12 hrs daily. 10 patch 0     lisinopril (ZESTRIL) 2.5 MG tablet Take 1 tablet (2.5 mg) by mouth daily 90 tablet 4     zolpidem ER (AMBIEN CR) 6.25 MG CR tablet Take 1 and 1/4 tablet by mouth at bedtime 45 tablet 5     naloxone (NARCAN) 4 MG/0.1ML nasal spray Spray 1 spray (4 mg) into one nostril alternating nostrils as needed for opioid reversal every 2-3 minutes until assistance arrives (Patient not taking: Reported on 6/1/2023) 0.2 mL 0     oxyCODONE (ROXICODONE) 5 MG tablet Take 0.5-1 tablets (2.5-5 mg) by mouth every 4 hours as needed for moderate to severe pain (MDD 4 tabs) (Patient not taking: Reported on 6/1/2023) 26 tablet 0     polyethylene glycol (MIRALAX) 17 GM/Dose powder Take 17 g by mouth daily (Patient not taking: Reported on 6/1/2023) 510 g 0     senna-docusate (SENOKOT-S/PERICOLACE) 8.6-50 MG tablet Take 1-2 tablets by mouth 2 times daily as needed for constipation Take while on oral narcotics to prevent or treat constipation. (Patient not taking: Reported on 4/20/2023) 30 tablet 0       Allergies   Allergen Reactions     Trazodone Shortness Of Breath and Unknown     Allergic to trazodone and deriv., Other: trouble swallowing       Clindamycin Diarrhea     C-diff     Gabapentin Other (See Comments)     \"Internal tremors\"     Temazepam Other (See Comments)     Annotation: Nightmares       Buprenorphine Palpitations     Tried patch          Lab Results  "   Chemistry/lipid CBC Cardiac Enzymes/BNP/TSH/INR   Recent Labs   Lab Test 03/12/15  1230   CHOL 179   HDL 64   LDL 80   TRIG 176*     Recent Labs   Lab Test 03/12/15  1230   LDL 80     Recent Labs   Lab Test 02/08/23  1345      POTASSIUM 4.2   CHLORIDE 105   CO2 27   GLC 82   BUN 20.6   CR 1.16*   GFRESTIMATED 44*   MARLENE 9.8*     Recent Labs   Lab Test 02/08/23  1345 09/07/22  1112 08/28/22  0442   CR 1.16* 1.14* 0.81     Recent Labs   Lab Test 06/15/21  0910   A1C 6.0*          Recent Labs   Lab Test 08/28/22  0442   WBC 6.2   HGB 12.1   HCT 36.1   MCV 93        Recent Labs   Lab Test 08/28/22  0442 08/26/22  0723 08/24/22  0718   HGB 12.1 12.6 12.8    Recent Labs   Lab Test 08/23/22  1154 08/17/22  1215 11/21/19  0451   TROPONINI 0.16 0.04 0.01     Recent Labs   Lab Test 02/08/23  1345 08/23/22  1154 08/17/22  1215 03/03/21  1222 02/21/19  0713   BNP  --  1,933*  --  177* 95   NTBNP 5,373*  --  5,101*  --   --      Recent Labs   Lab Test 02/21/19  0648   TSH 2.05     Recent Labs   Lab Test 08/23/22  1154 06/05/19  0052 02/21/19  0648   INR 1.37* 1.50* 1.20*        Jeannie Rivera MD

## 2023-06-01 NOTE — PATIENT INSTRUCTIONS
[facility]  Cardiac Electrophysiology  [address]  [place] [address]   Office: [phone]  Fax: [phone]       Thank you for seeing us in clinic today - it is a pleasure to be a part of your care team.  Below is a summary of our plan from today's visit.      Cardiomyopathy (decreased pumping function of the heart)  - will plan for a pacemaker defibrillator  - we will call and schedule the procedure  - ultrasound    Please do not hesitate to be in touch with our office at [phone] with any questions that may arise.      Thank you for trusting us with your care,    Jeannie Rivera MD  Clinical Cardiac Electrophysiology  [facility]  [address]  [place] [id number]   Office: [phone]  Fax: [phone]                      [place] ICD Decision Making Tool

## 2023-06-01 NOTE — LETTER
6/1/2023    Margret Flores MD  [address] [place] [id number]    RE: Gladys Ramirez       Dear Colleague,     I had the pleasure of seeing Gladys Ramirez in the [facility].    HEART CARE ENCOUNTER CONSULTATON NOTE      [facility]  [phone]      Assessment/Recommendations   Assessment/Plan:    Gladys Ramirez is a very pleasant 89 year old female with non ischemic cardiomyopathy(LVEF 25-30%), HTN, h/o PE, LBBB who presents today to discuss device therapy.    1. Non ischemic cardiomyopathy  - On GDMT  -  ms with LBBB  - Discussed indications for cardiac resynchronization therapy with a pacemaker-defibrillator, procedural details, possible complications and follow up.  I personally discussed with the patient the rational for ICD placement, alternate therapies, technical aspects of the surgical procedure, risk/benefits of therapy, and long term follow up in the Device Clinic. The [place] Decision Making Tool and further ICD education completed by the Electrophysiology Registered Nurse, attached is the documentation of this. At this time the patient verbalized understanding and has agreed to proceed with ICD implantation.    [place] ICD Decision Making Tool    2. Atrial fibrillation  - On Amiodarone and Eliquis for anticoagulation  - On chronic anticoagulation for her h/p PE also, so Watchman not offered    Time spent: 30 minutes spent on the date of the encounter doing chart review, history and exam, documentation and further activities as noted above.       History of Present Illness/Subjective    HPI: Gladys Ramirez is a very pleasant 89 year old female with non ischemic cardiomyopathy(LVEF 25-30%), HTN, h/o PE, LBBB who presents today to discuss device therapy.    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    She had a fall after the last  appointment oin 3/4/2023. She hit her head but no LOC. Had partial  hip replacement surgery on 3/5/23 and went to TCU from 3/27/23-4/10/23. She comes today for follow up.    Discussed indications for cardiac resynchronization therapy with a pacemaker-defibrillator, procedural details, possible complications and follow up. We also discussed possibility of defibrillator therapy in particular as an option and the she would like to pursue that.       Recent ECG(personally reviewed):  8/24/2022  NSR with LBBB    Recent Echocardiogram Results (personally reviewed):    November 2022    There is mild to moderate concentric left ventricular hypertrophy.  The visual ejection fraction is 25-30%.  There is severe global hypokinesia of the left ventricle.  Septal motion is consistent with conduction abnormality.  The right ventricle is normal in size and function.  No significant valve disease.  Compared to the prior study dated 8/24/2022, there have been no changes.      Labs below reviewed personally     Physical Examination  Review of Systems   Vitals: /66 (BP Location: Right arm, Patient Position: Sitting, Cuff Size: Adult Regular)   Pulse 59   Resp 16   Ht 1.524 m (5')   Wt 70.3 kg (155 lb)   BMI 30.27 kg/m    BMI= Body mass index is 30.27 kg/m .  Wt Readings from Last 3 Encounters:   06/01/23 70.3 kg (155 lb)   04/05/23 73.9 kg (163 lb)   03/22/23 69.4 kg (153 lb)       General Appearance:   no distress, normal body habitus   ENT/Mouth: membranes moist, no oral lesions or bleeding gums.      EYES:  no scleral icterus, normal conjunctivae   Neck: no carotid bruits or thyromegaly   Chest/Lungs:   lungs are clear to auscultation, no rales or wheezing, no sternal scar, equal chest wall expansion    Cardiovascular:   Regular. Normal first and second heart sounds with no murmurs, rubs, or gallops; the carotid, radial and posterior tibial pulses are intact, no edema bilaterally    Abdomen:  no organomegaly, masses,  bruits, or tenderness; bowel sounds are present   Extremities: no cyanosis or clubbing. Left knee in brace   Skin: no xanthelasma, warm.    Neurologic: normal  bilateral, no tremors     Psychiatric: alert and oriented x3, calm        Please refer above for cardiac ROS details.        Medical History  Surgical History Family History Social History   Past Medical History:   Diagnosis Date    Angina pectoris (H)     Chest pain 03/09/2017    Cough     Hyperlipidemia     Hypertension     Osteoarthritis      Past Surgical History:   Procedure Laterality Date    APPENDECTOMY      BASAL CELL CARCINOMA EXCISION      nose    LAPAROSCOPY DIAGNOSTIC (GENERAL) N/A 11/04/2014    Procedure: LAPAROSCOPY BILATERAL SALPINGO-OOPHORECTOMY ;  Surgeon: Sofia Harper MD;  [facility];  Service:     OPEN REDUCTION INTERNAL FIXATION HIP BIPOLAR Right 3/5/2023    Procedure: HEMIARTHROPLASTY, HIP, BIPOLAR;  Surgeon: Jose G Martinez MD;  [place] [facility]    TONSILLECTOMY      10 years old    ZZC TOTAL KNEE ARTHROPLASTY Left     2011     Family History   Problem Relation Age of Onset    Heart Disease Mother     Rheumatic Heart Disease Mother     No Known Problems Father     Cancer Brother         brain    Lung Cancer Brother     Lung Cancer Brother     Cancer Sister         lung    Lung Cancer Sister         Social History     Socioeconomic History    Marital status: Single     Spouse name: Not on file    Number of children: Not on file    Years of education: Not on file    Highest education level: Not on file   Occupational History    Not on file   Tobacco Use    Smoking status: Never     Passive exposure: Never    Smokeless tobacco: Never    Tobacco comments:     no passive exposure   Vaping Use    Vaping status: Never Used   Substance and Sexual Activity    Alcohol use: Yes     Comment: Alcoholic Drinks/day: Rarely a glass of wine    Drug use: No    Sexual activity: Not on file   Other Topics  Concern    Not on file   Social History Narrative    The patient is a nun.     Social Determinants of Health     Financial Resource Strain: Low Risk  (3/27/2023)    Overall Financial Resource Strain (CARDIA)     Difficulty of Paying Living Expenses: Not hard at all   Food Insecurity: No Food Insecurity (3/27/2023)    Hunger Vital Sign     Worried About Running Out of Food in the Last Year: Never true     Ran Out of Food in the Last Year: Never true   Transportation Needs: No Transportation Needs (3/27/2023)    PRAPARE - Transportation     Lack of Transportation (Medical): No     Lack of Transportation (Non-Medical): No   Physical Activity: Insufficiently Active (3/27/2023)    Exercise Vital Sign     Days of Exercise per Week: 3 days     Minutes of Exercise per Session: 10 min   Stress: No Stress Concern Present (3/27/2023)    [employer] - Occupational Stress Questionnaire     Feeling of Stress : Not at all   Social Connections: Moderately Integrated (3/27/2023)    Social Connection and Isolation Panel [NHANES]     Frequency of Communication with Friends and Family: More than three times a week     Frequency of Social Gatherings with Friends and Family: More than three times a week     Attends [employer] Services: More than 4 times per year     Active Member of Clubs or Organizations: Yes     Attends Club or Organization Meetings: More than 4 times per year     Marital Status: Never    Intimate Partner Violence: Not on file   Housing Stability: Not on file           Medications  Allergies   Current Outpatient Medications   Medication Sig Dispense Refill    acetaminophen (TYLENOL) 325 MG tablet Take 2 tablets (650 mg) by mouth every 4 hours as needed for other (mild pain) 100 tablet 0    amiodarone (PACERONE) 200 MG tablet Take 0.5 tablets (100 mg) by mouth daily 45 tablet 3    apixaban ANTICOAGULANT (ELIQUIS) 2.5 MG tablet Take 1 tablet (2.5 mg) by mouth 2 times daily 60 tablet 0     cholecalciferol, vitamin D3, (VITAMIN D3) 2,000 unit Tab [CHOLECALCIFEROL, VITAMIN D3, (VITAMIN D3) 2,000 UNIT TAB] Take 1 tablet (2,000 Units total) by mouth daily. 90 tablet 3    diclofenac (FLECTOR) 1.3 % patch Externally apply 1 patch topically 2 times daily 180 patch 1    DULoxetine (CYMBALTA) 60 MG capsule Take 1 capsule (60 mg) by mouth daily      furosemide (LASIX) 20 MG tablet Take 1 tablet (20 mg) by mouth daily 90 tablet 1    KLOR-CON 20 MEQ CR tablet TAKE ONE TABLET BY MOUTH ONE TIME DAILY 90 tablet 3    Lidocaine (LIDOCARE) 4 % Patch Place 1 patch onto the skin every 24 hours Apply as needed to painful area of intact skin. To prevent lidocaine toxicity, patient should be patch free for 12 hrs daily. 10 patch 0    lisinopril (ZESTRIL) 2.5 MG tablet Take 1 tablet (2.5 mg) by mouth daily 90 tablet 4    zolpidem ER (AMBIEN CR) 6.25 MG CR tablet Take 1 and 1/4 tablet by mouth at bedtime 45 tablet 5    naloxone (NARCAN) 4 MG/0.1ML nasal spray Spray 1 spray (4 mg) into one nostril alternating nostrils as needed for opioid reversal every 2-3 minutes until assistance arrives (Patient not taking: Reported on 6/1/2023) 0.2 mL 0    oxyCODONE (ROXICODONE) 5 MG tablet Take 0.5-1 tablets (2.5-5 mg) by mouth every 4 hours as needed for moderate to severe pain (MDD 4 tabs) (Patient not taking: Reported on 6/1/2023) 26 tablet 0    polyethylene glycol (MIRALAX) 17 GM/Dose powder Take 17 g by mouth daily (Patient not taking: Reported on 6/1/2023) 510 g 0    senna-docusate (SENOKOT-S/PERICOLACE) 8.6-50 MG tablet Take 1-2 tablets by mouth 2 times daily as needed for constipation Take while on oral narcotics to prevent or treat constipation. (Patient not taking: Reported on 4/20/2023) 30 tablet 0       Allergies   Allergen Reactions    Trazodone Shortness Of Breath and Unknown     Allergic to trazodone and deriv., Other: trouble swallowing      Clindamycin Diarrhea     C-diff    Gabapentin Other (See Comments)      "\"Internal tremors\"    Temazepam Other (See Comments)     Annotation: Nightmares      Buprenorphine Palpitations     Tried patch          Lab Results    Chemistry/lipid CBC Cardiac Enzymes/BNP/TSH/INR   Recent Labs   Lab Test 03/12/15  1230   CHOL 179   HDL 64   LDL 80   TRIG 176*     Recent Labs   Lab Test 03/12/15  1230   LDL 80     Recent Labs   Lab Test 02/08/23  1345      POTASSIUM 4.2   CHLORIDE 105   CO2 27   GLC 82   BUN 20.6   CR 1.16*   GFRESTIMATED 44*   MARLENE 9.8*     Recent Labs   Lab Test 02/08/23  1345 09/07/22  1112 08/28/22  0442   CR 1.16* 1.14* 0.81     Recent Labs   Lab Test 06/15/21  0910   A1C 6.0*          Recent Labs   Lab Test 08/28/22  0442   WBC 6.2   HGB 12.1   HCT 36.1   MCV 93        Recent Labs   Lab Test 08/28/22  0442 08/26/22  0723 08/24/22  0718   HGB 12.1 12.6 12.8    Recent Labs   Lab Test 08/23/22  1154 08/17/22  1215 11/21/19  0451   TROPONINI 0.16 0.04 0.01     Recent Labs   Lab Test 02/08/23  1345 08/23/22  1154 08/17/22  1215 03/03/21  1222 02/21/19  0713   BNP  --  1,933*  --  177* 95   NTBNP 5,373*  --  5,101*  --   --      Recent Labs   Lab Test 02/21/19  0648   TSH 2.05     Recent Labs   Lab Test 08/23/22  1154 06/05/19  0052 02/21/19  0648   INR 1.37* 1.50* 1.20*        Jeannie Rivera MD      Thank you for allowing me to participate in the care of your patient.      Sincerely,     Jeannie Rivera MD     [facility]  cc:   Charles Laird MD  [address]  [place] [address]        "

## 2023-06-01 NOTE — PROGRESS NOTES
H&P  PMD: []  Received [] Card OV: [x]  Date: 6/1/23 Teach  []   Orders  I [x] P  [x]  Letter []   AC: Eliquis- Hold 1 day prior All other AM Meds: Take All     9/29/1930  Home:[phone] (home) Cell:There is no such number on file (mobile).  Emergency Contact: YUDI العراقي   PCP: Margret Flores, [phone]      Important patient information for CSC/Cath Lab staff : Per Dr Rivera pt need to be admitted overnight for care s/p. Pt needs Echo AM prior to case.    [facility] EP Cath Lab Procedure Order     Device Implant/Revision:  Procedure: New Implant  Current Device/Device Co Needed for Procedure: BIV ICD Billogram  Ordering Provider: Dr Rivera  Ordering Date: 6/1/2023  Diagnosis:  Cardiomyopathy  Scheduling Timeframe:  Next Available  Scheduling Restrictions: Per Dr Rivera pt need to be admitted overnight for care s/p  Scheduling Contact: Please contact pt to schedule, if you are unable to schedule date within the next 24 hours please contact pt to update on scheduling process  Scheduling Follow-up apt for NEW HIS/CRT Implants: Device follow-up with HF DEON, EF < 50%, &/or has a hx/dx of HF or CM  Pre-Procedural Testing needed: Echo  Anesthesia:  Conscious Sedation- CV RN to administer    [facility] EP Cath Lab Prep   H&P:  Compled by cardiology on 6/1/23 if scheduled within 30 days, pt to schedule with PMD if procedure outside of this timeframe  Pre-op Labs: CBC, BMP, Beta HcG if appropriate, and INR if on Warfarin will be ordered AM of procedure (No labs work for ILR) and Review of most recent labs, WEL for procedure  T&S Pre-Procedure Review: T&S is not required for procedure  Medical Records Pertinent for Procedure:  Echo Pending and EKG LBBB  Allergies: Reviewed allergies, no concerns regarding orders for procedure    Allergies   Allergen Reactions     Trazodone Shortness Of Breath and Unknown     Allergic to trazodone and deriv., Other: trouble swallowing       Clindamycin Diarrhea     C-diff     Gabapentin  "Other (See Comments)     \"Internal tremors\"     Temazepam Other (See Comments)     Annotation: Nightmares       Buprenorphine Palpitations     Tried patch       Current Outpatient Medications:      acetaminophen (TYLENOL) 325 MG tablet, Take 2 tablets (650 mg) by mouth every 4 hours as needed for other (mild pain), Disp: 100 tablet, Rfl: 0     amiodarone (PACERONE) 200 MG tablet, Take 0.5 tablets (100 mg) by mouth daily, Disp: 45 tablet, Rfl: 3     apixaban ANTICOAGULANT (ELIQUIS) 2.5 MG tablet, Take 1 tablet (2.5 mg) by mouth 2 times daily, Disp: 60 tablet, Rfl: 0     cholecalciferol, vitamin D3, (VITAMIN D3) 2,000 unit Tab, [CHOLECALCIFEROL, VITAMIN D3, (VITAMIN D3) 2,000 UNIT TAB] Take 1 tablet (2,000 Units total) by mouth daily., Disp: 90 tablet, Rfl: 3     diclofenac (FLECTOR) 1.3 % patch, Externally apply 1 patch topically 2 times daily, Disp: 180 patch, Rfl: 1     DULoxetine (CYMBALTA) 60 MG capsule, Take 1 capsule (60 mg) by mouth daily, Disp: , Rfl:      furosemide (LASIX) 20 MG tablet, Take 1 tablet (20 mg) by mouth daily, Disp: 90 tablet, Rfl: 1     KLOR-CON 20 MEQ CR tablet, TAKE ONE TABLET BY MOUTH ONE TIME DAILY, Disp: 90 tablet, Rfl: 3     Lidocaine (LIDOCARE) 4 % Patch, Place 1 patch onto the skin every 24 hours Apply as needed to painful area of intact skin. To prevent lidocaine toxicity, patient should be patch free for 12 hrs daily., Disp: 10 patch, Rfl: 0     lisinopril (ZESTRIL) 2.5 MG tablet, Take 1 tablet (2.5 mg) by mouth daily, Disp: 90 tablet, Rfl: 4     naloxone (NARCAN) 4 MG/0.1ML nasal spray, Spray 1 spray (4 mg) into one nostril alternating nostrils as needed for opioid reversal every 2-3 minutes until assistance arrives (Patient not taking: Reported on 6/1/2023), Disp: 0.2 mL, Rfl: 0     oxyCODONE (ROXICODONE) 5 MG tablet, Take 0.5-1 tablets (2.5-5 mg) by mouth every 4 hours as needed for moderate to severe pain (MDD 4 tabs) (Patient not taking: Reported on 6/1/2023), Disp: 26 tablet, " Rfl: 0     polyethylene glycol (MIRALAX) 17 GM/Dose powder, Take 17 g by mouth daily (Patient not taking: Reported on 6/1/2023), Disp: 510 g, Rfl: 0     senna-docusate (SENOKOT-S/PERICOLACE) 8.6-50 MG tablet, Take 1-2 tablets by mouth 2 times daily as needed for constipation Take while on oral narcotics to prevent or treat constipation. (Patient not taking: Reported on 4/20/2023), Disp: 30 tablet, Rfl: 0     zolpidem ER (AMBIEN CR) 6.25 MG CR tablet, Take 1 and 1/4 tablet by mouth at bedtime, Disp: 45 tablet, Rfl: 5    Documentation Date:6/1/2023 10:29 AM  Larissa Aguila RN

## 2023-06-05 NOTE — TELEPHONE ENCOUNTER
Pc to patient.  She describes that she doesn't want a device currently.  She is happy with her life and doesn't want to prolong it with an ICD.  We discussed PPM, she declines this also.  She seems to have a good understanding what this means.  All questions answered.    Dr. Rivera, she wanted to extend her thanks to you.  Any further changes?  Thank you!  Fiordaliza

## 2023-07-20 ENCOUNTER — OFFICE VISIT (OUTPATIENT)
Dept: PALLIATIVE MEDICINE | Facility: OTHER | Age: 88
End: 2023-07-20
Payer: COMMERCIAL

## 2023-07-20 DIAGNOSIS — G89.29 CHRONIC INTRACTABLE PAIN: ICD-10-CM

## 2023-07-20 DIAGNOSIS — S72.001A HIP FRACTURE, RIGHT, CLOSED, INITIAL ENCOUNTER (H): ICD-10-CM

## 2023-07-20 DIAGNOSIS — S83.511S RUPTURE OF ANTERIOR CRUCIATE LIGAMENT OF RIGHT KNEE, SEQUELA: ICD-10-CM

## 2023-07-20 DIAGNOSIS — S72.001S CLOSED RIGHT HIP FRACTURE, SEQUELA: ICD-10-CM

## 2023-07-20 DIAGNOSIS — M17.11 PRIMARY OSTEOARTHRITIS OF RIGHT KNEE: ICD-10-CM

## 2023-07-20 DIAGNOSIS — M17.11 PRIMARY OSTEOARTHRITIS OF RIGHT KNEE: Primary | ICD-10-CM

## 2023-07-20 PROCEDURE — G0463 HOSPITAL OUTPT CLINIC VISIT: HCPCS | Performed by: NURSE PRACTITIONER

## 2023-07-20 PROCEDURE — 99214 OFFICE O/P EST MOD 30 MIN: CPT | Performed by: NURSE PRACTITIONER

## 2023-07-20 RX ORDER — OXYCODONE HYDROCHLORIDE 5 MG/1
5 TABLET ORAL 3 TIMES DAILY PRN
Qty: 30 TABLET | Refills: 0 | Status: SHIPPED | OUTPATIENT
Start: 2023-07-20 | End: 2023-09-28

## 2023-07-20 RX ORDER — OXYCODONE HYDROCHLORIDE 5 MG/1
5 TABLET ORAL 3 TIMES DAILY PRN
Qty: 630 TABLET | Refills: 0 | Status: SHIPPED | OUTPATIENT
Start: 2023-07-20 | End: 2023-07-20

## 2023-07-20 NOTE — PATIENT INSTRUCTIONS
After Visit Instructions:     Thank you for coming to [facility] for your care. It is my goal to partner with you to help you reach your optimal state of health.   Continue daily self-care, identifying contributing factors, and monitoring variations in pain level. Continue to integrate self-care into your life.      After Visit Instructions:     Thank you for coming to [facility] for your care. It is my goal to partner with you to help you reach your optimal state of health.   Continue daily self-care, identifying contributing factors, and monitoring variations in pain level. Continue to integrate self-care into your life.      30 minutes Clinic follow-up with SHASHA Simpson NP-C in 2 months   Procedures recommended: Right knee genicular block,  Medication Management : Oxycodone 5 mg refilled to  today.       SHASHA Domínguez NP-C  [facility] - Monday and Friday  [facility] - Tuesday [place] - Thursday    Be sure to request ALL medication refills 5 days prior to the due date unless you will see your medical provider in an appointment before the due date.  This is YOUR responsibility. Do not expect same day refills. If you do not plan ahead you may run out of medications.   NO early refills are allowed. It is your responsibility to manage your medications responsibility and keep them safely stored. Lost or destroyed medications WILL NOT be replaced    Scheduling/Clinic telephone Number for ALL locations:  [phone]    After Hours On-Call Service:  [phone]    Call with any questions about your care and for scheduling assistance.   Calls are returned Monday through Friday between 8 AM and 4:00 PM. We usually get back to you within 2 business days depending on the issue/request.    If we are prescribing your medications:  For opioid medication refills, call the clinic or send a [employer] message 7 days in  advance.  Please include:  Your name and date of birth.   Name of requested medication  Name of the pharmacy.  For non-opioid medications, call your pharmacy directly to request a refill. Please allow 3-4 days to be processed.   Per [place] State Law:  All controlled substance prescriptions must be filled within 30 days of being written.    For those controlled substances allowing refills, pickup must occur within 30 days of last fill.      We believe regular attendance is key to your success in our program!    Any time you are unable to keep your appointment we ask that you call us at least 24 hours in advance to cancel.This will allow us to offer the appointment time to another patient.   Multiple missed appointments may lead to dismissal from the clinic.        SHASHA Domínguez, NP-C  [facility]  [facility] - Monday and Friday  [facility] - Tuesday [place] - Thursday    Be sure to request ALL medication refills 5 days prior to the due date unless you will see your medical provider in an appointment before the due date.  This is YOUR responsibility. Do not expect same day refills. If you do not plan ahead you may run out of medications.   NO early refills are allowed. It is your responsibility to manage your medications responsibility and keep them safely stored. Lost or destroyed medications WILL NOT be replaced    Scheduling/Clinic telephone Number for ALL locations:  [phone]    After Hours On-Call Service:  [phone]    Call with any questions about your care and for scheduling assistance.   Calls are returned Monday through Friday between 8 AM and 4:00 PM. We usually get back to you within 2 business days depending on the issue/request.    If we are prescribing your medications:  For opioid medication refills, call the clinic or send a [employer] message 7 days in advance.  Please include:  Your name and date of birth.   Name of requested medication  Name of the  pharmacy.  For non-opioid medications, call your pharmacy directly to request a refill. Please allow 3-4 days to be processed.   Per [place] State Law:  All controlled substance prescriptions must be filled within 30 days of being written.    For those controlled substances allowing refills, pickup must occur within 30 days of last fill.      We believe regular attendance is key to your success in our program!    Any time you are unable to keep your appointment we ask that you call us at least 24 hours in advance to cancel.This will allow us to offer the appointment time to another patient.   Multiple missed appointments may lead to dismissal from the clinic.

## 2023-07-20 NOTE — TELEPHONE ENCOUNTER
[facility] Call Center    Phone Message    May a detailed message be left on voicemail: yes     Reason for Call: Other: Please call pharmacy to clarify information for oxycodone prescription.      Action Taken: Other: Pain    Travel Screening: Not Applicable

## 2023-07-20 NOTE — TELEPHONE ENCOUNTER
Script Eprescribed to pharmacy    Signed Prescriptions:                        Disp   Refills    oxyCODONE (ROXICODONE) 5 MG tablet         30 tab*0        Sig: Take 1 tablet (5 mg) by mouth 3 times daily as needed           for moderate to severe pain #30 tabs to last 30           days for chronic pain. Ok to fill/start July 20, 2023  Authorizing Provider: QUIANA EHRNANDEZ APRN, NP-C  [facility] [place]

## 2023-07-20 NOTE — TELEPHONE ENCOUNTER
Call returned to pharmacy. Pharmacy needs clarification on quantity and days supply of medication. Original order written for 630 tablets. Will clarify with provider. Pharmacy also stated that they cannot fill the medication for more than a 7 day supply if it is for acute pain. Verified with the pharmacy that this would be for chronic pain.

## 2023-07-20 NOTE — PROGRESS NOTES
Patient presents to the clinic today for a follow up with SHASHA Sims CNP regarding Pain Management.          4/20/2023     2:55 PM 4/20/2023     2:56 PM 7/20/2023     1:16 PM   PEG Score   PEG Total Score 5.67 5.67 6       UDS/CSA- 11.11.2022    Medications- Oxy 5mg last taken at 3pm. Tries to go as long as she can but at three she needs to take it.Afraid to take her oxy at night because of the ambien    QUESTIONS:Knee is better than when she first came in. Worst when she gets up from sitting. Always aching in the knee    Jemma WHITMAN [facility] Visit Facilitator

## 2023-07-20 NOTE — PROGRESS NOTES
[facility]    CHIEF COMPLAINT: Chronic Pain.    INTERVAL HISTORY:  Last seen on 23.       Recommendations/plan at the last visit included:  30 minutes Video or Clinic follow-up with SHASHA Simpson NP-C in 3 months or sooner as needed..   Medication Management : Ok to continue take Oxycodone as needed, no more than 3 tabs per day. Call 5 business days before refills are due.     Since last visit:   - Having a lot of pain upon standing, has sharp shooting pain from back of the knee and shoots to the calf, can also have groin pain which may be coming from her back.     Pain Information today: not provided/10. Location of pain: right knee is worst pain .    Annual requirements last collected:  22     Current Pain Relevant Medications:    Acetaminophen 325 M tabs QID every day  Compounded cream: Lidocaine, ibuprofen, diclofenac  Duloxetine 60 mg daily      Current Controlled Substance Medications:   Oxycodone 5 mg: Taking a few times per month    Ambien 6.25 m tab at HS     Previous Pain Relevant Medications: (H--helped; HI--Helped initially; SWH--Somewhat helpful; NH--No help; W--worse; SE--side effects; ?--Unsure if helpful)   Opiates: Tramadol: SWH, Buprenorphine:Allergic  NSAIDS: Can't take, on blood thinner  Migraine medications: N/A  Muscle Relaxants: none  Neuropathics: Gabapentin: SE  Anti-depressants for pain: Duloxetine: NH for pain  Anxiety medications: N/A  Topicals: Compounded cream: Lidocaine, ibuprofen, diclofenac:  OTC medications: Tylenol: takes 4000 mg/day  Sleep Medications: Temazepam: Allergy, Ambien:H  Other medications not covered above:      SUBSTANCE HISTORY:   Past or current illegal drug use: none  Past or current ETOH use: Rarely, glass of wine  Nicotine/tobacco use: none  Daily Caffeine intake: never     CURRENT FAMILY/SOCIAL SITUATION:  Past/Present occupation: Buddhist nun:   Housing status: apartment alone  Emotional/Physical support:  Sister Yandy Reyes, neighbor who is an RN  Safety Concerns: falls risk   Current stressors: Pain     THE 4 As OF OPIOID MAINTENANCE ANALGESIA    Analgesia: Is pain relief clinically significant? NO   Activity: Is patient functional and able to perform Activities of Daily Living? N/A   Adverse effects: Is patient free from adverse side effects from opiates? N/A   Adherence to Rx protocol: Is patient adhering to Controlled Substance Agreement and taking medications ONLY as ordered? N/A    [place] Board of Pharmacy Data Base Reviewed:    YES; No concern for abuse or misuse of controlled medications based on this report. Reviewed [facility] July 19, 2023- no concerning fills.      PHYSICAL EXAM    There were no vitals filed for this visit.    Constitutional: healthy, alert, and no distress A&O.   Patient is appropriate.  Psychiatric/mental status: Alert, without lethargy or stupor. Appropriate affect.     Neurologic exam:  CN:  Cranial nerves 2-12 are grossly normal.    MUSCULOSKELETAL: No physical exam completed at this visit.      DIRE Score for ongoing opioid management is calculated as follows:    Diagnosis = 2 pts (slowly progressive; moderate pain/objective findings)    Intractability = 3 pts (patient fully engaged but inadequate response to treatments)    Risk        Psych = 3 pts (no significant personality dysfunction/mental illness; good communication with clinic)         Chem Hlth = 3 pts (no history of chemical dependency; not drug-focused)       Reliability = 3 pts (highly reliable with meds, appointments, treatments)       Social = 2 pts (reduction in some relationships/life rolls)       (Psych + Chem hlth + Reliability + Social) = 16    Efficacy = 2 pts (moderate benefit/function; low med dose; too early/not tried meds)    DIRE Score = 18        7-13: likely NOT suitable candidate for long-term opioid analgesia       14-21: may be a suitable candidate for long-term opioid analgesia     DIAGNOSTIC  RESULTS:     11/15/22: MRI right knee w/o contrast   IMPRESSION:  1.  Horizontal cleavage tear of the posteromedial corner of the meniscus.  2.  Grade 2 cartilage loss both sides of the medial compartment.  3.  Full-thickness cartilage loss along the majority of both sides of the lateral compartment with bony remodeling of the lateral tibial plateau and extensive reactive edema.  4.  Large portions of the lateral meniscal body are not identified and may be completely degenerated. Anterior and posterior subluxation of the anterior and posterior horn, respectively.  5.  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  6.  Semimembranosus and medial gastrocnemius tendinopathy without tearing.  7.  Popliteus tendinopathy without tearing.  8.  Mild quadriceps and patellar tendinopathy without tearing.  9.  Small effusion.  10.  No evidence for acute fracture.     1/20/21: Left hip x-ray  IMPRESSION:  Mild primary degenerative narrowing both hip joints. Mild generalized degenerative change base of the spine and both SI joints.Pelvis and left hip otherwise negative. No fractures. No dislocations.     1/2021: XR KNEE RIGHT 1 OR 2 VWS   IMPRESSION:  Moderate primary osteoarthritis all 3 compartments but most prominent in the lateral compartment. Knee otherwise negative. No fractures. No joint effusion.     PAIN RELAVENT CONDITIONS:   1.  OA: severe right knee, Baker's cyst.  2.  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  Reviewed trying a right knee genicular block with Dr Carlson from orthopedics.  He was in agreement  with this option.  This has been ordered and approved by insurance and is scheduled for August 9 to begin block trial.  I have RN team to call Sister Gladys and review the process as well as that if the block trial goes well the actual genicular block procedure is painful.  We will be sure to give her appropriate medication instructions.  She should not premedicate prior to that trial appointment but should definitely double her oxycodone dose prior to the ablation procedure.  Sister Gladys does not drive and understands that she will need to have some with her.  RN to clarify this with her.  Plan:   PAIN INJECTION EVAL/TREAT/FOLLOW UP  Oxycodone 5 mg continued.    PATIENT INSTRUCTIONS:     Diagnosis reviewed, treatment option addressed, and risk/benefits discussed.  Self-care instructions given.  I am recommending a multidisciplinary treatment plan to help this patient better manage pain.    Remember to request ALL medication refills 5 BUSINESS days before you run out.     30 minutes Clinic follow-up with SHASHA Simpson NP-C in 2 months   Procedures recommended: Right knee genicular block, We will call you to schedule.   Medication Management : Oxycodone 5 mg refilled to  today.     I have reviewed the note as documented above.  This accurately captures the substance of my conversation with the patient.  A total of 23 minutes of preparation, care, and consultation were spent on this visit today.     SHASHA Domínguez NP-C  [facility]    (Information in italics and blue color are taken from previous pain and consulting medical providers notes and are documented as such)

## 2023-07-21 ENCOUNTER — TELEPHONE (OUTPATIENT)
Dept: PALLIATIVE MEDICINE | Facility: OTHER | Age: 88
End: 2023-07-21

## 2023-07-21 NOTE — TELEPHONE ENCOUNTER
----- Message from SHASHA Sims CNP sent at 7/20/2023  4:40 PM CDT -----  Regarding: Genicular block  I ordered a genicular block for her which will need a PA before we can schedule.     Can you give her a call and review the process?      First appt she should not take any pain medication before the appt and do her usual activities. (Similar to MBB)     If test block goes well, should take Oxycodone and 2 acetaminophen 650 mg 30 min before ablation appt. Expect that she will have pain for a day or two after and that it is to take Oxycodone 5 mg TID PRN as well as her current doses of Tylenol. She normally takes 1000 mg with in AM. 1300 midday with Oxy 1 tab and 500 mg at HS.     Let her know we will call to schedule but I wanted her to know what the medication and procedure plan would be.     Thanks, DV

## 2023-07-24 ENCOUNTER — TELEPHONE (OUTPATIENT)
Dept: PALLIATIVE MEDICINE | Facility: OTHER | Age: 88
End: 2023-07-24

## 2023-07-24 ENCOUNTER — TELEPHONE (OUTPATIENT)
Dept: PALLIATIVE MEDICINE | Facility: CLINIC | Age: 88
End: 2023-07-24

## 2023-07-24 DIAGNOSIS — M25.561 RIGHT KNEE PAIN: Primary | ICD-10-CM

## 2023-07-24 NOTE — TELEPHONE ENCOUNTER
Order has been entered for genicular nerve block  Pt takes Eliquis-Does not need to stop it. Left this message on pt's voicemail.      No Red Flags

## 2023-07-24 NOTE — TELEPHONE ENCOUNTER
Called and reviewed Valentien's note outlining med plan for upcoming procedure(s). Reviewed genicular block and RFA procedures.   Pt voiced understanding. Will check on whether Eliquis needs to be stopped.

## 2023-07-24 NOTE — TELEPHONE ENCOUNTER
Please PA Oxycodone 5 mg tablets   Disp Refills Start End SIMEON   oxyCODONE (ROXICODONE) 5 MG tablet 30 tablet 0 7/20/2023  No   Sig - Route: Take 1 tablet (5 mg) by mouth 3 times daily as needed for moderate to severe pain #30 tabs to last 30 days for chronic pain. Ok to fill/start July 20, 2023 - Oral   Sent to pharmacy as: oxyCODONE HCl 5 MG Oral Tablet (ROXICODONE)   Class: E-Prescribe   Earliest Fill Date: 7/20/2023   Order: [id number]   E-Prescribing Status: Receipt confirmed by pharmacy (7/20/2023  6:06 PM CDT)     [facility]

## 2023-07-24 NOTE — TELEPHONE ENCOUNTER
----- Message from SHASHA Sims CNP sent at 7/20/2023  4:40 PM CDT -----  Regarding: Genicular block  I ordered a genicular block for her which will need a PA before we can schedule.     Can you give her a call and review the process?      First appt she should not take any pain medication before the appt and do her usual activities. (Similar to MBB)     If test block goes well, should take Oxycodone and 2 acetaminophen 650 mg 30 min before ablation appt. Expect that she will have pain for a day or two after and that it is to take Oxycodone 5 mg TID PRN as well as her current doses of Tylenol. She normally takes 1000 mg with in AM. 1300 midday with Oxy 1 tab and 500 mg at HS.     Let her know we will call to schedule but I wanted her to know what the medication and procedure plan would be.     Thanks, DV    Called and reviewed Valentine's note above. Reviewed genicular block and RFA procedures. Pt voiced understanding.

## 2023-07-24 NOTE — TELEPHONE ENCOUNTER
Screening questions for MBB Injections:    Injection to be done at which interventional clinic site? [facility]    Procedure ordered by Dr. Howard    Procedure ordered? Right knee genicular block  Medial Branch Block    What insurance would patient like us to bill for this procedure? [employer]    MEDICA: REQUIRES A PA FOR BOTH MBB   Worker's comp- Any injection DO NOT SCHEDULE and route to Josi Perdomo.    [employer] insurance - If scheduling an SI joint injection DO NOT SCHEDULE and route to Joann Davis.     MBBs must be scheduled with elapsed time interval of at least 2 weeks and not more than 6 months between the First MBB and the Second MBB for insurance purposes     [employer] - Any injection besides hip/shoulder/knee joint DO NOT SCHEDULE and route to Joann Davis. She will obtain PA and call pt back to schedule procedure or notify pt of denial.     [employer]- PA required for all MBB's  MEDICARE (not supplement) - PA is required for all MBB's    **BCBS- ALL need to be routed to Joann for review if a PA is needed**  IF SCHEDULING IN [place] [facility] PLEASE SCHEDULE AT LEAST 7-10         BUSINESS DAYS OUT SO A PA CAN BE OBTAINED    Genicular Nerve blocks- ALL insurances except for- [employer], Medicare (straight not supplement) and [employer]. Need to be reviewed by Joann before scheduling.       Is patient scheduled at [facility]? no   If YES, route every encounter to [facility] CARE NAVIGATION POOL [[phone]]    Any chance of pregnancy? NO   If YES, do NOT schedule and route to RN breonna  - Dr. Angela route to Tressa Garsia and PM&R Nurse  [[id number]]      Is an  needed? No     Patient has a drive home? (mandatory) Yes     Is patient taking any blood thinners (plavix, coumadin, jantoven, warfarin, heparin, pradaxa or dabigatran )? Yes - Eliquis    If hold needed, do NOT schedule, route to RN pool/ Dr. Angela's Team     Is patient taking any aspirin products? No   If  more than 325mg/day, OK to schedule; Instruct pt to decrease to less than 325 mg for 7 days AND route to RN pool/ Dr. Angela's Team  For CERVICAL procedures, hold all aspirin products for 6 days.   Tell pt that if aspirin product is not held for 6 days, the procedure WILL BE cancelled.      Does the patient have a bleeding or clotting disorder? No  If YES, okay to schedule AND route to RN nurse pool/ Dr. Angela's Team  **For any patients with platelet count <100, must be forwarded to provider**    Is patient diabetic? no If YES, have them bring their glucometer.    Does patient have an active infection or treated for one within the past week? No    Is patient currently taking any antibiotics?  No  For patients on chronic, preventative, or prophylactic antibiotics, procedures may be scheduled.   For patients on antibiotics for active or recent infection:antibiotic course must have been completed for 4 days    Is patient currently taking any steroid medications? (i.e. Prednisone, Medrol)  No   For patients on steroid medications, course must have been completed for 4 days    Is patient actively being treated for cancer or immunocompromised? No   If YES, do NOT schedule and route to RAMU/ Dr. Angela's Team    Are you able to get on and off an exam table with minimal or no assistance? Yes   If NO, do NOT schedule and route to RN/ Dr. Angela's Team    Are you able to roll over and lay on your stomach with minimal or no assistance? Yes   If NO, do NOT schedule and route to RN/ Dr. Angela's Team    Any allergies to contrast dye, iodine, shellfish, or numbing and steroid medications? No  (If so, inform nursing and note in scheduling comments.)    Allergies: Trazodone, Clindamycin, Gabapentin, Temazepam, and Buprenorphine     Does patient have an MRI/CT?  Not Applicable  Check Procedure Scheduling Grid to see if required.    Was the MRI done within the last 3 years?  NA  If yes, where was the MRI done i.e.[facility],  [facility], [place], [facility] etc?     If no, do not schedule and route to nursing/ Dr. Angela's Team  If MRI was not done at [place], [facility] or [facility] do NOT schedule and route to nursing.    If pt has an imaging disc, the injection MAY be scheduled but pt has to bring disc to appt.   If they show up without the disc the injection cannot be done    Is patient able to transfer to a procedure table with minimal or no assistance? Yes  If NO, do NOT schedule and route to RN/ Dr. Angela's Team    Medial Branch Block Pre-Procedure Instructions  It is okay to take long acting pain medications (if you are on them) the day of the procedure but try not to take any short acting medications unless absolutely necessary.    YES: ok   Long acting meds would include: Gabapentin (Neurontin), MS Contin, Oxycontin        Short acting meds would include:  Percocet, Oxycodone, Vicodin, Ibuprofen   The day of the procedure, you should try to do things that provoke your pain, since the injection is being done to see if it will relieve your pain . YES: ok   If your pain level is a 4 out of 10 or less on the day of the procedu re, please call [phone] to reschedule.  YES: ok     Reminders:    If you are started on any steroids or antibiotics between now and your appointment, you must contact us because it may affect our ability to perform your procedure no    Instructed pt to arrive 30 minutes early for IV start if required. (Check Procedure Scheduling Grid) KALLI     If this is for a cervical MBB aspirin needs to be held for 6 days.  NO     Do not schedule procedures requiring IV placement in the first appointment of the day or first appointment after lunch. Do NOT schedule at 0745, 0815 or 1245.  ok    For patients 85 or older we recommend having an adult stay w/ them for the remainder of the day.      Does the patient have any questions? no

## 2023-07-25 NOTE — TELEPHONE ENCOUNTER
Prior Authorization Not Needed per Insurance    Medication: OXYCODONE HCL 5 MG PO TABS  Insurance Company: [employer] - Phone [phone] Fax [phone]  Expected CoPay:      Pharmacy Filling the Rx: [id number] - [place] [[place]][address]  Pharmacy Notified: Yes - verified pharmacy received a paid claim  Patient Notified: Yes (pharmacy will notify patient when ready)

## 2023-07-31 NOTE — TELEPHONE ENCOUNTER
Called pt back and reviewed Valentine's message as stated. Pt voiced understanding of all. Nurse informed pt that we will call  to the procedure date to review instructions again.

## 2023-08-08 ENCOUNTER — TELEPHONE (OUTPATIENT)
Dept: PALLIATIVE MEDICINE | Facility: OTHER | Age: 88
End: 2023-08-08

## 2023-08-08 NOTE — TELEPHONE ENCOUNTER
Pre-procedure reminder call for genicular nerve block    Arrival time 0215 pm    Do you have a ? yes    If patient doesn't have a  they will need to call and reschedule    [phone]

## 2023-08-09 ENCOUNTER — RADIOLOGY INJECTION OFFICE VISIT (OUTPATIENT)
Dept: PALLIATIVE MEDICINE | Facility: OTHER | Age: 88
End: 2023-08-09
Attending: NURSE PRACTITIONER
Payer: COMMERCIAL

## 2023-08-09 VITALS — DIASTOLIC BLOOD PRESSURE: 77 MMHG | OXYGEN SATURATION: 93 % | SYSTOLIC BLOOD PRESSURE: 165 MMHG | HEART RATE: 63 BPM

## 2023-08-09 DIAGNOSIS — M17.11 PRIMARY OSTEOARTHRITIS OF RIGHT KNEE: ICD-10-CM

## 2023-08-09 DIAGNOSIS — G89.29 CHRONIC INTRACTABLE PAIN: ICD-10-CM

## 2023-08-09 DIAGNOSIS — M25.561 RIGHT KNEE PAIN: ICD-10-CM

## 2023-08-09 DIAGNOSIS — S83.511S RUPTURE OF ANTERIOR CRUCIATE LIGAMENT OF RIGHT KNEE, SEQUELA: ICD-10-CM

## 2023-08-09 DIAGNOSIS — M17.11 ARTHROPATHY OF RIGHT KNEE: Primary | ICD-10-CM

## 2023-08-09 PROCEDURE — 255N000002 HC RX 255 OP 636: Performed by: STUDENT IN AN ORGANIZED HEALTH CARE EDUCATION/TRAINING PROGRAM

## 2023-08-09 PROCEDURE — 64454 NJX AA&/STRD GNCLR NRV BRNCH: CPT | Mod: RT | Performed by: STUDENT IN AN ORGANIZED HEALTH CARE EDUCATION/TRAINING PROGRAM

## 2023-08-09 PROCEDURE — 250N000011 HC RX IP 250 OP 636: Performed by: STUDENT IN AN ORGANIZED HEALTH CARE EDUCATION/TRAINING PROGRAM

## 2023-08-09 RX ORDER — ROPIVACAINE HYDROCHLORIDE 5 MG/ML
3 INJECTION, SOLUTION EPIDURAL; INFILTRATION; PERINEURAL ONCE
Status: COMPLETED | OUTPATIENT
Start: 2023-08-09 | End: 2023-08-09

## 2023-08-09 RX ADMIN — IOHEXOL 10 ML: 180 INJECTION INTRAVENOUS at 14:54

## 2023-08-09 RX ADMIN — ROPIVACAINE HYDROCHLORIDE 3 ML: 5 INJECTION EPIDURAL; INFILTRATION; PERINEURAL at 14:59

## 2023-08-09 ASSESSMENT — PAIN SCALES - GENERAL
PAINLEVEL: EXTREME PAIN (9)
PAINLEVEL: NO PAIN (0)

## 2023-08-09 NOTE — NURSING NOTE
Pre-procedure Intake  If YES to any questions or NO to having a   Please complete laminated checklist and leave on the computer keyboard for Provider, verbally inform provider if able.    For SCS Trial, RFA's or any sedation procedure:  Have you been fasting? NA  If yes, for how long?     Are you taking any any blood thinners such as Coumadin, Warfarin, Jantoven, Pradaxa Xarelto, Eliquis, Edoxaban, Enoxaparin, Lovenox, Heparin, Arixtra, Fondaparinux, or Fragmin? OR Antiplatelet medication such as Plavix, Brilinta, or Effient?   No   If yes, when did you take your last dose?     Do you take aspirin?  No  If cervical procedure, have you held aspirin for 6 days?   NA    Do you have any allergies to contrast dye, iodine, steroid and/or numbing medications?  NO    Are you currently taking antibiotics or have an active infection?  NO    Have you had a fever/elevated temperature within the past week? NO    Are you currently taking oral steroids? NO    Do you have a ? Yes    Are you pregnant or breastfeeding?  Not Applicable    Have you received the COVID-19 vaccine? Yes  If yes, was it your 1st, 2nd or only dose needed? 2nd dose and boosster  Date of most recent vaccine: 04/27/2022    Notify provider and RNs if systolic BP >170, diastolic BP >100, P >100 or O2 sats < 90%     Tressa Salvador MA  [facility] [place]

## 2023-08-09 NOTE — PATIENT INSTRUCTIONS
[facility]  Procedure Discharge Instructions    Today you saw:  Dr. Alba    You had a:  Genicular nerve block Right knee    Medications used:  Lidocaine  Omnipaque  Ropivicaine    After you go home:  Do some activities today to see if you have pain relief.  If you have pain relief, write down:  how long it lasted AND  the percentage of relief you had during that time.   Call the clinic in a couple of days with an update.   The next step will be determined based on your response to the injection.  You may resume your normal diet.   If you received sedation before, during or after your procedure:   For 24 hours-  Relax and take it easy  Do NOT make any important or legal decisions  Do NOT drive or operate machines at home or at work  Do NOT drink alcohol  It is recommended that you have an adult stay with you for 6 hours if you received sedation    Care of Puncture Site:  If you have a bandaid on your puncture site, you may remove it the next morning  You may shower   No bath tubs, whirlpools or swimming for at least 24 hours      Activity:  You may go back to normal activity in 24 hours  Avoid strenuous activity for the first 24 hours.  Be cautious when walking. Numbness and/or weakness in the lower extremities may occur for up to 6-8 hours after the procedure due to effect of the numbing medication used.  Do not drive for 6 hours.  The effect of the numbing medication could slow you reflexes.    Pain:   You may have a mild to moderate increase in pain for several days following the injection.  It may take up to 14 days for the steroid medication to start working although you may feel the effect as early as a few days after the procedure.  You may use ice packs for 10-15 minutes, 3 to 4 times a day at the injection site for comfort.  Do not use heat to painful areas for 6 to 8 hours.  This will give the numbing medication time to wear off and prevent you from accidentally  burning your skin.    Medicines:  You may resume all medications  If you are taking a different blood thinner, please follow the directions given to you by the Pain Clinic RN for restarting this medication.  For minor pain, you may use anti-inflammatory medications - (such as Ibuprofen, Aleve, Advil) or Acetaminophen (Tylenol) for pain control if necessary.  Resume your Warfarin/Coumadin at your regular dose tonight. Follow up with your provider to have your INR rechecked  Resume your Plavix/Clopidogrel and Aspirin in 12 hours.      Call the Pain Clinic if you experience any of the following:    You have chills or a fever greater than 100 F   Swelling, bleeding, redness, drainage, or warmth at the injection site  Progressive weakness or numbness in your legs or arms  Loss of bowel or bladder function  Unusual headache that is not relieved by Tylenol or other pain medication  Unusual new onset of pain that is not improving      Pain Clinic call the Pain Clinic at [phone]

## 2023-08-09 NOTE — PROGRESS NOTES
Pre procedure Diagnosis: Right knee pain  Post procedure Diagnosis: Same  Procedure performed: Right genicular nerve block, diagnostic  Anesthesia: none  Complications: none immediately  Operators: Jesus Alba MD    Indications:   Gladys kelly a 89 year old female was sent by Valentine Howard NP for right knee genicular nerve blocks    Options/alternatives, benefits and risks were discussed with the patient including bleeding, infection, tissue trauma, exposure to radiation, reaction to medications including seizure, nerve injury, weakness, and numbness.  Questions were answered to her  satisfaction and she agrees to proceed. Voluntary informed consent was obtained and signed.     Vitals were reviewed: Yes  Allergies were reviewed:  Yes   Medications were reviewed:  Yes   Pre-procedure pain score: 9/10    Procedure:  After getting informed consent, patient was brought into the procedure suite and was placed in a supine position on the procedure table.   A procedural pause was performed.  Patient was prepped and draped in sterile fashion.     After identifying the right knee joint, the overlying skin was superficially anesthetized with 1ml lidocaine 1%. A 27 gauge 3.5 inch needle was advanced under intermittent fluoroscopy at the sites of the right three genicular nerves. The genicular (superior medial, superior lateral, and inferior medial) articular nerves were targeted. Placement was confirmed with multiple fluoroscopic views. Omnipaque 180 was injected at each site, and after confirmation of the absence of vascular uptake, 0.5mL Ropivacaine 0.5% was injected at each site.  Needles were then withdrawn.    Hemostasis was achieved, the area was cleaned, and bandaids were placed when appropriate.  The patient tolerated the procedure well, and was taken to the recovery room.    Images were saved to PACS.    Post-procedure pain score: 0/10  Follow-up includes:   -f/u with referring provider    Jesus  MD Parth  Interventional Pain Medicine  [facility]

## 2023-08-14 DIAGNOSIS — M19.079 ARTHROSIS OF FOOT, UNSPECIFIED LATERALITY: ICD-10-CM

## 2023-08-14 RX ORDER — DULOXETIN HYDROCHLORIDE 60 MG/1
60 CAPSULE, DELAYED RELEASE ORAL 2 TIMES DAILY
Qty: 180 CAPSULE | Refills: 3 | Status: SHIPPED | OUTPATIENT
Start: 2023-08-14 | End: 2023-11-29

## 2023-08-14 NOTE — TELEPHONE ENCOUNTER
"Routing refill request to provider for review/approval because:  Medication is reported/historical.  This medication was last ordered while patient at [facility].  Requested sig does not match current Rx in medication list.    Last Written Prescription Date:  08/28/22  Last Fill Quantity: ?,  # refills: ?   Last office visit provider:  04/05/23     Requested Prescriptions   Pending Prescriptions Disp Refills    DULoxetine (CYMBALTA) 60 MG capsule [Pharmacy Med Name: DULoxetine HCl Oral Capsule Delayed Release Particles 60 MG] 180 capsule 0     Sig: TAKE ONE CAPSULE BY MOUTH TWICE DAILY       Serotonin-Norepinephrine Reuptake Inhibitors  Failed - 8/14/2023  2:34 PM        Failed - Blood pressure under 140/90 in past 12 months     BP Readings from Last 3 Encounters:   08/09/23 (!) 165/77   06/01/23 122/66   04/20/23 104/64                 Passed - Recent (12 mo) or future (30 days) visit within the authorizing provider's specialty     Patient has had an office visit with the authorizing provider or a provider within the authorizing providers department within the previous 12 mos or has a future within next 30 days. See \"Patient Info\" tab in inbasket, or \"Choose Columns\" in Meds & Orders section of the refill encounter.              Passed - Medication is active on med list        Passed - Patient is age 18 or older        Passed - No active pregnancy on record        Passed - No positive pregnancy test in past 12 months             Mari North RN 08/14/23 2:36 PM  "

## 2023-08-17 ENCOUNTER — HOSPITAL ENCOUNTER (OUTPATIENT)
Dept: CARDIOLOGY | Facility: HOSPITAL | Age: 88
Discharge: HOME | End: 2023-08-17
Attending: INTERNAL MEDICINE | Admitting: INTERNAL MEDICINE
Payer: COMMERCIAL

## 2023-08-17 DIAGNOSIS — I50.20 HFREF (HEART FAILURE WITH REDUCED EJECTION FRACTION) (H): ICD-10-CM

## 2023-08-17 LAB — LVEF ECHO: NORMAL

## 2023-08-17 PROCEDURE — 999N000208 ECHOCARDIOGRAM COMPLETE

## 2023-08-17 PROCEDURE — 93306 TTE W/DOPPLER COMPLETE: CPT | Mod: 26 | Performed by: INTERNAL MEDICINE

## 2023-08-17 PROCEDURE — 255N000002 HC RX 255 OP 636: Performed by: INTERNAL MEDICINE

## 2023-08-17 RX ADMIN — PERFLUTREN 2 ML: 6.52 INJECTION, SUSPENSION INTRAVENOUS at 14:30

## 2023-08-18 ENCOUNTER — TELEPHONE (OUTPATIENT)
Dept: PALLIATIVE MEDICINE | Facility: OTHER | Age: 88
End: 2023-08-18

## 2023-08-18 DIAGNOSIS — M17.11 OSTEOARTHRITIS OF RIGHT KNEE, UNSPECIFIED OSTEOARTHRITIS TYPE: Primary | ICD-10-CM

## 2023-08-18 NOTE — TELEPHONE ENCOUNTER
Right genicular nerve block pain analog was returned and results were not good enough to progress to genicular radiofrequency per Dr Alba. He said pt may return for right knee injections.  Pt states she would like to schedule the injection. Will enter order.

## 2023-09-05 ENCOUNTER — NURSE TRIAGE (OUTPATIENT)
Dept: NURSING | Facility: CLINIC | Age: 88
End: 2023-09-05

## 2023-09-05 NOTE — TELEPHONE ENCOUNTER
Nurse Triage SBAR    Is this a 2nd Level Triage? NO    Situation: COVID positive     Background: Tested positive for covid on 8/31/23. Symptoms started on 8/30/23. Pt interested in starting Paxlovid.     Assessment: Lethargic, congested, sore throat, & cough. Denies any fevers     Protocol Recommended Disposition:   Discuss With PCP And Callback By Nurse Within 1 Hour    Recommendation: Discuss with PCP and callback by nurse within 1 hr. Pt transferred to the COVID anti-viral line to set up a virtual appointment.  Protocol and care advice reviewed. Advised to call back with any new or worsening signs, symptoms, concerns, or questions. They verbalized understanding and agreed to follow advice given.        Reason for Disposition   HIGH RISK patient (e.g., weak immune system, age > 64 years, obesity with BMI of 30 or higher, pregnant, chronic lung disease or other chronic medical condition) and COVID symptoms (e.g., cough, fever)  (Exceptions: Already seen by doctor or NP/PA and no new or worsening symptoms.)    Additional Information   Negative: SEVERE difficulty breathing (e.g., struggling for each breath, speaks in single words)   Negative: Difficult to awaken or acting confused (e.g., disoriented, slurred speech)   Negative: Bluish (or gray) lips or face now   Negative: Shock suspected (e.g., cold/pale/clammy skin, too weak to stand, low BP, rapid pulse)   Negative: Sounds like a life-threatening emergency to the triager   Negative: Diagnosed or suspected COVID-19 and symptoms lasting 3 or more weeks   Negative: COVID-19 exposure and no symptoms   Negative: COVID-19 vaccine reaction suspected (e.g., fever, headache, muscle aches) occurring 1 to 3 days after getting vaccine   Negative: COVID-19 vaccine, questions about   Negative: Lives with someone known to have influenza (flu test positive) and flu-like symptoms (e.g., cough, runny nose, sore throat, SOB; with or without fever)   Negative: Possible COVID-19  symptoms and triager concerned about severity of symptoms or other causes   Negative: COVID-19 and breastfeeding, questions about   Negative: SEVERE or constant chest pain or pressure  (Exception: Mild central chest pain, present only when coughing.)   Negative: MODERATE difficulty breathing (e.g., speaks in phrases, SOB even at rest, pulse 100-120)   Negative: Headache and stiff neck (can't touch chin to chest)   Negative: Oxygen level (e.g., pulse oximetry) 90% or lower   Negative: Chest pain or pressure  (Exception: MILD central chest pain, present only when coughing.)   Negative: Drinking very little and dehydration suspected (e.g., no urine > 12 hours, very dry mouth, very lightheaded)   Negative: Patient sounds very sick or weak to the triager   Negative: MILD difficulty breathing (e.g., minimal/no SOB at rest, SOB with walking, pulse <100)   Negative: Fever > 103 F (39.4 C)   Negative: Fever > 101 F (38.3 C) and over 60 years of age   Negative: Fever > 100.0 F (37.8 C) and bedridden (e.g., CVA, chronic illness, recovering from surgery)    Protocols used: Coronavirus (COVID-19) Diagnosed or [id number]    Jeanie Hopkins RN on 9/5/2023 at 2:06 PM     rectal bleeding

## 2023-09-06 ENCOUNTER — VIRTUAL VISIT (OUTPATIENT)
Dept: FAMILY MEDICINE | Facility: CLINIC | Age: 88
End: 2023-09-06
Payer: COMMERCIAL

## 2023-09-06 DIAGNOSIS — U07.1 COVID-19: Primary | ICD-10-CM

## 2023-09-06 PROCEDURE — 99212 OFFICE O/P EST SF 10 MIN: CPT | Mod: 93 | Performed by: NURSE PRACTITIONER

## 2023-09-06 NOTE — PROGRESS NOTES
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      What phone number would you like to be contacted at? [phone]  How would you like to obtain your AVS? Mail a copy    Distant Location (provider location):  Off-site    Assessment & Plan     COVID-19  Patient is 6 days into covid symptoms and out of window for treatment and in addition takes eliquis and amiodarone which are contraindications with paxlovid and she is out of window for treatment with lagevrio, educated patient on when to seek emergency treatment, and she will treat symptomatically at home for now.        BMI:   Estimated body mass index is 30.27 kg/m  as calculated from the following:    Height as of 6/1/23: 1.524 m (5').    Weight as of 6/1/23: 70.3 kg (155 lb).       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 08/31/2023    Are any of the following symptoms significant for you?  New or worsening difficulty breathing? Yes  Please describe what kind of difficulty you are having breathing:Mild dyspnea (able to do ADLs without difficulty, mild shortness of breath with activities such as climbing one or two flights of stairs or walking briskly)  Worsening cough? No  Fever or chills? No  Headache: YES  Sore throat: YES  Chest pain: No  Diarrhea: YES  Body aches? YES    What treatments has patient tried? Decongestant - oral and Cough syrup   Does patient live in a nursing home, group home, or shelter? YES- senior building  Does patient have a way to get food/medications during quarantined? Yes, I have a friend or family member who can help me.                Review of Systems   Constitutional, HEENT, cardiovascular, pulmonary, gi and  gu systems are negative, except as otherwise noted.      Objective       Vitals:  No vitals were obtained today due to virtual visit.    Physical Exam   alert  PSYCH: Alert and oriented times 3; coherent speech, normal   rate and volume, able to articulate logical thoughts, able   to abstract reason, no tangential thoughts, no hallucinations   or delusions  Her affect is normal  RESP: No cough, no audible wheezing, able to talk in full sentences  Remainder of exam unable to be completed due to telephone visits      Phone call duration: 10 minutes

## 2023-09-18 ENCOUNTER — OFFICE VISIT (OUTPATIENT)
Dept: CARDIOLOGY | Facility: CLINIC | Age: 88
End: 2023-09-18
Payer: COMMERCIAL

## 2023-09-18 VITALS
RESPIRATION RATE: 14 BRPM | SYSTOLIC BLOOD PRESSURE: 128 MMHG | HEART RATE: 60 BPM | BODY MASS INDEX: 30.04 KG/M2 | WEIGHT: 153 LBS | DIASTOLIC BLOOD PRESSURE: 60 MMHG | HEIGHT: 60 IN

## 2023-09-18 DIAGNOSIS — I48.19 PERSISTENT ATRIAL FIBRILLATION (H): ICD-10-CM

## 2023-09-18 DIAGNOSIS — I42.9 SECONDARY CARDIOMYOPATHY (H): Primary | ICD-10-CM

## 2023-09-18 DIAGNOSIS — N18.31 STAGE 3A CHRONIC KIDNEY DISEASE (CKD) (H): ICD-10-CM

## 2023-09-18 DIAGNOSIS — I50.20 HFREF (HEART FAILURE WITH REDUCED EJECTION FRACTION) (H): ICD-10-CM

## 2023-09-18 DIAGNOSIS — I10 BENIGN ESSENTIAL HYPERTENSION: ICD-10-CM

## 2023-09-18 DIAGNOSIS — E78.00 PURE HYPERCHOLESTEROLEMIA: ICD-10-CM

## 2023-09-18 PROCEDURE — 99214 OFFICE O/P EST MOD 30 MIN: CPT | Performed by: INTERNAL MEDICINE

## 2023-09-18 RX ORDER — AMOXICILLIN 500 MG/1
2000 CAPSULE ORAL PRN
COMMUNITY
Start: 2023-05-12 | End: 2023-10-31

## 2023-09-18 NOTE — PROGRESS NOTES
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  Essentially asymptomatic and we discussed CRT device and given her age she is declining this.  Continue current meds and salt and fluid restriction.    (I50.20) HFrEF (heart failure with reduced ejection fraction) (H)  Comment: No significant signs or symptoms currently on Lasix 20 mg a day.    (E78.00) Pure hypercholesterolemia  Comment: Remote cholesterol 179 and not treating given her age.    (I10) Benign essential hypertension  Comment: Under good control currently on lisinopril.    (I48.19) Persistent atrial fibrillation (H)  Comment: Symptomatic, not valvular, advanced [id number] score of 4, on Eliquis 2.5 mg twice daily given age as well as renal dysfunction.  On amiodarone with blood work looking good in April and due to have this checked again in October.    (N18.31) Stage 3a chronic kidney disease (CKD) (H)  Comment: Minimally diminished GFR currently of 57 with creatinine of 0.94 and thus lower dose of Eliquis.    Plan  1.  Cancel CRT device.  2.  Check dose of Eliquis at home.  3.  Follow-up with me in 6 months or sooner if needed.    Subjective  CC: 89 soon-to-be 89-year-old white female being seen in follow-up.  Since have seen her she fell and fractured her right hip, had replacement of this as well as injection on right knee.  Back to living alone independently in an apartment in senior housing.  Is moving slowly but denies any chest pains, palpitations, shortness of breath, PND, orthopnea, syncope, dizziness or peripheral edema.    Medications  Current Outpatient Medications   Medication Sig Dispense Refill    acetaminophen (TYLENOL) 325 MG tablet Take 2 tablets (650 mg) by  mouth every 4 hours as needed for other (mild pain) 100 tablet 0    amiodarone (PACERONE) 200 MG tablet Take 0.5 tablets (100 mg) by mouth daily 45 tablet 3    amoxicillin (AMOXIL) 500 MG capsule Take 2,000 mg by mouth as needed (TAKE 4 CAPSULES (2,000 MG) 1 HOUR PRIOR TO DENTAL APPOINTMENTS.)      apixaban ANTICOAGULANT (ELIQUIS) 2.5 MG tablet Take 1 tablet (2.5 mg) by mouth 2 times daily 60 tablet 0    cholecalciferol, vitamin D3, (VITAMIN D3) 2,000 unit Tab [CHOLECALCIFEROL, VITAMIN D3, (VITAMIN D3) 2,000 UNIT TAB] Take 1 tablet (2,000 Units total) by mouth daily. 90 tablet 3    diclofenac (FLECTOR) 1.3 % patch Externally apply 1 patch topically 2 times daily 180 patch 1    DULoxetine (CYMBALTA) 60 MG capsule TAKE ONE CAPSULE BY MOUTH TWICE DAILY 180 capsule 3    furosemide (LASIX) 20 MG tablet Take 1 tablet (20 mg) by mouth daily 90 tablet 1    KLOR-CON 20 MEQ CR tablet TAKE ONE TABLET BY MOUTH ONE TIME DAILY 90 tablet 3    Lidocaine (LIDOCARE) 4 % Patch Place 1 patch onto the skin every 24 hours Apply as needed to painful area of intact skin. To prevent lidocaine toxicity, patient should be patch free for 12 hrs daily. 10 patch 0    lisinopril (ZESTRIL) 2.5 MG tablet Take 1 tablet (2.5 mg) by mouth daily 90 tablet 4    oxyCODONE (ROXICODONE) 5 MG tablet Take 1 tablet (5 mg) by mouth 3 times daily as needed for moderate to severe pain #30 tabs to last 30 days for chronic pain. Ok to fill/start July 20, 2023 30 tablet 0    zolpidem ER (AMBIEN CR) 6.25 MG CR tablet Take 1 and 1/4 tablet by mouth at bedtime 45 tablet 5       Objective  /60 (BP Location: Left arm, Patient Position: Sitting, Cuff Size: Adult Large)   Pulse 60   Resp 14   Ht 1.524 m (5')   Wt 69.4 kg (153 lb)   BMI 29.88 kg/m      General Appearance:    Alert, cooperative, no distress, appears stated age   Head:    Normocephalic, without obvious abnormality, atraumatic   Throat:   Lips, mucosa, and tongue normal; teeth and gums normal    Neck:   Supple, symmetrical, trachea midline, no adenopathy;        thyroid:  No enlargement/tenderness/nodules; no carotid    bruit or JVD   Back:     Symmetric, no curvature, ROM normal, no CVA tenderness   Lungs:     Clear to auscultation bilaterally, respirations unlabored   Chest wall:    No tenderness or deformity   Heart:    Regular rate and rhythm, S1 and S2 normal, no murmur, rub   or gallop   Abdomen:     Soft, non-tender, bowel sounds active all four quadrants,     no masses, no organomegaly   Extremities:   Normal, atraumatic, no cyanosis or edema   Pulses:   2+ and symmetric all extremities   Skin:   Skin color, texture, turgor normal, no rashes or lesions     Results    Lab Results personally reviewed   Lab Results   Component Value Date    CHOL 179 03/12/2015    CHOL 179 09/30/2013     Lab Results   Component Value Date    HDL 64 03/12/2015    HDL 64 09/30/2013     No components found for: LDLCALC  Lab Results   Component Value Date    TRIG 176 (H) 03/12/2015    TRIG 155 (H) 09/30/2013     Lab Results   Component Value Date    WBC 8.0 03/10/2023    HGB 11.3 (L) 03/10/2023    HCT 37.2 03/10/2023     03/10/2023     Lab Results   Component Value Date    BUN 22.1 03/10/2023     03/10/2023    CO2 26 03/10/2023

## 2023-09-18 NOTE — LETTER
9/18/2023    Margret Flores MD  [address] [place] [id number]    RE: Gladys Ramirez       Dear Colleague,     I had the pleasure of seeing Gladys Ramirez in the [facility].      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  Essentially asymptomatic and we discussed CRT device and given her age she is declining this.  Continue current meds and salt and fluid restriction.    (I50.20) HFrEF (heart failure with reduced ejection fraction) (H)  Comment: No significant signs or symptoms currently on Lasix 20 mg a day.    (E78.00) Pure hypercholesterolemia  Comment: Remote cholesterol 179 and not treating given her age.    (I10) Benign essential hypertension  Comment: Under good control currently on lisinopril.    (I48.19) Persistent atrial fibrillation (H)  Comment: Symptomatic, not valvular, advanced [id number] score of 4, on Eliquis 2.5 mg twice daily given age as well as renal dysfunction.  On amiodarone with blood work looking good in April and due to have this checked again in October.    (N18.31) Stage 3a chronic kidney disease (CKD) (H)  Comment: Minimally diminished GFR currently of 57 with creatinine of 0.94 and thus lower dose of Eliquis.    Plan  1.  Cancel CRT device.  2.  Check dose of Eliquis at home.  3.  Follow-up with me in 6 months or sooner if needed.    Subjective  CC: 89 soon-to-be 89-year-old white female being seen in follow-up.  Since have seen her she fell and fractured her right hip, had replacement of this as well as injection on right knee.  Back to living alone independently in an apartment in senior housing.  Is moving slowly but denies any chest pains, palpitations, shortness of  breath, PND, orthopnea, syncope, dizziness or peripheral edema.    Medications  Current Outpatient Medications   Medication Sig Dispense Refill    acetaminophen (TYLENOL) 325 MG tablet Take 2 tablets (650 mg) by mouth every 4 hours as needed for other (mild pain) 100 tablet 0    amiodarone (PACERONE) 200 MG tablet Take 0.5 tablets (100 mg) by mouth daily 45 tablet 3    amoxicillin (AMOXIL) 500 MG capsule Take 2,000 mg by mouth as needed (TAKE 4 CAPSULES (2,000 MG) 1 HOUR PRIOR TO DENTAL APPOINTMENTS.)      apixaban ANTICOAGULANT (ELIQUIS) 2.5 MG tablet Take 1 tablet (2.5 mg) by mouth 2 times daily 60 tablet 0    cholecalciferol, vitamin D3, (VITAMIN D3) 2,000 unit Tab [CHOLECALCIFEROL, VITAMIN D3, (VITAMIN D3) 2,000 UNIT TAB] Take 1 tablet (2,000 Units total) by mouth daily. 90 tablet 3    diclofenac (FLECTOR) 1.3 % patch Externally apply 1 patch topically 2 times daily 180 patch 1    DULoxetine (CYMBALTA) 60 MG capsule TAKE ONE CAPSULE BY MOUTH TWICE DAILY 180 capsule 3    furosemide (LASIX) 20 MG tablet Take 1 tablet (20 mg) by mouth daily 90 tablet 1    KLOR-CON 20 MEQ CR tablet TAKE ONE TABLET BY MOUTH ONE TIME DAILY 90 tablet 3    Lidocaine (LIDOCARE) 4 % Patch Place 1 patch onto the skin every 24 hours Apply as needed to painful area of intact skin. To prevent lidocaine toxicity, patient should be patch free for 12 hrs daily. 10 patch 0    lisinopril (ZESTRIL) 2.5 MG tablet Take 1 tablet (2.5 mg) by mouth daily 90 tablet 4    oxyCODONE (ROXICODONE) 5 MG tablet Take 1 tablet (5 mg) by mouth 3 times daily as needed for moderate to severe pain #30 tabs to last 30 days for chronic pain. Ok to fill/start July 20, 2023 30 tablet 0    zolpidem ER (AMBIEN CR) 6.25 MG CR tablet Take 1 and 1/4 tablet by mouth at bedtime 45 tablet 5       Objective  /60 (BP Location: Left arm, Patient Position: Sitting, Cuff Size: Adult Large)   Pulse 60   Resp 14   Ht 1.524 m (5')   Wt 69.4 kg (153 lb)   BMI 29.88 kg/m       General Appearance:    Alert, cooperative, no distress, appears stated age   Head:    Normocephalic, without obvious abnormality, atraumatic   Throat:   Lips, mucosa, and tongue normal; teeth and gums normal   Neck:   Supple, symmetrical, trachea midline, no adenopathy;        thyroid:  No enlargement/tenderness/nodules; no carotid    bruit or JVD   Back:     Symmetric, no curvature, ROM normal, no CVA tenderness   Lungs:     Clear to auscultation bilaterally, respirations unlabored   Chest wall:    No tenderness or deformity   Heart:    Regular rate and rhythm, S1 and S2 normal, no murmur, rub   or gallop   Abdomen:     Soft, non-tender, bowel sounds active all four quadrants,     no masses, no organomegaly   Extremities:   Normal, atraumatic, no cyanosis or edema   Pulses:   2+ and symmetric all extremities   Skin:   Skin color, texture, turgor normal, no rashes or lesions     Results    Lab Results personally reviewed   Lab Results   Component Value Date    CHOL 179 03/12/2015    CHOL 179 09/30/2013     Lab Results   Component Value Date    HDL 64 03/12/2015    HDL 64 09/30/2013     No components found for: LDLCALC  Lab Results   Component Value Date    TRIG 176 (H) 03/12/2015    TRIG 155 (H) 09/30/2013     Lab Results   Component Value Date    WBC 8.0 03/10/2023    HGB 11.3 (L) 03/10/2023    HCT 37.2 03/10/2023     03/10/2023     Lab Results   Component Value Date    BUN 22.1 03/10/2023     03/10/2023    CO2 26 03/10/2023       Thank you for allowing me to participate in the care of your patient.      Sincerely,   MARISA SANZ MD   [facility]  cc: No referring provider defined for this encounter.

## 2023-09-18 NOTE — PATIENT INSTRUCTIONS
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  I am glad to hear you are doing well.  Per our conversation we will not consider the pacemaker and continue current meds but if situation changes let me know.  I will plan on seeing you 6 months.  Estrada Holley

## 2023-09-28 ENCOUNTER — OFFICE VISIT (OUTPATIENT)
Dept: PALLIATIVE MEDICINE | Facility: OTHER | Age: 88
End: 2023-09-28
Attending: NURSE PRACTITIONER
Payer: COMMERCIAL

## 2023-09-28 VITALS
SYSTOLIC BLOOD PRESSURE: 122 MMHG | WEIGHT: 153 LBS | HEART RATE: 66 BPM | OXYGEN SATURATION: 99 % | DIASTOLIC BLOOD PRESSURE: 58 MMHG | BODY MASS INDEX: 29.88 KG/M2

## 2023-09-28 DIAGNOSIS — Z79.891 LONG TERM (CURRENT) USE OF OPIATE ANALGESIC: ICD-10-CM

## 2023-09-28 DIAGNOSIS — M17.11 PRIMARY OSTEOARTHRITIS OF RIGHT KNEE: ICD-10-CM

## 2023-09-28 DIAGNOSIS — S83.511S RUPTURE OF ANTERIOR CRUCIATE LIGAMENT OF RIGHT KNEE, SEQUELA: ICD-10-CM

## 2023-09-28 DIAGNOSIS — G89.29 CHRONIC INTRACTABLE PAIN: Primary | ICD-10-CM

## 2023-09-28 LAB — CREAT UR-MCNC: 180 MG/DL

## 2023-09-28 PROCEDURE — 80348 DRUG SCREENING BUPRENORPHINE: CPT | Performed by: NURSE PRACTITIONER

## 2023-09-28 PROCEDURE — 80346 BENZODIAZEPINES1-12: CPT | Performed by: NURSE PRACTITIONER

## 2023-09-28 PROCEDURE — 99214 OFFICE O/P EST MOD 30 MIN: CPT | Performed by: NURSE PRACTITIONER

## 2023-09-28 PROCEDURE — G0463 HOSPITAL OUTPT CLINIC VISIT: HCPCS | Performed by: NURSE PRACTITIONER

## 2023-09-28 RX ORDER — OXYCODONE HYDROCHLORIDE 5 MG/1
5 TABLET ORAL 3 TIMES DAILY PRN
Qty: 70 TABLET | Refills: 0 | Status: SHIPPED | OUTPATIENT
Start: 2023-09-28 | End: 2023-10-27

## 2023-09-28 RX ORDER — MORPHINE SULFATE 15 MG/1
15 TABLET, FILM COATED, EXTENDED RELEASE ORAL EVERY 24 HOURS
Qty: 30 TABLET | Refills: 0 | Status: SHIPPED | OUTPATIENT
Start: 2023-09-28 | End: 2023-10-28

## 2023-09-28 ASSESSMENT — PAIN SCALES - GENERAL: PAINLEVEL: SEVERE PAIN (7)

## 2023-09-28 NOTE — PATIENT INSTRUCTIONS
After Visit Instructions:     Thank you for coming to [facility] for your care. It is my goal to partner with you to help you reach your optimal state of health.   Continue daily self-care, identifying contributing factors, and monitoring variations in pain level. Continue to integrate self-care into your life.      Physical therapy: NO Start doing home exercise as you are able to. Don't overdue!  30 minutes Clinic follow-up with SHASHA Simpson NP-C in 1  month. Ok to schedule up to three follow up appts at a time.   Imaging: If the pain moving into groin continues, we will get an MRI of your lumbar spine.   Labs: UDS and Controlled substance agreement   Medication Management :   START Morphine 15 m tablet at 9 pm daily. If you feel overly sedated or groggy stop taking this medication.   Oxycodone 5 mg: ok to take 3 tablets per day until you start Morphine, then reduce to two tablets per day.   Acetaminophen 50 mg: MAX of 6 tablets per day. Minimize use as able.   Ketoprofen/Ketamine cream: Apply 2-4 pumps of cream up to 4 times daily to painful areas      SHASHA Domínguez, NP-C  [facility]  [facility] - Monday and Friday  [facility] - Tuesday  [place] - Thursday    Be sure to request ALL medication refills 5 days prior to the due date unless you will see your medical provider in an appointment before the due date.  This is YOUR responsibility. Do not expect same day refills. If you do not plan ahead you may run out of medications.   NO early refills are allowed. It is your responsibility to manage your medications responsibility and keep them safely stored. Lost or destroyed medications WILL NOT be replaced    Scheduling/Clinic telephone Number for ALL locations:  [phone]    After Hours On-Call Service:  [phone]    Call with any questions about your care and for scheduling assistance.   Calls are returned Monday through Friday between 8  AM and 4:00 PM. We usually get back to you within 2 business days depending on the issue/request.    If we are prescribing your medications:  For opioid medication refills, call the clinic or send a [employer] message 7 days in advance.  Please include:  Your name and date of birth.   Name of requested medication  Name of the pharmacy.  For non-opioid medications, call your pharmacy directly to request a refill. Please allow 3-4 days to be processed.   Per [place] State Law:  All controlled substance prescriptions must be filled within 30 days of being written.    For those controlled substances allowing refills, pickup must occur within 30 days of last fill.      We believe regular attendance is key to your success in our program!    Any time you are unable to keep your appointment we ask that you call us at least 24 hours in advance to cancel.This will allow us to offer the appointment time to another patient.   Multiple missed appointments may lead to dismissal from the clinic.

## 2023-09-28 NOTE — PROGRESS NOTES
"[facility]    CHIEF COMPLAINT: Chronic Pain.    INTERVAL HISTORY:  Last seen on 23.       Recommendations/plan at the last visit included:  30 minutes Clinic follow-up with SHASHA Simpson NP-C in 2 months   Procedures recommended: Right knee genicular block, We will call you to schedule.   Medication Management : Oxycodone 5 mg refilled to  today.    Since last visit:   - 23 had right genicular block however it was not effective enough to proceed to ablation. Pain in right knee remains debilitating. Has not had any oxycodone for quite a while. Didn't want to \"bother\" me asking for refills.     Pain Information today: Severe Pain (7)/10. Location of pain: right knee .    Annual requirements last collected:  23     Current Pain Relevant Medications:    Acetaminophen 325 M tabs QID every day  Compounded cream: Lidocaine, ibuprofen, diclofenac  Duloxetine 60 mg daily   Flector Patch daily using two patches for about 12 hours per day.    Current Controlled Substance Medications:   Oxycodone 5 mg: Taking a few times per month    Ambien 6.25 m tab at HS     Previous Pain Relevant Medications: (H--helped; HI--Helped initially; [facility]--Somewhat helpful; NH--No help; W--worse; SE--side effects; ?--Unsure if helpful)   Opiates: Tramadol: [facility], Buprenorphine:Allergic  NSAIDS: Can't take, on blood thinner  Migraine medications: N/A  Muscle Relaxants: none  Neuropathics: Gabapentin: SE  Anti-depressants for pain: Duloxetine: NH for pain  Anxiety medications: N/A  Topicals: Compounded cream: Lidocaine, ibuprofen, diclofenac:  OTC medications: Tylenol: takes 4000 mg/day  Sleep Medications: Temazepam: Allergy, Ambien:H  Other medications not covered above:      SUBSTANCE HISTORY:   Past or current illegal drug use: none  Past or current ETOH use: Rarely, glass of wine  Nicotine/tobacco use: none  Daily Caffeine intake: never     CURRENT FAMILY/SOCIAL " SITUATION:  Past/Present occupation: Denominational nun:   Housing status: apartment alone  Emotional/Physical support: Sister Yandy Reyes, neighbor who is an RN  Safety Concerns: falls risk   Current stressors: Pain     THE 4 As OF OPIOID MAINTENANCE ANALGESIA    Analgesia: Is pain relief clinically significant? NO   Activity: Is patient functional and able to perform Activities of Daily Living? N/A   Adverse effects: Is patient free from adverse side effects from opiates? N/A   Adherence to Rx protocol: Is patient adhering to Controlled Substance Agreement and taking medications ONLY as ordered? N/A    [place] Board of Pharmacy Data Base Reviewed:    YES; No concern for abuse or misuse of controlled medications based on this report. Reviewed [facility] September 27, 2023- no concerning fills.      PHYSICAL EXAM    Vitals:    09/28/23 1056   BP: 122/58   BP Location: Left arm   Cuff Size: Adult Regular   Pulse: 66   SpO2: 99%   Weight: 69.4 kg (153 lb)       Constitutional: healthy, alert, and no distress A&O.   Patient is appropriate.  Psychiatric/mental status: Alert, without lethargy or stupor. Appropriate affect.       DIRE Score for ongoing opioid management is calculated as follows:    Diagnosis = 2 pts (slowly progressive; moderate pain/objective findings)    Intractability = 3 pts (patient fully engaged but inadequate response to treatments)    Risk        Psych = 3 pts (no significant personality dysfunction/mental illness; good communication with clinic)         Chem Hlth = 3 pts (no history of chemical dependency; not drug-focused)       Reliability = 3 pts (highly reliable with meds, appointments, treatments)       Social = 2 pts (reduction in some relationships/life rolls)       (Psych + Chem hlth + Reliability + Social) = 16    Efficacy = 2 pts (moderate benefit/function; low med dose; too early/not tried meds)    DIRE Score = 18        7-13: likely NOT suitable candidate for long-term opioid analgesia        14-21: may be a suitable candidate for long-term opioid analgesia     DIAGNOSTIC RESULTS:     11/15/22: MRI right knee w/o contrast   IMPRESSION:  1.  Horizontal cleavage tear of the posteromedial corner of the meniscus.  2.  Grade 2 cartilage loss both sides of the medial compartment.  3.  Full-thickness cartilage loss along the majority of both sides of the lateral compartment with bony remodeling of the lateral tibial plateau and extensive reactive edema.  4.  Large portions of the lateral meniscal body are not identified and may be completely degenerated. Anterior and posterior subluxation of the anterior and posterior horn, respectively.  5.  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  6.  Semimembranosus and medial gastrocnemius tendinopathy without tearing.  7.  Popliteus tendinopathy without tearing.  8.  Mild quadriceps and patellar tendinopathy without tearing.  9.  Small effusion.  10.  No evidence for acute fracture.     1/20/21: Left hip x-ray  IMPRESSION:  Mild primary degenerative narrowing both hip joints. Mild generalized degenerative change base of the spine and both SI joints.Pelvis and left hip otherwise negative. No fractures. No dislocations.     1/2021: XR KNEE RIGHT 1 OR 2 VWS   IMPRESSION:  Moderate primary osteoarthritis all 3 compartments but most prominent in the lateral compartment. Knee otherwise negative. No fractures. No joint effusion.     PAIN RELAVENT CONDITIONS:   1.  OA: severe right knee, Baker's cyst.  2.  PMH: CKD Stage 2, A fib, CHF, primary insomnia    DIAGNOSIS AND PLAN:     (G89.29) Chronic intractable pain  (primary encounter diagnosis)  (M17.11) Primary osteoarthritis of right knee  (S83.511S) Rupture of anterior cruciate ligament of right knee, sequela  Comment: Reminded Gladys to call anytime and that she should not wait for appts if she needs a refill of medications. Adding topical ketamine to see is this helps joint pain.   Plan: oxyCODONE (ROXICODONE) 5  MG tablet, morphine         (MS CONTIN) 15 MG CR tablet, COMPOUNDED         NON-CONTROLLED SUBSTANCE (CMPD RX) - PHARMACY         TO MIX COMPOUNDED MEDICATION      (Z79.891) Long term (current) use of opiate analgesic  Comment: Annual requirements  Plan: Drug Confirmation Panel Urine with Creat            PATIENT INSTRUCTIONS:     Diagnosis reviewed, treatment option addressed, and risk/benefits discussed.  Self-care instructions given.  I am recommending a multidisciplinary treatment plan to help this patient better manage pain.    Remember to request ALL medication refills 5 BUSINESS days before you run out.     Physical therapy: No Start doing home exercise as you are able to. Don't overdue!  30 minutes Clinic follow-up with SHASHA Simpson, NP-C in 1  month. Ok to schedule up to three follow up appts at a time.   Imaging: If the pain moving into groin continues, we will get an MRI of your lumbar spine.   Labs: UDS and Controlled substance agreement   Medication Management : ALWAYS call for refills, do not wait for an appointment. We do not want you to be in pain  START Morphine 15 m tablet at 9 pm daily. If you feel overly sedated or groggy stop taking this medication.   Oxycodone 5 mg: ok to take 3 tablets per day until you start Morphine, then reduce to two tablets per day.   Acetaminophen 50 mg: MAX of 6 tablets per day. Minimize use as able.   Ketoprofen/Ketamine cream: Apply 2-4 pumps of cream up to 4 times daily to painful areas    I have reviewed the note as documented above.  This accurately captures the substance of my conversation with the patient.  A total of 39 minutes of preparation, care, and consultation were spent on this visit today.     SHASHA Domínguez, NP-C  [facility]    (Information in italics and blue color are taken from previous pain and consulting medical providers notes and are documented as such)

## 2023-09-28 NOTE — PROGRESS NOTES
Patient presents to the clinic today for a visit with SHASHA Sims CNP regarding Pain Management.    {  UDS/CSA- 11.11.2023    Medications- oxy 5 mg last taken 9/23/2023    QUESTIONS: It was getting better and now she is much worse. Taking oxy every six hours. She is praying and using heat packs.     Jemma Bo  [facility] Visit Facilitator

## 2023-10-02 LAB
OXYCODONE UR CFM-MCNC: 762 NG/ML
OXYCODONE/CREAT UR: 423 NG/MG {CREAT}
OXYMORPHONE UR CFM-MCNC: 364 NG/ML
OXYMORPHONE/CREAT UR: 202 NG/MG {CREAT}

## 2023-10-05 ENCOUNTER — OFFICE VISIT (OUTPATIENT)
Dept: FAMILY MEDICINE | Facility: CLINIC | Age: 88
End: 2023-10-05
Payer: COMMERCIAL

## 2023-10-05 VITALS
RESPIRATION RATE: 16 BRPM | SYSTOLIC BLOOD PRESSURE: 124 MMHG | OXYGEN SATURATION: 97 % | TEMPERATURE: 97.6 F | DIASTOLIC BLOOD PRESSURE: 62 MMHG | HEART RATE: 58 BPM

## 2023-10-05 DIAGNOSIS — N18.31 STAGE 3A CHRONIC KIDNEY DISEASE (CKD) (H): Primary | ICD-10-CM

## 2023-10-05 DIAGNOSIS — I48.19 PERSISTENT ATRIAL FIBRILLATION (H): ICD-10-CM

## 2023-10-05 DIAGNOSIS — I42.9 SECONDARY CARDIOMYOPATHY (H): ICD-10-CM

## 2023-10-05 DIAGNOSIS — M17.10 PRIMARY LOCALIZED OSTEOARTHROSIS OF LOWER LEG, UNSPECIFIED LATERALITY: ICD-10-CM

## 2023-10-05 PROCEDURE — G0008 ADMIN INFLUENZA VIRUS VAC: HCPCS | Performed by: FAMILY MEDICINE

## 2023-10-05 PROCEDURE — 99214 OFFICE O/P EST MOD 30 MIN: CPT | Mod: 25 | Performed by: FAMILY MEDICINE

## 2023-10-05 PROCEDURE — 90480 ADMN SARSCOV2 VAC 1/ONLY CMP: CPT | Performed by: FAMILY MEDICINE

## 2023-10-05 PROCEDURE — 91320 SARSCV2 VAC 30MCG TRS-SUC IM: CPT | Performed by: FAMILY MEDICINE

## 2023-10-05 PROCEDURE — 90662 IIV NO PRSV INCREASED AG IM: CPT | Performed by: FAMILY MEDICINE

## 2023-10-05 NOTE — PROGRESS NOTES
Gladys was seen today for follow up.    Diagnoses and all orders for this visit:    Stage 3a chronic kidney disease (CKD) (H): Stable, avoid nephrotoxic agents. Recheck in 6 months at physical.    Persistent atrial fibrillation (H)   Secondary cardiomyopathy (H): Stable, no new symptoms. Follows with cardiology.    Primary localized osteoarthrosis of lower leg, unspecified laterality/Chronic pain: follows with pain clinic- reviewed recent notes.     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 The left ventricle is mildly dilated. Left ventricular function isdecreased. The ejection fraction is 25-30% (severely reduced). There is severeglobal hypokinesia of the left ventricle.2. Normal right ventricle size and systolic function.3. The left atrium is moderately dilated.4. Small pericardial effusion. There are no echocardiographic indications ofcardiac tamponade.    She denies any worsening dyspnea, chest pain or lower extremity edema  Reports her knee pain is stable- working with pain clinic to manage her chronic pain.    Due for flu and covid shot today        Heart Failure:  She presents for follow up of heart failure. She is not experiencing shortness of breath at night, with rest or with activity  She is experiencing lower extremity edema which is same as usual.   She denies orthopenea and is not coughing at night. Patient is not checking weight daily. She has recently had a None.  She has side effects from medications including slow heartbeat.  She has has a medical visit for heart failure 2 times since the last visit.    Vascular Disease:  She presents for  follow up of vascular disease.     She never takes nitroglycerin. She is not taking daily aspirin.    She eats 2-3 servings of fruits and vegetables daily.She consumes 0 sweetened beverage(s) daily.She exercises with enough effort to increase her heart rate 9 or less minutes per day.  She exercises with enough effort to increase her heart rate 3 or less days per week.   She is taking medications regularly.         Objective    /62   Pulse 58   Temp 97.6  F (36.4  C)   Resp 16   SpO2 97%   There is no height or weight on file to calculate BMI.  Gen: well appearing, no distress            Last Echo: Echo result w/o MOPS: Interpretation Summary 1. The left ventricle is mildly dilated. Left ventricular function isdecreased. The ejection fraction is 25-30% (severely reduced). There is severeglobal hypokinesia of the left ventricle.2. Normal right ventricle size and systolic function.3. The left atrium is moderately dilated.4. Small pericardial effusion. There are no echocardiographic indications ofcardiac tamponade.

## 2023-10-23 ENCOUNTER — HOSPITAL ENCOUNTER (EMERGENCY)
Facility: HOSPITAL | Age: 88
Discharge: HOME | End: 2023-10-23
Attending: EMERGENCY MEDICINE | Admitting: EMERGENCY MEDICINE
Payer: COMMERCIAL

## 2023-10-23 VITALS
DIASTOLIC BLOOD PRESSURE: 81 MMHG | TEMPERATURE: 98.3 F | SYSTOLIC BLOOD PRESSURE: 173 MMHG | RESPIRATION RATE: 20 BRPM | HEART RATE: 63 BPM | OXYGEN SATURATION: 98 %

## 2023-10-23 DIAGNOSIS — M25.561 CHRONIC PAIN OF RIGHT KNEE: ICD-10-CM

## 2023-10-23 DIAGNOSIS — G89.29 CHRONIC PAIN OF RIGHT KNEE: ICD-10-CM
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
## 2023-10-23 PROCEDURE — 99284 EMERGENCY DEPT VISIT MOD MDM: CPT | Mod: 25

## 2023-10-23 PROCEDURE — 96376 TX/PRO/DX INJ SAME DRUG ADON: CPT

## 2023-10-23 PROCEDURE — 96372 THER/PROPH/DIAG INJ SC/IM: CPT | Performed by: EMERGENCY MEDICINE

## 2023-10-23 PROCEDURE — 96374 THER/PROPH/DIAG INJ IV PUSH: CPT

## 2023-10-23 PROCEDURE — 250N000011 HC RX IP 250 OP 636: Performed by: EMERGENCY MEDICINE

## 2023-10-23 RX ORDER — MORPHINE SULFATE 4 MG/ML
6 INJECTION, SOLUTION INTRAMUSCULAR; INTRAVENOUS ONCE
Status: COMPLETED | OUTPATIENT
Start: 2023-10-23 | End: 2023-10-23

## 2023-10-23 RX ADMIN — MORPHINE SULFATE 6 MG: 4 INJECTION, SOLUTION INTRAMUSCULAR; INTRAVENOUS at 02:02

## 2023-10-23 RX ADMIN — MORPHINE SULFATE 6 MG: 4 INJECTION, SOLUTION INTRAMUSCULAR; INTRAVENOUS at 03:21

## 2023-10-23 ASSESSMENT — ACTIVITIES OF DAILY LIVING (ADL)
ADLS_ACUITY_SCORE: 35
ADLS_ACUITY_SCORE: 35

## 2023-10-23 ASSESSMENT — ENCOUNTER SYMPTOMS: ARTHRALGIAS: 1

## 2023-10-23 NOTE — ED NOTES
Bed: JNED-26  Expected date: 10/23/23  Expected time: 12:51 AM  Means of arrival: Ambulance  Comments:  Sena 89F near fall, lowered to the ground herself, on thinners, did not hit head

## 2023-10-23 NOTE — ED TRIAGE NOTES
The patient lives alone in an appartment. She has chronic knee pain. She reports that she was getting a hot pack for her knees when her knees gave out. She states that she did not fall but lowered herself to the ground. She did not hit her head. She did not loose consciousness. She denies any pain other than her chronic pain. 911 called ems took her in to the er.

## 2023-10-23 NOTE — ED NOTES
The patient is resting calmly in her bed. She is alert and oriented. She is able to speak in complete sentences and denies any pain other than her chronic pain. She denies cough or fever. Skin pwd. She is able to express needs. Call light with the patient.

## 2023-10-23 NOTE — ED PROVIDER NOTES
Emergency Department Encounter      NAME: Gladys Ramirez  AGE: 89 year old female  YOB: 1930  MRN: [id number]  EVALUATION DATE & TIME: 10/23/2023 12:59 AM    PCP: Margret Flores    ED PROVIDER: Luis Alberto Aranda M.D.      Chief Complaint   Patient presents with    Knee Pain         FINAL IMPRESSION:  1. Fall, initial encounter    2. Chronic pain of right knee        MEDICAL DECISION MAKIN:38 AM I met with the patient, obtained history, performed an initial exam, and discussed options and plan for diagnostics and treatment here in the ED.   3:03 AM RN updated me on patient. Left knee feels better but right one does not ~1 hour post morphine. Patient is open to trying another medication to help her pain.  3:30 AM I rechecked patient and discussed discharge.    This patient is a 89-year-old female who lives alone in her own apartment.  She had a recent fall.  She says that she was getting her stuff out of a microwave when she lost her balance while turning and landed on her buttocks.  She was able to get to her feet but felt a little stiff and was using her walker afterwards.  She did not have any head injury during this fall.  No LOC.  She does not have a headache or have any neck pain now.  She is only complaining of knee pain which she says is longstanding.  She was found by the staff after the fall and since she is on blood thinners was told to come to the ER for evaluation.  She has apixaban.  I did not see any reason to image her head as she was acting normally, denied symptoms and denied any injury to her head or neck.  Her neurologic exam was normal.  Her only complaint was of the knee pain which was chronic.  I did not see any reason to image her knee ,although this was considered, because of the lack of trauma and no recent change in the level of pain.  Her daughter was present as well and assisted in the history and I interviewed her today as well.  I reviewed the patient's clinic  visit to Dr. Flores on 5 October at her [facility].  She was seen for her stage III chronic kidney disease, persistent A-fib, secondary to cardiomyopathy and primary localized osteoarthrosis of the lower leg causing chronic pain.  Patient was encouraged to follow-up with her primary care doctor if her symptoms continue.    Pertinent Labs & Imaging studies reviewed. (See chart for details)    The importance of close follow up was discussed. We reviewed warning signs and symptoms, and I instructed Ms. Ramirez to return to the emergency department immediately if she develops any new or worsening symptoms. I provided additional verbal discharge instructions. Ms. Ramirez expressed understanding and agreement with this plan of care, her questions were answered, and she was discharged in stable condition.     Medical Decision Making     History:  Supplemental history from: Documented in chart, if applicable  External Record(s) reviewed: Documented in chart, if applicable.     Work Up:  Chart documentation includes differential considered and any EKGs or imaging independently interpreted by provider, where specified.  In additional to work up documented, I considered the following work up: Documented in chart, if applicable.     External consultation:  Discussion of management with another provider: Documented in chart, if applicable     Complicating factors:  Care impacted by chronic illness: Hypertension, Hyperlipidemia, CKD, Anticoagulated  Care affected by social determinants of health: Access to Medical Care     Disposition considerations: Discharge and told to continue her over-the-counter pain medications like Tylenol as well as use lidocaine patches.      MEDICATIONS GIVEN IN THE EMERGENCY:  Medications   morphine (PF) injection 6 mg (6 mg Intramuscular $Given 10/23/23 0202)   morphine (PF) injection 6 mg (6 mg Intramuscular $Given 10/23/23 0321)       NEW PRESCRIPTIONS STARTED AT TODAY'S ER VISIT:  Discharge  Medication List as of 10/23/2023  3:43 AM             =================================================================    HPI    Patient information was obtained from: Patient & EMS    Use of : N/A        Gladys Ramirez is a 89 year old female with a past medical history of chronic right knee pain, left total knee arthroplasty, anticoagulated, CKD stage III, hyperlipidemia, and hypertension, who presents after a fall.    Patient states that she has chronic knee pain and had an episode prior to arrival where both her knees gave out and she had to call EMS. This occurred while she was trying to get a hot pack for her knees as they were both causing her pain. She notes that she fell on her buttocks and had to use her bracelet to call for help as she was unable to get up. No new pain. Patient denies additional medical concerns or complaints at this time.     Of note, patient has chronic right knee pain and intermittent left knee pain. She used to get Cortizone shots in her right knee. Notes that oxycodone does not help the pain.      REVIEW OF SYSTEMS   Review of Systems   Musculoskeletal:  Positive for arthralgias (chronic right knee pain).   All other systems reviewed and are negative.       PAST MEDICAL HISTORY:  Past Medical History:   Diagnosis Date    Angina pectoris (H24)     Chest pain 03/09/2017    Cough     Hyperlipidemia     Hypertension     Osteoarthritis        PAST SURGICAL HISTORY:  Past Surgical History:   Procedure Laterality Date    APPENDECTOMY      BASAL CELL CARCINOMA EXCISION      nose    LAPAROSCOPY DIAGNOSTIC (GENERAL) N/A 11/04/2014    Procedure: LAPAROSCOPY BILATERAL SALPINGO-OOPHORECTOMY ;  Surgeon: Sofia Harper MD;  Location: [facility] OR;  Service:     OPEN REDUCTION INTERNAL FIXATION HIP BIPOLAR Right 3/5/2023    Procedure: HEMIARTHROPLASTY, HIP, BIPOLAR;  Surgeon: Jose G Martinez MD;  Location: [facility]    TONSILLECTOMY      10 years old    [facility]  TOTAL KNEE ARTHROPLASTY Left     2011       CURRENT MEDICATIONS:    No current facility-administered medications for this encounter.    Current Outpatient Medications:     acetaminophen (TYLENOL) 325 MG tablet, Take 2 tablets (650 mg) by mouth every 4 hours as needed for other (mild pain) (Patient not taking: Reported on 10/31/2023), Disp: 100 tablet, Rfl: 0    amiodarone (PACERONE) 200 MG tablet, Take 0.5 tablets (100 mg) by mouth daily (Patient not taking: Reported on 10/31/2023), Disp: 45 tablet, Rfl: 3    apixaban ANTICOAGULANT (ELIQUIS) 2.5 MG tablet, Take 1 tablet (2.5 mg) by mouth 2 times daily (Patient not taking: Reported on 10/31/2023), Disp: 60 tablet, Rfl: 0    cholecalciferol, vitamin D3, (VITAMIN D3) 2,000 unit Tab, [CHOLECALCIFEROL, VITAMIN D3, (VITAMIN D3) 2,000 UNIT TAB] Take 1 tablet (2,000 Units total) by mouth daily. (Patient not taking: Reported on 10/31/2023), Disp: 90 tablet, Rfl: 3    COMPOUNDED NON-CONTROLLED SUBSTANCE (CMPD RX) - PHARMACY TO MIX COMPOUNDED MEDICATION, Ketamine 8% and Ketoprofen 5 % in carrier gel/lotion. Apply 2-4 pumps to affected areas QID PRN (Patient not taking: Reported on 10/31/2023), Disp: 120 g, Rfl: 1    diclofenac (FLECTOR) 1.3 % patch, Externally apply 1 patch topically 2 times daily (Patient not taking: Reported on 10/31/2023), Disp: 180 patch, Rfl: 1    DULoxetine (CYMBALTA) 60 MG capsule, TAKE ONE CAPSULE BY MOUTH TWICE DAILY (Patient not taking: Reported on 10/31/2023), Disp: 180 capsule, Rfl: 3    furosemide (LASIX) 20 MG tablet, Take 1 tablet (20 mg) by mouth daily (Patient not taking: Reported on 10/31/2023), Disp: 90 tablet, Rfl: 1    KLOR-CON 20 MEQ CR tablet, TAKE ONE TABLET BY MOUTH ONE TIME DAILY (Patient not taking: Reported on 10/31/2023), Disp: 90 tablet, Rfl: 3    Lidocaine (LIDOCARE) 4 % Patch, Place 1 patch onto the skin every 24 hours Apply as needed to painful area of intact skin. To prevent lidocaine toxicity, patient should be patch free for  "12 hrs daily. (Patient not taking: Reported on 10/31/2023), Disp: 10 patch, Rfl: 0    lisinopril (ZESTRIL) 2.5 MG tablet, TAKE ONE TABLET BY MOUTH ONE TIME DAILY (Patient not taking: Reported on 10/31/2023), Disp: 90 tablet, Rfl: 1    oxyCODONE (ROXICODONE) 5 MG tablet, Take 1 tablet (5 mg) by mouth 3 times daily as needed for moderate to severe pain #70 tabs to last 30 days for chronic pain. Ok to fill/start October 30, 2023, Disp: 70 tablet, Rfl: 0    zolpidem ER (AMBIEN CR) 6.25 MG CR tablet, Take 1 and 1/4 tablet by mouth at bedtime (Patient not taking: Reported on 10/31/2023), Disp: 45 tablet, Rfl: 5    ALLERGIES:  Allergies   Allergen Reactions    Trazodone Shortness Of Breath and Unknown     Allergic to trazodone and deriv., Other: trouble swallowing      Clindamycin Diarrhea     C-diff    Gabapentin Other (See Comments)     \"Internal tremors\"    Temazepam Other (See Comments)     Annotation: Nightmares      Buprenorphine Palpitations     Tried patch       FAMILY HISTORY:  Family History   Problem Relation Age of Onset    Heart Disease Mother     Rheumatic Heart Disease Mother     No Known Problems Father     Cancer Brother         brain    Lung Cancer Brother     Lung Cancer Brother     Cancer Sister         lung    Lung Cancer Sister        SOCIAL HISTORY:   Social History     Socioeconomic History    Marital status: Single   Tobacco Use    Smoking status: Never     Passive exposure: Never    Smokeless tobacco: Never    Tobacco comments:     no passive exposure   Vaping Use    Vaping Use: Never used   Substance and Sexual Activity    Alcohol use: Yes     Comment: Alcoholic Drinks/day: Rarely a glass of wine    Drug use: No   Social History Narrative    The patient is a nun.     Social Determinants of Health     Financial Resource Strain: Low Risk  (10/5/2023)    Financial Resource Strain     Within the past 12 months, have you or your family members you live with been unable to get utilities (heat, " electricity) when it was really needed?: No   Food Insecurity: Low Risk  (10/5/2023)    Food Insecurity     Within the past 12 months, did you worry that your food would run out before you got money to buy more?: No     Within the past 12 months, did the food you bought just not last and you didn t have money to get more?: No   Transportation Needs: Low Risk  (10/5/2023)    Transportation Needs     Within the past 12 months, has lack of transportation kept you from medical appointments, getting your medicines, non-medical meetings or appointments, work, or from getting things that you need?: No   Physical Activity: Insufficiently Active (3/27/2023)    Exercise Vital Sign     Days of Exercise per Week: 3 days     Minutes of Exercise per Session: 10 min   Stress: No Stress Concern Present (3/27/2023)    [employer] [employer] - Occupational Stress Questionnaire     Feeling of Stress : Not at all   Social Connections: Moderately Integrated (3/27/2023)    Social Connection and Isolation Panel [NHANES]     Frequency of Communication with Friends and Family: More than three times a week     Frequency of Social Gatherings with Friends and Family: More than three times a week     Attends Congregation Services: More than 4 times per year     Active Member of Clubs or Organizations: Yes     Attends Club or Organization Meetings: More than 4 times per year     Marital Status: Never    Interpersonal Safety: Low Risk  (10/5/2023)    Interpersonal Safety     Do you feel physically and emotionally safe where you currently live?: Yes     Within the past 12 months, have you been hit, slapped, kicked or otherwise physically hurt by someone?: No     Within the past 12 months, have you been humiliated or emotionally abused in other ways by your partner or ex-partner?: No   Housing Stability: Low Risk  (10/5/2023)    Housing Stability     Do you have housing? : Yes     Are you worried about losing your housing?: No        PHYSICAL EXAM:    Vitals: BP (!) 173/81   Pulse 63   Temp 98.3  F (36.8  C) (Oral)   Resp 20   SpO2 98%    Constitutional: Well developed, well nourished. Comfortable appearing.  HEAD:Normocephalic, atraumatic,   Eyes: PERRLA, EOM intact, conjunctiva clear, no discharge  ENT: mucous membranes moist, nose normal.   Neck- Supple, gross ROM intact.  No JVD.  No palpable nodes.  Pulmonary: Clear to auscultation bilaterally, no respiratory distress, no wheezing, speaks full sentences easily.  Chest: No chest wall tenderness  Cardiovascular: Normal heart rate, regular rhythm, no murmurs. No lower extremity edema, 2+ DP pulses.   GI: Soft, no tenderness to deep palpation in all quadrants, not distended, no masses.  No hepatosplenomegaly.  Musculoskeletal: Moving all 4 extremities intentionally. Right knee is chronically swollen and mildly tender.  Anterior drawer intact.  Normal varus and valgus stress testing.  Quadriceps and patella tendon intact.  No significant effusion noted.  The patella is not ballotable.  There is no overlying erythema or warmth.  Back: No CVA tenderness  Skin: Warm, dry, no rash.  Neurologic: Alert & oriented x 3, speech clear, moving all extremities spontaneously   Psychiatric: Affect normal, cooperative.     LAB:  All pertinent labs reviewed and interpreted.  Labs Ordered and Resulted from Time of ED Arrival to Time of ED Departure - No data to display        ISheila, am serving as a scribe to document services personally performed by Dr. Luis Alberto Aranda based on my observation and the provider's statements to me. ILuis Alberto M.D. attest that Sheila Dodge is acting in a scribe capacity, has observed my performance of the services and has documented them in accordance with my direction.      Luis Alberto Aranda M.D.  Emergency Medicine  [facility] EMERGENCY DEPARTMENT  [address]  [id number]  [phone]  Dept: [phone]     Luis Alberto Aranda MD  11/01/23 0808

## 2023-10-23 NOTE — DISCHARGE INSTRUCTIONS
Continue using your current pain medications including Tylenol, morphine, lidocaine patches and oxycodone.

## 2023-10-25 ENCOUNTER — TELEPHONE (OUTPATIENT)
Dept: FAMILY MEDICINE | Facility: CLINIC | Age: 88
End: 2023-10-25

## 2023-10-25 DIAGNOSIS — R06.09 DYSPNEA ON EXERTION: ICD-10-CM

## 2023-10-25 RX ORDER — FUROSEMIDE 20 MG
20 TABLET ORAL DAILY
Qty: 90 TABLET | Refills: 1 | Status: ON HOLD | OUTPATIENT
Start: 2023-10-25 | End: 2023-12-02

## 2023-10-25 NOTE — TELEPHONE ENCOUNTER
Was in ED and needs appt with DR Flores for follow up  Also she is suppose to have labs from her cardiology, needs thyroid and ALT and she has these orders in her hand    Pt was given first available that worked with her schedule which was on 10/31 (offered her appt on 10/30 and she declined)    She states she feels fine. Did not hit her head when she fell but onto her buttocks.     Ibis Chadwick, RN, BSN  [facility]

## 2023-10-26 DIAGNOSIS — I10 BENIGN ESSENTIAL HYPERTENSION: ICD-10-CM

## 2023-10-26 DIAGNOSIS — I42.9 SECONDARY CARDIOMYOPATHY (H): Primary | ICD-10-CM

## 2023-10-27 DIAGNOSIS — S83.511S RUPTURE OF ANTERIOR CRUCIATE LIGAMENT OF RIGHT KNEE, SEQUELA: ICD-10-CM

## 2023-10-27 DIAGNOSIS — M17.11 PRIMARY OSTEOARTHRITIS OF RIGHT KNEE: ICD-10-CM

## 2023-10-27 RX ORDER — LISINOPRIL 2.5 MG/1
2.5 TABLET ORAL DAILY
Qty: 90 TABLET | Refills: 1 | Status: SHIPPED | OUTPATIENT
Start: 2023-10-27 | End: 2024-04-30

## 2023-10-27 NOTE — TELEPHONE ENCOUNTER
Received call from patient requesting refill(s)   oxyCODONE (ROXICODONE) 5 MG tablet   Last dispensed from pharmacy on 9/28/23    Patient's last office/virtual visit by prescribing provider on 9/28/23  Next office/virtual appointment scheduled for 11/24/23    Last urine drug screen date 9/28/23  Current opioid agreement on file (completed within the last year) Yes Date of opioid agreement: 9/28/23    E-prescribe to     [id number] [[address]      Will route to nursing Berkley for review and preparation of prescription(s).      Kiana Li MA  [facility] [place]

## 2023-10-30 RX ORDER — OXYCODONE HYDROCHLORIDE 5 MG/1
5 TABLET ORAL 3 TIMES DAILY PRN
Qty: 70 TABLET | Refills: 0 | Status: SHIPPED | OUTPATIENT
Start: 2023-10-30 | End: 2023-11-24

## 2023-10-30 NOTE — TELEPHONE ENCOUNTER
Pending Prescriptions:                       Disp   Refills    oxyCODONE (ROXICODONE) 5 MG tablet        70 tab*0            Sig: Take 1 tablet (5 mg) by mouth 3 times daily as           needed for moderate to severe pain #70 tabs to           last 30 days for chronic pain. Ok to fill/start           October 30, 2023    [facility][id number] [[place]][address]

## 2023-10-31 ENCOUNTER — OFFICE VISIT (OUTPATIENT)
Dept: FAMILY MEDICINE | Facility: CLINIC | Age: 88
End: 2023-10-31
Payer: COMMERCIAL

## 2023-10-31 VITALS
BODY MASS INDEX: 27.97 KG/M2 | TEMPERATURE: 97.4 F | WEIGHT: 152 LBS | DIASTOLIC BLOOD PRESSURE: 71 MMHG | OXYGEN SATURATION: 98 % | RESPIRATION RATE: 16 BRPM | SYSTOLIC BLOOD PRESSURE: 116 MMHG | HEIGHT: 62 IN | HEART RATE: 58 BPM

## 2023-10-31 DIAGNOSIS — I10 BENIGN ESSENTIAL HYPERTENSION: ICD-10-CM

## 2023-10-31 DIAGNOSIS — F51.01 PRIMARY INSOMNIA: ICD-10-CM

## 2023-10-31 DIAGNOSIS — M17.11 PRIMARY OSTEOARTHRITIS OF RIGHT KNEE: ICD-10-CM

## 2023-10-31 DIAGNOSIS — S83.511S RUPTURE OF ANTERIOR CRUCIATE LIGAMENT OF RIGHT KNEE, SEQUELA: ICD-10-CM

## 2023-10-31 DIAGNOSIS — G89.29 OTHER CHRONIC PAIN: ICD-10-CM

## 2023-10-31 DIAGNOSIS — N18.31 STAGE 3A CHRONIC KIDNEY DISEASE (CKD) (H): Primary | ICD-10-CM

## 2023-10-31 DIAGNOSIS — I50.20 HFREF (HEART FAILURE WITH REDUCED EJECTION FRACTION) (H): ICD-10-CM

## 2023-10-31 PROCEDURE — 80048 BASIC METABOLIC PNL TOTAL CA: CPT | Performed by: FAMILY MEDICINE

## 2023-10-31 PROCEDURE — 36415 COLL VENOUS BLD VENIPUNCTURE: CPT | Performed by: FAMILY MEDICINE

## 2023-10-31 PROCEDURE — 99214 OFFICE O/P EST MOD 30 MIN: CPT | Performed by: FAMILY MEDICINE

## 2023-10-31 RX ORDER — RESPIRATORY SYNCYTIAL VIRUS VACCINE 120MCG/0.5
0.5 KIT INTRAMUSCULAR ONCE
Qty: 1 EACH | Refills: 0 | Status: CANCELLED | OUTPATIENT
Start: 2023-10-31 | End: 2023-10-31

## 2023-10-31 NOTE — PATIENT INSTRUCTIONS
Was a pleasure to meet you today.  Today we will be doing lab test for your kidney function.     I would recommend that your medications be changed to long-acting type of oxycodone this would give you better pain relief and the medications you are taking now and would last longer.  An option that would be useful would be trying oxycodone extended release 1 tablet every 12 hours this would not interfere with your daily medication schedule and would be possible for you to take Ambien at night to help you sleep      I am recommending that you see your primary doctor in 3 months of course please keep your pain clinic appointment

## 2023-10-31 NOTE — PROGRESS NOTES
"  Assessment & Plan     Stage 3a chronic kidney disease (CKD) (H)  Repeat BMP today.  Previous values reviewed with the patient.  - Albumin Random Urine Quantitative with Creat Ratio; Future  - BASIC METABOLIC PANEL; Future    Primary insomnia    Takes Ambien.  She is worried that she cannot take this medication close to her narcotics.  We discussed the possibility of her taking Ambien provided she would be on the lower dose or extended release oxycodone.    HFrEF (heart failure with reduced ejection fraction) (H)    No weight changes noted no shortness of breath no chest pain she is on a low-sodium diet    Benign essential hypertension    Blood pressure is well controlled    Primary osteoarthritis of right knee    Severe right knee osteoarthritis difficulty with extension of the right knee beyond 30 degrees.  She has not picked up pain medications for the last 3 days    Rupture of anterior cruciate ligament of right knee, sequela    Not a surgical candidate.  Patient not interested in therapeutic injections she will  her pain medication from the pharmacy    Other chronic pain    She sees the pain clinic regularly she is scheduled to visit them at the end of November oxycodone has been relatively effective she can talk to her specialist about starting a long-acting opioid instead the possibility is using morphine.             BMI:   Estimated body mass index is 27.8 kg/m  as calculated from the following:    Height as of this encounter: 1.575 m (5' 2\").    Weight as of this encounter: 68.9 kg (152 lb).           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  She exercises with enough effort to increase her heart rate 3 or less days per week.   She is taking medications regularly.           89-year-old female here after follow-up after being seen in the ER after falling.  She reports that she fell backwards when she was trying to put an ice pack in her knee at night.  She denies any head injuries.  She had no loss of consciousness.  She says her knee is still \"\" acting up she has been out of her oxycodone for the last 3 to 4 days it is also swollen.  She denies any neck or back pain.  She states that she is worried about taking the oxycodone at the same time as she takes her sleep medication.  She states that her appetites been normal she has not noticed any swelling in her legs.  On the sleep medications she is sleep all night except that the pain from her right knee does wake her up.      Review of Systems   Constitutional, HEENT, cardiovascular, pulmonary, gi and gu systems are negative, except as otherwise noted.      Objective    /71   Pulse 58   Temp 97.4  F (36.3  C) (Oral)   Resp 16   SpO2 98%   There is no height or weight on file to calculate BMI.  Physical Exam   GENERAL: healthy, alert and no distress  EYES: Eyes grossly normal to inspection, PERRL and conjunctivae and sclerae normal  NECK: no adenopathy, no asymmetry, masses, or scars and thyroid normal to palpation  RESP: lungs clear to auscultation - no rales, rhonchi or wheezes  CV: regular rate and rhythm, normal S1 S2, no S3 or S4, no murmur, click or rub, no peripheral edema and peripheral pulses strong  ABDOMEN: soft, nontender, no hepatosplenomegaly, no masses and bowel sounds normal  MS: Tenderness noted on the medial aspect of the right patella.  Flexion of her right knee beyond 40 degrees causes her discomfort which she experiences medial aspect of the patella rating into " the popliteal fossa.  Extension right knee limited to 30 degrees without pain  SKIN: no suspicious lesions or rashes  NEURO: Normal strength and tone, mentation intact and speech normal  PSYCH: mentation appears normal, affect normal/bright

## 2023-11-01 LAB
ANION GAP SERPL CALCULATED.3IONS-SCNC: 9 MMOL/L (ref 7–15)
BUN SERPL-MCNC: 24.9 MG/DL (ref 8–23)
CALCIUM SERPL-MCNC: 9.7 MG/DL (ref 8.2–9.6)
CHLORIDE SERPL-SCNC: 105 MMOL/L (ref 98–107)
CREAT SERPL-MCNC: 1.16 MG/DL (ref 0.51–0.95)
CREAT UR-MCNC: 122 MG/DL
DEPRECATED HCO3 PLAS-SCNC: 28 MMOL/L (ref 22–29)
EGFRCR SERPLBLD CKD-EPI 2021: 44 ML/MIN/1.73M2
GLUCOSE SERPL-MCNC: 80 MG/DL (ref 70–99)
MICROALBUMIN UR-MCNC: 58.7 MG/L
MICROALBUMIN/CREAT UR: 48.11 MG/G CR (ref 0–25)
POTASSIUM SERPL-SCNC: 4.4 MMOL/L (ref 3.4–5.3)
SODIUM SERPL-SCNC: 142 MMOL/L (ref 135–145)

## 2023-11-01 PROCEDURE — 82043 UR ALBUMIN QUANTITATIVE: CPT | Performed by: FAMILY MEDICINE

## 2023-11-01 PROCEDURE — 82570 ASSAY OF URINE CREATININE: CPT | Performed by: FAMILY MEDICINE

## 2023-11-08 DIAGNOSIS — Z79.899 ENCOUNTER FOR LONG-TERM (CURRENT) USE OF MEDICATIONS: Primary | ICD-10-CM

## 2023-11-11 DIAGNOSIS — F51.01 PRIMARY INSOMNIA: ICD-10-CM

## 2023-11-13 RX ORDER — ZOLPIDEM TARTRATE 6.25 MG/1
TABLET, FILM COATED, EXTENDED RELEASE ORAL
Qty: 45 TABLET | Refills: 5 | Status: SHIPPED | OUTPATIENT
Start: 2023-11-13 | End: 2024-01-02

## 2023-11-24 ENCOUNTER — OFFICE VISIT (OUTPATIENT)
Dept: PALLIATIVE MEDICINE | Facility: OTHER | Age: 88
End: 2023-11-24
Attending: NURSE PRACTITIONER
Payer: COMMERCIAL

## 2023-11-24 ENCOUNTER — NURSE TRIAGE (OUTPATIENT)
Dept: CARDIOLOGY | Facility: CLINIC | Age: 88
End: 2023-11-24

## 2023-11-24 VITALS — DIASTOLIC BLOOD PRESSURE: 82 MMHG | OXYGEN SATURATION: 96 % | HEART RATE: 102 BPM | SYSTOLIC BLOOD PRESSURE: 137 MMHG

## 2023-11-24 DIAGNOSIS — R74.8 ALKALINE PHOSPHATASE ELEVATION: ICD-10-CM

## 2023-11-24 DIAGNOSIS — M17.11 PRIMARY OSTEOARTHRITIS OF RIGHT KNEE: ICD-10-CM

## 2023-11-24 DIAGNOSIS — G89.29 CHRONIC INTRACTABLE PAIN: Primary | ICD-10-CM

## 2023-11-24 DIAGNOSIS — S83.511S RUPTURE OF ANTERIOR CRUCIATE LIGAMENT OF RIGHT KNEE, SEQUELA: ICD-10-CM

## 2023-11-24 PROCEDURE — 99215 OFFICE O/P EST HI 40 MIN: CPT | Performed by: NURSE PRACTITIONER

## 2023-11-24 PROCEDURE — G0463 HOSPITAL OUTPT CLINIC VISIT: HCPCS | Performed by: NURSE PRACTITIONER

## 2023-11-24 RX ORDER — OXYCODONE HCL 10 MG/1
10 TABLET, FILM COATED, EXTENDED RELEASE ORAL AT BEDTIME
Qty: 30 TABLET | Refills: 0 | Status: SHIPPED | OUTPATIENT
Start: 2023-11-24 | End: 2023-12-20

## 2023-11-24 RX ORDER — OXYCODONE HYDROCHLORIDE 5 MG/1
5 TABLET ORAL 3 TIMES DAILY PRN
Qty: 70 TABLET | Refills: 0 | Status: SHIPPED | OUTPATIENT
Start: 2023-11-24 | End: 2024-01-11

## 2023-11-24 ASSESSMENT — PAIN SCALES - GENERAL: PAINLEVEL: SEVERE PAIN (6)

## 2023-11-24 NOTE — PATIENT INSTRUCTIONS
After Visit Instructions:     Thank you for coming to [facility] for your care. It is my goal to partner with you to help you reach your optimal state of health.   Continue daily self-care, identifying contributing factors, and monitoring variations in pain level. Continue to integrate self-care into your life.        30 minutes Video or Clinic follow-up with SHASHA Simpson NP-C on 12/22/23 at 10:30 AM   Labs: Liver panel, please have this collected marquez you see Dr Mcelroy.   Other:   Look into Gold Bond Age Renew Retinol Overnight lotion.   Please call cardiology as soon as you get home to report the spells.   Medication Management :   Oxycodone extended release 10 mg: take 1 tablet at 10 pm  Continue Oxycodone 5 mg 1 tablet up to 3 times per day. Reduce to 2 tablets per day if you are able to.  REDUCE Ambien to 1 tablet at bedtime.   Be VERY CAUTIOUS FOR SEDATION. If you feel too sleepy, weak or confused, you MUST stop with the long acting Oxycodone 10 mg or the Ambien.      SHASHA Domínguez, NP-C  [facility]  [facility] - Monday, Thursday and Friday  [facility] - Tuesday      Be sure to request ALL medication refills 5 days prior to the due date whether or not you will see your medical provider in an appointment before the due date.      Do not expect same day refills. If you do not plan ahead you may run out of medications.     Early refills are not provided.  It is your responsibility to manage your medications responsibility and keep them safely stored. Lost or destroyed medications WILL NOT be replaced    Scheduling/Clinic telephone Number for ALL locations:  [phone]    After Hours On-Call Service:  [phone]    Call with any questions about your care and for scheduling assistance.   Calls are returned Monday through Friday between 8 AM and 4:00 PM. We usually get back to you within 2 business days depending on the issue/request.    If we  are prescribing your medications:  For opioid medication refills, call the clinic or send a [employer] message 7 days in advance.  Please include:  Your name and date of birth.   Name of requested medication  Name of the pharmacy.  For non-opioid medications, call your pharmacy directly to request a refill. Please allow 3-4 days to be processed.   Per [place] State Law:  All controlled substance prescriptions must be filled within 30 days of being written.    For those controlled substances allowing refills, pickup must occur within 30 days of last fill.      We believe regular attendance is key to your success in our program!    Any time you are unable to keep your appointment we ask that you call us at least 24 hours in advance to cancel.This will allow us to offer the appointment time to another patient.   Multiple missed appointments may lead to dismissal from the clinic.

## 2023-11-24 NOTE — TELEPHONE ENCOUNTER
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 She kept sitting up so she only had naps- not a good sleep. This morning she had 3 episodes of feeling out of breath and feeling like she was going to faint. She would sit down and this would pass after a couple of minutes. She has not eaten yet today as she had company. After they left she had to go to her pain clinic appointment. She had 2 episodes before she left and 1 at the clinic. Her BP at the Pain Clinic was 163/90 when she got there. Before she left they rechecked it and it was 137/82. She said Dr. Holley told her if she had this happen she was to call his clinic right away. She would like a call back at [phone] . I told her while she is waiting she needs to drink 2 glasses of water and eat.     Reason for Disposition   Any history of prior \"blood clot\" in leg or lungs   MILD difficulty breathing (e.g., minimal/no SOB at rest, SOB with walking, pulse < 100) of new-onset or worse than normal   Longstanding difficulty breathing (e.g., CHF, COPD, emphysema) and worse than normal    Additional Information   Negative: SEVERE difficulty breathing (e.g., struggling for each breath, speaks in single words, pulse > 120)   Negative: Breathing stopped and hasn't returned   Negative: Choking on something   Negative: Bluish (or gray) lips or face   Negative: Difficult to awaken or acting confused (e.g., disoriented, slurred speech)   Negative: Passed out (i.e., fainted, collapsed and was not responding)   Negative: Wheezing started suddenly after medicine, an allergic food, or bee sting   Negative: Stridor (harsh sound while breathing in)   Negative: Slow, shallow and weak breathing   Negative: Sounds like a life-threatening emergency to the triager   Negative: Chest pain   Negative: Wheezing (high pitched whistling sound) and previous asthma attacks or use of asthma medicines   Negative: Difficulty breathing and within 14 days " of COVID-19 Exposure   Negative: Difficulty breathing and only present when coughing   Negative: Difficulty breathing and only from stuffy nose   Negative: Difficulty breathing and only from stuffy nose or runny nose from common cold   Negative: MODERATE difficulty breathing (e.g., speaks in phrases, SOB even at rest, pulse 100-120) of new-onset or worse than normal   Negative: Oxygen level (e.g., pulse oximetry) 90% or lower   Negative: Wheezing can be heard across the room   Negative: Drooling or spitting out saliva (because can't swallow)   Negative: Illness requiring prolonged bedrest in past month (e.g., immobilization, long hospital stay)   Negative: Hip or leg fracture (broken bone) in past month (or had cast on leg or ankle in past month)   Negative: Major surgery in the past month   Negative: Long-distance travel in past month (e.g., car, bus, train, plane; with trip lasting 6 or more hours)   Negative: Cancer treatment in past six months (or has cancer now)   Negative: Extra heartbeats, irregular heart beating, or heart is beating very fast (i.e., 'palpitations')   Negative: Fever > 103 F (39.4 C)   Negative: Fever > 101 F (38.3 C) and over 60 years of age   Negative: Fever > 100.0 F (37.8 C) and bedridden (e.g., nursing home patient, stroke, chronic illness, recovering from surgery)   Negative: Fever > 100.0 F (37.8 C) and diabetes mellitus or weak immune system (e.g., HIV positive, cancer chemo, splenectomy, organ transplant, chronic steroids)   Negative: Periods where breathing stops and then resumes normally and bedridden (e.g., nursing home patient, CVA)   Negative: Pregnant or postpartum (from 0 to 6 weeks after delivery)   Negative: Patient sounds very sick or weak to the triager   Negative: Longstanding difficulty breathing and not responding to usual therapy   Negative: Continuous (nonstop) coughing   Negative: Patient wants to be seen    Answer Assessment - Initial Assessment Questions  1.  "RESPIRATORY STATUS: \"Describe your breathing?\" (e.g., wheezing, shortness of breath, unable to speak, severe coughing)       Sudden onset of feeling out of breath and feeling faint.  2. ONSET: \"When did this breathing problem begin?\"       This morning.  3. PATTERN \"Does the difficult breathing come and go, or has it been constant since it started?\"       Comes and goes.   4. SEVERITY: \"How bad is your breathing?\" (e.g., mild, moderate, severe)     - MILD: No SOB at rest, mild SOB with walking, speaks normally in sentences, can lie down, no retractions, pulse < 100.     - MODERATE: SOB at rest, SOB with minimal exertion and prefers to sit, cannot lie down flat, speaks in phrases, mild retractions, audible wheezing, pulse 100-120.     - SEVERE: Very SOB at rest, speaks in single words, struggling to breathe, sitting hunched forward, retractions, pulse > 120       Mild- moderate.  5. RECURRENT SYMPTOM: \"Have you had difficulty breathing before?\" If Yes, ask: \"When was the last time?\" and \"What happened that time?\"       Yes  6. CARDIAC HISTORY: \"Do you have any history of heart disease?\" (e.g., heart attack, angina, bypass surgery, angioplasty)       PAF, secondary cardiomyopathy, HFrEF, history DVT and PE, exertional dyspnea, sinus bradycardia  7. LUNG HISTORY: \"Do you have any history of lung disease?\"  (e.g., pulmonary embolus, asthma, emphysema)      PE  8. 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   9. OTHER SYMPTOMS: \"Do you have any other symptoms? (e.g., dizziness, runny nose, cough, chest pain, fever)      When she feels OOB she feels like she is going to faint. She sits down and it goes away for sure by 2 minutes or sooner.   10. O2 SATURATION MONITOR:  \"Do you use an oxygen saturation monitor (pulse oximeter) at home?\" If Yes, ask: \"What is your reading (oxygen level) today?\" \"What is your usual " "oxygen saturation reading?\" (e.g., 95%)        no  11. PREGNANCY: \"Is there any chance you are pregnant?\" \"When was your last menstrual period?\"        N/A  12. TRAVEL: \"Have you traveled out of the country in the last month?\" (e.g., travel history, exposures)        No    Protocols used: Breathing Difficulty-A-[place]    "

## 2023-11-24 NOTE — PROGRESS NOTES
"[facility]    CHIEF COMPLAINT: Chronic Pain.    INTERVAL HISTORY:  Last seen on 23.       Recommendations/plan at the last visit included:  Physical therapy: No Start doing home exercise as you are able to. Don't overdue!  30 minutes Clinic follow-up with SHASHA Simpson NP-C in 1  month. Ok to schedule up to three follow up appts at a time.   Imaging: If the pain moving into groin continues, we will get an MRI of your lumbar spine.   Labs: UDS and Controlled substance agreement   Medication Management : ALWAYS call for refills, do not wait for an appointment. We do not want you to be in pain  START Morphine 15 m tablet at 9 pm daily. If you feel overly sedated or groggy stop taking this medication.   Oxycodone 5 mg: ok to take 3 tablets per day until you start Morphine, then reduce to two tablets per day.   Acetaminophen 50 mg: MAX of 6 tablets per day. Minimize use as able.   Ketoprofen/Ketamine cream: Apply 2-4 pumps of cream up to 4 times daily to painful areas    Since last visit:   - 10/23/23: in ED with a fall, injuring her right knee. Was given IV Morphine and checked out for further injury. Doing well now. Compounded topical with Ketamine and Ketoprofen is helpful.   - Saw Dr Mcelroy in primary care who suggested that she ask about OxyContin for overnight pain.    - Had a \"cardiac spell\" overnight. Felt like her breathing was shallow all night. This morning felt lightheaded, weak, thought she might faint, felt heart racing, Laid down for awhile and felt better, had another spell on the way out the door this morning.     Pain Information today: 6/10. Location of pain: right knee .    Annual requirements last collected:  23     Current Pain Relevant Medications:    Acetaminophen 325 M tabs 1-2 times daily, not every day  Compounded cream: Ketamine and Ketoprofen, 3-4 x/day PRN  Duloxetine 60 mg daily   Flector Patch:2 patches daily PRN. (When not using " topical cream)      Current Controlled Substance Medications: (as of 2023)   Ambien 5 m tab at HS  Oxycodone 5 m tablet BID-TID PRN: 15-22.5 MME/day  OxyContin 10 mg at HS = 15 MME/day  Total opiate dose =  30-37.5 MME/day     Previous Pain Relevant Medications: (H--helped; HI--Helped initially; SWH--Somewhat helpful; NH--No help; W--worse; SE--side effects; ?--Unsure if helpful)   Opiates: Tramadol: S[facility], Buprenorphine:Allergic  NSAIDS: Can't take, on blood thinner  Migraine medications: N/A  Muscle Relaxants: none  Neuropathics: Gabapentin: SE  Anti-depressants for pain: Duloxetine: NH for pain  Anxiety medications: N/A  Topicals: Compounded cream: Lidocaine, ibuprofen, diclofenac:  OTC medications: Tylenol: takes 4000 mg/day  Sleep Medications: Temazepam: Allergy, Ambien:H  Other medications not covered above:      SUBSTANCE HISTORY:   Past or current illegal drug use: none  Past or current ETOH use: Rarely, glass of wine  Nicotine/tobacco use: none  Daily Caffeine intake: never     CURRENT FAMILY/SOCIAL SITUATION:  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 NO   Activity: Is patient functional and able to perform Activities of Daily Living? N/A   Adverse effects: Is patient free from adverse side effects from opiates? N/A   Adherence to Rx protocol: Is patient adhering to Controlled Substance Agreement and taking medications ONLY as ordered? N/A    [place] Board of Pharmacy Data Base Reviewed:    YES; No concern for abuse or misuse of controlled medications based on this report. Reviewed [facility] 2023- no concerning fills.      PHYSICAL EXAM    Vitals:    23 1019 23 1111   BP: (!) 163/90 137/82   Pulse: 102    SpO2:  96%        Constitutional: healthy, alert, and no distress A&O.   Patient is appropriate.  Psychiatric/mental status: Alert, without lethargy or stupor. Appropriate affect.     Neurologic exam:  CN:  Cranial nerves 2-12 are grossly normal.    MUSCULOSKELETAL:     Posture: Upright, shoulders and pelvis are leveled. No  Antalgic Gait Pattern?: Yes left antalgia, uses 4WW for ambulation.       DIRE Score for ongoing opioid management is calculated as follows:    Diagnosis = 2 pts (slowly progressive; moderate pain/objective findings)    Intractability = 3 pts (patient fully engaged but inadequate response to treatments)    Risk        Psych = 3 pts (no significant personality dysfunction/mental illness; good communication with clinic)         Chem Hlth = 3 pts (no history of chemical dependency; not drug-focused)       Reliability = 3 pts (highly reliable with meds, appointments, treatments)       Social = 2 pts (reduction in some relationships/life rolls)       (Psych + Chem hlth + Reliability + Social) = 16    Efficacy = 2 pts (moderate benefit/function; low med dose; too early/not tried meds)    DIRE Score = 18        7-13: likely NOT suitable candidate for long-term opioid analgesia       14-21: may be a suitable candidate for long-term opioid analgesia     DIAGNOSTIC RESULTS:     11/15/22: MRI right knee w/o contrast   IMPRESSION:  1.  Horizontal cleavage tear of the posteromedial corner of the meniscus.  2.  Grade 2 cartilage loss both sides of the medial compartment.  3.  Full-thickness cartilage loss along the majority of both sides of the lateral compartment with bony remodeling of the lateral tibial plateau and extensive reactive edema.  4.  Large portions of the lateral meniscal body are not identified and may be completely degenerated. Anterior and posterior subluxation of the anterior and posterior horn, respectively.  5.  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  6.  Semimembranosus and medial  gastrocnemius tendinopathy without tearing.  7.  Popliteus tendinopathy without tearing.  8.  Mild quadriceps and patellar tendinopathy without tearing.  9.  Small effusion.  10.  No evidence for acute fracture.     1/20/21: Left hip x-ray  IMPRESSION:  Mild primary degenerative narrowing both hip joints. Mild generalized degenerative change base of the spine and both SI joints.Pelvis and left hip otherwise negative. No fractures. No dislocations.     1/2021: XR KNEE RIGHT 1 OR 2 VWS   IMPRESSION:  Moderate primary osteoarthritis all 3 compartments but most prominent in the lateral compartment. Knee otherwise negative. No fractures. No joint effusion.     PAIN RELAVENT CONDITIONS:   1.  OA: severe right knee, Baker's cyst.  2.  PMH: CKD Stage 2, A fib, CHF, primary insomnia    DIAGNOSIS AND PLAN:     (G89.29) Chronic intractable pain  (primary encounter diagnosis)  (M17.11) Primary osteoarthritis of right knee  (S83.511S) Rupture of anterior cruciate ligament of right knee, sequela  (R74.8) Alkaline phosphatase elevation  Comment: Reviewed medication options including OxyContin. Discussed safety concerns related to concurrent use of Ambien and Oxycodone. 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 Will also start compounded topical medications for knee pain.   She also reported spells of feeling unwell, tachycardic and dizzy. She will contact her cardiologist as soon as she gers home.   Plan:   oxyCODONE (ROXICODONE) 5 MG tablet,   oxyCODONE (OXYCONTIN) 10 MG 12 hr tablet,   COMPOUNDED Topical cream: Ketoprofen and Ketamine 3-4 x/day PRN right knee pain   Hepatic function panel        Hypertension: Recheck was WNL.       PATIENT INSTRUCTIONS:     Diagnosis reviewed, treatment option addressed, and risk/benefits discussed.  Self-care instructions given.  I am recommending a multidisciplinary treatment plan to help this patient better manage  pain.    Remember to request ALL medication refills 5 BUSINESS days before you run out.       30 minutes Video or Clinic follow-up with SHASHA Simpson NP-C on 12/22/23 at 10:30 AM   Labs: Liver panel, please have this collected marquez you see Dr Mcelroy.   Other:   Look into Gold Bond Age Renew Retinol Overnight lotion.   Please call cardiology as soon as you get home to report the spells.   Medication Management :   Oxycodone extended release 10 mg: take 1 tablet at 10 pm  Continue Oxycodone 5 mg 1 tablet up to 3 times per day. Reduce to 2 tablets per day if you are able to.  REDUCE Ambien to 1 tablet at bedtime.   Be VERY CAUTIOUS FOR SEDATION. If you feel too sleepy, weak or confused, you MUST stop with the long acting Oxycodone 10 mg or the Ambien.    I have reviewed the note as documented above.  This accurately captures the substance of my conversation with the patient.  A total of 45 minutes of preparation, care, and consultation were spent on this visit today.     SHASHA Domínguez, NP-C  [facility]    (Information in italics and blue color are taken from previous pain and consulting medical providers notes and are documented as such)

## 2023-11-24 NOTE — PROGRESS NOTES
Patient presents to the clinic today for a visit  with SHASHA Sims CNP            4/20/2023     2:56 PM 7/20/2023     1:16 PM 11/24/2023    10:20 AM   PEG Score   PEG Total Score 5.67 6 6.33       UDS/CSA-  9/28/23  Medications-    QUESTIONS:    Kiana Li MA  [facility] [place]

## 2023-11-24 NOTE — TELEPHONE ENCOUNTER
"Dr. Holley,  Please see nurse triage tele note below.  Follow up is planned in May, 2024.  Any new orders or recommendations?  Thank you,  Rina    Reached out to patient to discuss her symptoms. She did eat a bowl of cereal with blueberries and drank a big glass of water. She is feeling better at this time after eating.  Patient denies edema, weight gain, lightheadedness, dizziness, chest pain/tightness, shortness of breath and palpitations.  Her weight is stable 152 to 154 lb's.  Patient did have a \"huge day\" for Thanksgiving at her house and did have dietary indiscretions.   Again, patient states she is feeling much better and is getting ready to take a nap. She understanding to call 911 for return of symptoms.  Assured patient an update will be forwarded to Dr. Holley; however, he is out of the clinic today and she will be contacted next week with any new recommendations. Understanding verbalized.  "

## 2023-11-27 NOTE — TELEPHONE ENCOUNTER
Reached out to patient to discuss her symptoms. She had 4 spells on Friday, 2 on Saturday and 4 spells yesterday. Encouraged patient to make arrangements to get to the ED for symptoms assessment and management. Patient verbalized understanding and will make call for a ride to go to the ED.  Patient will call in the interim with any further questions.  -------------------------------------  ----- Message -----   From: Estrada Holley MD   Sent: 11/26/2023   6:19 PM CST   To: Rina Lino RN     Give her a call on Monday, and if not well to ED.   I have a low threshold for this 89-year old, I suspect chf.   LF

## 2023-11-28 ENCOUNTER — TELEPHONE (OUTPATIENT)
Dept: PALLIATIVE MEDICINE | Facility: CLINIC | Age: 88
End: 2023-11-28

## 2023-11-29 ENCOUNTER — APPOINTMENT (OUTPATIENT)
Dept: RADIOLOGY | Facility: HOSPITAL | Age: 88
DRG: 291 | End: 2023-11-29
Attending: EMERGENCY MEDICINE
Payer: COMMERCIAL

## 2023-11-29 ENCOUNTER — HOSPITAL ENCOUNTER (INPATIENT)
Facility: HOSPITAL | Age: 88
LOS: 3 days | Discharge: HOME HEALTH | DRG: 291 | End: 2023-12-02
Attending: EMERGENCY MEDICINE | Admitting: INTERNAL MEDICINE
Payer: COMMERCIAL

## 2023-11-29 DIAGNOSIS — I48.0 PAROXYSMAL ATRIAL FIBRILLATION (H): ICD-10-CM

## 2023-11-29 DIAGNOSIS — I50.20 HFREF (HEART FAILURE WITH REDUCED EJECTION FRACTION) (H): Primary | ICD-10-CM

## 2023-11-29 DIAGNOSIS — I50.9 ACUTE CONGESTIVE HEART FAILURE, UNSPECIFIED HEART FAILURE TYPE (H): ICD-10-CM

## 2023-11-29 DIAGNOSIS — R06.09 DYSPNEA ON EXERTION: ICD-10-CM

## 2023-11-29 DIAGNOSIS — I48.91 ATRIAL FIBRILLATION, UNSPECIFIED TYPE (H): ICD-10-CM

## 2023-11-29 LAB
ANION GAP SERPL CALCULATED.3IONS-SCNC: 13 MMOL/L (ref 7–15)
BASOPHILS # BLD AUTO: 0.1 10E3/UL (ref 0–0.2)
BASOPHILS NFR BLD AUTO: 1 %
BUN SERPL-MCNC: 27.5 MG/DL (ref 8–23)
CALCIUM SERPL-MCNC: 9.7 MG/DL (ref 8.2–9.6)
CHLORIDE SERPL-SCNC: 106 MMOL/L (ref 98–107)
CREAT SERPL-MCNC: 1.08 MG/DL (ref 0.51–0.95)
DEPRECATED HCO3 PLAS-SCNC: 23 MMOL/L (ref 22–29)
EGFRCR SERPLBLD CKD-EPI 2021: 48 ML/MIN/1.73M2
EOSINOPHIL # BLD AUTO: 0.1 10E3/UL (ref 0–0.7)
EOSINOPHIL NFR BLD AUTO: 1 %
ERYTHROCYTE [DISTWIDTH] IN BLOOD BY AUTOMATED COUNT: 15.5 % (ref 10–15)
GLUCOSE SERPL-MCNC: 163 MG/DL (ref 70–99)
HCT VFR BLD AUTO: 38.8 % (ref 35–47)
HGB BLD-MCNC: 12.2 G/DL (ref 11.7–15.7)
IMM GRANULOCYTES # BLD: 0 10E3/UL
IMM GRANULOCYTES NFR BLD: 0 %
LYMPHOCYTES # BLD AUTO: 1.1 10E3/UL (ref 0.8–5.3)
LYMPHOCYTES NFR BLD AUTO: 15 %
MAGNESIUM SERPL-MCNC: 2 MG/DL (ref 1.7–2.3)
MCH RBC QN AUTO: 29.7 PG (ref 26.5–33)
MCHC RBC AUTO-ENTMCNC: 31.4 G/DL (ref 31.5–36.5)
MCV RBC AUTO: 94 FL (ref 78–100)
MONOCYTES # BLD AUTO: 0.7 10E3/UL (ref 0–1.3)
MONOCYTES NFR BLD AUTO: 9 %
NEUTROPHILS # BLD AUTO: 5.7 10E3/UL (ref 1.6–8.3)
NEUTROPHILS NFR BLD AUTO: 74 %
NRBC # BLD AUTO: 0 10E3/UL
NRBC BLD AUTO-RTO: 0 /100
NT-PROBNP SERPL-MCNC: ABNORMAL PG/ML (ref 0–1800)
PLATELET # BLD AUTO: 330 10E3/UL (ref 150–450)
POTASSIUM SERPL-SCNC: 4.2 MMOL/L (ref 3.4–5.3)
RBC # BLD AUTO: 4.11 10E6/UL (ref 3.8–5.2)
SODIUM SERPL-SCNC: 142 MMOL/L (ref 135–145)
TROPONIN T SERPL HS-MCNC: 40 NG/L
TROPONIN T SERPL HS-MCNC: 41 NG/L
WBC # BLD AUTO: 7.6 10E3/UL (ref 4–11)

## 2023-11-29 PROCEDURE — 250N000013 HC RX MED GY IP 250 OP 250 PS 637: Performed by: INTERNAL MEDICINE

## 2023-11-29 PROCEDURE — 36415 COLL VENOUS BLD VENIPUNCTURE: CPT | Performed by: EMERGENCY MEDICINE

## 2023-11-29 PROCEDURE — 250N000011 HC RX IP 250 OP 636: Mod: JZ | Performed by: INTERNAL MEDICINE

## 2023-11-29 PROCEDURE — 71046 X-RAY EXAM CHEST 2 VIEWS: CPT

## 2023-11-29 PROCEDURE — 93005 ELECTROCARDIOGRAM TRACING: CPT | Performed by: EMERGENCY MEDICINE

## 2023-11-29 PROCEDURE — 84484 ASSAY OF TROPONIN QUANT: CPT | Performed by: EMERGENCY MEDICINE

## 2023-11-29 PROCEDURE — 83880 ASSAY OF NATRIURETIC PEPTIDE: CPT | Performed by: EMERGENCY MEDICINE

## 2023-11-29 PROCEDURE — 85025 COMPLETE CBC W/AUTO DIFF WBC: CPT | Performed by: EMERGENCY MEDICINE

## 2023-11-29 PROCEDURE — 210N000001 HC R&B IMCU HEART CARE

## 2023-11-29 PROCEDURE — 99285 EMERGENCY DEPT VISIT HI MDM: CPT | Mod: 25

## 2023-11-29 PROCEDURE — 83735 ASSAY OF MAGNESIUM: CPT | Performed by: EMERGENCY MEDICINE

## 2023-11-29 PROCEDURE — 93005 ELECTROCARDIOGRAM TRACING: CPT | Performed by: INTERNAL MEDICINE

## 2023-11-29 PROCEDURE — 250N000011 HC RX IP 250 OP 636: Mod: JZ | Performed by: EMERGENCY MEDICINE

## 2023-11-29 PROCEDURE — 96374 THER/PROPH/DIAG INJ IV PUSH: CPT | Mod: 59

## 2023-11-29 PROCEDURE — 99222 1ST HOSP IP/OBS MODERATE 55: CPT | Mod: 25 | Performed by: INTERNAL MEDICINE

## 2023-11-29 PROCEDURE — 99223 1ST HOSP IP/OBS HIGH 75: CPT | Performed by: INTERNAL MEDICINE

## 2023-11-29 PROCEDURE — 80048 BASIC METABOLIC PNL TOTAL CA: CPT | Performed by: EMERGENCY MEDICINE

## 2023-11-29 RX ORDER — AMOXICILLIN 250 MG
2 CAPSULE ORAL 2 TIMES DAILY PRN
Status: DISCONTINUED | OUTPATIENT
Start: 2023-11-29 | End: 2023-12-02 | Stop reason: HOSPADM

## 2023-11-29 RX ORDER — CALCIUM CARBONATE 500 MG/1
1000 TABLET, CHEWABLE ORAL 4 TIMES DAILY PRN
Status: DISCONTINUED | OUTPATIENT
Start: 2023-11-29 | End: 2023-12-02 | Stop reason: HOSPADM

## 2023-11-29 RX ORDER — AMIODARONE HYDROCHLORIDE 100 MG/1
100 TABLET ORAL DAILY
Status: DISCONTINUED | OUTPATIENT
Start: 2023-11-30 | End: 2023-12-02 | Stop reason: HOSPADM

## 2023-11-29 RX ORDER — ZOLPIDEM TARTRATE 5 MG/1
5 TABLET ORAL
Status: DISCONTINUED | OUTPATIENT
Start: 2023-11-29 | End: 2023-12-02 | Stop reason: HOSPADM

## 2023-11-29 RX ORDER — LIDOCAINE 40 MG/G
CREAM TOPICAL
Status: DISCONTINUED | OUTPATIENT
Start: 2023-11-29 | End: 2023-12-02 | Stop reason: HOSPADM

## 2023-11-29 RX ORDER — LIDOCAINE 4 G/G
1 PATCH TOPICAL EVERY 24 HOURS
Status: DISCONTINUED | OUTPATIENT
Start: 2023-11-29 | End: 2023-12-02 | Stop reason: HOSPADM

## 2023-11-29 RX ORDER — AMOXICILLIN 250 MG
1 CAPSULE ORAL 2 TIMES DAILY PRN
Status: DISCONTINUED | OUTPATIENT
Start: 2023-11-29 | End: 2023-12-02 | Stop reason: HOSPADM

## 2023-11-29 RX ORDER — LISINOPRIL 2.5 MG/1
2.5 TABLET ORAL DAILY
Status: DISCONTINUED | OUTPATIENT
Start: 2023-11-30 | End: 2023-12-02 | Stop reason: HOSPADM

## 2023-11-29 RX ORDER — DICLOFENAC EPOLAMINE 0.01 G/1
1 SYSTEM TOPICAL AT BEDTIME
COMMUNITY
End: 2023-12-20

## 2023-11-29 RX ORDER — FUROSEMIDE 10 MG/ML
20 INJECTION INTRAMUSCULAR; INTRAVENOUS ONCE
Status: COMPLETED | OUTPATIENT
Start: 2023-11-29 | End: 2023-11-29

## 2023-11-29 RX ORDER — DULOXETIN HYDROCHLORIDE 60 MG/1
60 CAPSULE, DELAYED RELEASE ORAL DAILY
Status: DISCONTINUED | OUTPATIENT
Start: 2023-11-30 | End: 2023-12-02 | Stop reason: HOSPADM

## 2023-11-29 RX ORDER — POTASSIUM CHLORIDE 1500 MG/1
20 TABLET, EXTENDED RELEASE ORAL DAILY
Status: DISCONTINUED | OUTPATIENT
Start: 2023-11-30 | End: 2023-12-02 | Stop reason: HOSPADM

## 2023-11-29 RX ORDER — FUROSEMIDE 10 MG/ML
40 INJECTION INTRAMUSCULAR; INTRAVENOUS EVERY 12 HOURS
Status: DISCONTINUED | OUTPATIENT
Start: 2023-11-29 | End: 2023-11-30

## 2023-11-29 RX ORDER — DULOXETIN HYDROCHLORIDE 60 MG/1
60 CAPSULE, DELAYED RELEASE ORAL EVERY MORNING
COMMUNITY

## 2023-11-29 RX ADMIN — ZOLPIDEM TARTRATE 5 MG: 5 TABLET ORAL at 21:46

## 2023-11-29 RX ADMIN — LIDOCAINE 1 PATCH: 4 PATCH TOPICAL at 18:02

## 2023-11-29 RX ADMIN — FUROSEMIDE 20 MG: 10 INJECTION, SOLUTION INTRAMUSCULAR; INTRAVENOUS at 12:31

## 2023-11-29 RX ADMIN — APIXABAN 2.5 MG: 2.5 TABLET, FILM COATED ORAL at 20:38

## 2023-11-29 RX ADMIN — FUROSEMIDE 40 MG: 10 INJECTION, SOLUTION INTRAMUSCULAR; INTRAVENOUS at 18:02

## 2023-11-29 ASSESSMENT — ACTIVITIES OF DAILY LIVING (ADL)
ADLS_ACUITY_SCORE: 35

## 2023-11-29 NOTE — PHARMACY-ADMISSION MEDICATION HISTORY
Pharmacist Admission Medication History    Admission medication history is complete. The information provided in this note is only as accurate as the sources available at the time of the update.    Information Source(s): Patient and CareEverywhere/SureScripts via in-person    Pertinent Information: Oxycontin and specialty compounded ketamine are both new prescriptions that she hasn't started yet.    Changes made to PTA medication list:  Added: None  Deleted: None  Changed: diclofenac patches BID to once daily, duloxetine BID to once daily    Medication Affordability:  Not including over the counter (OTC) medications, was there a time in the past 3 months when you did not take your medications as prescribed because of cost?: No    Allergies reviewed with patient and updates made in EHR: yes    Medication History Completed By: Denisse Armstrong [facility] 11/29/2023 3:12 PM    PTA Med List   Medication Sig Last Dose    acetaminophen (TYLENOL) 325 MG tablet Take 2 tablets (650 mg) by mouth every 4 hours as needed for other (mild pain) Unknown    amiodarone (PACERONE) 200 MG tablet Take 0.5 tablets (100 mg) by mouth daily 11/29/2023    apixaban ANTICOAGULANT (ELIQUIS) 2.5 MG tablet Take 1 tablet (2.5 mg) by mouth 2 times daily 11/29/2023 at x1 am    cholecalciferol, vitamin D3, (VITAMIN D3) 2,000 unit Tab [CHOLECALCIFEROL, VITAMIN D3, (VITAMIN D3) 2,000 UNIT TAB] Take 1 tablet (2,000 Units total) by mouth daily. 11/29/2023    diclofenac (FLECTOR) 1.3 % patch Externally apply 1 patch topically at bedtime 11/28/2023    DULoxetine (CYMBALTA) 60 MG capsule Take 60 mg by mouth daily 11/29/2023    furosemide (LASIX) 20 MG tablet Take 1 tablet (20 mg) by mouth daily 11/28/2023    KLOR-CON 20 MEQ CR tablet TAKE ONE TABLET BY MOUTH ONE TIME DAILY 11/29/2023    Lidocaine (LIDOCARE) 4 % Patch Place 1 patch onto the skin every 24 hours Apply as needed to painful area of intact skin. To prevent lidocaine toxicity, patient should be patch  free for 12 hrs daily. Unknown    lisinopril (ZESTRIL) 2.5 MG tablet TAKE ONE TABLET BY MOUTH ONE TIME DAILY 11/29/2023    oxyCODONE (ROXICODONE) 5 MG tablet Take 1 tablet (5 mg) by mouth 3 times daily as needed for moderate to severe pain #70 tabs to last 30 days for chronic pain. Ok to fill 11/28/23 to begin using 11/30/23 11/29/2023 at x1 am    zolpidem ER (AMBIEN CR) 6.25 MG CR tablet Take 1 and 1/4 tablet by mouth at bedtime 11/28/2023

## 2023-11-29 NOTE — ED TRIAGE NOTES
Patient presents here for evaluation of shortness of breath and lightheadedness that has persisted since 11/24. She notes palpitations as well. She was advised by her cardiologist to come here.      Triage Assessment (Adult)       Row Name 11/29/23 1044          Triage Assessment    Airway WDL WDL        Respiratory WDL    Respiratory WDL WDL        Skin Circulation/Temperature WDL    Skin Circulation/Temperature WDL WDL        Cardiac WDL    Cardiac WDL WDL        Peripheral/Neurovascular WDL    Peripheral Neurovascular WDL WDL        Cognitive/Neuro/Behavioral WDL    Cognitive/Neuro/Behavioral WDL WDL

## 2023-11-29 NOTE — ED PROVIDER NOTES
EMERGENCY DEPARTMENT ENCOUNTER      NAME: Gladys Ramirez  AGE: 89 year old female  YOB: 1930  MRN: [id number]  EVALUATION DATE & TIME: 2023 10:43 AM    PCP: Margret Flores    ED PROVIDER: Sammy Lerner M.D.      Chief Complaint   Patient presents with    Shortness of Breath         FINAL IMPRESSION:  1. Acute congestive heart failure, unspecified heart failure type (H)    2. Atrial fibrillation, unspecified type (H)          ED COURSE & MEDICAL DECISION MAKIN:00 AM I met with the patient to gather history and to perform my initial exam. I discussed the plan fo care while in the Emergency Department.   12:02 PM I spoke with Kristina Velasquez MD, cardiology about patient plan of care.   12:15 PM I rechecked and updated the patient on findings and need for transfer.   1:39 PM Per [facility], there are no beds for transfer so the patient will be admitted here.  1:56 PM I spoke with Johann Sheppard MD, hospitalist, about patient admission.       Pertinent Labs & Imaging studies reviewed. (See chart for details)  89 year old female presents to the Emergency Department for evaluation of palpitations, shortness of breath. Patient appears non toxic with stable vitals signs, patient is afebrile with no persistent tachycardia, no hypoxia.  Lungs are clear, abdomen is benign.  Patient denies any syncope, chest pain, fevers or cough.  Does endorse these frequent episodes, more frequent over the past 3 days.  Certainly concern for atrial fibrillation with rapid ventricular response though has an acceptable rate here, low suspicion for ACS, CHF, nothing suggest PE or dissection as no endorsement of ripping or tearing  pain to chest, back or shoulders.  Per review of the medical record patient was seen by cardiologyDr. For [facility] office visit on 2023, patient states that she recently had her medications adjusted.  We will obtain screening labs, ECG and chest x-ray.    Reassessment: Labs by my  independent interpretation concerning for CHF with elevated BNP, initial and repeat troponin not markedly elevated with nothing to suggest acute ischemia.  Again, ECG showed atrial fibrillation.  No fever, white blood cell count of 7.6 with nothing to just infection.  Chest x-ray reported findings of pulmonary edema and bilateral pleural effusions.  Feel the patient would benefit from admission for gentle diuresis and continued monitoring.  Initially attempted transfer but no beds in the system and so patient will continue to board at [facility] emergency room until inpatient bed becomes available.  Discussed these findings recommendations with the patient.  Discussed the plan to admitting service.    Medical Decision Making    History:  Supplemental history from: Documented in chart, if applicable  External Record(s) reviewed: Documented in chart, if applicable.    Work Up:  Chart documentation includes differential considered and any EKGs or imaging independently interpreted by provider, where specified.  In additional to work up documented, I considered the following work up: Documented in chart, if applicable.    External consultation:  Discussion of management with another provider: Documented in chart, if applicable and Cardiology and Hospitalist    Complicating factors:  Care impacted by chronic illness: Anticoagulated State, Chronic Kidney Disease, Heart Disease, Hyperlipidemia, and Hypertension  Care affected by social determinants of health: N/A    Disposition considerations: Admit.          At the conclusion of the encounter I discussed the results of all of the tests and the disposition. The questions were answered and return precautions provided. The patient or family acknowledged understanding and was agreeable with the care plan.         MEDICATIONS GIVEN IN THE EMERGENCY:  Medications   lidocaine 1 % 0.1-1 mL (has no administration in time range)   lidocaine (LMX4) cream (has no administration in  time range)   sodium chloride (PF) 0.9% PF flush 3 mL (3 mLs Intracatheter Not Given 11/29/23 1629)   sodium chloride (PF) 0.9% PF flush 3 mL (has no administration in time range)   senna-docusate (SENOKOT-S/PERICOLACE) 8.6-50 MG per tablet 1 tablet (has no administration in time range)     Or   senna-docusate (SENOKOT-S/PERICOLACE) 8.6-50 MG per tablet 2 tablet (has no administration in time range)   calcium carbonate (TUMS) chewable tablet 1,000 mg (has no administration in time range)   furosemide (LASIX) injection 40 mg (40 mg Intravenous $Given 11/29/23 1802)   amiodarone (PACERONE) tablet 100 mg (has no administration in time range)   apixaban ANTICOAGULANT (ELIQUIS) tablet 2.5 mg (has no administration in time range)   DULoxetine (CYMBALTA) DR capsule 60 mg (has no administration in time range)   lisinopril (ZESTRIL) tablet 2.5 mg (has no administration in time range)   Lidocaine (LIDOCARE) 4 % Patch 1 patch (1 patch Transdermal $Patch/Med Applied 11/29/23 1802)   potassium chloride ER (KLOR-CON M) CR tablet 20 mEq (has no administration in time range)   furosemide (LASIX) injection 20 mg (20 mg Intravenous $Given 11/29/23 1231)       NEW PRESCRIPTIONS STARTED AT TODAY'S ER VISIT  New Prescriptions    No medications on file            =================================================================    HPI    Patient information was obtained from: patient and friend     Use of Intrepreter: N/A         Gladys Ramirez is a 89 year old female who presents for shortness of breath. She spoke with her cardiologist who advised her to come to the ED with these symptoms. Patient reports she began experiencing symptoms on 11/24 (5 days ago). The next two days, she states she felt normal. The past two days, her symptoms have returned with four episodes of lightheadedness followed by shortness of breath, and palpitations that last seconds to minutes. During these episodes, she feels like passing out but denies  "syncope. Since 4:00 AM this morning, she notes these episodes have come \"often\". At this moment, she states that she does not have any symptoms. Denies pain or vomiting. She has a history of similar symptoms but with additional sweating during these episodes and was diagnosed with bundle branch block and atrial fibrillation. Patient reports seeing her cardiologist in October of this year and was told to cut her lisinopril dose in half, eliquis dose in half, and amiodarone dose in half.       REVIEW OF SYSTEMS   Constitutional:  Lightheadedness. Denies fever, chills  Respiratory:  Shortness of breath. Denies productive cough   Cardiovascular:  Palpitations. Denies chest pain  GI:  Denies abdominal pain, nausea, vomiting, or change in bowel or bladder habits   Musculoskeletal:  Denies any new muscle/joint swelling  Skin:  Denies rash   Neurologic:  Denies focal weakness  All systems negative except as marked.     PAST MEDICAL HISTORY:  Past Medical History:   Diagnosis Date    Angina pectoris (H24)     Chest pain 03/09/2017    Cough     Hyperlipidemia     Hypertension     Osteoarthritis        PAST SURGICAL HISTORY:  Past Surgical History:   Procedure Laterality Date    APPENDECTOMY      BASAL CELL CARCINOMA EXCISION      nose    LAPAROSCOPY DIAGNOSTIC (GENERAL) N/A 11/04/2014    Procedure: LAPAROSCOPY BILATERAL SALPINGO-OOPHORECTOMY ;  Surgeon: Sofia Harper MD;  Location: [facility];  Service:     OPEN REDUCTION INTERNAL FIXATION HIP BIPOLAR Right 3/5/2023    Procedure: HEMIARTHROPLASTY, HIP, BIPOLAR;  Surgeon: Jose G Martinez MD;  Location: [facility] OR    TONSILLECTOMY      10 years old    [facility] TOTAL KNEE ARTHROPLASTY Left     2011         CURRENT MEDICATIONS:    Prior to Admission medications    Medication Sig Start Date End Date Taking? Authorizing Provider   acetaminophen (TYLENOL) 325 MG tablet Take 2 tablets (650 mg) by mouth every 4 hours as needed for other (mild pain) 3/5/23   " Naina Guzmán PA-C   amiodarone (PACERONE) 200 MG tablet Take 0.5 tablets (100 mg) by mouth daily  Patient not taking: Reported on 10/31/2023 12/7/22   Estrada Holley MD   apixaban ANTICOAGULANT (ELIQUIS) 2.5 MG tablet Take 1 tablet (2.5 mg) by mouth 2 times daily 3/8/23   Shyla Babcock MD   cholecalciferol, vitamin D3, (VITAMIN D3) 2,000 unit Tab [CHOLECALCIFEROL, VITAMIN D3, (VITAMIN D3) 2,000 UNIT TAB] Take 1 tablet (2,000 Units total) by mouth daily. 7/29/19   Lore Martin MD   COMPOUNDED NON-CONTROLLED SUBSTANCE (CMPD RX) - PHARMACY TO MIX COMPOUNDED MEDICATION Ketamine 8% and Ketoprofen 5 % in carrier gel/lotion. Apply 2-4 pumps to affected areas QID PRN 11/24/23   Valentine Howard APRN CNP   diclofenac (FLECTOR) 1.3 % patch Externally apply 1 patch topically 2 times daily 1/19/23   Valentine Howard APRN CNP   DULoxetine (CYMBALTA) 60 MG capsule TAKE ONE CAPSULE BY MOUTH TWICE DAILY 8/14/23   Margret Flores MD   furosemide (LASIX) 20 MG tablet Take 1 tablet (20 mg) by mouth daily 10/25/23   Estrada Holley MD   KLOR-CON 20 MEQ CR tablet TAKE ONE TABLET BY MOUTH ONE TIME DAILY 4/13/23   Estrada Holley MD   Lidocaine (LIDOCARE) 4 % Patch Place 1 patch onto the skin every 24 hours Apply as needed to painful area of intact skin. To prevent lidocaine toxicity, patient should be patch free for 12 hrs daily. 3/8/23   Shyla Babcock MD   lisinopril (ZESTRIL) 2.5 MG tablet TAKE ONE TABLET BY MOUTH ONE TIME DAILY 10/27/23   Estrada Holley MD   oxyCODONE (OXYCONTIN) 10 MG 12 hr tablet Take 1 tablet (10 mg) by mouth at bedtime for 30 days 11/24/23 12/24/23  Valentine Howard APRN CNP   oxyCODONE (ROXICODONE) 5 MG tablet Take 1 tablet (5 mg) by mouth 3 times daily as needed for moderate to severe pain #70 tabs to last 30 days for chronic pain. Ok to fill 11/28/23 to begin using 11/30/23 11/24/23   Valentine Howard APRN CNP   zolpidem ER (AMBIEN CR) 6.25 MG CR tablet Take 1 and 1/4 tablet by  "mouth at bedtime 11/13/23   Margret Flores MD        ALLERGIES:  Allergies   Allergen Reactions    Trazodone Shortness Of Breath and Unknown     Allergic to trazodone and deriv., Other: trouble swallowing      Clindamycin Diarrhea     C-diff    Gabapentin Other (See Comments)     \"Internal tremors\"    Temazepam Other (See Comments)     Annotation: Nightmares         FAMILY HISTORY:  Family History   Problem Relation Age of Onset    Heart Disease Mother     Rheumatic Heart Disease Mother     No Known Problems Father     Cancer Brother         brain    Lung Cancer Brother     Lung Cancer Brother     Cancer Sister         lung    Lung Cancer Sister        SOCIAL HISTORY:   Social History     Socioeconomic History    Marital status: Single   Tobacco Use    Smoking status: Never     Passive exposure: Never    Smokeless tobacco: Never    Tobacco comments:     no passive exposure   Vaping Use    Vaping Use: Never used   Substance and Sexual Activity    Alcohol use: Yes     Comment: Alcoholic Drinks/day: Rarely a glass of wine    Drug use: No   Social History Narrative    The patient is a nun.     Social Determinants of Health     Financial Resource Strain: Low Risk  (10/5/2023)    Financial Resource Strain     Within the past 12 months, have you or your family members you live with been unable to get utilities (heat, electricity) when it was really needed?: No   Food Insecurity: Low Risk  (10/5/2023)    Food Insecurity     Within the past 12 months, did you worry that your food would run out before you got money to buy more?: No     Within the past 12 months, did the food you bought just not last and you didn t have money to get more?: No   Transportation Needs: Low Risk  (10/5/2023)    Transportation Needs     Within the past 12 months, has lack of transportation kept you from medical appointments, getting your medicines, non-medical meetings or appointments, work, or from getting things that you need?: No   Physical " "Activity: Insufficiently Active (3/27/2023)    Exercise Vital Sign     Days of Exercise per Week: 3 days     Minutes of Exercise per Session: 10 min   Stress: No Stress Concern Present (3/27/2023)    [place] [employer] - Occupational Stress Questionnaire     Feeling of Stress : Not at all   Social Connections: Moderately Integrated (3/27/2023)    Social Connection and Isolation Panel [NHANES]     Frequency of Communication with Friends and Family: More than three times a week     Frequency of Social Gatherings with Friends and Family: More than three times a week     Attends Yazdanism Services: More than 4 times per year     Active Member of Clubs or Organizations: Yes     Attends Club or Organization Meetings: More than 4 times per year     Marital Status: Never    Interpersonal Safety: Low Risk  (10/5/2023)    Interpersonal Safety     Do you feel physically and emotionally safe where you currently live?: Yes     Within the past 12 months, have you been hit, slapped, kicked or otherwise physically hurt by someone?: No     Within the past 12 months, have you been humiliated or emotionally abused in other ways by your partner or ex-partner?: No   Housing Stability: Low Risk  (10/5/2023)    Housing Stability     Do you have housing? : Yes     Are you worried about losing your housing?: No       VITALS:  Patient Vitals for the past 24 hrs:   BP Temp Temp src Pulse Resp SpO2 Height Weight   11/29/23 1725 (!) 132/98 97.7  F (36.5  C) Oral -- -- -- -- --   11/29/23 1500 138/89 -- -- 94 27 93 % -- --   11/29/23 1430 (!) 192/86 -- -- 115 24 -- -- --   11/29/23 1400 139/86 -- -- 96 25 (!) 88 % -- --   11/29/23 1230 (!) 158/86 -- -- 92 (!) 31 92 % -- --   11/29/23 1200 130/75 -- -- 91 27 92 % -- --   11/29/23 1045 133/79 97.5  F (36.4  C) Oral 107 26 92 % 1.575 m (5' 2\") 69.4 kg (153 lb)        PHYSICAL EXAM    Constitutional:  Awake, alert, in no apparent distress   HENT:  Normocephalic, " Atraumatic. Bilateral external ears normal. Oropharynx moist. Nose normal. Neck- Normal range of motion with no guarding, No midline cervical tenderness, Supple, No stridor.   Eyes:  PERRL, EOMI with no signs of entrapment, Conjunctiva normal, No discharge.   Respiratory:  Normal breath sounds, No respiratory distress, No wheezing.    Cardiovascular: Regular rate with irregular rhythm, no appreciable rubs or gallops.   GI:  Soft, No tenderness, No distension, No palpable masses  Musculoskeletal:  Intact distal pulses, No edema. Good range of motion in all major joints. No tenderness to palpation or major deformities noted.  Integument:  Warm, Dry, No erythema, No rash.   Neurologic:  Alert & oriented, Normal motor function, Normal sensory function, No focal deficits noted.   Psychiatric:  Affect normal, Judgment normal, Mood normal.     LAB:  All pertinent labs reviewed and interpreted.  Results for orders placed or performed during the hospital encounter of 11/29/23   XR Chest 2 Views    Impression    IMPRESSION: Interstitial pulmonary edema with tiny bilateral pleural effusions. No pneumothorax. Unchanged enlarged cardiac silhouette.   Basic metabolic panel   Result Value Ref Range    Sodium 142 135 - 145 mmol/L    Potassium 4.2 3.4 - 5.3 mmol/L    Chloride 106 98 - 107 mmol/L    Carbon Dioxide (CO2) 23 22 - 29 mmol/L    Anion Gap 13 7 - 15 mmol/L    Urea Nitrogen 27.5 (H) 8.0 - 23.0 mg/dL    Creatinine 1.08 (H) 0.51 - 0.95 mg/dL    GFR Estimate 48 (L) >60 mL/min/1.73m2    Calcium 9.7 (H) 8.2 - 9.6 mg/dL    Glucose 163 (H) 70 - 99 mg/dL   Result Value Ref Range    Magnesium 2.0 1.7 - 2.3 mg/dL   Result Value Ref Range    Troponin T, High Sensitivity 41 (H) <=14 ng/L   Nt probnp inpatient (BNP)   Result Value Ref Range    N terminal Pro BNP Inpatient 13,109 (H) 0 - 1,800 pg/mL   CBC with platelets and differential   Result Value Ref Range    WBC Count 7.6 4.0 - 11.0 10e3/uL    RBC Count 4.11 3.80 - 5.20 10e6/uL     Hemoglobin 12.2 11.7 - 15.7 g/dL    Hematocrit 38.8 35.0 - 47.0 %    MCV 94 78 - 100 fL    MCH 29.7 26.5 - 33.0 pg    MCHC 31.4 (L) 31.5 - 36.5 g/dL    RDW 15.5 (H) 10.0 - 15.0 %    Platelet Count 330 150 - 450 10e3/uL    % Neutrophils 74 %    % Lymphocytes 15 %    % Monocytes 9 %    % Eosinophils 1 %    % Basophils 1 %    % Immature Granulocytes 0 %    NRBCs per 100 WBC 0 <1 /100    Absolute Neutrophils 5.7 1.6 - 8.3 10e3/uL    Absolute Lymphocytes 1.1 0.8 - 5.3 10e3/uL    Absolute Monocytes 0.7 0.0 - 1.3 10e3/uL    Absolute Eosinophils 0.1 0.0 - 0.7 10e3/uL    Absolute Basophils 0.1 0.0 - 0.2 10e3/uL    Absolute Immature Granulocytes 0.0 <=0.4 10e3/uL    Absolute NRBCs 0.0 10e3/uL   Result Value Ref Range    Troponin T, High Sensitivity 40 (H) <=14 ng/L       RADIOLOGY:  XR Chest 2 Views   Final Result   IMPRESSION: Interstitial pulmonary edema with tiny bilateral pleural effusions. No pneumothorax. Unchanged enlarged cardiac silhouette.      Echocardiogram Limited    (Results Pending)          EKG:    Atrial fibrillation, left bundle branch block, no specific ST acute ischemic changes, compared to ECG of March 4, 2023, atrial fibrillation has replaced a sinus rhythm  I have independently reviewed and interpreted the EKG(s) documented above.    [facility] Documentation:       I, Willem Hayward, am serving as a scribe to document services personally performed by Sammy Lerner MD, based on my observation and the provider's statements to me. I, Sammy Lerner MD attest that Willem Hayward is acting in a scribe capacity, has observed my performance of the services and has documented them in accordance with my direction.    Sammy Lerner M.D.  Emergency Medicine  [facility] EMERGENCY DEPARTMENT  [facility] [facility] [address]  [phone]  Dept: [phone]     Sammy Lerner MD  11/29/23 9110

## 2023-11-29 NOTE — CONSULTS
HEART CARE ENCOUNTER CONSULTATON NOTE      [facility]  [phone]      Assessment/Recommendations   Assessment:  1.  Acute on chronic heart failure with reduced ejection fraction: May be precipitated by atrial fibrillation.  Also noted dietary discretions recently with the holiday.  2.  Atrial fibrillation: Persistent atrial fibrillation for unclear duration.  Historically maintained on rhythm control strategy and has been on a low-dose of amiodarone 100 mg daily.  In the past had difficulty with sinus bradycardia and thus was taken off of metoprolol.  Currently A-fib is persistent and well rate controlled.  3.  Anticoagulation: Maintained on dose adjusted Eliquis  4.  Nonischemic cardiomyopathy LVEF 25-30% has been evaluated by EP as outpatient and has declined CRT due to age  5.  Left bundle branch block  6.  Chronic kidney disease      Plan:  1.  Repeat limited echocardiogram  2.  Monitor on telemetry for bradycardia  3.  May be a candidate for ILR can discuss with EP tomorrow  4.  Agree with IV Lasix 40 mg twice daily and may reevaluate tomorrow  Primary cardiologist Dr. Holley     History of Present Illness/Subjective    HPI: Gladys Ramirez is a 89 year old female with history of paroxysmal atrial fibrillation maintained on amiodarone low-dose for rhythm control, nonischemic cardiomyopathy LVEF 25-30% with left bundle branch block declined CRT device due to advanced age, chronic heart failure with reduced ejection fraction, hypertension, chronic kidney disease who presented to the ED today with complaints of dyspnea on exertion and episodes of presyncope.  Twelve-lead EKG shows rate controlled atrial fibrillation.  She has had issues with sinus bradycardia in the past and this is why metoprolol has not been given.  BNP over 13,000, previously 5000 last year.  No complaints of chest pain or palpitations.    Echocardiogram 8/17/2023  1. The left ventricle is mildly dilated. Left  "ventricular function is  decreased. The ejection fraction is 25-30% (severely reduced). There is severe  global hypokinesia of the left ventricle.  2. Normal right ventricle size and systolic function.  3. The left atrium is moderately dilated.  4. Small pericardial effusion. There are no echocardiographic indications of  cardiac tamponade.    Clinically Significant Risk Factors Present on Admission               # Drug Induced Coagulation Defect: home medication list includes an anticoagulant medication    # Hypertension: Noted on problem list  # Acute heart failure with reduced ejection fraction: last echo with EF <40% and receiving IV diuretics     # Overweight: Estimated body mass index is 27.98 kg/m  as calculated from the following:    Height as of this encounter: 1.575 m (5' 2\").    Weight as of this encounter: 69.4 kg (153 lb).             Cardiac Arrhythmia: Atrial fibrillation: Persistent  Cardiomyopathy  Systolic acute        Fluid overload, unspecified        CKD POA List: Stage 3a (GFR 45-59)        Chronic Fatigue and Other Debilities: Age-related physical debility     ___________________     Physical Examination  Review of Systems   Vitals: /89   Pulse 94   Temp 97.5  F (36.4  C) (Oral)   Resp 27   Ht 1.575 m (5' 2\")   Wt 69.4 kg (153 lb)   SpO2 93%   BMI 27.98 kg/m    BMI= Body mass index is 27.98 kg/m .  Wt Readings from Last 3 Encounters:   11/29/23 69.4 kg (153 lb)   10/31/23 68.9 kg (152 lb)   09/28/23 69.4 kg (153 lb)       General Appearance:   no distress, normal body habitus   ENT/Mouth: membranes moist, no oral lesions or bleeding gums.      EYES:  no scleral icterus, normal conjunctivae   Neck: no carotid bruits or thyromegaly   Chest/Lungs:   Decreased at bases   Cardiovascular:   irregular. Normal first and second heart sounds with no murmur no edema bilaterally    Abdomen:   bowel sounds are present   Extremities: no cyanosis or clubbing   Skin: no xanthelasma, warm.  "   Neurologic: normal  bilateral, no tremors     Psychiatric: alert and oriented x3, calm        Please refer above for cardiac ROS details.        Medical History  Surgical History Family History Social History   Past Medical History:   Diagnosis Date     Angina pectoris (H24)      Chest pain 03/09/2017     Cough      Hyperlipidemia      Hypertension      Osteoarthritis      Past Surgical History:   Procedure Laterality Date     APPENDECTOMY       BASAL CELL CARCINOMA EXCISION      nose     LAPAROSCOPY DIAGNOSTIC (GENERAL) N/A 11/04/2014    Procedure: LAPAROSCOPY BILATERAL SALPINGO-OOPHORECTOMY ;  Surgeon: Sofia Harper MD;  Location: [facility] [place];  Service:      OPEN REDUCTION INTERNAL FIXATION HIP BIPOLAR Right 3/5/2023    Procedure: HEMIARTHROPLASTY, HIP, BIPOLAR;  Surgeon: Jose G Martinez MD;  Location: [facility] [place]     TONSILLECTOMY      10 years old     ZZC TOTAL KNEE ARTHROPLASTY Left     2011     Family History   Problem Relation Age of Onset     Heart Disease Mother      Rheumatic Heart Disease Mother      No Known Problems Father      Cancer Brother         brain     Lung Cancer Brother      Lung Cancer Brother      Cancer Sister         lung     Lung Cancer Sister         Social History     Socioeconomic History     Marital status: Single     Spouse name: Not on file     Number of children: Not on file     Years of education: Not on file     Highest education level: Not on file   Occupational History     Not on file   Tobacco Use     Smoking status: Never     Passive exposure: Never     Smokeless tobacco: Never     Tobacco comments:     no passive exposure   Vaping Use     Vaping Use: Never used   Substance and Sexual Activity     Alcohol use: Yes     Comment: Alcoholic Drinks/day: Rarely a glass of wine     Drug use: No     Sexual activity: Not on file   Other Topics Concern     Not on file   Social History Narrative    The patient is a nun.     Social Determinants of Health      Financial Resource Strain: Low Risk  (10/5/2023)    Financial Resource Strain      Within the past 12 months, have you or your family members you live with been unable to get utilities (heat, electricity) when it was really needed?: No   Food Insecurity: Low Risk  (10/5/2023)    Food Insecurity      Within the past 12 months, did you worry that your food would run out before you got money to buy more?: No      Within the past 12 months, did the food you bought just not last and you didn t have money to get more?: No   Transportation Needs: Low Risk  (10/5/2023)    Transportation Needs      Within the past 12 months, has lack of transportation kept you from medical appointments, getting your medicines, non-medical meetings or appointments, work, or from getting things that you need?: No   Physical Activity: Insufficiently Active (3/27/2023)    Exercise Vital Sign      Days of Exercise per Week: 3 days      Minutes of Exercise per Session: 10 min   Stress: No Stress Concern Present (3/27/2023)    [employer] - Occupational Stress Questionnaire      Feeling of Stress : Not at all   Social Connections: Moderately Integrated (3/27/2023)    Social Connection and Isolation Panel [NHANES]      Frequency of Communication with Friends and Family: More than three times a week      Frequency of Social Gatherings with Friends and Family: More than three times a week      Attends Religion Services: More than 4 times per year      Active Member of Clubs or Organizations: Yes      Attends Club or Organization Meetings: More than 4 times per year      Marital Status: Never    Interpersonal Safety: Low Risk  (10/5/2023)    Interpersonal Safety      Do you feel physically and emotionally safe where you currently live?: Yes      Within the past 12 months, have you been hit, slapped, kicked or otherwise physically hurt by someone?: No      Within the past 12 months, have you been humiliated or  emotionally abused in other ways by your partner or ex-partner?: No   Housing Stability: Low Risk  (10/5/2023)    Housing Stability      Do you have housing? : Yes      Are you worried about losing your housing?: No           Medications  Allergies   Current Outpatient Medications   Medication Sig Dispense Refill     acetaminophen (TYLENOL) 325 MG tablet Take 2 tablets (650 mg) by mouth every 4 hours as needed for other (mild pain) 100 tablet 0     amiodarone (PACERONE) 200 MG tablet Take 0.5 tablets (100 mg) by mouth daily 45 tablet 3     apixaban ANTICOAGULANT (ELIQUIS) 2.5 MG tablet Take 1 tablet (2.5 mg) by mouth 2 times daily 60 tablet 0     cholecalciferol, vitamin D3, (VITAMIN D3) 2,000 unit Tab [CHOLECALCIFEROL, VITAMIN D3, (VITAMIN D3) 2,000 UNIT TAB] Take 1 tablet (2,000 Units total) by mouth daily. 90 tablet 3     diclofenac (FLECTOR) 1.3 % patch Externally apply 1 patch topically at bedtime       DULoxetine (CYMBALTA) 60 MG capsule Take 60 mg by mouth daily       furosemide (LASIX) 20 MG tablet Take 1 tablet (20 mg) by mouth daily 90 tablet 1     KLOR-CON 20 MEQ CR tablet TAKE ONE TABLET BY MOUTH ONE TIME DAILY 90 tablet 3     Lidocaine (LIDOCARE) 4 % Patch Place 1 patch onto the skin every 24 hours Apply as needed to painful area of intact skin. To prevent lidocaine toxicity, patient should be patch free for 12 hrs daily. 10 patch 0     lisinopril (ZESTRIL) 2.5 MG tablet TAKE ONE TABLET BY MOUTH ONE TIME DAILY 90 tablet 1     oxyCODONE (ROXICODONE) 5 MG tablet Take 1 tablet (5 mg) by mouth 3 times daily as needed for moderate to severe pain #70 tabs to last 30 days for chronic pain. Ok to fill 11/28/23 to begin using 11/30/23 70 tablet 0     zolpidem ER (AMBIEN CR) 6.25 MG CR tablet Take 1 and 1/4 tablet by mouth at bedtime 45 tablet 5     COMPOUNDED NON-CONTROLLED SUBSTANCE (CMPD RX) - PHARMACY TO MIX COMPOUNDED MEDICATION Ketamine 8% and Ketoprofen 5 % in carrier gel/lotion. Apply 2-4 pumps to  "affected areas QID  g 1     oxyCODONE (OXYCONTIN) 10 MG 12 hr tablet Take 1 tablet (10 mg) by mouth at bedtime for 30 days 30 tablet 0       Allergies   Allergen Reactions     Trazodone Shortness Of Breath and Unknown     Allergic to trazodone and deriv., Other: trouble swallowing       Clindamycin Diarrhea     C-diff     Gabapentin Other (See Comments)     \"Internal tremors\"     Temazepam Other (See Comments)     Annotation: Nightmares            Lab Results    Chemistry/lipid CBC Cardiac Enzymes/BNP/TSH/INR   No results for input(s): \"CHOL\", \"HDL\", \"LDL\", \"TRIG\", \"CHOLHDLRATIO\" in the last 41262 hours.  No results for input(s): \"LDL\" in the last 03782 hours.  Recent Labs   Lab Test 11/29/23  1106      POTASSIUM 4.2   CHLORIDE 106   CO2 23   *   BUN 27.5*   CR 1.08*   GFRESTIMATED 48*   MARLENE 9.7*     Recent Labs   Lab Test 11/29/23  1106 10/31/23  1443 03/10/23  0945   CR 1.08* 1.16* 0.94     Recent Labs   Lab Test 06/15/21  0910   A1C 6.0*          Recent Labs   Lab Test 11/29/23  1106   WBC 7.6   HGB 12.2   HCT 38.8   MCV 94        Recent Labs   Lab Test 11/29/23  1106 03/10/23  0945 03/07/23  0506   HGB 12.2 11.3* 11.2*    Recent Labs   Lab Test 08/23/22  1154 08/17/22  1215 11/21/19  0451   TROPONINI 0.16 0.04 0.01     Recent Labs   Lab Test 11/29/23  1106 02/08/23  1345 08/23/22  1154 08/17/22  1215 03/03/21  1222 02/21/19  0713   BNP  --   --  1,933*  --  177* 95   NTBNPI 13,109*  --   --   --   --   --    NTBNP  --  5,373*  --  5,101*  --   --      Recent Labs   Lab Test 04/05/23  1412   TSH 3.73     Recent Labs   Lab Test 03/04/23  1905 08/23/22  1154 06/05/19  0052   INR 1.33* 1.37* 1.50*        Kristnia Velasquez MD                                      "

## 2023-11-30 ENCOUNTER — TELEPHONE (OUTPATIENT)
Dept: CARDIOLOGY | Facility: CLINIC | Age: 88
End: 2023-11-30

## 2023-11-30 ENCOUNTER — APPOINTMENT (OUTPATIENT)
Dept: CARDIOLOGY | Facility: HOSPITAL | Age: 88
DRG: 291 | End: 2023-11-30
Attending: INTERNAL MEDICINE
Payer: COMMERCIAL

## 2023-11-30 ENCOUNTER — APPOINTMENT (OUTPATIENT)
Dept: OCCUPATIONAL THERAPY | Facility: HOSPITAL | Age: 88
DRG: 291 | End: 2023-11-30
Attending: INTERNAL MEDICINE
Payer: COMMERCIAL

## 2023-11-30 ENCOUNTER — APPOINTMENT (OUTPATIENT)
Dept: PHYSICAL THERAPY | Facility: HOSPITAL | Age: 88
DRG: 291 | End: 2023-11-30
Attending: INTERNAL MEDICINE
Payer: COMMERCIAL

## 2023-11-30 DIAGNOSIS — I50.9 ACUTE DECOMPENSATED HEART FAILURE (H): Primary | ICD-10-CM

## 2023-11-30 LAB
ANION GAP SERPL CALCULATED.3IONS-SCNC: 14 MMOL/L (ref 7–15)
ATRIAL RATE - MUSE: 104 BPM
ATRIAL RATE - MUSE: 141 BPM
BUN SERPL-MCNC: 24.1 MG/DL (ref 8–23)
CALCIUM SERPL-MCNC: 9.9 MG/DL (ref 8.2–9.6)
CHLORIDE SERPL-SCNC: 102 MMOL/L (ref 98–107)
CREAT SERPL-MCNC: 1.09 MG/DL (ref 0.51–0.95)
DEPRECATED HCO3 PLAS-SCNC: 29 MMOL/L (ref 22–29)
DIASTOLIC BLOOD PRESSURE - MUSE: 86 MMHG
DIASTOLIC BLOOD PRESSURE - MUSE: NORMAL MMHG
EGFRCR SERPLBLD CKD-EPI 2021: 47 ML/MIN/1.73M2
ERYTHROCYTE [DISTWIDTH] IN BLOOD BY AUTOMATED COUNT: 15.3 % (ref 10–15)
GLUCOSE SERPL-MCNC: 122 MG/DL (ref 70–99)
HCT VFR BLD AUTO: 44.1 % (ref 35–47)
HGB BLD-MCNC: 13.7 G/DL (ref 11.7–15.7)
INTERPRETATION ECG - MUSE: NORMAL
INTERPRETATION ECG - MUSE: NORMAL
LVEF ECHO: NORMAL
MCH RBC QN AUTO: 29.3 PG (ref 26.5–33)
MCHC RBC AUTO-ENTMCNC: 31.1 G/DL (ref 31.5–36.5)
MCV RBC AUTO: 94 FL (ref 78–100)
P AXIS - MUSE: NORMAL DEGREES
P AXIS - MUSE: NORMAL DEGREES
PLATELET # BLD AUTO: 324 10E3/UL (ref 150–450)
POTASSIUM SERPL-SCNC: 3.5 MMOL/L (ref 3.4–5.3)
PR INTERVAL - MUSE: NORMAL MS
PR INTERVAL - MUSE: NORMAL MS
QRS DURATION - MUSE: 142 MS
QRS DURATION - MUSE: 150 MS
QT - MUSE: 390 MS
QT - MUSE: 412 MS
QTC - MUSE: 500 MS
QTC - MUSE: 525 MS
R AXIS - MUSE: 106 DEGREES
R AXIS - MUSE: 70 DEGREES
RBC # BLD AUTO: 4.68 10E6/UL (ref 3.8–5.2)
SODIUM SERPL-SCNC: 145 MMOL/L (ref 135–145)
SYSTOLIC BLOOD PRESSURE - MUSE: 139 MMHG
SYSTOLIC BLOOD PRESSURE - MUSE: NORMAL MMHG
T AXIS - MUSE: -30 DEGREES
T AXIS - MUSE: 64 DEGREES
VENTRICULAR RATE- MUSE: 98 BPM
VENTRICULAR RATE- MUSE: 99 BPM
WBC # BLD AUTO: 8.6 10E3/UL (ref 4–11)

## 2023-11-30 PROCEDURE — 93325 DOPPLER ECHO COLOR FLOW MAPG: CPT | Mod: 26 | Performed by: GENERAL ACUTE CARE HOSPITAL

## 2023-11-30 PROCEDURE — 97110 THERAPEUTIC EXERCISES: CPT | Mod: GO

## 2023-11-30 PROCEDURE — 97161 PT EVAL LOW COMPLEX 20 MIN: CPT | Mod: GP

## 2023-11-30 PROCEDURE — 210N000001 HC R&B IMCU HEART CARE

## 2023-11-30 PROCEDURE — C8924 2D TTE W OR W/O FOL W/CON,FU: HCPCS

## 2023-11-30 PROCEDURE — 82374 ASSAY BLOOD CARBON DIOXIDE: CPT | Performed by: INTERNAL MEDICINE

## 2023-11-30 PROCEDURE — 250N000011 HC RX IP 250 OP 636: Mod: JZ | Performed by: INTERNAL MEDICINE

## 2023-11-30 PROCEDURE — 97535 SELF CARE MNGMENT TRAINING: CPT | Mod: GO

## 2023-11-30 PROCEDURE — 99222 1ST HOSP IP/OBS MODERATE 55: CPT | Mod: FS | Performed by: INTERNAL MEDICINE

## 2023-11-30 PROCEDURE — 93321 DOPPLER ECHO F-UP/LMTD STD: CPT | Mod: 26 | Performed by: GENERAL ACUTE CARE HOSPITAL

## 2023-11-30 PROCEDURE — 255N000002 HC RX 255 OP 636: Performed by: INTERNAL MEDICINE

## 2023-11-30 PROCEDURE — 97165 OT EVAL LOW COMPLEX 30 MIN: CPT | Mod: GO

## 2023-11-30 PROCEDURE — 250N000013 HC RX MED GY IP 250 OP 250 PS 637: Performed by: INTERNAL MEDICINE

## 2023-11-30 PROCEDURE — 36415 COLL VENOUS BLD VENIPUNCTURE: CPT | Performed by: INTERNAL MEDICINE

## 2023-11-30 PROCEDURE — 99233 SBSQ HOSP IP/OBS HIGH 50: CPT | Mod: 25 | Performed by: INTERNAL MEDICINE

## 2023-11-30 PROCEDURE — 93308 TTE F-UP OR LMTD: CPT | Mod: 26 | Performed by: GENERAL ACUTE CARE HOSPITAL

## 2023-11-30 PROCEDURE — 85027 COMPLETE CBC AUTOMATED: CPT | Performed by: INTERNAL MEDICINE

## 2023-11-30 PROCEDURE — 250N000013 HC RX MED GY IP 250 OP 250 PS 637: Performed by: NURSE PRACTITIONER

## 2023-11-30 PROCEDURE — 93325 DOPPLER ECHO COLOR FLOW MAPG: CPT

## 2023-11-30 PROCEDURE — 97116 GAIT TRAINING THERAPY: CPT | Mod: GP

## 2023-11-30 PROCEDURE — 99232 SBSQ HOSP IP/OBS MODERATE 35: CPT | Performed by: INTERNAL MEDICINE

## 2023-11-30 RX ORDER — ACETAMINOPHEN 650 MG/1
650 SUPPOSITORY RECTAL EVERY 4 HOURS PRN
Status: DISCONTINUED | OUTPATIENT
Start: 2023-11-30 | End: 2023-12-02 | Stop reason: HOSPADM

## 2023-11-30 RX ORDER — ACETAMINOPHEN 325 MG/1
650 TABLET ORAL EVERY 4 HOURS PRN
Status: DISCONTINUED | OUTPATIENT
Start: 2023-11-30 | End: 2023-12-02 | Stop reason: HOSPADM

## 2023-11-30 RX ORDER — FUROSEMIDE 10 MG/ML
40 INJECTION INTRAMUSCULAR; INTRAVENOUS EVERY 12 HOURS
Status: COMPLETED | OUTPATIENT
Start: 2023-11-30 | End: 2023-11-30

## 2023-11-30 RX ORDER — METOPROLOL TARTRATE 25 MG/1
25 TABLET, FILM COATED ORAL 2 TIMES DAILY
Status: DISCONTINUED | OUTPATIENT
Start: 2023-11-30 | End: 2023-12-02 | Stop reason: HOSPADM

## 2023-11-30 RX ORDER — FUROSEMIDE 20 MG
40 TABLET ORAL DAILY
Status: DISCONTINUED | OUTPATIENT
Start: 2023-12-01 | End: 2023-12-02 | Stop reason: HOSPADM

## 2023-11-30 RX ADMIN — APIXABAN 5 MG: 5 TABLET, FILM COATED ORAL at 19:58

## 2023-11-30 RX ADMIN — LISINOPRIL 2.5 MG: 2.5 TABLET ORAL at 09:16

## 2023-11-30 RX ADMIN — AMIODARONE HYDROCHLORIDE 100 MG: 100 TABLET ORAL at 09:16

## 2023-11-30 RX ADMIN — FUROSEMIDE 40 MG: 10 INJECTION, SOLUTION INTRAMUSCULAR; INTRAVENOUS at 06:22

## 2023-11-30 RX ADMIN — METOPROLOL TARTRATE 12.5 MG: 25 TABLET, FILM COATED ORAL at 09:48

## 2023-11-30 RX ADMIN — ACETAMINOPHEN 650 MG: 325 TABLET ORAL at 18:35

## 2023-11-30 RX ADMIN — PERFLUTREN 3 ML: 6.52 INJECTION, SUSPENSION INTRAVENOUS at 10:24

## 2023-11-30 RX ADMIN — FUROSEMIDE 40 MG: 10 INJECTION, SOLUTION INTRAMUSCULAR; INTRAVENOUS at 17:23

## 2023-11-30 RX ADMIN — ZOLPIDEM TARTRATE 5 MG: 5 TABLET ORAL at 23:56

## 2023-11-30 RX ADMIN — METOPROLOL TARTRATE 25 MG: 25 TABLET, FILM COATED ORAL at 19:58

## 2023-11-30 RX ADMIN — LIDOCAINE 1 PATCH: 4 PATCH TOPICAL at 17:23

## 2023-11-30 RX ADMIN — APIXABAN 2.5 MG: 2.5 TABLET, FILM COATED ORAL at 09:16

## 2023-11-30 RX ADMIN — DULOXETINE HYDROCHLORIDE 60 MG: 60 CAPSULE, DELAYED RELEASE PELLETS ORAL at 09:16

## 2023-11-30 RX ADMIN — POTASSIUM CHLORIDE 20 MEQ: 1500 TABLET, EXTENDED RELEASE ORAL at 09:16

## 2023-11-30 ASSESSMENT — ACTIVITIES OF DAILY LIVING (ADL)
ADLS_ACUITY_SCORE: 25
ADLS_ACUITY_SCORE: 25
ADLS_ACUITY_SCORE: 35
ADLS_ACUITY_SCORE: 25
ADLS_ACUITY_SCORE: 35
ADLS_ACUITY_SCORE: 35
ADLS_ACUITY_SCORE: 23
ADLS_ACUITY_SCORE: 25
ADLS_ACUITY_SCORE: 23

## 2023-11-30 NOTE — PLAN OF CARE
Physical Therapy Discharge Summary    Reason for therapy discharge:    All goals and outcomes met, no further needs identified.    Progress towards therapy goal(s). See goals on Care Plan in [facility] electronic health record for goal details.  Goals met    Therapy recommendation(s):    Patient to continue with CR/OT per CHF diagnosis

## 2023-11-30 NOTE — PROGRESS NOTES
PRIMARY DIAGNOSIS: CONGESTIVE HEART FAILURE  OUTPATIENT/OBSERVATION GOALS TO BE MET BEFORE DISCHARGE:  Dyspnea improved and O2 sats >88% at RA or at prior home O2 therapy level:  on 1.5L NC        SpO2: 95 %, O2 Device: Nasal cannula  Vitals:    11/29/23 1045   Weight: 69.4 kg (153 lb)        ECHO and other diagnostic testing complete (if applicable):  Pending AM    Return to near baseline physical activity: Yes    Discharge Planner Nurse   Safe discharge environment identified: Yes  Barriers to discharge: Yes       Entered by: Denise Olvera RN 11/30/2023 4:11 AM     Please review provider order for any additional goals.   Nurse to notify provider when observation goals have been met and patient is ready for discharge.

## 2023-11-30 NOTE — PROGRESS NOTES
HEART CARE ENCOUNTER CONSULTATON NOTE      [facility]  [phone]      Assessment/Recommendations   Assessment:  1.  Acute on chronic heart failure with reduced ejection fraction: May be precipitated by atrial fibrillation.  Also noted dietary discretions recently with the holiday.  2.  Atrial fibrillation: Persistent atrial fibrillation for unclear duration.  Historically maintained on rhythm control strategy and has been on a low-dose of amiodarone 100 mg daily.  In the past had difficulty with sinus bradycardia and thus was taken off of metoprolol.  Currently A-fib is persistent and well rate controlled.  3.  Anticoagulation: Maintained on dose adjusted Eliquis  4.  Nonischemic cardiomyopathy LVEF 25-30% has been evaluated by EP as outpatient and has declined CRT due to age  5.  Left bundle branch block  6.  Chronic kidney disease        Plan:  1.  Limited echocardiogram pending  2.  Will need to discuss with EP regarding whether or not we want to continue with attempts for rhythm control  3.  Will likely be able to transition to p.o. Lasix 40 mg once daily tomorrow  Primary cardiologist Dr. Holley         History of Present Illness/Subjective    Telemetry demonstrates rate controlled atrial fibrillation  Breathing has overall improved.  Patient felt a little dizzy when she got out of bed this morning.    Echocardiogram 11/30/2023  1. Left ventricular chamber size is mildly enlarged. Wall thickness is normal.  Systolic function is severely reduced with global hypokinesis and  intraventricular dyssynchrony due to left bundle branch block. The visually  estimated left ventricular ejection fraction is 20-25%.  2. Right ventricular chamber size and systolic function are normal.  3. Moderate left atrial enlargement. Mild right atrial enlargement.  4. No hemodynamically significant valvular abnormalities.  5. Compared to the prior study dated 8/17/2023, the patient is now in  "atrial  fibrillation otherwise the findings are similar.  _______________________________________     Physical Examination  Review of Systems   Vitals: /89 (BP Location: Right arm)   Pulse 79   Temp 97.8  F (36.6  C) (Oral)   Resp 20   Ht 1.575 m (5' 2\")   Wt 69.4 kg (153 lb)   SpO2 96%   BMI 27.98 kg/m    BMI= Body mass index is 27.98 kg/m .  Wt Readings from Last 3 Encounters:   11/29/23 69.4 kg (153 lb)   10/31/23 68.9 kg (152 lb)   09/28/23 69.4 kg (153 lb)       General Appearance:   no distress, normal body habitus   ENT/Mouth: membranes moist, no oral lesions or bleeding gums.      EYES:  no scleral icterus, normal conjunctivae   Neck: no carotid bruits or thyromegaly   Chest/Lungs:   lungs are clear to auscultation   Cardiovascular:   irregular. Normal first and second heart sounds with no murmur trace edema bilaterally        Extremities: no cyanosis or clubbing   Skin: no xanthelasma, warm.    Neurologic: normal  bilateral, no tremors     Psychiatric: alert and oriented x3, calm        Please refer above for cardiac ROS details.        Medical History  Surgical History Family History Social History   Past Medical History:   Diagnosis Date     Angina pectoris (H24)      Chest pain 03/09/2017     Cough      Hyperlipidemia      Hypertension      Osteoarthritis      Past Surgical History:   Procedure Laterality Date     APPENDECTOMY       BASAL CELL CARCINOMA EXCISION      nose     LAPAROSCOPY DIAGNOSTIC (GENERAL) N/A 11/04/2014    Procedure: LAPAROSCOPY BILATERAL SALPINGO-OOPHORECTOMY ;  Surgeon: Sofia Harper MD;  Location: [facility];  Service:      OPEN REDUCTION INTERNAL FIXATION HIP BIPOLAR Right 3/5/2023    Procedure: HEMIARTHROPLASTY, HIP, BIPOLAR;  Surgeon: Jose G Martinez MD;  Location: [facility]     TONSILLECTOMY      10 years old     ZZC TOTAL KNEE ARTHROPLASTY Left     2011     Family History   Problem Relation Age of Onset     Heart Disease Mother      " Rheumatic Heart Disease Mother      No Known Problems Father      Cancer Brother         brain     Lung Cancer Brother      Lung Cancer Brother      Cancer Sister         lung     Lung Cancer Sister         Social History     Socioeconomic History     Marital status: Single     Spouse name: Not on file     Number of children: Not on file     Years of education: Not on file     Highest education level: Not on file   Occupational History     Not on file   Tobacco Use     Smoking status: Never     Passive exposure: Never     Smokeless tobacco: Never     Tobacco comments:     no passive exposure   Vaping Use     Vaping Use: Never used   Substance and Sexual Activity     Alcohol use: Yes     Comment: Alcoholic Drinks/day: Rarely a glass of wine     Drug use: No     Sexual activity: Not on file   Other Topics Concern     Not on file   Social History Narrative    The patient is a nun.     Social Determinants of Health     Financial Resource Strain: Low Risk  (10/5/2023)    Financial Resource Strain      Within the past 12 months, have you or your family members you live with been unable to get utilities (heat, electricity) when it was really needed?: No   Food Insecurity: Low Risk  (10/5/2023)    Food Insecurity      Within the past 12 months, did you worry that your food would run out before you got money to buy more?: No      Within the past 12 months, did the food you bought just not last and you didn t have money to get more?: No   Transportation Needs: Low Risk  (10/5/2023)    Transportation Needs      Within the past 12 months, has lack of transportation kept you from medical appointments, getting your medicines, non-medical meetings or appointments, work, or from getting things that you need?: No   Physical Activity: Insufficiently Active (3/27/2023)    Exercise Vital Sign      Days of Exercise per Week: 3 days      Minutes of Exercise per Session: 10 min   Stress: No Stress Concern Present (3/27/2023)    [place]  [employer] - Occupational Stress Questionnaire      Feeling of Stress : Not at all   Social Connections: Moderately Integrated (3/27/2023)    Social Connection and Isolation Panel [NHANES]      Frequency of Communication with Friends and Family: More than three times a week      Frequency of Social Gatherings with Friends and Family: More than three times a week      Attends Baptism Services: More than 4 times per year      Active Member of Clubs or Organizations: Yes      Attends Club or Organization Meetings: More than 4 times per year      Marital Status: Never    Interpersonal Safety: Low Risk  (10/5/2023)    Interpersonal Safety      Do you feel physically and emotionally safe where you currently live?: Yes      Within the past 12 months, have you been hit, slapped, kicked or otherwise physically hurt by someone?: No      Within the past 12 months, have you been humiliated or emotionally abused in other ways by your partner or ex-partner?: No   Housing Stability: Low Risk  (10/5/2023)    Housing Stability      Do you have housing? : Yes      Are you worried about losing your housing?: No           Medications  Allergies   No current outpatient medications on file.       Allergies   Allergen Reactions     Trazodone Shortness Of Breath and Unknown     Allergic to trazodone and deriv., Other: trouble swallowing       Clindamycin Diarrhea     C-diff     Gabapentin Other (See Comments)     \"Internal tremors\"     Temazepam Other (See Comments)     Annotation: Nightmares            Lab Results    Chemistry/lipid CBC Cardiac Enzymes/BNP/TSH/INR   No results for input(s): \"CHOL\", \"HDL\", \"LDL\", \"TRIG\", \"CHOLHDLRATIO\" in the last 70394 hours.  No results for input(s): \"LDL\" in the last 03897 hours.  Recent Labs   Lab Test 11/30/23  0810      POTASSIUM 3.5   CHLORIDE 102   CO2 29   *   BUN 24.1*   CR 1.09*   GFRESTIMATED 47*   MARLENE 9.9*     Recent Labs   Lab Test 11/30/23  0810 " 11/29/23  1106 10/31/23  1443   CR 1.09* 1.08* 1.16*     Recent Labs   Lab Test 06/15/21  0910   A1C 6.0*          Recent Labs   Lab Test 11/30/23  0810   WBC 8.6   HGB 13.7   HCT 44.1   MCV 94        Recent Labs   Lab Test 11/30/23  0810 11/29/23  1106 03/10/23  0945   HGB 13.7 12.2 11.3*    Recent Labs   Lab Test 08/23/22  1154 08/17/22  1215 11/21/19  0451   TROPONINI 0.16 0.04 0.01     Recent Labs   Lab Test 11/29/23  1106 02/08/23  1345 08/23/22  1154 08/17/22  1215 03/03/21  1222 02/21/19  0713   BNP  --   --  1,933*  --  177* 95   NTBNPI 13,109*  --   --   --   --   --    NTBNP  --  5,373*  --  5,101*  --   --      Recent Labs   Lab Test 04/05/23  1412   TSH 3.73     Recent Labs   Lab Test 03/04/23  1905 08/23/22  1154 06/05/19  0052   INR 1.33* 1.37* 1.50*        Kristina Velasquez MD

## 2023-11-30 NOTE — PLAN OF CARE
Pt on 1.5L, 96%.  Denies pain. Reports some SOB with exertion. Lung diminished but clear. Afib on tele. Gave IV lasix and lidocaine patch on right knee.

## 2023-11-30 NOTE — PROGRESS NOTES
[id number]: A&Ox4, VSS, on 1.5L NC overnight. Was able to wean to RA during day but desat to 88% while asleep. Tele- A.fib, 80-100s.Did not get OOB this shift. Turns well in bed. Purewick in place, incontinent. Good urine output. [place].

## 2023-11-30 NOTE — PLAN OF CARE
Goal Outcome Evaluation:      Plan of Care Reviewed With: patient    Overall Patient Progress: improvingOverall Patient Progress: improving    Outcome Evaluation: Pt arrived from ED in stable condition.  Pt continues on 1.5 L per NC.  Pt A/O.  Pt  is considering getting a pacemaker if needed at this moment.  RN answered pt's questions and provided verbal info on the procedure. Pt is AFib with controlled rate.

## 2023-11-30 NOTE — PROGRESS NOTES
PRIMARY DIAGNOSIS: CONGESTIVE HEART FAILURE  OUTPATIENT/OBSERVATION GOALS TO BE MET BEFORE DISCHARGE:  Dyspnea improved and O2 sats >88% at RA or at prior home O2 therapy level:  On 1.5L NC, RA O2>92%        SpO2: 95 %, O2 Device: Nasal cannula  Vitals:    11/29/23 1045   Weight: 69.4 kg (153 lb)        ECHO and other diagnostic testing complete (if applicable):  Pending AM ECHO    Return to near baseline physical activity: Yes    Discharge Planner Nurse   Safe discharge environment identified: Yes  Barriers to discharge: No       Entered by: Denise Olvera RN 11/29/2023 9:20 PM     Please review provider order for any additional goals.   Nurse to notify provider when observation goals have been met and patient is ready for discharge.

## 2023-11-30 NOTE — PROGRESS NOTES
11/30/23 1500   Appointment Info   Signing Clinician's Name / Credentials (OT) Tressa Lema, OTR/L   Living Environment   People in Home alone   Current Living Arrangements apartment   Living Environment Comments tub shower   Self-Care   Equipment Currently Used at Home raised toilet seat;walker, rolling  (4WW)   Activity/Exercise/Self-Care Comment ind for BADLs, reports difficulty with bathing d/t tub height   Instrumental Activities of Daily Living (IADL)   IADL Comments meal and groceries delivered, meds delivered, hired cleaning assistance   General Information   Onset of Illness/Injury or Date of Surgery 11/29/23   Referring Physician Toña Dorado DO   Patient/Family Therapy Goal Statement (OT) go home   Additional Occupational Profile Info/Pertinent History of Current Problem presents with lightheadedness, found to have acute on chronic heart failure with reduced EF. PMHx of HLD, HTN, OA, Afib, HFrEF, CKD 3   Existing Precautions/Restrictions fall;oxygen therapy device and L/min  (1L)   Cognitive Status Examination   Orientation Status orientation to person, place and time   Affect/Mental Status (Cognitive) WFL   Follows Commands WFL   Pain Assessment   Patient Currently in Pain No   Range of Motion Comprehensive   General Range of Motion bilateral upper extremity ROM WFL   Strength Comprehensive (MMT)   Comment, General Manual Muscle Testing (MMT) Assessment BUE WFL   Transfers   Transfers sit-stand transfer   Sit-Stand Transfer   Sit-Stand [place] (Transfers) independent   Balance   Balance Comments supervision with 4WW for mobility in room   Activities of Daily Living   BADL Assessment/Intervention lower body dressing   Lower Body Dressing Assessment/Training   Hardin Level (Lower Body Dressing) supervision;doff;don;socks   Clinical Impression   Criteria for Skilled Therapeutic Interventions Met (OT) Yes, treatment indicated   OT Diagnosis dec BADL indep   OT Problem  List-Impairments impacting ADL problems related to;activity tolerance impaired   Assessment of Occupational Performance 1-3 Performance Deficits   Identified Performance Deficits household mobility, community mobility, bathing   Planned Therapy Interventions (OT) ADL retraining;IADL retraining;home program guidelines;progressive activity/exercise   Clinical Decision Making Complexity (OT) problem focused assessment/low complexity   Risk & Benefits of therapy have been explained evaluation/treatment results reviewed;participants included;patient   OT Total Evaluation Time   OT Eval, Low Complexity Minutes (57594) 10   OT Goals   Therapy Frequency (OT) Daily   OT Predicted Duration/Target Date for Goal Attainment 12/07/23   OT Goals Cardiac Phase 1   OT: Understanding of cardiac education to maximize quality of life, condition management, and health outcomes Patient;Verbalize;Demonstrate   OT: Perform aerobic activity with stable cardiovascular response continuous;5 minutes;ambulation   OT: Functional/aerobic ambulation tolerance with stable cardiovascular response in order to return to home and community environment Modified independent;Greater than 300 feet;Rolling walker   Self-Care/Home Management   Self-Care/Home Mgmt/ADL, Compensatory, Meal Prep Minutes (68124) 10   Symptoms Noted During/After Treatment (Meal Preparation/Planning Training) none   Treatment Detail/Skilled Intervention See cardiac educ.   Therapeutic Procedures/Exercise   Therapeutic Procedure: strength, endurance, ROM, flexibillity minutes (77334) 15   Symptoms Noted During/After Treatment fatigue;shortness of breath   Treatment Detail/Skilled Intervention See amb   Ambulation   Workload 100ft   Symptoms Diaphoresis;Dyspnea;Palpitations   Cardiovascular Response Tachycardia   Exercise Details SBA andre walk with 4WW   Vital Signs Details HR 85 before, 110 after; requires wheelchair ride back to room; O2 and BP WNL   Cardiac Education   Education  Provided Daily weights;Diagnosis;Diet;Resuming home activities;Signs and symptoms;Stop light tool   Education Packet Given to Patient Yes   All Patient Education Handouts Reviewed with Patient and/or Family Yes   OT Discharge Planning   OT Plan wants cals and walking program, progress andre walk, review CHF educ   OT Discharge Recommendation (DC Rec) home with home care occupational therapy   OT Rationale for DC Rec pt appears near baseline for BADLs but limited functionally by endurance. recommend home OT to facilitate a return to PLOF for IADLs.   OT Brief overview of current status SBA andre walk, limited tolerance   Total Session Time   Timed Code Treatment Minutes 25   Total Session Time (sum of timed and untimed services) 35

## 2023-11-30 NOTE — PROGRESS NOTES
I gave report to Karol, P3 RN.     I also let her know that I put the pt on 2 lpm NC as the pt became sweaty and SOB upon arrival to room 305.

## 2023-11-30 NOTE — PROGRESS NOTES
[facility]    PROGRESS NOTE - Hospitalist Service    ASSESSMENT AND PLAN     Principal Problem:    Atrial fibrillation, unspecified type (H)  Active Problems:    Acute congestive heart failure, unspecified heart failure type (H)    Gladys Ramirez is a 89 year old female who presented 11/29/23 with a PMHx of HLD, HTN, OA, Afib, HFrEF, CKD 3 who presented with lightheadedness.     Acute on chronic heart failure with reduced EF  Limited ECHO with EF of 20-25%. Similar to previous. Full report below   Diuresing with lasix 40 mg IVP BID   Tele   Cardio following   Likely contributing: dietary indiscretions     Persistent atrial fibrillation- currently rate controlled  History of rhythm control strategy   Continue amiodarone 100 mg daily  H/O sinus bradycardia- no longer on metoprolol.    Continue Eliquis    Nonischemic cardiomyopathy   LVEF 20-25%  Has been evaluated by EP as outpatient and has declined CRT due to age    Left bundle branch block- stable     CKD stage 3- no signs of HARRIS       ECHO 11/30/2023  1. Left ventricular chamber size is mildly enlarged. Wall thickness is normal.  Systolic function is severely reduced with global hypokinesis and  intraventricular dyssynchrony due to left bundle branch block. The visually  estimated left ventricular ejection fraction is 20-25%.  2. Right ventricular chamber size and systolic function are normal.  3. Moderate left atrial enlargement. Mild right atrial enlargement.  4. No hemodynamically significant valvular abnormalities.  5. Compared to the prior study dated 8/17/2023, the patient is now in atrial  fibrillation otherwise the findings are similar.    Barriers to discharge: Cardio workup, clinical improvement, PT    Anticipated length of stay: 1-2 days       Present on Admission:   Atrial fibrillation, unspecified type (H)   Acute congestive heart failure, unspecified heart failure type (H)      Subjective:  Patient resting comfortably in  bed.  Notes that her symptoms of shortness of breath and lightheadedness come on suddenly and are not associated with activity.  No other complaints.    PHYSICAL EXAM  Temp:  [97.5  F (36.4  C)-98.2  F (36.8  C)] 97.8  F (36.6  C)  Pulse:  [] 79  Resp:  [20-29] 20  BP: (105-192)/() 138/89  SpO2:  [88 %-96 %] 96 %  Wt Readings from Last 1 Encounters:   11/29/23 69.4 kg (153 lb)       Intake/Output Summary (Last 24 hours) at 11/30/2023 1344  Last data filed at 11/30/2023 0403  Gross per 24 hour   Intake 10 ml   Output 1650 ml   Net -1640 ml      Body mass index is 27.98 kg/m .    GENRL: Alert and answering questions appropriately. Not in acute distress. Lying in bed   CHEST: Clear to auscultation bilaterally. Breathing easily   HEART: Tachycardic and irregular   EXTRM: No pedal edema  NEURO: Following commands and moving extremities No involuntary movements. Normal mentation  PSYCH: Normal affect and mood.   INTGM: No skin rash, no cyanosis or clubbing      Medical Decision Making       35 MINUTES SPENT BY ME on the date of service doing chart review, history, exam, documentation & further activities per the note.      PERTINENT LABS/IMAGING:  Results for orders placed or performed during the hospital encounter of 11/29/23   XR Chest 2 Views    Impression    IMPRESSION: Interstitial pulmonary edema with tiny bilateral pleural effusions. No pneumothorax. Unchanged enlarged cardiac silhouette.   Echocardiogram Limited   Result Value Ref Range    LVEF  20-25%      Most Recent 3 CBC's:  Recent Labs   Lab Test 11/30/23  0810 11/29/23  1106 03/10/23  0945   WBC 8.6 7.6 8.0   HGB 13.7 12.2 11.3*   MCV 94 94 95    330 337     Most Recent 3 BMP's:  Recent Labs   Lab Test 11/30/23  0810 11/29/23  1106 10/31/23  1443    142 142   POTASSIUM 3.5 4.2 4.4   CHLORIDE 102 106 105   CO2 29 23 28   BUN 24.1* 27.5* 24.9*   CR 1.09* 1.08* 1.16*   ANIONGAP 14 13 9   MARLENE 9.9* 9.7* 9.7*   * 163* 80     Most  "Recent 2 LFT's:  Recent Labs   Lab Test 04/05/23  1412 03/04/23  1905 08/17/22  1215 12/02/19  0942   AST  --   --  19 21   ALT 13 16 17 30   ALKPHOS  --  151* 110 150*   BILITOTAL  --  0.4 0.5 0.5       No results for input(s): \"CHOL\", \"HDL\", \"LDL\", \"TRIG\", \"CHOLHDLRATIO\" in the last 61480 hours.  No results for input(s): \"LDL\" in the last 47269 hours.  Recent Labs   Lab Test 11/30/23  0810      POTASSIUM 3.5   CHLORIDE 102   CO2 29   *   BUN 24.1*   CR 1.09*   GFRESTIMATED 47*   MARLENE 9.9*     Recent Labs   Lab Test 06/15/21  0910   A1C 6.0*     Recent Labs   Lab Test 11/30/23  0810 11/29/23  1106 03/10/23  0945   HGB 13.7 12.2 11.3*     Recent Labs   Lab Test 08/23/22  1154 08/17/22  1215 11/21/19  0451   TROPONINI 0.16 0.04 0.01     Recent Labs   Lab Test 11/29/23  1106 02/08/23  1345 08/23/22  1154 08/17/22  1215 03/03/21  1222 02/21/19  0713   BNP  --   --  1,933*  --  177* 95   NTBNPI 13,109*  --   --   --   --   --    NTBNP  --  5,373*  --  5,101*  --   --      Recent Labs   Lab Test 04/05/23  1412   TSH 3.73     Recent Labs   Lab Test 03/04/23  1905 08/23/22  1154 06/05/19  0052   INR 1.33* 1.37* 1.50*       Toña Dorado,   Hospitalist Service  [facility]  Text page via [facility] Paging/Directory      "

## 2023-11-30 NOTE — CONSULTS
[facility]  Cardiac Electrophysiology  [address]  [place] [address]   Office: [phone]  Fax: [phone]     Cardiac Electrophysiology Consultation    Patient: Gladys Ramirez   : 1930     Referring Provider: Dr. Velasquez    CHIEF COMPLAINT/REASON FOR CONSULTATION  Atrial fibrillation, presyncope    Assessment/Recommendations   Atrial fibrillation: History of paroxysmal atrial fibrillation since 2019, may now be persistent.  Symptoms of dyspnea and presyncope are consistent with her previous symptoms of A-fib, though she also has an element of acute on chronic heart failure.  Ideally recommend restoration of sinus rhythm; unfortunately she is on a subtherapeutic dose of Eliquis.  Options include MARTHA guided cardioversion versus waiting 3 weeks until she is appropriately anticoagulated.  We also discussed remaining in A-fib under rate control.  My concern with rate control is that her symptoms are consistent with previous episodes of AF.  She would like to consider her options before making a decision.  -- Continue amiodarone 100 mg daily  -- Increase metoprolol to tartrate to 25 mg twice daily    [id number] score for 7 for age >75, female gender, HFpEF, HTN, PE.  She was reassured that atrial fibrillation is not life-threatening, but carries an increased risk for stroke.  She is at increased risk for bleed due to multiple falls, unfortunately she is not a candidate for left atrial appendage closure with Watchman device due to history of PE.  Continue long-term oral anticoagulation, however increase Eliquis to a therapeutic dose.  She does not meet criteria for dose adjustment; age is >80, but weight is >60 kg and creatinine is <1.5.  -- Increase Eliquis to 5 mg every 12 hours for stroke prophylaxis    Sinus bradycardia: There are notes that her medical record due to concerns for sinus bradycardia.  However, in review of EKG and various monitors, I see no  "evidence of significant bradycardia or pauses.    -- Monitor for bradycardia    Acute on chronic heart failure with reduced ejection fraction: LVEF 20 to 25%, no significant change despite recurrence of atrial fibrillation.  Acute heart failure possibly related to A-fib, but also dietary sodium indiscretion over the holiday.  We also discussed CRT pacing as it relates to heart failure.  -- Diuresis per cardiology    Hypertension: Moderately controlled, though intermittently elevated.  No evidence of hypotension.  Increase in metoprolol as above will help with blood pressure management.  Continue lisinopril.         History of Present Illness   Gladys Ramirez is a 89 year old female admitted for episodes of presyncope, dyspnea.  Noted to be in atrial fibrillation with controlled ventricular response.      She has a history of paroxysmal atrial fibrillation, nonischemic cardiomyopathy (LVEF 20 to 25%), left bundle branch block, heart failure with reduced ejection fraction, hypertension, hyperlipidemia, stage III chronic kidney disease, history of PE/DVT.  Declined CRT device implant.  She has also had 2 mechanical falls in the last several in the past year resulting in right hip fracture with subsequent replacement.  She lives alone independently in an apartment in senior housing.    For the past week, she has had multiple daily spells of shortness of breath and presyncope, which she refers to as \"spells\".  She notes episodes occur with standing or movement and she feels better when lying still.  She has no evidence of orthostatic hypotension.  She denies chest discomfort, palpitations, abdominal fullness/bloating or peripheral edema, shortness of breath at rest, paroxysmal nocturnal dyspnea, orthopnea, or syncope.    Arrhythmia history  Dx/date: Paroxysmal atrial fibrillation diagnosed in September 2019.  Hospitalized in August 2022 for dyspnea and presyncope due to A-fib, AAD switched to amiodarone.  Sx: Dyspnea " "on exertion and presyncope  [id number] score: 7 for age >75, female gender, HFpEF, HTN, PE  Oral anticoagulation: Eliquis 2.5 mg twice daily--does not qualify for dose reduction, therapeutic dose is 5 mg twice daily.  Not a candidate for left atrial appendage closure with the Watchman device due to history of PE/DVT.  Antiarrhythmic medications, AV timbo blocking agents: Failed antiarrhythmic therapy with flecainide.  On amiodarone 100 mg daily (on amiodarone since August 2022).  Procedures  DCCV: N/A  Ablation: N/A       Physical Examination  Review of Systems   VITALS: /89 (BP Location: Right arm)   Pulse 79   Temp 97.8  F (36.6  C) (Oral)   Resp 20   Ht 1.575 m (5' 2\")   Wt 69.4 kg (153 lb)   SpO2 96%   BMI 27.98 kg/m    Wt Readings from Last 3 Encounters:   11/29/23 69.4 kg (153 lb)   10/31/23 68.9 kg (152 lb)   09/28/23 69.4 kg (153 lb)       Intake/Output Summary (Last 24 hours) at 11/30/2023 1246  Last data filed at 11/30/2023 0403  Gross per 24 hour   Intake 10 ml   Output 1650 ml   Net -1640 ml     General Appearance:   Alert, well-appearing and in no acute distress.   HEENT: Atraumatic, normocephalic.  No scleral icterus, normal conjunctivae, EOMs intact, PERRL.  Mucous membranes pink and moist.     Chest/Lungs:   Chest symmetric, spine straight.  Respirations unlabored.  Lungs are clear to auscultation.   Cardiovascular:   Irregularly irregular rate and rhythm.  Normal first and second heart sounds with no murmurs, rubs, or gallops; radial and posterior tibial pulses are intact, no edema.   Abdomen:  Soft, nontender, nondistended, bowel sounds present.   Extremities: No cyanosis or clubbing.   Musculoskeletal: Moves all extremities.     Skin: Warm, dry, intact.    Neurologic: Mood and affect are appropriate.  Alert and oriented to person, place, time, and situation.       ROS: 10 point ROS neg other than the symptoms noted above in the HPI.       Medical History  Surgical History   Past " Medical History:   Diagnosis Date     Angina pectoris (H24)      Chest pain 03/09/2017     Cough      Hyperlipidemia      Hypertension      Osteoarthritis     Past Surgical History:   Procedure Laterality Date     APPENDECTOMY       BASAL CELL CARCINOMA EXCISION      nose     LAPAROSCOPY DIAGNOSTIC (GENERAL) N/A 11/04/2014    Procedure: LAPAROSCOPY BILATERAL SALPINGO-OOPHORECTOMY ;  Surgeon: Sofia Harper MD;  Location: [facility] [place];  Service:      OPEN REDUCTION INTERNAL FIXATION HIP BIPOLAR Right 3/5/2023    Procedure: HEMIARTHROPLASTY, HIP, BIPOLAR;  Surgeon: Jose G Martinez MD;  Location: [facility] [place]     TONSILLECTOMY      10 years old     ZZC TOTAL KNEE ARTHROPLASTY Left     2011         Family History Social History   Family History   Problem Relation Age of Onset     Heart Disease Mother      Rheumatic Heart Disease Mother      No Known Problems Father      Cancer Brother         brain     Lung Cancer Brother      Lung Cancer Brother      Cancer Sister         lung     Lung Cancer Sister         Social History     Tobacco Use     Smoking status: Never     Passive exposure: Never     Smokeless tobacco: Never     Tobacco comments:     no passive exposure   Vaping Use     Vaping Use: Never used   Substance Use Topics     Alcohol use: Yes     Comment: Alcoholic Drinks/day: Rarely a glass of wine     Drug use: No         Medications  Allergies     Current Facility-Administered Medications:      amiodarone (PACERONE) tablet 100 mg, 100 mg, Oral, Daily, Johann Sheppard MD, 100 mg at 11/30/23 0916     apixaban ANTICOAGULANT (ELIQUIS) tablet 2.5 mg, 2.5 mg, Oral, BID, Johann Sheppard MD, 2.5 mg at 11/30/23 0916     calcium carbonate (TUMS) chewable tablet 1,000 mg, 1,000 mg, Oral, 4x Daily PRN, Johann Sheppard MD     DULoxetine (CYMBALTA) DR capsule 60 mg, 60 mg, Oral, Daily, Johann Sheppard MD, 60 mg at 11/30/23 0916     furosemide (LASIX) injection 40 mg, 40 mg, Intravenous, Q12H,  "Johann Sheppard MD, 40 mg at 11/30/23 0622     Lidocaine (LIDOCARE) 4 % Patch 1 patch, 1 patch, Transdermal, Q24H, Johann Sheppard MD, 1 patch at 11/29/23 1802     lidocaine (LMX4) cream, , Topical, Q1H PRN, Johann Sheppard MD     lidocaine 1 % 0.1-1 mL, 0.1-1 mL, Other, Q1H PRN, Johann Sheppard MD     lisinopril (ZESTRIL) tablet 2.5 mg, 2.5 mg, Oral, Daily, Johann Sheppard MD, 2.5 mg at 11/30/23 0916     metoprolol tartrate (LOPRESSOR) half-tab 12.5 mg, 12.5 mg, Oral, BID, Kristina Velasquez MD, 12.5 mg at 11/30/23 0948     potassium chloride ER (KLOR-CON M) CR tablet 20 mEq, 20 mEq, Oral, Daily, Johann Sheppard MD, 20 mEq at 11/30/23 0916     senna-docusate (SENOKOT-S/PERICOLACE) 8.6-50 MG per tablet 1 tablet, 1 tablet, Oral, BID PRN **OR** senna-docusate (SENOKOT-S/PERICOLACE) 8.6-50 MG per tablet 2 tablet, 2 tablet, Oral, BID PRN, Johann Sheppard MD     sodium chloride (PF) 0.9% PF flush 3 mL, 3 mL, Intracatheter, Q8H, Johann Sheppard MD, 3 mL at 11/30/23 0623     sodium chloride (PF) 0.9% PF flush 3 mL, 3 mL, Intracatheter, q1 min prn, Johann Sheppard MD, 3 mL at 11/29/23 2038     zolpidem (AMBIEN) tablet 5 mg, 5 mg, Oral, At Bedtime PRN, Johann Sheppard MD, 5 mg at 11/29/23 2146     Allergies   Allergen Reactions     Trazodone Shortness Of Breath and Unknown     Allergic to trazodone and deriv., Other: trouble swallowing       Clindamycin Diarrhea     C-diff     Gabapentin Other (See Comments)     \"Internal tremors\"     Temazepam Other (See Comments)     Annotation: Nightmares            Lab Results    Chemistry CBC Cardiac Enzymes/BNP/TSH/INR   Recent Labs   Lab Test 11/30/23  0810      POTASSIUM 3.5   CHLORIDE 102   CO2 29   *   BUN 24.1*   CR 1.09*   GFRESTIMATED 47*   MARLENE 9.9*     Recent Labs   Lab Test 11/30/23  0810 11/29/23  1106 10/31/23  1443   CR 1.09* 1.08* 1.16*          Recent Labs   Lab Test 11/30/23  0810   WBC 8.6   HGB 13.7   HCT 44.1   MCV 94    "     Recent Labs   Lab Test 11/30/23  0810 11/29/23  1106 03/10/23  0945   HGB 13.7 12.2 11.3*    Recent Labs   Lab Test 08/23/22  1154 08/17/22  1215 11/21/19  0451   TROPONINI 0.16 0.04 0.01     Recent Labs   Lab Test 11/29/23  1106 02/08/23  1345 08/23/22  1154 08/17/22  1215 03/03/21  1222 02/21/19  0713   BNP  --   --  1,933*  --  177* 95   NTBNPI 13,109*  --   --   --   --   --    NTBNP  --  5,373*  --  5,101*  --   --      Recent Labs   Lab Test 04/05/23  1412   TSH 3.73     Recent Labs   Lab Test 03/04/23  1905 08/23/22  1154 06/05/19  0052   INR 1.33* 1.37* 1.50*         Data Review    ECGs (all tracings independently reviewed)  11/29/2023: Atrial fibrillation with mildly elevated ventricular response at 98 bpm, left bundle branch block  3/4/2023: Sinus rhythm at 72 bpm, left bundle branch block,  ms, QT/QTc 476/521 ms    Telemetry shows atrial fibrillation with controlled ventricular response in the 80s to 100s    24-hour Holter monitor worn 8/19/2022 (personally reviewed) shows sinus rhythm with IVCD, average rate of 89 bpm with a range of 72 to 124 bpm.  No significant bradycardia or pauses.  Brief episodes of ectopic atrial tachycardia.  Paroxysmal atrial fibrillation with a burden of 7%.  No significant ventricular ectopy.    Echo done 11/30/2023:  1. Left ventricular chamber size is mildly enlarged. Wall thickness is normal.  Systolic function is severely reduced with global hypokinesis and  intraventricular dyssynchrony due to left bundle branch block. The visually  estimated left ventricular ejection fraction is 20-25%.  2. Right ventricular chamber size and systolic function are normal.  3. Moderate left atrial enlargement. Mild right atrial enlargement.  4. No hemodynamically significant valvular abnormalities.  5. Compared to the prior study dated 8/17/2023, the patient is now in atrial  fibrillation otherwise the findings are similar.

## 2023-12-01 ENCOUNTER — APPOINTMENT (OUTPATIENT)
Dept: OCCUPATIONAL THERAPY | Facility: HOSPITAL | Age: 88
DRG: 291 | End: 2023-12-01
Payer: COMMERCIAL

## 2023-12-01 ENCOUNTER — TELEPHONE (OUTPATIENT)
Dept: CARDIOLOGY | Facility: CLINIC | Age: 88
End: 2023-12-01

## 2023-12-01 DIAGNOSIS — I48.19 PERSISTENT ATRIAL FIBRILLATION (H): Primary | ICD-10-CM

## 2023-12-01 PROCEDURE — 97110 THERAPEUTIC EXERCISES: CPT | Mod: GO

## 2023-12-01 PROCEDURE — 210N000001 HC R&B IMCU HEART CARE

## 2023-12-01 PROCEDURE — 250N000013 HC RX MED GY IP 250 OP 250 PS 637: Performed by: INTERNAL MEDICINE

## 2023-12-01 PROCEDURE — 250N000013 HC RX MED GY IP 250 OP 250 PS 637: Performed by: NURSE PRACTITIONER

## 2023-12-01 PROCEDURE — 99232 SBSQ HOSP IP/OBS MODERATE 35: CPT | Performed by: INTERNAL MEDICINE

## 2023-12-01 PROCEDURE — 97535 SELF CARE MNGMENT TRAINING: CPT | Mod: GO

## 2023-12-01 RX ADMIN — DULOXETINE HYDROCHLORIDE 60 MG: 60 CAPSULE, DELAYED RELEASE PELLETS ORAL at 09:09

## 2023-12-01 RX ADMIN — METOPROLOL TARTRATE 25 MG: 25 TABLET, FILM COATED ORAL at 09:09

## 2023-12-01 RX ADMIN — ACETAMINOPHEN 650 MG: 325 TABLET ORAL at 20:52

## 2023-12-01 RX ADMIN — FUROSEMIDE 40 MG: 20 TABLET ORAL at 09:09

## 2023-12-01 RX ADMIN — APIXABAN 5 MG: 5 TABLET, FILM COATED ORAL at 09:09

## 2023-12-01 RX ADMIN — POTASSIUM CHLORIDE 20 MEQ: 1500 TABLET, EXTENDED RELEASE ORAL at 09:09

## 2023-12-01 RX ADMIN — LISINOPRIL 2.5 MG: 2.5 TABLET ORAL at 09:09

## 2023-12-01 RX ADMIN — APIXABAN 5 MG: 5 TABLET, FILM COATED ORAL at 20:52

## 2023-12-01 RX ADMIN — ZOLPIDEM TARTRATE 5 MG: 5 TABLET ORAL at 23:07

## 2023-12-01 RX ADMIN — AMIODARONE HYDROCHLORIDE 100 MG: 100 TABLET ORAL at 09:09

## 2023-12-01 RX ADMIN — METOPROLOL TARTRATE 25 MG: 25 TABLET, FILM COATED ORAL at 20:52

## 2023-12-01 ASSESSMENT — ACTIVITIES OF DAILY LIVING (ADL)
ADLS_ACUITY_SCORE: 29
ADLS_ACUITY_SCORE: 25
ADLS_ACUITY_SCORE: 29
ADLS_ACUITY_SCORE: 25
ADLS_ACUITY_SCORE: 29

## 2023-12-01 NOTE — TELEPHONE ENCOUNTER
----- Message from Kristina Velasquez MD sent at 12/1/2023 11:07 AM CST -----  Need AF or appt with Dr Holley in 3-4 weeks to reevaluate and decide if going ahead with DCCV for AF

## 2023-12-01 NOTE — PROGRESS NOTES
[facility]    PROGRESS NOTE - Hospitalist Service    ASSESSMENT AND PLAN     Principal Problem:    Atrial fibrillation, unspecified type (H)  Active Problems:    Acute congestive heart failure, unspecified heart failure type (H)    Gladys Ramirez is a 89 year old female who presented 11/29/23 with a PMHx of HLD, HTN, OA, Afib, HFrEF, CKD 3 who presented with lightheadedness.      Acute on chronic heart failure with reduced EF  Limited ECHO with EF of 20-25%. Similar to previous.   Diuresing with lasix 40 mg IVP BID- transitioned to oral lasix 40 mg qDay 12/1  Cardio following- possible cardioversion in 3 weeks as outpatient. Follow up with Dr. Holley   Likely contributing: dietary indiscretions. Discussed slat restricted diet with patient   Continue lasix 40 mg qDay at discharge      Persistent atrial fibrillation- currently rate controlled  History of rhythm control strategy   Continue amiodarone 100 mg daily  Metoprolol 25 mg BID added per EP  Increased Eliquis to full dose for afib per EP     Nonischemic cardiomyopathy   LVEF 20-25%  Has been evaluated by EP as outpatient and has declined CRT due to age     Left bundle branch block- stable      CKD stage 3- no signs of HARRIS     Barriers to discharge: Planned discharge tomorrow with home care     Anticipated length of stay: 1 more day       Present on Admission:   Atrial fibrillation, unspecified type (H)   Acute congestive heart failure, unspecified heart failure type (H)      Subjective:  Patient walking hallways without O2. Feeling well but not quite ready for discharge. Patient anxious about feeling like herself prior to discharge.     PHYSICAL EXAM  Temp:  [97.8  F (36.6  C)-98.1  F (36.7  C)] 97.8  F (36.6  C)  Pulse:  [75-85] 75  Resp:  [18-20] 18  BP: (110-142)/(69-96) 112/71  SpO2:  [95 %-98 %] 97 %  Wt Readings from Last 1 Encounters:   12/01/23 67.5 kg (148 lb 12.8 oz)       Intake/Output Summary (Last 24 hours) at 12/1/2023  "1219  Last data filed at 12/1/2023 1039  Gross per 24 hour   Intake 1080 ml   Output 1250 ml   Net -170 ml      Body mass index is 27.22 kg/m .    GENRL: Alert and answering questions appropriately. Not in acute distress. CHEST: Clear to auscultation bilaterally. Breathing easily   HEART: rate controlled and irregular   EXTRM: No pedal edema  NEURO: Following commands and moving extremities No involuntary movements. Normal mentation  PSYCH: Normal affect and mood.   INTGM: No skin rash, no cyanosis or clubbing    Medical Decision Making       35 MINUTES SPENT BY ME on the date of service doing chart review, history, exam, documentation & further activities per the note.      PERTINENT LABS/IMAGING:  Results for orders placed or performed during the hospital encounter of 11/29/23   XR Chest 2 Views    Impression    IMPRESSION: Interstitial pulmonary edema with tiny bilateral pleural effusions. No pneumothorax. Unchanged enlarged cardiac silhouette.   Echocardiogram Limited   Result Value Ref Range    LVEF  20-25%      Most Recent 3 CBC's:  Recent Labs   Lab Test 11/30/23  0810 11/29/23  1106 03/10/23  0945   WBC 8.6 7.6 8.0   HGB 13.7 12.2 11.3*   MCV 94 94 95    330 337     Most Recent 3 BMP's:  Recent Labs   Lab Test 11/30/23  0810 11/29/23  1106 10/31/23  1443    142 142   POTASSIUM 3.5 4.2 4.4   CHLORIDE 102 106 105   CO2 29 23 28   BUN 24.1* 27.5* 24.9*   CR 1.09* 1.08* 1.16*   ANIONGAP 14 13 9   MARLENE 9.9* 9.7* 9.7*   * 163* 80     Most Recent 2 LFT's:  Recent Labs   Lab Test 04/05/23  1412 03/04/23  1905 08/17/22  1215 12/02/19  0942   AST  --   --  19 21   ALT 13 16 17 30   ALKPHOS  --  151* 110 150*   BILITOTAL  --  0.4 0.5 0.5       No results for input(s): \"CHOL\", \"HDL\", \"LDL\", \"TRIG\", \"CHOLHDLRATIO\" in the last 14529 hours.  No results for input(s): \"LDL\" in the last 66332 hours.  Recent Labs   Lab Test 11/30/23  0810      POTASSIUM 3.5   CHLORIDE 102   CO2 29   *   BUN " 24.1*   CR 1.09*   GFRESTIMATED 47*   MARLENE 9.9*     Recent Labs   Lab Test 06/15/21  0910   A1C 6.0*     Recent Labs   Lab Test 11/30/23  0810 11/29/23  1106 03/10/23  0945   HGB 13.7 12.2 11.3*     Recent Labs   Lab Test 08/23/22  1154 08/17/22  1215 11/21/19  0451   TROPONINI 0.16 0.04 0.01     Recent Labs   Lab Test 11/29/23  1106 02/08/23  1345 08/23/22  1154 08/17/22  1215 03/03/21  1222 02/21/19  0713   BNP  --   --  1,933*  --  177* 95   NTBNPI 13,109*  --   --   --   --   --    NTBNP  --  5,373*  --  5,101*  --   --      Recent Labs   Lab Test 04/05/23  1412   TSH 3.73     Recent Labs   Lab Test 03/04/23  1905 08/23/22  1154 06/05/19  0052   INR 1.33* 1.37* 1.50*       [place],   Hospitalist Service  [facility]  Text page via [facility] Paging/Directory

## 2023-12-01 NOTE — PLAN OF CARE
Goal Outcome Evaluation:      Plan of Care Reviewed With: patient    Overall Patient Progress: improvingOverall Patient Progress: improving    Outcome Evaluation: Pt reports no pain this shift. VSS on 1.5 L O2 nasal cannula. Ambien given at bedtime and grouped cares to allow for sleep. purewick in place overnight.

## 2023-12-01 NOTE — PROGRESS NOTES
"Care Management Follow Up    Length of Stay (days): 2    Expected Discharge Date: 12/02/2023     Concerns to be Addressed:     Care progression  Patient plan of care discussed at interdisciplinary rounds: Yes    Anticipated Discharge Disposition:  Home with home care SN/PT/OT with [facility]     Anticipated Discharge Services:  Home with home care SN/PT/OT with [facility]  Anticipated Discharge DME: NA     Patient/family educated on Medicare website which has current facility and service quality ratings:  NA  Education Provided on the Discharge Plan:  Yes per team  Patient/Family in Agreement with the Plan:  Yes    Referrals Placed by CM/SW:  Yes  Private pay costs discussed: Not applicable    Additional Information:  Met with patient to discuss PT/OT discharge rec for home care services.   Patient is in agreement for SN/PT/OT  Referral sent    Social Hx: \"Patient lives in [facility] apartment. Sees [facility]. Accepted to [facility]. Transport TBD.\"     RNCM to follow for medical progression, recommendations, and final discharge plan.     Radha Underwood RN     "

## 2023-12-01 NOTE — PROGRESS NOTES
"  HEART CARE ENCOUNTER CONSULTATON NOTE      [facility]  [phone]      Assessment/Recommendations   Assessment:  1.  Acute on chronic heart failure with reduced ejection fraction: May be precipitated by atrial fibrillation.  Also noted dietary discretions recently with the holiday.  2.  Atrial fibrillation: Persistent atrial fibrillation for unclear duration.  Historically maintained on rhythm control strategy and has been on a low-dose of amiodarone 100 mg daily.  In the past had difficulty with sinus bradycardia and thus was taken off of metoprolol.  Currently A-fib is persistent and well rate controlled.  3.  Anticoagulation: Maintained on dose adjusted Eliquis  4.  Nonischemic cardiomyopathy LVEF 25-30% has been evaluated by EP as outpatient and has declined CRT due to age  5.  Left bundle branch block  6.  Chronic kidney disease        Plan:  1.  May continue on low-dose amiodarone and metoprolol  2.  Continue Eliquis 5 mg twice daily  3.  Continue on Lasix 40 mg once daily  4.  May reevaluate as outpatient and consider cardioversion after 3 weeks of adequate anticoagulation  Possible discharge tomorrow.  No further cardiac recommendations  Primary cardiologist Dr. Holley         History of Present Illness/Subjective    She is feeling much better today with improvement in breathing.  No complaints of chest pain       Physical Examination  Review of Systems   Vitals: /71 (BP Location: Right arm, Patient Position: Chair, Cuff Size: Adult Regular)   Pulse 75   Temp 97.8  F (36.6  C) (Oral)   Resp 18   Ht 1.575 m (5' 2\")   Wt 67.5 kg (148 lb 12.8 oz)   SpO2 97%   BMI 27.22 kg/m    BMI= Body mass index is 27.22 kg/m .  Wt Readings from Last 3 Encounters:   12/01/23 67.5 kg (148 lb 12.8 oz)   10/31/23 68.9 kg (152 lb)   09/28/23 69.4 kg (153 lb)       General Appearance:   no distress, normal body habitus   ENT/Mouth: membranes moist, no oral lesions or bleeding gums.      EYES:  " no scleral icterus, normal conjunctivae   Neck: no carotid bruits or thyromegaly   Chest/Lungs:   lungs are clear to auscultation   Cardiovascular:   irregular. Normal first and second heart sounds with no murmur  no edema bilaterally    Abdomen:  bowel sounds are present   Extremities: no cyanosis or clubbing   Skin: no xanthelasma, warm.    Neurologic: normal  bilateral, no tremors     Psychiatric: alert and oriented x3, calm        Please refer above for cardiac ROS details.        Medical History  Surgical History Family History Social History   Past Medical History:   Diagnosis Date     Angina pectoris (H24)      Chest pain 03/09/2017     Cough      Hyperlipidemia      Hypertension      Osteoarthritis      Past Surgical History:   Procedure Laterality Date     APPENDECTOMY       BASAL CELL CARCINOMA EXCISION      nose     LAPAROSCOPY DIAGNOSTIC (GENERAL) N/A 11/04/2014    Procedure: LAPAROSCOPY BILATERAL SALPINGO-OOPHORECTOMY ;  Surgeon: Sofia Harper MD;  Location: [facility] [place];  Service:      OPEN REDUCTION INTERNAL FIXATION HIP BIPOLAR Right 3/5/2023    Procedure: HEMIARTHROPLASTY, HIP, BIPOLAR;  Surgeon: Jose G Martinez MD;  Location: [facility]     TONSILLECTOMY      10 years old     ZZC TOTAL KNEE ARTHROPLASTY Left     2011     Family History   Problem Relation Age of Onset     Heart Disease Mother      Rheumatic Heart Disease Mother      No Known Problems Father      Cancer Brother         brain     Lung Cancer Brother      Lung Cancer Brother      Cancer Sister         lung     Lung Cancer Sister         Social History     Socioeconomic History     Marital status: Single     Spouse name: Not on file     Number of children: Not on file     Years of education: Not on file     Highest education level: Not on file   Occupational History     Not on file   Tobacco Use     Smoking status: Never     Passive exposure: Never     Smokeless tobacco: Never     Tobacco comments:     no  passive exposure   Vaping Use     Vaping Use: Never used   Substance and Sexual Activity     Alcohol use: Yes     Comment: Alcoholic Drinks/day: Rarely a glass of wine     Drug use: No     Sexual activity: Not on file   Other Topics Concern     Not on file   Social History Narrative    The patient is a nun.     Social Determinants of Health     Financial Resource Strain: Low Risk  (10/5/2023)    Financial Resource Strain      Within the past 12 months, have you or your family members you live with been unable to get utilities (heat, electricity) when it was really needed?: No   Food Insecurity: Low Risk  (10/5/2023)    Food Insecurity      Within the past 12 months, did you worry that your food would run out before you got money to buy more?: No      Within the past 12 months, did the food you bought just not last and you didn t have money to get more?: No   Transportation Needs: Low Risk  (10/5/2023)    Transportation Needs      Within the past 12 months, has lack of transportation kept you from medical appointments, getting your medicines, non-medical meetings or appointments, work, or from getting things that you need?: No   Physical Activity: Insufficiently Active (3/27/2023)    Exercise Vital Sign      Days of Exercise per Week: 3 days      Minutes of Exercise per Session: 10 min   Stress: No Stress Concern Present (3/27/2023)    [employer] [place] [employer] - Occupational Stress Questionnaire      Feeling of Stress : Not at all   Social Connections: Moderately Integrated (3/27/2023)    Social Connection and Isolation Panel [NHANES]      Frequency of Communication with Friends and Family: More than three times a week      Frequency of Social Gatherings with Friends and Family: More than three times a week      Attends Bahai Services: More than 4 times per year      Active Member of Clubs or Organizations: Yes      Attends Club or Organization Meetings: More than 4 times per year      Marital  "Status: Never    Interpersonal Safety: Low Risk  (10/5/2023)    Interpersonal Safety      Do you feel physically and emotionally safe where you currently live?: Yes      Within the past 12 months, have you been hit, slapped, kicked or otherwise physically hurt by someone?: No      Within the past 12 months, have you been humiliated or emotionally abused in other ways by your partner or ex-partner?: No   Housing Stability: Low Risk  (10/5/2023)    Housing Stability      Do you have housing? : Yes      Are you worried about losing your housing?: No           Medications  Allergies   No current outpatient medications on file.       Allergies   Allergen Reactions     Trazodone Shortness Of Breath and Unknown     Allergic to trazodone and deriv., Other: trouble swallowing       Clindamycin Diarrhea     C-diff     Gabapentin Other (See Comments)     \"Internal tremors\"     Temazepam Other (See Comments)     Annotation: Nightmares            Lab Results    Chemistry/lipid CBC Cardiac Enzymes/BNP/TSH/INR   No results for input(s): \"CHOL\", \"HDL\", \"LDL\", \"TRIG\", \"CHOLHDLRATIO\" in the last 31839 hours.  No results for input(s): \"LDL\" in the last 20894 hours.  Recent Labs   Lab Test 11/30/23  0810      POTASSIUM 3.5   CHLORIDE 102   CO2 29   *   BUN 24.1*   CR 1.09*   GFRESTIMATED 47*   MARLENE 9.9*     Recent Labs   Lab Test 11/30/23  0810 11/29/23  1106 10/31/23  1443   CR 1.09* 1.08* 1.16*     Recent Labs   Lab Test 06/15/21  0910   A1C 6.0*          Recent Labs   Lab Test 11/30/23  0810   WBC 8.6   HGB 13.7   HCT 44.1   MCV 94        Recent Labs   Lab Test 11/30/23  0810 11/29/23  1106 03/10/23  0945   HGB 13.7 12.2 11.3*    Recent Labs   Lab Test 08/23/22  1154 08/17/22  1215 11/21/19  0451   TROPONINI 0.16 0.04 0.01     Recent Labs   Lab Test 11/29/23  1106 02/08/23  1345 08/23/22  1154 08/17/22  1215 03/03/21  1222 02/21/19  0713   BNP  --   --  1,933*  --  177* 95   NTBNPI 13,109*  --   --   --   " --   --    NTBNP  --  5,373*  --  5,101*  --   --      Recent Labs   Lab Test 04/05/23  1412   TSH 3.73     Recent Labs   Lab Test 03/04/23  1905 08/23/22  1154 06/05/19  0052   INR 1.33* 1.37* 1.50*        Kristina Velasquez MD

## 2023-12-01 NOTE — PLAN OF CARE
"  Problem: Adult Inpatient Plan of Care  Goal: Plan of Care Review  Description: The Plan of Care Review/Shift note should be completed every shift.  The Outcome Evaluation is a brief statement about your assessment that the patient is improving, declining, or no change.  This information will be displayed automatically on your shift  note.  Outcome: Progressing  Goal: Patient-Specific Goal (Individualized)  Description: You can add care plan individualizations to a care plan. Examples of Individualization might be:  \"Parent requests to be called daily at 9am for status\", \"I have a hard time hearing out of my right ear\", or \"Do not touch me to wake me up as it startles  me\".  Outcome: Progressing  Goal: Absence of Hospital-Acquired Illness or Injury  Outcome: Progressing  Intervention: Identify and Manage Fall Risk  Recent Flowsheet Documentation  Taken 12/1/2023 1658 by Madison Rinaldi RN  Safety Promotion/Fall Prevention: activity supervised  Taken 12/1/2023 1200 by Madison Rinaldi RN  Safety Promotion/Fall Prevention: activity supervised  Taken 12/1/2023 0900 by Madison Rinaldi RN  Safety Promotion/Fall Prevention: activity supervised  Intervention: Prevent Skin Injury  Recent Flowsheet Documentation  Taken 12/1/2023 1658 by Madison Rinaldi RN  Body Position: position changed independently  Taken 12/1/2023 1200 by Madison Rinaldi RN  Body Position: position changed independently  Taken 12/1/2023 0900 by Madison Rinaldi RN  Body Position: position changed independently  Goal: Optimal Comfort and Wellbeing  Outcome: Progressing   Goal Outcome Evaluation:       Walked in the hallway  and tolerated the activity without Oxygen  up to the bathroom off the purewick during the day Alert and oriented  1 episode of Diarrhea .and will monitor.No IV access Dr Dorado   said maria antonia to be without an IV access for now .                 "

## 2023-12-01 NOTE — TELEPHONE ENCOUNTER
Central Prior Authorization Team   Phone: [phone]    PA Initiation    Medication: OXYCONTIN 10 MG PO T12A  Insurance Company: [employer] Non-Specialty PA's - Phone [phone] Fax [phone]  Pharmacy Filling the Rx: [id number] - [place] [[address]  Filling Pharmacy Phone: [phone]  Filling Pharmacy Fax:    Start Date: 12/1/2023

## 2023-12-01 NOTE — PROGRESS NOTES
..Patient has been assessed for Home Oxygen needs.     Pulse oximetry (SpO2) and Oxygen flow readings:    SpO2 = 93% on room air at rest while awake.    SpO2 improved to 2% on 2 liters/minute at rest.    SpO2 = 93% on room air during activity/with exercise.    *SpO2 improved to  % on 0 liters/minute during activity/with exercise.

## 2023-12-01 NOTE — PLAN OF CARE
Problem: Heart Failure  Goal: Optimal Coping  Outcome: Progressing     Problem: Heart Failure  Goal: Improved Oral Intake  Outcome: Progressing     Problem: Heart Failure  Goal: Effective Breathing Pattern During Sleep  Outcome: Progressing  Intervention: Monitor and Manage Obstructive Sleep Apnea  Recent Flowsheet Documentation  Taken 11/30/2023 1630 by Lo Lawrence RN  Medication Review/Management: medications reviewed   Goal Outcome Evaluation:        Pt is AOX4. She c/o left flank/rib pain. Pt states she feels like after therapy today she may have tweaked it. PRN Tylenol administered with relief. Purewick in place. Tele A fib. VSS.           Heart Failure Care Map  GOALS TO BE MET BEFORE DISCHARGE:    1. Decrease congestion and/or edema with diuretic therapy to achieve near optimal volume status.     Dyspnea improved: No, further care required to meet this goal. Please explain pt on 1.5L NC   Edema improved: Yes, satisfactory for discharge.        Last 24 hour I/O:   Intake/Output Summary (Last 24 hours) at 11/30/2023 2204  Last data filed at 11/30/2023 1951  Gross per 24 hour   Intake 480 ml   Output 1150 ml   Net -670 ml           Net I/O and Weights since admission:   10/31 2300 - 11/30 2259  In: 490 [P.O.:480; I.V.:10]  Out: 2150 [Urine:2150]  Net: -1660     Vitals:    11/29/23 1045   Weight: 69.4 kg (153 lb)       2.  O2 sats > 90% on room air, or at prior home O2 therapy level.      Able to wean O2 this shift to keep sats above 90%?: No, further care required to meet this goal. Please explain pt on1.5L NC   Does patient use Home O2? No          Current oxygenation status:   SpO2: 95 %     O2 Device: Nasal cannula, Oxygen Delivery: 1.5 LPM    3.  Tolerates ambulation and mobility near baseline.     Ambulation: Yes, satisfactory for discharge.   Times patient ambulated with staff this shift: 0    Please review the Heart Failure Care Map for additional HF goal outcomes.    Lo Lawrence RN  11/30/2023

## 2023-12-02 VITALS
WEIGHT: 150 LBS | OXYGEN SATURATION: 92 % | RESPIRATION RATE: 18 BRPM | HEIGHT: 62 IN | TEMPERATURE: 98.2 F | HEART RATE: 81 BPM | BODY MASS INDEX: 27.6 KG/M2 | DIASTOLIC BLOOD PRESSURE: 68 MMHG | SYSTOLIC BLOOD PRESSURE: 144 MMHG

## 2023-12-02 LAB
ANION GAP SERPL CALCULATED.3IONS-SCNC: 7 MMOL/L (ref 7–15)
BUN SERPL-MCNC: 27.6 MG/DL (ref 8–23)
CALCIUM SERPL-MCNC: 9.7 MG/DL (ref 8.2–9.6)
CHLORIDE SERPL-SCNC: 102 MMOL/L (ref 98–107)
CREAT SERPL-MCNC: 1.16 MG/DL (ref 0.51–0.95)
DEPRECATED HCO3 PLAS-SCNC: 33 MMOL/L (ref 22–29)
EGFRCR SERPLBLD CKD-EPI 2021: 44 ML/MIN/1.73M2
GLUCOSE SERPL-MCNC: 110 MG/DL (ref 70–99)
POTASSIUM SERPL-SCNC: 3.9 MMOL/L (ref 3.4–5.3)
SODIUM SERPL-SCNC: 142 MMOL/L (ref 135–145)

## 2023-12-02 PROCEDURE — 250N000013 HC RX MED GY IP 250 OP 250 PS 637: Performed by: NURSE PRACTITIONER

## 2023-12-02 PROCEDURE — 250N000013 HC RX MED GY IP 250 OP 250 PS 637: Performed by: INTERNAL MEDICINE

## 2023-12-02 PROCEDURE — 80048 BASIC METABOLIC PNL TOTAL CA: CPT | Performed by: INTERNAL MEDICINE

## 2023-12-02 PROCEDURE — 99239 HOSP IP/OBS DSCHRG MGMT >30: CPT | Performed by: INTERNAL MEDICINE

## 2023-12-02 PROCEDURE — 36415 COLL VENOUS BLD VENIPUNCTURE: CPT | Performed by: INTERNAL MEDICINE

## 2023-12-02 RX ORDER — METOPROLOL TARTRATE 25 MG/1
25 TABLET, FILM COATED ORAL 2 TIMES DAILY
Qty: 60 TABLET | Refills: 1 | Status: SHIPPED | OUTPATIENT
Start: 2023-12-02 | End: 2023-12-30

## 2023-12-02 RX ORDER — FUROSEMIDE 20 MG
40 TABLET ORAL DAILY
Qty: 60 TABLET | Refills: 1 | Status: SHIPPED | OUTPATIENT
Start: 2023-12-02 | End: 2023-12-18

## 2023-12-02 RX ADMIN — DULOXETINE HYDROCHLORIDE 60 MG: 60 CAPSULE, DELAYED RELEASE PELLETS ORAL at 09:10

## 2023-12-02 RX ADMIN — AMIODARONE HYDROCHLORIDE 100 MG: 100 TABLET ORAL at 09:10

## 2023-12-02 RX ADMIN — POTASSIUM CHLORIDE 20 MEQ: 1500 TABLET, EXTENDED RELEASE ORAL at 09:10

## 2023-12-02 RX ADMIN — FUROSEMIDE 40 MG: 20 TABLET ORAL at 09:10

## 2023-12-02 RX ADMIN — METOPROLOL TARTRATE 25 MG: 25 TABLET, FILM COATED ORAL at 09:10

## 2023-12-02 RX ADMIN — LISINOPRIL 2.5 MG: 2.5 TABLET ORAL at 09:10

## 2023-12-02 RX ADMIN — APIXABAN 5 MG: 5 TABLET, FILM COATED ORAL at 09:10

## 2023-12-02 ASSESSMENT — ACTIVITIES OF DAILY LIVING (ADL)
ADLS_ACUITY_SCORE: 29
ADLS_ACUITY_SCORE: 29
ADLS_ACUITY_SCORE: 25
ADLS_ACUITY_SCORE: 29

## 2023-12-02 NOTE — PLAN OF CARE
Pt reports no pain this shift. VSS on room air. SBA up to the bathroom with walker. Eager to discharge today.

## 2023-12-02 NOTE — DISCHARGE SUMMARY
"[facility]  Hospitalist Discharge Summary      Date of Admission:  11/29/2023  Date of Discharge:  12/2/2023  Discharging Provider: Felix Christianson MD  Discharge Service: Hospitalist Service    Discharge Diagnoses   Principal Problem:    Acute on chronic systolic heart failure (H)  Active Problems:    Benign essential hypertension    Hyperlipidemia    Gastroesophageal reflux disease without esophagitis    Stage 3a chronic kidney disease (CKD) (H)    Persistent atrial fibrillation (H)    H/o DVT/PE    Nonischemic cardiomyopathy     LBBB    Clinically Significant Risk Factors     # Overweight: Estimated body mass index is 27.44 kg/m  as calculated from the following:    Height as of this encounter: 1.575 m (5' 2\").    Weight as of this encounter: 68 kg (150 lb).       Follow-ups Needed After Discharge        Follow up with primary care provider, Margret Flores, within 3-5 days to evaluate medication change and for hospital follow- up.  The following labs/tests are recommended: BMP, Mg.    Follow up with heart care clinic - they will call you to set up appointment.  Consider cardioversion in 3 weeks.        Discharge Disposition   Discharged to home  Condition at discharge: Stable    Hospital Course   89 year old female who presented 11/29/23 with a PMHx of HLD, HTN, OA, Afib, HFrEF, CKD 3 who presented with lightheadedness.  She was found to be in acute/chronic heart failure and was diuresed with IV lasix.  It was felt her Afib may be contributing to the development of her symptoms so Eliquis was increased to full dose and she will followup with Heart Clinic to see decide if they can cardiovert her.  Metoprolol was added in addition to amiodarone. Her other medical issues have been stable. Converted to oral diuretic 12/1 and is now ready for discharge.     Acute on chronic heart failure with reduced EF  Limited ECHO with EF of 20-25%. Similar to previous.   Diuresing with lasix 40 mg IVP BID- " transitioned to oral lasix 40 mg qDay 12/1  Cardio following- possible cardioversion in 3 weeks as outpatient. Follow up with Dr. Holley   Likely contributing: dietary indiscretions. Discussed slat restricted diet with patient   Continue lasix 40 mg qDay at discharge      Persistent atrial fibrillation- currently rate controlled  History of rhythm control strategy   Continue amiodarone 100 mg daily  Metoprolol 25 mg BID added per EP  Increased Eliquis to full dose for afib per EP     Nonischemic cardiomyopathy   LVEF 20-25%  Has been evaluated by EP as outpatient and has declined CRT due to age       Consultations This Hospital Stay   CARDIOLOGY IP CONSULT  ELECTROPHYSIOLOGY IP CONSULT  OCCUPATIONAL THERAPY ADULT IP CONSULT  PHYSICAL THERAPY ADULT IP CONSULT    Code Status   Full Code    Time Spent on this Encounter   I, Felix Christianson MD, personally saw the patient today and spent greater than 30 minutes discharging this patient.       Felix Christianson MD  [facility]  [address] [place] [id number]  Phone: [phone]  Fax: [phone]  ______________________________________________________________________    Physical Exam   Vital Signs: Temp: 98.2  F (36.8  C) Temp src: Oral BP: (!) 144/68 Pulse: 81   Resp: 18 SpO2: 92 % O2 Device: None (Room air)    Weight: 150 lbs 0 oz  The patient was interviewed and examined on the day of discharge.         Primary Care Physician   Margret Flores    Discharge Orders      Follow-Up with Cardiology [facility] Heart Failure Discharge      Home Care Referral      Follow-up and recommended labs and tests     Follow up with primary care provider, Margret Flores, within 3-5 days to evaluate medication change and for hospital follow- up.  The following labs/tests are recommended: BMP, Mg.     Activity    Your activity upon discharge: activity as tolerated     Diet    Follow this diet upon discharge: Orders Placed This Encounter      Combination  Diet Low Saturated Fat Na <2400mg Diet, No Caffeine Diet       Significant Results and Procedures   Results for orders placed or performed during the hospital encounter of 23   XR Chest 2 Views    Narrative    EXAM: XR CHEST 2 VIEWS  LOCATION: [facility]  DATE: 2023    INDICATION: sob  COMPARISON: CT 2022      Impression    IMPRESSION: Interstitial pulmonary edema with tiny bilateral pleural effusions. No pneumothorax. Unchanged enlarged cardiac silhouette.   Echocardiogram Limited     Value    LVEF  20-25%    Narrative    [id number]  [id number]  [id number]  [id number]^TIM^JAN^[place] [address]     Name: BHARAT QUICK  MRN: [id number]  : 1930  Study Date: 2023 10:08 AM  Age: 89 yrs  Gender: Female  Patient Location: [facility]  Reason For Study: Abn EKG  Ordering Physician: JAN DUMONT  Performed By: PILO     BSA: 1.7 m2  Height: 62 in  Weight: 153 lb  HR: 89  BP: 157/72 mmHg  ______________________________________________________________________________  Procedure  Limited Portable Echo Adult. Definity (NDC #78002-804) given intravenously.  Adequate quality two-dimensional was performed and interpreted. Adequate  quality color and spectral Doppler were performed and interpreted. Compared to  the prior study dated 2023, there are changes as noted.  ______________________________________________________________________________  Interpretation Summary     1. Left ventricular chamber size is mildly enlarged. Wall thickness is normal.  Systolic function is severely reduced with global hypokinesis and  intraventricular dyssynchrony due to left bundle branch block. The visually  estimated left ventricular ejection fraction is 20-25%.  2. Right ventricular chamber size and systolic function are normal.  3. Moderate left atrial enlargement. Mild right atrial enlargement.  4. No hemodynamically significant valvular  abnormalities.  5. Compared to the prior study dated 8/17/2023, the patient is now in atrial  fibrillation otherwise the findings are similar.  ______________________________________________________________________________  I      WMSI = 2.00     % Normal = 0     X - Cannot   0 -                      (2) - Mildly 2 -          Segments  Size  Interpret    Hyperkinetic 1 - Normal  Hypokinetic  Hypokinetic  1-2     small                                                     7 -          3-5      moderate  3 - Akinetic 4 -          5 -         6 - Akinetic Dyskinetic   6-14    large               Dyskinetic   Aneurysmal  w/scar       w/scar       15-16   diffuse     Left Ventricle  The left ventricle is mildly dilated. There is normal left ventricular wall  thickness. The visual ejection fraction is 20-25%. There is severe global  hypokinesia of the left ventricle. Septal motion is consistent with conduction  abnormality. There is no thrombus seen in the left ventricle.     Right Ventricle  Normal right ventricle size and systolic function.     Atria  The left atrium is moderately dilated. The right atrium is mildly dilated.     Mitral Valve  There is mild mitral annular calcification. The mitral valve leaflets are  mildly thickened. There is trace to mild mitral regurgitation. There is no  mitral valve stenosis.     Tricuspid Valve  Tricuspid valve leaflets appear normal. There is mild (1+) tricuspid  regurgitation. There is no tricuspid stenosis.     Aortic Valve  The aortic valve is trileaflet with aortic valve sclerosis. No aortic  regurgitation is present. No aortic stenosis is present.     Pulmonic Valve  The pulmonic valve is not well visualized.     Vessels  The aorta root is normal. IVC diameter <2.1 cm collapsing >50% with sniff  suggests a normal RA pressure of 3 mmHg.     Pericardium  There is no pericardial effusion.     Rhythm  The rhythm was atrial fibrillation.      ______________________________________________________________________________  MMode/2D Measurements & Calculations  IVSd: 0.90 cm  LVIDd: 5.6 cm  LVIDs: 5.4 cm  LVPWd: 0.90 cm  FS: 3.6 %     LV mass(C)d: 191.6 grams  LV mass(C)dI: 112.3 grams/m2  Ao root diam: 2.7 cm  Ao root diam index Ht(cm/m): 1.7  Ao root diam index BSA (cm/m2): 1.6  LA Volume Index (BP): 44.0 ml/m2  RWT: 0.32     ______________________________________________________________________________  Report approved by: Tl Pandey 11/30/2023 11:52 AM             Discharge Medications   Current Discharge Medication List        START taking these medications    Details   metoprolol tartrate (LOPRESSOR) 25 MG tablet Take 1 tablet (25 mg) by mouth 2 times daily  Qty: 60 tablet, Refills: 1    Associated Diagnoses: HFrEF (heart failure with reduced ejection fraction) (H)           CONTINUE these medications which have CHANGED    Details   apixaban ANTICOAGULANT (ELIQUIS) 5 MG tablet Take 1 tablet (5 mg) by mouth 2 times daily  Qty: 60 tablet, Refills: 1    Associated Diagnoses: Paroxysmal atrial fibrillation (H)      furosemide (LASIX) 20 MG tablet Take 2 tablets (40 mg) by mouth daily  Qty: 60 tablet, Refills: 1    Associated Diagnoses: Dyspnea on exertion           CONTINUE these medications which have NOT CHANGED    Details   acetaminophen (TYLENOL) 325 MG tablet Take 2 tablets (650 mg) by mouth every 4 hours as needed for other (mild pain)  Qty: 100 tablet, Refills: 0    Associated Diagnoses: Hip fracture, right, closed, initial encounter (H)      amiodarone (PACERONE) 200 MG tablet Take 0.5 tablets (100 mg) by mouth daily  Qty: 45 tablet, Refills: 3    Comments: Dose strength change. Please remind Sister to split tablet.  Associated Diagnoses: Paroxysmal atrial fibrillation (H)      cholecalciferol, vitamin D3, (VITAMIN D3) 2,000 unit Tab [CHOLECALCIFEROL, VITAMIN D3, (VITAMIN D3) 2,000 UNIT TAB] Take 1 tablet (2,000 Units total) by mouth  daily.  Qty: 90 tablet, Refills: 3    Comments: Replaces weekly ergo 50,000 units  Associated Diagnoses: Vitamin D deficiency      diclofenac (FLECTOR) 1.3 % patch Externally apply 1 patch topically at bedtime      DULoxetine (CYMBALTA) 60 MG capsule Take 60 mg by mouth daily      KLOR-CON 20 MEQ CR tablet TAKE ONE TABLET BY MOUTH ONE TIME DAILY  Qty: 90 tablet, Refills: 3    Associated Diagnoses: Dyspnea on exertion      Lidocaine (LIDOCARE) 4 % Patch Place 1 patch onto the skin every 24 hours Apply as needed to painful area of intact skin. To prevent lidocaine toxicity, patient should be patch free for 12 hrs daily.  Qty: 10 patch, Refills: 0    Associated Diagnoses: Chest wall pain      lisinopril (ZESTRIL) 2.5 MG tablet TAKE ONE TABLET BY MOUTH ONE TIME DAILY  Qty: 90 tablet, Refills: 1    Associated Diagnoses: Secondary cardiomyopathy (H); Benign essential hypertension      oxyCODONE (ROXICODONE) 5 MG tablet Take 1 tablet (5 mg) by mouth 3 times daily as needed for moderate to severe pain #70 tabs to last 30 days for chronic pain. Ok to fill 11/28/23 to begin using 11/30/23  Qty: 70 tablet, Refills: 0    Associated Diagnoses: Primary osteoarthritis of right knee; Rupture of anterior cruciate ligament of right knee, sequela; Chronic intractable pain      zolpidem ER (AMBIEN CR) 6.25 MG CR tablet Take 1 and 1/4 tablet by mouth at bedtime  Qty: 45 tablet, Refills: 5    Associated Diagnoses: Primary insomnia      COMPOUNDED NON-CONTROLLED SUBSTANCE (CMPD RX) - PHARMACY TO MIX COMPOUNDED MEDICATION Ketamine 8% and Ketoprofen 5 % in carrier gel/lotion. Apply 2-4 pumps to affected areas QID PRN  Qty: 120 g, Refills: 1    Associated Diagnoses: Primary osteoarthritis of right knee; Rupture of anterior cruciate ligament of right knee, sequela; Chronic intractable pain      oxyCODONE (OXYCONTIN) 10 MG 12 hr tablet Take 1 tablet (10 mg) by mouth at bedtime for 30 days  Qty: 30 tablet, Refills: 0    Associated Diagnoses:  "Primary osteoarthritis of right knee; Rupture of anterior cruciate ligament of right knee, sequela; Chronic intractable pain           Allergies   Allergies   Allergen Reactions    Trazodone Shortness Of Breath and Unknown     Allergic to trazodone and deriv., Other: trouble swallowing      Clindamycin Diarrhea     C-diff    Gabapentin Other (See Comments)     \"Internal tremors\"    Temazepam Other (See Comments)     Annotation: Nightmares       "

## 2023-12-02 NOTE — PLAN OF CARE
"  Problem: Adult Inpatient Plan of Care  Goal: Plan of Care Review  Description: The Plan of Care Review/Shift note should be completed every shift.  The Outcome Evaluation is a brief statement about your assessment that the patient is improving, declining, or no change.  This information will be displayed automatically on your shift  note.  Outcome: Adequate for Care Transition  Goal: Patient-Specific Goal (Individualized)  Description: You can add care plan individualizations to a care plan. Examples of Individualization might be:  \"Parent requests to be called daily at 9am for status\", \"I have a hard time hearing out of my right ear\", or \"Do not touch me to wake me up as it startles  me\".  Outcome: Adequate for Care Transition  Goal: Absence of Hospital-Acquired Illness or Injury  Outcome: Adequate for Care Transition  Intervention: Identify and Manage Fall Risk  Recent Flowsheet Documentation  Taken 12/2/2023 0911 by Madison Rinaldi RN  Safety Promotion/Fall Prevention:   activity supervised   mobility aid in reach   nonskid shoes/slippers when out of bed   room door open  Intervention: Prevent Skin Injury  Recent Flowsheet Documentation  Taken 12/2/2023 0911 by Madison Rinaldi RN  Body Position: position changed independently  Goal: Optimal Comfort and Wellbeing  Outcome: Adequate for Care Transition  Goal: Readiness for Transition of Care  Outcome: Adequate for Care Transition     Problem: Risk for Delirium  Goal: Optimal Coping  Outcome: Adequate for Care Transition  Goal: Improved Behavioral Control  Outcome: Adequate for Care Transition  Intervention: Minimize Safety Risk  Recent Flowsheet Documentation  Taken 12/2/2023 0911 by Madison Rinaldi RN  Communication Enhancement Strategies: call light answered in person  Goal: Improved Attention and Thought Clarity  Outcome: Adequate for Care Transition  Intervention: Maximize Cognitive Function  Recent Flowsheet Documentation  Taken 12/2/2023 0911 by " Madison Rinaldi RN  Reorientation Measures:   clock in view   calendar in view  Goal: Improved Sleep  Outcome: Adequate for Care Transition     Problem: Risk for Delirium  Goal: Improved Sleep  Outcome: Adequate for Care Transition     Problem: Heart Failure  Goal: Optimal Coping  Outcome: Adequate for Care Transition  Goal: Optimal Cardiac Output  Outcome: Adequate for Care Transition  Goal: Stable Heart Rate and Rhythm  Outcome: Adequate for Care Transition  Goal: Optimal Functional Ability  Outcome: Adequate for Care Transition  Intervention: Optimize Functional Ability  Recent Flowsheet Documentation  Taken 12/2/2023 1032 by Madison Rinaldi RN  Activity Management: up in chair  Taken 12/2/2023 0911 by Madison Rinaldi RN  Activity Management:   activity adjusted per tolerance   up ad thiago   up in chair   ambulated to bathroom  Goal: Fluid and Electrolyte Balance  Outcome: Adequate for Care Transition  Goal: Improved Oral Intake  Outcome: Adequate for Care Transition  Goal: Effective Oxygenation and Ventilation  Outcome: Adequate for Care Transition  Intervention: Promote Airway Secretion Clearance  Recent Flowsheet Documentation  Taken 12/2/2023 1032 by Madison Rinaldi RN  Activity Management: up in chair  Taken 12/2/2023 0911 by Madison Rinaldi RN  Cough And Deep Breathing: done independently per patient  Activity Management:   activity adjusted per tolerance   up ad thiago   up in chair   ambulated to bathroom  Intervention: Optimize Oxygenation and Ventilation  Recent Flowsheet Documentation  Taken 12/2/2023 0911 by Madison Rinaldi RN  Head of Bed (HOB) Positioning: HOB at 30-45 degrees  Goal: Effective Breathing Pattern During Sleep  Outcome: Adequate for Care Transition  Intervention: Monitor and Manage Obstructive Sleep Apnea  Recent Flowsheet Documentation  Taken 12/2/2023 0911 by Madison Rinaldi RN  Medication Review/Management: medications reviewed     Problem: Heart Failure  Goal: Fluid  and Electrolyte Balance  Outcome: Adequate for Care Transition     Problem: Heart Failure  Goal: Effective Breathing Pattern During Sleep  Outcome: Adequate for Care Transition  Intervention: Monitor and Manage Obstructive Sleep Apnea  Recent Flowsheet Documentation  Taken 12/2/2023 0911 by Madison Rinaldi RN  Medication Review/Management: medications reviewed     Problem: Comorbidity Management  Goal: Maintenance of Heart Failure Symptom Control  Outcome: Adequate for Care Transition  Intervention: Maintain Heart Failure Management  Recent Flowsheet Documentation  Taken 12/2/2023 0911 by Madison Rinaldi RN  Medication Review/Management: medications reviewed  Goal: Blood Pressure in Desired Range  Outcome: Adequate for Care Transition  Intervention: Maintain Blood Pressure Management  Recent Flowsheet Documentation  Taken 12/2/2023 0911 by Madison Rinaldi RN  Medication Review/Management: medications reviewed     Problem: Comorbidity Management  Goal: Blood Pressure in Desired Range  Outcome: Adequate for Care Transition  Intervention: Maintain Blood Pressure Management  Recent Flowsheet Documentation  Taken 12/2/2023 0911 by Madison Rinaldi RN  Medication Review/Management: medications reviewed     Problem: Comorbidity Management  Goal: Blood Pressure in Desired Range  Intervention: Maintain Blood Pressure Management  Recent Flowsheet Documentation  Taken 12/2/2023 0911 by Madison Rinaldi RN  Medication Review/Management: medications reviewed   Goal Outcome Evaluation:      Discharge to home a friend will come to pick her up discharge instructions discussed and verbalized understanding medications schedules purposes and changes discussed Telemetry removed Had a shower she moves well ith a walker remained on RA .

## 2023-12-02 NOTE — PROGRESS NOTES
Care Management Discharge Note    Discharge Date: 12/02/2023       Discharge Disposition: Home, Home Care - [facility]    Discharge Services: Home, Home Care - [facility]    Discharge DME: None    Discharge Transportation:  Family/    Private pay costs discussed: Not applicable    Does the patient's insurance plan have a 3 day qualifying hospital stay waiver?  No    PAS Confirmation Code:  NA  Patient/family educated on Medicare website which has current facility and service quality ratings: yes    Education Provided on the Discharge Plan: Yes per team  Persons Notified of Discharge Plans: Patient per team  Patient/Family in Agreement with the Plan: yes    Handoff Referral Completed: Yes    Additional Information:  Patient discharge Home with Home Care - [facility]. Family will transport.  Sent discharge orders and called to notify Angelique with [facility]    Patient states sister, Lyudmila, will be picking her up    Radha Underwood RN

## 2023-12-03 NOTE — PLAN OF CARE
Occupational Therapy Discharge Summary    Reason for therapy discharge:    Discharged to home with home care.    Progress towards therapy goal(s). See goals on Care Plan in [facility] electronic health record for goal details.  Goals partially met.  Barriers to achieving goals:   discharge from facility.    Therapy recommendation(s):    Continued therapy is recommended.  Rationale/Recommendations:  decreased ADLs.    Goal Outcome Evaluation:               Mary Reyes, OTR/CORWIN  12/3/2023

## 2023-12-04 ENCOUNTER — TRANSFERRED RECORDS (OUTPATIENT)
Dept: HEALTH INFORMATION MANAGEMENT | Facility: CLINIC | Age: 88
End: 2023-12-04

## 2023-12-04 ENCOUNTER — PATIENT OUTREACH (OUTPATIENT)
Dept: CARE COORDINATION | Facility: CLINIC | Age: 88
End: 2023-12-04

## 2023-12-04 PROBLEM — I50.23 ACUTE ON CHRONIC SYSTOLIC HEART FAILURE (H): Status: ACTIVE | Noted: 2023-11-29

## 2023-12-04 NOTE — PROGRESS NOTES
Clinic Care Coordination Contact  [facility]/Voicemail    Clinical Data: Care Coordinator Outreach    Outreach Documentation Number of Outreach Attempt   12/11/2023   6:41 AM 1       Left message on patient's voicemail with call back information and requested return call.    Plan: Care Coordinator will try to reach patient again in 1-2 business days.

## 2023-12-06 ENCOUNTER — OFFICE VISIT (OUTPATIENT)
Dept: FAMILY MEDICINE | Facility: CLINIC | Age: 88
End: 2023-12-06
Payer: COMMERCIAL

## 2023-12-06 ENCOUNTER — PATIENT OUTREACH (OUTPATIENT)
Dept: CARE COORDINATION | Facility: CLINIC | Age: 88
End: 2023-12-06

## 2023-12-06 VITALS
DIASTOLIC BLOOD PRESSURE: 72 MMHG | OXYGEN SATURATION: 98 % | SYSTOLIC BLOOD PRESSURE: 107 MMHG | WEIGHT: 151 LBS | TEMPERATURE: 97.6 F | BODY MASS INDEX: 27.79 KG/M2 | HEART RATE: 74 BPM | HEIGHT: 62 IN | RESPIRATION RATE: 16 BRPM

## 2023-12-06 DIAGNOSIS — I50.20 HFREF (HEART FAILURE WITH REDUCED EJECTION FRACTION) (H): ICD-10-CM

## 2023-12-06 DIAGNOSIS — N18.31 STAGE 3A CHRONIC KIDNEY DISEASE (CKD) (H): ICD-10-CM

## 2023-12-06 DIAGNOSIS — I42.9 SECONDARY CARDIOMYOPATHY (H): ICD-10-CM

## 2023-12-06 DIAGNOSIS — I10 BENIGN ESSENTIAL HYPERTENSION: Primary | ICD-10-CM

## 2023-12-06 DIAGNOSIS — Z79.899 ENCOUNTER FOR LONG-TERM (CURRENT) USE OF MEDICATIONS: ICD-10-CM

## 2023-12-06 DIAGNOSIS — E83.42 HYPOMAGNESEMIA: ICD-10-CM

## 2023-12-06 DIAGNOSIS — R74.8 ALKALINE PHOSPHATASE ELEVATION: ICD-10-CM

## 2023-12-06 DIAGNOSIS — I48.0 PAROXYSMAL ATRIAL FIBRILLATION (H): ICD-10-CM

## 2023-12-06 PROCEDURE — 99214 OFFICE O/P EST MOD 30 MIN: CPT | Performed by: FAMILY MEDICINE

## 2023-12-06 PROCEDURE — 80053 COMPREHEN METABOLIC PANEL: CPT | Performed by: FAMILY MEDICINE

## 2023-12-06 PROCEDURE — 82248 BILIRUBIN DIRECT: CPT | Performed by: FAMILY MEDICINE

## 2023-12-06 PROCEDURE — 36415 COLL VENOUS BLD VENIPUNCTURE: CPT | Performed by: FAMILY MEDICINE

## 2023-12-06 PROCEDURE — 83735 ASSAY OF MAGNESIUM: CPT | Performed by: FAMILY MEDICINE

## 2023-12-06 PROCEDURE — 84443 ASSAY THYROID STIM HORMONE: CPT | Performed by: FAMILY MEDICINE

## 2023-12-06 NOTE — PROGRESS NOTES
Assessment & Plan     Benign essential hypertension    Blood pressure stable she is tolerating the metoprolol.  - Comprehensive metabolic panel (BMP + Alb, Alk Phos, ALT, AST, Total. Bili, TP); Future  - Comprehensive metabolic panel (BMP + Alb, Alk Phos, ALT, AST, Total. Bili, TP)    HFrEF (heart failure with reduced ejection fraction) (H)    Scheduled to see cardiology on the 14th and 22nd.  Is aware of those appointments.  Watching weight at home we discussed salt restriction.  CMP to be drawn today.  No evidence of fluid overload.  No pedal edema no shortness of breath noted patient noted some shortness of breath with walk to lab but symptoms faded after approximately 1 minute of rest she was told that if she develops shortness of breath that lasts beyond 3 minutes to call the nurse line immediately  - Comprehensive metabolic panel (BMP + Alb, Alk Phos, ALT, AST, Total. Bili, TP); Future  - Comprehensive metabolic panel (BMP + Alb, Alk Phos, ALT, AST, Total. Bili, TP)    Stage 3a chronic kidney disease (CKD) (H)    Repeating a CMP today.  She is urinating regularly she continues to take the Lasix  - Comprehensive metabolic panel (BMP + Alb, Alk Phos, ALT, AST, Total. Bili, TP); Future  - Comprehensive metabolic panel (BMP + Alb, Alk Phos, ALT, AST, Total. Bili, TP)    Hypomagnesemia    Rechecking magnesium level.  - Magnesium; Future  - Magnesium    Paroxysmal atrial fibrillation (H)    No dizziness no shortness of breath noted she is in sinus rhythm currently    Secondary cardiomyopathy (H)    Scheduled to see cardiology next week.  Results of her echocardiogram were discussed with her    Encounter for long-term (current) use of medications      - TSH with free T4 reflex    Alkaline phosphatase elevation      - Bilirubin direct           MED REC REQUIRED  Post Medication Reconciliation Status:   BMI:   Estimated body mass index is 27.62 kg/m  as calculated from the following:    Height as of this encounter:  "1.575 m (5' 2\").    Weight as of this encounter: 68.5 kg (151 lb).           Virgil Mcelroy MD  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  She was diuresed with IV Lasix and placed on oral Lasix and was found to be in atrial fibrillation her Eliquis was increased and she is scheduled for cardiology visit next week with possible cardioversion.  She states that she has been urinating very regularly and has been disturbing her sleep her weight has been stable and she has been avoiding all salty foods she has not noticed any shortness of breath and feels her energy level is normal.  Medical history significant for hypertension cardiomyopathy heart failure with reduced ejection fraction and hypertension.  Her last echocardiogram revealed an EF of between 20 to 25% she is accompanied here by a friend  Hospital Follow-up Visit:    Hospital/Nursing Home/IP Rehab Facility: [facility]  Date of Admission: 11/29/2023  Date of Discharge: 12/02/2023  Reason(s) for Admission: Acute on chronic systolic heart failure     Was your hospitalization related to COVID-19? No   Problems taking medications regularly:  None  Medication changes since discharge: start in metoprolol   Problems adhering to non-medication therapy:  None    Summary of hospitalization:  [facility] discharge summary reviewed  Diagnostic Tests/Treatments reviewed.  Follow up needed: Follow-up with cardiology scheduled  Other Healthcare Providers Involved in Patient s Care:         Cardiology scheduled  Update since discharge: improved. " "        Plan of care communicated with patient and family             Review of Systems   Constitutional, HEENT, cardiovascular, pulmonary, GI, , musculoskeletal, neuro, skin, endocrine and psych systems are negative, except as otherwise noted.      Objective    /72 (BP Location: Left arm, Patient Position: Sitting, Cuff Size: Adult Regular)   Pulse 74   Temp 97.6  F (36.4  C) (Oral)   Resp 16   Ht 1.575 m (5' 2\")   Wt 68.5 kg (151 lb)   SpO2 98%   BMI 27.62 kg/m    Body mass index is 27.62 kg/m .  Physical Exam   GENERAL: healthy, alert and no distress  EYES: Eyes grossly normal to inspection, PERRL and conjunctivae and sclerae normal  NECK: no adenopathy, no asymmetry, masses, or scars and thyroid normal to palpation  RESP: lungs clear to auscultation - no rales, rhonchi or wheezes  CV: regular rate and rhythm, normal S1 S2, no S3 or S4, no murmur, click or rub, no peripheral edema and peripheral pulses strong  ABDOMEN: soft, nontender, no hepatosplenomegaly, no masses and bowel sounds normal  MS: no gross musculoskeletal defects noted, no edema  SKIN: no suspicious lesions or rashes  NEURO: Normal strength and tone, mentation intact and speech normal  PSYCH: mentation appears normal, affect normal/bright          "

## 2023-12-07 ENCOUNTER — NURSE TRIAGE (OUTPATIENT)
Dept: NURSING | Facility: CLINIC | Age: 88
End: 2023-12-07

## 2023-12-07 LAB
ALBUMIN SERPL BCG-MCNC: 4.2 G/DL (ref 3.5–5.2)
ALP SERPL-CCNC: 172 U/L (ref 40–150)
ALT SERPL W P-5'-P-CCNC: 34 U/L (ref 0–50)
ANION GAP SERPL CALCULATED.3IONS-SCNC: 16 MMOL/L (ref 7–15)
AST SERPL W P-5'-P-CCNC: 27 U/L (ref 0–45)
BILIRUB DIRECT SERPL-MCNC: <0.2 MG/DL (ref 0–0.3)
BILIRUB SERPL-MCNC: 0.3 MG/DL
BUN SERPL-MCNC: 29.8 MG/DL (ref 8–23)
CALCIUM SERPL-MCNC: 9.9 MG/DL (ref 8.2–9.6)
CHLORIDE SERPL-SCNC: 103 MMOL/L (ref 98–107)
CREAT SERPL-MCNC: 1.18 MG/DL (ref 0.51–0.95)
DEPRECATED HCO3 PLAS-SCNC: 25 MMOL/L (ref 22–29)
EGFRCR SERPLBLD CKD-EPI 2021: 43 ML/MIN/1.73M2
GLUCOSE SERPL-MCNC: 112 MG/DL (ref 70–99)
MAGNESIUM SERPL-MCNC: 2.3 MG/DL (ref 1.7–2.3)
POTASSIUM SERPL-SCNC: 3.8 MMOL/L (ref 3.4–5.3)
PROT SERPL-MCNC: 7.3 G/DL (ref 6.4–8.3)
SODIUM SERPL-SCNC: 144 MMOL/L (ref 135–145)
TSH SERPL DL<=0.005 MIU/L-ACNC: 3.99 UIU/ML (ref 0.3–4.2)

## 2023-12-07 NOTE — TELEPHONE ENCOUNTER
Margret Flores MD  You34 minutes ago (12:31 PM)     LG  Yes, I am okay to follow this patient for ongoing home care orders.     Spoke to Martha regarding message above.     No further action needed.    Closing encounter.    FRANCOISE SmithN, RN   [facility]

## 2023-12-07 NOTE — TELEPHONE ENCOUNTER
Nurse Triage SBAR    Is this a 2nd Level Triage? NO  Request for Authorization to Delay Start of Care to Dec 11, 2023    Situation:   Caller is Home Care RNSherry with [facility].    Two Questions:  1)  Permission to delay start of care to Dec 11, 2023  2)  Which provider will be following this patient for ongoing home care orders?    Callback # for [facility] Care [facility] -> [phone].    Thank you-    Kelley WHITMAN [facility] Nurse Advisor     Reason for Disposition   [1] Follow-up call from patient regarding patient's clinical status AND [2] information NON-URGENT     Caller is home care RN.    Protocols used: PCP Call - No Triage-A-

## 2023-12-07 NOTE — TELEPHONE ENCOUNTER
Jl Goetz MD  You10 minutes ago (9:31 AM)     MZ  Requested order to delay start approved.  Will defer to PCP for question # 2.    Dr. Jl Goetz  12/7/2023 9:31 AM     Called and spoke to Martha from [facility]. Dr. Goetz's message relayed to Martha. Informed Martha that Dr. Flores is not in clinic this week and will resume in clinic next week.    Will route this message to Dr. Flores for next week to review question #2 below, if provider OK to follow pt for Home Care orders.    Robert Adamson, FRANCOISEN, RN   [facility]

## 2023-12-07 NOTE — TELEPHONE ENCOUNTER
Dr. Flores is not in clinic for the remainder of the week.     Will route message to covering provider, Dr. Goetz to review and advise.    FRANCOISE SmithN, RN   [facility]

## 2023-12-11 ENCOUNTER — MEDICAL CORRESPONDENCE (OUTPATIENT)
Dept: HEALTH INFORMATION MANAGEMENT | Facility: CLINIC | Age: 88
End: 2023-12-11

## 2023-12-11 NOTE — PROGRESS NOTES
Clinic Care Coordination Contact  [facility]: Post-Discharge Note  SITUATION                                                      Admission: 11/29/2023        Discharge: 12/3/2023       BACKGROUND                                                      Per hospital discharge summary and inpatient provider notes: 89 year old female who presented 11/29/23 with a PMHx of HLD, HTN, OA, Afib, HFrEF, CKD 3 who presented with lightheadedness.  She was found to be in acute/chronic heart failure and was diuresed with IV lasix.  It was felt her Afib may be contributing to the development of her symptoms so Eliquis was increased to full dose and she will followup with [facility] to see decide if they can cardiovert her.  Metoprolol was added in addition to amiodarone. Her other medical issues have been stable. Converted to oral diuretic 12/1 and is now ready for discharge.   ASSESSMENT    [facility]CRN wasn't able to reach patient x2 for hospital discharge follow up. Per chart review, patient is scheduled for INF with Dr Mcelroy today and she's scheduled with [facility] on 12/14/2023. CCRN will do no further outreach at this time.     PLAN                                                      Outpatient Plan:  INF scheduled with Dr Mcelroy today.     Follow up with primary care provider, Margret Flores, within 3-5 days to evaluate medication change and for hospital follow- up.  The following labs/tests are recommended: BMP, Mg.        Future Appointments   Date Time Provider Department Center   12/14/2023  7:50 AM [facility]   12/14/2023  8:30 AM TALITA [facility]   12/22/2023 10:30 AM Valentine Howard [facility]   12/22/2023  2:10 PM Ibis Pedro, NP [facility]   1/31/2024 11:40 AM Margret Flores MD [facility] SPRO   4/5/2024 10:20 AM Margret Flores MD [facility] SPR         For any urgent concerns, please contact our 24 hour nurse  triage line: [phone] ([phone])         Chelsea Hagen RN

## 2023-12-12 ENCOUNTER — TELEPHONE (OUTPATIENT)
Dept: FAMILY MEDICINE | Facility: CLINIC | Age: 88
End: 2023-12-12

## 2023-12-12 NOTE — TELEPHONE ENCOUNTER
Home Care is calling regarding an established patient with [facility].       Requesting orders from: Margret Flores  Provider is following patient: Yes  Is this a 60-day recertification request?  No        Home Care is calling regarding an established patient with [facility].       Requesting orders from: Margret Flores  Provider is following patient: Yes  Is this a 60-day recertification request?  No    Orders Requested    Skilled Nursing  Request for continuation of care with no increase or decrease in frequency  Frequency: Twice a week for one week, then once a week for 3 weeks, once every other week for 4 weeks.        Physical Therapy  Request for initial evaluation and treatment (one time)     Home Health Aid  Once a week for 8 weeks    Will need verbal orders ASAP. Dr. Flores is not in clinic today, will route to covering provider, Dr. Mcelroy.    Provider review needed.  RN will contact Home Care with information after provider review.  Confirmed ok to leave a detailed message with call back.  Contact information confirmed and updated as needed.    Robert Adamson RN

## 2023-12-12 NOTE — TELEPHONE ENCOUNTER
Writer attempted to call Keara, from [facility] back regarding provider's message below. No answer, left detailed voicemail, with clinic call back number.    If Keara calls back, please relay Dr. Mcelroy's message to her. Thanks!    Closing encounter.    FRANCOISE SmithN, RN   [facility]

## 2023-12-13 ENCOUNTER — TELEPHONE (OUTPATIENT)
Dept: FAMILY MEDICINE | Facility: CLINIC | Age: 88
End: 2023-12-13

## 2023-12-13 ENCOUNTER — MEDICAL CORRESPONDENCE (OUTPATIENT)
Dept: HEALTH INFORMATION MANAGEMENT | Facility: CLINIC | Age: 88
End: 2023-12-13

## 2023-12-13 NOTE — TELEPHONE ENCOUNTER
Agree with orders below.    If BP continues to be low at subsequent visits, or any worsening lightheadedness, I would advise that home nurse or patient contacts cardiologist to see if any medication adjustments are needed.

## 2023-12-13 NOTE — TELEPHONE ENCOUNTER
Home Care is calling regarding an established patient with [facility].       Requesting orders from: Margret Flores  Provider is following patient: Yes  Is this a 60-day recertification request?  No    Orders Requested    Physical Therapy  Request for initial certification (first set of orders)   Frequency:  Once a week for 2 week, twice a week for 3 weeks, once a week for 1 week.    FYI on a low BP taken today during visit today. Patient's BP was  90/62; sitting. Rechecked at standing was 80/64; moderate lightheadedness.     Information was gathered and will be sent to provider for review.  RN will contact Home Care with information after provider review.  Confirmed ok to leave a detailed message with call back.  Contact information confirmed and updated as needed.    Robert Adamson RN

## 2023-12-13 NOTE — TELEPHONE ENCOUNTER
Patient is not recognized by insurance and encounter was closed by the clinic, removing it from my followup queue. I spoke to Wilner at [employer] and she refused to give me any information. She told me to call Customer Care and after 8 minutes on hold, she hung up on me. I was able to call back and get information from Leslie at [employer]. They have a [employer] phone and address for patient.

## 2023-12-14 ENCOUNTER — OFFICE VISIT (OUTPATIENT)
Dept: CARDIOLOGY | Facility: CLINIC | Age: 88
End: 2023-12-14
Payer: COMMERCIAL

## 2023-12-14 ENCOUNTER — APPOINTMENT (OUTPATIENT)
Dept: CARDIOLOGY | Facility: CLINIC | Age: 88
End: 2023-12-14
Payer: COMMERCIAL

## 2023-12-14 VITALS
HEART RATE: 68 BPM | OXYGEN SATURATION: 95 % | SYSTOLIC BLOOD PRESSURE: 104 MMHG | RESPIRATION RATE: 16 BRPM | BODY MASS INDEX: 28.72 KG/M2 | WEIGHT: 157 LBS | DIASTOLIC BLOOD PRESSURE: 72 MMHG

## 2023-12-14 DIAGNOSIS — I50.20 HFREF (HEART FAILURE WITH REDUCED EJECTION FRACTION) (H): Primary | ICD-10-CM

## 2023-12-14 DIAGNOSIS — I10 BENIGN ESSENTIAL HYPERTENSION: ICD-10-CM

## 2023-12-14 DIAGNOSIS — I48.19 PERSISTENT ATRIAL FIBRILLATION (H): ICD-10-CM

## 2023-12-14 DIAGNOSIS — N18.31 STAGE 3A CHRONIC KIDNEY DISEASE (CKD) (H): ICD-10-CM

## 2023-12-14 PROCEDURE — 99215 OFFICE O/P EST HI 40 MIN: CPT | Performed by: NURSE PRACTITIONER

## 2023-12-14 NOTE — LETTER
12/14/2023    Margret Flores MD  [address] [place] [id number]    RE: Gladys Ramirez       Dear Colleague,     I had the pleasure of seeing Gladys Ramirez in the [facility].        Assessment/Recommendations   Assessment:    1. Heart failure with reduced ejection fraction, ejection fraction 20-25%, NYHA class III: Decompensated.  She states she has had increased dyspnea on exertion since discharge.  Her weight has also increased 4 pounds.  She has trace edema.  She denies orthopnea or PND.  We discussed monitoring weights, following a low-sodium diet, symptoms.  She does not always follow a low-sodium diet and she met with the nurse clinician for further education.  2.  Persistent atrial fibrillation: Rate controlled.  She continues amiodarone 100 mg daily, Lopressor for rate control and Eliquis for anticoagulation.  Discussion for possible cardioversion to see if normal sinus rhythm would improve her symptoms.  She meets with Ibis Pedro CNP December 22 to discuss further  3.  Chronic kidney disease stage IIIa: She had labs last week which were stable  4.  Hypertension: Controlled.  Blood pressure 104/72.  She denies any lightheadedness or dizziness    Plan:  1.  Take an extra 20 mg of Lasix for the next 3 days.  We will call her on Monday for an update to see if her shortness of breath has improved or any weight loss  2.  Continue monitoring daily weights and stressed importance of low-sodium diet  3.  Encouraged regular activity    Gladys Ramirez will follow up with Dr. Holley in 3 months, Ibis Pedro December 22 and in the heart failure clinic in 1 month.     History of Present Illness/Subjective    Ms. Gladys Ramirez is a 89 year old female seen at [facility] heart failure clinic today for continued follow-up.  She follows up for heart failure with reduced ejection fraction.  She was hospitalized November 29 to December 2 due to acute heart failure  and lightheadedness.  She received IV Lasix and symptoms improved.  During hospitalization and felt that her atrial fibrillation was contributing to the development of her symptoms.  Her Eliquis dose was increased to 5 mg twice a day and possible cardioversion was recommended in 3 weeks.  Lopressor was also added to her amiodarone.  She had an echocardiogram which showed a reduced ejection fraction of 20 to 25%.  She does have a left bundle branch block and CRT has been discussed in the past which she has declined due to her age.  She has a past medical history significant for hypertension, persistent atrial fibrillation, LBBB, chronic kidney disease stage IIIa, hyperlipidemia, DVT.    Today, she has fatigue and increased dyspnea on exertion since discharge.  She has trace edema.  She denies lightheadedness, shortness of breath, orthopnea, PND, palpitations, chest pain, and abdominal fullness/bloating.      She is monitoring home weights which have increased to 148 pounds to 152 pounds today.  She is trying to follow a low-sodium diet.  Her food is delivered by [employer].      LIMITED ECHOCARDIOGRAM: 11/30/2023-reviewed  Interpretation Summary     1. Left ventricular chamber size is mildly enlarged. Wall thickness is normal.  Systolic function is severely reduced with global hypokinesis and  intraventricular dyssynchrony due to left bundle branch block. The visually  estimated left ventricular ejection fraction is 20-25%.  2. Right ventricular chamber size and systolic function are normal.  3. Moderate left atrial enlargement. Mild right atrial enlargement.  4. No hemodynamically significant valvular abnormalities.  5. Compared to the prior study dated 8/17/2023, the patient is now in atrial  fibrillation otherwise the findings are similar.     Physical Examination Review of Systems   /72 (BP Location: Left arm, Patient Position: Sitting, Cuff Size: Adult Large)   Pulse 68   Resp 16   Wt 71.2 kg (157 lb)   SpO2  95%   BMI 28.72 kg/m    Body mass index is 28.72 kg/m .  Wt Readings from Last 3 Encounters:   12/14/23 71.2 kg (157 lb)   12/06/23 68.5 kg (151 lb)   12/02/23 68 kg (150 lb)       General Appearance:   no acute distress   ENT/Mouth: No abnormalities   EYES:  no scleral icterus, normal conjunctivae   Neck: no thyromegaly   Chest/Lungs:   lungs are clear to auscultation, no rales or wheezing, equal chest wall expansion    Cardiovascular:   Irregularly irregular. Normal first and second heart sounds with no murmurs, rubs, or gallops, trace edema bilaterally    Abdomen:  bowel sounds are present   Extremities: no cyanosis or clubbing   Skin: warm   Neurologic: no tremors     Psychiatric: alert and oriented x3    Enc Vitals  BP: 104/72  Pulse: 68  Resp: 16  SpO2: 95 %  Weight: 71.2 kg (157 lb)                                         Medical History  Surgical History Family History Social History   Past Medical History:   Diagnosis Date    Angina pectoris (H24)     Atrial fibrillation (H)     Chest pain 03/09/2017    Chronic kidney disease     Congestive heart failure (H)     Cough     Hyperlipidemia     Hypertension     Osteoarthritis     Past Surgical History:   Procedure Laterality Date    APPENDECTOMY      BASAL CELL CARCINOMA EXCISION      nose    LAPAROSCOPY DIAGNOSTIC (GENERAL) N/A 11/04/2014    Procedure: LAPAROSCOPY BILATERAL SALPINGO-OOPHORECTOMY ;  Surgeon: Sofia Harper MD;  Location: [facility];  Service:     OPEN REDUCTION INTERNAL FIXATION HIP BIPOLAR Right 3/5/2023    Procedure: HEMIARTHROPLASTY, HIP, BIPOLAR;  Surgeon: Jose G Martinez MD;  Location: [facility]    TONSILLECTOMY      10 years old    [facility] TOTAL KNEE ARTHROPLASTY Left     2011    Family History   Problem Relation Age of Onset    Heart Disease Mother     Rheumatic Heart Disease Mother     No Known Problems Father     Cancer Brother         brain    Lung Cancer Brother     Lung Cancer Brother     Cancer Sister          lung    Lung Cancer Sister     Social History     Socioeconomic History    Marital status: Single     Spouse name: Not on file    Number of children: Not on file    Years of education: Not on file    Highest education level: Not on file   Occupational History    Not on file   Tobacco Use    Smoking status: Never     Passive exposure: Never    Smokeless tobacco: Never    Tobacco comments:     no passive exposure   Vaping Use    Vaping Use: Never used   Substance and Sexual Activity    Alcohol use: Yes     Comment: Alcoholic Drinks/day: Rarely a glass of wine    Drug use: No    Sexual activity: Not on file   Other Topics Concern    Not on file   Social History Narrative    The patient is a nun.     Social Determinants of Health     Financial Resource Strain: Low Risk  (12/6/2023)    Financial Resource Strain     Within the past 12 months, have you or your family members you live with been unable to get utilities (heat, electricity) when it was really needed?: No   Food Insecurity: Low Risk  (12/6/2023)    Food Insecurity     Within the past 12 months, did you worry that your food would run out before you got money to buy more?: No     Within the past 12 months, did the food you bought just not last and you didn t have money to get more?: No   Transportation Needs: Low Risk  (12/6/2023)    Transportation Needs     Within the past 12 months, has lack of transportation kept you from medical appointments, getting your medicines, non-medical meetings or appointments, work, or from getting things that you need?: No   Physical Activity: Insufficiently Active (3/27/2023)    Exercise Vital Sign     Days of Exercise per Week: 3 days     Minutes of Exercise per Session: 10 min   Stress: No Stress Concern Present (3/27/2023)    [employer] - Occupational Stress Questionnaire     Feeling of Stress : Not at all   Social Connections: Moderately Integrated (3/27/2023)    Social Connection and Isolation  Panel [NHANES]     Frequency of Communication with Friends and Family: More than three times a week     Frequency of Social Gatherings with Friends and Family: More than three times a week     Attends Zoroastrian Services: More than 4 times per year     Active Member of Clubs or Organizations: Yes     Attends Club or Organization Meetings: More than 4 times per year     Marital Status: Never    Interpersonal Safety: Low Risk  (10/5/2023)    Interpersonal Safety     Do you feel physically and emotionally safe where you currently live?: Yes     Within the past 12 months, have you been hit, slapped, kicked or otherwise physically hurt by someone?: No     Within the past 12 months, have you been humiliated or emotionally abused in other ways by your partner or ex-partner?: No   Housing Stability: Low Risk  (12/6/2023)    Housing Stability     Do you have housing? : Yes     Are you worried about losing your housing?: No          Medications  Allergies   Current Outpatient Medications   Medication Sig Dispense Refill    acetaminophen (TYLENOL) 325 MG tablet Take 2 tablets (650 mg) by mouth every 4 hours as needed for other (mild pain) 100 tablet 0    amiodarone (PACERONE) 200 MG tablet Take 0.5 tablets (100 mg) by mouth daily 45 tablet 3    apixaban ANTICOAGULANT (ELIQUIS) 5 MG tablet Take 1 tablet (5 mg) by mouth 2 times daily 60 tablet 1    cholecalciferol, vitamin D3, (VITAMIN D3) 2,000 unit Tab [CHOLECALCIFEROL, VITAMIN D3, (VITAMIN D3) 2,000 UNIT TAB] Take 1 tablet (2,000 Units total) by mouth daily. 90 tablet 3    COMPOUNDED NON-CONTROLLED SUBSTANCE (CMPD RX) - PHARMACY TO MIX COMPOUNDED MEDICATION Ketamine 8% and Ketoprofen 5 % in carrier gel/lotion. Apply 2-4 pumps to affected areas QID  g 1    DULoxetine (CYMBALTA) 60 MG capsule Take 60 mg by mouth daily      furosemide (LASIX) 20 MG tablet Take 2 tablets (40 mg) by mouth daily 60 tablet 1    KLOR-CON 20 MEQ CR tablet TAKE ONE TABLET BY MOUTH ONE TIME  "DAILY 90 tablet 3    lisinopril (ZESTRIL) 2.5 MG tablet TAKE ONE TABLET BY MOUTH ONE TIME DAILY 90 tablet 1    metoprolol tartrate (LOPRESSOR) 25 MG tablet Take 1 tablet (25 mg) by mouth 2 times daily 60 tablet 1    oxyCODONE (OXYCONTIN) 10 MG 12 hr tablet Take 1 tablet (10 mg) by mouth at bedtime for 30 days 30 tablet 0    oxyCODONE (ROXICODONE) 5 MG tablet Take 1 tablet (5 mg) by mouth 3 times daily as needed for moderate to severe pain #70 tabs to last 30 days for chronic pain. Ok to fill 11/28/23 to begin using 11/30/23 70 tablet 0    zolpidem ER (AMBIEN CR) 6.25 MG CR tablet Take 1 and 1/4 tablet by mouth at bedtime 45 tablet 5    diclofenac (FLECTOR) 1.3 % patch Externally apply 1 patch topically at bedtime (Patient not taking: Reported on 12/14/2023)      Lidocaine (LIDOCARE) 4 % Patch Place 1 patch onto the skin every 24 hours Apply as needed to painful area of intact skin. To prevent lidocaine toxicity, patient should be patch free for 12 hrs daily. (Patient not taking: Reported on 12/14/2023) 10 patch 0    Allergies   Allergen Reactions    Trazodone Shortness Of Breath and Unknown     Allergic to trazodone and deriv., Other: trouble swallowing      Clindamycin Diarrhea     C-diff    Gabapentin Other (See Comments)     \"Internal tremors\"    Temazepam Other (See Comments)     Annotation: Nightmares           Lab Results    Chemistry/lipid CBC Cardiac Enzymes/BNP/TSH/INR   Lab Results   Component Value Date    CHOL 179 03/12/2015    HDL 64 03/12/2015    TRIG 176 (H) 03/12/2015    BUN 29.8 (H) 12/06/2023     12/06/2023    CO2 25 12/06/2023    Lab Results   Component Value Date    WBC 8.6 11/30/2023    HGB 13.7 11/30/2023    HCT 44.1 11/30/2023    MCV 94 11/30/2023     11/30/2023    Lab Results   Component Value Date    TROPONINI 0.16 08/23/2022    BNP 1,933 (H) 08/23/2022    TSH 3.99 12/06/2023    INR 1.33 (H) 03/04/2023             This note has been dictated using voice recognition software. Any " grammatical, typographical, or context distortions are unintentional and inherent to the software    40 minutes spent on the date of encounter doing chart review, review of outside records, review of test results, interpretation with above tests, patient visit, and documentation.                [facility]: Heart Failure Care Coordination   Heart Failure Education    Situation/Background:      RN CC provided heart failure education to Pt during clinic visit.    Assessment:      Living situation: home, independent    Barriers to Heart Failure follow-up: none     Medication management: independent    Intervention/Plan:      CM/HF education topics reviewed:  Low sodium: 2000 mg or less daily, meal choices and label reading   Fluid Restriction: 50-60oz daily   Daily weight monitoring and logging   Medication review and importance of compliance   Home blood pressure monitoring and logging   Overview of C.O.R.E. clinic   Importance of exercise   Symptoms of HF to be reported to Core Team      Education materials provided:  Low sodium food and drink handout  Low sodium food product examples  Already prepared low salt meal delivery services handout  HF stoplight tool  Guide to HF booklet  Cardiac medication handout  C.O.R.E. information brochure     HF resources reviewed:  HRS      Patient to follow up as scheduled.  Instructed patient to call RN line with new or worsening heart failure symptoms and/or rapid weight gain.     Patient expressed understanding of above education/instructions and denied further questions at this time.    Pt tracks her wt and BP daily. We reviewed when to call with worsening wt gain and/or symptoms. She tries to follow a low sodium diet. Currently ordering her groceries online. We discussed low sodium vs unsalted options, no salt seasonings, foods to avoid.    Randell Olvera C.O.R.E. RN  BSN      Thank you for allowing me to participate in the care of your patient.      Sincerely,     Bria  SHASHA Douglass CNP     [facility]  cc:   Thad Corral MD  [address]  [place] [address]

## 2023-12-14 NOTE — PROGRESS NOTES
[facility]: Heart Failure Care Coordination   Heart Failure Education    Situation/Background:      RN ANTIONETTE provided heart failure education to Pt during clinic visit.    Assessment:      Living situation: home, independent    Barriers to Heart Failure follow-up: none     Medication management: independent    Intervention/Plan:      CM/HF education topics reviewed:  Low sodium: 2000 mg or less daily, meal choices and label reading   Fluid Restriction: 50-60oz daily   Daily weight monitoring and logging   Medication review and importance of compliance   Home blood pressure monitoring and logging   Overview of C.O.R.E. clinic   Importance of exercise   Symptoms of HF to be reported to Core Team      Education materials provided:  Low sodium food and drink handout  Low sodium food product examples  Already prepared low salt meal delivery services handout  HF stoplight tool  Guide to HF booklet  Cardiac medication handout  C.O.R.E. information brochure     HF resources reviewed:  HRS      Patient to follow up as scheduled.  Instructed patient to call RN line with new or worsening heart failure symptoms and/or rapid weight gain.     Patient expressed understanding of above education/instructions and denied further questions at this time.    Pt tracks her wt and BP daily. We reviewed when to call with worsening wt gain and/or symptoms. She tries to follow a low sodium diet. Currently ordering her groceries online. We discussed low sodium vs unsalted options, no salt seasonings, foods to avoid.    Randell PIÑARDAVI GUALLPA  BSN

## 2023-12-14 NOTE — PROGRESS NOTES
Assessment/Recommendations   Assessment:    1. Heart failure with reduced ejection fraction, ejection fraction 20-25%, NYHA class III: Decompensated.  She states she has had increased dyspnea on exertion since discharge.  Her weight has also increased 4 pounds.  She has trace edema.  She denies orthopnea or PND.  We discussed monitoring weights, following a low-sodium diet, symptoms.  She does not always follow a low-sodium diet and she met with the nurse clinician for further education.  2.  Persistent atrial fibrillation: Rate controlled.  She continues amiodarone 100 mg daily, Lopressor for rate control and Eliquis for anticoagulation.  Discussion for possible cardioversion to see if normal sinus rhythm would improve her symptoms.  She meets with Ibis Pedro CNP December 22 to discuss further  3.  Chronic kidney disease stage IIIa: She had labs last week which were stable  4.  Hypertension: Controlled.  Blood pressure 104/72.  She denies any lightheadedness or dizziness    Plan:  1.  Take an extra 20 mg of Lasix for the next 3 days.  We will call her on Monday for an update to see if her shortness of breath has improved or any weight loss  2.  Continue monitoring daily weights and stressed importance of low-sodium diet  3.  Encouraged regular activity    Gladys Ramirez will follow up with Dr. Holley in 3 months, Ibis Pedro December 22 and in the heart failure clinic in 1 month.     History of Present Illness/Subjective    Ms. Gladys Ramirez is a 89 year old female seen at [facility] heart failure clinic today for continued follow-up.  She follows up for heart failure with reduced ejection fraction.  She was hospitalized November 29 to December 2 due to acute heart failure and lightheadedness.  She received IV Lasix and symptoms improved.  During hospitalization and felt that her atrial fibrillation was contributing to the development of her symptoms.  Her Eliquis dose was increased  to 5 mg twice a day and possible cardioversion was recommended in 3 weeks.  Lopressor was also added to her amiodarone.  She had an echocardiogram which showed a reduced ejection fraction of 20 to 25%.  She does have a left bundle branch block and CRT has been discussed in the past which she has declined due to her age.  She has a past medical history significant for hypertension, persistent atrial fibrillation, LBBB, chronic kidney disease stage IIIa, hyperlipidemia, DVT.    Today, she has fatigue and increased dyspnea on exertion since discharge.  She has trace edema.  She denies lightheadedness, shortness of breath, orthopnea, PND, palpitations, chest pain, and abdominal fullness/bloating.      She is monitoring home weights which have increased to 148 pounds to 152 pounds today.  She is trying to follow a low-sodium diet.  Her food is delivered by [employer].      LIMITED ECHOCARDIOGRAM: 11/30/2023-reviewed  Interpretation Summary     1. Left ventricular chamber size is mildly enlarged. Wall thickness is normal.  Systolic function is severely reduced with global hypokinesis and  intraventricular dyssynchrony due to left bundle branch block. The visually  estimated left ventricular ejection fraction is 20-25%.  2. Right ventricular chamber size and systolic function are normal.  3. Moderate left atrial enlargement. Mild right atrial enlargement.  4. No hemodynamically significant valvular abnormalities.  5. Compared to the prior study dated 8/17/2023, the patient is now in atrial  fibrillation otherwise the findings are similar.     Physical Examination Review of Systems   /72 (BP Location: Left arm, Patient Position: Sitting, Cuff Size: Adult Large)   Pulse 68   Resp 16   Wt 71.2 kg (157 lb)   SpO2 95%   BMI 28.72 kg/m    Body mass index is 28.72 kg/m .  Wt Readings from Last 3 Encounters:   12/14/23 71.2 kg (157 lb)   12/06/23 68.5 kg (151 lb)   12/02/23 68 kg (150 lb)       General Appearance:   no acute  distress   ENT/Mouth: No abnormalities   EYES:  no scleral icterus, normal conjunctivae   Neck: no thyromegaly   Chest/Lungs:   lungs are clear to auscultation, no rales or wheezing, equal chest wall expansion    Cardiovascular:   Irregularly irregular. Normal first and second heart sounds with no murmurs, rubs, or gallops, trace edema bilaterally    Abdomen:  bowel sounds are present   Extremities: no cyanosis or clubbing   Skin: warm   Neurologic: no tremors     Psychiatric: alert and oriented x3    Enc Vitals  BP: 104/72  Pulse: 68  Resp: 16  SpO2: 95 %  Weight: 71.2 kg (157 lb)                                         Medical History  Surgical History Family History Social History   Past Medical History:   Diagnosis Date    Angina pectoris (H24)     Atrial fibrillation (H)     Chest pain 03/09/2017    Chronic kidney disease     Congestive heart failure (H)     Cough     Hyperlipidemia     Hypertension     Osteoarthritis     Past Surgical History:   Procedure Laterality Date    APPENDECTOMY      BASAL CELL CARCINOMA EXCISION      nose    LAPAROSCOPY DIAGNOSTIC (GENERAL) N/A 11/04/2014    Procedure: LAPAROSCOPY BILATERAL SALPINGO-OOPHORECTOMY ;  Surgeon: Sofia Harper MD;  Location: [facility] [place];  Service:     OPEN REDUCTION INTERNAL FIXATION HIP BIPOLAR Right 3/5/2023    Procedure: HEMIARTHROPLASTY, HIP, BIPOLAR;  Surgeon: Jose G Martinez MD;  Location: [facility] [place]    TONSILLECTOMY      10 years old    ZZC TOTAL KNEE ARTHROPLASTY Left     2011    Family History   Problem Relation Age of Onset    Heart Disease Mother     Rheumatic Heart Disease Mother     No Known Problems Father     Cancer Brother         brain    Lung Cancer Brother     Lung Cancer Brother     Cancer Sister         lung    Lung Cancer Sister     Social History     Socioeconomic History    Marital status: Single     Spouse name: Not on file    Number of children: Not on file    Years of education: Not on file    Highest  education level: Not on file   Occupational History    Not on file   Tobacco Use    Smoking status: Never     Passive exposure: Never    Smokeless tobacco: Never    Tobacco comments:     no passive exposure   Vaping Use    Vaping Use: Never used   Substance and Sexual Activity    Alcohol use: Yes     Comment: Alcoholic Drinks/day: Rarely a glass of wine    Drug use: No    Sexual activity: Not on file   Other Topics Concern    Not on file   Social History Narrative    The patient is a nun.     Social Determinants of Health     Financial Resource Strain: Low Risk  (12/6/2023)    Financial Resource Strain     Within the past 12 months, have you or your family members you live with been unable to get utilities (heat, electricity) when it was really needed?: No   Food Insecurity: Low Risk  (12/6/2023)    Food Insecurity     Within the past 12 months, did you worry that your food would run out before you got money to buy more?: No     Within the past 12 months, did the food you bought just not last and you didn t have money to get more?: No   Transportation Needs: Low Risk  (12/6/2023)    Transportation Needs     Within the past 12 months, has lack of transportation kept you from medical appointments, getting your medicines, non-medical meetings or appointments, work, or from getting things that you need?: No   Physical Activity: Insufficiently Active (3/27/2023)    Exercise Vital Sign     Days of Exercise per Week: 3 days     Minutes of Exercise per Session: 10 min   Stress: No Stress Concern Present (3/27/2023)    [employer] [place] [employer] - Occupational Stress Questionnaire     Feeling of Stress : Not at all   Social Connections: Moderately Integrated (3/27/2023)    Social Connection and Isolation Panel [NHANES]     Frequency of Communication with Friends and Family: More than three times a week     Frequency of Social Gatherings with Friends and Family: More than three times a week     Attends Protestant  Services: More than 4 times per year     Active Member of Clubs or Organizations: Yes     Attends Club or Organization Meetings: More than 4 times per year     Marital Status: Never    Interpersonal Safety: Low Risk  (10/5/2023)    Interpersonal Safety     Do you feel physically and emotionally safe where you currently live?: Yes     Within the past 12 months, have you been hit, slapped, kicked or otherwise physically hurt by someone?: No     Within the past 12 months, have you been humiliated or emotionally abused in other ways by your partner or ex-partner?: No   Housing Stability: Low Risk  (12/6/2023)    Housing Stability     Do you have housing? : Yes     Are you worried about losing your housing?: No          Medications  Allergies   Current Outpatient Medications   Medication Sig Dispense Refill    acetaminophen (TYLENOL) 325 MG tablet Take 2 tablets (650 mg) by mouth every 4 hours as needed for other (mild pain) 100 tablet 0    amiodarone (PACERONE) 200 MG tablet Take 0.5 tablets (100 mg) by mouth daily 45 tablet 3    apixaban ANTICOAGULANT (ELIQUIS) 5 MG tablet Take 1 tablet (5 mg) by mouth 2 times daily 60 tablet 1    cholecalciferol, vitamin D3, (VITAMIN D3) 2,000 unit Tab [CHOLECALCIFEROL, VITAMIN D3, (VITAMIN D3) 2,000 UNIT TAB] Take 1 tablet (2,000 Units total) by mouth daily. 90 tablet 3    COMPOUNDED NON-CONTROLLED SUBSTANCE (CMPD RX) - PHARMACY TO MIX COMPOUNDED MEDICATION Ketamine 8% and Ketoprofen 5 % in carrier gel/lotion. Apply 2-4 pumps to affected areas QID  g 1    DULoxetine (CYMBALTA) 60 MG capsule Take 60 mg by mouth daily      furosemide (LASIX) 20 MG tablet Take 2 tablets (40 mg) by mouth daily 60 tablet 1    KLOR-CON 20 MEQ CR tablet TAKE ONE TABLET BY MOUTH ONE TIME DAILY 90 tablet 3    lisinopril (ZESTRIL) 2.5 MG tablet TAKE ONE TABLET BY MOUTH ONE TIME DAILY 90 tablet 1    metoprolol tartrate (LOPRESSOR) 25 MG tablet Take 1 tablet (25 mg) by mouth 2 times daily 60 tablet  "1    oxyCODONE (OXYCONTIN) 10 MG 12 hr tablet Take 1 tablet (10 mg) by mouth at bedtime for 30 days 30 tablet 0    oxyCODONE (ROXICODONE) 5 MG tablet Take 1 tablet (5 mg) by mouth 3 times daily as needed for moderate to severe pain #70 tabs to last 30 days for chronic pain. Ok to fill 11/28/23 to begin using 11/30/23 70 tablet 0    zolpidem ER (AMBIEN CR) 6.25 MG CR tablet Take 1 and 1/4 tablet by mouth at bedtime 45 tablet 5    diclofenac (FLECTOR) 1.3 % patch Externally apply 1 patch topically at bedtime (Patient not taking: Reported on 12/14/2023)      Lidocaine (LIDOCARE) 4 % Patch Place 1 patch onto the skin every 24 hours Apply as needed to painful area of intact skin. To prevent lidocaine toxicity, patient should be patch free for 12 hrs daily. (Patient not taking: Reported on 12/14/2023) 10 patch 0    Allergies   Allergen Reactions    Trazodone Shortness Of Breath and Unknown     Allergic to trazodone and deriv., Other: trouble swallowing      Clindamycin Diarrhea     C-diff    Gabapentin Other (See Comments)     \"Internal tremors\"    Temazepam Other (See Comments)     Annotation: Nightmares           Lab Results    Chemistry/lipid CBC Cardiac Enzymes/BNP/TSH/INR   Lab Results   Component Value Date    CHOL 179 03/12/2015    HDL 64 03/12/2015    TRIG 176 (H) 03/12/2015    BUN 29.8 (H) 12/06/2023     12/06/2023    CO2 25 12/06/2023    Lab Results   Component Value Date    WBC 8.6 11/30/2023    HGB 13.7 11/30/2023    HCT 44.1 11/30/2023    MCV 94 11/30/2023     11/30/2023    Lab Results   Component Value Date    TROPONINI 0.16 08/23/2022    BNP 1,933 (H) 08/23/2022    TSH 3.99 12/06/2023    INR 1.33 (H) 03/04/2023             This note has been dictated using voice recognition software. Any grammatical, typographical, or context distortions are unintentional and inherent to the software    40 minutes spent on the date of encounter doing chart review, review of outside records, review of test " results, interpretation with above tests, patient visit, and documentation.

## 2023-12-14 NOTE — PATIENT INSTRUCTIONS
Gladys Ramirez,    It was a pleasure to see you today at [facility].     My recommendations after this visit include:  - Please follow up with Dr Holley in 3 months   - Please follow up with Ibis Pedro December 22  - Please follow up with Bria Hinds in 4 weeks  - Take and extra 20 mg of lasix in the afternoon for 3 days and we will call you Monday to see if any improvement or weight loss    Bria Hinds, CNP

## 2023-12-15 ENCOUNTER — TELEPHONE (OUTPATIENT)
Dept: FAMILY MEDICINE | Facility: CLINIC | Age: 88
End: 2023-12-15

## 2023-12-15 NOTE — TELEPHONE ENCOUNTER
HomeCare nurse calling to report that patient has refused two times home visits. Does PCP have any questions or concerns you may reach Gretel at [phone].

## 2023-12-18 ENCOUNTER — TELEPHONE (OUTPATIENT)
Dept: CARDIOLOGY | Facility: CLINIC | Age: 88
End: 2023-12-18

## 2023-12-18 DIAGNOSIS — R06.09 DYSPNEA ON EXERTION: ICD-10-CM

## 2023-12-18 RX ORDER — FUROSEMIDE 20 MG
20 TABLET ORAL DAILY
Qty: 90 TABLET | Refills: 1 | Status: ON HOLD | OUTPATIENT
Start: 2023-12-18 | End: 2023-12-24

## 2023-12-18 NOTE — TELEPHONE ENCOUNTER
Pt was called and informed to go back to 20mg of lasix daily. She agreed to this. Med list updated.    Randell POSADAS RN  BSN

## 2023-12-18 NOTE — TELEPHONE ENCOUNTER
Prior Authorization Approval    Medication: OXYCONTIN 10 MG PO T12A  Authorization Effective Date: 11/13/2023  Authorization Expiration Date: 12/12/2024  Approved Dose/Quantity:   Reference #: [id number]   Insurance Company: [employer] Non-Specialty PA's - Phone [phone] Fax [phone]  Expected CoPay: $    CoPay Card Available:      Financial Assistance Needed:   Which Pharmacy is filling the prescription: [id number] - [place] [[address]  Pharmacy Notified: Yes  Patient Notified:

## 2023-12-18 NOTE — TELEPHONE ENCOUNTER
----- Message from SHASHA Haney CNP sent at 12/18/2023 12:36 PM CST -----  Regarding: FW: increase lasix for 3 days  Will you please call and see if any improvement in sx with increasing lasix last 3 days. Thank you    ----- Message -----  From: Bria Hinds APRN CNP  Sent: 12/18/2023  12:00 AM CST  To: SHASHA Haney CNP  Subject: increase lasix for 3 days                        Call patient to see if extra 20 mg of lasix for 3 days helped SOB or any weight loss.

## 2023-12-20 ENCOUNTER — HOSPITAL ENCOUNTER (INPATIENT)
Facility: HOSPITAL | Age: 88
LOS: 4 days | Discharge: HOME HEALTH | DRG: 291 | End: 2023-12-24
Attending: EMERGENCY MEDICINE
Payer: COMMERCIAL

## 2023-12-20 ENCOUNTER — APPOINTMENT (OUTPATIENT)
Dept: RADIOLOGY | Facility: HOSPITAL | Age: 88
DRG: 291 | End: 2023-12-20
Attending: EMERGENCY MEDICINE
Payer: COMMERCIAL

## 2023-12-20 DIAGNOSIS — R09.02 HYPOXIA: ICD-10-CM

## 2023-12-20 DIAGNOSIS — I50.9 ACUTE CONGESTIVE HEART FAILURE, UNSPECIFIED HEART FAILURE TYPE (H): ICD-10-CM

## 2023-12-20 LAB
ANION GAP SERPL CALCULATED.3IONS-SCNC: 11 MMOL/L (ref 7–15)
BASOPHILS # BLD AUTO: 0.1 10E3/UL (ref 0–0.2)
BASOPHILS NFR BLD AUTO: 1 %
BUN SERPL-MCNC: 24 MG/DL (ref 8–23)
CALCIUM SERPL-MCNC: 9.6 MG/DL (ref 8.2–9.6)
CHLORIDE SERPL-SCNC: 105 MMOL/L (ref 98–107)
CREAT SERPL-MCNC: 1.16 MG/DL (ref 0.51–0.95)
DEPRECATED HCO3 PLAS-SCNC: 26 MMOL/L (ref 22–29)
EGFRCR SERPLBLD CKD-EPI 2021: 44 ML/MIN/1.73M2
EOSINOPHIL # BLD AUTO: 0.1 10E3/UL (ref 0–0.7)
EOSINOPHIL NFR BLD AUTO: 1 %
ERYTHROCYTE [DISTWIDTH] IN BLOOD BY AUTOMATED COUNT: 15.5 % (ref 10–15)
GLUCOSE SERPL-MCNC: 131 MG/DL (ref 70–99)
HCT VFR BLD AUTO: 40.3 % (ref 35–47)
HGB BLD-MCNC: 12.7 G/DL (ref 11.7–15.7)
IMM GRANULOCYTES # BLD: 0 10E3/UL
IMM GRANULOCYTES NFR BLD: 0 %
LYMPHOCYTES # BLD AUTO: 1.3 10E3/UL (ref 0.8–5.3)
LYMPHOCYTES NFR BLD AUTO: 16 %
MAGNESIUM SERPL-MCNC: 2.1 MG/DL (ref 1.7–2.3)
MCH RBC QN AUTO: 29.6 PG (ref 26.5–33)
MCHC RBC AUTO-ENTMCNC: 31.5 G/DL (ref 31.5–36.5)
MCV RBC AUTO: 94 FL (ref 78–100)
MONOCYTES # BLD AUTO: 0.7 10E3/UL (ref 0–1.3)
MONOCYTES NFR BLD AUTO: 9 %
NEUTROPHILS # BLD AUTO: 6 10E3/UL (ref 1.6–8.3)
NEUTROPHILS NFR BLD AUTO: 73 %
NRBC # BLD AUTO: 0 10E3/UL
NRBC BLD AUTO-RTO: 0 /100
NT-PROBNP SERPL-MCNC: ABNORMAL PG/ML (ref 0–1800)
PLATELET # BLD AUTO: 289 10E3/UL (ref 150–450)
POTASSIUM SERPL-SCNC: 4.3 MMOL/L (ref 3.4–5.3)
RBC # BLD AUTO: 4.29 10E6/UL (ref 3.8–5.2)
SODIUM SERPL-SCNC: 142 MMOL/L (ref 135–145)
TROPONIN T SERPL HS-MCNC: 32 NG/L
TROPONIN T SERPL HS-MCNC: 32 NG/L
WBC # BLD AUTO: 8.2 10E3/UL (ref 4–11)

## 2023-12-20 PROCEDURE — 99285 EMERGENCY DEPT VISIT HI MDM: CPT | Mod: 25

## 2023-12-20 PROCEDURE — 96374 THER/PROPH/DIAG INJ IV PUSH: CPT | Mod: 59

## 2023-12-20 PROCEDURE — 71045 X-RAY EXAM CHEST 1 VIEW: CPT

## 2023-12-20 PROCEDURE — 250N000013 HC RX MED GY IP 250 OP 250 PS 637: Performed by: INTERNAL MEDICINE

## 2023-12-20 PROCEDURE — 83880 ASSAY OF NATRIURETIC PEPTIDE: CPT | Performed by: EMERGENCY MEDICINE

## 2023-12-20 PROCEDURE — 85025 COMPLETE CBC W/AUTO DIFF WBC: CPT | Performed by: EMERGENCY MEDICINE

## 2023-12-20 PROCEDURE — 82374 ASSAY BLOOD CARBON DIOXIDE: CPT | Performed by: EMERGENCY MEDICINE

## 2023-12-20 PROCEDURE — 210N000001 HC R&B IMCU HEART CARE

## 2023-12-20 PROCEDURE — 250N000011 HC RX IP 250 OP 636: Mod: JZ | Performed by: EMERGENCY MEDICINE

## 2023-12-20 PROCEDURE — 99223 1ST HOSP IP/OBS HIGH 75: CPT | Performed by: INTERNAL MEDICINE

## 2023-12-20 PROCEDURE — 83735 ASSAY OF MAGNESIUM: CPT | Performed by: INTERNAL MEDICINE

## 2023-12-20 PROCEDURE — 250N000011 HC RX IP 250 OP 636: Mod: JZ | Performed by: INTERNAL MEDICINE

## 2023-12-20 PROCEDURE — 36415 COLL VENOUS BLD VENIPUNCTURE: CPT | Performed by: EMERGENCY MEDICINE

## 2023-12-20 PROCEDURE — 84484 ASSAY OF TROPONIN QUANT: CPT | Performed by: EMERGENCY MEDICINE

## 2023-12-20 PROCEDURE — 93005 ELECTROCARDIOGRAM TRACING: CPT | Performed by: EMERGENCY MEDICINE

## 2023-12-20 PROCEDURE — 93005 ELECTROCARDIOGRAM TRACING: CPT | Performed by: STUDENT IN AN ORGANIZED HEALTH CARE EDUCATION/TRAINING PROGRAM

## 2023-12-20 RX ORDER — OXYCODONE HYDROCHLORIDE 5 MG/1
5 TABLET ORAL 3 TIMES DAILY PRN
Status: DISCONTINUED | OUTPATIENT
Start: 2023-12-20 | End: 2023-12-24 | Stop reason: HOSPADM

## 2023-12-20 RX ORDER — ZOLPIDEM TARTRATE 5 MG/1
5 TABLET ORAL
Status: DISCONTINUED | OUTPATIENT
Start: 2023-12-20 | End: 2023-12-24 | Stop reason: HOSPADM

## 2023-12-20 RX ORDER — METOPROLOL TARTRATE 25 MG/1
25 TABLET, FILM COATED ORAL 2 TIMES DAILY
Status: DISCONTINUED | OUTPATIENT
Start: 2023-12-20 | End: 2023-12-24 | Stop reason: HOSPADM

## 2023-12-20 RX ORDER — AMIODARONE HYDROCHLORIDE 100 MG/1
100 TABLET ORAL DAILY
Status: DISCONTINUED | OUTPATIENT
Start: 2023-12-21 | End: 2023-12-24 | Stop reason: HOSPADM

## 2023-12-20 RX ORDER — ACETAMINOPHEN 325 MG/1
650 TABLET ORAL EVERY 4 HOURS PRN
Status: DISCONTINUED | OUTPATIENT
Start: 2023-12-20 | End: 2023-12-24 | Stop reason: HOSPADM

## 2023-12-20 RX ORDER — HYDRALAZINE HYDROCHLORIDE 20 MG/ML
10 INJECTION INTRAMUSCULAR; INTRAVENOUS EVERY 4 HOURS PRN
Status: DISCONTINUED | OUTPATIENT
Start: 2023-12-20 | End: 2023-12-24 | Stop reason: HOSPADM

## 2023-12-20 RX ORDER — LISINOPRIL 2.5 MG/1
2.5 TABLET ORAL DAILY
Status: DISCONTINUED | OUTPATIENT
Start: 2023-12-21 | End: 2023-12-24 | Stop reason: HOSPADM

## 2023-12-20 RX ORDER — FUROSEMIDE 10 MG/ML
40 INJECTION INTRAMUSCULAR; INTRAVENOUS EVERY 8 HOURS
Status: DISCONTINUED | OUTPATIENT
Start: 2023-12-20 | End: 2023-12-21

## 2023-12-20 RX ORDER — DULOXETIN HYDROCHLORIDE 60 MG/1
60 CAPSULE, DELAYED RELEASE ORAL DAILY
Status: DISCONTINUED | OUTPATIENT
Start: 2023-12-21 | End: 2023-12-24 | Stop reason: HOSPADM

## 2023-12-20 RX ORDER — FUROSEMIDE 10 MG/ML
20 INJECTION INTRAMUSCULAR; INTRAVENOUS ONCE
Status: COMPLETED | OUTPATIENT
Start: 2023-12-20 | End: 2023-12-20

## 2023-12-20 RX ADMIN — METOPROLOL TARTRATE 25 MG: 25 TABLET, FILM COATED ORAL at 20:28

## 2023-12-20 RX ADMIN — FUROSEMIDE 40 MG: 10 INJECTION, SOLUTION INTRAMUSCULAR; INTRAVENOUS at 18:00

## 2023-12-20 RX ADMIN — OXYCODONE HYDROCHLORIDE 5 MG: 5 TABLET ORAL at 18:01

## 2023-12-20 RX ADMIN — ZOLPIDEM TARTRATE 5 MG: 5 TABLET ORAL at 20:28

## 2023-12-20 RX ADMIN — APIXABAN 5 MG: 5 TABLET, FILM COATED ORAL at 20:28

## 2023-12-20 RX ADMIN — FUROSEMIDE 20 MG: 10 INJECTION, SOLUTION INTRAMUSCULAR; INTRAVENOUS at 14:35

## 2023-12-20 ASSESSMENT — ACTIVITIES OF DAILY LIVING (ADL)
ADLS_ACUITY_SCORE: 26
ADLS_ACUITY_SCORE: 35
ADLS_ACUITY_SCORE: 35

## 2023-12-20 ASSESSMENT — ENCOUNTER SYMPTOMS
ABDOMINAL PAIN: 0
SHORTNESS OF BREATH: 1
UNEXPECTED WEIGHT CHANGE: 0

## 2023-12-20 NOTE — PHARMACY-ADMISSION MEDICATION HISTORY
Pharmacist Admission Medication History    Admission medication history is complete. The information provided in this note is only as accurate as the sources available at the time of the update.    Information Source(s): Patient and CareEverywhere/[employer]s via in-person    Pertinent Information: Patient does not have pain cream with her     Changes made to PTA medication list:  Added: None  Deleted: diclofenac patch, lidocaine patch, oxycontin   Changed: None      Allergies reviewed with patient and updates made in EHR: yes    Medication History Completed By: JOCELYNE ADEN RPH 12/20/2023 3:53 PM    PTA Med List   Medication Sig Last Dose    acetaminophen (TYLENOL) 325 MG tablet Take 2 tablets (650 mg) by mouth every 4 hours as needed for other (mild pain) (Patient taking differently: Take 650 mg by mouth every 4 hours as needed for mild pain) Past Month at prn    amiodarone (PACERONE) 200 MG tablet Take 0.5 tablets (100 mg) by mouth daily 12/20/2023 at am    apixaban ANTICOAGULANT (ELIQUIS) 5 MG tablet Take 1 tablet (5 mg) by mouth 2 times daily 12/20/2023 at am x 1    cholecalciferol, vitamin D3, (VITAMIN D3) 2,000 unit Tab [CHOLECALCIFEROL, VITAMIN D3, (VITAMIN D3) 2,000 UNIT TAB] Take 1 tablet (2,000 Units total) by mouth daily. 12/20/2023 at am    COMPOUNDED NON-CONTROLLED SUBSTANCE (CMPD RX) - PHARMACY TO MIX COMPOUNDED MEDICATION Ketamine 8% and Ketoprofen 5 % in carrier gel/lotion. Apply 2-4 pumps to affected areas QID PRN Past Week at prn to knee    DULoxetine (CYMBALTA) 60 MG capsule Take 60 mg by mouth daily 12/20/2023 at am    furosemide (LASIX) 20 MG tablet Take 1 tablet (20 mg) by mouth daily 12/20/2023 at am    KLOR-CON 20 MEQ CR tablet TAKE ONE TABLET BY MOUTH ONE TIME DAILY 12/20/2023 at am    lisinopril (ZESTRIL) 2.5 MG tablet TAKE ONE TABLET BY MOUTH ONE TIME DAILY 12/20/2023 at am    metoprolol tartrate (LOPRESSOR) 25 MG tablet Take 1 tablet (25 mg) by mouth 2 times daily 12/20/2023 at am x 1     oxyCODONE (ROXICODONE) 5 MG tablet Take 1 tablet (5 mg) by mouth 3 times daily as needed for moderate to severe pain #70 tabs to last 30 days for chronic pain. Ok to fill 11/28/23 to begin using 11/30/23 12/20/2023 at am x 1    zolpidem ER (AMBIEN CR) 6.25 MG CR tablet Take 1 and 1/4 tablet by mouth at bedtime 12/19/2023 at hs

## 2023-12-20 NOTE — ED NOTES
"[facility] ED Handoff Report    ED Chief Complaint: SOB     ED Diagnosis:  (I50.9) Acute congestive heart failure, unspecified heart failure type (H)  Comment: N/A  Plan: N/A    (R09.02) Hypoxia  Comment: N/A  Plan: N/A       PMH:    Past Medical History:   Diagnosis Date    Angina pectoris (H24)     Atrial fibrillation (H)     Chest pain 03/09/2017    Chronic kidney disease     Congestive heart failure (H)     Cough     Hyperlipidemia     Hypertension     Osteoarthritis         Code Status:  Prior     Falls Risk: Yes Band: Applied    Current Living Situation/Residence: lives alone     Elimination Status: Continent: Yes     Activity Level: SBA w/ walker    Patients Preferred Language:  English     Needed: No    Vital Signs:  BP (!) 178/136   Pulse 101   Temp 97  F (36.1  C) (Temporal)   Resp 24   Ht 1.575 m (5' 2\")   Wt 68.8 kg (151 lb 11.2 oz)   SpO2 93%   BMI 27.75 kg/m       Cardiac Rhythm: Atrial Fibrillation     Pain Score: 0/10    Is the Patient Confused:  No    Last Food or Drink: 12/20/23 at Unknown.     Focused Assessment:      Pt presents for evaluation of SOB and a palpitation sensation in her chest that began early morning. Hx of CHF with hospital admissions in the past. SKIN: WNL.   HEENT: Normocephalic, alert and oriented x 3.   CHEST: Symmetrical rise, lung sounds diminished bilaterally. No reported CP or SOB while laying in bed. Hypoxic in triage, placed on 2 L via N/C. Pt comfortable in the bed.   ABD: No reported N&V or diarrhea.  EXT: STORM x 4  Rest of exam unremarkable.  Plan for diuresis and monitor.       Tests Performed: Done: Labs and Imaging    Treatments Provided:  See MAR.     Family Dynamics/Concerns: No    Family Updated On Visitor Policy: No    Plan of Care Communicated to Family: No        Belongings Checklist Done and Signed by Patient: Yes    Medications sent with patient: N/A      Additional Information: N/A    RN: Gagan Corral RN   12/20/2023 5:00 " PM

## 2023-12-20 NOTE — ED TRIAGE NOTES
Patient arrives with sob, and heart fluttering sensation. Hx Afib. Symptoms began around 0400. O2 90 in triage, placed on 2 LPM.

## 2023-12-20 NOTE — ED PROVIDER NOTES
EMERGENCY DEPARTMENT ENCOUNTER      NAME: Gladys Ramirez  AGE: 89 year old female  YOB: 1930  MRN: [id number]  EVALUATION DATE & TIME: 2023  1:34 PM    PCP: Margret Flores    ED PROVIDER: Juventino Castellano M.D.      Chief Complaint   Patient presents with    Shortness of Breath         FINAL IMPRESSION:  1.  Acute dyspnea.  2.  Acute CHF.  3.  Acute hypoxia.    ED COURSE & MEDICAL DECISION MAKIN:04 PM I met with the patient to gather history and to perform my initial exam. We discussed plans for the ED course, including diagnostic testing and treatment. PPE worn: cloth mask.  Patient presenting shortness of breath for the last day or 2.  Hypoxic to 90% on arrival.  Better on 2 L.  Chronic atrial fibrillation rate controlled.  Patient on Eliquis.  Patient with admission  for CHF.  Patient with similar symptoms.  3:24 PM Rechecked patient.  Patient with hypoxia and congestive heart failure.  Patient given Lasix.  Plan admit patient to cardiac telemetry inpatient.  Patient in agreement.  We will page the admitting service.  3:43 PM.  Hospitalist in agreement.    Pertinent Labs & Imaging studies reviewed. (See chart for details)  89 year old female presents to the Emergency Department for evaluation of shortness of breath.    At the conclusion of the encounter I discussed the results of all of the tests and the disposition. The questions were answered. The patient or family acknowledged understanding and was agreeable with the care plan.              Medical Decision Making    History:  Supplemental history from: Documented in chart, if applicable  External Record(s) reviewed: Outpatient Record: [facility] 14-Dec-2023    Work Up:  Chart documentation includes differential considered and any EKGs or imaging independently interpreted by provider, where specified.  Differential diagnosis includes pneumonia, coronary syndrome, CHF, etc.  In additional to work up  documented, I considered the following work up: Documented in chart, if applicable.    External consultation:  Discussion of management with another provider: Documented in chart, if applicable and Hospitalist    Complicating factors:  Care impacted by chronic illness: Anticoagulated State, Chronic Kidney Disease, Chronic Pain, Heart Disease, Hyperlipidemia, Hypertension, and Other: congestive heart failure, atrial fibrillation,  Care affected by social determinants of health: N/A    Disposition considerations: Patient will probably require admission for CHF.          MEDICATIONS GIVEN IN THE EMERGENCY:  Medications - No data to display    NEW PRESCRIPTIONS STARTED AT TODAY'S ER VISIT  New Prescriptions    No medications on file          =================================================================    HPI    Patient information was obtained from: patient    Use of : N/A       Gladys Ramirez is a 89 year old female with a pertinent history of CHF, DVT, PE, CKD stage 3, who presents to this ED via walk-in for evaluation of shortness of breath.    Patient  has had intermittent shortness of breath for the past few weeks without improvement. She currently has no shortness of breath, but when she does have it, it is worse than at her hospital admission at the end of November. Her weight is about the same. She denies abdominal pain.    She does not identify any waxing or waning symptoms otherwise, exacerbating or alleviating features, associated symptoms except as mentioned. She denies any pain related complaints.    Per chart review, the patient presented to [facility] on 14-Dec-2023 for evaluation of heart failure check up. Since hospital discharge on 02-Dec-2023, the patient has had dyspnea. She has trace edema. Advised to take an extra 20 mg of Lasix for the next 3 days (baseline dose is 20 mg), and advised to check up on shortness of breath 4 days after appointment.    REVIEW OF SYSTEMS  "  Review of Systems   Constitutional:  Negative for unexpected weight change.   Respiratory:  Positive for shortness of breath.    Gastrointestinal:  Negative for abdominal pain.        PAST MEDICAL HISTORY:  Past Medical History:   Diagnosis Date    Angina pectoris (H24)     Atrial fibrillation (H)     Chest pain 03/09/2017    Chronic kidney disease     Congestive heart failure (H)     Cough     Hyperlipidemia     Hypertension     Osteoarthritis        PAST SURGICAL HISTORY:  Past Surgical History:   Procedure Laterality Date    APPENDECTOMY      BASAL CELL CARCINOMA EXCISION      nose    LAPAROSCOPY DIAGNOSTIC (GENERAL) N/A 11/04/2014    Procedure: LAPAROSCOPY BILATERAL SALPINGO-OOPHORECTOMY ;  Surgeon: Sofia Harper MD;  Location: [facility] [place];  Service:     OPEN REDUCTION INTERNAL FIXATION HIP BIPOLAR Right 3/5/2023    Procedure: HEMIARTHROPLASTY, HIP, BIPOLAR;  Surgeon: Jose G Martinez MD;  Location: [facility] [place]    TONSILLECTOMY      10 years old    ZZC TOTAL KNEE ARTHROPLASTY Left     2011           CURRENT MEDICATIONS:    acetaminophen (TYLENOL) 325 MG tablet  amiodarone (PACERONE) 200 MG tablet  apixaban ANTICOAGULANT (ELIQUIS) 5 MG tablet  cholecalciferol, vitamin D3, (VITAMIN D3) 2,000 unit Tab  COMPOUNDED NON-CONTROLLED SUBSTANCE (CMPD RX) - PHARMACY TO MIX COMPOUNDED MEDICATION  diclofenac (FLECTOR) 1.3 % patch  DULoxetine (CYMBALTA) 60 MG capsule  furosemide (LASIX) 20 MG tablet  KLOR-CON 20 MEQ CR tablet  Lidocaine (LIDOCARE) 4 % Patch  lisinopril (ZESTRIL) 2.5 MG tablet  metoprolol tartrate (LOPRESSOR) 25 MG tablet  oxyCODONE (OXYCONTIN) 10 MG 12 hr tablet  oxyCODONE (ROXICODONE) 5 MG tablet  zolpidem ER (AMBIEN CR) 6.25 MG CR tablet        ALLERGIES:  Allergies   Allergen Reactions    Trazodone Shortness Of Breath and Unknown     Allergic to trazodone and deriv., Other: trouble swallowing      Clindamycin Diarrhea     C-diff    Gabapentin Other (See Comments)     \"Internal " "tremors\"    Temazepam Other (See Comments)     Annotation: Nightmares         FAMILY HISTORY:  Family History   Problem Relation Age of Onset    Heart Disease Mother     Rheumatic Heart Disease Mother     No Known Problems Father     Cancer Brother         brain    Lung Cancer Brother     Lung Cancer Brother     Cancer Sister         lung    Lung Cancer Sister        SOCIAL HISTORY:   Social History     Socioeconomic History    Marital status: Single   Tobacco Use    Smoking status: Never     Passive exposure: Never    Smokeless tobacco: Never    Tobacco comments:     no passive exposure   Vaping Use    Vaping Use: Never used   Substance and Sexual Activity    Alcohol use: Yes     Comment: Alcoholic Drinks/day: Rarely a glass of wine    Drug use: No   Social History Narrative    The patient is a nun.     Social Determinants of Health     Financial Resource Strain: Low Risk  (12/6/2023)    Financial Resource Strain     Within the past 12 months, have you or your family members you live with been unable to get utilities (heat, electricity) when it was really needed?: No   Food Insecurity: Low Risk  (12/6/2023)    Food Insecurity     Within the past 12 months, did you worry that your food would run out before you got money to buy more?: No     Within the past 12 months, did the food you bought just not last and you didn t have money to get more?: No   Transportation Needs: Low Risk  (12/6/2023)    Transportation Needs     Within the past 12 months, has lack of transportation kept you from medical appointments, getting your medicines, non-medical meetings or appointments, work, or from getting things that you need?: No   Physical Activity: Insufficiently Active (3/27/2023)    Exercise Vital Sign     Days of Exercise per Week: 3 days     Minutes of Exercise per Session: 10 min   Stress: No Stress Concern Present (3/27/2023)    [place] [employer] - Occupational Stress Questionnaire     Feeling of " "Stress : Not at all   Social Connections: Moderately Integrated (3/27/2023)    Social Connection and Isolation Panel [NHANES]     Frequency of Communication with Friends and Family: More than three times a week     Frequency of Social Gatherings with Friends and Family: More than three times a week     Attends [employer] Services: More than 4 times per year     Active Member of Clubs or Organizations: Yes     Attends Club or Organization Meetings: More than 4 times per year     Marital Status: Never    Interpersonal Safety: Low Risk  (10/5/2023)    Interpersonal Safety     Do you feel physically and emotionally safe where you currently live?: Yes     Within the past 12 months, have you been hit, slapped, kicked or otherwise physically hurt by someone?: No     Within the past 12 months, have you been humiliated or emotionally abused in other ways by your partner or ex-partner?: No   Housing Stability: Low Risk  (12/6/2023)    Housing Stability     Do you have housing? : Yes     Are you worried about losing your housing?: No   Rare alcohol.  No drugs or tobacco.    VITALS:  BP (!) 185/92   Pulse 85   Temp 97  F (36.1  C) (Temporal)   Resp (!) 32   Ht 1.575 m (5' 2\")   Wt 68.8 kg (151 lb 11.2 oz)   SpO2 91%   BMI 27.75 kg/m      PHYSICAL EXAM    Vital Signs:  BP (!) 185/92   Pulse 85   Temp 97  F (36.1  C) (Temporal)   Resp (!) 32   Ht 1.575 m (5' 2\")   Wt 68.8 kg (151 lb 11.2 oz)   SpO2 91%   BMI 27.75 kg/m    General:  On entering the room she is in no apparent distress.    Neck:  Neck supple with full range of motion and nontender.    Back:  Back and spine are nontender.  No costovertebral angle tenderness.  8 to 9 cm JVD.  HEENT:  Oropharynx clear with moist mucous membranes.  HEENT unremarkable.    Pulmonary:  Chest clear to auscultation without rhonchi rales or wheezing.    Cardiovascular:  Cardiac irregular rate and rhythm without murmurs rubs or gallops.    Abdomen:  Abdomen soft nontender.  " There is no rebound or guarding.    Muskuloskeletal:  She moves all 4 without any difficulty and has normal neurovascular exams.  Extremities without clubbing, cyanosis.  2+ edema both ankles.  Legs and calves are nontender.    Neuro:  She is alert and oriented ×3 and moves all extremities symmetrically.    Psych:  Normal affect.    Skin:  Unremarkable and warm and dry.       LAB:  All pertinent labs reviewed and interpreted.  Labs Ordered and Resulted from Time of ED Arrival to Time of ED Departure   NT PROBNP INPATIENT - Abnormal       Result Value    N terminal Pro BNP Inpatient 19,546 (*)    TROPONIN T, HIGH SENSITIVITY - Abnormal    Troponin T, High Sensitivity 32 (*)    BASIC METABOLIC PANEL - Abnormal    Sodium 142      Potassium 4.3      Chloride 105      Carbon Dioxide (CO2) 26      Anion Gap 11      Urea Nitrogen 24.0 (*)     Creatinine 1.16 (*)     GFR Estimate 44 (*)     Calcium 9.6      Glucose 131 (*)    CBC WITH PLATELETS AND DIFFERENTIAL - Abnormal    WBC Count 8.2      RBC Count 4.29      Hemoglobin 12.7      Hematocrit 40.3      MCV 94      MCH 29.6      MCHC 31.5      RDW 15.5 (*)     Platelet Count 289      % Neutrophils 73      % Lymphocytes 16      % Monocytes 9      % Eosinophils 1      % Basophils 1      % Immature Granulocytes 0      NRBCs per 100 WBC 0      Absolute Neutrophils 6.0      Absolute Lymphocytes 1.3      Absolute Monocytes 0.7      Absolute Eosinophils 0.1      Absolute Basophils 0.1      Absolute Immature Granulocytes 0.0      Absolute NRBCs 0.0     TROPONIN T, HIGH SENSITIVITY       RADIOLOGY:  Reviewed all pertinent imaging. Please see official radiology report.  XR Chest Port 1 View   Final Result   IMPRESSION: Small pleural effusions are again seen. Cannot exclude basilar airspace disease. No pneumothorax. The cardiomediastinal silhouette is enlarged.                  EKG:    Atrial fibrillation rate controlled 81, LVH pattern.  IVCD left tending towards a left bundle  branch block pattern.  Very similar to previous EKG of November 29, 2023.    I have independently reviewed and interpreted the EKG(s) documented above.    PROCEDURES:         I, Ministerio Díaz, am serving as a scribe to document services personally performed by Dr. Castellano based on my observation and the provider's statements to me. I, Juventino Castellano MD attest that Ministerio Díaz is acting in a scribe capacity, has observed my performance of the services and has documented them in accordance with my direction.    Juventino Castellano M.D.  Emergency Medicine  [facility] EMERGENCY DEPARTMENT  [address] [place] [id number]  [phone]  Dept: [phone]       Juventino Castellano MD  12/20/23 1534       Juventino Castellano MD  12/20/23 3750

## 2023-12-20 NOTE — H&P
[facility] History [facility] Physical  Department of Internal Medicine    Patient Name: Gladys Ramirez MRN# [id number]   Age: 89 year old YOB: 1930     Date of Admission:12/20/2023      Primary care provider: Margret Flores  Date of Service: 12/20/2023  Admitting Team: 14         Assessment and Plan:   Gladys Ramirez is a 89 year old female with past medical history of chronic congestive heart failure with reduced EF of 20 to 25%, hypertension, chronic atrial fibrillation on Eliquis who presents with complaints of dyspnea with exertion and dizziness.      Acute exacerbation of chronic systolic congestive heart failure.  Echocardiogram obtained 11/30/2023 showed reduced left ventricular ejection fraction of 20-25%.  On furosemide 20 mg p.o. daily.  Chest x-ray showed cardiomegaly with trace bilateral pleural effusions, BNP is significantly elevated.  Has bilateral lower extremity edema.  Received Lasix 20 mg IV in the ED.  Admitted with congestive heart failure care map.  Continue IV furosemide 40 mg 3 times daily, strict I's and O's, daily renal panel while on IV diuretics.  Hypoxia due to pulmonary vascular congestion and decompensated heart failure.  Currently on 2 L of oxygen satting okay.  Will wean off supplemental oxygen as able, I would do home O2 need assessment prior to discharge.  Orthopnea and exertional dyspnea due to congestive heart failure exacerbation  Paroxysmal nocturnal dyspnea  Chronic atrial fibrillation on chronic anticoagulation.  Continue PTA amiodarone, metoprolol tartrate and Eliquis  Chronic kidney disease.  Likely stage IIIa.  Appears to be at baseline.  Avoid NSAIDs.  I will continue lisinopril.  Monitor kidney function with daily renal panel while on IV diuretics  Benign essential hypertension.  Continue PTA lisinopril, metoprolol, IV hydralazine as needed  Hyperlipidemia  Osteoarthritis, continue Tylenol as needed    CODE: DO NOT INTUBATE or resuscitate  Diet/IVF:  Cardiac diet, Hep-Lock IV  DVT ppx: Eliquis  Disposition/Admission Status: Inpatient    Sean Harris MD  Internal Medicine Staff Hospitalist  [facility]  Pager: [phone]       Chief Complaint:   Shortness of breath         HPI:   This is a very pleasant 89-year-old female with history of chronic congestive heart failure with reduced EF of 20 to 25% by echocardiogram done on November 30, 2023, on furosemide 20 mg daily, recent hospitalization with acute exacerbation of congestive heart failure and noted to have reduced EF from 25 to 30%.  Patient was discharged home on furosemide 20 mg p.o. daily.  She states she was on salt restricted diet as advised.  Denies any chest pain.  He states she has been feeling increasingly short of breath for the past 3 weeks and her symptoms were better when she was recently hospitalized.  It has been getting progressively worse since discharge home.  She is not on supplemental oxygen at home.  She denies any fever or chills.  States she feels short of breath lying in bed at night and had to get up at night to catch her breath.  Has productive cough of whitish sputum in the morning.  Was noted to have a low oxygen saturation of about 90% on room air and was placed on supplemental oxygen 2 L  and currently satting okay.  EKG showed chronic A-fib with rates in the low 90s.   high-sensitivity troponin initial 32.  BNP is greater than 19,000.  She does have bilateral lower extremity edema.  Chest x-ray obtained shows small bilateral pleural effusion, cardiomegaly.  She did receive Lasix 20 mg IV x 1 while in the ED.         Past Medical History:     Past Medical History:   Diagnosis Date    Angina pectoris (H24)     Atrial fibrillation (H)     Chest pain 03/09/2017    Chronic kidney disease     Congestive heart failure (H)     Cough     Hyperlipidemia     Hypertension     Osteoarthritis      Reviewed         Past Surgical History:     Past Surgical History:    Procedure Laterality Date    APPENDECTOMY      BASAL CELL CARCINOMA EXCISION      nose    LAPAROSCOPY DIAGNOSTIC (GENERAL) N/A 11/04/2014    Procedure: LAPAROSCOPY BILATERAL SALPINGO-OOPHORECTOMY ;  Surgeon: Sofia Harper MD;  Location: [facility] - [place];  Service:     OPEN REDUCTION INTERNAL FIXATION HIP BIPOLAR Right 3/5/2023    Procedure: HEMIARTHROPLASTY, HIP, BIPOLAR;  Surgeon: Jose G Martinez MD;  Location: [facility] [place]    TONSILLECTOMY      10 years old    ZZC TOTAL KNEE ARTHROPLASTY Left     2011              Social History:   Reviewed  Social History     Socioeconomic History    Marital status: Single     Spouse name: Not on file    Number of children: Not on file    Years of education: Not on file    Highest education level: Not on file   Occupational History    Not on file   Tobacco Use    Smoking status: Never     Passive exposure: Never    Smokeless tobacco: Never    Tobacco comments:     no passive exposure   Vaping Use    Vaping Use: Never used   Substance and Sexual Activity    Alcohol use: Yes     Comment: Alcoholic Drinks/day: Rarely a glass of wine    Drug use: No    Sexual activity: Not on file   Other Topics Concern    Not on file   Social History Narrative    The patient is a nun.     Social Determinants of Health     Financial Resource Strain: Low Risk  (12/6/2023)    Financial Resource Strain     Within the past 12 months, have you or your family members you live with been unable to get utilities (heat, electricity) when it was really needed?: No   Food Insecurity: Low Risk  (12/6/2023)    Food Insecurity     Within the past 12 months, did you worry that your food would run out before you got money to buy more?: No     Within the past 12 months, did the food you bought just not last and you didn t have money to get more?: No   Transportation Needs: Low Risk  (12/6/2023)    Transportation Needs     Within the past 12 months, has lack of transportation kept you from medical  appointments, getting your medicines, non-medical meetings or appointments, work, or from getting things that you need?: No   Physical Activity: Insufficiently Active (3/27/2023)    Exercise Vital Sign     Days of Exercise per Week: 3 days     Minutes of Exercise per Session: 10 min   Stress: No Stress Concern Present (3/27/2023)    [employer] [place] [employer] - Occupational Stress Questionnaire     Feeling of Stress : Not at all   Social Connections: Moderately Integrated (3/27/2023)    Social Connection and Isolation Panel [NHANES]     Frequency of Communication with Friends and Family: More than three times a week     Frequency of Social Gatherings with Friends and Family: More than three times a week     Attends [employer] Services: More than 4 times per year     Active Member of Clubs or Organizations: Yes     Attends Club or Organization Meetings: More than 4 times per year     Marital Status: Never    Interpersonal Safety: Low Risk  (10/5/2023)    Interpersonal Safety     Do you feel physically and emotionally safe where you currently live?: Yes     Within the past 12 months, have you been hit, slapped, kicked or otherwise physically hurt by someone?: No     Within the past 12 months, have you been humiliated or emotionally abused in other ways by your partner or ex-partner?: No   Housing Stability: Low Risk  (12/6/2023)    Housing Stability     Do you have housing? : Yes     Are you worried about losing your housing?: No            Family History:     Family History   Problem Relation Age of Onset    Heart Disease Mother     Rheumatic Heart Disease Mother     No Known Problems Father     Cancer Brother         brain    Lung Cancer Brother     Lung Cancer Brother     Cancer Sister         lung    Lung Cancer Sister      Reviewed         Immunizations:     Immunization History   Administered Date(s) Administered    COVID-19 12+ (2023-24) (Pfizer) 10/05/2023    COVID-19 Monovalent 12+ (Pfizer  "2022) 04/27/2022    COVID-19 Monovalent 18+ (Moderna) 02/26/2021, 03/26/2021, 11/28/2021    Flu, Unspecified 09/19/2011, 09/18/2013, 09/30/2014    Influenza (High Dose) 3 valent vaccine 09/28/2015, 10/10/2016, 10/14/2017, 10/11/2018, 10/14/2019    Influenza (IIV3) PF 09/30/2011, 09/24/2012, 09/18/2013, 09/30/2014    Influenza Vaccine 65+ (Fluzone HD) 10/04/2021, 09/28/2022, 10/05/2023    Influenza Vaccine, 6+MO IM (QUADRIVALENT W/PRESERVATIVES) 09/24/2012, 09/30/2014    Pneumo Conj 13-V (2010&after) 10/06/2015    Pneumococcal 23 valent 12/12/2001, 10/29/2003    TD,PF 7+ (Tenivac) 10/14/2019    TDAP (Adacel,Boostrix) 10/30/2009    Zoster vaccine, live 09/24/2012              Allergies:      Allergies   Allergen Reactions    Trazodone Shortness Of Breath and Unknown     Allergic to trazodone and deriv., Other: trouble swallowing      Clindamycin Diarrhea     C-diff    Gabapentin Other (See Comments)     \"Internal tremors\"    Temazepam Other (See Comments)     Annotation: Nightmares              Medications:   (Not in a hospital admission)            Review of Systems:   A complete ROS was performed and is negative other than what is stated in the HPI.          Physical Exam:   BP (!) 178/136   Pulse 101   Temp 97  F (36.1  C) (Temporal)   Resp 24   Ht 1.575 m (5' 2\")   Wt 68.8 kg (151 lb 11.2 oz)   SpO2 93%   BMI 27.75 kg/m       GENERAL: Alert and oriented x 3. NAD.   HEENT: Anicteric sclera. Mucous membranes moist.   CV: irregular. S1, S2. No murmurs appreciated.   RESPIRATORY: Effort normal on RA. Diminished breath sounds on the bilateral lower lungs, no wheezes   GI: Abdomen soft and non distended with normoactive bowel sounds present in all quadrants. No tenderness, rebound, guarding.   NEUROLOGICAL: No focal deficits. Moves all extremities.    EXTREMITIES: 1+ peripheral edema. Intact bilateral pedal pulses.   SKIN: No jaundice. No rashes.          Data:   CBC RESULTS:   Recent Labs   Lab Test " 12/20/23  1432   WBC 8.2   RBC 4.29   HGB 12.7   HCT 40.3   MCV 94   MCH 29.6   MCHC 31.5   RDW 15.5*        Last Comprehensive Metabolic Panel:  Sodium   Date Value Ref Range Status   12/20/2023 142 135 - 145 mmol/L Final     Comment:     Reference intervals for this test were updated on 09/26/2023 to more accurately reflect our healthy population. There may be differences in the flagging of prior results with similar values performed with this method. Interpretation of those prior results can be made in the context of the updated reference intervals.      Potassium   Date Value Ref Range Status   12/20/2023 4.3 3.4 - 5.3 mmol/L Final   08/28/2022 3.1 (L) 3.5 - 5.0 mmol/L Final     Chloride   Date Value Ref Range Status   12/20/2023 105 98 - 107 mmol/L Final   08/28/2022 103 98 - 107 mmol/L Final     Carbon Dioxide (CO2)   Date Value Ref Range Status   12/20/2023 26 22 - 29 mmol/L Final   08/28/2022 27 22 - 31 mmol/L Final     Anion Gap   Date Value Ref Range Status   12/20/2023 11 7 - 15 mmol/L Final   08/28/2022 10 5 - 18 mmol/L Final     Glucose   Date Value Ref Range Status   12/20/2023 131 (H) 70 - 99 mg/dL Final   08/28/2022 95 70 - 125 mg/dL Final     GLUCOSE BY METER POCT   Date Value Ref Range Status   03/06/2023 135 (H) 70 - 99 mg/dL Final     Urea Nitrogen   Date Value Ref Range Status   12/20/2023 24.0 (H) 8.0 - 23.0 mg/dL Final   08/28/2022 16 8 - 28 mg/dL Final     Creatinine   Date Value Ref Range Status   12/20/2023 1.16 (H) 0.51 - 0.95 mg/dL Final     GFR Estimate   Date Value Ref Range Status   12/20/2023 44 (L) >60 mL/min/1.73m2 Final   04/19/2021 54 (L) >60 mL/min/1.73m2 Final     Calcium   Date Value Ref Range Status   12/20/2023 9.6 8.2 - 9.6 mg/dL Final     Bilirubin Total   Date Value Ref Range Status   12/06/2023 0.3 <=1.2 mg/dL Final     Alkaline Phosphatase   Date Value Ref Range Status   12/06/2023 172 (H) 40 - 150 U/L Final     Comment:     Reference intervals for this test were  updated on 11/14/2023 to more accurately reflect our healthy population. There may be differences in the flagging of prior results with similar values performed with this method. Interpretation of those prior results can be made in the context of the updated reference intervals.     ALT   Date Value Ref Range Status   12/06/2023 34 0 - 50 U/L Final     Comment:     Reference intervals for this test were updated on 6/12/2023 to more accurately reflect our healthy population. There may be differences in the flagging of prior results with similar values performed with this method. Interpretation of those prior results can be made in the context of the updated reference intervals.       AST   Date Value Ref Range Status   12/06/2023 27 0 - 45 U/L Final     Comment:     Reference intervals for this test were updated on 6/12/2023 to more accurately reflect our healthy population. There may be differences in the flagging of prior results with similar values performed with this method. Interpretation of those prior results can be made in the context of the updated reference intervals.

## 2023-12-21 ENCOUNTER — APPOINTMENT (OUTPATIENT)
Dept: CARDIOLOGY | Facility: HOSPITAL | Age: 88
DRG: 291 | End: 2023-12-21
Attending: INTERNAL MEDICINE
Payer: COMMERCIAL

## 2023-12-21 ENCOUNTER — TELEPHONE (OUTPATIENT)
Dept: CARDIOLOGY | Facility: CLINIC | Age: 88
End: 2023-12-21

## 2023-12-21 ENCOUNTER — APPOINTMENT (OUTPATIENT)
Dept: OCCUPATIONAL THERAPY | Facility: HOSPITAL | Age: 88
DRG: 291 | End: 2023-12-21
Attending: INTERNAL MEDICINE
Payer: COMMERCIAL

## 2023-12-21 DIAGNOSIS — I50.9 ACUTE DECOMPENSATED HEART FAILURE (H): Primary | ICD-10-CM

## 2023-12-21 PROBLEM — Z91.81 HISTORY OF FALLING: Status: ACTIVE | Noted: 2023-03-08

## 2023-12-21 PROBLEM — I82.4Z9: Status: ACTIVE | Noted: 2023-03-07

## 2023-12-21 LAB
ANION GAP SERPL CALCULATED.3IONS-SCNC: 12 MMOL/L (ref 7–15)
BI-PLANE LVEF ECHO: NORMAL
BUN SERPL-MCNC: 22 MG/DL (ref 8–23)
CALCIUM SERPL-MCNC: 9.8 MG/DL (ref 8.2–9.6)
CHLORIDE SERPL-SCNC: 102 MMOL/L (ref 98–107)
CREAT SERPL-MCNC: 1.06 MG/DL (ref 0.51–0.95)
DEPRECATED HCO3 PLAS-SCNC: 30 MMOL/L (ref 22–29)
EGFRCR SERPLBLD CKD-EPI 2021: 49 ML/MIN/1.73M2
GLUCOSE SERPL-MCNC: 116 MG/DL (ref 70–99)
MAGNESIUM SERPL-MCNC: 2 MG/DL (ref 1.7–2.3)
POTASSIUM SERPL-SCNC: 3.3 MMOL/L (ref 3.4–5.3)
POTASSIUM SERPL-SCNC: 3.8 MMOL/L (ref 3.4–5.3)
SODIUM SERPL-SCNC: 144 MMOL/L (ref 135–145)

## 2023-12-21 PROCEDURE — 97110 THERAPEUTIC EXERCISES: CPT | Mod: GO

## 2023-12-21 PROCEDURE — 250N000011 HC RX IP 250 OP 636: Mod: JZ | Performed by: INTERNAL MEDICINE

## 2023-12-21 PROCEDURE — 36415 COLL VENOUS BLD VENIPUNCTURE: CPT | Performed by: INTERNAL MEDICINE

## 2023-12-21 PROCEDURE — 210N000001 HC R&B IMCU HEART CARE

## 2023-12-21 PROCEDURE — 999N000208 ECHOCARDIOGRAM LIMITED

## 2023-12-21 PROCEDURE — 250N000013 HC RX MED GY IP 250 OP 250 PS 637: Performed by: STUDENT IN AN ORGANIZED HEALTH CARE EDUCATION/TRAINING PROGRAM

## 2023-12-21 PROCEDURE — 93325 DOPPLER ECHO COLOR FLOW MAPG: CPT | Mod: 26 | Performed by: INTERNAL MEDICINE

## 2023-12-21 PROCEDURE — 84132 ASSAY OF SERUM POTASSIUM: CPT | Performed by: STUDENT IN AN ORGANIZED HEALTH CARE EDUCATION/TRAINING PROGRAM

## 2023-12-21 PROCEDURE — 250N000013 HC RX MED GY IP 250 OP 250 PS 637: Performed by: INTERNAL MEDICINE

## 2023-12-21 PROCEDURE — 99233 SBSQ HOSP IP/OBS HIGH 50: CPT | Performed by: STUDENT IN AN ORGANIZED HEALTH CARE EDUCATION/TRAINING PROGRAM

## 2023-12-21 PROCEDURE — 36415 COLL VENOUS BLD VENIPUNCTURE: CPT | Performed by: STUDENT IN AN ORGANIZED HEALTH CARE EDUCATION/TRAINING PROGRAM

## 2023-12-21 PROCEDURE — 97166 OT EVAL MOD COMPLEX 45 MIN: CPT | Mod: GO

## 2023-12-21 PROCEDURE — 83735 ASSAY OF MAGNESIUM: CPT | Performed by: INTERNAL MEDICINE

## 2023-12-21 PROCEDURE — 93321 DOPPLER ECHO F-UP/LMTD STD: CPT | Mod: 26 | Performed by: INTERNAL MEDICINE

## 2023-12-21 PROCEDURE — 80048 BASIC METABOLIC PNL TOTAL CA: CPT | Performed by: INTERNAL MEDICINE

## 2023-12-21 PROCEDURE — 97535 SELF CARE MNGMENT TRAINING: CPT | Mod: GO

## 2023-12-21 PROCEDURE — 255N000002 HC RX 255 OP 636: Performed by: INTERNAL MEDICINE

## 2023-12-21 PROCEDURE — 93321 DOPPLER ECHO F-UP/LMTD STD: CPT

## 2023-12-21 PROCEDURE — 93308 TTE F-UP OR LMTD: CPT | Mod: 26 | Performed by: INTERNAL MEDICINE

## 2023-12-21 PROCEDURE — 250N000011 HC RX IP 250 OP 636: Mod: JZ | Performed by: STUDENT IN AN ORGANIZED HEALTH CARE EDUCATION/TRAINING PROGRAM

## 2023-12-21 PROCEDURE — 99232 SBSQ HOSP IP/OBS MODERATE 35: CPT | Performed by: INTERNAL MEDICINE

## 2023-12-21 RX ORDER — POTASSIUM CHLORIDE 1500 MG/1
20 TABLET, EXTENDED RELEASE ORAL ONCE
Status: COMPLETED | OUTPATIENT
Start: 2023-12-21 | End: 2023-12-21

## 2023-12-21 RX ORDER — FUROSEMIDE 10 MG/ML
20 INJECTION INTRAMUSCULAR; INTRAVENOUS EVERY 12 HOURS
Status: DISCONTINUED | OUTPATIENT
Start: 2023-12-22 | End: 2023-12-22

## 2023-12-21 RX ORDER — FUROSEMIDE 10 MG/ML
40 INJECTION INTRAMUSCULAR; INTRAVENOUS EVERY 12 HOURS
Status: DISCONTINUED | OUTPATIENT
Start: 2023-12-21 | End: 2023-12-21

## 2023-12-21 RX ORDER — NALOXONE HYDROCHLORIDE 0.4 MG/ML
0.4 INJECTION, SOLUTION INTRAMUSCULAR; INTRAVENOUS; SUBCUTANEOUS
Status: DISCONTINUED | OUTPATIENT
Start: 2023-12-21 | End: 2023-12-24 | Stop reason: HOSPADM

## 2023-12-21 RX ORDER — NALOXONE HYDROCHLORIDE 0.4 MG/ML
0.2 INJECTION, SOLUTION INTRAMUSCULAR; INTRAVENOUS; SUBCUTANEOUS
Status: DISCONTINUED | OUTPATIENT
Start: 2023-12-21 | End: 2023-12-24 | Stop reason: HOSPADM

## 2023-12-21 RX ORDER — POTASSIUM CHLORIDE 750 MG/1
10 TABLET, EXTENDED RELEASE ORAL ONCE
Status: COMPLETED | OUTPATIENT
Start: 2023-12-21 | End: 2023-12-21

## 2023-12-21 RX ORDER — MAGNESIUM OXIDE 400 MG/1
400 TABLET ORAL EVERY 4 HOURS
Status: COMPLETED | OUTPATIENT
Start: 2023-12-21 | End: 2023-12-21

## 2023-12-21 RX ADMIN — ZOLPIDEM TARTRATE 5 MG: 5 TABLET ORAL at 22:04

## 2023-12-21 RX ADMIN — FUROSEMIDE 40 MG: 10 INJECTION, SOLUTION INTRAMUSCULAR; INTRAVENOUS at 12:56

## 2023-12-21 RX ADMIN — AMIODARONE HYDROCHLORIDE 100 MG: 100 TABLET ORAL at 08:28

## 2023-12-21 RX ADMIN — POTASSIUM CHLORIDE 10 MEQ: 750 TABLET, EXTENDED RELEASE ORAL at 15:59

## 2023-12-21 RX ADMIN — POTASSIUM CHLORIDE 20 MEQ: 1500 TABLET, EXTENDED RELEASE ORAL at 09:58

## 2023-12-21 RX ADMIN — APIXABAN 5 MG: 5 TABLET, FILM COATED ORAL at 20:28

## 2023-12-21 RX ADMIN — METOPROLOL TARTRATE 25 MG: 25 TABLET, FILM COATED ORAL at 20:28

## 2023-12-21 RX ADMIN — APIXABAN 5 MG: 5 TABLET, FILM COATED ORAL at 08:28

## 2023-12-21 RX ADMIN — DULOXETINE HYDROCHLORIDE 60 MG: 60 CAPSULE, DELAYED RELEASE PELLETS ORAL at 08:28

## 2023-12-21 RX ADMIN — MAGNESIUM OXIDE TAB 400 MG (241.3 MG ELEMENTAL MG) 400 MG: 400 (241.3 MG) TAB at 08:28

## 2023-12-21 RX ADMIN — OXYCODONE HYDROCHLORIDE 5 MG: 5 TABLET ORAL at 18:36

## 2023-12-21 RX ADMIN — MAGNESIUM OXIDE TAB 400 MG (241.3 MG ELEMENTAL MG) 400 MG: 400 (241.3 MG) TAB at 11:35

## 2023-12-21 RX ADMIN — FUROSEMIDE 40 MG: 10 INJECTION, SOLUTION INTRAMUSCULAR; INTRAVENOUS at 01:29

## 2023-12-21 RX ADMIN — LISINOPRIL 2.5 MG: 2.5 TABLET ORAL at 08:28

## 2023-12-21 RX ADMIN — METOPROLOL TARTRATE 25 MG: 25 TABLET, FILM COATED ORAL at 08:28

## 2023-12-21 RX ADMIN — PERFLUTREN 2 ML: 6.52 INJECTION, SUSPENSION INTRAVENOUS at 09:44

## 2023-12-21 ASSESSMENT — ACTIVITIES OF DAILY LIVING (ADL)
DEPENDENT_IADLS:: SHOPPING
ADLS_ACUITY_SCORE: 30
ADLS_ACUITY_SCORE: 30
ADLS_ACUITY_SCORE: 26
ADLS_ACUITY_SCORE: 26
ADLS_ACUITY_SCORE: 30
ADLS_ACUITY_SCORE: 30
ADLS_ACUITY_SCORE: 26
ADLS_ACUITY_SCORE: 26
ADLS_ACUITY_SCORE: 30
ADLS_ACUITY_SCORE: 26

## 2023-12-21 NOTE — CONSULTS
CARDIOLOGY CONSULT NOTE         Assessment:   1.  Acute congestive heart failure, (H)-acute on chronic in the setting of diminished ejection fraction of 25%, exacerbation most likely secondary to inadequate diuresis from last hospitalization as well as continued atrial fibrillation.  Agree with aggressive furosemide 40 mg IV twice daily as you are doing.  2.  Cardiomyopathy-ejection fraction 25%, presumed nonischemic.  Would increase ACE inhibitor to bring blood pressure down some.  Given her age would not utilize Entresto just yet.  Spoke with her about CRT device which she declines.  3.  Atrial fibrillation-not valvular, possibly symptomatic related to cardiomyopathy, persistent.  On amiodarone with no missed doses of Eliquis.  Will attempt cardioversion to sinus rhythm tomorrow.  4.  Benign essential hypertension-increase lisinopril as above,  5.  Left bundle branch block pattern-she has a widened QRS greater than 150 ms, strongly suspect she would benefit from CRT device but she declines.     Plan:   1.  Continue IV furosemide monitoring creatinine.  2.  Cardioversion tomorrow.  3.  Can follow-up with me as primary cardiologist.     Current History:   [facility] has been requested by Dr. Harris to evaluate Gladys Ramirez  who is a 89 year old year old white female for heart failure.  Patient has been having increased shortness of breath for several weeks, she had her outpatient furosemide increased from 20 to 40 mg a day.  She still continue with increased shortness of breath with some PND and orthopnea.  There is no peripheral edema, chest pains, palpitations, or weight changes.  Due to the fact she had significant PND and orthopnea she presented to the emergency department.  She is felt to be in heart failure given significantly increased BNP of over 14,000 and cardiology consultation was requested.    Past Medical History:     Past Medical History:   Diagnosis Date    Angina pectoris (H24)      Atrial fibrillation (H)     Chronic kidney disease     Congestive heart failure (H)/non ischemic cardiomyopathy     Hyperlipidemia     Hypertension     Osteoarthritis       Past history is negative for cancer, tuberculosis, diabetes mellitus, myocardial infarction,  rheumatic fever, cerebrovascular accident, chronic kidney disease, peptic ulcer disease, chronic obstructive pulmonary disease, or thyroid disorder.    Past Surgical History:     Past Surgical History:   Procedure Laterality Date    APPENDECTOMY      BASAL CELL CARCINOMA EXCISION      nose    LAPAROSCOPY DIAGNOSTIC (GENERAL) N/A 11/04/2014    Procedure: LAPAROSCOPY BILATERAL SALPINGO-OOPHORECTOMY ;  Surgeon: Sofia Harper MD;  Location: [facility] [place];  Service:     OPEN REDUCTION INTERNAL FIXATION HIP BIPOLAR Right 3/5/2023    Procedure: HEMIARTHROPLASTY, HIP, BIPOLAR;  Surgeon: Jose G Martinez MD;  Location: [facility]    TONSILLECTOMY      10 years old    ZC TOTAL KNEE ARTHROPLASTY Left     2011     Family History:   Reviewed, and negative for coronary artery disease.    Social History:   Reviewed, and she  reports that she has never smoked. She has never been exposed to tobacco smoke. She has never used smokeless tobacco. She reports current alcohol use. She reports that she does not use drugs.  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 Margret Corcoran.    Meds:      amiodarone  100 mg Oral Daily    apixaban ANTICOAGULANT  5 mg Oral BID    DULoxetine  60 mg Oral Daily    furosemide  40 mg Intravenous Q12H    lisinopril  2.5 mg Oral Daily    magnesium oxide  400 mg Oral Q4H    metoprolol tartrate  25 mg Oral BID     Allergies:   Trazodone, Clindamycin, Gabapentin, and Temazepam    Review of Systems:   A 12 point comprehensive review of systems was  as follows:   General: Positive for fatigue, negative weight loss or weight gain  Constitutional: negative for  "anorexia, chills, fevers, malaise, night sweats   Eyes: negative for cataracts, color blindness, contacts/glasses, glaucoma, icterus, irritation, redness and visual disturbance  Ears, nose, mouth, throat, and face: negative for ear drainage, earaches, epistaxis, facial trauma, hearing loss, hoarseness, nasal congestion, snoring, sore mouth, sore throat, tinnitus and voice change  Respiratory: Positive PND, orthopnea, dyspnea on exertion, negative hemoptysis, sputum production, pleurisy, stridor, wheezing,   Cardiovascular: negative for chest pain, chest pressure/discomfort, claudication, dyspnea, exertional chest pressure/discomfort, fatigue, irregular heart beat, lower extremity edema, near-syncope,  palpitations,  syncope   Gastrointestinal: negative for abdominal pain, change in bowel haibits, constipation, diarrhea, dyspepsia, dysphagia, jaundice, melena, nausea, odynophagia, reflux symptoms and vomiting  Genitourinary: negative for decreased stream  Hematologic/lymphatic: negative for bleeding  Musculoskeletal: negative for arthralgias, back pain, bone pain, muscle weakness, myalgias, neck pain and stiff joints  Neurological: negative for syncope, presyncope, coordination problems, dizziness, gait problems, headaches, memory problems, paresthesia, seizures, speech problems, tremors, vertigo and weakness    Objective:     Physical Exam:  BP (!) 142/93 (BP Location: Left arm)   Pulse 75   Temp 98.2  F (36.8  C) (Oral)   Resp 20   Ht 1.575 m (5' 2\")   Wt 66.8 kg (147 lb 4.3 oz)   SpO2 98%   BMI 26.94 kg/m       Head:    Normocephalic, without obvious abnormality, atraumatic   Eyes:    PERRL, conjunctiva/corneas clear, EOM's intact,           Nose:   Nares normal, septum midline, mucosa normal, no drainage  or sinus tenderness   Throat:   Lips, mucosa, and tongue normal; teeth and gums normal   Neck:   Supple, symmetrical, trachea midline, no adenopathy;        thyroid:  No enlargement/tenderness/nodules; no " carotid    bruit, to jaw at 30 degrees JVD   Back:     Symmetric, no curvature, ROM normal, no CVA tenderness   Lungs:     Crackles bases bilaterally, respirations unlabored   Chest wall:    No tenderness or deformity   Heart:    Irregularly irregular S1 and S2 normal, no murmur, rub   or gallop   Abdomen:     Soft, non-tender, bowel sounds active all four quadrants,     no masses, no organomegaly   Extremities:   Extremities normal, atraumatic, no cyanosis or edema   Pulses:   2+ and symmetric all extremities   Skin:   Skin color, texture, turgor normal, no rashes or lesions   Neurologic:   CNII-XII intact. Normal strength, sensation and reflexes       throughout     Cardiographics and Imaging  Radiology Results: Chest x-ray shows  Small pleural effusions are again seen. Cannot exclude basilar airspace disease. No pneumothorax. The cardiomediastinal silhouette is enlarged.     EKG Results: personally reviewed atrial fibrillation, left bundle branch block pattern, QRS of 158 ms, repolarization changes.    Lab Review   Pertinent Labs  Lab Results: personally reviewed       Lab Results   Component Value Date    TROPONINI 0.16 08/23/2022       Lab Results   Component Value Date    BUN 22.0 12/21/2023     12/21/2023    CO2 30 (H) 12/21/2023       Lab Results   Component Value Date    WBC 8.2 12/20/2023    HGB 12.7 12/20/2023    HCT 40.3 12/20/2023    MCV 94 12/20/2023     12/20/2023       Lab Results   Component Value Date    BNP 1,933 (H) 08/23/2022       Clinically Significant Risk Factors Present on Admission        # Hypokalemia: Lowest K = 3.3 mmol/L in last 2 days, will replace as needed        # Drug Induced Coagulation Defect: home medication list includes an anticoagulant medication    # Hypertension: Noted on problem list  # Acute heart failure with reduced ejection fraction: last echo with EF <40% and receiving IV diuretics     # Overweight: Estimated body mass index is 26.94 kg/m  as calculated  "from the following:    Height as of this encounter: 1.575 m (5' 2\").    Weight as of this encounter: 66.8 kg (147 lb 4.3 oz).       # Financial/Environmental Concerns: none        Systolic    Hypokalemia    CKD POA List: Stage 3a (GFR 45-59)               "

## 2023-12-21 NOTE — PLAN OF CARE
Goal Outcome Evaluation:      Plan of Care Reviewed With: patient               Patient denies shortness of breath. Afib - controlled rate. Plan is for cardioversion tomorrow. Continue to monitor telemetry.

## 2023-12-21 NOTE — PLAN OF CARE
Problem: Risk for Delirium  Goal: Optimal Coping  Outcome: Progressing  Goal: Improved Behavioral Control  Outcome: Progressing  Intervention: Minimize Safety Risk  Recent Flowsheet Documentation  Taken 12/20/2023 1800 by Chani Dunn RN  Enhanced Safety Measures: room near unit station  Goal: Improved Attention and Thought Clarity  Outcome: Progressing  Goal: Improved Sleep  Outcome: Progressing   Goal Outcome Evaluation:       Pt is A/O x 4. SBA with walker. VSS. On NC 3L keeping SpO2 at 95%. Complained of 5/10 pain in her left leg. Oxycodone PRN given. Pt reported pain improved. Lungs are diminished. Tele: A-fib with BBB. 1800 fluid restrictions. Pure wick in place. 600 ml output. Asked for Ambien PRN to help sleep.

## 2023-12-21 NOTE — PROGRESS NOTES
SPIRITUAL HEALTH SERVICES Note     Saw pt Gladys Ramirez and administered Mandaeism sacrament of anointing for the healing of the sick.    Fr. Yaw Perez

## 2023-12-21 NOTE — PROGRESS NOTES
"   12/21/23 1049   Appointment Info   Signing Clinician's Name / Credentials (OT) Cece Ana Paula, OTR/L   Living Environment   People in Home alone   Current Living Arrangements apartment   Home Accessibility no concerns  (elevator)   Living Environment Comments Tub shower with shower chair   Self-Care   Equipment Currently Used at Home walker, rolling   Activity/Exercise/Self-Care Comment Pt states she is independent with most ADLs, has assistance with showering.   Instrumental Activities of Daily Living (IADL)   IADL Comments Pt independent with meds per pt, gets meals delivered by friends. Pt has assistance with cleaning.   General Information   Onset of Illness/Injury or Date of Surgery 12/20/23   Referring Physician Sean Harris MD   Patient/Family Therapy Goal Statement (OT) to go home   Additional Occupational Profile Info/Pertinent History of Current Problem Per chart review, pt \"is a 89 year old female with past medical history of chronic congestive heart failure with reduced EF of 20 to 25%, hypertension, chronic atrial fibrillation on Eliquis who presents with complaints of dyspnea with exertion and dizziness.\"   Existing Precautions/Restrictions fall;oxygen therapy device and L/min  (2 LPM via NC during eval)   Cognitive Status Examination   Orientation Status orientation to person, place and time  (A&Ox4)   Cognitive Status Comments minor sequencing/initiation deficits noted   Pain Assessment   Patient Currently in Pain No   Range of Motion Comprehensive   General Range of Motion bilateral upper extremity ROM WFL   Strength Comprehensive (MMT)   General Manual Muscle Testing (MMT) Assessment no strength deficits identified   Bed Mobility   Bed Mobility supine-sit   Supine-Sit [place] (Bed Mobility) supervision   Transfers   Transfers sit-stand transfer;toilet transfer   Sit-Stand Transfer   Sit-Stand [place] (Transfers) contact guard   Assistive Device (Sit-Stand Transfers) walker, 4-wheeled "   Toilet Transfer   Type (Toilet Transfer) sit-stand;stand-sit   [place] Level (Toilet Transfer) contact guard   Assistive Device (Toilet Transfer) grab bars/safety frame;walker, 4-wheeled   Balance   Balance Comments Pt requiring SBA-Min A for mobility using 4WW, a few minor LOB noted.   Activities of Daily Living   BADL Assessment/Intervention lower body dressing;grooming   Lower Body Dressing Assessment/Training   [place] Level (Lower Body Dressing) moderate assist (50% patient effort)   Comment, (Lower Body Dressing) SBA donning L sock, total A donning R sock d/t knee pain   Grooming Assessment/Training   Position (Grooming) sink side   [place] Level (Grooming) minimum assist (75% patient effort)   Clinical Impression   Criteria for Skilled Therapeutic Interventions Met (OT) Yes, treatment indicated   OT Diagnosis Impaired ability to perform ADLs and functional mobility.   Influenced by the following impairments hypoxia   OT Problem List-Impairments impacting ADL problems related to;activity tolerance impaired;balance;cognition;mobility   Identified Performance Deficits functional mobility, functional endurance, grooming/hygiene, toileting, lower body dressing, health management   Planned Therapy Interventions (OT) ADL retraining;balance training;strengthening;transfer training;home program guidelines;progressive activity/exercise;risk factor education   Clinical Decision Making Complexity (OT) detailed assessment/moderate complexity   Risk & Benefits of therapy have been explained evaluation/treatment results reviewed;care plan/treatment goals reviewed;risks/benefits reviewed;current/potential barriers reviewed;participants voiced agreement with care plan;participants included;patient   Clinical Impression Comments Pt would benefit from skilled OT services to promote ability to perform ADLs and functional mobility.   OT Total Evaluation Time   OT Torie Moderate Complexity Minutes (95804) 12   OT  Goals   Therapy Frequency (OT) Daily   OT Predicted Duration/Target Date for Goal Attainment 12/28/23   OT Goals Hygiene/Grooming;Lower Body Dressing;Transfers;Toilet Transfer/Toileting;Cardiac Phase 1   OT: Lower Body Dressing Supervision/stand-by assist;using adaptive equipment;including set-up/clothing retrieval   OT: Transfer Supervision/stand-by assist;with assistive device   OT: Toilet Transfer/Toileting Supervision/stand-by assist;toilet transfer;cleaning and garment management;using adaptive equipment   OT: Understanding of cardiac education to maximize quality of life, condition management, and health outcomes Patient;Verbalize   OT: Perform aerobic activity with stable cardiovascular response continuous;10 minutes   OT: Functional/aerobic ambulation tolerance with stable cardiovascular response in order to return to home and community environment Supervision/SBA;Assistive device;Greater than 300 feet   Self-Care/Home Management   Self-Care/Home Mgmt/ADL, Compensatory, Meal Prep Minutes (87959) 12   Symptoms Noted During/After Treatment (Meal Preparation/Planning Training) fatigue;shortness of breath   Treatment Detail/Skilled Intervention Pt noted to become SOB with LBD socks seated EOB, pt able to don L sock with set up and SBA however required cueing for seated rest break and PLB tech. Pt ambulated into bathroom with CGA/SBA using 4WW. Pt required cueing for doffing brief for toileting, attempted to sit on toilet with brief on. Pt stood at sink with CGA/Min A for balance to complete G/H. Pt left in recliner at end of session with chair alarm on and call light in reach.   Therapeutic Procedures/Exercise   Therapeutic Procedure: strength, endurance, ROM, flexibillity minutes (23007) 8   Symptoms Noted During/After Treatment fatigue;shortness of breath;increased pain   Treatment Detail/Skilled Intervention Pt ambulated 150' with CGA using 4WW, slow pace on 2LPM via NC supplemental O2. Pt fatigued and SOB at  end of walk, some pain in R knee reported. /73 before walk and 120/76 after walk, unable to get accurate read of O2 using 2 different machines, pt reported no SOB after short seated rest break.   OT Discharge Planning   OT Plan CHF education, andre walk, lower body dressing, G/H at sink, monitor cog   OT Discharge Recommendation (DC Rec) home with home care occupational therapy;home with assist   OT Rationale for DC Rec Pt moving well functional distances, requiring minor assistance with ADLs and mobility at this time. Recommend increased check ins from family/friends and continued home care therapy services.   OT Brief overview of current status ' ambulation, CGA/Min A ADLs   Total Session Time   Timed Code Treatment Minutes 20   Total Session Time (sum of timed and untimed services) 32

## 2023-12-21 NOTE — UTILIZATION REVIEW
Admission Status; Secondary Review Determination   Under the authority of the Utilization Management Committee, the utilization review process indicated a secondary review on Gladys Ramirez. The review outcome is based on review of the medical records, discussions with staff, and applying clinical experience noted on the date of the review.   (x) Inpatient Status Appropriate - This patient's medical care is consistent with medical management for inpatient care and reasonable inpatient medical practice.     RATIONALE FOR DETERMINATION   89 year old female with past medical history of chronic congestive heart failure with reduced EF of 20 to 25%, hypertension, chronic atrial fibrillation on Eliquis who presents to ER with complaints of dyspnea with exertion and dizziness. Admitted with acute on chronic CHF, cardiology consult , still on IV lasix , hypoxic and requires oxygen, plan for cardioversion tomorrow     At the time of admission with the information available to the attending physician more than 2 nights Hospital complex care was anticipated, based on patient risk of adverse outcome if treated as outpatient and complex care required. Inpatient admission is appropriate based on the Medicare guidelines.   The information on this document is developed by the utilization review team in order for the business office to ensure compliance. This only denotes the appropriateness of proper admission status and does not reflect the quality of care rendered.   The definitions of Inpatient Status and Observation Status used in making the determination above are those provided in the CMS Coverage Manual, Chapter 1 and Chapter 6, section 70.4.   Sincerely,   Miller Casillas MD  Utilization Review  Physician Advisor  [facility]

## 2023-12-21 NOTE — PROGRESS NOTES
[facility]    Medicine Progress Note - Hospitalist Service    Date of Admission:  12/20/2023    Assessment & Plan   Assessment:  Gladys Ramirez is a 89 year old female with past medical history of chronic congestive heart failure with reduced EF of 20 to 25%, hypertension, chronic atrial fibrillation on Eliquis who presents with complaints of dyspnea with exertion and dizziness.     Problem List and Plan   Acute exacerbation of chronic systolic congestive heart failure.   Hypoxia due to pulmonary vascular congestion and decompensated heart failure  Orthopnea and exertional dyspnea due to congestive heart failure exacerbation  Paroxysmal nocturnal dyspnea  Elevated troponin most likely in setting of type II NSTEMI/demand ischemia  Patient presented with complaint of dyspnea with exertion and dizziness  Echocardiogram obtained 11/30/2023 showed reduced left ventricular ejection fraction of 20-25%.    On furosemide 20 mg p.o. daily.    Chest x-ray showed cardiomegaly with trace bilateral pleural effusions, BNP is significantly elevated.    Has bilateral lower extremity edema.    Received Lasix 20 mg IV in the ED.  Was initially started on furosemide IV 40 3 times daily subsequently switched to 40 twice daily and now switched to 20 twice daily as volume status has trending down and patient was running soft blood pressure.  Continue with strict I's and O's, daily weights, fluid restriction and cardiac diet  Continue with oxygen via nasal cannula and wean off as appropriate  Recommend home O2 need assessment prior to discharge.  Troponins trended and are flat, no current complaint of chest pain, could not appreciate any significant acute ischemic changes on EKG  Echo ordered showing There is mild concentric left ventricular hypertrophy.  Biplane LVEF is 25%. Septal motion is consistent with conduction abnormality. Mildly decreased right ventricular systolic function. No significant valve  "disease. Compared to the prior study dated 11/30/2023, there have been no changes.  Cardiology following as per cardiology continue with IV diuretics, cardioversion in a.m. cardiology will continue to follow, advising CRT device but patient declines also advised to increase lisinopril dose. Will hold increasing lisinopril dose as currently running soft blood pressure  Currently continue to monitor on telemetry monitoring    Electrolyte abnormality/hypokalemia  Repleted    Chronic atrial fibrillation on chronic anticoagulation  Continue PTA amiodarone, metoprolol tartrate and Eliquis  Cardiology planning cardioversion 12/22/2023    Chronic kidney disease stage IIIa  Appears to be at baseline.  Avoid NSAIDs.   Monitor kidney function with daily renal panel while on IV diuretics  Baseline creatinine appears to be around 1.1    Benign essential hypertension  Continue PTA lisinopril, metoprolol, IV hydralazine as needed    Osteoarthritis  Continue Tylenol as needed    Physical deconditioning/weakness  OT recommending home with assist home with home care Occupational Therapy           Diet: Fluid restriction 1800 ML FLUID (and additional linked orders)  2 Gram Sodium Diet    DVT Prophylaxis: DOAC  Reyes Catheter: Not present  Lines: None     Cardiac Monitoring: ACTIVE order. Indication: Acute decompensated heart failure (48 hours)  Code Status: No CPR- Do NOT Intubate      Clinically Significant Risk Factors Present on Admission        # Hypokalemia: Lowest K = 3.3 mmol/L in last 2 days, will replace as needed        # Drug Induced Coagulation Defect: home medication list includes an anticoagulant medication    # Hypertension: Noted on problem list  # Acute heart failure with reduced ejection fraction: last echo with EF <40% and receiving IV diuretics     # Overweight: Estimated body mass index is 26.94 kg/m  as calculated from the following:    Height as of this encounter: 1.575 m (5' 2\").    Weight as of this encounter: " 66.8 kg (147 lb 4.3 oz).       # Financial/Environmental Concerns: none         Disposition Plan      Expected Discharge Date: 12/22/2023      Destination: home  Discharge Comments: Cardioversion 12/22            Saad J. Gondal, MD  Hospitalist Service  [facility]  Securely message with [employer] (more info)  Text page via [employer] Paging/Directory   ______________________________________________________________________    Interval History   Patient was seen and examined at bedside, denies any acute overnight concerns or complaints and was feeling better, discussed with the plan with the patient at bedside.    Physical Exam   Vital Signs: Temp: 97.2  F (36.2  C) Temp src: Oral BP: 97/52 Pulse: 71   Resp: 20 SpO2: (!) 86 % O2 Device: Nasal cannula Oxygen Delivery: 2 LPM  Weight: 147 lbs 4.28 oz    GENERAL: Patient was seen and examined at bedside with no acute distress  HENT:  Head is normocephalic, atraumatic.   EYES:  Eyes have anicteric sclerae without conjunctival injection   LUNGS: Bilateral air entry, clear to auscultation bilaterally  CARDIOVASCULAR:  Regular rate and rhythm with no murmurs, gallops or rubs.  ABDOMEN:  Normal bowel sounds. Soft; nontender   EXT: no edema    SKIN:  No acute rashes.   NEUROLOGIC:  Grossly nonfocal.      Medical Decision Making       50 MINUTES SPENT BY ME on the date of service doing chart review, history, exam, documentation & further activities per the note.      Data     I have personally reviewed the following data over the past 24 hrs:    N/A  \   N/A   / N/A     144 102 22.0 /  116 (H)   3.8 30 (H) 1.06 (H) \     Trop: 32 (H) BNP: N/A       Imaging results reviewed over the past 24 hrs:   Recent Results (from the past 24 hour(s))   XR Chest Port 1 View    Narrative    EXAM: XR CHEST PORT 1 VIEW  LOCATION: [facility]  DATE: 12/20/2023    INDICATION: sob  COMPARISON: 11/29/2023       Impression    IMPRESSION: Small pleural  effusions are again seen. Cannot exclude basilar airspace disease. No pneumothorax. The cardiomediastinal silhouette is enlarged.    Echocardiogram Limited   Result Value    Biplane LVEF 25%    Narrative    [id number]  [facility]  [id number]  [id number]^CURRY^JOSÉ ANTONIO^[place] [address]     Name: BHARAT QUICK  MRN: [id number]  : 1930  Study Date: 2023 09:01 AM  Age: 89 yrs  Gender: Female  Patient Location: [facility]  Reason For Study: Heart Failure - Left  Ordering Physician: JOSÉ ANTONIO ZAPIEN  Performed By: CRUZITO     BSA: 1.7 m2  Height: 62 in  Weight: 146 lb  HR: 74  BP: 129/66 mmHg  ______________________________________________________________________________  Procedure  Limited Portable Echo Adult. Definity (NDC #82804-790) given intravenously.  ______________________________________________________________________________  Interpretation Summary     There is mild concentric left ventricular hypertrophy.  Biplane LVEF is 25%.  Septal motion is consistent with conduction abnormality.  Mildly decreased right ventricular systolic function  No significant valve disease.  Compared to the prior study dated 2023, there have been no changes.  ______________________________________________________________________________  Left Ventricle  The left ventricle is normal in size. There is mild concentric left  ventricular hypertrophy. Biplane LVEF is 25%. Septal motion is consistent with  conduction abnormality. There is mod-severe global hypokinesia of the left  ventricle.     Right Ventricle  The right ventricle is normal size. TAPSE is abnormal, which is consistent  with abnormal right ventricular systolic function. Mildly decreased right  ventricular systolic function.     Atria  The left atrium is moderately dilated. The right atrium is mildly dilated.     Mitral Valve  The mitral valve leaflets are mildly thickened. There is mild to moderate (1-  2+) mitral  regurgitation.     Tricuspid Valve  Tricuspid valve leaflets appear normal. There is mild (1+) tricuspid  regurgitation. The right ventricular systolic pressure is approximated at  30mmHg plus the right atrial pressure.     Aortic Valve  There is trivial trileaflet aortic sclerosis. No aortic regurgitation is  present.     Vessels  The aorta root is normal. Normal size ascending aorta.     Pericardium  There is no pericardial effusion.     ______________________________________________________________________________  MMode/2D Measurements & Calculations  IVSd: 1.1 cm  LVIDd: 5.5 cm  LVIDs: 4.7 cm  LVPWd: 1.2 cm  FS: 13.8 %  LV mass(C)d: 247.2 grams  LV mass(C)dI: 147.7 grams/m2  Ao root diam: 3.1 cm  asc Aorta Diam: 3.6 cm     LVOT diam: 2.0 cm  LVOT area: 3.1 cm2  Ao root diam index Ht(cm/m): 2.0  Ao root diam index BSA (cm/m2): 1.9  Asc Ao diam index BSA (cm/m2): 2.2  Asc Ao diam index Ht(cm/m): 2.3  RWT: 0.44  TAPSE: 1.5 cm     Time Measurements  MM HR: 71.0 BPM     Doppler Measurements & Calculations  TR max jimbo: 284.0 cm/sec  TR max P.3 mmHg  RV S Jimbo: 4.8 cm/sec     ______________________________________________________________________________  Report approved by: Tl Obregon 2023 09:55 AM

## 2023-12-21 NOTE — PROGRESS NOTES
Physical Therapy: Orders received. Chart reviewed and discussed with care team.? Physical Therapy not indicated due to patient being followed by cardiac rehab for mobility goals.? Defer discharge recommendations to cardiac rehab.? Will complete orders.     Bina Downing, PT  12/21/2023

## 2023-12-21 NOTE — CONSULTS
Care Management Initial Consult    General Information  Assessment completed with: Patient, Patient  Type of CM/SW Visit: Initial Assessment    Primary Care Provider verified and updated as needed: Yes   Readmission within the last 30 days: current reason for admission unrelated to previous admission      Reason for Consult: discharge planning  Advance Care Planning: Advance Care Planning Reviewed: present on chart        Communication Assessment  Patient's communication style: spoken language (English or Bilingual)    Hearing Difficulty or Deaf: yes   Wear Glasses or Blind: yes    Cognitive  Cognitive/Neuro/Behavioral: WDL                      Living Environment:   People in home: alone     Current living Arrangements: apartment      Able to return to prior arrangements: yes     Family/Social Support:  Care provided by: self, other (see comments) (Other nuns)  Provides care for: no one  Marital Status: Single  Other (specify) (Other nuns)          Description of Support System: Supportive, Involved    Support Assessment: Adequate social supports    Current Resources:   Patient receiving home care services: No     Community Resources: None  Equipment currently used at home: walker, rolling  Supplies currently used at home: Incontinence Supplies    Employment/Financial:  Employment Status: retired        Financial Concerns: none   Referral to Financial Worker: No     Does the patient's insurance plan have a 3 day qualifying hospital stay waiver?  No    Lifestyle & Psychosocial Needs:  Social Determinants of Health     Food Insecurity: Low Risk  (12/6/2023)    Food Insecurity     Within the past 12 months, did you worry that your food would run out before you got money to buy more?: No     Within the past 12 months, did the food you bought just not last and you didn t have money to get more?: No   Depression: Not at risk (7/20/2023)    PHQ-2     PHQ-2 Score: 0   Housing Stability: Low Risk  (12/6/2023)    Housing  Stability     Do you have housing? : Yes     Are you worried about losing your housing?: No   Tobacco Use: Low Risk  (12/20/2023)    Patient History     Smoking Tobacco Use: Never     Smokeless Tobacco Use: Never     Passive Exposure: Never   Financial Resource Strain: Low Risk  (12/6/2023)    Financial Resource Strain     Within the past 12 months, have you or your family members you live with been unable to get utilities (heat, electricity) when it was really needed?: No   Alcohol Use: Not At Risk (3/27/2023)    AUDIT-C     Frequency of Alcohol Consumption: Never     Average Number of Drinks: Patient does not drink     Frequency of Binge Drinking: Never   Transportation Needs: Low Risk  (12/6/2023)    Transportation Needs     Within the past 12 months, has lack of transportation kept you from medical appointments, getting your medicines, non-medical meetings or appointments, work, or from getting things that you need?: No   Physical Activity: Insufficiently Active (3/27/2023)    Exercise Vital Sign     Days of Exercise per Week: 3 days     Minutes of Exercise per Session: 10 min   Interpersonal Safety: Low Risk  (10/5/2023)    Interpersonal Safety     Do you feel physically and emotionally safe where you currently live?: Yes     Within the past 12 months, have you been hit, slapped, kicked or otherwise physically hurt by someone?: No     Within the past 12 months, have you been humiliated or emotionally abused in other ways by your partner or ex-partner?: No   Stress: No Stress Concern Present (3/27/2023)    [employer] [place] [employer] - Occupational Stress Questionnaire     Feeling of Stress : Not at all   Social Connections: Moderately Integrated (3/27/2023)    Social Connection and Isolation Panel [NHANES]     Frequency of Communication with Friends and Family: More than three times a week     Frequency of Social Gatherings with Friends and Family: More than three times a week     Attends Anabaptist  Services: More than 4 times per year     Active Member of Clubs or Organizations: Yes     Attends Club or Organization Meetings: More than 4 times per year     Marital Status: Never        Functional Status:  Prior to admission patient needed assistance:   Dependent ADLs:: Ambulation-cane, Ambulation-walker  Dependent IADLs:: Shopping       Additional Information:  Writer met with patient at bedside to review role of care management services, discuss goals of care and assess need for any possible services at discharge. Patient alert, answering questions appropriately and engaged in the conversation. Patient has a HCD. Lives in apartment at [facility]. She is a nun. Needs assist with ADLs/IADLs. Use rolling walker for ambulation support. Has incontinence supplies. Goal is to return home. The other nuns will transport.     Radha Underwood RN     Per provider, Dr. Gondal, cardiology following. Will be here another 1-2 days.    1510 met with patient at bedside to discuss the OT discharge rec for home care OT. Patient said she would like home care for SN/PT/OT/HHA services.    Called DAMARIS Zhao regarding patient's request. Cece is OK with patient's request. Patient is already getting home care for SN/PT/HHA.    Referral sent     1530 Rec'd a message from [facility], patient was accepted by [facility]  Called [facility] [phone] and spoke with Gloria. Gloria verified patient is currently open to them for SN/PT/OT. Request to add HHA per patient. Gloria said they can add the HHA, if on discharge orders.

## 2023-12-21 NOTE — PLAN OF CARE
Problem: Heart Failure  Goal: Optimal Cardiac Output  Outcome: Progressing     Problem: Heart Failure  Goal: Fluid and Electrolyte Balance  Outcome: Progressing   Goal Outcome Evaluation:      A&O x4. No c/o pain but did c/o some muscle cramps in her right wrist. She remains in A-fib rate controlled. Sometime tomorrow the pt will have a cardioversion. Purewick in place with good UOP. Walked in andre with SBA, gait belt, and walker. Call-light within reach.       Tele- A-fib rate controlled.

## 2023-12-21 NOTE — PLAN OF CARE
Goal Outcome Evaluation:                      Patient is alert and oriented.  Denied pain over night.  External catheter in place to measure output.  RN weaned oxygen down from 3 L/min to 2 L/min nasal cannula.  Patient remained in bed over night, so denied shortness of breath at this time.  Will continue to monitor.      Heart Failure Care Map  GOALS TO BE MET BEFORE DISCHARGE:    1. Decrease congestion and/or edema with diuretic therapy to achieve near optimal volume status.     Dyspnea improved: No, further care required to meet this goal. Please explain denied shortness of breath, but did not get up out of bed over night, so unable to assess if short of breath with activity   Edema improved:     Yes    Last 24 hour I/O:   Intake/Output Summary (Last 24 hours) at 12/21/2023 0523  Last data filed at 12/21/2023 0521  Gross per 24 hour   Intake 360 ml   Output 2300 ml   Net -1940 ml           Net I/O and Weights since admission:   11/21 0700 - 12/21 0659  In: 360 [P.O.:360]  Out: 2300 [Urine:2300]  Net: -1940     Vitals:    12/20/23 1301 12/21/23 0333   Weight: 68.8 kg (151 lb 11.2 oz) 66.8 kg (147 lb 4.3 oz)       2.  O2 sats > 90% on room air, or at prior home O2 therapy level.      Able to wean O2 this shift to keep sats above 90%?: Yes, weaned from 3 to 2 L/min nasal cannula   Does patient use Home O2? No          Current oxygenation status:   SpO2: 99 %     O2 Device: Nasal cannula, Oxygen Delivery: 2 LPM    3.  Tolerates ambulation and mobility near baseline.     Ambulation: No, further care required to meet this goal. Please explain remained in bed over night   Times patient ambulated with staff this shift: n/a    Please review the Heart Failure Care Map for additional HF goal outcomes.    Hannah Pandya RN  12/21/2023

## 2023-12-22 ENCOUNTER — ANESTHESIA EVENT (OUTPATIENT)
Dept: CARDIOLOGY | Facility: HOSPITAL | Age: 88
DRG: 291 | End: 2023-12-22
Payer: COMMERCIAL

## 2023-12-22 ENCOUNTER — APPOINTMENT (OUTPATIENT)
Dept: CARDIOLOGY | Facility: HOSPITAL | Age: 88
DRG: 291 | End: 2023-12-22
Attending: INTERNAL MEDICINE
Payer: COMMERCIAL

## 2023-12-22 ENCOUNTER — ANESTHESIA (OUTPATIENT)
Dept: CARDIOLOGY | Facility: HOSPITAL | Age: 88
DRG: 291 | End: 2023-12-22
Payer: COMMERCIAL

## 2023-12-22 LAB
ANION GAP SERPL CALCULATED.3IONS-SCNC: 10 MMOL/L (ref 7–15)
ATRIAL RATE - MUSE: 326 BPM
ATRIAL RATE - MUSE: 64 BPM
BUN SERPL-MCNC: 26.8 MG/DL (ref 8–23)
CALCIUM SERPL-MCNC: 9.3 MG/DL (ref 8.2–9.6)
CHLORIDE SERPL-SCNC: 98 MMOL/L (ref 98–107)
CREAT SERPL-MCNC: 1.17 MG/DL (ref 0.51–0.95)
DEPRECATED HCO3 PLAS-SCNC: 31 MMOL/L (ref 22–29)
DIASTOLIC BLOOD PRESSURE - MUSE: NORMAL MMHG
DIASTOLIC BLOOD PRESSURE - MUSE: NORMAL MMHG
EGFRCR SERPLBLD CKD-EPI 2021: 43 ML/MIN/1.73M2
ERYTHROCYTE [DISTWIDTH] IN BLOOD BY AUTOMATED COUNT: 14.9 % (ref 10–15)
GLUCOSE SERPL-MCNC: 113 MG/DL (ref 70–99)
HCT VFR BLD AUTO: 39.7 % (ref 35–47)
HGB BLD-MCNC: 12.9 G/DL (ref 11.7–15.7)
INTERPRETATION ECG - MUSE: NORMAL
INTERPRETATION ECG - MUSE: NORMAL
MAGNESIUM SERPL-MCNC: 1.9 MG/DL (ref 1.7–2.3)
MCH RBC QN AUTO: 30.1 PG (ref 26.5–33)
MCHC RBC AUTO-ENTMCNC: 32.5 G/DL (ref 31.5–36.5)
MCV RBC AUTO: 93 FL (ref 78–100)
P AXIS - MUSE: 73 DEGREES
P AXIS - MUSE: NORMAL DEGREES
PHOSPHATE SERPL-MCNC: 3.4 MG/DL (ref 2.5–4.5)
PLATELET # BLD AUTO: 260 10E3/UL (ref 150–450)
POTASSIUM SERPL-SCNC: 3.5 MMOL/L (ref 3.4–5.3)
PR INTERVAL - MUSE: 172 MS
PR INTERVAL - MUSE: NORMAL MS
QRS DURATION - MUSE: 152 MS
QRS DURATION - MUSE: 158 MS
QT - MUSE: 450 MS
QT - MUSE: 502 MS
QTC - MUSE: 517 MS
QTC - MUSE: 522 MS
R AXIS - MUSE: 6 DEGREES
R AXIS - MUSE: 71 DEGREES
RBC # BLD AUTO: 4.29 10E6/UL (ref 3.8–5.2)
SODIUM SERPL-SCNC: 139 MMOL/L (ref 135–145)
SYSTOLIC BLOOD PRESSURE - MUSE: NORMAL MMHG
SYSTOLIC BLOOD PRESSURE - MUSE: NORMAL MMHG
T AXIS - MUSE: -42 DEGREES
T AXIS - MUSE: 56 DEGREES
VENTRICULAR RATE- MUSE: 64 BPM
VENTRICULAR RATE- MUSE: 81 BPM
WBC # BLD AUTO: 7.7 10E3/UL (ref 4–11)

## 2023-12-22 PROCEDURE — 250N000009 HC RX 250: Performed by: NURSE ANESTHETIST, CERTIFIED REGISTERED

## 2023-12-22 PROCEDURE — 92960 CARDIOVERSION ELECTRIC EXT: CPT | Performed by: NURSE PRACTITIONER

## 2023-12-22 PROCEDURE — 250N000013 HC RX MED GY IP 250 OP 250 PS 637: Performed by: NURSE PRACTITIONER

## 2023-12-22 PROCEDURE — 210N000001 HC R&B IMCU HEART CARE

## 2023-12-22 PROCEDURE — 36415 COLL VENOUS BLD VENIPUNCTURE: CPT | Performed by: STUDENT IN AN ORGANIZED HEALTH CARE EDUCATION/TRAINING PROGRAM

## 2023-12-22 PROCEDURE — 93005 ELECTROCARDIOGRAM TRACING: CPT

## 2023-12-22 PROCEDURE — 5A2204Z RESTORATION OF CARDIAC RHYTHM, SINGLE: ICD-10-PCS | Performed by: NURSE PRACTITIONER

## 2023-12-22 PROCEDURE — 250N000011 HC RX IP 250 OP 636: Mod: JZ | Performed by: NURSE PRACTITIONER

## 2023-12-22 PROCEDURE — 85027 COMPLETE CBC AUTOMATED: CPT | Performed by: STUDENT IN AN ORGANIZED HEALTH CARE EDUCATION/TRAINING PROGRAM

## 2023-12-22 PROCEDURE — 99232 SBSQ HOSP IP/OBS MODERATE 35: CPT | Mod: 25 | Performed by: INTERNAL MEDICINE

## 2023-12-22 PROCEDURE — 250N000013 HC RX MED GY IP 250 OP 250 PS 637: Performed by: STUDENT IN AN ORGANIZED HEALTH CARE EDUCATION/TRAINING PROGRAM

## 2023-12-22 PROCEDURE — 80048 BASIC METABOLIC PNL TOTAL CA: CPT | Performed by: INTERNAL MEDICINE

## 2023-12-22 PROCEDURE — 250N000011 HC RX IP 250 OP 636: Mod: JZ | Performed by: STUDENT IN AN ORGANIZED HEALTH CARE EDUCATION/TRAINING PROGRAM

## 2023-12-22 PROCEDURE — 84100 ASSAY OF PHOSPHORUS: CPT | Performed by: STUDENT IN AN ORGANIZED HEALTH CARE EDUCATION/TRAINING PROGRAM

## 2023-12-22 PROCEDURE — 370N000017 HC ANESTHESIA TECHNICAL FEE, PER MIN

## 2023-12-22 PROCEDURE — 99233 SBSQ HOSP IP/OBS HIGH 50: CPT | Performed by: STUDENT IN AN ORGANIZED HEALTH CARE EDUCATION/TRAINING PROGRAM

## 2023-12-22 PROCEDURE — 93010 ELECTROCARDIOGRAM REPORT: CPT | Performed by: INTERNAL MEDICINE

## 2023-12-22 PROCEDURE — 250N000013 HC RX MED GY IP 250 OP 250 PS 637: Performed by: INTERNAL MEDICINE

## 2023-12-22 PROCEDURE — 83735 ASSAY OF MAGNESIUM: CPT | Performed by: STUDENT IN AN ORGANIZED HEALTH CARE EDUCATION/TRAINING PROGRAM

## 2023-12-22 RX ORDER — FUROSEMIDE 10 MG/ML
40 INJECTION INTRAMUSCULAR; INTRAVENOUS
Status: DISCONTINUED | OUTPATIENT
Start: 2023-12-22 | End: 2023-12-23

## 2023-12-22 RX ORDER — POTASSIUM CHLORIDE 750 MG/1
10 TABLET, EXTENDED RELEASE ORAL ONCE
Status: COMPLETED | OUTPATIENT
Start: 2023-12-22 | End: 2023-12-22

## 2023-12-22 RX ORDER — FUROSEMIDE 10 MG/ML
20 INJECTION INTRAMUSCULAR; INTRAVENOUS
Status: DISCONTINUED | OUTPATIENT
Start: 2023-12-22 | End: 2023-12-22

## 2023-12-22 RX ORDER — MAGNESIUM OXIDE 400 MG/1
400 TABLET ORAL EVERY 4 HOURS
Status: COMPLETED | OUTPATIENT
Start: 2023-12-22 | End: 2023-12-22

## 2023-12-22 RX ADMIN — OXYCODONE HYDROCHLORIDE 5 MG: 5 TABLET ORAL at 16:53

## 2023-12-22 RX ADMIN — FUROSEMIDE 40 MG: 10 INJECTION, SOLUTION INTRAMUSCULAR; INTRAVENOUS at 09:33

## 2023-12-22 RX ADMIN — FUROSEMIDE 40 MG: 10 INJECTION, SOLUTION INTRAMUSCULAR; INTRAVENOUS at 16:56

## 2023-12-22 RX ADMIN — METOPROLOL TARTRATE 25 MG: 25 TABLET, FILM COATED ORAL at 12:11

## 2023-12-22 RX ADMIN — MAGNESIUM OXIDE TAB 400 MG (241.3 MG ELEMENTAL MG) 400 MG: 400 (241.3 MG) TAB at 12:11

## 2023-12-22 RX ADMIN — APIXABAN 5 MG: 5 TABLET, FILM COATED ORAL at 20:44

## 2023-12-22 RX ADMIN — APIXABAN 5 MG: 5 TABLET, FILM COATED ORAL at 09:27

## 2023-12-22 RX ADMIN — METHOHEXITAL SODIUM 50 MG: 500 INJECTION, POWDER, LYOPHILIZED, FOR SOLUTION INTRAMUSCULAR; INTRAVENOUS; RECTAL at 11:11

## 2023-12-22 RX ADMIN — FUROSEMIDE 20 MG: 10 INJECTION, SOLUTION INTRAMUSCULAR; INTRAVENOUS at 00:55

## 2023-12-22 RX ADMIN — MAGNESIUM OXIDE TAB 400 MG (241.3 MG ELEMENTAL MG) 400 MG: 400 (241.3 MG) TAB at 09:28

## 2023-12-22 RX ADMIN — LISINOPRIL 2.5 MG: 2.5 TABLET ORAL at 09:27

## 2023-12-22 RX ADMIN — MICONAZOLE NITRATE ANTIFUNGAL POWDER: 2 POWDER TOPICAL at 20:43

## 2023-12-22 RX ADMIN — MICONAZOLE NITRATE ANTIFUNGAL POWDER: 2 POWDER TOPICAL at 09:29

## 2023-12-22 RX ADMIN — AMIODARONE HYDROCHLORIDE 100 MG: 100 TABLET ORAL at 09:29

## 2023-12-22 RX ADMIN — DULOXETINE HYDROCHLORIDE 60 MG: 60 CAPSULE, DELAYED RELEASE PELLETS ORAL at 09:27

## 2023-12-22 RX ADMIN — POTASSIUM CHLORIDE 10 MEQ: 750 TABLET, EXTENDED RELEASE ORAL at 09:28

## 2023-12-22 RX ADMIN — ZOLPIDEM TARTRATE 5 MG: 5 TABLET ORAL at 20:51

## 2023-12-22 ASSESSMENT — ACTIVITIES OF DAILY LIVING (ADL)
ADLS_ACUITY_SCORE: 33
ADLS_ACUITY_SCORE: 33
ADLS_ACUITY_SCORE: 30
ADLS_ACUITY_SCORE: 33
ADLS_ACUITY_SCORE: 30
ADLS_ACUITY_SCORE: 33

## 2023-12-22 ASSESSMENT — ENCOUNTER SYMPTOMS: DYSRHYTHMIAS: 1

## 2023-12-22 NOTE — PROGRESS NOTES
[facility]    Medicine Progress Note - Hospitalist Service    Date of Admission:  12/20/2023    Assessment & Plan   Assessment:  Gladys Ramirez is a 89 year old female with past medical history of chronic congestive heart failure with reduced EF of 20 to 25%, hypertension, chronic atrial fibrillation on Eliquis who presents with complaints of dyspnea with exertion and dizziness.     #Acute exacerbation of chronic HFrEF  #Acute hypoxemic respiratory failure due to decompensated heart failure  #Non-MI troponin elevation  Patient presented with complaint of dyspnea with exertion and dizziness  Echocardiogram obtained 11/30/2023 showed reduced left ventricular ejection fraction of 20-25%. Chest x-ray showed cardiomegaly with trace bilateral pleural effusions, BNP is significantly elevated. HS troponin mildly elevated, but flat and without acute symptoms of ACS.  - Cardiology consulted  - Continue Lasix 40mg IV BID, titrate and monitor BMP  - Continue metoprolol tartrate 25mg BID  - Continue lisinopril 2.5mg daily  - Continue with strict I's and O's, daily weights  - TTE: There is mild concentric left ventricular hypertrophy. LVEF is 25%. Septal motion is consistent with conduction abnormality. Mildly decreased right ventricular systolic function. No significant valve disease.   - Underwent cardioversion 12/22. Patient with QRS >150 in LBBB pattern, would likely benefit from CRT but patient declined    #Hypokalemia  Repleted    #Chronic atrial fibrillation on chronic anticoagulation  - Continue PTA amiodarone, metoprolol tartrate and Eliquis  - Cardiology performed cardioversion 12/22/2023    #Chronic kidney disease stage IIIa  Appears to be at baseline.  Cr ~1-1.1  - Monitor kidney function with daily renal panel while on IV diuretics    #Essential hypertension  - Continue PTA lisinopril, metoprolol, IV hydralazine as needed    #Osteoarthritis  - Continue Tylenol as needed    #Physical  "deconditioning/weakness  - HH OT/PT/RN/aide at discharge. Lives at [place].           Diet: Fluid restriction 1800 ML FLUID (and additional linked orders)  2 Gram Sodium Diet    DVT Prophylaxis: DOAC  Reyes Catheter: Not present  Lines: None     Cardiac Monitoring: ACTIVE order. Indication: Post- EP procedure (48 hours)  Code Status: No CPR- Do NOT Intubate      Clinically Significant Risk Factors        # Hypokalemia: Lowest K = 3.3 mmol/L in last 2 days, will replace as needed             # Hypertension: Noted on problem list  # Acute heart failure with reduced ejection fraction: last echo with EF <40% and receiving IV diuretics       # Overweight: Estimated body mass index is 25.85 kg/m  as calculated from the following:    Height as of this encounter: 1.575 m (5' 2\").    Weight as of this encounter: 64.1 kg (141 lb 5 oz)., PRESENT ON ADMISSION       # Financial/Environmental Concerns: none         Disposition Plan      Expected Discharge Date: 12/24/2023      Destination: home  Discharge Comments: home with home care            CRISTI KEEN MD  Hospitalist Service  [facility]  Securely message with [employer] (more info)  Text page via [employer] Paging/Directory   ______________________________________________________________________    Interval History   Continues to feel even better, ready for cardioversion today.    Physical Exam   Vital Signs: Temp: 97.6  F (36.4  C) Temp src: Oral BP: 133/61 Pulse: 67   Resp: 20 SpO2: 95 % O2 Device: Nasal cannula Oxygen Delivery: 2 LPM  Weight: 141 lbs 5.04 oz    GENERAL: Patient was seen and examined at bedside with no acute distress  HENT:  Head is normocephalic, atraumatic.    LUNGS: Bilateral air entry, clear to auscultation bilaterally  CARDIOVASCULAR:  Regular rate and rhythm with no murmurs, gallops or rubs.  ABDOMEN: Soft; nontender   EXT: no edema   NEUROLOGIC: Grossly nonfocal.      Medical Decision Making       50 MINUTES SPENT BY ME on " the date of service doing chart review, history, exam, documentation & further activities per the note.      Data     I have personally reviewed the following data over the past 24 hrs:    7.7  \   12.9   / 260     139 98 26.8 (H) /  113 (H)   3.5 31 (H) 1.17 (H) \       Imaging results reviewed over the past 24 hrs:   Recent Results (from the past 24 hour(s))   EP Cardioversion External    Narrative    Kayla Montano APRN CNP     12/22/2023 11:46 AM  [facility]    Procedure: EP Cardioversion External    Date/Time: 12/22/2023 11:42 AM    Performed by: Kayla Montano APRN CNP  Authorized by: Kayla Montano APRN CNP      UNIVERSAL PROTOCOL   Site Marked: NA  Prior Images Obtained and Reviewed:  Yes  Required items: Required blood products, implants, devices and special   equipment available    Patient identity confirmed:  Verbally with patient, arm band and provided   demographic data  Patient was reevaluated immediately before administering moderate or deep   sedation or anesthesia  Confirmation Checklist:  Patient's identity using two indicators, relevant   allergies, procedure was appropriate and matched the consent or emergent   situation and correct equipment/implants were available  Time out: Immediately prior to the procedure a time out was called    Universal Protocol: the Joint Commission Universal Protocol was followed       ANESTHESIA    Anesthesia was administered and monitored by anesthesiology.  See   anesthesia documentation for details.    PROCEDURE DETAILS  Pre-procedure rhythm: atrial fibrillation  Patient position: patient was placed in a supine position  Chest area: chest area exposed  Electrodes: pads  Electrodes placed: anterior-posterior  Number of attempts: 1    Details of Attempts:  At 1112, after administration of IV Brevital by MDA   and confirmation of adequate sedation, she received a single synchronized   shock at 200 J with prompt restoration of sinus rhythm in  the 60s.  Post   cardioversion EKG pending.  Post-procedure rhythm: normal sinus rhythm  Complications: no complications      PROCEDURE  Describe Procedure: Hospitalized with acute heart failure.  Known history   of persistent atrial fibrillation with plan for cardioversion.  Eliquis   was increased to a therapeutic dose on 11/30/2023.  She denies missing any   doses of Eliquis since that time, so is now eligible for cardioversion.    Ventricular rates have been well-controlled, but A-fib may be contributing   to her heart failure.  Successful cardioversion to sinus rhythm  Continue amiodarone 100 mg daily  Continue Eliquis 5 mg twice daily for stroke prophylaxis (she does not   qualify for dose adjustment)  Follow-up with Ibis Pedro CNP in 1 month  Transfer back to telemetry once awake and stable after cardioversion  Patient Tolerance:  Patient tolerated the procedure well with no immediate   complications  Length of time physician/provider present for 1:1 monitoring during   sedation: 10

## 2023-12-22 NOTE — PLAN OF CARE
Goal Outcome Evaluation:               Heart Failure Care Map  GOALS TO BE MET BEFORE DISCHARGE:    1. Decrease congestion and/or edema with diuretic therapy to achieve near optimal volume status.     Dyspnea improved: Denied shortness of breath at rest, but still some dyspnea with activity   Edema improved: Yes, satisfactory for discharge.        Last 24 hour I/O:   Intake/Output Summary (Last 24 hours) at 12/22/2023 0703  Last data filed at 12/22/2023 0406  Gross per 24 hour   Intake 590 ml   Output 950 ml   Net -360 ml           Net I/O and Weights since admission:   11/22 1500 - 12/22 1459  In: 950 [P.O.:950]  Out: 3250 [Urine:3250]  Net: -2300     Vitals:    12/20/23 1301 12/21/23 0333 12/22/23 0659   Weight: 68.8 kg (151 lb 11.2 oz) 66.8 kg (147 lb 4.3 oz) 64.1 kg (141 lb 5 oz)       2.  O2 sats > 90% on room air, or at prior home O2 therapy level.      Able to wean O2 this shift to keep sats above 90%?: No, further care required to meet this goal. Please explain still requires 2 L/min nasal cannula   Does patient use Home O2? No          Current oxygenation status:   SpO2: 92 %     O2 Device: Nasal cannula, Oxygen Delivery: 2 LPM    3.  Tolerates ambulation and mobility near baseline.     Ambulation: No, further care required to meet this goal. Please explain remained in bed   Times patient ambulated with staff this shift: n/a    Please review the Heart Failure Care Map for additional HF goal outcomes.    Hannah Pandya RN  12/22/2023         Patient alert and oriented denied chest pain.  Kept NPO for cardioversion.  External catheter was exchanged after it bypassed over night and patient had large incontinence.  Patient has redness in right pelvis, which she says is most likely yeast infection.  Message left with MD.  Will continue to monitor.

## 2023-12-22 NOTE — PROCEDURES
[facility]    Procedure: EP Cardioversion External    Date/Time: 12/22/2023 11:42 AM    Performed by: Kayla Montano APRN CNP  Authorized by: Kayla Montano APRN CNP      UNIVERSAL PROTOCOL   Site Marked: NA  Prior Images Obtained and Reviewed:  Yes  Required items: Required blood products, implants, devices and special equipment available    Patient identity confirmed:  Verbally with patient, arm band and provided demographic data  Patient was reevaluated immediately before administering moderate or deep sedation or anesthesia  Confirmation Checklist:  Patient's identity using two indicators, relevant allergies, procedure was appropriate and matched the consent or emergent situation and correct equipment/implants were available  Time out: Immediately prior to the procedure a time out was called    Universal Protocol: the Joint Commission Universal Protocol was followed       ANESTHESIA    Anesthesia was administered and monitored by anesthesiology.  See anesthesia documentation for details.    PROCEDURE DETAILS  Pre-procedure rhythm: atrial fibrillation  Patient position: patient was placed in a supine position  Chest area: chest area exposed  Electrodes: pads  Electrodes placed: anterior-posterior  Number of attempts: 1    Details of Attempts:  At 1112, after administration of IV Brevital by MDA and confirmation of adequate sedation, she received a single synchronized shock at 200 J with prompt restoration of sinus rhythm in the 60s.  Post cardioversion EKG pending.  Post-procedure rhythm: normal sinus rhythm  Complications: no complications      PROCEDURE  Describe Procedure: Hospitalized with acute heart failure.  Known history of persistent atrial fibrillation with plan for cardioversion.  Eliquis was increased to a therapeutic dose on 11/30/2023.  She denies missing any doses of Eliquis since that time, so is now eligible for cardioversion.  Ventricular rates have been well-controlled,  but A-fib may be contributing to her heart failure.  Successful cardioversion to sinus rhythm  Continue amiodarone 100 mg daily  Continue Eliquis 5 mg twice daily for stroke prophylaxis (she does not qualify for dose adjustment)  Follow-up with Ibis Pedro CNP in 1 month  Transfer back to telemetry once awake and stable after cardioversion  Patient Tolerance:  Patient tolerated the procedure well with no immediate complications  Length of time physician/provider present for 1:1 monitoring during sedation: 10

## 2023-12-22 NOTE — CONSULTS
SPIRITUAL HEALTH SERVICES Progress Note  [facility], P3    Saw pt Gladys NAJERA Ashley per consult order/length of stay assessment. Gladys was sleeping and did not rouse to voice.     Plan of Care - A  will continue to visit as able or per request by patient/family/staff.      Antione Robison MDiv, [facility]  /Manager Spiritual Health Services  [phone]

## 2023-12-22 NOTE — PROGRESS NOTES
"Care Management Follow Up    Length of Stay (days): 2    Expected Discharge Date: 12/23/2023     Concerns to be Addressed:     care progression  IV duretic  Patient plan of care discussed at interdisciplinary rounds: Yes    Anticipated Discharge Disposition:  Home with Home care - [facility] SN/PT/OT/HHA     Anticipated Discharge Services:  Home with Home care - [facility] SN/PT/OT/HHA  Anticipated Discharge DME:  NA    Patient/family educated on Medicare website which has current facility and service quality ratings:  NA  Education Provided on the Discharge Plan:  Yes per team  Patient/Family in Agreement with the Plan: yes    Referrals Placed by CM/SW:  Yes  Private pay costs discussed: Not applicable    Additional Information:  Per provider, Dr. Avalos, plan for a cardioversion. On IV diuretic. Maybe discharge in 1-2 days, pending cardiology rec.    Social Hx: \"Lives in apartment at [facility]. She is a nun. Needs assist with ADLs/IADLs. Use rolling walker for ambulation support. Has incontinence supplies. Open to [facility] for SN/PT/OT (would like to add HHA). Goal is to return home. The other nuns will transport.\"    RNCM to follow for medical progression, recommendations, and final discharge plan.     Radha Underwood RN     "

## 2023-12-22 NOTE — ANESTHESIA PREPROCEDURE EVALUATION
"Anesthesia Pre-Procedure Evaluation    Patient: Gladys Ramirez   MRN: [id number] : 1930        Procedure : * No procedures listed *          Past Medical History:   Diagnosis Date    Angina pectoris (H24)     Atrial fibrillation (H)     Chest pain 2017    Chronic kidney disease     Congestive heart failure (H)     Cough     Hyperlipidemia     Hypertension     Osteoarthritis       Past Surgical History:   Procedure Laterality Date    APPENDECTOMY      BASAL CELL CARCINOMA EXCISION      nose    LAPAROSCOPY DIAGNOSTIC (GENERAL) N/A 2014    Procedure: LAPAROSCOPY BILATERAL SALPINGO-OOPHORECTOMY ;  Surgeon: Sofia Harper MD;  Location: [facility];  Service:     OPEN REDUCTION INTERNAL FIXATION HIP BIPOLAR Right 3/5/2023    Procedure: HEMIARTHROPLASTY, HIP, BIPOLAR;  Surgeon: Jose G Martinez MD;  Location: [facility]    TONSILLECTOMY      10 years old    ZZC TOTAL KNEE ARTHROPLASTY Left           Allergies   Allergen Reactions    Trazodone Shortness Of Breath and Unknown     Allergic to trazodone and deriv., Other: trouble swallowing      Clindamycin Diarrhea     C-diff    Gabapentin Other (See Comments)     \"Internal tremors\"    Temazepam Other (See Comments)     Annotation: Nightmares        Social History     Tobacco Use    Smoking status: Never     Passive exposure: Never    Smokeless tobacco: Never    Tobacco comments:     no passive exposure   Substance Use Topics    Alcohol use: Yes     Comment: Alcoholic Drinks/day: Rarely a glass of wine      Wt Readings from Last 1 Encounters:   23 64.1 kg (141 lb 5 oz)        Anesthesia Evaluation   Pt has had prior anesthetic.     No history of anesthetic complications       ROS/MED HX  ENT/Pulmonary:  - neg pulmonary ROS     Neurologic:  - neg neurologic ROS     Cardiovascular:     (+) Dyslipidemia hypertension- -   -  - -   Taking blood thinners   CHF  Last EF: 25      pacemaker,          dysrhythmias, a-fib,         " "    METS/Exercise Tolerance: >4 METS    Hematologic:     (+) History of blood clots,               Musculoskeletal:   (+)  arthritis,             GI/Hepatic:     (+) GERD, Asymptomatic on medication,                  Renal/Genitourinary:     (+) renal disease, type: CRI,            Endo:     (+)          thyroid problem, hypothyroidism,           Psychiatric/Substance Use:  - neg psychiatric ROS     Infectious Disease:  - neg infectious disease ROS     Malignancy:  - neg malignancy ROS     Other:  - neg other ROS    (+)  , H/O Chronic Pain,         Physical Exam    Airway  airway exam normal      Mallampati: II   TM distance: > 3 FB   Neck ROM: full   Mouth opening: > 3 cm    Respiratory Devices and Support         Dental  no notable dental history     (+) Multiple crowns, permanant bridges      Cardiovascular          Rhythm and rate: irregular     Pulmonary   pulmonary exam normal                OUTSIDE LABS:  CBC:   Lab Results   Component Value Date    WBC 7.7 12/22/2023    WBC 8.2 12/20/2023    HGB 12.9 12/22/2023    HGB 12.7 12/20/2023    HCT 39.7 12/22/2023    HCT 40.3 12/20/2023     12/22/2023     12/20/2023     BMP:   Lab Results   Component Value Date     12/22/2023     12/21/2023    POTASSIUM 3.5 12/22/2023    POTASSIUM 3.8 12/21/2023    CHLORIDE 98 12/22/2023    CHLORIDE 102 12/21/2023    CO2 31 (H) 12/22/2023    CO2 30 (H) 12/21/2023    BUN 26.8 (H) 12/22/2023    BUN 22.0 12/21/2023    CR 1.17 (H) 12/22/2023    CR 1.06 (H) 12/21/2023     (H) 12/22/2023     (H) 12/21/2023     COAGS:   Lab Results   Component Value Date    PTT 32 02/21/2019    INR 1.33 (H) 03/04/2023     POC: No results found for: \"BGM\", \"HCG\", \"HCGS\"  HEPATIC:   Lab Results   Component Value Date    ALBUMIN 4.2 12/06/2023    PROTTOTAL 7.3 12/06/2023    ALT 34 12/06/2023    AST 27 12/06/2023    ALKPHOS 172 (H) 12/06/2023    BILITOTAL 0.3 12/06/2023     OTHER:   Lab Results   Component Value Date    " LACT 2.6 (H) 08/21/2018    A1C 6.0 (H) 06/15/2021    MARLENE 9.3 12/22/2023    PHOS 3.4 12/22/2023    MAG 1.9 12/22/2023    LIPASE <9 02/21/2019    TSH 3.99 12/06/2023    CRP 0.8 03/05/2018    SED 38 (H) 03/05/2018       Anesthesia Plan    ASA Status:  4    NPO Status:  NPO Appropriate    Anesthesia Type: General.     - Airway: Mask Only   Induction: Intravenous.           Consents    Anesthesia Plan(s) and associated risks, benefits, and realistic alternatives discussed. Questions answered and patient/representative(s) expressed understanding.     - Discussed:     - Discussed with:  Patient      - Extended Intubation/Ventilatory Support Discussed: No.      - Patient is DNR/DNI Status: No     Use of blood products discussed: No .     Postoperative Care            Comments:               Scout Ro MD    I have reviewed the pertinent notes and labs in the chart from the past 30 days and (re)examined the patient.  Any updates or changes from those notes are reflected in this note.

## 2023-12-22 NOTE — ANESTHESIA POSTPROCEDURE EVALUATION
Patient: Gladys Ramirez    Procedure: * No procedures listed *       Anesthesia Type:  General    Note:  Disposition: Inpatient   Postop Pain Control: Uneventful            Sign Out: Well controlled pain   PONV: No   Neuro/Psych: Uneventful            Sign Out: Acceptable/Baseline neuro status   Airway/Respiratory: Uneventful            Sign Out: Acceptable/Baseline resp. status   CV/Hemodynamics: Uneventful            Sign Out: Acceptable CV status; No obvious hypovolemia; No obvious fluid overload   Other NRE: NONE   DID A NON-ROUTINE EVENT OCCUR?            Last vitals:  Vitals:    12/22/23 1152 12/22/23 1156 12/22/23 1211   BP: 116/56  132/63   Pulse:  58 60   Resp:   20   Temp:      SpO2:          Electronically Signed By: Scout Ro MD  December 22, 2023  12:56 PM

## 2023-12-22 NOTE — CONSULTS
NUTRITION EDUCATION      REASON FOR ASSESSMENT:  Consulted to educate pt on a low sodium diet    NUTRITION HISTORY:  Information obtained from pt    Pt states that she always prepares her own supper and others prepare breakfast and lunch.  She never adds salt in the cooking or at the table and does not use canned foods. She avoids frozen dinners with high sodium content. She usually has cereal and fruit at breakfast and sandwich with something else at lunch    CURRENT DIET:  2 gram Na with FR of 1800 ml/day    INTERVENTIONS:    Nutrition Prescription:  2 gram na    Implementation:      *  Nutrition Education (Content):   A)  Provided handout low sodium nutrition therapy, sodium free seasonings and low sodium shopping guidelines   B)  Discussed reading labels, appropriate sodium free seasonings      *  Nutrition Education (Application):   A)  Discussed current eating habits and recommended alternative food choices      *  Anticipate good compliance      *  Diet Education - refer to Education Flowsheet    Goals:      *  Patient will verbalize understanding of diet met      *  All of the above goals met during the education session    Follow Up/Monitoring:      *  Provided RD contact information for future questions      *  Recommended Out-Patient Nutrition Referral, if further diet instructions are needed

## 2023-12-22 NOTE — PROGRESS NOTES
CARDIOLOGY PROGRESS NOTE      Assessment/Plan:  1.  Acute congestive heart failure, (H)-acute on chronic in the setting of diminished ejection fraction of 25%, exacerbation most likely secondary to inadequate diuresis from last hospitalization as well as continued atrial fibrillation.  With aggressive furosemide 40 mg IV twice daily weight down 4 kg, oxygen saturation 93% on 2 L, symptomatically improved, creatinine stable.  Continue IV diuretic.    2.  Cardiomyopathy-ejection fraction 25%, presumed nonischemic.  Given her age would not utilize Entresto just yet.  Spoke with her about CRT device which she declines.  3.  Atrial fibrillation-not valvular, possibly symptomatic related to heart failure and persistent.  On amiodarone with no missed doses of Eliquis.  Eliquis was increased to 5 mg over 3 weeks ago. Will attempt cardioversion to sinus rhythm today.  4.  Benign essential hypertension-under good control.  5.  Left bundle branch block pattern-she has a widened QRS greater than 150 ms, strongly suspect she would benefit from CRT device but she declines.    Plan:  1.  Continue IV diuretic.  2.  Attempt cardioversion to sinus rhythm today.    Discharge Plannin.  Barrier to discharge is resolution of heart failure, anticipate 1 to 2 days.  2.  Can follow with me as primary cardiologist.     LOS: 2 days     Subjective:  Interval History:    89-year-old white female being seen on third day of hospitalization.  She still has a little shortness of breath, but feels better.  No PND, orthopnea, syncope, dizziness, major fatigue, chest pains, palpitations, or peripheral edema.    Medications   amiodarone  100 mg Oral Daily    apixaban ANTICOAGULANT  5 mg Oral BID    DULoxetine  60 mg Oral Daily    furosemide  40 mg Intravenous BID    lisinopril  2.5 mg Oral Daily    magnesium oxide  400 mg Oral Q4H    metoprolol tartrate  25 mg Oral BID    miconazole   Topical BID     Objective:   Vital signs in last 24  "hours:  Temp:  [97.2  F (36.2  C)-98.3  F (36.8  C)] 98.3  F (36.8  C)  Pulse:  [71-83] 75  Resp:  [18-20] 18  BP: ()/(52-70) 113/70  SpO2:  [86 %-98 %] 96 %    Physical Exam:  /70 (BP Location: Left arm)   Pulse 75   Temp 98.3  F (36.8  C) (Oral)   Resp 18   Ht 1.575 m (5' 2\")   Wt 64.1 kg (141 lb 5 oz)   SpO2 96%   BMI 25.85 kg/m      General Appearance:    Alert, cooperative, no distress, appears stated age   Head:    Normocephalic, without obvious abnormality, atraumatic   Throat:   Lips, mucosa, and tongue normal; teeth and gums normal   Neck:   Supple, symmetrical, trachea midline, no adenopathy;        thyroid:  No enlargement/tenderness/nodules; no carotid    bruit or JVD   Back:     Symmetric, no curvature, ROM normal, no CVA tenderness   Lungs:     Crackles to auscultation bilaterally, respirations unlabored   Chest wall:    No tenderness or deformity   Heart:    Irregularly irregular, S1 and S2 normal, no murmur, rub   or gallop   Abdomen:     Soft, non-tender, bowel sounds active all four quadrants,     no masses, no organomegaly   Extremities:   Normal, atraumatic, no cyanosis or edema   Pulses:   2+ and symmetric all extremities   Skin:   Skin color, texture, turgor normal, no rashes or lesions     Cardiographics:      ECG: Personally reviewed by myself shows atrial fibrillation, left bundle branch block pattern.    Echocardiogram:   There is mild concentric left ventricular hypertrophy.  Biplane LVEF is 25%.  Septal motion is consistent with conduction abnormality.  Mildly decreased right ventricular systolic function  No significant valve disease.  Compared to the prior study dated 11/30/2023, there have been no  changes.      Lab Results:   Recent Labs   Lab 12/22/23  0502   WBC 7.7   HGB 12.9   HCT 39.7        Recent Labs   Lab 12/22/23  0502      CO2 31*   BUN 26.8*   .       Lab Results   Component Value Date    TROPONINI 0.16 08/23/2022           "

## 2023-12-22 NOTE — ANESTHESIA CARE TRANSFER NOTE
Patient: Gladys Ramirez    Procedure: * No procedures listed *       Diagnosis: * No pre-op diagnosis entered *  Diagnosis Additional Information: No value filed.    Anesthesia Type:   No value filed.     Note:    Oropharynx: oropharynx clear of all foreign objects  Level of Consciousness: awake  Oxygen Supplementation: nasal cannula  Level of Supplemental Oxygen (L/min / FiO2): 4  Independent Airway: airway patency satisfactory and stable  Dentition: dentition unchanged  Vital Signs Stable: post-procedure vital signs reviewed and stable  Report to RN Given: handoff report given  Patient transferred to: [facility]          Vitals:  Vitals Value Taken Time   /58    Temp 97.7    Pulse 68    Resp 16    SpO2 92        Electronically Signed By: SHASHA Ramírez CRNA  December 22, 2023  11:13 AM

## 2023-12-22 NOTE — PLAN OF CARE
Successful cardioversion. Pt in NSR/ SB vitals stable. Pt feeling well. Back to room. Was able to get up to the bathroom with SBA, reoprt given to P3.

## 2023-12-23 LAB
ANION GAP SERPL CALCULATED.3IONS-SCNC: 10 MMOL/L (ref 7–15)
BUN SERPL-MCNC: 33.5 MG/DL (ref 8–23)
CALCIUM SERPL-MCNC: 9.5 MG/DL (ref 8.2–9.6)
CHLORIDE SERPL-SCNC: 99 MMOL/L (ref 98–107)
CREAT SERPL-MCNC: 1.43 MG/DL (ref 0.51–0.95)
DEPRECATED HCO3 PLAS-SCNC: 31 MMOL/L (ref 22–29)
EGFRCR SERPLBLD CKD-EPI 2021: 34 ML/MIN/1.73M2
GLUCOSE SERPL-MCNC: 104 MG/DL (ref 70–99)
MAGNESIUM SERPL-MCNC: 2.2 MG/DL (ref 1.7–2.3)
POTASSIUM SERPL-SCNC: 3.7 MMOL/L (ref 3.4–5.3)
SODIUM SERPL-SCNC: 140 MMOL/L (ref 135–145)

## 2023-12-23 PROCEDURE — 250N000013 HC RX MED GY IP 250 OP 250 PS 637: Performed by: STUDENT IN AN ORGANIZED HEALTH CARE EDUCATION/TRAINING PROGRAM

## 2023-12-23 PROCEDURE — 250N000013 HC RX MED GY IP 250 OP 250 PS 637: Performed by: NURSE PRACTITIONER

## 2023-12-23 PROCEDURE — 80048 BASIC METABOLIC PNL TOTAL CA: CPT | Performed by: STUDENT IN AN ORGANIZED HEALTH CARE EDUCATION/TRAINING PROGRAM

## 2023-12-23 PROCEDURE — 99233 SBSQ HOSP IP/OBS HIGH 50: CPT | Performed by: STUDENT IN AN ORGANIZED HEALTH CARE EDUCATION/TRAINING PROGRAM

## 2023-12-23 PROCEDURE — 83735 ASSAY OF MAGNESIUM: CPT | Performed by: STUDENT IN AN ORGANIZED HEALTH CARE EDUCATION/TRAINING PROGRAM

## 2023-12-23 PROCEDURE — 99232 SBSQ HOSP IP/OBS MODERATE 35: CPT | Performed by: INTERNAL MEDICINE

## 2023-12-23 PROCEDURE — 250N000013 HC RX MED GY IP 250 OP 250 PS 637: Performed by: INTERNAL MEDICINE

## 2023-12-23 PROCEDURE — 36415 COLL VENOUS BLD VENIPUNCTURE: CPT | Performed by: STUDENT IN AN ORGANIZED HEALTH CARE EDUCATION/TRAINING PROGRAM

## 2023-12-23 PROCEDURE — 210N000001 HC R&B IMCU HEART CARE

## 2023-12-23 PROCEDURE — 250N000011 HC RX IP 250 OP 636: Mod: JZ | Performed by: NURSE PRACTITIONER

## 2023-12-23 RX ORDER — POTASSIUM CHLORIDE 750 MG/1
10 TABLET, EXTENDED RELEASE ORAL ONCE
Qty: 1 TABLET | Refills: 0 | Status: COMPLETED | OUTPATIENT
Start: 2023-12-23 | End: 2023-12-23

## 2023-12-23 RX ORDER — FUROSEMIDE 20 MG
40 TABLET ORAL
Status: DISCONTINUED | OUTPATIENT
Start: 2023-12-23 | End: 2023-12-24 | Stop reason: HOSPADM

## 2023-12-23 RX ADMIN — FUROSEMIDE 40 MG: 10 INJECTION, SOLUTION INTRAMUSCULAR; INTRAVENOUS at 08:39

## 2023-12-23 RX ADMIN — MICONAZOLE NITRATE ANTIFUNGAL POWDER: 2 POWDER TOPICAL at 19:40

## 2023-12-23 RX ADMIN — AMIODARONE HYDROCHLORIDE 100 MG: 100 TABLET ORAL at 08:39

## 2023-12-23 RX ADMIN — FUROSEMIDE 40 MG: 20 TABLET ORAL at 16:32

## 2023-12-23 RX ADMIN — METOPROLOL TARTRATE 25 MG: 25 TABLET, FILM COATED ORAL at 08:39

## 2023-12-23 RX ADMIN — LISINOPRIL 2.5 MG: 2.5 TABLET ORAL at 08:39

## 2023-12-23 RX ADMIN — OXYCODONE HYDROCHLORIDE 5 MG: 5 TABLET ORAL at 13:34

## 2023-12-23 RX ADMIN — APIXABAN 5 MG: 5 TABLET, FILM COATED ORAL at 19:38

## 2023-12-23 RX ADMIN — ZOLPIDEM TARTRATE 5 MG: 5 TABLET ORAL at 21:11

## 2023-12-23 RX ADMIN — MICONAZOLE NITRATE ANTIFUNGAL POWDER: 2 POWDER TOPICAL at 08:40

## 2023-12-23 RX ADMIN — POTASSIUM CHLORIDE 10 MEQ: 750 TABLET, EXTENDED RELEASE ORAL at 08:39

## 2023-12-23 RX ADMIN — APIXABAN 5 MG: 5 TABLET, FILM COATED ORAL at 08:39

## 2023-12-23 RX ADMIN — METOPROLOL TARTRATE 25 MG: 25 TABLET, FILM COATED ORAL at 19:38

## 2023-12-23 RX ADMIN — DULOXETINE HYDROCHLORIDE 60 MG: 60 CAPSULE, DELAYED RELEASE PELLETS ORAL at 08:39

## 2023-12-23 ASSESSMENT — ACTIVITIES OF DAILY LIVING (ADL)
ADLS_ACUITY_SCORE: 32
ADLS_ACUITY_SCORE: 32
ADLS_ACUITY_SCORE: 33
ADLS_ACUITY_SCORE: 32
ADLS_ACUITY_SCORE: 33
ADLS_ACUITY_SCORE: 33
ADLS_ACUITY_SCORE: 32
ADLS_ACUITY_SCORE: 32
ADLS_ACUITY_SCORE: 33
ADLS_ACUITY_SCORE: 33
ADLS_ACUITY_SCORE: 32
ADLS_ACUITY_SCORE: 33

## 2023-12-23 NOTE — PLAN OF CARE
Heart Failure Care Map  GOALS TO BE MET BEFORE DISCHARGE:    1. Decrease congestion and/or edema with diuretic therapy to achieve near optimal volume status.     Dyspnea improved: Yes, satisfactory for discharge.   Edema improved: Yes, satisfactory for discharge.        Last 24 hour I/O:   Intake/Output Summary (Last 24 hours) at 12/23/2023 0651  Last data filed at 12/23/2023 0647  Gross per 24 hour   Intake 680 ml   Output 200 ml   Net 480 ml           Net I/O and Weights since admission:   11/23 0700 - 12/23 0659  In: 1630 [P.O.:1630]  Out: 3450 [Urine:3450]  Net: -1820     Vitals:    12/20/23 1301 12/21/23 0333 12/22/23 0659 12/23/23 0449   Weight: 68.8 kg (151 lb 11.2 oz) 66.8 kg (147 lb 4.3 oz) 64.1 kg (141 lb 5 oz) 67.5 kg (148 lb 14.4 oz)       2.  O2 sats > 90% on room air, or at prior home O2 therapy level.      Able to wean O2 this shift to keep sats above 90%?: Yes, satisfactory for discharge.   Does patient use Home O2? No          Current oxygenation status:   SpO2: 96 %     O2 Device: None (Room air),     3.  Tolerates ambulation and mobility near baseline.     Ambulation: No, further care required to meet this goal. Please explain NA   Times patient ambulated with staff this shift: 0    Please review the Heart Failure Care Map for additional HF goal outcomes.    Laura Carrasco RN  12/23/2023

## 2023-12-23 NOTE — PROGRESS NOTES
[facility]    Medicine Progress Note - Hospitalist Service    Date of Admission:  12/20/2023    Assessment & Plan   Assessment:  Gladys Ramirez is a 89 year old female with past medical history of chronic congestive heart failure with reduced EF of 20 to 25%, hypertension, chronic atrial fibrillation on Eliquis who presents with complaints of dyspnea with exertion and dizziness.     #Acute exacerbation of chronic HFrEF  #Acute hypoxemic respiratory failure due to decompensated heart failure  #Non-MI troponin elevation  Patient presented with complaint of dyspnea with exertion and dizziness  Echocardiogram obtained 11/30/2023 showed reduced left ventricular ejection fraction of 20-25%. Chest x-ray showed cardiomegaly with trace bilateral pleural effusions, BNP is significantly elevated. HS troponin mildly elevated, but flat and without acute symptoms of ACS.  - Cardiology consulted  - Continue Lasix 40mg IV BID; transitioned to 40mg PO BID 12/23  - Continue metoprolol tartrate 25mg BID  - Continue lisinopril 2.5mg daily  - Continue with strict I's and O's, daily weights  - TTE: There is mild concentric left ventricular hypertrophy. LVEF is 25%. Septal motion is consistent with conduction abnormality. Mildly decreased right ventricular systolic function. No significant valve disease.   - Underwent cardioversion 12/22. Patient with QRS >150 in LBBB pattern, would likely benefit from CRT but patient declined  - Patient can discharge 12/24 morning back to [facility]. Will have HH PT/OT/RN/HHA    #Hypokalemia  Repleted    #Chronic atrial fibrillation on chronic anticoagulation  - Continue PTA amiodarone, metoprolol tartrate and Eliquis  - Cardiology performed cardioversion 12/22/2023, in NSR    #Chronic kidney disease stage IIIa  Appears to be at baseline.  Cr ~1-1.1  - Monitor kidney function with daily renal panel while on IV diuretics    #Essential hypertension  - Continue PTA lisinopril,  "metoprolol, IV hydralazine as needed    #Osteoarthritis  - Continue Tylenol as needed    #Physical deconditioning/weakness  -  OT/PT/RN/aide at discharge. Lives at [facility].           Diet: Fluid restriction 1800 ML FLUID (and additional linked orders)  2 Gram Sodium Diet    DVT Prophylaxis: DOAC  Reyes Catheter: Not present  Lines: None     Cardiac Monitoring: ACTIVE order. Indication: Post- EP procedure (48 hours)  Code Status: No CPR- Do NOT Intubate      Clinically Significant Risk Factors                    # Hypertension: Noted on problem list  # Acute heart failure with reduced ejection fraction: last echo with EF <40% and receiving IV diuretics       # Overweight: Estimated body mass index is 27.23 kg/m  as calculated from the following:    Height as of this encounter: 1.575 m (5' 2\").    Weight as of this encounter: 67.5 kg (148 lb 14.4 oz)., PRESENT ON ADMISSION       # Financial/Environmental Concerns: none         Disposition Plan      Expected Discharge Date: 12/24/2023      Destination: home  Discharge Comments: po lasix, RA  home with home care            CRISTI KEEN MD  Hospitalist Service  [facility]  Securely message with [employer] (more info)  Text page via [employer] Paging/Directory   ______________________________________________________________________    Interval History   Feeling overall well today. Still with some mild SOB with activity, but overall much improved since admission.    Physical Exam   Vital Signs: Temp: 97.6  F (36.4  C) Temp src: Oral BP: 111/55 Pulse: (!) 49   Resp: 20 SpO2: 95 % O2 Device: None (Room air) Oxygen Delivery: 2 LPM  Weight: 148 lbs 14.4 oz    GENERAL: Patient was seen and examined at bedside with no acute distress  HENT: Head is normocephalic, atraumatic.  LUNGS: Bilateral air entry, clear to auscultation bilaterally  CARDIOVASCULAR:  Regular rate and rhythm with no murmurs, gallops or rubs.  ABDOMEN: Soft; nontender   EXT: no edema "   NEUROLOGIC: Grossly nonfocal.      Medical Decision Making       50 MINUTES SPENT BY ME on the date of service doing chart review, history, exam, documentation & further activities per the note.      Data     I have personally reviewed the following data over the past 24 hrs:    N/A  \   N/A   / N/A     140 99 33.5 (H) /  104 (H)   3.7 31 (H) 1.43 (H) \       Imaging results reviewed over the past 24 hrs:   No results found for this or any previous visit (from the past 24 hour(s)).

## 2023-12-23 NOTE — PROGRESS NOTES
Care Management Follow Up    Length of Stay (days): 3    Expected Discharge Date: 12/24/2023    Concerns to be Addressed:   IV Lasix twice a day.  Supplemental oxygen. Monitoring labs.   Patient plan of care discussed at interdisciplinary rounds: Yes  Follow up from rounds/notes:    Per cardiology, plan to change to PO lasix today.     Anticipated Discharge Disposition:  Home.      Anticipated Discharge Services:  Resumed home PT, OT and skilled nursing (would like to add a home health aide).   Anticipated Discharge DME:  Per therapy (if indicated).    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 The nuns assist with activities of daily living and instrumental activities of daily living (IADLs) as needed. She uses a walker for mobility at baseline. She is open to [facility] for PT, OT and RN but would like to add a home health aide.   Her fellow sisters will provide transport.  2:51 PM:  Per MD, patient will be able to return home tomorrow. Met with patient who agreed and asked writer to call Sister Theresa Villarreal to let her know (she says Sister Theresa will pick her up). However the number she gave writer ([phone]) was incorrect. Writer called sister Yandy who provided the number [phone]. Writer left a brief message at this number but did not sound right.  Yandy provided the number of [phone]. Writer called this number; voicemail indicated that it was indeed Sister Theresa Villarreal so writer left a message. Will add this number to patient's contact list.     Leslie West RN

## 2023-12-23 NOTE — PROGRESS NOTES
CARDIOLOGY PROGRESS NOTE      Assessment/Plan:  1.  Acute congestive heart failure, (H)-acute on chronic in the setting of diminished ejection fraction of 25%, exacerbation most likely secondary to inadequate diuresis from last hospitalization as well as continued atrial fibrillation.      2.  Cardiomyopathy-ejection fraction 25%, presumed nonischemic.  Given her age would not utilize Entresto just yet.  Spoke with her about CRT device which she declines. She had initially agreed to it but has since changed her mind.  3.  Atrial fibrillation-not valvular, possibly symptomatic related to heart failure and persistent.  On amiodarone with no missed doses of Eliquis.  Eliquis was increased to 5 mg over 3 weeks ago.s/p cardioversion on 12/22  4.  Benign essential hypertension-under good control.  5.  Left bundle branch block pattern-she has a widened QRS greater than 150 ms, strongly suspect she would benefit from CRT device but she declines.    Plan:  1.  Will switch to PO lasix today  2.  S/p cardioversion on 12/22, in NSR with frequent PACs  3. Continue Amiodarone       Subjective:  Interval History:    89-year-old white female being seen on third day of hospitalization.  She still has a little shortness of breath, but feels better.  No PND, orthopnea, syncope, dizziness, major fatigue, chest pains, palpitations, or peripheral edema.    Underwent cardioversion yesterday.    Medications   amiodarone  100 mg Oral Daily    apixaban ANTICOAGULANT  5 mg Oral BID    DULoxetine  60 mg Oral Daily    furosemide  40 mg Intravenous BID    lisinopril  2.5 mg Oral Daily    metoprolol tartrate  25 mg Oral BID    miconazole   Topical BID     Objective:   Vital signs in last 24 hours:  Temp:  [97.6  F (36.4  C)-98.2  F (36.8  C)] 97.6  F (36.4  C)  Pulse:  [] 68  Resp:  [18-20] 18  BP: ()/(46-65) 137/65  FiO2 (%):  [2 %] 2 %  SpO2:  [89 %-98 %] 91 %    Physical Exam:  /65 (BP Location: Left arm)   Pulse 68    "Temp 97.6  F (36.4  C) (Oral)   Resp 18   Ht 1.575 m (5' 2\")   Wt 67.5 kg (148 lb 14.4 oz)   SpO2 91%   BMI 27.23 kg/m      General Appearance:    Alert, cooperative, no distress, appears stated age   Head:    Normocephalic, without obvious abnormality, atraumatic   Throat:   Lips, mucosa, and tongue normal; teeth and gums normal   Neck:   Supple, symmetrical, trachea midline, no adenopathy;        thyroid:  No enlargement/tenderness/nodules; no carotid    bruit or JVD   Back:     Symmetric, no curvature, ROM normal, no CVA tenderness   Lungs:     Crackles to auscultation bilaterally, respirations unlabored   Chest wall:    No tenderness or deformity   Heart:    Regular, S1 and S2 normal, no murmur, rub   or gallop   Abdomen:     Soft, non-tender, bowel sounds active all four quadrants,     no masses, no organomegaly   Extremities:   Normal, atraumatic, no cyanosis or edema   Pulses:   2+ and symmetric all extremities   Skin:   Skin color, texture, turgor normal, no rashes or lesions     Cardiographics:      ECG: Personally reviewed by myself shows atrial fibrillation, left bundle branch block pattern.    Echocardiogram:   There is mild concentric left ventricular hypertrophy.  Biplane LVEF is 25%.  Septal motion is consistent with conduction abnormality.  Mildly decreased right ventricular systolic function  No significant valve disease.  Compared to the prior study dated 11/30/2023, there have been no  changes.      Lab Results:   Recent Labs   Lab 12/22/23  0502   WBC 7.7   HGB 12.9   HCT 39.7        Recent Labs   Lab 12/23/23  0512      CO2 31*   BUN 33.5*   .       Lab Results   Component Value Date    TROPONINI 0.16 08/23/2022         Jeannie Rivera MD  Clinical Cardiac Electrophysiology    "

## 2023-12-23 NOTE — PLAN OF CARE
"  Problem: Adult Inpatient Plan of Care  Goal: Plan of Care Review  Description: The Plan of Care Review/Shift note should be completed every shift.  The Outcome Evaluation is a brief statement about your assessment that the patient is improving, declining, or no change.  This information will be displayed automatically on your shift  note.  Outcome: Progressing  Goal: Patient-Specific Goal (Individualized)  Description: You can add care plan individualizations to a care plan. Examples of Individualization might be:  \"Parent requests to be called daily at 9am for status\", \"I have a hard time hearing out of my right ear\", or \"Do not touch me to wake me up as it startles  me\".  Outcome: Progressing  Goal: Absence of Hospital-Acquired Illness or Injury  Outcome: Progressing  Intervention: Identify and Manage Fall Risk  Recent Flowsheet Documentation  Taken 12/22/2023 1702 by Madison Rinaldi RN  Safety Promotion/Fall Prevention: activity supervised  Taken 12/22/2023 1233 by Madison Rinaldi RN  Safety Promotion/Fall Prevention: activity supervised  Taken 12/22/2023 0900 by Madison Rinaldi RN  Safety Promotion/Fall Prevention: activity supervised  Intervention: Prevent Skin Injury  Recent Flowsheet Documentation  Taken 12/22/2023 1702 by Madison Rinaldi RN  Body Position: position changed independently  Taken 12/22/2023 1233 by Madison Rinaldi RN  Body Position: position changed independently  Taken 12/22/2023 0900 by Madison Rinaldi RN  Body Position: position changed independently  Goal: Optimal Comfort and Wellbeing  Outcome: Progressing   Goal Outcome Evaluation:  Alert and oriented tolerating activity well given  Oxycodone 5 mg  for knee pain rated 6/10   Monitor Reading showed sinus Bradycardia with BBB . Good pain control with the Oxycodone 5 mg                       "

## 2023-12-23 NOTE — PLAN OF CARE
Problem: Heart Failure  Goal: Optimal Coping  Outcome: Progressing  Goal: Optimal Cardiac Output  Outcome: Progressing  Goal: Stable Heart Rate and Rhythm  Outcome: Progressing  Goal: Optimal Functional Ability  Outcome: Progressing  Intervention: Optimize Functional Ability  Recent Flowsheet Documentation  Taken 12/23/2023 0000 by Laura Carrasco RN  Activity Management: activity adjusted per tolerance  Goal: Fluid and Electrolyte Balance  Outcome: Progressing  Goal: Improved Oral Intake  Outcome: Progressing  Goal: Effective Oxygenation and Ventilation  Outcome: Progressing  Intervention: Promote Airway Secretion Clearance  Recent Flowsheet Documentation  Taken 12/23/2023 0000 by Laura Carrasco RN  Activity Management: activity adjusted per tolerance  Intervention: Optimize Oxygenation and Ventilation  Recent Flowsheet Documentation  Taken 12/23/2023 0000 by Laura Carrasco RN  Head of Bed (HOB) Positioning: HOB at 20-30 degrees    Goal: Effective Breathing Pattern During Sleep  Outcome: Progressing  Intervention: Monitor and Manage Obstructive Sleep Apnea  Recent Flowsheet Documentation  Taken 12/23/2023 0000 by Laura Carrasco RN  Medication Review/Management: medications reviewed   Goal Outcome Evaluation:       Pt is alert and oriented x 4, denies pain or SOB. Lung sounds, clear, diminished, on O2 at 2 LPM per nasal cannula. Tried to wean her off O2 but she desaturated to 88 to 89% on RA when sleeping. Reapplied supplemental O2 at 2 LPM, SpO2 went up to 95 to 97%. HR in the 50's to 60's, Sinus donna/ NSR with BBB, with inverted TW. At 0600 weaned off O2, SpO2 90 to 96% on RA. Assist of 1 using a walker with transfer.

## 2023-12-23 NOTE — PLAN OF CARE
"  Problem: Adult Inpatient Plan of Care  Goal: Plan of Care Review  Description: The Plan of Care Review/Shift note should be completed every shift.  The Outcome Evaluation is a brief statement about your assessment that the patient is improving, declining, or no change.  This information will be displayed automatically on your shift  note.  Outcome: Progressing  Goal: Patient-Specific Goal (Individualized)  Description: You can add care plan individualizations to a care plan. Examples of Individualization might be:  \"Parent requests to be called daily at 9am for status\", \"I have a hard time hearing out of my right ear\", or \"Do not touch me to wake me up as it startles  me\".  Outcome: Progressing  Goal: Absence of Hospital-Acquired Illness or Injury  Outcome: Progressing  Intervention: Identify and Manage Fall Risk  Recent Flowsheet Documentation  Taken 12/23/2023 1624 by Madison Rinaldi RN  Safety Promotion/Fall Prevention: activity supervised  Taken 12/23/2023 1234 by Madison Rinaldi RN  Safety Promotion/Fall Prevention: activity supervised  Taken 12/23/2023 0832 by Madison Rinaldi RN  Safety Promotion/Fall Prevention: activity supervised  Intervention: Prevent Skin Injury  Recent Flowsheet Documentation  Taken 12/23/2023 1624 by Madison Rinaldi RN  Body Position: position changed independently  Taken 12/23/2023 1234 by Madison Rinaldi RN  Body Position: position changed independently  Taken 12/23/2023 0832 by Madison Rinaldi RN  Body Position: position changed independently  Goal: Optimal Comfort and Wellbeing  Outcome: Progressing  Intervention: Monitor Pain and Promote Comfort  Recent Flowsheet Documentation  Taken 12/23/2023 1334 by Madison Rinaldi RN  Pain Management Interventions: medication (see MAR)   Goal Outcome Evaluation:       Up in the bathroom and up in the recliner for meals Oxycodone 5 mg given for Rt Knee pain with good pain control                  "

## 2023-12-24 VITALS
BODY MASS INDEX: 27.18 KG/M2 | RESPIRATION RATE: 18 BRPM | OXYGEN SATURATION: 93 % | SYSTOLIC BLOOD PRESSURE: 130 MMHG | DIASTOLIC BLOOD PRESSURE: 63 MMHG | HEIGHT: 62 IN | WEIGHT: 147.71 LBS | HEART RATE: 58 BPM | TEMPERATURE: 97.9 F

## 2023-12-24 LAB
ANION GAP SERPL CALCULATED.3IONS-SCNC: 9 MMOL/L (ref 7–15)
BUN SERPL-MCNC: 33.5 MG/DL (ref 8–23)
CALCIUM SERPL-MCNC: 9.5 MG/DL (ref 8.2–9.6)
CHLORIDE SERPL-SCNC: 99 MMOL/L (ref 98–107)
CREAT SERPL-MCNC: 1.29 MG/DL (ref 0.51–0.95)
DEPRECATED HCO3 PLAS-SCNC: 31 MMOL/L (ref 22–29)
EGFRCR SERPLBLD CKD-EPI 2021: 39 ML/MIN/1.73M2
GLUCOSE SERPL-MCNC: 113 MG/DL (ref 70–99)
MAGNESIUM SERPL-MCNC: 2.1 MG/DL (ref 1.7–2.3)
POTASSIUM SERPL-SCNC: 3.8 MMOL/L (ref 3.4–5.3)
SODIUM SERPL-SCNC: 139 MMOL/L (ref 135–145)

## 2023-12-24 PROCEDURE — 99232 SBSQ HOSP IP/OBS MODERATE 35: CPT | Performed by: INTERNAL MEDICINE

## 2023-12-24 PROCEDURE — 83735 ASSAY OF MAGNESIUM: CPT | Performed by: STUDENT IN AN ORGANIZED HEALTH CARE EDUCATION/TRAINING PROGRAM

## 2023-12-24 PROCEDURE — 36415 COLL VENOUS BLD VENIPUNCTURE: CPT | Performed by: STUDENT IN AN ORGANIZED HEALTH CARE EDUCATION/TRAINING PROGRAM

## 2023-12-24 PROCEDURE — 250N000013 HC RX MED GY IP 250 OP 250 PS 637: Performed by: INTERNAL MEDICINE

## 2023-12-24 PROCEDURE — 250N000013 HC RX MED GY IP 250 OP 250 PS 637: Performed by: NURSE PRACTITIONER

## 2023-12-24 PROCEDURE — 250N000013 HC RX MED GY IP 250 OP 250 PS 637: Performed by: STUDENT IN AN ORGANIZED HEALTH CARE EDUCATION/TRAINING PROGRAM

## 2023-12-24 PROCEDURE — 80048 BASIC METABOLIC PNL TOTAL CA: CPT | Performed by: STUDENT IN AN ORGANIZED HEALTH CARE EDUCATION/TRAINING PROGRAM

## 2023-12-24 PROCEDURE — 99239 HOSP IP/OBS DSCHRG MGMT >30: CPT | Performed by: STUDENT IN AN ORGANIZED HEALTH CARE EDUCATION/TRAINING PROGRAM

## 2023-12-24 RX ORDER — POTASSIUM CHLORIDE 750 MG/1
10 TABLET, EXTENDED RELEASE ORAL ONCE
Status: COMPLETED | OUTPATIENT
Start: 2023-12-24 | End: 2023-12-24

## 2023-12-24 RX ORDER — FUROSEMIDE 40 MG
40 TABLET ORAL
Qty: 60 TABLET | Refills: 1 | Status: SHIPPED | OUTPATIENT
Start: 2023-12-24 | End: 2023-12-30

## 2023-12-24 RX ADMIN — AMIODARONE HYDROCHLORIDE 100 MG: 100 TABLET ORAL at 08:26

## 2023-12-24 RX ADMIN — DULOXETINE HYDROCHLORIDE 60 MG: 60 CAPSULE, DELAYED RELEASE PELLETS ORAL at 08:26

## 2023-12-24 RX ADMIN — LISINOPRIL 2.5 MG: 2.5 TABLET ORAL at 08:26

## 2023-12-24 RX ADMIN — MICONAZOLE NITRATE ANTIFUNGAL POWDER: 2 POWDER TOPICAL at 08:27

## 2023-12-24 RX ADMIN — METOPROLOL TARTRATE 25 MG: 25 TABLET, FILM COATED ORAL at 08:26

## 2023-12-24 RX ADMIN — APIXABAN 5 MG: 5 TABLET, FILM COATED ORAL at 08:26

## 2023-12-24 RX ADMIN — FUROSEMIDE 40 MG: 20 TABLET ORAL at 08:26

## 2023-12-24 RX ADMIN — POTASSIUM CHLORIDE 10 MEQ: 750 TABLET, EXTENDED RELEASE ORAL at 06:56

## 2023-12-24 ASSESSMENT — ACTIVITIES OF DAILY LIVING (ADL)
ADLS_ACUITY_SCORE: 33

## 2023-12-24 NOTE — PROGRESS NOTES
CARDIOLOGY PROGRESS NOTE      Assessment/Plan:  1.  Acute congestive heart failure, (H)-acute on chronic in the setting of diminished ejection fraction of 25%, exacerbation most likely secondary to inadequate diuresis from last hospitalization as well as continued atrial fibrillation.      2.  Cardiomyopathy-ejection fraction 25%, presumed nonischemic.  Given her age would not utilize Entresto just yet.  Spoke with her about CRT device which she declines. She had initially agreed to it in clinic but has since changed her mind.  3.  Atrial fibrillation-not valvular, possibly symptomatic related to heart failure and persistent.  On amiodarone with no missed doses of Eliquis.  Eliquis was increased to 5 mg over 3 weeks ago.s/p cardioversion on 12/22  4.  Benign essential hypertension-under good control.  5.  Left bundle branch block pattern-she has a widened QRS greater than 150 ms, strongly suspect she would benefit from CRT device but she declines.    Plan:  1.  On PO lasix, feeling back to baseline  2.  S/p cardioversion on 12/22, in NSR with frequent PACs  3. Continue Amiodarone and Eliquis for anticoagulation   4. Cr down trending    Okay to discharge from cardiology stand point.  Follow up with EP DEON in AF clinic.      Subjective:  Interval History:    89-year-old white female being seen on third day of hospitalization.  She still has a little shortness of breath, but feels better.  No PND, orthopnea, syncope, dizziness, major fatigue, chest pains, palpitations, or peripheral edema.    Underwent cardioversion 12/22.    Medications   amiodarone  100 mg Oral Daily    apixaban ANTICOAGULANT  5 mg Oral BID    DULoxetine  60 mg Oral Daily    furosemide  40 mg Oral BID    lisinopril  2.5 mg Oral Daily    metoprolol tartrate  25 mg Oral BID    miconazole   Topical BID     Objective:   Vital signs in last 24 hours:  Temp:  [97.6  F (36.4  C)-98.2  F (36.8  C)] 98.1  F (36.7  C)  Pulse:  [49-68] 62  Resp:  [18-20]  "18  BP: (105-138)/(51-66) 138/63  SpO2:  [91 %-95 %] 91 %    Physical Exam:  /63 (BP Location: Left arm)   Pulse 62   Temp 98.1  F (36.7  C) (Oral)   Resp 18   Ht 1.575 m (5' 2\")   Wt 67 kg (147 lb 11.3 oz)   SpO2 91%   BMI 27.02 kg/m      General Appearance:    Alert, cooperative, no distress, appears stated age   Head:    Normocephalic, without obvious abnormality, atraumatic   Throat:   Lips, mucosa, and tongue normal; teeth and gums normal   Neck:   Supple, symmetrical, trachea midline, no adenopathy;        thyroid:  No enlargement/tenderness/nodules; no carotid    bruit or JVD   Back:     Symmetric, no curvature, ROM normal, no CVA tenderness   Lungs:     Crackles to auscultation bilaterally, respirations unlabored   Chest wall:    No tenderness or deformity   Heart:    Regular, S1 and S2 normal, no murmur, rub   or gallop   Abdomen:     Soft, non-tender, bowel sounds active all four quadrants,     no masses, no organomegaly   Extremities:   Normal, atraumatic, no cyanosis or edema   Pulses:   2+ and symmetric all extremities   Skin:   Skin color, texture, turgor normal, no rashes or lesions     Cardiographics:      ECG: Personally reviewed by myself shows atrial fibrillation, left bundle branch block pattern.    Echocardiogram:   There is mild concentric left ventricular hypertrophy.  Biplane LVEF is 25%.  Septal motion is consistent with conduction abnormality.  Mildly decreased right ventricular systolic function  No significant valve disease.  Compared to the prior study dated 11/30/2023, there have been no  changes.      Lab Results:   Recent Labs   Lab 12/22/23  0502   WBC 7.7   HGB 12.9   HCT 39.7        Recent Labs   Lab 12/24/23  0525      CO2 31*   BUN 33.5*   .       Lab Results   Component Value Date    TROPONINI 0.16 08/23/2022         Jeannie Rivera MD  Clinical Cardiac Electrophysiology    "

## 2023-12-24 NOTE — PROGRESS NOTES
Care Management Discharge Note    Discharge Date: 12/24/2023     Discharge Disposition: Home Care    Discharge Services: None    Discharge DME: None    Discharge Transportation: family or friend will provide    Private pay costs discussed: Not applicable    Does the patient's insurance plan have a 3 day qualifying hospital stay waiver?  No    PAS Confirmation Code: NA  Patient/family educated on Medicare website which has current facility and service quality ratings: yes    Education Provided on the Discharge Plan: Yes  Persons Notified of Discharge Plans: Nursing: home care; fellow sister's   Patient/Family in Agreement with the Plan: yes    Handoff Referral Completed: Yes    Additional Information:  Plan home with fellow nuns and resumed home care (skilled nursing, PT and OT) through [facility]. Sister Theresa Villarreal to transport ([phone]).   Reviewed with primary bedside RN. BPA noted and completed. Discharge orders faxed to [facility]. Spoke with intake at [facility] who stated that they have received the orders.   9:23 AM:  Reviewed plan to discharge home with a prescription from the outpatient pharmacy. Spoke with pharmacy staff who stated that they are working on it now. Primary bedside nurse aware as well.     Leslie West, RN

## 2023-12-24 NOTE — PLAN OF CARE
Problem: Adult Inpatient Plan of Care  Goal: Plan of Care Review  Description: The Plan of Care Review/Shift note should be completed every shift.  The Outcome Evaluation is a brief statement about your assessment that the patient is improving, declining, or no change.  This information will be displayed automatically on your shift  note.  Outcome: Met   Goal Outcome Evaluation:       Patient is discharging home. Ride provided by a friend. Discharge instructions and medications reviewed with patient.

## 2023-12-24 NOTE — PROGRESS NOTES
Heart Failure Care Map  GOALS TO BE MET BEFORE DISCHARGE:    1. Decrease congestion and/or edema with diuretic therapy to achieve near optimal volume status.     Dyspnea improved: Yes, satisfactory for discharge.   Edema improved: Yes, satisfactory for discharge.        Last 24 hour I/O:   Intake/Output Summary (Last 24 hours) at 12/24/2023 0302  Last data filed at 12/23/2023 1900  Gross per 24 hour   Intake 430 ml   Output 450 ml   Net -20 ml           Net I/O and Weights since admission:   11/24 0700 - 12/24 0659  In: 1980 [P.O.:1980]  Out: 3700 [Urine:3700]  Net: -1720     Vitals:    12/20/23 1301 12/21/23 0333 12/22/23 0659 12/23/23 0449   Weight: 68.8 kg (151 lb 11.2 oz) 66.8 kg (147 lb 4.3 oz) 64.1 kg (141 lb 5 oz) 67.5 kg (148 lb 14.4 oz)       2.  O2 sats > 90% on room air, or at prior home O2 therapy level.      Able to wean O2 this shift to keep sats above 90%?: Yes, satisfactory for discharge.   Does patient use Home O2? No          Current oxygenation status:   SpO2: 93 %     O2 Device: None (Room air), Oxygen Delivery: 2 LPM    3.  Tolerates ambulation and mobility near baseline.     Ambulation: No, further care required to meet this goal. Please explain generalized weakness, purwik in place for elimination.    Times patient ambulated with staff this shift: 0    Please review the Heart Failure Care Map for additional HF goal outcomes.    Kristina Del Cid RN  12/24/2023

## 2023-12-24 NOTE — PLAN OF CARE
Problem: Heart Failure  Goal: Optimal Coping  Outcome: Progressing  Goal: Optimal Cardiac Output  Outcome: Progressing  Goal: Stable Heart Rate and Rhythm  Outcome: Progressing  Goal: Optimal Functional Ability  Outcome: Progressing  Goal: Fluid and Electrolyte Balance  Outcome: Progressing  Goal: Improved Oral Intake  Outcome: Progressing  Goal: Effective Oxygenation and Ventilation  Outcome: Progressing  Goal: Effective Breathing Pattern During Sleep  Outcome: Progressing  Intervention: Monitor and Manage Obstructive Sleep Apnea  Recent Flowsheet Documentation  Taken 12/23/2023 1938 by Linden Jean RN  Medication Review/Management: medications reviewed   Goal Outcome Evaluation:       Pt denies pain when lying in bed.  Pt declines pain medication.   Pt was sinus dysrhythmia  with bradycardia at times.  PT denies dizziness.

## 2023-12-24 NOTE — DISCHARGE SUMMARY
"[facility]  Hospitalist Discharge Summary      Date of Admission:  12/20/2023  Date of Discharge:  12/24/2023 10:03 AM  Discharging Provider: CRISTI KEEN MD  Discharge Service: Hospitalist Service    Discharge Diagnoses     #Acute exacerbation of chronic HFrEF  #Acute hypoxemic respiratory failure due to decompensated heart failure  #Non-MI troponin elevation  #Hypokalemia   #Chronic atrial fibrillation on chronic anticoagulation   #Chronic kidney disease stage IIIa   #Essential hypertension   #Osteoarthritis   #Physical deconditioning/weakness     Clinically Significant Risk Factors     # Overweight: Estimated body mass index is 27.02 kg/m  as calculated from the following:    Height as of this encounter: 1.575 m (5' 2\").    Weight as of this encounter: 67 kg (147 lb 11.3 oz).       Follow-ups Needed After Discharge   Follow-up Appointments     Follow-up and recommended labs and tests       Follow up with your cardiology clinic as scheduled on 1/11/23            Unresulted Labs Ordered in the Past 30 Days of this Admission       No orders found from 11/20/2023 to 12/21/2023.            Discharge Disposition   Discharged to home  Condition at discharge: Stable    Hospital Course   Assessment:  Gladys Ramirez is a 89 year old female with past medical history of chronic congestive heart failure with reduced EF of 20 to 25%, hypertension, chronic atrial fibrillation on Eliquis who presents with complaints of dyspnea with exertion and dizziness.     #Acute exacerbation of chronic HFrEF  #Acute hypoxemic respiratory failure due to decompensated heart failure  #Non-MI troponin elevation  Patient presented with complaint of dyspnea with exertion and dizziness.  Echocardiogram obtained 11/30/2023 showed reduced left ventricular ejection fraction of 20-25%. Chest x-ray showed cardiomegaly with trace bilateral pleural effusions, BNP is significantly elevated. HS troponin mildly elevated, but " flat and without acute symptoms of ACS. Symptoms resolved with diuresis and cardioversion for her Afib.  - Cardiology consulted  - S/p Lasix 40mg IV BID; transitioned to 40mg PO BID 12/23, continue at discharge  - Continue metoprolol tartrate 25mg BID  - Continue lisinopril 2.5mg daily  - TTE: There is mild concentric left ventricular hypertrophy. LVEF is 25%. Septal motion is consistent with conduction abnormality. Mildly decreased right ventricular systolic function. No significant valve disease.   - Underwent cardioversion 12/22. Patient with QRS >150 in LBBB pattern, would likely benefit from CRT but patient declined  - Patient can discharged back to [facility]. Will have  PT/OT/RN/HHA    #Hypokalemia  Repleted    #Chronic atrial fibrillation on chronic anticoagulation  - Continue PTA amiodarone, metoprolol tartrate and Eliquis  - Cardiology performed cardioversion 12/22/2023, in NSR    #Chronic kidney disease stage IIIa  Appears to be at baseline.  Cr ~1-1.1    #Essential hypertension  - Continue PTA lisinopril, metoprolol    #Osteoarthritis  - Continue Tylenol as needed    #Physical deconditioning/weakness  -  OT/PT/RN/aide at discharge       Consultations This Hospital Stay   CORE CLINIC EVALUATION IP CONSULT  OCCUPATIONAL THERAPY ADULT IP CONSULT  NUTRITION SERVICES ADULT IP CONSULT  CARE MANAGEMENT / SOCIAL WORK IP CONSULT  CARDIOLOGY IP CONSULT  PHYSICAL THERAPY ADULT IP CONSULT  SPIRITUAL HEALTH SERVICES IP CONSULT    Code Status   Prior    Time Spent on this Encounter   I, CRISTI KEEN MD, personally saw the patient today and spent greater than 30 minutes discharging this patient.       CRISTI KEEN MD  [facility] HEART CARE  [address] [place] [id number]  Phone: [phone]  Fax: [phone]  ______________________________________________________________________    Physical Exam   Vital Signs: Temp: 97.9  F (36.6  C) Temp src: Oral BP: 130/63  Pulse: 58   Resp: 18 SpO2: 93 % O2 Device: None (Room air)    Weight: 147 lbs 11.33 oz    GENERAL: Patient was seen and examined at bedside with no acute distress  HENT: Head is normocephalic, atraumatic.  LUNGS: Bilateral air entry, clear to auscultation bilaterally  CARDIOVASCULAR:  Regular rate and rhythm with no murmurs, gallops or rubs.  ABDOMEN: Soft; nontender   EXT: no edema   NEUROLOGIC: Grossly nonfocal.       Primary Care Physician   Margret Flores    Discharge Orders      Home Care Referral      Primary Care - Care Coordination Referral      Follow Up with the Primary Physician    Follow-up with Ibis Pedro CNP in 1 month (Please call [phone] to schedule follow-up appointment)     Reason for your hospital stay    You were admitted for an exacerbation of your heart failure. You improved with IV diuresis and we adjusted your home Lasix dose to a higher dose for now. This may be further adjusted by your cardiologist in clinic.     Follow-up and recommended labs and tests     Follow up with your cardiology clinic as scheduled on 1/11/23     Activity    Your activity upon discharge: activity as tolerated     Diet    Follow this diet upon discharge: Orders Placed This Encounter      2 Gram Sodium Diet       Significant Results and Procedures   Most Recent 3 CBC's:  Recent Labs   Lab Test 12/22/23  0502 12/20/23  1432 11/30/23  0810   WBC 7.7 8.2 8.6   HGB 12.9 12.7 13.7   MCV 93 94 94    289 324     Most Recent 3 BMP's:  Recent Labs   Lab Test 12/24/23  0525 12/23/23  0512 12/22/23  0502    140 139   POTASSIUM 3.8 3.7 3.5   CHLORIDE 99 99 98   CO2 31* 31* 31*   BUN 33.5* 33.5* 26.8*   CR 1.29* 1.43* 1.17*   ANIONGAP 9 10 10   MARLENE 9.5 9.5 9.3   * 104* 113*   ,   Results for orders placed or performed during the hospital encounter of 12/20/23   XR Chest Port 1 View    Narrative    EXAM: XR CHEST PORT 1 VIEW  LOCATION: [facility]  DATE:  12/20/2023    INDICATION: sob  COMPARISON: 11/29/2023       Impression    IMPRESSION: Small pleural effusions are again seen. Cannot exclude basilar airspace disease. No pneumothorax. The cardiomediastinal silhouette is enlarged.    EP Cardioversion External    Narrative    Kayla Montano APRN CNP     12/22/2023 11:46 AM  [facility]    Procedure: EP Cardioversion External    Date/Time: 12/22/2023 11:42 AM    Performed by: Kayla Montano APRN CNP  Authorized by: Kayla Montano APRN CNP      UNIVERSAL PROTOCOL   Site Marked: NA  Prior Images Obtained and Reviewed:  Yes  Required items: Required blood products, implants, devices and special   equipment available    Patient identity confirmed:  Verbally with patient, arm band and provided   demographic data  Patient was reevaluated immediately before administering moderate or deep   sedation or anesthesia  Confirmation Checklist:  Patient's identity using two indicators, relevant   allergies, procedure was appropriate and matched the consent or emergent   situation and correct equipment/implants were available  Time out: Immediately prior to the procedure a time out was called    Universal Protocol: the Joint Commission Universal Protocol was followed       ANESTHESIA    Anesthesia was administered and monitored by anesthesiology.  See   anesthesia documentation for details.    PROCEDURE DETAILS  Pre-procedure rhythm: atrial fibrillation  Patient position: patient was placed in a supine position  Chest area: chest area exposed  Electrodes: pads  Electrodes placed: anterior-posterior  Number of attempts: 1    Details of Attempts:  At 1112, after administration of IV Brevital by MDA   and confirmation of adequate sedation, she received a single synchronized   shock at 200 J with prompt restoration of sinus rhythm in the 60s.  Post   cardioversion EKG pending.  Post-procedure rhythm: normal sinus rhythm  Complications: no  complications      PROCEDURE  Describe Procedure: Hospitalized with acute heart failure.  Known history   of persistent atrial fibrillation with plan for cardioversion.  Eliquis   was increased to a therapeutic dose on 2023.  She denies missing any   doses of Eliquis since that time, so is now eligible for cardioversion.    Ventricular rates have been well-controlled, but A-fib may be contributing   to her heart failure.  Successful cardioversion to sinus rhythm  Continue amiodarone 100 mg daily  Continue Eliquis 5 mg twice daily for stroke prophylaxis (she does not   qualify for dose adjustment)  Follow-up with Ibis Pedro CNP in 1 month  Transfer back to telemetry once awake and stable after cardioversion  Patient Tolerance:  Patient tolerated the procedure well with no immediate   complications  Length of time physician/provider present for 1:1 monitoring during   sedation: 10   Echocardiogram Limited     Value    Biplane LVEF 25%    Narrative    [id number]  [id number]  [id number]  [id number]^CURRY^JOSÉ ANTONIO^[place] [address]     Name: BHARAT QUICK  MRN: [id number]  : 1930  Study Date: 2023 09:01 AM  Age: 89 yrs  Gender: Female  Patient Location: [facility]  Reason For Study: Heart Failure - Left  Ordering Physician: JOSÉ ANTONIO ZAPIEN  Performed By: CRUZITO     BSA: 1.7 m2  Height: 62 in  Weight: 146 lb  HR: 74  BP: 129/66 mmHg  ______________________________________________________________________________  Procedure  Limited Portable Echo Adult. Definity (NDC #06284-321) given intravenously.  ______________________________________________________________________________  Interpretation Summary     There is mild concentric left ventricular hypertrophy.  Biplane LVEF is 25%.  Septal motion is consistent with conduction abnormality.  Mildly decreased right ventricular systolic function  No significant valve disease.  Compared to the prior study dated  2023, there have been no changes.  ______________________________________________________________________________  Left Ventricle  The left ventricle is normal in size. There is mild concentric left  ventricular hypertrophy. Biplane LVEF is 25%. Septal motion is consistent with  conduction abnormality. There is mod-severe global hypokinesia of the left  ventricle.     Right Ventricle  The right ventricle is normal size. TAPSE is abnormal, which is consistent  with abnormal right ventricular systolic function. Mildly decreased right  ventricular systolic function.     Atria  The left atrium is moderately dilated. The right atrium is mildly dilated.     Mitral Valve  The mitral valve leaflets are mildly thickened. There is mild to moderate (1-  2+) mitral regurgitation.     Tricuspid Valve  Tricuspid valve leaflets appear normal. There is mild (1+) tricuspid  regurgitation. The right ventricular systolic pressure is approximated at  30mmHg plus the right atrial pressure.     Aortic Valve  There is trivial trileaflet aortic sclerosis. No aortic regurgitation is  present.     Vessels  The aorta root is normal. Normal size ascending aorta.     Pericardium  There is no pericardial effusion.     ______________________________________________________________________________  MMode/2D Measurements & Calculations  IVSd: 1.1 cm  LVIDd: 5.5 cm  LVIDs: 4.7 cm  LVPWd: 1.2 cm  FS: 13.8 %  LV mass(C)d: 247.2 grams  LV mass(C)dI: 147.7 grams/m2  Ao root diam: 3.1 cm  asc Aorta Diam: 3.6 cm     LVOT diam: 2.0 cm  LVOT area: 3.1 cm2  Ao root diam index Ht(cm/m): 2.0  Ao root diam index BSA (cm/m2): 1.9  Asc Ao diam index BSA (cm/m2): 2.2  Asc Ao diam index Ht(cm/m): 2.3  RWT: 0.44  TAPSE: 1.5 cm     Time Measurements  MM HR: 71.0 BPM     Doppler Measurements & Calculations  TR max jimbo: 284.0 cm/sec  TR max P.3 mmHg  RV S Jimbo: 4.8 cm/sec      ______________________________________________________________________________  Report approved by: Tl Obregon 12/21/2023 09:55 AM             Discharge Medications   Discharge Medication List as of 12/24/2023  9:21 AM        CONTINUE these medications which have CHANGED    Details   furosemide (LASIX) 40 MG tablet Take 1 tablet (40 mg) by mouth 2 times daily, Disp-60 tablet, R-1, E-Prescribe           CONTINUE these medications which have NOT CHANGED    Details   acetaminophen (TYLENOL) 325 MG tablet Take 2 tablets (650 mg) by mouth every 4 hours as needed for other (mild pain), Disp-100 tablet, R-0, Local Print      amiodarone (PACERONE) 200 MG tablet Take 0.5 tablets (100 mg) by mouth daily, Disp-45 tablet, R-3, E-PrescribeDose strength change. Please remind Sister to split tablet.      apixaban ANTICOAGULANT (ELIQUIS) 5 MG tablet Take 1 tablet (5 mg) by mouth 2 times daily, Disp-60 tablet, R-1, E-Prescribe      cholecalciferol, vitamin D3, (VITAMIN D3) 2,000 unit Tab [CHOLECALCIFEROL, VITAMIN D3, (VITAMIN D3) 2,000 UNIT TAB] Take 1 tablet (2,000 Units total) by mouth daily., Disp-90 tablet, R-3, NormalReplaces weekly ergo 50,000 units      COMPOUNDED NON-CONTROLLED SUBSTANCE (CMPD RX) - PHARMACY TO MIX COMPOUNDED MEDICATION Ketamine 8% and Ketoprofen 5 % in carrier gel/lotion. Apply 2-4 pumps to affected areas QID PRN, Disp-120 g, R-1, E-Prescribe      DULoxetine (CYMBALTA) 60 MG capsule Take 60 mg by mouth daily, Historical      KLOR-CON 20 MEQ CR tablet TAKE ONE TABLET BY MOUTH ONE TIME DAILY, Disp-90 tablet, R-3, E-Prescribe      lisinopril (ZESTRIL) 2.5 MG tablet TAKE ONE TABLET BY MOUTH ONE TIME DAILY, Disp-90 tablet, R-1, E-Prescribe      metoprolol tartrate (LOPRESSOR) 25 MG tablet Take 1 tablet (25 mg) by mouth 2 times daily, Disp-60 tablet, R-1, E-Prescribe      oxyCODONE (ROXICODONE) 5 MG tablet Take 1 tablet (5 mg) by mouth 3 times daily as needed for moderate to severe pain #70 tabs to last  "30 days for chronic pain. Ok to fill 11/28/23 to begin using 11/30/23, Disp-70 tablet, R-0, E-Prescribe      zolpidem ER (AMBIEN CR) 6.25 MG CR tablet Take 1 and 1/4 tablet by mouth at bedtime, Disp-45 tablet, R-5, E-Prescribe           Allergies   Allergies   Allergen Reactions    Trazodone Shortness Of Breath and Unknown     Allergic to trazodone and deriv., Other: trouble swallowing      Clindamycin Diarrhea     C-diff    Gabapentin Other (See Comments)     \"Internal tremors\"    Temazepam Other (See Comments)     Annotation: Nightmares       "

## 2023-12-24 NOTE — PLAN OF CARE
Occupational Therapy Discharge Summary    Reason for therapy discharge:    Discharged to home with home therapy.    Progress towards therapy goal(s). See goals on Care Plan in [facility] electronic health record for goal details.  Goals not met.  Barriers to achieving goals:   discharge from facility.    Therapy recommendation(s):    Continued therapy is recommended.  Rationale/Recommendations:  pt to return home w/home therapy.

## 2023-12-26 ENCOUNTER — TELEPHONE (OUTPATIENT)
Dept: FAMILY MEDICINE | Facility: CLINIC | Age: 88
End: 2023-12-26

## 2023-12-26 ENCOUNTER — PATIENT OUTREACH (OUTPATIENT)
Dept: CARE COORDINATION | Facility: CLINIC | Age: 88
End: 2023-12-26

## 2023-12-26 NOTE — TELEPHONE ENCOUNTER
Home Health Care    Reason for call:  Verbal order    Orders are needed for this patient.  To day nursing, resumption of care on 12/27/2023.    Pt Provider: Dr. Flores    Phone Number Homecare Nurse can be reached at: [phone]      Okay to leave a detailed message?: Yes at Other phone number:  [phone].

## 2023-12-26 NOTE — PROGRESS NOTES
Clinic Care Coordination Contact  Care Coordination Transition Communication           [facility]  Hospitalist Discharge Summary       Date of Admission:  12/20/2023  Date of Discharge:  12/24/2023 10:03 AM  Discharging Provider: CRISTI KEEN MD  Discharge Service: Hospitalist Service        Discharge Diagnoses  #Acute exacerbation of chronic HFrEF  #Acute hypoxemic respiratory failure due to decompensated heart failure  #Non-MI troponin elevation  #Hypokalemia   #Chronic atrial fibrillation on chronic anticoagulation   #Chronic kidney disease stage IIIa   #Essential hypertension   #Osteoarthritis   #Physical deconditioning/weakness               Assessment: Patient has returned home with [facility] home care, Pt and Ot.     Plan: Pt returned home with continued homecare form [facility]. No new needs at this time. Pt has follow up with specialist on 1/11.       DARIUS Knowles, SW  Social Work Care Coordinator

## 2023-12-27 ENCOUNTER — MEDICAL CORRESPONDENCE (OUTPATIENT)
Dept: HEALTH INFORMATION MANAGEMENT | Facility: CLINIC | Age: 88
End: 2023-12-27

## 2023-12-27 DIAGNOSIS — Z53.9 DIAGNOSIS NOT YET DEFINED: Primary | ICD-10-CM

## 2023-12-27 PROCEDURE — G0180 MD CERTIFICATION HHA PATIENT: HCPCS | Performed by: FAMILY MEDICINE

## 2023-12-28 ENCOUNTER — TELEPHONE (OUTPATIENT)
Dept: PALLIATIVE MEDICINE | Facility: OTHER | Age: 88
End: 2023-12-28

## 2023-12-28 NOTE — TELEPHONE ENCOUNTER
[facility] Call Center    Phone Message    May a detailed message be left on voicemail: yes     Reason for Call: Medication Question or concern regarding medication   Prescription Clarification  Name of Medication: oxyCODONE (ROXICODONE) 5 MG tablet   Prescribing Provider: Valentine Howard     Pt said that she has questions on the timing that she is supposed to be taking this.  She is requesting a call back from a nurse to discuss.  Thanks.

## 2023-12-29 ENCOUNTER — HOSPITAL ENCOUNTER (OUTPATIENT)
Facility: HOSPITAL | Age: 88
Setting detail: OBSERVATION
Discharge: HOME | End: 2023-12-30
Attending: EMERGENCY MEDICINE | Admitting: EMERGENCY MEDICINE
Payer: COMMERCIAL

## 2023-12-29 ENCOUNTER — TELEPHONE (OUTPATIENT)
Dept: FAMILY MEDICINE | Facility: CLINIC | Age: 88
End: 2023-12-29

## 2023-12-29 DIAGNOSIS — R00.1 BRADYCARDIA: ICD-10-CM

## 2023-12-29 DIAGNOSIS — S72.001A HIP FRACTURE, RIGHT, CLOSED, INITIAL ENCOUNTER (H): ICD-10-CM

## 2023-12-29 DIAGNOSIS — R26.89 IMPAIRED GAIT AND MOBILITY: ICD-10-CM

## 2023-12-29 DIAGNOSIS — I50.22 CHRONIC SYSTOLIC HEART FAILURE (H): Primary | ICD-10-CM

## 2023-12-29 DIAGNOSIS — N30.00 ACUTE CYSTITIS WITHOUT HEMATURIA: ICD-10-CM

## 2023-12-29 LAB
ALBUMIN UR-MCNC: NEGATIVE MG/DL
ANION GAP SERPL CALCULATED.3IONS-SCNC: 10 MMOL/L (ref 7–15)
APPEARANCE UR: CLEAR
BACTERIA #/AREA URNS HPF: ABNORMAL /HPF
BASOPHILS # BLD AUTO: 0.1 10E3/UL (ref 0–0.2)
BASOPHILS NFR BLD AUTO: 1 %
BILIRUB UR QL STRIP: NEGATIVE
BUN SERPL-MCNC: 34.3 MG/DL (ref 8–23)
CALCIUM SERPL-MCNC: 9.8 MG/DL (ref 8.2–9.6)
CHLORIDE SERPL-SCNC: 102 MMOL/L (ref 98–107)
COLOR UR AUTO: ABNORMAL
CREAT SERPL-MCNC: 1.41 MG/DL (ref 0.51–0.95)
DEPRECATED HCO3 PLAS-SCNC: 30 MMOL/L (ref 22–29)
EGFRCR SERPLBLD CKD-EPI 2021: 35 ML/MIN/1.73M2
EOSINOPHIL # BLD AUTO: 0.4 10E3/UL (ref 0–0.7)
EOSINOPHIL NFR BLD AUTO: 5 %
ERYTHROCYTE [DISTWIDTH] IN BLOOD BY AUTOMATED COUNT: 14.6 % (ref 10–15)
GLUCOSE SERPL-MCNC: 112 MG/DL (ref 70–99)
GLUCOSE UR STRIP-MCNC: NEGATIVE MG/DL
HCT VFR BLD AUTO: 41.7 % (ref 35–47)
HGB BLD-MCNC: 12.9 G/DL (ref 11.7–15.7)
HGB UR QL STRIP: NEGATIVE
HYALINE CASTS: 9 /LPF
IMM GRANULOCYTES # BLD: 0 10E3/UL
IMM GRANULOCYTES NFR BLD: 0 %
KETONES UR STRIP-MCNC: NEGATIVE MG/DL
LEUKOCYTE ESTERASE UR QL STRIP: ABNORMAL
LYMPHOCYTES # BLD AUTO: 1.4 10E3/UL (ref 0.8–5.3)
LYMPHOCYTES NFR BLD AUTO: 18 %
MCH RBC QN AUTO: 29.4 PG (ref 26.5–33)
MCHC RBC AUTO-ENTMCNC: 30.9 G/DL (ref 31.5–36.5)
MCV RBC AUTO: 95 FL (ref 78–100)
MONOCYTES # BLD AUTO: 0.8 10E3/UL (ref 0–1.3)
MONOCYTES NFR BLD AUTO: 10 %
MUCOUS THREADS #/AREA URNS LPF: PRESENT /LPF
NEUTROPHILS # BLD AUTO: 4.9 10E3/UL (ref 1.6–8.3)
NEUTROPHILS NFR BLD AUTO: 66 %
NITRATE UR QL: NEGATIVE
NRBC # BLD AUTO: 0 10E3/UL
NRBC BLD AUTO-RTO: 0 /100
PH UR STRIP: 6.5 [PH] (ref 5–7)
PLATELET # BLD AUTO: 256 10E3/UL (ref 150–450)
POTASSIUM SERPL-SCNC: 3.9 MMOL/L (ref 3.4–5.3)
RBC # BLD AUTO: 4.39 10E6/UL (ref 3.8–5.2)
RBC URINE: 0 /HPF
SODIUM SERPL-SCNC: 142 MMOL/L (ref 135–145)
SP GR UR STRIP: 1.01 (ref 1–1.03)
SQUAMOUS EPITHELIAL: 1 /HPF
T4 FREE SERPL-MCNC: 1.03 NG/DL (ref 0.9–1.7)
TROPONIN T SERPL HS-MCNC: 36 NG/L
TROPONIN T SERPL HS-MCNC: 39 NG/L
TSH SERPL DL<=0.005 MIU/L-ACNC: 6.25 UIU/ML (ref 0.3–4.2)
UROBILINOGEN UR STRIP-MCNC: <2 MG/DL
WBC # BLD AUTO: 7.5 10E3/UL (ref 4–11)
WBC URINE: 67 /HPF

## 2023-12-29 PROCEDURE — G0378 HOSPITAL OBSERVATION PER HR: HCPCS

## 2023-12-29 PROCEDURE — 84439 ASSAY OF FREE THYROXINE: CPT | Performed by: EMERGENCY MEDICINE

## 2023-12-29 PROCEDURE — 250N000013 HC RX MED GY IP 250 OP 250 PS 637: Performed by: EMERGENCY MEDICINE

## 2023-12-29 PROCEDURE — 87086 URINE CULTURE/COLONY COUNT: CPT | Performed by: EMERGENCY MEDICINE

## 2023-12-29 PROCEDURE — 99223 1ST HOSP IP/OBS HIGH 75: CPT | Performed by: EMERGENCY MEDICINE

## 2023-12-29 PROCEDURE — 99232 SBSQ HOSP IP/OBS MODERATE 35: CPT | Performed by: INTERNAL MEDICINE

## 2023-12-29 PROCEDURE — 87186 SC STD MICRODIL/AGAR DIL: CPT | Performed by: EMERGENCY MEDICINE

## 2023-12-29 PROCEDURE — 36415 COLL VENOUS BLD VENIPUNCTURE: CPT | Performed by: EMERGENCY MEDICINE

## 2023-12-29 PROCEDURE — 85025 COMPLETE CBC W/AUTO DIFF WBC: CPT | Performed by: EMERGENCY MEDICINE

## 2023-12-29 PROCEDURE — 84484 ASSAY OF TROPONIN QUANT: CPT | Performed by: EMERGENCY MEDICINE

## 2023-12-29 PROCEDURE — 93005 ELECTROCARDIOGRAM TRACING: CPT | Performed by: STUDENT IN AN ORGANIZED HEALTH CARE EDUCATION/TRAINING PROGRAM

## 2023-12-29 PROCEDURE — 84443 ASSAY THYROID STIM HORMONE: CPT | Performed by: EMERGENCY MEDICINE

## 2023-12-29 PROCEDURE — 99285 EMERGENCY DEPT VISIT HI MDM: CPT | Mod: 25

## 2023-12-29 PROCEDURE — 81001 URINALYSIS AUTO W/SCOPE: CPT | Performed by: EMERGENCY MEDICINE

## 2023-12-29 PROCEDURE — 80048 BASIC METABOLIC PNL TOTAL CA: CPT | Performed by: EMERGENCY MEDICINE

## 2023-12-29 RX ORDER — FUROSEMIDE 20 MG
20 TABLET ORAL DAILY
Status: DISCONTINUED | OUTPATIENT
Start: 2023-12-30 | End: 2023-12-30 | Stop reason: HOSPADM

## 2023-12-29 RX ORDER — CALCIUM CARBONATE 500 MG/1
1000 TABLET, CHEWABLE ORAL 4 TIMES DAILY PRN
Status: DISCONTINUED | OUTPATIENT
Start: 2023-12-29 | End: 2023-12-30 | Stop reason: HOSPADM

## 2023-12-29 RX ORDER — AMOXICILLIN 250 MG
1 CAPSULE ORAL 2 TIMES DAILY PRN
Status: DISCONTINUED | OUTPATIENT
Start: 2023-12-29 | End: 2023-12-30 | Stop reason: HOSPADM

## 2023-12-29 RX ORDER — POTASSIUM CHLORIDE 1500 MG/1
20 TABLET, EXTENDED RELEASE ORAL DAILY
Status: DISCONTINUED | OUTPATIENT
Start: 2023-12-30 | End: 2023-12-30 | Stop reason: HOSPADM

## 2023-12-29 RX ORDER — FUROSEMIDE 20 MG
40 TABLET ORAL DAILY
Status: DISCONTINUED | OUTPATIENT
Start: 2023-12-30 | End: 2023-12-30 | Stop reason: HOSPADM

## 2023-12-29 RX ORDER — LIDOCAINE 40 MG/G
CREAM TOPICAL
Status: DISCONTINUED | OUTPATIENT
Start: 2023-12-29 | End: 2023-12-30 | Stop reason: HOSPADM

## 2023-12-29 RX ORDER — ZOLPIDEM TARTRATE 5 MG/1
5 TABLET ORAL
Status: DISCONTINUED | OUTPATIENT
Start: 2023-12-29 | End: 2023-12-30 | Stop reason: HOSPADM

## 2023-12-29 RX ORDER — ACETAMINOPHEN 325 MG/1
650 TABLET ORAL EVERY 4 HOURS PRN
Status: DISCONTINUED | OUTPATIENT
Start: 2023-12-29 | End: 2023-12-30 | Stop reason: HOSPADM

## 2023-12-29 RX ORDER — LISINOPRIL 2.5 MG/1
2.5 TABLET ORAL DAILY
Status: DISCONTINUED | OUTPATIENT
Start: 2023-12-30 | End: 2023-12-30 | Stop reason: HOSPADM

## 2023-12-29 RX ORDER — VITAMIN B COMPLEX
50 TABLET ORAL DAILY
Status: DISCONTINUED | OUTPATIENT
Start: 2023-12-30 | End: 2023-12-30 | Stop reason: HOSPADM

## 2023-12-29 RX ORDER — FUROSEMIDE 20 MG
40 TABLET ORAL
Status: DISCONTINUED | OUTPATIENT
Start: 2023-12-29 | End: 2023-12-29

## 2023-12-29 RX ORDER — ONDANSETRON 4 MG/1
4 TABLET, ORALLY DISINTEGRATING ORAL EVERY 6 HOURS PRN
Status: DISCONTINUED | OUTPATIENT
Start: 2023-12-29 | End: 2023-12-30 | Stop reason: HOSPADM

## 2023-12-29 RX ORDER — AMIODARONE HYDROCHLORIDE 100 MG/1
100 TABLET ORAL DAILY
Status: DISCONTINUED | OUTPATIENT
Start: 2023-12-29 | End: 2023-12-30 | Stop reason: HOSPADM

## 2023-12-29 RX ORDER — DULOXETIN HYDROCHLORIDE 60 MG/1
60 CAPSULE, DELAYED RELEASE ORAL DAILY
Status: DISCONTINUED | OUTPATIENT
Start: 2023-12-30 | End: 2023-12-30 | Stop reason: HOSPADM

## 2023-12-29 RX ORDER — ONDANSETRON 2 MG/ML
4 INJECTION INTRAMUSCULAR; INTRAVENOUS EVERY 6 HOURS PRN
Status: DISCONTINUED | OUTPATIENT
Start: 2023-12-29 | End: 2023-12-30 | Stop reason: HOSPADM

## 2023-12-29 RX ORDER — AMOXICILLIN 250 MG
2 CAPSULE ORAL 2 TIMES DAILY PRN
Status: DISCONTINUED | OUTPATIENT
Start: 2023-12-29 | End: 2023-12-30 | Stop reason: HOSPADM

## 2023-12-29 RX ORDER — METOPROLOL TARTRATE 25 MG/1
25 TABLET, FILM COATED ORAL 2 TIMES DAILY
Status: DISCONTINUED | OUTPATIENT
Start: 2023-12-29 | End: 2023-12-29

## 2023-12-29 RX ORDER — OXYCODONE HYDROCHLORIDE 5 MG/1
5 TABLET ORAL 3 TIMES DAILY PRN
Status: DISCONTINUED | OUTPATIENT
Start: 2023-12-29 | End: 2023-12-30 | Stop reason: HOSPADM

## 2023-12-29 RX ADMIN — ACETAMINOPHEN 650 MG: 325 TABLET ORAL at 17:25

## 2023-12-29 RX ADMIN — APIXABAN 5 MG: 5 TABLET, FILM COATED ORAL at 21:26

## 2023-12-29 ASSESSMENT — ACTIVITIES OF DAILY LIVING (ADL)
ADLS_ACUITY_SCORE: 35
ADLS_ACUITY_SCORE: 43
ADLS_ACUITY_SCORE: 35
ADLS_ACUITY_SCORE: 37
ADLS_ACUITY_SCORE: 35
DEPENDENT_IADLS:: SHOPPING;TRANSPORTATION
ADLS_ACUITY_SCORE: 43

## 2023-12-29 NOTE — PHARMACY-ADMISSION MEDICATION HISTORY
Pharmacist Admission Medication History    Admission medication history is complete. The information provided in this note is only as accurate as the sources available at the time of the update.    Information Source(s): Patient and Hospital records via in-person--just discharged on 12/24    Pertinent Information: patient was worried that she is on too many BP meds because her BP keeps being low    Changes made to PTA medication list:  Added: None  Deleted: None  Changed: None    Medication Affordability:       Allergies reviewed with patient and updates made in EHR: yes    Medication History Completed By: Tamara Colvin RPH 12/29/2023 1:14 PM    PTA Med List   Medication Sig Last Dose    acetaminophen (TYLENOL) 325 MG tablet Take 2 tablets (650 mg) by mouth every 4 hours as needed for other (mild pain) (Patient taking differently: Take 650 mg by mouth every 4 hours as needed for mild pain) Unknown at PRN    amiodarone (PACERONE) 200 MG tablet Take 0.5 tablets (100 mg) by mouth daily 12/29/2023 at am    apixaban ANTICOAGULANT (ELIQUIS) 5 MG tablet Take 1 tablet (5 mg) by mouth 2 times daily 12/29/2023 at am    cholecalciferol, vitamin D3, (VITAMIN D3) 2,000 unit Tab [CHOLECALCIFEROL, VITAMIN D3, (VITAMIN D3) 2,000 UNIT TAB] Take 1 tablet (2,000 Units total) by mouth daily. 12/29/2023 at am    COMPOUNDED NON-CONTROLLED SUBSTANCE (CMPD RX) - PHARMACY TO MIX COMPOUNDED MEDICATION Ketamine 8% and Ketoprofen 5 % in carrier gel/lotion. Apply 2-4 pumps to affected areas QID PRN Unknown at PRN    DULoxetine (CYMBALTA) 60 MG capsule Take 60 mg by mouth daily 12/29/2023 at am    furosemide (LASIX) 40 MG tablet Take 1 tablet (40 mg) by mouth 2 times daily 12/29/2023 at am    KLOR-CON 20 MEQ CR tablet TAKE ONE TABLET BY MOUTH ONE TIME DAILY (Patient taking differently: 20 mEq daily) 12/29/2023 at am    lisinopril (ZESTRIL) 2.5 MG tablet TAKE ONE TABLET BY MOUTH ONE TIME DAILY 12/29/2023 at am    metoprolol tartrate (LOPRESSOR)  25 MG tablet Take 1 tablet (25 mg) by mouth 2 times daily 12/29/2023 at am    oxyCODONE (ROXICODONE) 5 MG tablet Take 1 tablet (5 mg) by mouth 3 times daily as needed for moderate to severe pain #70 tabs to last 30 days for chronic pain. Ok to fill 11/28/23 to begin using 11/30/23 Unknown at PRN    zolpidem ER (AMBIEN CR) 6.25 MG CR tablet Take 1 and 1/4 tablet by mouth at bedtime 12/28/2023 at PM

## 2023-12-29 NOTE — TELEPHONE ENCOUNTER
Spoke to patient and we reviewed the oxycodone is prn and does not need to be taken in a scheduled way but that she can use between 1 and 3 tablets per day as needed and should space them out at lease every 4 hours. 70 tablets needs to last at least 30 days. She is also taking OxyContin 10 MG at bedtime so will not take the oxycodone 5 MG at bedtime only during awake hours.     FRANCOISE CastañedaN, RN  Care Coordinator  [facility] [place]

## 2023-12-29 NOTE — TELEPHONE ENCOUNTER
Home care calling  to report low heart rate: 41-44 bpm regular rhythm. Other vital signs stable.    Patient complaining of increased fatigue.  No other symptoms reported per patient. Advised transport via 911 to ER.    Willa Norris RN, BSN, PHN  [facility]  [phone]

## 2023-12-29 NOTE — CONSULTS
[facility]   [address], [place] [address]   [email or web address]   OFFICE: [phone]     CARDIOLOGY INPATIENT CONSULT NOTE     Impression and Plan     Assessment:  Bradycardia - sinus bradycardia, likely related to b-blocker. Asymptomatic. We will discontinue her metoprolol.  Persistent atrial fibrillation - on amiodarone for rhythm control and is s/p recent cardioversion.  On eliquis for stroke prevention.  Chronic heart failure with reduced LVEF - currently compensated.  Presumed non-ischemic cardiomyopathy - with LVEF 25%. Not on high doses of GDMT due to limitations of blood pressure and age.   Chronic LBBB - with her recent bradycardia, wide QRS duration, and low LVEF she would benefit from CRT pacing. I again discussed and recommended this for her but she continues to decline.  CKD stage IIIb, stable    Plan:  Discontinue metoprolol  Will adjust her furosemide to 40 mg in the morning and 20 mg in the afternoon  Provided her HR remains >40 bpm she could likely discharge tomorrow.    Primary Cardiologist: Dr. Estrada Holley    History of Present Illness      Ms. Gladys Ramirez is a 89 year old female with HFrEF, presumed nonischemic cardiomyopathy, persistent atrial fibrillation, hypertension, who was recently admitted with acute heart failure and atrial fibrillation presents with bradycardia. Sister Gladys underwent cardioversion on 12/22 and discharged on 12/24 after adequate diuresis. Her discharge medications included metoprolol tartrate 25 mg BID and amiodarone. The metoprolol was new for her. After returning home, Sister Gladys felt well. She was moving around and denied light headedness or fatigue. A home health nurse came to check on her today and noted a slow heart rate of around 30 bpm and sent Sister Gladys here for evaluation. She continues to feel well.    Other than noted above, Ms. Ramirez denies any chest pain/pressure/tightness,  shortness of breath at rest or with exertion, light headedness/dizziness, pre-syncope, syncope, lower extremity swelling, palpitations, paroxysmal nocturnal dyspnea (PND), or orthopnea.    Review of Systems:  Further review of systems is otherwise negative/noncontributory (based on review of medical record (admission H&P) and 13 point review of systems reviewed. Pertinent positives noted).    Cardiac Diagnostics     ECG: Personally reviewed and interpreted: sinus bradycardia with sinus arrhythmia and LBBB.    Telemetry (personally reviewed): sinus bradycardia with BBB    Most recent:  Echocardiogram (results reviewed):   Limited TTE 12/21/23  There is mild concentric left ventricular hypertrophy.  Biplane LVEF is 25%.  Septal motion is consistent with conduction abnormality.  Mildly decreased right ventricular systolic function  No significant valve disease.  Compared to the prior study dated 11/30/2023, there have been no changes.          Medical History  Surgical History Family History Social History   Past Medical History:   Diagnosis Date     Angina pectoris (H24)      Atrial fibrillation (H)      Chest pain 03/09/2017     Chronic kidney disease      Congestive heart failure (H)      Cough      Hyperlipidemia      Hypertension      Osteoarthritis      Past Surgical History:   Procedure Laterality Date     APPENDECTOMY       BASAL CELL CARCINOMA EXCISION      nose     LAPAROSCOPY DIAGNOSTIC (GENERAL) N/A 11/04/2014    Procedure: LAPAROSCOPY BILATERAL SALPINGO-OOPHORECTOMY ;  Surgeon: Sofia Harper MD;  Location: [facility] [place];  Service:      OPEN REDUCTION INTERNAL FIXATION HIP BIPOLAR Right 3/5/2023    Procedure: HEMIARTHROPLASTY, HIP, BIPOLAR;  Surgeon: Jose G Martinez MD;  Location: [facility] [place]     TONSILLECTOMY      10 years old     ZC TOTAL KNEE ARTHROPLASTY Left     2011     Family History   Problem Relation Age of Onset     Heart Disease Mother      Rheumatic Heart Disease Mother       No Known Problems Father      Cancer Brother         brain     Lung Cancer Brother      Lung Cancer Brother      Cancer Sister         lung     Lung Cancer Sister            Social History     Socioeconomic History     Marital status: Single     Spouse name: Not on file     Number of children: Not on file     Years of education: Not on file     Highest education level: Not on file   Occupational History     Not on file   Tobacco Use     Smoking status: Never     Passive exposure: Never     Smokeless tobacco: Never     Tobacco comments:     no passive exposure   Vaping Use     Vaping Use: Never used   Substance and Sexual Activity     Alcohol use: Yes     Comment: Alcoholic Drinks/day: Rarely a glass of wine     Drug use: No     Sexual activity: Not on file   Other Topics Concern     Not on file   Social History Narrative    The patient is a nun.     Social Determinants of Health     Financial Resource Strain: Low Risk  (12/6/2023)    Financial Resource Strain      Within the past 12 months, have you or your family members you live with been unable to get utilities (heat, electricity) when it was really needed?: No   Food Insecurity: Low Risk  (12/6/2023)    Food Insecurity      Within the past 12 months, did you worry that your food would run out before you got money to buy more?: No      Within the past 12 months, did the food you bought just not last and you didn t have money to get more?: No   Transportation Needs: Low Risk  (12/6/2023)    Transportation Needs      Within the past 12 months, has lack of transportation kept you from medical appointments, getting your medicines, non-medical meetings or appointments, work, or from getting things that you need?: No   Physical Activity: Insufficiently Active (3/27/2023)    Exercise Vital Sign      Days of Exercise per Week: 3 days      Minutes of Exercise per Session: 10 min   Stress: No Stress Concern Present (3/27/2023)    [employer] [place] [employer]  "Health - Occupational Stress Questionnaire      Feeling of Stress : Not at all   Social Connections: Moderately Integrated (3/27/2023)    Social Connection and Isolation Panel [NHANES]      Frequency of Communication with Friends and Family: More than three times a week      Frequency of Social Gatherings with Friends and Family: More than three times a week      Attends Jewish Services: More than 4 times per year      Active Member of Clubs or Organizations: Yes      Attends Club or Organization Meetings: More than 4 times per year      Marital Status: Never    Interpersonal Safety: Low Risk  (10/5/2023)    Interpersonal Safety      Do you feel physically and emotionally safe where you currently live?: Yes      Within the past 12 months, have you been hit, slapped, kicked or otherwise physically hurt by someone?: No      Within the past 12 months, have you been humiliated or emotionally abused in other ways by your partner or ex-partner?: No   Housing Stability: Low Risk  (12/6/2023)    Housing Stability      Do you have housing? : Yes      Are you worried about losing your housing?: No             Physical Examination   VITALS: /59   Pulse (!) 46   Temp 97.8  F (36.6  C) (Oral)   Resp 17   Ht 1.575 m (5' 2\")   Wt 66.2 kg (146 lb)   SpO2 94%   BMI 26.70 kg/m    BMI: Body mass index is 26.7 kg/m .  Wt Readings from Last 3 Encounters:   12/29/23 66.2 kg (146 lb)   12/24/23 67 kg (147 lb 11.3 oz)   12/14/23 71.2 kg (157 lb)     No intake or output data in the 24 hours ending 12/29/23 1519    General: pleasant elderly female. No acute distress.   HENT: external ears normal. Nares patent. Mucous membranes moist.  Eyes: perrla, extraocular muscles intact. No scleral icterus.   Neck: No JVD  Lungs: clear to auscultation  COR: regular rate and rhythm, No murmurs, rubs, or gallops  Abd: nondistended, BS present  Extrem: No edema         Non-cardiac Imaging Studies Reviewed      No non-cardiac " "imaging.       Lab Results Reviewed    Chemistry/lipid CBC Cardiac Enzymes/BNP/TSH/INR   No results for input(s): \"CHOL\", \"HDL\", \"LDL\", \"TRIG\", \"CHOLHDLRATIO\" in the last 41450 hours.  No results for input(s): \"LDL\" in the last 41745 hours.  Recent Labs   Lab Test 12/29/23  1214      POTASSIUM 3.9   CHLORIDE 102   CO2 30*   *   BUN 34.3*   CR 1.41*   GFRESTIMATED 35*   MARLENE 9.8*     Recent Labs   Lab Test 12/29/23  1214 12/24/23  0525 12/23/23  0512   CR 1.41* 1.29* 1.43*     Recent Labs   Lab Test 06/15/21  0910   A1C 6.0*          Recent Labs   Lab Test 12/29/23  1214   WBC 7.5   HGB 12.9   HCT 41.7   MCV 95        Recent Labs   Lab Test 12/29/23  1214 12/22/23  0502 12/20/23  1432   HGB 12.9 12.9 12.7    Recent Labs   Lab Test 08/23/22  1154 08/17/22  1215 11/21/19  0451   TROPONINI 0.16 0.04 0.01     Recent Labs   Lab Test 12/20/23  1432 11/29/23  1106 02/08/23  1345 08/23/22  1154 08/17/22  1215 03/03/21  1222 02/21/19  0713   BNP  --   --   --  1,933*  --  177* 95   NTBNPI 19,546* 13,109*  --   --   --   --   --    NTBNP  --   --  5,373*  --  5,101*  --   --      Recent Labs   Lab Test 12/29/23  1214   TSH 6.25*     Recent Labs   Lab Test 03/04/23  1905 08/23/22  1154 06/05/19  0052   INR 1.33* 1.37* 1.50*           Current Inpatient Scheduled Medications   Scheduled Meds:    [Held by provider] amiodarone  100 mg Oral Daily     apixaban ANTICOAGULANT  5 mg Oral BID     [START ON 12/30/2023] DULoxetine  60 mg Oral Daily     furosemide  40 mg Oral BID     [START ON 12/30/2023] lisinopril  2.5 mg Oral Daily     [Held by provider] metoprolol tartrate  25 mg Oral BID     [START ON 12/30/2023] potassium chloride ER  20 mEq Oral Daily     sodium chloride (PF)  3 mL Intracatheter Q8H     [START ON 12/30/2023] Vitamin D3  50 mcg Oral Daily     Continuous Infusions:    - MEDICATION INSTRUCTIONS -         Current Outpatient Medications   Medication Sig Dispense Refill     acetaminophen (TYLENOL) 325 MG " tablet Take 2 tablets (650 mg) by mouth every 4 hours as needed for other (mild pain) (Patient taking differently: Take 650 mg by mouth every 4 hours as needed for mild pain) 100 tablet 0     amiodarone (PACERONE) 200 MG tablet Take 0.5 tablets (100 mg) by mouth daily 45 tablet 3     apixaban ANTICOAGULANT (ELIQUIS) 5 MG tablet Take 1 tablet (5 mg) by mouth 2 times daily 60 tablet 1     cholecalciferol, vitamin D3, (VITAMIN D3) 2,000 unit Tab [CHOLECALCIFEROL, VITAMIN D3, (VITAMIN D3) 2,000 UNIT TAB] Take 1 tablet (2,000 Units total) by mouth daily. 90 tablet 3     COMPOUNDED NON-CONTROLLED SUBSTANCE (CMPD RX) - PHARMACY TO MIX COMPOUNDED MEDICATION Ketamine 8% and Ketoprofen 5 % in carrier gel/lotion. Apply 2-4 pumps to affected areas QID  g 1     DULoxetine (CYMBALTA) 60 MG capsule Take 60 mg by mouth daily       furosemide (LASIX) 40 MG tablet Take 1 tablet (40 mg) by mouth 2 times daily 60 tablet 1     KLOR-CON 20 MEQ CR tablet TAKE ONE TABLET BY MOUTH ONE TIME DAILY (Patient taking differently: 20 mEq daily) 90 tablet 3     lisinopril (ZESTRIL) 2.5 MG tablet TAKE ONE TABLET BY MOUTH ONE TIME DAILY 90 tablet 1     metoprolol tartrate (LOPRESSOR) 25 MG tablet Take 1 tablet (25 mg) by mouth 2 times daily 60 tablet 1     oxyCODONE (ROXICODONE) 5 MG tablet Take 1 tablet (5 mg) by mouth 3 times daily as needed for moderate to severe pain #70 tabs to last 30 days for chronic pain. Ok to fill 11/28/23 to begin using 11/30/23 70 tablet 0     zolpidem ER (AMBIEN CR) 6.25 MG CR tablet Take 1 and 1/4 tablet by mouth at bedtime 45 tablet 5          Medications Prior to Admission   Prior to Admission medications    Medication Sig Start Date End Date Taking? Authorizing Provider   acetaminophen (TYLENOL) 325 MG tablet Take 2 tablets (650 mg) by mouth every 4 hours as needed for other (mild pain)  Patient taking differently: Take 650 mg by mouth every 4 hours as needed for mild pain 3/5/23  Yes Naina Guzmán PA-C    amiodarone (PACERONE) 200 MG tablet Take 0.5 tablets (100 mg) by mouth daily 12/7/22  Yes Estrada Holley MD   apixaban ANTICOAGULANT (ELIQUIS) 5 MG tablet Take 1 tablet (5 mg) by mouth 2 times daily 12/2/23  Yes Felix Christianson MD   cholecalciferol, vitamin D3, (VITAMIN D3) 2,000 unit Tab [CHOLECALCIFEROL, VITAMIN D3, (VITAMIN D3) 2,000 UNIT TAB] Take 1 tablet (2,000 Units total) by mouth daily. 7/29/19  Yes Lore Martin MD   COMPOUNDED NON-CONTROLLED SUBSTANCE (CMPD RX) - PHARMACY TO MIX COMPOUNDED MEDICATION Ketamine 8% and Ketoprofen 5 % in carrier gel/lotion. Apply 2-4 pumps to affected areas QID PRN 11/24/23  Yes Valentine Howard APRN CNP   DULoxetine (CYMBALTA) 60 MG capsule Take 60 mg by mouth daily   Yes Unknown, Entered By History   furosemide (LASIX) 40 MG tablet Take 1 tablet (40 mg) by mouth 2 times daily 12/24/23  Yes Ricco Avalos MD   KLOR-CON 20 MEQ CR tablet TAKE ONE TABLET BY MOUTH ONE TIME DAILY  Patient taking differently: 20 mEq daily 4/13/23  Yes Estrada Holley MD   lisinopril (ZESTRIL) 2.5 MG tablet TAKE ONE TABLET BY MOUTH ONE TIME DAILY 10/27/23  Yes Estrada Holley MD   metoprolol tartrate (LOPRESSOR) 25 MG tablet Take 1 tablet (25 mg) by mouth 2 times daily 12/2/23  Yes Felix Christianson MD   oxyCODONE (ROXICODONE) 5 MG tablet Take 1 tablet (5 mg) by mouth 3 times daily as needed for moderate to severe pain #70 tabs to last 30 days for chronic pain. Ok to fill 11/28/23 to begin using 11/30/23 11/24/23  Yes Valentine Howard APRN CNP   zolpidem ER (AMBIEN CR) 6.25 MG CR tablet Take 1 and 1/4 tablet by mouth at bedtime 11/13/23  Yes Margret Flores MD              Clinically Significant Risk Factors Present on Admission               # Drug Induced Coagulation Defect: home medication list includes an anticoagulant medication    # Hypertension: Noted on problem list  # Chronic heart failure with reduced ejection fraction: last echo with EF <40%     # Overweight: Estimated  "body mass index is 26.7 kg/m  as calculated from the following:    Height as of this encounter: 1.575 m (5' 2\").    Weight as of this encounter: 66.2 kg (146 lb).       # Financial/Environmental Concerns:            "

## 2023-12-29 NOTE — H&P
ADMISSION HISTORY & PHYSICAL      Margret Flores, [phone]  ASSESSMENT AND PLAN:  89 year old female with history of congestive heart failure with reduced EF of 20 to 25%, chronic atrial fibrillation on Eliquis who was recently discharged from [facility] on 1224 who now presents with bradycardia and hypotension, possibly secondary to medication.    Bradycardia: Heart rate consistently in the 30s to 40s and frequently in the 30s in the ED but asymptomatic.  Was recently cardioverted last admission.  Has remained on amiodarone and metoprolol, will hold for now and consult cardiology.  TSH mildly elevated but free T4 is normal  Paroxysmal atrial fibrillation: Holding medication as above, continue home Eliquis.  Troponin mildly elevated at 39 which actually appears her baseline.  Will repeat in a couple hours  Heart failure with reduced ejection fraction: Continue lisinopril and Lasix.  Recent echocardiogram showed LVEF of 25%.  Chronic kidney disease stage III: Current creatinine 1.41 with recent baseline between 1.06 and 1.43.  Will trend daily  Hypertension: Continue lisinopril and hold metoprolol for right now.  History of DVT/PE: Continue home anticoagulation  Osteoarthritis with chronic pain: Continue home pain meds    Barriers to discharge: Bradycardia      CHIEF COMPLAINT:  Hypotension, low heart rate    HISTORY OF PRESENTING ILLNESS:  Gladys Ramirez is a 89 year old female just discharged from [facility] December 24 for heart failure exacerbation and cardioversion was sent in due to concerns of hypotension and low heart rate.  Her blood pressure is adequate but her heart rate in the ED has been running in the 30s to 40s.  She feels fine, denies any lightheadedness or dizziness or weakness.  No fevers or chills or nausea vomiting or diarrhea, no chest pain or shortness of breath.  Initially her heart rate was pretty consistently in the 30s and 40s, currently in the 50s.    PMH/PSH:  Patient Active Problem  "List   Diagnosis    Localized Primary Osteoarthritis Of The Left Knee    Benign essential hypertension    Serum Enzyme Levels - Alkaline Phosphatase Elevated    Hyperlipidemia    Cataract    Insomnia    BCC (basal cell carcinoma of skin)    Sinus bradycardia    DNR (do not resuscitate)    Arthrosis of foot - bilateral    Lower extremity edema    Frequent nosebleeds    Controlled substance agreement signed - for tramadol and ambien signed 1/30/20    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    Vitamin D deficiency    Elevated alkaline phosphatase level    Deep vein thrombosis (DVT) of distal vein of lower extremity, unspecified chronicity, unspecified laterality (H)    Other pulmonary embolism without acute cor pulmonale, unspecified chronicity (H)    Hypomagnesemia    Lung nodule < 6cm on CT    Chronic pain    Normocytic anemia    Gastroesophageal reflux disease without esophagitis    Thyroid nodule    Exertional dyspnea    Anticoagulated by anticoagulation treatment    HFrEF (heart failure with reduced ejection fraction) (H)    Stage 3a chronic kidney disease (CKD) (H)    Persistent atrial fibrillation (H)    Anticoagulated    Hip fracture, right, closed, initial encounter (H)    Secondary cardiomyopathy (H)    Atrial fibrillation, unspecified type (H)    Acute on chronic systolic heart failure (H)    Hypoxia    Acute congestive heart failure, unspecified heart failure type (H)    Acute embolism and thrombosis of unspecified deep veins of unspecified distal lower extremity (H)    History of falling    Long term (current) use of anticoagulants    Cardiac pacemaker in situ    Bradycardia       ALLERGIES:  Allergies   Allergen Reactions    Trazodone Shortness Of Breath and Unknown     Allergic to trazodone and deriv., Other: trouble swallowing      Clindamycin Diarrhea     C-diff    Gabapentin Other (See Comments)     \"Internal tremors\"    Temazepam Other (See Comments)     Annotation: Nightmares   "       MEDICATIONS:  Reviewed.  No current facility-administered medications for this encounter.     Current Outpatient Medications   Medication    acetaminophen (TYLENOL) 325 MG tablet    amiodarone (PACERONE) 200 MG tablet    apixaban ANTICOAGULANT (ELIQUIS) 5 MG tablet    cholecalciferol, vitamin D3, (VITAMIN D3) 2,000 unit Tab    COMPOUNDED NON-CONTROLLED SUBSTANCE (CMPD RX) - PHARMACY TO MIX COMPOUNDED MEDICATION    DULoxetine (CYMBALTA) 60 MG capsule    furosemide (LASIX) 40 MG tablet    KLOR-CON 20 MEQ CR tablet    lisinopril (ZESTRIL) 2.5 MG tablet    metoprolol tartrate (LOPRESSOR) 25 MG tablet    oxyCODONE (ROXICODONE) 5 MG tablet    zolpidem ER (AMBIEN CR) 6.25 MG CR tablet       SOCIAL HISTORY:  Social History     Socioeconomic History    Marital status: Single     Spouse name: Not on file    Number of children: Not on file    Years of education: Not on file    Highest education level: Not on file   Occupational History    Not on file   Tobacco Use    Smoking status: Never     Passive exposure: Never    Smokeless tobacco: Never    Tobacco comments:     no passive exposure   Vaping Use    Vaping Use: Never used   Substance and Sexual Activity    Alcohol use: Yes     Comment: Alcoholic Drinks/day: Rarely a glass of wine    Drug use: No    Sexual activity: Not on file   Other Topics Concern    Not on file   Social History Narrative    The patient is a nun.     Social Determinants of Health     Financial Resource Strain: Low Risk  (12/6/2023)    Financial Resource Strain     Within the past 12 months, have you or your family members you live with been unable to get utilities (heat, electricity) when it was really needed?: No   Food Insecurity: Low Risk  (12/6/2023)    Food Insecurity     Within the past 12 months, did you worry that your food would run out before you got money to buy more?: No     Within the past 12 months, did the food you bought just not last and you didn t have money to get more?: No    Transportation Needs: Low Risk  (12/6/2023)    Transportation Needs     Within the past 12 months, has lack of transportation kept you from medical appointments, getting your medicines, non-medical meetings or appointments, work, or from getting things that you need?: No   Physical Activity: Insufficiently Active (3/27/2023)    Exercise Vital Sign     Days of Exercise per Week: 3 days     Minutes of Exercise per Session: 10 min   Stress: No Stress Concern Present (3/27/2023)    [employer] [place] [employer] - Occupational Stress Questionnaire     Feeling of Stress : Not at all   Social Connections: Moderately Integrated (3/27/2023)    Social Connection and Isolation Panel [NHANES]     Frequency of Communication with Friends and Family: More than three times a week     Frequency of Social Gatherings with Friends and Family: More than three times a week     Attends [employer] Services: More than 4 times per year     Active Member of Clubs or Organizations: Yes     Attends Club or Organization Meetings: More than 4 times per year     Marital Status: Never    Interpersonal Safety: Low Risk  (10/5/2023)    Interpersonal Safety     Do you feel physically and emotionally safe where you currently live?: Yes     Within the past 12 months, have you been hit, slapped, kicked or otherwise physically hurt by someone?: No     Within the past 12 months, have you been humiliated or emotionally abused in other ways by your partner or ex-partner?: No   Housing Stability: Low Risk  (12/6/2023)    Housing Stability     Do you have housing? : Yes     Are you worried about losing your housing?: No       FAMILY HISTORY:  Family History   Problem Relation Age of Onset    Heart Disease Mother     Rheumatic Heart Disease Mother     No Known Problems Father     Cancer Brother         brain    Lung Cancer Brother     Lung Cancer Brother     Cancer Sister         lung    Lung Cancer Sister        ROS:  12 point ROS was  "performed and found to be negative except for the pertinent positives mentioned in the HPI.      PHYSICAL EXAM:  /64   Pulse (!) 45   Temp 97.8  F (36.6  C) (Oral)   Resp 22   Ht 1.575 m (5' 2\")   Wt 66.2 kg (146 lb)   SpO2 96%   BMI 26.70 kg/m    No intake/output data recorded.  No intake/output data recorded.  CONSTITUTIONAL: No apparent distress  HEENT:       Sclera- anicteric      Mucous membrane- moist and pink  LUNGS: Clear to auscultation bilaterally  CARDIOVASCULAR: S1S2 regular.  Rate in the 50s.  No murmurs, rubs or gallops,no pedal edema  GI: Soft. Non-tender, non-distended. No organomegaly. No guarding or rigidity. Bowel sounds- active  NEURO: Cranial nerves II-XII grossly intact. No focal neurological deficit. No involuntary movements  INTGM: No jaundice, no cyanosis or clubbing  LYMPH:   MSK: no joint swelling or tenderness  PSYCH: alert and oriented , no agitation      DIAGNOSTIC DATA:  Recent Results (from the past 24 hour(s))   Basic metabolic panel    Collection Time: 12/29/23 12:14 PM   Result Value Ref Range    Sodium 142 135 - 145 mmol/L    Potassium 3.9 3.4 - 5.3 mmol/L    Chloride 102 98 - 107 mmol/L    Carbon Dioxide (CO2) 30 (H) 22 - 29 mmol/L    Anion Gap 10 7 - 15 mmol/L    Urea Nitrogen 34.3 (H) 8.0 - 23.0 mg/dL    Creatinine 1.41 (H) 0.51 - 0.95 mg/dL    GFR Estimate 35 (L) >60 mL/min/1.73m2    Calcium 9.8 (H) 8.2 - 9.6 mg/dL    Glucose 112 (H) 70 - 99 mg/dL   Troponin T, High Sensitivity (now)    Collection Time: 12/29/23 12:14 PM   Result Value Ref Range    Troponin T, High Sensitivity 39 (H) <=14 ng/L   TSH with free T4 reflex    Collection Time: 12/29/23 12:14 PM   Result Value Ref Range    TSH 6.25 (H) 0.30 - 4.20 uIU/mL   CBC with platelets and differential    Collection Time: 12/29/23 12:14 PM   Result Value Ref Range    WBC Count 7.5 4.0 - 11.0 10e3/uL    RBC Count 4.39 3.80 - 5.20 10e6/uL    Hemoglobin 12.9 11.7 - 15.7 g/dL    Hematocrit 41.7 35.0 - 47.0 %    MCV 95 " 78 - 100 fL    MCH 29.4 26.5 - 33.0 pg    MCHC 30.9 (L) 31.5 - 36.5 g/dL    RDW 14.6 10.0 - 15.0 %    Platelet Count 256 150 - 450 10e3/uL    % Neutrophils 66 %    % Lymphocytes 18 %    % Monocytes 10 %    % Eosinophils 5 %    % Basophils 1 %    % Immature Granulocytes 0 %    NRBCs per 100 WBC 0 <1 /100    Absolute Neutrophils 4.9 1.6 - 8.3 10e3/uL    Absolute Lymphocytes 1.4 0.8 - 5.3 10e3/uL    Absolute Monocytes 0.8 0.0 - 1.3 10e3/uL    Absolute Eosinophils 0.4 0.0 - 0.7 10e3/uL    Absolute Basophils 0.1 0.0 - 0.2 10e3/uL    Absolute Immature Granulocytes 0.0 <=0.4 10e3/uL    Absolute NRBCs 0.0 10e3/uL   T4 free    Collection Time: 12/29/23 12:14 PM   Result Value Ref Range    Free T4 1.03 0.90 - 1.70 ng/dL     All lab studies reviewed personally    EP Cardioversion External    Result Date: 12/22/2023  Kayla Montano APRN CNP     12/22/2023 11:46 AM [facility] Procedure: EP Cardioversion External Date/Time: 12/22/2023 11:42 AM Performed by: Kayla Montano APRN CNP Authorized by: Kayla Montano APRN CNP  UNIVERSAL PROTOCOL Site Marked: NA Prior Images Obtained and Reviewed:  Yes Required items: Required blood products, implants, devices and special equipment available  Patient identity confirmed:  Verbally with patient, arm band and provided demographic data Patient was reevaluated immediately before administering moderate or deep sedation or anesthesia Confirmation Checklist:  Patient's identity using two indicators, relevant allergies, procedure was appropriate and matched the consent or emergent situation and correct equipment/implants were available Time out: Immediately prior to the procedure a time out was called  Universal Protocol: the Joint Commission Universal Protocol was followed   ANESTHESIA Anesthesia was administered and monitored by anesthesiology.  See anesthesia documentation for details. PROCEDURE DETAILS Pre-procedure rhythm: atrial fibrillation Patient position:  patient was placed in a supine position Chest area: chest area exposed Electrodes: pads Electrodes placed: anterior-posterior Number of attempts: 1 Details of Attempts:  At 1112, after administration of IV Brevital by DAVY and confirmation of adequate sedation, she received a single synchronized shock at 200 J with prompt restoration of sinus rhythm in the 60s.  Post cardioversion EKG pending. Post-procedure rhythm: normal sinus rhythm Complications: no complications PROCEDURE Describe Procedure: Hospitalized with acute heart failure.  Known history of persistent atrial fibrillation with plan for cardioversion.  Eliquis was increased to a therapeutic dose on 2023.  She denies missing any doses of Eliquis since that time, so is now eligible for cardioversion.  Ventricular rates have been well-controlled, but A-fib may be contributing to her heart failure. Successful cardioversion to sinus rhythm Continue amiodarone 100 mg daily Continue Eliquis 5 mg twice daily for stroke prophylaxis (she does not qualify for dose adjustment) Follow-up with Ibis Pedro CNP in 1 month Transfer back to telemetry once awake and stable after cardioversion Patient Tolerance:  Patient tolerated the procedure well with no immediate complications Length of time physician/provider present for 1:1 monitoring during sedation: 10    Echocardiogram Limited    Result Date: 2023  [id number]^CURRY^JOSÉ ANTONIO^[place] [address]  Name: BHARAT QUICK MRN: [id number] : 1930 Study Date: 2023 09:01 AM Age: 89 yrs Gender: Female Patient Location: [facility] Reason For Study: Heart Failure - Left Ordering Physician: JOSÉ ANTONIO ZAPIEN Performed By: CRUZITO  BSA: 1.7 m2 Height: 62 in Weight: 146 lb HR: 74 BP: 129/66 mmHg ______________________________________________________________________________ Procedure Limited Portable Echo Adult. Definity (NDC #17888-174) given  intravenously. ______________________________________________________________________________ Interpretation Summary  There is mild concentric left ventricular hypertrophy. Biplane LVEF is 25%. Septal motion is consistent with conduction abnormality. Mildly decreased right ventricular systolic function No significant valve disease. Compared to the prior study dated 11/30/2023, there have been no changes. ______________________________________________________________________________ Left Ventricle The left ventricle is normal in size. There is mild concentric left ventricular hypertrophy. Biplane LVEF is 25%. Septal motion is consistent with conduction abnormality. There is mod-severe global hypokinesia of the left ventricle.  Right Ventricle The right ventricle is normal size. TAPSE is abnormal, which is consistent with abnormal right ventricular systolic function. Mildly decreased right ventricular systolic function.  Atria The left atrium is moderately dilated. The right atrium is mildly dilated.  Mitral Valve The mitral valve leaflets are mildly thickened. There is mild to moderate (1- 2+) mitral regurgitation.  Tricuspid Valve Tricuspid valve leaflets appear normal. There is mild (1+) tricuspid regurgitation. The right ventricular systolic pressure is approximated at 30mmHg plus the right atrial pressure.  Aortic Valve There is trivial trileaflet aortic sclerosis. No aortic regurgitation is present.  Vessels The aorta root is normal. Normal size ascending aorta.  Pericardium There is no pericardial effusion.  ______________________________________________________________________________ MMode/2D Measurements & Calculations IVSd: 1.1 cm LVIDd: 5.5 cm LVIDs: 4.7 cm LVPWd: 1.2 cm FS: 13.8 % LV mass(C)d: 247.2 grams LV mass(C)dI: 147.7 grams/m2 Ao root diam: 3.1 cm asc Aorta Diam: 3.6 cm  LVOT diam: 2.0 cm LVOT area: 3.1 cm2 Ao root diam index Ht(cm/m): 2.0 Ao root diam index BSA (cm/m2): 1.9 Asc Ao diam index BSA  (cm/m2): 2.2 Asc Ao diam index Ht(cm/m): 2.3 RWT: 0.44 TAPSE: 1.5 cm  Time Measurements MM HR: 71.0 BPM  Doppler Measurements & Calculations TR max jimbo: 284.0 cm/sec TR max P.3 mmHg RV S Jimbo: 4.8 cm/sec  ______________________________________________________________________________ Report approved by: Tl Obregon 2023 09:55 AM       XR Chest Port 1 View    Result Date: 2023  EXAM: XR CHEST PORT 1 VIEW LOCATION: [facility] DATE: 2023 INDICATION: sob COMPARISON: 2023     IMPRESSION: Small pleural effusions are again seen. Cannot exclude basilar airspace disease. No pneumothorax. The cardiomediastinal silhouette is enlarged.     Echocardiogram Limited    Result Date: 2023  [id number]^TIM^JAN^[place] [address]  Name: BHARAT QUICK MRN: [id number] : 1930 Study Date: 2023 10:08 AM Age: 89 yrs Gender: Female Patient Location: [facility] Reason For Study: Abn EKG Ordering Physician: JAN DUMONT Performed By: PILO  BSA: 1.7 m2 Height: 62 in Weight: 153 lb HR: 89 BP: 157/72 mmHg ______________________________________________________________________________ Procedure Limited Portable Echo Adult. Definity (NDC #27203-731) given intravenously. Adequate quality two-dimensional was performed and interpreted. Adequate quality color and spectral Doppler were performed and interpreted. Compared to the prior study dated 2023, there are changes as noted. ______________________________________________________________________________ Interpretation Summary  1. Left ventricular chamber size is mildly enlarged. Wall thickness is normal. Systolic function is severely reduced with global hypokinesis and intraventricular dyssynchrony due to left bundle branch block. The visually estimated left ventricular ejection fraction is 20-25%. 2. Right ventricular chamber size and systolic  function are normal. 3. Moderate left atrial enlargement. Mild right atrial enlargement. 4. No hemodynamically significant valvular abnormalities. 5. Compared to the prior study dated 8/17/2023, the patient is now in atrial fibrillation otherwise the findings are similar. ______________________________________________________________________________ I      WMSI = 2.00     % Normal = 0  X - Cannot   0 -                      (2) - Mildly 2 -          Segments  Size Interpret    Hyperkinetic 1 - Normal  Hypokinetic  Hypokinetic  1-2     small                                                    7 -          3-5    moderate 3 - Akinetic 4 -          5 -         6 - Akinetic Dyskinetic   6-14    large              Dyskinetic   Aneurysmal  w/scar       w/scar       15-16   diffuse  Left Ventricle The left ventricle is mildly dilated. There is normal left ventricular wall thickness. The visual ejection fraction is 20-25%. There is severe global hypokinesia of the left ventricle. Septal motion is consistent with conduction abnormality. There is no thrombus seen in the left ventricle.  Right Ventricle Normal right ventricle size and systolic function.  Atria The left atrium is moderately dilated. The right atrium is mildly dilated.  Mitral Valve There is mild mitral annular calcification. The mitral valve leaflets are mildly thickened. There is trace to mild mitral regurgitation. There is no mitral valve stenosis.  Tricuspid Valve Tricuspid valve leaflets appear normal. There is mild (1+) tricuspid regurgitation. There is no tricuspid stenosis.  Aortic Valve The aortic valve is trileaflet with aortic valve sclerosis. No aortic regurgitation is present. No aortic stenosis is present.  Pulmonic Valve The pulmonic valve is not well visualized.  Vessels The aorta root is normal. IVC diameter <2.1 cm collapsing >50% with sniff suggests a normal RA pressure of 3 mmHg.  Pericardium There is no pericardial effusion.  Rhythm The rhythm  was atrial fibrillation.  ______________________________________________________________________________ MMode/2D Measurements & Calculations IVSd: 0.90 cm LVIDd: 5.6 cm LVIDs: 5.4 cm LVPWd: 0.90 cm FS: 3.6 %  LV mass(C)d: 191.6 grams LV mass(C)dI: 112.3 grams/m2 Ao root diam: 2.7 cm Ao root diam index Ht(cm/m): 1.7 Ao root diam index BSA (cm/m2): 1.6 LA Volume Index (BP): 44.0 ml/m2 RWT: 0.32  ______________________________________________________________________________ Report approved by: Tl Pandey 11/30/2023 11:52 AM       Radiology report reviewed.    Medical Decision Making       80 MINUTES SPENT BY ME on the date of service doing chart review, history, exam, documentation & further activities per the note.      Maximo Oliva MD  [facility] Medicine Service

## 2023-12-29 NOTE — ED TRIAGE NOTES
Patient was admitted before Dafter for cardioversion.  Is taking amiodarone and lisinopril and lasix which dose has been increased at discharge. Patient is feeling fine--had a nurse visit today for vitals check and her BP was 100/41.  Sent in by the nurse for evaluation of low BP.

## 2023-12-29 NOTE — ED PROVIDER NOTES
EMERGENCY DEPARTMENT ENCOUNTER      NAME: Gladys Ramirez  AGE: 89 year old female  YOB: 1930  MRN: [id number]  EVALUATION DATE & TIME: 2023 11:39 AM    PCP: Margret Flores    ED PROVIDER: Corey Law D.O.      Chief Complaint   Patient presents with    Hypotension       FINAL IMPRESSION:  1. Bradycardia        ED COURSE & MEDICAL DECISION MAKIN:54 AM I met with the patient to gather history and to perform my initial exam. I discussed the plan for care while in the Emergency Department.  1:36 PM I spoke with Dr. Oliva, hospitalist, who agrees with admission.  1:39 PM I spoke with the patient who agrees with admission.         Pertinent Labs & Imaging studies reviewed. (See chart for details)  89 year old female presents to the Emergency Department for evaluation of severe bradycardia.  Patient has known history of atrial fibrillation with recent cardioversion that was successful at the time although she does appear to be back in atrial fibrillation again today.  She does appear that she is intermittently going in and out though and has been occasionally in sinus rhythm as well.  Unfortunately she has been severely bradycardic down here ranging anywhere from 35 to 45 bpm.  Appears to be minimally symptomatic, however with her rate I am concerned that she may be overmedicated on her amiodarone.  She is not show any signs of cardiac injury, no evidence of ischemia on her EKG, and lab testing other than showing some evidence of possible hypothyroidism is largely unremarkable.  There is no evidence of infectious process.  I do not believe that her bradycardia necessarily requires a pacemaker at this time, though will defer to cardiology for final decision.  Do believe that she would benefit from adjustment of her medications inpatient to ensure that her heart rate improves, without causing significant complications.  Therefore I discussed with the hospitalist who agreed to the  admission.    Medical Decision Making    History:  Supplemental history from: Documented in chart, if applicable  External Record(s) reviewed: Documented in chart, if applicable. and Inpatient Record: 12/20/2023-12/24/2023 [facility] Admission    Work Up:  Chart documentation includes differential considered and any EKGs or imaging independently interpreted by provider, where specified.  In additional to work up documented, I considered the following work up: Documented in chart, if applicable.    External consultation:  Discussion of management with another provider: Documented in chart, if applicable    Complicating factors:  Care impacted by chronic illness: Hyperlipidemia, Hypertension, and Other: Persistent atrial fibrillation  Care affected by social determinants of health: N/A    Disposition considerations: Admit.        At the conclusion of the encounter I discussed the results of all of the tests and the disposition. The questions were answered. The patient or family acknowledged understanding and was agreeable with the care plan.        HPI    Patient information was obtained from: the patient    Use of : N/A        Gladys Ramirez is a 89 year old female who presents to this emergency department for evaluation of bradycardia.     The patient states that she was started on OxyContin and oxycodone to take once at night and twice during the day, respectively, for right knee pain. Other than having to use the bathroom every two hours on the night of 12/28, she does not report any other symptoms. Additionally, she says that her blood pressure was 100/41 earlier today, 12/29. She was discharged from the hospital on 12/24, noting that her blood pressure was low while in the hospital as well. Additionally, the patient had a cardioversion while at the hospital and is currently taking Amiodarone and Lisinopril.     Per Chart Review: the patient was seen on 12/20/2023 at [facility] for evaluation of shortness of breath. She was hypoxic with an oxygen saturation of 90% on arrival and placed on 2 liters of oxygen. She was given Lasix and admitted to cardiac telemetry inpatient. Her chest  x-ray showed cardiomegaly with trace bilateral pleural effusion. Her BNP was significantly elevated. Her HS troponin was slightly elevated. The patient's symptoms improved with diuresis and cardioversion. She was continued on 40 mg Lasix PO BID upon discharge as well as metoprolol 25 mg BID and lisinopril 2.5 mg daily. The patient was discharged on 12/24.       REVIEW OF SYSTEMS  Constitutional:  Denies fever, chills, weight loss or weakness. Positive for hypotension.   Eyes:  No pain, discharge, redness  HENT:  Denies sore throat, ear pain, congestion  Respiratory: No SOB, wheeze or cough  Cardiovascular:  No CP, palpitations  GI:  Denies abdominal pain, nausea, vomiting, diarrhea  : Denies dysuria, hematuria  Musculoskeletal:  Denies any new swelling or loss of function. Positive for right knee pain.   Skin:  Denies rash, pallor  Neurologic:  Denies headache, focal weakness or sensory changes  Lymph: Denies swollen nodes    All other systems negative unless noted in HPI.    PAST MEDICAL HISTORY:  Past Medical History:   Diagnosis Date    Angina pectoris (H24)     Atrial fibrillation (H)     Chest pain 03/09/2017    Chronic kidney disease     Congestive heart failure (H)     Cough     Hyperlipidemia     Hypertension     Osteoarthritis        PAST SURGICAL HISTORY:  Past Surgical History:   Procedure Laterality Date    APPENDECTOMY      BASAL CELL CARCINOMA EXCISION      nose    LAPAROSCOPY DIAGNOSTIC (GENERAL) N/A 11/04/2014    Procedure: LAPAROSCOPY BILATERAL SALPINGO-OOPHORECTOMY ;  Surgeon: Sofia Harper MD;  Location: [facility];  Service:     OPEN REDUCTION INTERNAL FIXATION HIP BIPOLAR Right 3/5/2023    Procedure: HEMIARTHROPLASTY, HIP, BIPOLAR;  Surgeon: Jose G Martinez  MD;  Location: [facility] OR    TONSILLECTOMY      10 years old    ZC TOTAL KNEE ARTHROPLASTY Left     2011         CURRENT MEDICATIONS:    Current Facility-Administered Medications   Medication    acetaminophen (TYLENOL) tablet 650 mg    [Held by provider] amiodarone (PACERONE) tablet 100 mg    apixaban ANTICOAGULANT (ELIQUIS) tablet 5 mg    calcium carbonate (TUMS) chewable tablet 1,000 mg    [START ON 12/30/2023] DULoxetine (CYMBALTA) DR capsule 60 mg    furosemide (LASIX) tablet 40 mg    lidocaine (LMX4) cream    lidocaine 1 % 0.1-1 mL    [START ON 12/30/2023] lisinopril (ZESTRIL) tablet 2.5 mg    [Held by provider] metoprolol tartrate (LOPRESSOR) tablet 25 mg    ondansetron (ZOFRAN ODT) ODT tab 4 mg    Or    ondansetron (ZOFRAN) injection 4 mg    oxyCODONE (ROXICODONE) tablet 5 mg    Patient is already receiving anticoagulation with heparin, enoxaparin (LOVENOX), warfarin (COUMADIN)  or other anticoagulant medication    [START ON 12/30/2023] potassium chloride ER (KLOR-CON M) CR tablet 20 mEq    senna-docusate (SENOKOT-S/PERICOLACE) 8.6-50 MG per tablet 1 tablet    Or    senna-docusate (SENOKOT-S/PERICOLACE) 8.6-50 MG per tablet 2 tablet    sodium chloride (PF) 0.9% PF flush 3 mL    sodium chloride (PF) 0.9% PF flush 3 mL    [START ON 12/30/2023] Vitamin D3 (CHOLECALCIFEROL) tablet 50 mcg    zolpidem (AMBIEN) tablet 5 mg     Current Outpatient Medications   Medication    acetaminophen (TYLENOL) 325 MG tablet    amiodarone (PACERONE) 200 MG tablet    apixaban ANTICOAGULANT (ELIQUIS) 5 MG tablet    cholecalciferol, vitamin D3, (VITAMIN D3) 2,000 unit Tab    COMPOUNDED NON-CONTROLLED SUBSTANCE (CMPD RX) - PHARMACY TO MIX COMPOUNDED MEDICATION    DULoxetine (CYMBALTA) 60 MG capsule    furosemide (LASIX) 40 MG tablet    KLOR-CON 20 MEQ CR tablet    lisinopril (ZESTRIL) 2.5 MG tablet    metoprolol tartrate (LOPRESSOR) 25 MG tablet    oxyCODONE (ROXICODONE) 5 MG tablet    zolpidem ER (AMBIEN CR) 6.25 MG CR tablet  "        ALLERGIES:  Allergies   Allergen Reactions    Trazodone Shortness Of Breath and Unknown     Allergic to trazodone and deriv., Other: trouble swallowing      Clindamycin Diarrhea     C-diff    Gabapentin Other (See Comments)     \"Internal tremors\"    Temazepam Other (See Comments)     Annotation: Nightmares         FAMILY HISTORY:  Family History   Problem Relation Age of Onset    Heart Disease Mother     Rheumatic Heart Disease Mother     No Known Problems Father     Cancer Brother         brain    Lung Cancer Brother     Lung Cancer Brother     Cancer Sister         lung    Lung Cancer Sister        SOCIAL HISTORY:  Social History     Socioeconomic History    Marital status: Single   Tobacco Use    Smoking status: Never     Passive exposure: Never    Smokeless tobacco: Never    Tobacco comments:     no passive exposure   Vaping Use    Vaping Use: Never used   Substance and Sexual Activity    Alcohol use: Yes     Comment: Alcoholic Drinks/day: Rarely a glass of wine    Drug use: No   Social History Narrative    The patient is a nun.     Social Determinants of Health     Financial Resource Strain: Low Risk  (12/6/2023)    Financial Resource Strain     Within the past 12 months, have you or your family members you live with been unable to get utilities (heat, electricity) when it was really needed?: No   Food Insecurity: Low Risk  (12/6/2023)    Food Insecurity     Within the past 12 months, did you worry that your food would run out before you got money to buy more?: No     Within the past 12 months, did the food you bought just not last and you didn t have money to get more?: No   Transportation Needs: Low Risk  (12/6/2023)    Transportation Needs     Within the past 12 months, has lack of transportation kept you from medical appointments, getting your medicines, non-medical meetings or appointments, work, or from getting things that you need?: No   Physical Activity: Insufficiently Active (3/27/2023)    " "Exercise Vital Sign     Days of Exercise per Week: 3 days     Minutes of Exercise per Session: 10 min   Stress: No Stress Concern Present (3/27/2023)    [place] [employer] - Occupational Stress Questionnaire     Feeling of Stress : Not at all   Social Connections: Moderately Integrated (3/27/2023)    Social Connection and Isolation Panel [NHANES]     Frequency of Communication with Friends and Family: More than three times a week     Frequency of Social Gatherings with Friends and Family: More than three times a week     Attends [employer] Services: More than 4 times per year     Active Member of Clubs or Organizations: Yes     Attends Club or Organization Meetings: More than 4 times per year     Marital Status: Never    Interpersonal Safety: Low Risk  (10/5/2023)    Interpersonal Safety     Do you feel physically and emotionally safe where you currently live?: Yes     Within the past 12 months, have you been hit, slapped, kicked or otherwise physically hurt by someone?: No     Within the past 12 months, have you been humiliated or emotionally abused in other ways by your partner or ex-partner?: No   Housing Stability: Low Risk  (12/6/2023)    Housing Stability     Do you have housing? : Yes     Are you worried about losing your housing?: No       VITALS:  Patient Vitals for the past 24 hrs:   BP Temp Temp src Pulse Resp SpO2 Height Weight   12/29/23 1430 116/59 -- -- (!) 46 17 94 % -- --   12/29/23 1415 115/58 -- -- (!) 44 12 95 % -- --   12/29/23 1401 125/60 -- -- (!) 45 15 94 % -- --   12/29/23 1345 127/57 -- -- (!) 44 14 96 % -- --   12/29/23 1330 (!) 143/70 -- -- (!) 42 14 -- -- --   12/29/23 1315 121/64 -- -- (!) 45 22 96 % -- --   12/29/23 1300 116/57 -- -- (!) 42 20 94 % -- --   12/29/23 1230 125/58 -- -- (!) 39 23 99 % -- --   12/29/23 1200 119/58 -- -- (!) 39 13 100 % -- --   12/29/23 1131 (!) 141/63 97.8  F (36.6  C) Oral (!) 45 18 98 % 1.575 m (5' 2\") 66.2 kg (146 lb) " "      PHYSICAL EXAM    VITAL SIGNS: /59   Pulse (!) 46   Temp 97.8  F (36.6  C) (Oral)   Resp 17   Ht 1.575 m (5' 2\")   Wt 66.2 kg (146 lb)   SpO2 94%   BMI 26.70 kg/m      General Appearance: Well-appearing, well-nourished, no acute distress.  Head:  Normocephalic, without obvious abnormality, atraumatic  Eyes:  PERRL, conjunctiva/corneas clear, EOM's intact,  ENT:  Lips, mucosa, and tongue normal, membranes are moist without pallor  Neck:  Normal ROM, symmetrical, trachea midline    Chest:  No tenderness or deformity, no crepitus  Cardio:  No murmur, rub or gallop, 2+ pulses symmetric in all extremities. Bradycardic. Irregular rate and rhythm.   Pulm:  Clear to auscultation bilaterally, respirations unlabored,  Back:  ROM normal, no CVA tenderness, no spinal tenderness, no paraspinal tenderness  Abdomen:  Soft, non-tender, no rebound or guarding.  Musculoskeletal: Full ROM, no edema, no cyanosis, good ROM of major joints  Integument:  Warm, Dry, No erythema, No rash.    Neurologic:  Alert & oriented.  No focal deficits appreciated.  Ambulatory.  Psychiatric:  Affect normal, Judgment normal, Mood normal.      LABS  Results for orders placed or performed during the hospital encounter of 12/29/23 (from the past 24 hour(s))   CBC with platelets + differential    Narrative    The following orders were created for panel order CBC with platelets + differential.  Procedure                               Abnormality         Status                     ---------                               -----------         ------                     CBC with platelets and d...[[id number]]  Abnormal            Final result                 Please view results for these tests on the individual orders.   Basic metabolic panel   Result Value Ref Range    Sodium 142 135 - 145 mmol/L    Potassium 3.9 3.4 - 5.3 mmol/L    Chloride 102 98 - 107 mmol/L    Carbon Dioxide (CO2) 30 (H) 22 - 29 mmol/L    Anion Gap 10 7 - 15 mmol/L    Urea " Nitrogen 34.3 (H) 8.0 - 23.0 mg/dL    Creatinine 1.41 (H) 0.51 - 0.95 mg/dL    GFR Estimate 35 (L) >60 mL/min/1.73m2    Calcium 9.8 (H) 8.2 - 9.6 mg/dL    Glucose 112 (H) 70 - 99 mg/dL   Troponin T, High Sensitivity (now)   Result Value Ref Range    Troponin T, High Sensitivity 39 (H) <=14 ng/L   TSH with free T4 reflex   Result Value Ref Range    TSH 6.25 (H) 0.30 - 4.20 uIU/mL   CBC with platelets and differential   Result Value Ref Range    WBC Count 7.5 4.0 - 11.0 10e3/uL    RBC Count 4.39 3.80 - 5.20 10e6/uL    Hemoglobin 12.9 11.7 - 15.7 g/dL    Hematocrit 41.7 35.0 - 47.0 %    MCV 95 78 - 100 fL    MCH 29.4 26.5 - 33.0 pg    MCHC 30.9 (L) 31.5 - 36.5 g/dL    RDW 14.6 10.0 - 15.0 %    Platelet Count 256 150 - 450 10e3/uL    % Neutrophils 66 %    % Lymphocytes 18 %    % Monocytes 10 %    % Eosinophils 5 %    % Basophils 1 %    % Immature Granulocytes 0 %    NRBCs per 100 WBC 0 <1 /100    Absolute Neutrophils 4.9 1.6 - 8.3 10e3/uL    Absolute Lymphocytes 1.4 0.8 - 5.3 10e3/uL    Absolute Monocytes 0.8 0.0 - 1.3 10e3/uL    Absolute Eosinophils 0.4 0.0 - 0.7 10e3/uL    Absolute Basophils 0.1 0.0 - 0.2 10e3/uL    Absolute Immature Granulocytes 0.0 <=0.4 10e3/uL    Absolute NRBCs 0.0 10e3/uL   T4 free   Result Value Ref Range    Free T4 1.03 0.90 - 1.70 ng/dL   UA with Microscopic reflex to Culture    Specimen: Urine, Clean Catch   Result Value Ref Range    Color Urine Light Yellow Colorless, Straw, Light Yellow, Yellow    Appearance Urine Clear Clear    Glucose Urine Negative Negative mg/dL    Bilirubin Urine Negative Negative    Ketones Urine Negative Negative mg/dL    Specific Gravity Urine 1.009 1.001 - 1.030    Blood Urine Negative Negative    pH Urine 6.5 5.0 - 7.0    Protein Albumin Urine Negative Negative mg/dL    Urobilinogen Urine <2.0 <2.0 mg/dL    Nitrite Urine Negative Negative    Leukocyte Esterase Urine 500 [place]ar/uL (A) Negative    Bacteria Urine Few (A) None Seen /HPF    Mucus Urine Present (A) None  Seen /LPF    RBC Urine 0 <=2 /HPF    WBC Urine 67 (H) <=5 /HPF    Squamous Epithelials Urine 1 <=1 /HPF    Hyaline Casts Urine 9 (H) <=2 /LPF    Narrative    Urine Culture ordered based on laboratory criteria         RADIOLOGY  No orders to display          EKG:    Rate: 49 bpm  Rhythm: atrial fibrillation  Axis: Normal  Interval: Normal  Conduction: Normal  QRS: Normal  ST: Normal  T-wave: Normal  QT: Not prolonged  Comparison EKG: Was in sinus rhythm on 22 Dec 2023, otherwise no significant change  Impression:  No acute ischemic change   I have independently reviewed and interpreted today's EKG, pending Cardiologist read            MEDICATIONS GIVEN IN THE EMERGENCY:  Medications   acetaminophen (TYLENOL) tablet 650 mg (has no administration in time range)   amiodarone (PACERONE) tablet 100 mg ( Oral Automatically Held 1/1/24 0800)   apixaban ANTICOAGULANT (ELIQUIS) tablet 5 mg (has no administration in time range)   Vitamin D3 (CHOLECALCIFEROL) tablet 50 mcg (has no administration in time range)   DULoxetine (CYMBALTA) DR capsule 60 mg (has no administration in time range)   furosemide (LASIX) tablet 40 mg (has no administration in time range)   potassium chloride ER (KLOR-CON M) CR tablet 20 mEq (has no administration in time range)   lisinopril (ZESTRIL) tablet 2.5 mg (has no administration in time range)   metoprolol tartrate (LOPRESSOR) tablet 25 mg ( Oral Automatically Held 1/1/24 2000)   oxyCODONE (ROXICODONE) tablet 5 mg (has no administration in time range)   zolpidem (AMBIEN) tablet 5 mg (has no administration in time range)   lidocaine 1 % 0.1-1 mL (has no administration in time range)   lidocaine (LMX4) cream (has no administration in time range)   sodium chloride (PF) 0.9% PF flush 3 mL (3 mLs Intracatheter $Given 12/29/23 1451)   sodium chloride (PF) 0.9% PF flush 3 mL (3 mLs Intracatheter $Given 12/29/23 1451)   senna-docusate (SENOKOT-S/PERICOLACE) 8.6-50 MG per tablet 1 tablet (has no  administration in time range)     Or   senna-docusate (SENOKOT-S/PERICOLACE) 8.6-50 MG per tablet 2 tablet (has no administration in time range)   calcium carbonate (TUMS) chewable tablet 1,000 mg (has no administration in time range)   Patient is already receiving anticoagulation with heparin, enoxaparin (LOVENOX), warfarin (COUMADIN)  or other anticoagulant medication (has no administration in time range)   ondansetron (ZOFRAN ODT) ODT tab 4 mg (has no administration in time range)     Or   ondansetron (ZOFRAN) injection 4 mg (has no administration in time range)       NEW PRESCRIPTIONS STARTED AT TODAY'S ER VISIT  New Prescriptions    No medications on file        I, Kari Ferreira, am serving as a scribe to document services personally performed by Corey Law D.O., based on my observations and the provider's statements to me.  I, Corey Law D.O., attest that Kari Ferreira is acting in a scribe capacity, has observed my performance of the services and has documented them in accordance with my direction.     Corey Law D.O.  Emergency Medicine  [facility] EMERGENCY DEPARTMENT  [address] [place] [id number] [phone]  Dept: [phone]       Corey Law,   12/29/23 2132

## 2023-12-30 VITALS
OXYGEN SATURATION: 98 % | HEIGHT: 62 IN | HEART RATE: 51 BPM | BODY MASS INDEX: 26.87 KG/M2 | WEIGHT: 146 LBS | SYSTOLIC BLOOD PRESSURE: 149 MMHG | DIASTOLIC BLOOD PRESSURE: 94 MMHG | TEMPERATURE: 97.7 F | RESPIRATION RATE: 18 BRPM

## 2023-12-30 LAB
ANION GAP SERPL CALCULATED.3IONS-SCNC: 10 MMOL/L (ref 7–15)
BUN SERPL-MCNC: 34.1 MG/DL (ref 8–23)
CALCIUM SERPL-MCNC: 9.8 MG/DL (ref 8.2–9.6)
CHLORIDE SERPL-SCNC: 103 MMOL/L (ref 98–107)
CREAT SERPL-MCNC: 1.26 MG/DL (ref 0.51–0.95)
DEPRECATED HCO3 PLAS-SCNC: 28 MMOL/L (ref 22–29)
EGFRCR SERPLBLD CKD-EPI 2021: 40 ML/MIN/1.73M2
GLUCOSE SERPL-MCNC: 106 MG/DL (ref 70–99)
HOLD SPECIMEN: NORMAL
POTASSIUM SERPL-SCNC: 3.6 MMOL/L (ref 3.4–5.3)
SODIUM SERPL-SCNC: 141 MMOL/L (ref 135–145)

## 2023-12-30 PROCEDURE — G0378 HOSPITAL OBSERVATION PER HR: HCPCS

## 2023-12-30 PROCEDURE — 250N000013 HC RX MED GY IP 250 OP 250 PS 637: Performed by: EMERGENCY MEDICINE

## 2023-12-30 PROCEDURE — 80048 BASIC METABOLIC PNL TOTAL CA: CPT | Performed by: EMERGENCY MEDICINE

## 2023-12-30 PROCEDURE — 36415 COLL VENOUS BLD VENIPUNCTURE: CPT | Performed by: EMERGENCY MEDICINE

## 2023-12-30 PROCEDURE — 99239 HOSP IP/OBS DSCHRG MGMT >30: CPT | Performed by: INTERNAL MEDICINE

## 2023-12-30 PROCEDURE — 99232 SBSQ HOSP IP/OBS MODERATE 35: CPT | Performed by: INTERNAL MEDICINE

## 2023-12-30 PROCEDURE — 250N000013 HC RX MED GY IP 250 OP 250 PS 637: Performed by: INTERNAL MEDICINE

## 2023-12-30 RX ORDER — FUROSEMIDE 20 MG
20 TABLET ORAL
Start: 2023-12-30 | End: 2023-12-30

## 2023-12-30 RX ORDER — FUROSEMIDE 40 MG
40 TABLET ORAL EVERY MORNING
Start: 2023-12-30 | End: 2024-01-31

## 2023-12-30 RX ORDER — CEPHALEXIN 500 MG/1
500 CAPSULE ORAL 2 TIMES DAILY
Qty: 10 CAPSULE | Refills: 0 | Status: SHIPPED | OUTPATIENT
Start: 2023-12-30 | End: 2024-01-04

## 2023-12-30 RX ORDER — FUROSEMIDE 20 MG
20 TABLET ORAL
Qty: 30 TABLET | Refills: 3 | Status: SHIPPED | OUTPATIENT
Start: 2023-12-30 | End: 2024-04-05

## 2023-12-30 RX ORDER — ACETAMINOPHEN 325 MG/1
650 TABLET ORAL EVERY 4 HOURS PRN
COMMUNITY
Start: 2023-12-30 | End: 2024-01-11 | Stop reason: DRUGHIGH

## 2023-12-30 RX ADMIN — APIXABAN 5 MG: 5 TABLET, FILM COATED ORAL at 08:27

## 2023-12-30 RX ADMIN — ACETAMINOPHEN 650 MG: 325 TABLET ORAL at 04:21

## 2023-12-30 RX ADMIN — AMIODARONE HYDROCHLORIDE 100 MG: 100 TABLET ORAL at 08:27

## 2023-12-30 RX ADMIN — Medication 50 MCG: at 08:27

## 2023-12-30 RX ADMIN — DULOXETINE HYDROCHLORIDE 60 MG: 60 CAPSULE, DELAYED RELEASE PELLETS ORAL at 08:27

## 2023-12-30 RX ADMIN — FUROSEMIDE 40 MG: 20 TABLET ORAL at 08:27

## 2023-12-30 RX ADMIN — LISINOPRIL 2.5 MG: 2.5 TABLET ORAL at 08:27

## 2023-12-30 RX ADMIN — POTASSIUM CHLORIDE 20 MEQ: 1500 TABLET, EXTENDED RELEASE ORAL at 08:27

## 2023-12-30 ASSESSMENT — ACTIVITIES OF DAILY LIVING (ADL)
ADLS_ACUITY_SCORE: 44
ADLS_ACUITY_SCORE: 43
ADLS_ACUITY_SCORE: 44
ADLS_ACUITY_SCORE: 43
ADLS_ACUITY_SCORE: 43

## 2023-12-30 NOTE — PLAN OF CARE
PRIMARY DIAGNOSIS: GENERALIZED WEAKNESS    OUTPATIENT/OBSERVATION GOALS TO BE MET BEFORE DISCHARGE  1. Orthostatic performed: N/A    2. Tolerating PO medications: Yes    3. Return to near baseline physical activity: Yes    4. Cleared for discharge by consultants (if involved): No    Discharge Planner Nurse   Safe discharge environment identified: Yes  Barriers to discharge: Yes       Entered by: April Castano RN 12/29/2023 10:33 PM     Please review provider order for any additional goals.   Nurse to notify provider when observation goals have been met and patient is ready for discharge.Goal Outcome Evaluation:       Heart rate donna in the 40's and 50's, occasionally in the mid 30's. Patient is asymptomatic, up to the bath room with standby assistance.

## 2023-12-30 NOTE — PROGRESS NOTES
"[facility]   [address]  [place] [address]   [email or web address]   OFFICE: [phone]     CARDIOLOGY FOLLOW-UP NOTE      Impression and Plan     Impression:   Bradycardia - sinus bradycardia, likely related to b-blocker. Asymptomatic. Metoprolol has been discontinued  Persistent atrial fibrillation - on amiodarone for rhythm control and is s/p recent cardioversion.  On eliquis for stroke prevention.  Chronic heart failure with reduced LVEF - currently compensated.  Presumed non-ischemic cardiomyopathy - with LVEF 25%. Not on high doses of GDMT due to limitations of blood pressure and age.   Chronic LBBB - with her recent bradycardia, wide QRS duration, and low LVEF she would benefit from CRT pacing. I again discussed and recommended this for her but she continues to decline.  CKD stage IIIb, stable    Plan:  Okay for discharge from cardiac standpoint  Adjust home furosemide to 40 mg in the AM and 20 mg in the afternoon  She is declining pacemaker   Already has follow-up scheduled on 1/11/24    Primary Cardiologist: Dr. Estrada Holley MD    Subjective     Feeling physically well, but frustrated that the left brake on her 4ww broke.     Cardiac Diagnostics   Telemetry (personally reviewed): sinus bradycardia with HR around 50 bpm.    Echocardiogram (results reviewed):   Limited TTE 12/21/23  There is mild concentric left ventricular hypertrophy.  Biplane LVEF is 25%.  Septal motion is consistent with conduction abnormality.  Mildly decreased right ventricular systolic function  No significant valve disease.  Compared to the prior study dated 11/30/2023, there have been no changes.      Physical Examination       BP (!) 155/70 (BP Location: Left arm)   Pulse (!) 16   Temp 97.8  F (36.6  C) (Oral)   Resp 18   Ht 1.575 m (5' 2\")   Wt 66.2 kg (146 lb)   SpO2 97%   BMI 26.70 kg/m            Intake/Output Summary (Last 24 hours) at 12/30/2023 1109  Last data " filed at 12/30/2023 0904  Gross per 24 hour   Intake 380 ml   Output --   Net 380 ml       General: pleasant female. No acute distress.   HENT: external ears normal. Nares patent. Mucous membranes moist.  Eyes: perrla, extraocular muscles intact. No scleral icterus.   Neck: No JVD  Lungs: clear to auscultation  COR: slow rate, regular rhythm, No murmurs, rubs, or gallops  Abd: nondistended, BS present  Extrem: No edema         Imaging       No new non-cardiac imaging.    Lab Results   Lab Results   Component Value Date    CHOL 179 03/12/2015    HDL 64 03/12/2015    TRIG 176 (H) 03/12/2015    BUN 34.1 (H) 12/30/2023     12/30/2023    CO2 28 12/30/2023       Lab Results   Component Value Date    WBC 7.5 12/29/2023    HGB 12.9 12/29/2023    HCT 41.7 12/29/2023    MCV 95 12/29/2023     12/29/2023       Lab Results   Component Value Date    TROPONINI 0.16 08/23/2022    BNP 1,933 (H) 08/23/2022    TSH 6.25 (H) 12/29/2023    INR 1.33 (H) 03/04/2023               Current Inpatient Scheduled Medications   Scheduled Meds:    amiodarone  100 mg Oral Daily     apixaban ANTICOAGULANT  5 mg Oral BID     DULoxetine  60 mg Oral Daily     furosemide  20 mg Oral Daily     furosemide  40 mg Oral Daily     lisinopril  2.5 mg Oral Daily     potassium chloride ER  20 mEq Oral Daily     sodium chloride (PF)  3 mL Intracatheter Q8H     Vitamin D3  50 mcg Oral Daily     Continuous Infusions:    - MEDICATION INSTRUCTIONS -              Medications Prior to Admission   Prior to Admission medications    Medication Sig Start Date End Date Taking? Authorizing Provider   acetaminophen (TYLENOL) 325 MG tablet Take 2 tablets (650 mg) by mouth every 4 hours as needed for other (mild pain)  Patient taking differently: Take 650 mg by mouth every 4 hours as needed for mild pain 3/5/23  Yes Naina Guzmán PA-C   amiodarone (PACERONE) 200 MG tablet Take 0.5 tablets (100 mg) by mouth daily 12/7/22  Yes Estrada Holley MD   apixaban  ANTICOAGULANT (ELIQUIS) 5 MG tablet Take 1 tablet (5 mg) by mouth 2 times daily 12/2/23  Yes Felix Christianson MD   cholecalciferol, vitamin D3, (VITAMIN D3) 2,000 unit Tab [CHOLECALCIFEROL, VITAMIN D3, (VITAMIN D3) 2,000 UNIT TAB] Take 1 tablet (2,000 Units total) by mouth daily. 7/29/19  Yes Lore Martin MD   COMPOUNDED NON-CONTROLLED SUBSTANCE (CMPD RX) - PHARMACY TO MIX COMPOUNDED MEDICATION Ketamine 8% and Ketoprofen 5 % in carrier gel/lotion. Apply 2-4 pumps to affected areas QID PRN 11/24/23  Yes Valentine Howard APRN CNP   DULoxetine (CYMBALTA) 60 MG capsule Take 60 mg by mouth daily   Yes Unknown, Entered By History   furosemide (LASIX) 40 MG tablet Take 1 tablet (40 mg) by mouth 2 times daily 12/24/23  Yes Ricco Avalos MD   KLOR-CON 20 MEQ CR tablet TAKE ONE TABLET BY MOUTH ONE TIME DAILY  Patient taking differently: 20 mEq daily 4/13/23  Yes Estrada Holley MD   lisinopril (ZESTRIL) 2.5 MG tablet TAKE ONE TABLET BY MOUTH ONE TIME DAILY 10/27/23  Yes Estrada Holley MD   metoprolol tartrate (LOPRESSOR) 25 MG tablet Take 1 tablet (25 mg) by mouth 2 times daily 12/2/23  Yes Felix Christianson MD   oxyCODONE (ROXICODONE) 5 MG tablet Take 1 tablet (5 mg) by mouth 3 times daily as needed for moderate to severe pain #70 tabs to last 30 days for chronic pain. Ok to fill 11/28/23 to begin using 11/30/23 11/24/23  Yes Valentine Howard APRN CNP   zolpidem ER (AMBIEN CR) 6.25 MG CR tablet Take 1 and 1/4 tablet by mouth at bedtime 11/13/23  Yes Margret Flores MD

## 2023-12-30 NOTE — CONSULTS
Care Management Initial Consult    General Information  Assessment completed with: Patient,    Type of CM/SW Visit: Initial Assessment    Primary Care Provider verified and updated as needed: Yes   Readmission within the last 30 days: current reason for admission unrelated to previous admission      Reason for Consult: discharge planning  Advance Care Planning: Advance Care Planning Reviewed: no concerns identified          Communication Assessment  Patient's communication style: spoken language (English or Bilingual)             Cognitive  Cognitive/Neuro/Behavioral: WDL                      Living Environment:   People in home: alone     Current living Arrangements: apartment      Able to return to prior arrangements: yes       Family/Social Support:  Care provided by: self, other (see comments) (fellow nuns)  Provides care for: no one  Marital Status: Single  Neighbor, Other (specify) (Sisters from the con[employer])          Description of Support System: Supportive, Involved    Support Assessment: Patient communicates needs well met, Adequate family and caregiver support, Adequate social supports    Current Resources:   Patient receiving home care services: Yes  Skilled Home Care Services: Skilled Nursing, Home Health Aid, Physical Therapy, Occupational Therapy (with [facility])  Community Resources: Home Care  Equipment currently used at home:    Supplies currently used at home: Incontinence Supplies    Employment/Financial:  Employment Status: retired        Financial Concerns:             Does the patient's insurance plan have a 3 day qualifying hospital stay waiver?  No    Lifestyle & Psychosocial Needs:  Social Determinants of Health     Food Insecurity: Low Risk  (12/6/2023)    Food Insecurity     Within the past 12 months, did you worry that your food would run out before you got money to buy more?: No     Within the past 12 months, did the food you bought just not last and you didn t have money to get more?:  No   Depression: Not at risk (7/20/2023)    PHQ-2     PHQ-2 Score: 0   Housing Stability: Low Risk  (12/6/2023)    Housing Stability     Do you have housing? : Yes     Are you worried about losing your housing?: No   Tobacco Use: Low Risk  (12/29/2023)    Patient History     Smoking Tobacco Use: Never     Smokeless Tobacco Use: Never     Passive Exposure: Never   Financial Resource Strain: Low Risk  (12/6/2023)    Financial Resource Strain     Within the past 12 months, have you or your family members you live with been unable to get utilities (heat, electricity) when it was really needed?: No   Alcohol Use: Not At Risk (3/27/2023)    AUDIT-C     Frequency of Alcohol Consumption: Never     Average Number of Drinks: Patient does not drink     Frequency of Binge Drinking: Never   Transportation Needs: Low Risk  (12/6/2023)    Transportation Needs     Within the past 12 months, has lack of transportation kept you from medical appointments, getting your medicines, non-medical meetings or appointments, work, or from getting things that you need?: No   Physical Activity: Insufficiently Active (3/27/2023)    Exercise Vital Sign     Days of Exercise per Week: 3 days     Minutes of Exercise per Session: 10 min   Interpersonal Safety: Low Risk  (10/5/2023)    Interpersonal Safety     Do you feel physically and emotionally safe where you currently live?: Yes     Within the past 12 months, have you been hit, slapped, kicked or otherwise physically hurt by someone?: No     Within the past 12 months, have you been humiliated or emotionally abused in other ways by your partner or ex-partner?: No   Stress: No Stress Concern Present (3/27/2023)    [employer] - Occupational Stress Questionnaire     Feeling of Stress : Not at all   Social Connections: Moderately Integrated (3/27/2023)    Social Connection and Isolation Panel [NHANES]     Frequency of Communication with Friends and Family: More than three  "times a week     Frequency of Social Gatherings with Friends and Family: More than three times a week     Attends [employer] Services: More than 4 times per year     Active Member of Clubs or Organizations: Yes     Attends Club or Organization Meetings: More than 4 times per year     Marital Status: Never        Functional Status:  Prior to admission patient needed assistance:   Dependent ADLs:: Ambulation-walker, Bathing  Dependent IADLs:: Shopping, Transportation       Mental Health Status:          Chemical Dependency Status:                Values/Beliefs:  Spiritual, Cultural Beliefs, [employer] Practices, Values that affect care:                 Additional Information:  Met with patient at bedside to review for initial assessment. Introduced self and the care management role.  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 She reports that she can perform her ADLs \"but it is getting more and more difficult\". Home care services recently started with [facility] that includes an HHA to help with bathing. Neighbors and other nuns assist her with her IADLs. Use rolling walker.. Has incontinence supplies. Goal is to return home. HERNANDEZ discussed. Sister Yandy will transport home.     Violette Pearson RN     "

## 2023-12-30 NOTE — PLAN OF CARE
PRIMARY DIAGNOSIS: Bradycardia  OUTPATIENT/OBSERVATION GOALS TO BE MET BEFORE DISCHARGE:  ADLs back to baseline: Yes    Activity and level of assistance: Up with standby assistance.    Pain status: Pain free.    Return to near baseline physical activity: Yes     Discharge Planner Nurse   Safe discharge environment identified: Yes  Barriers to discharge: Yes       Entered by: Sanchez Baker RN 12/30/2023 7:01 AM     Please review provider order for any additional goals.   Nurse to notify provider when observation goals have been met and patient is ready for discharge.Goal Outcome Evaluation: pt is alert and oriented. Pt was pain free and then after getting up to the bathroom, c/o knee pain. Prn given with relief. Pt continues to have asymptomatic bradycardia. RA. Up with SBA with walker and gait belt. Urine culture result pending.

## 2023-12-30 NOTE — PLAN OF CARE
Problem: Adult Inpatient Plan of Care  Goal: Absence of Hospital-Acquired Illness or Injury  Intervention: Identify and Manage Fall Risk  Recent Flowsheet Documentation  Taken 12/30/2023 1200 by Jena Rojas RN  Safety Promotion/Fall Prevention:   activity supervised   nonskid shoes/slippers when out of bed   safety round/check completed  Taken 12/30/2023 0800 by Jena Rojas RN  Safety Promotion/Fall Prevention:   activity supervised   nonskid shoes/slippers when out of bed   safety round/check completed  Intervention: Prevent Skin Injury  Recent Flowsheet Documentation  Taken 12/30/2023 1200 by Jena Rojas RN  Body Position: position changed independently  Taken 12/30/2023 0800 by Jena Rojas RN  Body Position: position changed independently   Goal Outcome Evaluation:    Pt has had no further episodes of bradycardia and was cleared for discharge. Waiting for friend for ride home.

## 2023-12-30 NOTE — PLAN OF CARE
"PRIMARY DIAGNOSIS: \"GENERIC\" NURSING  OUTPATIENT/OBSERVATION GOALS TO BE MET BEFORE DISCHARGE:  ADLs back to baseline: Yes    Activity and level of assistance: Up with standby assistance.    Pain status: Pain free.    Return to near baseline physical activity: Yes     Discharge Planner Nurse   Safe discharge environment identified: Yes  Barriers to discharge: Yes       Entered by: April Castano RN 12/29/2023 11:27 PM     Please review provider order for any additional goals.   Nurse to notify provider when observation goals have been met and patient is ready for discharge.Goal Outcome Evaluation:  Patient denies pain, denies shortness of breath. Heart rate donna in the 40's and 50's, occasionally in the 30's. Patient is asymptomatic.                      "

## 2023-12-30 NOTE — DISCHARGE SUMMARY
"[facility]  Hospitalist Discharge Summary      Date of Admission:  12/29/2023  Date of Discharge:  12/30/2023  Discharging Provider: Dominique Navarro MD  Discharge Service: Hospitalist Service    Discharge Diagnoses     Sinus bradycardia   Persistent Afib  Chronic HFrEF  Chronic LBBB  Acute cystitis without hematuria   CKD 3        Clinically Significant Risk Factors     # Overweight: Estimated body mass index is 26.7 kg/m  as calculated from the following:    Height as of this encounter: 1.575 m (5' 2\").    Weight as of this encounter: 66.2 kg (146 lb).       Follow-ups Needed After Discharge   Follow-up Appointments     Follow-up and recommended labs and tests       Follow up with primary care provider, Margret Flores, within 7 days for   hospital follow- up.  The following labs/tests are recommended: BMP.            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  Bradycardia was thought to be related to beta blocker.  Metoprolol was discontinued and her heart rates have been in the 50s.  With recent bradycardia, wide QRS and low EF, CRT pacing was recommended however patient has been declining.     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  Urine culture shows E.coli so will treat with Keflex x 5 days.        Consultations This Hospital Stay   CARDIOLOGY " IP CONSULT  CARE MANAGEMENT / SOCIAL WORK IP CONSULT    Code Status   No CPR- Do NOT Intubate    Time Spent on this Encounter   I, Dominique Navarro MD, personally saw the patient today and spent greater than 30 minutes discharging this patient.       Dominique Navarro MD  [facility] EMERGENCY DEPARTMENT  [address] [address]  Phone: [phone]  ______________________________________________________________________    Physical Exam   Vital Signs: Temp: 97.7  F (36.5  C) Temp src: Oral BP: (!) 149/94 Pulse: 51   Resp: 18 SpO2: 98 % O2 Device: None (Room air)    Weight: 146 lbs 0 oz  General Appearance: NAD  Respiratory: Lungs are clear  Cardiovascular: Regular.  S1S2, no murmur. No edema   Other: Awake, alert, nonfocal         Primary Care Physician   Margret Flores    Discharge Orders      Follow-Up with Cardiology DEON      Reason for your hospital stay    Bradycardia, hypotension     Follow-up and recommended labs and tests     Follow up with primary care provider, Margret Flores, within 7 days for hospital follow- up.  The following labs/tests are recommended: BMP.     Activity    Your activity upon discharge: activity as tolerated     Holter Monitor 24 hour Adult Pediatric     Walker Order for DME - ONLY FOR DME    I, the undersigned, certify that the above prescribed supplies are medically necessary for this patient and is both reasonable and necessary in reference to accepted standards of medical and necessary in reference to accepted standards of medical practice in the treatment of this patient's condition and is not prescribed as a convenience.      Diet    Follow this diet upon discharge: Orders Placed This Encounter      Combination Diet Low Saturated Fat Na <2400mg Diet       Significant Results and Procedures   Most Recent 3 CBC's:  Recent Labs   Lab Test 12/29/23  1214 12/22/23  0502 12/20/23  1432   WBC 7.5 7.7 8.2   HGB 12.9 12.9 12.7   MCV 95 93 94     260 289     Most Recent 3 BMP's:  Recent Labs   Lab Test 12/30/23  0803 12/29/23  1214 12/24/23  0525    142 139   POTASSIUM 3.6 3.9 3.8   CHLORIDE 103 102 99   CO2 28 30* 31*   BUN 34.1* 34.3* 33.5*   CR 1.26* 1.41* 1.29*   ANIONGAP 10 10 9   MARLENE 9.8* 9.8* 9.5   * 112* 113*   ,   Results for orders placed or performed during the hospital encounter of 12/20/23   XR Chest Port 1 View    Narrative    EXAM: XR CHEST PORT 1 VIEW  LOCATION: [facility]  DATE: 12/20/2023    INDICATION: sob  COMPARISON: 11/29/2023       Impression    IMPRESSION: Small pleural effusions are again seen. Cannot exclude basilar airspace disease. No pneumothorax. The cardiomediastinal silhouette is enlarged.    EP Cardioversion External    Narrative    Kayla Montano APRN CNP     12/22/2023 11:46 AM  [facility]    Procedure: EP Cardioversion External    Date/Time: 12/22/2023 11:42 AM    Performed by: Kayla Montano APRN CNP  Authorized by: Kayla Montano APRN CNP      UNIVERSAL PROTOCOL   Site Marked: NA  Prior Images Obtained and Reviewed:  Yes  Required items: Required blood products, implants, devices and special   equipment available    Patient identity confirmed:  Verbally with patient, arm band and provided   demographic data  Patient was reevaluated immediately before administering moderate or deep   sedation or anesthesia  Confirmation Checklist:  Patient's identity using two indicators, relevant   allergies, procedure was appropriate and matched the consent or emergent   situation and correct equipment/implants were available  Time out: Immediately prior to the procedure a time out was called    Universal Protocol: the Joint Commission Universal Protocol was followed       ANESTHESIA    Anesthesia was administered and monitored by anesthesiology.  See   anesthesia documentation for details.    PROCEDURE DETAILS  Pre-procedure rhythm: atrial fibrillation  Patient  position: patient was placed in a supine position  Chest area: chest area exposed  Electrodes: pads  Electrodes placed: anterior-posterior  Number of attempts: 1    Details of Attempts:  At 1112, after administration of IV Brevital by [facility]   and confirmation of adequate sedation, she received a single synchronized   shock at 200 J with prompt restoration of sinus rhythm in the 60s.  Post   cardioversion EKG pending.  Post-procedure rhythm: normal sinus rhythm  Complications: no complications      PROCEDURE  Describe Procedure: Hospitalized with acute heart failure.  Known history   of persistent atrial fibrillation with plan for cardioversion.  Eliquis   was increased to a therapeutic dose on 2023.  She denies missing any   doses of Eliquis since that time, so is now eligible for cardioversion.    Ventricular rates have been well-controlled, but A-fib may be contributing   to her heart failure.  Successful cardioversion to sinus rhythm  Continue amiodarone 100 mg daily  Continue Eliquis 5 mg twice daily for stroke prophylaxis (she does not   qualify for dose adjustment)  Follow-up with Ibis Pedro CNP in 1 month  Transfer back to telemetry once awake and stable after cardioversion  Patient Tolerance:  Patient tolerated the procedure well with no immediate   complications  Length of time physician/provider present for 1:1 monitoring during   sedation: 10   Echocardiogram Limited     Value    Biplane LVEF 25%    Narrative    [id number]  [id number]  [id number]  [id number]^CURRY^JOSÉ ANTONIO^[place] [address]     Name: BHARAT QUICK  MRN: [id number]  : 1930  Study Date: 2023 09:01 AM  Age: 89 yrs  Gender: Female  Patient Location: [facility]  Reason For Study: Heart Failure - Left  Ordering Physician: JOSÉ ANTONIO ZAPIEN  Performed By: CRUZITO     BSA: 1.7 m2  Height: 62 in  Weight: 146 lb  HR: 74  BP: 129/66  mmHg  ______________________________________________________________________________  Procedure  Limited Portable Echo Adult. Definity (NDC #61376-823) given intravenously.  ______________________________________________________________________________  Interpretation Summary     There is mild concentric left ventricular hypertrophy.  Biplane LVEF is 25%.  Septal motion is consistent with conduction abnormality.  Mildly decreased right ventricular systolic function  No significant valve disease.  Compared to the prior study dated 11/30/2023, there have been no changes.  ______________________________________________________________________________  Left Ventricle  The left ventricle is normal in size. There is mild concentric left  ventricular hypertrophy. Biplane LVEF is 25%. Septal motion is consistent with  conduction abnormality. There is mod-severe global hypokinesia of the left  ventricle.     Right Ventricle  The right ventricle is normal size. TAPSE is abnormal, which is consistent  with abnormal right ventricular systolic function. Mildly decreased right  ventricular systolic function.     Atria  The left atrium is moderately dilated. The right atrium is mildly dilated.     Mitral Valve  The mitral valve leaflets are mildly thickened. There is mild to moderate (1-  2+) mitral regurgitation.     Tricuspid Valve  Tricuspid valve leaflets appear normal. There is mild (1+) tricuspid  regurgitation. The right ventricular systolic pressure is approximated at  30mmHg plus the right atrial pressure.     Aortic Valve  There is trivial trileaflet aortic sclerosis. No aortic regurgitation is  present.     Vessels  The aorta root is normal. Normal size ascending aorta.     Pericardium  There is no pericardial effusion.     ______________________________________________________________________________  MMode/2D Measurements & Calculations  IVSd: 1.1 cm  LVIDd: 5.5 cm  LVIDs: 4.7 cm  LVPWd: 1.2 cm  FS: 13.8 %  LV  mass(C)d: 247.2 grams  LV mass(C)dI: 147.7 grams/m2  Ao root diam: 3.1 cm  asc Aorta Diam: 3.6 cm     LVOT diam: 2.0 cm  LVOT area: 3.1 cm2  Ao root diam index Ht(cm/m): 2.0  Ao root diam index BSA (cm/m2): 1.9  Asc Ao diam index BSA (cm/m2): 2.2  Asc Ao diam index Ht(cm/m): 2.3  RWT: 0.44  TAPSE: 1.5 cm     Time Measurements  MM HR: 71.0 BPM     Doppler Measurements & Calculations  TR max jimbo: 284.0 cm/sec  TR max P.3 mmHg  RV S Jimbo: 4.8 cm/sec     ______________________________________________________________________________  Report approved by: Tl Obregon 2023 09:55 AM             Discharge Medications   Current Discharge Medication List        CONTINUE these medications which have CHANGED    Details   acetaminophen (TYLENOL) 325 MG tablet Take 2 tablets (650 mg) by mouth every 4 hours as needed for mild pain    Associated Diagnoses: Hip fracture, right, closed, initial encounter (H)      !! furosemide (LASIX) 20 MG tablet Take 1 tablet (20 mg) by mouth daily at 4pm    Associated Diagnoses: Chronic systolic heart failure (H)      !! furosemide (LASIX) 40 MG tablet Take 1 tablet (40 mg) by mouth every morning    Associated Diagnoses: Chronic systolic heart failure (H)       !! - Potential duplicate medications found. Please discuss with provider.        CONTINUE these medications which have NOT CHANGED    Details   amiodarone (PACERONE) 200 MG tablet Take 0.5 tablets (100 mg) by mouth daily  Qty: 45 tablet, Refills: 3    Comments: Dose strength change. Please remind Sister to split tablet.  Associated Diagnoses: Paroxysmal atrial fibrillation (H)      apixaban ANTICOAGULANT (ELIQUIS) 5 MG tablet Take 1 tablet (5 mg) by mouth 2 times daily  Qty: 60 tablet, Refills: 1    Associated Diagnoses: Paroxysmal atrial fibrillation (H)      cholecalciferol, vitamin D3, (VITAMIN D3) 2,000 unit Tab [CHOLECALCIFEROL, VITAMIN D3, (VITAMIN D3) 2,000 UNIT TAB] Take 1 tablet (2,000 Units total) by mouth  "daily.  Qty: 90 tablet, Refills: 3    Comments: Replaces weekly ergo 50,000 units  Associated Diagnoses: Vitamin D deficiency      COMPOUNDED NON-CONTROLLED SUBSTANCE (CMPD RX) - PHARMACY TO MIX COMPOUNDED MEDICATION Ketamine 8% and Ketoprofen 5 % in carrier gel/lotion. Apply 2-4 pumps to affected areas QID PRN  Qty: 120 g, Refills: 1    Associated Diagnoses: Primary osteoarthritis of right knee; Rupture of anterior cruciate ligament of right knee, sequela; Chronic intractable pain      DULoxetine (CYMBALTA) 60 MG capsule Take 60 mg by mouth daily      KLOR-CON 20 MEQ CR tablet TAKE ONE TABLET BY MOUTH ONE TIME DAILY  Qty: 90 tablet, Refills: 3    Associated Diagnoses: Dyspnea on exertion      lisinopril (ZESTRIL) 2.5 MG tablet TAKE ONE TABLET BY MOUTH ONE TIME DAILY  Qty: 90 tablet, Refills: 1    Associated Diagnoses: Secondary cardiomyopathy (H); Benign essential hypertension      oxyCODONE (ROXICODONE) 5 MG tablet Take 1 tablet (5 mg) by mouth 3 times daily as needed for moderate to severe pain #70 tabs to last 30 days for chronic pain. Ok to fill 11/28/23 to begin using 11/30/23  Qty: 70 tablet, Refills: 0    Associated Diagnoses: Primary osteoarthritis of right knee; Rupture of anterior cruciate ligament of right knee, sequela; Chronic intractable pain      zolpidem ER (AMBIEN CR) 6.25 MG CR tablet Take 1 and 1/4 tablet by mouth at bedtime  Qty: 45 tablet, Refills: 5    Associated Diagnoses: Primary insomnia           STOP taking these medications       metoprolol tartrate (LOPRESSOR) 25 MG tablet Comments:   Reason for Stopping:             Allergies   Allergies   Allergen Reactions    Trazodone Shortness Of Breath and Unknown     Allergic to trazodone and deriv., Other: trouble swallowing      Clindamycin Diarrhea     C-diff    Gabapentin Other (See Comments)     \"Internal tremors\"    Temazepam Other (See Comments)     Annotation: Nightmares       "

## 2023-12-31 LAB — BACTERIA UR CULT: ABNORMAL

## 2024-01-02 DIAGNOSIS — F51.01 PRIMARY INSOMNIA: ICD-10-CM

## 2024-01-02 NOTE — TELEPHONE ENCOUNTER
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) [phone] (H)            What medication are you calling about (include dose and sig)?: zolpidem ER (AMBIEN CR) 6.25 MG CR tablet     Preferred Pharmacy:   [id number] [[place]], [place] - [address] [place] [address]  Phone: [phone] Fax: [phone]      Controlled Substance Agreement on file:   CSA -- Patient Level:     [Media Unavailable] Controlled Substance Agreement - Opioid - Scan on 11/15/2022  1:07 PM   [Media Unavailable] Controlled Substance Agreement - Opioid - Scan on 1/30/2020   [Media Unavailable] Controlled Substance Agreement - Opioid - Scan on 3/21/2019   [Media Unavailable] Controlled Substance Agreement - Non - Opioid - Scan on 3/15/2019   [Media Unavailable] Controlled Substance Agreement - Opioid - Scan on 1/16/2019       Who prescribed the medication?: PCP    Do you need a refill? Yes    When did you use the medication last?     Patient offered an appointment? No    Do you have any questions or concerns?  Yes: NEW YEAR, PT NEEDS A NEW PRIOR AUTH TO INSURANCE. PLEASE SEND PA TO INS AS PT IS OUT OF MEDICATIONS      Okay to leave a detailed message?: Yes at Home number on file [phone] (home)

## 2024-01-03 ENCOUNTER — MEDICAL CORRESPONDENCE (OUTPATIENT)
Dept: HEALTH INFORMATION MANAGEMENT | Facility: CLINIC | Age: 89
End: 2024-01-03
Payer: COMMERCIAL

## 2024-01-03 RX ORDER — ZOLPIDEM TARTRATE 6.25 MG/1
TABLET, FILM COATED, EXTENDED RELEASE ORAL
Qty: 45 TABLET | Refills: 5 | Status: SHIPPED | OUTPATIENT
Start: 2024-01-03 | End: 2024-05-22

## 2024-01-04 LAB
ATRIAL RATE - MUSE: 32 BPM
DIASTOLIC BLOOD PRESSURE - MUSE: NORMAL MMHG
INTERPRETATION ECG - MUSE: NORMAL
P AXIS - MUSE: NORMAL DEGREES
PR INTERVAL - MUSE: NORMAL MS
QRS DURATION - MUSE: 154 MS
QT - MUSE: 544 MS
QTC - MUSE: 491 MS
R AXIS - MUSE: -11 DEGREES
SYSTOLIC BLOOD PRESSURE - MUSE: NORMAL MMHG
T AXIS - MUSE: 101 DEGREES
VENTRICULAR RATE- MUSE: 49 BPM

## 2024-01-05 ENCOUNTER — MEDICAL CORRESPONDENCE (OUTPATIENT)
Dept: HEALTH INFORMATION MANAGEMENT | Facility: CLINIC | Age: 89
End: 2024-01-05
Payer: COMMERCIAL

## 2024-01-11 ENCOUNTER — OFFICE VISIT (OUTPATIENT)
Dept: PALLIATIVE MEDICINE | Facility: OTHER | Age: 89
End: 2024-01-11
Attending: NURSE PRACTITIONER
Payer: COMMERCIAL

## 2024-01-11 ENCOUNTER — OFFICE VISIT (OUTPATIENT)
Dept: CARDIOLOGY | Facility: CLINIC | Age: 89
End: 2024-01-11
Payer: COMMERCIAL

## 2024-01-11 ENCOUNTER — MEDICAL CORRESPONDENCE (OUTPATIENT)
Dept: HEALTH INFORMATION MANAGEMENT | Facility: CLINIC | Age: 89
End: 2024-01-11

## 2024-01-11 VITALS — HEART RATE: 68 BPM | DIASTOLIC BLOOD PRESSURE: 71 MMHG | SYSTOLIC BLOOD PRESSURE: 134 MMHG | OXYGEN SATURATION: 93 %

## 2024-01-11 VITALS
HEIGHT: 62 IN | BODY MASS INDEX: 28.89 KG/M2 | WEIGHT: 157 LBS | DIASTOLIC BLOOD PRESSURE: 54 MMHG | SYSTOLIC BLOOD PRESSURE: 100 MMHG | HEART RATE: 67 BPM | RESPIRATION RATE: 16 BRPM

## 2024-01-11 DIAGNOSIS — R06.09 DYSPNEA ON EXERTION: ICD-10-CM

## 2024-01-11 DIAGNOSIS — I50.20 HFREF (HEART FAILURE WITH REDUCED EJECTION FRACTION) (H): Primary | ICD-10-CM

## 2024-01-11 DIAGNOSIS — N18.31 STAGE 3A CHRONIC KIDNEY DISEASE (CKD) (H): ICD-10-CM

## 2024-01-11 DIAGNOSIS — M17.11 PRIMARY OSTEOARTHRITIS OF RIGHT KNEE: ICD-10-CM

## 2024-01-11 DIAGNOSIS — I48.19 PERSISTENT ATRIAL FIBRILLATION (H): ICD-10-CM

## 2024-01-11 DIAGNOSIS — S83.511S RUPTURE OF ANTERIOR CRUCIATE LIGAMENT OF RIGHT KNEE, SEQUELA: ICD-10-CM

## 2024-01-11 DIAGNOSIS — G89.29 CHRONIC INTRACTABLE PAIN: ICD-10-CM

## 2024-01-11 DIAGNOSIS — I10 BENIGN ESSENTIAL HYPERTENSION: ICD-10-CM

## 2024-01-11 LAB
ALBUMIN SERPL BCG-MCNC: 3.9 G/DL (ref 3.5–5.2)
ALP SERPL-CCNC: 155 U/L (ref 40–150)
ALT SERPL W P-5'-P-CCNC: 13 U/L (ref 0–50)
ANION GAP SERPL CALCULATED.3IONS-SCNC: 11 MMOL/L (ref 7–15)
AST SERPL W P-5'-P-CCNC: 19 U/L (ref 0–45)
BILIRUB SERPL-MCNC: 0.3 MG/DL
BUN SERPL-MCNC: 28.1 MG/DL (ref 8–23)
CALCIUM SERPL-MCNC: 9.7 MG/DL (ref 8.2–9.6)
CHLORIDE SERPL-SCNC: 102 MMOL/L (ref 98–107)
CREAT SERPL-MCNC: 1.07 MG/DL (ref 0.51–0.95)
DEPRECATED HCO3 PLAS-SCNC: 25 MMOL/L (ref 22–29)
EGFRCR SERPLBLD CKD-EPI 2021: 48 ML/MIN/1.73M2
GLUCOSE SERPL-MCNC: 126 MG/DL (ref 70–99)
POTASSIUM SERPL-SCNC: 3.8 MMOL/L (ref 3.4–5.3)
PROT SERPL-MCNC: 6.8 G/DL (ref 6.4–8.3)
SODIUM SERPL-SCNC: 138 MMOL/L (ref 135–145)

## 2024-01-11 PROCEDURE — 99214 OFFICE O/P EST MOD 30 MIN: CPT | Performed by: NURSE PRACTITIONER

## 2024-01-11 PROCEDURE — G0463 HOSPITAL OUTPT CLINIC VISIT: HCPCS | Performed by: NURSE PRACTITIONER

## 2024-01-11 PROCEDURE — G2211 COMPLEX E/M VISIT ADD ON: HCPCS | Performed by: NURSE PRACTITIONER

## 2024-01-11 PROCEDURE — 80053 COMPREHEN METABOLIC PANEL: CPT | Performed by: NURSE PRACTITIONER

## 2024-01-11 PROCEDURE — 36415 COLL VENOUS BLD VENIPUNCTURE: CPT | Performed by: NURSE PRACTITIONER

## 2024-01-11 RX ORDER — POTASSIUM CHLORIDE 1500 MG/1
20 TABLET, EXTENDED RELEASE ORAL DAILY
Qty: 90 TABLET | Refills: 3 | Status: SHIPPED | OUTPATIENT
Start: 2024-01-11 | End: 2024-04-18

## 2024-01-11 RX ORDER — ACETAMINOPHEN 500 MG
500-1000 TABLET ORAL 3 TIMES DAILY PRN
Qty: 250 TABLET | Refills: 1 | Status: SHIPPED | OUTPATIENT
Start: 2024-01-11 | End: 2024-01-12

## 2024-01-11 RX ORDER — OXYCODONE HYDROCHLORIDE 5 MG/1
5 TABLET ORAL EVERY 4 HOURS PRN
Qty: 70 TABLET | Refills: 0 | Status: SHIPPED | OUTPATIENT
Start: 2024-01-11 | End: 2024-01-11

## 2024-01-11 RX ORDER — DICLOFENAC EPOLAMINE 0.01 G/1
1 SYSTEM TOPICAL 2 TIMES DAILY
Qty: 180 PATCH | Refills: 1 | Status: SHIPPED | OUTPATIENT
Start: 2024-01-11 | End: 2024-01-12

## 2024-01-11 RX ORDER — OXYCODONE HYDROCHLORIDE 5 MG/1
5 TABLET ORAL EVERY 4 HOURS PRN
Qty: 90 TABLET | Refills: 0 | Status: SHIPPED | OUTPATIENT
Start: 2024-01-11 | End: 2024-01-12

## 2024-01-11 ASSESSMENT — PAIN SCALES - GENERAL: PAINLEVEL: EXTREME PAIN (8)

## 2024-01-11 NOTE — PROGRESS NOTES
[facility]    CHIEF COMPLAINT: Chronic Pain.    INTERVAL HISTORY:  Last seen on 23.       Recommendations/plan at the last visit included:  30 minutes Video or Clinic follow-up with SHASHA Simpson NP-C on 23 at 10:30 AM   Labs: Liver panel, please have this collected marquez you see Dr Mcelroy.   Other:   Look into Gold Bond Age Renew Retinol Overnight lotion.   Please call cardiology as soon as you get home to report the spells.   Medication Management :   Oxycodone extended release 10 mg: take 1 tablet at 10 pm  Continue Oxycodone 5 mg 1 tablet up to 3 times per day. Reduce to 2 tablets per day if you are able to.  REDUCE Ambien to 1 tablet at bedtime.   Be VERY CAUTIOUS FOR SEDATION. If you feel too sleepy, weak or confused, you MUST stop with the long acting Oxycodone 10 mg or the Ambien.    Since last visit:   - OxyContin 10 g was too strong and she felt like she couldn't wake up until about 2 pm.  Only took once and did not take again.    Pain Information today: Extreme Pain (8)/10. Location of pain: right pain.    Annual requirements last collected:  23     Current Pain Relevant Medications:    Acetaminophen 1000 M tabs 1-2 times daily, not every day  Compounded cream: Ketamine and Ketoprofen, 3-4 x/day PRN  Duloxetine 60 mg daily   Flector Patch:2 patches daily PRN. (When not using topical cream)       Current Controlled Substance Medications: (as of 2023)   Ambien 5 m tab at HS  Oxycodone 5 m tablet BID-TID PRN: 15-22.5 MME/day  OxyContin 10 mg at HS = 15 MME/day  Total opiate dose =  30-37.5 MME/day     Previous Pain Relevant Medications: (H--helped; HI--Helped initially; SWH--Somewhat helpful; NH--No help; W--worse; SE--side effects; ?--Unsure if helpful)   Opiates: Tramadol: SWH, Buprenorphine:Allergic  NSAIDS: Can't take, on blood thinner  Migraine medications: N/A  Muscle Relaxants: none  Neuropathics: Gabapentin:  SE  Anti-depressants for pain: Duloxetine: NH for pain  Anxiety medications: N/A  Topicals: Compounded cream: Lidocaine, ibuprofen, diclofenac:  OTC medications: Tylenol: takes 4000 mg/day  Sleep Medications: Temazepam: Allergy, Ambien:H  Other medications not covered above:      SUBSTANCE HISTORY:   Past or current illegal drug use: none  Past or current ETOH use: Rarely, glass of wine  Nicotine/tobacco use: none  Daily Caffeine intake: never     CURRENT FAMILY/SOCIAL SITUATION:  Past/Present occupation: Baptism nun:   Housing status: apartment alone  Emotional/Physical support: Sister Yandy Reyes, neighbor who is an RN  Safety Concerns: falls risk   Current stressors: Pain     THE 4 As OF OPIOID MAINTENANCE ANALGESIA    Analgesia: Is pain relief clinically significant? NO   Activity: Is patient functional and able to perform Activities of Daily Living? N/A   Adverse effects: Is patient free from adverse side effects from opiates? N/A   Adherence to Rx protocol: Is patient adhering to Controlled Substance Agreement and taking medications ONLY as ordered? N/A    [place] Board of Pharmacy Data Base Reviewed:    YES; No concern for abuse or misuse of controlled medications based on this report. Reviewed [facility] January 10, 2024- no concerning fills.      PHYSICAL EXAM    Vitals:    01/11/24 1537   BP: 134/71   Pulse: 68   SpO2: 93%       Constitutional: healthy, alert, and no distress A&O.   Patient is appropriate.  Psychiatric/mental status: Alert, without lethargy or stupor. Appropriate affect.     Neurologic exam:  CN:  Cranial nerves 2-12 are grossly normal.    MUSCULOSKELETAL: Physical exam deferred at this visit.    Right knee is very swollen and painful to palpation.      DIRE Score for ongoing opioid management is calculated as follows:    Diagnosis = 2 pts (slowly progressive; moderate pain/objective findings)    Intractability = 3 pts (patient fully engaged but inadequate response to treatments)    Risk         Psych = 3 pts (no significant personality dysfunction/mental illness; good communication with clinic)         Chem Hlth = 3 pts (no history of chemical dependency; not drug-focused)       Reliability = 3 pts (highly reliable with meds, appointments, treatments)       Social = 2 pts (reduction in some relationships/life rolls)       (Psych + Chem hlth + Reliability + Social) = 16    Efficacy = 2 pts (moderate benefit/function; low med dose; too early/not tried meds)    DIRE Score = 18        7-13: likely NOT suitable candidate for long-term opioid analgesia       14-21: may be a suitable candidate for long-term opioid analgesia     DIAGNOSTIC RESULTS:     11/15/22: MRI right knee w/o contrast   IMPRESSION:  1.  Horizontal cleavage tear of the posteromedial corner of the meniscus.  2.  Grade 2 cartilage loss both sides of the medial compartment.  3.  Full-thickness cartilage loss along the majority of both sides of the lateral compartment with bony remodeling of the lateral tibial plateau and extensive reactive edema.  4.  Large portions of the lateral meniscal body are not identified and may be completely degenerated. Anterior and posterior subluxation of the anterior and posterior horn, respectively.  5.  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  6.  Semimembranosus and medial gastrocnemius tendinopathy without tearing.  7.  Popliteus tendinopathy without tearing.  8.  Mild quadriceps and patellar tendinopathy without tearing.  9.  Small effusion.  10.  No evidence for acute fracture.     1/20/21: Left hip x-ray  IMPRESSION:  Mild primary degenerative narrowing both hip joints. Mild generalized degenerative change base of the spine and both SI joints.Pelvis and left hip otherwise negative. No fractures. No dislocations.     1/2021: XR KNEE RIGHT 1 OR 2 VWS   IMPRESSION:  Moderate primary osteoarthritis all 3 compartments but most prominent in the lateral compartment. Knee otherwise negative. No  fractures. No joint effusion.     PAIN RELAVENT CONDITIONS:   1.  OA: severe right knee, Baker's cyst.  2.  PMH: CKD Stage 2, A fib, CHF, primary insomnia    DIAGNOSIS AND PLAN:     (G89.29) Chronic intractable pain  (M17.11) Primary osteoarthritis of right knee  (S83.511S) Rupture of anterior cruciate ligament of right knee, sequela  Comment: Continue current plan of care.  Referred to orthopedics for injections.  Plan:   COMPOUNDED:  Ketamine 10% and Ketoprofen 5 % in carrier gel/lotion.   Orthopedic  Referral,   oxyCODONE (ROXICODONE) 5 MG tablet  Acetaminophen (TYLENOL) 500 MG tablet  diclofenac (FLECTOR) 1.3 % patch,    (R06.09) Dyspnea on exertion  Comment: Added historically   Plan: potassium chloride ER (KLOR-CON) 20 MEQ CR tablet              PATIENT INSTRUCTIONS:     Diagnosis reviewed, treatment option addressed, and risk/benefits discussed.  Self-care instructions given.  I am recommending a multidisciplinary treatment plan to help this patient better manage pain.    Remember to request ALL medication refills 5 BUSINESS days before you run out.     30 minutes Clinic follow-up with SHASHA Simpson, NP-C in 1 month.  Procedures recommended: Steroid vs Synvisc injections   Referrals: Orthopedics referral for knee injections, call [phone] to make an appt   Medication Management :   Tylenol 500 m tablets up to 3 x per day.   Oxycodone 1 tablet up to 3 x/day   Flector Patch at Bedtime.   Compounded cream 3-4 times per day    I have reviewed the note as documented above.  This accurately captures the substance of my conversation with the patient.  A total of 31 minutes of preparation, care, and consultation were spent on this visit today.     SHASHA Domínguez, NP-C  [facility]    (Information in italics and blue color are taken from previous pain and consulting medical providers notes and are documented as such)

## 2024-01-11 NOTE — PROGRESS NOTES
Patient presents to the clinic today for a visit with SHASHA Sims CNP regarding Pain Management.          7/20/2023     1:16 PM 11/24/2023    10:20 AM 1/11/2024     3:40 PM   PEG Score   PEG Total Score 6 6.33 8       UDS/CSA- 9/28/23  Medications- Oxycodone( 1/11/2024)     QUESTIONS:  OXYcotin caused irregular sleeping       Savanna Dow, Clinic Facilitator  [facility] [place]

## 2024-01-11 NOTE — PATIENT INSTRUCTIONS
Gladys Ramirez,    It was a pleasure to see you today at [facility].     My recommendations after this visit include:  - Please follow up with Dr Holley March 25   - Please follow up with EP DEON in 4 weeks  - Please follow up with Bria Hinds in mid February  - I have checked labs  - Continue current medications    Bria Hinds, CNP

## 2024-01-11 NOTE — PATIENT INSTRUCTIONS
After Visit Instructions:     Thank you for coming to [facility] for your care. It is my goal to partner with you to help you reach your optimal state of health.   Continue daily self-care, identifying contributing factors, and monitoring variations in pain level. Continue to integrate self-care into your life.      30 minutes Clinic follow-up with SHASHA Simpson NP-C in 1 month.  Procedures recommended: Steroid vs Synvisc injections   Referrals: Orthopedics referral for knee injections, call [phone] to make an appt   Medication Management :   Tylenol 500 m tablets up to 3 x per day.   Oxycodone 1 tablet up to 3 x/day   Flector Patch at Bedtime.   Compounded cream 3-4 times per day      SHASHA Domínguez NP-C  [facility]  [facility] - Monday, Thursday and Friday  [facility] - Tuesday      Be sure to request ALL medication refills 5 days prior to the due date whether or not you will see your medical provider in an appointment before the due date.      Do not expect same day refills. If you do not plan ahead you may run out of medications.     Early refills are not provided.  It is your responsibility to manage your medications responsibility and keep them safely stored. Lost or destroyed medications WILL NOT be replaced    Scheduling/Clinic telephone Number for ALL locations:  [phone]    After Hours On-Call Service:  [phone]    Call with any questions about your care and for scheduling assistance.   Calls are returned Monday through Friday between 8 AM and 4:00 PM. We usually get back to you within 2 business days depending on the issue/request.    If we are prescribing your medications:  For opioid medication refills, call the clinic or send a [employer] message 7 days in advance.  Please include:  Your name and date of birth.   Name of requested medication  Name of the pharmacy.  For non-opioid medications, call your pharmacy directly  to request a refill. Please allow 3-4 days to be processed.   Per [place] State Law:  All controlled substance prescriptions must be filled within 30 days of being written.    For those controlled substances allowing refills, pickup must occur within 30 days of last fill.      We believe regular attendance is key to your success in our program!    Any time you are unable to keep your appointment we ask that you call us at least 24 hours in advance to cancel.This will allow us to offer the appointment time to another patient.   Multiple missed appointments may lead to dismissal from the clinic.

## 2024-01-11 NOTE — LETTER
1/11/2024    Margret Flores MD  [address] [place] [id number]    RE: Gladys Ramirez       Dear Colleague,     I had the pleasure of seeing Gladys Ramirez in the [facility].        Assessment/Recommendations   Assessment:    1. Nonischemic cardiomyopathy, heart failure with reduced ejection fraction, ejection fraction 25%, NYHA class II: Compensated.  She has fatigue, dyspnea on exertion and trace right lower extremity edema.  Her weight has been stable since discharge.  She follows a low-sodium diet.  CRT was discussed during hospitalization and she has declined.  2.  Persistent atrial fibrillation: Normal sinus rhythm.  Status post cardioversion December 22.  She continues amiodarone 100 mg daily and Eliquis for anticoagulation.  Metoprolol was discontinued due to bradycardia during hospitalization  3.  Chronic kidney disease stage IIIa: CMP pending.  Baseline creatinine around 1.2-1.4  4.  Hypertension: Controlled.  Blood pressure 100/54    Plan:  1.   Heart failure medications:  - Beta blocker therapy with metoprolol was discontinued due to bradycardia in the hospital  - ACEI therapy with lisinopril 2.5 mg daily  - Diuretic therapy with furosemide 40 mg in the morning and 20 mg in the afternoon  2.  CMP pending  3.  Continue current medications  4.  Continue monitoring daily weights and following a low-sodium diet    Gladys Ramirez will follow up with Dr. Holley March 25, [facility] in 4 weeks and in the heart failure clinic in February.     History of Present Illness/Subjective    Ms. Gladys Ramirez is a 89 year old female seen at [facility] heart failure clinic today for continued follow-up.  She follows up for heart failure with reduced ejection fraction, nonischemic cardiomyopathy.  She was hospitalized twice during the month of December.  She was first admitted for decompensated heart failure and was started on IV Lasix.  She also underwent cardioversion on  "December 22 which was successful.  He had a limited echocardiogram which showed ejection fraction of 25%.  Discussion of CRT was done but patient declined.  She was then readmitted 5 days later for bradycardia with heart rate in the 30s.  She was also back in atrial fibrillation.  Her metoprolol was discontinued and heart rates improved to 50s.  Again CRT was discussed but patient declined. She has a past medical history significant for hypertension, persistent atrial fibrillation, LBBB, chronic kidney disease stage IIIa, hyperlipidemia, DVT.     Today, she has fatigue, dyspnea on exertion and trace right lower extremity edema.  She denies lightheadedness, shortness of breath, orthopnea, PND, palpitations, chest pain, and abdominal fullness/bloating.      She is monitoring home weights which are stable between 148-152 pounds.  She is following a low sodium diet.        LIMITED ECHOCARDIOGRAM: 12/21/2023-reviewed  Interpretation Summary     There is mild concentric left ventricular hypertrophy.  Biplane LVEF is 25%.  Septal motion is consistent with conduction abnormality.  Mildly decreased right ventricular systolic function  No significant valve disease.  Compared to the prior study dated 11/30/2023, there have been no changes.     Physical Examination Review of Systems   /54 (BP Location: Left arm, Patient Position: Sitting, Cuff Size: Adult Large)   Pulse 67   Resp 16   Ht 1.575 m (5' 2\")   Wt 71.2 kg (157 lb)   BMI 28.72 kg/m    Body mass index is 28.72 kg/m .  Wt Readings from Last 3 Encounters:   01/11/24 71.2 kg (157 lb)   12/29/23 66.2 kg (146 lb)   12/24/23 67 kg (147 lb 11.3 oz)       General Appearance:   no acute distress   ENT/Mouth: No abnormalities   EYES:  no scleral icterus, normal conjunctivae   Neck: no thyromegaly   Chest/Lungs:   lungs are clear to auscultation, no rales or wheezing, equal chest wall expansion    Cardiovascular:   Regular. Normal first and second heart sounds with no " murmurs, rubs, or gallops, trace right lower extremity edema     Abdomen:  bowel sounds are present   Extremities: no cyanosis or clubbing   Skin: warm   Neurologic: no tremors     Psychiatric: alert and oriented x3                                              Medical History  Surgical History Family History Social History   Past Medical History:   Diagnosis Date    Angina pectoris (H24)     Atrial fibrillation (H)     Chest pain 03/09/2017    Chronic kidney disease     Congestive heart failure (H)     Cough     Hyperlipidemia     Hypertension     Osteoarthritis     Past Surgical History:   Procedure Laterality Date    APPENDECTOMY      BASAL CELL CARCINOMA EXCISION      nose    LAPAROSCOPY DIAGNOSTIC (GENERAL) N/A 11/04/2014    Procedure: LAPAROSCOPY BILATERAL SALPINGO-OOPHORECTOMY ;  Surgeon: Sofia Harper MD;  Location: [facility];  Service:     OPEN REDUCTION INTERNAL FIXATION HIP BIPOLAR Right 3/5/2023    Procedure: HEMIARTHROPLASTY, HIP, BIPOLAR;  Surgeon: Jose G Martinez MD;  Location: [facility]    TONSILLECTOMY      10 years old    ZZC TOTAL KNEE ARTHROPLASTY Left     2011    Family History   Problem Relation Age of Onset    Heart Disease Mother     Rheumatic Heart Disease Mother     No Known Problems Father     Cancer Brother         brain    Lung Cancer Brother     Lung Cancer Brother     Cancer Sister         lung    Lung Cancer Sister     Social History     Socioeconomic History    Marital status: Single     Spouse name: Not on file    Number of children: Not on file    Years of education: Not on file    Highest education level: Not on file   Occupational History    Not on file   Tobacco Use    Smoking status: Never     Passive exposure: Never    Smokeless tobacco: Never    Tobacco comments:     no passive exposure   Vaping Use    Vaping Use: Never used   Substance and Sexual Activity    Alcohol use: Yes     Comment: Alcoholic Drinks/day: Rarely a glass of wine    Drug use: No     Sexual activity: Not on file   Other Topics Concern    Not on file   Social History Narrative    The patient is a nun.     Social Determinants of Health     Financial Resource Strain: Low Risk  (12/6/2023)    Financial Resource Strain     Within the past 12 months, have you or your family members you live with been unable to get utilities (heat, electricity) when it was really needed?: No   Food Insecurity: Low Risk  (12/6/2023)    Food Insecurity     Within the past 12 months, did you worry that your food would run out before you got money to buy more?: No     Within the past 12 months, did the food you bought just not last and you didn t have money to get more?: No   Transportation Needs: Low Risk  (12/6/2023)    Transportation Needs     Within the past 12 months, has lack of transportation kept you from medical appointments, getting your medicines, non-medical meetings or appointments, work, or from getting things that you need?: No   Physical Activity: Insufficiently Active (3/27/2023)    Exercise Vital Sign     Days of Exercise per Week: 3 days     Minutes of Exercise per Session: 10 min   Stress: No Stress Concern Present (3/27/2023)    [employer] - Occupational Stress Questionnaire     Feeling of Stress : Not at all   Social Connections: Moderately Integrated (3/27/2023)    Social Connection and Isolation Panel [NHANES]     Frequency of Communication with Friends and Family: More than three times a week     Frequency of Social Gatherings with Friends and Family: More than three times a week     Attends Congregation Services: More than 4 times per year     Active Member of Clubs or Organizations: Yes     Attends Club or Organization Meetings: More than 4 times per year     Marital Status: Never    Interpersonal Safety: Low Risk  (10/5/2023)    Interpersonal Safety     Do you feel physically and emotionally safe where you currently live?: Yes     Within the past 12 months,  have you been hit, slapped, kicked or otherwise physically hurt by someone?: No     Within the past 12 months, have you been humiliated or emotionally abused in other ways by your partner or ex-partner?: No   Housing Stability: Low Risk  (12/6/2023)    Housing Stability     Do you have housing? : Yes     Are you worried about losing your housing?: No          Medications  Allergies   Current Outpatient Medications   Medication Sig Dispense Refill    acetaminophen (TYLENOL) 325 MG tablet Take 2 tablets (650 mg) by mouth every 4 hours as needed for mild pain      amiodarone (PACERONE) 200 MG tablet Take 0.5 tablets (100 mg) by mouth daily 45 tablet 3    apixaban ANTICOAGULANT (ELIQUIS) 5 MG tablet Take 1 tablet (5 mg) by mouth 2 times daily 60 tablet 1    cholecalciferol, vitamin D3, (VITAMIN D3) 2,000 unit Tab [CHOLECALCIFEROL, VITAMIN D3, (VITAMIN D3) 2,000 UNIT TAB] Take 1 tablet (2,000 Units total) by mouth daily. 90 tablet 3    COMPOUNDED NON-CONTROLLED SUBSTANCE (CMPD RX) - PHARMACY TO MIX COMPOUNDED MEDICATION Ketamine 8% and Ketoprofen 5 % in carrier gel/lotion. Apply 2-4 pumps to affected areas QID  g 1    DULoxetine (CYMBALTA) 60 MG capsule Take 60 mg by mouth daily      furosemide (LASIX) 20 MG tablet Take 1 tablet (20 mg) by mouth daily at 4pm 30 tablet 3    furosemide (LASIX) 40 MG tablet Take 1 tablet (40 mg) by mouth every morning      KLOR-CON 20 MEQ CR tablet TAKE ONE TABLET BY MOUTH ONE TIME DAILY (Patient taking differently: 20 mEq daily) 90 tablet 3    lisinopril (ZESTRIL) 2.5 MG tablet TAKE ONE TABLET BY MOUTH ONE TIME DAILY 90 tablet 1    oxyCODONE (ROXICODONE) 5 MG tablet Take 1 tablet (5 mg) by mouth 3 times daily as needed for moderate to severe pain #70 tabs to last 30 days for chronic pain. Ok to fill 11/28/23 to begin using 11/30/23 70 tablet 0    zolpidem ER (AMBIEN CR) 6.25 MG CR tablet Take 1 and 1/4 tablet by mouth at bedtime 45 tablet 5    Allergies   Allergen Reactions     "Trazodone Shortness Of Breath and Unknown     Allergic to trazodone and deriv., Other: trouble swallowing      Clindamycin Diarrhea     C-diff    Gabapentin Other (See Comments)     \"Internal tremors\"    Temazepam Other (See Comments)     Annotation: Nightmares           Lab Results    Chemistry/lipid CBC Cardiac Enzymes/BNP/TSH/INR   Lab Results   Component Value Date    CHOL 179 03/12/2015    HDL 64 03/12/2015    TRIG 176 (H) 03/12/2015    BUN 34.1 (H) 12/30/2023     12/30/2023    CO2 28 12/30/2023    Lab Results   Component Value Date    WBC 7.5 12/29/2023    HGB 12.9 12/29/2023    HCT 41.7 12/29/2023    MCV 95 12/29/2023     12/29/2023    Lab Results   Component Value Date    TROPONINI 0.16 08/23/2022    BNP 1,933 (H) 08/23/2022    TSH 6.25 (H) 12/29/2023    INR 1.33 (H) 03/04/2023             This note has been dictated using voice recognition software. Any grammatical, typographical, or context distortions are unintentional and inherent to the software    30 minutes spent on the date of encounter doing chart review, review of outside records, review of test results, interpretation with above tests, patient visit, and documentation.        The longitudinal plan of care for heart failure with reduced ejection fraction, persistent atrial fibrillation, hypertension, CKD stage IIIa was addressed during this visit. 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                Thank you for allowing me to participate in the care of your patient.      Sincerely,     SHASHA Haney CNP     [facility]  cc:   SHASHA Haney CNP  [address], [address]  [place] [address]      "

## 2024-01-11 NOTE — PROGRESS NOTES
Assessment/Recommendations   Assessment:    1. Nonischemic cardiomyopathy, heart failure with reduced ejection fraction, ejection fraction 25%, NYHA class II: Compensated.  She has fatigue, dyspnea on exertion and trace right lower extremity edema.  Her weight has been stable since discharge.  She follows a low-sodium diet.  CRT was discussed during hospitalization and she has declined.  2.  Persistent atrial fibrillation: Normal sinus rhythm.  Status post cardioversion December 22.  She continues amiodarone 100 mg daily and Eliquis for anticoagulation.  Metoprolol was discontinued due to bradycardia during hospitalization  3.  Chronic kidney disease stage IIIa: CMP pending.  Baseline creatinine around 1.2-1.4  4.  Hypertension: Controlled.  Blood pressure 100/54    Plan:  1.   Heart failure medications:  - Beta blocker therapy with metoprolol was discontinued due to bradycardia in the hospital  - ACEI therapy with lisinopril 2.5 mg daily  - Diuretic therapy with furosemide 40 mg in the morning and 20 mg in the afternoon  2.  CMP pending  3.  Continue current medications  4.  Continue monitoring daily weights and following a low-sodium diet    Gladys Ramirez will follow up with Dr. Holley March 25, EP DEON in 4 weeks and in the heart failure clinic in February.     History of Present Illness/Subjective    Ms. Gladys Ramirez is a 89 year old female seen at [facility] heart failure clinic today for continued follow-up.  She follows up for heart failure with reduced ejection fraction, nonischemic cardiomyopathy.  She was hospitalized twice during the month of December.  She was first admitted for decompensated heart failure and was started on IV Lasix.  She also underwent cardioversion on December 22 which was successful.  He had a limited echocardiogram which showed ejection fraction of 25%.  Discussion of CRT was done but patient declined.  She was then readmitted 5 days later for bradycardia with  "heart rate in the 30s.  She was also back in atrial fibrillation.  Her metoprolol was discontinued and heart rates improved to 50s.  Again CRT was discussed but patient declined. She has a past medical history significant for hypertension, persistent atrial fibrillation, LBBB, chronic kidney disease stage IIIa, hyperlipidemia, DVT.     Today, she has fatigue, dyspnea on exertion and trace right lower extremity edema.  She denies lightheadedness, shortness of breath, orthopnea, PND, palpitations, chest pain, and abdominal fullness/bloating.      She is monitoring home weights which are stable between 148-152 pounds.  She is following a low sodium diet.        LIMITED ECHOCARDIOGRAM: 12/21/2023-reviewed  Interpretation Summary     There is mild concentric left ventricular hypertrophy.  Biplane LVEF is 25%.  Septal motion is consistent with conduction abnormality.  Mildly decreased right ventricular systolic function  No significant valve disease.  Compared to the prior study dated 11/30/2023, there have been no changes.     Physical Examination Review of Systems   /54 (BP Location: Left arm, Patient Position: Sitting, Cuff Size: Adult Large)   Pulse 67   Resp 16   Ht 1.575 m (5' 2\")   Wt 71.2 kg (157 lb)   BMI 28.72 kg/m    Body mass index is 28.72 kg/m .  Wt Readings from Last 3 Encounters:   01/11/24 71.2 kg (157 lb)   12/29/23 66.2 kg (146 lb)   12/24/23 67 kg (147 lb 11.3 oz)       General Appearance:   no acute distress   ENT/Mouth: No abnormalities   EYES:  no scleral icterus, normal conjunctivae   Neck: no thyromegaly   Chest/Lungs:   lungs are clear to auscultation, no rales or wheezing, equal chest wall expansion    Cardiovascular:   Regular. Normal first and second heart sounds with no murmurs, rubs, or gallops, trace right lower extremity edema     Abdomen:  bowel sounds are present   Extremities: no cyanosis or clubbing   Skin: warm   Neurologic: no tremors     Psychiatric: alert and oriented x3 "                                              Medical History  Surgical History Family History Social History   Past Medical History:   Diagnosis Date    Angina pectoris (H24)     Atrial fibrillation (H)     Chest pain 03/09/2017    Chronic kidney disease     Congestive heart failure (H)     Cough     Hyperlipidemia     Hypertension     Osteoarthritis     Past Surgical History:   Procedure Laterality Date    APPENDECTOMY      BASAL CELL CARCINOMA EXCISION      nose    LAPAROSCOPY DIAGNOSTIC (GENERAL) N/A 11/04/2014    Procedure: LAPAROSCOPY BILATERAL SALPINGO-OOPHORECTOMY ;  Surgeon: Sofia Harper MD;  [place] [facility] [place]  Service:     OPEN REDUCTION INTERNAL FIXATION HIP BIPOLAR Right 3/5/2023    Procedure: HEMIARTHROPLASTY, HIP, BIPOLAR;  Surgeon: Jose G Martinez MD;  Location: [facility]    TONSILLECTOMY      10 years old    ZZC TOTAL KNEE ARTHROPLASTY Left     2011    Family History   Problem Relation Age of Onset    Heart Disease Mother     Rheumatic Heart Disease Mother     No Known Problems Father     Cancer Brother         brain    Lung Cancer Brother     Lung Cancer Brother     Cancer Sister         lung    Lung Cancer Sister     Social History     Socioeconomic History    Marital status: Single     Spouse name: Not on file    Number of children: Not on file    Years of education: Not on file    Highest education level: Not on file   Occupational History    Not on file   Tobacco Use    Smoking status: Never     Passive exposure: Never    Smokeless tobacco: Never    Tobacco comments:     no passive exposure   Vaping Use    Vaping Use: Never used   Substance and Sexual Activity    Alcohol use: Yes     Comment: Alcoholic Drinks/day: Rarely a glass of wine    Drug use: No    Sexual activity: Not on file   Other Topics Concern    Not on file   Social History Narrative    The patient is a nun.     Social Determinants of Health     Financial Resource Strain: Low Risk  (12/6/2023)     Financial Resource Strain     Within the past 12 months, have you or your family members you live with been unable to get utilities (heat, electricity) when it was really needed?: No   Food Insecurity: Low Risk  (12/6/2023)    Food Insecurity     Within the past 12 months, did you worry that your food would run out before you got money to buy more?: No     Within the past 12 months, did the food you bought just not last and you didn t have money to get more?: No   Transportation Needs: Low Risk  (12/6/2023)    Transportation Needs     Within the past 12 months, has lack of transportation kept you from medical appointments, getting your medicines, non-medical meetings or appointments, work, or from getting things that you need?: No   Physical Activity: Insufficiently Active (3/27/2023)    Exercise Vital Sign     Days of Exercise per Week: 3 days     Minutes of Exercise per Session: 10 min   Stress: No Stress Concern Present (3/27/2023)    [employer] [place] [employer] - Occupational Stress Questionnaire     Feeling of Stress : Not at all   Social Connections: Moderately Integrated (3/27/2023)    Social Connection and Isolation Panel [NHANES]     Frequency of Communication with Friends and Family: More than three times a week     Frequency of Social Gatherings with Friends and Family: More than three times a week     Attends Latter day Services: More than 4 times per year     Active Member of Clubs or Organizations: Yes     Attends Club or Organization Meetings: More than 4 times per year     Marital Status: Never    Interpersonal Safety: Low Risk  (10/5/2023)    Interpersonal Safety     Do you feel physically and emotionally safe where you currently live?: Yes     Within the past 12 months, have you been hit, slapped, kicked or otherwise physically hurt by someone?: No     Within the past 12 months, have you been humiliated or emotionally abused in other ways by your partner or ex-partner?: No  "  Housing Stability: Low Risk  (12/6/2023)    Housing Stability     Do you have housing? : Yes     Are you worried about losing your housing?: No          Medications  Allergies   Current Outpatient Medications   Medication Sig Dispense Refill    acetaminophen (TYLENOL) 325 MG tablet Take 2 tablets (650 mg) by mouth every 4 hours as needed for mild pain      amiodarone (PACERONE) 200 MG tablet Take 0.5 tablets (100 mg) by mouth daily 45 tablet 3    apixaban ANTICOAGULANT (ELIQUIS) 5 MG tablet Take 1 tablet (5 mg) by mouth 2 times daily 60 tablet 1    cholecalciferol, vitamin D3, (VITAMIN D3) 2,000 unit Tab [CHOLECALCIFEROL, VITAMIN D3, (VITAMIN D3) 2,000 UNIT TAB] Take 1 tablet (2,000 Units total) by mouth daily. 90 tablet 3    COMPOUNDED NON-CONTROLLED SUBSTANCE (CMPD RX) - PHARMACY TO MIX COMPOUNDED MEDICATION Ketamine 8% and Ketoprofen 5 % in carrier gel/lotion. Apply 2-4 pumps to affected areas QID  g 1    DULoxetine (CYMBALTA) 60 MG capsule Take 60 mg by mouth daily      furosemide (LASIX) 20 MG tablet Take 1 tablet (20 mg) by mouth daily at 4pm 30 tablet 3    furosemide (LASIX) 40 MG tablet Take 1 tablet (40 mg) by mouth every morning      KLOR-CON 20 MEQ CR tablet TAKE ONE TABLET BY MOUTH ONE TIME DAILY (Patient taking differently: 20 mEq daily) 90 tablet 3    lisinopril (ZESTRIL) 2.5 MG tablet TAKE ONE TABLET BY MOUTH ONE TIME DAILY 90 tablet 1    oxyCODONE (ROXICODONE) 5 MG tablet Take 1 tablet (5 mg) by mouth 3 times daily as needed for moderate to severe pain #70 tabs to last 30 days for chronic pain. Ok to fill 11/28/23 to begin using 11/30/23 70 tablet 0    zolpidem ER (AMBIEN CR) 6.25 MG CR tablet Take 1 and 1/4 tablet by mouth at bedtime 45 tablet 5    Allergies   Allergen Reactions    Trazodone Shortness Of Breath and Unknown     Allergic to trazodone and deriv., Other: trouble swallowing      Clindamycin Diarrhea     C-diff    Gabapentin Other (See Comments)     \"Internal tremors\"    " Temazepam Other (See Comments)     Annotation: Nightmares           Lab Results    Chemistry/lipid CBC Cardiac Enzymes/BNP/TSH/INR   Lab Results   Component Value Date    CHOL 179 03/12/2015    HDL 64 03/12/2015    TRIG 176 (H) 03/12/2015    BUN 34.1 (H) 12/30/2023     12/30/2023    CO2 28 12/30/2023    Lab Results   Component Value Date    WBC 7.5 12/29/2023    HGB 12.9 12/29/2023    HCT 41.7 12/29/2023    MCV 95 12/29/2023     12/29/2023    Lab Results   Component Value Date    TROPONINI 0.16 08/23/2022    BNP 1,933 (H) 08/23/2022    TSH 6.25 (H) 12/29/2023    INR 1.33 (H) 03/04/2023             This note has been dictated using voice recognition software. Any grammatical, typographical, or context distortions are unintentional and inherent to the software    30 minutes spent on the date of encounter doing chart review, review of outside records, review of test results, interpretation with above tests, patient visit, and documentation.        The longitudinal plan of care for heart failure with reduced ejection fraction, persistent atrial fibrillation, hypertension, CKD stage IIIa was addressed during this visit. 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

## 2024-01-12 DIAGNOSIS — G89.29 CHRONIC INTRACTABLE PAIN: ICD-10-CM

## 2024-01-12 DIAGNOSIS — S83.511S RUPTURE OF ANTERIOR CRUCIATE LIGAMENT OF RIGHT KNEE, SEQUELA: ICD-10-CM

## 2024-01-12 DIAGNOSIS — M17.11 PRIMARY OSTEOARTHRITIS OF RIGHT KNEE: ICD-10-CM

## 2024-01-12 RX ORDER — ACETAMINOPHEN 500 MG
500-1000 TABLET ORAL 3 TIMES DAILY PRN
Qty: 250 TABLET | Refills: 1 | Status: ON HOLD | OUTPATIENT
Start: 2024-01-12 | End: 2024-05-24

## 2024-01-12 RX ORDER — OXYCODONE HYDROCHLORIDE 5 MG/1
5 TABLET ORAL EVERY 4 HOURS PRN
Qty: 90 TABLET | Refills: 0 | Status: SHIPPED | OUTPATIENT
Start: 2024-01-12 | End: 2024-02-21

## 2024-01-12 RX ORDER — DICLOFENAC EPOLAMINE 0.01 G/1
1 SYSTEM TOPICAL 2 TIMES DAILY
Qty: 180 PATCH | Refills: 1 | Status: SHIPPED | OUTPATIENT
Start: 2024-01-12 | End: 2024-02-23

## 2024-01-12 NOTE — TELEPHONE ENCOUNTER
[facility] Call Center    Phone Message    May a detailed message be left on voicemail: no     Reason for Call: Medication Question or concern regarding medication   Prescription Clarification  Name of Medication: COMPOUNDED NON-CONTROLLED SUBSTANCE (CMPD RX)   Prescribing Provider: Valentine Howard,   Pharmacy: [facility] ([facility]) - [place], [place] - [address]   What on the order needs clarification? Medication was sent over as non-controlled when it is a controlled substance. Please call Pharmacy back for verbal verification or send new orders.      Action Taken: Message routed to:  Other: [facility]    Travel Screening: Not Applicable

## 2024-01-12 NOTE — PROGRESS NOTES
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    Diagnoses and all orders for this visit:    Primary osteoarthritis of right knee  -     Orthopedic  Referral    Rupture of anterior cruciate ligament of right knee, sequela  -     Orthopedic  Referral    Chronic intractable pain  -     Orthopedic  Referral    Other orders  -     Large Joint Injection/Arthocentesis: R knee joint      89-year-old female with severe right knee osteoarthritis in the setting of chronic ACL tear presents with worsening right knee pain.  She has had corticosteroid injections in the past with at least several months of relief and has tried multiple other treatments without success including genicular nerve blocks, hyaluronic acid, bracing, therapy, and is currently ambulating with rolling walker to help with pain.  On physical exam, today she does have a large joint effusion with valgus deformity, significant tenderness palpation of her medial joint line, reduced range of motion secondary to stiffness and effusion, and positive Lachman's test.    Plan:  -Tylenol Extra Strength (1000mg) up to three times daily as needed for pain  -Aspiration and steroid injection to right knee today in clinic.  We discussed that due to her age, I am willing to repeat these as early as every 3 months if they are improving her pain and quality of life  -Continue home exercises. Challenge yourself when these get easier by increasing the number of repetitions   -Continue to use rolling walker as needed for ambulation  - Continue current pain medication regimen as prescribed by pain management      Return in about 3 months (around 4/16/2024).    Large Joint Injection/Arthocentesis: R knee joint    Date/Time: 1/16/2024 3:44 PM    Performed by: Estrella Walker DO  Authorized by: Estrella Walker DO    Indications:  Pain and osteoarthritis  Needle Size:  25 G  Guidance: ultrasound    Approach:   "Anterolateral  Location:  Knee      Medications:  40 mg triamcinolone 40 MG/ML; 3 mL ROPivacaine 5 MG/ML; 3 mL lidocaine 1 %  Aspirate amount (mL):  13  Aspirate:  Yellow, blood-tinged and cloudy  Outcome:  Tolerated well, no immediate complications  Procedure discussed: discussed risks, benefits, and alternatives    Consent Given by:  Patient  Timeout: timeout called immediately prior to procedure    Prep: patient was prepped and draped in usual sterile fashion     Ultrasound was used to ensure safe and accurate needle placement and injection. Ultrasound images of the procedure were permanently stored.        Dr. Estrella Walker, DO  [facility]  Sports Medicine     -----  Chief Complaint   Patient presents with    Right Knee - Pain       SUBJECTIVE  Gladys Ramirez is a/an 89 year old female who is seen in consultation at the request of  Valentine Howard C.N.P. for evaluation of right knee pain.  Onset was several years ago. Pain is located over the medial compartment and posterior knee, some general soreness in the anterior knee. Symptoms are worsened by being standing, walking, and finding a sleep position.  She has tried cortisone shots 3/10/22, 6/15/22, and 2/10/2023 that provided 3-6 months of relief (decreasing effect each time) and SynviscOne injections which provided No relief, and home exercises. She also takes tylenol and oxycodone, which help. Associated symptoms include mostly sharp pain with bouts of aching. \"It feels like there's scissors in there\".     The patient is seen alone    Prior injury/Surgical history of affected joint: none  Social History/Occupation: Religious N[employer]    REVIEW OF SYSTEMS:  Pertinent positives/negative: As stated above in HPI    OBJECTIVE:  /72   Wt 71.2 kg (157 lb)   BMI 28.72 kg/m     General: Alert and in no distress  Skin: no visable rashes  CV: Extremities appear well perfused   Resp: normal respiratory effort, no conversational " dyspnea   Psych: normal mood, affect  MSK:  RIGHT KNEE  Inspection:    genu valgum  Palpation:    Tender about the medial joint line. Remainder of bony and ligamentous landmarks are nontender.    Moderate effusion is present    Patellofemoral crepitus is Present  Range of Motion:     50 extension to 1200 flexion  Strength:    Extensor mechanism intact  Special Tests:    Positive: Lachman       RADIOLOGY:  Final results and radiologist's interpretation available in the [facility] health record.  Images below were personally reviewed and discussed with the patient in the office today.  My personal interpretation of the performed imaging: X-ray of the right knee from 3/4/2023 reveals Abeer tricompartmental osteoarthritis with bone-on-bone articulation and, most severe in the medial compartment with associated valgus deformity and joint effusion      Review of prior external note(s) from - pain management

## 2024-01-15 NOTE — TELEPHONE ENCOUNTER
"Due to an EPIC related glitch, we cannot send as a controlled substance compounded medication. I have spoken to IT and there is no work around. There is no legal reason that this must be sent on a \"controlled substance\" script.  The medication can be made and filled with this order.     SHASHA Domínguez, NP-C  [facility]      "

## 2024-01-15 NOTE — TELEPHONE ENCOUNTER
Spoke to the pharmacist. This has to be prescribed as a controlled substance because Ketamine is a controlled substance and because an electronic signature validation for controlled substance is required by the board of pharmacy for this medication. They cannot fill it as it is.     This prescription is pended here as a controlled substance. I have removed the refill as that seems to be what the glitch is. Routing to provider to see if this can be signed electronically as pended now.    Toña Dow, FRANCOISEN, RN  Care Coordinator  [facility] [place]

## 2024-01-15 NOTE — TELEPHONE ENCOUNTER
Spoke with Rina at [facility] Refills can be written using the Ketamine powder medication options OR we can put refills in the sig or note to pharamcy line. This refill has been given verbally.     SHASHA Domínguez, NP-C  [facility] [place]

## 2024-01-16 ENCOUNTER — OFFICE VISIT (OUTPATIENT)
Dept: ORTHOPEDICS | Facility: CLINIC | Age: 89
End: 2024-01-16
Attending: NURSE PRACTITIONER
Payer: COMMERCIAL

## 2024-01-16 VITALS — SYSTOLIC BLOOD PRESSURE: 134 MMHG | BODY MASS INDEX: 28.72 KG/M2 | DIASTOLIC BLOOD PRESSURE: 72 MMHG | WEIGHT: 157 LBS

## 2024-01-16 DIAGNOSIS — S83.511S RUPTURE OF ANTERIOR CRUCIATE LIGAMENT OF RIGHT KNEE, SEQUELA: ICD-10-CM

## 2024-01-16 DIAGNOSIS — G89.29 CHRONIC INTRACTABLE PAIN: ICD-10-CM

## 2024-01-16 DIAGNOSIS — M17.11 PRIMARY OSTEOARTHRITIS OF RIGHT KNEE: Primary | ICD-10-CM

## 2024-01-16 PROCEDURE — 20611 DRAIN/INJ JOINT/BURSA W/US: CPT | Mod: RT | Performed by: STUDENT IN AN ORGANIZED HEALTH CARE EDUCATION/TRAINING PROGRAM

## 2024-01-16 PROCEDURE — 99204 OFFICE O/P NEW MOD 45 MIN: CPT | Mod: 25 | Performed by: STUDENT IN AN ORGANIZED HEALTH CARE EDUCATION/TRAINING PROGRAM

## 2024-01-16 RX ORDER — LIDOCAINE HYDROCHLORIDE 10 MG/ML
3 INJECTION, SOLUTION INFILTRATION; PERINEURAL
Status: DISCONTINUED | OUTPATIENT
Start: 2024-01-16 | End: 2024-03-25

## 2024-01-16 RX ORDER — TRIAMCINOLONE ACETONIDE 40 MG/ML
40 INJECTION, SUSPENSION INTRA-ARTICULAR; INTRAMUSCULAR
Status: DISCONTINUED | OUTPATIENT
Start: 2024-01-16 | End: 2024-03-25

## 2024-01-16 RX ORDER — ROPIVACAINE HYDROCHLORIDE 5 MG/ML
3 INJECTION, SOLUTION EPIDURAL; INFILTRATION; PERINEURAL
Status: DISCONTINUED | OUTPATIENT
Start: 2024-01-16 | End: 2024-03-25

## 2024-01-16 RX ADMIN — TRIAMCINOLONE ACETONIDE 40 MG: 40 INJECTION, SUSPENSION INTRA-ARTICULAR; INTRAMUSCULAR at 15:44

## 2024-01-16 RX ADMIN — LIDOCAINE HYDROCHLORIDE 3 ML: 10 INJECTION, SOLUTION INFILTRATION; PERINEURAL at 15:44

## 2024-01-16 RX ADMIN — ROPIVACAINE HYDROCHLORIDE 3 ML: 5 INJECTION, SOLUTION EPIDURAL; INFILTRATION; PERINEURAL at 15:44

## 2024-01-16 NOTE — LETTER
1/16/2024         RE: Gladys Ramirez  [address]  [place] [address]        Dear Colleague,    Thank you for referring your patient, Gladys Ramirez, to the [facility]. Please see a copy of my visit note below.    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    Diagnoses and all orders for this visit:    Primary osteoarthritis of right knee  -     Orthopedic  Referral    Rupture of anterior cruciate ligament of right knee, sequela  -     Orthopedic  Referral    Chronic intractable pain  -     Orthopedic  Referral    Other orders  -     Large Joint Injection/Arthocentesis: R knee joint      89-year-old female with severe right knee osteoarthritis in the setting of chronic ACL tear presents with worsening right knee pain.  She has had corticosteroid injections in the past with at least several months of relief and has tried multiple other treatments without success including genicular nerve blocks, hyaluronic acid, bracing, therapy, and is currently ambulating with rolling walker to help with pain.  On physical exam, today she does have a large joint effusion with valgus deformity, significant tenderness palpation of her medial joint line, reduced range of motion secondary to stiffness and effusion, and positive Lachman's test.    Plan:  -Tylenol Extra Strength (1000mg) up to three times daily as needed for pain  -Aspiration and steroid injection to right knee today in clinic.  We discussed that due to her age, I am willing to repeat these as early as every 3 months if they are improving her pain and quality of life  -Continue home exercises. Challenge yourself when these get easier by increasing the number of repetitions   -Continue to use rolling walker as needed for ambulation  - Continue current pain medication regimen as prescribed by pain management      Return in about 3 months (around  "4/16/2024).    Large Joint Injection/Arthocentesis: R knee joint    Date/Time: 1/16/2024 3:44 PM    Performed by: Estrella Walker DO  Authorized by: Estrella Walker DO    Indications:  Pain and osteoarthritis  Needle Size:  25 G  Guidance: ultrasound    Approach:  Anterolateral  Location:  Knee      Medications:  40 mg triamcinolone 40 MG/ML; 3 mL ROPivacaine 5 MG/ML; 3 mL lidocaine 1 %  Aspirate amount (mL):  13  Aspirate:  Yellow, blood-tinged and cloudy  Outcome:  Tolerated well, no immediate complications  Procedure discussed: discussed risks, benefits, and alternatives    Consent Given by:  Patient  Timeout: timeout called immediately prior to procedure    Prep: patient was prepped and draped in usual sterile fashion     Ultrasound was used to ensure safe and accurate needle placement and injection. Ultrasound images of the procedure were permanently stored.        Dr. Estrella Walker DO  [facility]  Sports Medicine     -----  Chief Complaint   Patient presents with     Right Knee - Pain       SUBJECTIVE  Gladys Ramirez is a/an 89 year old female who is seen in consultation at the request of  Valentine Howard C.N.P. for evaluation of right knee pain.  Onset was several years ago. Pain is located over the medial compartment and posterior knee, some general soreness in the anterior knee. Symptoms are worsened by being standing, walking, and finding a sleep position.  She has tried cortisone shots 3/10/22, 6/15/22, and 2/10/2023 that provided 3-6 months of relief (decreasing effect each time) and SynviscOne injections which provided No relief, and home exercises. She also takes tylenol and oxycodone, which help. Associated symptoms include mostly sharp pain with bouts of aching. \"It feels like there's scissors in there\".     The patient is seen alone    Prior injury/Surgical history of affected joint: none  Social History/Occupation: [employer] [place] [employer]    REVIEW OF " SYSTEMS:  Pertinent positives/negative: As stated above in HPI    OBJECTIVE:  /72   Wt 71.2 kg (157 lb)   BMI 28.72 kg/m     General: Alert and in no distress  Skin: no visable rashes  CV: Extremities appear well perfused   Resp: normal respiratory effort, no conversational dyspnea   Psych: normal mood, affect  MSK:  RIGHT KNEE  Inspection:    genu valgum  Palpation:    Tender about the medial joint line. Remainder of bony and ligamentous landmarks are nontender.    Moderate effusion is present    Patellofemoral crepitus is Present  Range of Motion:     50 extension to 1200 flexion  Strength:    Extensor mechanism intact  Special Tests:    Positive: Lachman       RADIOLOGY:  Final results and radiologist's interpretation available in the [facility] health record.  Images below were personally reviewed and discussed with the patient in the office today.  My personal interpretation of the performed imaging: X-ray of the right knee from 3/4/2023 reveals Abeer tricompartmental osteoarthritis with bone-on-bone articulation and, most severe in the medial compartment with associated valgus deformity and joint effusion      Review of prior external note(s) from - pain management             Again, thank you for allowing me to participate in the care of your patient.        Sincerely,        Estrella Walker, DO

## 2024-01-16 NOTE — PATIENT INSTRUCTIONS
Thank you for choosing [facility]!    DR. MORALES'S CLINIC LOCATIONS:     [place]  TRIAGE LINE: [phone] [address] APPOINTMENTS: [phone]   [place] [address] RADIOLOGY: [phone]   (Mondays & Tuesdays) HAND THERAPY: [phone]    PHYSICAL THERAPY: [phone]   [place] BILLING QUESTIONS: [phone] [address] FAX: [phone]   [place] [address]    (Thursdays & Fridays)       Plan:  -Tylenol Extra Strength (1000mg) up to three times daily as needed for pain  -Aspiration and steroid injection to right knee today in clinic. We can repeat these as soon as every 3 months if helping  -Continue home exercises. Challenge yourself when these get easier by increasing the number of repetitions     Post-Injection Discharge Instructions    You may shower, however avoid swimming, tub baths or hot tubs for 24 hours following your procedure  You may have a mild to moderate increase in pain for a few days following the injection.  The lidocaine (local numbing medicine) will wear off in several hours. It usually takes 3-5 days for the steroid medication to start working although it may take up to 14 days for full effect.   You may use ice packs for 10-15 minutes, 3 to 4 times a day at the injection site for comfort if needed  You may use extra strength Tylenol for pain control if necessary   If you were fasting, you may resume your normal diet and medications after the procedure  If you have diabetes, your blood sugar may be higher than normal for 10-14 days following a steroid injection. Contact your doctor who manages your diabetes if your blood sugar is significantly higher than usual    If you experience any of the following, call Sports Medicine @ [phone] or [phone]  -Fever over 100 degrees F  -Swelling, bleeding, redness, drainage, warmth at the injection site  -New or significant worsening pain

## 2024-01-25 ENCOUNTER — TELEPHONE (OUTPATIENT)
Dept: FAMILY MEDICINE | Facility: CLINIC | Age: 89
End: 2024-01-25
Payer: COMMERCIAL

## 2024-01-25 NOTE — TELEPHONE ENCOUNTER
Dr. Flores-Please review and may close encounter.    Call received from RAMU Ritchie with [facility]:  Patient has met nursing goals and is discharged from skilled nursing with home care    Informed Chau message will be sent to Dr. Flores.    Thank you!  FRANCOISE FordN, RN-BC  [facility]

## 2024-01-31 ENCOUNTER — OFFICE VISIT (OUTPATIENT)
Dept: FAMILY MEDICINE | Facility: CLINIC | Age: 89
End: 2024-01-31
Payer: COMMERCIAL

## 2024-01-31 VITALS
HEIGHT: 62 IN | SYSTOLIC BLOOD PRESSURE: 110 MMHG | HEART RATE: 73 BPM | RESPIRATION RATE: 16 BRPM | WEIGHT: 149.5 LBS | OXYGEN SATURATION: 98 % | DIASTOLIC BLOOD PRESSURE: 67 MMHG | TEMPERATURE: 97.6 F | BODY MASS INDEX: 27.51 KG/M2

## 2024-01-31 DIAGNOSIS — F11.20 EPISODIC OPIOID DEPENDENCE (H): ICD-10-CM

## 2024-01-31 DIAGNOSIS — I42.9 SECONDARY CARDIOMYOPATHY (H): ICD-10-CM

## 2024-01-31 DIAGNOSIS — I48.19 PERSISTENT ATRIAL FIBRILLATION (H): ICD-10-CM

## 2024-01-31 DIAGNOSIS — I50.22 CHRONIC SYSTOLIC HEART FAILURE (H): Primary | ICD-10-CM

## 2024-01-31 DIAGNOSIS — G47.00 INSOMNIA, UNSPECIFIED TYPE: ICD-10-CM

## 2024-01-31 PROBLEM — R09.02 HYPOXIA: Status: RESOLVED | Noted: 2023-12-20 | Resolved: 2024-01-31

## 2024-01-31 PROBLEM — Z79.01 ANTICOAGULATED: Status: RESOLVED | Noted: 2023-03-04 | Resolved: 2024-01-31

## 2024-01-31 PROBLEM — I50.23 ACUTE ON CHRONIC SYSTOLIC HEART FAILURE (H): Status: RESOLVED | Noted: 2023-11-29 | Resolved: 2024-01-31

## 2024-01-31 PROBLEM — I50.9 ACUTE CONGESTIVE HEART FAILURE, UNSPECIFIED HEART FAILURE TYPE (H): Status: RESOLVED | Noted: 2023-12-20 | Resolved: 2024-01-31

## 2024-01-31 PROBLEM — I82.4Z9: Status: RESOLVED | Noted: 2023-03-07 | Resolved: 2024-01-31

## 2024-01-31 PROBLEM — R74.8 ELEVATED ALKALINE PHOSPHATASE LEVEL: Status: RESOLVED | Noted: 2019-01-17 | Resolved: 2024-01-31

## 2024-01-31 PROBLEM — E83.42 HYPOMAGNESEMIA: Status: RESOLVED | Noted: 2019-03-04 | Resolved: 2024-01-31

## 2024-01-31 PROBLEM — S72.001A HIP FRACTURE, RIGHT, CLOSED, INITIAL ENCOUNTER (H): Status: RESOLVED | Noted: 2023-03-04 | Resolved: 2024-01-31

## 2024-01-31 PROBLEM — R60.0 LOWER EXTREMITY EDEMA: Status: RESOLVED | Noted: 2019-01-08 | Resolved: 2024-01-31

## 2024-01-31 PROCEDURE — 99214 OFFICE O/P EST MOD 30 MIN: CPT | Performed by: FAMILY MEDICINE

## 2024-01-31 RX ORDER — FUROSEMIDE 40 MG
40 TABLET ORAL EVERY MORNING
Qty: 90 TABLET | Refills: 3 | Status: SHIPPED | OUTPATIENT
Start: 2024-01-31 | End: 2024-06-18

## 2024-01-31 NOTE — PROGRESS NOTES
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    Diagnoses and all orders for this visit:    Chronic systolic heart failure (H)  Persistent atrial fibrillation (H)  Secondary cardiomyopathy (H): Follows with cardiology. Reviewed recent hospital notes- beta blocker was discontinued 2/2 severe bradycardia. She continues with daily weights and I did refill her diuretic to her preferred pharmacy- she was 40 mg qAM, and 20 mg qPM. No dyspnea or chest pain. No worsening peripheral edema. She has follow-up with cardiology. BP at goal today.  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 She reports pain improved since her most recent knee injection and she has follow-up in April. She uses oxycodone prn.     Insomnia, unspecified type: Has seen sleep medicine previously- and tried multiple medications. Her symptoms have been best controlled on zolpidem, however she received notification from insurance this is not on formulary- I did sent prior authorization request today.       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 She is not experiencing shortness of breath at night, with rest or with activity  She is not experiencing any lower extremity edema.   She denies orthopenea and is not coughing at night. Patient is checking weight daily. She has recently had a None.  She has side effects from medications including fatigue.  She has had no other medical visits for heart failure since the last visit.    Vascular Disease:  She presents for follow up of vascular disease.     She never takes nitroglycerin. She is not taking daily  "aspirin.    She eats 2-3 servings of fruits and vegetables daily.She consumes 1 sweetened beverage(s) daily.She exercises with enough effort to increase her heart rate 20 to 29 minutes per day.  She exercises with enough effort to increase her heart rate 3 or less days per week.   She is taking medications regularly.     Last Echo: Echo result w/o MOPS: Interpretation Summary There is mild concentric left ventricular hypertrophy.Biplane LVEF is 25%.Septal motion is consistent with conduction abnormality.Mildly decreased right ventricular systolic functionNo significant valve disease.Compared to the prior study dated 11/30/2023, there have been no changes.    Doing well overall  Daily weights- stable  Taking medications without difficulty  Knee pain improved since recent injection  She uses oxycodone prn  Home health has graduated her- she is managing independently  Using walker for ambulation.                    Objective    /67   Pulse 73   Temp 97.6  F (36.4  C) (Oral)   Resp 16   Ht 1.575 m (5' 2\")   Wt 67.8 kg (149 lb 8 oz)   SpO2 98%   BMI 27.34 kg/m    Body mass index is 27.34 kg/m .  Physical Exam   Gen: well appearing  CV: RRR no m/r/g  Resp: CTAB  Ext: trace peripheral edema        Signed Electronically by: Margret Flores MD    "

## 2024-02-07 NOTE — TELEPHONE ENCOUNTER
Medication Request  Medication name: Omeprazole 20 mg  Requested Pharmacy: [facility]  Reason for request: Patient spoke with Cardiologist telling him about her heartburn and thought is was her heart. Cardiologist suggested to have primary physician prescribe the Omeprazole 20 mg.  Will she need to schedule an appointment for this. Please advise.  When did you use medication last?:  N/A  Patient offered appointment:  No  Okay to leave a detailed message: yes       Therapist collaborated with PCP regarding last visit with therapist and goals related to social interaction prior to PCP follow up today.

## 2024-02-12 ENCOUNTER — TELEPHONE (OUTPATIENT)
Dept: FAMILY MEDICINE | Facility: CLINIC | Age: 89
End: 2024-02-12
Payer: COMMERCIAL

## 2024-02-12 NOTE — TELEPHONE ENCOUNTER
Central Prior Authorization Team   Phone: [phone]    PA Initiation    Medication: Zolpidem Tartrate ER 6.25MG er tablets  Insurance Company: MEDICA - Phone [phone] Fax [phone]  Pharmacy Filling the Rx: [id number] [[address]  Filling Pharmacy Phone: [phone]  Filling Pharmacy Fax:    Start Date: 2/12/2024

## 2024-02-12 NOTE — TELEPHONE ENCOUNTER
Prior Authorization Retail Medication Request    Medication/Dose: zolpidem ER (AMBIEN CR) 6.25 MG CR tablet   Diagnosis and ICD code (if different than what is on RX):    Primary insomnia [F51.01]      New/renewal/insurance change PA/secondary ins. PA: Insurance requested, pharmacy required  Previously Tried and Failed:  Unknown   Rationale:  Unknown     Insurance   Primary: [employer] [facility] [place]   Insurance ID:  [id number]

## 2024-02-12 NOTE — TELEPHONE ENCOUNTER
Prior authorization requested for Zolpidem - initiated today. Please see phone encounter dated 02/12/24. Thanks.

## 2024-02-13 ENCOUNTER — TELEPHONE (OUTPATIENT)
Dept: ORTHOPEDICS | Facility: CLINIC | Age: 89
End: 2024-02-13
Payer: COMMERCIAL

## 2024-02-13 NOTE — TELEPHONE ENCOUNTER
Left voicemail provide out of the clinic on 4/16/24 and appt was cancelled.  Asked patient to call CS at [phone] to get appt rescheduled.

## 2024-02-14 NOTE — TELEPHONE ENCOUNTER
Prior Authorization Approval    Authorization Effective Date: 1/13/2024  Authorization Expiration Date: 2/11/2025  Medication: Zolpidem Tartrate ER 6.25MG er tablets  Reference #:     Insurance Company: MEDICA - Phone [phone] Fax [phone]  Which Pharmacy is filling the prescription (Not needed for infusion/clinic administered): [facility][id number] - [place] [[address]  Pharmacy Notified: Yes  Patient Notified: Instructed pharmacy to notify patient when script is ready to /ship.

## 2024-02-15 ENCOUNTER — OFFICE VISIT (OUTPATIENT)
Dept: CARDIOLOGY | Facility: CLINIC | Age: 89
End: 2024-02-15
Payer: COMMERCIAL

## 2024-02-15 VITALS
DIASTOLIC BLOOD PRESSURE: 60 MMHG | BODY MASS INDEX: 28.35 KG/M2 | WEIGHT: 155 LBS | RESPIRATION RATE: 16 BRPM | HEART RATE: 64 BPM | SYSTOLIC BLOOD PRESSURE: 110 MMHG

## 2024-02-15 DIAGNOSIS — I48.19 PERSISTENT ATRIAL FIBRILLATION (H): ICD-10-CM

## 2024-02-15 DIAGNOSIS — N18.31 STAGE 3A CHRONIC KIDNEY DISEASE (CKD) (H): ICD-10-CM

## 2024-02-15 DIAGNOSIS — I10 BENIGN ESSENTIAL HYPERTENSION: ICD-10-CM

## 2024-02-15 DIAGNOSIS — I50.20 HFREF (HEART FAILURE WITH REDUCED EJECTION FRACTION) (H): Primary | ICD-10-CM

## 2024-02-15 PROCEDURE — 99214 OFFICE O/P EST MOD 30 MIN: CPT | Performed by: NURSE PRACTITIONER

## 2024-02-15 PROCEDURE — G2211 COMPLEX E/M VISIT ADD ON: HCPCS | Performed by: NURSE PRACTITIONER

## 2024-02-15 NOTE — PROGRESS NOTES
Assessment/Recommendations   Assessment:    1. Nonischemic cardiomyopathy, heart failure with reduced ejection fraction, ejection fraction 25%, NYHA class II: Compensated.  She states she is feeling well. Her symptoms are stable. She has fatigue, dyspnea on exertion and trace right lower extremity edema.  Her weight has been stable since discharge.  She follows a low-sodium diet.  CRT was discussed during hospitalization and she has declined.  2.  Persistent atrial fibrillation: Status post cardioversion December 22.  She continues amiodarone 100 mg daily and Eliquis for anticoagulation.  Metoprolol was discontinued due to bradycardia during hospitalization  3.  Chronic kidney disease stage IIIa: She had labs a month ago which were stable  4.  Hypertension: Controlled.  Blood pressure 10/60    Plan:  1.   Heart failure medications:  - Beta blocker therapy with metoprolol was discontinued due to bradycardia in the hospital  - ACEI therapy with lisinopril 2.5 mg daily  - Diuretic therapy with furosemide 40 mg in the morning and 20 mg in the afternoon  2.  Continue current medications  3.  Continue monitoring daily weights and following a low-sodium diet    Gladys Ramirez will follow up with Dr. Holley March 25, Dr Rievra February 20 and in the heart failure clinic in June     History of Present Illness/Subjective    Ms. Gladys Ramirez is a 89 year old female seen at [facility] heart failure clinic today for continued follow-up.  She follows up for heart failure with reduced ejection fraction, nonischemic cardiomyopathy.  She was hospitalized twice during the month of December.  She was first admitted for decompensated heart failure and was started on IV Lasix.  She also underwent cardioversion on December 22 which was successful.  He had a limited echocardiogram which showed ejection fraction of 25%.  Discussion of CRT was done but patient declined.  She was then readmitted 5 days later for  bradycardia with heart rate in the 30s.  She was also back in atrial fibrillation.  Her metoprolol was discontinued and heart rates improved to 50s.  Again CRT was discussed but patient declined. She has a past medical history significant for hypertension, persistent atrial fibrillation, LBBB, chronic kidney disease stage IIIa, hyperlipidemia, DVT.     Today, she has fatigue, dyspnea on exertion and trace right lower extremity edema.  She denies lightheadedness, shortness of breath, orthopnea, PND, palpitations, chest pain, and abdominal fullness/bloating.      She is monitoring home weights which are stable between 148-150 pounds.  She is following a low sodium diet.        LIMITED ECHOCARDIOGRAM: 12/21/2023-reviewed  Interpretation Summary     There is mild concentric left ventricular hypertrophy.  Biplane LVEF is 25%.  Septal motion is consistent with conduction abnormality.  Mildly decreased right ventricular systolic function  No significant valve disease.  Compared to the prior study dated 11/30/2023, there have been no changes.     Physical Examination Review of Systems   /60 (BP Location: Right arm, Patient Position: Sitting, Cuff Size: Adult Regular)   Pulse 64   Resp 16   Wt 70.3 kg (155 lb)   BMI 28.35 kg/m    Body mass index is 28.35 kg/m .  Wt Readings from Last 3 Encounters:   02/15/24 70.3 kg (155 lb)   01/31/24 67.8 kg (149 lb 8 oz)   01/16/24 71.2 kg (157 lb)       General Appearance:   no acute distress   ENT/Mouth: No abnormalities   EYES:  no scleral icterus, normal conjunctivae   Neck: no thyromegaly   Chest/Lungs:   lungs are clear to auscultation, no rales or wheezing, equal chest wall expansion    Cardiovascular:   Regular. Normal first and second heart sounds with no murmurs, rubs, or gallops, trace right lower extremity edema     Abdomen:  bowel sounds are present   Extremities: no cyanosis or clubbing   Skin: warm   Neurologic: no tremors     Psychiatric: alert and oriented x3                                               Medical History  Surgical History Family History Social History   Past Medical History:   Diagnosis Date    Angina pectoris (H24)     Atrial fibrillation (H)     Chest pain 03/09/2017    Chronic kidney disease     Congestive heart failure (H)     Cough     Hyperlipidemia     Hypertension     Osteoarthritis     Past Surgical History:   Procedure Laterality Date    APPENDECTOMY      BASAL CELL CARCINOMA EXCISION      nose    LAPAROSCOPY DIAGNOSTIC (GENERAL) N/A 11/04/2014    Procedure: LAPAROSCOPY BILATERAL SALPINGO-OOPHORECTOMY ;  Surgeon: Sofia Harper MD;  Location: [facility];  Service:     OPEN REDUCTION INTERNAL FIXATION HIP BIPOLAR Right 3/5/2023    Procedure: HEMIARTHROPLASTY, HIP, BIPOLAR;  Surgeon: Jose G Martinez MD;  [place] [facility] [place]SIL[place]Y      10 years old    ZZC TOTAL KNEE ARTHROPLASTY Left     2011    Family History   Problem Relation Age of Onset    Heart Disease Mother     Rheumatic Heart Disease Mother     No Known Problems Father     Cancer Brother         brain    Lung Cancer Brother     Lung Cancer Brother     Cancer Sister         lung    Lung Cancer Sister     Social History     Socioeconomic History    Marital status: Single     Spouse name: Not on file    Number of children: Not on file    Years of education: Not on file    Highest education level: Not on file   Occupational History    Not on file   Tobacco Use    Smoking status: Never     Passive exposure: Never    Smokeless tobacco: Never    Tobacco comments:     no passive exposure   Vaping Use    Vaping Use: Never used   Substance and Sexual Activity    Alcohol use: Yes     Comment: Alcoholic Drinks/day: Rarely a glass of wine    Drug use: No    Sexual activity: Not on file   Other Topics Concern    Not on file   Social History Narrative    The patient is a nun.     Social Determinants of Health     Financial Resource Strain: Low Risk  (1/31/2024)     Financial Resource Strain     Within the past 12 months, have you or your family members you live with been unable to get utilities (heat, electricity) when it was really needed?: No   Food Insecurity: Low Risk  (1/31/2024)    Food Insecurity     Within the past 12 months, did you worry that your food would run out before you got money to buy more?: No     Within the past 12 months, did the food you bought just not last and you didn t have money to get more?: No   Transportation Needs: Low Risk  (1/31/2024)    Transportation Needs     Within the past 12 months, has lack of transportation kept you from medical appointments, getting your medicines, non-medical meetings or appointments, work, or from getting things that you need?: No   Physical Activity: Insufficiently Active (3/27/2023)    Exercise Vital Sign     Days of Exercise per Week: 3 days     Minutes of Exercise per Session: 10 min   Stress: No Stress Concern Present (3/27/2023)    [employer] - Occupational Stress Questionnaire     Feeling of Stress : Not at all   Social Connections: Moderately Integrated (3/27/2023)    Social Connection and Isolation Panel [NHANES]     Frequency of Communication with Friends and Family: More than three times a week     Frequency of Social Gatherings with Friends and Family: More than three times a week     Attends Adventism Services: More than 4 times per year     Active Member of Clubs or Organizations: Yes     Attends Club or Organization Meetings: More than 4 times per year     Marital Status: Never    Interpersonal Safety: Low Risk  (10/5/2023)    Interpersonal Safety     Do you feel physically and emotionally safe where you currently live?: Yes     Within the past 12 months, have you been hit, slapped, kicked or otherwise physically hurt by someone?: No     Within the past 12 months, have you been humiliated or emotionally abused in other ways by your partner or ex-partner?: No    Housing Stability: Low Risk  (1/31/2024)    Housing Stability     Do you have housing? : Yes     Are you worried about losing your housing?: No          Medications  Allergies   Current Outpatient Medications   Medication Sig Dispense Refill    acetaminophen (TYLENOL) 500 MG tablet Take 1-2 tablets (500-1,000 mg) by mouth 3 times daily as needed for mild pain 250 tablet 1    amiodarone (PACERONE) 200 MG tablet Take 0.5 tablets (100 mg) by mouth daily 45 tablet 3    apixaban ANTICOAGULANT (ELIQUIS) 5 MG tablet Take 1 tablet (5 mg) by mouth 2 times daily 60 tablet 1    BETA BLOCKER NOT PRESCRIBED (INTENTIONAL) Please choose reason not prescribed from choices below.      BETA BLOCKER NOT PRESCRIBED (INTENTIONAL) Please choose reason not prescribed from choices below.      cholecalciferol, vitamin D3, (VITAMIN D3) 2,000 unit Tab [CHOLECALCIFEROL, VITAMIN D3, (VITAMIN D3) 2,000 UNIT TAB] Take 1 tablet (2,000 Units total) by mouth daily. 90 tablet 3    COMPOUNDED NON-CONTROLLED SUBSTANCE (CMPD RX) - PHARMACY TO MIX COMPOUNDED MEDICATION Ketamine 10% and Ketoprofen 5 % in carrier gel/lotion. Apply 2-4 pumps to affected areas TID- QID  g 1    DULoxetine (CYMBALTA) 60 MG capsule Take 60 mg by mouth daily      furosemide (LASIX) 20 MG tablet Take 1 tablet (20 mg) by mouth daily at 4pm 30 tablet 3    furosemide (LASIX) 40 MG tablet Take 1 tablet (40 mg) by mouth every morning 90 tablet 3    lisinopril (ZESTRIL) 2.5 MG tablet TAKE ONE TABLET BY MOUTH ONE TIME DAILY 90 tablet 1    oxyCODONE (ROXICODONE) 5 MG tablet Take 1 tablet (5 mg) by mouth every 4 hours as needed for moderate to severe pain (Max 3 tabs per day) #90 tabs to last 30 days for chronic pain. Ok to fill and start January 11, 2024 90 tablet 0    potassium chloride ER (KLOR-CON) 20 MEQ CR tablet Take 1 tablet (20 mEq) by mouth daily 90 tablet 3    zolpidem ER (AMBIEN CR) 6.25 MG CR tablet Take 1 and 1/4 tablet by mouth at bedtime 45 tablet 5    diclofenac  "(FLECTOR) 1.3 % patch Externally apply 1 patch topically 2 times daily (Patient not taking: Reported on 2/15/2024) 180 patch 1    Allergies   Allergen Reactions    Trazodone Shortness Of Breath and Unknown     Allergic to trazodone and deriv., Other: trouble swallowing      Clindamycin Diarrhea     C-diff    Gabapentin Other (See Comments)     \"Internal tremors\"    Temazepam Other (See Comments)     Annotation: Nightmares           Lab Results    Chemistry/lipid CBC Cardiac Enzymes/BNP/TSH/INR   Lab Results   Component Value Date    CHOL 179 03/12/2015    HDL 64 03/12/2015    TRIG 176 (H) 03/12/2015    BUN 28.1 (H) 01/11/2024     01/11/2024    CO2 25 01/11/2024    Lab Results   Component Value Date    WBC 7.5 12/29/2023    HGB 12.9 12/29/2023    HCT 41.7 12/29/2023    MCV 95 12/29/2023     12/29/2023    Lab Results   Component Value Date    TROPONINI 0.16 08/23/2022    BNP 1,933 (H) 08/23/2022    TSH 6.25 (H) 12/29/2023    INR 1.33 (H) 03/04/2023             This note has been dictated using voice recognition software. Any grammatical, typographical, or context distortions are unintentional and inherent to the software    30 minutes spent on the date of encounter doing chart review, review of outside records, review of test results, interpretation with above tests, patient visit, and documentation.        The longitudinal plan of care for heart failure with reduced ejection fraction, persistent atrial fibrillation, hypertension, CKD stage IIIa was addressed during this visit. 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              "

## 2024-02-15 NOTE — PATIENT INSTRUCTIONS
Gladys Ramirez,    It was a pleasure to see you today at [facility].     My recommendations after this visit include:  - Please follow up with Dr Holley March 25   - Please follow up with Dr Rivera February 20  - Please follow up with Bria Hinds in June  - Continue current medications    Bria Hinds, CNP

## 2024-02-15 NOTE — LETTER
2/15/2024    Margret Flores MD  [address] [place] [id number]    RE: Gladys NAJERA Ashley       Dear Colleague,     I had the pleasure of seeing Gladys Ramirez in the [facility].        Assessment/Recommendations   Assessment:    1. Nonischemic cardiomyopathy, heart failure with reduced ejection fraction, ejection fraction 25%, NYHA class II: Compensated.  She states she is feeling well. Her symptoms are stable. She has fatigue, dyspnea on exertion and trace right lower extremity edema.  Her weight has been stable since discharge.  She follows a low-sodium diet.  CRT was discussed during hospitalization and she has declined.  2.  Persistent atrial fibrillation: Status post cardioversion December 22.  She continues amiodarone 100 mg daily and Eliquis for anticoagulation.  Metoprolol was discontinued due to bradycardia during hospitalization  3.  Chronic kidney disease stage IIIa: She had labs a month ago which were stable  4.  Hypertension: Controlled.  Blood pressure 10/60    Plan:  1.   Heart failure medications:  - Beta blocker therapy with metoprolol was discontinued due to bradycardia in the hospital  - ACEI therapy with lisinopril 2.5 mg daily  - Diuretic therapy with furosemide 40 mg in the morning and 20 mg in the afternoon  2.  Continue current medications  3.  Continue monitoring daily weights and following a low-sodium diet    Gladys Valdovinosjameschip will follow up with Dr. Holley March 25, Dr Rivera February 20 and in the heart failure clinic in June     History of Present Illness/Subjective    Ms. Gladys Ramirez is a 89 year old female seen at [facility] heart failure clinic today for continued follow-up.  She follows up for heart failure with reduced ejection fraction, nonischemic cardiomyopathy.  She was hospitalized twice during the month of December.  She was first admitted for decompensated heart failure and was started on IV Lasix.  She also underwent  cardioversion on December 22 which was successful.  He had a limited echocardiogram which showed ejection fraction of 25%.  Discussion of CRT was done but patient declined.  She was then readmitted 5 days later for bradycardia with heart rate in the 30s.  She was also back in atrial fibrillation.  Her metoprolol was discontinued and heart rates improved to 50s.  Again CRT was discussed but patient declined. She has a past medical history significant for hypertension, persistent atrial fibrillation, LBBB, chronic kidney disease stage IIIa, hyperlipidemia, DVT.     Today, she has fatigue, dyspnea on exertion and trace right lower extremity edema.  She denies lightheadedness, shortness of breath, orthopnea, PND, palpitations, chest pain, and abdominal fullness/bloating.      She is monitoring home weights which are stable between 148-150 pounds.  She is following a low sodium diet.        LIMITED ECHOCARDIOGRAM: 12/21/2023-reviewed  Interpretation Summary     There is mild concentric left ventricular hypertrophy.  Biplane LVEF is 25%.  Septal motion is consistent with conduction abnormality.  Mildly decreased right ventricular systolic function  No significant valve disease.  Compared to the prior study dated 11/30/2023, there have been no changes.     Physical Examination Review of Systems   /60 (BP Location: Right arm, Patient Position: Sitting, Cuff Size: Adult Regular)   Pulse 64   Resp 16   Wt 70.3 kg (155 lb)   BMI 28.35 kg/m    Body mass index is 28.35 kg/m .  Wt Readings from Last 3 Encounters:   02/15/24 70.3 kg (155 lb)   01/31/24 67.8 kg (149 lb 8 oz)   01/16/24 71.2 kg (157 lb)       General Appearance:   no acute distress   ENT/Mouth: No abnormalities   EYES:  no scleral icterus, normal conjunctivae   Neck: no thyromegaly   Chest/Lungs:   lungs are clear to auscultation, no rales or wheezing, equal chest wall expansion    Cardiovascular:   Regular. Normal first and second heart sounds with no  murmurs, rubs, or gallops, trace right lower extremity edema     Abdomen:  bowel sounds are present   Extremities: no cyanosis or clubbing   Skin: warm   Neurologic: no tremors     Psychiatric: alert and oriented x3                                              Medical History  Surgical History Family History Social History   Past Medical History:   Diagnosis Date    Angina pectoris (H24)     Atrial fibrillation (H)     Chest pain 03/09/2017    Chronic kidney disease     Congestive heart failure (H)     Cough     Hyperlipidemia     Hypertension     Osteoarthritis     Past Surgical History:   Procedure Laterality Date    APPENDECTOMY      BASAL CELL CARCINOMA EXCISION      nose    LAPAROSCOPY DIAGNOSTIC (GENERAL) N/A 11/04/2014    Procedure: LAPAROSCOPY BILATERAL SALPINGO-OOPHORECTOMY ;  Surgeon: Sofia Harper MD;  Location: [facility];  Service:     OPEN REDUCTION INTERNAL FIXATION HIP BIPOLAR Right 3/5/2023    Procedure: HEMIARTHROPLASTY, HIP, BIPOLAR;  Surgeon: Jose G Martinez MD;  Location: [facility]    TONSILLECTOMY      10 years old    ZZC TOTAL KNEE ARTHROPLASTY Left     2011    Family History   Problem Relation Age of Onset    Heart Disease Mother     Rheumatic Heart Disease Mother     No Known Problems Father     Cancer Brother         brain    Lung Cancer Brother     Lung Cancer Brother     Cancer Sister         lung    Lung Cancer Sister     Social History     Socioeconomic History    Marital status: Single     Spouse name: Not on file    Number of children: Not on file    Years of education: Not on file    Highest education level: Not on file   Occupational History    Not on file   Tobacco Use    Smoking status: Never     Passive exposure: Never    Smokeless tobacco: Never    Tobacco comments:     no passive exposure   Vaping Use    Vaping Use: Never used   Substance and Sexual Activity    Alcohol use: Yes     Comment: Alcoholic Drinks/day: Rarely a glass of wine    Drug use: No     Sexual activity: Not on file   Other Topics Concern    Not on file   Social History Narrative    The patient is a nun.     Social Determinants of Health     Financial Resource Strain: Low Risk  (1/31/2024)    Financial Resource Strain     Within the past 12 months, have you or your family members you live with been unable to get utilities (heat, electricity) when it was really needed?: No   Food Insecurity: Low Risk  (1/31/2024)    Food Insecurity     Within the past 12 months, did you worry that your food would run out before you got money to buy more?: No     Within the past 12 months, did the food you bought just not last and you didn t have money to get more?: No   Transportation Needs: Low Risk  (1/31/2024)    Transportation Needs     Within the past 12 months, has lack of transportation kept you from medical appointments, getting your medicines, non-medical meetings or appointments, work, or from getting things that you need?: No   Physical Activity: Insufficiently Active (3/27/2023)    Exercise Vital Sign     Days of Exercise per Week: 3 days     Minutes of Exercise per Session: 10 min   Stress: No Stress Concern Present (3/27/2023)    [employer] [place] [employer] - Occupational Stress Questionnaire     Feeling of Stress : Not at all   Social Connections: Moderately Integrated (3/27/2023)    Social Connection and Isolation Panel [NHANES]     Frequency of Communication with Friends and Family: More than three times a week     Frequency of Social Gatherings with Friends and Family: More than three times a week     Attends Buddhist Services: More than 4 times per year     Active Member of Clubs or Organizations: Yes     Attends Club or Organization Meetings: More than 4 times per year     Marital Status: Never    Interpersonal Safety: Low Risk  (10/5/2023)    Interpersonal Safety     Do you feel physically and emotionally safe where you currently live?: Yes     Within the past 12 months,  have you been hit, slapped, kicked or otherwise physically hurt by someone?: No     Within the past 12 months, have you been humiliated or emotionally abused in other ways by your partner or ex-partner?: No   Housing Stability: Low Risk  (1/31/2024)    Housing Stability     Do you have housing? : Yes     Are you worried about losing your housing?: No          Medications  Allergies   Current Outpatient Medications   Medication Sig Dispense Refill    acetaminophen (TYLENOL) 500 MG tablet Take 1-2 tablets (500-1,000 mg) by mouth 3 times daily as needed for mild pain 250 tablet 1    amiodarone (PACERONE) 200 MG tablet Take 0.5 tablets (100 mg) by mouth daily 45 tablet 3    apixaban ANTICOAGULANT (ELIQUIS) 5 MG tablet Take 1 tablet (5 mg) by mouth 2 times daily 60 tablet 1    BETA BLOCKER NOT PRESCRIBED (INTENTIONAL) Please choose reason not prescribed from choices below.      BETA BLOCKER NOT PRESCRIBED (INTENTIONAL) Please choose reason not prescribed from choices below.      cholecalciferol, vitamin D3, (VITAMIN D3) 2,000 unit Tab [CHOLECALCIFEROL, VITAMIN D3, (VITAMIN D3) 2,000 UNIT TAB] Take 1 tablet (2,000 Units total) by mouth daily. 90 tablet 3    COMPOUNDED NON-CONTROLLED SUBSTANCE (CMPD RX) - PHARMACY TO MIX COMPOUNDED MEDICATION Ketamine 10% and Ketoprofen 5 % in carrier gel/lotion. Apply 2-4 pumps to affected areas TID- QID  g 1    DULoxetine (CYMBALTA) 60 MG capsule Take 60 mg by mouth daily      furosemide (LASIX) 20 MG tablet Take 1 tablet (20 mg) by mouth daily at 4pm 30 tablet 3    furosemide (LASIX) 40 MG tablet Take 1 tablet (40 mg) by mouth every morning 90 tablet 3    lisinopril (ZESTRIL) 2.5 MG tablet TAKE ONE TABLET BY MOUTH ONE TIME DAILY 90 tablet 1    oxyCODONE (ROXICODONE) 5 MG tablet Take 1 tablet (5 mg) by mouth every 4 hours as needed for moderate to severe pain (Max 3 tabs per day) #90 tabs to last 30 days for chronic pain. Ok to fill and start January 11, 2024 90 tablet 0     "potassium chloride ER (KLOR-CON) 20 MEQ CR tablet Take 1 tablet (20 mEq) by mouth daily 90 tablet 3    zolpidem ER (AMBIEN CR) 6.25 MG CR tablet Take 1 and 1/4 tablet by mouth at bedtime 45 tablet 5    diclofenac (FLECTOR) 1.3 % patch Externally apply 1 patch topically 2 times daily (Patient not taking: Reported on 2/15/2024) 180 patch 1    Allergies   Allergen Reactions    Trazodone Shortness Of Breath and Unknown     Allergic to trazodone and deriv., Other: trouble swallowing      Clindamycin Diarrhea     C-diff    Gabapentin Other (See Comments)     \"Internal tremors\"    Temazepam Other (See Comments)     Annotation: Nightmares           Lab Results    Chemistry/lipid CBC Cardiac Enzymes/BNP/TSH/INR   Lab Results   Component Value Date    CHOL 179 03/12/2015    HDL 64 03/12/2015    TRIG 176 (H) 03/12/2015    BUN 28.1 (H) 01/11/2024     01/11/2024    CO2 25 01/11/2024    Lab Results   Component Value Date    WBC 7.5 12/29/2023    HGB 12.9 12/29/2023    HCT 41.7 12/29/2023    MCV 95 12/29/2023     12/29/2023    Lab Results   Component Value Date    TROPONINI 0.16 08/23/2022    BNP 1,933 (H) 08/23/2022    TSH 6.25 (H) 12/29/2023    INR 1.33 (H) 03/04/2023             This note has been dictated using voice recognition software. Any grammatical, typographical, or context distortions are unintentional and inherent to the software    30 minutes spent on the date of encounter doing chart review, review of outside records, review of test results, interpretation with above tests, patient visit, and documentation.        The longitudinal plan of care for heart failure with reduced ejection fraction, persistent atrial fibrillation, hypertension, CKD stage IIIa was addressed during this visit. 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                Thank you for " allowing me to participate in the care of your patient.      Sincerely,     SHASHA Haney CNP     [facility]  cc:   SHASHA Haney CNP  [address], [address]  [place] [address]

## 2024-02-19 DIAGNOSIS — I48.0 PAROXYSMAL ATRIAL FIBRILLATION (H): ICD-10-CM

## 2024-02-20 ENCOUNTER — PREP FOR PROCEDURE (OUTPATIENT)
Dept: CARDIOLOGY | Facility: CLINIC | Age: 89
End: 2024-02-20

## 2024-02-20 ENCOUNTER — OFFICE VISIT (OUTPATIENT)
Dept: CARDIOLOGY | Facility: CLINIC | Age: 89
End: 2024-02-20
Payer: COMMERCIAL

## 2024-02-20 ENCOUNTER — DOCUMENTATION ONLY (OUTPATIENT)
Dept: CARDIOLOGY | Facility: CLINIC | Age: 89
End: 2024-02-20

## 2024-02-20 VITALS
DIASTOLIC BLOOD PRESSURE: 70 MMHG | HEIGHT: 62 IN | BODY MASS INDEX: 27.49 KG/M2 | WEIGHT: 149.4 LBS | RESPIRATION RATE: 16 BRPM | HEART RATE: 71 BPM | SYSTOLIC BLOOD PRESSURE: 116 MMHG

## 2024-02-20 DIAGNOSIS — I50.20 HFREF (HEART FAILURE WITH REDUCED EJECTION FRACTION) (H): ICD-10-CM

## 2024-02-20 DIAGNOSIS — I44.7 LBBB (LEFT BUNDLE BRANCH BLOCK): Primary | ICD-10-CM

## 2024-02-20 DIAGNOSIS — I48.19 PERSISTENT ATRIAL FIBRILLATION (H): ICD-10-CM

## 2024-02-20 DIAGNOSIS — I50.32 CHRONIC DIASTOLIC CONGESTIVE HEART FAILURE (H): ICD-10-CM

## 2024-02-20 PROCEDURE — 99214 OFFICE O/P EST MOD 30 MIN: CPT | Performed by: INTERNAL MEDICINE

## 2024-02-20 PROCEDURE — G2211 COMPLEX E/M VISIT ADD ON: HCPCS | Performed by: INTERNAL MEDICINE

## 2024-02-20 RX ORDER — LIDOCAINE 40 MG/G
CREAM TOPICAL
Status: CANCELLED | OUTPATIENT
Start: 2024-02-20

## 2024-02-20 RX ORDER — CEFAZOLIN SODIUM 2 G/100ML
2 INJECTION, SOLUTION INTRAVENOUS
Status: CANCELLED | OUTPATIENT
Start: 2024-02-20

## 2024-02-20 RX ORDER — AMIODARONE HYDROCHLORIDE 200 MG/1
100 TABLET ORAL DAILY
Qty: 45 TABLET | Refills: 0 | Status: SHIPPED | OUTPATIENT
Start: 2024-02-20 | End: 2024-04-30

## 2024-02-20 RX ORDER — SODIUM CHLORIDE 9 MG/ML
100 INJECTION, SOLUTION INTRAVENOUS CONTINUOUS
Status: CANCELLED | OUTPATIENT
Start: 2024-02-20

## 2024-02-20 NOTE — PATIENT INSTRUCTIONS
[facility]  Cardiac Electrophysiology  [address]  [place] [address]   Office: [phone]  Fax: [phone]       Thank you for seeing us in clinic today - it is a pleasure to be a part of your care team.  Below is a summary of our plan from today's visit.      We will call you and set up the procedure  Continue all your current meds    Please do not hesitate to be in touch with our office at [phone] with any questions that may arise.      Thank you for trusting us with your care,    Jeannie Rivera MD  Clinical Cardiac Electrophysiology  [facility]  [address]  [place] [address]   Office: [phone]  Fax: [phone]                        [place] ICD Decision Making Tool

## 2024-02-20 NOTE — LETTER
2/20/2024    Margret Flores MD  [address] [place] [id number]    RE: Gladys Ramirez       Dear Colleague,     I had the pleasure of seeing Gladys Ramirez in the [facility].    HEART CARE ENCOUNTER CONSULTATON NOTE      [facility]  [phone]      Assessment/Recommendations   Assessment/Plan:    Gladys Ramirez is a very pleasant 89 year old female with non ischemic cardiomyopathy(LVEF 25-30%), HTN, h/o PE, LBBB who presents today to discuss device therapy.    1. Non ischemic cardiomyopathy  - On GDMT  - LVEF   -  ms with LBBB  -  she has declined CRT therapy multiple times in the past  but has decided to proceed with it on todays visit  - will plan an Medtronic ICD with LB lead  I personally discussed with the patient the rational for ICD placement, alternate therapies, technical aspects of the surgical procedure, risk/benefits of therapy, and long term follow up in the Device Clinic. The [place] Decision Making Tool and further ICD education completed by the Electrophysiology Registered Nurse, attached is the documentation of this. At this time the patient verbalized understanding and has agreed to proceed with ICD implantation.    [place] ICD Decision Making Tool    2. Persistent atrial fibrillation  - On Amiodarone 100 mg daily and Eliquis for anticoagulation  - On chronic anticoagulation for her h/p PE also, so Watchman not offered    3. HTN  - well controlled    Time spent: 30 minutes spent on the date of the encounter doing chart review, history and exam, documentation and further activities as noted above.       History of Present Illness/Subjective    HPI: Gladys Ramirez is a very pleasant 89 year old female with non ischemic cardiomyopathy(LVEF 25-30%), HTN, h/o PE, LBBB who presents today to discuss device therapy.    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    She had a fall after the last appointment oin 3/4/2023. She hit her head but no LOC. Had partial  hip replacement surgery on 3/5/23 and went to TCU from 3/27/23-4/10/23. She comes today for follow up.    February 2024    She was hospitalized twice during the month of December. She was first admitted for decompensated heart failure and was started on IV Lasix. She also underwent cardioversion on December 22 which was successful. He had a limited echocardiogram which showed ejection fraction of 25%. Discussion of CRT was done but patient declined. She was then readmitted 5 days later for bradycardia with heart rate in the 30s. Her metoprolol was discontinued and heart rates improved to 50s. Again CRT was discussed but patient declined.     On today's visit she has indicated that while she has urmila ambivalent in the past about getting a pacemaker, today she has decided to proceed with a pacemaker/defibrillator    Recent ECG(personally reviewed):  8/24/2022  NSR with LBBB    Recent Echocardiogram Results (personally reviewed):    December 2023    Interpretation Summary     There is mild concentric left ventricular hypertrophy.  Biplane LVEF is 25%.  Septal motion is consistent with conduction abnormality.  Mildly decreased right ventricular systolic function  No significant valve disease.  Compared to the prior study dated 11/30/2023, there have been no changes.      Labs below reviewed personally     Physical Examination  Review of Systems   Vitals: /70 (BP Location: Left arm, Patient Position: Sitting, Cuff Size: Adult Regular)   Pulse 71   Resp 16   Ht 1.575 m (5' 2\")   Wt 67.8 kg (149 lb 6.4 oz)   BMI 27.33 kg/m    BMI= Body mass index is 27.33 kg/m .  Wt Readings from Last 3 Encounters:   02/20/24 67.8 kg (149 lb 6.4 oz)   02/15/24 70.3 kg (155 lb)   01/31/24 67.8 kg (149 lb 8 oz)       General Appearance:   no distress, normal body habitus   ENT/Mouth: membranes moist, no oral " lesions or bleeding gums.      EYES:  no scleral icterus, normal conjunctivae   Neck: no carotid bruits or thyromegaly   Chest/Lungs:   lungs are clear to auscultation, no rales or wheezing, no sternal scar, equal chest wall expansion    Cardiovascular:   Regular. Normal first and second heart sounds with no murmurs, rubs, or gallops; the carotid, radial and posterior tibial pulses are intact, no edema bilaterally    Abdomen:  no organomegaly, masses, bruits, or tenderness; bowel sounds are present   Extremities: no cyanosis or clubbing. Left knee in brace   Skin: no xanthelasma, warm.    Neurologic: normal  bilateral, no tremors     Psychiatric: alert and oriented x3, calm        Please refer above for cardiac ROS details.        Medical History  Surgical History Family History Social History   Past Medical History:   Diagnosis Date    Angina pectoris (H24)     Atrial fibrillation (H)     Chest pain 03/09/2017    Chronic kidney disease     Congestive heart failure (H)     Cough     Hyperlipidemia     Hypertension     Osteoarthritis      Past Surgical History:   Procedure Laterality Date    APPENDECTOMY      BASAL CELL CARCINOMA EXCISION      nose    LAPAROSCOPY DIAGNOSTIC (GENERAL) N/A 11/04/2014    Procedure: LAPAROSCOPY BILATERAL SALPINGO-OOPHORECTOMY ;  Surgeon: Sofia Harper MD;  Location: [facility];  Service:     OPEN REDUCTION INTERNAL FIXATION HIP BIPOLAR Right 3/5/2023    Procedure: HEMIARTHROPLASTY, HIP, BIPOLAR;  Surgeon: Jose G Martinez MD;  Location: [facility]    TONSILLECTOMY      10 years old    Z TOTAL KNEE ARTHROPLASTY Left     2011     Family History   Problem Relation Age of Onset    Heart Disease Mother     Rheumatic Heart Disease Mother     No Known Problems Father     Cancer Brother         brain    Lung Cancer Brother     Lung Cancer Brother     Cancer Sister         lung    Lung Cancer Sister         Social History     Socioeconomic History    Marital status:  Single     Spouse name: Not on file    Number of children: Not on file    Years of education: Not on file    Highest education level: Not on file   Occupational History    Not on file   Tobacco Use    Smoking status: Never     Passive exposure: Never    Smokeless tobacco: Never    Tobacco comments:     no passive exposure   Vaping Use    Vaping Use: Never used   Substance and Sexual Activity    Alcohol use: Yes     Comment: Alcoholic Drinks/day: Rarely a glass of wine    Drug use: No    Sexual activity: Not on file   Other Topics Concern    Not on file   Social History Narrative    The patient is a nun.     Social Determinants of Health     Financial Resource Strain: Low Risk  (1/31/2024)    Financial Resource Strain     Within the past 12 months, have you or your family members you live with been unable to get utilities (heat, electricity) when it was really needed?: No   Food Insecurity: Low Risk  (1/31/2024)    Food Insecurity     Within the past 12 months, did you worry that your food would run out before you got money to buy more?: No     Within the past 12 months, did the food you bought just not last and you didn t have money to get more?: No   Transportation Needs: Low Risk  (1/31/2024)    Transportation Needs     Within the past 12 months, has lack of transportation kept you from medical appointments, getting your medicines, non-medical meetings or appointments, work, or from getting things that you need?: No   Physical Activity: Insufficiently Active (3/27/2023)    Exercise Vital Sign     Days of Exercise per Week: 3 days     Minutes of Exercise per Session: 10 min   Stress: No Stress Concern Present (3/27/2023)    [employer] - Occupational Stress Questionnaire     Feeling of Stress : Not at all   Social Connections: Moderately Integrated (3/27/2023)    Social Connection and Isolation Panel [NHANES]     Frequency of Communication with Friends and Family: More than three times  a week     Frequency of Social Gatherings with Friends and Family: More than three times a week     Attends [employer] Services: More than 4 times per year     Active Member of Clubs or Organizations: Yes     Attends Club or Organization Meetings: More than 4 times per year     Marital Status: Never    Interpersonal Safety: Low Risk  (10/5/2023)    Interpersonal Safety     Do you feel physically and emotionally safe where you currently live?: Yes     Within the past 12 months, have you been hit, slapped, kicked or otherwise physically hurt by someone?: No     Within the past 12 months, have you been humiliated or emotionally abused in other ways by your partner or ex-partner?: No   Housing Stability: Low Risk  (1/31/2024)    Housing Stability     Do you have housing? : Yes     Are you worried about losing your housing?: No           Medications  Allergies   Current Outpatient Medications   Medication Sig Dispense Refill    acetaminophen (TYLENOL) 500 MG tablet Take 1-2 tablets (500-1,000 mg) by mouth 3 times daily as needed for mild pain 250 tablet 1    amiodarone (PACERONE) 200 MG tablet Take 0.5 tablets (100 mg) by mouth daily 45 tablet 3    apixaban ANTICOAGULANT (ELIQUIS) 5 MG tablet Take 1 tablet (5 mg) by mouth 2 times daily 60 tablet 1    BETA BLOCKER NOT PRESCRIBED (INTENTIONAL) Please choose reason not prescribed from choices below.      BETA BLOCKER NOT PRESCRIBED (INTENTIONAL) Please choose reason not prescribed from choices below.      cholecalciferol, vitamin D3, (VITAMIN D3) 2,000 unit Tab [CHOLECALCIFEROL, VITAMIN D3, (VITAMIN D3) 2,000 UNIT TAB] Take 1 tablet (2,000 Units total) by mouth daily. 90 tablet 3    COMPOUNDED NON-CONTROLLED SUBSTANCE (CMPD RX) - PHARMACY TO MIX COMPOUNDED MEDICATION Ketamine 10% and Ketoprofen 5 % in carrier gel/lotion. Apply 2-4 pumps to affected areas TID- QID  g 1    DULoxetine (CYMBALTA) 60 MG capsule Take 60 mg by mouth daily      furosemide (LASIX) 20 MG  "tablet Take 1 tablet (20 mg) by mouth daily at 4pm 30 tablet 3    furosemide (LASIX) 40 MG tablet Take 1 tablet (40 mg) by mouth every morning 90 tablet 3    lisinopril (ZESTRIL) 2.5 MG tablet TAKE ONE TABLET BY MOUTH ONE TIME DAILY 90 tablet 1    oxyCODONE (ROXICODONE) 5 MG tablet Take 1 tablet (5 mg) by mouth every 4 hours as needed for moderate to severe pain (Max 3 tabs per day) #90 tabs to last 30 days for chronic pain. Ok to fill and start January 11, 2024 90 tablet 0    potassium chloride ER (KLOR-CON) 20 MEQ CR tablet Take 1 tablet (20 mEq) by mouth daily 90 tablet 3    zolpidem ER (AMBIEN CR) 6.25 MG CR tablet Take 1 and 1/4 tablet by mouth at bedtime 45 tablet 5    diclofenac (FLECTOR) 1.3 % patch Externally apply 1 patch topically 2 times daily (Patient not taking: Reported on 2/15/2024) 180 patch 1       Allergies   Allergen Reactions    Trazodone Shortness Of Breath and Unknown     Allergic to trazodone and deriv., Other: trouble swallowing      Clindamycin Diarrhea     C-diff    Gabapentin Other (See Comments)     \"Internal tremors\"    Temazepam Other (See Comments)     Annotation: Nightmares            Lab Results    Chemistry/lipid CBC Cardiac Enzymes/BNP/TSH/INR   Recent Labs   Lab Test 03/12/15  1230   CHOL 179   HDL 64   LDL 80   TRIG 176*     Recent Labs   Lab Test 03/12/15  1230   LDL 80     Recent Labs   Lab Test 02/08/23  1345      POTASSIUM 4.2   CHLORIDE 105   CO2 27   GLC 82   BUN 20.6   CR 1.16*   GFRESTIMATED 44*   MARLENE 9.8*     Recent Labs   Lab Test 02/08/23  1345 09/07/22  1112 08/28/22  0442   CR 1.16* 1.14* 0.81     Recent Labs   Lab Test 06/15/21  0910   A1C 6.0*          Recent Labs   Lab Test 08/28/22  0442   WBC 6.2   HGB 12.1   HCT 36.1   MCV 93        Recent Labs   Lab Test 08/28/22  0442 08/26/22  0723 08/24/22  0718   HGB 12.1 12.6 12.8    Recent Labs   Lab Test 08/23/22  1154 08/17/22  1215 11/21/19  0451   TROPONINI 0.16 0.04 0.01     Recent Labs   Lab Test " 02/08/23  1345 08/23/22  1154 08/17/22  1215 03/03/21  1222 02/21/19  0713   BNP  --  1,933*  --  177* 95   NTBNP 5,373*  --  5,101*  --   --      Recent Labs   Lab Test 02/21/19  0648   TSH 2.05     Recent Labs   Lab Test 08/23/22  1154 06/05/19  0052 02/21/19  0648   INR 1.37* 1.50* 1.20*        Jeannie Rivera MD            Thank you for allowing me to participate in the care of your patient.      Sincerely,     Jeannie Rivera MD     [facility]  cc:   SHASHA Haney CNP  [address], [address]  [place] [address]

## 2024-02-20 NOTE — PROGRESS NOTES
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    Thanks,  ANAHI  Gladys Ramirez 9/29/1930 [id number]  Home:[phone] (home) Cell:[phone] (mobile)  Emergency Contact: Sister CARMEN Villarreal   PCP: Margret Flores, [phone]      Important patient information for CSC/Cath Lab staff :  Pt will need overnight stay    [facility] EP Cath Lab Procedure Order     Device Implant/Revision:  Procedure: New Implant  Current Device/Device Co Needed for Procedure: Medtronic Dual with HIS LEAD  Ordering Provider: Dr Rivera  Date Ordered and Prepped: 2/20/2024 Analia Bush RN  Diagnosis:  Cardiomyopathy, LBBB  Scheduling Timeframe:  Next Available  Scheduling Restrictions: None  Scheduling Contact: Please contact pt to schedule, if you are unable to schedule date within the next 24 hours please contact pt to update on scheduling process  Cardiology Follow Up Apt s/p:  New CRT/HIS Lead Device follow-up with HF DEON, EF < 50%, &/or has a hx/dx of HF or CM and HF/General Card @ 3mo  Pre-Procedural Testing needed: None  Anesthesia:  MAC- Monitored Anesthesia Care    [facility] EP Cath Lab Prep   H&P:  Compled by cardiology on 2-20-24 if scheduled within 30 days, pt to schedule with PMD if procedure outside of this timeframe  Pre-Procedure Labs/T&S: For SICD & MICRA Devices only schedule lab visit at [facility]  "lab within 3 days prior to procedure for T&S, BMP, CBC, HcG is appropriate, and INR if on warfarin. All other Devices pre-procedure lab work will be done the morning of the procedure.  Medical Records Pertinent for Procedure:   Cardioversion 12-20-23, Echo 12-20-23 EF 25%, and EKG 12-29-23 Afib @49,  ms  Iodinated Contrast Dye Allergies (Does not include Shellfish, Egg, and/or Iodine Allergy)- excludes ILR/SICD:None  Renal Protocol (GFR < 40ml/min, IV contrast past 2 days, EF < or = 25%)- excludes ILR/SICD: No  GLP-1 Protocol: If on Dulaglutide (Trulicity) (weekly)- Injection hold 7 days prior to procedure  , Exenatide extended release (Bydureon bcise) (weekly)- Injection hold 7 days prior to procedure, Exenatide (Byetta) (twice daily)- Oral Tablet hold day prior and morning of procedure and for Injection hold 7 days prior to procedure, Semaglutide (Ozempic) (weekly)- Injection and Oral hold 7 days prior to procedure, Liraglutide (Victoza, Saxenda) (daily)- Injection hold day prior and morning of procedure    Allergies   Allergen Reactions    Trazodone Shortness Of Breath and Unknown     Allergic to trazodone and deriv., Other: trouble swallowing      Clindamycin Diarrhea     C-diff    Gabapentin Other (See Comments)     \"Internal tremors\"    Temazepam Other (See Comments)     Annotation: Nightmares         Current Outpatient Medications:     acetaminophen (TYLENOL) 500 MG tablet, Take 1-2 tablets (500-1,000 mg) by mouth 3 times daily as needed for mild pain, Disp: 250 tablet, Rfl: 1    amiodarone (PACERONE) 200 MG tablet, Take 0.5 tablets (100 mg) by mouth daily, Disp: 45 tablet, Rfl: 3    apixaban ANTICOAGULANT (ELIQUIS) 5 MG tablet, Take 1 tablet (5 mg) by mouth 2 times daily, Disp: 60 tablet, Rfl: 1    BETA BLOCKER NOT PRESCRIBED (INTENTIONAL), Please choose reason not prescribed from choices below., Disp: , Rfl:     BETA BLOCKER NOT PRESCRIBED (INTENTIONAL), Please choose reason not prescribed from " choices below., Disp: , Rfl:     cholecalciferol, vitamin D3, (VITAMIN D3) 2,000 unit Tab, [CHOLECALCIFEROL, VITAMIN D3, (VITAMIN D3) 2,000 UNIT TAB] Take 1 tablet (2,000 Units total) by mouth daily., Disp: 90 tablet, Rfl: 3    COMPOUNDED NON-CONTROLLED SUBSTANCE (CMPD RX) - PHARMACY TO MIX COMPOUNDED MEDICATION, Ketamine 10% and Ketoprofen 5 % in carrier gel/lotion. Apply 2-4 pumps to affected areas TID- QID PRN, Disp: 180 g, Rfl: 1    diclofenac (FLECTOR) 1.3 % patch, Externally apply 1 patch topically 2 times daily (Patient not taking: Reported on 2/15/2024), Disp: 180 patch, Rfl: 1    DULoxetine (CYMBALTA) 60 MG capsule, Take 60 mg by mouth daily, Disp: , Rfl:     furosemide (LASIX) 20 MG tablet, Take 1 tablet (20 mg) by mouth daily at 4pm, Disp: 30 tablet, Rfl: 3    furosemide (LASIX) 40 MG tablet, Take 1 tablet (40 mg) by mouth every morning, Disp: 90 tablet, Rfl: 3    lisinopril (ZESTRIL) 2.5 MG tablet, TAKE ONE TABLET BY MOUTH ONE TIME DAILY, Disp: 90 tablet, Rfl: 1    oxyCODONE (ROXICODONE) 5 MG tablet, Take 1 tablet (5 mg) by mouth every 4 hours as needed for moderate to severe pain (Max 3 tabs per day) #90 tabs to last 30 days for chronic pain. Ok to fill and start January 11, 2024, Disp: 90 tablet, Rfl: 0    potassium chloride ER (KLOR-CON) 20 MEQ CR tablet, Take 1 tablet (20 mEq) by mouth daily, Disp: 90 tablet, Rfl: 3    zolpidem ER (AMBIEN CR) 6.25 MG CR tablet, Take 1 and 1/4 tablet by mouth at bedtime, Disp: 45 tablet, Rfl: 5    Current Facility-Administered Medications:     3 mL ropivacaine (NAROPIN) injection 5 mg/mL, 3 mL, , , Estrella Walker DO, 3 mL at 01/16/24 1544    lidocaine 1 % injection 3 mL, 3 mL, , , Estrella Walker DO, 3 mL at 01/16/24 1544    triamcinolone (KENALOG-40) injection 40 mg, 40 mg, , , Estrella Walker DO, 40 mg at 01/16/24 1544    Documentation Date:2/20/2024 1:10 PM  Analia Bush RN

## 2024-02-20 NOTE — PROGRESS NOTES
HEART CARE ENCOUNTER CONSULTATON NOTE      [facility]  [phone]      Assessment/Recommendations   Assessment/Plan:    Gladys Ramirez is a very pleasant 89 year old female with non ischemic cardiomyopathy(LVEF 25-30%), HTN, h/o PE, LBBB who presents today to discuss device therapy.    1. Non ischemic cardiomyopathy  - On GDMT  - LVEF   -  ms with LBBB  -  she has declined CRT therapy multiple times in the past  but has decided to proceed with it on todays visit  - will plan an Med[employer]onic ICD with LB lead  I personally discussed with the patient the rational for ICD placement, alternate therapies, technical aspects of the surgical procedure, risk/benefits of therapy, and long term follow up in the Device Clinic. The [place] Decision Making Tool and further ICD education completed by the Electrophysiology Registered Nurse, attached is the documentation of this. At this time the patient verbalized understanding and has agreed to proceed with ICD implantation.    [place] ICD Decision Making Tool    2. Persistent atrial fibrillation  - On Amiodarone 100 mg daily and Eliquis for anticoagulation  - On chronic anticoagulation for her h/p PE also, so Watchman not offered    3. HTN  - well controlled    Time spent: 30 minutes spent on the date of the encounter doing chart review, history and exam, documentation and further activities as noted above.       History of Present Illness/Subjective    HPI: Gladys Ramirez is a very pleasant 89 year old female with non ischemic cardiomyopathy(LVEF 25-30%), HTN, h/o PE, LBBB who presents today to discuss device therapy.    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    She had a fall after the last appointment oin 3/4/2023. She hit her head but no LOC. Had partial  hip replacement surgery on 3/5/23 and went to TCU from 3/27/23-4/10/23. She comes today  "for follow up.    February 2024    She was hospitalized twice during the month of December. She was first admitted for decompensated heart failure and was started on IV Lasix. She also underwent cardioversion on December 22 which was successful. He had a limited echocardiogram which showed ejection fraction of 25%. Discussion of CRT was done but patient declined. She was then readmitted 5 days later for bradycardia with heart rate in the 30s. Her metoprolol was discontinued and heart rates improved to 50s. Again CRT was discussed but patient declined.     On today's visit she has indicated that while she has urmila ambivalent in the past about getting a pacemaker, today she has decided to proceed with a pacemaker/defibrillator    Recent ECG(personally reviewed):  8/24/2022  NSR with LBBB    Recent Echocardiogram Results (personally reviewed):    December 2023    Interpretation Summary     There is mild concentric left ventricular hypertrophy.  Biplane LVEF is 25%.  Septal motion is consistent with conduction abnormality.  Mildly decreased right ventricular systolic function  No significant valve disease.  Compared to the prior study dated 11/30/2023, there have been no changes.      Labs below reviewed personally     Physical Examination  Review of Systems   Vitals: /70 (BP Location: Left arm, Patient Position: Sitting, Cuff Size: Adult Regular)   Pulse 71   Resp 16   Ht 1.575 m (5' 2\")   Wt 67.8 kg (149 lb 6.4 oz)   BMI 27.33 kg/m    BMI= Body mass index is 27.33 kg/m .  Wt Readings from Last 3 Encounters:   02/20/24 67.8 kg (149 lb 6.4 oz)   02/15/24 70.3 kg (155 lb)   01/31/24 67.8 kg (149 lb 8 oz)       General Appearance:   no distress, normal body habitus   ENT/Mouth: membranes moist, no oral lesions or bleeding gums.      EYES:  no scleral icterus, normal conjunctivae   Neck: no carotid bruits or thyromegaly   Chest/Lungs:   lungs are clear to auscultation, no rales or wheezing, no sternal scar, equal " chest wall expansion    Cardiovascular:   Regular. Normal first and second heart sounds with no murmurs, rubs, or gallops; the carotid, radial and posterior tibial pulses are intact, no edema bilaterally    Abdomen:  no organomegaly, masses, bruits, or tenderness; bowel sounds are present   Extremities: no cyanosis or clubbing. Left knee in brace   Skin: no xanthelasma, warm.    Neurologic: normal  bilateral, no tremors     Psychiatric: alert and oriented x3, calm        Please refer above for cardiac ROS details.        Medical History  Surgical History Family History Social History   Past Medical History:   Diagnosis Date    Angina pectoris (H24)     Atrial fibrillation (H)     Chest pain 03/09/2017    Chronic kidney disease     Congestive heart failure (H)     Cough     Hyperlipidemia     Hypertension     Osteoarthritis      Past Surgical History:   Procedure Laterality Date    APPENDECTOMY      BASAL CELL CARCINOMA EXCISION      nose    LAPAROSCOPY DIAGNOSTIC (GENERAL) N/A 11/04/2014    Procedure: LAPAROSCOPY BILATERAL SALPINGO-OOPHORECTOMY ;  Surgeon: Sofia Harper MD;  Location: [facility];  Service:     OPEN REDUCTION INTERNAL FIXATION HIP BIPOLAR Right 3/5/2023    Procedure: HEMIARTHROPLASTY, HIP, BIPOLAR;  Surgeon: Jose G Martinez MD;  Location: [facility]    TONSILLECTOMY      10 years old    ZZC TOTAL KNEE ARTHROPLASTY Left     2011     Family History   Problem Relation Age of Onset    Heart Disease Mother     Rheumatic Heart Disease Mother     No Known Problems Father     Cancer Brother         brain    Lung Cancer Brother     Lung Cancer Brother     Cancer Sister         lung    Lung Cancer Sister         Social History     Socioeconomic History    Marital status: Single     Spouse name: Not on file    Number of children: Not on file    Years of education: Not on file    Highest education level: Not on file   Occupational History    Not on file   Tobacco Use    Smoking  status: Never     Passive exposure: Never    Smokeless tobacco: Never    Tobacco comments:     no passive exposure   Vaping Use    Vaping Use: Never used   Substance and Sexual Activity    Alcohol use: Yes     Comment: Alcoholic Drinks/day: Rarely a glass of wine    Drug use: No    Sexual activity: Not on file   Other Topics Concern    Not on file   Social History Narrative    The patient is a nun.     Social Determinants of Health     Financial Resource Strain: Low Risk  (1/31/2024)    Financial Resource Strain     Within the past 12 months, have you or your family members you live with been unable to get utilities (heat, electricity) when it was really needed?: No   Food Insecurity: Low Risk  (1/31/2024)    Food Insecurity     Within the past 12 months, did you worry that your food would run out before you got money to buy more?: No     Within the past 12 months, did the food you bought just not last and you didn t have money to get more?: No   Transportation Needs: Low Risk  (1/31/2024)    Transportation Needs     Within the past 12 months, has lack of transportation kept you from medical appointments, getting your medicines, non-medical meetings or appointments, work, or from getting things that you need?: No   Physical Activity: Insufficiently Active (3/27/2023)    Exercise Vital Sign     Days of Exercise per Week: 3 days     Minutes of Exercise per Session: 10 min   Stress: No Stress Concern Present (3/27/2023)    [employer] [place] [employer] - Occupational Stress Questionnaire     Feeling of Stress : Not at all   Social Connections: Moderately Integrated (3/27/2023)    Social Connection and Isolation Panel [NHANES]     Frequency of Communication with Friends and Family: More than three times a week     Frequency of Social Gatherings with Friends and Family: More than three times a week     Attends Jewish Services: More than 4 times per year     Active Member of Clubs or Organizations: Yes      Attends Club or Organization Meetings: More than 4 times per year     Marital Status: Never    Interpersonal Safety: Low Risk  (10/5/2023)    Interpersonal Safety     Do you feel physically and emotionally safe where you currently live?: Yes     Within the past 12 months, have you been hit, slapped, kicked or otherwise physically hurt by someone?: No     Within the past 12 months, have you been humiliated or emotionally abused in other ways by your partner or ex-partner?: No   Housing Stability: Low Risk  (1/31/2024)    Housing Stability     Do you have housing? : Yes     Are you worried about losing your housing?: No           Medications  Allergies   Current Outpatient Medications   Medication Sig Dispense Refill    acetaminophen (TYLENOL) 500 MG tablet Take 1-2 tablets (500-1,000 mg) by mouth 3 times daily as needed for mild pain 250 tablet 1    amiodarone (PACERONE) 200 MG tablet Take 0.5 tablets (100 mg) by mouth daily 45 tablet 3    apixaban ANTICOAGULANT (ELIQUIS) 5 MG tablet Take 1 tablet (5 mg) by mouth 2 times daily 60 tablet 1    BETA BLOCKER NOT PRESCRIBED (INTENTIONAL) Please choose reason not prescribed from choices below.      BETA BLOCKER NOT PRESCRIBED (INTENTIONAL) Please choose reason not prescribed from choices below.      cholecalciferol, vitamin D3, (VITAMIN D3) 2,000 unit Tab [CHOLECALCIFEROL, VITAMIN D3, (VITAMIN D3) 2,000 UNIT TAB] Take 1 tablet (2,000 Units total) by mouth daily. 90 tablet 3    COMPOUNDED NON-CONTROLLED SUBSTANCE (CMPD RX) - PHARMACY TO MIX COMPOUNDED MEDICATION Ketamine 10% and Ketoprofen 5 % in carrier gel/lotion. Apply 2-4 pumps to affected areas TID- QID  g 1    DULoxetine (CYMBALTA) 60 MG capsule Take 60 mg by mouth daily      furosemide (LASIX) 20 MG tablet Take 1 tablet (20 mg) by mouth daily at 4pm 30 tablet 3    furosemide (LASIX) 40 MG tablet Take 1 tablet (40 mg) by mouth every morning 90 tablet 3    lisinopril (ZESTRIL) 2.5 MG tablet TAKE ONE  "TABLET BY MOUTH ONE TIME DAILY 90 tablet 1    oxyCODONE (ROXICODONE) 5 MG tablet Take 1 tablet (5 mg) by mouth every 4 hours as needed for moderate to severe pain (Max 3 tabs per day) #90 tabs to last 30 days for chronic pain. Ok to fill and start January 11, 2024 90 tablet 0    potassium chloride ER (KLOR-CON) 20 MEQ CR tablet Take 1 tablet (20 mEq) by mouth daily 90 tablet 3    zolpidem ER (AMBIEN CR) 6.25 MG CR tablet Take 1 and 1/4 tablet by mouth at bedtime 45 tablet 5    diclofenac (FLECTOR) 1.3 % patch Externally apply 1 patch topically 2 times daily (Patient not taking: Reported on 2/15/2024) 180 patch 1       Allergies   Allergen Reactions    Trazodone Shortness Of Breath and Unknown     Allergic to trazodone and deriv., Other: trouble swallowing      Clindamycin Diarrhea     C-diff    Gabapentin Other (See Comments)     \"Internal tremors\"    Temazepam Other (See Comments)     Annotation: Nightmares            Lab Results    Chemistry/lipid CBC Cardiac Enzymes/BNP/TSH/INR   Recent Labs   Lab Test 03/12/15  1230   CHOL 179   HDL 64   LDL 80   TRIG 176*     Recent Labs   Lab Test 03/12/15  1230   LDL 80     Recent Labs   Lab Test 02/08/23  1345      POTASSIUM 4.2   CHLORIDE 105   CO2 27   GLC 82   BUN 20.6   CR 1.16*   GFRESTIMATED 44*   MARLENE 9.8*     Recent Labs   Lab Test 02/08/23  1345 09/07/22  1112 08/28/22  0442   CR 1.16* 1.14* 0.81     Recent Labs   Lab Test 06/15/21  0910   A1C 6.0*          Recent Labs   Lab Test 08/28/22  0442   WBC 6.2   HGB 12.1   HCT 36.1   MCV 93        Recent Labs   Lab Test 08/28/22  0442 08/26/22  0723 08/24/22  0718   HGB 12.1 12.6 12.8    Recent Labs   Lab Test 08/23/22  1154 08/17/22  1215 11/21/19  0451   TROPONINI 0.16 0.04 0.01     Recent Labs   Lab Test 02/08/23  1345 08/23/22  1154 08/17/22  1215 03/03/21  1222 02/21/19  0713   BNP  --  1,933*  --  177* 95   NTBNP 5,373*  --  5,101*  --   --      Recent Labs   Lab Test 02/21/19  0648   TSH 2.05     Recent " Labs   Lab Test 08/23/22  1154 06/05/19  0052 02/21/19  0648   INR 1.37* 1.50* 1.20*        Jeannie Rivera MD

## 2024-02-21 DIAGNOSIS — S83.511S RUPTURE OF ANTERIOR CRUCIATE LIGAMENT OF RIGHT KNEE, SEQUELA: ICD-10-CM

## 2024-02-21 DIAGNOSIS — G89.29 CHRONIC INTRACTABLE PAIN: ICD-10-CM

## 2024-02-21 DIAGNOSIS — M17.11 PRIMARY OSTEOARTHRITIS OF RIGHT KNEE: ICD-10-CM

## 2024-02-21 NOTE — TELEPHONE ENCOUNTER
Received call from patient requesting refill(s) oxyCODONE (ROXICODONE) 5 MG tablet    Last dispensed from pharmacy on 01/12/2024    Patient's last office/virtual visit by prescribing provider on 01/11/2024  Next office/virtual appointment scheduled for 02/23/2024    Last urine drug screen date 09/28/2023  Current opioid agreement on file (completed within the last year) No Date of opioid agreement: 11/11/2022    E-prescribe to   [id number] [[address], pharmacy    Will route to nursing Alba for review and preparation of prescription(s).     Tressa Salvador MA  [facility] [place]

## 2024-02-22 ENCOUNTER — PREP FOR PROCEDURE (OUTPATIENT)
Dept: CARDIOLOGY | Facility: CLINIC | Age: 89
End: 2024-02-22
Payer: COMMERCIAL

## 2024-02-22 ENCOUNTER — DOCUMENTATION ONLY (OUTPATIENT)
Dept: CARDIOLOGY | Facility: CLINIC | Age: 89
End: 2024-02-22
Payer: COMMERCIAL

## 2024-02-22 DIAGNOSIS — I50.32 CHRONIC DIASTOLIC CONGESTIVE HEART FAILURE (H): ICD-10-CM

## 2024-02-22 DIAGNOSIS — I48.19 PERSISTENT ATRIAL FIBRILLATION (H): ICD-10-CM

## 2024-02-22 DIAGNOSIS — I44.7 LBBB (LEFT BUNDLE BRANCH BLOCK): Primary | ICD-10-CM

## 2024-02-22 RX ORDER — CEFAZOLIN SODIUM/WATER 2 G/20 ML
2 SYRINGE (ML) INTRAVENOUS
Status: CANCELLED | OUTPATIENT
Start: 2024-04-08

## 2024-02-22 RX ORDER — OXYCODONE HYDROCHLORIDE 5 MG/1
5 TABLET ORAL EVERY 4 HOURS PRN
Qty: 90 TABLET | Refills: 0 | Status: SHIPPED | OUTPATIENT
Start: 2024-02-22 | End: 2024-02-23

## 2024-02-22 RX ORDER — OXYCODONE HYDROCHLORIDE 5 MG/1
5 TABLET ORAL EVERY 4 HOURS PRN
Qty: 90 TABLET | Refills: 0 | Status: SHIPPED | OUTPATIENT
Start: 2024-02-22 | End: 2024-02-22

## 2024-02-22 RX ORDER — LIDOCAINE 40 MG/G
CREAM TOPICAL
Status: CANCELLED | OUTPATIENT
Start: 2024-02-22

## 2024-02-22 NOTE — H&P
.[facility]  TRAUMA SERVICE - PROGRESS NOTE    Patient Name: Olesya Rodriguez  MRN: [id number]  Admit Date: 219  : 1939  AGE: 84 y.o.   GENDER: female  ==============================================================================  MECHANISM OF INJURY / CHIEF COMPLAINT:   83yo female unwitnessed fall    LOC (yes/no?): Unknown  Anticoagulant / Anti-platelet Rx? (for what dx?): ASA Plavix Past CVA  Referring Facility Name (N/A for scene EMR run): [facility]      INJURIES:   L Distal Radius fx  L ulnar styloid fx  Acute, comminuted burst type compression fracture of the L3 vertebral body with approximately 40% vertebral body height loss.  Acute on chronic right subdural hematoma measuring up to 9 mm in maximal thickness with minimal adjacent mass effect. No midline shift.     OTHER MEDICAL PROBLEMS:  orthostatic hypotension   CVA unknown if residual deficit  Vascular Dementia  Hypertension     INCIDENTAL FINDINGS:  Cystic Pancreatic lesion     ==============================================================================  TODAY'S ASSESSMENT AND PLAN OF CARE:  Assessment: 84 year old female with left wrist fractures, T3 burst fracture, and acute on chronic subdural hematoma.     PLAN:  Acute on Chronic SDH  -Repeat CTH showed increase in SDH from 9mm to 10mm. Next repeat CT showed stability.   -Neurosurgery consult  -Okay for DVT ppx post bleed day 2  -Okay for ASA post bleed day 7 and PLX post bleed day 10    Orthostatic Hypotension  -decrease home amlodipine to 2.5mg today     Burst Fx L3  -ortho surg consulted  -upright images reviewed by ortho  -no acute intervention  -avoid extreme twisting/bending  -no weightbearing instructions    L Distal Radius fx  L ulnar styloid fx  -splinted by orthopedic surgery  -no operative intervention  -follow up outpt      - Analgesia: Scheduled Tylenol,  Lidoderm patch, Toradol, Gabapentin, Oxy 5/10 Q4h PRN, Dilaudid BT  - PT/OT rec'ed moderate  Admission History and Physical   Gladys Ramirez,    1930,   [id number]    [facility]   Atrial fibrillation, unspecified type (H) [I48.91]    PCP: Margret Flores,    Code status:  Prior       Extended Emergency Contact Information  Primary Emergency Contact: YUDI العراقي  Mobile Phone: [phone]  Relation: Sister  Secondary Emergency Contact: ELISEO LIAO  Mobile Phone: [phone]  Relation: Niece       Principal Problem:    Atrial fibrillation, unspecified type (H)  Active Problems:    Acute congestive heart failure, unspecified heart failure type (H)       ASSESSMENT AND PLAN:  Acute on chronic systolic heart failure exacerbation, follows with cardiology, last EF was 25 to 30%, declined CRT at that visit, repeat limited echo, begin IV Lasix, cardiology consulted  Persistent atrial fibrillation on anticoagulation, now with presyncope symptoms, placed on telemetry monitor to evaluate for heart block, cardiology consulted  Stage III CKD, avoid nephrotoxins, BMP daily while on Lasix  Benign essential hypertension currently controlled continue home lisinopril      Plan  Admit to cardiac monitoring  EKG with rate controlled A-fib, unremarkable delta troponin  ED spoke to cardiology who will evaluate in ED today and provide recommendations  Any rate control medications per cardiology due to suspicion for underlying heart block  Begin IV Lasix twice daily  Obtain limited echo  PT and OT evaluation once stable    Patient wants full code per discussion with her at bedside, witnessed by friend      DVT PPX:  Home anticoagulation    Barriers to discharge    Anticipated Discharge date inpatient multiple days          Chief Complaint:  Shortness of breath few days duration    HPI:  Gladys Ramirez is a 89 year old old female with history of cardiac issues.  Cardiology clinic presented with shortness of breath a few days duration, symptoms present with exertion even walking to the bathroom.  Denies  chest pain but has had some presyncope episodes of feeling dizzy but no actual syncope.  No recent falls some mild leg swelling but no pain orthopnea, no cough no chills and no fever.  In the ED workup showed elevated BNP with EKG showing A-fib rate controlled.  He is admitted for cardiac evaluation for CHF exacerbation      Medical History  Past Medical History:   Diagnosis Date    Angina pectoris (H24)     Chest pain 03/09/2017    Cough     Hyperlipidemia     Hypertension     Osteoarthritis           Surgical History  She  has a past surgical history that includes TOTAL KNEE ARTHROPLASTY (Left); appendectomy; Tonsillectomy; Laparoscopy diagnostic (general) (N/A, 11/04/2014); Basal Cell Carcinoma Excision; and Open reduction internal fixation hip bipolar (Right, 3/5/2023).      SOCIAL HISTORY:  Social History     Socioeconomic History    Marital status: Single     Spouse name: Not on file    Number of children: Not on file    Years of education: Not on file    Highest education level: Not on file   Occupational History    Not on file   Tobacco Use    Smoking status: Never     Passive exposure: Never    Smokeless tobacco: Never    Tobacco comments:     no passive exposure   Vaping Use    Vaping Use: Never used   Substance and Sexual Activity    Alcohol use: Yes     Comment: Alcoholic Drinks/day: Rarely a glass of wine    Drug use: No    Sexual activity: Not on file   Other Topics Concern    Not on file   Social History Narrative    The patient is a nun.     Social Determinants of Health     Financial Resource Strain: Low Risk  (10/5/2023)    Financial Resource Strain     Within the past 12 months, have you or your family members you live with been unable to get utilities (heat, electricity) when it was really needed?: No   Food Insecurity: Low Risk  (10/5/2023)    Food Insecurity     Within the past 12 months, did you worry that your food would run out before you got money to buy more?: No     Within the past 12  intensity (SNF)     ##FEN/GI/  - regular diet  - BR with Sophy-Colace and Miralax  - Monitor electrolytes and replete as clinically indicated  - Monitor fluid status and replete as clinically indicated  - Voiding spontaneously   - Strict I/Os  - AM labs prn     ##PPX  -subcutaneous lovenox (started post bleed day 2)  - SCDs     Dispo: RNF, medically ready for discharge; has received list of facilities and will provide social work with choices    Patient seen and plan discussed with attending, Dr. Ward.     Magali Goodwin MD- PGY1  Trauma Surgery  Floor: [phone]    ==============================================================================  CHIEF COMPLAINT / OVERNIGHT EVENTS:   No acute overnight events. Pt reports she has very little pain. Is continuing to work with physical therapy. Has been tolerating diet and endorses a good appetite. Self voiding and having bowel movements.    MEDICAL HISTORY / ROS:  Admission history and ROS reviewed. Pertinent changes as follows:  none    PHYSICAL EXAM:  Heart Rate:  [69-88]   Temp:  [36 °C (96.8 °F)-37.2 °C (99 °F)]   Resp:  [15-16]   BP: ()/(46-75)   SpO2:  [94 %-98 %]     Constitutional:       General: She is not in acute distress.     Appearance: She is not ill-appearing.   HENT:      Head:      Comments: L frontal old hematoma     Right Ear: External ear normal.      Left Ear: External ear normal.      Nose: Nose normal.      Mouth/Throat:      Mouth: Mucous membranes are moist.      Pharynx: Oropharynx is clear.   Eyes:      General:         Right eye: No discharge.         Left eye: No discharge.      Extraocular Movements: Extraocular movements intact.      Pupils: Pupils are equal, round, and reactive to light.   Cardiovascular:      Rate and Rhythm: Normal rate and regular rhythm.      Pulses: Normal pulses.      Heart sounds: Normal heart sounds.   Pulmonary:      Effort: Pulmonary effort is normal.      Breath sounds: Normal breath sounds.   Abdominal:       General: Abdomen is flat.      Palpations: Abdomen is soft.      Tenderness: There is no abdominal tenderness. There is no guarding or rebound.   Musculoskeletal:      Cervical back: Neck supple. No rigidity.      Comments: No tenderness to palpation L wrist   Skin:     Capillary Refill: Capillary refill takes less than 2 seconds.   Neurological:      General: No focal deficit present.      Mental Status: She is alert. Mental status is at baseline.    Cranial Nerves: No cranial nerve deficit.      Sensory: No sensory deficit.      Motor: No weakness.   Psychiatric:         Mood and Affect: Mood normal.         Behavior: Behavior normal.     IMAGING SUMMARY:  (summary of new imaging findings, not a copy of dictation)  none    LABS:  Results from last 7 days   Lab Units 02/22/24  0604 02/21/24  0643 02/20/24  0459   WBC AUTO x10*3/uL 7.2 8.2 9.0   HEMOGLOBIN g/dL 11.9* 14.1 12.1   HEMATOCRIT % 38.2 42.8 36.1   PLATELETS AUTO x10*3/uL 405 469* 410   NEUTROS PCT AUTO %  --   --  68.3   LYMPHS PCT AUTO %  --   --  14.5   MONOS PCT AUTO %  --   --  8.8   EOS PCT AUTO %  --   --  7.4     Results from last 7 days   Lab Units 02/20/24  0459   APTT seconds 25*   INR  1.0     Results from last 7 days   Lab Units 02/22/24  0604 02/21/24  0643 02/20/24  0459   SODIUM mmol/L 137 136 137   POTASSIUM mmol/L 4.1 4.8 4.3   CHLORIDE mmol/L 100 97* 98   CO2 mmol/L 27 28 30   BUN mg/dL 23 17 19   CREATININE mg/dL 0.95 0.82 0.82   CALCIUM mg/dL 9.1 10.0 9.2   PROTEIN TOTAL g/dL  --   --  6.6   BILIRUBIN TOTAL mg/dL  --   --  0.4   ALK PHOS U/L  --   --  85   ALT U/L  --   --  7   AST U/L  --   --  15   GLUCOSE mg/dL 68* 80 80     Results from last 7 days   Lab Units 02/20/24  0459   BILIRUBIN TOTAL mg/dL 0.4         I have reviewed all medications, laboratory results, and imaging pertinent for today's encounter.    I saw and evaluated the patient. I personally obtained the key and critical portions of the history and physical exam.  months, did the food you bought just not last and you didn t have money to get more?: No   Transportation Needs: Low Risk  (10/5/2023)    Transportation Needs     Within the past 12 months, has lack of transportation kept you from medical appointments, getting your medicines, non-medical meetings or appointments, work, or from getting things that you need?: No   Physical Activity: Insufficiently Active (3/27/2023)    Exercise Vital Sign     Days of Exercise per Week: 3 days     Minutes of Exercise per Session: 10 min   Stress: No Stress Concern Present (3/27/2023)    [employer] [place] [employer] - Occupational Stress Questionnaire     Feeling of Stress : Not at all   Social Connections: Moderately Integrated (3/27/2023)    Social Connection and Isolation Panel [NHANES]     Frequency of Communication with Friends and Family: More than three times a week     Frequency of Social Gatherings with Friends and Family: More than three times a week     Attends Sabianist Services: More than 4 times per year     Active Member of Clubs or Organizations: Yes     Attends Club or Organization Meetings: More than 4 times per year     Marital Status: Never    Interpersonal Safety: Low Risk  (10/5/2023)    Interpersonal Safety     Do you feel physically and emotionally safe where you currently live?: Yes     Within the past 12 months, have you been hit, slapped, kicked or otherwise physically hurt by someone?: No     Within the past 12 months, have you been humiliated or emotionally abused in other ways by your partner or ex-partner?: No   Housing Stability: Low Risk  (10/5/2023)    Housing Stability     Do you have housing? : Yes     Are you worried about losing your housing?: No       FAMILY HISTORY:  Family History   Problem Relation Age of Onset    Heart Disease Mother     Rheumatic Heart Disease Mother     No Known Problems Father     Cancer Brother         brain    Lung Cancer Brother     Lung Cancer Brother   "   Cancer Sister         lung    Lung Cancer Sister          ALLERGIES:  Allergies   Allergen Reactions    Trazodone Shortness Of Breath and Unknown     Allergic to trazodone and deriv., Other: trouble swallowing      Clindamycin Diarrhea     C-diff    Gabapentin Other (See Comments)     \"Internal tremors\"    Temazepam Other (See Comments)     Annotation: Nightmares      Buprenorphine Palpitations     Tried patch       MEDICATIONS: Please see pharmacy note        ROS:  12 point review of systems reviewed and is negative except as stated above      PHYSICAL EXAM:  /86   Pulse 96   Temp 97.5  F (36.4  C) (Oral)   Resp 25   Ht 1.575 m (5' 2\")   Wt 69.4 kg (153 lb)   SpO2 (!) 88%   BMI 27.98 kg/m      General: Alert and oriented x 3. Not in obvious distress.  HEENT: Pupils equal and reactive,ENT WNL   Neck- supple, No JVP elevation, lymphadenopathy or thyromegaly. Trachea-central.  Chest: Clear to auscultation bilaterally.  Heart: S1S2 irregular. No M/R/G.  Abdomen: Soft. NT, ND. No organomegaly. Bowel sounds- active.  Back: No spine tenderness. No CVA tenderness.  Extremities: Minimal foot swelling. Peripheral pulses 2+ bilaterally.  Neuro: No focal neurological deficit  Skin: no skin rashes     DIAGNOSTIC DATA: Case discussed extensively ED physician      EKG Results: Personally reviewed shows atrial fibrillation rate controlled into Ventricular conduction block        Advanced Care Planning       Johann Sheppard MD     " I reviewed the resident’s documentation and discussed the patient with the resident. I agree with the resident’s medical decision making as documented in the resident’s note.    Doing well. Will cut amlodipine dose in half given orthostasis. Plan to restart Asa post-bleed day 7 and plavix post-bleed day 10. Working towards SNF.    Lance Ward MD  Trauma, Critical Care, and Acute Care Surgery  Pager: [phone]

## 2024-02-22 NOTE — PROGRESS NOTES
Jeannie Rivera MD  [facility] Ep Support Pool - Lhe  Dual chamber pacemaker  MDT with conduction system pacing  MAC  Continue all home meds  CBC BMP on day of he procedure          Previous Messages       ----- Message -----  From: Aleja Tripp RN  Sent: 2/22/2024  11:48 AM CST  To: Jeannie Rivera MD  Subject: procedure change request                        Dr. Rivera,    Pt called today and reports she no longer wants the defibrillator that was discussed with you on 2/20.    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 Can you please review her chart again and give recommendations?    Thank you,  Aleja Tripp RN

## 2024-02-22 NOTE — TELEPHONE ENCOUNTER
Script Eprescribed to pharmacy    Signed Prescriptions:                        Disp   Refills    oxyCODONE (ROXICODONE) 5 MG tablet         90 tab*0        Sig: Take 1 tablet (5 mg) by mouth every 4 hours as needed           for moderate to severe pain (Max 3 tabs per day)           #90 tabs to last 30 days for chronic pain. Ok to           fill and start 02/22/24  Authorizing Provider: QUIANA HERNANDEZ APRN, NP-C  [facility]

## 2024-02-22 NOTE — TELEPHONE ENCOUNTER
Medication refill information reviewed.     Due date for oxyCODONE (ROXICODONE) 5 MG tablet is 2/22/24     Prescriptions prepped for review.     Will route to provider.

## 2024-02-22 NOTE — PROGRESS NOTES
Please hold off on scheduling pt procedure. She called this morning and would no longer like to proceed with the defibrillator portion and only wants pacemaker. A message has been sent to Dr. Rivera and will send updated prep sheet once reviewed.    Aleja Tripp RN

## 2024-02-23 ENCOUNTER — OFFICE VISIT (OUTPATIENT)
Dept: PALLIATIVE MEDICINE | Facility: OTHER | Age: 89
End: 2024-02-23
Payer: COMMERCIAL

## 2024-02-23 VITALS — DIASTOLIC BLOOD PRESSURE: 59 MMHG | HEART RATE: 65 BPM | OXYGEN SATURATION: 97 % | SYSTOLIC BLOOD PRESSURE: 112 MMHG

## 2024-02-23 DIAGNOSIS — M17.11 PRIMARY OSTEOARTHRITIS OF RIGHT KNEE: ICD-10-CM

## 2024-02-23 DIAGNOSIS — G89.29 CHRONIC INTRACTABLE PAIN: Primary | ICD-10-CM

## 2024-02-23 DIAGNOSIS — S83.511S RUPTURE OF ANTERIOR CRUCIATE LIGAMENT OF RIGHT KNEE, SEQUELA: ICD-10-CM

## 2024-02-23 PROCEDURE — G0463 HOSPITAL OUTPT CLINIC VISIT: HCPCS | Performed by: NURSE PRACTITIONER

## 2024-02-23 PROCEDURE — 99213 OFFICE O/P EST LOW 20 MIN: CPT | Performed by: NURSE PRACTITIONER

## 2024-02-23 RX ORDER — OXYCODONE HYDROCHLORIDE 5 MG/1
5 TABLET ORAL EVERY 4 HOURS PRN
Qty: 90 TABLET | Refills: 0 | Status: SHIPPED | OUTPATIENT
Start: 2024-02-23 | End: 2024-02-23

## 2024-02-23 RX ORDER — OXYCODONE HYDROCHLORIDE 5 MG/1
5 TABLET ORAL EVERY 4 HOURS PRN
Qty: 90 TABLET | Refills: 0 | Status: SHIPPED | OUTPATIENT
Start: 2024-02-23 | End: 2024-03-27

## 2024-02-23 ASSESSMENT — PAIN SCALES - GENERAL: PAINLEVEL: SEVERE PAIN (6)

## 2024-02-23 NOTE — PROGRESS NOTES
Patient presents to the clinic today for a follow up with SHASHA Sims CNP  regarding Pain Management.           2023    10:20 AM 2024     3:40 PM 2024     2:29 PM   PEG Score   PEG Total Score 6.33 8 2.67          UDT/CSA 23      Tressa Salvador MA  [facility]      [facility]    CHIEF COMPLAINT: Chronic Pain.    INTERVAL HISTORY:  Last seen on 2024.       Recommendations/plan at the last visit included:  30 minutes Clinic follow-up with SHASHA Simpson, NP-C in 1 month.  Procedures recommended: Steroid vs Synvisc injections   Referrals: Orthopedics referral for knee injections, call [phone] to make an appt   Medication Management :   Tylenol 500 m tablets up to 3 x per day.   Oxycodone 1 tablet up to 3 x/day   Flector Patch at Bedtime.   Compounded cream 3-4 times per day       Since last visit:   - Right knee pain management is going well. Takes Oxycodone 5 mg TID and Tylenol 1000 mg TID. Aspiration and steroid injection with Dr Walker in Sports Med was helpful.     Pain Information today: Severe Pain (6)/10. Location of pain: right knee.    Annual requirements last collected:  23     Current Pain Relevant Medications:    Acetaminophen 1000 M tabs 1-2 times daily, not every day  Compounded cream: Ketamine and Ketoprofen, 3-4 x/day PRN  Duloxetine 60 mg daily   Flector Patch:2 patches daily PRN. (When not using topical cream)       Current Controlled Substance Medications: (as of 2023)   Ambien 5 m tab at HS  Oxycodone 5 m tablet BID-TID PRN: 15-22.5 MME/day  OxyContin 10 mg at HS = 15 MME/day  Total opiate dose =  30-37.5 MME/day     Previous Pain Relevant Medications: (H--helped; HI--Helped initially; SWH--Somewhat helpful; NH--No help; W--worse; SE--side effects; ?--Unsure if helpful)   Opiates: Tramadol: SWH, Buprenorphine:Allergic  NSAIDS: Can't take, on blood  thinner  Migraine medications: N/A  Muscle Relaxants: none  Neuropathics: Gabapentin: SE  Anti-depressants for pain: Duloxetine: NH for pain  Anxiety medications: N/A  Topicals: Compounded cream: Lidocaine, ibuprofen, diclofenac:  OTC medications: Tylenol: takes 4000 mg/day  Sleep Medications: Temazepam: Allergy, Ambien:H  Other medications not covered above:      SUBSTANCE HISTORY:   Past or current illegal drug use: none  Past or current ETOH use: Rarely, glass of wine  Nicotine/tobacco use: none  Daily Caffeine intake: never     CURRENT FAMILY/SOCIAL SITUATION:  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 NO   Activity: Is patient functional and able to perform Activities of Daily Living? N/A   Adverse effects: Is patient free from adverse side effects from opiates? N/A   Adherence to Rx protocol: Is patient adhering to Controlled Substance Agreement and taking medications ONLY as ordered? N/A    [place] Board of Pharmacy Data Base Reviewed:    YES; No concern for abuse or misuse of controlled medications based on this report. Reviewed [facility] February 23, 2024- no concerning fills.      PHYSICAL EXAM    Vitals:    02/23/24 1426   BP: 112/59   Pulse: 65   SpO2: 97%       Constitutional: healthy, alert, and no distress A&O.   Patient is appropriate.  Psychiatric/mental status: Alert, without lethargy or stupor. Appropriate affect.     Neurologic exam:  CN:  Cranial nerves 2-12 are grossly normal.      DIRE Score for ongoing opioid management is calculated as follows:    Diagnosis = 2 pts (slowly progressive; moderate pain/objective findings)    Intractability = 3 pts (patient fully engaged but inadequate response to treatments)    Risk        Psych = 3 pts (no  significant personality dysfunction/mental illness; good communication with clinic)         Chem Hlth = 3 pts (no history of chemical dependency; not drug-focused)       Reliability = 3 pts (highly reliable with meds, appointments, treatments)       Social = 2 pts (reduction in some relationships/life rolls)       (Psych + Chem hlth + Reliability + Social) = 16    Efficacy = 2 pts (moderate benefit/function; low med dose; too early/not tried meds)    DIRE Score = 18        7-13: likely NOT suitable candidate for long-term opioid analgesia       14-21: may be a suitable candidate for long-term opioid analgesia     DIAGNOSTIC RESULTS:     11/15/22: MRI right knee w/o contrast   IMPRESSION:  1.  Horizontal cleavage tear of the posteromedial corner of the meniscus.  2.  Grade 2 cartilage loss both sides of the medial compartment.  3.  Full-thickness cartilage loss along the majority of both sides of the lateral compartment with bony remodeling of the lateral tibial plateau and extensive reactive edema.  4.  Large portions of the lateral meniscal body are not identified and may be completely degenerated. Anterior and posterior subluxation of the anterior and posterior horn, respectively.  5.  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  6.  Semimembranosus and medial gastrocnemius tendinopathy without tearing.  7.  Popliteus tendinopathy without tearing.  8.  Mild quadriceps and patellar tendinopathy without tearing.  9.  Small effusion.  10.  No evidence for acute fracture.     1/20/21: Left hip x-ray  IMPRESSION:  Mild primary degenerative narrowing both hip joints. Mild generalized degenerative change base of the spine and both SI joints.Pelvis and left hip otherwise negative. No fractures. No dislocations.     1/2021: XR KNEE RIGHT 1 OR 2 VWS   IMPRESSION:  Moderate primary osteoarthritis all 3 compartments but most prominent in the lateral compartment. Knee otherwise negative. No fractures. No joint  effusion.     PAIN RELAVENT CONDITIONS:   1.  OA: severe right knee, Baker's cyst.  2.  PMH: CKD Stage 2, A fib, CHF, primary insomnia    DIAGNOSIS AND PLAN:   .  (G89.29) Chronic intractable pain  (primary encounter diagnosis)  (M17.11) Primary osteoarthritis of right knee  (S83.511S) Rupture of anterior cruciate ligament of right knee, sequela  Comment: Continue current plan of care and medications.   Plan:oxyCODONE (ROXICODONE) 5 MG tablet           PATIENT INSTRUCTIONS:     Diagnosis reviewed, treatment option addressed, and risk/benefits discussed.  Self-care instructions given.  I am recommending a multidisciplinary treatment plan to help this patient better manage pain.    Remember to request ALL medication refills 5 BUSINESS days before you run out.     30 minutes Clinic follow-up with SHASHA Simpson, NP-C every 3 months or sooner if having problems.   Ok to schedule up to three follow up appts at a time, alternating clinic and video visits.   Procedures recommended: Continue with Dr Walker for steroid injections   Medication Management :   Oxycodone 5 mg refill to pick and start today.     I have reviewed the note as documented above.  This accurately captures the substance of my conversation with the patient.  A total of 19 minutes of preparation, care, and consultation were spent on this visit today.     SHASHA Domínguez, NP-C  [facility]    (Information in italics and blue color are taken from previous pain and consulting medical providers notes and are documented as such)

## 2024-02-23 NOTE — PATIENT INSTRUCTIONS
After Visit Instructions:     Thank you for coming to [facility] for your care. It is my goal to partner with you to help you reach your optimal state of health.   Continue daily self-care, identifying contributing factors, and monitoring variations in pain level. Continue to integrate self-care into your life.        30 minutes Clinic follow-up with SHASHA Simpson NP-C every 3 months or sooner if having problems.   Ok to schedule up to three follow up appts at a time, alternating clinic and video visits.   Procedures recommended: Continue with Dr Walker for steroid injections   Medication Management :   Oxycodone 5 mg refill to pick and start today.       SHASHA Domínguez NP-C  [facility]  [facility] - Monday, Thursday and Friday  [facility] - Tuesday      Be sure to request ALL medication refills 5 days prior to the due date whether or not you will see your medical provider in an appointment before the due date.      Do not expect same day refills. If you do not plan ahead you may run out of medications.     Early refills are not provided.  It is your responsibility to manage your medications responsibility and keep them safely stored. Lost or destroyed medications WILL NOT be replaced    Scheduling/Clinic telephone Number for ALL locations:  [phone]    After Hours On-Call Service:  [phone]    Call with any questions about your care and for scheduling assistance.   Calls are returned Monday through Friday between 8 AM and 4:00 PM. We usually get back to you within 2 business days depending on the issue/request.    If we are prescribing your medications:  For opioid medication refills, call the clinic or send a [employer] message 7 days in advance.  Please include:  Your name and date of birth.   Name of requested medication  Name of the pharmacy.  For non-opioid medications, call your pharmacy directly to request a refill. Please allow 3-4  days to be processed.   Per [place] State Law:  All controlled substance prescriptions must be filled within 30 days of being written.    For those controlled substances allowing refills, pickup must occur within 30 days of last fill.      We believe regular attendance is key to your success in our program!    Any time you are unable to keep your appointment we ask that you call us at least 24 hours in advance to cancel.This will allow us to offer the appointment time to another patient.   Multiple missed appointments may lead to dismissal from the clinic.

## 2024-03-25 ENCOUNTER — OFFICE VISIT (OUTPATIENT)
Dept: CARDIOLOGY | Facility: CLINIC | Age: 89
End: 2024-03-25
Attending: NURSE PRACTITIONER
Payer: COMMERCIAL

## 2024-03-25 VITALS
BODY MASS INDEX: 27.98 KG/M2 | DIASTOLIC BLOOD PRESSURE: 59 MMHG | WEIGHT: 153 LBS | SYSTOLIC BLOOD PRESSURE: 105 MMHG | HEART RATE: 75 BPM | RESPIRATION RATE: 14 BRPM

## 2024-03-25 DIAGNOSIS — E78.00 PURE HYPERCHOLESTEROLEMIA: ICD-10-CM

## 2024-03-25 DIAGNOSIS — N18.31 STAGE 3A CHRONIC KIDNEY DISEASE (CKD) (H): ICD-10-CM

## 2024-03-25 DIAGNOSIS — I48.19 PERSISTENT ATRIAL FIBRILLATION (H): ICD-10-CM

## 2024-03-25 DIAGNOSIS — R00.1 BRADYCARDIA: ICD-10-CM

## 2024-03-25 DIAGNOSIS — I10 BENIGN ESSENTIAL HYPERTENSION: ICD-10-CM

## 2024-03-25 DIAGNOSIS — I50.20 HFREF (HEART FAILURE WITH REDUCED EJECTION FRACTION) (H): ICD-10-CM

## 2024-03-25 DIAGNOSIS — I42.9 SECONDARY CARDIOMYOPATHY (H): Primary | ICD-10-CM

## 2024-03-25 PROBLEM — I48.91 ATRIAL FIBRILLATION, UNSPECIFIED TYPE (H): Status: RESOLVED | Noted: 2023-11-29 | Resolved: 2024-03-25

## 2024-03-25 LAB
ANION GAP SERPL CALCULATED.3IONS-SCNC: 13 MMOL/L (ref 7–15)
BUN SERPL-MCNC: 27.1 MG/DL (ref 8–23)
CALCIUM SERPL-MCNC: 10.1 MG/DL (ref 8.2–9.6)
CHLORIDE SERPL-SCNC: 99 MMOL/L (ref 98–107)
CREAT SERPL-MCNC: 1.25 MG/DL (ref 0.51–0.95)
DEPRECATED HCO3 PLAS-SCNC: 28 MMOL/L (ref 22–29)
EGFRCR SERPLBLD CKD-EPI 2021: 40 ML/MIN/1.73M2
GLUCOSE SERPL-MCNC: 102 MG/DL (ref 70–99)
NT-PROBNP SERPL-MCNC: 8126 PG/ML (ref 0–1800)
POTASSIUM SERPL-SCNC: 4.6 MMOL/L (ref 3.4–5.3)
SODIUM SERPL-SCNC: 140 MMOL/L (ref 135–145)

## 2024-03-25 PROCEDURE — 80048 BASIC METABOLIC PNL TOTAL CA: CPT | Performed by: INTERNAL MEDICINE

## 2024-03-25 PROCEDURE — 99214 OFFICE O/P EST MOD 30 MIN: CPT | Performed by: INTERNAL MEDICINE

## 2024-03-25 PROCEDURE — 36415 COLL VENOUS BLD VENIPUNCTURE: CPT | Performed by: INTERNAL MEDICINE

## 2024-03-25 PROCEDURE — 83880 ASSAY OF NATRIURETIC PEPTIDE: CPT | Performed by: INTERNAL MEDICINE

## 2024-03-25 PROCEDURE — G2211 COMPLEX E/M VISIT ADD ON: HCPCS | Performed by: INTERNAL MEDICINE

## 2024-03-25 NOTE — PROGRESS NOTES
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  On low-dose vasodilators such as lisinopril.  Once pacemaker implanted within be able to add possibly beta-blocker.    (I50.20) HFrEF (heart failure with reduced ejection fraction) (H)  Comment: Was hospitalized for this in December, cleared and now taking Lasix 40 the morning and 20 afternoon.  No significant signs or symptoms on exam although she is a little concerned with shortness breath over the last several weeks.  Check BMP and renal profile and address with increased diuretic if need be.    (I48.19) Persistent atrial fibrillation (H)  Comment: Not valvular, symptomatic, and persistent and controlled on amiodarone 100 mg a day, LFTs looked good in December 2023.    (R00.1) Bradycardia  Comment: Has had the amiodarone dose lowered and unable to tolerate beta-blocker for heart failure given this.  Will consider restarting very low-dose carvedilol once implanted device.    (I10) Benign essential hypertension  Comment: Under good control currently on low-dose lisinopril.    (E78.00) Pure hypercholesterolemia  Comment: No recent cholesterol with a total of 179.    (N18.31) Stage 3a chronic kidney disease (CKD) (H)  Comment: Fairly good control with a creatinine of 1.07 and a GFR 48 which is stable for her.    Plan  1.  Check renal profile today.  Check BMP today.  If need be go up in dose of diuretic.  2.  Agree with CRT device.  3.  Follow-up with me thereafter.    Subjective  CC: 89-year-old white female being seen post hospital discharge follow-up.  Since I seen her she was readmitted to the hospital secondary to hypotension and bradycardia which is since resolved with medication adjustments.  Beta-blocker has been held for this.  She  has been seen by electrophysiology and is planning on having a CRT device placed April 8.  She has a little bit increase shortness breath the last week to 2 with some stable bipedal edema, no chest pains, PND, orthopnea, syncope, dizziness.    Medications  Current Outpatient Medications   Medication Sig Dispense Refill    acetaminophen (TYLENOL) 500 MG tablet Take 1-2 tablets (500-1,000 mg) by mouth 3 times daily as needed for mild pain 250 tablet 1    amiodarone (PACERONE) 200 MG tablet Take 0.5 tablets (100 mg) by mouth daily 45 tablet 0    apixaban ANTICOAGULANT (ELIQUIS) 5 MG tablet Take 1 tablet (5 mg) by mouth 2 times daily 60 tablet 1    BETA BLOCKER NOT PRESCRIBED (INTENTIONAL) Please choose reason not prescribed from choices below.      BETA BLOCKER NOT PRESCRIBED (INTENTIONAL) Please choose reason not prescribed from choices below.      cholecalciferol, vitamin D3, (VITAMIN D3) 2,000 unit Tab [CHOLECALCIFEROL, VITAMIN D3, (VITAMIN D3) 2,000 UNIT TAB] Take 1 tablet (2,000 Units total) by mouth daily. 90 tablet 3    COMPOUNDED NON-CONTROLLED SUBSTANCE (CMPD RX) - PHARMACY TO MIX COMPOUNDED MEDICATION Ketamine 10% and Ketoprofen 5 % in carrier gel/lotion. Apply 2-4 pumps to affected areas TID- QID  g 1    DULoxetine (CYMBALTA) 60 MG capsule Take 60 mg by mouth daily      furosemide (LASIX) 20 MG tablet Take 1 tablet (20 mg) by mouth daily at 4pm (Patient taking differently: Take 40 mg by mouth daily at 4pm) 30 tablet 3    furosemide (LASIX) 40 MG tablet Take 1 tablet (40 mg) by mouth every morning 90 tablet 3    lisinopril (ZESTRIL) 2.5 MG tablet TAKE ONE TABLET BY MOUTH ONE TIME DAILY 90 tablet 1    oxyCODONE (ROXICODONE) 5 MG tablet Take 1 tablet (5 mg) by mouth every 4 hours as needed for moderate to severe pain (Max 3 tabs per day) #90 tabs to last 30 days for chronic pain. Ok to fill and start 02/23/24 90 tablet 0    potassium chloride ER (KLOR-CON) 20 MEQ CR tablet Take 1 tablet (20 mEq) by mouth  "daily 90 tablet 3    zolpidem ER (AMBIEN CR) 6.25 MG CR tablet Take 1 and 1/4 tablet by mouth at bedtime 45 tablet 5       Objective  /59 (BP Location: Right arm, Patient Position: Sitting, Cuff Size: Adult Large)   Pulse 75   Resp 14   Wt 69.4 kg (153 lb)   BMI 27.98 kg/m      General Appearance:    Alert, cooperative, no distress, appears stated age   Head:    Normocephalic, without obvious abnormality, atraumatic   Throat:   Lips, mucosa, and tongue normal; teeth and gums normal   Neck:   Supple, symmetrical, trachea midline, no adenopathy;        thyroid:  No enlargement/tenderness/nodules; no carotid    bruit or JVD   Back:     Symmetric, no curvature, ROM normal, no CVA tenderness   Lungs:     Clear to auscultation bilaterally, respirations unlabored   Chest wall:    No tenderness or deformity   Heart:    Regular rate and rhythm, S1 and S2 normal, no murmur, rub   or gallop   Abdomen:     Soft, non-tender, bowel sounds active all four quadrants,     no masses, no organomegaly   Extremities:   Normal, atraumatic, no cyanosis, trace 1/4 bipedal edema   Pulses:   2+ and symmetric all extremities   Skin:   Skin color, texture, turgor normal, no rashes or lesions     Results    Lab Results personally reviewed   Lab Results   Component Value Date    CHOL 179 03/12/2015    CHOL 179 09/30/2013     Lab Results   Component Value Date    HDL 64 03/12/2015    HDL 64 09/30/2013     No components found for: \"LDLCALC\"  Lab Results   Component Value Date    TRIG 176 (H) 03/12/2015    TRIG 155 (H) 09/30/2013     Lab Results   Component Value Date    WBC 7.5 12/29/2023    HGB 12.9 12/29/2023    HCT 41.7 12/29/2023     12/29/2023     Lab Results   Component Value Date    BUN 28.1 (H) 01/11/2024     01/11/2024    CO2 25 01/11/2024       Reviewed electrocardiogram sinus bradycardia left bundle branch block pattern.      "

## 2024-03-25 NOTE — PATIENT INSTRUCTIONS
Nigel Ramirez,  I enjoyed visiting with you again today.  I am a little concerned with the increased shortness of breath although I do not see much water on your exam.  Per our conversation I will check the blood work today and we will be in touch with results if we need to increase the diuretic.  I will plan on seeing you after you have the pacemaker implant.  Estrada Holley

## 2024-03-25 NOTE — LETTER
3/25/2024    Margret Flores MD  [address] [place] [id number]    RE: Gladys Ramirez       Dear Colleague,     I had the pleasure of seeing Gladys Ramirez in the [facility].      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  On low-dose vasodilators such as lisinopril.  Once pacemaker implanted within be able to add possibly beta-blocker.    (I50.20) HFrEF (heart failure with reduced ejection fraction) (H)  Comment: Was hospitalized for this in December, cleared and now taking Lasix 40 the morning and 20 afternoon.  No significant signs or symptoms on exam although she is a little concerned with shortness breath over the last several weeks.  Check BMP and renal profile and address with increased diuretic if need be.    (I48.19) Persistent atrial fibrillation (H)  Comment: Not valvular, symptomatic, and persistent and controlled on amiodarone 100 mg a day, LFTs looked good in December 2023.    (R00.1) Bradycardia  Comment: Has had the amiodarone dose lowered and unable to tolerate beta-blocker for heart failure given this.  Will consider restarting very low-dose carvedilol once implanted device.    (I10) Benign essential hypertension  Comment: Under good control currently on low-dose lisinopril.    (E78.00) Pure hypercholesterolemia  Comment: No recent cholesterol with a total of 179.    (N18.31) Stage 3a chronic kidney disease (CKD) (H)  Comment: Fairly good control with a creatinine of 1.07 and a GFR 48 which is stable for her.    Plan  1.  Check renal profile today.  Check BMP today.  If need be go up in dose of diuretic.  2.  Agree with CRT device.  3.  Follow-up with me thereafter.    Subjective  CC: 89-year-old white female being seen post hospital  discharge follow-up.  Since I seen her she was readmitted to the hospital secondary to hypotension and bradycardia which is since resolved with medication adjustments.  Beta-blocker has been held for this.  She has been seen by electrophysiology and is planning on having a CRT device placed April 8.  She has a little bit increase shortness breath the last week to 2 with some stable bipedal edema, no chest pains, PND, orthopnea, syncope, dizziness.    Medications  Current Outpatient Medications   Medication Sig Dispense Refill    acetaminophen (TYLENOL) 500 MG tablet Take 1-2 tablets (500-1,000 mg) by mouth 3 times daily as needed for mild pain 250 tablet 1    amiodarone (PACERONE) 200 MG tablet Take 0.5 tablets (100 mg) by mouth daily 45 tablet 0    apixaban ANTICOAGULANT (ELIQUIS) 5 MG tablet Take 1 tablet (5 mg) by mouth 2 times daily 60 tablet 1    BETA BLOCKER NOT PRESCRIBED (INTENTIONAL) Please choose reason not prescribed from choices below.      BETA BLOCKER NOT PRESCRIBED (INTENTIONAL) Please choose reason not prescribed from choices below.      cholecalciferol, vitamin D3, (VITAMIN D3) 2,000 unit Tab [CHOLECALCIFEROL, VITAMIN D3, (VITAMIN D3) 2,000 UNIT TAB] Take 1 tablet (2,000 Units total) by mouth daily. 90 tablet 3    COMPOUNDED NON-CONTROLLED SUBSTANCE (CMPD RX) - PHARMACY TO MIX COMPOUNDED MEDICATION Ketamine 10% and Ketoprofen 5 % in carrier gel/lotion. Apply 2-4 pumps to affected areas TID- QID  g 1    DULoxetine (CYMBALTA) 60 MG capsule Take 60 mg by mouth daily      furosemide (LASIX) 20 MG tablet Take 1 tablet (20 mg) by mouth daily at 4pm (Patient taking differently: Take 40 mg by mouth daily at 4pm) 30 tablet 3    furosemide (LASIX) 40 MG tablet Take 1 tablet (40 mg) by mouth every morning 90 tablet 3    lisinopril (ZESTRIL) 2.5 MG tablet TAKE ONE TABLET BY MOUTH ONE TIME DAILY 90 tablet 1    oxyCODONE (ROXICODONE) 5 MG tablet Take 1 tablet (5 mg) by mouth every 4 hours as needed for  "moderate to severe pain (Max 3 tabs per day) #90 tabs to last 30 days for chronic pain. Ok to fill and start 02/23/24 90 tablet 0    potassium chloride ER (KLOR-CON) 20 MEQ CR tablet Take 1 tablet (20 mEq) by mouth daily 90 tablet 3    zolpidem ER (AMBIEN CR) 6.25 MG CR tablet Take 1 and 1/4 tablet by mouth at bedtime 45 tablet 5       Objective  /59 (BP Location: Right arm, Patient Position: Sitting, Cuff Size: Adult Large)   Pulse 75   Resp 14   Wt 69.4 kg (153 lb)   BMI 27.98 kg/m      General Appearance:    Alert, cooperative, no distress, appears stated age   Head:    Normocephalic, without obvious abnormality, atraumatic   Throat:   Lips, mucosa, and tongue normal; teeth and gums normal   Neck:   Supple, symmetrical, trachea midline, no adenopathy;        thyroid:  No enlargement/tenderness/nodules; no carotid    bruit or JVD   Back:     Symmetric, no curvature, ROM normal, no CVA tenderness   Lungs:     Clear to auscultation bilaterally, respirations unlabored   Chest wall:    No tenderness or deformity   Heart:    Regular rate and rhythm, S1 and S2 normal, no murmur, rub   or gallop   Abdomen:     Soft, non-tender, bowel sounds active all four quadrants,     no masses, no organomegaly   Extremities:   Normal, atraumatic, no cyanosis, trace 1/4 bipedal edema   Pulses:   2+ and symmetric all extremities   Skin:   Skin color, texture, turgor normal, no rashes or lesions     Results    Lab Results personally reviewed   Lab Results   Component Value Date    CHOL 179 03/12/2015    CHOL 179 09/30/2013     Lab Results   Component Value Date    HDL 64 03/12/2015    HDL 64 09/30/2013     No components found for: \"LDLCALC\"  Lab Results   Component Value Date    TRIG 176 (H) 03/12/2015    TRIG 155 (H) 09/30/2013     Lab Results   Component Value Date    WBC 7.5 12/29/2023    HGB 12.9 12/29/2023    HCT 41.7 12/29/2023     12/29/2023     Lab Results   Component Value Date    BUN 28.1 (H) 01/11/2024    NA " 138 01/11/2024    CO2 25 01/11/2024       Reviewed electrocardiogram sinus bradycardia left bundle branch block pattern.          Thank you for allowing me to participate in the care of your patient.      Sincerely,     MARISA SANZ MD     [facility]  cc:   SHASHA Haney CNP  [address] [facility] [address], [address]  [place] [address]

## 2024-03-27 DIAGNOSIS — M17.11 PRIMARY OSTEOARTHRITIS OF RIGHT KNEE: ICD-10-CM

## 2024-03-27 DIAGNOSIS — I50.20 HFREF (HEART FAILURE WITH REDUCED EJECTION FRACTION) (H): ICD-10-CM

## 2024-03-27 DIAGNOSIS — I42.9 SECONDARY CARDIOMYOPATHY (H): Primary | ICD-10-CM

## 2024-03-27 DIAGNOSIS — S83.511S RUPTURE OF ANTERIOR CRUCIATE LIGAMENT OF RIGHT KNEE, SEQUELA: ICD-10-CM

## 2024-03-27 DIAGNOSIS — G89.29 CHRONIC INTRACTABLE PAIN: ICD-10-CM

## 2024-03-27 NOTE — PROGRESS NOTES
Stacy Noriega RN  3/26/2024 12:01 PM CDT Back to Top      ----- Message -----  From: Estrada Holley MD  Sent: 3/25/2024   7:28 PM CDT  To: Stacy Noriega RN     I called sister and told her that her BNP is markedly elevated over 8000, she is going back in the heart failure.  I have asked her to take an additional Lasix 20 mg this afternoon, 60 tomorrow morning Tuesday, 40 4 PM tomorrow afternoon, and 60 Wednesday morning.  Can you please connect with her Wednesday midday or she will call you to let us know how she is doing and if breathing is better?  May need to get another renal profile Wednesday, Thursday or Friday.  LF           [place] placed. [place] [place]

## 2024-03-27 NOTE — TELEPHONE ENCOUNTER
Received call from patient requesting refill(s) oxyCODONE (ROXICODONE) 5 MG tablet    Last dispensed from pharmacy on 02/23/2024    Patient's last office/virtual visit by prescribing provider on 02/23/2024  Next office/virtual appointment scheduled for 05/23/2024    Last urine drug screen date 09/28/2023  Current opioid agreement on file (completed within the last year) No Date of opioid agreement: 11/15/2022    E-prescribe to   [id number] [[address], pharmacy    Will route to nursing Jerome for review and preparation of prescription(s).     Tressa Salvador MA  [facility] [place]

## 2024-03-29 ENCOUNTER — TELEPHONE (OUTPATIENT)
Dept: CARDIOLOGY | Facility: CLINIC | Age: 89
End: 2024-03-29

## 2024-03-29 ENCOUNTER — LAB (OUTPATIENT)
Dept: CARDIOLOGY | Facility: CLINIC | Age: 89
End: 2024-03-29
Payer: COMMERCIAL

## 2024-03-29 DIAGNOSIS — I48.0 PAROXYSMAL ATRIAL FIBRILLATION (H): ICD-10-CM

## 2024-03-29 DIAGNOSIS — I50.20 HFREF (HEART FAILURE WITH REDUCED EJECTION FRACTION) (H): ICD-10-CM

## 2024-03-29 LAB
ANION GAP SERPL CALCULATED.3IONS-SCNC: 15 MMOL/L (ref 7–15)
BUN SERPL-MCNC: 32.4 MG/DL (ref 8–23)
CALCIUM SERPL-MCNC: 10 MG/DL (ref 8.2–9.6)
CHLORIDE SERPL-SCNC: 101 MMOL/L (ref 98–107)
CREAT SERPL-MCNC: 1.28 MG/DL (ref 0.51–0.95)
DEPRECATED HCO3 PLAS-SCNC: 25 MMOL/L (ref 22–29)
EGFRCR SERPLBLD CKD-EPI 2021: 39 ML/MIN/1.73M2
GLUCOSE SERPL-MCNC: 146 MG/DL (ref 70–99)
POTASSIUM SERPL-SCNC: 4.3 MMOL/L (ref 3.4–5.3)
SODIUM SERPL-SCNC: 141 MMOL/L (ref 135–145)

## 2024-03-29 PROCEDURE — 36415 COLL VENOUS BLD VENIPUNCTURE: CPT

## 2024-03-29 PROCEDURE — 80048 BASIC METABOLIC PNL TOTAL CA: CPT

## 2024-03-29 RX ORDER — OXYCODONE HYDROCHLORIDE 5 MG/1
5 TABLET ORAL EVERY 4 HOURS PRN
Qty: 90 TABLET | Refills: 0 | Status: SHIPPED | OUTPATIENT
Start: 2024-03-29 | End: 2024-05-02

## 2024-03-29 NOTE — TELEPHONE ENCOUNTER
Pending Prescriptions:                       Disp   Refills    oxyCODONE (ROXICODONE) 5 MG tablet        90 tab*0            Sig: Take 1 tablet (5 mg) by mouth every 4 hours as           needed for moderate to severe pain (Max 3 tabs           per day) #90 tabs to last 30 days for chronic           pain. Ok to fill and start 03/29/24

## 2024-03-29 NOTE — TELEPHONE ENCOUNTER
Script Eprescribed to pharmacy    Signed Prescriptions:                        Disp   Refills    oxyCODONE (ROXICODONE) 5 MG tablet         90 tab*0        Sig: Take 1 tablet (5 mg) by mouth every 4 hours as needed           for moderate to severe pain (Max 3 tabs per day)           #90 tabs to last 30 days for chronic pain. Ok to           fill and start 03/29/24  Authorizing Provider: QUIANA HERNANDEZ      Thank you for using My Chart to request your medication refills!     SHASHA Domínguez, NP-C  [facility]

## 2024-03-29 NOTE — TELEPHONE ENCOUNTER
[facility] Call Center    Phone Message    May a detailed message be left on voicemail: yes     Reason for Call: Medication Refill Request    Has the patient contacted the pharmacy for the refill? Yes   Name of medication being requested: apixaban ANTICOAGULANT (ELIQUIS) 5 MG tablet [[id number]]   Provider who prescribed the medication: Felix Christianson MD   Pharmacy: [facility] [address] [place] [address] [[address]    Date medication is needed: ASAP    Pt states she is running low on her medication and had a visit with Dr. Holley on 3/25 where they discussed him filling Gladys's Eliquis. Please fill ASAP. Thank you!    Action Taken: Other: Cardiology    Travel Screening: Not Applicable    Thank you!  [facility]

## 2024-04-01 ENCOUNTER — TELEPHONE (OUTPATIENT)
Dept: CARDIOLOGY | Facility: CLINIC | Age: 89
End: 2024-04-01
Payer: COMMERCIAL

## 2024-04-01 NOTE — TELEPHONE ENCOUNTER
Pre-Procedure Education    Procedure: PPM with Dr Rivera on 4/8 with arrival time 8:30 am    Orders: Orderset for procedure verified signed/held    COVID: COVID policy- if pt develops COVID like symptoms prior to procedure, he/she would need to complete an at home with a rapid antigen COVID test 1-2 days prior to your procedure date. If COVID + pt is aware the procedure will need to be rescheduled, and to contact CV scheduling as soon as possible    Pre-Op H&P: Scheduled on 4/5/24 at PMD- Will request upon completion    Education:   Contact: Reviewed via phone with pt  Pre-Procedure Instruction: NPO after midnight pre procedure, Defined NPO with pt, Remove all jewelry and leave all valuables at home, Shower prior to arrival, Sedation plan/orders, Transportation requirements and arrangements post procedure, Post-procedure follow up process, Post-procedure restrictions/expectations, and Pre-procedure letter sent- letter tab  Risks:  Permanent Programable Pacemaker (PPM) Risks  1% Pneumothorax  1-2% Infection, Pocket erosion  1-2% Major bleeding, Hematoma (increased with anticoagulation therapy); 5-10% Minor bleeding  1-2% Acute subclavian vein thrombosis & 10% Chronic subclavian vein thrombosis  <1% Cardiac perforation, Cardiac tamponade, Arrythmias, Diaphragmatic stimulation  <2% % Lead dislodgment, <1% Lead fracture or Generator malfunction  < 0.1% Death  <2% Tricuspid valve dysfunction  < 5% Need for ICD system revision  If external defibrillation or CV is needed, 25% risk for superficial burn.  Risk for electromagnetic interference (DEVYN), and psychological and social aspects of having an PPM.      Medication:   Instructions regarding anticoagulants: Eliquis- To continue anticoagulation uninterrupted through their procedure  Instructions regarding antiarrhythmic medication: N/A for this type of procedure; N/A  Instructions given to pt regarding diuretics medication: Hold oral diuretics AM of  procedure  Instructions given to pt regarding DM/GLP-1 medication:   DM- None  GLP-1- None  Instructions for medication, other than anticoagulants and antiarrhythmics listed above, given to pt: Take all medication AM of procedure with small sips of water     Important patient information for staff:  Verified e-mail sent to Aleja EISENBERG Regarding pt needing to stay overnight.    4/1/2024 2:10 PM  Stacy Syed RN

## 2024-04-05 ENCOUNTER — OFFICE VISIT (OUTPATIENT)
Dept: FAMILY MEDICINE | Facility: CLINIC | Age: 89
End: 2024-04-05
Payer: COMMERCIAL

## 2024-04-05 VITALS
TEMPERATURE: 97.6 F | BODY MASS INDEX: 30.49 KG/M2 | OXYGEN SATURATION: 97 % | WEIGHT: 155.31 LBS | DIASTOLIC BLOOD PRESSURE: 64 MMHG | RESPIRATION RATE: 20 BRPM | SYSTOLIC BLOOD PRESSURE: 138 MMHG | HEART RATE: 68 BPM | HEIGHT: 60 IN

## 2024-04-05 DIAGNOSIS — I48.19 PERSISTENT ATRIAL FIBRILLATION (H): ICD-10-CM

## 2024-04-05 DIAGNOSIS — G89.29 OTHER CHRONIC PAIN: ICD-10-CM

## 2024-04-05 DIAGNOSIS — R00.1 BRADYCARDIA: ICD-10-CM

## 2024-04-05 DIAGNOSIS — N18.31 STAGE 3A CHRONIC KIDNEY DISEASE (CKD) (H): ICD-10-CM

## 2024-04-05 DIAGNOSIS — Z01.818 PREOP GENERAL PHYSICAL EXAM: Primary | ICD-10-CM

## 2024-04-05 DIAGNOSIS — I42.9 SECONDARY CARDIOMYOPATHY (H): ICD-10-CM

## 2024-04-05 DIAGNOSIS — M17.10 PRIMARY LOCALIZED OSTEOARTHROSIS OF LOWER LEG, UNSPECIFIED LATERALITY: ICD-10-CM

## 2024-04-05 DIAGNOSIS — I50.20 HFREF (HEART FAILURE WITH REDUCED EJECTION FRACTION) (H): ICD-10-CM

## 2024-04-05 PROBLEM — D64.9 NORMOCYTIC ANEMIA: Status: RESOLVED | Noted: 2019-11-20 | Resolved: 2024-04-05

## 2024-04-05 PROCEDURE — 99214 OFFICE O/P EST MOD 30 MIN: CPT | Performed by: FAMILY MEDICINE

## 2024-04-05 RX ORDER — RESPIRATORY SYNCYTIAL VIRUS VACCINE 120MCG/0.5
0.5 KIT INTRAMUSCULAR ONCE
Qty: 1 EACH | Refills: 0 | Status: CANCELLED | OUTPATIENT
Start: 2024-04-05 | End: 2024-04-05

## 2024-04-05 ASSESSMENT — ANXIETY QUESTIONNAIRES
4. TROUBLE RELAXING: NOT AT ALL
6. BECOMING EASILY ANNOYED OR IRRITABLE: NOT AT ALL
GAD7 TOTAL SCORE: 0
GAD7 TOTAL SCORE: 0
1. FEELING NERVOUS, ANXIOUS, OR ON EDGE: NOT AT ALL
7. FEELING AFRAID AS IF SOMETHING AWFUL MIGHT HAPPEN: NOT AT ALL
2. NOT BEING ABLE TO STOP OR CONTROL WORRYING: NOT AT ALL
3. WORRYING TOO MUCH ABOUT DIFFERENT THINGS: NOT AT ALL
7. FEELING AFRAID AS IF SOMETHING AWFUL MIGHT HAPPEN: NOT AT ALL
IF YOU CHECKED OFF ANY PROBLEMS ON THIS QUESTIONNAIRE, HOW DIFFICULT HAVE THESE PROBLEMS MADE IT FOR YOU TO DO YOUR WORK, TAKE CARE OF THINGS AT HOME, OR GET ALONG WITH OTHER PEOPLE: NOT DIFFICULT AT ALL
8. IF YOU CHECKED OFF ANY PROBLEMS, HOW DIFFICULT HAVE THESE MADE IT FOR YOU TO DO YOUR WORK, TAKE CARE OF THINGS AT HOME, OR GET ALONG WITH OTHER PEOPLE?: NOT DIFFICULT AT ALL
5. BEING SO RESTLESS THAT IT IS HARD TO SIT STILL: NOT AT ALL
GAD7 TOTAL SCORE: 0

## 2024-04-05 ASSESSMENT — PATIENT HEALTH QUESTIONNAIRE - PHQ9
SUM OF ALL RESPONSES TO PHQ QUESTIONS 1-9: 2
SUM OF ALL RESPONSES TO PHQ QUESTIONS 1-9: 2
10. IF YOU CHECKED OFF ANY PROBLEMS, HOW DIFFICULT HAVE THESE PROBLEMS MADE IT FOR YOU TO DO YOUR WORK, TAKE CARE OF THINGS AT HOME, OR GET ALONG WITH OTHER PEOPLE: NOT DIFFICULT AT ALL

## 2024-04-05 NOTE — PROGRESS NOTES
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 Rivera [facility]  Surgery Date: April 8, 2024  Time of Surgery: 8:30 am   Where patient plans to recover: Other: Stay in hospital overnight   Fax number for surgical facility: Note does not need to be faxed, will be available electronically in Epic.    Assessment & Plan     The proposed surgical procedure is considered INTERMEDIATE risk.    Preop general physical exam    Persistent atrial fibrillation (H)  Secondary cardiomyopathy (H)  HFrEF (heart failure with reduced ejection fraction) (H):  EF 25%. Unable to tolerate beta blocker due to bradycardia. Mild peripheral edema on exam, no signs of overt fluid overload. Lungs clear. She denies any worsening dyspnea, chest pain. Reviewed cardiology notes- planning for pacemaker.    Stage 3a chronic kidney disease (CKD) (H): Stable- recent labs checked- no need to recheck today. Avoid nephrotoxic agents.    Primary localized osteoarthrosis of lower leg, unspecified laterality  Other chronic pain: Follows with specialists, on oxycodone for pain management- reviewed  database- advised her not to take oxycodone on the day of surgery.        Risks and Recommendations  The patient has the following additional risks and recommendations for perioperative complications:  Cardiovascular:   - Cardiology consulted and performing procedure    Antiplatelet or Anticoagulation Medication Instructions  Eliquis- reviewed cardiology recommendation and plan to  continue    Additional Medication Instructions  Patient is to take all scheduled medications on the day of surgery EXCEPT lasix, oxycodone.     Recommendation  APPROVAL GIVEN to proceed with proposed procedure, without further diagnostic evaluation.        Subjective       HPI related to upcoming procedure: History of HFrEF 25%, cardiomyopathy, atrial fibrillation, bradycardia- planning for pacemaker        4/5/2024     9:56 AM   Preop Questions   1. Have you ever had a heart attack or stroke? No   2. Have you ever had surgery on your heart or blood vessels, such as a stent placement, a coronary artery bypass, or surgery on an artery in your head, neck, heart, or legs? No   3. Do you have chest pain with activity? No   4. Do you have a history of  heart failure? YES - follows with cardiology   5. Do you currently have a cold, bronchitis or symptoms of other infection? No   6. Do you have a cough, shortness of breath, or wheezing? No   7. Do you or anyone in your family have previous history of blood clots? No   8. Do you or does anyone in your family have a serious bleeding problem such as prolonged bleeding following surgeries or cuts? No   9. Have you ever had problems with anemia or been told to take iron pills? No   10. Have you had any abnormal blood loss such as black, tarry or bloody stools, or abnormal vaginal bleeding? No   11. Have you ever had a blood transfusion? No   12. Are you willing to have a blood transfusion if it is medically needed before, during, or after your surgery? Yes   13. Have you or any of your relatives ever had problems with anesthesia? UNKNOWN    14. Do you have sleep apnea, excessive snoring or daytime drowsiness? No   15. Do you have any artifical heart valves or other implanted medical devices like a pacemaker, defibrillator, or continuous glucose monitor? No   16. Do you have artificial joints? YES    17. Are you allergic to latex? No       Health Care Directive  Patient does not  have a Health Care Directive or Living Will: DNR    Preoperative Review of    reviewed - controlled substances reflected in medication list.          Patient Active Problem List    Diagnosis Date Noted    Episodic opioid dependence (H) - F11.2 01/31/2024     Priority: Medium    Bradycardia 12/29/2023     Priority: Medium    Cardiac pacemaker in situ 12/21/2023     Priority: Medium    Secondary cardiomyopathy (H) 09/18/2023     Priority: Medium    History of falling 03/08/2023     Priority: Medium    Long term (current) use of anticoagulants 03/08/2023     Priority: Medium    Persistent atrial fibrillation (H) 10/26/2022     Priority: Medium    Stage 3a chronic kidney disease (CKD) (H) 09/28/2022     Priority: Medium    HFrEF (heart failure with reduced ejection fraction) (H) 09/12/2022     Priority: Medium    Anticoagulated by anticoagulation treatment 08/24/2022     Priority: Medium    Exertional dyspnea 08/23/2022     Priority: Medium    Thyroid nodule 01/30/2020     Priority: Medium    Gastroesophageal reflux disease without esophagitis      Priority: Medium    Chronic pain 11/20/2019     Priority: Medium    Normocytic anemia 11/20/2019     Priority: Medium    Benign essential hypertension      Priority: Medium    Insomnia      Priority: Medium    Lung nodule < 6cm on CT 04/29/2019     Priority: Medium    Deep vein thrombosis (DVT) of distal vein of lower extremity, unspecified chronicity, unspecified laterality (H)      Priority: Medium    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 01/17/2019     Priority: Medium    Vitamin D deficiency 01/17/2019     Priority: Medium    Frequent nosebleeds 01/08/2019     Priority: Medium    Controlled substance agreement signed - for tramadol and ambien signed 1/30/20 01/08/2019     Priority: Medium    DNR (do not resuscitate) 10/11/2018     Priority: Medium    Arthrosis of foot - bilateral 10/11/2018     Priority: Medium    Sinus bradycardia      Priority: Medium     Hyperlipidemia      Priority: Medium    Localized Primary Osteoarthritis Of The Left Knee      Priority: Medium     schedule for left knee replacement on 5/8/12 w/ Dr Daily        Serum Enzyme Levels - Alkaline Phosphatase Elevated      Priority: Medium    Cataract      Priority: Medium    BCC (basal cell carcinoma of skin) 08/06/2015     Priority: Medium     Per derm consult 7/2015          Past Medical History:   Diagnosis Date    Angina pectoris (H24)     Atrial fibrillation (H)     Chest pain 03/09/2017    Chronic kidney disease     Congestive heart failure (H)     Cough     Hyperlipidemia     Hypertension     Osteoarthritis      Past Surgical History:   Procedure Laterality Date    APPENDECTOMY      BASAL CELL CARCINOMA EXCISION      nose    LAPAROSCOPY DIAGNOSTIC (GENERAL) N/A 11/04/2014    Procedure: LAPAROSCOPY BILATERAL SALPINGO-OOPHORECTOMY ;  Surgeon: Sofia Harper MD;  Location: [facility];  Service:     OPEN REDUCTION INTERNAL FIXATION HIP BIPOLAR Right 3/5/2023    Procedure: HEMIARTHROPLASTY, HIP, BIPOLAR;  Surgeon: Jose G Martinez MD;  Location: [facility]    TONSILLECTOMY      10 years old    [facility] TOTAL KNEE ARTHROPLASTY Left     2011     Current Outpatient Medications   Medication Sig Dispense Refill    acetaminophen (TYLENOL) 500 MG tablet Take 1-2 tablets (500-1,000 mg) by mouth 3 times daily as needed for mild pain 250 tablet 1    amiodarone (PACERONE) 200 MG tablet Take 0.5 tablets (100 mg) by mouth daily 45 tablet 0    apixaban ANTICOAGULANT (ELIQUIS) 5 MG tablet Take 1 tablet (5 mg) by mouth 2 times daily 60 tablet 3    BETA BLOCKER NOT PRESCRIBED (INTENTIONAL) Please choose reason not prescribed from choices below.      BETA BLOCKER NOT PRESCRIBED (INTENTIONAL) Please choose reason not prescribed from choices below.      DULoxetine (CYMBALTA) 60 MG capsule Take 60 mg by mouth daily      furosemide (LASIX) 40 MG tablet Take 1 tablet (40 mg) by mouth every morning 90  "tablet 3    lisinopril (ZESTRIL) 2.5 MG tablet TAKE ONE TABLET BY MOUTH ONE TIME DAILY 90 tablet 1    oxyCODONE (ROXICODONE) 5 MG tablet Take 1 tablet (5 mg) by mouth every 4 hours as needed for moderate to severe pain (Max 3 tabs per day) #90 tabs to last 30 days for chronic pain. Ok to fill and start 03/29/24 90 tablet 0    potassium chloride ER (KLOR-CON) 20 MEQ CR tablet Take 1 tablet (20 mEq) by mouth daily 90 tablet 3    zolpidem ER (AMBIEN CR) 6.25 MG CR tablet Take 1 and 1/4 tablet by mouth at bedtime 45 tablet 5    cholecalciferol, vitamin D3, (VITAMIN D3) 2,000 unit Tab [CHOLECALCIFEROL, VITAMIN D3, (VITAMIN D3) 2,000 UNIT TAB] Take 1 tablet (2,000 Units total) by mouth daily. 90 tablet 3    COMPOUNDED NON-CONTROLLED SUBSTANCE (CMPD RX) - PHARMACY TO MIX COMPOUNDED MEDICATION Ketamine 10% and Ketoprofen 5 % in carrier gel/lotion. Apply 2-4 pumps to affected areas TID- QID PRN (Patient not taking: Reported on 4/5/2024) 180 g 1       Allergies   Allergen Reactions    Trazodone Shortness Of Breath and Unknown     Allergic to trazodone and deriv., Other: trouble swallowing      Clindamycin Diarrhea     C-diff    Gabapentin Other (See Comments)     \"Internal tremors\"    Temazepam Other (See Comments)     Annotation: Nightmares          Social History     Tobacco Use    Smoking status: Never     Passive exposure: Never    Smokeless tobacco: Never    Tobacco comments:     no passive exposure   Substance Use Topics    Alcohol use: Yes     Comment: Alcoholic Drinks/day: Rarely a glass of wine       History   Drug Use No         Review of Systems      Objective    /64 (BP Location: Right arm, Patient Position: Sitting, Cuff Size: Adult Regular)   Pulse 68   Temp 97.6  F (36.4  C) (Oral)   Resp 20   Ht 1.535 m (5' 0.43\")   Wt 70.4 kg (155 lb 5 oz)   SpO2 97%   BMI 29.90 kg/m     Estimated body mass index is 29.9 kg/m  as calculated from the following:    Height as of this encounter: 1.535 m (5' 0.43\").   "  Weight as of this encounter: 70.4 kg (155 lb 5 oz).  Physical Exam  GENERAL: alert and no distress  RESP: lungs clear to auscultation - no rales, rhonchi or wheezes  CV: regular rate and irregular rhythm, normal S1 S2  Ext: trace peripheral edema in lower extremities bilaterally      Recent Labs   Lab Test 03/29/24  1047 03/25/24  1521 12/30/23  0803 12/29/23  1214 12/23/23  0512 12/22/23  0502 03/05/23  0726 03/04/23  1905 08/24/22  0718 08/23/22  1154   HGB  --   --   --  12.9  --  12.9   < > 12.5   < > 12.7   PLT  --   --   --  256  --  260   < > 225   < > 281   INR  --   --   --   --   --   --   --  1.33*  --  1.37*    140   < > 142   < > 139   < > 138   < > 141   POTASSIUM 4.3 4.6   < > 3.9   < > 3.5   < > 4.2   < > 4.6   CR 1.28* 1.25*   < > 1.41*   < > 1.17*   < > 1.01*   < > 1.08    < > = values in this interval not displayed.        Diagnostics  Recent Results (from the past 720 hour(s))   Basic metabolic panel    Collection Time: 03/25/24  3:21 PM   Result Value Ref Range    Sodium 140 135 - 145 mmol/L    Potassium 4.6 3.4 - 5.3 mmol/L    Chloride 99 98 - 107 mmol/L    Carbon Dioxide (CO2) 28 22 - 29 mmol/L    Anion Gap 13 7 - 15 mmol/L    Urea Nitrogen 27.1 (H) 8.0 - 23.0 mg/dL    Creatinine 1.25 (H) 0.51 - 0.95 mg/dL    GFR Estimate 40 (L) >60 mL/min/1.73m2    Calcium 10.1 (H) 8.2 - 9.6 mg/dL    Glucose 102 (H) 70 - 99 mg/dL   N terminal pro BNP outpatient    Collection Time: 03/25/24  3:21 PM   Result Value Ref Range    N Terminal Pro BNP Outpatient 8,126 (H) 0 - 1,800 pg/mL   Basic metabolic panel    Collection Time: 03/29/24 10:47 AM   Result Value Ref Range    Sodium 141 135 - 145 mmol/L    Potassium 4.3 3.4 - 5.3 mmol/L    Chloride 101 98 - 107 mmol/L    Carbon Dioxide (CO2) 25 22 - 29 mmol/L    Anion Gap 15 7 - 15 mmol/L    Urea Nitrogen 32.4 (H) 8.0 - 23.0 mg/dL    Creatinine 1.28 (H) 0.51 - 0.95 mg/dL    GFR Estimate 39 (L) >60 mL/min/1.73m2    Calcium 10.0 (H) 8.2 - 9.6 mg/dL    Glucose  146 (H) 70 - 99 mg/dL      No EKG necessary due to cardiac procedure    Revised Cardiac Risk Index (RCRI)  The patient has the following serious cardiovascular risks for perioperative complications:   - Congestive Heart Failure (pulmonary edema, PND, s3 linda, CXR with pulmonary congestion, basilar rales) = 1 point     RCRI Interpretation: 1 point: Class II (low risk - 0.9% complication rate)       Signed Electronically by: Margret Flores MD  Copy of this evaluation report is provided to requesting physician.

## 2024-04-05 NOTE — H&P (VIEW-ONLY)
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 Rivera [facility]  Surgery Date: April 8, 2024  Time of Surgery: 8:30 am   Where patient plans to recover: Other: Stay in hospital overnight   Fax number for surgical facility: Note does not need to be faxed, will be available electronically in Epic.    Assessment & Plan     The proposed surgical procedure is considered INTERMEDIATE risk.    Preop general physical exam    Persistent atrial fibrillation (H)  Secondary cardiomyopathy (H)  HFrEF (heart failure with reduced ejection fraction) (H):  EF 25%. Unable to tolerate beta blocker due to bradycardia. Mild peripheral edema on exam, no signs of overt fluid overload. Lungs clear. She denies any worsening dyspnea, chest pain. Reviewed cardiology notes- planning for pacemaker.    Stage 3a chronic kidney disease (CKD) (H): Stable- recent labs checked- no need to recheck today. Avoid nephrotoxic agents.    Primary localized osteoarthrosis of lower leg, unspecified laterality  Other chronic pain: Follows with specialists, on oxycodone for pain management- reviewed  database- advised her not to take oxycodone on the day of surgery.        Risks and Recommendations  The patient has the following additional risks and recommendations for perioperative complications:  Cardiovascular:   - Cardiology consulted and performing procedure    Antiplatelet or Anticoagulation Medication Instructions  Eliquis- reviewed cardiology recommendation and plan to  continue    Additional Medication Instructions  Patient is to take all scheduled medications on the day of surgery EXCEPT lasix, oxycodone.     Recommendation  APPROVAL GIVEN to proceed with proposed procedure, without further diagnostic evaluation.        Subjective       HPI related to upcoming procedure: History of HFrEF 25%, cardiomyopathy, atrial fibrillation, bradycardia- planning for pacemaker        4/5/2024     9:56 AM   Preop Questions   1. Have you ever had a heart attack or stroke? No   2. Have you ever had surgery on your heart or blood vessels, such as a stent placement, a coronary artery bypass, or surgery on an artery in your head, neck, heart, or legs? No   3. Do you have chest pain with activity? No   4. Do you have a history of  heart failure? YES - follows with cardiology   5. Do you currently have a cold, bronchitis or symptoms of other infection? No   6. Do you have a cough, shortness of breath, or wheezing? No   7. Do you or anyone in your family have previous history of blood clots? No   8. Do you or does anyone in your family have a serious bleeding problem such as prolonged bleeding following surgeries or cuts? No   9. Have you ever had problems with anemia or been told to take iron pills? No   10. Have you had any abnormal blood loss such as black, tarry or bloody stools, or abnormal vaginal bleeding? No   11. Have you ever had a blood transfusion? No   12. Are you willing to have a blood transfusion if it is medically needed before, during, or after your surgery? Yes   13. Have you or any of your relatives ever had problems with anesthesia? UNKNOWN    14. Do you have sleep apnea, excessive snoring or daytime drowsiness? No   15. Do you have any artifical heart valves or other implanted medical devices like a pacemaker, defibrillator, or continuous glucose monitor? No   16. Do you have artificial joints? YES    17. Are you allergic to latex? No       Health Care Directive  Patient does not  have a Health Care Directive or Living Will: DNR    Preoperative Review of    reviewed - controlled substances reflected in medication list.          Patient Active Problem List    Diagnosis Date Noted    Episodic opioid dependence (H) - F11.2 01/31/2024     Priority: Medium    Bradycardia 12/29/2023     Priority: Medium    Cardiac pacemaker in situ 12/21/2023     Priority: Medium    Secondary cardiomyopathy (H) 09/18/2023     Priority: Medium    History of falling 03/08/2023     Priority: Medium    Long term (current) use of anticoagulants 03/08/2023     Priority: Medium    Persistent atrial fibrillation (H) 10/26/2022     Priority: Medium    Stage 3a chronic kidney disease (CKD) (H) 09/28/2022     Priority: Medium    HFrEF (heart failure with reduced ejection fraction) (H) 09/12/2022     Priority: Medium    Anticoagulated by anticoagulation treatment 08/24/2022     Priority: Medium    Exertional dyspnea 08/23/2022     Priority: Medium    Thyroid nodule 01/30/2020     Priority: Medium    Gastroesophageal reflux disease without esophagitis      Priority: Medium    Chronic pain 11/20/2019     Priority: Medium    Normocytic anemia 11/20/2019     Priority: Medium    Benign essential hypertension      Priority: Medium    Insomnia      Priority: Medium    Lung nodule < 6cm on CT 04/29/2019     Priority: Medium    Deep vein thrombosis (DVT) of distal vein of lower extremity, unspecified chronicity, unspecified laterality (H)      Priority: Medium    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 01/17/2019     Priority: Medium    Vitamin D deficiency 01/17/2019     Priority: Medium    Frequent nosebleeds 01/08/2019     Priority: Medium    Controlled substance agreement signed - for tramadol and ambien signed 1/30/20 01/08/2019     Priority: Medium    DNR (do not resuscitate) 10/11/2018     Priority: Medium    Arthrosis of foot - bilateral 10/11/2018     Priority: Medium    Sinus bradycardia      Priority: Medium     Hyperlipidemia      Priority: Medium    Localized Primary Osteoarthritis Of The Left Knee      Priority: Medium     schedule for left knee replacement on 5/8/12 w/ Dr Daily        Serum Enzyme Levels - Alkaline Phosphatase Elevated      Priority: Medium    Cataract      Priority: Medium    BCC (basal cell carcinoma of skin) 08/06/2015     Priority: Medium     Per derm consult 7/2015          Past Medical History:   Diagnosis Date    Angina pectoris (H24)     Atrial fibrillation (H)     Chest pain 03/09/2017    Chronic kidney disease     Congestive heart failure (H)     Cough     Hyperlipidemia     Hypertension     Osteoarthritis      Past Surgical History:   Procedure Laterality Date    APPENDECTOMY      BASAL CELL CARCINOMA EXCISION      nose    LAPAROSCOPY DIAGNOSTIC (GENERAL) N/A 11/04/2014    Procedure: LAPAROSCOPY BILATERAL SALPINGO-OOPHORECTOMY ;  Surgeon: Sofia Harper MD;  Location: [facility] [place];  Service:     OPEN REDUCTION INTERNAL FIXATION HIP BIPOLAR Right 3/5/2023    Procedure: HEMIARTHROPLASTY, HIP, BIPOLAR;  Surgeon: Jose G Martinez MD;  [facility]    TONSILLECTOMY      10 years old    ZC TOTAL KNEE ARTHROPLASTY Left     2011     Current Outpatient Medications   Medication Sig Dispense Refill    acetaminophen (TYLENOL) 500 MG tablet Take 1-2 tablets (500-1,000 mg) by mouth 3 times daily as needed for mild pain 250 tablet 1    amiodarone (PACERONE) 200 MG tablet Take 0.5 tablets (100 mg) by mouth daily 45 tablet 0    apixaban ANTICOAGULANT (ELIQUIS) 5 MG tablet Take 1 tablet (5 mg) by mouth 2 times daily 60 tablet 3    BETA BLOCKER NOT PRESCRIBED (INTENTIONAL) Please choose reason not prescribed from choices below.      BETA BLOCKER NOT PRESCRIBED (INTENTIONAL) Please choose reason not prescribed from choices below.      DULoxetine (CYMBALTA) 60 MG capsule Take 60 mg by mouth daily      furosemide (LASIX) 40 MG tablet Take 1 tablet (40 mg) by mouth every morning 90  "tablet 3    lisinopril (ZESTRIL) 2.5 MG tablet TAKE ONE TABLET BY MOUTH ONE TIME DAILY 90 tablet 1    oxyCODONE (ROXICODONE) 5 MG tablet Take 1 tablet (5 mg) by mouth every 4 hours as needed for moderate to severe pain (Max 3 tabs per day) #90 tabs to last 30 days for chronic pain. Ok to fill and start 03/29/24 90 tablet 0    potassium chloride ER (KLOR-CON) 20 MEQ CR tablet Take 1 tablet (20 mEq) by mouth daily 90 tablet 3    zolpidem ER (AMBIEN CR) 6.25 MG CR tablet Take 1 and 1/4 tablet by mouth at bedtime 45 tablet 5    cholecalciferol, vitamin D3, (VITAMIN D3) 2,000 unit Tab [CHOLECALCIFEROL, VITAMIN D3, (VITAMIN D3) 2,000 UNIT TAB] Take 1 tablet (2,000 Units total) by mouth daily. 90 tablet 3    COMPOUNDED NON-CONTROLLED SUBSTANCE (CMPD RX) - PHARMACY TO MIX COMPOUNDED MEDICATION Ketamine 10% and Ketoprofen 5 % in carrier gel/lotion. Apply 2-4 pumps to affected areas TID- QID PRN (Patient not taking: Reported on 4/5/2024) 180 g 1       Allergies   Allergen Reactions    Trazodone Shortness Of Breath and Unknown     Allergic to trazodone and deriv., Other: trouble swallowing      Clindamycin Diarrhea     C-diff    Gabapentin Other (See Comments)     \"Internal tremors\"    Temazepam Other (See Comments)     Annotation: Nightmares          Social History     Tobacco Use    Smoking status: Never     Passive exposure: Never    Smokeless tobacco: Never    Tobacco comments:     no passive exposure   Substance Use Topics    Alcohol use: Yes     Comment: Alcoholic Drinks/day: Rarely a glass of wine       History   Drug Use No         Review of Systems      Objective    /64 (BP Location: Right arm, Patient Position: Sitting, Cuff Size: Adult Regular)   Pulse 68   Temp 97.6  F (36.4  C) (Oral)   Resp 20   Ht 1.535 m (5' 0.43\")   Wt 70.4 kg (155 lb 5 oz)   SpO2 97%   BMI 29.90 kg/m     Estimated body mass index is 29.9 kg/m  as calculated from the following:    Height as of this encounter: 1.535 m (5' 0.43\").   "  Weight as of this encounter: 70.4 kg (155 lb 5 oz).  Physical Exam  GENERAL: alert and no distress  RESP: lungs clear to auscultation - no rales, rhonchi or wheezes  CV: regular rate and irregular rhythm, normal S1 S2  Ext: trace peripheral edema in lower extremities bilaterally      Recent Labs   Lab Test 03/29/24  1047 03/25/24  1521 12/30/23  0803 12/29/23  1214 12/23/23  0512 12/22/23  0502 03/05/23  0726 03/04/23  1905 08/24/22  0718 08/23/22  1154   HGB  --   --   --  12.9  --  12.9   < > 12.5   < > 12.7   PLT  --   --   --  256  --  260   < > 225   < > 281   INR  --   --   --   --   --   --   --  1.33*  --  1.37*    140   < > 142   < > 139   < > 138   < > 141   POTASSIUM 4.3 4.6   < > 3.9   < > 3.5   < > 4.2   < > 4.6   CR 1.28* 1.25*   < > 1.41*   < > 1.17*   < > 1.01*   < > 1.08    < > = values in this interval not displayed.        Diagnostics  Recent Results (from the past 720 hour(s))   Basic metabolic panel    Collection Time: 03/25/24  3:21 PM   Result Value Ref Range    Sodium 140 135 - 145 mmol/L    Potassium 4.6 3.4 - 5.3 mmol/L    Chloride 99 98 - 107 mmol/L    Carbon Dioxide (CO2) 28 22 - 29 mmol/L    Anion Gap 13 7 - 15 mmol/L    Urea Nitrogen 27.1 (H) 8.0 - 23.0 mg/dL    Creatinine 1.25 (H) 0.51 - 0.95 mg/dL    GFR Estimate 40 (L) >60 mL/min/1.73m2    Calcium 10.1 (H) 8.2 - 9.6 mg/dL    Glucose 102 (H) 70 - 99 mg/dL   N terminal pro BNP outpatient    Collection Time: 03/25/24  3:21 PM   Result Value Ref Range    N Terminal Pro BNP Outpatient 8,126 (H) 0 - 1,800 pg/mL   Basic metabolic panel    Collection Time: 03/29/24 10:47 AM   Result Value Ref Range    Sodium 141 135 - 145 mmol/L    Potassium 4.3 3.4 - 5.3 mmol/L    Chloride 101 98 - 107 mmol/L    Carbon Dioxide (CO2) 25 22 - 29 mmol/L    Anion Gap 15 7 - 15 mmol/L    Urea Nitrogen 32.4 (H) 8.0 - 23.0 mg/dL    Creatinine 1.28 (H) 0.51 - 0.95 mg/dL    GFR Estimate 39 (L) >60 mL/min/1.73m2    Calcium 10.0 (H) 8.2 - 9.6 mg/dL    Glucose  146 (H) 70 - 99 mg/dL      No EKG necessary due to cardiac procedure    Revised Cardiac Risk Index (RCRI)  The patient has the following serious cardiovascular risks for perioperative complications:   - Congestive Heart Failure (pulmonary edema, PND, s3 linda, CXR with pulmonary congestion, basilar rales) = 1 point     RCRI Interpretation: 1 point: Class II (low risk - 0.9% complication rate)       Signed Electronically by: Margret Flores MD  Copy of this evaluation report is provided to requesting physician.

## 2024-04-05 NOTE — PATIENT INSTRUCTIONS
Preparing for Your Surgery  Getting started  A nurse will call you to review your health history and instructions. They will give you an arrival time based on your scheduled surgery time. Please be ready to share:  Your doctor's clinic name and phone number  Your medical, surgical, and anesthesia history  A list of allergies and sensitivities  A list of medicines, including herbal treatments and over-the-counter drugs  Whether the patient has a legal guardian (ask how to send us the papers in advance)  Please tell us if you're pregnant--or if there's any chance you might be pregnant. Some surgeries may injure a fetus (unborn baby), so they require a pregnancy test. Surgeries that are safe for a fetus don't always need a test, and you can choose whether to have one.   If you have a child who's having surgery, please ask for a copy of Preparing for Your Child's Surgery.    Preparing for surgery  Within 10 to 30 days of surgery: Have a pre-op exam (sometimes called an H&P, or History and Physical). This can be done at a clinic or pre-operative center.  If you're having a , you may not need this exam. Talk to your care team.  At your pre-op exam, talk to your care team about all medicines you take. If you need to stop any medicines before surgery, ask when to start taking them again.  We do this for your safety. Many medicines can make you bleed too much during surgery. Some change how well surgery (anesthesia) drugs work.  Call your insurance company to let them know you're having surgery. (If you don't have insurance, call [phone].)  Call your clinic if there's any change in your health. This includes signs of a cold or flu (sore throat, runny nose, cough, rash, fever). It also includes a scrape or scratch near the surgery site.  If you have questions on the day of surgery, call your hospital or surgery center.  Eating and drinking guidelines  For your safety: Unless your surgeon tells you otherwise,  follow the guidelines below.  Eat and drink as usual until 8 hours before you arrive for surgery. After that, no food or milk.  Drink clear liquids until 2 hours before you arrive. These are liquids you can see through, like water, Gatorade, and Propel Water. They also include plain black coffee and tea (no cream or milk), candy, and breath mints. You can spit out gum when you arrive.  If you drink alcohol: Stop drinking it the night before surgery.  If your care team tells you to take medicine on the morning of surgery, it's okay to take it with a sip of water.  Preventing infection  Shower or bathe the night before and morning of your surgery. Follow the instructions your clinic gave you. (If no instructions, use regular soap.)  Don't shave or clip hair near your surgery site. We'll remove the hair if needed.  Don't smoke or vape the morning of surgery. You may chew nicotine gum up to 2 hours before surgery. A nicotine patch is okay.  Note: Some surgeries require you to completely quit smoking and nicotine. Check with your surgeon.  Your care team will make every effort to keep you safe from infection. We will:  Clean our hands often with soap and water (or an alcohol-based hand rub).  Clean the skin at your surgery site with a special soap that kills germs.  Give you a special gown to keep you warm. (Cold raises the risk of infection.)  Wear special hair covers, masks, gowns and gloves during surgery.  Give antibiotic medicine, if prescribed. Not all surgeries need antibiotics.  What to bring on the day of surgery  Photo ID and insurance card  Copy of your health care directive, if you have one  Glasses and hearing aids (bring cases)  You can't wear contacts during surgery  Inhaler and eye drops, if you use them (tell us about these when you arrive)  CPAP machine or breathing device, if you use them  A few personal items, if spending the night  If you have . . .  A pacemaker, ICD (cardiac defibrillator) or other  implant: Bring the ID card.  An implanted stimulator: Bring the remote control.  A legal guardian: Bring a copy of the certified (court-stamped) guardianship papers.  Please remove any jewelry, including body piercings. Leave jewelry and other valuables at home.  If you're going home the day of surgery  You must have a responsible adult drive you home. They should stay with you overnight as well.  If you don't have someone to stay with you, and you aren't safe to go home alone, we may keep you overnight. Insurance often won't pay for this.  After surgery  If it's hard to control your pain or you need more pain medicine, please call your surgeon's office.  Questions?   If you have any questions for your care team, list them here: _________________________________________________________________________________________________________________________________________________________________________ ____________________________________ ____________________________________ ____________________________________  For informational purposes only. Not to replace the advice of your health care provider. Copyright   2003, 2019 [employer]. All rights reserved. Clinically reviewed by Sara Tipton MD. [employer] 039927 - REV 12/22.    How to Take Your Medication Before Surgery    Medication:   Instructions regarding anticoagulants: Eliquis- continue  Instructions given to pt regarding diuretics medication: Hold morning dose of lasix  Instructions for medication, other than anticoagulants and antiarrhythmics listed above, given to pt: Take all other medication AM of procedure with small sips of water

## 2024-04-08 ENCOUNTER — ANESTHESIA EVENT (OUTPATIENT)
Dept: CARDIOLOGY | Facility: HOSPITAL | Age: 89
End: 2024-04-08
Payer: COMMERCIAL

## 2024-04-08 ENCOUNTER — APPOINTMENT (OUTPATIENT)
Dept: RADIOLOGY | Facility: HOSPITAL | Age: 89
End: 2024-04-08
Attending: NURSE PRACTITIONER
Payer: COMMERCIAL

## 2024-04-08 ENCOUNTER — HOSPITAL ENCOUNTER (OUTPATIENT)
Facility: HOSPITAL | Age: 89
Discharge: HOME | End: 2024-04-09
Attending: INTERNAL MEDICINE | Admitting: INTERNAL MEDICINE
Payer: COMMERCIAL

## 2024-04-08 ENCOUNTER — ANESTHESIA (OUTPATIENT)
Dept: CARDIOLOGY | Facility: HOSPITAL | Age: 89
End: 2024-04-08
Payer: COMMERCIAL

## 2024-04-08 DIAGNOSIS — I50.32 CHRONIC DIASTOLIC CONGESTIVE HEART FAILURE (H): ICD-10-CM

## 2024-04-08 DIAGNOSIS — Z95.0 CARDIAC PACEMAKER IN SITU: ICD-10-CM

## 2024-04-08 DIAGNOSIS — I44.7 LBBB (LEFT BUNDLE BRANCH BLOCK): ICD-10-CM

## 2024-04-08 DIAGNOSIS — I48.19 PERSISTENT ATRIAL FIBRILLATION (H): ICD-10-CM

## 2024-04-08 DIAGNOSIS — I42.9 SECONDARY CARDIOMYOPATHY (H): Primary | ICD-10-CM

## 2024-04-08 DIAGNOSIS — I50.20 HFREF (HEART FAILURE WITH REDUCED EJECTION FRACTION) (H): ICD-10-CM

## 2024-04-08 LAB
ANION GAP SERPL CALCULATED.3IONS-SCNC: 13 MMOL/L (ref 7–15)
BUN SERPL-MCNC: 28.7 MG/DL (ref 8–23)
CALCIUM SERPL-MCNC: 9.6 MG/DL (ref 8.2–9.6)
CHLORIDE SERPL-SCNC: 104 MMOL/L (ref 98–107)
CREAT SERPL-MCNC: 1.33 MG/DL (ref 0.51–0.95)
DEPRECATED HCO3 PLAS-SCNC: 25 MMOL/L (ref 22–29)
EGFRCR SERPLBLD CKD-EPI 2021: 37 ML/MIN/1.73M2
ERYTHROCYTE [DISTWIDTH] IN BLOOD BY AUTOMATED COUNT: 14.6 % (ref 10–15)
GLUCOSE SERPL-MCNC: 98 MG/DL (ref 70–99)
HCT VFR BLD AUTO: 35.4 % (ref 35–47)
HGB BLD-MCNC: 11.4 G/DL (ref 11.7–15.7)
MCH RBC QN AUTO: 30 PG (ref 26.5–33)
MCHC RBC AUTO-ENTMCNC: 32.2 G/DL (ref 31.5–36.5)
MCV RBC AUTO: 93 FL (ref 78–100)
PLATELET # BLD AUTO: 273 10E3/UL (ref 150–450)
POTASSIUM SERPL-SCNC: 3.7 MMOL/L (ref 3.4–5.3)
RBC # BLD AUTO: 3.8 10E6/UL (ref 3.8–5.2)
SODIUM SERPL-SCNC: 142 MMOL/L (ref 135–145)
WBC # BLD AUTO: 6.6 10E3/UL (ref 4–11)

## 2024-04-08 PROCEDURE — C1894 INTRO/SHEATH, NON-LASER: HCPCS | Performed by: INTERNAL MEDICINE

## 2024-04-08 PROCEDURE — 999N000065 XR CHEST 2 VIEWS

## 2024-04-08 PROCEDURE — 999N000054 HC STATISTIC EKG NON-CHARGEABLE

## 2024-04-08 PROCEDURE — 85014 HEMATOCRIT: CPT | Performed by: INTERNAL MEDICINE

## 2024-04-08 PROCEDURE — 250N000013 HC RX MED GY IP 250 OP 250 PS 637: Performed by: INTERNAL MEDICINE

## 2024-04-08 PROCEDURE — 370N000017 HC ANESTHESIA TECHNICAL FEE, PER MIN: Performed by: INTERNAL MEDICINE

## 2024-04-08 PROCEDURE — 250N000013 HC RX MED GY IP 250 OP 250 PS 637: Performed by: NURSE PRACTITIONER

## 2024-04-08 PROCEDURE — 33208 INSRT HEART PM ATRIAL & VENT: CPT | Mod: KX | Performed by: INTERNAL MEDICINE

## 2024-04-08 PROCEDURE — 272N000001 HC OR GENERAL SUPPLY STERILE: Performed by: INTERNAL MEDICINE

## 2024-04-08 PROCEDURE — C1785 PMKR, DUAL, RATE-RESP: HCPCS | Performed by: INTERNAL MEDICINE

## 2024-04-08 PROCEDURE — 93010 ELECTROCARDIOGRAM REPORT: CPT | Performed by: INTERNAL MEDICINE

## 2024-04-08 PROCEDURE — C1730 CATH, EP, 19 OR FEW ELECT: HCPCS | Performed by: INTERNAL MEDICINE

## 2024-04-08 PROCEDURE — 250N000011 HC RX IP 250 OP 636: Performed by: NURSE ANESTHETIST, CERTIFIED REGISTERED

## 2024-04-08 PROCEDURE — C1887 CATHETER, GUIDING: HCPCS | Performed by: INTERNAL MEDICINE

## 2024-04-08 PROCEDURE — 258N000003 HC RX IP 258 OP 636: Performed by: NURSE ANESTHETIST, CERTIFIED REGISTERED

## 2024-04-08 PROCEDURE — 80048 BASIC METABOLIC PNL TOTAL CA: CPT | Performed by: INTERNAL MEDICINE

## 2024-04-08 PROCEDURE — 93005 ELECTROCARDIOGRAM TRACING: CPT

## 2024-04-08 PROCEDURE — C1898 LEAD, PMKR, OTHER THAN TRANS: HCPCS | Performed by: INTERNAL MEDICINE

## 2024-04-08 PROCEDURE — 36415 COLL VENOUS BLD VENIPUNCTURE: CPT | Performed by: INTERNAL MEDICINE

## 2024-04-08 PROCEDURE — 250N000009 HC RX 250: Performed by: INTERNAL MEDICINE

## 2024-04-08 DEVICE — IMP LEAD PACING BIPOLAR CAPSUREFIX NOVUS 52CM 5076-52: Type: IMPLANTABLE DEVICE | Site: HEART | Status: FUNCTIONAL

## 2024-04-08 DEVICE — LEAD PACEMAKER SELECTSECURE 69CM MD  383069: Type: IMPLANTABLE DEVICE | Site: HEART | Status: FUNCTIONAL

## 2024-04-08 DEVICE — PACEMAKER AZURE MRI XT DR: Type: IMPLANTABLE DEVICE | Site: CHEST  WALL | Status: FUNCTIONAL

## 2024-04-08 RX ORDER — LISINOPRIL 2.5 MG/1
2.5 TABLET ORAL DAILY
Status: DISCONTINUED | OUTPATIENT
Start: 2024-04-09 | End: 2024-04-09 | Stop reason: HOSPADM

## 2024-04-08 RX ORDER — NALOXONE HYDROCHLORIDE 0.4 MG/ML
0.1 INJECTION, SOLUTION INTRAMUSCULAR; INTRAVENOUS; SUBCUTANEOUS
Status: DISCONTINUED | OUTPATIENT
Start: 2024-04-08 | End: 2024-04-08

## 2024-04-08 RX ORDER — SODIUM CHLORIDE, SODIUM LACTATE, POTASSIUM CHLORIDE, CALCIUM CHLORIDE 600; 310; 30; 20 MG/100ML; MG/100ML; MG/100ML; MG/100ML
INJECTION, SOLUTION INTRAVENOUS CONTINUOUS
Status: DISCONTINUED | OUTPATIENT
Start: 2024-04-08 | End: 2024-04-08 | Stop reason: HOSPADM

## 2024-04-08 RX ORDER — NALOXONE HYDROCHLORIDE 0.4 MG/ML
0.4 INJECTION, SOLUTION INTRAMUSCULAR; INTRAVENOUS; SUBCUTANEOUS
Status: DISCONTINUED | OUTPATIENT
Start: 2024-04-08 | End: 2024-04-09 | Stop reason: HOSPADM

## 2024-04-08 RX ORDER — ONDANSETRON 2 MG/ML
4 INJECTION INTRAMUSCULAR; INTRAVENOUS EVERY 30 MIN PRN
Status: DISCONTINUED | OUTPATIENT
Start: 2024-04-08 | End: 2024-04-08

## 2024-04-08 RX ORDER — LABETALOL HYDROCHLORIDE 5 MG/ML
10 INJECTION, SOLUTION INTRAVENOUS EVERY 6 HOURS PRN
Status: DISCONTINUED | OUTPATIENT
Start: 2024-04-08 | End: 2024-04-09 | Stop reason: HOSPADM

## 2024-04-08 RX ORDER — NALOXONE HYDROCHLORIDE 0.4 MG/ML
0.2 INJECTION, SOLUTION INTRAMUSCULAR; INTRAVENOUS; SUBCUTANEOUS
Status: DISCONTINUED | OUTPATIENT
Start: 2024-04-08 | End: 2024-04-09 | Stop reason: HOSPADM

## 2024-04-08 RX ORDER — CEFAZOLIN SODIUM/WATER 2 G/20 ML
2 SYRINGE (ML) INTRAVENOUS
Status: DISCONTINUED | OUTPATIENT
Start: 2024-04-08 | End: 2024-04-08 | Stop reason: HOSPADM

## 2024-04-08 RX ORDER — METOPROLOL SUCCINATE 25 MG/1
25 TABLET, EXTENDED RELEASE ORAL DAILY
Status: DISCONTINUED | OUTPATIENT
Start: 2024-04-08 | End: 2024-04-09 | Stop reason: HOSPADM

## 2024-04-08 RX ORDER — LIDOCAINE 40 MG/G
CREAM TOPICAL
Status: DISCONTINUED | OUTPATIENT
Start: 2024-04-08 | End: 2024-04-08 | Stop reason: HOSPADM

## 2024-04-08 RX ORDER — POTASSIUM CHLORIDE 1500 MG/1
20 TABLET, EXTENDED RELEASE ORAL DAILY
Status: DISCONTINUED | OUTPATIENT
Start: 2024-04-09 | End: 2024-04-09 | Stop reason: HOSPADM

## 2024-04-08 RX ORDER — FENTANYL CITRATE 50 UG/ML
25 INJECTION, SOLUTION INTRAMUSCULAR; INTRAVENOUS
Status: DISCONTINUED | OUTPATIENT
Start: 2024-04-08 | End: 2024-04-08

## 2024-04-08 RX ORDER — ACETAMINOPHEN 325 MG/1
975 TABLET ORAL
Status: DISCONTINUED | OUTPATIENT
Start: 2024-04-08 | End: 2024-04-08

## 2024-04-08 RX ORDER — SODIUM CHLORIDE 9 MG/ML
INJECTION, SOLUTION INTRAVENOUS CONTINUOUS PRN
Status: DISCONTINUED | OUTPATIENT
Start: 2024-04-08 | End: 2024-04-08

## 2024-04-08 RX ORDER — ACETAMINOPHEN 325 MG/1
650 TABLET ORAL EVERY 4 HOURS PRN
Status: DISCONTINUED | OUTPATIENT
Start: 2024-04-08 | End: 2024-04-09 | Stop reason: HOSPADM

## 2024-04-08 RX ORDER — ONDANSETRON 4 MG/1
4 TABLET, ORALLY DISINTEGRATING ORAL EVERY 30 MIN PRN
Status: DISCONTINUED | OUTPATIENT
Start: 2024-04-08 | End: 2024-04-08

## 2024-04-08 RX ORDER — FUROSEMIDE 20 MG
40 TABLET ORAL EVERY MORNING
Status: DISCONTINUED | OUTPATIENT
Start: 2024-04-09 | End: 2024-04-09 | Stop reason: HOSPADM

## 2024-04-08 RX ORDER — PROPOFOL 10 MG/ML
INJECTION, EMULSION INTRAVENOUS CONTINUOUS PRN
Status: DISCONTINUED | OUTPATIENT
Start: 2024-04-08 | End: 2024-04-08

## 2024-04-08 RX ORDER — AMIODARONE HYDROCHLORIDE 100 MG/1
100 TABLET ORAL DAILY
Status: DISCONTINUED | OUTPATIENT
Start: 2024-04-09 | End: 2024-04-09 | Stop reason: HOSPADM

## 2024-04-08 RX ORDER — ONDANSETRON 2 MG/ML
INJECTION INTRAMUSCULAR; INTRAVENOUS PRN
Status: DISCONTINUED | OUTPATIENT
Start: 2024-04-08 | End: 2024-04-08

## 2024-04-08 RX ORDER — CEFAZOLIN SODIUM 1 G/3ML
INJECTION, POWDER, FOR SOLUTION INTRAMUSCULAR; INTRAVENOUS PRN
Status: DISCONTINUED | OUTPATIENT
Start: 2024-04-08 | End: 2024-04-08

## 2024-04-08 RX ORDER — ZOLPIDEM TARTRATE 6.25 MG/1
6.25 TABLET, FILM COATED, EXTENDED RELEASE ORAL AT BEDTIME
Status: DISCONTINUED | OUTPATIENT
Start: 2024-04-08 | End: 2024-04-08

## 2024-04-08 RX ORDER — OXYCODONE HYDROCHLORIDE 5 MG/1
5 TABLET ORAL EVERY 6 HOURS PRN
Status: DISCONTINUED | OUTPATIENT
Start: 2024-04-08 | End: 2024-04-09 | Stop reason: HOSPADM

## 2024-04-08 RX ORDER — DULOXETIN HYDROCHLORIDE 60 MG/1
60 CAPSULE, DELAYED RELEASE ORAL DAILY
Status: DISCONTINUED | OUTPATIENT
Start: 2024-04-09 | End: 2024-04-09 | Stop reason: HOSPADM

## 2024-04-08 RX ADMIN — ACETAMINOPHEN 650 MG: 325 TABLET ORAL at 16:34

## 2024-04-08 RX ADMIN — ONDANSETRON 4 MG: 2 INJECTION INTRAMUSCULAR; INTRAVENOUS at 11:55

## 2024-04-08 RX ADMIN — PHENYLEPHRINE HYDROCHLORIDE 200 MCG: 10 INJECTION INTRAVENOUS at 12:54

## 2024-04-08 RX ADMIN — PROPOFOL 100 MCG/KG/MIN: 10 INJECTION, EMULSION INTRAVENOUS at 11:45

## 2024-04-08 RX ADMIN — PHENYLEPHRINE HYDROCHLORIDE 100 MCG: 10 INJECTION INTRAVENOUS at 11:50

## 2024-04-08 RX ADMIN — CEFAZOLIN 2 G: 1 INJECTION, POWDER, FOR SOLUTION INTRAMUSCULAR; INTRAVENOUS at 11:45

## 2024-04-08 RX ADMIN — SODIUM CHLORIDE: 9 INJECTION, SOLUTION INTRAVENOUS at 13:22

## 2024-04-08 RX ADMIN — OXYCODONE HYDROCHLORIDE 5 MG: 5 TABLET ORAL at 16:34

## 2024-04-08 RX ADMIN — SODIUM CHLORIDE: 9 INJECTION, SOLUTION INTRAVENOUS at 11:39

## 2024-04-08 RX ADMIN — ZOLPIDEM TARTRATE 2.5 MG: 5 TABLET ORAL at 21:11

## 2024-04-08 RX ADMIN — METOPROLOL SUCCINATE 25 MG: 25 TABLET, EXTENDED RELEASE ORAL at 19:38

## 2024-04-08 ASSESSMENT — ACTIVITIES OF DAILY LIVING (ADL)
DESCRIBE_HEARING_LOSS: BILATERAL HEARING LOSS
CHANGE_IN_FUNCTIONAL_STATUS_SINCE_ONSET_OF_CURRENT_ILLNESS/INJURY: NO
ADLS_ACUITY_SCORE: 38
ADLS_ACUITY_SCORE: 37
WERE_AUXILIARY_AIDS_OFFERED?: NO
ADLS_ACUITY_SCORE: 38
ADLS_ACUITY_SCORE: 30
CONCENTRATING,_REMEMBERING_OR_MAKING_DECISIONS_DIFFICULTY: NO
EQUIPMENT_CURRENTLY_USED_AT_HOME: WALKER, ROLLING
VISION_MANAGEMENT: GLASSES
DIFFICULTY_COMMUNICATING: NO
USE_OF_HEARING_ASSISTIVE_DEVICES: BILATERAL HEARING AIDS
WALKING_OR_CLIMBING_STAIRS_DIFFICULTY: YES
ADLS_ACUITY_SCORE: 30
ADLS_ACUITY_SCORE: 38
TOILETING_ISSUES: NO
WALKING_OR_CLIMBING_STAIRS: AMBULATION DIFFICULTY, ASSISTANCE 1 PERSON
ADLS_ACUITY_SCORE: 37
WEAR_GLASSES_OR_BLIND: YES
ADLS_ACUITY_SCORE: 38
DOING_ERRANDS_INDEPENDENTLY_DIFFICULTY: YES
ADLS_ACUITY_SCORE: 38
DIFFICULTY_EATING/SWALLOWING: NO
ADLS_ACUITY_SCORE: 38
ADLS_ACUITY_SCORE: 37
ADLS_ACUITY_SCORE: 37
ADLS_ACUITY_SCORE: 38
HEARING_DIFFICULTY_OR_DEAF: YES
ADLS_ACUITY_SCORE: 37
FALL_HISTORY_WITHIN_LAST_SIX_MONTHS: NO
DRESSING/BATHING_DIFFICULTY: NO
PATIENT'S_PREFERRED_MEANS_OF_COMMUNICATION: VERBAL
THE_FOLLOWING_AIDS_WERE_PROVIDED;: PATIENT DECLINED OFFER OF AUXILIARY AIDS
ADLS_ACUITY_SCORE: 38

## 2024-04-08 ASSESSMENT — COLUMBIA-SUICIDE SEVERITY RATING SCALE - C-SSRS
6. HAVE YOU EVER DONE ANYTHING, STARTED TO DO ANYTHING, OR PREPARED TO DO ANYTHING TO END YOUR LIFE?: NO
1. IN THE PAST MONTH, HAVE YOU WISHED YOU WERE DEAD OR WISHED YOU COULD GO TO SLEEP AND NOT WAKE UP?: NO
2. HAVE YOU ACTUALLY HAD ANY THOUGHTS OF KILLING YOURSELF IN THE PAST MONTH?: NO

## 2024-04-08 ASSESSMENT — ENCOUNTER SYMPTOMS: DYSRHYTHMIAS: 1

## 2024-04-08 NOTE — DISCHARGE INSTRUCTIONS
[facility]  Cardiac Electrophysiology  [address]  [place] [address]   Office: [phone]  Fax: [phone]     Cardiac Electrophysiology - Post Pacemaker Implantation Discharge Instructions      PROCEDURE   Pacemaker Implantation         MEDICATION INSTRUCTIONS   Continue taking all home meds.         WOUND CARE   Leave the large overlying dressing in place until 2 days after discharge - this dressing can thereafter be removed by gently pulling off.  Underneath the large dressing will be steri-strips. DO NOT REMOVE these strips; please leave these in place until you are seen in clinic.  DO NOT COVER the site with any bandages or dressings. The site should be kept dry for 7 days - please avoid traditional showers or baths during this time.  Keep the site clean and dry.  No swimming, sauna, or hot tub use until incision is completely healed.         ACTIVITY   It takes approximately 4 weeks for your pacemaker/defibrillator leads to settle into place. During this time use extra precaution to avoid dislodging the leads.  Avoid the following:  Raising your arm over your head or stretching behind your back (no hyperflexion)  Pushing or pulling (mowing the lawn, vacuuming)  Lifting anything heavier than 10 pounds or a gallon of milk  Sudden or extreme motions  Be physically active every day.  Moving your arm is important       REMOTE MONITORING   You will receive a remote transmission station to monitor your device from home  Please set the transmitter up as soon as you get home from the hospital.  Directions are included in the box, and you may call our office or the company technical support line if you need assistance connecting your transmitter.  Please send a transmission the day after you go home  Automated transmissions will be sent every 3 months or sooner if device issues are detected.  You may manually send in transmissions if you are having arrhythmia symptoms or  think there may be a problem with your device.  Please call our office at [phone] if you send in a transmission off-schedule         WHAT TO WATCH OUT FOR   Contact our office or seek emergency care for any of the following:  Drainage, bleeding or oozing from the site  Redness, increased swelling, or warmth around the device site  Pain not treated with prescribed pain medication  Temperature greater than 100.4F  Chest pain, shortness of breath, dizziness/fainting         FOLLOW-UP   Our office will coordinate a follow-up visit visit in clinic for a device interrogation and an incision check 7-14 days after your procedure.   Please contact us at [phone] with any issues during your recovery.

## 2024-04-08 NOTE — PROVIDER NOTIFICATION
Dr. Rivera notified on 4/8/24 at 1551:    Pt is having knee pain and usually takes oxycodone at home. Can you order oxycodone?

## 2024-04-08 NOTE — PHARMACY-ADMISSION MEDICATION HISTORY
Pharmacist Admission Medication History    Admission medication history is complete. The information provided in this note is only as accurate as the sources available at the time of the update.    Information Source(s): Clinic records and CareEverywhere/SureScripts via N/A    Allergies reviewed with patient and updates made in EHR: yes    Medication History Completed By: Monae Rinaldi RPH 4/8/2024 9:47 AM    PTA Med List   Medication Sig Last Dose    acetaminophen (TYLENOL) 500 MG tablet Take 1-2 tablets (500-1,000 mg) by mouth 3 times daily as needed for mild pain Unknown at unknown    amiodarone (PACERONE) 200 MG tablet Take 0.5 tablets (100 mg) by mouth daily 4/8/2024 at am    apixaban ANTICOAGULANT (ELIQUIS) 5 MG tablet Take 1 tablet (5 mg) by mouth 2 times daily 4/8/2024 at am    cholecalciferol, vitamin D3, (VITAMIN D3) 2,000 unit Tab [CHOLECALCIFEROL, VITAMIN D3, (VITAMIN D3) 2,000 UNIT TAB] Take 1 tablet (2,000 Units total) by mouth daily. 4/8/2024 at am    DULoxetine (CYMBALTA) 60 MG capsule Take 60 mg by mouth daily 4/8/2024 at am    furosemide (LASIX) 40 MG tablet Take 1 tablet (40 mg) by mouth every morning 4/7/2024 at am    lisinopril (ZESTRIL) 2.5 MG tablet TAKE ONE TABLET BY MOUTH ONE TIME DAILY 4/8/2024 at am    oxyCODONE (ROXICODONE) 5 MG tablet Take 1 tablet (5 mg) by mouth every 4 hours as needed for moderate to severe pain (Max 3 tabs per day) #90 tabs to last 30 days for chronic pain. Ok to fill and start 03/29/24 4/7/2024 at pm    potassium chloride ER (KLOR-CON) 20 MEQ CR tablet Take 1 tablet (20 mEq) by mouth daily 4/8/2024 at am    zolpidem ER (AMBIEN CR) 6.25 MG CR tablet Take 1 and 1/4 tablet by mouth at bedtime 4/7/2024 at pm

## 2024-04-08 NOTE — INTERVAL H&P NOTE
I have reviewed the surgical (or preoperative) H&P that is linked to this encounter, and examined the patient. There are no significant changes    Clinical Conditions Present on Arrival:  Clinically Significant Risk Factors Present on Admission                # Drug Induced Coagulation Defect: home medication list includes an anticoagulant medication   # Overweight: Estimated body mass index is 29.49 kg/m  as calculated from the following:    Height as of this encounter: 1.524 m (5').    Weight as of this encounter: 68.5 kg (151 lb).

## 2024-04-08 NOTE — PLAN OF CARE
"PRIMARY DIAGNOSIS: \"GENERIC\" NURSING  OUTPATIENT/OBSERVATION GOALS TO BE MET BEFORE DISCHARGE:  ADLs back to baseline: No    Activity and level of assistance: 1 assist with walker and gaitbelt    Pain status: Improved-controlled with oral pain medications.    Return to near baseline physical activity: No     Discharge Planner Nurse   Safe discharge environment identified: Yes  Barriers to discharge: Yes       Entered by: Sheri Doll RN 04/08/2024 6:48 PM     Dressing dry and intact, scant amount of sanganeous drainage at site. No swelling or redness noted. Educated pt regarding eliminating extending left arm. Pt given tylenol and oxycodone for incisional chest pain and also pain under left breast. Ice packed applied. Some pain relief with medication and getting up to sit up in chair. Tolerated intakes. Voided and had 1 bm. Pt to Xray at this time. Result pending.  "

## 2024-04-08 NOTE — PROVIDER NOTIFICATION
Provider Dr. Rivera on 4/8/24 at 1739:    Patient's BP is 193/86. Having pain 8/10 at incision site not improving with oxycodone and tylenol. Moving over to sit in chair to see if that helps. Can you order PRN BP meds?

## 2024-04-08 NOTE — PLAN OF CARE
Patient admitted to room 2 at approximately 1530 via cart from [place].  Reason for Admission: Pacemaker placement  Report received from: Chet PATEL RN  Patient was accompanied by Self.  Discharge transportation provided by:  Patient ambulated/transferred:  bedrest. air michelle.  Patient is alert and orientated x 3.  Outpatient Observation education provided to: patient  Safety risks were identified during admission:  fall.   Yellow risk/fall band applied:  Yes  Detailed Belongings: cellphone, clothes, nuzhat hearing aids, 4 wheel walker, purse, glasses.

## 2024-04-08 NOTE — ANESTHESIA POSTPROCEDURE EVALUATION
Patient: Gladys Ramirez    Procedure: Procedure(s):  Pacemaker Device & Lead Implant - HIS Lead Dual       Anesthesia Type:  MAC    Note:  Disposition: Outpatient   Postop Pain Control: Uneventful            Sign Out: Well controlled pain   PONV: No   Neuro/Psych: Uneventful            Sign Out: Acceptable/Baseline neuro status   Airway/Respiratory: Uneventful            Sign Out: Acceptable/Baseline resp. status   CV/Hemodynamics: Uneventful            Sign Out: Acceptable CV status; No obvious hypovolemia; No obvious fluid overload   Other NRE: NONE   DID A NON-ROUTINE EVENT OCCUR? No       Last vitals:  Vitals Value Taken Time   /77 04/08/24 1415   Temp     Pulse 61 04/08/24 1430   Resp 26 04/08/24 1430   SpO2 98 % 04/08/24 1430   Vitals shown include unfiled device data.    Electronically Signed By: Dada Quigley MD  April 8, 2024  2:31 PM

## 2024-04-08 NOTE — ANESTHESIA PREPROCEDURE EVALUATION
"Anesthesia Pre-Procedure Evaluation    Patient: Gladys Ramirez   MRN: [id number] : 1930        Procedure : Procedure(s):  Pacemaker Device & Lead Implant - HIS Lead Dual          Past Medical History:   Diagnosis Date    Angina pectoris (H24)     Atrial fibrillation (H)     Chest pain 2017    Chronic kidney disease     Congestive heart failure (H)     Cough     Hyperlipidemia     Hypertension     Osteoarthritis       Past Surgical History:   Procedure Laterality Date    APPENDECTOMY      BASAL CELL CARCINOMA EXCISION      nose    LAPAROSCOPY DIAGNOSTIC (GENERAL) N/A 2014    Procedure: LAPAROSCOPY BILATERAL SALPINGO-OOPHORECTOMY ;  Surgeon: Sofia Harper MD;  Location: [facility] [place];  Service:     OPEN REDUCTION INTERNAL FIXATION HIP BIPOLAR Right 3/5/2023    Procedure: HEMIARTHROPLASTY, HIP, BIPOLAR;  Surgeon: Jose G Martinez MD;  Location: [facility] [place]    TONSILLECTOMY      10 years old    ZZC TOTAL KNEE ARTHROPLASTY Left           Allergies   Allergen Reactions    Trazodone Shortness Of Breath and Unknown     Allergic to trazodone and deriv., Other: trouble swallowing      Clindamycin Diarrhea     C-diff    Gabapentin Other (See Comments)     \"Internal tremors\"    Temazepam Other (See Comments)     Annotation: Nightmares        Social History     Tobacco Use    Smoking status: Never     Passive exposure: Never    Smokeless tobacco: Never    Tobacco comments:     no passive exposure   Substance Use Topics    Alcohol use: Yes     Comment: Alcoholic Drinks/day: Rarely a glass of wine      Wt Readings from Last 1 Encounters:   24 70.4 kg (155 lb 5 oz)        Anesthesia Evaluation            ROS/MED HX  ENT/Pulmonary: Comment: Frequent epistaxis.      Neurologic: Comment: Hx of falling.      Cardiovascular:     (+) Dyslipidemia hypertension- -   angina-  - -   Taking blood thinners   CHF etiology: Abnl RVEF Last EF: 25   JONES.   pacemaker,          dysrhythmias, " "a-fib,  valvular problems/murmurs type: MR          METS/Exercise Tolerance:     Hematologic:       Musculoskeletal:       GI/Hepatic:     (+) GERD,                   Renal/Genitourinary:     (+) renal disease, type: CRI,            Endo:     (+)          thyroid problem,            Psychiatric/Substance Use:       Infectious Disease:       Malignancy:       Other:          Physical Exam    Airway        Mallampati: II   TM distance: > 3 FB   Neck ROM: full   Mouth opening: > 3 cm    Respiratory Devices and Support         Dental       (+) Modest Abnormalities - crowns, retainers, 1 or 2 missing teeth      Cardiovascular   cardiovascular exam normal          Pulmonary   pulmonary exam normal                OUTSIDE LABS:  CBC:   Lab Results   Component Value Date    WBC 7.5 12/29/2023    WBC 7.7 12/22/2023    HGB 12.9 12/29/2023    HGB 12.9 12/22/2023    HCT 41.7 12/29/2023    HCT 39.7 12/22/2023     12/29/2023     12/22/2023     BMP:   Lab Results   Component Value Date     03/29/2024     03/25/2024    POTASSIUM 4.3 03/29/2024    POTASSIUM 4.6 03/25/2024    CHLORIDE 101 03/29/2024    CHLORIDE 99 03/25/2024    CO2 25 03/29/2024    CO2 28 03/25/2024    BUN 32.4 (H) 03/29/2024    BUN 27.1 (H) 03/25/2024    CR 1.28 (H) 03/29/2024    CR 1.25 (H) 03/25/2024     (H) 03/29/2024     (H) 03/25/2024     COAGS:   Lab Results   Component Value Date    PTT 32 02/21/2019    INR 1.33 (H) 03/04/2023     POC: No results found for: \"BGM\", \"HCG\", \"HCGS\"  HEPATIC:   Lab Results   Component Value Date    ALBUMIN 3.9 01/11/2024    PROTTOTAL 6.8 01/11/2024    ALT 13 01/11/2024    AST 19 01/11/2024    ALKPHOS 155 (H) 01/11/2024    BILITOTAL 0.3 01/11/2024     OTHER:   Lab Results   Component Value Date    LACT 2.6 (H) 08/21/2018    A1C 6.0 (H) 06/15/2021    MARLENE 10.0 (H) 03/29/2024    PHOS 3.4 12/22/2023    MAG 2.1 12/24/2023    LIPASE <9 02/21/2019    TSH 6.25 (H) 12/29/2023    T4 1.03 12/29/2023    " "CRP 0.8 03/05/2018    SED 38 (H) 03/05/2018       Anesthesia Plan    ASA Status:  4    NPO Status:  NPO Appropriate    Anesthesia Type: MAC.     - Reason for MAC: straight local not clinically adequate      Maintenance: TIVA.        Consents    Anesthesia Plan(s) and associated risks, benefits, and realistic alternatives discussed. Questions answered and patient/representative(s) expressed understanding.     - Discussed:     - Discussed with:  Patient      - Extended Intubation/Ventilatory Support Discussed: No.      - Patient is DNR/DNI Status: No     Use of blood products discussed: No .     Postoperative Care            Comments:    Other Comments: Had significant postoperative delirium after JEREMIAH, so will try to minimize psychotropics and narcotics.    Decadron, Zofran.  Diprivan infusion.  Tylenol.  No Versed.  Fentanyl PRN.           Dada Quigley MD    I have reviewed the pertinent notes and labs in the chart from the past 30 days and (re)examined the patient.  Any updates or changes from those notes are reflected in this note.            # Drug Induced Coagulation Defect: home medication list includes an anticoagulant medication   # Overweight: Estimated body mass index is 29.9 kg/m  as calculated from the following:    Height as of 4/5/24: 1.535 m (5' 0.43\").    Weight as of 4/5/24: 70.4 kg (155 lb 5 oz).      "

## 2024-04-08 NOTE — ANESTHESIA CARE TRANSFER NOTE
Patient: Gladys Ramirez    Procedure: Procedure(s):  Pacemaker Device & Lead Implant - HIS Lead Dual       Diagnosis: Cardiomyopathy, LBBB, persistent afib  Diagnosis Additional Information: No value filed.    Anesthesia Type:   MAC     Note:    Oropharynx: oropharynx clear of all foreign objects  Level of Consciousness: awake  Oxygen Supplementation: face mask  Level of Supplemental Oxygen (L/min / FiO2): 8  Independent Airway: airway patency satisfactory and stable  Dentition: dentition unchanged  Vital Signs Stable: post-procedure vital signs reviewed and stable  Report to RN Given: handoff report given  Patient transferred to: PACU    Handoff Report: Identifed the Patient, Identified the Reponsible Provider, Reviewed the pertinent medical history, Discussed the surgical course, Reviewed Intra-OP anesthesia mangement and issues during anesthesia, Set expectations for post-procedure period and Allowed opportunity for questions and acknowledgement of understanding      Vitals:  Vitals Value Taken Time   /82 04/08/2024 1358   Temp  04/08/2024 1358   Pulse 65 04/08/2024 1358   Resp 12 04/08/2024 1358   SpO2 99 04/08/2024 1358       Electronically Signed By: SHASHA Holland CRNA  April 8, 2024  1:58 PM

## 2024-04-08 NOTE — Clinical Note
Tested the atrial lead. See vendor printout/MD dictation. [employer] MRI SureScan 5076-52cm  SN [id number]  Exp 12/16/2025

## 2024-04-08 NOTE — PRE-PROCEDURE
GENERAL PRE-PROCEDURE:   Procedure:  CRT-P implant for indiccation of 2/2 cardiomyopathy and bradycardia  Date/Time:  4/8/2024 8:56 AM    Written consent obtained?: Yes    Risks and benefits: Risks, benefits and alternatives were discussed    Consent given by:  Patient  Patient states understanding of procedure being performed: Yes    Patient's understanding of procedure matches consent: Yes    Procedure consent matches procedure scheduled: Yes    Expected level of sedation:  Moderate  Appropriately NPO:  Yes  ASA Class:  3 (HFrEF, PerAF, 2/2 cardiomyopathy, bradycardia, HTN, hypercholesterolemia, CKD Stage IIIa)  Mallampati  :  Grade 3- soft palate visible, posterior pharyngeal wall not visible  Lungs:  Lungs clear with good breath sounds bilaterally  Heart:  Normal heart sounds and rate  History & Physical reviewed:  History and physical reviewed and updates made (see comment)  H&P Comments:  Clinically Significant Risk Factors Present on Admission    Cardiovascular : HFrEF, PerAF, 2/2 cardiomyopathy, bradycardia, HTN, hypercholesterolemia    Fluid & Electrolyte Disorders : Not present on admission    Gastroenterology : Not present on admission    Hematology/Oncology : Not present on admission    Nephrology : CKD Stage IIIa; stable    Neurology : Not present on admission    Pulmonology : Not present on admission    Systemic : Not present on admission  Statement of review:  I have reviewed the lab findings, diagnostic data, medications, and the plan for sedation

## 2024-04-09 VITALS
HEIGHT: 60 IN | RESPIRATION RATE: 16 BRPM | OXYGEN SATURATION: 96 % | HEART RATE: 60 BPM | TEMPERATURE: 97.8 F | WEIGHT: 161.6 LBS | BODY MASS INDEX: 31.73 KG/M2 | DIASTOLIC BLOOD PRESSURE: 76 MMHG | SYSTOLIC BLOOD PRESSURE: 165 MMHG

## 2024-04-09 LAB
ATRIAL RATE - MUSE: 67 BPM
DIASTOLIC BLOOD PRESSURE - MUSE: NORMAL MMHG
GLUCOSE BLDC GLUCOMTR-MCNC: 92 MG/DL (ref 70–99)
INTERPRETATION ECG - MUSE: NORMAL
P AXIS - MUSE: 91 DEGREES
PR INTERVAL - MUSE: 86 MS
QRS DURATION - MUSE: 114 MS
QT - MUSE: 482 MS
QTC - MUSE: 509 MS
R AXIS - MUSE: -29 DEGREES
SYSTOLIC BLOOD PRESSURE - MUSE: NORMAL MMHG
T AXIS - MUSE: 128 DEGREES
VENTRICULAR RATE- MUSE: 67 BPM

## 2024-04-09 PROCEDURE — 250N000013 HC RX MED GY IP 250 OP 250 PS 637: Performed by: INTERNAL MEDICINE

## 2024-04-09 PROCEDURE — 99024 POSTOP FOLLOW-UP VISIT: CPT | Performed by: NURSE PRACTITIONER

## 2024-04-09 PROCEDURE — 250N000013 HC RX MED GY IP 250 OP 250 PS 637: Performed by: NURSE PRACTITIONER

## 2024-04-09 PROCEDURE — 82962 GLUCOSE BLOOD TEST: CPT

## 2024-04-09 RX ORDER — METOPROLOL SUCCINATE 25 MG/1
25 TABLET, EXTENDED RELEASE ORAL DAILY
Qty: 90 TABLET | Refills: 3 | Status: SHIPPED | OUTPATIENT
Start: 2024-04-09 | End: 2024-08-22

## 2024-04-09 RX ADMIN — ACETAMINOPHEN 650 MG: 325 TABLET ORAL at 00:49

## 2024-04-09 RX ADMIN — OXYCODONE HYDROCHLORIDE 5 MG: 5 TABLET ORAL at 12:40

## 2024-04-09 RX ADMIN — AMIODARONE HYDROCHLORIDE 100 MG: 100 TABLET ORAL at 08:17

## 2024-04-09 RX ADMIN — METOPROLOL SUCCINATE 25 MG: 25 TABLET, EXTENDED RELEASE ORAL at 08:15

## 2024-04-09 RX ADMIN — DULOXETINE HYDROCHLORIDE 60 MG: 60 CAPSULE, DELAYED RELEASE PELLETS ORAL at 08:14

## 2024-04-09 RX ADMIN — POTASSIUM CHLORIDE 20 MEQ: 1500 TABLET, EXTENDED RELEASE ORAL at 08:15

## 2024-04-09 RX ADMIN — APIXABAN 5 MG: 5 TABLET, FILM COATED ORAL at 08:15

## 2024-04-09 RX ADMIN — ACETAMINOPHEN 650 MG: 325 TABLET ORAL at 12:40

## 2024-04-09 RX ADMIN — FUROSEMIDE 40 MG: 20 TABLET ORAL at 08:15

## 2024-04-09 RX ADMIN — OXYCODONE HYDROCHLORIDE 5 MG: 5 TABLET ORAL at 00:49

## 2024-04-09 RX ADMIN — OXYCODONE HYDROCHLORIDE 5 MG: 5 TABLET ORAL at 08:15

## 2024-04-09 RX ADMIN — ACETAMINOPHEN 650 MG: 325 TABLET ORAL at 08:12

## 2024-04-09 RX ADMIN — LISINOPRIL 2.5 MG: 2.5 TABLET ORAL at 08:15

## 2024-04-09 ASSESSMENT — ACTIVITIES OF DAILY LIVING (ADL)
ADLS_ACUITY_SCORE: 35
ADLS_ACUITY_SCORE: 30
ADLS_ACUITY_SCORE: 35
DEPENDENT_IADLS:: SHOPPING;TRANSPORTATION
ADLS_ACUITY_SCORE: 37
ADLS_ACUITY_SCORE: 35
ADLS_ACUITY_SCORE: 35

## 2024-04-09 NOTE — PROGRESS NOTES
Pt had runs of 6beats  Vtach at this times 2047, 2051, 2052. Provider Jeannie Rivera MD was informed thru vocholli 2236 . No new order.     Luz Turner RN

## 2024-04-09 NOTE — DISCHARGE SUMMARY
HEART CARE INPATIENT ENCOUNTER NOTE      Structural Discharge Summary    Primary Care Physician:  Margret Flores    Discharge Provider: Ibis Pedro NP     Admission Date: 4/8/2024. Admission Diagnoses: LBBB (left bundle branch block) [I44.7]  Chronic diastolic congestive heart failure (H) [I50.32]  Persistent atrial fibrillation (H) [I48.19]   Discharge Date: April 9, 2024   Disposition: Home   Condition at Discharge: Stable  Code Status: Full Code     Principal Diagnosis:  LBBB in the setting of cardiomyopathy, LVEF 25%    Discharge Diagnoses:  Successful new implant of a dual-chamber pacemaker. Ventricular lead was implanted with a tip position in the His-Purkinje conduction system. Implanted on 4/9/24 by Dr Rivera. Normal acute device function.       Consult/s: None    Significant Diagnostic Studies:     Device check 4/9/24    RA  Sensing: programmed 0.30mV  Impedence: 437 ohms  Threshold: 0.75 V @ 0.40ms    RV  Sensing:  programmed 0.90mV  Impedence: 646 ohms  Threshold:  0.50V @ 0.40ms      CXR:  EXAM: XR CHEST 2 VIEWS  LOCATION: [facility]  DATE: 4/8/2024     INDICATION: Status post pacemaker placement  COMPARISON: 12/20/2023                                                                      IMPRESSION:      New left subclavian approach pacemaker with lead tips in the right atrium and right ventricle.     Hyperinflated lungs. No focal airspace disease. No pleural effusion or pneumothorax.     Stable mild cardiomegaly.     Multilevel degenerative changes of the spine.              Discharge Medications:   Current Discharge Medication List        CONTINUE these medications which have NOT CHANGED    Details   acetaminophen (TYLENOL) 500 MG tablet Take 1-2 tablets (500-1,000 mg) by mouth 3 times daily as needed for mild pain  Qty: 250 tablet, Refills: 1    Associated Diagnoses: Primary osteoarthritis of right knee; Rupture of anterior cruciate ligament of right knee, sequela;  Chronic intractable pain      amiodarone (PACERONE) 200 MG tablet Take 0.5 tablets (100 mg) by mouth daily  Qty: 45 tablet, Refills: 0    Associated Diagnoses: Paroxysmal atrial fibrillation (H)      apixaban ANTICOAGULANT (ELIQUIS) 5 MG tablet Take 1 tablet (5 mg) by mouth 2 times daily  Qty: 60 tablet, Refills: 3    Associated Diagnoses: Paroxysmal atrial fibrillation (H)      cholecalciferol, vitamin D3, (VITAMIN D3) 2,000 unit Tab [CHOLECALCIFEROL, VITAMIN D3, (VITAMIN D3) 2,000 UNIT TAB] Take 1 tablet (2,000 Units total) by mouth daily.  Qty: 90 tablet, Refills: 3    Comments: Replaces weekly ergo 50,000 units  Associated Diagnoses: Vitamin D deficiency      DULoxetine (CYMBALTA) 60 MG capsule Take 60 mg by mouth daily      furosemide (LASIX) 40 MG tablet Take 1 tablet (40 mg) by mouth every morning  Qty: 90 tablet, Refills: 3    Associated Diagnoses: Chronic systolic heart failure (H)      lisinopril (ZESTRIL) 2.5 MG tablet TAKE ONE TABLET BY MOUTH ONE TIME DAILY  Qty: 90 tablet, Refills: 1    Associated Diagnoses: Secondary cardiomyopathy (H); Benign essential hypertension      oxyCODONE (ROXICODONE) 5 MG tablet Take 1 tablet (5 mg) by mouth every 4 hours as needed for moderate to severe pain (Max 3 tabs per day) #90 tabs to last 30 days for chronic pain. Ok to fill and start 03/29/24  Qty: 90 tablet, Refills: 0    Associated Diagnoses: Primary osteoarthritis of right knee; Rupture of anterior cruciate ligament of right knee, sequela; Chronic intractable pain      potassium chloride ER (KLOR-CON) 20 MEQ CR tablet Take 1 tablet (20 mEq) by mouth daily  Qty: 90 tablet, Refills: 3    Associated Diagnoses: Dyspnea on exertion      zolpidem ER (AMBIEN CR) 6.25 MG CR tablet Take 1 and 1/4 tablet by mouth at bedtime  Qty: 45 tablet, Refills: 5    Associated Diagnoses: Primary insomnia      !! BETA BLOCKER NOT PRESCRIBED (INTENTIONAL) Please choose reason not prescribed from choices below.      !! BETA BLOCKER NOT  PRESCRIBED (INTENTIONAL) Please choose reason not prescribed from choices below.       !! - Potential duplicate medications found. Please discuss with provider.          Discharge Instructions:  Follow up appointment with device clinic on 4/15/24 @ 1100 and again on 5/20/24. Follow up in HF clinic with Bria Hinds CNP on 6/11/24        Diet: Resume pre hospital diet  Activity: See attached pacemaker instructions  Restrictions: No lifting >10 lbs or vigorous exercise for 5 days. No driving for 3 days.  Wound / drain care:  Keep wound clean and dry. Ok to use Ice packs PRN for discomfort. No baths, hot tubs or swimming pools for 5 days. See attached pacemaker instructions    Hospital Summary:   Ms. Gladys Ramirez is a 89 year old female with history of cardiomyopathy with reduced LVEF 25%, LBBB, persistent AF  Rhythm abnormality:LBBB in the setting of cardiomyopathy  Device insertion: Successful new implant of a dual-chamber pacemaker.  Ventricular lead was implanted with a tip position in the His-Purkinje conduction system.   Normal acute device function.     The patient will be observed in the [facility] as part of routine postprocedure care. Patient to be admitted for observation overnight   Chest x-ray and device interrogation tomorrow, prior to discharge.   Ventricular paced QRS characteristics:  Stim > QRS onset = 91ms  Stim > R wave peak (V5) = 20ms  QRS duration = 120 ms (180 ms prior)    Follow up is arranged as above.        Physical Examination   BP (!) 165/78 (BP Location: Right arm)   Pulse 59   Temp 98.4  F (36.9  C)   Resp 16   Ht 1.524 m (5')   Wt 73.3 kg (161 lb 9.6 oz)   SpO2 98%   BMI 31.56 kg/m    Body mass index is 31.56 kg/m .  Wt Readings from Last 3 Encounters:   04/08/24 73.3 kg (161 lb 9.6 oz)   04/05/24 70.4 kg (155 lb 5 oz)   03/25/24 69.4 kg (153 lb)       Intake/Output Summary (Last 24 hours) at 4/9/2024 0844  Last data filed at 4/8/2024 1900  Gross per 24 hour   Intake 1040 ml    Output 350 ml   Net 690 ml     General Appearance:   no distress, normal body habitus   Chest/Lungs:   Normal respiratory effort. Respirations are even and unlabored. Lungs are clear to auscultation, no rales or wheezing. No chest wall tenderness.    Cardiovascular:   Regular. Normal S1, S2 with no murmurs, rubs, or gallops; no BLE edema noted.    Abdomen:   soft, non-tender to palpation, non-distended. + bowel sounds.   Extremities: No edema    Skin: Left pacemaker site C/D/I, no signs of hematoma   Neurologic: Alert and oriented x3, calm and able to move all 4 extremities appropriately            Lab Results    Chemistry/lipid CBC    Creat 4/8/2024 - 1.33 Hgb 4/8/2024 - 11.4          Ibis Pedro NP  Clinical Cardiac Electrophysiology  [facility]  Office: [phone]  Fax: [phone]   Nurses: [phone]

## 2024-04-09 NOTE — PROVIDER NOTIFICATION
Paged to Dr. Rivera on 4/8/24 at 2237:    Can you address code status? Pt said she wants to change from DNR/DNI to full code?     Response: place order.   Will get updated MRI to ensure stability

## 2024-04-09 NOTE — PROGRESS NOTES
PRIMARY DIAGNOSIS: ACUTE PAIN  OUTPATIENT/OBSERVATION GOALS TO BE MET BEFORE DISCHARGE:  1. Pain Status: Improved-controlled with oral pain medications.    2. Return to near baseline physical activity: Yes    3. Cleared for discharge by consultants (if involved): Yes    Discharge Planner Nurse   Safe discharge environment identified: Yes  Barriers to discharge: No       Entered by: Megan Bennett RN 04/09/2024 9:36 AM   Patient has met all observation goals. Order for discharge to home.   Please review provider order for any additional goals.   Nurse to notify provider when observation goals have been met and patient is ready for discharge.

## 2024-04-09 NOTE — PROGRESS NOTES
Care Management Discharge Note    Discharge Date: 04/09/2024       Discharge Disposition: Home    Discharge Services: None    Discharge DME:  n/a    Discharge Transportation: family or friend will provide    Private pay costs discussed: Not applicable    Education Provided on the Discharge Plan:  yes  Persons Notified of Discharge Plans: yes  Patient/Family in Agreement with the Plan: yes    Handoff Referral Completed: Yes    Additional Information:  No needs anticipated from CM at discharge per pt; independent at baseline. Pt to follow up with PCP post discharge if needs arise. Sister Nikky to transport home.  9:02 AM    Brenna Kjellberg, BSW LSW  4/9/2024

## 2024-04-09 NOTE — PROGRESS NOTES
"PRIMARY DIAGNOSIS: \"GENERIC\" NURSING  OUTPATIENT/OBSERVATION GOALS TO BE MET BEFORE DISCHARGE:  ADLs back to baseline: No    Activity and level of assistance: Ax1 with walker and gaitbelt     Pain status: Improved-controlled with oral pain medications.    Return to near baseline physical activity: Yes     Discharge Planner Nurse   Safe discharge environment identified: No  Barriers to discharge: Yes       Entered by: Luz Turner RN 04/09/2024 1:52 AM    Pt A&O. VSS in RA. On Tele with A-paced. Dressing dry and intact, Old scant amount of sanganeous drainage at site noted from previous shift. No swelling or bruise noted at the site. Pt complains of Right knee pain, gave PRN oxy. Up at bedside commode with Ax1. Voiding adequately.      Please review provider order for any additional goals.   Nurse to notify provider when observation goals have been met and patient is ready for discharge.  "

## 2024-04-09 NOTE — PROGRESS NOTES
"PRIMARY DIAGNOSIS: \"GENERIC\" NURSING  OUTPATIENT/OBSERVATION GOALS TO BE MET BEFORE DISCHARGE:  ADLs back to baseline: No    Activity and level of assistance:  Ax1 with walker & Gaitbelt to bedside commode.     Pain status: Improved-controlled with oral pain medications.    Return to near baseline physical activity: No     Discharge Planner Nurse   Safe discharge environment identified: Yes  Barriers to discharge: Yes       Entered by: Luz Turner RN 04/09/2024 6:25 AM    Pt A&O. Vitals within baseline. Denies discomfort or pain. No changes noted from old drainage. No swelling or bruise noted at the site. Tele, A-paced. Voiding adequately. Uses commode at bedside.     Please review provider order for any additional goals.   Nurse to notify provider when observation goals have been met and patient is ready for discharge.  "

## 2024-04-09 NOTE — PROGRESS NOTES
Pt had run 6 beats of V-tach at . Pt denies chest pain, SOB/. Pt just got from bathroom.     Luz Turner RN

## 2024-04-09 NOTE — PROGRESS NOTES
[facility] DISCHARGE NOTE    Patient discharged to home at 2:02 PM via wheel chair. Accompanied by staff. Discharge instructions reviewed with patient, opportunity offered to ask questions. Prescriptions sent to patients preferred pharmacy. All belongings sent with patient.    Megan Bennett RN

## 2024-04-09 NOTE — PROGRESS NOTES
"PRIMARY DIAGNOSIS: \"GENERIC\" NURSING  OUTPATIENT/OBSERVATION GOALS TO BE MET BEFORE DISCHARGE:  ADLs back to baseline: No    Activity and level of assistance: Ax1 with walker & Gaitbelt to bedside commode.    Pain status: Improved-controlled with oral pain medications.    Return to near baseline physical activity: No     Discharge Planner Nurse   Safe discharge environment identified: Yes  Barriers to discharge: Yes       Entered by: Luz Turner RN 04/09/2024 4:43 AM    Pt A&O. Vitals within baseline. Denies discomfort or pain after given PRN Oxy & Tylenol. Incision site, Dry&Intact.  No changes noted from old drainage. Tele, A-paced.      Please review provider order for any additional goals.   Nurse to notify provider when observation goals have been met and patient is ready for discharge.  "

## 2024-04-10 ENCOUNTER — PATIENT OUTREACH (OUTPATIENT)
Dept: CARE COORDINATION | Facility: CLINIC | Age: 89
End: 2024-04-10
Payer: COMMERCIAL

## 2024-04-10 NOTE — PROGRESS NOTES
"Clinic Care Coordination Contact  [facility]: Post-Discharge Note  SITUATION                                                      Admission:    Admission Date: 04/08/24   Reason for Admission: scheduled pacemaker procedure  Discharge:   Discharge Date: 04/09/24  Discharge Diagnosis: left bundle branch block) (I44.7)      BACKGROUND                                                      Per hospital discharge summary and inpatient provider notes: Successful new implant of a dual-chamber pacemaker. Ventricular lead was implanted with a tip position in the His-Purkinje conduction system. Implanted on 4/9/24 by Dr Rivera. Normal acute device function.     ASSESSMENT    CCRN spoke with patient via phone today. Patient states she missed her lunch and just woke up. Patient declined care coordination or the need for community resources. Patient was a bit hard of hearing. Needed to repeat the questions 2-3 times. 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 Patient declined to schedule hospital discharge follow up with her PCP at this time stating that she scheduled to follow up heart clinic on 4/15/24 and 6/18/24.     Discharge Assessment  How are you doing now that you are home?: \" feeling fine\"  How are your symptoms? (Red Flag symptoms escalate to triage hotline per guidelines): Improved  Do you feel your condition is stable enough to be safe at home until your provider visit?: Yes  Does the patient have their discharge instructions? : Yes  Does the patient have questions regarding their discharge instructions? : No  Were you started on any new medications or were there changes to any of your previous medications? : Yes  Does the patient have all of their medications?: Yes  Do you have questions regarding any of your medications? : No  Do you have all of your needed medical supplies or equipment (DME)?  (i.e. oxygen tank, CPAP, cane, etc.): " Yes  Discharge follow-up appointment scheduled within 14 calendar days? : No  Is patient agreeable to assistance with scheduling? : No    Post-op (CHW CTA Only)  If the patient had a surgery or procedure, do they have any questions for a nurse?: No    Post-op (Clinicians Only)  Did the patient have surgery or a procedure: Yes  Incision: closed  Drainage: No  Bleeding: none  Chills: No  Redness: No  Warmth: No  Swelling: No  Incision site pain: No  Closure: none  Eating & Drinking: eating and drinking without complaints/concerns  PO Intake: clear liquids  Bowel Function: normal  Date of last BM: 04/09/24  Urinary Status: voiding without complaint/concerns    PLAN                                                      Outpatient Plan:  declined INF appt. Patient is scheduled follow up with heart clinic on 4/15 and 6/18 as recommended.     Future Appointments   Date Time Provider Department Center   4/15/2024 11:00 AM RAULITO [facility]URSE 1 [facility]   4/22/2024 11:20 AM Estrella Walker DO WISPMD [facility] WBWW   5/20/2024 11:15 AM RAULITO [facility] DEVICE NURSE 2 [facility]CVN [facility]   5/23/2024  3:30 PM Valentine Howard APRN CNP MRPNCR [facility] MPLW   6/18/2024  8:30 AM [facility]   7/26/2024 10:40 AM Margret Flores MD DAFMO[facility] SPRO   8/22/2024  3:30 PM Valentine Howard APRN CNP MRPNCR [facility] MPLW         For any urgent concerns, please contact our 24 hour nurse triage line: [phone] ([phone])         Chelsea Hagen RN

## 2024-04-15 ENCOUNTER — TELEPHONE (OUTPATIENT)
Dept: PALLIATIVE MEDICINE | Facility: OTHER | Age: 89
End: 2024-04-15

## 2024-04-15 ENCOUNTER — ANCILLARY PROCEDURE (OUTPATIENT)
Dept: CARDIOLOGY | Facility: CLINIC | Age: 89
End: 2024-04-15
Attending: INTERNAL MEDICINE
Payer: COMMERCIAL

## 2024-04-15 ENCOUNTER — TELEPHONE (OUTPATIENT)
Dept: PALLIATIVE MEDICINE | Facility: OTHER | Age: 89
End: 2024-04-15
Payer: COMMERCIAL

## 2024-04-15 DIAGNOSIS — I48.0 PAROXYSMAL ATRIAL FIBRILLATION (H): Primary | ICD-10-CM

## 2024-04-15 DIAGNOSIS — Z95.0 PACEMAKER: ICD-10-CM

## 2024-04-15 DIAGNOSIS — R00.1 BRADYCARDIA: ICD-10-CM

## 2024-04-15 NOTE — TELEPHONE ENCOUNTER
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

## 2024-04-15 NOTE — DISCHARGE INSTRUCTIONS
Pacemaker Post-operative Checklist    The Device Registered Nurse (RN) reviewed the pacemaker function.    The Device RN did a wound assessment and wound care teaching.    Please call the Device Nurses with any signs of infection or questions regarding wound healing. Device Nurse Line: [phone], Option #3    The Device RN demonstrated and displayed the specific remote monitoring system for your pacemaker.    The Device RN reviewed the Partnership Agreement Form.    Patient Instructions  Do not lift your Left arm above the shoulder height, perform any vigorous arm movements such as swimming, golfing, washing windows, shoveling show, vacuuming or lifting greater than 10-15lbs with the affected arm for 4 weeks from the date of implant, until 5/6/2024.    To reduce the risk of infection, try to avoid any dental procedures for the first 6 weeks after your pacemaker implant.     You will receive a device identification (ID) card in the mail from the device  within 6 weeks to replace the temporary ID card you were given in the hospital.    You may travel by any mode of transportation; just show your pacemaker ID card. You may be subject to a hand search or use of a handheld wand, but official should not keep the wand over the implant site for greater than 5-10 seconds.    For any surgery, let your doctor know you have a pacemaker.     Your pacemaker is MRI safe after 5/20/2024.    Most household appliances, including a microwave, will not interfere with your pacemaker function. If you suspect interference, simply move away from the source. Cell phones MAY cause a problem. Please refer to the device booklet from the  or their website under the section on electromagnetic interference (DEVYN) for further guidelines on things that may interfere with your pacemaker.       Device Clinic Contact Information  Device Nurses: [phone], Option #3    Device Remote Specialists: [phone], Option #2.  For questions about your Remote Transmission or Transmission Schedule  Device Schedulers: [phone], Option #1

## 2024-04-16 NOTE — PROGRESS NOTES
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    Diagnoses and all orders for this visit:    Primary osteoarthritis of right knee  -     Physical Therapy  Referral; Future    Chronic pain of right knee    Genu valgum, acquired, right    Other orders  -     Large Joint Injection/Arthocentesis: R knee joint      89 female with chronic right knee pain secondary to osteoarthritis presents to follow-up on right knee pain.  Continues to have significant pain with walking as well as a significant valgus motion with ambulating significant pain relief for a little over 3 weeks, then pain returned.  Request that we repeat this today, she did not respond to prior Synvisc injections or genicular nerve blocks.  Has been told in the past she is not a candidate for knee replacement.    Plan:  -PT to eval for offloader brace, if this does not work for her, consider referral to O&P for custom knee brace fabrication  -R knee aspiration and corticosteroid injection today in clinic. Could consider PRP in future if no improvement  -Consider repeat genicular nerve blocks in future  -Plan to repeat knee XR at next visit    Return in about 3 months (around 7/22/2024).      Dr. Estrella Walker, DO  [facility]  Sports Medicine     -----  Chief Complaint   Patient presents with    Right Knee - Pain, Follow Up       SUBJECTIVE  Gladys Ramirez is a/an 89 year old female who is seen to follow-up on chronic right knee pain. The patient was last seen 1/16/24. Since last visit patient notes right knee pain & swelling over the past 2 months.  Right knee steroid injection & aspiration completed on 1/16/24 provided significant pain relief for ~ 3 weeks.    The patient is seen alone        REVIEW OF SYSTEMS:  Pertinent positives/negative: As stated above in HPI    OBJECTIVE:  Ht 1.534 m (5' 0.4\")   Wt 67.8 kg (149 lb 8 oz)   BMI 28.81 kg/m     General: Alert and in no " distress  Skin: no visable rashes  CV: Extremities appear well perfused   Resp: normal respiratory effort, no conversational dyspnea   Psych: normal mood, affect  MSK:  R knee with large effusion  Valgus deformity, worse with ambulating  Pain with palpation over medial joint line    RADIOLOGY:  Final results and radiologist's interpretation available in the [facility] health record.  Images below were personally reviewed and discussed with the patient in the office today.  My personal interpretation of the performed imaging: No new imaging    Large Joint Injection/Arthocentesis: R knee joint    Date/Time: 4/22/2024 11:30 AM    Performed by: Estrella Walker DO  Authorized by: Estrella Walker DO    Indications:  Pain, osteoarthritis and joint swelling  Needle Size:  18 G  Guidance: ultrasound    Approach:  Superolateral  Location:  Knee      Medications:  40 mg triamcinolone 40 MG/ML; 3 mL ROPivacaine 5 MG/ML  Aspirate amount (mL):  29  Aspirate:  Serous, blood-tinged and yellow  Outcome:  Tolerated well, no immediate complications  Procedure discussed: discussed risks, benefits, and alternatives    Consent Given by:  Patient  Timeout: timeout called immediately prior to procedure    Prep: patient was prepped and draped in usual sterile fashion     A mixture of 4ml's 1% lidocaine & 1 ml 8.4% Sodium Bicarbonate was injected for local anesthetic prior to aspiration.  Ultrasound images of procedure were permanently stored.                      Disclaimer: This note consists of symbols derived from dictation and/or voice recognition software. As a result, there may be errors in the script that have gone undetected. Please consider this when interpreting information found in this chart.

## 2024-04-18 DIAGNOSIS — R06.09 DYSPNEA ON EXERTION: ICD-10-CM

## 2024-04-18 LAB
MDC_IDC_LEAD_CONNECTION_STATUS: NORMAL
MDC_IDC_LEAD_CONNECTION_STATUS: NORMAL
MDC_IDC_LEAD_IMPLANT_DT: NORMAL
MDC_IDC_LEAD_IMPLANT_DT: NORMAL
MDC_IDC_LEAD_LOCATION: NORMAL
MDC_IDC_LEAD_LOCATION: NORMAL
MDC_IDC_LEAD_LOCATION_DETAIL_1: NORMAL
MDC_IDC_LEAD_LOCATION_DETAIL_1: NORMAL
MDC_IDC_LEAD_MFG: NORMAL
MDC_IDC_LEAD_MFG: NORMAL
MDC_IDC_LEAD_MODEL: NORMAL
MDC_IDC_LEAD_MODEL: NORMAL
MDC_IDC_LEAD_POLARITY_TYPE: NORMAL
MDC_IDC_LEAD_POLARITY_TYPE: NORMAL
MDC_IDC_LEAD_SERIAL: NORMAL
MDC_IDC_LEAD_SERIAL: NORMAL
MDC_IDC_LEAD_SPECIAL_FUNCTION: NORMAL
MDC_IDC_LEAD_SPECIAL_FUNCTION: NORMAL
MDC_IDC_MSMT_BATTERY_DTM: NORMAL
MDC_IDC_MSMT_BATTERY_REMAINING_LONGEVITY: 156 MO
MDC_IDC_MSMT_BATTERY_RRT_TRIGGER: 2.62
MDC_IDC_MSMT_BATTERY_STATUS: NORMAL
MDC_IDC_MSMT_BATTERY_VOLTAGE: 3.22 V
MDC_IDC_MSMT_LEADCHNL_RA_IMPEDANCE_VALUE: 361 OHM
MDC_IDC_MSMT_LEADCHNL_RA_IMPEDANCE_VALUE: 437 OHM
MDC_IDC_MSMT_LEADCHNL_RA_PACING_THRESHOLD_AMPLITUDE: 0.75 V
MDC_IDC_MSMT_LEADCHNL_RA_PACING_THRESHOLD_AMPLITUDE: 1 V
MDC_IDC_MSMT_LEADCHNL_RA_PACING_THRESHOLD_PULSEWIDTH: 0.4 MS
MDC_IDC_MSMT_LEADCHNL_RA_PACING_THRESHOLD_PULSEWIDTH: 0.4 MS
MDC_IDC_MSMT_LEADCHNL_RA_SENSING_INTR_AMPL: 1.38 MV
MDC_IDC_MSMT_LEADCHNL_RA_SENSING_INTR_AMPL: 2 MV
MDC_IDC_MSMT_LEADCHNL_RV_IMPEDANCE_VALUE: 494 OHM
MDC_IDC_MSMT_LEADCHNL_RV_IMPEDANCE_VALUE: 627 OHM
MDC_IDC_MSMT_LEADCHNL_RV_PACING_THRESHOLD_AMPLITUDE: 0.62 V
MDC_IDC_MSMT_LEADCHNL_RV_PACING_THRESHOLD_AMPLITUDE: 0.75 V
MDC_IDC_MSMT_LEADCHNL_RV_PACING_THRESHOLD_PULSEWIDTH: 0.4 MS
MDC_IDC_MSMT_LEADCHNL_RV_PACING_THRESHOLD_PULSEWIDTH: 0.4 MS
MDC_IDC_MSMT_LEADCHNL_RV_SENSING_INTR_AMPL: 11.75 MV
MDC_IDC_MSMT_LEADCHNL_RV_SENSING_INTR_AMPL: 8.38 MV
MDC_IDC_PG_IMPLANT_DTM: NORMAL
MDC_IDC_PG_MFG: NORMAL
MDC_IDC_PG_MODEL: NORMAL
MDC_IDC_PG_SERIAL: NORMAL
MDC_IDC_PG_TYPE: NORMAL
MDC_IDC_SESS_CLINIC_NAME: NORMAL
MDC_IDC_SESS_DTM: NORMAL
MDC_IDC_SESS_TYPE: NORMAL
MDC_IDC_SET_BRADY_AT_MODE_SWITCH_RATE: 171 {BEATS}/MIN
MDC_IDC_SET_BRADY_HYSTRATE: NORMAL
MDC_IDC_SET_BRADY_LOWRATE: 60 {BEATS}/MIN
MDC_IDC_SET_BRADY_MAX_SENSOR_RATE: 110 {BEATS}/MIN
MDC_IDC_SET_BRADY_MAX_TRACKING_RATE: 110 {BEATS}/MIN
MDC_IDC_SET_BRADY_MODE: NORMAL
MDC_IDC_SET_BRADY_PAV_DELAY_LOW: 120 MS
MDC_IDC_SET_BRADY_SAV_DELAY_LOW: 100 MS
MDC_IDC_SET_LEADCHNL_RA_PACING_AMPLITUDE: NORMAL
MDC_IDC_SET_LEADCHNL_RA_PACING_ANODE_ELECTRODE_1: NORMAL
MDC_IDC_SET_LEADCHNL_RA_PACING_ANODE_LOCATION_1: NORMAL
MDC_IDC_SET_LEADCHNL_RA_PACING_CAPTURE_MODE: NORMAL
MDC_IDC_SET_LEADCHNL_RA_PACING_CATHODE_ELECTRODE_1: NORMAL
MDC_IDC_SET_LEADCHNL_RA_PACING_CATHODE_LOCATION_1: NORMAL
MDC_IDC_SET_LEADCHNL_RA_PACING_POLARITY: NORMAL
MDC_IDC_SET_LEADCHNL_RA_PACING_PULSEWIDTH: 0.4 MS
MDC_IDC_SET_LEADCHNL_RA_SENSING_ANODE_ELECTRODE_1: NORMAL
MDC_IDC_SET_LEADCHNL_RA_SENSING_ANODE_LOCATION_1: NORMAL
MDC_IDC_SET_LEADCHNL_RA_SENSING_CATHODE_ELECTRODE_1: NORMAL
MDC_IDC_SET_LEADCHNL_RA_SENSING_CATHODE_LOCATION_1: NORMAL
MDC_IDC_SET_LEADCHNL_RA_SENSING_POLARITY: NORMAL
MDC_IDC_SET_LEADCHNL_RA_SENSING_SENSITIVITY: 0.3 MV
MDC_IDC_SET_LEADCHNL_RV_PACING_AMPLITUDE: NORMAL
MDC_IDC_SET_LEADCHNL_RV_PACING_ANODE_ELECTRODE_1: NORMAL
MDC_IDC_SET_LEADCHNL_RV_PACING_ANODE_LOCATION_1: NORMAL
MDC_IDC_SET_LEADCHNL_RV_PACING_CAPTURE_MODE: NORMAL
MDC_IDC_SET_LEADCHNL_RV_PACING_CATHODE_ELECTRODE_1: NORMAL
MDC_IDC_SET_LEADCHNL_RV_PACING_CATHODE_LOCATION_1: NORMAL
MDC_IDC_SET_LEADCHNL_RV_PACING_POLARITY: NORMAL
MDC_IDC_SET_LEADCHNL_RV_PACING_PULSEWIDTH: 0.4 MS
MDC_IDC_SET_LEADCHNL_RV_SENSING_ANODE_ELECTRODE_1: NORMAL
MDC_IDC_SET_LEADCHNL_RV_SENSING_ANODE_LOCATION_1: NORMAL
MDC_IDC_SET_LEADCHNL_RV_SENSING_CATHODE_ELECTRODE_1: NORMAL
MDC_IDC_SET_LEADCHNL_RV_SENSING_CATHODE_LOCATION_1: NORMAL
MDC_IDC_SET_LEADCHNL_RV_SENSING_POLARITY: NORMAL
MDC_IDC_SET_LEADCHNL_RV_SENSING_SENSITIVITY: 0.9 MV
MDC_IDC_SET_ZONE_DETECTION_INTERVAL: 350 MS
MDC_IDC_SET_ZONE_DETECTION_INTERVAL: 400 MS
MDC_IDC_SET_ZONE_STATUS: NORMAL
MDC_IDC_SET_ZONE_STATUS: NORMAL
MDC_IDC_SET_ZONE_TYPE: NORMAL
MDC_IDC_SET_ZONE_VENDOR_TYPE: NORMAL
MDC_IDC_STAT_AT_BURDEN_PERCENT: 0 %
MDC_IDC_STAT_AT_DTM_END: NORMAL
MDC_IDC_STAT_AT_DTM_START: NORMAL
MDC_IDC_STAT_AT_MODE_SW_COUNT: 0
MDC_IDC_STAT_BRADY_AP_VP_PERCENT: 66.76 %
MDC_IDC_STAT_BRADY_AP_VS_PERCENT: 0.04 %
MDC_IDC_STAT_BRADY_AS_VP_PERCENT: 31.34 %
MDC_IDC_STAT_BRADY_AS_VS_PERCENT: 1.85 %
MDC_IDC_STAT_BRADY_DTM_END: NORMAL
MDC_IDC_STAT_BRADY_DTM_START: NORMAL
MDC_IDC_STAT_BRADY_RA_PERCENT_PACED: 66.88 %
MDC_IDC_STAT_BRADY_RV_PERCENT_PACED: 98.11 %
MDC_IDC_STAT_EPISODE_RECENT_COUNT: 0
MDC_IDC_STAT_EPISODE_RECENT_COUNT_DTM_END: NORMAL
MDC_IDC_STAT_EPISODE_RECENT_COUNT_DTM_START: NORMAL
MDC_IDC_STAT_EPISODE_TOTAL_COUNT: 0
MDC_IDC_STAT_EPISODE_TOTAL_COUNT_DTM_END: NORMAL
MDC_IDC_STAT_EPISODE_TOTAL_COUNT_DTM_START: NORMAL
MDC_IDC_STAT_EPISODE_TYPE: NORMAL
MDC_IDC_STAT_TACHYTHERAPY_RECENT_DTM_END: NORMAL
MDC_IDC_STAT_TACHYTHERAPY_RECENT_DTM_START: NORMAL
MDC_IDC_STAT_TACHYTHERAPY_TOTAL_DTM_END: NORMAL
MDC_IDC_STAT_TACHYTHERAPY_TOTAL_DTM_START: NORMAL

## 2024-04-18 PROCEDURE — 93280 PM DEVICE PROGR EVAL DUAL: CPT | Performed by: INTERNAL MEDICINE

## 2024-04-18 RX ORDER — POTASSIUM CHLORIDE 1500 MG/1
20 TABLET, EXTENDED RELEASE ORAL DAILY
Qty: 90 TABLET | Refills: 3 | Status: SHIPPED | OUTPATIENT
Start: 2024-04-18

## 2024-04-22 ENCOUNTER — OFFICE VISIT (OUTPATIENT)
Dept: ORTHOPEDICS | Facility: CLINIC | Age: 89
End: 2024-04-22
Payer: COMMERCIAL

## 2024-04-22 VITALS — HEIGHT: 60 IN | WEIGHT: 149.5 LBS | BODY MASS INDEX: 29.35 KG/M2

## 2024-04-22 DIAGNOSIS — M21.061 GENU VALGUM, ACQUIRED, RIGHT: ICD-10-CM

## 2024-04-22 DIAGNOSIS — G89.29 CHRONIC PAIN OF RIGHT KNEE: ICD-10-CM

## 2024-04-22 DIAGNOSIS — M25.561 CHRONIC PAIN OF RIGHT KNEE: ICD-10-CM

## 2024-04-22 DIAGNOSIS — M17.11 PRIMARY OSTEOARTHRITIS OF RIGHT KNEE: Primary | ICD-10-CM

## 2024-04-22 PROCEDURE — 99213 OFFICE O/P EST LOW 20 MIN: CPT | Mod: 25 | Performed by: STUDENT IN AN ORGANIZED HEALTH CARE EDUCATION/TRAINING PROGRAM

## 2024-04-22 PROCEDURE — 20611 DRAIN/INJ JOINT/BURSA W/US: CPT | Mod: RT | Performed by: STUDENT IN AN ORGANIZED HEALTH CARE EDUCATION/TRAINING PROGRAM

## 2024-04-22 RX ORDER — ROPIVACAINE HYDROCHLORIDE 5 MG/ML
3 INJECTION, SOLUTION EPIDURAL; INFILTRATION; PERINEURAL
Status: DISCONTINUED | OUTPATIENT
Start: 2024-04-22 | End: 2024-05-02

## 2024-04-22 RX ORDER — TRIAMCINOLONE ACETONIDE 40 MG/ML
40 INJECTION, SUSPENSION INTRA-ARTICULAR; INTRAMUSCULAR
Status: DISCONTINUED | OUTPATIENT
Start: 2024-04-22 | End: 2024-05-02

## 2024-04-22 RX ADMIN — TRIAMCINOLONE ACETONIDE 40 MG: 40 INJECTION, SUSPENSION INTRA-ARTICULAR; INTRAMUSCULAR at 11:30

## 2024-04-22 RX ADMIN — ROPIVACAINE HYDROCHLORIDE 3 ML: 5 INJECTION, SOLUTION EPIDURAL; INFILTRATION; PERINEURAL at 11:30

## 2024-04-22 NOTE — LETTER
4/22/2024         RE: Gladys Ramirez  [address]  [place] [address]        Dear Colleague,    Thank you for referring your patient, Gladys Ramirez, to the [facility]. Please see a copy of my visit note below.    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    Diagnoses and all orders for this visit:    Primary osteoarthritis of right knee  -     Physical Therapy  Referral; Future    Chronic pain of right knee    Genu valgum, acquired, right    Other orders  -     Large Joint Injection/Arthocentesis: R knee joint      89 female with chronic right knee pain secondary to osteoarthritis presents to follow-up on right knee pain.  Continues to have significant pain with walking as well as a significant valgus motion with ambulating significant pain relief for a little over 3 weeks, then pain returned.  Request that we repeat this today, she did not respond to prior Synvisc injections or genicular nerve blocks.  Has been told in the past she is not a candidate for knee replacement.    Plan:  -PT to eval for offloader brace, if this does not work for her, consider referral to O&P for custom knee brace fabrication  -R knee aspiration and corticosteroid injection today in clinic. Could consider PRP in future if no improvement  -Consider repeat genicular nerve blocks in future  -Plan to repeat knee XR at next visit    Return in about 3 months (around 7/22/2024).      Dr. Estrella Walker, DO  [facility]  Sports Medicine     -----  Chief Complaint   Patient presents with     Right Knee - Pain, Follow Up       SUBJECTIVE  Gladys Ramirez is a/an 89 year old female who is seen to follow-up on chronic right knee pain. The patient was last seen 1/16/24. Since last visit patient notes right knee pain & swelling over the past 2 months.  Right knee steroid injection & aspiration completed on  "1/16/24 provided significant pain relief for ~ 3 weeks.    The patient is seen alone        REVIEW OF SYSTEMS:  Pertinent positives/negative: As stated above in HPI    OBJECTIVE:  Ht 1.534 m (5' 0.4\")   Wt 67.8 kg (149 lb 8 oz)   BMI 28.81 kg/m     General: Alert and in no distress  Skin: no visable rashes  CV: Extremities appear well perfused   Resp: normal respiratory effort, no conversational dyspnea   Psych: normal mood, affect  MSK:  R knee with large effusion  Valgus deformity, worse with ambulating  Pain with palpation over medial joint line    RADIOLOGY:  Final results and radiologist's interpretation available in the [facility] health record.  Images below were personally reviewed and discussed with the patient in the office today.  My personal interpretation of the performed imaging: No new imaging    Large Joint Injection/Arthocentesis: R knee joint    Date/Time: 4/22/2024 11:30 AM    Performed by: Estrella Walker DO  Authorized by: Estrella Walker DO    Indications:  Pain, osteoarthritis and joint swelling  Needle Size:  18 G  Guidance: ultrasound    Approach:  Superolateral  Location:  Knee      Medications:  40 mg triamcinolone 40 MG/ML; 3 mL ROPivacaine 5 MG/ML  Aspirate amount (mL):  29  Aspirate:  Serous, blood-tinged and yellow  Outcome:  Tolerated well, no immediate complications  Procedure discussed: discussed risks, benefits, and alternatives    Consent Given by:  Patient  Timeout: timeout called immediately prior to procedure    Prep: patient was prepped and draped in usual sterile fashion     A mixture of 4ml's 1% lidocaine & 1 ml 8.4% Sodium Bicarbonate was injected for local anesthetic prior to aspiration.  Ultrasound images of procedure were permanently stored.                      Disclaimer: This note consists of symbols derived from dictation and/or voice recognition software. As a result, there may be errors in the script that have gone undetected. Please consider this when " interpreting information found in this chart.        Again, thank you for allowing me to participate in the care of your patient.        Sincerely,        Estrella Walker, DO

## 2024-04-22 NOTE — PATIENT INSTRUCTIONS
1. Primary osteoarthritis of right knee    2. Chronic pain of right knee    3. Genu valgum, acquired, right        Plan:  -PT to eval for knee offloader brace. If this does not work for her, consider referral to O&P for custom knee brace fabrication  -R knee corticosteroid injection today in clinic  -Consider repeat genicular nerve blocks in future    If you have any questions or concerns after your appointment, please send a [employer] [facility] message or call the clinic at [phone]    Estrella Walker DO, MURALI  [facility]  Sports Medicine    Thank you for choosing [facility]!    DR. WALKER'S CLINIC LOCATIONS:     [place]  TRIAGE LINE: [phone] [address] [employer] APPOINTMENTS: [phone]   [place] [address] RADIOLOGY: [phone]   (Mondays & Tuesdays) HAND THERAPY: [phone]    PHYSICAL THERAPY: [phone]   [place] BILLING QUESTIONS: [phone] [address] FAX: [phone]   [place] [address]    (Thursdays & Fridays)       Post-Injection Discharge Instructions    You may shower, however avoid swimming, tub baths or hot tubs for 24 hours following your procedure  You may have a mild to moderate increase in pain for a few days following the injection.  The lidocaine (local numbing medicine) will wear off in several hours. It usually takes 3-5 days for the steroid medication to start working although it may take up to 14 days for full effect.   You may use ice packs for 10-15 minutes, 3 to 4 times a day at the injection site for comfort if needed  You may use extra strength Tylenol for pain control if necessary   If you were fasting, you may resume your normal diet and medications after the procedure  If you have diabetes, your blood sugar may be higher than normal for 10-14 days following a steroid injection. Contact your doctor who manages your diabetes if your blood sugar is significantly higher than usual    If you  "experience any of the following, call Sports Medicine @ [phone] or [phone]  -Fever over 100 degrees F  -Swelling, bleeding, redness, drainage, warmth at the injection site  -New or significant worsening pain     Non-Operative treatment of Knee Osteoarthritis    To Decrease Stress  Stay fit and get regular exercise    Muscle Strengthening: Consider physical therapy  Activity Modification: Avoid high-impact activities  Weight Control  Consider using walking poles/cane or a knee brace to offload knee    To Decrease Pain  Tylenol (Acetaminophen) as needed 2 tablets (1,000 mg) three times per day, not to exceed 3,000 mg per day   Trial topical Voltaren (Diclofenac) gel up to 4x daily to area of pain  Glucosamine chondroiten (try taking for 3 months and if helpful, continue)  Steroid injections (no sooner than every 3 months)  Viscosupplementation/\"gel\" injections (Synvisc, Euflexxa, etc. are brand names)  Platelet Rich Plasma (Not covered by insurance)   "

## 2024-04-25 ENCOUNTER — DOCUMENTATION ONLY (OUTPATIENT)
Dept: CARDIOLOGY | Facility: CLINIC | Age: 89
End: 2024-04-25
Payer: COMMERCIAL

## 2024-04-25 NOTE — PROGRESS NOTES
Gladys had some concerns about her BP when I saw her in clinic post PPM implant. Her BP was 130/65 on 4/15/24.  She had 1 fainting spell after she got home post PPM implant. She was started on Metoprolol which was new for her.   She agreed to take her BP daily for a week- this is what she got:     Her BP readings are 130/65, 130/90, 99/55, 122/69, 115/62, 165/79, and 158/84.    She is doing her BP for another week and will touch base with us again.    Lorri Manzanaers, Device RN

## 2024-04-26 NOTE — TELEPHONE ENCOUNTER
Retail Pharmacy Prior Authorization Team   Phone: [phone]    PA Initiation    Medication: diclofenac (FLECTOR) 1.3 % patch  Insurance Company: [employer] Non-Specialty PA's - Phone [phone] Fax [phone]  Pharmacy Filling the Rx: [id number] - [place] [[place]][address]  Filling Pharmacy Phone: [phone]  Filling Pharmacy Fax: [phone]  Start Date: 4/26/2024    CMM UNABLE TO LOCATE PATIENT - WILL NEED TO CONTACT INSURANCE TO START PA

## 2024-04-26 NOTE — PROGRESS NOTES
----- Message -----  From: Estrada Holley MD  Sent: 4/25/2024   4:53 PM CDT  To: Stacy Noriega, RN; Lorri Manzanares, RN    I am gone for a week and will include my nurse in.  I have no problems with doing half dose metoprolol in this 89-ipt-fovt-old lady.  Lets see how she feels on the current dose and if not any better then I have no problems as above cut it in half.  LF          ==noted plan for patient to call in next week with more blood pressures. Will then cut Metoprolol in half, if needed per above. -[facility]

## 2024-04-29 DIAGNOSIS — I48.0 PAROXYSMAL ATRIAL FIBRILLATION (H): ICD-10-CM

## 2024-04-29 DIAGNOSIS — I42.9 SECONDARY CARDIOMYOPATHY (H): ICD-10-CM

## 2024-04-29 DIAGNOSIS — I10 BENIGN ESSENTIAL HYPERTENSION: ICD-10-CM

## 2024-04-30 ENCOUNTER — HOSPITAL ENCOUNTER (EMERGENCY)
Facility: HOSPITAL | Age: 89
Discharge: HOME | End: 2024-04-30
Attending: EMERGENCY MEDICINE | Admitting: EMERGENCY MEDICINE
Payer: COMMERCIAL

## 2024-04-30 ENCOUNTER — APPOINTMENT (OUTPATIENT)
Dept: RADIOLOGY | Facility: HOSPITAL | Age: 89
End: 2024-04-30
Payer: COMMERCIAL

## 2024-04-30 VITALS
DIASTOLIC BLOOD PRESSURE: 63 MMHG | RESPIRATION RATE: 19 BRPM | BODY MASS INDEX: 28.29 KG/M2 | WEIGHT: 146.8 LBS | TEMPERATURE: 97.8 F | SYSTOLIC BLOOD PRESSURE: 136 MMHG | HEART RATE: 76 BPM | OXYGEN SATURATION: 98 %

## 2024-04-30 DIAGNOSIS — M25.561 RIGHT KNEE PAIN: ICD-10-CM

## 2024-04-30 PROCEDURE — 99283 EMERGENCY DEPT VISIT LOW MDM: CPT

## 2024-04-30 PROCEDURE — 250N000013 HC RX MED GY IP 250 OP 250 PS 637

## 2024-04-30 PROCEDURE — 73562 X-RAY EXAM OF KNEE 3: CPT | Mod: RT

## 2024-04-30 RX ORDER — OXYCODONE HYDROCHLORIDE 5 MG/1
5 TABLET ORAL ONCE
Status: COMPLETED | OUTPATIENT
Start: 2024-04-30 | End: 2024-04-30

## 2024-04-30 RX ORDER — ACETAMINOPHEN 325 MG/1
650 TABLET ORAL ONCE
Status: COMPLETED | OUTPATIENT
Start: 2024-04-30 | End: 2024-04-30

## 2024-04-30 RX ORDER — LISINOPRIL 2.5 MG/1
2.5 TABLET ORAL DAILY
Qty: 90 TABLET | Refills: 0 | Status: SHIPPED | OUTPATIENT
Start: 2024-04-30 | End: 2024-08-22

## 2024-04-30 RX ORDER — AMIODARONE HYDROCHLORIDE 200 MG/1
100 TABLET ORAL DAILY
Qty: 45 TABLET | Refills: 0 | Status: SHIPPED | OUTPATIENT
Start: 2024-04-30

## 2024-04-30 RX ADMIN — ACETAMINOPHEN 650 MG: 325 TABLET ORAL at 11:36

## 2024-04-30 RX ADMIN — OXYCODONE HYDROCHLORIDE 5 MG: 5 TABLET ORAL at 11:37

## 2024-04-30 ASSESSMENT — COLUMBIA-SUICIDE SEVERITY RATING SCALE - C-SSRS
2. HAVE YOU ACTUALLY HAD ANY THOUGHTS OF KILLING YOURSELF IN THE PAST MONTH?: NO
1. IN THE PAST MONTH, HAVE YOU WISHED YOU WERE DEAD OR WISHED YOU COULD GO TO SLEEP AND NOT WAKE UP?: NO
6. HAVE YOU EVER DONE ANYTHING, STARTED TO DO ANYTHING, OR PREPARED TO DO ANYTHING TO END YOUR LIFE?: NO

## 2024-04-30 ASSESSMENT — ACTIVITIES OF DAILY LIVING (ADL)
ADLS_ACUITY_SCORE: 38
ADLS_ACUITY_SCORE: 38

## 2024-04-30 NOTE — ED PROVIDER NOTES
Emergency Department Encounter   NAME: Gladys Ramirez  AGE: 89 year old female  YOB: 1930  MRN: [id number]    PCP: Margret Flores  ED PROVIDER: Paris Ratliff PA-C    Evaluation Date & Time:   No admission date for patient encounter.    CHIEF COMPLAINT:  Knee Pain      Impression and Plan   MDM: 83-year-old female with a history of chronic pain, osteoarthritis, opioid dependence, and persistent a fib on Eliquis who presents for evaluation of right knee pain.  Had a steroid injection 1 week ago on this knee.  Yesterday noticed increased right knee pain radiating down her leg. No trauma. No missed doses of Eliquis, calf pain, calf swelling or redness.  On arrival patient is vitally stable.  Afebrile.  On exam neurovascularly intact, full range of motion.  No erythema or excessive warmth.  Low suspicion for gout or septic arthritis to based on my exam. She is anticoagulated and hasn't missed any doses - DVT is unlikely. Low suspicion for fracture given no trauma, but we will get x-ray to see if there is a large effusion there that may need to be drained by her orthopedic provider.  We discussed the plan for x-ray and some pain management here and she is agreeable to this.  Did discuss that she is on chronic opioids and that we would not be increasing this here as she is on a pain contract with her primary provider.    ED Course as of 04/30/24 1242   Tue Apr 30, 2024   1105 I met and introduced myself to the patient. I gathered initial history and performed my physical exam. We discussed plan for initial workup.    1115 I have staffed the patient with Dr. Ball, ED physician, who will evaluate the patient and agrees with all aspects of today's care.    1230 XR Knee Right 3 Views  No acute fracture or malalignment. Severe arthritis.   1239 Reassessed patient.  She is feeling much better after medications.  She is reassured that her x-ray is negative for any fracture.  We discussed close follow-up with  her orthopedic provider to address this ongoing and worsening right knee pain as well as continuing her home pain medication regimen.  Encouraged her to use ice and heat if this is comfortable for herreviewed strict return precautions and patient was discharged home in stable condition.       Medical Decision Making  Obtained supplemental history:Supplemental history obtained?: No  Reviewed external records: External records reviewed?: Outpatient Record: sports medicine 4/22/24, PCP pre-op 5/4/24  Care impacted by chronic illness:Chronic Pain  Care significantly affected by social determinants of health:N/A  Did you consider but not order tests?: In addition to work-up documented, I considered the following work up: none  Did you interpret images independently?: My independent interpretation of imaging: none  Consultation discussion with other provider:Did you involve another provider (consultant, MH, pharmacy, etc.)?: No  Discharge. No recommendations on prescription strength medication(s). N/A.   At the conclusion of the encounter I discussed the results of all the tests and the disposition. The questions were answered. The patient or family acknowledged understanding and was agreeable with the care plan.    FINAL IMPRESSION:    ICD-10-CM    1. Right knee pain  M25.561             MEDICATIONS GIVEN IN THE EMERGENCY DEPARTMENT:  Medications   oxyCODONE (ROXICODONE) tablet 5 mg (5 mg Oral $Given 4/30/24 1137)   acetaminophen (TYLENOL) tablet 650 mg (650 mg Oral $Given 4/30/24 1136)         NEW PRESCRIPTIONS STARTED AT TODAY'S ED VISIT:  New Prescriptions    No medications on file         HPI   Patient information was obtained from: patient   Use of Intrepreter: N/A     Gladys Ramirez is a 89 year old female with a pertinent history of right knee osteoarthritis on 5 mg of oxycodone 3 times daily, chronic pain, heart failure, persistent A-fib on Eliquis, who presents to the ED by EMS for evaluation of R knee pain.  Yesterday afternoon noticed increase in right knee pain now radiating down to her right lower leg.  No recent trauma.  Did get a steroid injection in this knee 1 week ago.  No fever or chills or calf pain.  It does note that the pain now radiates down her leg.  No swelling.  She is chronically on Eliquis for her persistent atrial fibrillation.      REVIEW OF SYSTEMS:  Pertinent positive and negative symptoms per HPI.       Medical History     Past Medical History:   Diagnosis Date    Angina pectoris (H24)     Atrial fibrillation (H)     Chest pain 03/09/2017    Chronic kidney disease     Congestive heart failure (H)     Cough     Hyperlipidemia     Hypertension     Osteoarthritis        Past Surgical History:   Procedure Laterality Date    APPENDECTOMY      BASAL CELL CARCINOMA EXCISION      nose    EP PACEMAKER DEVICE & IMPLANT- HIS LEAD DUAL N/A 4/8/2024    Procedure: Pacemaker Device & Lead Implant - HIS Lead Dual;  Surgeon: Jeannie Rivera MD;  Location: [facility] CATH LAB CV    LAPAROSCOPY DIAGNOSTIC (GENERAL) N/A 11/04/2014    Procedure: LAPAROSCOPY BILATERAL SALPINGO-OOPHORECTOMY ;  Surgeon: Sofia Harper MD;  Location: [facility];  Service:     OPEN REDUCTION INTERNAL FIXATION HIP BIPOLAR Right 3/5/2023    Procedure: HEMIARTHROPLASTY, HIP, BIPOLAR;  Surgeon: Jose G Martinez MD;  Location: [facility]    TONSILLECTOMY      10 years old    ZZC TOTAL KNEE ARTHROPLASTY Left     2011       Family History   Problem Relation Age of Onset    Heart Disease Mother     Rheumatic Heart Disease Mother     No Known Problems Father     Cancer Brother         brain    Lung Cancer Brother     Lung Cancer Brother     Cancer Sister         lung    Lung Cancer Sister        Social History     Tobacco Use    Smoking status: Never     Passive exposure: Never    Smokeless tobacco: Never    Tobacco comments:     no passive exposure   Vaping Use    Vaping status: Never Used   Substance Use Topics     Alcohol use: Yes     Comment: Alcoholic Drinks/day: Rarely a glass of wine    Drug use: No       acetaminophen (TYLENOL) 500 MG tablet  amiodarone (PACERONE) 200 MG tablet  apixaban ANTICOAGULANT (ELIQUIS) 5 MG tablet  BETA BLOCKER NOT PRESCRIBED (INTENTIONAL)  BETA BLOCKER NOT PRESCRIBED (INTENTIONAL)  cholecalciferol, vitamin D3, (VITAMIN D3) 2,000 unit Tab  diclofenac (FLECTOR) 1.3 % patch  DULoxetine (CYMBALTA) 60 MG capsule  furosemide (LASIX) 40 MG tablet  lisinopril (ZESTRIL) 2.5 MG tablet  metoprolol succinate ER (TOPROL XL) 25 MG 24 hr tablet  oxyCODONE (ROXICODONE) 5 MG tablet  potassium chloride tamiko ER (KLOR-CON M20) 20 MEQ CR tablet  zolpidem ER (AMBIEN CR) 6.25 MG CR tablet          Physical Exam     First Vitals:  Patient Vitals for the past 24 hrs:   BP Temp Temp src Pulse Resp SpO2 Weight   04/30/24 0956 136/63 97.8  F (36.6  C) Oral 76 19 98 % 66.6 kg (146 lb 12.8 oz)       PHYSICAL EXAM:   General Appearance:  Alert, cooperative, no distress, appears stated age  HENT: Normocephalic without obvious deformity, atraumatic. Mucous membranes moist   Eyes: Conjunctiva clear, Lids normal. No discharge.   Musculoskeletal: Moving all extremities. No gross deformities.  Right calf without swelling, erythema, or tenderness to palpation.  R knee: Injection site from the last steroid injection is bruised, but without erythema, excessive warmth, or drainage.  Mild knee swelling without erythema or excessive warmth.  Full range of motion.  Sensation intact to light touch and 2+ DP and PT pulses.  Integument: Warm, dry, no rashes or lesions  Neurologic: Alert and orientated x3. No focal deficits.  Psych: Normal mood and affect      Results     LAB:  All pertinent labs reviewed and interpreted  Labs Ordered and Resulted from Time of ED Arrival to Time of ED Departure - No data to display    RADIOLOGY:  XR Knee Right 3 Views   Final Result   IMPRESSION: No acute fracture or malalignment. Severe lateral compartment  degenerative changes with bone-on-bone articulation. Moderate medial and patellofemoral compartment degenerative changes with chondrocalcinosis. Moderate knee joint effusion.    Osteopenia.          Paris Ratliff PA-C   Emergency Medicine   [facility] EMERGENCY DEPARTMENT       Paris Ratliff PA-C  04/30/24 0986

## 2024-04-30 NOTE — ED TRIAGE NOTES
Ed via EMS from [facility] apartment.  Has right knee osteoarthritis and had fluid drained a week ago about 33cc.  Since yesterday afternoon.  Right knee has increasing pain and today unable to bear weight due to pain.  No pain meds for this but did have a cortisone shot a week ago as well.  Recent pacemaker placement     Triage Assessment (Adult)       Row Name 04/30/24 0957          Triage Assessment    Airway WDL WDL        Respiratory WDL    Respiratory WDL WDL        Skin Circulation/Temperature WDL    Skin Circulation/Temperature WDL WDL        Cardiac WDL    Cardiac WDL WDL        Peripheral/Neurovascular WDL    Peripheral Neurovascular WDL X  right knee pain radiating right ankle        Cognitive/Neuro/Behavioral WDL    Cognitive/Neuro/Behavioral WDL WDL

## 2024-04-30 NOTE — ED NOTES
I am seeing this patient along with Paris Ratliff PA-C. I had a face to face encounter with this patient seen by the Advanced Practice Provider (DEON).  I have seen, examined, and discussed the patient with the DEON and agree with their assessment and plan of management. I personally saw the patient and performed a substantive portion of the visit including all aspects of the medical decision making.    HPI: 89-year-old female with a history of chronic knee pain status post steroid injection 1 week ago here with increased right knee pain.  She is on chronic oxycodone for pain control    Physical Exam: Well-appearing elderly female who does not have any significant erythema or warmth, no calf tenderness      LABS  Pertinent lab results reviewed in chart.  Labs Ordered and Resulted from Time of ED Arrival to Time of ED Departure - No data to display    EKG  \    RADIOLOGY  XR Knee Right 3 Views   Final Result   IMPRESSION: No acute fracture or malalignment. Severe lateral compartment degenerative changes with bone-on-bone articulation. Moderate medial and patellofemoral compartment degenerative changes with chondrocalcinosis. Moderate knee joint effusion.    Osteopenia.          PROCEDURES   \    ED COURSE & MEDICAL DECISION MAKING    Pertinent Labs and Imagaing reviewed (see chart for details)    89 year old female with a history of chronic knee pain here with increased pain.  On chart review, she is on chronic oxycodone for this pain.  On exam, there is no fever, swelling or significant erythema that would be concerning for infection.  X-ray reviewed and interpreted by me does not show a large effusion and radiology is reading as severe tricompartmental arthritis which is likely the cause of her chronic pain.  No signs of any acute emergency today and we did give an extra dose of oral oxycodone as well as some Tylenol and she had significant improvement.  Will plan for discharge at this time.  Further prescriptions will  need to be managed by her outpatient team.    At the conclusion of the encounter I discussed  the results of all of the tests and the disposition.   The questions were answered.  The patient or family acknowledged understanding and was agreeable with the care plan.       FINAL IMPRESSION      1. Right knee pain            CRITICAL CARE  0 Minutes    Oly Ball MD  4/30/2024 11:15 AM       Oly Ball MD  04/30/24 7040

## 2024-04-30 NOTE — DISCHARGE INSTRUCTIONS
He was seen today in the emergency department for evaluation of right knee pain.  Exam is overall reassuring.  There were no fractures seen on your x-ray.    Please call your orthopedic provider and schedule a follow-up with them regarding this pain.     Seek emergent medical attention if you experience foot numbness/tingling, loss of pulses, you fall, or any other concerning symptoms.

## 2024-05-01 ENCOUNTER — TELEPHONE (OUTPATIENT)
Dept: CARDIOLOGY | Facility: CLINIC | Age: 89
End: 2024-05-01
Payer: COMMERCIAL

## 2024-05-01 NOTE — TELEPHONE ENCOUNTER
5/1/2024: Called patient to check BP's and how she is feeling. She said she is having trouble with her knee, had to go to ED because she couldn't even bear weight on her foot. She had fluid removed from her knee and an injection. Her BP has been ok this past week 118-149/66-80's. Denies any dizziness or lightheadedness. She has our number to call if any further symptoms or concerns. Lorri Manzanares, Device RN

## 2024-05-02 ENCOUNTER — TELEPHONE (OUTPATIENT)
Dept: PALLIATIVE MEDICINE | Facility: OTHER | Age: 89
End: 2024-05-02
Payer: COMMERCIAL

## 2024-05-02 ENCOUNTER — TELEPHONE (OUTPATIENT)
Dept: ORTHOPEDICS | Facility: CLINIC | Age: 89
End: 2024-05-02
Payer: COMMERCIAL

## 2024-05-02 DIAGNOSIS — M17.11 PRIMARY OSTEOARTHRITIS OF RIGHT KNEE: ICD-10-CM

## 2024-05-02 DIAGNOSIS — S83.511S RUPTURE OF ANTERIOR CRUCIATE LIGAMENT OF RIGHT KNEE, SEQUELA: ICD-10-CM

## 2024-05-02 DIAGNOSIS — R26.9 ABNORMAL GAIT: ICD-10-CM

## 2024-05-02 DIAGNOSIS — R26.2 DIFFICULTY WALKING: ICD-10-CM

## 2024-05-02 DIAGNOSIS — Z91.81 AT HIGH RISK FOR INJURY RELATED TO FALL: ICD-10-CM

## 2024-05-02 DIAGNOSIS — R29.898 WEAKNESS OF RIGHT LOWER EXTREMITY: ICD-10-CM

## 2024-05-02 DIAGNOSIS — G89.29 CHRONIC INTRACTABLE PAIN: Primary | ICD-10-CM

## 2024-05-02 RX ORDER — LIDOCAINE 4 G/G
1 PATCH TOPICAL EVERY 24 HOURS
Qty: 10 PATCH | Refills: 3 | Status: SHIPPED | OUTPATIENT
Start: 2024-05-02 | End: 2024-05-22

## 2024-05-02 RX ORDER — OXYCODONE HYDROCHLORIDE 5 MG/1
5 TABLET ORAL EVERY 4 HOURS PRN
Qty: 120 TABLET | Refills: 0 | Status: ON HOLD | OUTPATIENT
Start: 2024-05-02 | End: 2024-05-28

## 2024-05-02 NOTE — TELEPHONE ENCOUNTER
Patient received a CSI on 4/22/2024 in her right knee. Patient went to the ED on 4/30/2024 for knee pain. Phone call to patient to discuss the pain. I explained that it can take up to 2 weeks for the steroid to start working. Patient seemed very frantic and didn't understand what I was saying. Patient was receiving a call from the pain clinic and said she would call me back and hung up.    Mary Fisher, ARCHIE, LAT

## 2024-05-02 NOTE — TELEPHONE ENCOUNTER
Okay to use lidocaine patches, agree with orthopedics advising time as she just had CSI 1 week ago. She can continue to work with them on other options if not helpful.  I sent an order for these to her pharmacy, but they are also available over the counter.     Katerina Muñoz, RAH

## 2024-05-02 NOTE — TELEPHONE ENCOUNTER
"Returned call to the pt. She reports severe right knee pain, which is making it difficult to walk. Has been taking Oxycodone and Tylenol 2 tabs tid with some relief. She's changing ice packs hourly and has been applying compounding cream. She denies redness, drainage or fever. She went to [facility] ER yesterday via paramedics and had Xrays. She's asking if she could have a patch ordered that she had 12 years ago. States she didn't tolerate it at that time, \"but I had just seen a video about how knee replacements are done, and I think that caused me to feel upset.\"    "

## 2024-05-02 NOTE — TELEPHONE ENCOUNTER
Patient is calling and need to talk to a Nurse, her pain is increasing and she had a hard day on Tuesday and went to the ED and she is not wanting to go back to ED and she wondering if [facility] is able to increase pain meds.     Please contact patient at [phone]    Jemma DE LEON

## 2024-05-02 NOTE — TELEPHONE ENCOUNTER
Prior Authorization Approval    Authorization Effective Date: 4/2/2024  Authorization Expiration Date: 5/2/2025  Medication: diclofenac (FLECTOR) 1.3 % patch - APPROVED  Approved Dose/Quantity:    Reference #:     Insurance Company: [employer] Non-Specialty PA's - Phone [phone] Fax [phone]  Expected CoPay:       CoPay Card Available:      Foundation Assistance Needed:    Which Pharmacy is filling the prescription (Not needed for infusion/clinic administered): [id number] - [place] [[place]], [address]  Pharmacy Notified:  YES  Patient Notified:  YES  **Instructed pharmacy to notify patient when script is ready to /ship.**

## 2024-05-02 NOTE — TELEPHONE ENCOUNTER
Pt is having pain in knee after injection is still having terrible pain can't walk would like a call back regarding this    Could we send this information to you in [employer] or would you prefer to receive a phone call?:   Patient would prefer a phone call   Okay to leave a detailed message?: Yes at Cell number on file:    Telephone Information:   Mobile [phone]

## 2024-05-02 NOTE — TELEPHONE ENCOUNTER
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 She has OxyContin 10 mg at home. She will try taking this at bedtime and continue Oxycodone 5 mg up to three times per day. Her other option is to increase the Oxycodone 5 mg to 5 tabs per day.     I have ordered a bedside commode for her as well and this was faxed to [facility] in [place]. They will check insurance coverage and her reach out to her.     We have added her to my schedule for this coming Monday to discuss a longer term plan.     SHASHA Domínguez, NP-C  [facility] [place]

## 2024-05-06 ENCOUNTER — OFFICE VISIT (OUTPATIENT)
Dept: PALLIATIVE MEDICINE | Facility: OTHER | Age: 89
End: 2024-05-06
Payer: COMMERCIAL

## 2024-05-06 VITALS — HEART RATE: 59 BPM | SYSTOLIC BLOOD PRESSURE: 145 MMHG | DIASTOLIC BLOOD PRESSURE: 65 MMHG

## 2024-05-06 DIAGNOSIS — R29.898 DIFFICULTY BEARING WEIGHT ON RIGHT LOWER EXTREMITY: ICD-10-CM

## 2024-05-06 DIAGNOSIS — M15.0 PRIMARY OSTEOARTHRITIS INVOLVING MULTIPLE JOINTS: ICD-10-CM

## 2024-05-06 DIAGNOSIS — G89.29 CHRONIC INTRACTABLE PAIN: Primary | ICD-10-CM

## 2024-05-06 DIAGNOSIS — M17.11 PRIMARY OSTEOARTHRITIS OF RIGHT KNEE: ICD-10-CM

## 2024-05-06 DIAGNOSIS — M79.2 NEUROPATHIC PAIN: ICD-10-CM

## 2024-05-06 DIAGNOSIS — R26.9 ABNORMAL GAIT: ICD-10-CM

## 2024-05-06 PROCEDURE — G2211 COMPLEX E/M VISIT ADD ON: HCPCS | Performed by: NURSE PRACTITIONER

## 2024-05-06 PROCEDURE — 99214 OFFICE O/P EST MOD 30 MIN: CPT | Performed by: NURSE PRACTITIONER

## 2024-05-06 PROCEDURE — G0463 HOSPITAL OUTPT CLINIC VISIT: HCPCS | Performed by: NURSE PRACTITIONER

## 2024-05-06 RX ORDER — OXYCODONE HCL 10 MG/1
10 TABLET, FILM COATED, EXTENDED RELEASE ORAL EVERY 12 HOURS
Qty: 60 TABLET | Refills: 0 | Status: ON HOLD | OUTPATIENT
Start: 2024-05-06 | End: 2024-05-28

## 2024-05-06 RX ORDER — PREDNISONE 20 MG/1
20 TABLET ORAL DAILY
Qty: 5 TABLET | Refills: 0 | Status: SHIPPED | OUTPATIENT
Start: 2024-05-06 | End: 2024-05-22

## 2024-05-06 ASSESSMENT — PAIN SCALES - GENERAL: PAINLEVEL: WORST PAIN (10)

## 2024-05-06 NOTE — PATIENT INSTRUCTIONS
After Visit Instructions:     Thank you for coming to [facility] for your care. It is my goal to partner with you to help you reach your optimal state of health.   Continue daily self-care, identifying contributing factors, and monitoring variations in pain level. Continue to integrate self-care into your life.      30 minutes Clinic follow-up with SHASHA Simpson NP-C a few days after the CT  Imaging: CT of right knee: [facility]): [phone]   Other: Order for wheelchair provider. Look into buy a transport chair  Medication Management :   Start taking OxyContin 10 m tablet every 12 hours.   REDUCE the Oxycodone 5 mg to two tablets per day  START Gabapentin 100 m capsule twice per day      SHASHA Domínguez NP-C  [facility] - Monday, Thursday and Friday  [facility] - Tuesday      Be sure to request ALL medication refills 5 days prior to the due date whether or not you will see your medical provider in an appointment before the due date.      Do not expect same day refills. If you do not plan ahead you may run out of medications.     Early refills are not provided.  It is your responsibility to manage your medications responsibility and keep them safely stored. Lost or destroyed medications WILL NOT be replaced    Scheduling/Clinic telephone Number for ALL locations:  [phone]    After Hours On-Call Service:  [phone]    Call with any questions about your care and for scheduling assistance.   Calls are returned Monday through Friday between 8 AM and 4:00 PM. We usually get back to you within 2 business days depending on the issue/request.    If we are prescribing your medications:  For opioid medication refills, call the clinic or send a [facility] message 7 days in advance.  Please include:  Your name and date of birth.   Name of requested medication  Name of the pharmacy.  For non-opioid medications, call  your pharmacy directly to request a refill. Please allow 3-4 days to be processed.   Per [place] State Law:  All controlled substance prescriptions must be filled within 30 days of being written.    For those controlled substances allowing refills, pickup must occur within 30 days of last fill.      We believe regular attendance is key to your success in our program!    Any time you are unable to keep your appointment we ask that you call us at least 24 hours in advance to cancel.This will allow us to offer the appointment time to another patient.   Multiple missed appointments may lead to dismissal from the clinic.

## 2024-05-06 NOTE — PROGRESS NOTES
[facility]    CHIEF COMPLAINT: Chronic Pain.    INTERVAL HISTORY:  Last seen on 2024.       Recommendations/plan at the last visit included:  30 minutes Clinic follow-up with SHASHA Simpson NP-C every 3 months or sooner if having problems.   Ok to schedule up to three follow up appts at a time, alternating clinic and video visits.   Procedures recommended: Continue with Dr Walker for steroid injections   Medication Management :   Oxycodone 5 mg refill to pick and start today.     Since last visit:   -  was seen at Sports med and had steroid injections.   -  went in to ED with severe right knee pain   - Saw Ortho and they want to fit her for a custom knee brace.     Pain Information today: Worst Pain (10)/10. Location of pain: right knee.    Annual requirements last collected:  23     Current Pain Relevant Medications:    Acetaminophen 1000 M tabs 1-2 times daily, not every day  Compounded cream: Ketamine and Ketoprofen, 3-4 x/day PRN  Duloxetine 60 mg daily   Flector Patch:2 patches daily PRN. (When not using topical cream)       Current Controlled Substance Medications: (as of 2023)   Ambien 5 m tab at HS  Oxycodone 5 m tablet BID-TID PRN: 15-22.5 MME/day  OxyContin 10 mg at HS = 15 MME/day  Total opiate dose =  30-37.5 MME/day     Previous Pain Relevant Medications: (H--helped; HI--Helped initially; SWH--Somewhat helpful; NH--No help; W--worse; SE--side effects; ?--Unsure if helpful)   Opiates: Tramadol: S[facility], Buprenorphine:Allergic  NSAIDS: Can't take, on blood thinner  Migraine medications: N/A  Muscle Relaxants: none  Neuropathics: Gabapentin: SE  Anti-depressants for pain: Duloxetine: NH for pain  Anxiety medications: N/A  Topicals: Compounded cream: Lidocaine, ibuprofen, diclofenac:  OTC medications: Tylenol: takes 4000 mg/day  Sleep Medications: Temazepam: Allergy, Ambien:H  Other medications not covered above:      SUBSTANCE  HISTORY:   Past or current illegal drug use: none  Past or current ETOH use: Rarely, glass of wine  Nicotine/tobacco use: none  Daily Caffeine intake: never     CURRENT FAMILY/SOCIAL SITUATION:  Past/Present occupation: [employer]:   Housing status: apartment alone  Emotional/Physical support: Sister Yandy Reyes, neighbor who is an RN  Safety Concerns: falls risk   Current stressors: Pain     THE 4 As OF OPIOID MAINTENANCE ANALGESIA    Analgesia: Is pain relief clinically significant? NO   Activity: Is patient functional and able to perform Activities of Daily Living? N/A   Adverse effects: Is patient free from adverse side effects from opiates? N/A   Adherence to Rx protocol: Is patient adhering to Controlled Substance Agreement and taking medications ONLY as ordered? N/A    Is Narcan prescribed for opiate use >50 MME daily or concurrent use of opiates and benzodiazepines? N/A    [place] Board of Pharmacy Data Base Reviewed:    YES; No concern for abuse or misuse of controlled medications based on this report. Reviewed [facility] May 5, 2024- no concerning fills.      PHYSICAL EXAM    Vitals:    05/06/24 1127   BP: (!) 145/65   Pulse: 59       Constitutional: healthy, alert, and no distress A&O.   Patient is appropriate.  Psychiatric/mental status: Alert, without lethargy or stupor. Appropriate affect.     DIRE Score for ongoing opioid management is calculated as follows:    Diagnosis = 2 pts (slowly progressive; moderate pain/objective findings)    Intractability = 3 pts (patient fully engaged but inadequate response to treatments)    Risk        Psych = 3 pts (no significant personality dysfunction/mental illness; good communication with clinic)         Chem Hlth = 3 pts (no history of chemical dependency; not drug-focused)       Reliability = 3 pts (highly reliable with meds, appointments, treatments)       Social = 2 pts (reduction in some relationships/life rolls)       (Psych + Chem hlth + Reliability +  Social) = 16    Efficacy = 2 pts (moderate benefit/function; low med dose; too early/not tried meds)    DIRE Score = 18        7-13: likely NOT suitable candidate for long-term opioid analgesia       14-21: may be a suitable candidate for long-term opioid analgesia     DIAGNOSTIC RESULTS:     11/15/22: MRI right knee w/o contrast   IMPRESSION:  1.  Horizontal cleavage tear of the posteromedial corner of the meniscus.  2.  Grade 2 cartilage loss both sides of the medial compartment.  3.  Full-thickness cartilage loss along the majority of both sides of the lateral compartment with bony remodeling of the lateral tibial plateau and extensive reactive edema.  4.  Large portions of the lateral meniscal body are not identified and may be completely degenerated. Anterior and posterior subluxation of the anterior and posterior horn, respectively.  5.  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  6.  Semimembranosus and medial gastrocnemius tendinopathy without tearing.  7.  Popliteus tendinopathy without tearing.  8.  Mild quadriceps and patellar tendinopathy without tearing.  9.  Small effusion.  10.  No evidence for acute fracture.     1/20/21: Left hip x-ray  IMPRESSION:  Mild primary degenerative narrowing both hip joints. Mild generalized degenerative change base of the spine and both SI joints.Pelvis and left hip otherwise negative. No fractures. No dislocations.     1/2021: XR KNEE RIGHT 1 OR 2 VWS   IMPRESSION:  Moderate primary osteoarthritis all 3 compartments but most prominent in the lateral compartment. Knee otherwise negative. No fractures. No joint effusion.     PAIN RELAVENT CONDITIONS:   1.  OA: severe right knee, Baker's cyst.  2.  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 Knee is swollen and the feels like fluid is present around the joint. Poor ROM. Due to pacer placed since last MRI, we will obtain a CT of the right knee. Order for a wheelchair provided. We will increase OxyContin to q12h and reduce Oxycodone 5 mg to 2 tablets per day. May consider increasing PRN meds if pain continues to be intolerable. Gabapentin 100 mg daily with titration up to 300 mg TID as tolerated.     Steroid injections are not providing as much relief, Synvisc One x1 in 2023 was not effective and genicular block did not help at all.     I will reach out to previous orthopedic physicians to see if there is any safer, less invasive surgical or interventional procedure that may help relieve her pain. She will need cardiology clearance for any surgical procedures.     Plan:   gabapentin (NEURONTIN) 100 MG capsule  Wheelchair Order for DME - ONLY FOR DME  CT Knee Right w/o Contrast  oxyCODONE (OXYCONTIN) 10 MG 12 hr tablet,   prednisone (DELTASONE) 20 MG tablet    Hypertension: Sister Gladys to follow up with Primary Care provider regarding elevated blood pressure., having severe pain    PATIENT INSTRUCTIONS:     Diagnosis reviewed, treatment option addressed, and risk/benefits discussed.  Self-care instructions given.  I am recommending a multidisciplinary treatment plan to help this patient better manage pain.    Remember to request ALL medication refills 5 BUSINESS days before you run out.       30 minutes Clinic follow-up with SHASHA Simpson, NP-C a few days after the CT  Imaging: CT of right knee: [facility] ([place]): [phone]   Other: Order for wheelchair provider. Look into buy a transport chair  Medication Management :   Start taking OxyContin 10 m tablet every 12 hours.    REDUCE the Oxycodone 5 mg to two tablets per day  START Gabapentin 100 m capsule twice per day    I have reviewed the note as documented above.  This accurately captures the substance of my conversation with the patient.  A total of 33 minutes of preparation, care, and consultation were spent on this visit today.     SHASHA Domínguez, NP-C  [facility]    (Information in italics and blue color are taken from previous pain and consulting medical providers notes and are documented as such)

## 2024-05-06 NOTE — PROGRESS NOTES
05/06/24 1133   PEG: A Thee-Item Scale Assessing Pain Intensity and Interference        0 = No pain / No interference    10 = Pain as bad as you can imagine / Completely interferes   What number best describes your pain on average in the past week? 10   What number best describes how, during the past week, pain has interfered with your enjoyment of life? 10   What number best describes how, during the past week, pain has interfered with your general activity? 10   PEG Total Score 10

## 2024-05-06 NOTE — NURSING NOTE
1/11/2024     3:40 PM 2/23/2024     2:29 PM 5/6/2024    11:33 AM   PEG Score   PEG Total Score 8 2.67 10

## 2024-05-07 RX ORDER — GABAPENTIN 100 MG/1
CAPSULE ORAL
Qty: 90 CAPSULE | Refills: 2 | Status: SHIPPED | OUTPATIENT
Start: 2024-05-07 | End: 2024-05-22

## 2024-05-08 ENCOUNTER — TELEPHONE (OUTPATIENT)
Dept: CARDIOLOGY | Facility: CLINIC | Age: 89
End: 2024-05-08

## 2024-05-08 ENCOUNTER — ANCILLARY PROCEDURE (OUTPATIENT)
Dept: CARDIOLOGY | Facility: CLINIC | Age: 89
End: 2024-05-08
Attending: INTERNAL MEDICINE
Payer: COMMERCIAL

## 2024-05-08 DIAGNOSIS — Z95.0 PACEMAKER: ICD-10-CM

## 2024-05-08 DIAGNOSIS — I48.0 PAROXYSMAL ATRIAL FIBRILLATION (H): ICD-10-CM

## 2024-05-08 DIAGNOSIS — R00.1 BRADYCARDIA: ICD-10-CM

## 2024-05-08 NOTE — TELEPHONE ENCOUNTER
5/8/24: Called patient to see if any symptoms with Afib episode on going since 6 this am. L/M for her to call us back. Lorri Manzanares, Device RN        Encounter Type: alert remote pacemaker transmission for AT/AF >/=6hrs. Courtesy check.   Device: Medtronic Montegut.  Pacing % /Programmed: AP 81%,  97% at DDD 60/110 ppm.  Lead(s): stable.  Battery longevity: 12yrs, 8mo estimated.  Presenting: Atrial fibrillation with ventricular sensing and pacing 75-85 bpm.  Atrial high rates: since 4/16/24; One AF episode in progress since 5/8/24 6:16AM (6hrs), burden 1%, v-rates >/=120bpm 5%.   Anticoagulant: Eliquis.  Ventricular High rates: since 4/16/24; none detected.  Comments: Normal device function.   Plan: Routed to device RN for review. WENDY Estrada, Device Specialist  Add: remote reviewed, agree with above. Will call pt- see Epic note. Lorri Manzanares, Device RN

## 2024-05-13 ENCOUNTER — THERAPY VISIT (OUTPATIENT)
Dept: PHYSICAL THERAPY | Facility: REHABILITATION | Age: 89
End: 2024-05-13
Attending: STUDENT IN AN ORGANIZED HEALTH CARE EDUCATION/TRAINING PROGRAM
Payer: COMMERCIAL

## 2024-05-13 ENCOUNTER — HOSPITAL ENCOUNTER (OUTPATIENT)
Dept: CT IMAGING | Facility: HOSPITAL | Age: 89
Discharge: HOME | End: 2024-05-13
Attending: NURSE PRACTITIONER | Admitting: NURSE PRACTITIONER
Payer: COMMERCIAL

## 2024-05-13 DIAGNOSIS — M15.0 PRIMARY OSTEOARTHRITIS INVOLVING MULTIPLE JOINTS: ICD-10-CM

## 2024-05-13 DIAGNOSIS — R26.9 ABNORMAL GAIT: ICD-10-CM

## 2024-05-13 DIAGNOSIS — M17.11 PRIMARY OSTEOARTHRITIS OF RIGHT KNEE: ICD-10-CM

## 2024-05-13 DIAGNOSIS — R29.898 DIFFICULTY BEARING WEIGHT ON RIGHT LOWER EXTREMITY: ICD-10-CM

## 2024-05-13 DIAGNOSIS — G89.29 CHRONIC INTRACTABLE PAIN: ICD-10-CM

## 2024-05-13 LAB
MDC_IDC_EPISODE_DTM: NORMAL
MDC_IDC_EPISODE_ID: 1
MDC_IDC_EPISODE_TYPE: NORMAL
MDC_IDC_LEAD_CONNECTION_STATUS: NORMAL
MDC_IDC_LEAD_CONNECTION_STATUS: NORMAL
MDC_IDC_LEAD_IMPLANT_DT: NORMAL
MDC_IDC_LEAD_IMPLANT_DT: NORMAL
MDC_IDC_LEAD_LOCATION: NORMAL
MDC_IDC_LEAD_LOCATION: NORMAL
MDC_IDC_LEAD_LOCATION_DETAIL_1: NORMAL
MDC_IDC_LEAD_LOCATION_DETAIL_1: NORMAL
MDC_IDC_LEAD_MFG: NORMAL
MDC_IDC_LEAD_MFG: NORMAL
MDC_IDC_LEAD_MODEL: NORMAL
MDC_IDC_LEAD_MODEL: NORMAL
MDC_IDC_LEAD_POLARITY_TYPE: NORMAL
MDC_IDC_LEAD_POLARITY_TYPE: NORMAL
MDC_IDC_LEAD_SERIAL: NORMAL
MDC_IDC_LEAD_SERIAL: NORMAL
MDC_IDC_LEAD_SPECIAL_FUNCTION: NORMAL
MDC_IDC_LEAD_SPECIAL_FUNCTION: NORMAL
MDC_IDC_MSMT_BATTERY_DTM: NORMAL
MDC_IDC_MSMT_BATTERY_REMAINING_LONGEVITY: 152 MO
MDC_IDC_MSMT_BATTERY_RRT_TRIGGER: 2.62
MDC_IDC_MSMT_BATTERY_STATUS: NORMAL
MDC_IDC_MSMT_BATTERY_VOLTAGE: 3.21 V
MDC_IDC_MSMT_LEADCHNL_RA_IMPEDANCE_VALUE: 342 OHM
MDC_IDC_MSMT_LEADCHNL_RA_IMPEDANCE_VALUE: 380 OHM
MDC_IDC_MSMT_LEADCHNL_RA_PACING_THRESHOLD_AMPLITUDE: 0.62 V
MDC_IDC_MSMT_LEADCHNL_RA_PACING_THRESHOLD_PULSEWIDTH: 0.4 MS
MDC_IDC_MSMT_LEADCHNL_RA_SENSING_INTR_AMPL: 2 MV
MDC_IDC_MSMT_LEADCHNL_RA_SENSING_INTR_AMPL: 2 MV
MDC_IDC_MSMT_LEADCHNL_RV_IMPEDANCE_VALUE: 494 OHM
MDC_IDC_MSMT_LEADCHNL_RV_IMPEDANCE_VALUE: 608 OHM
MDC_IDC_MSMT_LEADCHNL_RV_PACING_THRESHOLD_AMPLITUDE: 0.62 V
MDC_IDC_MSMT_LEADCHNL_RV_PACING_THRESHOLD_PULSEWIDTH: 0.4 MS
MDC_IDC_MSMT_LEADCHNL_RV_SENSING_INTR_AMPL: 10.88 MV
MDC_IDC_MSMT_LEADCHNL_RV_SENSING_INTR_AMPL: 10.88 MV
MDC_IDC_PG_IMPLANT_DTM: NORMAL
MDC_IDC_PG_MFG: NORMAL
MDC_IDC_PG_MODEL: NORMAL
MDC_IDC_PG_SERIAL: NORMAL
MDC_IDC_PG_TYPE: NORMAL
MDC_IDC_SESS_CLINIC_NAME: NORMAL
MDC_IDC_SESS_DTM: NORMAL
MDC_IDC_SESS_TYPE: NORMAL
MDC_IDC_SET_BRADY_AT_MODE_SWITCH_RATE: 171 {BEATS}/MIN
MDC_IDC_SET_BRADY_HYSTRATE: NORMAL
MDC_IDC_SET_BRADY_LOWRATE: 60 {BEATS}/MIN
MDC_IDC_SET_BRADY_MAX_SENSOR_RATE: 110 {BEATS}/MIN
MDC_IDC_SET_BRADY_MAX_TRACKING_RATE: 110 {BEATS}/MIN
MDC_IDC_SET_BRADY_MODE: NORMAL
MDC_IDC_SET_BRADY_PAV_DELAY_LOW: 120 MS
MDC_IDC_SET_BRADY_SAV_DELAY_LOW: 100 MS
MDC_IDC_SET_LEADCHNL_RA_PACING_AMPLITUDE: 1.5 V
MDC_IDC_SET_LEADCHNL_RA_PACING_ANODE_ELECTRODE_1: NORMAL
MDC_IDC_SET_LEADCHNL_RA_PACING_ANODE_LOCATION_1: NORMAL
MDC_IDC_SET_LEADCHNL_RA_PACING_CAPTURE_MODE: NORMAL
MDC_IDC_SET_LEADCHNL_RA_PACING_CATHODE_ELECTRODE_1: NORMAL
MDC_IDC_SET_LEADCHNL_RA_PACING_CATHODE_LOCATION_1: NORMAL
MDC_IDC_SET_LEADCHNL_RA_PACING_POLARITY: NORMAL
MDC_IDC_SET_LEADCHNL_RA_PACING_PULSEWIDTH: 0.4 MS
MDC_IDC_SET_LEADCHNL_RA_SENSING_ANODE_ELECTRODE_1: NORMAL
MDC_IDC_SET_LEADCHNL_RA_SENSING_ANODE_LOCATION_1: NORMAL
MDC_IDC_SET_LEADCHNL_RA_SENSING_CATHODE_ELECTRODE_1: NORMAL
MDC_IDC_SET_LEADCHNL_RA_SENSING_CATHODE_LOCATION_1: NORMAL
MDC_IDC_SET_LEADCHNL_RA_SENSING_POLARITY: NORMAL
MDC_IDC_SET_LEADCHNL_RA_SENSING_SENSITIVITY: 0.3 MV
MDC_IDC_SET_LEADCHNL_RV_PACING_AMPLITUDE: 1.5 V
MDC_IDC_SET_LEADCHNL_RV_PACING_ANODE_ELECTRODE_1: NORMAL
MDC_IDC_SET_LEADCHNL_RV_PACING_ANODE_LOCATION_1: NORMAL
MDC_IDC_SET_LEADCHNL_RV_PACING_CAPTURE_MODE: NORMAL
MDC_IDC_SET_LEADCHNL_RV_PACING_CATHODE_ELECTRODE_1: NORMAL
MDC_IDC_SET_LEADCHNL_RV_PACING_CATHODE_LOCATION_1: NORMAL
MDC_IDC_SET_LEADCHNL_RV_PACING_POLARITY: NORMAL
MDC_IDC_SET_LEADCHNL_RV_PACING_PULSEWIDTH: 0.4 MS
MDC_IDC_SET_LEADCHNL_RV_SENSING_ANODE_ELECTRODE_1: NORMAL
MDC_IDC_SET_LEADCHNL_RV_SENSING_ANODE_LOCATION_1: NORMAL
MDC_IDC_SET_LEADCHNL_RV_SENSING_CATHODE_ELECTRODE_1: NORMAL
MDC_IDC_SET_LEADCHNL_RV_SENSING_CATHODE_LOCATION_1: NORMAL
MDC_IDC_SET_LEADCHNL_RV_SENSING_POLARITY: NORMAL
MDC_IDC_SET_LEADCHNL_RV_SENSING_SENSITIVITY: 0.9 MV
MDC_IDC_SET_ZONE_DETECTION_INTERVAL: 350 MS
MDC_IDC_SET_ZONE_DETECTION_INTERVAL: 400 MS
MDC_IDC_SET_ZONE_STATUS: NORMAL
MDC_IDC_SET_ZONE_STATUS: NORMAL
MDC_IDC_SET_ZONE_TYPE: NORMAL
MDC_IDC_SET_ZONE_VENDOR_TYPE: NORMAL
MDC_IDC_STAT_AT_BURDEN_PERCENT: 1.1 %
MDC_IDC_STAT_AT_DTM_END: NORMAL
MDC_IDC_STAT_AT_DTM_START: NORMAL
MDC_IDC_STAT_BRADY_AP_VP_PERCENT: 82.1 %
MDC_IDC_STAT_BRADY_AP_VS_PERCENT: 0.09 %
MDC_IDC_STAT_BRADY_AS_VP_PERCENT: 16.55 %
MDC_IDC_STAT_BRADY_AS_VS_PERCENT: 1.27 %
MDC_IDC_STAT_BRADY_DTM_END: NORMAL
MDC_IDC_STAT_BRADY_DTM_START: NORMAL
MDC_IDC_STAT_BRADY_RA_PERCENT_PACED: 81.37 %
MDC_IDC_STAT_BRADY_RV_PERCENT_PACED: 97.75 %
MDC_IDC_STAT_EPISODE_RECENT_COUNT: 0
MDC_IDC_STAT_EPISODE_RECENT_COUNT: 1
MDC_IDC_STAT_EPISODE_RECENT_COUNT_DTM_END: NORMAL
MDC_IDC_STAT_EPISODE_RECENT_COUNT_DTM_START: NORMAL
MDC_IDC_STAT_EPISODE_TOTAL_COUNT: 0
MDC_IDC_STAT_EPISODE_TOTAL_COUNT: 1
MDC_IDC_STAT_EPISODE_TOTAL_COUNT_DTM_END: NORMAL
MDC_IDC_STAT_EPISODE_TOTAL_COUNT_DTM_START: NORMAL
MDC_IDC_STAT_EPISODE_TYPE: NORMAL
MDC_IDC_STAT_TACHYTHERAPY_RECENT_DTM_END: NORMAL
MDC_IDC_STAT_TACHYTHERAPY_RECENT_DTM_START: NORMAL
MDC_IDC_STAT_TACHYTHERAPY_TOTAL_DTM_END: NORMAL
MDC_IDC_STAT_TACHYTHERAPY_TOTAL_DTM_START: NORMAL

## 2024-05-13 PROCEDURE — 97162 PT EVAL MOD COMPLEX 30 MIN: CPT | Mod: GP | Performed by: PHYSICAL THERAPIST

## 2024-05-13 PROCEDURE — 73700 CT LOWER EXTREMITY W/O DYE: CPT | Mod: RT

## 2024-05-13 PROCEDURE — 97530 THERAPEUTIC ACTIVITIES: CPT | Mod: GP | Performed by: PHYSICAL THERAPIST

## 2024-05-13 NOTE — PROGRESS NOTES
PHYSICAL THERAPY EVALUATION  Type of Visit: Evaluation    See electronic medical record for Abuse and Falls Screening details.    Subjective       Presenting condition or subjective complaint: Pt reports significant R knee pain.  She has had this pain for the past 2 years. She has a generic brace and she has never worn the brace one time because it is so cumbersome and uncomfotable.  The cortisone shots, pain medications etc do not help.  She is getting a CT scan for the knee tonight.  She has done PT before but does not do the exercises because they are too painful.  She does try walking around the apartment.  She uses cold pack for her knee and it helps while it is on.  MD wanted pt to be fit for a different brace.  Date of onset: 04/22/24    Relevant medical history: Arthritis; Hearing problems; High blood pressure; Implanted device; Osteoporosis; Pacemaker; Pain at night or rest   Dates & types of surgery:      Prior diagnostic imaging/testing results: MRI; X-ray; CT scan     Prior therapy history for the same diagnosis, illness or injury: Yes home therapy    Prior Level of Function  Transfers: Assistive equipment  Ambulation: Assistive equipment  ADL: Assistive equipment, Assistive person    Living Environment  Social support: Alone   Type of home: Apartment/condo   Stairs to enter the home: No   Is there a railing: No   Ramp: No   Stairs inside the home: No   Is there a railing: No   Help at home: Home management tasks (cooking, cleaning)  Equipment owned:       Employment: No    Hobbies/Interests:      Patient goals for therapy:      Pain assessment: Pain present     Objective   KNEE EVALUATION  PAIN: Pain Quality: Aching, Burning, Sharp, and Shooting  INTEGUMENTARY (edema, incisions): WNL  POSTURE: Standing Posture: R knee genu valgum; unable to  upright posture     GAIT:  Weightbearing Status: WBAT  Assistive Device(s): Walker (front wheeled)  Gait Deviations: Antalgic  Base of support  increased  Stance time decreased  Stride length decreased  Araceli decreased    BALANCE/PROPRIOCEPTION:  NT     ROM:   (Degrees) Left AROM Left PROM  Right AROM Right PROM   Knee Flexion WNL  100, pain     Knee Extension WNL  20 deg lacking, but measured in wheelchair       STRENGTH:   Pain: - none + mild ++ moderate +++ severe  Strength Scale: 0-5/5 Left Right   Knee Flexion 5 3+   Knee Extension 5 3+   Quad Set 5 3+   Hip Flexion  5 4   Hip ABD 5 4   Hip ADD 5 4   Pain with all R knee motions     PALPATION:  increased tenderness throughout the knee.  Increased pain over medial and lateral joint line on the R.     JOINT MOBILITY:  decreased overall knee mobility; decreased patella mobility     Ossur Knee  Brace Trial:     Pain Rating    Affected Joint: Right Knee    Without  brace:    At rest 10/10  Walking: 10/10    Squatting: 10/10   Stairs: unable      Wearing  brace:    At rest 9/10  Walkin/10    Squattin/10   Stairs: unable      Brace and Measurements:    Brace trialed:     Type - Ossur  One  Size - Medium - could possibly be small, but do not have this in clinic   Side - Medial  Affected Knee - Right Knee    Circumference 6 inches below mid patella: 14.5 inches (Ossur )    Other Comments:  See PT evaluation in Epic for any additional information including joint special testing/ligament testing       Assessment & Plan   CLINICAL IMPRESSIONS  Medical Diagnosis: Primary osteoarthritis of right knee    Treatment Diagnosis: Chronic R Knee Pain and Decreased Activity Tolerance   Impression/Assessment:  Pt presents to PT with a history of severe R knee pain and decreased overall function.  Pt has noted trying several cortisone injections, PT, and a generic brace with no change in her pain levels.  Her knee pain is limiting her to walk, stand and perform ADL's. She has decreased R knee flexion and extension ROM.  She has decreased R>L LE strength and activity tolerance.   The pt has increased tenderness over the entire R knee.  She is here for a more custom knee brace and did well with the trail of the brace today in clinic and will be pursuing this further at this time.       Clinical Decision Making (Complexity):  Clinical Presentation: Stable/Uncomplicated  Clinical Presentation Rationale: based on medical and personal factors listed in PT evaluation  Clinical Decision Making (Complexity): Moderate complexity    PLAN OF CARE  Treatment Interventions:  Modalities: Cryotherapy, Hot Pack  Interventions: Gait Training, Manual Therapy, Neuromuscular Re-education, Therapeutic Activity, Therapeutic Exercise, Self-Care/Home Management    Long Term Goals     PT Goal 1  Goal Identifier: [place]  Goal Description: Pt will demonstrate readiness for independent sx management using an Ossur unloading brace  Rationale: to maximize safety and independence within the home  Goal Progress: Met -was able to take in clinic brace off indepently  Target Date: 05/13/24  Date Met: 05/13/24      Frequency of Treatment: 1x/week  Duration of Treatment: 90 days    Recommended Referrals to Other Professionals:  [facility]   Education Assessment:   Learner/Method: Patient;Demonstration;Pictures/Video    Risks and benefits of evaluation/treatment have been explained.   Patient/Family/caregiver agrees with Plan of Care.     Evaluation Time:        Signing Clinician: Toña Laura, PT      [facility]                                                                                   OUTPATIENT PHYSICAL THERAPY      PLAN OF TREATMENT FOR OUTPATIENT REHABILITATION   Patient's Last Name, First Name, Gladys Mora YOB: 1930   Provider's Name   [facility]   Medical Record No.  [id number]     Onset Date: 04/22/24  Start of Care Date: 05/13/24     Medical Diagnosis:  Primary osteoarthritis of right knee      PT Treatment Diagnosis:   Chronic R Knee Pain and Decreased Activity Tolerance Plan of Treatment  Frequency/Duration: 1x/week/ 90 days    Certification date from 05/13/24 to 08/10/24         See note for plan of treatment details and functional goals     Toña Laura, PT                         I CERTIFY THE NEED FOR THESE SERVICES FURNISHED UNDER        THIS PLAN OF TREATMENT AND WHILE UNDER MY CARE     (Physician attestation of this document indicates review and certification of the therapy plan).              Referring Provider:  Estrella Walker    Initial Assessment  See Epic Evaluation- Start of Care Date: 05/13/24

## 2024-05-14 DIAGNOSIS — M25.561 CHRONIC PAIN OF RIGHT KNEE: ICD-10-CM

## 2024-05-14 DIAGNOSIS — M17.11 PRIMARY OSTEOARTHRITIS OF RIGHT KNEE: Primary | ICD-10-CM

## 2024-05-14 DIAGNOSIS — G89.29 CHRONIC PAIN OF RIGHT KNEE: ICD-10-CM

## 2024-05-14 NOTE — PROGRESS NOTES
Called patient to discuss knee pain.  She reports pain as severe and also reports she is having difficulty ambulating secondary to severe pain.  CT performed yesterday reveals advanced degenerative changes but no fractures.  Discussed that we could try aspiration and intra-articular Toradol injection, but unfortunately she has failed almost all other conservative therapies at this point and I would like her to at least discuss possibility of knee replacement with orthopedic surgery.  Ortho order placed today.  Could consider repeating genicular nerve blocks in the future, however previously did not seem to have significant enough improvement for RFA.    Dr. Estrella Walker, DO  Sports Medicine

## 2024-05-15 ENCOUNTER — TELEPHONE (OUTPATIENT)
Dept: ORTHOPEDICS | Facility: CLINIC | Age: 89
End: 2024-05-15
Payer: COMMERCIAL

## 2024-05-20 ENCOUNTER — ANCILLARY PROCEDURE (OUTPATIENT)
Dept: CARDIOLOGY | Facility: CLINIC | Age: 89
End: 2024-05-20
Attending: INTERNAL MEDICINE
Payer: COMMERCIAL

## 2024-05-20 ENCOUNTER — TELEPHONE (OUTPATIENT)
Dept: CARDIOLOGY | Facility: CLINIC | Age: 89
End: 2024-05-20

## 2024-05-20 ENCOUNTER — OFFICE VISIT (OUTPATIENT)
Dept: ORTHOPEDICS | Facility: CLINIC | Age: 89
End: 2024-05-20
Payer: COMMERCIAL

## 2024-05-20 ENCOUNTER — DOCUMENTATION ONLY (OUTPATIENT)
Dept: CARDIOLOGY | Facility: CLINIC | Age: 89
End: 2024-05-20

## 2024-05-20 DIAGNOSIS — I48.0 PAROXYSMAL ATRIAL FIBRILLATION (H): Primary | ICD-10-CM

## 2024-05-20 DIAGNOSIS — Z95.0 PACEMAKER: ICD-10-CM

## 2024-05-20 DIAGNOSIS — M17.11 PRIMARY LOCALIZED OSTEOARTHRITIS OF RIGHT KNEE: Primary | ICD-10-CM

## 2024-05-20 LAB
APPEARANCE FLD: ABNORMAL
CELL COUNT BODY FLUID SOURCE: ABNORMAL
COLOR FLD: YELLOW
CRYSTALS SNV MICRO: NORMAL
LYMPHOCYTES NFR FLD MANUAL: 3 %
MDC_IDC_EPISODE_DTM: NORMAL
MDC_IDC_EPISODE_ID: 2
MDC_IDC_EPISODE_TYPE: NORMAL
MDC_IDC_EPISODE_TYPE_INDUCED: NO
MDC_IDC_LEAD_CONNECTION_STATUS: NORMAL
MDC_IDC_LEAD_CONNECTION_STATUS: NORMAL
MDC_IDC_LEAD_IMPLANT_DT: NORMAL
MDC_IDC_LEAD_IMPLANT_DT: NORMAL
MDC_IDC_LEAD_LOCATION: NORMAL
MDC_IDC_LEAD_LOCATION: NORMAL
MDC_IDC_LEAD_LOCATION_DETAIL_1: NORMAL
MDC_IDC_LEAD_LOCATION_DETAIL_1: NORMAL
MDC_IDC_LEAD_MFG: NORMAL
MDC_IDC_LEAD_MFG: NORMAL
MDC_IDC_LEAD_MODEL: NORMAL
MDC_IDC_LEAD_MODEL: NORMAL
MDC_IDC_LEAD_POLARITY_TYPE: NORMAL
MDC_IDC_LEAD_POLARITY_TYPE: NORMAL
MDC_IDC_LEAD_SERIAL: NORMAL
MDC_IDC_LEAD_SERIAL: NORMAL
MDC_IDC_LEAD_SPECIAL_FUNCTION: NORMAL
MDC_IDC_LEAD_SPECIAL_FUNCTION: NORMAL
MDC_IDC_MSMT_BATTERY_DTM: NORMAL
MDC_IDC_MSMT_BATTERY_REMAINING_LONGEVITY: 160 MO
MDC_IDC_MSMT_BATTERY_RRT_TRIGGER: 2.62
MDC_IDC_MSMT_BATTERY_STATUS: NORMAL
MDC_IDC_MSMT_BATTERY_VOLTAGE: 3.21 V
MDC_IDC_MSMT_LEADCHNL_RA_IMPEDANCE_VALUE: 323 OHM
MDC_IDC_MSMT_LEADCHNL_RA_IMPEDANCE_VALUE: 399 OHM
MDC_IDC_MSMT_LEADCHNL_RA_SENSING_INTR_AMPL: 0.38 MV
MDC_IDC_MSMT_LEADCHNL_RA_SENSING_INTR_AMPL: 0.62 MV
MDC_IDC_MSMT_LEADCHNL_RV_IMPEDANCE_VALUE: 475 OHM
MDC_IDC_MSMT_LEADCHNL_RV_IMPEDANCE_VALUE: 608 OHM
MDC_IDC_MSMT_LEADCHNL_RV_PACING_THRESHOLD_AMPLITUDE: 0.5 V
MDC_IDC_MSMT_LEADCHNL_RV_PACING_THRESHOLD_AMPLITUDE: 0.62 V
MDC_IDC_MSMT_LEADCHNL_RV_PACING_THRESHOLD_PULSEWIDTH: 0.4 MS
MDC_IDC_MSMT_LEADCHNL_RV_PACING_THRESHOLD_PULSEWIDTH: 0.4 MS
MDC_IDC_MSMT_LEADCHNL_RV_SENSING_INTR_AMPL: 11.12 MV
MDC_IDC_MSMT_LEADCHNL_RV_SENSING_INTR_AMPL: 12.38 MV
MDC_IDC_PG_IMPLANT_DTM: NORMAL
MDC_IDC_PG_MFG: NORMAL
MDC_IDC_PG_MODEL: NORMAL
MDC_IDC_PG_SERIAL: NORMAL
MDC_IDC_PG_TYPE: NORMAL
MDC_IDC_SESS_CLINIC_NAME: NORMAL
MDC_IDC_SESS_DTM: NORMAL
MDC_IDC_SESS_TYPE: NORMAL
MDC_IDC_SET_BRADY_AT_MODE_SWITCH_RATE: 171 {BEATS}/MIN
MDC_IDC_SET_BRADY_HYSTRATE: NORMAL
MDC_IDC_SET_BRADY_LOWRATE: 60 {BEATS}/MIN
MDC_IDC_SET_BRADY_MAX_SENSOR_RATE: 110 {BEATS}/MIN
MDC_IDC_SET_BRADY_MAX_TRACKING_RATE: 110 {BEATS}/MIN
MDC_IDC_SET_BRADY_MODE: NORMAL
MDC_IDC_SET_BRADY_PAV_DELAY_LOW: 120 MS
MDC_IDC_SET_BRADY_SAV_DELAY_LOW: 100 MS
MDC_IDC_SET_LEADCHNL_RA_PACING_AMPLITUDE: NORMAL
MDC_IDC_SET_LEADCHNL_RA_PACING_ANODE_ELECTRODE_1: NORMAL
MDC_IDC_SET_LEADCHNL_RA_PACING_ANODE_LOCATION_1: NORMAL
MDC_IDC_SET_LEADCHNL_RA_PACING_CAPTURE_MODE: NORMAL
MDC_IDC_SET_LEADCHNL_RA_PACING_CATHODE_ELECTRODE_1: NORMAL
MDC_IDC_SET_LEADCHNL_RA_PACING_CATHODE_LOCATION_1: NORMAL
MDC_IDC_SET_LEADCHNL_RA_PACING_POLARITY: NORMAL
MDC_IDC_SET_LEADCHNL_RA_PACING_PULSEWIDTH: 0.4 MS
MDC_IDC_SET_LEADCHNL_RA_SENSING_ANODE_ELECTRODE_1: NORMAL
MDC_IDC_SET_LEADCHNL_RA_SENSING_ANODE_LOCATION_1: NORMAL
MDC_IDC_SET_LEADCHNL_RA_SENSING_CATHODE_ELECTRODE_1: NORMAL
MDC_IDC_SET_LEADCHNL_RA_SENSING_CATHODE_LOCATION_1: NORMAL
MDC_IDC_SET_LEADCHNL_RA_SENSING_POLARITY: NORMAL
MDC_IDC_SET_LEADCHNL_RA_SENSING_SENSITIVITY: 0.15 MV
MDC_IDC_SET_LEADCHNL_RV_PACING_AMPLITUDE: NORMAL
MDC_IDC_SET_LEADCHNL_RV_PACING_ANODE_ELECTRODE_1: NORMAL
MDC_IDC_SET_LEADCHNL_RV_PACING_ANODE_LOCATION_1: NORMAL
MDC_IDC_SET_LEADCHNL_RV_PACING_CAPTURE_MODE: NORMAL
MDC_IDC_SET_LEADCHNL_RV_PACING_CATHODE_ELECTRODE_1: NORMAL
MDC_IDC_SET_LEADCHNL_RV_PACING_CATHODE_LOCATION_1: NORMAL
MDC_IDC_SET_LEADCHNL_RV_PACING_POLARITY: NORMAL
MDC_IDC_SET_LEADCHNL_RV_PACING_PULSEWIDTH: 0.4 MS
MDC_IDC_SET_LEADCHNL_RV_SENSING_ANODE_ELECTRODE_1: NORMAL
MDC_IDC_SET_LEADCHNL_RV_SENSING_ANODE_LOCATION_1: NORMAL
MDC_IDC_SET_LEADCHNL_RV_SENSING_CATHODE_ELECTRODE_1: NORMAL
MDC_IDC_SET_LEADCHNL_RV_SENSING_CATHODE_LOCATION_1: NORMAL
MDC_IDC_SET_LEADCHNL_RV_SENSING_POLARITY: NORMAL
MDC_IDC_SET_LEADCHNL_RV_SENSING_SENSITIVITY: 0.9 MV
MDC_IDC_SET_ZONE_DETECTION_INTERVAL: 350 MS
MDC_IDC_SET_ZONE_DETECTION_INTERVAL: 400 MS
MDC_IDC_SET_ZONE_STATUS: NORMAL
MDC_IDC_SET_ZONE_STATUS: NORMAL
MDC_IDC_SET_ZONE_TYPE: NORMAL
MDC_IDC_SET_ZONE_VENDOR_TYPE: NORMAL
MDC_IDC_STAT_AT_BURDEN_PERCENT: 34.7 %
MDC_IDC_STAT_AT_DTM_END: NORMAL
MDC_IDC_STAT_AT_DTM_START: NORMAL
MDC_IDC_STAT_AT_MODE_SW_COUNT: 2
MDC_IDC_STAT_BRADY_AP_VP_PERCENT: 82.09 %
MDC_IDC_STAT_BRADY_AP_VS_PERCENT: 0.08 %
MDC_IDC_STAT_BRADY_AS_VP_PERCENT: 16.78 %
MDC_IDC_STAT_BRADY_AS_VS_PERCENT: 1.23 %
MDC_IDC_STAT_BRADY_DTM_END: NORMAL
MDC_IDC_STAT_BRADY_DTM_START: NORMAL
MDC_IDC_STAT_BRADY_RA_PERCENT_PACED: 54.17 %
MDC_IDC_STAT_BRADY_RV_PERCENT_PACED: 68.69 %
MDC_IDC_STAT_EPISODE_RECENT_COUNT: 0
MDC_IDC_STAT_EPISODE_RECENT_COUNT: 2
MDC_IDC_STAT_EPISODE_RECENT_COUNT_DTM_END: NORMAL
MDC_IDC_STAT_EPISODE_RECENT_COUNT_DTM_START: NORMAL
MDC_IDC_STAT_EPISODE_TOTAL_COUNT: 0
MDC_IDC_STAT_EPISODE_TOTAL_COUNT: 2
MDC_IDC_STAT_EPISODE_TOTAL_COUNT_DTM_END: NORMAL
MDC_IDC_STAT_EPISODE_TOTAL_COUNT_DTM_START: NORMAL
MDC_IDC_STAT_EPISODE_TYPE: NORMAL
MDC_IDC_STAT_TACHYTHERAPY_RECENT_DTM_END: NORMAL
MDC_IDC_STAT_TACHYTHERAPY_RECENT_DTM_START: NORMAL
MDC_IDC_STAT_TACHYTHERAPY_TOTAL_DTM_END: NORMAL
MDC_IDC_STAT_TACHYTHERAPY_TOTAL_DTM_START: NORMAL
MONOS+MACROS NFR FLD MANUAL: 0 %
NEUTS BAND NFR FLD MANUAL: 90 %
OTHER CELLS FLD MANUAL: 7 %
WBC # FLD AUTO: 3785 /UL

## 2024-05-20 PROCEDURE — 87186 SC STD MICRODIL/AGAR DIL: CPT | Performed by: STUDENT IN AN ORGANIZED HEALTH CARE EDUCATION/TRAINING PROGRAM

## 2024-05-20 PROCEDURE — 87205 SMEAR GRAM STAIN: CPT | Performed by: STUDENT IN AN ORGANIZED HEALTH CARE EDUCATION/TRAINING PROGRAM

## 2024-05-20 PROCEDURE — 87070 CULTURE OTHR SPECIMN AEROBIC: CPT | Performed by: STUDENT IN AN ORGANIZED HEALTH CARE EDUCATION/TRAINING PROGRAM

## 2024-05-20 PROCEDURE — 87075 CULTR BACTERIA EXCEPT BLOOD: CPT | Performed by: STUDENT IN AN ORGANIZED HEALTH CARE EDUCATION/TRAINING PROGRAM

## 2024-05-20 PROCEDURE — 20611 DRAIN/INJ JOINT/BURSA W/US: CPT | Mod: RT | Performed by: STUDENT IN AN ORGANIZED HEALTH CARE EDUCATION/TRAINING PROGRAM

## 2024-05-20 PROCEDURE — 89051 BODY FLUID CELL COUNT: CPT | Performed by: STUDENT IN AN ORGANIZED HEALTH CARE EDUCATION/TRAINING PROGRAM

## 2024-05-20 PROCEDURE — 87077 CULTURE AEROBIC IDENTIFY: CPT | Performed by: STUDENT IN AN ORGANIZED HEALTH CARE EDUCATION/TRAINING PROGRAM

## 2024-05-20 PROCEDURE — 89060 EXAM SYNOVIAL FLUID CRYSTALS: CPT | Performed by: STUDENT IN AN ORGANIZED HEALTH CARE EDUCATION/TRAINING PROGRAM

## 2024-05-20 PROCEDURE — 99207 PR NO BILLABLE SERVICE THIS VISIT: CPT | Mod: 25 | Performed by: STUDENT IN AN ORGANIZED HEALTH CARE EDUCATION/TRAINING PROGRAM

## 2024-05-20 RX ORDER — LIDOCAINE 40 MG/G
CREAM TOPICAL
Status: CANCELLED | OUTPATIENT
Start: 2024-05-20

## 2024-05-20 RX ORDER — ROPIVACAINE HYDROCHLORIDE 5 MG/ML
4 INJECTION, SOLUTION EPIDURAL; INFILTRATION; PERINEURAL
Status: DISCONTINUED | OUTPATIENT
Start: 2024-05-20 | End: 2024-05-28

## 2024-05-20 RX ORDER — LIDOCAINE HYDROCHLORIDE 10 MG/ML
3 INJECTION, SOLUTION INFILTRATION; PERINEURAL
Status: DISCONTINUED | OUTPATIENT
Start: 2024-05-20 | End: 2024-05-28

## 2024-05-20 RX ORDER — KETOROLAC TROMETHAMINE 30 MG/ML
30 INJECTION, SOLUTION INTRAMUSCULAR; INTRAVENOUS
Status: DISCONTINUED | OUTPATIENT
Start: 2024-05-20 | End: 2024-05-28

## 2024-05-20 RX ADMIN — ROPIVACAINE HYDROCHLORIDE 4 ML: 5 INJECTION, SOLUTION EPIDURAL; INFILTRATION; PERINEURAL at 07:58

## 2024-05-20 RX ADMIN — LIDOCAINE HYDROCHLORIDE 3 ML: 10 INJECTION, SOLUTION INFILTRATION; PERINEURAL at 07:58

## 2024-05-20 RX ADMIN — KETOROLAC TROMETHAMINE 30 MG: 30 INJECTION, SOLUTION INTRAMUSCULAR; INTRAVENOUS at 07:58

## 2024-05-20 NOTE — TELEPHONE ENCOUNTER
1st Attempt to contact pt, unable to leave voicemail.  Will attempt again.  5/20/2024 10:51 AM  Larissa Vidal RN

## 2024-05-20 NOTE — PROGRESS NOTES
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    Diagnoses and all orders for this visit:    Primary localized osteoarthritis of right knee  -     Large Joint Injection/Arthocentesis: R knee joint  -     Orthopedic  Referral; Future  -     Cancel: Cell count with differential fluid; Future  -     Cancel: Anaerobic Bacterial Culture Routine; Future  -     Cancel: Crystal ID Synovial Fluid; Future  -     Anaerobic Bacterial Culture Routine  -     Cell count with differential fluid  -     Crystal ID Synovial Fluid  -     lidocaine 1 % injection 3 mL  -     ketorolac (TORADOL) injection 30 mg  -     4 mL ropivacaine (NAROPIN) injection 5 mg/mL  -     Synovial fluid Aerobic Bacterial Culture Routine With Gram Stain  -     Gram stain; Future  -     Cancel: Gram stain        Gladys Ramirez is a/an 89 year old female who is seen for  Toradol  injection of right knee.   Last injection: 4/22/24, cortisone injection with aspiration  R knee pain has been progressively worsening, especially with ambulation. Denies redness, swelling, fevers, chills.     -R knee aspiration and Toradol injection performed in clinic today  -Referral to orthopedic surgery provided for TKA eval, patient has failed conservative treatment thus far. Will need cardial clearance.   -Will send synovial fluid to lab for further analysis   -Post-injection instructions were provided to the patient    Return if symptoms worsen or fail to improve.    Large Joint Injection/Arthocentesis: R knee joint    Date/Time: 5/20/2024 7:58 AM    Performed by: Estrella Walker DO  Authorized by: Estrella Walker DO    Indications:  Pain, joint swelling and osteoarthritis  Needle Size:  21 G  Guidance: ultrasound    Approach:  Anterolateral  Location:  Knee   Location comment:  Right knee joint      Medications:  30 mg ketorolac 30 MG/ML; 4 mL ROPivacaine 5 MG/ML; 3 mL lidocaine 1 %  Aspirate amount (mL):  15  Aspirate:   Cloudy and yellow  Outcome:  Tolerated well, no immediate complications  Procedure discussed: discussed risks, benefits, and alternatives    Consent Given by:  Patient  Timeout: timeout called immediately prior to procedure    Prep: patient was prepped and draped in usual sterile fashion     Ultrasound was used to ensure safe and accurate needle placement and injection. Ultrasound images of the procedure were permanently stored.  1ml of 8.4% Sodium Bicarbonate solution was used to buffer the local numbing agent for today's injection        Post-Injection Discharge Instructions    You may shower, however avoid swimming, tub baths or hot tubs for 24 hours following your procedure  You may have a mild to moderate increase in pain for a few days following the injection.  The lidocaine (local numbing medicine) will wear off in several hours. It usually takes 3-5 days for the steroid medication to start working although it may take up to 14 days for full effect.   You may use ice packs for 10-15 minutes, 3 to 4 times a day at the injection site for comfort if needed  You may use extra strength Tylenol for pain control if necessary   If you were fasting, you may resume your normal diet and medications after the procedure  If you have diabetes, your blood sugar may be higher than normal for 10-14 days following a steroid injection. Contact your doctor who manages your diabetes if your blood sugar is significantly higher than usual    If you experience any of the following, call Sports Medicine @ [phone] or [phone]  -Fever over 100 degrees F  -Swelling, bleeding, redness, drainage, warmth at the injection site  -New or significant worsening pain        Dr. Estrella Walker, DO  [facility]  Sports Medicine     -----

## 2024-05-20 NOTE — PROGRESS NOTES
H&P  PMD: []  Date: Card OV: []  Date:  Sent for Teach []   Orders: I [x] P  [x]      AC: Eliquis NP Med Review: NEEDS review    DM Meds: None  GLP-1:None       Gladys Ramirez, 9/29/1930, [id number]  Home:[phone] (home) Cell:[phone] (mobile)  Emergency Contact: Phil,Sister CARMEN   PCP: Margret Flores, [phone]    +++Important patient information for CSC/Cath Lab staff : None+++    [facility] EP Cath Lab Procedure Order   Procedure: Cardioversion for AF  Ordering Provider: Dr Rivera  Date Ordered and Prepped: 5/20/2024 Larissa Vidal RN  Anticipated Case Duration:  Standard  Scheduling Needs/Timeframe:  Next Available  Scheduling Contact: Please contact pt to schedule  Cardiology Follow Up Apt s/p: Schedule 4-6 wks follow-up with EP MD for PVI/AF consults  Current Device/Device Co Needed for Procedure: Dual PacemakerMedtronic  Pre-Procedural Testing needed: None  Anesthesia:  General    [facility] EP Cath Lab Prep   H&P:  Pt to schedule with PMD to complete  Pre-op Labs: K if pt taking diuretic medication or hx of low Potassium, Beta HcG if appropriate, and INR if on Warfarin will be ordered AM of procedure and Review of most recent labs, WEL for procedure  T&S Pre-Procedure Review: T&S is not required for DCCV/DFT Testing  Medical Records Pertinent for Procedure:  None  Iodinated Contrast Dye Allergies (Does not include Shellfish, Egg, and/or Iodine Allergy): NA  GLP-1 Protocol: If on Dulaglutide (Trulicity) (weekly)- Injection hold 7 days prior to procedure  , Exenatide extended release (Bydureon bcise) (weekly)- Injection hold 7 days prior to procedure, Exenatide (Byetta) (twice daily)- Oral Tablet hold day prior and morning of procedure and for Injection hold 7 days prior to procedure, Semaglutide (Ozempic) (weekly)- Injection and Oral hold 7 days prior to procedure, Liraglutide (Victoza, Saxenda) (daily)- Injection hold day prior and morning of procedure    Allergies   Allergen Reactions    Trazodone  "Shortness Of Breath and Unknown     Allergic to trazodone and deriv., Other: trouble swallowing      Clindamycin Diarrhea     C-diff    Gabapentin Other (See Comments)     \"Internal tremors\"    Temazepam Other (See Comments)     Annotation: Nightmares         Current Outpatient Medications:     acetaminophen (TYLENOL) 500 MG tablet, Take 1-2 tablets (500-1,000 mg) by mouth 3 times daily as needed for mild pain, Disp: 250 tablet, Rfl: 1    amiodarone (PACERONE) 200 MG tablet, Take 0.5 tablets (100 mg) by mouth daily, Disp: 45 tablet, Rfl: 0    apixaban ANTICOAGULANT (ELIQUIS) 5 MG tablet, Take 1 tablet (5 mg) by mouth 2 times daily, Disp: 60 tablet, Rfl: 3    BETA BLOCKER NOT PRESCRIBED (INTENTIONAL), Please choose reason not prescribed from choices below., Disp: , Rfl:     BETA BLOCKER NOT PRESCRIBED (INTENTIONAL), Please choose reason not prescribed from choices below., Disp: , Rfl:     cholecalciferol, vitamin D3, (VITAMIN D3) 2,000 unit Tab, [CHOLECALCIFEROL, VITAMIN D3, (VITAMIN D3) 2,000 UNIT TAB] Take 1 tablet (2,000 Units total) by mouth daily., Disp: 90 tablet, Rfl: 3    DULoxetine (CYMBALTA) 60 MG capsule, Take 60 mg by mouth daily, Disp: , Rfl:     furosemide (LASIX) 40 MG tablet, Take 1 tablet (40 mg) by mouth every morning, Disp: 90 tablet, Rfl: 3    gabapentin (NEURONTIN) 100 MG capsule, Take one capsule daily for 4-5 days. If tolerating side effects, increase to BID for 4-5 days. Ok to increase to 100- mg TID as tolerated., Disp: 90 capsule, Rfl: 2    Lidocaine (LIDOCARE) 4 % Patch, Place 1 patch onto the skin every 24 hours Wear for 12 hours, then have patch off for 12 hours before replacing., Disp: 10 patch, Rfl: 3    lisinopril (ZESTRIL) 2.5 MG tablet, TAKE 1 TABLET BY MOUTH ONE TIME DAILY, Disp: 90 tablet, Rfl: 0    metoprolol succinate ER (TOPROL XL) 25 MG 24 hr tablet, Take 1 tablet (25 mg) by mouth daily, Disp: 90 tablet, Rfl: 3    oxyCODONE (OXYCONTIN) 10 MG 12 hr tablet, Take 1 tablet (10 mg) " by mouth every 12 hours for 30 days Reduce Oxycodone 5 mg to 2 tablets per day., Disp: 60 tablet, Rfl: 0    oxyCODONE (ROXICODONE) 5 MG tablet, Take 1 tablet (5 mg) by mouth every 4 hours as needed for moderate to severe pain (Max 4 tabs per day) #120 tabs to last 30 days for chronic pain. Ok to fill and start May 2, 2024, Disp: 120 tablet, Rfl: 0    potassium chloride tamiko ER (KLOR-CON M20) 20 MEQ CR tablet, Take 1 tablet (20 mEq) by mouth daily, Disp: 90 tablet, Rfl: 3    predniSONE (DELTASONE) 20 MG tablet, Take 1 tablet (20 mg) by mouth daily In the morning with food., Disp: 5 tablet, Rfl: 0    zolpidem ER (AMBIEN CR) 6.25 MG CR tablet, Take 1 and 1/4 tablet by mouth at bedtime, Disp: 45 tablet, Rfl: 5    Current Facility-Administered Medications:     4 mL ropivacaine (NAROPIN) injection 5 mg/mL, 4 mL, , , , 4 mL at 05/20/24 0758    ketorolac (TORADOL) injection 30 mg, 30 mg, , , , 30 mg at 05/20/24 0758    lidocaine 1 % injection 3 mL, 3 mL, , , , 3 mL at 05/20/24 0758    Documentation Date:5/20/2024 3:20 PM  Larissa Vidal RN

## 2024-05-20 NOTE — TELEPHONE ENCOUNTER
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 She has been in Afib for 13 days now.  Lorri Manzanares, Device RN      Encounter Type: Patient seen in clinic for 6 week  PO device evaluation and iterative programming.   Device: Medtronic [place] (D) Pacemaker  Pacing %/Programmed: AP 54%,  69% / DDD  bpm  Lead(s): RA, RV available measurements stable  Battery Longevity: Estimates 13.3 years remaining  Presenting Rhythm: Afib/VS  bpm  Underlying Rhythm: Afib w/ V rates  bpm  Heart Rates:  bpm, primarily  bpm  Atrial High rates: 2 mode switches, burden 35% since implant, continuous for 12 days. VR >= 120 bpm ~5-10%.  Anticoagulant: Eliquis  Ventricular High rates: None  Teaching/Education: Reviewed Afib, how PPM is working.   Incision site: Clean, dry and well healed. small scab on Rt side of incision  Comments: Normal device function. Atrial sensitivity changed to 0.15mV, Atrial ATP turned ON per Modified Fabiana Protocol.  Plan: Next check= remote on 6/17 prior to William Lamas NP visit on 6/18. Next billable remote on 8/23/24.  Lorri Manzanares, Device RN

## 2024-05-20 NOTE — PATIENT INSTRUCTIONS
1. Primary localized osteoarthritis of right knee        Plan:  -Follow-up with orthopedic surgery to discuss knee replacement    If you have any questions or concerns after your appointment, please send a [employer] message or call the clinic at [phone]     Dr. Estrella Walker, DO, [facility]  Sports Medicine and Orthopedics    Thank you for choosing [facility] Sports Medicine!  Dr. Walker's Clinic Locations:     [place]  TRIAGE LINE: [phone]   [address] [place] [address] APPOINTMENTS: [phone]   [place] [address] RADIOLOGY: [phone]   (Mondays & Tuesdays) HAND THERAPY: [phone]    PHYSICAL THERAPY: [phone]   [place] BILLING QUESTIONS: [phone] [address] FAX: [phone]   [place] [address]    (Thursdays & Fridays)

## 2024-05-20 NOTE — LETTER
5/20/2024         RE: Gladys Ramirez  [address]  [facility] [place] [address]        Dear Colleague,    Thank you for referring your patient, Gladys Ramirez, to the [facility] SPORTS MEDICINE [facility]. Please see a copy of my visit note below.    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    Diagnoses and all orders for this visit:    Primary localized osteoarthritis of right knee  -     Large Joint Injection/Arthocentesis: R knee joint  -     Orthopedic  Referral; Future  -     Cancel: Cell count with differential fluid; Future  -     Cancel: Anaerobic Bacterial Culture Routine; Future  -     Cancel: Crystal ID Synovial Fluid; Future  -     Anaerobic Bacterial Culture Routine  -     Cell count with differential fluid  -     Crystal ID Synovial Fluid        Gladys Ramirez is a/an 89 year old female who is seen for  Toradol  injection of right knee.   Last injection: 4/22/24, cortisone injection with aspiration    -R knee aspiration and Toradol injection performed in clinic today  -Referral to orthopedic surgery provided for TKA eval, patient has failed conservative treatment thus far   -Post-injection instructions were provided to the patient    Return if symptoms worsen or fail to improve.    Large Joint Injection/Arthocentesis: R knee joint    Date/Time: 5/20/2024 7:58 AM    Performed by: Estrella Walker DO  Authorized by: Estrella Walker DO    Indications:  Pain, joint swelling and osteoarthritis  Needle Size:  21 G  Guidance: ultrasound    Approach:  Anterolateral  Location:  Knee   Location comment:  Right knee joint      Medications:  30 mg ketorolac 30 MG/ML; 4 mL ROPivacaine 5 MG/ML; 3 mL lidocaine 1 %  Aspirate amount (mL):  15  Aspirate:  Cloudy and yellow  Outcome:  Tolerated well, no immediate complications  Procedure discussed: discussed risks, benefits, and alternatives    Consent Given by:   Patient  Timeout: timeout called immediately prior to procedure    Prep: patient was prepped and draped in usual sterile fashion     Ultrasound was used to ensure safe and accurate needle placement and injection. Ultrasound images of the procedure were permanently stored.  1ml of 8.4% Sodium Bicarbonate solution was used to buffer the local numbing agent for today's injection        Post-Injection Discharge Instructions    You may shower, however avoid swimming, tub baths or hot tubs for 24 hours following your procedure  You may have a mild to moderate increase in pain for a few days following the injection.  The lidocaine (local numbing medicine) will wear off in several hours. It usually takes 3-5 days for the steroid medication to start working although it may take up to 14 days for full effect.   You may use ice packs for 10-15 minutes, 3 to 4 times a day at the injection site for comfort if needed  You may use extra strength Tylenol for pain control if necessary   If you were fasting, you may resume your normal diet and medications after the procedure  If you have diabetes, your blood sugar may be higher than normal for 10-14 days following a steroid injection. Contact your doctor who manages your diabetes if your blood sugar is significantly higher than usual    If you experience any of the following, call Sports Medicine @ [phone] or [phone]  -Fever over 100 degrees F  -Swelling, bleeding, redness, drainage, warmth at the injection site  -New or significant worsening pain        Dr. Estrella Walker, DO  [facility]  Sports Medicine     -----      Again, thank you for allowing me to participate in the care of your patient.        Sincerely,        Estrella Walker, DO

## 2024-05-20 NOTE — TELEPHONE ENCOUNTER
Return call from patient  She confirms no missed doses of eliquis.  Agreeable to DCCV.  Prep or orders to scheduling.  BROOKS

## 2024-05-20 NOTE — TELEPHONE ENCOUNTER
Lorri Manzanares, RN  P [facility] Ep Support [facility] [place] [facility]  Caller: Unspecified (Today, 10:17 AM)  Recommend scheduling for a cardioversion after confirming compliance with her anticoagulation.  Follow up after DCCV to discuss AV node ablation

## 2024-05-22 ENCOUNTER — TELEPHONE (OUTPATIENT)
Dept: ORTHOPEDICS | Facility: CLINIC | Age: 89
End: 2024-05-22
Payer: COMMERCIAL

## 2024-05-22 ENCOUNTER — TELEPHONE (OUTPATIENT)
Dept: ORTHOPEDICS | Facility: CLINIC | Age: 89
End: 2024-05-22

## 2024-05-22 ENCOUNTER — HOSPITAL ENCOUNTER (INPATIENT)
Facility: HOSPITAL | Age: 89
LOS: 7 days | Discharge: NURSING, REHAB OR LONG-TERM CARE | DRG: 486 | End: 2024-05-29
Attending: EMERGENCY MEDICINE | Admitting: STUDENT IN AN ORGANIZED HEALTH CARE EDUCATION/TRAINING PROGRAM
Payer: COMMERCIAL

## 2024-05-22 DIAGNOSIS — M00.061 STAPHYLOCOCCAL ARTHRITIS OF RIGHT KNEE (H): ICD-10-CM

## 2024-05-22 DIAGNOSIS — M15.0 PRIMARY OSTEOARTHRITIS INVOLVING MULTIPLE JOINTS: ICD-10-CM

## 2024-05-22 DIAGNOSIS — G89.29 CHRONIC INTRACTABLE PAIN: ICD-10-CM

## 2024-05-22 DIAGNOSIS — S83.511S RUPTURE OF ANTERIOR CRUCIATE LIGAMENT OF RIGHT KNEE, SEQUELA: ICD-10-CM

## 2024-05-22 DIAGNOSIS — K59.03 DRUG-INDUCED CONSTIPATION: ICD-10-CM

## 2024-05-22 DIAGNOSIS — M17.11 PRIMARY OSTEOARTHRITIS OF RIGHT KNEE: Primary | ICD-10-CM

## 2024-05-22 LAB
ALBUMIN SERPL BCG-MCNC: 3.3 G/DL (ref 3.5–5.2)
ALP SERPL-CCNC: 118 U/L (ref 40–150)
ALT SERPL W P-5'-P-CCNC: 11 U/L (ref 0–50)
ANION GAP SERPL CALCULATED.3IONS-SCNC: 14 MMOL/L (ref 7–15)
APPEARANCE FLD: ABNORMAL
AST SERPL W P-5'-P-CCNC: 18 U/L (ref 0–45)
BASOPHILS # BLD AUTO: 0.1 10E3/UL (ref 0–0.2)
BASOPHILS NFR BLD AUTO: 1 %
BILIRUB SERPL-MCNC: 0.3 MG/DL
BUN SERPL-MCNC: 23.3 MG/DL (ref 8–23)
CALCIUM SERPL-MCNC: 10.1 MG/DL (ref 8.2–9.6)
CELL COUNT BODY FLUID SOURCE: ABNORMAL
CHLORIDE SERPL-SCNC: 96 MMOL/L (ref 98–107)
COLOR FLD: ABNORMAL
CREAT SERPL-MCNC: 1.17 MG/DL (ref 0.51–0.95)
CRP SERPL-MCNC: 158.8 MG/L
CRYSTALS SNV MICRO: ABNORMAL
DEPRECATED HCO3 PLAS-SCNC: 28 MMOL/L (ref 22–29)
EGFRCR SERPLBLD CKD-EPI 2021: 43 ML/MIN/1.73M2
EOSINOPHIL # BLD AUTO: 0.3 10E3/UL (ref 0–0.7)
EOSINOPHIL NFR BLD AUTO: 3 %
ERYTHROCYTE [DISTWIDTH] IN BLOOD BY AUTOMATED COUNT: 13.2 % (ref 10–15)
ERYTHROCYTE [SEDIMENTATION RATE] IN BLOOD BY WESTERGREN METHOD: 92 MM/HR (ref 0–30)
GLUCOSE SERPL-MCNC: 111 MG/DL (ref 70–99)
HCT VFR BLD AUTO: 35.7 % (ref 35–47)
HGB BLD-MCNC: 11.3 G/DL (ref 11.7–15.7)
HOLD SPECIMEN: NORMAL
IMM GRANULOCYTES # BLD: 0.1 10E3/UL
IMM GRANULOCYTES NFR BLD: 1 %
LACTATE SERPL-SCNC: 1.5 MMOL/L (ref 0.7–2)
LYMPHOCYTES # BLD AUTO: 0.7 10E3/UL (ref 0.8–5.3)
LYMPHOCYTES NFR BLD AUTO: 9 %
LYMPHOCYTES NFR FLD MANUAL: 0 %
MCH RBC QN AUTO: 29.4 PG (ref 26.5–33)
MCHC RBC AUTO-ENTMCNC: 31.7 G/DL (ref 31.5–36.5)
MCV RBC AUTO: 93 FL (ref 78–100)
MONOCYTES # BLD AUTO: 0.8 10E3/UL (ref 0–1.3)
MONOCYTES NFR BLD AUTO: 10 %
MONOS+MACROS NFR FLD MANUAL: 8 %
NEUTROPHILS # BLD AUTO: 6 10E3/UL (ref 1.6–8.3)
NEUTROPHILS NFR BLD AUTO: 76 %
NEUTS BAND NFR FLD MANUAL: 92 %
NRBC # BLD AUTO: 0 10E3/UL
NRBC BLD AUTO-RTO: 0 /100
PLATELET # BLD AUTO: 503 10E3/UL (ref 150–450)
POTASSIUM SERPL-SCNC: 4.6 MMOL/L (ref 3.4–5.3)
PROT SERPL-MCNC: 7.2 G/DL (ref 6.4–8.3)
RBC # BLD AUTO: 3.85 10E6/UL (ref 3.8–5.2)
SODIUM SERPL-SCNC: 138 MMOL/L (ref 135–145)
WBC # BLD AUTO: 8 10E3/UL (ref 4–11)
WBC # FLD AUTO: ABNORMAL /UL

## 2024-05-22 PROCEDURE — 86140 C-REACTIVE PROTEIN: CPT | Performed by: EMERGENCY MEDICINE

## 2024-05-22 PROCEDURE — 85652 RBC SED RATE AUTOMATED: CPT | Performed by: EMERGENCY MEDICINE

## 2024-05-22 PROCEDURE — 83605 ASSAY OF LACTIC ACID: CPT | Performed by: EMERGENCY MEDICINE

## 2024-05-22 PROCEDURE — 83605 ASSAY OF LACTIC ACID: CPT | Performed by: STUDENT IN AN ORGANIZED HEALTH CARE EDUCATION/TRAINING PROGRAM

## 2024-05-22 PROCEDURE — 99223 1ST HOSP IP/OBS HIGH 75: CPT | Performed by: STUDENT IN AN ORGANIZED HEALTH CARE EDUCATION/TRAINING PROGRAM

## 2024-05-22 PROCEDURE — 89050 BODY FLUID CELL COUNT: CPT | Performed by: EMERGENCY MEDICINE

## 2024-05-22 PROCEDURE — 87040 BLOOD CULTURE FOR BACTERIA: CPT | Performed by: STUDENT IN AN ORGANIZED HEALTH CARE EDUCATION/TRAINING PROGRAM

## 2024-05-22 PROCEDURE — 99285 EMERGENCY DEPT VISIT HI MDM: CPT | Mod: 25

## 2024-05-22 PROCEDURE — 120N000004 HC R&B MS OVERFLOW

## 2024-05-22 PROCEDURE — 85025 COMPLETE CBC W/AUTO DIFF WBC: CPT | Performed by: STUDENT IN AN ORGANIZED HEALTH CARE EDUCATION/TRAINING PROGRAM

## 2024-05-22 PROCEDURE — 80053 COMPREHEN METABOLIC PANEL: CPT | Performed by: STUDENT IN AN ORGANIZED HEALTH CARE EDUCATION/TRAINING PROGRAM

## 2024-05-22 PROCEDURE — 87077 CULTURE AEROBIC IDENTIFY: CPT | Performed by: EMERGENCY MEDICINE

## 2024-05-22 PROCEDURE — 85025 COMPLETE CBC W/AUTO DIFF WBC: CPT | Performed by: EMERGENCY MEDICINE

## 2024-05-22 PROCEDURE — 250N000011 HC RX IP 250 OP 636: Performed by: EMERGENCY MEDICINE

## 2024-05-22 PROCEDURE — 36415 COLL VENOUS BLD VENIPUNCTURE: CPT | Performed by: STUDENT IN AN ORGANIZED HEALTH CARE EDUCATION/TRAINING PROGRAM

## 2024-05-22 PROCEDURE — 80053 COMPREHEN METABOLIC PANEL: CPT | Performed by: EMERGENCY MEDICINE

## 2024-05-22 PROCEDURE — 20611 DRAIN/INJ JOINT/BURSA W/US: CPT

## 2024-05-22 PROCEDURE — 96365 THER/PROPH/DIAG IV INF INIT: CPT | Mod: 59

## 2024-05-22 PROCEDURE — 250N000011 HC RX IP 250 OP 636: Performed by: STUDENT IN AN ORGANIZED HEALTH CARE EDUCATION/TRAINING PROGRAM

## 2024-05-22 PROCEDURE — 250N000013 HC RX MED GY IP 250 OP 250 PS 637: Performed by: STUDENT IN AN ORGANIZED HEALTH CARE EDUCATION/TRAINING PROGRAM

## 2024-05-22 PROCEDURE — 89060 EXAM SYNOVIAL FLUID CRYSTALS: CPT | Performed by: EMERGENCY MEDICINE

## 2024-05-22 PROCEDURE — 0S9C3ZZ DRAINAGE OF RIGHT KNEE JOINT, PERCUTANEOUS APPROACH: ICD-10-PCS | Performed by: EMERGENCY MEDICINE

## 2024-05-22 PROCEDURE — 87205 SMEAR GRAM STAIN: CPT | Performed by: EMERGENCY MEDICINE

## 2024-05-22 PROCEDURE — 258N000003 HC RX IP 258 OP 636: Performed by: EMERGENCY MEDICINE

## 2024-05-22 PROCEDURE — 87040 BLOOD CULTURE FOR BACTERIA: CPT | Performed by: EMERGENCY MEDICINE

## 2024-05-22 RX ORDER — AMOXICILLIN 250 MG
1 CAPSULE ORAL 2 TIMES DAILY PRN
Status: DISCONTINUED | OUTPATIENT
Start: 2024-05-22 | End: 2024-05-22

## 2024-05-22 RX ORDER — LISINOPRIL 2.5 MG/1
2.5 TABLET ORAL DAILY
Status: DISCONTINUED | OUTPATIENT
Start: 2024-05-23 | End: 2024-05-29 | Stop reason: HOSPADM

## 2024-05-22 RX ORDER — NALOXONE HYDROCHLORIDE 0.4 MG/ML
0.2 INJECTION, SOLUTION INTRAMUSCULAR; INTRAVENOUS; SUBCUTANEOUS
Status: DISCONTINUED | OUTPATIENT
Start: 2024-05-22 | End: 2024-05-29 | Stop reason: HOSPADM

## 2024-05-22 RX ORDER — NALOXONE HYDROCHLORIDE 0.4 MG/ML
0.4 INJECTION, SOLUTION INTRAMUSCULAR; INTRAVENOUS; SUBCUTANEOUS
Status: DISCONTINUED | OUTPATIENT
Start: 2024-05-22 | End: 2024-05-29 | Stop reason: HOSPADM

## 2024-05-22 RX ORDER — OXYCODONE HCL 10 MG/1
10 TABLET, FILM COATED, EXTENDED RELEASE ORAL EVERY 12 HOURS
Status: DISCONTINUED | OUTPATIENT
Start: 2024-05-22 | End: 2024-05-29 | Stop reason: HOSPADM

## 2024-05-22 RX ORDER — CEFAZOLIN SODIUM 1 G/3ML
1 INJECTION, POWDER, FOR SOLUTION INTRAMUSCULAR; INTRAVENOUS ONCE
Status: COMPLETED | OUTPATIENT
Start: 2024-05-22 | End: 2024-05-22

## 2024-05-22 RX ORDER — CEFAZOLIN SODIUM 1 G/3ML
1 INJECTION, POWDER, FOR SOLUTION INTRAMUSCULAR; INTRAVENOUS EVERY 12 HOURS
Status: DISCONTINUED | OUTPATIENT
Start: 2024-05-23 | End: 2024-05-24

## 2024-05-22 RX ORDER — DICLOFENAC EPOLAMINE 0.01 G/1
1 SYSTEM TOPICAL 2 TIMES DAILY
COMMUNITY
End: 2024-09-29

## 2024-05-22 RX ORDER — METHYLPREDNISOLONE SODIUM SUCCINATE 40 MG/ML
40 INJECTION, POWDER, LYOPHILIZED, FOR SOLUTION INTRAMUSCULAR; INTRAVENOUS EVERY 8 HOURS
Status: DISCONTINUED | OUTPATIENT
Start: 2024-05-22 | End: 2024-05-28

## 2024-05-22 RX ORDER — AMIODARONE HYDROCHLORIDE 100 MG/1
100 TABLET ORAL DAILY
Status: DISCONTINUED | OUTPATIENT
Start: 2024-05-23 | End: 2024-05-29 | Stop reason: HOSPADM

## 2024-05-22 RX ORDER — AMOXICILLIN 250 MG
2 CAPSULE ORAL 2 TIMES DAILY PRN
Status: CANCELLED | OUTPATIENT
Start: 2024-05-22

## 2024-05-22 RX ORDER — LIDOCAINE 40 MG/G
CREAM TOPICAL
Status: DISCONTINUED | OUTPATIENT
Start: 2024-05-22 | End: 2024-05-29 | Stop reason: HOSPADM

## 2024-05-22 RX ORDER — FUROSEMIDE 20 MG
40 TABLET ORAL EVERY MORNING
Status: DISCONTINUED | OUTPATIENT
Start: 2024-05-23 | End: 2024-05-29 | Stop reason: HOSPADM

## 2024-05-22 RX ORDER — AMOXICILLIN 250 MG
1 CAPSULE ORAL 2 TIMES DAILY PRN
Status: DISCONTINUED | OUTPATIENT
Start: 2024-05-22 | End: 2024-05-29 | Stop reason: HOSPADM

## 2024-05-22 RX ORDER — LIDOCAINE 40 MG/G
CREAM TOPICAL
Status: DISCONTINUED | OUTPATIENT
Start: 2024-05-22 | End: 2024-05-22

## 2024-05-22 RX ORDER — LIDOCAINE 40 MG/G
CREAM TOPICAL
Status: CANCELLED | OUTPATIENT
Start: 2024-05-22

## 2024-05-22 RX ORDER — AMOXICILLIN 250 MG
1 CAPSULE ORAL 2 TIMES DAILY PRN
Status: CANCELLED | OUTPATIENT
Start: 2024-05-22

## 2024-05-22 RX ORDER — OXYCODONE HYDROCHLORIDE 5 MG/1
5 TABLET ORAL EVERY 4 HOURS PRN
Status: DISCONTINUED | OUTPATIENT
Start: 2024-05-22 | End: 2024-05-29 | Stop reason: HOSPADM

## 2024-05-22 RX ORDER — AMOXICILLIN 250 MG
2 CAPSULE ORAL 2 TIMES DAILY PRN
Status: DISCONTINUED | OUTPATIENT
Start: 2024-05-22 | End: 2024-05-29 | Stop reason: HOSPADM

## 2024-05-22 RX ORDER — CALCIUM CARBONATE 500 MG/1
1000 TABLET, CHEWABLE ORAL 4 TIMES DAILY PRN
Status: CANCELLED | OUTPATIENT
Start: 2024-05-22

## 2024-05-22 RX ORDER — CALCIUM CARBONATE 500 MG/1
1000 TABLET, CHEWABLE ORAL 4 TIMES DAILY PRN
Status: DISCONTINUED | OUTPATIENT
Start: 2024-05-22 | End: 2024-05-29 | Stop reason: HOSPADM

## 2024-05-22 RX ORDER — METOPROLOL SUCCINATE 25 MG/1
25 TABLET, EXTENDED RELEASE ORAL DAILY
Status: DISCONTINUED | OUTPATIENT
Start: 2024-05-23 | End: 2024-05-29 | Stop reason: HOSPADM

## 2024-05-22 RX ORDER — CEFAZOLIN SODIUM 1 G/50ML
1250 SOLUTION INTRAVENOUS ONCE
Status: COMPLETED | OUTPATIENT
Start: 2024-05-22 | End: 2024-05-22

## 2024-05-22 RX ORDER — AMOXICILLIN 250 MG
2 CAPSULE ORAL 2 TIMES DAILY PRN
Status: DISCONTINUED | OUTPATIENT
Start: 2024-05-22 | End: 2024-05-22

## 2024-05-22 RX ORDER — DULOXETIN HYDROCHLORIDE 60 MG/1
60 CAPSULE, DELAYED RELEASE ORAL DAILY
Status: DISCONTINUED | OUTPATIENT
Start: 2024-05-23 | End: 2024-05-29 | Stop reason: HOSPADM

## 2024-05-22 RX ORDER — POTASSIUM CHLORIDE 1500 MG/1
20 TABLET, EXTENDED RELEASE ORAL DAILY
Status: DISCONTINUED | OUTPATIENT
Start: 2024-05-23 | End: 2024-05-29 | Stop reason: HOSPADM

## 2024-05-22 RX ORDER — HYDRALAZINE HYDROCHLORIDE 20 MG/ML
10 INJECTION INTRAMUSCULAR; INTRAVENOUS EVERY 6 HOURS PRN
Status: DISCONTINUED | OUTPATIENT
Start: 2024-05-22 | End: 2024-05-29 | Stop reason: HOSPADM

## 2024-05-22 RX ORDER — CEFAZOLIN SODIUM 1 G/50ML
750 SOLUTION INTRAVENOUS EVERY 24 HOURS
Status: DISCONTINUED | OUTPATIENT
Start: 2024-05-23 | End: 2024-05-24

## 2024-05-22 RX ORDER — CALCIUM CARBONATE 500 MG/1
1000 TABLET, CHEWABLE ORAL 4 TIMES DAILY PRN
Status: DISCONTINUED | OUTPATIENT
Start: 2024-05-22 | End: 2024-05-22

## 2024-05-22 RX ADMIN — SODIUM CHLORIDE 1250 MG: 9 INJECTION, SOLUTION INTRAVENOUS at 17:42

## 2024-05-22 RX ADMIN — CEFAZOLIN 1 G: 1 INJECTION, POWDER, FOR SOLUTION INTRAMUSCULAR; INTRAVENOUS at 16:52

## 2024-05-22 RX ADMIN — METHYLPREDNISOLONE SODIUM SUCCINATE 40 MG: 40 INJECTION INTRAMUSCULAR; INTRAVENOUS at 22:25

## 2024-05-22 RX ADMIN — OXYCODONE HYDROCHLORIDE 10 MG: 10 TABLET, FILM COATED, EXTENDED RELEASE ORAL at 22:53

## 2024-05-22 ASSESSMENT — ACTIVITIES OF DAILY LIVING (ADL)
ADLS_ACUITY_SCORE: 29
ADLS_ACUITY_SCORE: 38
DEPENDENT_IADLS:: SHOPPING;TRANSPORTATION
ADLS_ACUITY_SCORE: 38
ADLS_ACUITY_SCORE: 38

## 2024-05-22 ASSESSMENT — COLUMBIA-SUICIDE SEVERITY RATING SCALE - C-SSRS
2. HAVE YOU ACTUALLY HAD ANY THOUGHTS OF KILLING YOURSELF IN THE PAST MONTH?: NO
6. HAVE YOU EVER DONE ANYTHING, STARTED TO DO ANYTHING, OR PREPARED TO DO ANYTHING TO END YOUR LIFE?: NO
1. IN THE PAST MONTH, HAVE YOU WISHED YOU WERE DEAD OR WISHED YOU COULD GO TO SLEEP AND NOT WAKE UP?: NO

## 2024-05-22 NOTE — TELEPHONE ENCOUNTER
Called patient to inform her that synovial fluid culture from her right knee obtained Monday returned with bacterial growth (1+ Staph lugdunesis) . She reports feeling tired, but denies fevers, chills, redness of knee. Discussed with patient that I would like her to go to the ER immediately due to concern for septic arthritis. She demonstrated understanding and will present to [facility] in [place]. I discussed that if she is unable to obtain a ride to the ED I would like her to call 911 and have an ambulance bring her to the ED.   Called [facility] ED and gave signout, patient will be triaged when she arrives.   Dr. Estrella Walker, DO  Sports Medicine

## 2024-05-22 NOTE — TELEPHONE ENCOUNTER
Dr. Walker called back infectious disease regarding result.  Dr. Walker to update patient and give new instructions    Fredy Rader ATC

## 2024-05-22 NOTE — H&P
[facility]    History and Physical - Hospitalist Service       Date of Admission:  5/22/2024    Assessment & Plan    Gladys Ramirez is a 89 year old female admitted on 5/22/2024. She presented with worsening of chronic knee pain. Joint recently aspirated.  Went aspiration grew staph lugdunensis.  Past medical history is significant for hypertension, hyperlipidemia, HFrEF, A-fib, CKD stage III.    Right knee joint swelling  Septic arthritis versus gout exacerbation  -Has had acute worsening of chronic right knee joint pain.  Has elevated CRP and ESR  - 2 weeks ago patient had a steroid injection for pain.  -Few days ago went to a sports medicine clinic and got  joint aspirated.  -synovial fluid grew staph lugdunensis  -Joint aspiration repeated in ER.  Preliminary result shows positive for intracellular crystals consistent with monosodium urate crystals  -Started on cefazolin and vancomycin  -Added  steroid  -Orthopedics consult    A-fib  -PTA metoprolol succinate 25 mg oral daily  -PTA amiodarone 100 mg oral daily  -Eliquis 5 mg oral twice daily  -Patient reported that she had a pacemaker implantation 2 weeks ago. Consider pacemaker interrogation.   -Telemetry monitor    HFrEF  -Appears euvolemic clinically  -PTA Lasix 40 mg oral daily  -Echocardiogram        Diet: NPO per Anesthesia Guidelines for Procedure/Surgery Except for: Meds    DVT Prophylaxis: DOAC  Reyes Catheter: Not present  Lines: None     Cardiac Monitoring: None  Code Status:  Full code    Clinically Significant Risk Factors Present on Admission           # Hypercalcemia: corrected calcium is >10.1, will monitor as appropriate    # Hypoalbuminemia: Lowest albumin = 3.3 g/dL at 5/22/2024  3:06 PM, will monitor as appropriate  # Drug Induced Coagulation Defect: home medication list includes an anticoagulant medication    # Hypertension: Noted on problem list  # Chronic heart failure with reduced ejection fraction: last echo  with EF <40%     # Overweight: Estimated body mass index is 28.9 kg/m  as calculated from the following:    Height as of this encounter: 1.524 m (5').    Weight as of this encounter: 67.1 kg (148 lb).       # Financial/Environmental Concerns:     # Pacemaker present       Disposition Plan     Medically Ready for Discharge: Anticipated in 2-4 Days       Farhan Neil MD  Hospitalist Service  [facility]  Securely message with [employer] (more info)  Text page via [employer] Paging/Directory     ______________________________________________________________________    Chief Complaint   Worsening of knee joint pain/few days    History is obtained from the patient    History of Present Illness   Gladys Ramirez is a 89 year old female who presented with the above complaint.Past medical history is significant for hypertension, hyperlipidemia, HFrEF, A-fib, CKD stage III.  Patient has been dealing with longstanding right knee joint pain which has worsened recently.  She got steroid injection last week.  Presented with worsening of swelling and pain.  Labs are significant for elevated CRP and ESR.  Joint aspiration performed from outside grew staph species.  Arthrocentesis was repeated in the ER.  Patient will be admitted for management of septic arthritis.      Past Medical History    Past Medical History:   Diagnosis Date    Angina pectoris (H24)     Atrial fibrillation (H)     Chest pain 03/09/2017    Chronic kidney disease     Congestive heart failure (H)     Cough     Hyperlipidemia     Hypertension     Osteoarthritis        Past Surgical History   Past Surgical History:   Procedure Laterality Date    APPENDECTOMY      BASAL CELL CARCINOMA EXCISION      nose    EP PACEMAKER DEVICE & IMPLANT- HIS LEAD DUAL N/A 4/8/2024    Procedure: Pacemaker Device & Lead Implant - HIS Lead Dual;  Surgeon: Jeannie Rivera MD;  Location: [facility] CATH LAB CV    LAPAROSCOPY DIAGNOSTIC (GENERAL) N/A  11/04/2014    Procedure: LAPAROSCOPY BILATERAL SALPINGO-OOPHORECTOMY ;  Surgeon: Sofia Harper MD;  Location: [facility] OR;  Service:     OPEN REDUCTION INTERNAL FIXATION HIP BIPOLAR Right 3/5/2023    Procedure: HEMIARTHROPLASTY, HIP, BIPOLAR;  Surgeon: Jose G Martinez MD;  Location: [facility] OR    TONSILLECTOMY      10 years old    ZC TOTAL KNEE ARTHROPLASTY Left     2011       Prior to Admission Medications   Prior to Admission Medications   Prescriptions Last Dose Informant Patient Reported? Taking?   DULoxetine (CYMBALTA) 60 MG capsule 5/22/2024 at AM  Yes Yes   Sig: Take 60 mg by mouth daily   UNABLE TO FIND   Yes Yes   Sig: Apply 2-4 Pump topically as needed MEDICATION NAME: Ketamine 8% - Ketoprofen 5% in carrier gel   acetaminophen (TYLENOL) 500 MG tablet Past Month at unknown  No Yes   Sig: Take 1-2 tablets (500-1,000 mg) by mouth 3 times daily as needed for mild pain   amiodarone (PACERONE) 200 MG tablet 5/22/2024 at AM  No Yes   Sig: Take 0.5 tablets (100 mg) by mouth daily   apixaban ANTICOAGULANT (ELIQUIS) 5 MG tablet 5/22/2024 at AM  No Yes   Sig: Take 1 tablet (5 mg) by mouth 2 times daily   cholecalciferol, vitamin D3, (VITAMIN D3) 2,000 unit Tab 5/22/2024 at AM  No Yes   Sig: [CHOLECALCIFEROL, VITAMIN D3, (VITAMIN D3) 2,000 UNIT TAB] Take 1 tablet (2,000 Units total) by mouth daily.   furosemide (LASIX) 40 MG tablet 5/22/2024 at AM  No Yes   Sig: Take 1 tablet (40 mg) by mouth every morning   lisinopril (ZESTRIL) 2.5 MG tablet 5/22/2024 at AM  No Yes   Sig: TAKE 1 TABLET BY MOUTH ONE TIME DAILY   metoprolol succinate ER (TOPROL XL) 25 MG 24 hr tablet 5/22/2024 at AM  No Yes   Sig: Take 1 tablet (25 mg) by mouth daily   oxyCODONE (OXYCONTIN) 10 MG 12 hr tablet 5/22/2024 at 1000  No Yes   Sig: Take 1 tablet (10 mg) by mouth every 12 hours for 30 days Reduce Oxycodone 5 mg to 2 tablets per day.   oxyCODONE (ROXICODONE) 5 MG tablet 5/22/2024 at 0600  No Yes   Sig: Take 1 tablet (5  mg) by mouth every 4 hours as needed for moderate to severe pain (Max 4 tabs per day) #120 tabs to last 30 days for chronic pain. Ok to fill and start May 2, 2024   potassium chloride tamiko ER (KLOR-CON M20) 20 MEQ CR tablet 5/22/2024 at AM  No Yes   Sig: Take 1 tablet (20 mEq) by mouth daily      Facility-Administered Medications Last Administration Doses Remaining   4 mL ropivacaine (NAROPIN) injection 5 mg/mL 5/20/2024  7:58 AM    ketorolac (TORADOL) injection 30 mg 5/20/2024  7:58 AM    lidocaine 1 % injection 3 mL 5/20/2024  7:58 AM               Physical Exam   Vital Signs: Temp: 98.1  F (36.7  C)   BP: (!) 157/60 Pulse: 88   Resp: 20 SpO2: 92 %      Weight: 148 lbs 0 oz    General Appearance: No distress noted  Respiratory: Good air entry bilaterally  Cardiovascular: S1 and S2 are heard, no murmur or gallop  GI: Soft abdomen, no tenderness, normoactive bowel sounds  Skin: Intact and warm      Medical Decision Making       75 MINUTES SPENT BY ME on the date of service doing chart review, history, exam, documentation & further activities per the note.      Data

## 2024-05-22 NOTE — ED PROVIDER NOTES
EMERGENCY DEPARTMENT ENCOUNTER      NAME: Gladys Ramirez  AGE: 89 year old female  YOB: 1930  MRN: [id number]  EVALUATION DATE & TIME: No admission date for patient encounter.    PCP: Margret Flores    ED PROVIDER: Sammy Lerner M.D.      Chief Complaint   Patient presents with    Wound Infection         FINAL IMPRESSION:  1. Staphylococcal arthritis of right knee (H)          ED COURSE & MEDICAL DECISION MAKING:    Pertinent Labs & Imaging studies reviewed. (See chart for details)  89 year old female presents to the Emergency Department for evaluation of knee pain. Patient appears non toxic with stable vitals signs, patient afebrile with no tachycardia or hypoxia.  Lungs are clear and abdomen is benign.  Exam significant for guarded range of motion at the right knee with some swelling, warmth but no erythema.  Per review of the medical record, did review telephone encounter earlier today from Dr. Walker, [facility] sports medicine [facility].  Patient was contacted as her synovial fluid culture obtained Monday returned with bacterial growth, 1+ staph lugdunensis.  I spoke with Dr. Walker earlier today sending the patient here for concern of septic joint.  Patient is neurovascular intact with nothing suggest DVT, ischemic limb or compartment syndrome.  I did speak with [facility] at this time is recommending repeat synovial fluid aspiration to rule out contamination, recommending admission for IV antibiotics and Ortho consultation.  We initiated broad-spectrum antibiotics including vancomycin and Ancef, I did obtain synovial fluid joint aspiration and will send for testing, Gram stain, cultures pending at this time.  Discussed these findings and need for admission with the patient who is in agreement.  Discussed care plan with the admitting service.  Labs by my independent rotation concerning for infection with a CRP of 158, no elevated white blood cell count at 8.0 and  no elevated lactic acid at 1.5, nothing to suggest sepsis.  Nothing to suggest acute kidney injury with a creatinine of 1.17.      3:45 PM I met with the patient, obtained history, performed an initial exam, and discussed options and plan for diagnostics and treatment here in the ED.  4:14 PM Spoke with Dr. Flynn with [facility]  4:27 PM Repeat exam, obtained consent for joint aspiration.  5:07 PM Arthrocentesis procedure. See procedures section of this note for detailed documentation of this procedure.   5:33 PM discussed patient case with the admitting service.      Medical Decision Making  Obtained supplemental history:Supplemental history obtained?: No  Reviewed external records: External records reviewed?: No  Care impacted by chronic illness:Cancer/Chemotherapy, Chronic Kidney Disease, Heart Disease, Hyperlipidemia, and Hypertension  Care significantly affected by social determinants of health:N/A  Did you consider but not order tests?: Work up considered but not performed and documented in chart, if applicable  Did you interpret images independently?: Independent interpretation of ECG and images noted in documentation, when applicable.  Consultation discussion with other provider:Did you involve another provider (consultant, , pharmacy, etc.)?: I discussed the care with another health care provider, see documentation for details.  Admit.      At the conclusion of the encounter I discussed the results of all of the tests and the disposition. The questions were answered and return precautions provided. The patient or family acknowledged understanding and was agreeable with the care plan.         MEDICATIONS GIVEN IN THE EMERGENCY:  Medications   vancomycin (VANCOCIN) 1,250 mg in sodium chloride 0.9 % 250 mL intermittent infusion (1,250 mg Intravenous $New Bag 5/22/24 6023)   ceFAZolin (ANCEF) 1 g vial to attach to  ml bag for ADULT or 50 ml bag for PEDS (0 g Intravenous Stopped 5/22/24 3013)  "      NEW PRESCRIPTIONS STARTED AT TODAY'S ER VISIT  New Prescriptions    No medications on file            =================================================================    HPI    Patient information was obtained from: Patient    Use of Intrepreter: N/A       Gladys Ramirez is a 89 year old female who presents to the ED for evaluation of wound infection.     The patient reports going to the Sports Medicine clinic and getting fluid in her right knee drained. The fluid obtained then grew bacteria and her sports medicine doctor advised her to go to the ED for evaluation of a possible bacterial infection. Patient reports having constant right knee pain for about 2 years now but there was a change in the pain about 2 weeks ago after receiving a cortisone injection. She notes going to her \"pain doctor\" to relief the pain where they then took x-rays and noticed no new fractures. Patient states she is prescribed oxycodone to relieve the pain. She notes she had a pacemaker placed about 3 weeks ago and reports it is not working.    Patient denies fever, rash, warmth.       REVIEW OF SYSTEMS   Constitutional:  Denies fever, chills  Respiratory:  Denies productive cough or increased work of breathing  Cardiovascular:  Denies chest pain, palpitations  GI:  Denies abdominal pain, nausea, vomiting, or change in bowel or bladder habits   Musculoskeletal:  Denies any new muscle/joint swelling Positive for knee pain  Skin:  Denies rash   Neurologic:  Denies focal weakness  All systems negative except as marked.     PAST MEDICAL HISTORY:  Past Medical History:   Diagnosis Date    Angina pectoris (H24)     Atrial fibrillation (H)     Chest pain 03/09/2017    Chronic kidney disease     Congestive heart failure (H)     Cough     Hyperlipidemia     Hypertension     Osteoarthritis        PAST SURGICAL HISTORY:  Past Surgical History:   Procedure Laterality Date    APPENDECTOMY      BASAL CELL CARCINOMA EXCISION      nose    EP " PACEMAKER DEVICE & IMPLANT- HIS LEAD DUAL N/A 4/8/2024    Procedure: Pacemaker Device & Lead Implant - HIS Lead Dual;  Surgeon: Jeannie Rivera MD;  [facility] C[facility] LAB CV    LAPAROSCOPY DIAGNOSTIC (GENERAL) N/A 11/04/2014    Procedure: LAPAROSCOPY BILATERAL SALPINGO-OOPHORECTOMY ;  Surgeon: Sofia Harper MD;  Location: [facility] OR;  Service:     OPEN REDUCTION INTERNAL FIXATION HIP BIPOLAR Right 3/5/2023    Procedure: HEMIARTHROPLASTY, HIP, BIPOLAR;  Surgeon: Jose G Martinez MD;  Location: [facility] OR    TONSILLECTOMY      10 years old    ZC TOTAL KNEE ARTHROPLASTY Left     2011         CURRENT MEDICATIONS:    Prior to Admission medications    Medication Sig Start Date End Date Taking? Authorizing Provider   acetaminophen (TYLENOL) 500 MG tablet Take 1-2 tablets (500-1,000 mg) by mouth 3 times daily as needed for mild pain 1/12/24   Valentine Howard APRN CNP   amiodarone (PACERONE) 200 MG tablet Take 0.5 tablets (100 mg) by mouth daily 4/30/24   Estrada Holley MD   apixaban ANTICOAGULANT (ELIQUIS) 5 MG tablet Take 1 tablet (5 mg) by mouth 2 times daily 3/29/24   Estrada Holley MD   BETA BLOCKER NOT PRESCRIBED (INTENTIONAL) Please choose reason not prescribed from choices below.    Margret Flores MD   BETA BLOCKER NOT PRESCRIBED (INTENTIONAL) Please choose reason not prescribed from choices below.    Margret Flores MD   cholecalciferol, vitamin D3, (VITAMIN D3) 2,000 unit Tab [CHOLECALCIFEROL, VITAMIN D3, (VITAMIN D3) 2,000 UNIT TAB] Take 1 tablet (2,000 Units total) by mouth daily. 7/29/19   Lore Martin MD   DULoxetine (CYMBALTA) 60 MG capsule Take 60 mg by mouth daily    Unknown, Entered By History   furosemide (LASIX) 40 MG tablet Take 1 tablet (40 mg) by mouth every morning 1/31/24   Margret Flores MD   gabapentin (NEURONTIN) 100 MG capsule Take one capsule daily for 4-5 days. If tolerating side effects, increase to BID for 4-5 days. Ok to increase to 100- mg  "TID as tolerated. 5/7/24   Valentine Howard APRN CNP   Lidocaine (LIDOCARE) 4 % Patch Place 1 patch onto the skin every 24 hours Wear for 12 hours, then have patch off for 12 hours before replacing. 5/2/24   Katerina Muñoz NP   lisinopril (ZESTRIL) 2.5 MG tablet TAKE 1 TABLET BY MOUTH ONE TIME DAILY 4/30/24   Estrada Holley MD   metoprolol succinate ER (TOPROL XL) 25 MG 24 hr tablet Take 1 tablet (25 mg) by mouth daily 4/9/24   Ibis Pedro NP   oxyCODONE (OXYCONTIN) 10 MG 12 hr tablet Take 1 tablet (10 mg) by mouth every 12 hours for 30 days Reduce Oxycodone 5 mg to 2 tablets per day. 5/6/24 6/5/24  Valentine Howard APRN CNP   oxyCODONE (ROXICODONE) 5 MG tablet Take 1 tablet (5 mg) by mouth every 4 hours as needed for moderate to severe pain (Max 4 tabs per day) #120 tabs to last 30 days for chronic pain. Ok to fill and start May 2, 2024 5/2/24   Valentine Howard APRN CNP   potassium chloride tamiko ER (KLOR-CON M20) 20 MEQ CR tablet Take 1 tablet (20 mEq) by mouth daily 4/18/24   Estrada Holley MD   predniSONE (DELTASONE) 20 MG tablet Take 1 tablet (20 mg) by mouth daily In the morning with food. 5/6/24   Valentine Howard APRN CNP   zolpidem ER (AMBIEN CR) 6.25 MG CR tablet Take 1 and 1/4 tablet by mouth at bedtime 1/3/24   Margret Flores MD        ALLERGIES:  Allergies   Allergen Reactions    Trazodone Shortness Of Breath and Unknown     Allergic to trazodone and deriv., Other: trouble swallowing      Clindamycin Diarrhea     C-diff    Gabapentin Other (See Comments)     \"Internal tremors\"    Temazepam Other (See Comments)     Annotation: Nightmares         FAMILY HISTORY:  Family History   Problem Relation Age of Onset    Heart Disease Mother     Rheumatic Heart Disease Mother     No Known Problems Father     Cancer Brother         brain    Lung Cancer Brother     Lung Cancer Brother     Cancer Sister         lung    Lung Cancer Sister        SOCIAL HISTORY:   Social History "     Socioeconomic History    Marital status: Single   Tobacco Use    Smoking status: Never     Passive exposure: Never    Smokeless tobacco: Never    Tobacco comments:     no passive exposure   Vaping Use    Vaping status: Never Used   Substance and Sexual Activity    Alcohol use: Yes     Comment: Alcoholic Drinks/day: Rarely a glass of wine    Drug use: No   Social History Narrative    The patient is a nun.     Social Determinants of Health     Financial Resource Strain: Low Risk  (4/5/2024)    Financial Resource Strain     Within the past 12 months, have you or your family members you live with been unable to get utilities (heat, electricity) when it was really needed?: No   Food Insecurity: Low Risk  (4/5/2024)    Food Insecurity     Within the past 12 months, did you worry that your food would run out before you got money to buy more?: No     Within the past 12 months, did the food you bought just not last and you didn t have money to get more?: No   Transportation Needs: Low Risk  (4/5/2024)    Transportation Needs     Within the past 12 months, has lack of transportation kept you from medical appointments, getting your medicines, non-medical meetings or appointments, work, or from getting things that you need?: No   Physical Activity: Insufficiently Active (3/27/2023)    Exercise Vital Sign     Days of Exercise per Week: 3 days     Minutes of Exercise per Session: 10 min   Stress: No Stress Concern Present (4/5/2024)    [employer] [place] [employer] - Occupational Stress Questionnaire     Feeling of Stress : Not at all   Social Connections: Moderately Integrated (3/27/2023)    Social Connection and Isolation Panel [NHANES]     Frequency of Communication with Friends and Family: More than three times a week     Frequency of Social Gatherings with Friends and Family: More than three times a week     Attends Evangelical Services: More than 4 times per year     Active Member of Clubs or Organizations: Yes      Attends Club or Organization Meetings: More than 4 times per year     Marital Status: Never    Interpersonal Safety: Low Risk  (4/5/2024)    Interpersonal Safety     Do you feel physically and emotionally safe where you currently live?: Yes     Within the past 12 months, have you been hit, slapped, kicked or otherwise physically hurt by someone?: No     Within the past 12 months, have you been humiliated or emotionally abused in other ways by your partner or ex-partner?: No   Housing Stability: Low Risk  (4/5/2024)    Housing Stability     Do you have housing? : Yes     Are you worried about losing your housing?: No       VITALS:  Patient Vitals for the past 24 hrs:   BP Temp Pulse Resp SpO2 Height Weight   05/22/24 1715 (!) 157/60 -- 88 -- 92 % -- --   05/22/24 1700 133/84 -- -- -- -- -- --   05/22/24 1429 134/69 98.1  F (36.7  C) 82 20 96 % -- --   05/22/24 1426 -- 98.1  F (36.7  C) -- -- -- 1.524 m (5') 67.1 kg (148 lb)        PHYSICAL EXAM    Constitutional:  Awake, alert, in no apparent distress  HENT:  Normocephalic, Atraumatic. Bilateral external ears normal. Oropharynx moist. Nose normal. Neck- Normal range of motion with no guarding, No midline cervical tenderness, Supple, No stridor.   Eyes:  PERRL, EOMI with no signs of entrapment, Conjunctiva normal, No discharge.   Respiratory:  Normal breath sounds, No respiratory distress, No wheezing.    Cardiovascular:  Normal heart rate, Normal rhythm, No appreciable rubs or gallops.   GI:  Soft, No tenderness, No distension, No palpable masses  Musculoskeletal:  Intact distal pulses, No edema.  Guarded range of motion at the right knee, mild swelling, warm to touch but no erythema.  Integument:  Warm, Dry, No erythema, No rash.   Neurologic:  Alert & oriented, Normal motor function, Normal sensory function, No focal deficits noted.   Psychiatric:  Affect normal, Judgment normal, Mood normal.     LAB:  All pertinent labs reviewed and  interpreted.  Results for orders placed or performed during the hospital encounter of 05/22/24   Comprehensive metabolic panel   Result Value Ref Range    Sodium 138 135 - 145 mmol/L    Potassium 4.6 3.4 - 5.3 mmol/L    Carbon Dioxide (CO2) 28 22 - 29 mmol/L    Anion Gap 14 7 - 15 mmol/L    Urea Nitrogen 23.3 (H) 8.0 - 23.0 mg/dL    Creatinine 1.17 (H) 0.51 - 0.95 mg/dL    GFR Estimate 43 (L) >60 mL/min/1.73m2    Calcium 10.1 (H) 8.2 - 9.6 mg/dL    Chloride 96 (L) 98 - 107 mmol/L    Glucose 111 (H) 70 - 99 mg/dL    Alkaline Phosphatase 118 40 - 150 U/L    AST 18 0 - 45 U/L    ALT 11 0 - 50 U/L    Protein Total 7.2 6.4 - 8.3 g/dL    Albumin 3.3 (L) 3.5 - 5.2 g/dL    Bilirubin Total 0.3 <=1.2 mg/dL   Lactic acid whole blood with 1x repeat in 2 hr when >2   Result Value Ref Range    Lactic Acid, Initial 1.5 0.7 - 2.0 mmol/L   CBC with platelets and differential   Result Value Ref Range    WBC Count 8.0 4.0 - 11.0 10e3/uL    RBC Count 3.85 3.80 - 5.20 10e6/uL    Hemoglobin 11.3 (L) 11.7 - 15.7 g/dL    Hematocrit 35.7 35.0 - 47.0 %    MCV 93 78 - 100 fL    MCH 29.4 26.5 - 33.0 pg    MCHC 31.7 31.5 - 36.5 g/dL    RDW 13.2 10.0 - 15.0 %    Platelet Count 503 (H) 150 - 450 10e3/uL    % Neutrophils 76 %    % Lymphocytes 9 %    % Monocytes 10 %    % Eosinophils 3 %    % Basophils 1 %    % Immature Granulocytes 1 %    NRBCs per 100 WBC 0 <1 /100    Absolute Neutrophils 6.0 1.6 - 8.3 10e3/uL    Absolute Lymphocytes 0.7 (L) 0.8 - 5.3 10e3/uL    Absolute Monocytes 0.8 0.0 - 1.3 10e3/uL    Absolute Eosinophils 0.3 0.0 - 0.7 10e3/uL    Absolute Basophils 0.1 0.0 - 0.2 10e3/uL    Absolute Immature Granulocytes 0.1 <=0.4 10e3/uL    Absolute NRBCs 0.0 10e3/uL   Extra Blood Culture Bottle   Result Value Ref Range    Hold Specimen JIC    Result Value Ref Range    CRP Inflammation 158.80 (H) <5.00 mg/L   Erythrocyte sedimentation rate auto   Result Value Ref Range    Erythrocyte Sedimentation Rate 92 (H) 0 - 30 mm/hr       RADIOLOGY:  No  orders to display          EKG:      I have independently reviewed and interpreted the EKG(s) documented above.    PROCEDURES:     PROCEDURE: Arthrocentesis   INDICATIONS: Knee pain   PROCEDURE PROVIDER: Dr Sammy Lerner   CONSENT: Risks, benefits and alternatives were discussed with and Written consent was obtained from Patient.   TIME OUT: Universal protocol was followed. TIME OUT conducted just prior to starting procedure confirmed patient identity, site/side, procedure, patient position, and availability of correct equipment. Yes   SITE: Right knee   MEDICATIONS:  5 mLs of 1% Lidocaine with epinephrine   NOTE: The area was prepped with chlorhexidine and draped off in the usual sterile fashion.  The joint was entered with an 18 gauge needle and 15 ml of fluid was withdrawn. The fluid was straw colored, cloudy. The needle was then withdrawn and a dressing was applied.   COMPLICATIONS: Patient tolerated procedure well, without complication         [facility] System Documentation:   CMS Diagnoses:       I, Gloria Morgan, am serving as a scribe to document services personally performed by Sammy Lerner MD, based on my observation and the provider's statements to me. I, Sammy Lerner MD attest that Gloria Morgan is acting in a scribe capacity, has observed my performance of the services and has documented them in accordance with my direction.    Sammy Lerner M.D.  Emergency Medicine  [facility] EMERGENCY DEPARTMENT  [address] [place] [id number]  [phone]  Dept: [phone]     Sammy Lerner MD  05/22/24 1758       Sammy Lerner MD  05/22/24 1759

## 2024-05-22 NOTE — MEDICATION SCRIBE - ADMISSION MEDICATION HISTORY
Medication Scribe Admission Medication History    Admission medication history is complete. The information provided in this note is only as accurate as the sources available at the time of the update.    Information Source(s): Patient and CareEverywhere/SureScripts via in-person    Pertinent Information:   -pt reports 60mg duloxetine daily, last dispense 90 ds 180 caps in 11/23  -pt reports trying gabapentin again for 5 days, but did not tolerate and stopped, left on allergy/intolerance list.  - Oxycontin 10mg 60 tabs 30 ds, and Oxycodone 5 mg 120 tabs 30 ds, both have recent fill histories 5/6/24, and 5/2/24 respectively.  -patient is on two different compounded creams   1. Ketamine 8%-Ketoprofen 5%   2.Lidocaine-Ibuprofen-Diclofenac cream   diclofenac sod, micro (bulk) 100 % 5 %, ibuprofen (bulk) 100 % 10 %, lidocaine (bulk) 5 %   Indications: Chronic pain syndrome Apply 4 g topically 4 (four) times a day as needed. 120 g        Changes made to PTA medication list:  Added: compounded Ketamine8%-Ketoprofen 5% in carrier gel.,Flector 1.3% patch, compounded diclofenac/ibuprofen/lidocaine cream  Deleted: Gapapentin 100mg, Lidocaine 4% patch, Zolpidem CR 6.25, Prednisone 20mg.  Changed: None    Allergies reviewed with patient and updates made in EHR: yes    Medication History Completed By: Christiano Boogie 5/22/2024 5:35 PM    Current Facility-Administered Medications for the 5/22/24 encounter (Hospital Encounter)   Medication    4 mL ropivacaine (NAROPIN) injection 5 mg/mL    ketorolac (TORADOL) injection 30 mg    lidocaine 1 % injection 3 mL     PTA Med List   Medication Sig Last Dose    acetaminophen (TYLENOL) 500 MG tablet Take 1-2 tablets (500-1,000 mg) by mouth 3 times daily as needed for mild pain Past Month at unknown    amiodarone (PACERONE) 200 MG tablet Take 0.5 tablets (100 mg) by mouth daily 5/22/2024 at AM    apixaban ANTICOAGULANT (ELIQUIS) 5 MG tablet Take 1 tablet (5 mg) by mouth 2 times daily 5/22/2024  at AM    cholecalciferol, vitamin D3, (VITAMIN D3) 2,000 unit Tab [CHOLECALCIFEROL, VITAMIN D3, (VITAMIN D3) 2,000 UNIT TAB] Take 1 tablet (2,000 Units total) by mouth daily. 5/22/2024 at AM    diclofenac (FLECTOR) 1.3 % patch Externally apply 1 patch topically 2 times daily Past Week at unknown    DULoxetine (CYMBALTA) 60 MG capsule Take 60 mg by mouth daily 5/22/2024 at AM    furosemide (LASIX) 40 MG tablet Take 1 tablet (40 mg) by mouth every morning 5/22/2024 at AM    lisinopril (ZESTRIL) 2.5 MG tablet TAKE 1 TABLET BY MOUTH ONE TIME DAILY 5/22/2024 at AM    metoprolol succinate ER (TOPROL XL) 25 MG 24 hr tablet Take 1 tablet (25 mg) by mouth daily 5/22/2024 at AM    oxyCODONE (OXYCONTIN) 10 MG 12 hr tablet Take 1 tablet (10 mg) by mouth every 12 hours for 30 days Reduce Oxycodone 5 mg to 2 tablets per day. 5/22/2024 at 1000    oxyCODONE (ROXICODONE) 5 MG tablet Take 1 tablet (5 mg) by mouth every 4 hours as needed for moderate to severe pain (Max 4 tabs per day) #120 tabs to last 30 days for chronic pain. Ok to fill and start May 2, 2024 5/22/2024 at 0600    potassium chloride tamiko ER (KLOR-CON M20) 20 MEQ CR tablet Take 1 tablet (20 mEq) by mouth daily 5/22/2024 at AM    UNABLE TO FIND Apply 2-4 Pump topically as needed MEDICATION NAME: Ketamine 8% - Ketoprofen 5% in carrier gel 5/21/2024 at unknown    UNABLE TO FIND Apply 4 g topically 4 times daily as needed (chronic pain syndrome) MEDICATION NAME: diclofenac sod, micro (bulk) 100 % 5 %, ibuprofen (bulk) 100 % 10 %, lidocaine (bulk) 5 %   Indications: Chronic pain syndrome Apply 4 g topically 4 (four) times a day as needed. 120 g     Diclofenac 5%, Ibuprofen 10%, Lidocaine 5% compounded cream Past Week at unknown

## 2024-05-22 NOTE — ED TRIAGE NOTES
"Patient to triage via WC with nephrew. Patient has history of right knee pain with fluid. Fluid aspirated 2 days ago and call today to go to ER \"fluid is infected\"         "

## 2024-05-22 NOTE — PHARMACY-VANCOMYCIN DOSING SERVICE
Pharmacy Vancomycin Initial Note  Date of Service May 22, 2024  Patient's  1930  89 year old, female    Indication: Bone and Joint Infection and Skin and Soft Tissue Infection    Current estimated CrCl = Estimated Creatinine Clearance: 25.7 mL/min (A) (based on SCr of 1.17 mg/dL (H)).    Creatinine for last 3 days  2024:  3:06 PM Creatinine 1.17 mg/dL    Recent Vancomycin Level(s) for last 3 days  No results found for requested labs within last 3 days.      Vancomycin IV Administrations (past 72 hours)        No vancomycin orders with administrations in past 72 hours.                    Nephrotoxins and other renal medications (From now, onward)      None            Contrast Orders - past 72 hours (72h ago, onward)      None                Plan:  Start vancomycin 1250 mg IV once.  Please re-consult pharmacy if therapy to continue upon admission.    Toña Olsen, [facility]

## 2024-05-22 NOTE — ED NOTES
Expected Patient Referral to ED  12:57 PM    Referring Clinic/Provider:  [facility]    Reason for referral/Clinical facts:  Synovial fluid testing performed on Monday, today culture came back positive for Staph. Patient has severe knee pain, concern for septic joint. Also, patient has a fib.      Recommendations provided:  Send to ED for further evaluation    Caller was informed that this institution does possess the capabilities and/or resources to provide for patient and should be transferred to our facility.    Discussed that if direct admit is sought and any hurdles are encountered, this ED would be happy to see the patient and evaluate.    Informed caller that recommendations provided are recommendations based only on the facts provided and that they responsible to accept or reject the advice, or to seek a formal in person consultation as needed and that this ED will see/treat patient should they arrive.      SAMMY LERNER MD  [facility] EMERGENCY DEPARTMENT  [address] [place] [id number]  [phone]       Sammy Lerner MD  05/22/24 2001

## 2024-05-22 NOTE — CONSULTS
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  Type of CM/SW Visit: Initial Assessment    Primary Care Provider verified and updated as needed: Yes   Readmission within the last 30 days: no previous admission in last 30 days      Reason for Consult: discharge planning  Advance Care Planning: Advance Care Planning Reviewed: no concerns identified          Communication Assessment  Patient's communication style: spoken language (English or Bilingual)                             Living Environment:   People in home: alone     Current living Arrangements: apartment      Able to return to prior arrangements: other (see comments) (unknown at this time)       Family/Social Support:  Care provided by: self  Provides care for: no one     Friend, Neighbor, Other (specify) (\"other Sisters and niece\")          Description of Support System: Supportive, Involved    Support Assessment: Adequate family and caregiver support, Adequate social supports, Patient communicates needs well met    Current Resources:   Patient receiving home care services: Yes  Skilled Home Care Services: Skilled Nursing, Home Health Aid (\"[facility]\")  Community Resources: Home Care  Equipment currently used at home: walker, rolling (\"FWW\")  Supplies currently used at home: Incontinence Supplies    Employment/Financial:  Employment Status: employed full-time     Employment/ Comments: \"no  history\"  Financial Concerns: none   Referral to Financial Worker: No       Does the patient's insurance plan have a 3 day qualifying hospital stay waiver?  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 She lives alone in her apartment. She uses a FWW for mobility.    She is getting [facility] help. She gets help with a bath aide.    Unknown discharge needs at this time. Will likely need PT/OT involvement for recommendations.     Family/friend to transport at discharge.    CM to follow for medical progression of care, discharge recommendations, and final discharge plan.    Kiana Rinaldi RN

## 2024-05-22 NOTE — TELEPHONE ENCOUNTER
Other: Infectious disease calling with a significant result for the joint fluid culture, Please call Kiana or Angelique at [phone]      Could we send this information to you in Thinglink or would you prefer to receive a phone call?:   Patient would prefer a phone call   Okay to leave a detailed message?: Yes at Other phone number:  [phone]

## 2024-05-23 ENCOUNTER — ANESTHESIA (OUTPATIENT)
Dept: SURGERY | Facility: HOSPITAL | Age: 89
DRG: 486 | End: 2024-05-23
Payer: COMMERCIAL

## 2024-05-23 ENCOUNTER — TELEPHONE (OUTPATIENT)
Dept: CARDIOLOGY | Facility: CLINIC | Age: 89
End: 2024-05-23

## 2024-05-23 ENCOUNTER — ANESTHESIA EVENT (OUTPATIENT)
Dept: SURGERY | Facility: HOSPITAL | Age: 89
DRG: 486 | End: 2024-05-23
Payer: COMMERCIAL

## 2024-05-23 ENCOUNTER — APPOINTMENT (OUTPATIENT)
Dept: CARDIOLOGY | Facility: HOSPITAL | Age: 89
DRG: 486 | End: 2024-05-23
Attending: STUDENT IN AN ORGANIZED HEALTH CARE EDUCATION/TRAINING PROGRAM
Payer: COMMERCIAL

## 2024-05-23 LAB
ANION GAP SERPL CALCULATED.3IONS-SCNC: 11 MMOL/L (ref 7–15)
BACTERIA SNV CULT: ABNORMAL
BUN SERPL-MCNC: 21.9 MG/DL (ref 8–23)
CALCIUM SERPL-MCNC: 9.5 MG/DL (ref 8.2–9.6)
CHLORIDE SERPL-SCNC: 102 MMOL/L (ref 98–107)
CREAT SERPL-MCNC: 1.04 MG/DL (ref 0.51–0.95)
DEPRECATED HCO3 PLAS-SCNC: 25 MMOL/L (ref 22–29)
EGFRCR SERPLBLD CKD-EPI 2021: 50 ML/MIN/1.73M2
ERYTHROCYTE [DISTWIDTH] IN BLOOD BY AUTOMATED COUNT: 13.2 % (ref 10–15)
GLUCOSE BLDC GLUCOMTR-MCNC: 132 MG/DL (ref 70–99)
GLUCOSE SERPL-MCNC: 154 MG/DL (ref 70–99)
GRAM STAIN RESULT: ABNORMAL
GRAM STAIN RESULT: ABNORMAL
HCT VFR BLD AUTO: 32.1 % (ref 35–47)
HGB BLD-MCNC: 10.1 G/DL (ref 11.7–15.7)
LVEF ECHO: NORMAL
MCH RBC QN AUTO: 29.4 PG (ref 26.5–33)
MCHC RBC AUTO-ENTMCNC: 31.5 G/DL (ref 31.5–36.5)
MCV RBC AUTO: 93 FL (ref 78–100)
PLATELET # BLD AUTO: 446 10E3/UL (ref 150–450)
POTASSIUM SERPL-SCNC: 4.6 MMOL/L (ref 3.4–5.3)
RBC # BLD AUTO: 3.44 10E6/UL (ref 3.8–5.2)
SODIUM SERPL-SCNC: 138 MMOL/L (ref 135–145)
WBC # BLD AUTO: 5.8 10E3/UL (ref 4–11)

## 2024-05-23 PROCEDURE — 36415 COLL VENOUS BLD VENIPUNCTURE: CPT | Performed by: STUDENT IN AN ORGANIZED HEALTH CARE EDUCATION/TRAINING PROGRAM

## 2024-05-23 PROCEDURE — 250N000011 HC RX IP 250 OP 636: Performed by: STUDENT IN AN ORGANIZED HEALTH CARE EDUCATION/TRAINING PROGRAM

## 2024-05-23 PROCEDURE — 93306 TTE W/DOPPLER COMPLETE: CPT | Mod: 26 | Performed by: STUDENT IN AN ORGANIZED HEALTH CARE EDUCATION/TRAINING PROGRAM

## 2024-05-23 PROCEDURE — 250N000013 HC RX MED GY IP 250 OP 250 PS 637: Performed by: STUDENT IN AN ORGANIZED HEALTH CARE EDUCATION/TRAINING PROGRAM

## 2024-05-23 PROCEDURE — 99233 SBSQ HOSP IP/OBS HIGH 50: CPT | Performed by: INTERNAL MEDICINE

## 2024-05-23 PROCEDURE — 258N000003 HC RX IP 258 OP 636: Performed by: ANESTHESIOLOGY

## 2024-05-23 PROCEDURE — 80048 BASIC METABOLIC PNL TOTAL CA: CPT | Performed by: STUDENT IN AN ORGANIZED HEALTH CARE EDUCATION/TRAINING PROGRAM

## 2024-05-23 PROCEDURE — 255N000002 HC RX 255 OP 636: Performed by: INTERNAL MEDICINE

## 2024-05-23 PROCEDURE — 85027 COMPLETE CBC AUTOMATED: CPT | Performed by: STUDENT IN AN ORGANIZED HEALTH CARE EDUCATION/TRAINING PROGRAM

## 2024-05-23 PROCEDURE — 272N000001 HC OR GENERAL SUPPLY STERILE: Performed by: STUDENT IN AN ORGANIZED HEALTH CARE EDUCATION/TRAINING PROGRAM

## 2024-05-23 PROCEDURE — 250N000009 HC RX 250: Performed by: ANESTHESIOLOGY

## 2024-05-23 PROCEDURE — 120N000004 HC R&B MS OVERFLOW

## 2024-05-23 PROCEDURE — C8929 TTE W OR WO FOL WCON,DOPPLER: HCPCS

## 2024-05-23 PROCEDURE — 258N000003 HC RX IP 258 OP 636: Performed by: STUDENT IN AN ORGANIZED HEALTH CARE EDUCATION/TRAINING PROGRAM

## 2024-05-23 PROCEDURE — 360N000076 HC SURGERY LEVEL 3, PER MIN: Performed by: STUDENT IN AN ORGANIZED HEALTH CARE EDUCATION/TRAINING PROGRAM

## 2024-05-23 PROCEDURE — 710N000009 HC RECOVERY PHASE 1, LEVEL 1, PER MIN: Performed by: STUDENT IN AN ORGANIZED HEALTH CARE EDUCATION/TRAINING PROGRAM

## 2024-05-23 PROCEDURE — 250N000025 HC SEVOFLURANE, PER MIN: Performed by: STUDENT IN AN ORGANIZED HEALTH CARE EDUCATION/TRAINING PROGRAM

## 2024-05-23 PROCEDURE — 250N000011 HC RX IP 250 OP 636: Performed by: ANESTHESIOLOGY

## 2024-05-23 PROCEDURE — 370N000017 HC ANESTHESIA TECHNICAL FEE, PER MIN: Performed by: STUDENT IN AN ORGANIZED HEALTH CARE EDUCATION/TRAINING PROGRAM

## 2024-05-23 PROCEDURE — 0SBC0ZZ EXCISION OF RIGHT KNEE JOINT, OPEN APPROACH: ICD-10-PCS | Performed by: STUDENT IN AN ORGANIZED HEALTH CARE EDUCATION/TRAINING PROGRAM

## 2024-05-23 PROCEDURE — 999N000141 HC STATISTIC PRE-PROCEDURE NURSING ASSESSMENT: Performed by: STUDENT IN AN ORGANIZED HEALTH CARE EDUCATION/TRAINING PROGRAM

## 2024-05-23 PROCEDURE — 258N000001 HC RX 258: Performed by: STUDENT IN AN ORGANIZED HEALTH CARE EDUCATION/TRAINING PROGRAM

## 2024-05-23 RX ORDER — CEFAZOLIN SODIUM/WATER 2 G/20 ML
2 SYRINGE (ML) INTRAVENOUS SEE ADMIN INSTRUCTIONS
Status: DISCONTINUED | OUTPATIENT
Start: 2024-05-23 | End: 2024-05-23 | Stop reason: HOSPADM

## 2024-05-23 RX ORDER — DIMENHYDRINATE 50 MG/ML
25 INJECTION, SOLUTION INTRAMUSCULAR; INTRAVENOUS
Status: DISCONTINUED | OUTPATIENT
Start: 2024-05-23 | End: 2024-05-23 | Stop reason: HOSPADM

## 2024-05-23 RX ORDER — FENTANYL CITRATE 50 UG/ML
50 INJECTION, SOLUTION INTRAMUSCULAR; INTRAVENOUS EVERY 5 MIN PRN
Status: DISCONTINUED | OUTPATIENT
Start: 2024-05-23 | End: 2024-05-23 | Stop reason: HOSPADM

## 2024-05-23 RX ORDER — FENTANYL CITRATE 50 UG/ML
INJECTION, SOLUTION INTRAMUSCULAR; INTRAVENOUS PRN
Status: DISCONTINUED | OUTPATIENT
Start: 2024-05-23 | End: 2024-05-23

## 2024-05-23 RX ORDER — PROPOFOL 10 MG/ML
INJECTION, EMULSION INTRAVENOUS PRN
Status: DISCONTINUED | OUTPATIENT
Start: 2024-05-23 | End: 2024-05-23

## 2024-05-23 RX ORDER — LIDOCAINE HYDROCHLORIDE 10 MG/ML
INJECTION, SOLUTION INFILTRATION; PERINEURAL PRN
Status: DISCONTINUED | OUTPATIENT
Start: 2024-05-23 | End: 2024-05-23

## 2024-05-23 RX ORDER — HYDROMORPHONE HCL IN WATER/PF 6 MG/30 ML
0.4 PATIENT CONTROLLED ANALGESIA SYRINGE INTRAVENOUS EVERY 5 MIN PRN
Status: DISCONTINUED | OUTPATIENT
Start: 2024-05-23 | End: 2024-05-23 | Stop reason: HOSPADM

## 2024-05-23 RX ORDER — FENTANYL CITRATE-0.9 % NACL/PF 10 MCG/ML
PLASTIC BAG, INJECTION (ML) INTRAVENOUS CONTINUOUS PRN
Status: DISCONTINUED | OUTPATIENT
Start: 2024-05-23 | End: 2024-05-23

## 2024-05-23 RX ORDER — KETAMINE HYDROCHLORIDE 10 MG/ML
INJECTION INTRAMUSCULAR; INTRAVENOUS PRN
Status: DISCONTINUED | OUTPATIENT
Start: 2024-05-23 | End: 2024-05-23

## 2024-05-23 RX ORDER — SODIUM CHLORIDE, SODIUM LACTATE, POTASSIUM CHLORIDE, CALCIUM CHLORIDE 600; 310; 30; 20 MG/100ML; MG/100ML; MG/100ML; MG/100ML
INJECTION, SOLUTION INTRAVENOUS CONTINUOUS
Status: DISCONTINUED | OUTPATIENT
Start: 2024-05-23 | End: 2024-05-23 | Stop reason: HOSPADM

## 2024-05-23 RX ORDER — ONDANSETRON 2 MG/ML
INJECTION INTRAMUSCULAR; INTRAVENOUS PRN
Status: DISCONTINUED | OUTPATIENT
Start: 2024-05-23 | End: 2024-05-23

## 2024-05-23 RX ORDER — HYDROMORPHONE HCL IN WATER/PF 6 MG/30 ML
0.2 PATIENT CONTROLLED ANALGESIA SYRINGE INTRAVENOUS EVERY 5 MIN PRN
Status: DISCONTINUED | OUTPATIENT
Start: 2024-05-23 | End: 2024-05-23 | Stop reason: HOSPADM

## 2024-05-23 RX ORDER — NALOXONE HYDROCHLORIDE 0.4 MG/ML
0.1 INJECTION, SOLUTION INTRAMUSCULAR; INTRAVENOUS; SUBCUTANEOUS
Status: DISCONTINUED | OUTPATIENT
Start: 2024-05-23 | End: 2024-05-23 | Stop reason: HOSPADM

## 2024-05-23 RX ORDER — CEFAZOLIN SODIUM/WATER 2 G/20 ML
2 SYRINGE (ML) INTRAVENOUS
Status: COMPLETED | OUTPATIENT
Start: 2024-05-23 | End: 2024-05-23

## 2024-05-23 RX ORDER — ONDANSETRON 2 MG/ML
4 INJECTION INTRAMUSCULAR; INTRAVENOUS EVERY 30 MIN PRN
Status: DISCONTINUED | OUTPATIENT
Start: 2024-05-23 | End: 2024-05-23 | Stop reason: HOSPADM

## 2024-05-23 RX ORDER — FENTANYL CITRATE 50 UG/ML
25 INJECTION, SOLUTION INTRAMUSCULAR; INTRAVENOUS EVERY 5 MIN PRN
Status: DISCONTINUED | OUTPATIENT
Start: 2024-05-23 | End: 2024-05-23 | Stop reason: HOSPADM

## 2024-05-23 RX ORDER — HALOPERIDOL 5 MG/ML
1 INJECTION INTRAMUSCULAR
Status: DISCONTINUED | OUTPATIENT
Start: 2024-05-23 | End: 2024-05-23 | Stop reason: HOSPADM

## 2024-05-23 RX ORDER — LIDOCAINE 40 MG/G
CREAM TOPICAL
Status: DISCONTINUED | OUTPATIENT
Start: 2024-05-23 | End: 2024-05-23 | Stop reason: HOSPADM

## 2024-05-23 RX ORDER — ONDANSETRON 4 MG/1
4 TABLET, ORALLY DISINTEGRATING ORAL EVERY 30 MIN PRN
Status: DISCONTINUED | OUTPATIENT
Start: 2024-05-23 | End: 2024-05-23 | Stop reason: HOSPADM

## 2024-05-23 RX ADMIN — METHYLPREDNISOLONE SODIUM SUCCINATE 40 MG: 40 INJECTION INTRAMUSCULAR; INTRAVENOUS at 05:38

## 2024-05-23 RX ADMIN — AMIODARONE HYDROCHLORIDE 100 MG: 100 TABLET ORAL at 08:50

## 2024-05-23 RX ADMIN — PROPOFOL 110 MG: 10 INJECTION, EMULSION INTRAVENOUS at 14:16

## 2024-05-23 RX ADMIN — METHYLPREDNISOLONE SODIUM SUCCINATE 40 MG: 40 INJECTION INTRAMUSCULAR; INTRAVENOUS at 23:02

## 2024-05-23 RX ADMIN — OXYCODONE HYDROCHLORIDE 5 MG: 5 TABLET ORAL at 08:48

## 2024-05-23 RX ADMIN — FENTANYL CITRATE 50 MCG: 50 INJECTION INTRAMUSCULAR; INTRAVENOUS at 15:07

## 2024-05-23 RX ADMIN — OXYCODONE HYDROCHLORIDE 10 MG: 10 TABLET, FILM COATED, EXTENDED RELEASE ORAL at 23:02

## 2024-05-23 RX ADMIN — Medication 20 MCG/MIN: at 14:16

## 2024-05-23 RX ADMIN — OXYCODONE HYDROCHLORIDE 5 MG: 5 TABLET ORAL at 18:21

## 2024-05-23 RX ADMIN — FENTANYL CITRATE 50 MCG: 50 INJECTION INTRAMUSCULAR; INTRAVENOUS at 14:55

## 2024-05-23 RX ADMIN — LIDOCAINE HYDROCHLORIDE 5 ML: 10 INJECTION, SOLUTION INFILTRATION; PERINEURAL at 14:16

## 2024-05-23 RX ADMIN — PERFLUTREN 2 ML: 6.52 INJECTION, SUSPENSION INTRAVENOUS at 09:37

## 2024-05-23 RX ADMIN — FENTANYL CITRATE 100 MCG: 50 INJECTION INTRAMUSCULAR; INTRAVENOUS at 14:16

## 2024-05-23 RX ADMIN — DULOXETINE HYDROCHLORIDE 60 MG: 60 CAPSULE, DELAYED RELEASE ORAL at 08:48

## 2024-05-23 RX ADMIN — CEFAZOLIN 1 G: 1 INJECTION, POWDER, FOR SOLUTION INTRAMUSCULAR; INTRAVENOUS at 21:01

## 2024-05-23 RX ADMIN — SODIUM CHLORIDE 750 MG: 9 INJECTION, SOLUTION INTRAVENOUS at 21:09

## 2024-05-23 RX ADMIN — SODIUM CHLORIDE, POTASSIUM CHLORIDE, SODIUM LACTATE AND CALCIUM CHLORIDE: 600; 310; 30; 20 INJECTION, SOLUTION INTRAVENOUS at 12:55

## 2024-05-23 RX ADMIN — OXYCODONE HYDROCHLORIDE 10 MG: 10 TABLET, FILM COATED, EXTENDED RELEASE ORAL at 11:24

## 2024-05-23 RX ADMIN — FUROSEMIDE 40 MG: 20 TABLET ORAL at 08:50

## 2024-05-23 RX ADMIN — PHENYLEPHRINE HYDROCHLORIDE 100 MCG: 10 INJECTION INTRAVENOUS at 14:16

## 2024-05-23 RX ADMIN — KETAMINE HYDROCHLORIDE 30 MG: 10 INJECTION INTRAMUSCULAR; INTRAVENOUS at 14:16

## 2024-05-23 RX ADMIN — ONDANSETRON 4 MG: 2 INJECTION INTRAMUSCULAR; INTRAVENOUS at 15:13

## 2024-05-23 RX ADMIN — POTASSIUM CHLORIDE 20 MEQ: 1500 TABLET, EXTENDED RELEASE ORAL at 08:50

## 2024-05-23 RX ADMIN — METOPROLOL SUCCINATE 25 MG: 25 TABLET, EXTENDED RELEASE ORAL at 08:48

## 2024-05-23 RX ADMIN — PROPOFOL 50 MG: 10 INJECTION, EMULSION INTRAVENOUS at 14:45

## 2024-05-23 RX ADMIN — CEFAZOLIN 1 G: 1 INJECTION, POWDER, FOR SOLUTION INTRAMUSCULAR; INTRAVENOUS at 05:39

## 2024-05-23 RX ADMIN — PROPOFOL 40 MG: 10 INJECTION, EMULSION INTRAVENOUS at 14:38

## 2024-05-23 RX ADMIN — Medication 2 G: at 14:20

## 2024-05-23 ASSESSMENT — ACTIVITIES OF DAILY LIVING (ADL)
ADLS_ACUITY_SCORE: 36
ADLS_ACUITY_SCORE: 32
ADLS_ACUITY_SCORE: 31
ADLS_ACUITY_SCORE: 32
ADLS_ACUITY_SCORE: 36
ADLS_ACUITY_SCORE: 36
ADLS_ACUITY_SCORE: 32
ADLS_ACUITY_SCORE: 31
ADLS_ACUITY_SCORE: 32
ADLS_ACUITY_SCORE: 36
ADLS_ACUITY_SCORE: 31
ADLS_ACUITY_SCORE: 36
ADLS_ACUITY_SCORE: 36
ADLS_ACUITY_SCORE: 31
ADLS_ACUITY_SCORE: 36
ADLS_ACUITY_SCORE: 31
ADLS_ACUITY_SCORE: 32
ADLS_ACUITY_SCORE: 36
ADLS_ACUITY_SCORE: 31

## 2024-05-23 ASSESSMENT — ENCOUNTER SYMPTOMS: DYSRHYTHMIAS: 1

## 2024-05-23 NOTE — TELEPHONE ENCOUNTER
----- Message from SHASHA Jameson CNP sent at 5/23/2024 11:01 AM CDT -----  Looks like she is currently inpatient; pending I&D today and Eliquis on hold. Can her DCCV be rescheduled? Thank you  ----- Message -----  From: Natali Walker LPN  Sent: 5/23/2024   8:29 AM CDT  To: SHASHA Jameson CNP    Good morning girls hope everyone is good  Was going to teach this pt this morning fyi she is in house  please review and get back to me  Purnima herrera

## 2024-05-23 NOTE — OP NOTE
PATIENT: Gladys Ramirez  MR# :   [id number]  DATE OF OPERATION: 05/23/24    SURGEON:  Sanchez Monahan MD     ASSISTANT:  Scout Bragg PA-C     A skilled assistant was required due to the nature of the case for patient position, retraction, exposure, implant placement, closure and dressing application.      Preoperative diagnosis: Septic right knee arthritis  Right knee crystalline arthropathy  Severe right knee tricompartment osteoarthritis status post recent steroid injection    Postoperative diagnosis: Septic right knee arthritis  Right knee crystalline arthropathy  Severe right knee tricompartment osteoarthritis status post recent steroid injection    Surgical procedure: Right knee arthroscopic irrigation and debridement    Anesthesia:  GETA    Drains: None    Estimated blood loss: <5 cc    IV fluids: Please see Anesthesia Report    Complications: None identified intraoperatively     Blood products: none given     Specimens: * No specimens in log *    Findings: Gross purulent appearing material within the synovial fluid, diffuse synovitis        INDICATIONS:    Gladys Ramirez is a 89 year old female who was admitted for worsening  negative right knee pain.  She reports history of severe right knee osteoarthritis and valgus deformity with recent steroid injection a few weeks prior.  Over the last couple weeks she has noticed worsening knee pain, difficulty with weightbearing and range of motion.  Outside orthopedic provider aspirated the knee with cultures growing Staphylococcus lugdunensis.  She was therefore sent to the emergency department for evaluation and management.  Repeat aspiration revealed significantly increased cell count (67,000 WBCs), turbid appearing fluid, 92% PMNs positive for monosodium urate crystals.  At this point we discussed treatment options including both nonoperative and operative management.  For suspected gout with superinfection in need of knee with worsening clinical  evaluation, we formally recommended surgical management for irrigation debridement.  Following this discussion of risks and benefits and shared decision making, the patient elected to proceed with the above procedure.  Informed consent was obtained at bedside.    PROCEDURE:    After proper identification of the patient including verification and marking of the surgical site, the patient was brought to the operating room. General anesthesia was given without complications and prophylactic IV Ancef was confirmed to have been administered within the appropriate timeframe(s). The patient was placed in the supine position on the operative table, with the right extremity thigh held in a positioner.    The surgical site which was properly marked, was then prepped and draped in the usual sterile fashion. A timeout was done utilizing protocol to again identify the correct patient and operative site, which was marked appropriately.    We began by making a lateral infrapatellar portal incision sharply with an 11 blade towards the notch.  A blunt trocar was then placed into the notch, and then repositioned into the suprapatellar recess.  At this point we noted purulent appearing material draining from the intra-articular space.  We then placed the arthroscopic camera and began insufflating with saline.  Under direct visualization we placed a inferomedial parapatellar portal for outflow.  The arthroscopic shaver was inserted through this portal.  We performed a diagnostic arthroscopy throughout the entirety of the knee, confirming grade IV chondromalacia in all 3 compartments diffusely, marked by marginal osteophytic spurring, degenerative meniscal tears and diffuse synovitis.  We then thoroughly irrigated and debrided the entirety of the intra-articular space with the arthroscopic shaver and 15 L of sterile saline.  At the end of the debridement and irrigation there is no other obvious purulent appearing material.  The  arthroscopic tools were removed and portals closed with interrupted 2-0 nylon suture.  The portals were covered with Xeroform, 4 x 4, ABD and Webril dressing.  The knee was wrapped in a double 6 inch Ace wrap.  The patient was allowed to awaken from anesthetic and transition to her hospital bed in stable condition.  She was then transferred to the PACU for ongoing postoperative hemodynamic monitoring.    Sponge, needle, and instrument counts were correct at the conclusion of the procedure. The patient has palpable distal pulses in both lower extremity.     Postoperative Plan:  Pain Control: Continue per pain protocol.  Weight Bearing: Weight bearing as tolerated on affected lower extremity.   DVT Prophylaxis: SCDs, mechanical  Antibiotics: 24 hours of perioperative antibiotics + ongoing antibiotic tailoring via infectious disease recommendations  GI: Plan for aggressive bowel regimen to prevent constipation from narcotic medications  Lines: HLIV once tolerating PO  PT/OT: Eval and treatment. Will follow up on recommendations.  Follow up:  in 2 weeks in clinic for wound check  Discharge plan: to home pending postoperative course, medical stability, PT and social work evaluations    Dispo: stable to pacu    Sanchez Monahan MD  Orthopedic Surgery  [facility]

## 2024-05-23 NOTE — TREATMENT PLAN
Consult received this evening.  Patient not personally evaluated at the time of this note.      Chart review the patient is a 89-year-old female with history of severe right knee osteoarthritis and valgus deformity.  She recently had a corticosteroid injection reportedly.  She had persistent pain and swelling in her knee.  She follows with Dr. Walker [facility] sports medicine.  Dr. Walker aspirated her right knee and the pain and swelling earlier this week.  Cultures are growing Staphylococcus lugdunensis.  Dr. Walker contacted the patient to come to the emergency department for further evaluation and management.    Repeat aspiration of the right knee 5/22/2024  92% PMN  66,726 WBC  + intracellular crystals  Gram stain pending    Right knee aspiration right knee 5/20/2024  90% PMN  3, 785  Staphylococcus lugdunensis    Plan:  - Right knee I&D tomorrow  - NPO midnight  - Admit to medicine. Appreciate cardiopulmonary clearance for OR  - Okay to start antibiotics -- vanco/ancef given +culture data  - Full consult in AM    Migue Flynn MD  [facility]

## 2024-05-23 NOTE — PLAN OF CARE
Problem: Adult Inpatient Plan of Care  Goal: Optimal Comfort and Wellbeing  Outcome: Met     Problem: Risk for Delirium  Goal: Improved Sleep  Outcome: Met     Problem: Pain Acute  Goal: Optimal Pain Control and Function  Outcome: Met  Intervention: Prevent or Manage Pain  Recent Flowsheet Documentation  Taken 5/23/2024 0039 by Dutch Lux RN  Sensory Stimulation Regulation: quiet environment promoted  Medication Review/Management: medications reviewed  Taken 5/22/2024 2200 by Dutch Lux RN  Sensory Stimulation Regulation: quiet environment promoted  Medication Review/Management: medications reviewed  Intervention: Optimize Psychosocial Wellbeing  Recent Flowsheet Documentation  Taken 5/23/2024 0039 by Dutch Lux RN  Supportive Measures: active listening utilized  Taken 5/22/2024 2200 by Dutch Lux RN  Supportive Measures: active listening utilized   Goal Outcome Evaluation:       Alert and oriented. On telemetry A-Fib rate controlled. Has a pacemaker. Painful right knee. NPO since 0000 for I&D today. On IV ABX. Scheduled Oxycontin given for pain-effective. Purewick on for voiding. Vital sign stable. Sleeping in between cares over the night. Uses call light for all needs. Will continue to monitor.    Dutch Lux RN

## 2024-05-23 NOTE — PHARMACY-VANCOMYCIN DOSING SERVICE
Pharmacy Vancomycin Initial Note  Date of Service May 22, 2024  Patient's  1930  89 year old, female    Indication: Bone and Joint Infection and Skin and Soft Tissue Infection    Current estimated CrCl = Estimated Creatinine Clearance: 25.7 mL/min (A) (based on SCr of 1.17 mg/dL (H)).    Creatinine for last 3 days  2024:  3:06 PM Creatinine 1.17 mg/dL    Recent Vancomycin Level(s) for last 3 days  No results found for requested labs within last 3 days.      Vancomycin IV Administrations (past 72 hours)                     vancomycin (VANCOCIN) 1,250 mg in sodium chloride 0.9 % 250 mL intermittent infusion (mg) 1,250 mg New Bag 24 174                    Nephrotoxins and other renal medications (From now, onward)      None            Contrast Orders - past 72 hours (72h ago, onward)      None            InsightRX Prediction of Planned Initial Vancomycin Regimen  Regimen: 750 mg IV every 24 hours.  Start time: 05:42 on 2024  Exposure target: AUC24 (range)400-600 mg/L.hr   AUC24,ss: 470 mg/L.hr  Probability of AUC24 > 400: 68 %  Ctrough,ss: 15.6 mg/L  Probability of Ctrough,ss > 20: 25 %  Probability of nephrotoxicity (Lodise MARTA ): 11 %          Plan:  Start vancomycin 750 mg IV q24h.   Vancomycin monitoring method: AUC  Vancomycin therapeutic monitoring goal: 400-600 mg*h/L  Pharmacy will check vancomycin levels as appropriate in 1-3 Days.    Serum creatinine levels will be ordered daily for the first week of therapy and at least twice weekly for subsequent weeks.      Toña Olsen [facility]

## 2024-05-23 NOTE — PROGRESS NOTES
Pt. Admitted to room 332 from ED. Alert and oriented. Ambulated from WC to bed with some pain right knee. Telemetry Afib. Scheduled pain med for knee given. Oriented to unit and plan. NPO 0000 for I&D tomorrow. Blood cultures ordered. IV ABX. Pain control. Will continue to monitor.    Dutch Lux RN

## 2024-05-23 NOTE — PLAN OF CARE
Patient AO x4, pain in right knee. Assist of one with walker to the bathroom. NPO at midnight, echo ordered.     Miryam Ro RN

## 2024-05-23 NOTE — ANESTHESIA PROCEDURE NOTES
Airway       Patient location during procedure: OR       Procedure Start/Stop Times: 5/23/2024 2:17 PM and 5/23/2024 2:17 PM  Staff -        CRNA: Sanchez Murray APRN CRNA       Performed By: CRNAIndications and Patient Condition       Indications for airway management: deniz-procedural       Induction type:intravenous       Mask difficulty assessment: 0 - not attempted    Final Airway Details       Final airway type: supraglottic airway    Supraglottic Airway Details        Type: LMA       Brand: Ambu AuraGain       LMA size: 4    Post intubation assessment        Placement verified by: capnometry, equal breath sounds and chest rise        Number of attempts at approach: 1       Secured with: tape       Ease of procedure: easy       Dentition: Intact    Medication(s) Administered   Medication Administration Time: 5/23/2024 2:17 PM    Additional Comments       Easy LMA placement.  No oral trauma.

## 2024-05-23 NOTE — PROGRESS NOTES
"Patient scheduled for R knee I&D at 1400. Transport on the way to pick her up now. Phone report given to receiving RN in [place], [place], earlier this morning. Patient has been NPO since yesterday. Sips of water with meds last at 1125. A&O x 4, pleasant. POC reviewed with patient and is in agreement to proceed with I&D, continue receiving IV abx and IV steroids. VSS. Tele: A-fib, rate controlled 70's bpm, ST elevation V1. S/p pacemaker 2 wks ago. Denies CP, sob, dizziness, palpitations, n/v. PRN Oxycodone 5 mg given at 0850 for c/o 8/10 R knee pain with movement. Pain down to a 6, \"If I don't move it, it feels fine.\" Eliquis has been on hold since admission on 5/22. HGB 10.1, no s/s of active bleeding. Cr 1.04, down from 1.17. Voided 350 cc via Purewick this shift. BC pending. Gold ring and life alert wristband taken off, placed with her belongings in her bag inside closet. Will go down with glasses. No dentures. CHG bath done mid morning today. 18g PIV in R AC flushed this morning, patent. Ortho following.  "

## 2024-05-23 NOTE — ANESTHESIA PREPROCEDURE EVALUATION
"Anesthesia Pre-Procedure Evaluation    Patient: Gladys Ramirez   MRN: [id number] : 1930        Procedure : Procedure(s):  INCISION AND DRAINAGE, KNEE  ARTHROSCOPY, KNEE          Past Medical History:   Diagnosis Date    Angina pectoris (H24)     Atrial fibrillation (H)     Chest pain 2017    Chronic kidney disease     Congestive heart failure (H)     Cough     Hyperlipidemia     Hypertension     Osteoarthritis       Past Surgical History:   Procedure Laterality Date    APPENDECTOMY      BASAL CELL CARCINOMA EXCISION      nose    EP PACEMAKER DEVICE & IMPLANT- HIS LEAD DUAL N/A 2024    Procedure: Pacemaker Device & Lead Implant - HIS Lead Dual;  Surgeon: Jeannie Rivera MD;  Location: [facility] CATH LAB CV    LAPAROSCOPY DIAGNOSTIC (GENERAL) N/A 2014    Procedure: LAPAROSCOPY BILATERAL SALPINGO-OOPHORECTOMY ;  Surgeon: Sofia Harper MD;  Location: [facility];  Service:     OPEN REDUCTION INTERNAL FIXATION HIP BIPOLAR Right 3/5/2023    Procedure: HEMIARTHROPLASTY, HIP, BIPOLAR;  Surgeon: Jose G Martinez MD;  Location: [facility]    TONSILLECTOMY      10 years old    ZZC TOTAL KNEE ARTHROPLASTY Left           Allergies   Allergen Reactions    Trazodone Shortness Of Breath and Unknown     Allergic to trazodone and deriv., Other: trouble swallowing      Clindamycin Diarrhea     C-diff    Gabapentin Other (See Comments)     \"Internal tremors\"    Temazepam Other (See Comments)     Annotation: Nightmares        Social History     Tobacco Use    Smoking status: Never     Passive exposure: Never    Smokeless tobacco: Never    Tobacco comments:     no passive exposure   Substance Use Topics    Alcohol use: Yes     Comment: Alcoholic Drinks/day: Rarely a glass of wine      Wt Readings from Last 1 Encounters:   24 66.7 kg (147 lb)        Anesthesia Evaluation   Pt has had prior anesthetic.     No history of anesthetic complications       ROS/MED HX  ENT/Pulmonary: " " - neg pulmonary ROS     Neurologic:  - neg neurologic ROS     Cardiovascular:     (+) Dyslipidemia hypertension- -   -  - -   Taking blood thinners   CHF  Last EF: 20-25   JONES.   pacemaker,          dysrhythmias, a-fib,             METS/Exercise Tolerance: >4 METS    Hematologic:  - neg hematologic  ROS     Musculoskeletal:   (+)  arthritis,             GI/Hepatic:     (+) GERD,                   Renal/Genitourinary:     (+) renal disease, type: CRI,            Endo:     (+)          thyroid problem, hypothyroidism,           Psychiatric/Substance Use:  - neg psychiatric ROS     Infectious Disease:     (+) Recent Fever,           Malignancy:  - neg malignancy ROS     Other:  - neg other ROS          Physical Exam    Airway  airway exam normal      Mallampati: II   TM distance: > 3 FB   Neck ROM: full   Mouth opening: > 3 cm    Respiratory Devices and Support         Dental  no notable dental history         Cardiovascular   cardiovascular exam normal          Pulmonary   pulmonary exam normal                OUTSIDE LABS:  CBC:   Lab Results   Component Value Date    WBC 5.8 05/23/2024    WBC 8.0 05/22/2024    HGB 10.1 (L) 05/23/2024    HGB 11.3 (L) 05/22/2024    HCT 32.1 (L) 05/23/2024    HCT 35.7 05/22/2024     05/23/2024     (H) 05/22/2024     BMP:   Lab Results   Component Value Date     05/23/2024     05/22/2024    POTASSIUM 4.6 05/23/2024    POTASSIUM 4.6 05/22/2024    CHLORIDE 102 05/23/2024    CHLORIDE 96 (L) 05/22/2024    CO2 25 05/23/2024    CO2 28 05/22/2024    BUN 21.9 05/23/2024    BUN 23.3 (H) 05/22/2024    CR 1.04 (H) 05/23/2024    CR 1.17 (H) 05/22/2024     (H) 05/23/2024     (H) 05/23/2024     COAGS:   Lab Results   Component Value Date    PTT 32 02/21/2019    INR 1.33 (H) 03/04/2023     POC: No results found for: \"BGM\", \"HCG\", \"HCGS\"  HEPATIC:   Lab Results   Component Value Date    ALBUMIN 3.3 (L) 05/22/2024    PROTTOTAL 7.2 05/22/2024    ALT 11 05/22/2024 "    AST 18 05/22/2024    ALKPHOS 118 05/22/2024    BILITOTAL 0.3 05/22/2024     OTHER:   Lab Results   Component Value Date    LACT 1.5 05/22/2024    A1C 6.0 (H) 06/15/2021    MARLENE 9.5 05/23/2024    PHOS 3.4 12/22/2023    MAG 2.1 12/24/2023    LIPASE <9 02/21/2019    TSH 6.25 (H) 12/29/2023    T4 1.03 12/29/2023    CRP 0.8 03/05/2018    SED 92 (H) 05/22/2024       Anesthesia Plan    ASA Status:  4    NPO Status:  NPO Appropriate    Anesthesia Type: General.     - Airway: LMA   Induction: Intravenous, Propofol.   Maintenance: Balanced.   Techniques and Equipment:       - Drips/Meds: Ketamine, Vasopressin, Phenylephrine     Consents    Anesthesia Plan(s) and associated risks, benefits, and realistic alternatives discussed. Questions answered and patient/representative(s) expressed understanding.     - Discussed:     - Discussed with:  Patient      - Extended Intubation/Ventilatory Support Discussed: No.      - Patient is DNR/DNI Status: Yes             Suspend during perioperative period? Yes.    Use of blood products discussed: No .     Postoperative Care    Pain management: IV analgesics, Multi-modal analgesia.     - Plan for long acting post-op opioid use   PONV prophylaxis: Ondansetron (or other 5HT-3), Dexamethasone or Solumedrol, Droperidol or Haldol     Comments:               Scout Ro MD    I have reviewed the pertinent notes and labs in the chart from the past 30 days and (re)examined the patient.  Any updates or changes from those notes are reflected in this note.          # Hypoalbuminemia: Lowest albumin = 3.3 g/dL at 5/22/2024  3:06 PM, will monitor as appropriate   # Drug Induced Coagulation Defect: home medication list includes an anticoagulant medication   # Overweight: Estimated body mass index is 28.71 kg/m  as calculated from the following:    Height as of this encounter: 1.524 m (5').    Weight as of this encounter: 66.7 kg (147 lb).

## 2024-05-23 NOTE — CONSULTS
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  The last 2 weeks her pain has steadily worsening and has been refractory to her most recent injection.  Aspiration as an outpatient grew Staphylococcus lugdunensis.  Repeat aspiration demonstrates cell count of 66,000, 92% PMN.  Findings suspicious for septic arthritis of the native knee joint     PLAN:  This patient was discussed with Dr. Flynn, on-call surgeon for [facility] and they are in agreement with the following plan.   -Plan for right knee I/D with Dr. Monahan later today.  I discussed risk/benefits of the surgery and she is again in agreement with proceeding   - n.p.o. until surgery  -Pain regimen per primary team  -Weight-bear as tolerated right lower extremity  -Infectious disease consult  -Currently on Vanco per primary team  -Hold anticoagulation.  On Eliquis at home  -Medical optimization per primary team.  Hemoglobin 10.1.  Echocardiogram ordered and completed at this time.  Will ensure patient is cleared for surgery today    Thank you for including [facility] in the care of Gladys Ramirez. It has been a pleasure participating in their care.        CHIEF COMPLAINT: <principal problem not specified>    HISTORY OF PRESENT ILLNESS:  The patient is seen in orthopedic consultation at the request of Bry Casanova MD for right knee pain.  The patient is a 89 year old female    Today patient is experiencing  "right knee pain and swelling.  She has a 2-year history of right knee osteoarthritis.  She has been seen Dr. Lema has an outpatient who has been doing right knee injections.  Typically she has pretty good relief with corticosteroid injections.  She does note that at her last visit she got a different type of injection, likely hyaluronic acid injection.  She did not is have as good of relief from that injection and since then pain has been progressively worsening.  She had an aspiration done at [facility] and was sent to the ED for further evaluation.  At this time today she still notes pain in her right knee that she describes as 10/10 and cannot bear weight on the right leg.  She denies fever/chills at this time.  No history of surgery on the right knee in the past.  At baseline she does not ambulate much.  She uses a sitting walker to get around.  She lives in an apartment by herself but has help during the daytime    ALLERGIES:   Review of patient's allergies indicates   Allergies   Allergen Reactions    Trazodone Shortness Of Breath and Unknown     Allergic to trazodone and deriv., Other: trouble swallowing      Clindamycin Diarrhea     C-diff    Gabapentin Other (See Comments)     \"Internal tremors\"    Temazepam Other (See Comments)     Annotation: Nightmares           MEDICATIONS UPON ADMISSION:  Medications were reviewed.  They include:   Facility-Administered Medications Prior to Admission   Medication Dose Route Frequency Provider Last Rate Last Admin    4 mL ropivacaine (NAROPIN) injection 5 mg/mL  4 mL      4 mL at 05/20/24 0758    ketorolac (TORADOL) injection 30 mg  30 mg      30 mg at 05/20/24 0758    lidocaine 1 % injection 3 mL  3 mL      3 mL at 05/20/24 0758     Medications Prior to Admission   Medication Sig Dispense Refill Last Dose    acetaminophen (TYLENOL) 500 MG tablet Take 1-2 tablets (500-1,000 mg) by mouth 3 times daily as needed for mild pain 250 tablet 1 Past Month at " unknown    amiodarone (PACERONE) 200 MG tablet Take 0.5 tablets (100 mg) by mouth daily 45 tablet 0 5/22/2024 at AM    apixaban ANTICOAGULANT (ELIQUIS) 5 MG tablet Take 1 tablet (5 mg) by mouth 2 times daily 60 tablet 3 5/22/2024 at AM    cholecalciferol, vitamin D3, (VITAMIN D3) 2,000 unit Tab [CHOLECALCIFEROL, VITAMIN D3, (VITAMIN D3) 2,000 UNIT TAB] Take 1 tablet (2,000 Units total) by mouth daily. 90 tablet 3 5/22/2024 at AM    diclofenac (FLECTOR) 1.3 % patch Externally apply 1 patch topically 2 times daily   Past Week at unknown    DULoxetine (CYMBALTA) 60 MG capsule Take 60 mg by mouth daily   5/22/2024 at AM    furosemide (LASIX) 40 MG tablet Take 1 tablet (40 mg) by mouth every morning 90 tablet 3 5/22/2024 at AM    lisinopril (ZESTRIL) 2.5 MG tablet TAKE 1 TABLET BY MOUTH ONE TIME DAILY 90 tablet 0 5/22/2024 at AM    metoprolol succinate ER (TOPROL XL) 25 MG 24 hr tablet Take 1 tablet (25 mg) by mouth daily 90 tablet 3 5/22/2024 at AM    oxyCODONE (OXYCONTIN) 10 MG 12 hr tablet Take 1 tablet (10 mg) by mouth every 12 hours for 30 days Reduce Oxycodone 5 mg to 2 tablets per day. 60 tablet 0 5/22/2024 at 1000    oxyCODONE (ROXICODONE) 5 MG tablet Take 1 tablet (5 mg) by mouth every 4 hours as needed for moderate to severe pain (Max 4 tabs per day) #120 tabs to last 30 days for chronic pain. Ok to fill and start May 2, 2024 120 tablet 0 5/22/2024 at 0600    potassium chloride tamiko ER (KLOR-CON M20) 20 MEQ CR tablet Take 1 tablet (20 mEq) by mouth daily 90 tablet 3 5/22/2024 at AM    UNABLE TO FIND Apply 2-4 Pump topically as needed MEDICATION NAME: Ketamine 8% - Ketoprofen 5% in carrier gel   5/21/2024 at unknown    UNABLE TO FIND Apply 4 g topically 4 times daily as needed (chronic pain syndrome) MEDICATION NAME: diclofenac sod, micro (bulk) 100 % 5 %, ibuprofen (bulk) 100 % 10 %, lidocaine (bulk) 5 %   Indications: Chronic pain syndrome Apply 4 g topically 4 (four) times a day as needed. 120 g      Diclofenac 5%, Ibuprofen 10%, Lidocaine 5% compounded cream   Past Week at unknown         SOCIAL HISTORY:   she  reports that she has never smoked. She has never been exposed to tobacco smoke. She has never used smokeless tobacco. She reports current alcohol use. She reports that she does not use drugs.      FAMILY HISTORY:  family history includes Cancer in her brother and sister; Heart Disease in her mother; Lung Cancer in her brother, brother, and sister; No Known Problems in her father; Rheumatic Heart Disease in her mother.      REVIEW OF SYSTEMS:   Reviewed with patient. See HPI, otherwise negative       PHYSICAL EXAMINATION:  Vitals: /74 (BP Location: Left arm)   Pulse 87   Temp 97.6  F (36.4  C) (Oral)   Resp 18   Ht 1.524 m (5')   Wt 66.7 kg (147 lb)   SpO2 94%   BMI 28.71 kg/m    General: On examination, the patient is resting comfortably, NAD, awake, and alert and oriented to person, place, time, and, and general circumstances   SKIN: There is 2+ effusion of the right knee.  Minimal erythema.  No breaks in the skin besides from recent aspiration  Pulses:  Dorsalis pedis and posterior tibialis pulse is intact and equal bilaterally  Sensation: intact and equal bilaterally to the distal lower extremities.  Tenderness: Medial joint line tenderness  ROM: She does demonstrate active range of motion about 0 to 30 degrees.  There is crepitus on movement and minimal pain with active movement.  Passively can arc the knee from 0 to 60 degrees with pain at extremes of flexion..  DF/PF intact and can wiggle toes.  Motor: TIB ANT, PF, ELH, QUAD, HF 5/5  Contralateral side= Full range of motion, Negative joint instability findings, 5/5 motor groups about the joint, Non-tender.       RADIOGRAPHIC EVALUATION:  Personally reviewed      PERTINENT LABS:  Personally reviewed  Recent Labs   Lab Test 05/23/24  0421 03/05/23  0726 03/04/23  1905   INR  --   --  1.33*   HGB 10.1*   < > 12.5      < > 225     < > = values in this interval not displayed.       5/20  Culture 1+ Staphylococcus lugdunensis Abnormal                Gram Stain Result No organisms seen      4+ WBC seen             Component  Ref Range & Units 3 d ago     Color  Colorless, Yellow Yellow    Clarity  Clear Cloudy Abnormal     Cell Count Fluid Source Knee, Right    Total Nucleated Cells  /uL 3,785       5/22    Component  Ref Range & Units 1 d ago 3 d ago     Color  Colorless, Yellow Red Abnormal  Yellow    Clarity  Clear Turbid Abnormal  Cloudy Abnormal     Cell Count Fluid Source Knee, Right Knee, Right    Total Nucleated Cells  /uL 66,726 3,785       SALVATORE NAVA PA-C  Date: 5/23/2024  Time: 9:14 AM  [facility]    CC1:   Bry Casanova MD

## 2024-05-23 NOTE — PROGRESS NOTES
Care Management Follow Up    Length of Stay (days): 1    Expected Discharge Date: 05/24/2024    Anticipated Discharge Plan:   TBD, awaiting PT/OT recs    Transportation: TBD    PT Recommendations:    OT Recommendations:        Barriers to Discharge: medical stability, IV abx, I&D today    Prior Living Situation: Lives in an apartment alone    Advanced Directive on File: no concerns identified     Patient/Spokesperson Updated: No    Additional Information:  Chart reviewed: pt not medically ready to discharge. Pt remains on IV ABX and IV steroids. Ortho plan is I&D today. CM to follow for medical progression, team recommendations and final discharge plans.    Mari Edmonds RN

## 2024-05-23 NOTE — PROGRESS NOTES
SPIRITUAL HEALTH SERVICES Note     Saw pt Gladys Ramirez and administered Mandaen sacrament of anointing for the healing of the sick.    Fr. Yaw Perez

## 2024-05-23 NOTE — INTERVAL H&P NOTE
I have reviewed the surgical (or preoperative) H&P that is linked to this encounter, and examined the patient. There are no significant changes    Clinical Conditions Present on Arrival:  Clinically Significant Risk Factors Present on Admission              # Hypoalbuminemia: Lowest albumin = 3.3 g/dL at 5/22/2024  3:06 PM, will monitor as appropriate   # Drug Induced Coagulation Defect: home medication list includes an anticoagulant medication   # Overweight: Estimated body mass index is 28.71 kg/m  as calculated from the following:    Height as of this encounter: 1.524 m (5').    Weight as of this encounter: 66.7 kg (147 lb).

## 2024-05-23 NOTE — ANESTHESIA CARE TRANSFER NOTE
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]      Anesthesia Type:   General     Note:    Oropharynx: oropharynx clear of all foreign objects and spontaneously breathing  Level of Consciousness: drowsy  Oxygen Supplementation: face mask  Level of Supplemental Oxygen (L/min / FiO2): 4  Independent Airway: airway patency satisfactory and stable  Dentition: dentition unchanged  Vital Signs Stable: post-procedure vital signs reviewed and stable  Report to RN Given: handoff report given  Patient transferred to: PACU    Handoff Report: Identifed the Patient, Identified the Reponsible Provider, Reviewed the pertinent medical history, Discussed the surgical course, Reviewed Intra-OP anesthesia mangement and issues during anesthesia, Set expectations for post-procedure period and Allowed opportunity for questions and acknowledgement of understanding      Vitals:  Vitals Value Taken Time   /66 05/23/24 1530   Temp     Pulse 80 05/23/24 1533   Resp 12 05/23/24 1533   SpO2 97 % on 4 L/min Mask 05/23/24 1533     Electronically Signed By: SHASHA Elder CRNA  May 23, 2024  3:34 PM

## 2024-05-23 NOTE — OR NURSING
I called and spoke with Dr Monahan and informed him Gladys's last dose of Eliquis was yesterday morning and today her creat cl is 29.  He said it is ok to proceed with the right knee I+D today at 1400.

## 2024-05-24 ENCOUNTER — APPOINTMENT (OUTPATIENT)
Dept: RADIOLOGY | Facility: HOSPITAL | Age: 89
DRG: 486 | End: 2024-05-24
Attending: INTERNAL MEDICINE
Payer: COMMERCIAL

## 2024-05-24 ENCOUNTER — APPOINTMENT (OUTPATIENT)
Dept: PHYSICAL THERAPY | Facility: HOSPITAL | Age: 89
DRG: 486 | End: 2024-05-24
Attending: STUDENT IN AN ORGANIZED HEALTH CARE EDUCATION/TRAINING PROGRAM
Payer: COMMERCIAL

## 2024-05-24 ENCOUNTER — APPOINTMENT (OUTPATIENT)
Dept: OCCUPATIONAL THERAPY | Facility: HOSPITAL | Age: 89
DRG: 486 | End: 2024-05-24
Attending: STUDENT IN AN ORGANIZED HEALTH CARE EDUCATION/TRAINING PROGRAM
Payer: COMMERCIAL

## 2024-05-24 LAB
CREAT SERPL-MCNC: 1.12 MG/DL (ref 0.51–0.95)
EGFRCR SERPLBLD CKD-EPI 2021: 46 ML/MIN/1.73M2

## 2024-05-24 PROCEDURE — 97535 SELF CARE MNGMENT TRAINING: CPT | Mod: GO

## 2024-05-24 PROCEDURE — 250N000009 HC RX 250: Performed by: INTERNAL MEDICINE

## 2024-05-24 PROCEDURE — 97165 OT EVAL LOW COMPLEX 30 MIN: CPT | Mod: GO

## 2024-05-24 PROCEDURE — 120N000004 HC R&B MS OVERFLOW

## 2024-05-24 PROCEDURE — 71045 X-RAY EXAM CHEST 1 VIEW: CPT

## 2024-05-24 PROCEDURE — 250N000011 HC RX IP 250 OP 636: Performed by: STUDENT IN AN ORGANIZED HEALTH CARE EDUCATION/TRAINING PROGRAM

## 2024-05-24 PROCEDURE — 250N000013 HC RX MED GY IP 250 OP 250 PS 637: Performed by: STUDENT IN AN ORGANIZED HEALTH CARE EDUCATION/TRAINING PROGRAM

## 2024-05-24 PROCEDURE — 97116 GAIT TRAINING THERAPY: CPT | Mod: GP

## 2024-05-24 PROCEDURE — 97161 PT EVAL LOW COMPLEX 20 MIN: CPT | Mod: GP

## 2024-05-24 PROCEDURE — 99254 IP/OBS CNSLTJ NEW/EST MOD 60: CPT | Performed by: INTERNAL MEDICINE

## 2024-05-24 PROCEDURE — 272N000450 HC KIT 4FR POWER PICC SINGLE LUMEN

## 2024-05-24 PROCEDURE — 250N000011 HC RX IP 250 OP 636: Performed by: INTERNAL MEDICINE

## 2024-05-24 PROCEDURE — 99232 SBSQ HOSP IP/OBS MODERATE 35: CPT | Performed by: INTERNAL MEDICINE

## 2024-05-24 PROCEDURE — 36569 INSJ PICC 5 YR+ W/O IMAGING: CPT

## 2024-05-24 PROCEDURE — 250N000013 HC RX MED GY IP 250 OP 250 PS 637: Performed by: PHYSICIAN ASSISTANT

## 2024-05-24 PROCEDURE — 36415 COLL VENOUS BLD VENIPUNCTURE: CPT | Performed by: STUDENT IN AN ORGANIZED HEALTH CARE EDUCATION/TRAINING PROGRAM

## 2024-05-24 PROCEDURE — 82565 ASSAY OF CREATININE: CPT | Performed by: STUDENT IN AN ORGANIZED HEALTH CARE EDUCATION/TRAINING PROGRAM

## 2024-05-24 RX ORDER — HYDROMORPHONE HCL IN WATER/PF 6 MG/30 ML
0.1 PATIENT CONTROLLED ANALGESIA SYRINGE INTRAVENOUS
Status: DISCONTINUED | OUTPATIENT
Start: 2024-05-24 | End: 2024-05-29 | Stop reason: HOSPADM

## 2024-05-24 RX ORDER — ONDANSETRON 4 MG/1
4 TABLET, ORALLY DISINTEGRATING ORAL EVERY 6 HOURS PRN
Status: DISCONTINUED | OUTPATIENT
Start: 2024-05-24 | End: 2024-05-29 | Stop reason: HOSPADM

## 2024-05-24 RX ORDER — ACETAMINOPHEN 325 MG/1
975 TABLET ORAL EVERY 8 HOURS
Qty: 27 TABLET | Refills: 0 | Status: COMPLETED | OUTPATIENT
Start: 2024-05-24 | End: 2024-05-27

## 2024-05-24 RX ORDER — CEFAZOLIN SODIUM 1 G/3ML
1 INJECTION, POWDER, FOR SOLUTION INTRAMUSCULAR; INTRAVENOUS EVERY 8 HOURS
Qty: 10 ML | Refills: 0 | Status: DISCONTINUED | OUTPATIENT
Start: 2024-05-24 | End: 2024-05-24

## 2024-05-24 RX ORDER — ONDANSETRON 2 MG/ML
4 INJECTION INTRAMUSCULAR; INTRAVENOUS EVERY 6 HOURS PRN
Status: DISCONTINUED | OUTPATIENT
Start: 2024-05-24 | End: 2024-05-29 | Stop reason: HOSPADM

## 2024-05-24 RX ORDER — LIDOCAINE 40 MG/G
CREAM TOPICAL
Status: ACTIVE | OUTPATIENT
Start: 2024-05-24 | End: 2024-05-27

## 2024-05-24 RX ORDER — BISACODYL 10 MG
10 SUPPOSITORY, RECTAL RECTAL DAILY PRN
Status: DISCONTINUED | OUTPATIENT
Start: 2024-05-26 | End: 2024-05-29 | Stop reason: HOSPADM

## 2024-05-24 RX ORDER — LIDOCAINE 40 MG/G
CREAM TOPICAL
Status: DISCONTINUED | OUTPATIENT
Start: 2024-05-24 | End: 2024-05-29 | Stop reason: HOSPADM

## 2024-05-24 RX ORDER — AMOXICILLIN 250 MG
2 CAPSULE ORAL 2 TIMES DAILY PRN
Qty: 60 TABLET | Refills: 0 | Status: SHIPPED | OUTPATIENT
Start: 2024-05-24 | End: 2024-09-11

## 2024-05-24 RX ORDER — PROCHLORPERAZINE MALEATE 5 MG
5 TABLET ORAL EVERY 6 HOURS PRN
Status: DISCONTINUED | OUTPATIENT
Start: 2024-05-24 | End: 2024-05-29 | Stop reason: HOSPADM

## 2024-05-24 RX ORDER — ACETAMINOPHEN 325 MG/1
650 TABLET ORAL EVERY 4 HOURS PRN
Status: DISCONTINUED | OUTPATIENT
Start: 2024-05-26 | End: 2024-05-29 | Stop reason: HOSPADM

## 2024-05-24 RX ORDER — HYDROMORPHONE HCL IN WATER/PF 6 MG/30 ML
0.2 PATIENT CONTROLLED ANALGESIA SYRINGE INTRAVENOUS
Status: DISCONTINUED | OUTPATIENT
Start: 2024-05-24 | End: 2024-05-29 | Stop reason: HOSPADM

## 2024-05-24 RX ORDER — AMOXICILLIN 250 MG
1 CAPSULE ORAL 2 TIMES DAILY
Status: DISCONTINUED | OUTPATIENT
Start: 2024-05-24 | End: 2024-05-29 | Stop reason: HOSPADM

## 2024-05-24 RX ORDER — SODIUM CHLORIDE, SODIUM LACTATE, POTASSIUM CHLORIDE, CALCIUM CHLORIDE 600; 310; 30; 20 MG/100ML; MG/100ML; MG/100ML; MG/100ML
INJECTION, SOLUTION INTRAVENOUS CONTINUOUS
Status: DISCONTINUED | OUTPATIENT
Start: 2024-05-24 | End: 2024-05-24

## 2024-05-24 RX ORDER — OXYCODONE HYDROCHLORIDE 5 MG/1
5 TABLET ORAL EVERY 4 HOURS PRN
Status: DISCONTINUED | OUTPATIENT
Start: 2024-05-24 | End: 2024-05-29 | Stop reason: HOSPADM

## 2024-05-24 RX ORDER — CEFAZOLIN SODIUM 1 G/3ML
1 INJECTION, POWDER, FOR SOLUTION INTRAMUSCULAR; INTRAVENOUS EVERY 8 HOURS
Status: DISCONTINUED | OUTPATIENT
Start: 2024-05-24 | End: 2024-05-24

## 2024-05-24 RX ORDER — HYDROXYZINE HYDROCHLORIDE 10 MG/1
10 TABLET, FILM COATED ORAL EVERY 6 HOURS PRN
Status: DISCONTINUED | OUTPATIENT
Start: 2024-05-24 | End: 2024-05-29 | Stop reason: HOSPADM

## 2024-05-24 RX ORDER — POLYETHYLENE GLYCOL 3350 17 G/17G
17 POWDER, FOR SOLUTION ORAL DAILY
Status: DISCONTINUED | OUTPATIENT
Start: 2024-05-24 | End: 2024-05-29 | Stop reason: HOSPADM

## 2024-05-24 RX ORDER — CEFAZOLIN SODIUM 1 G/3ML
1 INJECTION, POWDER, FOR SOLUTION INTRAMUSCULAR; INTRAVENOUS EVERY 12 HOURS
Status: DISCONTINUED | OUTPATIENT
Start: 2024-05-24 | End: 2024-05-29 | Stop reason: HOSPADM

## 2024-05-24 RX ORDER — ACETAMINOPHEN 325 MG/1
975 TABLET ORAL EVERY 8 HOURS
Qty: 100 TABLET | Refills: 0 | Status: SHIPPED | OUTPATIENT
Start: 2024-05-24 | End: 2024-05-29

## 2024-05-24 RX ADMIN — OXYCODONE HYDROCHLORIDE 5 MG: 5 TABLET ORAL at 05:40

## 2024-05-24 RX ADMIN — OXYCODONE HYDROCHLORIDE 2.5 MG: 5 TABLET ORAL at 18:32

## 2024-05-24 RX ADMIN — DULOXETINE HYDROCHLORIDE 60 MG: 60 CAPSULE, DELAYED RELEASE ORAL at 08:19

## 2024-05-24 RX ADMIN — AMIODARONE HYDROCHLORIDE 100 MG: 100 TABLET ORAL at 08:18

## 2024-05-24 RX ADMIN — METHYLPREDNISOLONE SODIUM SUCCINATE 40 MG: 40 INJECTION INTRAMUSCULAR; INTRAVENOUS at 13:36

## 2024-05-24 RX ADMIN — OXYCODONE HYDROCHLORIDE 5 MG: 5 TABLET ORAL at 13:36

## 2024-05-24 RX ADMIN — ACETAMINOPHEN 975 MG: 325 TABLET ORAL at 22:12

## 2024-05-24 RX ADMIN — CEFAZOLIN 1 G: 1 INJECTION, POWDER, FOR SOLUTION INTRAMUSCULAR; INTRAVENOUS at 17:12

## 2024-05-24 RX ADMIN — OXYCODONE HYDROCHLORIDE 5 MG: 5 TABLET ORAL at 09:29

## 2024-05-24 RX ADMIN — APIXABAN 5 MG: 5 TABLET, FILM COATED ORAL at 20:19

## 2024-05-24 RX ADMIN — POTASSIUM CHLORIDE 20 MEQ: 1500 TABLET, EXTENDED RELEASE ORAL at 08:19

## 2024-05-24 RX ADMIN — OXYCODONE HYDROCHLORIDE 10 MG: 10 TABLET, FILM COATED, EXTENDED RELEASE ORAL at 22:13

## 2024-05-24 RX ADMIN — METHYLPREDNISOLONE SODIUM SUCCINATE 40 MG: 40 INJECTION INTRAMUSCULAR; INTRAVENOUS at 05:40

## 2024-05-24 RX ADMIN — METOPROLOL SUCCINATE 25 MG: 25 TABLET, EXTENDED RELEASE ORAL at 08:20

## 2024-05-24 RX ADMIN — CEFAZOLIN 1 G: 1 INJECTION, POWDER, FOR SOLUTION INTRAMUSCULAR; INTRAVENOUS at 05:40

## 2024-05-24 RX ADMIN — FUROSEMIDE 40 MG: 20 TABLET ORAL at 08:20

## 2024-05-24 RX ADMIN — SENNOSIDES AND DOCUSATE SODIUM 1 TABLET: 50; 8.6 TABLET ORAL at 20:18

## 2024-05-24 RX ADMIN — ACETAMINOPHEN 975 MG: 325 TABLET ORAL at 13:36

## 2024-05-24 RX ADMIN — POLYETHYLENE GLYCOL 3350 17 G: 17 POWDER, FOR SOLUTION ORAL at 11:47

## 2024-05-24 RX ADMIN — METHYLPREDNISOLONE SODIUM SUCCINATE 40 MG: 40 INJECTION INTRAMUSCULAR; INTRAVENOUS at 22:11

## 2024-05-24 RX ADMIN — LIDOCAINE HYDROCHLORIDE 2 ML: 10 INJECTION, SOLUTION EPIDURAL; INFILTRATION; INTRACAUDAL; PERINEURAL at 14:30

## 2024-05-24 RX ADMIN — OXYCODONE HYDROCHLORIDE 10 MG: 10 TABLET, FILM COATED, EXTENDED RELEASE ORAL at 11:47

## 2024-05-24 ASSESSMENT — ACTIVITIES OF DAILY LIVING (ADL)
ADLS_ACUITY_SCORE: 39
ADLS_ACUITY_SCORE: 32
ADLS_ACUITY_SCORE: 39
ADLS_ACUITY_SCORE: 32
ADLS_ACUITY_SCORE: 39
ADLS_ACUITY_SCORE: 36
ADLS_ACUITY_SCORE: 36
ADLS_ACUITY_SCORE: 39
ADLS_ACUITY_SCORE: 32
ADLS_ACUITY_SCORE: 31
ADLS_ACUITY_SCORE: 39
ADLS_ACUITY_SCORE: 36
ADLS_ACUITY_SCORE: 39
ADLS_ACUITY_SCORE: 32
ADLS_ACUITY_SCORE: 32

## 2024-05-24 NOTE — PROGRESS NOTES
Care Management Follow Up    Length of Stay (days): 2    Expected Discharge Date: 05/26/2024     Concerns to be Addressed: discharge planning     Patient plan of care discussed at interdisciplinary rounds: Yes    Anticipated Discharge Disposition:       Anticipated Discharge Services:    Anticipated Discharge DME:      Patient/family educated on Medicare website which has current facility and service quality ratings:    Education Provided on the Discharge Plan:    Patient/Family in Agreement with the Plan:      Referrals Placed by CM/SW:    Private pay costs discussed: Not applicable    Additional Information:  Therapy recommendation is TCU. CM went to pt room to discuss this. Pt is in agreement with this plan and would like to look over the TCU list. I told her that we would like to have at least five TCU choices to send referrals to. Pt was understanding of this. CM will meet up with pt tomorrow to go over TCU choices.    Mari Edmonds RN

## 2024-05-24 NOTE — ANESTHESIA POSTPROCEDURE EVALUATION
Patient: Gladys Ramirez    Procedure: Procedure(s):  INCISION AND DRAINAGE, KNEE  ARTHROSCOPY, KNEE       Anesthesia Type:  General    Note:  Disposition: Inpatient   Postop Pain Control: Uneventful            Sign Out: Well controlled pain   PONV: No   Neuro/Psych: Uneventful            Sign Out: Acceptable/Baseline neuro status   Airway/Respiratory: Uneventful            Sign Out: Acceptable/Baseline resp. status   CV/Hemodynamics: Uneventful            Sign Out: Acceptable CV status; No obvious hypovolemia; No obvious fluid overload   Other NRE: NONE   DID A NON-ROUTINE EVENT OCCUR? No           Last vitals:  Vitals Value Taken Time   /86 05/23/24 1615   Temp 36  C (96.8  F) 05/23/24 1533   Pulse 83 05/23/24 1632   Resp 19 05/23/24 1629   SpO2 96 % 05/23/24 1638   Vitals shown include unfiled device data.    Electronically Signed By: Gloria Smith MD  May 23, 2024  9:09 PM

## 2024-05-24 NOTE — PROGRESS NOTES
[facility]    Medicine Progress Note - Hospitalist Service    Date of Admission:  5/22/2024    Assessment & Plan         Gladys Ramirez is a 89 year old female admitted on 5/22/2024. She presented with worsening of chronic knee pain. Joint recently aspirated.  Went aspiration grew staph lugdunensis.  Past medical history is significant for hypertension, hyperlipidemia, HFrEF, A-fib, CKD stage III.      5/23 :     S/p Right knee arthroscopic irrigation and debridement   Ortho following  Follow cultures   Continue iv cefazolin, vanco  Resume eliquis once ok with ortho    Not medically ready for discharge at this time.        A/p :        Right knee joint swelling  Septic arthritis versus gout exacerbation  -Has had acute worsening of chronic right knee joint pain.  Has elevated CRP and ESR  - 2 weeks ago patient had a steroid injection for pain.  -Few days ago went to a sports medicine clinic and got  joint aspirated.  -synovial fluid grew staph lugdunensis  -Joint aspiration repeated in ER.  Preliminary result shows positive for intracellular crystals consistent with monosodium urate crystals  -Started on cefazolin and vancomycin  -Added  steroid  -Orthopedics consult     A-fib  -PTA metoprolol succinate 25 mg oral daily  -PTA amiodarone 100 mg oral daily  -Eliquis 5 mg oral twice daily  -Patient reported that she had a pacemaker implantation 2 weeks ago. Consider pacemaker interrogation.   -Telemetry monitor     HFrEF  -Appears euvolemic clinically  -PTA Lasix 40 mg oral daily  -Echocardiogram          Diet: Regular Diet Adult    DVT Prophylaxis: Pneumatic Compression Devices  Reyes Catheter: Not present  Lines: None     Cardiac Monitoring: None  Code Status: Full Code      Clinically Significant Risk Factors           # Hypercalcemia: corrected calcium is >10.1, will monitor as appropriate    # Hypoalbuminemia: Lowest albumin = 3.3 g/dL at 5/22/2024  3:06 PM, will monitor as appropriate      # Hypertension: Noted on problem list  # Chronic heart failure with reduced ejection fraction: last echo with EF <40%       # Overweight: Estimated body mass index is 28.71 kg/m  as calculated from the following:    Height as of this encounter: 1.524 m (5').    Weight as of this encounter: 66.7 kg (147 lb)., PRESENT ON ADMISSION     # Financial/Environmental Concerns: none   # Pacemaker present       Disposition Plan     Medically Ready for Discharge: Anticipated in 2-4 Days             Bry Casanova MD  Hospitalist Service  [facility]  Securely message with [employer] (more info)  Text page via [facility] Paging/Directory   ______________________________________________________________________        Physical Exam   Vital Signs: Temp: 97.5  F (36.4  C) Temp src: Oral BP: 107/58 Pulse: 87   Resp: 18 SpO2: 94 % O2 Device: None (Room air) Oxygen Delivery: 2 LPM  Weight: 147 lbs 0 oz       GENERAL: The patient is not in any acute distressed. Awake and alert.  HEENT: Nonicteric sclerae, PERRLA, EOMI. Oropharynx clear. Moist mucous membranes. Conjunctivae appear well perfused.  HEART: Regular rate and rhythm without murmurs.  LUNGS: Clear to auscultation bilaterally. No wheezing or crackles.  ABDOMEN: Soft, positive bowel sounds, nontender.  SKIN: No rash, no excessive bruising, petechiae, or purpura.  EXTREMITIES : no rashes, no swelling in legs.  NEUROLOGIC: conscious and oriented, follows commands, no obvious focal deficits.  ROS: All other systems negative       Medical Decision Making       60 MINUTES SPENT BY ME on the date of service doing chart review, history, exam, documentation & further activities per the note.  MANAGEMENT DISCUSSED with the following over the past 24 hours: rn, patient       Data     I have personally reviewed the following data over the past 24 hrs:    5.8  \   10.1 (L)   / 446     138 102 21.9 /  132 (H)   4.6 25 1.04 (H) \

## 2024-05-24 NOTE — PLAN OF CARE
Assumed care from 1500 to 0730. A&O x 4. Assist x 2, team lift. Tele is A.fib w/ BBB (v-paced). C/O right knee pain, PRN Oxycodone given (see MAR) and ice applied. Room air. Purewick in place to suction. Call light within reach, able to make needs known. Bed alarm on for safety.    Problem: Pain Acute  Goal: Optimal Pain Control and Function  Intervention: Prevent or Manage Pain  Recent Flowsheet Documentation  Taken 5/24/2024 0541 by Katerina Jaimes RN  Sensory Stimulation Regulation: care clustered  Medication Review/Management: medications reviewed  Taken 5/24/2024 0045 by Katerina Jaimes RN  Sensory Stimulation Regulation: care clustered  Medication Review/Management: medications reviewed  Taken 5/23/2024 2101 by Katerina Jaimes RN  Sensory Stimulation Regulation: care clustered  Medication Review/Management: medications reviewed  Taken 5/23/2024 1707 by Katerina Jaimes RN  Sensory Stimulation Regulation: care clustered  Medication Review/Management: medications reviewed     Problem: Adult Inpatient Plan of Care  Goal: Absence of Hospital-Acquired Illness or Injury  Intervention: Prevent and Manage VTE (Venous Thromboembolism) Risk  Recent Flowsheet Documentation  Taken 5/24/2024 0541 by Katerina Jaimes RN  VTE Prevention/Management: SCDs (sequential compression devices) on  Taken 5/24/2024 0045 by Katerina Jaimes RN  VTE Prevention/Management: SCDs (sequential compression devices) on  Taken 5/23/2024 2101 by Katerina Jaimes RN  VTE Prevention/Management: SCDs (sequential compression devices) on  Taken 5/23/2024 1707 by Katerina Jaimes RN  VTE Prevention/Management: SCDs (sequential compression devices) on

## 2024-05-24 NOTE — PLAN OF CARE
"  Problem: Adult Inpatient Plan of Care  Goal: Plan of Care Review  Description: The Plan of Care Review/Shift note should be completed every shift.  The Outcome Evaluation is a brief statement about your assessment that the patient is improving, declining, or no change.  This information will be displayed automatically on your shift  note.  Outcome: Progressing  Flowsheets (Taken 5/24/2024 1423)  Plan of Care Reviewed With: patient  Goal: Patient-Specific Goal (Individualized)  Description: You can add care plan individualizations to a care plan. Examples of Individualization might be:  \"Parent requests to be called daily at 9am for status\", \"I have a hard time hearing out of my right ear\", or \"Do not touch me to wake me up as it startles  me\".  Outcome: Progressing  Goal: Absence of Hospital-Acquired Illness or Injury  Outcome: Progressing  Intervention: Identify and Manage Fall Risk  Recent Flowsheet Documentation  Taken 5/24/2024 0800 by Jai Gill RN  Safety Promotion/Fall Prevention:   activity supervised   clutter free environment maintained  Intervention: Prevent Skin Injury  Recent Flowsheet Documentation  Taken 5/24/2024 0800 by Jai Gill RN  Skin Protection: incontinence pads utilized  Device Skin Pressure Protection:   absorbent pad utilized/changed   adhesive use limited   tubing/devices free from skin contact  Intervention: Prevent and Manage VTE (Venous Thromboembolism) Risk  Recent Flowsheet Documentation  Taken 5/24/2024 0800 by Jai Gill RN  VTE Prevention/Management: SCDs (sequential compression devices) on  Intervention: Prevent Infection  Recent Flowsheet Documentation  Taken 5/24/2024 0800 by Jai Gill RN  Infection Prevention:   environmental surveillance performed   rest/sleep promoted  Goal: Readiness for Transition of Care  Outcome: Progressing     Problem: Risk for Delirium  Goal: Optimal Coping  Outcome: Progressing  Intervention: Optimize Psychosocial Adjustment to " Delirium  Recent Flowsheet Documentation  Taken 5/24/2024 0800 by Jai Gill RN  Supportive Measures: active listening utilized  Goal: Improved Behavioral Control  Outcome: Progressing  Intervention: Prevent and Manage Agitation  Recent Flowsheet Documentation  Taken 5/24/2024 0800 by Jai Gill RN  Environment Familiarity/Consistency: daily routine followed  Intervention: Minimize Safety Risk  Recent Flowsheet Documentation  Taken 5/24/2024 0800 by Jai Gill RN  Communication Enhancement Strategies: call light answered in person  Enhanced Safety Measures:   assistive devices when indicated   pain management  Goal: Improved Attention and Thought Clarity  Outcome: Progressing  Intervention: Maximize Cognitive Function  Recent Flowsheet Documentation  Taken 5/24/2024 0800 by Jai Gill RN  Sensory Stimulation Regulation: care clustered  Reorientation Measures:   clock in view   calendar in view     Goal Outcome Evaluation:      Plan of Care Reviewed With: patient    Pt A/O x 4, [place] & forgetful. Vitally stable & saturating well on RA - lungs clear & pt denies SOB. Tele shows a-fib with BBB, rate controlled when stationary. Dressing change performed for RLE by ortho team today. RLE incisions all CDI. Pt c/o of R-knee pain rated as severe, poor improvement following PRN meds - see MAR/flowsheet. Cultures from procedure yesterday growing staph luheavenlyunensis - ID consulted & will keep pt on longterm IV abx, pt understands this & necessity of PICC line. Will continue to monitor.     Jai Gill RN on 5/24/2024 at 2:35 PM

## 2024-05-24 NOTE — PROGRESS NOTES
Occupational Therapy      05/24/24 1140   Appointment Info   Signing Clinician's Name / Credentials (OT) Aron OTR/L   Living Environment   People in Home alone   Current Living Arrangements independent living facility   Home Accessibility no concerns   Transportation Anticipated family or friend will provide   Living Environment Comments Pt able to live in one level apartment- pt reports getting meals on wheels 3x week, groceries delivered and can perform light cooking. Pt does get assist w/ cleaning every/other week.   Self-Care   Usual Activity Tolerance good   Current Activity Tolerance fair   Regular Exercise No   Equipment Currently Used at Home walker, standard;wheelchair, manual   Fall history within last six months no   Activity/Exercise/Self-Care Comment Ind. w/ ADL tasks   Instrumental Activities of Daily Living (IADL)   IADL Comments Ind. w/ finances/meds, pt does not drive anymroe   General Information   Onset of Illness/Injury or Date of Surgery 05/22/24   Referring Physician Bry Casanova MD   Additional Occupational Profile Info/Pertinent History of Current Problem 89 year old female admitted on 5/22/2024. She presented with worsening of chronic knee pain. Joint recently aspirated.  Went aspiration grew staph lugdunensis.  Past medical history is significant for hypertension, hyperlipidemia, HFrEF, A-fib, CKD stage III.   Performance Patterns (Routines, Roles, Habits) \   Existing Precautions/Restrictions fall   Right Lower Extremity (Weight-bearing Status) weight-bearing as tolerated (WBAT)   Cognitive Status Examination   Orientation Status orientation to person, place and time   Range of Motion Comprehensive   General Range of Motion bilateral upper extremity ROM WFL   Bed Mobility   Comment (Bed Mobility) NT as pt was up in recliner- based on clinical experience from assessment pt may need support for RLE in/out of bed d/t increased pain following I & D   Transfers   Transfers sit-stand  transfer   Sit-Stand Transfer   Sit-Stand [place] (Transfers) minimum assist (75% patient effort)   Assistive Device (Sit-Stand Transfers) walker, front-wheeled   Balance   Balance Assessment sit to stand dynamic balance   Sit-to-Stand Balance minimal assist   Activities of Daily Living   BADL Assessment/Intervention lower body dressing   Lower Body Dressing Assessment/Training   Position (Lower Body Dressing) unsupported sitting;unsupported standing   [place] Level (Lower Body Dressing) moderate assist (50% patient effort)   Clinical Impression   Criteria for Skilled Therapeutic Interventions Met (OT) Yes, treatment indicated   OT Diagnosis decreased ADL ind   OT Problem List-Impairments impacting ADL problems related to;activity tolerance impaired;balance;mobility;strength   Assessment of Occupational Performance 1-3 Performance Deficits   Identified Performance Deficits dressing, toileting, trnf safety   Planned Therapy Interventions (OT) ADL retraining;IADL retraining;balance training;strengthening;transfer training   Clinical Decision Making Complexity (OT) problem focused assessment/low complexity   Risk & Benefits of therapy have been explained evaluation/treatment results reviewed;patient   OT Total Evaluation Time   OT Eval, Low Complexity Minutes (07556) 11   OT Goals   Therapy Frequency (OT) Daily   OT Predicted Duration/Target Date for Goal Attainment 05/31/24   OT Goals Hygiene/Grooming;Lower Body Dressing;Toilet Transfer/Toileting   OT: Hygiene/Grooming supervision/stand-by assist;while standing   OT: Lower Body Dressing Supervision/stand-by assist;using adaptive equipment   OT: Toilet Transfer/Toileting Supervision/stand-by assist;using adaptive equipment   Self-Care/Home Management   Self-Care/Home Mgmt/ADL, Compensatory, Meal Prep Minutes (46759) 10   Symptoms Noted During/After Treatment (Meal Preparation/Planning Training) fatigue;increased pain   Treatment Detail/Skilled Intervention Pt  up in recliner upon OT arrival- pt completed additional STSx3 min.A from recliner w/ cues for tech/safety w/ hand placement- pt ambulated short distance w/in room w/ Min.Ax1 w/ fww no LOB noted but very unsteady- slow BLE advancement w/ education on use of fww. Pt educated on LB dressing w/ AE such as SA/reacher to aid in Ind. LB dressing- pt reporting having Reacher for home she could use. Pt in recliner at end alarm on call light w/in room.   OT Discharge Planning   OT Plan toileting, LB dressing w/ AE, progress trnfs   OT Discharge Recommendation (DC Rec) Transitional Care Facility   OT Rationale for DC Rec Pt currently requring assistx1 for all trnfs/ADL tasks d/t increased pain/weakness w/ RLE- pt lives alone in ILF at baseline and is very Ind. Pt would benefit from skilled therapy at TCU to improve strength/endurance/ADL ind.   OT Brief overview of current status Min.A STS trnf w/ fww   Total Session Time   Timed Code Treatment Minutes 10   Total Session Time (sum of timed and untimed services) 21

## 2024-05-24 NOTE — CONSULTS
Infectious Disease Consultation:  Requesting MD:  Reason for consult:      HISTORY:  Pt had a steroid injection to the R knee recently.  Now in with acute pain.  Tap with frandy mayorga, then operative washout with same.  This is pasted from Dr. Monahan:    Gladys Ramirez is a 89 year old female who was admitted for worsening  negative right knee pain.  She reports history of severe right knee osteoarthritis and valgus deformity with recent steroid injection a few weeks prior.  Over the last couple weeks she has noticed worsening knee pain, difficulty with weightbearing and range of motion.  Outside orthopedic provider aspirated the knee with cultures growing Staphylococcus lugdunensis.  She was therefore sent to the emergency department for evaluation and management.  Repeat aspiration revealed significantly increased cell count (67,000 WBCs), turbid appearing fluid, 92% PMNs positive for monosodium urate crystals.  At this point we discussed treatment options including both nonoperative and operative management.  For suspected gout with superinfection in need of knee with worsening clinical evaluation, we formally recommended surgical management for irrigation debridement.     She is [employer] sister.  [facility].            Pertinent past history, past infectious disease history:    Past Medical History       Past Medical History:   Diagnosis Date    Angina pectoris (H24)      Atrial fibrillation (H)      Chest pain 03/09/2017    Chronic kidney disease      Congestive heart failure (H)      Cough      Hyperlipidemia      Hypertension      Osteoarthritis                 Past Surgical History    Medications:  Reviewed prior to admission meds as applicable in chart review.  Current meds are reviewed in the EMR listed MAR.     ANTIBIOTICS:    Current:   Prior:   Allergy to:    / and  travel history(if applicable to consult):   [employer] bindu   non contrib    REVIEW OF SYSTEMS:  All other systems  "negative    EXAMINATION:  /68 (BP Location: Right arm)   Pulse 79   Temp 98.2  F (36.8  C) (Oral)   Resp 17   Ht 1.524 m (5')   Wt 66.3 kg (146 lb 2.6 oz)   SpO2 (!) 88%   BMI 28.55 kg/m    Alert, awake  Vitals tabulated above, reviewed  HEENT:glasses, a bit [place]  Neck supple without lymphadenopathy  Sclera clear  CARDIOVASCULAR regular rate and rhythm, no murmur  Lungs CLEAR TO AUSCULTATION   Abdomen soft, NT/ND, absent HEPATOSPLENOMEGALY  Skin normal  Joints R knee bandaged  Neurologic exam non focal  Wound:  NA        CLINICAL DATABASE FOR---LAB/MICRO/CULTURES/IMAGING STUDIES:  Lab Results   Component Value Date    WBC 5.8 05/23/2024     Lab Results   Component Value Date    RBC 3.44 05/23/2024     Lab Results   Component Value Date    HGB 10.1 05/23/2024     Lab Results   Component Value Date    HCT 32.1 05/23/2024     No components found for: \"MCT\"  Lab Results   Component Value Date    MCV 93 05/23/2024     Lab Results   Component Value Date    MCH 29.4 05/23/2024     Lab Results   Component Value Date    MCHC 31.5 05/23/2024     Lab Results   Component Value Date    RDW 13.2 05/23/2024     Lab Results   Component Value Date     05/23/2024       Last Comprehensive Metabolic Panel:  Sodium   Date Value Ref Range Status   05/23/2024 138 135 - 145 mmol/L Final     Comment:     Reference intervals for this test were updated on 09/26/2023 to more accurately reflect our healthy population. There may be differences in the flagging of prior results with similar values performed with this method. Interpretation of those prior results can be made in the context of the updated reference intervals.      Potassium   Date Value Ref Range Status   05/23/2024 4.6 3.4 - 5.3 mmol/L Final   08/28/2022 3.1 (L) 3.5 - 5.0 mmol/L Final     Chloride   Date Value Ref Range Status   05/23/2024 102 98 - 107 mmol/L Final   08/28/2022 103 98 - 107 mmol/L Final     Carbon Dioxide (CO2)   Date Value Ref Range Status "   05/23/2024 25 22 - 29 mmol/L Final   08/28/2022 27 22 - 31 mmol/L Final     Anion Gap   Date Value Ref Range Status   05/23/2024 11 7 - 15 mmol/L Final   08/28/2022 10 5 - 18 mmol/L Final     Glucose   Date Value Ref Range Status   08/28/2022 95 70 - 125 mg/dL Final     GLUCOSE BY METER POCT   Date Value Ref Range Status   05/23/2024 132 (H) 70 - 99 mg/dL Final     Urea Nitrogen   Date Value Ref Range Status   05/23/2024 21.9 8.0 - 23.0 mg/dL Final   08/28/2022 16 8 - 28 mg/dL Final     Creatinine   Date Value Ref Range Status   05/24/2024 1.12 (H) 0.51 - 0.95 mg/dL Final     GFR Estimate   Date Value Ref Range Status   05/24/2024 46 (L) >60 mL/min/1.73m2 Final   04/19/2021 54 (L) >60 mL/min/1.73m2 Final     Calcium   Date Value Ref Range Status   05/23/2024 9.5 8.2 - 9.6 mg/dL Final       Liver Function Studies -   Recent Labs   Lab Test 05/22/24  1506   PROTTOTAL 7.2   ALBUMIN 3.3*   BILITOTAL 0.3   ALKPHOS 118   AST 18   ALT 11       Erythrocyte Sedimentation Rate   Date Value Ref Range Status   05/22/2024 92 (H) 0 - 30 mm/hr Final       CRP   Date Value Ref Range Status   03/05/2018 0.8 0.0 - 0.8 mg/dL Final      Latest Reference Range & Units 05/20/24 08:46 05/22/24 17:22   Cell Count Fluid Source  Knee, Right Knee, Right   Total Nucleated Cells /uL 3,785 66,726   % Neutrophils Fluid % 90 92   % Lymphocytes Fluid % 3 0   % Mono/Macro Fluid % 0 8   % Other Cells Fluid % 7    Color Fluid Colorless, Yellow  Yellow Red !   Appearance Fluid Clear  Cloudy ! Turbid !   !: Data is abnormal   Latest Reference Range & Units 05/22/24 15:06   CRP Inflammation <5.00 mg/L 158.80 (H)   (H): Data is abnormally high    Knee, Right; Synovial fluid   0 Result Notes  Culture 1+ Staphylococcus lugdunensis Abnormal                Gram Stain Result No organisms seen      4+ WBC seen   Predominantly PMNs           Resulting Agency: [facility]     Susceptibility     Staphylococcus lugdunensis     SHIRLEY     Ciprofloxacin <=0.5 ug/mL  Susceptible *     Clindamycin <=0.12 ug/mL Susceptible     Daptomycin 0.25 ug/mL Susceptible     Doxycycline <=0.5 ug/mL Susceptible     Erythromycin <=0.25 ug/mL Susceptible     Gentamicin <=0.5 ug/mL Susceptible     Inducible macrolide resistance test Negative ug/mL Negative *     Levofloxacin 0.25 ug/mL Susceptible *     Linezolid 2 ug/mL Susceptible *     Nitrofurantoin <=16 ug/mL Susceptible *     Oxacillin 2 ug/mL Susceptible 1     Rifampin <=0.5 ug/mL Susceptible *     Tetracycline <=1 ug/mL Susceptible     Tigecycline <=0.12 ug/mL Susceptible *     Trimethoprim/Sulfamethoxazole <=0.5/9.5 u... Susceptible     Vancomycin <=0.5 ug/mL Susceptible                         IMPRESSION:  S Lugdenensis native R knee infection, septic jt  Washed out    PLAN:  PICC  One month maria m RUIZ MD  [facility]  Office [phone]

## 2024-05-24 NOTE — PROGRESS NOTES
05/24/24 1340   Appointment Info   Signing Clinician's Name / Credentials (PT) Carey Stephenson, DEEDEE   Living Environment   People in Home alone   Current Living Arrangements independent living facility   Home Accessibility no concerns   Transportation Anticipated family or friend will provide   Self-Care   Usual Activity Tolerance good   Current Activity Tolerance fair   Regular Exercise No   Equipment Currently Used at Home wheelchair, manual;walker, rolling  (4WW)   Fall history within last six months no   General Information   Onset of Illness/Injury or Date of Surgery 05/22/24   Referring Physician Scout Bragg PA-C   Patient/Family Therapy Goals Statement (PT) none stated   Pertinent History of Current Problem (include personal factors and/or comorbidities that impact the POC) 89 year old female who presented with the above complaint.Past medical history is significant for hypertension, hyperlipidemia, HFrEF, A-fib, CKD stage III.  Patient has been dealing with longstanding right knee joint pain which has worsened recently.  She got steroid injection last week.  Presented with worsening of swelling and pain.  Labs are significant for elevated CRP and ESR.  Joint aspiration performed from outside grew staph species.  Arthrocentesis was repeated in the ER.  Patient will be admitted for management of septic arthritis.   Existing Precautions/Restrictions fall;weight bearing   Weight-Bearing Status - RLE weight-bearing as tolerated   Range of Motion (ROM)   Range of Motion ROM deficits secondary to pain   Strength (Manual Muscle Testing)   Strength (Manual Muscle Testing) Deficits observed during functional mobility   Bed Mobility   Comment, (Bed Mobility) pt in chair upon PT arrival   Transfers   Transfers sit-stand transfer   Maintains Weight-bearing Status (Transfers) able to maintain   Transfer Safety Concerns Noted stepping too close to front of assistive device   Impairments Contributing to  Impaired Transfers pain;decreased strength   Sit-Stand Transfer   Sit-Stand [place] (Transfers) contact guard   Assistive Device (Sit-Stand Transfers) walker, front-wheeled   Gait/Stairs (Locomotion)   [place] Level (Gait) contact guard;verbal cues   Assistive Device (Gait) walker, front-wheeled   Distance in Feet (Gait) 5   Pattern (Gait) step-to   Deviations/Abnormal Patterns (Gait) antalgic;gait speed decreased;stride length decreased;weight shifting decreased   Maintains Weight-bearing Status (Gait) able to maintain   Clinical Impression   Criteria for Skilled Therapeutic Intervention Yes, treatment indicated   PT Diagnosis (PT) impaired functional mobility   Influenced by the following impairments pain, decreased strength, decreased activity tolerance   Functional limitations due to impairments bed mobility, transfers, ambulation   Clinical Presentation (PT Evaluation Complexity) stable   Clinical Presentation Rationale presents as medically diagnosed   Clinical Decision Making (Complexity) low complexity   Planned Therapy Interventions (PT) balance training;bed mobility training;gait training;home exercise program;neuromuscular re-education;patient/family education;strengthening;stretching;transfer training   Risk & Benefits of therapy have been explained patient   PT Total Evaluation Time   PT Eval, Low Complexity Minutes (82471) 8   Physical Therapy Goals   PT Frequency 5x/week   PT Predicted Duration/Target Date for Goal Attainment 05/31/24   PT Goals Bed Mobility;Transfers;Gait   PT: Bed Mobility Modified independent;Supine to/from sit   PT: Transfers Supervision/stand-by assist;Sit to/from stand;Assistive device   PT: Gait Supervision/stand-by assist;Assistive device;Within precautions;50 feet   Interventions   Interventions Quick Adds Gait Training;Therapeutic Activity   Therapeutic Activity   Treatment Detail/Skilled Intervention sit > supine from EOB, CGA. boosted to HOB maxAx2 with draw  sheet. Bed alarm on, call light within reach, RN in room.   Gait Training   Gait Training Minutes (72343) 8   Symptoms Noted During/After Treatment (Gait Training) increased pain   Treatment Detail/Skilled Intervention Pt amb an additional 18' around the bed with a FWW, CGA. V/c to keep the walker further in front of her as well as to take smaller steps with her RLE. Pt amb with an antalgic gait and decreased gait speed.   Distance in Feet 18'   [place] Level (Gait Training) contact guard   Physical Assistance Level (Gait Training) verbal cues;1 person assist   Weight Bearing (Gait Training) weight-bearing as tolerated   Assistive Device (Gait Training) rolling walker   Pattern Analysis (Gait Training) 3-point gait   Gait Analysis Deviations decreased esteban;decreased stride length;decreased step length;decreased weight-shifting ability   Impairments (Gait Analysis/Training) balance impaired;pain;strength decreased   PT Discharge Planning   PT Plan Trial 4WW, amb further as tolerated, transfers, LE strength   PT Discharge Recommendation (DC Rec) Transitional Care Facility   PT Rationale for DC Rec Pt currently has increased pain in RLE with ambulation, only able to amb 23' total at this time. Pt would benefit from further therapies to improve strength   PT Brief overview of current status sit <> stand, CGA. amb 23' FWW, CGA. sit > supine, CGA   PT Equipment Needed at Discharge walker, rolling  (has 4WW at home)   Total Session Time   Timed Code Treatment Minutes 8   Total Session Time (sum of timed and untimed services) 16       Cece Bush, PT, DPT  5/24/2024

## 2024-05-24 NOTE — PROGRESS NOTES
Orthopedic Progress Note      Assessment: 1 Day Post-Op  S/P Procedure(s):  INCISION AND DRAINAGE, KNEE  ARTHROSCOPY, KNEE     Plan:   Pain Control: Hospitalist ordered scheduled ER oxycontin in addition to PRN oxycodone.  Continue current regimen  Weight Bearing: Weight bearing as tolerated on affected lower extremity.   DVT Prophylaxis: Eliquis in addition to SCDs, mechanical  Antibiotics: 24 hours of perioperative antibiotics + ongoing antibiotic tailoring via infectious disease recommendations. Cultures 5/22 NGTD  GI: Plan for aggressive bowel regimen to prevent constipation from narcotic medications  Lines: HLIV once tolerating PO  PT/OT: Eval and treatment. Will follow up on recommendations.  Follow up:  in 2 weeks in clinic for wound check  Discharge plan: Pending PT/OT evaluations, ID plan      Subjective:  Pain: severe  Nausea, Vomiting:  No  Lightheadedness, Dizziness:  No  Neuro:  Patient denies new onset numbness or paresthesias  Fever, chills: No  Chest pain: No  SOB: No    Patient reports feeling well today. Patient reports pain is tolerable with current pain regimen. Patient eating and drinking well. Patient voiding and passing gas however no BM. All questions/concerns answered.      Objective:  /78 (BP Location: Right arm)   Pulse 79   Temp 97.6  F (36.4  C) (Oral)   Resp 18   Ht 1.524 m (5')   Wt 66.3 kg (146 lb 2.6 oz)   SpO2 90%   BMI 28.55 kg/m      The patient is A&Ox3. Appears comfortable, sitting up at bedside.  Calves without tenderness, neg Chance's  Brisk capillary refill in the toes.   Palpable Right dorsalis pedis pulse. Right foot warm & well-perfused.  Sensation is intact to light touch & equal bilaterally in the femoral, DP, SP & tibial nerve distributions.  ROM: Appropriately flexes & extends all toes bilaterally.   Motor: +5/5 dorsiflexion, plantar flexion & EHL bilaterally.   Leg lengths equal.  Dressing C/D/I without strikethrough, no surrounding erythema.      No drain      Pertinent Labs   Lab Results: personally reviewed.   Lab Results   Component Value Date    INR 1.33 (H) 03/04/2023    INR 1.37 (H) 08/23/2022    INR 1.50 (H) 06/05/2019     Lab Results   Component Value Date    WBC 5.8 05/23/2024    HGB 10.1 (L) 05/23/2024    HCT 32.1 (L) 05/23/2024    MCV 93 05/23/2024     05/23/2024     Lab Results   Component Value Date     05/23/2024    CO2 25 05/23/2024         Report completed by:  SALVATORE NAVA PA-C  Date: 05/24/2024  [facility]

## 2024-05-25 ENCOUNTER — APPOINTMENT (OUTPATIENT)
Dept: OCCUPATIONAL THERAPY | Facility: HOSPITAL | Age: 89
DRG: 486 | End: 2024-05-25
Payer: COMMERCIAL

## 2024-05-25 ENCOUNTER — APPOINTMENT (OUTPATIENT)
Dept: PHYSICAL THERAPY | Facility: HOSPITAL | Age: 89
DRG: 486 | End: 2024-05-25
Payer: COMMERCIAL

## 2024-05-25 LAB
BACTERIA SNV CULT: ABNORMAL
CREAT SERPL-MCNC: 1.15 MG/DL (ref 0.51–0.95)
EGFRCR SERPLBLD CKD-EPI 2021: 44 ML/MIN/1.73M2
GLUCOSE BLDC GLUCOMTR-MCNC: 174 MG/DL (ref 70–99)
GRAM STAIN RESULT: ABNORMAL
GRAM STAIN RESULT: ABNORMAL
HGB BLD-MCNC: 9.9 G/DL (ref 11.7–15.7)

## 2024-05-25 PROCEDURE — 120N000004 HC R&B MS OVERFLOW

## 2024-05-25 PROCEDURE — 250N000013 HC RX MED GY IP 250 OP 250 PS 637: Performed by: STUDENT IN AN ORGANIZED HEALTH CARE EDUCATION/TRAINING PROGRAM

## 2024-05-25 PROCEDURE — 97535 SELF CARE MNGMENT TRAINING: CPT | Mod: GO

## 2024-05-25 PROCEDURE — 97110 THERAPEUTIC EXERCISES: CPT | Mod: GP

## 2024-05-25 PROCEDURE — 99232 SBSQ HOSP IP/OBS MODERATE 35: CPT | Performed by: INTERNAL MEDICINE

## 2024-05-25 PROCEDURE — 250N000013 HC RX MED GY IP 250 OP 250 PS 637: Performed by: PHYSICIAN ASSISTANT

## 2024-05-25 PROCEDURE — 82565 ASSAY OF CREATININE: CPT | Performed by: STUDENT IN AN ORGANIZED HEALTH CARE EDUCATION/TRAINING PROGRAM

## 2024-05-25 PROCEDURE — 97110 THERAPEUTIC EXERCISES: CPT | Mod: GO

## 2024-05-25 PROCEDURE — 85018 HEMOGLOBIN: CPT | Performed by: STUDENT IN AN ORGANIZED HEALTH CARE EDUCATION/TRAINING PROGRAM

## 2024-05-25 PROCEDURE — 97116 GAIT TRAINING THERAPY: CPT | Mod: GP

## 2024-05-25 PROCEDURE — 250N000011 HC RX IP 250 OP 636: Performed by: INTERNAL MEDICINE

## 2024-05-25 PROCEDURE — 250N000011 HC RX IP 250 OP 636: Performed by: STUDENT IN AN ORGANIZED HEALTH CARE EDUCATION/TRAINING PROGRAM

## 2024-05-25 RX ADMIN — OXYCODONE HYDROCHLORIDE 10 MG: 10 TABLET, FILM COATED, EXTENDED RELEASE ORAL at 12:14

## 2024-05-25 RX ADMIN — OXYCODONE HYDROCHLORIDE 2.5 MG: 5 TABLET ORAL at 05:40

## 2024-05-25 RX ADMIN — OXYCODONE HYDROCHLORIDE 5 MG: 5 TABLET ORAL at 09:05

## 2024-05-25 RX ADMIN — ACETAMINOPHEN 975 MG: 325 TABLET ORAL at 13:35

## 2024-05-25 RX ADMIN — DOCUSATE SODIUM 50 MG AND SENNOSIDES 8.6 MG 1 TABLET: 8.6; 5 TABLET, FILM COATED ORAL at 20:36

## 2024-05-25 RX ADMIN — FUROSEMIDE 40 MG: 20 TABLET ORAL at 09:05

## 2024-05-25 RX ADMIN — METHYLPREDNISOLONE SODIUM SUCCINATE 40 MG: 40 INJECTION INTRAMUSCULAR; INTRAVENOUS at 22:18

## 2024-05-25 RX ADMIN — APIXABAN 5 MG: 5 TABLET, FILM COATED ORAL at 09:05

## 2024-05-25 RX ADMIN — APIXABAN 5 MG: 5 TABLET, FILM COATED ORAL at 20:36

## 2024-05-25 RX ADMIN — POTASSIUM CHLORIDE 20 MEQ: 1500 TABLET, EXTENDED RELEASE ORAL at 09:05

## 2024-05-25 RX ADMIN — POLYETHYLENE GLYCOL 3350 17 G: 17 POWDER, FOR SOLUTION ORAL at 09:05

## 2024-05-25 RX ADMIN — CEFAZOLIN 1 G: 1 INJECTION, POWDER, FOR SOLUTION INTRAMUSCULAR; INTRAVENOUS at 17:19

## 2024-05-25 RX ADMIN — ACETAMINOPHEN 975 MG: 325 TABLET ORAL at 05:39

## 2024-05-25 RX ADMIN — ACETAMINOPHEN 975 MG: 325 TABLET ORAL at 22:18

## 2024-05-25 RX ADMIN — OXYCODONE HYDROCHLORIDE 5 MG: 5 TABLET ORAL at 18:16

## 2024-05-25 RX ADMIN — DULOXETINE HYDROCHLORIDE 60 MG: 60 CAPSULE, DELAYED RELEASE ORAL at 09:05

## 2024-05-25 RX ADMIN — METOPROLOL SUCCINATE 25 MG: 25 TABLET, EXTENDED RELEASE ORAL at 09:05

## 2024-05-25 RX ADMIN — SENNOSIDES AND DOCUSATE SODIUM 1 TABLET: 50; 8.6 TABLET ORAL at 09:05

## 2024-05-25 RX ADMIN — AMIODARONE HYDROCHLORIDE 100 MG: 100 TABLET ORAL at 09:04

## 2024-05-25 RX ADMIN — METHYLPREDNISOLONE SODIUM SUCCINATE 40 MG: 40 INJECTION INTRAMUSCULAR; INTRAVENOUS at 06:26

## 2024-05-25 RX ADMIN — METHYLPREDNISOLONE SODIUM SUCCINATE 40 MG: 40 INJECTION INTRAMUSCULAR; INTRAVENOUS at 13:35

## 2024-05-25 RX ADMIN — CEFAZOLIN 1 G: 1 INJECTION, POWDER, FOR SOLUTION INTRAMUSCULAR; INTRAVENOUS at 05:39

## 2024-05-25 RX ADMIN — OXYCODONE HYDROCHLORIDE 10 MG: 10 TABLET, FILM COATED, EXTENDED RELEASE ORAL at 22:18

## 2024-05-25 ASSESSMENT — ACTIVITIES OF DAILY LIVING (ADL)
ADLS_ACUITY_SCORE: 39
ADLS_ACUITY_SCORE: 36
ADLS_ACUITY_SCORE: 39
ADLS_ACUITY_SCORE: 39
ADLS_ACUITY_SCORE: 36
ADLS_ACUITY_SCORE: 39
ADLS_ACUITY_SCORE: 39
ADLS_ACUITY_SCORE: 36
ADLS_ACUITY_SCORE: 39
ADLS_ACUITY_SCORE: 39
ADLS_ACUITY_SCORE: 36
ADLS_ACUITY_SCORE: 39
ADLS_ACUITY_SCORE: 36
ADLS_ACUITY_SCORE: 39
ADLS_ACUITY_SCORE: 36
ADLS_ACUITY_SCORE: 36
ADLS_ACUITY_SCORE: 39
ADLS_ACUITY_SCORE: 36
ADLS_ACUITY_SCORE: 39

## 2024-05-25 NOTE — PLAN OF CARE
"  Problem: Adult Inpatient Plan of Care  Goal: Plan of Care Review  Description: The Plan of Care Review/Shift note should be completed every shift.  The Outcome Evaluation is a brief statement about your assessment that the patient is improving, declining, or no change.  This information will be displayed automatically on your shift  note.  Outcome: Progressing  Flowsheets (Taken 5/25/2024 1432)  Plan of Care Reviewed With: patient  Goal: Patient-Specific Goal (Individualized)  Description: You can add care plan individualizations to a care plan. Examples of Individualization might be:  \"Parent requests to be called daily at 9am for status\", \"I have a hard time hearing out of my right ear\", or \"Do not touch me to wake me up as it startles  me\".  Outcome: Progressing  Goal: Absence of Hospital-Acquired Illness or Injury  Outcome: Progressing  Intervention: Identify and Manage Fall Risk  Recent Flowsheet Documentation  Taken 5/25/2024 0800 by Jai Gill RN  Safety Promotion/Fall Prevention:   activity supervised   clutter free environment maintained  Intervention: Prevent and Manage VTE (Venous Thromboembolism) Risk  Recent Flowsheet Documentation  Taken 5/25/2024 0800 by Jai Gill RN  VTE Prevention/Management: SCDs (sequential compression devices) on  Intervention: Prevent Infection  Recent Flowsheet Documentation  Taken 5/25/2024 0800 by Jai Gill RN  Infection Prevention:   environmental surveillance performed   rest/sleep promoted  Goal: Readiness for Transition of Care  Outcome: Progressing     Problem: Risk for Delirium  Goal: Optimal Coping  Outcome: Progressing  Intervention: Optimize Psychosocial Adjustment to Delirium  Recent Flowsheet Documentation  Taken 5/25/2024 0800 by Jai Gill RN  Supportive Measures: active listening utilized  Goal: Improved Behavioral Control  Outcome: Progressing  Intervention: Prevent and Manage Agitation  Recent Flowsheet Documentation  Taken 5/25/2024 " 0800 by Jai Gill RN  Environment Familiarity/Consistency: daily routine followed  Intervention: Minimize Safety Risk  Recent Flowsheet Documentation  Taken 5/25/2024 0800 by Jai Gill RN  Communication Enhancement Strategies: call light answered in person  Enhanced Safety Measures:   assistive devices when indicated   pain management  Goal: Improved Attention and Thought Clarity  Outcome: Progressing  Intervention: Maximize Cognitive Function  Recent Flowsheet Documentation  Taken 5/25/2024 0800 by Jai Gill RN  Sensory Stimulation Regulation: care clustered  Reorientation Measures:   clock in view   calendar in view     Problem: Pain Acute  Goal: Optimal Pain Control and Function  Outcome: Progressing  Intervention: Develop Pain Management Plan  Recent Flowsheet Documentation  Taken 5/25/2024 0800 by Jai Gill RN  Pain Management Interventions: medication (see MAR)  Intervention: Prevent or Manage Pain  Recent Flowsheet Documentation  Taken 5/25/2024 0800 by Jai Gill RN  Sensory Stimulation Regulation: care clustered  Medication Review/Management: medications reviewed  Intervention: Optimize Psychosocial Wellbeing  Recent Flowsheet Documentation  Taken 5/25/2024 0800 by Jai Gill RN  Spiritual Activities Assistance: affirmation provided  Supportive Measures: active listening utilized     Problem: Mobility Impairment  Goal: Optimal Mobility  Outcome: Progressing  Intervention: Optimize Mobility  Recent Flowsheet Documentation  Taken 5/25/2024 1030 by Jai Gill RN  Assistive Device Utilized:   gait belt   walker  Activity Management: ambulated outside room  Goal Outcome Evaluation:      Plan of Care Reviewed With: patient    Pt A/O x 4, [place] & forgetful. Vitally stable & saturating well on RA - lungs with fine crackles to lower lobes bilaterally, pt denies SOB or JONES. Tele shows a-fib with BBB, tachycardic when ambulating. Great improvement in pt mobility & activity  tolerance today - has ambulated three times so far. Pt denies SOB, dizziness, or N/V. Endorses moderate pain of R-knee with activity - managed well with scheduled oxy & PRN meds. Will continue to monitor.     Jai Gill RN on 5/25/2024 at 2:37 PM

## 2024-05-25 NOTE — PLAN OF CARE
Problem: Pain Acute  Goal: Optimal Pain Control and Function  Intervention: Develop Pain Management Plan  Recent Flowsheet Documentation  Taken 5/24/2024 1917 by Linden Jean RN  Pain Management Interventions: medication (see MAR)  Taken 5/24/2024 1832 by Linden Jean RN  Pain Management Interventions: medication (see MAR)     Problem: Mobility Impairment  Goal: Optimal Mobility  Outcome: Progressing  Intervention: Optimize Mobility  Recent Flowsheet Documentation  Taken 5/24/2024 2200 by Linden Jean RN  Activity Management: patient refuses activity  Taken 5/24/2024 2000 by Lniden Jean, RN  Activity Management:   activity adjusted per tolerance   activity encouraged  Taken 5/24/2024 1633 by Linden Jean RN  Assistive Device Utilized: gait belt   Goal Outcome Evaluation:         Pt reports no pain when lying still.  Pt states pain goes 8-10 with movement.    Pt was given bowel meds and states she will get up when she has bowel movement.  Writer set up walker and commode.   Pt did not have BM thsi shift.  Pt was encouraged to sit on edge of bed or walk multiple times.  Pt states she did with therapy an will do more later.  Pt was moved in bed and repositioned several times.    Writer applied ice to top and bottom of right knee.  Pt had scd pumps placed.     Right knee bandage is CDI.  Pt has full sensation in all toes CMS is intact.     Pt is afib on tele monitoring.

## 2024-05-25 NOTE — PROGRESS NOTES
Care Management Follow Up    Length of Stay (days): 3    Expected Discharge Date: 05/26/2024     Concerns to be Addressed: discharge planning     Patient plan of care discussed at interdisciplinary rounds: Yes    Anticipated Discharge Disposition: TCU      Anticipated Discharge Services:    Anticipated Discharge DME:      Patient/family educated on Medicare website which has current facility and service quality ratings:    Education Provided on the Discharge Plan:    Patient/Family in Agreement with the Plan:      Referrals Placed by CM/SW:  TCU  Private pay costs discussed: Not applicable    Additional Information:  Therapy recommendation is TCU and CM went to pt room to see if she had a chance to go over the TCU list that I gave her yesterday. Pt had selected five TCU choices and these referrals were sent.    RNCM to follow for medical progression, recommendations, and final discharge plan.      Mari Edmonds RN

## 2024-05-25 NOTE — PLAN OF CARE
Pain  control /  control of infection    Goal Outcome Evaluation:    POD 2 S/P Procedure(s):  INCISION AND DRAINAGE, KNEE  ARTHROSCOPY, KNEE     Knee is wrapped  Pt. States only hurts if she moves around a lot.   No BM this shift.   Will get scheduled Tylenol and antibiotic this AM before night shift ends.   Pt. Has a single lumen PICC   Needs encouragement to get up and moving when not working with PT

## 2024-05-25 NOTE — PROGRESS NOTES
Ortho Progress Note      Post-operative Day: 2 Days Post-Op  S/P Procedure(s):  INCISION AND DRAINAGE, KNEE  ARTHROSCOPY, KNEE      Subjective:  Pain: Mild at rest   Chest pain, SOB:  No   Nausea, vomiting:  No   Lightheadedness, dizziness:  None reported   Neuro:  Patient denies new onset numbness or paresthesias    Objective:  /78   Pulse 87   Temp 97.8  F (36.6  C)   Resp 18   Ht 1.524 m (5')   Wt 66.3 kg (146 lb 2.6 oz)   SpO2 92%   BMI 28.55 kg/m    Gen: A&O x 3. NAD. Appears comfortable   Wound status: Covered.  Upon inspection no drainage.   Sutures in place.    Circulation, motion and sensation: Dorsiflexion/plantarflexion intact and equal bilaterally; distal lower extremity sensation is intact and equal bilaterally  Swelling: Appropriate swelling   Calf tenderness: calves are soft and non-tender bilaterally     Pertinent Labs   Lab Results:   Lab Results   Component Value Date    INR 1.33 (H) 03/04/2023    INR 1.37 (H) 08/23/2022    INR 1.50 (H) 06/05/2019     Lab Results   Component Value Date    WBC 5.8 05/23/2024    HGB 9.9 (L) 05/25/2024    HCT 32.1 (L) 05/23/2024    MCV 93 05/23/2024     05/23/2024     Lab Results   Component Value Date     05/23/2024    CO2 25 05/23/2024       Assessment: POD #2 S/P right knee I&D    Plan:   Continue PT/OT  Weightbearing status: WBAT  Anticoagulation: Eliquis  in addition to SCDs, whitney stockings and early ambulation.  ID following   Discharge planning: Pending PT/OT, ID plan, and medical clearance.      Report completed by:  KIMBERLEY BLACKWELL PA-C   Date: 5/25/2024  Time: 7:20 AM

## 2024-05-25 NOTE — PROGRESS NOTES
[facility]    Medicine Progress Note - Hospitalist Service    Date of Admission:  5/22/2024    Assessment & Plan   Gladys Ramirez is a 89 year old female admitted on 5/22/2024. She presented with worsening of chronic knee pain. Joint recently aspirated.  Went aspiration grew staph lugdunensis.  Past medical history is significant for hypertension, hyperlipidemia, HFrEF, A-fib, CKD stage III.     Right knee joint swelling  Septic arthritis     -Has had acute worsening of chronic right knee joint pain.  Has elevated CRP and ESR  - 2 weeks ago patient had a steroid injection for pain.  -Few days ago went to a sports medicine clinic and got  joint aspirated.  - consulted ortho, on iv steroids  -synovial fluid grew staph lugdunensis  -Joint aspiration repeated in ER.  Preliminary result shows positive for intracellular crystals consistent with monosodium urate crystals  - 5/23 : S/p Right knee arthroscopic irrigation and debridement   -consulted ID - started  on cefazolin and vancomycin - vanco discontinued, continue cefazolin - 1 month, PICC placed         A-fib  -PTA metoprolol succinate 25 mg oral daily  -PTA amiodarone 100 mg oral daily  -Eliquis 5 mg oral twice daily  -Patient reported that she had a pacemaker implantation 2 weeks ago. Consider pacemaker interrogation.   -Telemetry monitor     HFrEF  -Appears euvolemic clinically  -PTA Lasix 40 mg oral daily  -Echocardiogram      Discharge once ok with ID and ortho  Needs placement          Diet: Advance Diet as Tolerated: Regular Diet Adult  Discharge Instruction - Regular Diet Adult    DVT Prophylaxis: DOAC  Reyes Catheter: Not present  Lines: PRESENT      PICC 05/24/24 Single Lumen Right Brachial vein medial Antibiotic(s)-Site Assessment: WDL      Cardiac Monitoring: None  Code Status: Full Code      Clinically Significant Risk Factors              # Hypoalbuminemia: Lowest albumin = 3.3 g/dL at 5/22/2024  3:06 PM, will monitor as  appropriate     # Hypertension: Noted on problem list  # Chronic heart failure with reduced ejection fraction: last echo with EF <40%       # Overweight: Estimated body mass index is 28.55 kg/m  as calculated from the following:    Height as of this encounter: 1.524 m (5').    Weight as of this encounter: 66.3 kg (146 lb 2.6 oz)., PRESENT ON ADMISSION     # Financial/Environmental Concerns: none   # Pacemaker present       Disposition Plan     Medically Ready for Discharge: Ready Now    Barrier: TCU placement; will be on iv Ancef for 1 month         Denae Hopkins MD  Hospitalist Service  [facility]  Securely message with [employer] (more info)  Text page via [employer] Paging/Directory   ______________________________________________________________________    Interval History   C/o knee pain on any weight bearing, no f/c, no n/v, no cp/sob    Physical Exam   Vital Signs: Temp: 97.7  F (36.5  C) Temp src: Oral BP: 118/78 Pulse: 87   Resp: 18 SpO2: 92 % O2 Device: None (Room air)    Weight: 146 lbs 2.64 oz    General.  Awake alert oriented not in acute distress.  HEENT.  Pupils equal round react to light, anicteric, EOM intact.  Neck supple no JVD.  CVS regular rhythm no murmur gallops.  Lungs.  Clear to auscultation bilateral no wheezing or rales.  Abdomen.  Soft nontender bowel sounds present.  Extremities.  Right knee s/p sx in dressing  Neurological.  Awake and alert. No focal deficit.  Skin no rash. No pallor.  Psych. Normal mood.      Medical Decision Making       45 MINUTES SPENT BY ME on the date of service doing chart review, history, exam, documentation & further activities per the note.      Data     I have personally reviewed the following data over the past 24 hrs:    N/A  \   9.9 (L)   / N/A     N/A N/A N/A /  N/A   N/A N/A 1.15 (H) \       Imaging results reviewed over the past 24 hrs:   Recent Results (from the past 24 hour(s))   XR Chest Port 1 View    Narrative    EXAM: XR CHEST  PORT 1 VIEW  LOCATION: [facility]  DATE: 5/24/2024    INDICATION: RN placed PICC   verify tip placement  COMPARISON: 4/8/2024      Impression    IMPRESSION: Right upper extremity PICC tip in the lower SVC. Unchanged left chest pacemaker.    No acute cardiopulmonary abnormality. Unchanged mildly enlarged cardiac silhouette.

## 2024-05-25 NOTE — PROGRESS NOTES
[facility]    Medicine Progress Note - Hospitalist Service    Date of Admission:  5/22/2024    Assessment & Plan         Gladys Ramirez is a 89 year old female admitted on 5/22/2024. She presented with worsening of chronic knee pain. Joint recently aspirated.  Went aspiration grew staph lugdunensis.  Past medical history is significant for hypertension, hyperlipidemia, HFrEF, A-fib, CKD stage III.          A/p :        Right knee joint swelling  Septic arthritis     -Has had acute worsening of chronic right knee joint pain.  Has elevated CRP and ESR  - 2 weeks ago patient had a steroid injection for pain.  -Few days ago went to a sports medicine clinic and got  joint aspirated.  - consulted ortho, on iv steroids  -synovial fluid grew staph lugdunensis  -Joint aspiration repeated in ER.  Preliminary result shows positive for intracellular crystals consistent with monosodium urate crystals  - 5/23 : S/p Right knee arthroscopic irrigation and debridement   -consulted ID - started  on cefazolin and vancomycin - vanco discontinued, continue cefazolin - 1 month, PICC placed         A-fib  -PTA metoprolol succinate 25 mg oral daily  -PTA amiodarone 100 mg oral daily  -Eliquis 5 mg oral twice daily  -Patient reported that she had a pacemaker implantation 2 weeks ago. Consider pacemaker interrogation.   -Telemetry monitor     HFrEF  -Appears euvolemic clinically  -PTA Lasix 40 mg oral daily  -Echocardiogram      Discharge once ok with ID and ortho  Needs placement          Diet: Advance Diet as Tolerated: Regular Diet Adult  Discharge Instruction - Regular Diet Adult    DVT Prophylaxis: Pneumatic Compression Devices  Reyes Catheter: Not present  Lines: PRESENT      PICC 05/24/24 Single Lumen Right Brachial vein medial Antibiotic(s)-Site Assessment: WDL    Cardiac Monitoring: None  Code Status: Full Code      Clinically Significant Risk Factors              # Hypoalbuminemia: Lowest albumin = 3.3  g/dL at 5/22/2024  3:06 PM, will monitor as appropriate     # Hypertension: Noted on problem list    # Chronic heart failure with reduced ejection fraction: last echo with EF <40%       # Overweight: Estimated body mass index is 28.55 kg/m  as calculated from the following:    Height as of this encounter: 1.524 m (5').    Weight as of this encounter: 66.3 kg (146 lb 2.6 oz)., PRESENT ON ADMISSION       # Financial/Environmental Concerns: none   # Pacemaker present       Disposition Plan     Medically Ready for Discharge: Anticipated in 2-4 Days             Bry Casanova MD  Hospitalist Service  [facility]  Securely message with [employer] (more info)  Text page via [employer] Paging/Directory   ______________________________________________________________________      5/24 :     Post op day 1  Ortho following  Follow cultures : S. Lugdenensis  Consulted ID  Continue iv cefazolin, iv steroids  Resumed eliquis     Not medically ready for discharge at this time.    Physical Exam   Vital Signs: Temp: 97.5  F (36.4  C) Temp src: Oral BP: 123/69 Pulse: 85   Resp: 20 SpO2: 96 % O2 Device: None (Room air)    Weight: 146 lbs 2.64 oz       GENERAL: The patient is not in any acute distressed. Awake and alert.  HEENT: Nonicteric sclerae, PERRLA, EOMI. Oropharynx clear. Moist mucous membranes. Conjunctivae appear well perfused.  HEART: Regular rate and rhythm without murmurs.  LUNGS: Clear to auscultation bilaterally. No wheezing or crackles.  ABDOMEN: Soft, positive bowel sounds, nontender.  SKIN: No rash, no excessive bruising, petechiae, or purpura.  EXTREMITIES : no rashes, no swelling in legs.  NEUROLOGIC: conscious and oriented, follows commands, no obvious focal deficits.  ROS: All other systems negative       Medical Decision Making       60 MINUTES SPENT BY ME on the date of service doing chart review, history, exam, documentation & further activities per the note.  MANAGEMENT DISCUSSED with the  following over the past 24 hours: rn, patient       Data     I have personally reviewed the following data over the past 24 hrs:    N/A  \   N/A   / N/A     N/A N/A N/A /  N/A   N/A N/A 1.12 (H) \

## 2024-05-26 ENCOUNTER — APPOINTMENT (OUTPATIENT)
Dept: OCCUPATIONAL THERAPY | Facility: HOSPITAL | Age: 89
DRG: 486 | End: 2024-05-26
Payer: COMMERCIAL

## 2024-05-26 ENCOUNTER — APPOINTMENT (OUTPATIENT)
Dept: PHYSICAL THERAPY | Facility: HOSPITAL | Age: 89
DRG: 486 | End: 2024-05-26
Payer: COMMERCIAL

## 2024-05-26 LAB
CREAT SERPL-MCNC: 1.1 MG/DL (ref 0.51–0.95)
EGFRCR SERPLBLD CKD-EPI 2021: 47 ML/MIN/1.73M2
HGB BLD-MCNC: 11 G/DL (ref 11.7–15.7)

## 2024-05-26 PROCEDURE — 97116 GAIT TRAINING THERAPY: CPT | Mod: GP

## 2024-05-26 PROCEDURE — 97530 THERAPEUTIC ACTIVITIES: CPT | Mod: GP

## 2024-05-26 PROCEDURE — 250N000011 HC RX IP 250 OP 636: Performed by: STUDENT IN AN ORGANIZED HEALTH CARE EDUCATION/TRAINING PROGRAM

## 2024-05-26 PROCEDURE — 82565 ASSAY OF CREATININE: CPT | Performed by: STUDENT IN AN ORGANIZED HEALTH CARE EDUCATION/TRAINING PROGRAM

## 2024-05-26 PROCEDURE — 250N000013 HC RX MED GY IP 250 OP 250 PS 637: Performed by: STUDENT IN AN ORGANIZED HEALTH CARE EDUCATION/TRAINING PROGRAM

## 2024-05-26 PROCEDURE — 97110 THERAPEUTIC EXERCISES: CPT | Mod: GO

## 2024-05-26 PROCEDURE — 250N000011 HC RX IP 250 OP 636: Performed by: INTERNAL MEDICINE

## 2024-05-26 PROCEDURE — 97535 SELF CARE MNGMENT TRAINING: CPT | Mod: GO

## 2024-05-26 PROCEDURE — 120N000004 HC R&B MS OVERFLOW

## 2024-05-26 PROCEDURE — 99231 SBSQ HOSP IP/OBS SF/LOW 25: CPT | Performed by: INTERNAL MEDICINE

## 2024-05-26 PROCEDURE — 250N000013 HC RX MED GY IP 250 OP 250 PS 637: Performed by: PHYSICIAN ASSISTANT

## 2024-05-26 PROCEDURE — 85018 HEMOGLOBIN: CPT | Performed by: STUDENT IN AN ORGANIZED HEALTH CARE EDUCATION/TRAINING PROGRAM

## 2024-05-26 RX ADMIN — METHYLPREDNISOLONE SODIUM SUCCINATE 40 MG: 40 INJECTION INTRAMUSCULAR; INTRAVENOUS at 14:11

## 2024-05-26 RX ADMIN — APIXABAN 5 MG: 5 TABLET, FILM COATED ORAL at 09:31

## 2024-05-26 RX ADMIN — METOPROLOL SUCCINATE 25 MG: 25 TABLET, EXTENDED RELEASE ORAL at 09:32

## 2024-05-26 RX ADMIN — OXYCODONE HYDROCHLORIDE 2.5 MG: 5 TABLET ORAL at 11:04

## 2024-05-26 RX ADMIN — METHYLPREDNISOLONE SODIUM SUCCINATE 40 MG: 40 INJECTION INTRAMUSCULAR; INTRAVENOUS at 05:27

## 2024-05-26 RX ADMIN — ACETAMINOPHEN 975 MG: 325 TABLET ORAL at 22:01

## 2024-05-26 RX ADMIN — FUROSEMIDE 40 MG: 20 TABLET ORAL at 09:33

## 2024-05-26 RX ADMIN — ACETAMINOPHEN 975 MG: 325 TABLET ORAL at 14:10

## 2024-05-26 RX ADMIN — METHYLPREDNISOLONE SODIUM SUCCINATE 40 MG: 40 INJECTION INTRAMUSCULAR; INTRAVENOUS at 22:00

## 2024-05-26 RX ADMIN — APIXABAN 5 MG: 5 TABLET, FILM COATED ORAL at 19:00

## 2024-05-26 RX ADMIN — POTASSIUM CHLORIDE 20 MEQ: 1500 TABLET, EXTENDED RELEASE ORAL at 09:30

## 2024-05-26 RX ADMIN — ACETAMINOPHEN 975 MG: 325 TABLET ORAL at 05:16

## 2024-05-26 RX ADMIN — AMIODARONE HYDROCHLORIDE 100 MG: 100 TABLET ORAL at 09:45

## 2024-05-26 RX ADMIN — CEFAZOLIN 1 G: 1 INJECTION, POWDER, FOR SOLUTION INTRAMUSCULAR; INTRAVENOUS at 05:22

## 2024-05-26 RX ADMIN — OXYCODONE HYDROCHLORIDE 5 MG: 5 TABLET ORAL at 18:58

## 2024-05-26 RX ADMIN — OXYCODONE HYDROCHLORIDE 10 MG: 10 TABLET, FILM COATED, EXTENDED RELEASE ORAL at 22:01

## 2024-05-26 RX ADMIN — OXYCODONE HYDROCHLORIDE 2.5 MG: 5 TABLET ORAL at 05:17

## 2024-05-26 RX ADMIN — DULOXETINE HYDROCHLORIDE 60 MG: 60 CAPSULE, DELAYED RELEASE ORAL at 09:32

## 2024-05-26 RX ADMIN — CEFAZOLIN 1 G: 1 INJECTION, POWDER, FOR SOLUTION INTRAMUSCULAR; INTRAVENOUS at 17:02

## 2024-05-26 ASSESSMENT — ACTIVITIES OF DAILY LIVING (ADL)
ADLS_ACUITY_SCORE: 36
ADLS_ACUITY_SCORE: 36
ADLS_ACUITY_SCORE: 32
ADLS_ACUITY_SCORE: 36
ADLS_ACUITY_SCORE: 39
ADLS_ACUITY_SCORE: 36
ADLS_ACUITY_SCORE: 36
ADLS_ACUITY_SCORE: 38
ADLS_ACUITY_SCORE: 39
ADLS_ACUITY_SCORE: 36
ADLS_ACUITY_SCORE: 36
ADLS_ACUITY_SCORE: 32
ADLS_ACUITY_SCORE: 36
ADLS_ACUITY_SCORE: 39
ADLS_ACUITY_SCORE: 39
ADLS_ACUITY_SCORE: 34
ADLS_ACUITY_SCORE: 36
ADLS_ACUITY_SCORE: 38
ADLS_ACUITY_SCORE: 36
ADLS_ACUITY_SCORE: 39
ADLS_ACUITY_SCORE: 32
ADLS_ACUITY_SCORE: 34
ADLS_ACUITY_SCORE: 36

## 2024-05-26 NOTE — PLAN OF CARE
"  Problem: Pain Acute  Goal: Optimal Pain Control and Function  Outcome: Progressing  Intervention: Develop Pain Management Plan  Recent Flowsheet Documentation  Taken 5/25/2024 1920 by Linden Jean RN  Pain Management Interventions: medication (see MAR)  Taken 5/25/2024 1824 by Linden Jean RN  Pain Management Interventions: medication (see MAR)     Problem: Mobility Impairment  Goal: Optimal Mobility  Outcome: Progressing  Intervention: Optimize Mobility  Recent Flowsheet Documentation  Taken 5/25/2024 1920 by Linden Jean RN  Assistive Device Utilized:   gait belt   walker  Activity Management: ambulated outside room   Goal Outcome Evaluation:         Pt took walk 200 ft+ in hallway with two wheel walker.  Pt stated \"It feels good to walk\".   Pt was given bowel meds.  Pt reports last BM 5/22.    Pt is on RA and reports pain with movement.  PRN oxycodone was used before walk with good results.     Pt has some slight edema in BLE .      Picc is working well with IV abx given.                    "

## 2024-05-26 NOTE — PROGRESS NOTES
Assessment: 3 Days Post-Op  S/P Procedure(s):  INCISION AND DRAINAGE, KNEE  ARTHROSCOPY, KNEE     Plan:   Continue PT/OT  Weightbearing status: WBAT  Anticoagulation: Eliquis  in addition to SCDs, whitney stockings and early ambulation.  ID following   Discharge planning: Pending PT/OT, ID plan, and medical clearance.      Subjective:  Pain: knee with minimal discomfort, 'tight' from swelling  Nausea, Vomiting:  No  Lightheadedness, Dizziness:  No  Neuro:  Patient denies new onset numbness or paresthesias    Patient resting in bed, answer questions appropriately. Knee pain improving. Hoping for BM, abdominal fullness/discomfort. Has had Miralax x2 per patient report.     Objective:  /84 (BP Location: Right arm)   Pulse 98   Temp 97.6  F (36.4  C) (Oral)   Resp 18   Ht 1.524 m (5')   Wt 66.3 kg (146 lb 2.6 oz)   SpO2 97%   BMI 28.55 kg/m    The patient is A&Ox3. Appears comfortable.   Sensation is intact.  Dorsiflexion and plantar flexion is intact.  Dorsalis pedis pulse intact.  Calves are soft and non-tender. Negative Chance's.  The incision is covered. Dressing C/D/I.    No drain     Pertinent Labs   Lab Results: personally reviewed.   Lab Results   Component Value Date    INR 1.33 (H) 03/04/2023    INR 1.37 (H) 08/23/2022    INR 1.50 (H) 06/05/2019     Lab Results   Component Value Date    WBC 5.8 05/23/2024    HGB 11.0 (L) 05/26/2024    HCT 32.1 (L) 05/23/2024    MCV 93 05/23/2024     05/23/2024     Lab Results   Component Value Date     05/23/2024    CO2 25 05/23/2024         Report completed by:  Megan Mccord PA-C, BOLA  Date: 5/26/2024  Time: 8:24 AM

## 2024-05-26 NOTE — PLAN OF CARE
"  Problem: Adult Inpatient Plan of Care  Goal: Plan of Care Review  Description: The Plan of Care Review/Shift note should be completed every shift.  The Outcome Evaluation is a brief statement about your assessment that the patient is improving, declining, or no change.  This information will be displayed automatically on your shift  note.  Outcome: Progressing  Flowsheets (Taken 5/26/2024 1429)  Plan of Care Reviewed With: patient  Goal: Patient-Specific Goal (Individualized)  Description: You can add care plan individualizations to a care plan. Examples of Individualization might be:  \"Parent requests to be called daily at 9am for status\", \"I have a hard time hearing out of my right ear\", or \"Do not touch me to wake me up as it startles  me\".  Outcome: Progressing  Goal: Absence of Hospital-Acquired Illness or Injury  Outcome: Progressing  Intervention: Identify and Manage Fall Risk  Recent Flowsheet Documentation  Taken 5/26/2024 1142 by Doreen Quiles RN  Safety Promotion/Fall Prevention:   activity supervised   lighting adjusted   nonskid shoes/slippers when out of bed   patient and family education   room organization consistent   safety round/check completed  Intervention: Prevent Skin Injury  Recent Flowsheet Documentation  Taken 5/26/2024 1104 by Doreen Quiles RN  Body Position: position changed independently  Goal: Optimal Comfort and Wellbeing  Intervention: Monitor Pain and Promote Comfort  Recent Flowsheet Documentation  Taken 5/26/2024 1104 by Doreen Quiles RN  Pain Management Interventions: medication (see MAR)  Goal: Readiness for Transition of Care  Outcome: Progressing     Problem: Risk for Delirium  Goal: Optimal Coping  Outcome: Progressing  Goal: Improved Behavioral Control  Outcome: Progressing  Intervention: Minimize Safety Risk  Recent Flowsheet Documentation  Taken 5/26/2024 1142 by oDreen Quiles RN  Enhanced Safety Measures: assistive devices when indicated  Taken 5/26/2024 0918 by Etta, " Doreen SALAS RN  Communication Enhancement Strategies:   call light answered in person   verbal and visual cues paired  Enhanced Safety Measures: assistive devices when indicated  Goal: Improved Attention and Thought Clarity  Outcome: Progressing  Intervention: Maximize Cognitive Function  Recent Flowsheet Documentation  Taken 5/26/2024 0918 by Doreen Quiles RN  Reorientation Measures:   calendar in view   clock in view     Problem: Pain Acute  Goal: Optimal Pain Control and Function  Intervention: Develop Pain Management Plan  Recent Flowsheet Documentation  Taken 5/26/2024 1104 by Doreen Quiles RN  Pain Management Interventions: medication (see MAR)  Intervention: Prevent or Manage Pain  Recent Flowsheet Documentation  Taken 5/26/2024 1142 by Doreen Quiles RN  Medication Review/Management: medications reviewed  Taken 5/26/2024 0918 by Doreen Quiles RN  Medication Review/Management: medications reviewed     Problem: Pain Acute  Goal: Optimal Pain Control and Function  Outcome: Progressing  Intervention: Develop Pain Management Plan  Recent Flowsheet Documentation  Taken 5/26/2024 1104 by Doreen Quiles RN  Pain Management Interventions: medication (see MAR)  Intervention: Prevent or Manage Pain  Recent Flowsheet Documentation  Taken 5/26/2024 1142 by Doreen Quiles RN  Medication Review/Management: medications reviewed  Taken 5/26/2024 0918 by Doreen Quiles RN  Medication Review/Management: medications reviewed     Problem: Mobility Impairment  Goal: Optimal Mobility  Outcome: Progressing  Intervention: Optimize Mobility  Recent Flowsheet Documentation  Taken 5/26/2024 1104 by Doreen Quiles RN  Assistive Device Utilized:   gait belt   walker  Activity Management:   activity adjusted per tolerance   ambulated to bathroom   ambulated in room   activity encouraged   up in chair  Positioning/Transfer Devices: pillows  Taken 5/26/2024 0918 by Doreen Quiles RN  Assistive Device Utilized:   gait belt   walker  Activity  "Management: ambulated to bathroom   Goal Outcome Evaluation:      Plan of Care Reviewed With: patient      VSS, Pt is not tele status. Pt had good pain control with scheduled Tylenol, however a  lower dose of Oxycodone PRN was given vs scheduled oxycodone to assist with constipation & abdominal discomfort.  Pt successfully had 3 BM's this shift = abdominal discomfort is resolved, \"Oh I feel so much better.\" Pt tolerated activity well with assist 1 with walker & gait belt. Pt had a CHG bath as ordered. Pt right knee incision, site is approximated with sutures in place, no drainage or reddness, sites are healing well. Pt has had good PO intake.  Pt awaits discharge planning. Pt uses call light appropriately. Pt has had a good shift, will continue plan of care.            "

## 2024-05-26 NOTE — PROGRESS NOTES
[facility]    Medicine Progress Note - Hospitalist Service    Date of Admission:  5/22/2024    Assessment & Plan   Gladys Ramirez is a 89 year old female admitted on 5/22/2024. She presented with worsening of chronic knee pain. Joint recently aspirated.  Went aspiration grew staph lugdunensis.  Past medical history is significant for hypertension, hyperlipidemia, HFrEF, A-fib, CKD stage III.     Right knee joint swelling  Septic arthritis     -Has had acute worsening of chronic right knee joint pain.  Has elevated CRP and ESR  - 2 weeks ago patient had a steroid injection for pain.  -Few days ago went to a sports medicine clinic and got  joint aspirated.  - consulted ortho, on iv steroids  -synovial fluid grew staph lugdunensis  -Joint aspiration repeated in ER.  Preliminary result shows positive for intracellular crystals consistent with monosodium urate crystals  - 5/23 : S/p Right knee arthroscopic irrigation and debridement   -consulted ID - started  on cefazolin and vancomycin - vanco discontinued, continue cefazolin - 1 month, PICC placed         A-fib  -PTA metoprolol succinate 25 mg oral daily  -PTA amiodarone 100 mg oral daily  -Eliquis 5 mg oral twice daily  -Patient reported that she had a pacemaker implantation 2 weeks ago. Consider pacemaker interrogation.   -Telemetry monitor     HFrEF  -Appears euvolemic clinically  -PTA Lasix 40 mg oral daily  -Echocardiogram            Diet: Advance Diet as Tolerated: Regular Diet Adult  Discharge Instruction - Regular Diet Adult    DVT Prophylaxis: DOAC  Reyes Catheter: Not present  Lines: PRESENT      PICC 05/24/24 Single Lumen Right Brachial vein medial Antibiotic(s)-Site Assessment: WDL      Cardiac Monitoring: None  Code Status: Full Code      Clinically Significant Risk Factors              # Hypoalbuminemia: Lowest albumin = 3.3 g/dL at 5/22/2024  3:06 PM, will monitor as appropriate     # Hypertension: Noted on problem list  #  Chronic heart failure with reduced ejection fraction: last echo with EF <40%       # Overweight: Estimated body mass index is 28.55 kg/m  as calculated from the following:    Height as of this encounter: 1.524 m (5').    Weight as of this encounter: 66.3 kg (146 lb 2.6 oz)., PRESENT ON ADMISSION     # Financial/Environmental Concerns: none   # Pacemaker present       Disposition Plan     Medically Ready for Discharge: Ready Now    Barrier: placement; need iv abx for 1 month         Deane Hopkins MD  Hospitalist Service  [facility]  Securely message with [employer] (more info)  Text page via [facility] Paging/Directory   ______________________________________________________________________    Interval History   No acute event, sleeping    Physical Exam   Vital Signs: Temp: 97.6  F (36.4  C) Temp src: Oral BP: 138/70 Pulse: 79   Resp: 18 SpO2: 94 % O2 Device: None (Room air)    Weight: 146 lbs 2.64 oz      Medical Decision Making       25 MINUTES SPENT BY ME on the date of service doing chart review, history, exam, documentation & further activities per the note.      Data     I have personally reviewed the following data over the past 24 hrs:    N/A  \   11.0 (L)   / N/A     N/A N/A N/A /  N/A   N/A N/A 1.10 (H) \       Imaging results reviewed over the past 24 hrs:   No results found for this or any previous visit (from the past 24 hour(s)).

## 2024-05-27 LAB — BACTERIA BLD CULT: NO GROWTH

## 2024-05-27 PROCEDURE — 250N000011 HC RX IP 250 OP 636: Performed by: STUDENT IN AN ORGANIZED HEALTH CARE EDUCATION/TRAINING PROGRAM

## 2024-05-27 PROCEDURE — 250N000013 HC RX MED GY IP 250 OP 250 PS 637: Performed by: STUDENT IN AN ORGANIZED HEALTH CARE EDUCATION/TRAINING PROGRAM

## 2024-05-27 PROCEDURE — 250N000013 HC RX MED GY IP 250 OP 250 PS 637: Performed by: PHYSICIAN ASSISTANT

## 2024-05-27 PROCEDURE — 120N000004 HC R&B MS OVERFLOW

## 2024-05-27 PROCEDURE — 250N000011 HC RX IP 250 OP 636: Performed by: INTERNAL MEDICINE

## 2024-05-27 PROCEDURE — 99232 SBSQ HOSP IP/OBS MODERATE 35: CPT | Performed by: INTERNAL MEDICINE

## 2024-05-27 RX ADMIN — FUROSEMIDE 40 MG: 20 TABLET ORAL at 09:56

## 2024-05-27 RX ADMIN — APIXABAN 5 MG: 5 TABLET, FILM COATED ORAL at 09:56

## 2024-05-27 RX ADMIN — METHYLPREDNISOLONE SODIUM SUCCINATE 40 MG: 40 INJECTION INTRAMUSCULAR; INTRAVENOUS at 13:33

## 2024-05-27 RX ADMIN — ACETAMINOPHEN 975 MG: 325 TABLET ORAL at 06:17

## 2024-05-27 RX ADMIN — AMIODARONE HYDROCHLORIDE 100 MG: 100 TABLET ORAL at 09:55

## 2024-05-27 RX ADMIN — OXYCODONE HYDROCHLORIDE 2.5 MG: 5 TABLET ORAL at 21:26

## 2024-05-27 RX ADMIN — METHYLPREDNISOLONE SODIUM SUCCINATE 40 MG: 40 INJECTION INTRAMUSCULAR; INTRAVENOUS at 06:18

## 2024-05-27 RX ADMIN — CEFAZOLIN 1 G: 1 INJECTION, POWDER, FOR SOLUTION INTRAMUSCULAR; INTRAVENOUS at 17:06

## 2024-05-27 RX ADMIN — CEFAZOLIN 1 G: 1 INJECTION, POWDER, FOR SOLUTION INTRAMUSCULAR; INTRAVENOUS at 06:18

## 2024-05-27 RX ADMIN — METHYLPREDNISOLONE SODIUM SUCCINATE 40 MG: 40 INJECTION INTRAMUSCULAR; INTRAVENOUS at 21:30

## 2024-05-27 RX ADMIN — APIXABAN 5 MG: 5 TABLET, FILM COATED ORAL at 19:44

## 2024-05-27 RX ADMIN — METOPROLOL SUCCINATE 25 MG: 25 TABLET, EXTENDED RELEASE ORAL at 09:55

## 2024-05-27 RX ADMIN — POTASSIUM CHLORIDE 20 MEQ: 1500 TABLET, EXTENDED RELEASE ORAL at 09:56

## 2024-05-27 RX ADMIN — DULOXETINE HYDROCHLORIDE 60 MG: 60 CAPSULE, DELAYED RELEASE ORAL at 09:56

## 2024-05-27 ASSESSMENT — ACTIVITIES OF DAILY LIVING (ADL)
ADLS_ACUITY_SCORE: 40

## 2024-05-27 NOTE — PROGRESS NOTES
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 Feels like her knee continues to slowly improve.     Exam:  BP (!) 127/91 (BP Location: Left arm)   Pulse 96   Temp 97.8  F (36.6  C) (Oral)   Resp 18   Ht 1.524 m (5')   Wt 70.4 kg (155 lb 1.6 oz)   SpO2 92%   BMI 30.29 kg/m    Gen: Awake, alert, NAD  Resp: breathing equal and non-labored  Extremities:      RLE: Dressing clean and intact. Tolerates passive ROM of the knee. SILT in s/s/dp/sp/t distributions. 1+ DP and PT pulses. Toes WWP, brisk cap refill.      Labs:    Recent Labs   Lab 05/26/24  0531 05/25/24  0550 05/23/24  0421 05/22/24  1506   WBC  --   --  5.8 8.0   HGB 11.0* 9.9* 10.1* 11.3*   PLT  --   --  446 503*     Recent Labs   Lab 05/26/24  0531 05/25/24  1032 05/25/24  0550 05/24/24  0458 05/23/24  1216 05/23/24  0421 05/22/24  1506   NA  --   --   --   --   --  138 138   POTASSIUM  --   --   --   --   --  4.6 4.6   CHLORIDE  --   --   --   --   --  102 96*   CO2  --   --   --   --   --  25 28   BUN  --   --   --   --   --  21.9 23.3*   CR 1.10*  --  1.15* 1.12*  --  1.04* 1.17*   GLC  --  174*  --   --  132* 154* 111*     No lab results found in last 7 days.      Assessment:   89 year old female 4 days sp R knee I&D       Plan:  Continue PT/OT  Weightbearing status: WBAT  Anticoagulation: Eliquis  in addition to SCDs, whitney stockings and early ambulation.  ID following - Continue cefazolin x 1 month. PICC in place  Discharge planning: Pending PT/OT, ID plan, and medical clearance.    Rakan Syed MD  Orthopedic Surgery  [facility]

## 2024-05-27 NOTE — CONSULTS
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 She said it was tasty, but way more food than she could eat. She says that her knee is starting to feel better, but that she anticipates she will be here for several more days. She anticipates going to a TCU at discharge and says that she is okay with this plan, as she believes she needs the therapy and support for awhile.     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 She has a Bible, and a rosary, and has been anointed while here in the hospital. No other needs/concerns identified at this time. Prayer shared.     Plan of Care: Spiritual care staff will remain available for further follow-up as requested by patient, family or staff.      Time Spent with Patient/Family: 15 minutes    Larissa Lema M.Div.      Office: [phone] (for non-urgent requests)  Please Vocera or page through [facility] for time-sensitive requests    "

## 2024-05-27 NOTE — PLAN OF CARE
Problem: Mobility Impairment  Goal: Optimal Mobility  Outcome: Progressing  Intervention: Optimize Mobility  Recent Flowsheet Documentation  Taken 5/26/2024 1524 by Linden Jean RN  Activity Management:   activity adjusted per tolerance   back to bed   ambulated to bathroom     Problem: Pain Acute  Goal: Optimal Pain Control and Function  Outcome: Progressing   Goal Outcome Evaluation:    Pt had 2 bm's today.    PT reports pain in improving.  Pt took walk after dinner and is up using toilet frequently.

## 2024-05-27 NOTE — PLAN OF CARE
"  Problem: Adult Inpatient Plan of Care  Goal: Plan of Care Review  Description: The Plan of Care Review/Shift note should be completed every shift.  The Outcome Evaluation is a brief statement about your assessment that the patient is improving, declining, or no change.  This information will be displayed automatically on your shift  note.  Outcome: Progressing  Goal: Patient-Specific Goal (Individualized)  Description: You can add care plan individualizations to a care plan. Examples of Individualization might be:  \"Parent requests to be called daily at 9am for status\", \"I have a hard time hearing out of my right ear\", or \"Do not touch me to wake me up as it startles  me\".  Outcome: Progressing  Goal: Absence of Hospital-Acquired Illness or Injury  Outcome: Progressing  Intervention: Identify and Manage Fall Risk  Recent Flowsheet Documentation  Taken 5/27/2024 0000 by Rufino Muir, RN  Safety Promotion/Fall Prevention:   activity supervised   safety round/check completed   room organization consistent   room near nurse's station   room door open   patient and family education   nonskid shoes/slippers when out of bed   mobility aid in reach   lighting adjusted   increase visualization of patient   increased rounding and observation   clutter free environment maintained  Intervention: Prevent Skin Injury  Recent Flowsheet Documentation  Taken 5/27/2024 0000 by Rufino Muir, RN  Body Position:   position changed independently   position maintained   heels elevated   legs elevated   neutral head position  Intervention: Prevent Infection  Recent Flowsheet Documentation  Taken 5/27/2024 0000 by Rufino Muir, RN  Infection Prevention:   hand hygiene promoted   personal protective equipment utilized   rest/sleep promoted   single patient room provided   visitors restricted/screened  Goal: Optimal Comfort and Wellbeing  Intervention: Monitor Pain and Promote Comfort  Recent Flowsheet " Documentation  Taken 5/27/2024 0000 by Rufino Muir, RN  Pain Management Interventions:   cold applied   distraction   diversional activity provided   emotional support   pain management plan reviewed with patient/caregiver   pillow support provided   quiet environment facilitated   relaxation techniques promoted   repositioned   rest  Goal: Readiness for Transition of Care  Outcome: Progressing   Goal Outcome Evaluation:      Cardiac:   No telemetry, med/surgical.  Respiratory:  Rate 16-20, non-labored.  Sat's: Maintaining mid 90's at RA.  LS: Diminished in the bases, bilat.  Neuro:  WNL.  GI:  Passing flatus.  X1 BM for NOC.  BS: Present X4 quadrants.  :  Good UOP (see I/O).  Pain:  Endorses periodic burning pain, to affected knee.  Treated with elevation and an ice pack.  No further intervention.  Ambulation:  Up with assist X1 with belt and walker.   Labs:  No AM labs ordered.  Plan:  IV abx treatment.  Awaiting placement to TCU. Change dressing as indicated.

## 2024-05-27 NOTE — PLAN OF CARE
"  Problem: Adult Inpatient Plan of Care  Goal: Plan of Care Review  Description: The Plan of Care Review/Shift note should be completed every shift.  The Outcome Evaluation is a brief statement about your assessment that the patient is improving, declining, or no change.  This information will be displayed automatically on your shift  note.  Outcome: Progressing  Flowsheets (Taken 5/27/2024 1459)  Plan of Care Reviewed With: patient  Goal: Patient-Specific Goal (Individualized)  Description: You can add care plan individualizations to a care plan. Examples of Individualization might be:  \"Parent requests to be called daily at 9am for status\", \"I have a hard time hearing out of my right ear\", or \"Do not touch me to wake me up as it startles  me\".  Outcome: Progressing  Goal: Absence of Hospital-Acquired Illness or Injury  Outcome: Progressing  Intervention: Identify and Manage Fall Risk  Recent Flowsheet Documentation  Taken 5/27/2024 0815 by Doreen Quiles RN  Safety Promotion/Fall Prevention:   activity supervised   nonskid shoes/slippers when out of bed   room organization consistent   safety round/check completed  Intervention: Prevent Skin Injury  Recent Flowsheet Documentation  Taken 5/27/2024 0815 by Doreen Quiles RN  Body Position:   supine, legs elevated   supine, head elevated   supine   upper extremity elevated   weight shifting  Intervention: Prevent and Manage VTE (Venous Thromboembolism) Risk  Recent Flowsheet Documentation  Taken 5/27/2024 0815 by Doreen Quiles RN  VTE Prevention/Management: SCDs (sequential compression devices) on  Goal: Readiness for Transition of Care  Outcome: Progressing     Problem: Risk for Delirium  Goal: Optimal Coping  Outcome: Progressing  Goal: Improved Behavioral Control  Outcome: Progressing  Intervention: Minimize Safety Risk  Recent Flowsheet Documentation  Taken 5/27/2024 0815 by Doreen Quiles RN  Communication Enhancement Strategies:   call light answered in person   " verbal and visual cues paired  Enhanced Safety Measures: assistive devices when indicated  Goal: Improved Attention and Thought Clarity  Outcome: Progressing  Intervention: Maximize Cognitive Function  Recent Flowsheet Documentation  Taken 5/27/2024 0815 by Doreen Quiles RN  Sensory Stimulation Regulation: (cares being grouped this AM) care clustered  Reorientation Measures:   calendar in view   clock in view     Problem: Pain Acute  Goal: Optimal Pain Control and Function  Intervention: Prevent or Manage Pain  Recent Flowsheet Documentation  Taken 5/27/2024 0815 by Doreen Quiles RN  Sensory Stimulation Regulation: (cares being grouped this AM) care clustered  Medication Review/Management: medications reviewed     Problem: Pain Acute  Goal: Optimal Pain Control and Function  Outcome: Progressing  Intervention: Prevent or Manage Pain  Recent Flowsheet Documentation  Taken 5/27/2024 0815 by Doreen Quiles RN  Sensory Stimulation Regulation: (cares being grouped this AM) care clustered  Medication Review/Management: medications reviewed     Problem: Mobility Impairment  Goal: Optimal Mobility  Outcome: Progressing  Intervention: Optimize Mobility  Recent Flowsheet Documentation  Taken 5/27/2024 0815 by Doreen Quiles RN  Assistive Device Utilized:   gait belt   walker  Activity Management: ambulated to bathroom  Positioning/Transfer Devices:   pillows   in use   Goal Outcome Evaluation:      Plan of Care Reviewed With: patient      Pt has had an uneventful shift, VSS, Pain is well controlled with scheduled tylenol, pt has gone without narcotics this shift and has done well. Pt was very constipated - now resolved from possible narcotics. Pt is aware that she has PRN available if needed. Pt right knee puncture sites are healing well, no signs of infection or drainage, sutures in place & approximated. Dry gauze dressing and ace wrap is in place.  VSS, Pt is not tele status, pt is irregular rhythm A-fib with a Opsona  pacemaker. Pt tolerated up in chair and ambulating in andre. Pt had a late breakfast and slept in this AM, pt is now having a late lunch. Pt is assist 1 with gait belt and walker. Pt awaits discharge with home IV ABX. Pt does have a single lumen PICC line to discharge with.  Pt had a CHG bath per [facility] protocol.  Will continue plan of care.

## 2024-05-27 NOTE — PROGRESS NOTES
[facility]    Medicine Progress Note - Hospitalist Service    Date of Admission:  5/22/2024    Assessment & Plan   Gladys Ramriez is a 89 year old female admitted on 5/22/2024. She presented with worsening of chronic knee pain. Joint recently aspirated.  Went aspiration grew staph lugdunensis.  Past medical history is significant for hypertension, hyperlipidemia, HFrEF, A-fib, CKD stage III.     Right knee joint swelling  Septic arthritis     -Has had acute worsening of chronic right knee joint pain.  Has elevated CRP and ESR  - 2 weeks ago patient had a steroid injection for pain.  -Few days ago went to a sports medicine clinic and got  joint aspirated.  - consulted ortho, on iv steroids  -synovial fluid grew staph lugdunensis  -Joint aspiration repeated in ER.  Preliminary result shows positive for intracellular crystals consistent with monosodium urate crystals  - 5/23 : S/p Right knee arthroscopic irrigation and debridement   -consulted ID - started  on cefazolin and vancomycin - vanco discontinued, continue cefazolin - 1 month, PICC placed  -pt feeling better with less knee pain       A-fib  -PTA metoprolol succinate 25 mg oral daily  -PTA amiodarone 100 mg oral daily  -Eliquis 5 mg oral twice daily  -Patient reported that she had a pacemaker implantation 2 weeks ago. Consider pacemaker interrogation.   -Telemetry monitor     HFrEF  -Appears euvolemic clinically  -PTA Lasix 40 mg oral daily  -Echocardiogram: EF 20-25%            Diet: Advance Diet as Tolerated: Regular Diet Adult  Discharge Instruction - Regular Diet Adult    DVT Prophylaxis: DOAC  Reyes Catheter: Not present  Lines: PRESENT      PICC 05/24/24 Single Lumen Right Brachial vein medial Antibiotic(s)-Site Assessment: WDL      Cardiac Monitoring: None  Code Status: Full Code      Clinically Significant Risk Factors              # Hypoalbuminemia: Lowest albumin = 3.3 g/dL at 5/22/2024  3:06 PM, will monitor as appropriate      # Hypertension: Noted on problem list  # Chronic heart failure with reduced ejection fraction: last echo with EF <40%       # Obesity: Estimated body mass index is 30.29 kg/m  as calculated from the following:    Height as of this encounter: 1.524 m (5').    Weight as of this encounter: 70.4 kg (155 lb 1.6 oz).      # Financial/Environmental Concerns: none   # Pacemaker present       Disposition Plan     Medically Ready for Discharge: Ready Now    Await placement         Denae Hopkins MD  Hospitalist Service  [facility]  Securely message with [employer] (more info)  Text page via [facility] Paging/Directory   ______________________________________________________________________    Interval History   Knee pain manageable, feeling better slowly, denies cp/sob, no f/c, no n/v    Physical Exam   Vital Signs: Temp: 97.8  F (36.6  C) Temp src: Oral BP: (!) 127/91 Pulse: 96   Resp: 18 SpO2: 92 % O2 Device: None (Room air)    Weight: 155 lbs 1.6 oz    General.  Awake alert oriented not in acute distress.  HEENT.  Pupils equal round react to light, anicteric, EOM intact.  Neck supple no JVD.  CVS regular rhythm no murmur gallops.  Lungs.  Clear to auscultation bilateral no wheezing or rales.  Abdomen.  Soft nontender bowel sounds present.  Extremities.  Right knee in dressing.  Neurological.  Awake and alert. No focal deficit.  Skin no rash. No pallor.  Psych. Normal mood.      Medical Decision Making       37 MINUTES SPENT BY ME on the date of service doing chart review, history, exam, documentation & further activities per the note.      Data         Imaging results reviewed over the past 24 hrs:   No results found for this or any previous visit (from the past 24 hour(s)).

## 2024-05-28 ENCOUNTER — TELEPHONE (OUTPATIENT)
Dept: INFECTIOUS DISEASES | Facility: CLINIC | Age: 89
End: 2024-05-28

## 2024-05-28 LAB — BACTERIA BLD CULT: NO GROWTH

## 2024-05-28 PROCEDURE — 99232 SBSQ HOSP IP/OBS MODERATE 35: CPT | Performed by: INTERNAL MEDICINE

## 2024-05-28 PROCEDURE — 99233 SBSQ HOSP IP/OBS HIGH 50: CPT | Performed by: INTERNAL MEDICINE

## 2024-05-28 PROCEDURE — 250N000011 HC RX IP 250 OP 636: Performed by: INTERNAL MEDICINE

## 2024-05-28 PROCEDURE — 250N000013 HC RX MED GY IP 250 OP 250 PS 637: Performed by: STUDENT IN AN ORGANIZED HEALTH CARE EDUCATION/TRAINING PROGRAM

## 2024-05-28 PROCEDURE — 250N000011 HC RX IP 250 OP 636: Performed by: STUDENT IN AN ORGANIZED HEALTH CARE EDUCATION/TRAINING PROGRAM

## 2024-05-28 PROCEDURE — 250N000013 HC RX MED GY IP 250 OP 250 PS 637: Performed by: PHYSICIAN ASSISTANT

## 2024-05-28 PROCEDURE — 120N000004 HC R&B MS OVERFLOW

## 2024-05-28 RX ORDER — OXYCODONE HYDROCHLORIDE 5 MG/1
5 TABLET ORAL EVERY 4 HOURS PRN
Qty: 20 TABLET | Refills: 0 | Status: ON HOLD | OUTPATIENT
Start: 2024-05-28 | End: 2024-07-08

## 2024-05-28 RX ORDER — ACETAMINOPHEN 325 MG/1
650 TABLET ORAL EVERY 4 HOURS PRN
DISCHARGE
Start: 2024-05-28 | End: 2024-05-29

## 2024-05-28 RX ORDER — OXYCODONE HCL 10 MG/1
10 TABLET, FILM COATED, EXTENDED RELEASE ORAL EVERY 12 HOURS
Qty: 20 TABLET | Refills: 0 | Status: SHIPPED | DISCHARGE
Start: 2024-05-28 | End: 2024-05-29

## 2024-05-28 RX ORDER — CEFAZOLIN SODIUM 1 G/3ML
1 INJECTION, POWDER, FOR SOLUTION INTRAMUSCULAR; INTRAVENOUS EVERY 12 HOURS
Status: SHIPPED
Start: 2024-05-28 | End: 2024-06-25

## 2024-05-28 RX ADMIN — METOPROLOL SUCCINATE 25 MG: 25 TABLET, EXTENDED RELEASE ORAL at 09:14

## 2024-05-28 RX ADMIN — OXYCODONE HYDROCHLORIDE 5 MG: 5 TABLET ORAL at 00:16

## 2024-05-28 RX ADMIN — APIXABAN 5 MG: 5 TABLET, FILM COATED ORAL at 20:01

## 2024-05-28 RX ADMIN — METHYLPREDNISOLONE SODIUM SUCCINATE 40 MG: 40 INJECTION INTRAMUSCULAR; INTRAVENOUS at 06:07

## 2024-05-28 RX ADMIN — CEFAZOLIN 1 G: 1 INJECTION, POWDER, FOR SOLUTION INTRAMUSCULAR; INTRAVENOUS at 17:56

## 2024-05-28 RX ADMIN — DULOXETINE HYDROCHLORIDE 60 MG: 60 CAPSULE, DELAYED RELEASE ORAL at 09:12

## 2024-05-28 RX ADMIN — APIXABAN 5 MG: 5 TABLET, FILM COATED ORAL at 09:14

## 2024-05-28 RX ADMIN — ACETAMINOPHEN 650 MG: 325 TABLET ORAL at 00:17

## 2024-05-28 RX ADMIN — POTASSIUM CHLORIDE 20 MEQ: 1500 TABLET, EXTENDED RELEASE ORAL at 09:14

## 2024-05-28 RX ADMIN — FUROSEMIDE 40 MG: 20 TABLET ORAL at 09:14

## 2024-05-28 RX ADMIN — OXYCODONE HYDROCHLORIDE 10 MG: 10 TABLET, FILM COATED, EXTENDED RELEASE ORAL at 22:41

## 2024-05-28 RX ADMIN — CEFAZOLIN 1 G: 1 INJECTION, POWDER, FOR SOLUTION INTRAMUSCULAR; INTRAVENOUS at 05:16

## 2024-05-28 RX ADMIN — AMIODARONE HYDROCHLORIDE 100 MG: 100 TABLET ORAL at 09:15

## 2024-05-28 ASSESSMENT — ACTIVITIES OF DAILY LIVING (ADL)
ADLS_ACUITY_SCORE: 37
ADLS_ACUITY_SCORE: 37
ADLS_ACUITY_SCORE: 40
ADLS_ACUITY_SCORE: 40
ADLS_ACUITY_SCORE: 37
ADLS_ACUITY_SCORE: 40
ADLS_ACUITY_SCORE: 40
ADLS_ACUITY_SCORE: 41
ADLS_ACUITY_SCORE: 40
ADLS_ACUITY_SCORE: 37
ADLS_ACUITY_SCORE: 37
ADLS_ACUITY_SCORE: 40
ADLS_ACUITY_SCORE: 41
ADLS_ACUITY_SCORE: 40
ADLS_ACUITY_SCORE: 41
ADLS_ACUITY_SCORE: 41
ADLS_ACUITY_SCORE: 37
ADLS_ACUITY_SCORE: 41
ADLS_ACUITY_SCORE: 40
ADLS_ACUITY_SCORE: 37
ADLS_ACUITY_SCORE: 41
ADLS_ACUITY_SCORE: 40
ADLS_ACUITY_SCORE: 41

## 2024-05-28 NOTE — PROGRESS NOTES
[facility]    Medicine Progress Note - Hospitalist Service    Date of Admission:  5/22/2024    Assessment & Plan   Gladys Ramirez is a 89 year old female admitted on 5/22/2024. She presented with worsening of chronic knee pain. Joint recently aspirated.  Went aspiration grew staph lugdunensis.  Past medical history is significant for hypertension, hyperlipidemia, HFrEF, A-fib, CKD stage III.     Right knee joint swelling  Septic arthritis, right knee     -Has had acute worsening of chronic right knee joint pain.  Has elevated CRP and ESR  - 2 weeks ago patient had a steroid injection for pain.  -Few days ago went to a sports medicine clinic and got  joint aspirated.  - consulted ortho,   -on iv steroids since admission for gout? Stop on 5/28 since pt has been on it for 1 week, with septic arthritis  -synovial fluid grew staph lugdunensis  -Joint aspiration repeated in ER.  Preliminary result shows positive for intracellular crystals consistent with monosodium urate crystals  - 5/23 : S/p Right knee arthroscopic irrigation and debridement   -consulted ID - started  on cefazolin and vancomycin - vanco discontinued, continue cefazolin - 1 month, PICC placed  -pt feeling better with less knee pain  -last night c/o weird feeling in leg, nerve pain?. I plan to order Neurontin but pt is listed as being allergic to it. Pt is on Cymbalta. Will observe.         A-fib  -PTA metoprolol succinate 25 mg oral daily  -PTA amiodarone 100 mg oral daily  -Eliquis 5 mg oral twice daily  -Patient reported that she had a pacemaker implantation 2 weeks ago. Consider pacemaker interrogation.   -Telemetry monitor     HFrEF  -Appears euvolemic clinically  -PTA Lasix 40 mg oral daily  -Echocardiogram: EF 20-25%             Diet: Advance Diet as Tolerated: Regular Diet Adult  Discharge Instruction - Regular Diet Adult  Diet    DVT Prophylaxis: DOAC  Reyes Catheter: PRESENT, indication:    Lines: PRESENT      PICC  "05/24/24 Single Lumen Right Brachial vein medial Antibiotic(s)-Site Assessment: WDL      Cardiac Monitoring: None  Code Status: Full Code      Clinically Significant Risk Factors              # Hypoalbuminemia: Lowest albumin = 3.3 g/dL at 5/22/2024  3:06 PM, will monitor as appropriate     # Hypertension: Noted on problem list  # Chronic heart failure with reduced ejection fraction: last echo with EF <40%       # Obesity: Estimated body mass index is 30.13 kg/m  as calculated from the following:    Height as of this encounter: 1.524 m (5').    Weight as of this encounter: 70 kg (154 lb 4.8 oz).      # Financial/Environmental Concerns: none   # Pacemaker present       Disposition Plan     Medically Ready for Discharge: Ready Now    Await placement         Denae Hopkins MD  Hospitalist Service  [facility]  Securely message with [employer] (more info)  Text page via [employer] Paging/Directory   ______________________________________________________________________    Interval History   C/o not resting well last night due to weird feeling in her right leg, describing as \"nerve pain\" , no f/c, no n/v    Physical Exam   Vital Signs: Temp: 97.8  F (36.6  C) Temp src: Oral BP: (!) 141/80 Pulse: 102   Resp: 18 SpO2: 97 % O2 Device: None (Room air)    Weight: 154 lbs 4.8 oz    General.  Awake alert oriented not in acute distress.  HEENT.  Pupils equal round react to light, anicteric, EOM intact.  Neck supple no JVD.  CVS regular rhythm no murmur gallops.  Lungs.  Clear to auscultation bilateral no wheezing or rales.  Abdomen.  Soft nontender bowel sounds present.  Extremities.  No edema no calf tenderness. Right knee s/p sx  Neurological.  Awake and alert. No focal deficit.  Skin no rash. No pallor.  Psych. Normal mood.      Medical Decision Making       46 MINUTES SPENT BY ME on the date of service doing chart review, history, exam, documentation & further activities per the note.      Data         Imaging " results reviewed over the past 24 hrs:   No results found for this or any previous visit (from the past 24 hour(s)).

## 2024-05-28 NOTE — PROGRESS NOTES
[facility]    Infectious Disease Progress Note     05/28/2024     Assessment & Plan   Gladys Ramirez is a 89 year old female who was admitted on 5/22/2024.     ASSESSMENT:  R knee septic arthritis, native knee  Staphylococcus lugdenensis  S/P washout  Please see recommendations by Dr. Reza dated 5/24/2024  Cultures reviewed    Susceptibility data from last 90 days.  Collected Specimen Info Organism Clindamycin Daptomycin Doxycycline Erythromycin Gentamicin Oxacillin Tetracycline Trimethoprim/Sulfamethoxazole  Vancomycin   05/22/24 Synovial fluid from Knee, Right Staphylococcus lugdunensis            05/20/24 Synovial fluid from Knee, Right Staphylococcus lugdunensis  S  S  S  S  S  S  S  S  S         RECOMMENDATIONS:    Antibiotics Cefazolin  Follow culture results   Duration 4 weeks  Monitor CBC, CMP  PICC  ID discharge orders entered for IV abx and follow up with Dr. Reza  Awaiting TCU placement  ID will sign off. Patient new to me. Please call with questions  Chart checked, patient seen and updated.        Lucinda Gallo MD  [facility]  [phone]      Interval History   Chart reviewed  Patient seen, discussed plan  Knee pain improved, able to bend knee, flexed with    Physical Exam   Temp: 97.8  F (36.6  C) Temp src: Oral BP: (!) 141/80 Pulse: 102   Resp: 18 SpO2: 97 % O2 Device: None (Room air)    Vitals:    05/24/24 0457 05/27/24 0434 05/28/24 0500   Weight: 66.3 kg (146 lb 2.6 oz) 70.4 kg (155 lb 1.6 oz) 70 kg (154 lb 4.8 oz)     Vital Signs with Ranges  Temp:  [97.5  F (36.4  C)-98  F (36.7  C)] 97.8  F (36.6  C)  Pulse:  [102-111] 102  Resp:  [18-20] 18  BP: (133-143)/(75-90) 141/80  SpO2:  [94 %-99 %] 97 %    Previous note  Alert, awake  Vitals tabulated above, reviewed  HEENT:glasses, a bit [place]  Neck supple without lymphadenopathy  Sclera clear  CARDIOVASCULAR regular rate and rhythm, no murmur  Lungs CLEAR TO AUSCULTATION   Abdomen soft, NT/ND,  absent HEPATOSPLENOMEGALY  Skin normal  Joints R knee bandaged  Neurologic exam non focal  Wound:  NA          Medications   Current Facility-Administered Medications   Medication Dose Route Frequency Provider Last Rate Last Admin     Current Facility-Administered Medications   Medication Dose Route Frequency Provider Last Rate Last Admin    amiodarone (PACERONE) tablet 100 mg  100 mg Oral Daily Farhan Neil MD   100 mg at 05/28/24 0915    apixaban ANTICOAGULANT (ELIQUIS) tablet 5 mg  5 mg Oral BID Milad House PA-C   5 mg at 05/28/24 0914    ceFAZolin (ANCEF) 1 g vial to attach to  ml bag for ADULT or 50 ml bag for PEDS  1 g Intravenous Q12H Aakash Reza MD   1 g at 05/28/24 0516    DULoxetine (CYMBALTA) DR capsule 60 mg  60 mg Oral Daily Farhan Neil MD   60 mg at 05/28/24 0912    furosemide (LASIX) tablet 40 mg  40 mg Oral QAM Farhan Neil MD   40 mg at 05/28/24 0914    [Held by provider] lisinopril (ZESTRIL) tablet 2.5 mg  2.5 mg Oral Daily Farhan Neil MD        methylPREDNISolone sodium succinate (SOLU-MEDROL) injection 40 mg  40 mg Intravenous Q8H Farhan Neil MD   40 mg at 05/28/24 0607    metoprolol succinate ER (TOPROL XL) 24 hr tablet 25 mg  25 mg Oral Daily Farhan Neil MD   25 mg at 05/28/24 0914    oxyCODONE (oxyCONTIN) 12 hr tablet 10 mg  10 mg Oral Q12H Farhan Neil MD   10 mg at 05/26/24 2201    polyethylene glycol (MIRALAX) Packet 17 g  17 g Oral Daily Scout Bragg PA-C   17 g at 05/25/24 0905    potassium chloride tamiko ER (KLOR-CON M20) CR tablet 20 mEq  20 mEq Oral Daily Farhan Neil MD   20 mEq at 05/28/24 0914    senna-docusate (SENOKOT-S/PERICOLACE) 8.6-50 MG per tablet 1 tablet  1 tablet Oral BID Scout Bragg PA-C   1 tablet at 05/25/24 0905    sodium chloride (PF) 0.9% PF flush 3 mL  3 mL Intracatheter Q8H Scout Bragg PA-C   3 mL at 05/27/24 0409     "sodium chloride (PF) 0.9% PF flush 3 mL  3 mL Intracatheter Q8H Farhan Neil MD   3 mL at 05/28/24 0517    sodium chloride (PF) 0.9% PF flush 3 mL  3 mL Intracatheter Q8H Farhan Neil MD   3 mL at 05/27/24 2131       Data   All microbiology laboratory data reviewed.  Recent Labs   Lab Test 05/26/24  0531 05/25/24  0550 05/23/24  0421 05/22/24  1506 04/08/24  0904   WBC  --   --  5.8 8.0 6.6   HGB 11.0* 9.9* 10.1* 11.3* 11.4*   HCT  --   --  32.1* 35.7 35.4   MCV  --   --  93 93 93   PLT  --   --  446 503* 273     Recent Labs   Lab Test 05/26/24  0531 05/25/24  0550 05/24/24  0458   CR 1.10* 1.15* 1.12*     Recent Labs   Lab Test 05/22/24  1506   SED 92*     No lab results found.    Invalid input(s): \"UC\"    MICROBIOLOGY:    Reviewed    7-Day Micro Results       Collected Updated Procedure Result Status      05/22/2024 1722 05/22/2024 2031 Crystal ID Synovial Fluid [[id number]]   (Abnormal)   Synovial fluid from Knee, Right    Final result Component Value   Crystals Analysis Positive for intracellular crystals, consistent with monosodium urate crystals.            05/22/2024 1722 05/22/2024 2106 Cell count with differential fluid [[id number]]    (Abnormal)   Synovial fluid from Knee, Right    Final result Component Value   No component results            05/22/2024 1722 05/25/2024 0941 Synovial fluid Aerobic Bacterial Culture Routine With Gram Stain [[id number]]   (Abnormal)   Synovial fluid from Knee, Right    Final result Component Value   Culture 1+ Staphylococcus lugdunensis    Susceptibilities done on previous cultures   Gram Stain Result No organisms seen    4+ WBC seen    Predominantly PMNs               05/22/2024 1722 05/22/2024 2105 Cell Count Body Fluid [[id number]]    (Abnormal)   Synovial fluid from Knee, Right    Final result Component Value Units   Color Red    Clarity Turbid    Cell Count Fluid Source Knee, Right    Total Nucleated Cells 66,726 /uL            05/22/2024 " 1722 2024 2106 Differential Body Fluid [[id number]]    Synovial fluid from Knee, Right    Final result Component Value Units   % Neutrophils 92 %   % Lymphocytes 0 %   % Monocyte/Macrophages 8 %            2024 1506 2024 2007 Blood Culture Peripheral Blood [[id number]]   Peripheral Blood    Final result Component Value   Culture No Growth               2024 1459 2024 0346 Blood Culture Peripheral Blood [[id number]]   Peripheral Blood    Final result Component Value   Culture No Growth                        RADIOLOGY:    Reviewed  XR Chest Port 1 View    Result Date: 2024  EXAM: XR CHEST PORT 1 VIEW LOCATION: [facility] DATE: 2024 INDICATION: RN placed PICC   verify tip placement COMPARISON: 2024     IMPRESSION: Right upper extremity PICC tip in the lower SVC. Unchanged left chest pacemaker. No acute cardiopulmonary abnormality. Unchanged mildly enlarged cardiac silhouette.    Echocardiogram Complete    Result Date: 2024  [id number]^KELLI^[place] [address]  Name: BHARAT QUICK MRN: [id number] : 1930 Study Date: 2024 09:16 AM Age: 89 yrs Gender: Female Patient Location: [facility] Reason For Study: Heart Failure Ordering Physician: JUAN DAVID PEREIRA Performed By:   BSA: 1.6 m2 Height: 60 in Weight: 147 lb HR: 86 ______________________________________________________________________________ Procedure Complete Portable Echo Adult. Definity (NDC #03266-208) given intravenously. Adequate quality two-dimensional was performed and interpreted. Compared to prior study, there is no significant change. ______________________________________________________________________________ Interpretation Summary  The left ventricle is normal in size. There is mild concentric left ventricular hypertrophy. Left ventricular function is decreased. The ejection  fraction is 20-25% (severely reduced). There is diffuse hypokinesis of the left ventricle. Septal motion is consistent with conduction abnormality.  The right ventricle is normal in size and function. The left atrium is moderate to severely dilated. Borderline right atrial enlargement. There is mild (1+) mitral regurgitation. There is mild (1+) tricuspid regurgitation. Right ventricle systolic pressure estimate normal IVC diameter and respiratory changes fall into an intermediate range suggesting an RA pressure of 8 mmHg. Compared to prior study, there is no significant change. ______________________________________________________________________________ Left Ventricle The left ventricle is normal in size. Left ventricular function is decreased. The ejection fraction is 20-25% (severely reduced). There is mild concentric left ventricular hypertrophy. There is diffuse hypokinesis of the left ventricle. Septal motion is consistent with conduction abnormality.  Right Ventricle The right ventricle is normal in size and function. There is a pacemaker lead in the right ventricle.  Atria The left atrium is moderate to severely dilated. Borderline right atrial enlargement.  Mitral Valve Mitral valve leaflets appear normal. There is mild (1+) mitral regurgitation. There is no mitral valve stenosis.  Tricuspid Valve Tricuspid valve leaflets appear normal. There is mild (1+) tricuspid regurgitation. Right ventricle systolic pressure estimate normal.  Aortic Valve The aortic valve is trileaflet. Aortic valve leaflets appear normal. No aortic regurgitation is present. No aortic stenosis is present.  Pulmonic Valve The pulmonic valve is not well visualized. There is trace pulmonic valvular regurgitation.  Vessels The aorta root is normal. IVC diameter and respiratory changes fall into an intermediate range suggesting an RA pressure of 8 mmHg.  Pericardium There is no pericardial effusion.  Rhythm The rhythm was atrial  fibrillation. ______________________________________________________________________________ MMode/2D Measurements & Calculations IVSd: 1.4 cm LVIDd: 4.9 cm LVIDs: 4.4 cm LVPWd: 1.2 cm FS: 11.2 % LV mass(C)d: 244.2 grams LV mass(C)dI: 149.1 grams/m2 Ao root diam: 2.7 cm asc Aorta Diam: 3.5 cm LVOT diam: 1.9 cm LVOT area: 2.8 cm2 Ao root diam index Ht(cm/m): 1.8 Ao root diam index BSA (cm/m2): 1.6 Asc Ao diam index BSA (cm/m2): 2.1 Asc Ao diam index Ht(cm/m): 2.3 EF Biplane: 48.9 % LA Volume (BP): 76.2 ml  LA Volume Index (BP): 46.5 ml/m2 RV Base: 4.3 cm RWT: 0.47 TAPSE: 1.3 cm  Doppler Measurements & Calculations MV E max jimbo: 122.3 cm/sec MV dec slope: 733.3 cm/sec2 MV dec time: 0.17 sec Ao V2 max: 183.7 cm/sec Ao max P.0 mmHg Ao V2 mean: 142.0 cm/sec Ao mean P.0 mmHg Ao V2 VTI: 40.9 cm NIK(I,D): 1.5 cm2 NIK(V,D): 1.7 cm2 LV V1 max P.9 mmHg LV V1 max: 110.3 cm/sec LV V1 VTI: 21.7 cm MR PISA: 1.6 cm2 MR ERO: 0.12 cm2 MR volume: 22.2 ml SV(LVOT): 61.5 ml SI(LVOT): 37.6 ml/m2  PA acc time: 0.08 sec TR max jimbo: 258.0 cm/sec TR max P.6 mmHg AV Jimbo Ratio (DI): 0.60 NIK Index (cm2/m2): 0.92 E/E' av.8 Lateral E/e': 11.8 Medial E/e': 17.9 RV S Jimbo: 9.1 cm/sec  ______________________________________________________________________________ Report approved by: Tl Steward 2024 11:01 AM       Cardiac Device Check - In Clinic (Post implant follow up)    Result Date: 2024  Encounter Type: Patient seen in clinic for 6 week  PO device evaluation and iterative programming. Device: Medtronic [place] (D) Pacemaker Pacing %/Programmed: AP 54%,  69% / DDD  bpm Lead(s): RA, RV available measurements stable Battery Longevity: Estimates 13.3 years remaining Presenting Rhythm: Afib/VS  bpm Underlying Rhythm: Afib w/ V rates  bpm Heart Rates:  bpm, primarily  bpm Atrial High rates: 2 mode switches, burden 35% since implant, continuous for 13 days. VR >= 120 bpm ~5-10%.  Anticoagulant: Eliquis Ventricular High rates: None Teaching/Education: Reviewed Afib, how PPM is working. Incision site: Clean, dry and well healed. small scab on Rt side of incision Comments: Normal device function. Atrial sensitivity changed to 0.15mV, Atrial ATP turned ON per Modified Fabiana Protocol. Plan: Next check= remote on 6/17 prior to William Lamas NP visit on 6/18. Next billable remote on 8/23/24. Lorri Manzanares, Device RN  Device follow up for the entirety of having the Device, based on best practices determined by Heart Rhythm Society and in Compliance with Medicare guidelines. Continue remote device monitoring per patient plan.    CT Knee Right w/o Contrast    Result Date: 5/14/2024  EXAM: CT KNEE RIGHT W/O CONTRAST LOCATION: [facility] DATE: 5/13/2024 INDICATION: Sharp increase in severely arthritic right knee. Unable to bear weight, steroid injections not helping. COMPARISON: 4/30/2024 radiographs. TECHNIQUE: Noncontrast. Axial, sagittal and coronal thin-section reconstruction. Dose reduction techniques were used. FINDINGS: BONES: -No evidence of a fracture. Advanced degenerative changes in the lateral compartment with extensive degenerative remodeling of the lateral tibial plateau with subchondral cystic change, sclerosis and moderate marginal osteophytic spurring. Osteopenia. Advanced degenerative changes medial compartment and moderate degenerative changes in the patellofemoral compartment. SOFT TISSUES: -Moderate-sized joint effusion with diffuse synovitis. No loose bodies. No soft tissue edema. Moderate global atrophy of the surrounding knee musculature.     IMPRESSION: 1.  No evidence of acute injury. 2.  Advanced degenerative changes in the medial and lateral compartments. 3.  Moderate degenerative changes in the patellofemoral compartment. 4.  Moderate-sized knee joint effusion with extensive synovitis. 5.  Moderate global atrophy of the surrounding knee  musculature.     Cardiac Device Check - Remote    Result Date: 5/13/2024  Encounter Type: alert remote pacemaker transmission for AT/AF >/=6hrs. Courtesy check. Device: Medtronic [place]. Pacing % /Programmed: AP 81%,  97% at DDD 60/110 ppm. Lead(s): stable. Battery longevity: 12yrs, 8mo estimated. Presenting: Atrial fibrillation with ventricular sensing and pacing 75-85 bpm. Atrial high rates: since 4/16/24; One AF episode in progress since 5/8/24 6:16AM (6hrs), burden 1%, v-rates >/=120bpm 5%. Anticoagulant: Eliquis. Ventricular High rates: since 4/16/24; none detected. Comments: Normal device function. Plan: Due for 6 week in clinic check on 5/20/2024. Routed to device RN for review. WENDY Estrada, Device Specialist. Add: remote reviewed, agree with above. Will call pt- see Epic note. Lorri Manzanares, Device RN  Device follow up for the entirety of having the Pacemaker, based on best practices determined by Heart Rhythm Society and in Compliance with Medicare guidelines. Continue remote device monitoring per patient plan. I have reviewed and interpreted the device interrogation, settings, programming, and encounter summary. The device is functioning within normal device parameters. I agree with the current findings, assessment and plan.    XR Knee Right 3 Views    Result Date: 4/30/2024  EXAM: XR KNEE RIGHT 3 VIEWS LOCATION: [facility] DATE: 4/30/2024 INDICATION: increased pain COMPARISON: 3/4/2023     IMPRESSION: No acute fracture or malalignment. Severe lateral compartment degenerative changes with bone-on-bone articulation. Moderate medial and patellofemoral compartment degenerative changes with chondrocalcinosis. Moderate knee joint effusion. Osteopenia.    Medical Decision Making       MANAGEMENT DISCUSSED with the following over the past 24 hours: patient   NOTE(S)/MEDICAL RECORDS REVIEWED over the past 24 hours: reviewed, patient new to me on 5/28/2024  Tests ORDERED & REVIEWED in the  past 24 hours:  - See lab/imaging results included in the data section of the note  Medical complexity over the past 24 hours:  - Intensive monitoring for MEDICATION TOXICITY  - OPAT, IV abx, follow up

## 2024-05-28 NOTE — TELEPHONE ENCOUNTER
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

## 2024-05-28 NOTE — PLAN OF CARE
"  Problem: Adult Inpatient Plan of Care  Goal: Plan of Care Review  Description: The Plan of Care Review/Shift note should be completed every shift.  The Outcome Evaluation is a brief statement about your assessment that the patient is improving, declining, or no change.  This information will be displayed automatically on your shift  note.  Outcome: Progressing  Goal: Patient-Specific Goal (Individualized)  Description: You can add care plan individualizations to a care plan. Examples of Individualization might be:  \"Parent requests to be called daily at 9am for status\", \"I have a hard time hearing out of my right ear\", or \"Do not touch me to wake me up as it startles  me\".  Outcome: Progressing  Goal: Absence of Hospital-Acquired Illness or Injury  Outcome: Progressing  Intervention: Identify and Manage Fall Risk  Recent Flowsheet Documentation  Taken 5/28/2024 1326 by Madison Rinaldi, RN  Safety Promotion/Fall Prevention: activity supervised  Taken 5/28/2024 0928 by Madison Rinaldi, RN  Safety Promotion/Fall Prevention: activity supervised  Intervention: Prevent Infection  Recent Flowsheet Documentation  Taken 5/28/2024 1326 by Madison Rinaldi, RN  Infection Prevention:   environmental surveillance performed   equipment surfaces disinfected   hand hygiene promoted   single patient room provided  Taken 5/28/2024 0928 by Madison Rinaldi, RN  Infection Prevention:   environmental surveillance performed   equipment surfaces disinfected   hand hygiene promoted   single patient room provided  Goal: Readiness for Transition of Care  Outcome: Progressing   Goal Outcome Evaluation:       Was up to the bathroom with SBA and a walker  and can't fully bear weight on RT legs Foam dressing in place Patient will call when needed transfer to TCU in the AM.Declined the Oxycontin doses  during the day she verbalized that she will take this before bedtime  Ice pack over the Rt Knee .                 "

## 2024-05-28 NOTE — PLAN OF CARE
Goal Outcome Evaluation:      Plan of Care Reviewed With: patient               Patient reports adequate pain control with scheduled and PRN pain medication administration and ice therapy.  Ambulated x 2 in the hallways with RN.  Bed alarm on for safety.

## 2024-05-28 NOTE — PROGRESS NOTES
Orthopedic Progress Note      Assessment: 5 Days Post-Op  S/P Procedure(s):  INCISION AND DRAINAGE, KNEE  ARTHROSCOPY, KNEE     Plan:   Pain Control: Hospitalist ordered scheduled ER oxycontin in addition to PRN oxycodone.  Continue current regimen  Weight Bearing: Weight bearing as tolerated on affected lower extremity.   DVT Prophylaxis: Eliquis in addition to SCDs, mechanical  Antibiotics: 1 month IV ancef per ID  GI: Plan for aggressive bowel regimen to prevent constipation from narcotic medications  Lines: HLIV once tolerating PO  PT/OT: Eval and treatment. Will follow up on recommendations.  Follow up:  in 2 weeks in clinic for wound check  Discharge plan: Pending placement.  Okay to discharge from an orthopedic standpoint.        Subjective:  Pain: moderate  Nausea, Vomiting:  No  Lightheadedness, Dizziness:  No  Neuro:  Patient denies new onset numbness or paresthesias  Fever, chills: No  Chest pain: No  SOB: No    Patient reports feeling well today. Pain is worse today.  She did a lot of walking over the weekend.  No new fevers/chills.. All questions/concerns answered.      Objective:  BP (!) 141/80 (BP Location: Left arm)   Pulse 102   Temp 97.8  F (36.6  C) (Oral)   Resp 18   Ht 1.524 m (5')   Wt 70 kg (154 lb 4.8 oz)   SpO2 97%   BMI 30.13 kg/m      The patient is A&Ox3. Appears comfortable, sitting up at bedside.  Calves without tenderness, neg Chance's  Brisk capillary refill in the toes.   Palpable Right dorsalis pedis pulse. Right foot warm & well-perfused.  Sensation is intact to light touch & equal bilaterally in the femoral, DP, SP & tibial nerve distributions.  ROM: Appropriately flexes & extends all toes bilaterally.   Motor: +5/5 dorsiflexion, plantar flexion & EHL bilaterally.   Leg lengths equal.  Dressing C/D/I without strikethrough, no surrounding erythema.      No drain     Pertinent Labs   Lab Results: personally reviewed.   Lab Results   Component Value Date    INR 1.33 (H)  03/04/2023    INR 1.37 (H) 08/23/2022    INR 1.50 (H) 06/05/2019     Lab Results   Component Value Date    WBC 5.8 05/23/2024    HGB 11.0 (L) 05/26/2024    HCT 32.1 (L) 05/23/2024    MCV 93 05/23/2024     05/23/2024     Lab Results   Component Value Date     05/23/2024    CO2 25 05/23/2024         Report completed by:  SALVATORE NAVA PA-C  Date: 05/28/2024  [facility]

## 2024-05-28 NOTE — PROGRESS NOTES
Pt. Was having some pain in knee at change of shift.  Scheduled stronger med was not given earlier, nurse had given smaller dose.   I did give her 5 mg prn and acetaminophen   Just saw pt. when peeked in and she said she really had not slept yet.   We had put ice pack on her at beginning of shift too.   States she did more yesterday and knee is having  burning pain.

## 2024-05-28 NOTE — PROGRESS NOTES
"Care Management Follow Up    Length of Stay (days): 6    Expected Discharge Date: 05/28/2024     Concerns to be Addressed: discharge planning     Patient plan of care discussed at interdisciplinary rounds: Yes    Anticipated Discharge Disposition:  PT rec transitional care and OT rec home with home care     Anticipated Discharge Services:  PT rec transitional care and OT rec home with home care  Anticipated Discharge DME:  NA    Patient/family educated on Medicare website which has current facility and service quality ratings:  Yes  Education Provided on the Discharge Plan:  Yes per team  Patient/Family in Agreement with the Plan:  Yes    Referrals Placed by CM/SW:  Yes  Private pay costs discussed: Transportation costs    Additional Information:  Met patient at bedside to discuss PT rec transitional care and OT rec home with home care.  Patient prefer transitional care  Patient said she never got the TCU list, \"I don't know about the list you are talking about.\"  Referrals were sent, no response.    Per Dr. Hopkins, patient maybe ready for discharge if have accepting TCU.    Social Hx: \"Live alone in an apartment. [facility]. Accepted to [facility]. PAS completed.  transport set up. Family/friend to transport.\"    RNCM to follow for medical progression, recommendations, and final discharge plan.     Radha Underwood, RN     1103 rec'd a call from Karon with [facility] [phone]. She has an open bed and can take patient, but will have a private room fee of $50.00/day. Can arrive sometime before 6PM if patient accept.    Met with patient at bedside to discuss. Patient said she is not ready for discharge today, \"I haven't seen my doctor yet. I need to talk with my doctor.\"  Patient requesting for RNCM to check the other TCUs that are closer to her families such as:  1) [facility]  2) [facility]    Sent message to Dr. Hopkins, patient wants to see  him.    Called " "[facility] - no open bed  Called Alisa and the admin is out until 5/29/24    [id number] met with patient to discuss [facility] accepted patient, but she will have a private room fee of $25/day. Patient is OK, \"I will have to talk with my superior and the community.\" Patient wants to make sure the discharge is for tomorrow and would like to see Dr. Hopkins.  Patient states she will need a WC transport set up  Discussed out of pocket cost of [facility] transportation by wheelchair with patient. Patient agreed with the plan to have transportation arranged by [employer] transport.      Message sent to Dr. Hopkins to see patient.      RNCM called to set up [facility] WC transport between [phone]  [phone]     Sent message to update AJ with [facility]  "

## 2024-05-29 VITALS
HEART RATE: 102 BPM | HEIGHT: 60 IN | BODY MASS INDEX: 30.15 KG/M2 | WEIGHT: 153.6 LBS | SYSTOLIC BLOOD PRESSURE: 130 MMHG | TEMPERATURE: 98.3 F | RESPIRATION RATE: 18 BRPM | DIASTOLIC BLOOD PRESSURE: 97 MMHG | OXYGEN SATURATION: 93 %

## 2024-05-29 PROCEDURE — 250N000011 HC RX IP 250 OP 636: Performed by: INTERNAL MEDICINE

## 2024-05-29 PROCEDURE — 99239 HOSP IP/OBS DSCHRG MGMT >30: CPT | Performed by: INTERNAL MEDICINE

## 2024-05-29 PROCEDURE — 250N000013 HC RX MED GY IP 250 OP 250 PS 637: Performed by: STUDENT IN AN ORGANIZED HEALTH CARE EDUCATION/TRAINING PROGRAM

## 2024-05-29 PROCEDURE — 250N000013 HC RX MED GY IP 250 OP 250 PS 637: Performed by: INTERNAL MEDICINE

## 2024-05-29 PROCEDURE — 250N000013 HC RX MED GY IP 250 OP 250 PS 637: Performed by: PHYSICIAN ASSISTANT

## 2024-05-29 RX ORDER — LIDOCAINE 40 MG/G
CREAM TOPICAL
Status: ACTIVE | OUTPATIENT
Start: 2024-05-29 | End: 2024-06-01

## 2024-05-29 RX ORDER — ACETAMINOPHEN 325 MG/1
975 TABLET ORAL EVERY 8 HOURS
DISCHARGE
Start: 2024-05-29 | End: 2024-07-02

## 2024-05-29 RX ORDER — ACETAMINOPHEN 325 MG/1
650 TABLET ORAL EVERY 4 HOURS PRN
Refills: 0 | DISCHARGE
Start: 2024-05-29 | End: 2024-07-02

## 2024-05-29 RX ADMIN — DULOXETINE HYDROCHLORIDE 60 MG: 60 CAPSULE, DELAYED RELEASE ORAL at 08:22

## 2024-05-29 RX ADMIN — FUROSEMIDE 40 MG: 20 TABLET ORAL at 08:23

## 2024-05-29 RX ADMIN — LISINOPRIL 2.5 MG: 2.5 TABLET ORAL at 08:23

## 2024-05-29 RX ADMIN — APIXABAN 5 MG: 5 TABLET, FILM COATED ORAL at 08:22

## 2024-05-29 RX ADMIN — ACETAMINOPHEN 650 MG: 325 TABLET ORAL at 08:33

## 2024-05-29 RX ADMIN — POTASSIUM CHLORIDE 20 MEQ: 1500 TABLET, EXTENDED RELEASE ORAL at 08:23

## 2024-05-29 RX ADMIN — METOPROLOL SUCCINATE 25 MG: 25 TABLET, EXTENDED RELEASE ORAL at 08:23

## 2024-05-29 RX ADMIN — CEFAZOLIN 1 G: 1 INJECTION, POWDER, FOR SOLUTION INTRAMUSCULAR; INTRAVENOUS at 05:59

## 2024-05-29 RX ADMIN — AMIODARONE HYDROCHLORIDE 100 MG: 100 TABLET ORAL at 08:22

## 2024-05-29 RX ADMIN — OXYCODONE HYDROCHLORIDE 5 MG: 5 TABLET ORAL at 08:33

## 2024-05-29 ASSESSMENT — ACTIVITIES OF DAILY LIVING (ADL)
ADLS_ACUITY_SCORE: 41

## 2024-05-29 NOTE — DISCHARGE SUMMARY
[facility]    Discharge Summary  Hospitalist    Date of Admission:  5/22/2024  Date of Discharge:  5/29/2024  Discharging Provider: Denae Hopkins MD  Date of Service (when I saw the patient): 05/29/24    Discharge Diagnoses   Septic arthritis, right knee  A-fib  HFrEF    History of Present Illness   Gladys Ramirez is an 89 year old female who presented with right knee pain    Hospital Course   Gladys Ramirez is a 89 year old female admitted on 5/22/2024. She presented with worsening of chronic knee pain. Joint recently aspirated.  Went aspiration grew staph lugdunensis.  Past medical history is significant for hypertension, hyperlipidemia, HFrEF, A-fib, CKD stage III.     Right knee joint swelling  Septic arthritis, right knee     -Has had acute worsening of chronic right knee joint pain.  Has elevated CRP and ESR  - 2 weeks ago patient had a steroid injection for pain.  -Few days ago went to a sports medicine clinic and got  joint aspirated.  - consulted ortho,   -on iv steroids since admission for gout? Stop on 5/28 since pt has been on it for 1 week, with septic arthritis  -synovial fluid grew staph lugdunensis  -Joint aspiration repeated in ER.  Preliminary result shows positive for intracellular crystals consistent with monosodium urate crystals  - 5/23 : S/p Right knee arthroscopic irrigation and debridement   -consulted ID - started  on cefazolin and vancomycin - vanco discontinued, continue cefazolin - 1 month, PICC placed  -pt feeling better with less knee pain  -TCU placement        A-fib  -PTA metoprolol succinate 25 mg oral daily  -PTA amiodarone 100 mg oral daily  -Eliquis 5 mg oral twice daily  -Patient reported that she had a pacemaker implantation 2 weeks ago. Consider pacemaker interrogation.   -Telemetry monitor     HFrEF  -Appears euvolemic clinically  -PTA Lasix 40 mg oral daily  -Echocardiogram: EF 20-25%       Denae Hopkins MD    Significant Results and  "Procedures   See below    Pending Results   These results will be followed up by pcp  Unresulted Labs Ordered in the Past 30 Days of this Admission       Date and Time Order Name Status Description    5/20/2024  8:31 AM ANAEROBIC BACTERIAL CULTURE ROUTINE Preliminary             Code Status   Full Code       Primary Care Physician   Margret Flores    General.  Awake alert oriented not in acute distress.  HEENT.  Pupils equal round react to light, anicteric, EOM intact.  Neck supple no JVD.  CVS regular rhythm no murmur gallops.  Lungs.  Clear to auscultation bilateral no wheezing or rales.  Abdomen.  Soft nontender bowel sounds present.  Extremities.  Right knee tenderness s/p sx  Neurological.  Awake and alert. No focal deficit.  Skin no rash. No pallor.  Psych. Normal mood.      Discharge Disposition   Discharged to TCU  Condition at discharge: Fair    Consultations This Hospital Stay   PHARMACY TO DOSE VANCO  CARE MANAGEMENT / SOCIAL WORK IP CONSULT  PHARMACY TO DOSE VANCO  INFECTIOUS DISEASES IP CONSULT  CARE MANAGEMENT / SOCIAL WORK IP CONSULT  PHYSICAL THERAPY ADULT IP CONSULT  OCCUPATIONAL THERAPY ADULT IP CONSULT  VASCULAR ACCESS ADULT IP CONSULT  SPIRITUAL HEALTH SERVICES IP CONSULT  PHYSICAL THERAPY ADULT IP CONSULT  OCCUPATIONAL THERAPY ADULT IP CONSULT    Time Spent on this Encounter   I, Denae Hopkisn MD, personally saw the patient today and spent greater than 30 minutes discharging this patient.    Discharge Orders      Primary Care - Care Coordination Referral      Reason for your hospital stay    S/p R knee I/D     When to call - Contact Surgeon Team    You may experience symptoms that require follow-up before your scheduled appointment. Refer to the \"Stoplight Tool\" for instructions on when to contact your Surgeon Team if you are concerned about pain control, blood clots, constipation, or if you are unable to urinate.     When to call - Reach out to Urgent Care    If you are not able to reach your " Surgeon Team and you need immediate care, go to the Orthopedic Walk-in Clinic or Urgent Care at your Surgeon's office.  Do NOT go to the Emergency Room unless you have shortness of breath, chest pain, or other signs of a medical emergency.     When to call - Reasons to Call 911    Call 911 immediately if you experience sudden-onset chest pain, arm weakness/numbness, slurred speech, or shortness of breath     Discharge Instruction - Breathing exercises    Perform breathing exercises using your Incentive Spirometer 10 times per hour while awake for 2 weeks.     Symptoms - Fever Management    A low grade fever can be expected after surgery.  Use acetaminophen (TYLENOL) as needed for fever management.  Contact your Surgeon Team if you have a fever greater than 101.5 F, chills, and/or night sweats.     Symptoms - Constipation management    Constipation (hard, dry bowel movements) is expected after surgery due to the combination of being less active, the anesthetic, and the opioid pain medication.  You can do the following to help reduce constipation:  ~  FLUIDS:  Drink clear liquids (water or Gatorade), or juice (apple/prune).  ~  DIET:  Eat a fiber rich diet.    ~  ACTIVITY:  Get up and move around several times a day.  Increase your activity as you are able.  MEDICATIONS:  Reduce the risk of constipation by starting medications before you are constipated.  You can take Miralax   (1 packet as directed) and/or a stool softener (Senokot 1-2 tablets 1-2 times a day).  If you already have constipation and these medications are not working, you can get magnesium citrate and use as directed.  If you continue to have constipation you can try an over the counter suppository or enema.  Call your Surgeon Team if it has been greater than 3 days since your last bowel movement.     Symptoms - Reduced Urine Output    Changes in the amount of fluids you drank before and after surgery may result in problems urinating.  It is important  to stay well-hydrated after surgery and drink plenty of water. If it has been greater than 8 hours since you have urinated despite drinking plenty of water, call your Surgeon Team.     Activity - Exercises to prevent blood clots    Unless otherwise directed by your Surgeon team, perform the following exercises at least three times per day for the first four weeks after surgery to prevent blood clots in your legs: 1) Point and flex your feet (Ankle Pumps), 2) Move your ankle around in big circles, 3) Wiggle your toes, 4) Walk, even for short distances, several times a day, will help decrease the risk of blood clots.     Comfort and Pain Management - Pain after Surgery    Pain after surgery is normal and expected.  You will have some amount of pain for several weeks after surgery.  Your pain will improve with time.  There are several things you can do to help reduce your pain including: rest, ice, elevation, and using pain medications as needed. Contact your Surgeon Team if you have pain that persists or worsens after surgery despite rest, ice, elevation, and taking your medication(s) as prescribed. Contact your Surgeon Team if you have new numbness, tingling, or weakness in your operative extremity.     Comfort and Pain Management - Swelling after Surgery    Swelling and/or bruising of the surgical extremity is common and may persist for several months after surgery. In addition to frequent icing and elevation, gentle compressive support with an ACE wrap or tubigrip may help with swelling. Apply compression regularly, removing at least twice daily to perform skin checks. Contact your Surgeon Team if your swelling increases and is NOT associated with an increase in your activity level, or if your swelling increases and is associated with redness and pain.     Comfort and Pain Management - LOWER Extremity Elevation    Swelling is expected for several months after surgery. This type of swelling is usually associated  "with gravity and activity, and can be improved with elevation.   The best way to do this is to get your \"toes above your nose\" by laying down and placing several pillows lengthwise under your calf and heel. When elevating your leg keep your knee completely straight. Perform this elevation as often as possible especially for the first two weeks after surgery.     Comfort and Pain Management - Cold therapy    Ice can be used to control swelling and discomfort after surgery. Place a thin towel over your operative site and apply the ice pack overtop. Leave ice pack in place for 20 minutes, then remove for 20 minutes. Repeat this 20 minutes on/20 minutes off routine as often as tolerated.     Medication Instructions - Acetaminophen (TYLENOL) Instructions    You were discharged with acetaminophen (TYLENOL) for pain management after surgery. Acetaminophen most effectively manages pain symptoms when it is taken on a schedule without missing doses (every four, six, or eight hours). Your Provider will prescribe a safe daily dose between 3000 - 4000 mg.  Do NOT exceed this daily dose. Most patients use acetaminophen for pain control for the first four weeks after surgery.  You can wean from this medication as your pain decreases.     Medication Instructions - Opioid Instructions (1 - 2 tablets Q 4-6 hours, MAX 6 tablets)    You were discharged with an opioid medication (hydromorphone, oxycodone, hydrocodone, or tramadol). This medication should only be taken for breakthrough pain that is not controlled with acetaminophen (TYLENOL). If you rate your pain less than 3 you do not need this medication.  Pain rating 0-3:  You do not need this medication.  Pain rating 4-6:  Take 1 tablet every 4-6 hours as needed  Pain rating 7-10:  Take 2 tablets every 4-6 hours as needed.  Do not exceed 6 tablets per day     Medication Instructions - Opioids - Tapering Instructions    In the first three days following surgery, your symptoms may " warrant use of the narcotic pain medication every four to six hours as prescribed. This is normal. As your pain symptoms improve, focus your efforts on decreasing (tapering) use of narcotic medications. The most successful tapering strategy is to first, decrease the number of tablets you take every 4-6 hours to the minimum prescribed. Then, increase the amount of time between doses.  For example:  First, taper to   or 1 tablet every 4-6 hours.  Then, taper to   or 1 tablet every 6-8 hours.  Then, taper to   or 1 tablet every 8-10 hours.  Then, taper to   or 1 tablet every 10-12 hours.  Then, taper to   or 1 tablet at bedtime.  The bedtime dose can help with comfort during sleep and is typically the last dose to be discontinued after surgery.     Follow Up Care    Please follow-up with Dr. Monahan's team in 2 weeks at [facility]. Call our scheduling line at [phone] to make an appointment, if you do not already have one scheduled.     Resume anticoagulation as prior to admission    Resume eliquis on post-op day 1     Activity    Activity as tolerated     Return to Driving    Return to driving - Driving is NOT permitted until directed by your provider. Under no circumstance are you permitted to drive while using narcotic pain medications.     NO Precautions    No precautions directed by your Provider.  You may perform range of motion activities as tolerated.     Weight bearing as tolerated    Weight bearing as tolerated on your operative extremity.     Dressing / Wound Care - Wound    You have a clean dressing on your surgical wound. Dressing change instructions as follows: dressing will be removed at your follow-up appointment. Contact your Surgeon Team if you have increased redness, warmth around the surgical wound, and/or drainage from the surgical wound.     Dressing / Wound care - Shower with wound/dressing covered    You must COVER your dressing or incision with saran wrap (or any other non-permeable  covering) to allow the incision to remain dry while showering.  You may shower 2 days after surgery as long as the surgical wound stays dry. Continue to cover your dressing or incision for showering until your first office visit.  You are strictly prohibited from soaking or submerging the surgical wound underwater.     Dressing / Wound Care - NO Tub Bathing    Tub bathing, swimming, or any other activities that will cause your incision to be submerged in water should be avoided until allowed by your Surgeon.     Follow Up (TCM)    Follow up with Aakash Reza MD, Infectious Disease at the [facility] (near [facility]) in 3-4 weeks (or next available). Phone: [phone]     Nursing Communication 1    Weekly labs: CBC with diff, CMP, ESR, CRP. Fax to Aakash Reza MD at [phone] Infectious Disease. PICC line cares.     General info for SNF    Length of Stay Estimate: Short Term Care: Estimated # of Days <30  Condition at Discharge: Improving  Level of care:skilled   Rehabilitation Potential: Good  Admission H&P remains valid and up-to-date: Yes  Recent Chemotherapy: N/A  Use Nursing Home Standing Orders: Yes     Mantoux instructions    Give two-step Mantoux (PPD) Per Facility Policy Yes     Follow Up and recommended labs and tests    Follow up with [facility] physician.  The following labs/tests are recommended: cbc, bmp.  Follow up with specialist, orthopedic surgeon as instructed     Reason for your hospital stay    Septic arthritis in knee     Activity - Up with nursing assistance     Physical Therapy Adult Consult    Evaluate and treat as clinically indicated.    Reason:  recent knee surgery     Occupational Therapy Adult Consult    Evaluate and treat as clinically indicated.    Reason:  recent knee surgery     Fall precautions     Crutches DME    DME Documentation: Describe the reason for need to support medical necessity: Impaired gait status post knee surgery. I, the undersigned, certify  that the above prescribed supplies are medically necessary for this patient and is both reasonable and necessary in reference to accepted standards of medical practice in the treatment of this patient's condition and is not prescribed as a convenience.     Cane DME    DME Documentation: Describe the reason for need to support medical necessity: Impaired gait status post knee surgery. I, the undersigned, certify that the above prescribed supplies are medically necessary for this patient and is both reasonable and necessary in reference to accepted standards of medical practice in the treatment of this patient's condition and is not prescribed as a convenience.     Walker DME    DME Documentation: Describe the reason for need to support medical necessity: Impaired gait status post knee surgery. I, the undersigned, certify that the above prescribed supplies are medically necessary for this patient and is both reasonable and necessary in reference to accepted standards of medical practice in the treatment of this patient's condition and is not prescribed as a convenience.     Discharge Instruction - Regular Diet Adult    Return to your pre-surgery diet unless instructed otherwise     Diet    Follow this diet upon discharge: Orders Placed This Encounter      Advance Diet as Tolerated: Regular Diet Adult      Discharge Instruction - Regular Diet Adult     Discharge Medications   Current Discharge Medication List        START taking these medications    Details   ceFAZolin (ANCEF) 1 GM vial Inject 1 g into the vein every 12 hours for 28 days    Comments: Weekly labs: CBC with diff, CMP, ESR, CRP. Fax to Aakash Reza MD at [phone] Infectious Disease. PICC line cares.  Associated Diagnoses: Staphylococcal arthritis of right knee (H); Primary osteoarthritis of right knee      magnesium hydroxide (MILK OF MAGNESIA) 400 MG/5ML suspension Take 30 mLs by mouth daily as needed for constipation (Use if polyethylene glycol  (Miralax) is not effective after 24 hours.)    Associated Diagnoses: Drug-induced constipation      senna-docusate (SENOKOT-S/PERICOLACE) 8.6-50 MG tablet Take 2 tablets by mouth 2 times daily as needed for constipation  Qty: 60 tablet, Refills: 0    Associated Diagnoses: Staphylococcal arthritis of right knee (H)           CONTINUE these medications which have CHANGED    Details   !! acetaminophen (TYLENOL) 325 MG tablet Take 3 tablets (975 mg) by mouth every 8 hours    Associated Diagnoses: Staphylococcal arthritis of right knee (H)      !! acetaminophen (TYLENOL) 325 MG tablet Take 2 tablets (650 mg) by mouth every 4 hours as needed for other (For optimal non-opioid multimodal pain management to improve pain control.)  Refills: 0    Associated Diagnoses: Staphylococcal arthritis of right knee (H)      oxyCODONE (ROXICODONE) 5 MG tablet Take 1 tablet (5 mg) by mouth every 4 hours as needed for moderate to severe pain (Max 4 tabs per day) #120 tabs to last 30 days for chronic pain. Ok to fill and start May 2, 2024  Qty: 20 tablet, Refills: 0    Associated Diagnoses: Primary osteoarthritis of right knee; Rupture of anterior cruciate ligament of right knee, sequela; Chronic intractable pain       !! - Potential duplicate medications found. Please discuss with provider.        CONTINUE these medications which have NOT CHANGED    Details   amiodarone (PACERONE) 200 MG tablet Take 0.5 tablets (100 mg) by mouth daily  Qty: 45 tablet, Refills: 0    Associated Diagnoses: Paroxysmal atrial fibrillation (H)      apixaban ANTICOAGULANT (ELIQUIS) 5 MG tablet Take 1 tablet (5 mg) by mouth 2 times daily  Qty: 60 tablet, Refills: 3    Associated Diagnoses: Paroxysmal atrial fibrillation (H)      cholecalciferol, vitamin D3, (VITAMIN D3) 2,000 unit Tab [CHOLECALCIFEROL, VITAMIN D3, (VITAMIN D3) 2,000 UNIT TAB] Take 1 tablet (2,000 Units total) by mouth daily.  Qty: 90 tablet, Refills: 3    Comments: Replaces weekly ergo 50,000  "units  Associated Diagnoses: Vitamin D deficiency      diclofenac (FLECTOR) 1.3 % patch Externally apply 1 patch topically 2 times daily      DULoxetine (CYMBALTA) 60 MG capsule Take 60 mg by mouth daily      furosemide (LASIX) 40 MG tablet Take 1 tablet (40 mg) by mouth every morning  Qty: 90 tablet, Refills: 3    Associated Diagnoses: Chronic systolic heart failure (H)      lisinopril (ZESTRIL) 2.5 MG tablet TAKE 1 TABLET BY MOUTH ONE TIME DAILY  Qty: 90 tablet, Refills: 0    Associated Diagnoses: Secondary cardiomyopathy (H); Benign essential hypertension      metoprolol succinate ER (TOPROL XL) 25 MG 24 hr tablet Take 1 tablet (25 mg) by mouth daily  Qty: 90 tablet, Refills: 3    Associated Diagnoses: Persistent atrial fibrillation (H)      potassium chloride tamiko ER (KLOR-CON M20) 20 MEQ CR tablet Take 1 tablet (20 mEq) by mouth daily  Qty: 90 tablet, Refills: 3    Associated Diagnoses: Dyspnea on exertion           STOP taking these medications       oxyCODONE (OXYCONTIN) 10 MG 12 hr tablet Comments:   Reason for Stopping:         UNABLE TO FIND Comments:   Reason for Stopping:         UNABLE TO FIND Comments:   Reason for Stopping:             Allergies   Allergies   Allergen Reactions    Trazodone Shortness Of Breath and Unknown     Allergic to trazodone and deriv., Other: trouble swallowing      Clindamycin Diarrhea     C-diff    Gabapentin Other (See Comments)     \"Internal tremors\"    Temazepam Other (See Comments)     Annotation: Nightmares       Data   Most Recent 3 CBC's:  Recent Labs   Lab Test 05/26/24  0531 05/25/24  0550 05/23/24  0421 05/22/24  1506 04/08/24  0904   WBC  --   --  5.8 8.0 6.6   HGB 11.0* 9.9* 10.1* 11.3* 11.4*   MCV  --   --  93 93 93   PLT  --   --  446 503* 273      Most Recent 3 BMP's:  Recent Labs   Lab Test 05/26/24  0531 05/25/24  1032 05/25/24  0550 05/24/24  0458 05/23/24  1216 05/23/24  0421 05/22/24  1506 04/09/24  0817 04/08/24  0904   NA  --   --   --   --   --  138 138 "  --  142   POTASSIUM  --   --   --   --   --  4.6 4.6  --  3.7   CHLORIDE  --   --   --   --   --  102 96*  --  104   CO2  --   --   --   --   --  25 28  --  25   BUN  --   --   --   --   --  21.9 23.3*  --  28.7*   CR 1.10*  --  1.15* 1.12*  --  1.04* 1.17*  --  1.33*   ANIONGAP  --   --   --   --   --  11 14  --  13   MARLENE  --   --   --   --   --  9.5 10.1*  --  9.6   GLC  --  174*  --   --  132* 154* 111*   < > 98    < > = values in this interval not displayed.     Most Recent 2 LFT's:  Recent Labs   Lab Test 05/22/24  1506 01/11/24  1430   AST 18 19   ALT 11 13   ALKPHOS 118 155*   BILITOTAL 0.3 0.3     Most Recent INR's and Anticoagulation Dosing History:  Anticoagulation Dose History          Latest Ref Rng & Units 1/19/2019 2/21/2019 6/5/2019 8/23/2022 3/4/2023   Recent Dosing and Labs   INR 0.85 - 1.15 1.03  1.20  1.50  1.37  1.33      Most Recent 3 Troponin's:No lab results found.  Most Recent Cholesterol Panel:No lab results found.  Most Recent 6 Bacteria Isolates From Any Culture (See EPIC Reports for Culture Details):No lab results found.  Most Recent TSH, T4 and A1c Labs:  Recent Labs   Lab Test 12/29/23  1214 04/05/23  1412 06/15/21  0910   TSH 6.25*   < >  --    T4 1.03  --   --    A1C  --   --  6.0*    < > = values in this interval not displayed.     Results for orders placed or performed during the hospital encounter of 05/22/24   XR Chest Port 1 View    Narrative    EXAM: XR CHEST PORT 1 VIEW  LOCATION: [facility]  DATE: 5/24/2024    INDICATION: RN placed PICC   verify tip placement  COMPARISON: 4/8/2024      Impression    IMPRESSION: Right upper extremity PICC tip in the lower SVC. Unchanged left chest pacemaker.    No acute cardiopulmonary abnormality. Unchanged mildly enlarged cardiac silhouette.   Echocardiogram Complete     Value    LVEF  20-25% (severely reduced)    [facility]    [id number]  [id number]  [id number]^KELLI^[facility]  [address]  [place] [address]     Name: BHARAT QUICK  MRN: [id number]  : 1930  Study Date: 2024 09:16 AM  Age: 89 yrs  Gender: Female  Patient Location: [facility]  Reason For Study: Heart Failure  Ordering Physician: JUAN DAVID PEREIRA  Performed By: ANDI     BSA: 1.6 m2  Height: 60 in  Weight: 147 lb  HR: 86  ______________________________________________________________________________  Procedure  Complete Portable Echo Adult. Definity (NDC #03845-580) given intravenously.  Adequate quality two-dimensional was performed and interpreted. Compared to  prior study, there is no significant change.  ______________________________________________________________________________  Interpretation Summary     The left ventricle is normal in size. There is mild concentric left  ventricular hypertrophy.  Left ventricular function is decreased. The ejection fraction is 20-25%  (severely reduced). There is diffuse hypokinesis of the left ventricle. Septal  motion is consistent with conduction abnormality.     The right ventricle is normal in size and function.  The left atrium is moderate to severely dilated. Borderline right atrial  enlargement.  There is mild (1+) mitral regurgitation.  There is mild (1+) tricuspid regurgitation.  Right ventricle systolic pressure estimate normal  IVC diameter and respiratory changes fall into an intermediate range  suggesting an RA pressure of 8 mmHg.  Compared to prior study, there is no significant change.  ______________________________________________________________________________  Left Ventricle  The left ventricle is normal in size. Left ventricular function is decreased.  The ejection fraction is 20-25% (severely reduced). There is mild concentric  left ventricular hypertrophy. There is diffuse hypokinesis of the left  ventricle. Septal motion is consistent with conduction abnormality.     Right Ventricle  The right ventricle is normal in size and function.  There is a pacemaker lead  in the right ventricle.     Atria  The left atrium is moderate to severely dilated. Borderline right atrial  enlargement.     Mitral Valve  Mitral valve leaflets appear normal. There is mild (1+) mitral regurgitation.  There is no mitral valve stenosis.     Tricuspid Valve  Tricuspid valve leaflets appear normal. There is mild (1+) tricuspid  regurgitation. Right ventricle systolic pressure estimate normal.     Aortic Valve  The aortic valve is trileaflet. Aortic valve leaflets appear normal. No aortic  regurgitation is present. No aortic stenosis is present.     Pulmonic Valve  The pulmonic valve is not well visualized. There is trace pulmonic valvular  regurgitation.     Vessels  The aorta root is normal. IVC diameter and respiratory changes fall into an  intermediate range suggesting an RA pressure of 8 mmHg.     Pericardium  There is no pericardial effusion.     Rhythm  The rhythm was atrial fibrillation.  ______________________________________________________________________________  MMode/2D Measurements & Calculations  IVSd: 1.4 cm  LVIDd: 4.9 cm  LVIDs: 4.4 cm  LVPWd: 1.2 cm  FS: 11.2 %  LV mass(C)d: 244.2 grams  LV mass(C)dI: 149.1 grams/m2  Ao root diam: 2.7 cm  asc Aorta Diam: 3.5 cm  LVOT diam: 1.9 cm  LVOT area: 2.8 cm2  Ao root diam index Ht(cm/m): 1.8  Ao root diam index BSA (cm/m2): 1.6  Asc Ao diam index BSA (cm/m2): 2.1  Asc Ao diam index Ht(cm/m): 2.3  EF Biplane: 48.9 %  LA Volume (BP): 76.2 ml     LA Volume Index (BP): 46.5 ml/m2  RV Base: 4.3 cm  RWT: 0.47  TAPSE: 1.3 cm     Doppler Measurements & Calculations  MV E max yaakov: 122.3 cm/sec  MV dec slope: 733.3 cm/sec2  MV dec time: 0.17 sec  Ao V2 max: 183.7 cm/sec  Ao max P.0 mmHg  Ao V2 mean: 142.0 cm/sec  Ao mean P.0 mmHg  Ao V2 VTI: 40.9 cm  NIK(I,D): 1.5 cm2  NIK(V,D): 1.7 cm2  LV V1 max P.9 mmHg  LV V1 max: 110.3 cm/sec  LV V1 VTI: 21.7 cm  MR PISA: 1.6 cm2  MR ERO: 0.12 cm2  MR volume: 22.2  ml  SV(LVOT): 61.5 ml  SI(LVOT): 37.6 ml/m2     PA acc time: 0.08 sec  TR max jimbo: 258.0 cm/sec  TR max P.6 mmHg  AV Jimbo Ratio (DI): 0.60  NIK Index (cm2/m2): 0.92  E/E' av.8  Lateral E/e': 11.8  Medial E/e': 17.9  RV S Jimbo: 9.1 cm/sec     ______________________________________________________________________________  Report approved by: Tl Steward 2024 11:01 AM

## 2024-05-29 NOTE — PROGRESS NOTES
[facility] called. They are missing all PICC maintaince orders. Paged Dr. Hopkins to get orders. Fax number to facility is [phone].    JESÚS TURCIOS RN

## 2024-05-29 NOTE — PLAN OF CARE
Physical Therapy Discharge Summary    Reason for therapy discharge:    Discharged to transitional care facility.    Progress towards therapy goal(s). See goals on Care Plan in [facility] electronic health record for goal details.  Goals partially met.  Barriers to achieving goals:   limited tolerance for therapy and discharge from facility.    Therapy recommendation(s):    Continued therapy is recommended.  Rationale/Recommendations:  Patient would benefit from continued therapies to improve strength and mobility.    Carey Stephenson, SPT  5/29/2024

## 2024-05-29 NOTE — PROGRESS NOTES
Care Management Discharge Note    Discharge Date: 05/29/2024       Discharge Disposition: Transitional Care - [facility]    Discharge Services: Transportation Services    Discharge DME: None    Discharge Transportation: [employer] transport between [id number]     Private pay costs discussed: transportation costs    Does the patient's insurance plan have a 3 day qualifying hospital stay waiver?  No    PAS Confirmation Code: [id number]  Patient/family educated on Medicare website which has current facility and service quality ratings: yes    Education Provided on the Discharge Plan: Yes per team  Persons Notified of Discharge Plans: Patient per team  Patient/Family in Agreement with the Plan: yes    Handoff Referral Completed: Yes    Additional Information:  Patient discharge to Transitional Care - [facility] via [employer] transport between [id number].     Orders sent to [facility]  Paged to update bedside nurse and HUC    Radha Underwood RN

## 2024-05-29 NOTE — PLAN OF CARE
"Goal Outcome Evaluation:      Plan of Care Reviewed With: patient    Overall Patient Progress: improvingOverall Patient Progress: improving           Problem: Adult Inpatient Plan of Care  Goal: Plan of Care Review  Description: The Plan of Care Review/Shift note should be completed every shift.  The Outcome Evaluation is a brief statement about your assessment that the patient is improving, declining, or no change.  This information will be displayed automatically on your shift  note.  Outcome: Adequate for Care Transition  Flowsheets (Taken 5/29/2024 1347)  Plan of Care Reviewed With: patient  Overall Patient Progress: improving  Goal: Patient-Specific Goal (Individualized)  Description: You can add care plan individualizations to a care plan. Examples of Individualization might be:  \"Parent requests to be called daily at 9am for status\", \"I have a hard time hearing out of my right ear\", or \"Do not touch me to wake me up as it startles  me\".  Outcome: Adequate for Care Transition  Goal: Absence of Hospital-Acquired Illness or Injury  Outcome: Adequate for Care Transition  Goal: Readiness for Transition of Care  Outcome: Adequate for Care Transition     Problem: Risk for Delirium  Goal: Optimal Coping  Outcome: Adequate for Care Transition  Goal: Improved Behavioral Control  Outcome: Adequate for Care Transition  Goal: Improved Attention and Thought Clarity  Outcome: Adequate for Care Transition     Problem: Pain Acute  Goal: Optimal Pain Control and Function  Outcome: Adequate for Care Transition     Problem: Mobility Impairment  Goal: Optimal Mobility  Outcome: Adequate for Care Transition      VSS. PRN pain medication given (see MAR) to premedicate ambulation. Pt discharging to TCU with PICC in place for 1 month of abx therapy. Dressing change done, next due 6/5/24.    [facility] and gave report to RN @ 0150.    Plan: [facility] today to [facility] with [facility] around 1300.     "

## 2024-05-29 NOTE — PLAN OF CARE
Occupational Therapy Discharge Summary    Reason for therapy discharge:    Discharged to transitional care facility.    Progress towards therapy goal(s). See goals on Care Plan in [facility] electronic health record for goal details.  Goals partially met.  Barriers to achieving goals:   discharge from facility.    Therapy recommendation(s):    Continued therapy is recommended.  Rationale/Recommendations:  to improve ADL independence.

## 2024-05-29 NOTE — PLAN OF CARE
Problem: Adult Inpatient Plan of Care  Goal: Plan of Care Review  Description: The Plan of Care Review/Shift note should be completed every shift.  The Outcome Evaluation is a brief statement about your assessment that the patient is improving, declining, or no change.  This information will be displayed automatically on your shift  note.  Outcome: Progressing   Goal Outcome Evaluation:             Pt A&Ox4. Medsurrobert IZAGUIRRE. Mle, good Is & Os. Pt reported pain in knee, scheduled oxy given and ice applied, pain improved. Denies SOB, dizziness, numbness & tingling. Cares grouped to allow rest.

## 2024-05-30 NOTE — TELEPHONE ENCOUNTER
05.30- lmtcb x1 on cell number, home number is full unable to leave a  message.     Pt needs a hospital follow up with Dr. Reza in 3-4 wks, Please schedule as a RTN appt.

## 2024-05-31 ENCOUNTER — LAB REQUISITION (OUTPATIENT)
Dept: LAB | Facility: CLINIC | Age: 89
End: 2024-05-31
Payer: COMMERCIAL

## 2024-05-31 DIAGNOSIS — M00.061 STAPHYLOCOCCAL ARTHRITIS, RIGHT KNEE (H): ICD-10-CM

## 2024-06-03 ENCOUNTER — PATIENT OUTREACH (OUTPATIENT)
Dept: CARE COORDINATION | Facility: CLINIC | Age: 89
End: 2024-06-03
Payer: COMMERCIAL

## 2024-06-03 LAB
ALBUMIN SERPL BCG-MCNC: 2.9 G/DL (ref 3.5–5.2)
ALP SERPL-CCNC: 90 U/L (ref 40–150)
ALT SERPL W P-5'-P-CCNC: 8 U/L (ref 0–50)
ANION GAP SERPL CALCULATED.3IONS-SCNC: 9 MMOL/L (ref 7–15)
AST SERPL W P-5'-P-CCNC: 20 U/L (ref 0–45)
BACTERIA SNV CULT: NORMAL
BASOPHILS # BLD AUTO: 0 10E3/UL (ref 0–0.2)
BASOPHILS NFR BLD AUTO: 0 %
BILIRUB SERPL-MCNC: 0.2 MG/DL
BUN SERPL-MCNC: 16.9 MG/DL (ref 8–23)
CALCIUM SERPL-MCNC: 8.8 MG/DL (ref 8.2–9.6)
CHLORIDE SERPL-SCNC: 101 MMOL/L (ref 98–107)
CREAT SERPL-MCNC: 0.86 MG/DL (ref 0.51–0.95)
CRP SERPL-MCNC: 157 MG/L
DEPRECATED HCO3 PLAS-SCNC: 27 MMOL/L (ref 22–29)
EGFRCR SERPLBLD CKD-EPI 2021: 63 ML/MIN/1.73M2
EOSINOPHIL # BLD AUTO: 0.3 10E3/UL (ref 0–0.7)
EOSINOPHIL NFR BLD AUTO: 2 %
ERYTHROCYTE [DISTWIDTH] IN BLOOD BY AUTOMATED COUNT: 14.6 % (ref 10–15)
ERYTHROCYTE [SEDIMENTATION RATE] IN BLOOD BY WESTERGREN METHOD: 87 MM/HR (ref 0–30)
GLUCOSE SERPL-MCNC: 103 MG/DL (ref 70–99)
HCT VFR BLD AUTO: 29.9 % (ref 35–47)
HGB BLD-MCNC: 9.4 G/DL (ref 11.7–15.7)
IMM GRANULOCYTES # BLD: 0.2 10E3/UL
IMM GRANULOCYTES NFR BLD: 2 %
LYMPHOCYTES # BLD AUTO: 1.2 10E3/UL (ref 0.8–5.3)
LYMPHOCYTES NFR BLD AUTO: 12 %
MCH RBC QN AUTO: 29.7 PG (ref 26.5–33)
MCHC RBC AUTO-ENTMCNC: 31.4 G/DL (ref 31.5–36.5)
MCV RBC AUTO: 94 FL (ref 78–100)
MONOCYTES # BLD AUTO: 1.3 10E3/UL (ref 0–1.3)
MONOCYTES NFR BLD AUTO: 13 %
NEUTROPHILS # BLD AUTO: 7.3 10E3/UL (ref 1.6–8.3)
NEUTROPHILS NFR BLD AUTO: 71 %
NRBC # BLD AUTO: 0 10E3/UL
NRBC BLD AUTO-RTO: 0 /100
PLATELET # BLD AUTO: 291 10E3/UL (ref 150–450)
POTASSIUM SERPL-SCNC: 4 MMOL/L (ref 3.4–5.3)
PROT SERPL-MCNC: 5.4 G/DL (ref 6.4–8.3)
RBC # BLD AUTO: 3.17 10E6/UL (ref 3.8–5.2)
SODIUM SERPL-SCNC: 137 MMOL/L (ref 135–145)
WBC # BLD AUTO: 10.3 10E3/UL (ref 4–11)

## 2024-06-03 PROCEDURE — 86140 C-REACTIVE PROTEIN: CPT | Mod: ORL | Performed by: INTERNAL MEDICINE

## 2024-06-03 PROCEDURE — 85652 RBC SED RATE AUTOMATED: CPT | Mod: ORL | Performed by: INTERNAL MEDICINE

## 2024-06-03 PROCEDURE — 85025 COMPLETE CBC W/AUTO DIFF WBC: CPT | Mod: ORL | Performed by: INTERNAL MEDICINE

## 2024-06-03 PROCEDURE — 36415 COLL VENOUS BLD VENIPUNCTURE: CPT | Mod: ORL | Performed by: INTERNAL MEDICINE

## 2024-06-03 PROCEDURE — 80053 COMPREHEN METABOLIC PANEL: CPT | Mod: ORL | Performed by: INTERNAL MEDICINE

## 2024-06-03 PROCEDURE — P9604 ONE-WAY ALLOW PRORATED TRIP: HCPCS | Mod: ORL | Performed by: INTERNAL MEDICINE

## 2024-06-03 NOTE — PROGRESS NOTES
Clinic Care Coordination Contact  Care Coordination Transition Communication    Clinical Data: Patient was hospitalized at [facility] from 5/22 to 5/29 with diagnosis of septic arthritis.     Assessment: Patient has transitioned to TCU/ARU for short term rehabilitation:    [facility] Name: [facility]  Transition Communication:  Notified facility of Primary Care- Care Coordination support via Telephone.    Plan: Care Coordinator will await notification from facility staff informing of patient's discharge plans/needs. Care Coordinator will review chart and outreach to facility staff every 4 weeks and as needed.     DARIUS Knowles, SW  Social Work Care Coordinator

## 2024-06-06 ENCOUNTER — TRANSFERRED RECORDS (OUTPATIENT)
Dept: HEALTH INFORMATION MANAGEMENT | Facility: CLINIC | Age: 89
End: 2024-06-06
Payer: COMMERCIAL

## 2024-06-10 ENCOUNTER — TELEPHONE (OUTPATIENT)
Dept: FAMILY MEDICINE | Facility: CLINIC | Age: 89
End: 2024-06-10
Payer: COMMERCIAL

## 2024-06-10 ENCOUNTER — LAB REQUISITION (OUTPATIENT)
Dept: LAB | Facility: CLINIC | Age: 89
End: 2024-06-10
Payer: COMMERCIAL

## 2024-06-10 ENCOUNTER — NURSE TRIAGE (OUTPATIENT)
Dept: CARDIOLOGY | Facility: CLINIC | Age: 89
End: 2024-06-10

## 2024-06-10 DIAGNOSIS — M00.061 STAPHYLOCOCCAL ARTHRITIS, RIGHT KNEE (H): ICD-10-CM

## 2024-06-10 LAB
ALBUMIN SERPL BCG-MCNC: 3.3 G/DL (ref 3.5–5.2)
ALP SERPL-CCNC: 185 U/L (ref 40–150)
ALT SERPL W P-5'-P-CCNC: 19 U/L (ref 0–50)
ANION GAP SERPL CALCULATED.3IONS-SCNC: 17 MMOL/L (ref 7–15)
AST SERPL W P-5'-P-CCNC: 30 U/L (ref 0–45)
BASOPHILS # BLD AUTO: 0.1 10E3/UL (ref 0–0.2)
BASOPHILS NFR BLD AUTO: 1 %
BILIRUB SERPL-MCNC: 0.3 MG/DL
BUN SERPL-MCNC: 19 MG/DL (ref 8–23)
CALCIUM SERPL-MCNC: 9.5 MG/DL (ref 8.2–9.6)
CHLORIDE SERPL-SCNC: 98 MMOL/L (ref 98–107)
CREAT SERPL-MCNC: 0.83 MG/DL (ref 0.51–0.95)
CRP SERPL-MCNC: 152 MG/L
DEPRECATED HCO3 PLAS-SCNC: 21 MMOL/L (ref 22–29)
EGFRCR SERPLBLD CKD-EPI 2021: 65 ML/MIN/1.73M2
EOSINOPHIL # BLD AUTO: 0.1 10E3/UL (ref 0–0.7)
EOSINOPHIL NFR BLD AUTO: 1 %
ERYTHROCYTE [DISTWIDTH] IN BLOOD BY AUTOMATED COUNT: 15.1 % (ref 10–15)
ERYTHROCYTE [SEDIMENTATION RATE] IN BLOOD BY WESTERGREN METHOD: 91 MM/HR (ref 0–30)
GLUCOSE SERPL-MCNC: 145 MG/DL (ref 70–99)
HCT VFR BLD AUTO: 33.6 % (ref 35–47)
HGB BLD-MCNC: 10 G/DL (ref 11.7–15.7)
IMM GRANULOCYTES # BLD: 0.1 10E3/UL
IMM GRANULOCYTES NFR BLD: 1 %
LYMPHOCYTES # BLD AUTO: 1.5 10E3/UL (ref 0.8–5.3)
LYMPHOCYTES NFR BLD AUTO: 15 %
MCH RBC QN AUTO: 28.9 PG (ref 26.5–33)
MCHC RBC AUTO-ENTMCNC: 29.8 G/DL (ref 31.5–36.5)
MCV RBC AUTO: 97 FL (ref 78–100)
MONOCYTES # BLD AUTO: 0.9 10E3/UL (ref 0–1.3)
MONOCYTES NFR BLD AUTO: 9 %
NEUTROPHILS # BLD AUTO: 7.6 10E3/UL (ref 1.6–8.3)
NEUTROPHILS NFR BLD AUTO: 73 %
NRBC # BLD AUTO: 0.1 10E3/UL
NRBC BLD AUTO-RTO: 1 /100
PLATELET # BLD AUTO: 354 10E3/UL (ref 150–450)
POTASSIUM SERPL-SCNC: 4.4 MMOL/L (ref 3.4–5.3)
PROT SERPL-MCNC: 6.5 G/DL (ref 6.4–8.3)
RBC # BLD AUTO: 3.46 10E6/UL (ref 3.8–5.2)
SODIUM SERPL-SCNC: 136 MMOL/L (ref 135–145)
WBC # BLD AUTO: 10.3 10E3/UL (ref 4–11)

## 2024-06-10 PROCEDURE — 80053 COMPREHEN METABOLIC PANEL: CPT | Mod: ORL | Performed by: INTERNAL MEDICINE

## 2024-06-10 PROCEDURE — 86140 C-REACTIVE PROTEIN: CPT | Mod: ORL | Performed by: INTERNAL MEDICINE

## 2024-06-10 PROCEDURE — 36415 COLL VENOUS BLD VENIPUNCTURE: CPT | Mod: ORL | Performed by: INTERNAL MEDICINE

## 2024-06-10 PROCEDURE — 85025 COMPLETE CBC W/AUTO DIFF WBC: CPT | Mod: ORL | Performed by: INTERNAL MEDICINE

## 2024-06-10 PROCEDURE — 85652 RBC SED RATE AUTOMATED: CPT | Mod: ORL | Performed by: INTERNAL MEDICINE

## 2024-06-10 PROCEDURE — P9604 ONE-WAY ALLOW PRORATED TRIP: HCPCS | Mod: ORL | Performed by: INTERNAL MEDICINE

## 2024-06-10 NOTE — TELEPHONE ENCOUNTER
Pc back to patient.  She confirms shortness of breath.  Denies fluid retention or weight gain.    Wants to schedule DCCV.  Hospital review confirms eliquis restarted 5/25.  Denies any missed doses since.  Ok for DCCV 6/17 or after.    Scheduling notified.  BROOKS

## 2024-06-10 NOTE — TELEPHONE ENCOUNTER
"Received a call from the call center to discuss shortness of breath.  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  She says for the last 13 days, she has had a \"heck of a time\" catching her breath, but has been really bad the last few days.  She says she is currently in a rehab faciltiy after her knee surgery-[facility]. The nurse checked her VS this morning and wrote it down: /78; HR 84; Oxygen level 93% RA. She does not know what the vital signs were over the weekend.  She is not short of breath during conversation.  She would like to get the cardioversion rescheduled.  Advised a message will be sent to [place] [facility] for follow up.  Please call her cell phone [phone] to discuss next steps.    1. RESPIRATORY STATUS: \"Describe your breathing?\" (e.g., wheezing, shortness of breath, unable to speak, severe coughing) short of breath  2. ONSET: \"When did this breathing problem begin?\" 13 days ago  3. PATTERN \"Does the difficult breathing come and go, or has it been constant since it started?\"  4. SEVERITY: \"How bad is your breathing?\" (e.g., mild, moderate, severe) Mild   - MILD: No SOB at rest, mild SOB with walking, speaks normally in sentences, can lie down, no retractions, pulse < 100.  - MODERATE: SOB at rest, SOB with minimal exertion and prefers to sit, cannot lie down flat, speaks in phrases, mild retractions, audible wheezing, pulse 100-120.  - SEVERE: Very SOB at rest, speaks in single words, struggling to breathe, sitting hunched forward, retractions, pulse > 120  5. RECURRENT SYMPTOM: \"Have you had difficulty breathing before?\" If Yes, ask: \"When was the last time?\" and \"What happened that time?\"  6. CARDIAC HISTORY: \"Do you have any history of heart disease?\" (e.g., heart attack, angina, bypass surgery, angioplasty) PAF  7. LUNG HISTORY: \"Do you have any history of " "lung disease?\" (e.g., pulmonary embolus, asthma, emphysema)  8. CAUSE: \"What do you think is causing the breathing problem?\"  9. OTHER SYMPTOMS: \"Do you have any other symptoms? (e.g., dizziness, runny nose, cough, chest pain, fever)  10. O2 SATURATION MONITOR: \"Do you use an oxygen saturation monitor (pulse oximeter) at home?\" If Yes, ask: \"What is your reading (oxygen level) today?\" \"What is your usual oxygen saturation reading?\" (e.g., 95%) 93 % RA today  11. PREGNANCY: \"Is there any chance you are pregnant?\" \"When was your last menstrual period?\"  12. TRAVEL: \"Have you traveled out of the country in the last month?\" (e.g., travel history, exposures)  Additional Information    Negative: MODERATE difficulty breathing (e.g., speaks in phrases, SOB even at rest, pulse 100-120) of new-onset or worse than normal    Protocols used: Breathing Difficulty-A-[place]    "

## 2024-06-10 NOTE — TELEPHONE ENCOUNTER
"  Reason for Call:  Appointment Request    Patient requesting this type of appt: Pre-op    Requested provider: Margret Flores or anyone else at the [facility] clinic    Reason patient unable to be scheduled: Not within requested timeframe    When does patient want to be seen/preferred time:  Before 6/19/24    Comments: DOS 6/19/24, Cardioversion at [facility]. Pt very upset there was nothing available with PCP or anyone else at the clinic.     Okay to leave a detailed message?: No at Cell number on file:    Telephone Information:   Mobile [phone]   Unclear if pt has any voicemail - she was very combative saying she will answer the call no matter what & that the phone is next to her at all times. She also is very nervous that no one will call her back, stating \"they always say they will call you & then never do.\"     Call taken on 6/10/2024 at 12:47 PM by Pascale Crooks    "

## 2024-06-12 ENCOUNTER — TELEPHONE (OUTPATIENT)
Dept: CARDIOLOGY | Facility: CLINIC | Age: 89
End: 2024-06-12
Payer: COMMERCIAL

## 2024-06-12 ENCOUNTER — TRANSFERRED RECORDS (OUTPATIENT)
Dept: HEALTH INFORMATION MANAGEMENT | Facility: CLINIC | Age: 89
End: 2024-06-12
Payer: COMMERCIAL

## 2024-06-12 NOTE — TELEPHONE ENCOUNTER
Pre-Procedure Education    Procedure: DCCV with Janine Strong NP on 6/19/2024 with arrival time 8:30 am      PT IS ON ELIQUIS AND NO MISSED DOSES REVIEWED TO CALL IF SHE DOES    Orders: Orderset for procedure verified signed/held    COVID: COVID policy- if pt develops COVID like symptoms prior to procedure, he/she would need to complete an at home with a rapid antigen COVID test 1-2 days prior to your procedure date. If COVID + pt is aware the procedure will need to be rescheduled, and to contact CV scheduling as soon as possible    Pre-Op H&P:  PRE OP IS MONDAY 6/17 AT [facility] [address]/DR [facility]INGH AT  11 A.M. - Will request upon completion    Education:   PT HAS A  FOR PROCEDURE THAT WILL DROP OFF AND     PT DOES NOT USE MY CHART  PT HAS BEEN IN [place] FOR REHAB AND NO MAIL TO HER SO NO PROCEDURE LETTER RECEIVED  REVIEWED ALL INSTRUCTIONS SLOWLY AND REPEATED FOR HER TO UNDERSTAND AND SHE HAS HAD [facility] BEFORE SO STATES SHE KNOWS WHERE TO GO AND WHAT TO DO     PT INSTRUCTED TO HOLD  ANY VITAMINS, MINERALS CALCIUM IRON OR SUPPLEMENTS T HE MORNING OF THE CV  PT INSTRUCTED NO GUM CHEWING MINTS OR CANDY THE MORNING OF CV  PT INSTRUCTED TO LEAVE JEWELRY AT HOME   PT INSTRUCTED TO BATHE OR SHOWER BEFORE COMING IN  PT HAS A PACEMAKER  PT IS ON ELIQUIS   NO MEDICATION CHANGES WERE MADE  PT IS ON AMIODARONE  PT HAS A  CV FOLLOW UP APPT 7/24  WITH JANINE    Contact: Reviewed via phone with pt    Pre-Procedure Instruction: NPO after midnight pre procedure, Defined NPO with pt, Remove all jewelry and leave all valuables at home, Shower prior to arrival, Sedation plan/orders, Transportation requirements and arrangements post procedure, Post-procedure follow up process, Post-procedure restrictions/expectations, and Pre-procedure letter sent- letter tab  Risks:      Medication:   Instructions regarding anticoagulants: Eliquis- To continue anticoagulation uninterrupted through their  procedure    Instructions regarding antiarrhythmic medication: Amiodarone; continue medication prior to procedure as prescribed    Instructions given to pt regarding diuretics medication: NA for DCCV    Instructions given to pt regarding DM/GLP-1 medication:   DM- None  GLP-1- None    Instructions for medication, other than anticoagulants and antiarrhythmics listed above, given to pt: Take all medication AM of procedure with small sips of water       Important patient information for staff:  PT HAS A PACEMAKER, PT IS ON ELIQUIS    6/12/2024 2:12 PM  Natali Walker LPN

## 2024-06-17 ENCOUNTER — LAB REQUISITION (OUTPATIENT)
Dept: LAB | Facility: CLINIC | Age: 89
End: 2024-06-17
Payer: COMMERCIAL

## 2024-06-17 DIAGNOSIS — M00.061 STAPHYLOCOCCAL ARTHRITIS, RIGHT KNEE (H): ICD-10-CM

## 2024-06-17 PROBLEM — R00.1 BRADYCARDIA: Status: RESOLVED | Noted: 2023-12-29 | Resolved: 2024-06-17

## 2024-06-17 LAB
ALBUMIN SERPL BCG-MCNC: 3.4 G/DL (ref 3.5–5.2)
ALP SERPL-CCNC: 178 U/L (ref 40–150)
ALT SERPL W P-5'-P-CCNC: 16 U/L (ref 0–50)
ANION GAP SERPL CALCULATED.3IONS-SCNC: 13 MMOL/L (ref 7–15)
AST SERPL W P-5'-P-CCNC: 26 U/L (ref 0–45)
BASOPHILS # BLD AUTO: 0 10E3/UL (ref 0–0.2)
BASOPHILS NFR BLD AUTO: 1 %
BILIRUB SERPL-MCNC: 0.3 MG/DL
BUN SERPL-MCNC: 18.5 MG/DL (ref 8–23)
CALCIUM SERPL-MCNC: 9.4 MG/DL (ref 8.2–9.6)
CHLORIDE SERPL-SCNC: 102 MMOL/L (ref 98–107)
CREAT SERPL-MCNC: 0.87 MG/DL (ref 0.51–0.95)
CRP SERPL-MCNC: 137 MG/L
DEPRECATED HCO3 PLAS-SCNC: 24 MMOL/L (ref 22–29)
EGFRCR SERPLBLD CKD-EPI 2021: 62 ML/MIN/1.73M2
EOSINOPHIL # BLD AUTO: 0.3 10E3/UL (ref 0–0.7)
EOSINOPHIL NFR BLD AUTO: 4 %
ERYTHROCYTE [DISTWIDTH] IN BLOOD BY AUTOMATED COUNT: 15.9 % (ref 10–15)
ERYTHROCYTE [SEDIMENTATION RATE] IN BLOOD BY WESTERGREN METHOD: 65 MM/HR (ref 0–30)
GLUCOSE SERPL-MCNC: 133 MG/DL (ref 70–99)
HCT VFR BLD AUTO: 35.8 % (ref 35–47)
HGB BLD-MCNC: 10.9 G/DL (ref 11.7–15.7)
IMM GRANULOCYTES # BLD: 0.1 10E3/UL
IMM GRANULOCYTES NFR BLD: 2 %
LYMPHOCYTES # BLD AUTO: 1.3 10E3/UL (ref 0.8–5.3)
LYMPHOCYTES NFR BLD AUTO: 17 %
MCH RBC QN AUTO: 29.2 PG (ref 26.5–33)
MCHC RBC AUTO-ENTMCNC: 30.4 G/DL (ref 31.5–36.5)
MCV RBC AUTO: 96 FL (ref 78–100)
MONOCYTES # BLD AUTO: 1.5 10E3/UL (ref 0–1.3)
MONOCYTES NFR BLD AUTO: 19 %
NEUTROPHILS # BLD AUTO: 4.6 10E3/UL (ref 1.6–8.3)
NEUTROPHILS NFR BLD AUTO: 57 %
NRBC # BLD AUTO: 0.1 10E3/UL
NRBC BLD AUTO-RTO: 1 /100
PLATELET # BLD AUTO: 633 10E3/UL (ref 150–450)
POTASSIUM SERPL-SCNC: 4.5 MMOL/L (ref 3.4–5.3)
PROT SERPL-MCNC: 6.5 G/DL (ref 6.4–8.3)
RBC # BLD AUTO: 3.73 10E6/UL (ref 3.8–5.2)
SODIUM SERPL-SCNC: 139 MMOL/L (ref 135–145)
WBC # BLD AUTO: 7.8 10E3/UL (ref 4–11)

## 2024-06-17 PROCEDURE — 86140 C-REACTIVE PROTEIN: CPT | Mod: ORL | Performed by: INTERNAL MEDICINE

## 2024-06-17 PROCEDURE — 36415 COLL VENOUS BLD VENIPUNCTURE: CPT | Mod: ORL | Performed by: INTERNAL MEDICINE

## 2024-06-17 PROCEDURE — 80053 COMPREHEN METABOLIC PANEL: CPT | Mod: ORL | Performed by: INTERNAL MEDICINE

## 2024-06-17 PROCEDURE — P9604 ONE-WAY ALLOW PRORATED TRIP: HCPCS | Mod: ORL | Performed by: INTERNAL MEDICINE

## 2024-06-17 PROCEDURE — 85025 COMPLETE CBC W/AUTO DIFF WBC: CPT | Mod: ORL | Performed by: INTERNAL MEDICINE

## 2024-06-17 PROCEDURE — 85652 RBC SED RATE AUTOMATED: CPT | Mod: ORL | Performed by: INTERNAL MEDICINE

## 2024-06-17 NOTE — TELEPHONE ENCOUNTER
Reviewed chart to check for H&P that was scheduled for 11am per below.    Pt has canceled the appt and did not show up.    Attempted to contact pt to discuss, unable to LVM as mailbox was full.  Will attempt again.      DCCV scheduled on 6/19 pt was made aware that she needs H&P completed 2 times- on the letter that was sent and during her education call.    Stacy

## 2024-06-17 NOTE — H&P (VIEW-ONLY)
Assessment/Recommendations   History and physical for cardioversion on 6/19/2024    Assessment:    1.  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  Previous echo in December 2023 showed LVEF severely reduced at 25%.  Patient underwent successful new implant of dual-chamber pacemaker with ventricular lead implanted with the t tip position in the HIS-Purkinje conduction system on 4/8/2024     Current weight at TCU is 153 pounds.   Patient is hypervolemic on exam.    Heart Failure Regimen:  -On beta-blocker therapy with metoprolol succinate 25 mg daily  -On ACE inhibitor with lisinopril 2.5 mg daily  -Not on aldosterone blocker therapy  -On diuretic therapy with furosemide 40 mg daily  -On potassium chloride 20 mEq daily    2.  Persistent atrial fibrillation with status post successful cardioversion on December 22 with recurrent atrial fibrillation with bradycardia: Device check on May 2024 showed atrial fibrillation with ventricular rates 70 to 120 bpm with ventricular rate greater than or equal to 120 bpm 5 to 10% of the time.  Device check also showed 35% burden of atrial fibrillation since device implant on 4/9/2024  On amiodarone 100 mg daily for rhythm control.  CHADS2 VASc score is 7  On Eliquis 5 mg twice a day for stroke prophylaxis.  History of known PE.  Hemoglobin stable at 10.9 on 6/17/2024.    Patient reports dyspnea on exertion and no extremity edema.  Shortness of breath at rest, PND orthopnea.    3.  Hypertension: Blood pressure stable.    4.   Chronic kidney disease stage III: BMP on 6/17/2024 showed sodium 139, potassium 4.5 and creatinine 0.87.    5.  Septic arthritis, right knee: Patient was hospitalized from May 22 through May 29 with worsening chronic knee pain. Noted elevated CRP and ESR  "2 weeks ago.  Ortho was consulted.  Patient had joint aspiration with status post right knee arthroscopic irrigation and debridement on 5/23/2024.  ID was consulted and patient was treated with antibiotic and continued for 1 month.    Lab on 6/17/2024 showed ESR 65 with previously at 91, 87, and 92.   previously at 152, 157 and 158, and WBC 7.8    Patient was seen at [facility] twice since discharge from the hospital.      On IV cefazolin.      Note available for review in media.    Patient is afebrile.  Denies fever or chills.    Plan/Recommendation:  -Instructed to take extra 40 mg today  -Starting tomorrow take furosemide 40 mg in a.m. and 2 mg in p.m.  -Will request EP pool to add BMP tomorrow prior to cardioversion.  -Repeat BMP in 3 days at TCU  -Cardioversion as planned tomorrow  -Limit salt intake to < 2000 mg/day, daily weight monitoring, and maintain fluid intake of 50 to 60 ounces per day    Follow up with Dr. Rivera in July as scheduled. Follow up with Dr. Holley in 3-4 months. Follow up with me in 6-7 weeks in Heart Failure clinic.     The longitudinal plan of care for nonischemic cardiomyopathy, heart failure with reduced ejection fraction, chronic kidney disease stage III, persistent atrial fibrillation was addressed during this visit.?Due to the added   complexity in care, I will continue to support Gladys Ramirez in the subsequent management of this   condition(s) and with the ongoing continuity of care of this condition(s)\".     History of Present Illness/Subjective    Ms. Gladys Ramirez is a 89 year old female with a past medical history of hypertension, persistent atrial fibrillation, left bundle branch block, chronic kidney disease stage III, hyperlipidemia, DVT, status post dual-chamber pacemaker and chronic heart failure with reduced ejection fraction who is seen at [facility] for continued heart failure and history and physical for " upcoming cardioversion.    Patient was hospitalized from May 22 through May 29 with septic arthritis.  Patient is status post right knee arthroscopic irrigation and debridement.  ID was consulted and patient was started on antibiotic.  Patient was discharged to TCU.    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 She denies fatigue, shortness of breath, orthopnea, PND, palpitations, chest pain, and abdominal fullness/bloating.  Her overall lightheadedness has improved since device placement.  Her episode of cold sweats has resolved since device placement.  She denies fever, chills or bleeding complications.    She denies history of COPD, asthma or sleep apnea.  She denies history of any reaction to general anesthesia, IV contrast or seafood.    ECHO on 5/23/2024-Reviewed:   Interpretation Summary     The left ventricle is normal in size. There is mild concentric left  ventricular hypertrophy.  Left ventricular function is decreased. The ejection fraction is 20-25%  (severely reduced). There is diffuse hypokinesis of the left ventricle. Septal  motion is consistent with conduction abnormality.     The right ventricle is normal in size and function.  The left atrium is moderate to severely dilated. Borderline right atrial  enlargement.  There is mild (1+) mitral regurgitation.  There is mild (1+) tricuspid regurgitation.  Right ventricle systolic pressure estimate normal  IVC diameter and respiratory changes fall into an intermediate range  suggesting an RA pressure of 8 mmHg.  Compared to prior study, there is no significant change.     Physical Examination Review of Systems   /70 (BP Location: Left arm, Patient Position: Sitting, Cuff Size: Adult Regular)   Pulse 96   Temp 97.5  F (36.4  C)   Resp 14   Wt 69.4 kg (153 lb)   SpO2 93%   BMI 29.88 kg/m    Body mass index is 29.88 kg/m .  Wt Readings from Last 3 Encounters:   06/18/24  69.4 kg (153 lb)   05/29/24 69.7 kg (153 lb 9.6 oz)   04/30/24 66.6 kg (146 lb 12.8 oz)     General Appearance:   no distress, normal body habitus   ENT/Mouth: membranes moist, no oral lesions or bleeding gums.      EYES:  no scleral icterus, normal conjunctivae   Neck: no carotid bruits or thyromegaly   Chest/Lungs:   lungs are clear to auscultation, no rales or wheezing, equal chest wall expansion    Cardiovascular:    Normal first and second heart sounds with no murmurs, rubs, or gallops; neck vein assessment limited as patient was examined in the chair due to safety concerns of getting on the exam table, 1-2 + edema bilaterally (rt>lt)   Abdomen:  no organomegaly, masses, bruits, or tenderness; bowel sounds are present   Extremities   no cyanosis or clubbing; CMS intact.   Skin: no xanthelasma, warm.    Neurologic:  no tremors   Psychiatric: alert and oriented x3, calm                                                   Negative unless noted in HPI     Medical History  Surgical History Family History Social History   Past Medical History:   Diagnosis Date    Angina pectoris (H24)     Atrial fibrillation (H)     Chest pain 03/09/2017    Chronic kidney disease     Congestive heart failure (H)     Cough     Hyperlipidemia     Hypertension     Osteoarthritis     Past Surgical History:   Procedure Laterality Date    APPENDECTOMY      ARTHROSCOPY KNEE Right 5/23/2024    Procedure: ARTHROSCOPY, KNEE;  Surgeon: Sanchez Monahan MD;  Location: [facility] [place] OR    BASAL CELL CARCINOMA EXCISION      nose    EP PACEMAKER DEVICE & IMPLANT- HIS LEAD DUAL N/A 4/8/2024    Procedure: Pacemaker Device & Lead Implant - HIS Lead Dual;  Surgeon: Jeannie Rivera MD;  Location: [facility] CATH LAB CV    INCISION AND DRAINAGE KNEE, COMBINED Right 5/23/2024    Procedure: INCISION AND DRAINAGE, KNEE;  Surgeon: Sanchez oMnahan MD;  Location: [facility] [place] OR    LAPAROSCOPY DIAGNOSTIC (GENERAL) N/A 11/04/2014    Procedure:  LAPAROSCOPY BILATERAL SALPINGO-OOPHORECTOMY ;  Surgeon: Sofia Harper MD;  Location: [facility] OR;  Service:     OPEN REDUCTION INTERNAL FIXATION HIP BIPOLAR Right 3/5/2023    Procedure: HEMIARTHROPLASTY, HIP, BIPOLAR;  Surgeon: Jose G Martinez MD;  [place] [facility] [place]    PICC SINGLE LUMEN PLACEMENT  5/24/2024    TONSILLECTOMY      10 years old    ZZC TOTAL KNEE ARTHROPLASTY Left     2011    Family History   Problem Relation Age of Onset    Heart Disease Mother     Rheumatic Heart Disease Mother     No Known Problems Father     Cancer Brother         brain    Lung Cancer Brother     Lung Cancer Brother     Cancer Sister         lung    Lung Cancer Sister     Social History     Socioeconomic History    Marital status: Single     Spouse name: Not on file    Number of children: Not on file    Years of education: Not on file    Highest education level: Not on file   Occupational History    Not on file   Tobacco Use    Smoking status: Never     Passive exposure: Never    Smokeless tobacco: Never    Tobacco comments:     no passive exposure   Vaping Use    Vaping status: Never Used   Substance and Sexual Activity    Alcohol use: Yes     Comment: Alcoholic Drinks/day: Rarely a glass of wine    Drug use: No    Sexual activity: Not on file   Other Topics Concern    Not on file   Social History Narrative    The patient is a nun.     Social Determinants of Health     Financial Resource Strain: Low Risk  (4/5/2024)    Financial Resource Strain     Within the past 12 months, have you or your family members you live with been unable to get utilities (heat, electricity) when it was really needed?: No   Food Insecurity: Low Risk  (4/5/2024)    Food Insecurity     Within the past 12 months, did you worry that your food would run out before you got money to buy more?: No     Within the past 12 months, did the food you bought just not last and you didn t have money to get more?: No   Transportation Needs: Low  Risk  (4/5/2024)    Transportation Needs     Within the past 12 months, has lack of transportation kept you from medical appointments, getting your medicines, non-medical meetings or appointments, work, or from getting things that you need?: No   Physical Activity: Insufficiently Active (3/27/2023)    Exercise Vital Sign     Days of Exercise per Week: 3 days     Minutes of Exercise per Session: 10 min   Stress: No Stress Concern Present (4/5/2024)    [employer] [place] [employer] - Occupational Stress Questionnaire     Feeling of Stress : Not at all   Social Connections: Moderately Integrated (3/27/2023)    Social Connection and Isolation Panel [NHANES]     Frequency of Communication with Friends and Family: More than three times a week     Frequency of Social Gatherings with Friends and Family: More than three times a week     Attends [employer] Services: More than 4 times per year     Active Member of Clubs or Organizations: Yes     Attends Club or Organization Meetings: More than 4 times per year     Marital Status: Never    Interpersonal Safety: Low Risk  (4/5/2024)    Interpersonal Safety     Do you feel physically and emotionally safe where you currently live?: Yes     Within the past 12 months, have you been hit, slapped, kicked or otherwise physically hurt by someone?: No     Within the past 12 months, have you been humiliated or emotionally abused in other ways by your partner or ex-partner?: No   Housing Stability: Low Risk  (4/5/2024)    Housing Stability     Do you have housing? : Yes     Are you worried about losing your housing?: No          Medications  Allergies   Current Outpatient Medications   Medication Sig Dispense Refill    acetaminophen (TYLENOL) 325 MG tablet Take 3 tablets (975 mg) by mouth every 8 hours      acetaminophen (TYLENOL) 325 MG tablet Take 2 tablets (650 mg) by mouth every 4 hours as needed for other (For optimal non-opioid multimodal pain management to improve  "pain control.)  0    amiodarone (PACERONE) 200 MG tablet Take 0.5 tablets (100 mg) by mouth daily 45 tablet 0    apixaban ANTICOAGULANT (ELIQUIS) 5 MG tablet Take 1 tablet (5 mg) by mouth 2 times daily 60 tablet 3    ceFAZolin (ANCEF) 1 GM vial Inject 1 g into the vein every 12 hours for 28 days      cholecalciferol, vitamin D3, (VITAMIN D3) 2,000 unit Tab [CHOLECALCIFEROL, VITAMIN D3, (VITAMIN D3) 2,000 UNIT TAB] Take 1 tablet (2,000 Units total) by mouth daily. 90 tablet 3    diclofenac (FLECTOR) 1.3 % patch Externally apply 1 patch topically 2 times daily      DULoxetine (CYMBALTA) 60 MG capsule Take 60 mg by mouth daily      furosemide (LASIX) 40 MG tablet Take extra 40 mg today 6/18/24 and then take 40 mg in Am and 20 mg in PM starting tomorrow 6/19/24      lisinopril (ZESTRIL) 2.5 MG tablet TAKE 1 TABLET BY MOUTH ONE TIME DAILY 90 tablet 0    magnesium hydroxide (MILK OF MAGNESIA) 400 MG/5ML suspension Take 30 mLs by mouth daily as needed for constipation (Use if polyethylene glycol (Miralax) is not effective after 24 hours.)      metoprolol succinate ER (TOPROL XL) 25 MG 24 hr tablet Take 1 tablet (25 mg) by mouth daily 90 tablet 3    oxyCODONE (ROXICODONE) 5 MG tablet Take 1 tablet (5 mg) by mouth every 4 hours as needed for moderate to severe pain (Max 4 tabs per day) #120 tabs to last 30 days for chronic pain. Ok to fill and start May 2, 2024 20 tablet 0    potassium chloride tamiko ER (KLOR-CON M20) 20 MEQ CR tablet Take 1 tablet (20 mEq) by mouth daily 90 tablet 3    senna-docusate (SENOKOT-S/PERICOLACE) 8.6-50 MG tablet Take 2 tablets by mouth 2 times daily as needed for constipation 60 tablet 0    sodium chloride, PF, 0.9% PF flush Inject 10-40 mLs every 24 hours      Allergies   Allergen Reactions    Trazodone Shortness Of Breath and Unknown     Allergic to trazodone and deriv., Other: trouble swallowing      Clindamycin Diarrhea     C-diff    Gabapentin Other (See Comments)     \"Internal tremors\"    " Temazepam Other (See Comments)     Annotation: Nightmares           Lab Results    Chemistry/lipid CBC Cardiac Enzymes/BNP/TSH/INR   Lab Results   Component Value Date    CHOL 179 03/12/2015    HDL 64 03/12/2015    TRIG 176 (H) 03/12/2015    BUN 18.5 06/17/2024     06/17/2024    CO2 24 06/17/2024    Lab Results   Component Value Date    WBC 7.8 06/17/2024    HGB 10.9 (L) 06/17/2024    HCT 35.8 06/17/2024    MCV 96 06/17/2024     (H) 06/17/2024    Lab Results   Component Value Date    TROPONINI 0.16 08/23/2022    BNP 1,933 (H) 08/23/2022    TSH 6.25 (H) 12/29/2023    INR 1.33 (H) 03/04/2023        50 minutes spent on the date of encounter doing chart review, review of outside records, review of test results, interpretation with above tests, patient visit, and documentation.        This note has been dictated using voice recognition software. Any grammatical, typographical, or context distortions are unintentional and inherent to the software

## 2024-06-17 NOTE — PROGRESS NOTES
"    DISCHARGE NOTE:    Reason for Discharge: Pt did not wish to pursue PT or knee brace at the time of the eval.     Equipment Issued: NA    Discharge Plan: None    Referring Provider:  Estrella Walker       05/13/24 0500   Appointment Info   Signing clinician's name / credentials Toña Laura, PT   Visits Used 1   Medical Diagnosis Primary osteoarthritis of right knee   PT Tx Diagnosis Chronic R Knee Pain and Decreased Activity Tolerance   Quick Adds Certification   Progress Note/Certification   Start of Care Date 05/13/24   Onset of illness/injury or Date of Surgery 04/22/24   Therapy Frequency 1x/week   Predicted Duration 90 days   Certification date from 05/13/24   Certification date to 08/10/24   Progress Note Completed Date 05/13/24   GOALS   PT Goals 2;3   PT Goal 1   Goal Identifier [place]   Goal Description Pt will demonstrate readiness for independent sx management using an [place] unloading brace   Rationale to maximize safety and independence within the home   Goal Progress Met -was able to take in clinic brace off indepently   Target Date 05/13/24   Date Met 05/13/24   Subjective Report   Subjective Report From the eval: Pt reports significant R knee pain.  She has had this pain for the past 2 years. She has a generic brace and she has never worn the brace one time because it is so cumbersome and uncomfotable.  The cortisone shots, pain medications etc do not help.  She is getting a CT scan for the knee tonight.  She has done PT before but does not do the exercises because they are too painful.  She does try walking around the apartment.  She uses cold pack for her knee and it helps while it is on.  MD wanted pt to be fit for a different brace.   Objective Measures   Objective Measures Objective Measure 1;Objective Measure 2   Objective Measure 1   Objective Measure Knee measurement - 6\" below mid patella   Details R=14.5 inches   Objective Measure 2   Objective Measure LEFS   Details " eval=3/80   Treatment Interventions (PT)   Interventions Therapeutic Activity   Therapeutic Activity   Therapeutic Activities: dynamic activities to improve functional performance minutes (88694) 10   Ther Act 1 Walking, Squatting and movement with the [place] knee unloading brace   Ther Act 1 - Details decreased pain reported on all activities.   Skilled Intervention brace trial with movements   Patient Response/Progress good; felt support and decreased pain.   Eval/Assessments   PT Eval, Moderate Complexity Minutes (81150) 30   Education   Learner/Method Patient;Demonstration;Pictures/Video   Plan   Home program see PTRX   Plan for next session Pt's information was shared with [facility] and they will proceed with obtaining and fitting of the  One brace   Comments   Comments Assessment:  Pt presents to PT with a history of severe R knee pain and decreased overall function.  Pt has noted trying several cortisone injections, PT, and a generic brace with no change in her pain levels.  Her knee pain is limiting her to walk, stand and perform ADL's. She has decreased R knee flexion and extension ROM.  She has decreased R>L LE strength and activity tolerance.  The pt has increased tenderness over the entire R knee.  She is here for a more custom knee brace and did well with the trail of the brace today in clinic and will be pursuing this further at this time.   Total Session Time   Timed Code Treatment Minutes 10   Total Treatment Time (sum of timed and untimed services) 40       Toña Laura, PT

## 2024-06-17 NOTE — PROGRESS NOTES
Assessment/Recommendations   History and physical for cardioversion on 6/19/2024    Assessment:    1.  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  Previous echo in December 2023 showed LVEF severely reduced at 25%.  Patient underwent successful new implant of dual-chamber pacemaker with ventricular lead implanted with the t tip position in the HIS-Purkinje conduction system on 4/8/2024     Current weight at TCU is 153 pounds.   Patient is hypervolemic on exam.    Heart Failure Regimen:  -On beta-blocker therapy with metoprolol succinate 25 mg daily  -On ACE inhibitor with lisinopril 2.5 mg daily  -Not on aldosterone blocker therapy  -On diuretic therapy with furosemide 40 mg daily  -On potassium chloride 20 mEq daily    2.  Persistent atrial fibrillation with status post successful cardioversion on December 22 with recurrent atrial fibrillation with bradycardia: Device check on May 2024 showed atrial fibrillation with ventricular rates 70 to 120 bpm with ventricular rate greater than or equal to 120 bpm 5 to 10% of the time.  Device check also showed 35% burden of atrial fibrillation since device implant on 4/9/2024  On amiodarone 100 mg daily for rhythm control.  CHADS2 VASc score is 7  On Eliquis 5 mg twice a day for stroke prophylaxis.  History of known PE.  Hemoglobin stable at 10.9 on 6/17/2024.    Patient reports dyspnea on exertion and no extremity edema.  Shortness of breath at rest, PND orthopnea.    3.  Hypertension: Blood pressure stable.    4.   Chronic kidney disease stage III: BMP on 6/17/2024 showed sodium 139, potassium 4.5 and creatinine 0.87.    5.  Septic arthritis, right knee: Patient was hospitalized from May 22 through May 29 with worsening chronic knee pain. Noted elevated CRP and ESR  "2 weeks ago.  Ortho was consulted.  Patient had joint aspiration with status post right knee arthroscopic irrigation and debridement on 5/23/2024.  ID was consulted and patient was treated with antibiotic and continued for 1 month.    Lab on 6/17/2024 showed ESR 65 with previously at 91, 87, and 92.   previously at 152, 157 and 158, and WBC 7.8    Patient was seen at [facility] twice since discharge from the hospital.      On IV cefazolin.      Note available for review in media.    Patient is afebrile.  Denies fever or chills.    Plan/Recommendation:  -Instructed to take extra 40 mg today  -Starting tomorrow take furosemide 40 mg in a.m. and 2 mg in p.m.  -Will request EP pool to add BMP tomorrow prior to cardioversion.  -Repeat BMP in 3 days at TCU  -Cardioversion as planned tomorrow  -Limit salt intake to < 2000 mg/day, daily weight monitoring, and maintain fluid intake of 50 to 60 ounces per day    Follow up with Dr. iRvera in July as scheduled. Follow up with Dr. Holley in 3-4 months. Follow up with me in 6-7 weeks in Heart Failure clinic.     The longitudinal plan of care for nonischemic cardiomyopathy, heart failure with reduced ejection fraction, chronic kidney disease stage III, persistent atrial fibrillation was addressed during this visit.?Due to the added   complexity in care, I will continue to support Gladys Ramirez in the subsequent management of this   condition(s) and with the ongoing continuity of care of this condition(s)\".     History of Present Illness/Subjective    Ms. Gladys Ramirez is a 89 year old female with a past medical history of hypertension, persistent atrial fibrillation, left bundle branch block, chronic kidney disease stage III, hyperlipidemia, DVT, status post dual-chamber pacemaker and chronic heart failure with reduced ejection fraction who is seen at [facility] for continued heart failure and history and physical for " upcoming cardioversion.    Patient was hospitalized from May 22 through May 29 with septic arthritis.  Patient is status post right knee arthroscopic irrigation and debridement.  ID was consulted and patient was started on antibiotic.  Patient was discharged to TCU.    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 She denies fatigue, shortness of breath, orthopnea, PND, palpitations, chest pain, and abdominal fullness/bloating.  Her overall lightheadedness has improved since device placement.  Her episode of cold sweats has resolved since device placement.  She denies fever, chills or bleeding complications.    She denies history of COPD, asthma or sleep apnea.  She denies history of any reaction to general anesthesia, IV contrast or seafood.    ECHO on 5/23/2024-Reviewed:   Interpretation Summary     The left ventricle is normal in size. There is mild concentric left  ventricular hypertrophy.  Left ventricular function is decreased. The ejection fraction is 20-25%  (severely reduced). There is diffuse hypokinesis of the left ventricle. Septal  motion is consistent with conduction abnormality.     The right ventricle is normal in size and function.  The left atrium is moderate to severely dilated. Borderline right atrial  enlargement.  There is mild (1+) mitral regurgitation.  There is mild (1+) tricuspid regurgitation.  Right ventricle systolic pressure estimate normal  IVC diameter and respiratory changes fall into an intermediate range  suggesting an RA pressure of 8 mmHg.  Compared to prior study, there is no significant change.     Physical Examination Review of Systems   /70 (BP Location: Left arm, Patient Position: Sitting, Cuff Size: Adult Regular)   Pulse 96   Temp 97.5  F (36.4  C)   Resp 14   Wt 69.4 kg (153 lb)   SpO2 93%   BMI 29.88 kg/m    Body mass index is 29.88 kg/m .  Wt Readings from Last 3 Encounters:   06/18/24  69.4 kg (153 lb)   05/29/24 69.7 kg (153 lb 9.6 oz)   04/30/24 66.6 kg (146 lb 12.8 oz)     General Appearance:   no distress, normal body habitus   ENT/Mouth: membranes moist, no oral lesions or bleeding gums.      EYES:  no scleral icterus, normal conjunctivae   Neck: no carotid bruits or thyromegaly   Chest/Lungs:   lungs are clear to auscultation, no rales or wheezing, equal chest wall expansion    Cardiovascular:    Normal first and second heart sounds with no murmurs, rubs, or gallops; neck vein assessment limited as patient was examined in the chair due to safety concerns of getting on the exam table, 1-2 + edema bilaterally (rt>lt)   Abdomen:  no organomegaly, masses, bruits, or tenderness; bowel sounds are present   Extremities   no cyanosis or clubbing; CMS intact.   Skin: no xanthelasma, warm.    Neurologic:  no tremors   Psychiatric: alert and oriented x3, calm                                                   Negative unless noted in HPI     Medical History  Surgical History Family History Social History   Past Medical History:   Diagnosis Date    Angina pectoris (H24)     Atrial fibrillation (H)     Chest pain 03/09/2017    Chronic kidney disease     Congestive heart failure (H)     Cough     Hyperlipidemia     Hypertension     Osteoarthritis     Past Surgical History:   Procedure Laterality Date    APPENDECTOMY      ARTHROSCOPY KNEE Right 5/23/2024    Procedure: ARTHROSCOPY, KNEE;  Surgeon: Sanchez Monahan MD;  Location: [facility] OR    BASAL CELL CARCINOMA EXCISION      nose    EP PACEMAKER DEVICE & IMPLANT- HIS LEAD DUAL N/A 4/8/2024    Procedure: Pacemaker Device & Lead Implant - HIS Lead Dual;  Surgeon: Jeannie Rivera MD;  Location: [facility] CATH LAB CV    INCISION AND DRAINAGE KNEE, COMBINED Right 5/23/2024    Procedure: INCISION AND DRAINAGE, KNEE;  Surgeon: Sanchez Monahan MD;  Location: [facility] OR    LAPAROSCOPY DIAGNOSTIC (GENERAL) N/A 11/04/2014    Procedure:  LAPAROSCOPY BILATERAL SALPINGO-OOPHORECTOMY ;  Surgeon: Sofia Harper MD;  Location: [facility] OR;  Service:     OPEN REDUCTION INTERNAL FIXATION HIP BIPOLAR Right 3/5/2023    Procedure: HEMIARTHROPLASTY, HIP, BIPOLAR;  Surgeon: Jose G Martinez MD;  Location: [facility]    PICC SINGLE LUMEN PLACEMENT  5/24/2024    TONSILLECTOMY      10 years old    ZZC TOTAL KNEE ARTHROPLASTY Left     2011    Family History   Problem Relation Age of Onset    Heart Disease Mother     Rheumatic Heart Disease Mother     No Known Problems Father     Cancer Brother         brain    Lung Cancer Brother     Lung Cancer Brother     Cancer Sister         lung    Lung Cancer Sister     Social History     Socioeconomic History    Marital status: Single     Spouse name: Not on file    Number of children: Not on file    Years of education: Not on file    Highest education level: Not on file   Occupational History    Not on file   Tobacco Use    Smoking status: Never     Passive exposure: Never    Smokeless tobacco: Never    Tobacco comments:     no passive exposure   Vaping Use    Vaping status: Never Used   Substance and Sexual Activity    Alcohol use: Yes     Comment: Alcoholic Drinks/day: Rarely a glass of wine    Drug use: No    Sexual activity: Not on file   Other Topics Concern    Not on file   Social History Narrative    The patient is a nun.     Social Determinants of Health     Financial Resource Strain: Low Risk  (4/5/2024)    Financial Resource Strain     Within the past 12 months, have you or your family members you live with been unable to get utilities (heat, electricity) when it was really needed?: No   Food Insecurity: Low Risk  (4/5/2024)    Food Insecurity     Within the past 12 months, did you worry that your food would run out before you got money to buy more?: No     Within the past 12 months, did the food you bought just not last and you didn t have money to get more?: No   Transportation Needs: Low  Risk  (4/5/2024)    Transportation Needs     Within the past 12 months, has lack of transportation kept you from medical appointments, getting your medicines, non-medical meetings or appointments, work, or from getting things that you need?: No   Physical Activity: Insufficiently Active (3/27/2023)    Exercise Vital Sign     Days of Exercise per Week: 3 days     Minutes of Exercise per Session: 10 min   Stress: No Stress Concern Present (4/5/2024)    [employer] - Occupational Stress Questionnaire     Feeling of Stress : Not at all   Social Connections: Moderately Integrated (3/27/2023)    Social Connection and Isolation Panel [NHANES]     Frequency of Communication with Friends and Family: More than three times a week     Frequency of Social Gatherings with Friends and Family: More than three times a week     Attends Yazidism Services: More than 4 times per year     Active Member of Clubs or Organizations: Yes     Attends Club or Organization Meetings: More than 4 times per year     Marital Status: Never    Interpersonal Safety: Low Risk  (4/5/2024)    Interpersonal Safety     Do you feel physically and emotionally safe where you currently live?: Yes     Within the past 12 months, have you been hit, slapped, kicked or otherwise physically hurt by someone?: No     Within the past 12 months, have you been humiliated or emotionally abused in other ways by your partner or ex-partner?: No   Housing Stability: Low Risk  (4/5/2024)    Housing Stability     Do you have housing? : Yes     Are you worried about losing your housing?: No          Medications  Allergies   Current Outpatient Medications   Medication Sig Dispense Refill    acetaminophen (TYLENOL) 325 MG tablet Take 3 tablets (975 mg) by mouth every 8 hours      acetaminophen (TYLENOL) 325 MG tablet Take 2 tablets (650 mg) by mouth every 4 hours as needed for other (For optimal non-opioid multimodal pain management to improve  "pain control.)  0    amiodarone (PACERONE) 200 MG tablet Take 0.5 tablets (100 mg) by mouth daily 45 tablet 0    apixaban ANTICOAGULANT (ELIQUIS) 5 MG tablet Take 1 tablet (5 mg) by mouth 2 times daily 60 tablet 3    ceFAZolin (ANCEF) 1 GM vial Inject 1 g into the vein every 12 hours for 28 days      cholecalciferol, vitamin D3, (VITAMIN D3) 2,000 unit Tab [CHOLECALCIFEROL, VITAMIN D3, (VITAMIN D3) 2,000 UNIT TAB] Take 1 tablet (2,000 Units total) by mouth daily. 90 tablet 3    diclofenac (FLECTOR) 1.3 % patch Externally apply 1 patch topically 2 times daily      DULoxetine (CYMBALTA) 60 MG capsule Take 60 mg by mouth daily      furosemide (LASIX) 40 MG tablet Take extra 40 mg today 6/18/24 and then take 40 mg in Am and 20 mg in PM starting tomorrow 6/19/24      lisinopril (ZESTRIL) 2.5 MG tablet TAKE 1 TABLET BY MOUTH ONE TIME DAILY 90 tablet 0    magnesium hydroxide (MILK OF MAGNESIA) 400 MG/5ML suspension Take 30 mLs by mouth daily as needed for constipation (Use if polyethylene glycol (Miralax) is not effective after 24 hours.)      metoprolol succinate ER (TOPROL XL) 25 MG 24 hr tablet Take 1 tablet (25 mg) by mouth daily 90 tablet 3    oxyCODONE (ROXICODONE) 5 MG tablet Take 1 tablet (5 mg) by mouth every 4 hours as needed for moderate to severe pain (Max 4 tabs per day) #120 tabs to last 30 days for chronic pain. Ok to fill and start May 2, 2024 20 tablet 0    potassium chloride tamiko ER (KLOR-CON M20) 20 MEQ CR tablet Take 1 tablet (20 mEq) by mouth daily 90 tablet 3    senna-docusate (SENOKOT-S/PERICOLACE) 8.6-50 MG tablet Take 2 tablets by mouth 2 times daily as needed for constipation 60 tablet 0    sodium chloride, PF, 0.9% PF flush Inject 10-40 mLs every 24 hours      Allergies   Allergen Reactions    Trazodone Shortness Of Breath and Unknown     Allergic to trazodone and deriv., Other: trouble swallowing      Clindamycin Diarrhea     C-diff    Gabapentin Other (See Comments)     \"Internal tremors\"    " Temazepam Other (See Comments)     Annotation: Nightmares           Lab Results    Chemistry/lipid CBC Cardiac Enzymes/BNP/TSH/INR   Lab Results   Component Value Date    CHOL 179 03/12/2015    HDL 64 03/12/2015    TRIG 176 (H) 03/12/2015    BUN 18.5 06/17/2024     06/17/2024    CO2 24 06/17/2024    Lab Results   Component Value Date    WBC 7.8 06/17/2024    HGB 10.9 (L) 06/17/2024    HCT 35.8 06/17/2024    MCV 96 06/17/2024     (H) 06/17/2024    Lab Results   Component Value Date    TROPONINI 0.16 08/23/2022    BNP 1,933 (H) 08/23/2022    TSH 6.25 (H) 12/29/2023    INR 1.33 (H) 03/04/2023        50 minutes spent on the date of encounter doing chart review, review of outside records, review of test results, interpretation with above tests, patient visit, and documentation.        This note has been dictated using voice recognition software. Any grammatical, typographical, or context distortions are unintentional and inherent to the software

## 2024-06-18 ENCOUNTER — OFFICE VISIT (OUTPATIENT)
Dept: CARDIOLOGY | Facility: CLINIC | Age: 89
End: 2024-06-18
Attending: NURSE PRACTITIONER
Payer: COMMERCIAL

## 2024-06-18 ENCOUNTER — TELEPHONE (OUTPATIENT)
Dept: CARDIOLOGY | Facility: CLINIC | Age: 89
End: 2024-06-18

## 2024-06-18 VITALS
SYSTOLIC BLOOD PRESSURE: 110 MMHG | DIASTOLIC BLOOD PRESSURE: 70 MMHG | TEMPERATURE: 97.5 F | RESPIRATION RATE: 14 BRPM | HEART RATE: 96 BPM | WEIGHT: 153 LBS | BODY MASS INDEX: 29.88 KG/M2 | OXYGEN SATURATION: 93 %

## 2024-06-18 DIAGNOSIS — I48.19 PERSISTENT ATRIAL FIBRILLATION (H): ICD-10-CM

## 2024-06-18 DIAGNOSIS — N18.31 STAGE 3A CHRONIC KIDNEY DISEASE (CKD) (H): ICD-10-CM

## 2024-06-18 DIAGNOSIS — I50.20 HFREF (HEART FAILURE WITH REDUCED EJECTION FRACTION) (H): Primary | ICD-10-CM

## 2024-06-18 DIAGNOSIS — M00.861 ARTHRITIS OF RIGHT KNEE DUE TO OTHER BACTERIA (H): ICD-10-CM

## 2024-06-18 DIAGNOSIS — I42.8 NON-ISCHEMIC CARDIOMYOPATHY (H): ICD-10-CM

## 2024-06-18 PROBLEM — M00.9 PYOGENIC ARTHRITIS OF RIGHT KNEE JOINT (H): Status: ACTIVE | Noted: 2024-06-18

## 2024-06-18 PROCEDURE — 99215 OFFICE O/P EST HI 40 MIN: CPT | Performed by: NURSE PRACTITIONER

## 2024-06-18 PROCEDURE — G2211 COMPLEX E/M VISIT ADD ON: HCPCS | Performed by: NURSE PRACTITIONER

## 2024-06-18 RX ORDER — FUROSEMIDE 40 MG
TABLET ORAL
Status: SHIPPED
Start: 2024-06-18 | End: 2024-07-02

## 2024-06-18 NOTE — LETTER
6/18/2024    Margret Flores MD  [address] [place] [id number]    RE: Gladys Ramirez       Dear Colleague,     I had the pleasure of seeing Gladys Ramirez in the [facility].          Assessment/Recommendations   History and physical for cardioversion on 6/19/2024    Assessment:    1.  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  Previous echo in December 2023 showed LVEF severely reduced at 25%.  Patient underwent successful new implant of dual-chamber pacemaker with ventricular lead implanted with the t tip position in the HIS-Purkinje conduction system on 4/8/2024     Current weight at U is 153 pounds.   Patient is hypervolemic on exam.    Heart Failure Regimen:  -On beta-blocker therapy with metoprolol succinate 25 mg daily  -On ACE inhibitor with lisinopril 2.5 mg daily  -Not on aldosterone blocker therapy  -On diuretic therapy with furosemide 40 mg daily  -On potassium chloride 20 mEq daily    2.  Persistent atrial fibrillation with status post successful cardioversion on December 22 with recurrent atrial fibrillation with bradycardia: Device check on May 2024 showed atrial fibrillation with ventricular rates 70 to 120 bpm with ventricular rate greater than or equal to 120 bpm 5 to 10% of the time.  Device check also showed 35% burden of atrial fibrillation since device implant on 4/9/2024  On amiodarone 100 mg daily for rhythm control.  CHADS2 VASc score is 7  On Eliquis 5 mg twice a day for stroke prophylaxis.  History of known PE.  Hemoglobin stable at 10.9 on 6/17/2024.    Patient reports dyspnea on exertion and no extremity edema.  Shortness of breath at rest, PND orthopnea.    3.  Hypertension: Blood pressure stable.    4.   Chronic kidney disease stage III: BMP on  "6/17/2024 showed sodium 139, potassium 4.5 and creatinine 0.87.    5.  Septic arthritis, right knee: Patient was hospitalized from May 22 through May 29 with worsening chronic knee pain. Noted elevated CRP and ESR 2 weeks ago.  Ortho was consulted.  Patient had joint aspiration with status post right knee arthroscopic irrigation and debridement on 5/23/2024.  ID was consulted and patient was treated with antibiotic and continued for 1 month.    Lab on 6/17/2024 showed ESR 65 with previously at 91, 87, and 92.   previously at 152, 157 and 158, and WBC 7.8    Patient was seen at [facility] twice since discharge from the hospital.      On IV cefazolin.      Note available for review in media.    Patient is afebrile.  Denies fever or chills.    Plan/Recommendation:  -Instructed to take extra 40 mg today  -Starting tomorrow take furosemide 40 mg in a.m. and 2 mg in p.m.  -Will request EP pool to add BMP tomorrow prior to cardioversion.  -Repeat BMP in 3 days at TCU  -Cardioversion as planned tomorrow  -Limit salt intake to < 2000 mg/day, daily weight monitoring, and maintain fluid intake of 50 to 60 ounces per day    Follow up with Dr. Rivera in July as scheduled. Follow up with Dr. Holley in 3-4 months. Follow up with me in 6-7 weeks in Heart Failure clinic.     The longitudinal plan of care for nonischemic cardiomyopathy, heart failure with reduced ejection fraction, chronic kidney disease stage III, persistent atrial fibrillation was addressed during this visit.?Due to the added   complexity in care, I will continue to support Gladys Ramirez in the subsequent management of this   condition(s) and with the ongoing continuity of care of this condition(s)\".     History of Present Illness/Subjective    Ms. Gladys Ramirez is a 89 year old female with a past medical history of hypertension, persistent atrial fibrillation, left bundle branch block, chronic kidney disease stage III, hyperlipidemia, DVT, " status post dual-chamber pacemaker and chronic heart failure with reduced ejection fraction who is seen at [facility]  [facility] for continued heart failure and history and physical for upcoming cardioversion.    Patient was hospitalized from May 22 through May 29 with septic arthritis.  Patient is status post right knee arthroscopic irrigation and debridement.  ID was consulted and patient was started on antibiotic.  Patient was discharged to TCU.    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 She denies fatigue, shortness of breath, orthopnea, PND, palpitations, chest pain, and abdominal fullness/bloating.  Her overall lightheadedness has improved since device placement.  Her episode of cold sweats has resolved since device placement.  She denies fever, chills or bleeding complications.    She denies history of COPD, asthma or sleep apnea.  She denies history of any reaction to general anesthesia, IV contrast or seafood.    ECHO on 5/23/2024-Reviewed:   Interpretation Summary     The left ventricle is normal in size. There is mild concentric left  ventricular hypertrophy.  Left ventricular function is decreased. The ejection fraction is 20-25%  (severely reduced). There is diffuse hypokinesis of the left ventricle. Septal  motion is consistent with conduction abnormality.     The right ventricle is normal in size and function.  The left atrium is moderate to severely dilated. Borderline right atrial  enlargement.  There is mild (1+) mitral regurgitation.  There is mild (1+) tricuspid regurgitation.  Right ventricle systolic pressure estimate normal  IVC diameter and respiratory changes fall into an intermediate range  suggesting an RA pressure of 8 mmHg.  Compared to prior study, there is no significant change.     Physical Examination Review of Systems   /70 (BP Location: Left arm, Patient Position: Sitting,  Cuff Size: Adult Regular)   Pulse 96   Temp 97.5  F (36.4  C)   Resp 14   Wt 69.4 kg (153 lb)   SpO2 93%   BMI 29.88 kg/m    Body mass index is 29.88 kg/m .  Wt Readings from Last 3 Encounters:   06/18/24 69.4 kg (153 lb)   05/29/24 69.7 kg (153 lb 9.6 oz)   04/30/24 66.6 kg (146 lb 12.8 oz)     General Appearance:   no distress, normal body habitus   ENT/Mouth: membranes moist, no oral lesions or bleeding gums.      EYES:  no scleral icterus, normal conjunctivae   Neck: no carotid bruits or thyromegaly   Chest/Lungs:   lungs are clear to auscultation, no rales or wheezing, equal chest wall expansion    Cardiovascular:    Normal first and second heart sounds with no murmurs, rubs, or gallops; neck vein assessment limited as patient was examined in the chair due to safety concerns of getting on the exam table, 1-2 + edema bilaterally (rt>lt)   Abdomen:  no organomegaly, masses, bruits, or tenderness; bowel sounds are present   Extremities   no cyanosis or clubbing; CMS intact.   Skin: no xanthelasma, warm.    Neurologic:  no tremors   Psychiatric: alert and oriented x3, calm                                                   Negative unless noted in HPI     Medical History  Surgical History Family History Social History   Past Medical History:   Diagnosis Date    Angina pectoris (H24)     Atrial fibrillation (H)     Chest pain 03/09/2017    Chronic kidney disease     Congestive heart failure (H)     Cough     Hyperlipidemia     Hypertension     Osteoarthritis     Past Surgical History:   Procedure Laterality Date    APPENDECTOMY      ARTHROSCOPY KNEE Right 5/23/2024    Procedure: ARTHROSCOPY, KNEE;  Surgeon: Sanchez Monahan MD;  Location: [facility] OR    BASAL CELL CARCINOMA EXCISION      nose    EP PACEMAKER DEVICE & IMPLANT- HIS LEAD DUAL N/A 4/8/2024    Procedure: Pacemaker Device & Lead Implant - HIS Lead Dual;  Surgeon: Jeannie Rivera MD;  Location: [facility] CATH LAB CV    INCISION AND  DRAINAGE KNEE, COMBINED Right 5/23/2024    Procedure: INCISION AND DRAINAGE, KNEE;  Surgeon: Sanchez Monahan MD;  Location: [facility]    LAPAROSCOPY DIAGNOSTIC (GENERAL) N/A 11/04/2014    Procedure: LAPAROSCOPY BILATERAL SALPINGO-OOPHORECTOMY ;  Surgeon: Sofia Harper MD;  Location: [facility] OR;  Service:     OPEN REDUCTION INTERNAL FIXATION HIP BIPOLAR Right 3/5/2023    Procedure: HEMIARTHROPLASTY, HIP, BIPOLAR;  Surgeon: Jose G Martinez MD;  Location: [facility]    PICC SINGLE LUMEN PLACEMENT  5/24/2024    TONSILLECTOMY      10 years old    ZZC TOTAL KNEE ARTHROPLASTY Left     2011    Family History   Problem Relation Age of Onset    Heart Disease Mother     Rheumatic Heart Disease Mother     No Known Problems Father     Cancer Brother         brain    Lung Cancer Brother     Lung Cancer Brother     Cancer Sister         lung    Lung Cancer Sister     Social History     Socioeconomic History    Marital status: Single     Spouse name: Not on file    Number of children: Not on file    Years of education: Not on file    Highest education level: Not on file   Occupational History    Not on file   Tobacco Use    Smoking status: Never     Passive exposure: Never    Smokeless tobacco: Never    Tobacco comments:     no passive exposure   Vaping Use    Vaping status: Never Used   Substance and Sexual Activity    Alcohol use: Yes     Comment: Alcoholic Drinks/day: Rarely a glass of wine    Drug use: No    Sexual activity: Not on file   Other Topics Concern    Not on file   Social History Narrative    The patient is a nun.     Social Determinants of Health     Financial Resource Strain: Low Risk  (4/5/2024)    Financial Resource Strain     Within the past 12 months, have you or your family members you live with been unable to get utilities (heat, electricity) when it was really needed?: No   Food Insecurity: Low Risk  (4/5/2024)    Food Insecurity     Within the past 12 months, did you  worry that your food would run out before you got money to buy more?: No     Within the past 12 months, did the food you bought just not last and you didn t have money to get more?: No   Transportation Needs: Low Risk  (4/5/2024)    Transportation Needs     Within the past 12 months, has lack of transportation kept you from medical appointments, getting your medicines, non-medical meetings or appointments, work, or from getting things that you need?: No   Physical Activity: Insufficiently Active (3/27/2023)    Exercise Vital Sign     Days of Exercise per Week: 3 days     Minutes of Exercise per Session: 10 min   Stress: No Stress Concern Present (4/5/2024)    [employer] - Occupational Stress Questionnaire     Feeling of Stress : Not at all   Social Connections: Moderately Integrated (3/27/2023)    Social Connection and Isolation Panel [NHANES]     Frequency of Communication with Friends and Family: More than three times a week     Frequency of Social Gatherings with Friends and Family: More than three times a week     Attends [employer] Services: More than 4 times per year     Active Member of Clubs or Organizations: Yes     Attends Club or Organization Meetings: More than 4 times per year     Marital Status: Never    Interpersonal Safety: Low Risk  (4/5/2024)    Interpersonal Safety     Do you feel physically and emotionally safe where you currently live?: Yes     Within the past 12 months, have you been hit, slapped, kicked or otherwise physically hurt by someone?: No     Within the past 12 months, have you been humiliated or emotionally abused in other ways by your partner or ex-partner?: No   Housing Stability: Low Risk  (4/5/2024)    Housing Stability     Do you have housing? : Yes     Are you worried about losing your housing?: No          Medications  Allergies   Current Outpatient Medications   Medication Sig Dispense Refill    acetaminophen (TYLENOL) 325 MG tablet Take 3  tablets (975 mg) by mouth every 8 hours      acetaminophen (TYLENOL) 325 MG tablet Take 2 tablets (650 mg) by mouth every 4 hours as needed for other (For optimal non-opioid multimodal pain management to improve pain control.)  0    amiodarone (PACERONE) 200 MG tablet Take 0.5 tablets (100 mg) by mouth daily 45 tablet 0    apixaban ANTICOAGULANT (ELIQUIS) 5 MG tablet Take 1 tablet (5 mg) by mouth 2 times daily 60 tablet 3    ceFAZolin (ANCEF) 1 GM vial Inject 1 g into the vein every 12 hours for 28 days      cholecalciferol, vitamin D3, (VITAMIN D3) 2,000 unit Tab [CHOLECALCIFEROL, VITAMIN D3, (VITAMIN D3) 2,000 UNIT TAB] Take 1 tablet (2,000 Units total) by mouth daily. 90 tablet 3    diclofenac (FLECTOR) 1.3 % patch Externally apply 1 patch topically 2 times daily      DULoxetine (CYMBALTA) 60 MG capsule Take 60 mg by mouth daily      furosemide (LASIX) 40 MG tablet Take extra 40 mg today 6/18/24 and then take 40 mg in Am and 20 mg in PM starting tomorrow 6/19/24      lisinopril (ZESTRIL) 2.5 MG tablet TAKE 1 TABLET BY MOUTH ONE TIME DAILY 90 tablet 0    magnesium hydroxide (MILK OF MAGNESIA) 400 MG/5ML suspension Take 30 mLs by mouth daily as needed for constipation (Use if polyethylene glycol (Miralax) is not effective after 24 hours.)      metoprolol succinate ER (TOPROL XL) 25 MG 24 hr tablet Take 1 tablet (25 mg) by mouth daily 90 tablet 3    oxyCODONE (ROXICODONE) 5 MG tablet Take 1 tablet (5 mg) by mouth every 4 hours as needed for moderate to severe pain (Max 4 tabs per day) #120 tabs to last 30 days for chronic pain. Ok to fill and start May 2, 2024 20 tablet 0    potassium chloride tamiko ER (KLOR-CON M20) 20 MEQ CR tablet Take 1 tablet (20 mEq) by mouth daily 90 tablet 3    senna-docusate (SENOKOT-S/PERICOLACE) 8.6-50 MG tablet Take 2 tablets by mouth 2 times daily as needed for constipation 60 tablet 0    sodium chloride, PF, 0.9% PF flush Inject 10-40 mLs every 24 hours      Allergies   Allergen  "Reactions    Trazodone Shortness Of Breath and Unknown     Allergic to trazodone and deriv., Other: trouble swallowing      Clindamycin Diarrhea     C-diff    Gabapentin Other (See Comments)     \"Internal tremors\"    Temazepam Other (See Comments)     Annotation: Nightmares           Lab Results    Chemistry/lipid CBC Cardiac Enzymes/BNP/TSH/INR   Lab Results   Component Value Date    CHOL 179 03/12/2015    HDL 64 03/12/2015    TRIG 176 (H) 03/12/2015    BUN 18.5 06/17/2024     06/17/2024    CO2 24 06/17/2024    Lab Results   Component Value Date    WBC 7.8 06/17/2024    HGB 10.9 (L) 06/17/2024    HCT 35.8 06/17/2024    MCV 96 06/17/2024     (H) 06/17/2024    Lab Results   Component Value Date    TROPONINI 0.16 08/23/2022    BNP 1,933 (H) 08/23/2022    TSH 6.25 (H) 12/29/2023    INR 1.33 (H) 03/04/2023        50 minutes spent on the date of encounter doing chart review, review of outside records, review of test results, interpretation with above tests, patient visit, and documentation.        This note has been dictated using voice recognition software. Any grammatical, typographical, or context distortions are unintentional and inherent to the software           Thank you for allowing me to participate in the care of your patient.      Sincerely,     SHASHA Quiroz CNP     [facility]  cc:   SHASHA Haney CNP  [address], [address]  [place] [address]      "

## 2024-06-18 NOTE — PATIENT INSTRUCTIONS
Gladys Ramirez,    It was a pleasure to see you today at the [facility].     My recommendations after this visit include:    - Take furosemide 40 mg when you get home and take extra 40 mg between 2-3 pm.    - Starting tomorrow take furosemide 40 mg in AM and 20 mg in PM (okay to take between 2-3 pm)    -Limit salt intake to < 2000 mg/day, daily weight monitoring, and maintain adequate fluid intake at 50 to 60 ounces per day    -Please call if you experience persistent weight gain 2 to 3 pounds 2 days in a row or 5 pounds in a week with shortness of breath, abdominal bloating and leg swelling    - Follow up with Dr. Rivera as scheduled in July    - Follow up with William in 7-8 weeks    - Follow up with Kishan in 3-4 months     - Please call Heart Failure Nurse Line at [phone], if you have any questions or concerns

## 2024-06-18 NOTE — TELEPHONE ENCOUNTER
William Lamas APRN CNP  [facility]  Saw patient today for heart failure follow-up.  She is hypervolemic on exam.   She is having cardioversion tomorrow.  She is currently at [facility] TCU.  She did come with the order form from TCU  I instructed her to take 40 mg twice a day today and then change to 40 mg in a.m. and 20 mg in p.m. starting tomorrow and BMP in 3 days.    Could you please call TCU and make sure she gets her extra furosemide today.  Please let me know if you have further questions.  Thank you  CY

## 2024-06-18 NOTE — TELEPHONE ENCOUNTER
William Lamas, Turner Sow CNP, RN  Caller: Unspecified (Today, 11:07 AM)  Thank you so much Turner!  CY

## 2024-06-18 NOTE — TELEPHONE ENCOUNTER
William Lamas APRN CNP  [facility] Ep Support Pool - e; Aleja Tripp RN16 hours ago (3:25 PM)     CY  Yes     Aleja Tripp RN Yangzom, Chime, APRN CNP16 hours ago (3:21 PM)     RAMU Thomas,  Pt is seeing you tomorrow and wondering if you could do the H&P for her DCCV on 6/19? She did have one scheduled with her PCP but was canceled due to conflicting appts.

## 2024-06-18 NOTE — TELEPHONE ENCOUNTER
William-    Noted. Spoke w/ Rein, nurse at [facility] TCU. She confirmed they received the orders and pt will get extra furosemide 40mg today, and tomorrow will start getting furosemide 40mg in AM and 20mg in PM. Tabathae take care of prescription. TCU will draw BMP on Friday 6/21. She believes it is sent to an [facility] lab so should get results back in Epic, but she will fax it as well to be sure. Provided HF nurse line if they have further questions or concerns.     FERNIE Malhotra RN

## 2024-06-19 ENCOUNTER — ANESTHESIA EVENT (OUTPATIENT)
Dept: CARDIOLOGY | Facility: HOSPITAL | Age: 89
End: 2024-06-19
Payer: COMMERCIAL

## 2024-06-19 ENCOUNTER — LAB REQUISITION (OUTPATIENT)
Dept: LAB | Facility: CLINIC | Age: 89
End: 2024-06-19
Payer: COMMERCIAL

## 2024-06-19 ENCOUNTER — HOSPITAL ENCOUNTER (OUTPATIENT)
Dept: CARDIOLOGY | Facility: HOSPITAL | Age: 89
Discharge: HOME | End: 2024-06-19
Attending: INTERNAL MEDICINE | Admitting: NURSE PRACTITIONER
Payer: COMMERCIAL

## 2024-06-19 ENCOUNTER — ANESTHESIA (OUTPATIENT)
Dept: CARDIOLOGY | Facility: HOSPITAL | Age: 89
End: 2024-06-19
Payer: COMMERCIAL

## 2024-06-19 VITALS
WEIGHT: 153 LBS | HEIGHT: 62 IN | RESPIRATION RATE: 23 BRPM | OXYGEN SATURATION: 90 % | HEART RATE: 71 BPM | DIASTOLIC BLOOD PRESSURE: 65 MMHG | TEMPERATURE: 96.4 F | SYSTOLIC BLOOD PRESSURE: 141 MMHG | BODY MASS INDEX: 28.16 KG/M2

## 2024-06-19 DIAGNOSIS — I48.0 PAROXYSMAL ATRIAL FIBRILLATION (H): ICD-10-CM

## 2024-06-19 DIAGNOSIS — I50.22 CHRONIC SYSTOLIC (CONGESTIVE) HEART FAILURE (H): ICD-10-CM

## 2024-06-19 LAB
ANION GAP SERPL CALCULATED.3IONS-SCNC: 12 MMOL/L (ref 7–15)
ATRIAL RATE - MUSE: 75 BPM
BUN SERPL-MCNC: 19.1 MG/DL (ref 8–23)
CALCIUM SERPL-MCNC: 9 MG/DL (ref 8.2–9.6)
CHLORIDE SERPL-SCNC: 102 MMOL/L (ref 98–107)
CREAT SERPL-MCNC: 0.85 MG/DL (ref 0.51–0.95)
DEPRECATED HCO3 PLAS-SCNC: 26 MMOL/L (ref 22–29)
DIASTOLIC BLOOD PRESSURE - MUSE: NORMAL MMHG
EGFRCR SERPLBLD CKD-EPI 2021: 64 ML/MIN/1.73M2
GLUCOSE SERPL-MCNC: 137 MG/DL (ref 70–99)
INTERPRETATION ECG - MUSE: NORMAL
P AXIS - MUSE: NORMAL DEGREES
POTASSIUM SERPL-SCNC: 4 MMOL/L (ref 3.4–5.3)
PR INTERVAL - MUSE: 144 MS
QRS DURATION - MUSE: 104 MS
QT - MUSE: 466 MS
QTC - MUSE: 520 MS
R AXIS - MUSE: 9 DEGREES
SODIUM SERPL-SCNC: 140 MMOL/L (ref 135–145)
SYSTOLIC BLOOD PRESSURE - MUSE: NORMAL MMHG
T AXIS - MUSE: -87 DEGREES
VENTRICULAR RATE- MUSE: 75 BPM

## 2024-06-19 PROCEDURE — 92960 CARDIOVERSION ELECTRIC EXT: CPT | Performed by: NURSE PRACTITIONER

## 2024-06-19 PROCEDURE — 80048 BASIC METABOLIC PNL TOTAL CA: CPT | Performed by: NURSE PRACTITIONER

## 2024-06-19 PROCEDURE — 93010 ELECTROCARDIOGRAM REPORT: CPT | Performed by: INTERNAL MEDICINE

## 2024-06-19 PROCEDURE — 93005 ELECTROCARDIOGRAM TRACING: CPT | Performed by: NURSE PRACTITIONER

## 2024-06-19 PROCEDURE — 250N000009 HC RX 250: Performed by: ANESTHESIOLOGY

## 2024-06-19 PROCEDURE — 92960 CARDIOVERSION ELECTRIC EXT: CPT

## 2024-06-19 PROCEDURE — 93005 ELECTROCARDIOGRAM TRACING: CPT

## 2024-06-19 PROCEDURE — 370N000017 HC ANESTHESIA TECHNICAL FEE, PER MIN

## 2024-06-19 PROCEDURE — 36415 COLL VENOUS BLD VENIPUNCTURE: CPT | Performed by: NURSE PRACTITIONER

## 2024-06-19 RX ORDER — LIDOCAINE 40 MG/G
CREAM TOPICAL
Status: DISCONTINUED | OUTPATIENT
Start: 2024-06-19 | End: 2024-06-19 | Stop reason: HOSPADM

## 2024-06-19 RX ORDER — METHOHEXITAL IN WATER/PF 100MG/10ML
SYRINGE (ML) INTRAVENOUS PRN
Status: DISCONTINUED | OUTPATIENT
Start: 2024-06-19 | End: 2024-06-19

## 2024-06-19 RX ADMIN — Medication 50 MG: at 09:50

## 2024-06-19 ASSESSMENT — ACTIVITIES OF DAILY LIVING (ADL)
ADLS_ACUITY_SCORE: 40

## 2024-06-19 NOTE — DISCHARGE INSTRUCTIONS
Sedation: Care Instructions  Overview     Sedation is the use of medicine to help you feel relaxed and comfortable during a procedure. The medicine is usually given in a vein (by I.V.). It may be used with numbing medicines.  There are different levels of sedation. They range from being awake but relaxed to being completely unconscious. Which level you have will depend on the procedure and your needs. You will be watched closely by a doctor or nurse during sedation.  Common side effects from sedation include:  Feeling sleepy or tired. (Your doctors and nurses will make sure you aren't too sleepy to go home.)  Feeling dizzy or unsteady.  Follow-up care is a key part of your treatment and safety. Be sure to make and go to all appointments, and call your doctor if you are having problems. It's also a good idea to know your test results and keep a list of the medicines you take.  How can you care for yourself at home?  Activity    Don't do anything that requires attention to detail until you recover. This includes going to work or school, making important decisions, and signing any legal documents. It takes time for the medicine effects to completely wear off.     For at least 24 hours, do not drive or operate any machinery.     When you get home, make sure to rest until the anesthesia has worn off. Some people will feel drowsy or dizzy for up to a few hours after leaving the hospital.     Take your time and walk slowly. Sudden changes in position may cause nausea.     Rest when you feel tired. Getting enough sleep will help you recover.     If you have sleep apnea and you have a CPAP machine, be sure to use it.   Diet    Don't drink alcohol for 24 hours.     You can eat your normal diet, unless your doctor gives you other instructions. If your stomach is upset, try clear liquids and bland, low-fat foods like plain toast or rice.     Drink plenty of fluids (unless your doctor tells you not to).   When should you call  "for help?   Call 911 anytime you think you may need emergency care. For example, call if:    You have trouble breathing.     You passed out (lost consciousness).   Call your doctor now or seek immediate medical care if:    You have nausea or vomiting that gets worse or won't stop.     You have a fever.     You have a new or worse headache.     The medicine is not wearing off and you can't think clearly.   Watch closely for changes in your health, and be sure to contact your doctor if:    You do not get better as expected.   Where can you learn more?  Go to https://www.[employer].net/patiented  Enter G817 in the search box to learn more about \"Sedation: Care Instructions.\"  Current as of: June 24, 2023               Content Version: 14.0    0692-3877 [employer]   Care instructions adapted under license by your healthcare professional. If you have questions about a medical condition or this instruction, always ask your healthcare professional. [employer] disclaims any warranty or liability for your use of this information.    Cardioversion  Cardioversion is a procedure to restore your heart's normal rhythm from a fast or irregular rhythm (arrhythmia) in the top or bottom chambers of your heart. You may have the procedure in a hospital or surgery center. It's often done on an outpatient (same day) basis. During the procedure, your doctor will give you medication to keep you free from pain. Then the doctor gives you a brief electric shock. This helps your heartbeat become normal again. In most cases, you can go home within hours of the procedure.    Before Your Procedure  Tell your doctor what over-the-counter and prescription medications, herbs, and supplements you are taking.  Take medication as directed. Your doctor may prescribe anticoagulants (blood thinners), depending on your situation. They help prevent blood clots from forming.  Ask your doctor about the risks and benefits of " cardioversion.  Sign your consent form.  Don t eat or drink anything for 8  hours before your procedure.  Follow any other instructions your doctor gives you.   Arrange for an adult to drive you home after the procedure.     During Your Procedure  Your health care provider will place small pads (electrodes) on your chest to record your heartbeat at all times.  Your health care provider will place an intravenous (IV) line in your arm. This gives you medication (sedation) that keeps you free of pain. You ll feel sleepy.      After Your Procedure  Your health care provider will monitor you until you are fully awake. Then you ll be able to sit up, walk, and eat.  In most cases, you ll be able to go home after the sedation wears off. This usually takes a few hours.  For a few days, the skin on your chest may feel a little sore, like a mild sunburn.  DO NOT  drive or operate heavy machinery for 24 hours after the procedure.  The day after your procedure, try to take it easy. Take medication as directed.  Call your doctor if you notice skipped beats, a rapid heartbeat, or chest tightness. These may be signs that an irregular heartbeat has returned.

## 2024-06-19 NOTE — ANESTHESIA PREPROCEDURE EVALUATION
"Anesthesia Pre-Procedure Evaluation    Patient: Gladys Ramirez   MRN: [id number] : 1930        Procedure :   Cardioversion External       Past Medical History:   Diagnosis Date    Angina pectoris (H24)     Atrial fibrillation (H)     Chest pain 2017    Chronic kidney disease     Congestive heart failure (H)     Cough     Hyperlipidemia     Hypertension     Osteoarthritis       Past Surgical History:   Procedure Laterality Date    APPENDECTOMY      ARTHROSCOPY KNEE Right 2024    Procedure: ARTHROSCOPY, KNEE;  Surgeon: Sanchez Monahan MD;  Location: [facility] [place]    BASAL CELL CARCINOMA EXCISION      nose    EP PACEMAKER DEVICE & IMPLANT- HIS LEAD DUAL N/A 2024    Procedure: Pacemaker Device & Lead Implant - HIS Lead Dual;  Surgeon: Jeannie Rivera MD;  Location: [facility] CATH LAB CV    INCISION AND DRAINAGE KNEE, COMBINED Right 2024    Procedure: INCISION AND DRAINAGE, KNEE;  Surgeon: Sanchez Monahan MD;  Location: [facility] [place]    LAPAROSCOPY DIAGNOSTIC (GENERAL) N/A 2014    Procedure: LAPAROSCOPY BILATERAL SALPINGO-OOPHORECTOMY ;  Surgeon: Sofia Harper MD;  Location: [facility] OR;  Service:     OPEN REDUCTION INTERNAL FIXATION HIP BIPOLAR Right 3/5/2023    Procedure: HEMIARTHROPLASTY, HIP, BIPOLAR;  Surgeon: Jose G Martinez MD;  Location: [facility] [place]    PICC SINGLE LUMEN PLACEMENT  2024    TONSILLECTOMY      10 years old    ZC TOTAL KNEE ARTHROPLASTY Left           Allergies   Allergen Reactions    Trazodone Shortness Of Breath and Unknown     Allergic to trazodone and deriv., Other: trouble swallowing      Clindamycin Diarrhea     C-diff    Gabapentin Other (See Comments)     \"Internal tremors\"    Temazepam Other (See Comments)     Annotation: Nightmares        Social History     Tobacco Use    Smoking status: Never     Passive exposure: Never    Smokeless tobacco: Never    Tobacco comments:     no passive exposure "   Substance Use Topics    Alcohol use: Yes     Comment: Alcoholic Drinks/day: Rarely a glass of wine      Wt Readings from Last 1 Encounters:   06/19/24 69.4 kg (153 lb)        Anesthesia Evaluation            ROS/MED HX  ENT/Pulmonary:  - neg pulmonary ROS     Neurologic:  - neg neurologic ROS     Cardiovascular:     (+)  hypertension- -   -  - -      CHF                           Previous cardiac testing   Echo: Date: 5/2024 Results:  Interpretation Summary     The left ventricle is normal in size. There is mild concentric left  ventricular hypertrophy.  Left ventricular function is decreased. The ejection fraction is 20-25%  (severely reduced). There is diffuse hypokinesis of the left ventricle. Septal  motion is consistent with conduction abnormality.     The right ventricle is normal in size and function.  The left atrium is moderate to severely dilated. Borderline right atrial  enlargement.  There is mild (1+) mitral regurgitation.  There is mild (1+) tricuspid regurgitation.  Right ventricle systolic pressure estimate normal  IVC diameter and respiratory changes fall into an intermediate range  suggesting an RA pressure of 8 mmHg.  Compared to prior study, there is no significant change.    Stress Test:  Date: Results:    ECG Reviewed:  Date: Results:    Cath:  Date: Results:      METS/Exercise Tolerance:     Hematologic:       Musculoskeletal:       GI/Hepatic:     (+) GERD,                   Renal/Genitourinary:     (+) renal disease,             Endo:     (+)          thyroid problem,            Psychiatric/Substance Use:       Infectious Disease:       Malignancy:       Other:            Physical Exam    Airway        Mallampati: II   TM distance: > 3 FB   Neck ROM: full   Mouth opening: > 3 cm    Respiratory Devices and Support         Dental       (+) Minor Abnormalities - some fillings, tiny chips      Cardiovascular          Rhythm and rate: irregular and normal     Pulmonary           breath sounds  "clear to auscultation           OUTSIDE LABS:  CBC:   Lab Results   Component Value Date    WBC 7.8 06/17/2024    WBC 10.3 06/10/2024    HGB 10.9 (L) 06/17/2024    HGB 10.0 (L) 06/10/2024    HCT 35.8 06/17/2024    HCT 33.6 (L) 06/10/2024     (H) 06/17/2024     06/10/2024     BMP:   Lab Results   Component Value Date     06/19/2024     06/17/2024    POTASSIUM 4.0 06/19/2024    POTASSIUM 4.5 06/17/2024    CHLORIDE 102 06/19/2024    CHLORIDE 102 06/17/2024    CO2 26 06/19/2024    CO2 24 06/17/2024    BUN 19.1 06/19/2024    BUN 18.5 06/17/2024    CR 0.85 06/19/2024    CR 0.87 06/17/2024     (H) 06/19/2024     (H) 06/17/2024     COAGS:   Lab Results   Component Value Date    PTT 32 02/21/2019    INR 1.33 (H) 03/04/2023     POC: No results found for: \"BGM\", \"HCG\", \"HCGS\"  HEPATIC:   Lab Results   Component Value Date    ALBUMIN 3.4 (L) 06/17/2024    PROTTOTAL 6.5 06/17/2024    ALT 16 06/17/2024    AST 26 06/17/2024    ALKPHOS 178 (H) 06/17/2024    BILITOTAL 0.3 06/17/2024     OTHER:   Lab Results   Component Value Date    LACT 1.5 05/22/2024    A1C 6.0 (H) 06/15/2021    MARLENE 9.0 06/19/2024    PHOS 3.4 12/22/2023    MAG 2.1 12/24/2023    LIPASE <9 02/21/2019    TSH 6.25 (H) 12/29/2023    T4 1.03 12/29/2023    CRP 0.8 03/05/2018    SED 65 (H) 06/17/2024       Anesthesia Plan    ASA Status:  4    NPO Status:  NPO Appropriate    Anesthesia Type: General.     - Airway: Mask Only   Induction: Intravenous.   Maintenance: TIVA.        Consents    Anesthesia Plan(s) and associated risks, benefits, and realistic alternatives discussed. Questions answered and patient/representative(s) expressed understanding.     - Discussed:     - Discussed with:  Patient      - Extended Intubation/Ventilatory Support Discussed: No.      - Patient is DNR/DNI Status: No     Use of blood products discussed: No .     Postoperative Care            Comments:    Other Comments: Brief GA mask with brevital. Bite " "block prior to cardioversion. ETT and LMA available. LINO available.              Trenton Au MD    I have reviewed the pertinent notes and labs in the chart from the past 30 days and (re)examined the patient.  Any updates or changes from those notes are reflected in this note.          # Hypoalbuminemia: Lowest albumin = 2.9 g/dL in the past 30 days , will monitor as appropriate   # Drug Induced Coagulation Defect: home medication list includes an anticoagulant medication   # Overweight: Estimated body mass index is 27.98 kg/m  as calculated from the following:    Height as of this encounter: 1.575 m (5' 2\").    Weight as of this encounter: 69.4 kg (153 lb).      "

## 2024-06-19 NOTE — PROGRESS NOTES
Patient discharged to home via wheelchair transport.  Patient's friend Ashanti is driving her home today.

## 2024-06-19 NOTE — PLAN OF CARE
BMP resulted.  Cardioversion for atrial fibrillation.  No questions.  [employer] present for pacemaker.

## 2024-06-19 NOTE — PROGRESS NOTES
Report received from Francesca Singletary RN.  Patient is A&Ox4 and pain free at this time.  Eating and drinking without issue.  Patient's friend Ashanti is picking her up today, awaiting her arrival.

## 2024-06-19 NOTE — ANESTHESIA POSTPROCEDURE EVALUATION
Patient: Gladys Ramirez    Procedure: * No procedures listed *  Cardioversion External    Anesthesia Type:  General    Note:  Disposition: Outpatient   Postop Pain Control: Uneventful            Sign Out: Well controlled pain   PONV: No   Neuro/Psych: Uneventful            Sign Out: Acceptable/Baseline neuro status   Airway/Respiratory: Uneventful            Sign Out: Acceptable/Baseline resp. status   CV/Hemodynamics: Uneventful            Sign Out: Acceptable CV status   Other NRE:    DID A NON-ROUTINE EVENT OCCUR?            Last vitals:  Vitals:    06/19/24 1016 06/19/24 1030 06/19/24 1045   BP: 125/56 138/69 (!) 141/65   Pulse: 68 70 71   Resp: 17 18 23   Temp:      SpO2: 92% (!) 89% 90%       Electronically Signed By: Trenton Au MD  June 19, 2024  10:52 AM

## 2024-06-19 NOTE — ANESTHESIA CARE TRANSFER NOTE
Patient: Gladys Ramirez    Procedure: * No procedures listed *  Cardioversion External    Diagnosis: * No pre-op diagnosis entered *  Diagnosis Additional Information: No value filed.    Anesthesia Type:   General     Note:    Oropharynx: oropharynx clear of all foreign objects  Level of Consciousness: awake  Oxygen Supplementation: nasal cannula  Level of Supplemental Oxygen (L/min / FiO2): 4  Independent Airway: airway patency satisfactory and stable  Dentition: dentition unchanged  Vital Signs Stable: post-procedure vital signs reviewed and stable  Report to RN Given: handoff report given  Patient transferred to: Cardiac Special Care      Vitals:  Vitals Value Taken Time   /69 06/19/24 0952   Temp     Pulse 81 06/19/24 0953   Resp 20 06/19/24 0953   SpO2 100 % 06/19/24 0953   Vitals shown include unfiled device data.    Electronically Signed By: SHASHA Martins CRNA  June 19, 2024  9:54 AM

## 2024-06-19 NOTE — INTERVAL H&P NOTE
"I have reviewed the surgical (or preoperative) H&P that is linked to this encounter, and examined the patient. There are no significant changes    Clinical Conditions Present on Arrival:  Clinically Significant Risk Factors Present on Admission              # Hypoalbuminemia: Lowest albumin = 2.9 g/dL in the past 30 days , will monitor as appropriate   # Drug Induced Coagulation Defect: home medication list includes an anticoagulant medication       # Overweight: Estimated body mass index is 27.98 kg/m  as calculated from the following:    Height as of this encounter: 1.575 m (5' 2\").    Weight as of this encounter: 69.4 kg (153 lb).       "

## 2024-06-19 NOTE — PROCEDURES
[facility]    Procedure: Cardioversion External    Date/Time: 6/19/2024 10:21 AM    Performed by: Cece Strong APRN CNP  Authorized by: Jeannie Rivera MD      UNIVERSAL PROTOCOL   Site Marked: NA  Prior Images Obtained and Reviewed:  Yes  Required items: Required blood products, implants, devices and special equipment available    Patient identity confirmed:  Verbally with patient, arm band, provided demographic data and hospital-assigned identification number  Patient was reevaluated immediately before administering moderate or deep sedation or anesthesia  Confirmation Checklist:  Patient's identity using two indicators, relevant allergies, procedure was appropriate and matched the consent or emergent situation and correct equipment/implants were available  Time out: Immediately prior to the procedure a time out was called    Universal Protocol: the Joint Commission Universal Protocol was followed       ANESTHESIA    Anesthesia was administered and monitored by anesthesiology.  See anesthesia documentation for details.    SEDATION  Patient Sedated: Yes    Vital signs: Vital signs monitored during sedation      PROCEDURE DETAILS  Cardioversion basis: elective  Pre-procedure rhythm: atrial fibrillation  Patient position: patient was placed in a supine position  Chest area: chest area exposed  Electrodes: pads  Electrodes placed: anterior-posterior  Number of attempts: 2    Details of Attempts:  At 0952 after administration of IV Brevital by MDA and confirmation of adequate sedation she received a synchronous shock at 200 J with no change.  She received a second synchronized shock at 300 J with prompt restoration of sinus rhythm.  Post cardioversion EKG pending  -Continue metoprolol succinate 25 mg daily  -Amiodarone 100 mg daily  -[id number] of at least 7 for age >75, gender, cardiomyopathy, HTN, VTE history-DVT/PE.  Continue Eliquis 5 mg twice a day for stroke  prophylaxis  -Follow-up with Dr. Rivera to discuss AV node ablation  Post-procedure rhythm: paced-ventricular  Complications: no complications

## 2024-06-20 ENCOUNTER — TRANSFERRED RECORDS (OUTPATIENT)
Dept: HEALTH INFORMATION MANAGEMENT | Facility: CLINIC | Age: 89
End: 2024-06-20
Payer: COMMERCIAL

## 2024-06-21 LAB
ANION GAP SERPL CALCULATED.3IONS-SCNC: 10 MMOL/L (ref 7–15)
BUN SERPL-MCNC: 15.5 MG/DL (ref 8–23)
CALCIUM SERPL-MCNC: 9.3 MG/DL (ref 8.2–9.6)
CHLORIDE SERPL-SCNC: 101 MMOL/L (ref 98–107)
CREAT SERPL-MCNC: 0.9 MG/DL (ref 0.51–0.95)
DEPRECATED HCO3 PLAS-SCNC: 28 MMOL/L (ref 22–29)
EGFRCR SERPLBLD CKD-EPI 2021: 59 ML/MIN/1.73M2
GLUCOSE SERPL-MCNC: 99 MG/DL (ref 70–99)
POTASSIUM SERPL-SCNC: 3.5 MMOL/L (ref 3.4–5.3)
SODIUM SERPL-SCNC: 139 MMOL/L (ref 135–145)

## 2024-06-21 PROCEDURE — 80048 BASIC METABOLIC PNL TOTAL CA: CPT | Mod: ORL | Performed by: INTERNAL MEDICINE

## 2024-06-21 PROCEDURE — 36415 COLL VENOUS BLD VENIPUNCTURE: CPT | Mod: ORL | Performed by: INTERNAL MEDICINE

## 2024-06-21 PROCEDURE — P9604 ONE-WAY ALLOW PRORATED TRIP: HCPCS | Mod: ORL | Performed by: INTERNAL MEDICINE

## 2024-06-24 ENCOUNTER — LAB REQUISITION (OUTPATIENT)
Dept: LAB | Facility: CLINIC | Age: 89
End: 2024-06-24
Payer: COMMERCIAL

## 2024-06-24 DIAGNOSIS — I50.22 CHRONIC SYSTOLIC (CONGESTIVE) HEART FAILURE (H): ICD-10-CM

## 2024-06-24 LAB
ALBUMIN SERPL BCG-MCNC: 3.4 G/DL (ref 3.5–5.2)
ALP SERPL-CCNC: 138 U/L (ref 40–150)
ALT SERPL W P-5'-P-CCNC: 9 U/L (ref 0–50)
ANION GAP SERPL CALCULATED.3IONS-SCNC: 11 MMOL/L (ref 7–15)
AST SERPL W P-5'-P-CCNC: 30 U/L (ref 0–45)
BASOPHILS # BLD AUTO: 0.1 10E3/UL (ref 0–0.2)
BASOPHILS NFR BLD AUTO: 1 %
BILIRUB SERPL-MCNC: 0.4 MG/DL
BUN SERPL-MCNC: 11.6 MG/DL (ref 8–23)
CALCIUM SERPL-MCNC: 9.4 MG/DL (ref 8.2–9.6)
CHLORIDE SERPL-SCNC: 99 MMOL/L (ref 98–107)
CREAT SERPL-MCNC: 0.86 MG/DL (ref 0.51–0.95)
CRP SERPL-MCNC: 92.2 MG/L
DEPRECATED HCO3 PLAS-SCNC: 29 MMOL/L (ref 22–29)
EGFRCR SERPLBLD CKD-EPI 2021: 63 ML/MIN/1.73M2
EOSINOPHIL # BLD AUTO: 0.3 10E3/UL (ref 0–0.7)
EOSINOPHIL NFR BLD AUTO: 4 %
ERYTHROCYTE [DISTWIDTH] IN BLOOD BY AUTOMATED COUNT: 15.6 % (ref 10–15)
ERYTHROCYTE [SEDIMENTATION RATE] IN BLOOD BY WESTERGREN METHOD: 60 MM/HR (ref 0–30)
GLUCOSE SERPL-MCNC: 106 MG/DL (ref 70–99)
HCT VFR BLD AUTO: 37.6 % (ref 35–47)
HGB BLD-MCNC: 11.5 G/DL (ref 11.7–15.7)
IMM GRANULOCYTES # BLD: 0.1 10E3/UL
IMM GRANULOCYTES NFR BLD: 1 %
LYMPHOCYTES # BLD AUTO: 1.5 10E3/UL (ref 0.8–5.3)
LYMPHOCYTES NFR BLD AUTO: 18 %
MCH RBC QN AUTO: 28 PG (ref 26.5–33)
MCHC RBC AUTO-ENTMCNC: 30.6 G/DL (ref 31.5–36.5)
MCV RBC AUTO: 92 FL (ref 78–100)
MONOCYTES # BLD AUTO: 1.5 10E3/UL (ref 0–1.3)
MONOCYTES NFR BLD AUTO: 18 %
NEUTROPHILS # BLD AUTO: 4.8 10E3/UL (ref 1.6–8.3)
NEUTROPHILS NFR BLD AUTO: 58 %
NRBC # BLD AUTO: 0 10E3/UL
NRBC BLD AUTO-RTO: 0 /100
PLATELET # BLD AUTO: 483 10E3/UL (ref 150–450)
POTASSIUM SERPL-SCNC: 3.9 MMOL/L (ref 3.4–5.3)
PROT SERPL-MCNC: 6.4 G/DL (ref 6.4–8.3)
RBC # BLD AUTO: 4.11 10E6/UL (ref 3.8–5.2)
SODIUM SERPL-SCNC: 139 MMOL/L (ref 135–145)
WBC # BLD AUTO: 8.3 10E3/UL (ref 4–11)

## 2024-06-24 PROCEDURE — 86140 C-REACTIVE PROTEIN: CPT | Mod: ORL | Performed by: INTERNAL MEDICINE

## 2024-06-24 PROCEDURE — 85025 COMPLETE CBC W/AUTO DIFF WBC: CPT | Mod: ORL | Performed by: INTERNAL MEDICINE

## 2024-06-24 PROCEDURE — P9604 ONE-WAY ALLOW PRORATED TRIP: HCPCS | Mod: ORL | Performed by: INTERNAL MEDICINE

## 2024-06-24 PROCEDURE — 36415 COLL VENOUS BLD VENIPUNCTURE: CPT | Mod: ORL | Performed by: INTERNAL MEDICINE

## 2024-06-24 PROCEDURE — 85652 RBC SED RATE AUTOMATED: CPT | Mod: ORL | Performed by: INTERNAL MEDICINE

## 2024-06-24 PROCEDURE — 80053 COMPREHEN METABOLIC PANEL: CPT | Mod: ORL | Performed by: INTERNAL MEDICINE

## 2024-06-27 ENCOUNTER — TRANSFERRED RECORDS (OUTPATIENT)
Dept: HEALTH INFORMATION MANAGEMENT | Facility: CLINIC | Age: 89
End: 2024-06-27

## 2024-06-27 ENCOUNTER — PATIENT OUTREACH (OUTPATIENT)
Dept: CARE COORDINATION | Facility: CLINIC | Age: 89
End: 2024-06-27

## 2024-06-27 ENCOUNTER — OFFICE VISIT (OUTPATIENT)
Dept: INFECTIOUS DISEASES | Facility: CLINIC | Age: 89
End: 2024-06-27
Attending: INTERNAL MEDICINE
Payer: COMMERCIAL

## 2024-06-27 VITALS
SYSTOLIC BLOOD PRESSURE: 112 MMHG | OXYGEN SATURATION: 96 % | TEMPERATURE: 97.5 F | DIASTOLIC BLOOD PRESSURE: 73 MMHG | HEART RATE: 92 BPM

## 2024-06-27 DIAGNOSIS — M00.061 STAPHYLOCOCCAL ARTHRITIS OF RIGHT KNEE (H): Primary | ICD-10-CM

## 2024-06-27 PROCEDURE — G2211 COMPLEX E/M VISIT ADD ON: HCPCS | Performed by: INTERNAL MEDICINE

## 2024-06-27 PROCEDURE — 99214 OFFICE O/P EST MOD 30 MIN: CPT | Performed by: INTERNAL MEDICINE

## 2024-06-27 NOTE — PROGRESS NOTES
Clinic Care Coordination Contact  Clinic Care Coordination Contact  OUTREACH    Referral Information: hospitalist            Chief Complaint   Patient presents with    Clinic Care Coordination - Post Hospital        Universal Utilization: appropriate     Utilization      No Show Count (past year)  1             ED Visits  6             Hospital Admissions  5                    Current as of: 6/27/2024 10:39 AM                Clinical Concerns:  Current Medical Concerns:  none    Current Behavioral Concerns: none    Education Provided to patient: CCC support and resoruces      Health Maintenance Reviewed:    Clinical Pathway: None    Medication Management:  Medication review status: Medications reviewed and no changes reported per patient.             Functional Status: INDEPENDENT       Living Situation: ALONE IN APARTMENT       Lifestyle & Psychosocial Needs:    Social Determinants of Health     Food Insecurity: Low Risk  (4/5/2024)    Food Insecurity     Within the past 12 months, did you worry that your food would run out before you got money to buy more?: No     Within the past 12 months, did the food you bought just not last and you didn t have money to get more?: No   Depression: Not at risk (4/5/2024)    PHQ-2     PHQ-2 Score: 0   Housing Stability: Low Risk  (4/5/2024)    Housing Stability     Do you have housing? : Yes     Are you worried about losing your housing?: No   Tobacco Use: Low Risk  (6/19/2024)    Patient History     Smoking Tobacco Use: Never     Smokeless Tobacco Use: Never     Passive Exposure: Never   Financial Resource Strain: Low Risk  (4/5/2024)    Financial Resource Strain     Within the past 12 months, have you or your family members you live with been unable to get utilities (heat, electricity) when it was really needed?: No   Alcohol Use: Not At Risk (3/27/2023)    AUDIT-C     Frequency of Alcohol Consumption: Never     Average Number of Drinks: Patient does not drink     Frequency of  Binge Drinking: Never   Transportation Needs: Low Risk  (4/5/2024)    Transportation Needs     Within the past 12 months, has lack of transportation kept you from medical appointments, getting your medicines, non-medical meetings or appointments, work, or from getting things that you need?: No   Physical Activity: Insufficiently Active (3/27/2023)    Exercise Vital Sign     Days of Exercise per Week: 3 days     Minutes of Exercise per Session: 10 min   Interpersonal Safety: Low Risk  (4/5/2024)    Interpersonal Safety     Do you feel physically and emotionally safe where you currently live?: Yes     Within the past 12 months, have you been hit, slapped, kicked or otherwise physically hurt by someone?: No     Within the past 12 months, have you been humiliated or emotionally abused in other ways by your partner or ex-partner?: No   Stress: No Stress Concern Present (4/5/2024)    [place] of [employer] - Occupational Stress Questionnaire     Feeling of Stress : Not at all   Social Connections: Moderately Integrated (3/27/2023)    Social Connection and Isolation Panel [NHANES]     Frequency of Communication with Friends and Family: More than three times a week     Frequency of Social Gatherings with Friends and Family: More than three times a week     Attends Taoist Services: More than 4 times per year     Active Member of Clubs or Organizations: Yes     Attends Club or Organization Meetings: More than 4 times per year     Marital Status: Never    Health Literacy: Not on file                    Resources and Interventions:  Current Resources:            Future Appointments                Today Aakash Reza MD [facility] MPLW    Tomorrow JN [facility] REMOTE DEVICE CHECK FROM HOME [facility], [facility] SJANASTACIO    In 1 week Jesus Alba MD; MPMB PAIN APOLINAR [facility], [facility] MPLW    In 3 weeks Estrella Walker DO M  [facility], [facility] WBWW    In 3 weeks JN [facility] DEVICE NURSE 2 M [facility], [facility] SJN    In 3 weeks Jeannie Rivera MD [facility], [facility]N    In 4 weeks Margret Flores MD [facility], [facility] SPRO    In 1 month William Lamas APRN CNP [facility], [facility]    In 1 month Valentine Howard APRN CNP [facility], [facility] MPLW    In 1 month JN [facility] REMOTE DEVICE CHECK FROM HOME [facility], [facility]    In 2 months Elizabeth Holley MD [facility], [facility]            Plan: CCSW contacted pt for ED follow up. Pt is independent, has transportation to all of her follow up appointments. Pt will see PCP 7/26.Pt declined sooner appointment as she has multiple specialists appointments between now and then. Pt declined Care Coordination, but will contact CCC if she wishes to seek support form CCC.     Rina Holly, MSW, LGSW  Social Work Care Coordinator

## 2024-06-27 NOTE — PROGRESS NOTES
CLINIC FOLLOW UP NOTE:    Original Reason for consultation, site patient seen:    Interval history:  Sister Gladys in for one month Follow-up.  R knee still mildly effused.  Hero left a very good note in chart about their decision to do watchful waiting.  The esr and CRP are falling but awful slowly.      Component      Latest Ref Rng 5/22/2024  3:06 PM 6/3/2024  7:28 AM 6/10/2024  7:53 AM 6/17/2024  8:15 AM 6/24/2024  7:30 AM   Sed Rate      0 - 30 mm/hr 92 (H)  87 (H)  91 (H)  65 (H)  60 (H)       Legend:  (H) High    Component      Latest Ref Rng 5/22/2024  3:06 PM 6/3/2024  7:28 AM 6/10/2024  7:53 AM 6/17/2024  8:15 AM 6/24/2024  7:30 AM   CRP Inflammation      <5.00 mg/L 158.80 (H)  157.00 (H)  152.00 (H)  137.00 (H)  92.20 (H)       Legend:  (H) High  Current antibiotics and duration plan:    Reviewed PAST MEDICAL HISTORY,  family and social history      Examination:  Alert, awake  Vitals tabulated above, reviewed  Neck supple  Sclera OK  CARDIOVASCULAR regular rate and rhythm, no mumur  Lungs CLEAR TO AUSCULTATION   Abdomen soft, NT/ND, absent HEPATOSPLENOMEGALY  Skin normal  R knee scope sites with mild redness.  There is a small effusion  Neurologic exam non focal  Wound:  N/A    Lab Data/New Imaging/Medication levels/Microbiologic data:  Staph lug on the micro S to ancef    IMPRESSION:  Staph lug native septic knee  Couple years of symptoms with acute worsening may/June  Labs above      PLAN:  Extend IV abx to end July 12  Then daily keflex 500mg times 90 more days  Stay in close contact with [place] MD Monahan    Thank you for allowing me to continue care of this patient.    Sincerely:      EMERALD Reza MD  [facility]  [facility] [phone]

## 2024-06-28 ENCOUNTER — LAB REQUISITION (OUTPATIENT)
Dept: LAB | Facility: CLINIC | Age: 89
End: 2024-06-28
Payer: COMMERCIAL

## 2024-06-28 DIAGNOSIS — M00.061 STAPHYLOCOCCAL ARTHRITIS, RIGHT KNEE (H): ICD-10-CM

## 2024-07-01 LAB
ALBUMIN SERPL BCG-MCNC: 3.5 G/DL (ref 3.5–5.2)
ALP SERPL-CCNC: 147 U/L (ref 40–150)
ALT SERPL W P-5'-P-CCNC: ABNORMAL U/L
ANION GAP SERPL CALCULATED.3IONS-SCNC: 15 MMOL/L (ref 7–15)
AST SERPL W P-5'-P-CCNC: ABNORMAL U/L
BASOPHILS # BLD AUTO: 0.1 10E3/UL (ref 0–0.2)
BASOPHILS NFR BLD AUTO: 1 %
BILIRUB SERPL-MCNC: 0.4 MG/DL
BUN SERPL-MCNC: 19.3 MG/DL (ref 8–23)
CALCIUM SERPL-MCNC: 9.9 MG/DL (ref 8.2–9.6)
CHLORIDE SERPL-SCNC: 100 MMOL/L (ref 98–107)
CREAT SERPL-MCNC: 0.8 MG/DL (ref 0.51–0.95)
CRP SERPL-MCNC: 68.6 MG/L
DEPRECATED HCO3 PLAS-SCNC: 25 MMOL/L (ref 22–29)
EGFRCR SERPLBLD CKD-EPI 2021: 68 ML/MIN/1.73M2
EOSINOPHIL # BLD AUTO: 0.3 10E3/UL (ref 0–0.7)
EOSINOPHIL NFR BLD AUTO: 4 %
ERYTHROCYTE [DISTWIDTH] IN BLOOD BY AUTOMATED COUNT: 15.8 % (ref 10–15)
ERYTHROCYTE [SEDIMENTATION RATE] IN BLOOD BY WESTERGREN METHOD: 74 MM/HR (ref 0–30)
GLUCOSE SERPL-MCNC: 126 MG/DL (ref 70–99)
HCT VFR BLD AUTO: 37.9 % (ref 35–47)
HGB BLD-MCNC: 11.2 G/DL (ref 11.7–15.7)
IMM GRANULOCYTES # BLD: 0 10E3/UL
IMM GRANULOCYTES NFR BLD: 0 %
LYMPHOCYTES # BLD AUTO: 2 10E3/UL (ref 0.8–5.3)
LYMPHOCYTES NFR BLD AUTO: 27 %
MCH RBC QN AUTO: 27.5 PG (ref 26.5–33)
MCHC RBC AUTO-ENTMCNC: 29.6 G/DL (ref 31.5–36.5)
MCV RBC AUTO: 93 FL (ref 78–100)
MONOCYTES # BLD AUTO: 0.9 10E3/UL (ref 0–1.3)
MONOCYTES NFR BLD AUTO: 12 %
NEUTROPHILS # BLD AUTO: 4.2 10E3/UL (ref 1.6–8.3)
NEUTROPHILS NFR BLD AUTO: 56 %
NRBC # BLD AUTO: 0 10E3/UL
NRBC BLD AUTO-RTO: 0 /100
PLATELET # BLD AUTO: 373 10E3/UL (ref 150–450)
POTASSIUM SERPL-SCNC: 3.5 MMOL/L (ref 3.4–5.3)
PROT SERPL-MCNC: 7.1 G/DL (ref 6.4–8.3)
RBC # BLD AUTO: 4.07 10E6/UL (ref 3.8–5.2)
SODIUM SERPL-SCNC: 140 MMOL/L (ref 135–145)
WBC # BLD AUTO: 7.6 10E3/UL (ref 4–11)

## 2024-07-01 PROCEDURE — 86140 C-REACTIVE PROTEIN: CPT | Mod: ORL | Performed by: INTERNAL MEDICINE

## 2024-07-01 PROCEDURE — 36415 COLL VENOUS BLD VENIPUNCTURE: CPT | Mod: ORL | Performed by: INTERNAL MEDICINE

## 2024-07-01 PROCEDURE — 80048 BASIC METABOLIC PNL TOTAL CA: CPT | Mod: ORL | Performed by: INTERNAL MEDICINE

## 2024-07-01 PROCEDURE — 85025 COMPLETE CBC W/AUTO DIFF WBC: CPT | Mod: ORL | Performed by: INTERNAL MEDICINE

## 2024-07-01 PROCEDURE — P9604 ONE-WAY ALLOW PRORATED TRIP: HCPCS | Mod: ORL | Performed by: INTERNAL MEDICINE

## 2024-07-01 PROCEDURE — 84155 ASSAY OF PROTEIN SERUM: CPT | Mod: ORL | Performed by: INTERNAL MEDICINE

## 2024-07-01 PROCEDURE — 85652 RBC SED RATE AUTOMATED: CPT | Mod: ORL | Performed by: INTERNAL MEDICINE

## 2024-07-02 ENCOUNTER — ANESTHESIA (OUTPATIENT)
Dept: SURGERY | Facility: HOSPITAL | Age: 89
DRG: 485 | End: 2024-07-02
Payer: COMMERCIAL

## 2024-07-02 ENCOUNTER — DOCUMENTATION ONLY (OUTPATIENT)
Dept: CARDIOLOGY | Facility: CLINIC | Age: 89
End: 2024-07-02

## 2024-07-02 ENCOUNTER — LAB REQUISITION (OUTPATIENT)
Dept: LAB | Facility: CLINIC | Age: 89
End: 2024-07-02
Payer: COMMERCIAL

## 2024-07-02 ENCOUNTER — ANESTHESIA EVENT (OUTPATIENT)
Dept: SURGERY | Facility: HOSPITAL | Age: 89
DRG: 485 | End: 2024-07-02
Payer: COMMERCIAL

## 2024-07-02 ENCOUNTER — APPOINTMENT (OUTPATIENT)
Dept: ULTRASOUND IMAGING | Facility: HOSPITAL | Age: 89
DRG: 485 | End: 2024-07-02
Attending: PHYSICIAN ASSISTANT
Payer: COMMERCIAL

## 2024-07-02 ENCOUNTER — APPOINTMENT (OUTPATIENT)
Dept: MRI IMAGING | Facility: HOSPITAL | Age: 89
DRG: 485 | End: 2024-07-02
Attending: PHYSICIAN ASSISTANT
Payer: COMMERCIAL

## 2024-07-02 ENCOUNTER — HOSPITAL ENCOUNTER (INPATIENT)
Facility: HOSPITAL | Age: 89
LOS: 7 days | Discharge: NURSING, REHAB OR LONG-TERM CARE | DRG: 485 | End: 2024-07-09
Attending: FAMILY MEDICINE | Admitting: INTERNAL MEDICINE
Payer: COMMERCIAL

## 2024-07-02 ENCOUNTER — MEDICAL CORRESPONDENCE (OUTPATIENT)
Dept: HEALTH INFORMATION MANAGEMENT | Facility: CLINIC | Age: 89
End: 2024-07-02

## 2024-07-02 ENCOUNTER — TRANSFERRED RECORDS (OUTPATIENT)
Dept: HEALTH INFORMATION MANAGEMENT | Facility: CLINIC | Age: 89
End: 2024-07-02

## 2024-07-02 DIAGNOSIS — M00.061 STAPHYLOCOCCAL ARTHRITIS, RIGHT KNEE (H): ICD-10-CM

## 2024-07-02 DIAGNOSIS — Z66 DNR (DO NOT RESUSCITATE): ICD-10-CM

## 2024-07-02 DIAGNOSIS — T40.2X5A THERAPEUTIC OPIOID INDUCED CONSTIPATION: ICD-10-CM

## 2024-07-02 DIAGNOSIS — M00.861 ARTHRITIS OF RIGHT KNEE DUE TO OTHER BACTERIA (H): Primary | ICD-10-CM

## 2024-07-02 DIAGNOSIS — K59.03 THERAPEUTIC OPIOID INDUCED CONSTIPATION: ICD-10-CM

## 2024-07-02 DIAGNOSIS — B37.2 CANDIDAL INTERTRIGO: ICD-10-CM

## 2024-07-02 DIAGNOSIS — Z79.01 LONG TERM (CURRENT) USE OF ANTICOAGULANTS: ICD-10-CM

## 2024-07-02 DIAGNOSIS — M00.061 STAPHYLOCOCCAL ARTHRITIS OF RIGHT KNEE (H): ICD-10-CM

## 2024-07-02 LAB
ANION GAP SERPL CALCULATED.3IONS-SCNC: 13 MMOL/L (ref 7–15)
APPEARANCE FLD: ABNORMAL
BACTERIA SPEC CULT: NORMAL
BACTERIA SPEC CULT: NORMAL
BASOPHILS # BLD AUTO: 0.1 10E3/UL (ref 0–0.2)
BASOPHILS NFR BLD AUTO: 1 %
BUN SERPL-MCNC: 22.3 MG/DL (ref 8–23)
CALCIUM SERPL-MCNC: 9.4 MG/DL (ref 8.2–9.6)
CELL COUNT BODY FLUID SOURCE: ABNORMAL
CHLORIDE SERPL-SCNC: 100 MMOL/L (ref 98–107)
COLOR FLD: ABNORMAL
CREAT SERPL-MCNC: 0.87 MG/DL (ref 0.51–0.95)
CRP SERPL-MCNC: 63.1 MG/L
CRYSTALS SNV MICRO: NORMAL
DEPRECATED HCO3 PLAS-SCNC: 25 MMOL/L (ref 22–29)
EGFRCR SERPLBLD CKD-EPI 2021: 62 ML/MIN/1.73M2
EOSINOPHIL # BLD AUTO: 0.3 10E3/UL (ref 0–0.7)
EOSINOPHIL NFR BLD AUTO: 4 %
ERYTHROCYTE [DISTWIDTH] IN BLOOD BY AUTOMATED COUNT: 15.6 % (ref 10–15)
ERYTHROCYTE [SEDIMENTATION RATE] IN BLOOD BY WESTERGREN METHOD: 65 MM/HR (ref 0–30)
GLUCOSE SERPL-MCNC: 128 MG/DL (ref 70–99)
GRAM STAIN RESULT: NORMAL
HCT VFR BLD AUTO: 37.8 % (ref 35–47)
HGB BLD-MCNC: 11.4 G/DL (ref 11.7–15.7)
IMM GRANULOCYTES # BLD: 0.1 10E3/UL
IMM GRANULOCYTES NFR BLD: 1 %
LYMPHOCYTES # BLD AUTO: 1.4 10E3/UL (ref 0.8–5.3)
LYMPHOCYTES NFR BLD AUTO: 19 %
LYMPHOCYTES NFR FLD MANUAL: 1 %
MCH RBC QN AUTO: 27.5 PG (ref 26.5–33)
MCHC RBC AUTO-ENTMCNC: 30.2 G/DL (ref 31.5–36.5)
MCV RBC AUTO: 91 FL (ref 78–100)
MONOCYTES # BLD AUTO: 0.9 10E3/UL (ref 0–1.3)
MONOCYTES NFR BLD AUTO: 12 %
MONOS+MACROS NFR FLD MANUAL: 0 %
NEUTROPHILS # BLD AUTO: 4.7 10E3/UL (ref 1.6–8.3)
NEUTROPHILS NFR BLD AUTO: 63 %
NEUTS BAND NFR FLD MANUAL: 99 %
NRBC # BLD AUTO: 0 10E3/UL
NRBC BLD AUTO-RTO: 0 /100
PLATELET # BLD AUTO: 323 10E3/UL (ref 150–450)
POTASSIUM SERPL-SCNC: 4.1 MMOL/L (ref 3.4–5.3)
RBC # BLD AUTO: 4.14 10E6/UL (ref 3.8–5.2)
SODIUM SERPL-SCNC: 138 MMOL/L (ref 135–145)
WBC # BLD AUTO: 7.4 10E3/UL (ref 4–11)
WBC # FLD AUTO: ABNORMAL /UL

## 2024-07-02 PROCEDURE — 250N000025 HC SEVOFLURANE, PER MIN: Performed by: STUDENT IN AN ORGANIZED HEALTH CARE EDUCATION/TRAINING PROGRAM

## 2024-07-02 PROCEDURE — 258N000003 HC RX IP 258 OP 636: Performed by: NURSE ANESTHETIST, CERTIFIED REGISTERED

## 2024-07-02 PROCEDURE — 272N000001 HC OR GENERAL SUPPLY STERILE: Performed by: STUDENT IN AN ORGANIZED HEALTH CARE EDUCATION/TRAINING PROGRAM

## 2024-07-02 PROCEDURE — 85652 RBC SED RATE AUTOMATED: CPT | Performed by: FAMILY MEDICINE

## 2024-07-02 PROCEDURE — 87075 CULTR BACTERIA EXCEPT BLOOD: CPT | Performed by: PHYSICIAN ASSISTANT

## 2024-07-02 PROCEDURE — 99223 1ST HOSP IP/OBS HIGH 75: CPT | Performed by: INTERNAL MEDICINE

## 2024-07-02 PROCEDURE — 73721 MRI JNT OF LWR EXTRE W/O DYE: CPT | Mod: RT

## 2024-07-02 PROCEDURE — 27310 EXPLORATION OF KNEE JOINT: CPT | Performed by: NURSE ANESTHETIST, CERTIFIED REGISTERED

## 2024-07-02 PROCEDURE — 87070 CULTURE OTHR SPECIMN AEROBIC: CPT | Performed by: STUDENT IN AN ORGANIZED HEALTH CARE EDUCATION/TRAINING PROGRAM

## 2024-07-02 PROCEDURE — 0SBC0ZZ EXCISION OF RIGHT KNEE JOINT, OPEN APPROACH: ICD-10-PCS | Performed by: STUDENT IN AN ORGANIZED HEALTH CARE EDUCATION/TRAINING PROGRAM

## 2024-07-02 PROCEDURE — 87205 SMEAR GRAM STAIN: CPT | Performed by: PHYSICIAN ASSISTANT

## 2024-07-02 PROCEDURE — 87040 BLOOD CULTURE FOR BACTERIA: CPT | Performed by: INTERNAL MEDICINE

## 2024-07-02 PROCEDURE — 258N000001 HC RX 258: Performed by: STUDENT IN AN ORGANIZED HEALTH CARE EDUCATION/TRAINING PROGRAM

## 2024-07-02 PROCEDURE — 250N000011 HC RX IP 250 OP 636: Performed by: NURSE ANESTHETIST, CERTIFIED REGISTERED

## 2024-07-02 PROCEDURE — 87070 CULTURE OTHR SPECIMN AEROBIC: CPT | Performed by: PHYSICIAN ASSISTANT

## 2024-07-02 PROCEDURE — 272N000710 US JOINT INJECTION ASPIRATION MAJOR RIGHT

## 2024-07-02 PROCEDURE — 89060 EXAM SYNOVIAL FLUID CRYSTALS: CPT | Performed by: PHYSICIAN ASSISTANT

## 2024-07-02 PROCEDURE — 710N000009 HC RECOVERY PHASE 1, LEVEL 1, PER MIN: Performed by: STUDENT IN AN ORGANIZED HEALTH CARE EDUCATION/TRAINING PROGRAM

## 2024-07-02 PROCEDURE — 258N000003 HC RX IP 258 OP 636: Performed by: ANESTHESIOLOGY

## 2024-07-02 PROCEDURE — 360N000075 HC SURGERY LEVEL 2, PER MIN: Performed by: STUDENT IN AN ORGANIZED HEALTH CARE EDUCATION/TRAINING PROGRAM

## 2024-07-02 PROCEDURE — 99285 EMERGENCY DEPT VISIT HI MDM: CPT | Mod: 25

## 2024-07-02 PROCEDURE — 370N000017 HC ANESTHESIA TECHNICAL FEE, PER MIN: Performed by: STUDENT IN AN ORGANIZED HEALTH CARE EDUCATION/TRAINING PROGRAM

## 2024-07-02 PROCEDURE — 120N000001 HC R&B MED SURG/OB

## 2024-07-02 PROCEDURE — 250N000013 HC RX MED GY IP 250 OP 250 PS 637: Performed by: STUDENT IN AN ORGANIZED HEALTH CARE EDUCATION/TRAINING PROGRAM

## 2024-07-02 PROCEDURE — 99100 ANES PT EXTEME AGE<1 YR&>70: CPT | Mod: QZ | Performed by: NURSE ANESTHETIST, CERTIFIED REGISTERED

## 2024-07-02 PROCEDURE — 89050 BODY FLUID CELL COUNT: CPT | Performed by: PHYSICIAN ASSISTANT

## 2024-07-02 PROCEDURE — 86140 C-REACTIVE PROTEIN: CPT | Performed by: FAMILY MEDICINE

## 2024-07-02 PROCEDURE — 250N000011 HC RX IP 250 OP 636: Performed by: ANESTHESIOLOGY

## 2024-07-02 PROCEDURE — 87205 SMEAR GRAM STAIN: CPT | Performed by: STUDENT IN AN ORGANIZED HEALTH CARE EDUCATION/TRAINING PROGRAM

## 2024-07-02 PROCEDURE — 250N000009 HC RX 250: Performed by: NURSE ANESTHETIST, CERTIFIED REGISTERED

## 2024-07-02 PROCEDURE — 36415 COLL VENOUS BLD VENIPUNCTURE: CPT | Performed by: INTERNAL MEDICINE

## 2024-07-02 PROCEDURE — 36415 COLL VENOUS BLD VENIPUNCTURE: CPT | Performed by: FAMILY MEDICINE

## 2024-07-02 PROCEDURE — 80048 BASIC METABOLIC PNL TOTAL CA: CPT | Performed by: FAMILY MEDICINE

## 2024-07-02 PROCEDURE — 99222 1ST HOSP IP/OBS MODERATE 55: CPT | Performed by: INTERNAL MEDICINE

## 2024-07-02 PROCEDURE — 0S9C3ZX DRAINAGE OF RIGHT KNEE JOINT, PERCUTANEOUS APPROACH, DIAGNOSTIC: ICD-10-PCS | Performed by: STUDENT IN AN ORGANIZED HEALTH CARE EDUCATION/TRAINING PROGRAM

## 2024-07-02 PROCEDURE — 87075 CULTR BACTERIA EXCEPT BLOOD: CPT | Performed by: STUDENT IN AN ORGANIZED HEALTH CARE EDUCATION/TRAINING PROGRAM

## 2024-07-02 PROCEDURE — 999N000141 HC STATISTIC PRE-PROCEDURE NURSING ASSESSMENT: Performed by: STUDENT IN AN ORGANIZED HEALTH CARE EDUCATION/TRAINING PROGRAM

## 2024-07-02 PROCEDURE — 85041 AUTOMATED RBC COUNT: CPT | Performed by: FAMILY MEDICINE

## 2024-07-02 RX ORDER — LIDOCAINE 40 MG/G
CREAM TOPICAL
Status: DISCONTINUED | OUTPATIENT
Start: 2024-07-02 | End: 2024-07-09 | Stop reason: HOSPADM

## 2024-07-02 RX ORDER — HYDRALAZINE HYDROCHLORIDE 20 MG/ML
10 INJECTION INTRAMUSCULAR; INTRAVENOUS EVERY 4 HOURS PRN
Status: DISCONTINUED | OUTPATIENT
Start: 2024-07-02 | End: 2024-07-09 | Stop reason: HOSPADM

## 2024-07-02 RX ORDER — ACETAMINOPHEN 325 MG/1
650 TABLET ORAL EVERY 4 HOURS PRN
Status: DISCONTINUED | OUTPATIENT
Start: 2024-07-02 | End: 2024-07-09 | Stop reason: HOSPADM

## 2024-07-02 RX ORDER — SODIUM CHLORIDE, SODIUM LACTATE, POTASSIUM CHLORIDE, CALCIUM CHLORIDE 600; 310; 30; 20 MG/100ML; MG/100ML; MG/100ML; MG/100ML
INJECTION, SOLUTION INTRAVENOUS CONTINUOUS
Status: DISCONTINUED | OUTPATIENT
Start: 2024-07-02 | End: 2024-07-02 | Stop reason: HOSPADM

## 2024-07-02 RX ORDER — FENTANYL CITRATE 50 UG/ML
INJECTION, SOLUTION INTRAMUSCULAR; INTRAVENOUS PRN
Status: DISCONTINUED | OUTPATIENT
Start: 2024-07-02 | End: 2024-07-02

## 2024-07-02 RX ORDER — FUROSEMIDE 20 MG
20 TABLET ORAL EVERY EVENING
Status: DISCONTINUED | OUTPATIENT
Start: 2024-07-02 | End: 2024-07-07

## 2024-07-02 RX ORDER — LIDOCAINE 40 MG/G
CREAM TOPICAL
Status: DISCONTINUED | OUTPATIENT
Start: 2024-07-02 | End: 2024-07-02 | Stop reason: HOSPADM

## 2024-07-02 RX ORDER — CEFAZOLIN SODIUM/WATER 2 G/20 ML
SYRINGE (ML) INTRAVENOUS
Status: DISCONTINUED
Start: 2024-07-02 | End: 2024-07-02 | Stop reason: HOSPADM

## 2024-07-02 RX ORDER — ONDANSETRON 4 MG/1
4 TABLET, ORALLY DISINTEGRATING ORAL EVERY 30 MIN PRN
Status: DISCONTINUED | OUTPATIENT
Start: 2024-07-02 | End: 2024-07-02 | Stop reason: HOSPADM

## 2024-07-02 RX ORDER — CEFAZOLIN SODIUM/WATER 2 G/20 ML
SYRINGE (ML) INTRAVENOUS PRN
Status: DISCONTINUED | OUTPATIENT
Start: 2024-07-02 | End: 2024-07-02

## 2024-07-02 RX ORDER — NALOXONE HYDROCHLORIDE 0.4 MG/ML
0.4 INJECTION, SOLUTION INTRAMUSCULAR; INTRAVENOUS; SUBCUTANEOUS
Status: DISCONTINUED | OUTPATIENT
Start: 2024-07-02 | End: 2024-07-09 | Stop reason: HOSPADM

## 2024-07-02 RX ORDER — LIDOCAINE HYDROCHLORIDE 10 MG/ML
INJECTION, SOLUTION INFILTRATION; PERINEURAL PRN
Status: DISCONTINUED | OUTPATIENT
Start: 2024-07-02 | End: 2024-07-02

## 2024-07-02 RX ORDER — ONDANSETRON 4 MG/1
4 TABLET, ORALLY DISINTEGRATING ORAL EVERY 6 HOURS PRN
Status: DISCONTINUED | OUTPATIENT
Start: 2024-07-02 | End: 2024-07-09 | Stop reason: HOSPADM

## 2024-07-02 RX ORDER — PROPOFOL 10 MG/ML
INJECTION, EMULSION INTRAVENOUS CONTINUOUS PRN
Status: DISCONTINUED | OUTPATIENT
Start: 2024-07-02 | End: 2024-07-02

## 2024-07-02 RX ORDER — AMOXICILLIN 250 MG
1 CAPSULE ORAL 2 TIMES DAILY PRN
Status: DISCONTINUED | OUTPATIENT
Start: 2024-07-02 | End: 2024-07-09 | Stop reason: HOSPADM

## 2024-07-02 RX ORDER — METOPROLOL SUCCINATE 25 MG/1
25 TABLET, EXTENDED RELEASE ORAL DAILY
Status: DISCONTINUED | OUTPATIENT
Start: 2024-07-03 | End: 2024-07-09 | Stop reason: HOSPADM

## 2024-07-02 RX ORDER — ONDANSETRON 2 MG/ML
4 INJECTION INTRAMUSCULAR; INTRAVENOUS EVERY 30 MIN PRN
Status: DISCONTINUED | OUTPATIENT
Start: 2024-07-02 | End: 2024-07-02 | Stop reason: HOSPADM

## 2024-07-02 RX ORDER — ACETAMINOPHEN 500 MG
1000 TABLET ORAL 3 TIMES DAILY
Status: ON HOLD | COMMUNITY
End: 2024-07-08

## 2024-07-02 RX ORDER — OXYCODONE HYDROCHLORIDE 5 MG/1
5 TABLET ORAL EVERY 4 HOURS PRN
Status: DISCONTINUED | OUTPATIENT
Start: 2024-07-02 | End: 2024-07-09 | Stop reason: HOSPADM

## 2024-07-02 RX ORDER — LISINOPRIL 2.5 MG/1
2.5 TABLET ORAL DAILY
Status: DISCONTINUED | OUTPATIENT
Start: 2024-07-03 | End: 2024-07-09 | Stop reason: HOSPADM

## 2024-07-02 RX ORDER — NALOXONE HYDROCHLORIDE 0.4 MG/ML
0.2 INJECTION, SOLUTION INTRAMUSCULAR; INTRAVENOUS; SUBCUTANEOUS
Status: DISCONTINUED | OUTPATIENT
Start: 2024-07-02 | End: 2024-07-09 | Stop reason: HOSPADM

## 2024-07-02 RX ORDER — AMIODARONE HYDROCHLORIDE 100 MG/1
100 TABLET ORAL DAILY
Status: DISCONTINUED | OUTPATIENT
Start: 2024-07-03 | End: 2024-07-09 | Stop reason: HOSPADM

## 2024-07-02 RX ORDER — FENTANYL CITRATE 50 UG/ML
50 INJECTION, SOLUTION INTRAMUSCULAR; INTRAVENOUS EVERY 5 MIN PRN
Status: DISCONTINUED | OUTPATIENT
Start: 2024-07-02 | End: 2024-07-02 | Stop reason: HOSPADM

## 2024-07-02 RX ORDER — NYSTATIN 100000 [USP'U]/G
POWDER TOPICAL 2 TIMES DAILY PRN
Status: ON HOLD | COMMUNITY
End: 2024-07-09

## 2024-07-02 RX ORDER — DEXAMETHASONE SODIUM PHOSPHATE 4 MG/ML
4 INJECTION, SOLUTION INTRA-ARTICULAR; INTRALESIONAL; INTRAMUSCULAR; INTRAVENOUS; SOFT TISSUE
Status: DISCONTINUED | OUTPATIENT
Start: 2024-07-02 | End: 2024-07-02 | Stop reason: HOSPADM

## 2024-07-02 RX ORDER — CEFAZOLIN SODIUM 1 G
1 VIAL (EA) INJECTION EVERY 12 HOURS
Status: ON HOLD | COMMUNITY
End: 2024-07-09

## 2024-07-02 RX ORDER — HYDRALAZINE HYDROCHLORIDE 10 MG/1
10 TABLET, FILM COATED ORAL EVERY 4 HOURS PRN
Status: DISCONTINUED | OUTPATIENT
Start: 2024-07-02 | End: 2024-07-09 | Stop reason: HOSPADM

## 2024-07-02 RX ORDER — NALOXONE HYDROCHLORIDE 0.4 MG/ML
0.1 INJECTION, SOLUTION INTRAMUSCULAR; INTRAVENOUS; SUBCUTANEOUS
Status: DISCONTINUED | OUTPATIENT
Start: 2024-07-02 | End: 2024-07-02 | Stop reason: HOSPADM

## 2024-07-02 RX ORDER — ONDANSETRON 2 MG/ML
4 INJECTION INTRAMUSCULAR; INTRAVENOUS EVERY 6 HOURS PRN
Status: DISCONTINUED | OUTPATIENT
Start: 2024-07-02 | End: 2024-07-09 | Stop reason: HOSPADM

## 2024-07-02 RX ORDER — AMOXICILLIN 250 MG
2 CAPSULE ORAL 2 TIMES DAILY PRN
Status: DISCONTINUED | OUTPATIENT
Start: 2024-07-02 | End: 2024-07-09 | Stop reason: HOSPADM

## 2024-07-02 RX ORDER — HYDROMORPHONE HCL IN WATER/PF 6 MG/30 ML
0.2 PATIENT CONTROLLED ANALGESIA SYRINGE INTRAVENOUS EVERY 5 MIN PRN
Status: DISCONTINUED | OUTPATIENT
Start: 2024-07-02 | End: 2024-07-02 | Stop reason: HOSPADM

## 2024-07-02 RX ORDER — HYDROMORPHONE HCL IN WATER/PF 6 MG/30 ML
0.4 PATIENT CONTROLLED ANALGESIA SYRINGE INTRAVENOUS EVERY 5 MIN PRN
Status: DISCONTINUED | OUTPATIENT
Start: 2024-07-02 | End: 2024-07-02 | Stop reason: HOSPADM

## 2024-07-02 RX ORDER — HYDROMORPHONE HCL IN WATER/PF 6 MG/30 ML
0.1 PATIENT CONTROLLED ANALGESIA SYRINGE INTRAVENOUS
Status: DISCONTINUED | OUTPATIENT
Start: 2024-07-02 | End: 2024-07-09 | Stop reason: HOSPADM

## 2024-07-02 RX ORDER — FENTANYL CITRATE 50 UG/ML
25 INJECTION, SOLUTION INTRAMUSCULAR; INTRAVENOUS EVERY 5 MIN PRN
Status: DISCONTINUED | OUTPATIENT
Start: 2024-07-02 | End: 2024-07-02 | Stop reason: HOSPADM

## 2024-07-02 RX ORDER — CALCIUM CARBONATE 500 MG/1
1000 TABLET, CHEWABLE ORAL 4 TIMES DAILY PRN
Status: DISCONTINUED | OUTPATIENT
Start: 2024-07-02 | End: 2024-07-09 | Stop reason: HOSPADM

## 2024-07-02 RX ORDER — PROPOFOL 10 MG/ML
INJECTION, EMULSION INTRAVENOUS PRN
Status: DISCONTINUED | OUTPATIENT
Start: 2024-07-02 | End: 2024-07-02

## 2024-07-02 RX ORDER — DICLOFENAC EPOLAMINE 0.01 G/1
1 SYSTEM TOPICAL 2 TIMES DAILY
Status: DISCONTINUED | OUTPATIENT
Start: 2024-07-02 | End: 2024-07-09 | Stop reason: HOSPADM

## 2024-07-02 RX ORDER — HYDROMORPHONE HCL IN WATER/PF 6 MG/30 ML
0.2 PATIENT CONTROLLED ANALGESIA SYRINGE INTRAVENOUS
Status: DISCONTINUED | OUTPATIENT
Start: 2024-07-02 | End: 2024-07-09 | Stop reason: HOSPADM

## 2024-07-02 RX ORDER — ONDANSETRON 2 MG/ML
INJECTION INTRAMUSCULAR; INTRAVENOUS PRN
Status: DISCONTINUED | OUTPATIENT
Start: 2024-07-02 | End: 2024-07-02

## 2024-07-02 RX ORDER — FUROSEMIDE 40 MG
40 TABLET ORAL EVERY MORNING
Status: ON HOLD | COMMUNITY
End: 2024-07-17

## 2024-07-02 RX ORDER — FUROSEMIDE 20 MG
20 TABLET ORAL EVERY EVENING
Status: ON HOLD | COMMUNITY
End: 2024-07-17

## 2024-07-02 RX ORDER — CEPHALEXIN 500 MG/1
500 CAPSULE ORAL EVERY MORNING
Status: ON HOLD | COMMUNITY
End: 2024-07-09

## 2024-07-02 RX ORDER — DULOXETIN HYDROCHLORIDE 60 MG/1
60 CAPSULE, DELAYED RELEASE ORAL DAILY
Status: DISCONTINUED | OUTPATIENT
Start: 2024-07-03 | End: 2024-07-09 | Stop reason: HOSPADM

## 2024-07-02 RX ORDER — LIDOCAINE 40 MG/G
CREAM TOPICAL
Status: DISCONTINUED | OUTPATIENT
Start: 2024-07-02 | End: 2024-07-03

## 2024-07-02 RX ORDER — ACETAMINOPHEN 650 MG/1
650 SUPPOSITORY RECTAL EVERY 4 HOURS PRN
Status: DISCONTINUED | OUTPATIENT
Start: 2024-07-02 | End: 2024-07-09 | Stop reason: HOSPADM

## 2024-07-02 RX ADMIN — LIDOCAINE HYDROCHLORIDE 4 ML: 10 INJECTION, SOLUTION INFILTRATION; PERINEURAL at 17:04

## 2024-07-02 RX ADMIN — PROPOFOL 100 MG: 10 INJECTION, EMULSION INTRAVENOUS at 17:02

## 2024-07-02 RX ADMIN — ONDANSETRON 4 MG: 2 INJECTION INTRAMUSCULAR; INTRAVENOUS at 17:30

## 2024-07-02 RX ADMIN — OXYCODONE HYDROCHLORIDE 5 MG: 5 TABLET ORAL at 21:36

## 2024-07-02 RX ADMIN — FENTANYL CITRATE 25 MCG: 50 INJECTION, SOLUTION INTRAMUSCULAR; INTRAVENOUS at 18:49

## 2024-07-02 RX ADMIN — DEXMEDETOMIDINE HYDROCHLORIDE 8 MCG: 100 INJECTION, SOLUTION INTRAVENOUS at 17:20

## 2024-07-02 RX ADMIN — PROPOFOL 150 MCG/KG/MIN: 10 INJECTION, EMULSION INTRAVENOUS at 17:08

## 2024-07-02 RX ADMIN — DEXMEDETOMIDINE HYDROCHLORIDE 8 MCG: 100 INJECTION, SOLUTION INTRAVENOUS at 17:18

## 2024-07-02 RX ADMIN — PROPOFOL 40 MG: 10 INJECTION, EMULSION INTRAVENOUS at 17:08

## 2024-07-02 RX ADMIN — SODIUM CHLORIDE, POTASSIUM CHLORIDE, SODIUM LACTATE AND CALCIUM CHLORIDE: 600; 310; 30; 20 INJECTION, SOLUTION INTRAVENOUS at 16:29

## 2024-07-02 RX ADMIN — Medication 2 G: at 16:58

## 2024-07-02 RX ADMIN — FENTANYL CITRATE 50 MCG: 50 INJECTION INTRAMUSCULAR; INTRAVENOUS at 17:22

## 2024-07-02 RX ADMIN — FENTANYL CITRATE 50 MCG: 50 INJECTION INTRAMUSCULAR; INTRAVENOUS at 17:04

## 2024-07-02 RX ADMIN — PHENYLEPHRINE HYDROCHLORIDE 100 MCG: 10 INJECTION INTRAVENOUS at 17:52

## 2024-07-02 RX ADMIN — PHENYLEPHRINE HYDROCHLORIDE 100 MCG: 10 INJECTION INTRAVENOUS at 17:12

## 2024-07-02 RX ADMIN — PHENYLEPHRINE HYDROCHLORIDE 100 MCG: 10 INJECTION INTRAVENOUS at 17:53

## 2024-07-02 RX ADMIN — FENTANYL CITRATE 50 MCG: 50 INJECTION INTRAMUSCULAR; INTRAVENOUS at 17:17

## 2024-07-02 RX ADMIN — DEXMEDETOMIDINE HYDROCHLORIDE 4 MCG: 100 INJECTION, SOLUTION INTRAVENOUS at 17:19

## 2024-07-02 RX ADMIN — FENTANYL CITRATE 25 MCG: 50 INJECTION, SOLUTION INTRAMUSCULAR; INTRAVENOUS at 18:36

## 2024-07-02 ASSESSMENT — ACTIVITIES OF DAILY LIVING (ADL)
ADLS_ACUITY_SCORE: 38
ADLS_ACUITY_SCORE: 38
ADLS_ACUITY_SCORE: 31
ADLS_ACUITY_SCORE: 38
ADLS_ACUITY_SCORE: 40
ADLS_ACUITY_SCORE: 40
ADLS_ACUITY_SCORE: 38
ADLS_ACUITY_SCORE: 31
ADLS_ACUITY_SCORE: 38
ADLS_ACUITY_SCORE: 38
ADLS_ACUITY_SCORE: 31
ADLS_ACUITY_SCORE: 31

## 2024-07-02 ASSESSMENT — ENCOUNTER SYMPTOMS: DYSRHYTHMIAS: 1

## 2024-07-02 NOTE — CONSULTS
[facility]  General ID Service Consult      Patient: Gladys Ramirez  YOB: 1930, MRN: [id number]  Date of Admission:  7/2/2024  Date of Consult: 07/02/2024  Consult Requested by: Ricco Lei DO  Admission Diagnosis: Staphylococcal arthritis of right knee (H)  DNR (do not resuscitate)  Long term (current) use of anticoagulants  Consult Question: right septic knee     ID Assessment & Plan   Admitted with nonresolving syx in her infected arthritic knee, no systemic syx, with declining CRP as below-while on OPAT  MRI no osteomyelitis  Staph lugdenensis native septic knee , diagnosed during her admission on 5/24, Dr. Reza  5/23 : S/p Right knee arthroscopic irrigation and debridement   Receiving outpatient IV cefazolin  Hx steroid injection  Severe OA, and extensive complex tearing of the body and posterior horn medial meniscus , ACL tear-which can cause effusions    Pacemaker  Left TKA    PLAN  Knee aspirated, follow analysis and culture  Continue IV cefazolin  For repeat washout  Will follow to see if any change indicated    Diane Espino MD  [facility]  ______________________________________________________________________      History of Present Illness     This is a 89 years old female who is being seen in the emergency room.  She is a case of infected native right knee, diagnosed when she was admitted in May with abnormal cell count analysis and positive culture for staph lugdunensis.  She underwent arthroscopic washout and was discharged on IV cefazolin.  CRP had been slowly declining.  At home she describes no fevers or chills.  She has ongoing pain she takes oxycodone at night.  She was seen by orthopedics today and was sent to the emergency room for repeat washout.        Radiology personally reviewed  MRI  1.  Large joint effusion with extensive synovitis. Subchondral edema and cystic change in the medial and lateral compartments  likely reflects the patient's septic arthritis.   Latest Reference Range & Units 06/03/24 07:28 06/10/24 07:53 06/17/24 08:15 06/24/24 07:30 07/01/24 08:23 07/02/24 11:43   CRP Inflammation <5.00 mg/L 157.00 (H) 152.00 (H) 137.00 (H) 92.20 (H) 68.60 (H) 63.10 (H)   (H): Data is abnormally high  Review of Systems   The 10 point Review of Systems is negative other than noted in the HPI or here.     Past Medical History    Past Medical History:   Diagnosis Date    Angina pectoris (H24)     Atrial fibrillation (H)     Chest pain 03/09/2017    Chronic kidney disease     Congestive heart failure (H)     Cough     Hyperlipidemia     Hypertension     Osteoarthritis        Past Surgical History   Past Surgical History:   Procedure Laterality Date    APPENDECTOMY      ARTHROSCOPY KNEE Right 5/23/2024    Procedure: ARTHROSCOPY, KNEE;  Surgeon: Sanchez Monahan MD;  [place] [facility] [place]    BASAL CELL CARCINOMA EXCISION      nose    CARDIOVERSION  06/19/2024    EP PACEMAKER DEVICE & IMPLANT- HIS LEAD DUAL N/A 4/8/2024    Procedure: Pacemaker Device & Lead Implant - HIS Lead Dual;  Surgeon: Jeannie Rivera MD;  Location: [facility] CATH LAB CV    INCISION AND DRAINAGE KNEE, COMBINED Right 5/23/2024    Procedure: INCISION AND DRAINAGE, KNEE;  Surgeon: Sanchez Monahan MD;  [place] [facility] [place]    LAPAROSCOPY DIAGNOSTIC (GENERAL) N/A 11/04/2014    Procedure: LAPAROSCOPY BILATERAL SALPINGO-OOPHORECTOMY ;  Surgeon: Sofia Harper MD;  Location: [facility] OR;  Service:     OPEN REDUCTION INTERNAL FIXATION HIP BIPOLAR Right 3/5/2023    Procedure: HEMIARTHROPLASTY, HIP, BIPOLAR;  Surgeon: Jose G Martinez MD;  [place] [facility] [place]    PICC SINGLE LUMEN PLACEMENT  05/24/2024    TONSILLECTOMY      10 years old    ZZC TOTAL KNEE ARTHROPLASTY Left     2011       Social History   Social History     Tobacco Use    Smoking status: Never     Passive exposure: Never    Smokeless tobacco: Never     "Tobacco comments:     no passive exposure   Vaping Use    Vaping status: Never Used   Substance Use Topics    Alcohol use: Yes     Comment: Alcoholic Drinks/day: Rarely a glass of wine    Drug use: No       Family History   I have reviewed this patient's family history and updated it with pertinent information if needed.  Family History   Problem Relation Age of Onset    Heart Disease Mother     Rheumatic Heart Disease Mother     No Known Problems Father     Cancer Brother         brain    Lung Cancer Brother     Lung Cancer Brother     Cancer Sister         lung    Lung Cancer Sister        Medications   I have reviewed this patient's current medications    Allergies   Allergies   Allergen Reactions    Trazodone Shortness Of Breath and Unknown     Allergic to trazodone and deriv., Other: trouble swallowing      Clindamycin Diarrhea     C-diff    Gabapentin Other (See Comments)     \"Internal tremors\"    Temazepam Other (See Comments)     Annotation: Nightmares         Physical Exam   Vital Signs: Temp: 97.5  F (36.4  C) Temp src: Temporal BP: 117/74 Pulse: 74   Resp: 16 SpO2: 100 % O2 Device: None (Room air)    Weight: 145 lbs 0 oz    Gen. appearance nontoxic  Eyes no conjunctivitis or icterus  Neck no stiffness or neck vein distention, no LN  Heart  no S3 or murmurs  Lungs clear no wheeze  Abdomen soft not tender  Extremities no synovitis, trace edema  Skin  no rash or emboli  Neurologic alert oriented no focal deficits        Data   Inflammatory Markers   Recent Labs   Lab Test 07/02/24  1143 07/01/24  0823 06/24/24  0730 06/17/24  0815 06/10/24  0753 06/03/24  0728 05/22/24  1506 03/05/18  0906   SED 65* 74* 60* 65* 91* 87* 92* 38*   CRPI 63.10* 68.60* 92.20* 137.00* 152.00* 157.00* 158.80*  --         Hematology Studies   Recent Labs   Lab Test 07/02/24  1143 07/01/24  0823 06/24/24  0730 06/17/24  0815 06/10/24  0753 06/03/24  0728   WBC 7.4 7.6 8.3 7.8 10.3 10.3   HGB 11.4* 11.2* 11.5* 10.9* 10.0* 9.4*   MCV " "91 93 92 96 97 94    373 483* 633* 354 291       Metabolic Studies   Recent Labs   Lab Test 07/02/24  1143 07/01/24  0823 06/24/24  0730 06/21/24  0658 06/19/24  0835    140 139 139 140   POTASSIUM 4.1 3.5 3.9 3.5 4.0   CHLORIDE 100 100 99 101 102   CO2 25 25 29 28 26   BUN 22.3 19.3 11.6 15.5 19.1   CR 0.87 0.80 0.86 0.90 0.85   GFRESTIMATED 62 68 63 59* 64       Hepatic Studies    Recent Labs   Lab Test 07/01/24  0823 06/24/24  0730 06/17/24  0815 06/10/24  0753 06/03/24  0728 05/22/24  1506 01/11/24  1430   BILITOTAL 0.4 0.4 0.3 0.3 0.2 0.3 0.3   ALKPHOS 147 138 178* 185* 90 118 155*   ALBUMIN 3.5 3.4* 3.4* 3.3* 2.9* 3.3* 3.9   AST  --  30 26 30 20 18 19   ALT  --  9 16 19 8 11 13       Most Recent 6 Bacteria Isolates From Any Culture (See EPIC Reports for Culture Details):No lab results found.    Urine Studies    Recent Labs   Lab Test 12/29/23  1350 03/08/23  1035 02/21/19  1008 01/08/19  1029 08/21/18  0040   LEUKEST 500 [place]/uL* Negative Negative Trace* Negative   WBCU 67* 0 0-5  --   --        Vancomycin Levels  No lab results found.    Invalid input(s): \"VANCO\"    Hepatitis B Testing No lab results found.  Hepatitis C Testing   No results found for: \"HCVAB\", \"HQTG\", \"HCGENO\", \"HCPCR\", \"HQTRNA\", \"HEPRNA\"  HIVTesting No lab results found.    Respiratory Virus Testing    No results found for: \"RS\", \"FLUAG\"  COVID-19 Antibody Results, Testing for Immunity           No data to display              COVID-19 PCR Results          8/23/2022    14:59   COVID-19 PCR Results   SARS CoV2 PCR Negative        "

## 2024-07-02 NOTE — CONSULTS
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 (nun)  Type of CM/SW Visit: Initial Assessment    Primary Care Provider verified and updated as needed: Yes   Readmission within the last 30 days: no previous admission in last 30 days         Advance Care Planning: Advance Care Planning Reviewed:  (states she has HCD)          Communication Assessment  Patient's communication style: spoken language (English or Bilingual)             Cognitive  Cognitive/Neuro/Behavioral:                        Living Environment:   People in home:       Current living Arrangements:        Able to return to prior arrangements:         Family/Social Support:  Care provided by:    Provides care for:                  Description of Support System:           Current Resources:   Patient receiving home care services:       Community Resources:    Equipment currently used at home: walker, rolling  Supplies currently used at home:      Employment/Financial:  Employment Status:          Financial Concerns:             Does the patient's insurance plan have a 3 day qualifying hospital stay waiver?  Yes     Which insurance plan 3 day waiver is available? Alternative insurance waiver    Will the waiver be used for post-acute placement? Undetermined at this time    Lifestyle & Psychosocial Needs:  Social Determinants of Health     Food Insecurity: Low Risk  (4/5/2024)    Food Insecurity     Within the past 12 months, did you worry that your food would run out before you got money to buy more?: No     Within the past 12 months, did the food you bought just not last and you didn t have money to get more?: No   Depression: Not at risk (4/5/2024)    PHQ-2     PHQ-2 Score: 0   Housing Stability: Low Risk  (4/5/2024)    Housing Stability     Do you have housing? : Yes     Are you worried about losing your housing?: No   Tobacco Use: Low Risk  (6/19/2024)    Patient History     Smoking Tobacco Use:  Never     Smokeless Tobacco Use: Never     Passive Exposure: Never   Financial Resource Strain: Low Risk  (4/5/2024)    Financial Resource Strain     Within the past 12 months, have you or your family members you live with been unable to get utilities (heat, electricity) when it was really needed?: No   Alcohol Use: Not At Risk (3/27/2023)    AUDIT-C     Frequency of Alcohol Consumption: Never     Average Number of Drinks: Patient does not drink     Frequency of Binge Drinking: Never   Transportation Needs: Low Risk  (4/5/2024)    Transportation Needs     Within the past 12 months, has lack of transportation kept you from medical appointments, getting your medicines, non-medical meetings or appointments, work, or from getting things that you need?: No   Physical Activity: Insufficiently Active (3/27/2023)    Exercise Vital Sign     Days of Exercise per Week: 3 days     Minutes of Exercise per Session: 10 min   Interpersonal Safety: Low Risk  (4/5/2024)    Interpersonal Safety     Do you feel physically and emotionally safe where you currently live?: Yes     Within the past 12 months, have you been hit, slapped, kicked or otherwise physically hurt by someone?: No     Within the past 12 months, have you been humiliated or emotionally abused in other ways by your partner or ex-partner?: No   Stress: No Stress Concern Present (4/5/2024)    [employer] [place] [employer] - Occupational Stress Questionnaire     Feeling of Stress : Not at all   Social Connections: Moderately Integrated (3/27/2023)    Social Connection and Isolation Panel [NHANES]     Frequency of Communication with Friends and Family: More than three times a week     Frequency of Social Gatherings with Friends and Family: More than three times a week     Attends [employer] Services: More than 4 times per year     Active Member of Clubs or Organizations: Yes     Attends Club or Organization Meetings: More than 4 times per year     Marital Status:  Never    Health Literacy: Not on file       Functional Status:  Prior to admission patient needed assistance:   Dependent ADLs:: Bathing, Ambulation-walker, Dressing, Toileting                  Additional Information:    Assessment completed with Sister Theresa (nun). [facility] She was hospitalized 5/22-5/29 and discharged to [facility]. She had been progressing with therapy and ambulating with her walker.  They have a bed hold with goal to return to [facility] at discharge.  [facility] to transport. Discussed potential transportation cost.     Lisa Ascencio RN

## 2024-07-02 NOTE — PROGRESS NOTES
Is the implanted device safe for MRI Exam? Yes  Is this device 3T compatible? Yes  Device Type: Pacemaker      Device Information: Medtronic, PPM Elmo (D)      Cardiology Orders for Device Programming     -- Yes -- The patient has a MRI conditional pulse generator and leads from the same     -- Yes -- The pulse generator and leads have been implanted for at least 6 weeks. (Does not apply to [employer] devices)    -- Yes-- The device is implanted in the right or left pectoral region    -- Yes -- There are not any additional active cardiac devices, abandoned leads, lead extenders or adapters    -- Yes -- The device lead impedance measurements are within the normal range per  guidelines    -- N/A -- If the patient is pacemaker dependent the thresholds are less than or equal to 2.0V @ 0.4ms.    -- Yes -- RA and RV leads are programmed to bipolar pacing operation or pacing off. If no, CONTACT THE DEVICE REP FOR PROGRAMMING     Date of last In-clinic Device check: Rep check 6/19/24  Results of last Device check:  1.   Right atrium impedance: 361 Ohms   2.   Right ventricle impedance: 570 Ohms   3.   Left ventricle impedance: n/a      4.   Right atrium threshold: 0.625V @ 0.4 ms   5.   Right ventricle threshold: 0.625V @ 0.4 ms   6.   Left ventricle threshold: n/a      Device programming during the scan guidelines   Pacing Mode (check one): Use the recommended pacing mode per Medtronic MRI Access Application  Pacing Rate: n/a  bpm   If remote reprogramming is applicable, please contact the Rep to assist      Dorothy Bray RN

## 2024-07-02 NOTE — ED TRIAGE NOTES
Patient referred to ED from [facility] for worsening infection in the R knee. Patient has a PICC line and is being treated for this with IV antibiotics. She states it was initially improving but over the last week, pain, swelling, and erythema have increased. She has unbearable pain with ambulation.

## 2024-07-02 NOTE — ANESTHESIA PROCEDURE NOTES
Airway       Patient location during procedure: OR  Staff -        Anesthesiologist:  Sachin Wei MD       CRNA: Cece Caro APRN CRNA       Performed By: anesthesiologistIndications and Patient Condition       Indications for airway management: deniz-procedural       Induction type:intravenous       Mask difficulty assessment: 0 - not attempted    Final Airway Details       Final airway type: supraglottic airway    Supraglottic Airway Details        Type: LMA       Brand: Ambu AuraGain       LMA size: 4    Post intubation assessment        Placement verified by: capnometry, equal breath sounds and chest rise        Number of attempts at approach: 1       Secured with: tape       Ease of procedure: easy       Dentition: Intact and Unchanged

## 2024-07-02 NOTE — H&P
[facility]    History and Physical - Hospitalist Service       Date of Admission:  7/2/2024    Assessment & Plan      Gladys Ramirez is a 89 year old female admitted on 7/2/2024.     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  Right knee pain secondary to above  - N.p.o.  - Inflammatory markers  - Plan for surgical washout this evening by orthopedic surgery.  Proceed without further perioperative cardiac evaluation.  Patient is maintained on DOAC which will be held.  - Will continue with IV Ancef but hold on any further alternative antimicrobial agents pending cultures.  - Appreciate orthopedic surgery input and assistance  - Infectious disease consultation.  Assistance appreciated.  -MRI right knee    Paroxysmal atrial fibrillation  - Hold DOAC  - Rate control once med rec completed    HFrEF without acute decompensated heart failure  - LVEF 20-25%  - Hold diuretics for today as not volume overloaded at present  - Await pharmacy to complete med rec    Status post dual-chamber pacemaker  - MRI compatible and verified with MRI    CKD stage III  - Check labs  - Strict I's/O and daily weights    Hypertension  - Await pharmacy to complete med rec        Diet: NPO per Anesthesia Guidelines for Procedure/Surgery Except for: Meds, Ice Chips    DVT Prophylaxis: Pneumatic Compression Devices  Reyes Catheter: Not present  Lines: PRESENT             Cardiac Monitoring: None  Code Status: Full Code      Clinically Significant Risk Factors Present on Admission               # Drug Induced Coagulation Defect: home medication list includes an anticoagulant medication    # Hypertension: Noted on problem list  # Chronic heart failure with reduced ejection fraction: last echo with EF <40%            # Overweight: Estimated body mass index is 28.32 kg/m  as calculated from the following:    Height as of this encounter: 1.524 m (5').    Weight as of this  encounter: 65.8 kg (145 lb).       # Financial/Environmental Concerns:     # Pacemaker present       Disposition Plan     Medically Ready for Discharge: Anticipated in 2-4 Days           Ricco Lei DO, DO  Hospitalist Service  [facility]  Securely message with [employer] (more info)  Text page via [employer] Paging/Directory     ______________________________________________________________________    Chief Complaint     Right knee pain, redness    History of Present Illness   Gladys Ramirez is a 89 year old female who has a past medical history significant for nonischemic cardiomyopathy/HF R EF with left bundle branch block and LVEF 20-25% on May 23, 2024 echocardiogram.  Patient also has a history of MRI compatible dual-chamber pacemaker placement in 4/8/2024, hypertension, hyperlipidemia, CKD stage III who was diagnosed with a right septic knee due to Staphylococcus lugdunensis back in May 2024.  She underwent washout by orthopedic surgery and has been maintained on IV Ancef as an outpatient through a right PICC line.  Patient last evaluated by infectious disease on 6/27 where he was advised to extend IV antibiotic coverage to the end of July 12 followed by Keflex 500 mg.  Patient was seen in orthopedic surgery clinic today due to increased pain, redness concerning for recurrent right septic knee and directly admitted for further evaluation and management for concern of recurrent right septic knee.  Patient has been evaluated by orthopedic surgery in the emergency department and is planning on washout of the right knee this evening.      Past Medical History    Past Medical History:   Diagnosis Date    Angina pectoris (H24)     Atrial fibrillation (H)     Chest pain 03/09/2017    Chronic kidney disease     Congestive heart failure (H)     Cough     Hyperlipidemia     Hypertension     Osteoarthritis        Past Surgical History   Past Surgical History:   Procedure Laterality Date     APPENDECTOMY      ARTHROSCOPY KNEE Right 5/23/2024    Procedure: ARTHROSCOPY, KNEE;  Surgeon: Sanchez Monahan MD;  Location: [facility] OR    BASAL CELL CARCINOMA EXCISION      nose    CARDIOVERSION  06/19/2024    EP PACEMAKER DEVICE & IMPLANT- HIS LEAD DUAL N/A 4/8/2024    Procedure: Pacemaker Device & Lead Implant - HIS Lead Dual;  Surgeon: Jeannie Rivera MD;  Location: [facility] CATH LAB CV    INCISION AND DRAINAGE KNEE, COMBINED Right 5/23/2024    Procedure: INCISION AND DRAINAGE, KNEE;  Surgeon: Sanchez Monahan MD;  Location: [facility]    LAPAROSCOPY DIAGNOSTIC (GENERAL) N/A 11/04/2014    Procedure: LAPAROSCOPY BILATERAL SALPINGO-OOPHORECTOMY ;  Surgeon: Sofia Harper MD;  Location: [facility];  Service:     OPEN REDUCTION INTERNAL FIXATION HIP BIPOLAR Right 3/5/2023    Procedure: HEMIARTHROPLASTY, HIP, BIPOLAR;  Surgeon: Jose G Martinez MD;  Location: [facility] OR    PICC SINGLE LUMEN PLACEMENT  05/24/2024    TONSILLECTOMY      10 years old    ZZC TOTAL KNEE ARTHROPLASTY Left     2011       Prior to Admission Medications     Await med rec completion    Physical Exam   Vital Signs: Temp: 97.5  F (36.4  C) Temp src: Temporal BP: 121/77 Pulse: 90   Resp: 16 SpO2: 98 % O2 Device: None (Room air)    Weight: 145 lbs 0 oz    Physical Examination:   General appearance - alert, and in no distress  Eyes - pupils equal and reactive, extraocular eye movements intact, sclera anicteric  Mouth - mucous membranes moist, pharynx normal without lesions  Lungs - clear to auscultation, no wheezes, rales or rhonchi, symmetric air entry  Heart - normal rate, regular rhythm, normal S1, S2, no murmurs, rubs, clicks or gallops. No peripheral edema.  Abdomen - soft, nontender, nondistended, no masses or organomegaly, BS+  Neurological - alert, oriented, normal speech, no focal findings or movement disorder noted  Skin - right knee red, edema, ttp    Lab/imaging reviewed

## 2024-07-02 NOTE — ANESTHESIA CARE TRANSFER NOTE
Patient: Gladys Ramirez    Procedure: Procedure(s):  Right knee open irrigation and debridement       Diagnosis: Staphylococcal arthritis of right knee (H) [M00.061]  Diagnosis Additional Information: No value filed.    Anesthesia Type:   General     Note:    Oropharynx: oropharynx clear of all foreign objects  Level of Consciousness: awake  Oxygen Supplementation: face mask  Level of Supplemental Oxygen (L/min / FiO2): 6  Independent Airway: airway patency satisfactory and stable  Dentition: dentition unchanged  Vital Signs Stable: post-procedure vital signs reviewed and stable  Report to RN Given: handoff report given  Patient transferred to: PACU    Handoff Report: Identifed the Patient, Identified the Reponsible Provider, Reviewed the pertinent medical history, Discussed the surgical course, Reviewed Intra-OP anesthesia mangement and issues during anesthesia, Set expectations for post-procedure period and Allowed opportunity for questions and acknowledgement of understanding      Vitals:  Vitals Value Taken Time   /68    Temp 97.7f    Pulse 75 07/02/24 1803   Resp 29 07/02/24 1803   SpO2 100 % 07/02/24 1803   Vitals shown include unfiled device data.    Electronically Signed By: SHASHA Valderrama CRNA  July 2, 2024  6:04 PM

## 2024-07-02 NOTE — ANESTHESIA PREPROCEDURE EVALUATION
"Anesthesia Pre-Procedure Evaluation    Patient: Gladys Ramirez   MRN: [id number] : 1930        Procedure : Procedure(s):  INCISION AND DRAINAGE, KNEE  ARTHROSCOPY, KNEE          Past Medical History:   Diagnosis Date    Angina pectoris (H24)     Atrial fibrillation (H)     Chest pain 2017    Chronic kidney disease     Congestive heart failure (H)     Cough     Hyperlipidemia     Hypertension     Osteoarthritis       Past Surgical History:   Procedure Laterality Date    APPENDECTOMY      ARTHROSCOPY KNEE Right 2024    Procedure: ARTHROSCOPY, KNEE;  Surgeon: Sanchez Monahan MD;  [place] [facility] [place]AL CELL CARCINOMA EXCISION      nose    CARDIOVERSION  2024    EP PACEMAKER DEVICE & IMPLANT- HIS LEAD DUAL N/A 2024    Procedure: Pacemaker Device & Lead Implant - HIS Lead Dual;  Surgeon: Jeannie Rivera MD;  Location: [facility] CATH LAB CV    INCISION AND DRAINAGE KNEE, COMBINED Right 2024    Procedure: INCISION AND DRAINAGE, KNEE;  Surgeon: Sanchez Monahan MD;  [place] [facility] [place]    LAPAROSCOPY DIAGNOSTIC (GENERAL) N/A 2014    Procedure: LAPAROSCOPY BILATERAL SALPINGO-OOPHORECTOMY ;  Surgeon: Sofia Harper MD;  Location: [facility] OR;  Service:     OPEN REDUCTION INTERNAL FIXATION HIP BIPOLAR Right 3/5/2023    Procedure: HEMIARTHROPLASTY, HIP, BIPOLAR;  Surgeon: Jose G Martinez MD;  [place] [facility] [place]    PICC SINGLE LUMEN PLACEMENT  2024    TONSILLECTOMY      10 years old    ZC TOTAL KNEE ARTHROPLASTY Left           Allergies   Allergen Reactions    Trazodone Shortness Of Breath and Unknown     Allergic to trazodone and deriv., Other: trouble swallowing      Clindamycin Diarrhea     C-diff    Gabapentin Other (See Comments)     \"Internal tremors\"    Temazepam Other (See Comments)     Annotation: Nightmares        Social History     Tobacco Use    Smoking status: Never     Passive exposure: Never    " Smokeless tobacco: Never    Tobacco comments:     no passive exposure   Substance Use Topics    Alcohol use: Yes     Comment: Alcoholic Drinks/day: Rarely a glass of wine      Wt Readings from Last 1 Encounters:   07/02/24 65.8 kg (145 lb)        Anesthesia Evaluation   Pt has had prior anesthetic.     No history of anesthetic complications       ROS/MED HX  ENT/Pulmonary:  - neg pulmonary ROS     Neurologic:  - neg neurologic ROS     Cardiovascular:     (+) Dyslipidemia hypertension- -   -  - -   Taking blood thinners   CHF  Last EF: 20-25   JNOES.   pacemaker,  type: dual chamber,  - Patientt is not dependent on pacemaker.      dysrhythmias, a-fib,             METS/Exercise Tolerance: >4 METS    Hematologic:  - neg hematologic  ROS     Musculoskeletal:   (+)  arthritis,             GI/Hepatic:     (+) GERD,                   Renal/Genitourinary:     (+) renal disease, type: CRI,            Endo:     (+)          thyroid problem, hypothyroidism,           Psychiatric/Substance Use:  - neg psychiatric ROS     Infectious Disease:     (+) Recent Fever,           Malignancy:  - neg malignancy ROS     Other:  - neg other ROS          Physical Exam    Airway  airway exam normal      Mallampati: II   TM distance: > 3 FB   Neck ROM: full   Mouth opening: > 3 cm    Respiratory Devices and Support         Dental  no notable dental history     (+) Modest Abnormalities - crowns, retainers, 1 or 2 missing teeth      Cardiovascular   cardiovascular exam normal          Pulmonary   pulmonary exam normal                OUTSIDE LABS:  CBC:   Lab Results   Component Value Date    WBC 7.4 07/02/2024    WBC 7.6 07/01/2024    HGB 11.4 (L) 07/02/2024    HGB 11.2 (L) 07/01/2024    HCT 37.8 07/02/2024    HCT 37.9 07/01/2024     07/02/2024     07/01/2024     BMP:   Lab Results   Component Value Date     07/02/2024     07/01/2024    POTASSIUM 4.1 07/02/2024    POTASSIUM 3.5 07/01/2024    CHLORIDE 100 07/02/2024     "CHLORIDE 100 07/01/2024    CO2 25 07/02/2024    CO2 25 07/01/2024    BUN 22.3 07/02/2024    BUN 19.3 07/01/2024    CR 0.87 07/02/2024    CR 0.80 07/01/2024     (H) 07/02/2024     (H) 07/01/2024     COAGS:   Lab Results   Component Value Date    PTT 32 02/21/2019    INR 1.33 (H) 03/04/2023     POC: No results found for: \"BGM\", \"HCG\", \"HCGS\"  HEPATIC:   Lab Results   Component Value Date    ALBUMIN 3.5 07/01/2024    PROTTOTAL 7.1 07/01/2024    ALT  07/01/2024      Comment:      Unsatisfactory specimen - lipemic  Reference intervals for this test were updated on 6/12/2023 to more accurately reflect our healthy population. There may be differences in the flagging of prior results with similar values performed with this method. Interpretation of those prior results can be made in the context of the updated reference intervals.      AST  07/01/2024      Comment:      Unsatisfactory specimen - lipemic  Reference intervals for this test were updated on 6/12/2023 to more accurately reflect our healthy population. There may be differences in the flagging of prior results with similar values performed with this method. Interpretation of those prior results can be made in the context of the updated reference intervals.    ALKPHOS 147 07/01/2024    BILITOTAL 0.4 07/01/2024     OTHER:   Lab Results   Component Value Date    LACT 1.5 05/22/2024    A1C 6.0 (H) 06/15/2021    MARLENE 9.4 07/02/2024    PHOS 3.4 12/22/2023    MAG 2.1 12/24/2023    LIPASE <9 02/21/2019    TSH 6.25 (H) 12/29/2023    T4 1.03 12/29/2023    CRP 0.8 03/05/2018    SED 65 (H) 07/02/2024       Anesthesia Plan    ASA Status:  3    NPO Status:  NPO Appropriate    Anesthesia Type: General.     - Airway: LMA   Induction: Intravenous, Propofol.   Maintenance: Balanced.   Techniques and Equipment:       - Drips/Meds: Ketamine, Vasopressin, Phenylephrine     Consents    Anesthesia Plan(s) and associated risks, benefits, and realistic alternatives discussed. " Questions answered and patient/representative(s) expressed understanding.     - Discussed:     - Discussed with:  Patient      - Extended Intubation/Ventilatory Support Discussed: No.      - Patient is DNR/DNI Status: Yes             Suspend during perioperative period? Yes.    Use of blood products discussed: No .     Postoperative Care    Pain management: IV analgesics, Multi-modal analgesia.     - Plan for long acting post-op opioid use   PONV prophylaxis: Ondansetron (or other 5HT-3), Dexamethasone or Solumedrol, Droperidol or Haldol     Comments:               Sachin Wei MD    I have reviewed the pertinent notes and labs in the chart from the past 30 days and (re)examined the patient.  Any updates or changes from those notes are reflected in this note.          # Hypoalbuminemia: Lowest albumin = 2.9 g/dL in the past 30 days , will monitor as appropriate   # Drug Induced Coagulation Defect: home medication list includes an anticoagulant medication   # Overweight: Estimated body mass index is 28.32 kg/m  as calculated from the following:    Height as of this encounter: 1.524 m (5').    Weight as of this encounter: 65.8 kg (145 lb).

## 2024-07-02 NOTE — CONSULTS
ORTHOPEDIC CONSULTATION    Consultation  Gladys Ramirez DOB 1930, MRN [id number]    Staphylococcal arthritis of right knee (H)  DNR (do not resuscitate)  Long term (current) use of anticoagulants    PCP: Margret Flores, [phone]   Code status:  Full Code       Extended Emergency Contact Information  Primary Emergency Contact: Sister CARMEN Villarreal  Mobile Phone: [phone]  Relation: Sister  Secondary Emergency Contact: YUDI العراقي  Mobile Phone: [phone]  Relation: Sister         IMPRESSION:  Patient is a 89-year-old female status post right knee I/D with Dr. Monahan on 2024 who has progressively worsening swelling, erythema, and pain of the right knee.  She was sent to the emergency department after seeing Dr. Monahan's team as an outpatient due to her worsening symptoms.  She has been on IV antibiotics since time of surgery     PLAN:  This patient was discussed with Dr. Syed, on-call surgeon for [facility] and they are in agreement with the following plan.   -Due to worsening pain, erythema, and swelling of the knee are tentatively planning to bring the patient to the OR this evening for repeat I/D.  Keep patient n.p.o. at this time  -Ordered a stat ultrasound-guided aspiration to help verify septic joint prior to surgery.  -Stat MRI to assess knee as well  -Weightbearing as tolerated right lower extremity  -Continue IV antibiotics as indicated per infectious disease team.  -Pain control per primary team   - medical clearance for surgery per hospitalist.  Appreciate Recommendations.  Hemoglobin 11.4    Thank you for including [facility] in the care of Gladys Ramirez. It has been a pleasure participating in their care.        CHIEF COMPLAINT: <principal problem not specified>    HISTORY OF PRESENT ILLNESS:  The patient is seen in orthopedic consultation at the request of Ricco Lei DO for right knee pain.  The patient is a 89 year old female    Today patient is  "experiencing right knee pain that has been progressively worsening for the last 3 to 4 days.  She previously had a right knee I/D with Dr. Monahan for her native knee and has been on IV antibiotics since that time.  She had a follow-up with the infectious disease team yesterday and duration of her IV antibiotics was extended.  She then saw Dr. Monahan's team this morning and due to increasing erythema, swelling, and pain in her knee she was instructed to come into the emergency department for further evaluation.  Patient does not note any fevers/chills at this time.  She is having pain only when weightbearing and ambulating, she has no pain at rest.  She notes that she has definitely had increasing swelling and erythema over the last 3 to 4 days.    ALLERGIES:   Review of patient's allergies indicates   Allergies   Allergen Reactions    Trazodone Shortness Of Breath and Unknown     Allergic to trazodone and deriv., Other: trouble swallowing      Clindamycin Diarrhea     C-diff    Gabapentin Other (See Comments)     \"Internal tremors\"    Temazepam Other (See Comments)     Annotation: Nightmares           MEDICATIONS UPON ADMISSION:  Medications were reviewed.  They include:   (Not in a hospital admission)        SOCIAL HISTORY:   she  reports that she has never smoked. She has never been exposed to tobacco smoke. She has never used smokeless tobacco. She reports current alcohol use. She reports that she does not use drugs.      FAMILY HISTORY:  family history includes Cancer in her brother and sister; Heart Disease in her mother; Lung Cancer in her brother, brother, and sister; No Known Problems in her father; Rheumatic Heart Disease in her mother.      REVIEW OF SYSTEMS:   Reviewed with patient. See HPI, otherwise negative       PHYSICAL EXAMINATION:  Vitals: /77   Pulse 90   Temp 97.5  F (36.4  C) (Temporal)   Resp 16   Ht 1.524 m (5')   Wt 65.8 kg (145 lb)   SpO2 98%   BMI 28.32 kg/m    General: On " examination, the patient is resting comfortably, NAD, awake, and alert and oriented to person, place, time, and, and general circumstances   SKIN: There is swelling and erythema through the anterior aspect of the knee.  She does have well-healing incisions without any presence of drainage or bleeding  Pulses:  Dorsalis pedis and posterior tibialis pulse is intact and equal bilaterally  Sensation: intact and equal bilaterally to the distal lower extremities.  Tenderness: NTTP of the femur, knee, tibia, fibula, ankle, foot.  ROM: Active range of motion from about 20 degrees to 90 degrees without pain.  Passive range of motion in the same arc without pain.  She is unable to extend to full extension  Motor: TIB ANT, PF, ELH, QUAD, HF 5/5  Contralateral side= Full range of motion, Negative joint instability findings, 5/5 motor groups about the joint, Non-tender.       RADIOGRAPHIC EVALUATION:  Personally reviewed      PERTINENT LABS:  Personally reviewed  Recent Labs   Lab Test 07/02/24  1143 03/05/23  0726 03/04/23  1905   INR  --   --  1.33*   HGB 11.4*   < > 12.5      < > 225    < > = values in this interval not displayed.         SALVATORE NAVA PA-C  Date: 7/2/2024  Time: 1:01 PM  [facility]    CC1:   Ricco Lei DO

## 2024-07-02 NOTE — OP NOTE
PATIENT: Gladys Ramirez  MR# :   [id number]  DATE OF OPERATION: 07/02/24    SURGEON:  Rakan Syed MD     ASSISTANT: None      Preoperative diagnosis:   Right knee recurrent septic arthritis     Postoperative diagnosis:   Right knee recurrent septic arthritis    Surgical procedure:   Right knee open irrigation and debridement with partial synovectomy    Anesthesia:  General with regional block    Drains: None    Estimated blood loss: 10 cc    IV fluids: Please see Anesthesia Report    Complications: None identified intraoperatively     Blood products: none given     Specimens:   ID Type Source Tests Collected by Time Destination   A : right  knee fluid Swab Knee, Right ANAEROBIC BACTERIAL CULTURE ROUTINE, GRAM STAIN, AEROBIC BACTERIAL CULTURE ROUTINE Rakan Syed MD 7/2/2024  5:17 PM    B : right knee synovium anerobic aerobic and gram stain Tissue Knee, Right ANAEROBIC BACTERIAL CULTURE ROUTINE, GRAM STAIN, AEROBIC BACTERIAL CULTURE ROUTINE Rakan Syed MD 7/2/2024  5:17 PM        Findings: Cloudy blood tinged fluid within knee joint, devitalized appearing synovial tissue.     COMPONENTS IMPLANTED: None    Tourniquet time: 40 minutes    INDICATIONS:    Gladys Ramirez is a 89 year old female admitted for a right knee infection.  She had a recent right knee arthroscopic irrigation debridement performed by my partner, Dr. Sanchez Monahan.  She had been doing well until about 3 days ago when she developed increasing erythema, pain and swelling.  She returned to Dr. Monahan's clinic and was sent to the emergency department for further evaluation.  MRI was done showing a large knee joint effusion with extensive synovitis.  Aspiration was performed.    Preoperatively, the nature of the procedure, risks and benefits, as well as alternatives including nonsurgical management were discussed in detail with the patient. I reviewed and discussed the patient's condition and relevant images with the patient. We discussed  options for further evaluation and treatment, including conservative non-operative management versus surgical intervention.  Given the patient's age and multiple medical comorbidities as well as the recurrent erythema and MRI findings, I discussed my recommendation for proceeding with surgical intervention.  I discussed that it would be dangerous to potentially leave a persistent infection especially given that she is still on IV antibiotics and has obvious erythema, pain and swelling.  Therefore, we made the joint decision to proceed with surgical intervention in the form of an open irrigation and debridement.    We also discussed at length the risks and benefits of surgery. Our discussion included but was not limited to the risk of pain, bleeding, infection, blood clots (DVT, PE), wound issues, continued cpain, post-operative joint stiffness, painful arc of motion, difficulty with ambulation, damage to nearby neurovascular structures, and need for further surgeries. The possibility of intra-operative and/or post-operative medical complications such as anesthesia complications or reactions, respiratory and cardiovascular events, stroke, heart attack and/or death were also discussed.     Specific details of the surgical procedure, hospitalization, recovery, rehabilitation, and long-term precautions were also presented. The final choices will be made at the time of procedure to match the anatomy and condition of the bone, ligaments, tendons, and muscles. All of patients questions were answered preoperatively at which time informed consent was taken.      PROCEDURE:    After proper identification of the patient including verification and marking of the surgical site, the patient was brought to the operating room.  General anesthesia was given without complications and prophylactic IV Ancef was confirmed to have been administered within the appropriate timeframe(s). The patient was placed in the supine position with  All bony prominences were well padded.  The surgical site which was properly marked, was then prepped and draped in the usual sterile fashion. A timeout was done utilizing protocol to again identify the correct patient and operative site, which was marked appropriately.    Filion exsanguination was performed and the thigh tourniquet was inflated.  I began by making an anterior medial approach to the knee extending off the patient's prior anterior medial knee portal.  This was extended proximally.  I dissected down through the superficial tissue I then performed a medial arthrotomy just medial to the patella leaving a cuff of tissue for later repair.  A moderate amount of cloudy, blood-tinged fluid was encountered.  This was collected and sent for specimen.  I then performed a thorough irrigation and debridement.  Using a knife, rongeur and curette, I debrided the synovial tissue throughout the knee.  There was a moderate amount of devitalized appearing synovium that was debrided and removed.  Once I had felt that an adequate debridement was performed, I proceeded to irrigate the knee copiously with 9 L of normal saline.  Irrigation was paused for multiple Second Look debridements which removed additional devitalized appearing tissue.  Following completion of the 9 L of irrigation, all dirty drapes, gloves and surgical instruments were removed from the field.  We changed our gloves and proceeded to use clean instruments to close the patient's arthrotomy.  The arthrotomy was closed with an 0 PDS suture.  Layered closure was then performed with 2-0 PDS, 3-0 Monocryl and 3-0 nylon sutures.  The tourniquet was then let down and a Mepilex dressing and Ace wrap was placed.    The patient was then awakened from anesthesia and transferred the PACU in stable condition.    Sponge, needle, and instrument counts were correct at the conclusion of the procedure. The patient has palpable distal pulses in both lower extremity.      Postoperative Plan:  Pain Control: Continue per pain protocol.  Weight Bearing: Weightbearing as tolerated.  No knee range of motion restrictions.  DVT Prophylaxis: Okay to restart home Eliquis on postop day 1  Antibiotics: Continue IV Ancef per infectious disease recommendations.  Will follow intraoperative cultures and follow-up on recommendations for further IV antibiotic management  GI: Plan for aggressive bowel regimen to prevent constipation from narcotic medications  Lines: HLIV once tolerating PO  PT/OT: Eval and treatment. Will follow up on recommendations.  Follow up:  in 2 weeks in clinic with either myself or Dr. Monahan's team.  Discharge plan: Likely return to transitional care facility.    Dispo: stable to pacu    Rakan Syed MD  Orthopedic Surgery  [facility]

## 2024-07-02 NOTE — MEDICATION SCRIBE - ADMISSION MEDICATION HISTORY
Medication Scribe Admission Medication History    Admission medication history is complete. The information provided in this note is only as accurate as the sources available at the time of the update.    Information Source(s): Facility ([facility]/) medication list/MAR via N/A    Pertinent Information: patient's medications are being managed by [facility]. Received MAR.    Changes made to PTA medication list:  Added: Ancef, Keflex, Nystatin Powder  Deleted: None  Changed: Tylenol to 1000 mg TID     Allergies reviewed with patient and updates made in EHR: yes    Medication History Completed By: John Campos 7/2/2024 1:45 PM    PTA Med List   Medication Sig Last Dose    acetaminophen (TYLENOL) 500 MG tablet Take 1,000 mg by mouth 3 times daily 7/2/2024 at am    amiodarone (PACERONE) 200 MG tablet Take 0.5 tablets (100 mg) by mouth daily 7/2/2024 at am    apixaban ANTICOAGULANT (ELIQUIS) 5 MG tablet Take 1 tablet (5 mg) by mouth 2 times daily 7/2/2024 at am    cephALEXin (KEFLEX) 500 MG capsule Take 500 mg by mouth every morning For 90 days. Until 10/11/24 7/2/2024 at am    cholecalciferol, vitamin D3, (VITAMIN D3) 2,000 unit Tab [CHOLECALCIFEROL, VITAMIN D3, (VITAMIN D3) 2,000 UNIT TAB] Take 1 tablet (2,000 Units total) by mouth daily. 7/2/2024 at am    diclofenac (FLECTOR) 1.3 % patch Externally apply 1 patch topically 2 times daily 7/2/2024 at am    DULoxetine (CYMBALTA) 60 MG capsule Take 60 mg by mouth daily 7/2/2024 at am    furosemide (LASIX) 20 MG tablet Take 20 mg by mouth every evening In addition, take 40 mg in the morning. 7/1/2024 at pm    furosemide (LASIX) 40 MG tablet Take 40 mg by mouth every morning In addition, take 20 mg in the evening. 7/2/2024 at am    lisinopril (ZESTRIL) 2.5 MG tablet TAKE 1 TABLET BY MOUTH ONE TIME DAILY 7/2/2024 at am    magnesium hydroxide (MILK OF MAGNESIA) 400 MG/5ML suspension Take 30 mLs by mouth daily as needed for constipation (Use if polyethylene glycol (Miralax) is  not effective after 24 hours.) Unknown at prn    metoprolol succinate ER (TOPROL XL) 25 MG 24 hr tablet Take 1 tablet (25 mg) by mouth daily 7/2/2024 at am    nystatin (MYCOSTATIN) 791054 UNIT/GM external powder Apply topically 2 times daily as needed Unknown at prn    oxyCODONE (ROXICODONE) 5 MG tablet Take 1 tablet (5 mg) by mouth every 4 hours as needed for moderate to severe pain (Max 4 tabs per day) #120 tabs to last 30 days for chronic pain. Ok to fill and start May 2, 2024 7/2/2024 at am    potassium chloride tamiko ER (KLOR-CON M20) 20 MEQ CR tablet Take 1 tablet (20 mEq) by mouth daily 7/2/2024 at am    senna-docusate (SENOKOT-S/PERICOLACE) 8.6-50 MG tablet Take 2 tablets by mouth 2 times daily as needed for constipation Unknown at prn    sterile water (preservative free) SOLN 10 mL with ceFAZolin 1 G SOLR 1 g soln for IVP Inject 1 g into the vein every 12 hours Until 7/12/24 @ 23:59 7/2/2024 at am

## 2024-07-02 NOTE — ED PROVIDER NOTES
EMERGENCY DEPARTMENT ENCOUNTER      NAME: Gladys Ramirez  AGE: 89 year old female  YOB: 1930  MRN: [id number]  EVALUATION DATE & TIME: No admission date for patient encounter.    PCP: Margret Flores    ED PROVIDER: John Smith M.D.    Chief Complaint   Patient presents with    Knee Pain    Infection       FINAL IMPRESSION:  1. Staphylococcal arthritis of right knee (H)    2. DNR (do not resuscitate)    3. Long term (current) use of anticoagulants        ED COURSE & MEDICAL DECISION MAKING:    Pertinent Labs & Imaging studies independently interpreted by me. (See chart for details)  Reviewed prior hospital admission from May 22 when patient was admitted with knee osteoarthritis and staphylococcal arthritis of the right knee, he is on IV Ancef and recently followed up with infectious disease on June 27 with continued elevated ESR and CRP.    ED Course as of 07/02/24 1212   Tue Jul 02, 2024   1140 Patient seen and examined, presents with right knee pain after having washout for staph arthritis in May, was doing better but worse in the last week.  On exam here, vitally stable, moderate joint effusion with erythema particularly anterior and anterior laterally.  Labs ordered, will contact orthopedics and plan for admission   1208 Care discussed with Dr. Lei, hospitalist for admission.   1210 Spoke with [facility], plan for OR this evening for washout.  MRI of the knee has been ordered.  No new antibiotic recommendations at this time.         At the conclusion of the encounter I discussed the results of all of the tests and the disposition. The questions were answered. The patient or family acknowledged understanding and was agreeable with the care plan.     Medical Decision Making  Obtained supplemental history:Supplemental history obtained?: No  Reviewed external records: External records reviewed?: Documented in chart and Inpatient Record: [facility] 6/19  Care  impacted by chronic illness:Chronic Kidney Disease, Chronic Pain, Heart Disease, Hyperlipidemia, and Hypertension  Care significantly affected by social determinants of health:Access to Affordable Health Care  Did you consider but not order tests?: Work up considered but not performed and documented in chart, if applicable  Did you interpret images independently?: Independent interpretation of ECG and images noted in documentation, when applicable.  Consultation discussion with other provider:Did you involve another provider (consultant, , pharmacy, etc.)?: I discussed the care with another health care provider, see documentation for details.  Admit.      MEDICATIONS GIVEN IN THE EMERGENCY:  Medications - No data to display    NEW PRESCRIPTIONS STARTED AT TODAY'S ER VISIT  New Prescriptions    No medications on file       =================================================================    HPI    Patient information was obtained from: patient      Gladys Ramirez is a 89 year old female with a pertinent history of CHF, cardiomyopathy, Afib, Angina pectoris, CKD3a, GERD, osteoarthritis of both knees, HTN, and HLD who presents to this ED via referral from [place] for evaluation of knee pain and infection.     Patient was referred to the ER by [place] [facility] for septic arthritis of the knee with increasing redness and swelling.    Patient had a procedure for her staphylococcal arthritis on the right knee on 5/23.  She was there for about 5 days, then discharged to rehabilitation at [facility].  She is now endorsing knee pain and swelling that has been worsening over the past 2 weeks.  She notes that it is too painful for her to put pressure on the leg, and that she is having difficulty ambulating.  She denies any nausea or fevers.  She does mention that she just had a pacemaker put in about a month ago, before her knee procedure.    Per Chart Review:  5/22/2024:   Patient was admitted to [facility] with  worsening chronic knee pain.  Her right knee joint aspiration grew staph lugdunensis.  she was put on IV steroids and had a joint aspiration repeated in the ER, which is positive for intracellular crystals consistent with monosodium urate crystals.  On 5/23 she had a right knee arthroscopic irrigation and debridement.  Infectious disease was consulted and she was started on cefazolin and Vanco.  She was instructed to continue cefazolin for 1 month.  She had a PICC line placed.  Her knee pain resolved and she was discharged to a TCU on 5/29.  6/19/24:  Patient was seen at [facility] for cardioversion. Echo from 5/2025 showed LVEJ of 20-25% with diffuse hypokinesis of the left ventricle.   Also noted mild mitral and tricuspid regurgitation with dilation of the left atrium.   Cardioversion was uneventful and she was discharged home to her TCU.    REVIEW OF SYSTEMS   See HPI.  All other systems reviewed and negative    PAST MEDICAL HISTORY:  Past Medical History:   Diagnosis Date    Angina pectoris (H24)     Atrial fibrillation (H)     Chest pain 03/09/2017    Chronic kidney disease     Congestive heart failure (H)     Cough     Hyperlipidemia     Hypertension     Osteoarthritis        PAST SURGICAL HISTORY:  Past Surgical History:   Procedure Laterality Date    APPENDECTOMY      ARTHROSCOPY KNEE Right 5/23/2024    Procedure: ARTHROSCOPY, KNEE;  Surgeon: Sanchez Monahan MD;  Location: [facility] [place] OR    BASAL CELL CARCINOMA EXCISION      nose    CARDIOVERSION  06/19/2024    EP PACEMAKER DEVICE & IMPLANT- HIS LEAD DUAL N/A 4/8/2024    Procedure: Pacemaker Device & Lead Implant - HIS Lead Dual;  Surgeon: Jeannie Rivera MD;  Location: [facility] CATH LAB CV    INCISION AND DRAINAGE KNEE, COMBINED Right 5/23/2024    Procedure: INCISION AND DRAINAGE, KNEE;  Surgeon: Sanchez Monahan MD;  Location: [facility] OR    LAPAROSCOPY DIAGNOSTIC (GENERAL) N/A 11/04/2014    Procedure:  LAPAROSCOPY BILATERAL SALPINGO-OOPHORECTOMY ;  Surgeon: Sofia Harper MD;  Location: [facility] OR;  Service:     OPEN REDUCTION INTERNAL FIXATION HIP BIPOLAR Right 3/5/2023    Procedure: HEMIARTHROPLASTY, HIP, BIPOLAR;  Surgeon: Jose G Martinez MD;  Location: [facility]    PICC SINGLE LUMEN PLACEMENT  05/24/2024    TONSILLECTOMY      10 years old    ZZC TOTAL KNEE ARTHROPLASTY Left     2011       CURRENT MEDICATIONS:    No current facility-administered medications for this encounter.     Current Outpatient Medications   Medication Sig Dispense Refill    acetaminophen (TYLENOL) 325 MG tablet Take 3 tablets (975 mg) by mouth every 8 hours      acetaminophen (TYLENOL) 325 MG tablet Take 2 tablets (650 mg) by mouth every 4 hours as needed for other (For optimal non-opioid multimodal pain management to improve pain control.)  0    amiodarone (PACERONE) 200 MG tablet Take 0.5 tablets (100 mg) by mouth daily 45 tablet 0    apixaban ANTICOAGULANT (ELIQUIS) 5 MG tablet Take 1 tablet (5 mg) by mouth 2 times daily 60 tablet 3    cholecalciferol, vitamin D3, (VITAMIN D3) 2,000 unit Tab [CHOLECALCIFEROL, VITAMIN D3, (VITAMIN D3) 2,000 UNIT TAB] Take 1 tablet (2,000 Units total) by mouth daily. 90 tablet 3    diclofenac (FLECTOR) 1.3 % patch Externally apply 1 patch topically 2 times daily      DULoxetine (CYMBALTA) 60 MG capsule Take 60 mg by mouth daily      furosemide (LASIX) 40 MG tablet Take extra 40 mg today 6/18/24 and then take 40 mg in Am and 20 mg in PM starting tomorrow 6/19/24      lisinopril (ZESTRIL) 2.5 MG tablet TAKE 1 TABLET BY MOUTH ONE TIME DAILY 90 tablet 0    magnesium hydroxide (MILK OF MAGNESIA) 400 MG/5ML suspension Take 30 mLs by mouth daily as needed for constipation (Use if polyethylene glycol (Miralax) is not effective after 24 hours.)      metoprolol succinate ER (TOPROL XL) 25 MG 24 hr tablet Take 1 tablet (25 mg) by mouth daily 90 tablet 3    oxyCODONE (ROXICODONE) 5 MG  "tablet Take 1 tablet (5 mg) by mouth every 4 hours as needed for moderate to severe pain (Max 4 tabs per day) #120 tabs to last 30 days for chronic pain. Ok to fill and start May 2, 2024 20 tablet 0    potassium chloride tamiko ER (KLOR-CON M20) 20 MEQ CR tablet Take 1 tablet (20 mEq) by mouth daily 90 tablet 3    senna-docusate (SENOKOT-S/PERICOLACE) 8.6-50 MG tablet Take 2 tablets by mouth 2 times daily as needed for constipation 60 tablet 0    sodium chloride, PF, 0.9% PF flush Inject 10-40 mLs every 24 hours       Facility-Administered Medications Ordered in Other Encounters   Medication Dose Route Frequency Provider Last Rate Last Admin    sodium chloride (PF) 0.9% PF flush 10-40 mL  10-40 mL Intracatheter Once PRN Denae Hopkins MD           ALLERGIES:  Allergies   Allergen Reactions    Trazodone Shortness Of Breath and Unknown     Allergic to trazodone and deriv., Other: trouble swallowing      Clindamycin Diarrhea     C-diff    Gabapentin Other (See Comments)     \"Internal tremors\"    Temazepam Other (See Comments)     Annotation: Nightmares         FAMILY HISTORY:  Family History   Problem Relation Age of Onset    Heart Disease Mother     Rheumatic Heart Disease Mother     No Known Problems Father     Cancer Brother         brain    Lung Cancer Brother     Lung Cancer Brother     Cancer Sister         lung    Lung Cancer Sister        SOCIAL HISTORY:   Social History     Socioeconomic History    Marital status: Single   Tobacco Use    Smoking status: Never     Passive exposure: Never    Smokeless tobacco: Never    Tobacco comments:     no passive exposure   Vaping Use    Vaping status: Never Used   Substance and Sexual Activity    Alcohol use: Yes     Comment: Alcoholic Drinks/day: Rarely a glass of wine    Drug use: No   Social History Narrative    The patient is a nun.     Social Determinants of Health     Financial Resource Strain: Low Risk  (4/5/2024)    Financial Resource Strain     Within the past 12 " months, have you or your family members you live with been unable to get utilities (heat, electricity) when it was really needed?: No   Food Insecurity: Low Risk  (4/5/2024)    Food Insecurity     Within the past 12 months, did you worry that your food would run out before you got money to buy more?: No     Within the past 12 months, did the food you bought just not last and you didn t have money to get more?: No   Transportation Needs: Low Risk  (4/5/2024)    Transportation Needs     Within the past 12 months, has lack of transportation kept you from medical appointments, getting your medicines, non-medical meetings or appointments, work, or from getting things that you need?: No   Physical Activity: Insufficiently Active (3/27/2023)    Exercise Vital Sign     Days of Exercise per Week: 3 days     Minutes of Exercise per Session: 10 min   Stress: No Stress Concern Present (4/5/2024)    [facility] - Occupational Stress Questionnaire     Feeling of Stress : Not at all   Social Connections: Moderately Integrated (3/27/2023)    Social Connection and Isolation Panel [NHANES]     Frequency of Communication with Friends and Family: More than three times a week     Frequency of Social Gatherings with Friends and Family: More than three times a week     Attends Faith Services: More than 4 times per year     Active Member of Clubs or Organizations: Yes     Attends Club or Organization Meetings: More than 4 times per year     Marital Status: Never    Interpersonal Safety: Low Risk  (4/5/2024)    Interpersonal Safety     Do you feel physically and emotionally safe where you currently live?: Yes     Within the past 12 months, have you been hit, slapped, kicked or otherwise physically hurt by someone?: No     Within the past 12 months, have you been humiliated or emotionally abused in other ways by your partner or ex-partner?: No   Housing Stability: Low Risk  (4/5/2024)    Housing  Stability     Do you have housing? : Yes     Are you worried about losing your housing?: No       VITALS:  /77   Pulse 90   Temp 97.5  F (36.4  C) (Temporal)   Resp 16   Ht 1.524 m (5')   Wt 65.8 kg (145 lb)   SpO2 98%   BMI 28.32 kg/m      PHYSICAL EXAM:  Physical Exam  Musculoskeletal:      Right knee: Swelling, effusion and erythema (tracking laterally) present.      Right lower leg: Pitting Edema present.          LAB:  All pertinent labs reviewed and interpreted.  Results for orders placed or performed during the hospital encounter of 07/02/24   Basic metabolic panel   Result Value Ref Range    Sodium 138 135 - 145 mmol/L    Potassium 4.1 3.4 - 5.3 mmol/L    Chloride 100 98 - 107 mmol/L    Carbon Dioxide (CO2) 25 22 - 29 mmol/L    Anion Gap 13 7 - 15 mmol/L    Urea Nitrogen 22.3 8.0 - 23.0 mg/dL    Creatinine 0.87 0.51 - 0.95 mg/dL    GFR Estimate 62 >60 mL/min/1.73m2    Calcium 9.4 8.2 - 9.6 mg/dL    Glucose 128 (H) 70 - 99 mg/dL   Result Value Ref Range    CRP Inflammation 63.10 (H) <5.00 mg/L   CBC with platelets and differential   Result Value Ref Range    WBC Count 7.4 4.0 - 11.0 10e3/uL    RBC Count 4.14 3.80 - 5.20 10e6/uL    Hemoglobin 11.4 (L) 11.7 - 15.7 g/dL    Hematocrit 37.8 35.0 - 47.0 %    MCV 91 78 - 100 fL    MCH 27.5 26.5 - 33.0 pg    MCHC 30.2 (L) 31.5 - 36.5 g/dL    RDW 15.6 (H) 10.0 - 15.0 %    Platelet Count 323 150 - 450 10e3/uL    % Neutrophils 63 %    % Lymphocytes 19 %    % Monocytes 12 %    % Eosinophils 4 %    % Basophils 1 %    % Immature Granulocytes 1 %    NRBCs per 100 WBC 0 <1 /100    Absolute Neutrophils 4.7 1.6 - 8.3 10e3/uL    Absolute Lymphocytes 1.4 0.8 - 5.3 10e3/uL    Absolute Monocytes 0.9 0.0 - 1.3 10e3/uL    Absolute Eosinophils 0.3 0.0 - 0.7 10e3/uL    Absolute Basophils 0.1 0.0 - 0.2 10e3/uL    Absolute Immature Granulocytes 0.1 <=0.4 10e3/uL    Absolute NRBCs 0.0 10e3/uL       RADIOLOGY:  Reviewed all pertinent imaging. Please see official radiology  report.  MR Knee Right w/o Contrast    (Results Pending)       I, Carey Melton, am serving as a scribe to document services personally performed by Dr. Smith based on my observation and the provider's statements to me. I, John Smith MD attest that Carey Melton is acting in a scribe capacity, has observed my performance of the services and has documented them in accordance with my direction.    John Smith M.D.  Emergency Medicine  [facility] EMERGENCY DEPARTMENT  [facility] [facility] [address]  [phone]  Dept: [phone]       John Smith MD  07/02/24 1210

## 2024-07-02 NOTE — ED NOTES
Expected Patient Referral to ED  9:56 AM    Referring Clinic/Provider:  [facility]    Reason for referral/Clinical facts:  Recent septic arthiris with washout, on Ancef, increasing redness and swelling, needs repeat washout.  Recommend CRP, hospitalist admit.    Recommendations provided:  Send to ED for further evaluation    Caller was informed that this institution does possess the capabilities and/or resources to provide for patient and should be transferred to our facility.    Discussed that if direct admit is sought and any hurdles are encountered, this ED would be happy to see the patient and evaluate.    Informed caller that recommendations provided are recommendations based only on the facts provided and that they responsible to accept or reject the advice, or to seek a formal in person consultation as needed and that this ED will see/treat patient should they arrive.      John Smith MD  [facility] EMERGENCY DEPARTMENT  [address] [place] [id number]  [phone]       John Smith MD  07/02/24 0957

## 2024-07-03 ENCOUNTER — APPOINTMENT (OUTPATIENT)
Dept: OCCUPATIONAL THERAPY | Facility: HOSPITAL | Age: 89
DRG: 485 | End: 2024-07-03
Attending: INTERNAL MEDICINE
Payer: COMMERCIAL

## 2024-07-03 ENCOUNTER — APPOINTMENT (OUTPATIENT)
Dept: PHYSICAL THERAPY | Facility: HOSPITAL | Age: 89
DRG: 485 | End: 2024-07-03
Attending: INTERNAL MEDICINE
Payer: COMMERCIAL

## 2024-07-03 ENCOUNTER — LAB REQUISITION (OUTPATIENT)
Dept: LAB | Facility: CLINIC | Age: 89
End: 2024-07-03
Payer: COMMERCIAL

## 2024-07-03 DIAGNOSIS — M00.061 STAPHYLOCOCCAL ARTHRITIS, RIGHT KNEE (H): ICD-10-CM

## 2024-07-03 LAB
ALBUMIN SERPL BCG-MCNC: 3.1 G/DL (ref 3.5–5.2)
ALP SERPL-CCNC: 141 U/L (ref 40–150)
ALT SERPL W P-5'-P-CCNC: <5 U/L (ref 0–50)
ANION GAP SERPL CALCULATED.3IONS-SCNC: 11 MMOL/L (ref 7–15)
AST SERPL W P-5'-P-CCNC: 15 U/L (ref 0–45)
BASOPHILS # BLD AUTO: 0 10E3/UL (ref 0–0.2)
BASOPHILS NFR BLD AUTO: 1 %
BILIRUB SERPL-MCNC: 0.4 MG/DL
BUN SERPL-MCNC: 18 MG/DL (ref 8–23)
CALCIUM SERPL-MCNC: 9.2 MG/DL (ref 8.2–9.6)
CHLORIDE SERPL-SCNC: 102 MMOL/L (ref 98–107)
CREAT SERPL-MCNC: 0.81 MG/DL (ref 0.51–0.95)
DEPRECATED HCO3 PLAS-SCNC: 28 MMOL/L (ref 22–29)
EGFRCR SERPLBLD CKD-EPI 2021: 67 ML/MIN/1.73M2
EOSINOPHIL # BLD AUTO: 0.2 10E3/UL (ref 0–0.7)
EOSINOPHIL NFR BLD AUTO: 2 %
ERYTHROCYTE [DISTWIDTH] IN BLOOD BY AUTOMATED COUNT: 15.8 % (ref 10–15)
GLUCOSE BLDC GLUCOMTR-MCNC: 122 MG/DL (ref 70–99)
GLUCOSE BLDC GLUCOMTR-MCNC: 125 MG/DL (ref 70–99)
GLUCOSE SERPL-MCNC: 119 MG/DL (ref 70–99)
HCT VFR BLD AUTO: 34.1 % (ref 35–47)
HGB BLD-MCNC: 10.1 G/DL (ref 11.7–15.7)
IMM GRANULOCYTES # BLD: 0 10E3/UL
IMM GRANULOCYTES NFR BLD: 1 %
LYMPHOCYTES # BLD AUTO: 1.3 10E3/UL (ref 0.8–5.3)
LYMPHOCYTES NFR BLD AUTO: 19 %
MCH RBC QN AUTO: 26.9 PG (ref 26.5–33)
MCHC RBC AUTO-ENTMCNC: 29.6 G/DL (ref 31.5–36.5)
MCV RBC AUTO: 91 FL (ref 78–100)
MONOCYTES # BLD AUTO: 1 10E3/UL (ref 0–1.3)
MONOCYTES NFR BLD AUTO: 14 %
NEUTROPHILS # BLD AUTO: 4.6 10E3/UL (ref 1.6–8.3)
NEUTROPHILS NFR BLD AUTO: 64 %
NRBC # BLD AUTO: 0 10E3/UL
NRBC BLD AUTO-RTO: 0 /100
PLATELET # BLD AUTO: 324 10E3/UL (ref 150–450)
POTASSIUM SERPL-SCNC: 4.1 MMOL/L (ref 3.4–5.3)
PROT SERPL-MCNC: 6.2 G/DL (ref 6.4–8.3)
RBC # BLD AUTO: 3.75 10E6/UL (ref 3.8–5.2)
SODIUM SERPL-SCNC: 141 MMOL/L (ref 135–145)
WBC # BLD AUTO: 7.1 10E3/UL (ref 4–11)

## 2024-07-03 PROCEDURE — 250N000013 HC RX MED GY IP 250 OP 250 PS 637: Performed by: STUDENT IN AN ORGANIZED HEALTH CARE EDUCATION/TRAINING PROGRAM

## 2024-07-03 PROCEDURE — 80053 COMPREHEN METABOLIC PANEL: CPT | Performed by: STUDENT IN AN ORGANIZED HEALTH CARE EDUCATION/TRAINING PROGRAM

## 2024-07-03 PROCEDURE — 99233 SBSQ HOSP IP/OBS HIGH 50: CPT | Performed by: STUDENT IN AN ORGANIZED HEALTH CARE EDUCATION/TRAINING PROGRAM

## 2024-07-03 PROCEDURE — 97535 SELF CARE MNGMENT TRAINING: CPT | Mod: GO

## 2024-07-03 PROCEDURE — 250N000011 HC RX IP 250 OP 636: Performed by: INTERNAL MEDICINE

## 2024-07-03 PROCEDURE — 120N000001 HC R&B MED SURG/OB

## 2024-07-03 PROCEDURE — 85004 AUTOMATED DIFF WBC COUNT: CPT | Performed by: STUDENT IN AN ORGANIZED HEALTH CARE EDUCATION/TRAINING PROGRAM

## 2024-07-03 PROCEDURE — 99232 SBSQ HOSP IP/OBS MODERATE 35: CPT | Performed by: INTERNAL MEDICINE

## 2024-07-03 PROCEDURE — 97165 OT EVAL LOW COMPLEX 30 MIN: CPT | Mod: GO

## 2024-07-03 PROCEDURE — 97110 THERAPEUTIC EXERCISES: CPT | Mod: GP

## 2024-07-03 PROCEDURE — 97161 PT EVAL LOW COMPLEX 20 MIN: CPT | Mod: GP

## 2024-07-03 RX ORDER — FUROSEMIDE 20 MG
40 TABLET ORAL EVERY MORNING
Status: DISCONTINUED | OUTPATIENT
Start: 2024-07-03 | End: 2024-07-09 | Stop reason: HOSPADM

## 2024-07-03 RX ORDER — POLYETHYLENE GLYCOL 3350 17 G/17G
17 POWDER, FOR SOLUTION ORAL DAILY
Status: DISCONTINUED | OUTPATIENT
Start: 2024-07-03 | End: 2024-07-09 | Stop reason: HOSPADM

## 2024-07-03 RX ORDER — CEFAZOLIN SODIUM 2 G/100ML
2 INJECTION, SOLUTION INTRAVENOUS EVERY 8 HOURS
Status: SHIPPED
Start: 2024-07-03 | End: 2024-07-31

## 2024-07-03 RX ORDER — CEFAZOLIN SODIUM 2 G/100ML
2 INJECTION, SOLUTION INTRAVENOUS EVERY 8 HOURS
Status: DISCONTINUED | OUTPATIENT
Start: 2024-07-03 | End: 2024-07-04

## 2024-07-03 RX ADMIN — LISINOPRIL 2.5 MG: 2.5 TABLET ORAL at 08:41

## 2024-07-03 RX ADMIN — FUROSEMIDE 40 MG: 20 TABLET ORAL at 08:41

## 2024-07-03 RX ADMIN — OXYCODONE HYDROCHLORIDE 5 MG: 5 TABLET ORAL at 06:59

## 2024-07-03 RX ADMIN — OXYCODONE HYDROCHLORIDE 5 MG: 5 TABLET ORAL at 17:06

## 2024-07-03 RX ADMIN — OXYCODONE HYDROCHLORIDE 5 MG: 5 TABLET ORAL at 21:38

## 2024-07-03 RX ADMIN — CEFAZOLIN SODIUM 2 G: 2 INJECTION, SOLUTION INTRAVENOUS at 17:06

## 2024-07-03 RX ADMIN — DULOXETINE HYDROCHLORIDE 60 MG: 60 CAPSULE, DELAYED RELEASE PELLETS ORAL at 08:40

## 2024-07-03 RX ADMIN — OXYCODONE HYDROCHLORIDE 5 MG: 5 TABLET ORAL at 11:10

## 2024-07-03 RX ADMIN — CEFAZOLIN SODIUM 2 G: 2 INJECTION, SOLUTION INTRAVENOUS at 11:11

## 2024-07-03 RX ADMIN — APIXABAN 5 MG: 5 TABLET, FILM COATED ORAL at 21:38

## 2024-07-03 RX ADMIN — FUROSEMIDE 20 MG: 20 TABLET ORAL at 17:06

## 2024-07-03 RX ADMIN — ACETAMINOPHEN 650 MG: 325 TABLET ORAL at 21:38

## 2024-07-03 RX ADMIN — ACETAMINOPHEN 650 MG: 325 TABLET ORAL at 17:19

## 2024-07-03 RX ADMIN — ACETAMINOPHEN 650 MG: 325 TABLET ORAL at 08:41

## 2024-07-03 RX ADMIN — METOPROLOL SUCCINATE 25 MG: 25 TABLET, EXTENDED RELEASE ORAL at 08:41

## 2024-07-03 RX ADMIN — AMIODARONE HYDROCHLORIDE 100 MG: 100 TABLET ORAL at 08:41

## 2024-07-03 RX ADMIN — DICLOFENAC EPOLAMINE 1 PATCH: 0.01 SYSTEM TOPICAL at 19:13

## 2024-07-03 RX ADMIN — DICLOFENAC 2 G: 10 GEL TOPICAL at 19:14

## 2024-07-03 ASSESSMENT — ACTIVITIES OF DAILY LIVING (ADL)
ADLS_ACUITY_SCORE: 39
ADLS_ACUITY_SCORE: 39
ADLS_ACUITY_SCORE: 40
ADLS_ACUITY_SCORE: 39
ADLS_ACUITY_SCORE: 40
ADLS_ACUITY_SCORE: 39
ADLS_ACUITY_SCORE: 40
ADLS_ACUITY_SCORE: 40
ADLS_ACUITY_SCORE: 39
ADLS_ACUITY_SCORE: 40
ADLS_ACUITY_SCORE: 39
ADLS_ACUITY_SCORE: 39
ADLS_ACUITY_SCORE: 40
ADLS_ACUITY_SCORE: 39

## 2024-07-03 NOTE — PROGRESS NOTES
Orthopedic Progress Note      Assessment: 1 Day Post-Op  S/P Procedure(s):  RIGHT KNEE OPEN IRRIGATION AND DEBRIDEMENT     Plan:   Pain Control: Continue per pain protocol.  Weight Bearing: Weightbearing as tolerated.  No knee range of motion restrictions.  DVT Prophylaxis: Okay to restart home Eliquis on postop day 1  Antibiotics: Continue IV Ancef per infectious disease recommendations.  Will follow intraoperative cultures and follow-up on recommendations for further IV antibiotic management  GI: Plan for aggressive bowel regimen to prevent constipation from narcotic medications  Lines: HLIV once tolerating PO  PT/OT: Eval and treatment. Will follow up on recommendations.  Follow up:  in 2 weeks in clinic with either myself or Dr. Monahan's team.  Discharge plan: Likely return to transitional care facility.      Subjective:  Pain: 6/10 at rest, 10/10 with WB  Chest pain, SOB: no  Nausea, Vomiting:  no  Lightheadedness, Dizziness:  no  Neuro:  Patient denies new onset numbness or paresthesias    Patient is doing well. Recently returned from PT and reports that her knee is sore, but states that she knows it is expected as it has only been one day since surgery. No concerns at this time.    Objective:  /56 (BP Location: Left arm)   Pulse 87   Temp 97.9  F (36.6  C) (Oral)   Resp 16   Ht 1.524 m (5')   Wt 65.8 kg (145 lb)   SpO2 95%   BMI 28.32 kg/m    The patient is A&Ox3. Appears comfortable. Sitting in chair eating lunch.  Sensation is intact.  Dorsiflexion and plantar flexion is intact.  Dorsalis pedis pulse intact.  Calves are soft and non-tender. Negative Chance's.  The incision is covered. Dressing C/D/I.  Resolving erythema, ecchymosis and mild swelling over the lateral knee.   Good ROM with flexion and extension        Pertinent Labs   Lab Results: personally reviewed.   Lab Results   Component Value Date    INR 1.33 (H) 03/04/2023    INR 1.37 (H) 08/23/2022    INR 1.50 (H) 06/05/2019     Lab Results    Component Value Date    WBC 7.1 07/03/2024    HGB 10.1 (L) 07/03/2024    HCT 34.1 (L) 07/03/2024    MCV 91 07/03/2024     07/03/2024     Lab Results   Component Value Date     07/03/2024    CO2 28 07/03/2024         Report completed by:  Hannah Abernathy PA-C/Dr. Syed  [facility]    Date: 7/3/2024  Time: 1:13 PM

## 2024-07-03 NOTE — PROGRESS NOTES
DAMARIS Vogt     07/03/24 1100   Appointment Info   Signing Clinician's Name / Credentials (OT) Leora Armenta OTR/L   Living Environment   People in Home alone   Current Living Arrangements apartment;independent living facility   Home Accessibility no concerns   Transportation Anticipated health plan transportation;family or friend will provide   Self-Care   Current Activity Tolerance fair   Equipment Currently Used at Home walker, rolling;wheelchair, manual   Activity/Exercise/Self-Care Comment IND ADLs, uses 4WW at BL   Instrumental Activities of Daily Living (IADL)   IADL Comments IND meds/finances, A w/ transport, some meals, cleaning   General Information   Onset of Illness/Injury or Date of Surgery 07/02/24   Referring Physician Rakan Syed MD   Additional Occupational Profile Info/Pertinent History of Current Problem Post I&D R knee   Existing Precautions/Restrictions fall   Right Lower Extremity (Weight-bearing Status) weight-bearing as tolerated (WBAT)   Cognitive Status Examination   Orientation Status orientation to person, place and time   Affect/Mental Status (Cognitive) WNL   Sensory   Sensory Quick Adds sensation intact   Range of Motion Comprehensive   General Range of Motion no range of motion deficits identified   Strength Comprehensive (MMT)   General Manual Muscle Testing (MMT) Assessment no strength deficits identified   Muscle Tone Assessment   Muscle Tone Quick Adds No deficits were identified   Coordination   Upper Extremity Coordination No deficits were identified   Transfers   Transfers sit-stand transfer   Sit-Stand Transfer   Sit-Stand [place] (Transfers) contact guard   Assistive Device (Sit-Stand Transfers) walker, front-wheeled   Stand-Sit Transfer   Stand-Sit [place] (Transfers) contact guard   Assistive Device (Stand-Sit Transfers) walker, front-wheeled   Activities of Daily Living   BADL Assessment/Intervention lower body dressing;toileting   Lower Body Dressing  Assessment/Training   Comment, (Lower Body Dressing) Mod IND w/ AE   Toileting   Comment, (Toileting) Did not directly observe today; Per clinical reasoning, pt likely CGA w/ FWW to walk short distance /SPT to commode   Clinical Impression   Criteria for Skilled Therapeutic Interventions Met (OT) Yes, treatment indicated   OT Diagnosis Impaired ADLs and functional mobility   Influenced by the following impairments Pain, post-procedure   OT Problem List-Impairments impacting ADL problems related to;activity tolerance impaired;mobility;strength;pain   Assessment of Occupational Performance 1-3 Performance Deficits   Identified Performance Deficits Toileting, functional mobility   Planned Therapy Interventions (OT) ADL retraining;home program guidelines;progressive activity/exercise   Clinical Decision Making Complexity (OT) problem focused assessment/low complexity   Risk & Benefits of therapy have been explained evaluation/treatment results reviewed;care plan/treatment goals reviewed;patient   OT Total Evaluation Time   OT Eval, Low Complexity Minutes (11067) 10   OT Goals   Therapy Frequency (OT) 5 times/week   OT Predicted Duration/Target Date for Goal Attainment 07/10/24   OT Goals Toilet Transfer/Toileting;Aerobic Activity   OT: Toilet Transfer/Toileting Modified independent  (4WW)   OT: Perform aerobic activity with stable cardiovascular response continuous activity;5 minutes;ambulation   Self-Care/Home Management   Self-Care/Home Mgmt/ADL, Compensatory, Meal Prep Minutes (20944) 10   Symptoms Noted During/After Treatment (Meal Preparation/Planning Training) increased pain   Treatment Detail/Skilled Intervention Eval complete, treatment indicated/initiated. Pt attempting additional functional mobility within room CGA FWW ~5', limited by pain and requesting to sit to rest, not interested in further ambulation/standing at this time. OT discussing ADLs w/ pt, pt reports she is not interested in trialing dressing  skills w/ OT in this facility as she has already learned to use AE while at TCU. Pt sat unsupported EOC ~5 additional min, OT rec'ing/educating on LB stretches to pt per pain management, pt able to complete LB stretches. Pt had Qs regardingher procedure/ expected recovery, OT answering as able, rec'ing pt ask further Qs to RN/. Session ended w/ pt in recliner w/ BLEs elevated, ice placed near knee (RN notified), call button near.   OT Discharge Planning   OT Plan Progress functional mobility, bring 4WW, ambulate to BR, standing at sink.   OT Discharge Recommendation (DC Rec) Transitional Care Facility   OT Rationale for DC Rec Pt currently demo'ing decreased activity tolerance d/t pain as a barrier, would benefit from time in TCU to facilitate safe DC home. Pt may progress to being able to DC home; Pt had similar procedure in May, lives in accessible home, stated today she would be interested in increasing services in the future as needed.   OT Brief overview of current status CGA FWW ~5'   Total Session Time   Timed Code Treatment Minutes 10   Total Session Time (sum of timed and untimed services) 20

## 2024-07-03 NOTE — PLAN OF CARE
Patient arrived on unit from PACU around 1945. Patient alert and oriented and was calm and without pain unless she moved. Initially. By hour of sleep pain was at 5/10 laying still.  Reassess  after pain medication , patient was sleeping but stated no change. Vital signs stable. Continue to monitor and treat . Patient  on void trial. Bladder scanned. Report and continue to monitor.  Problem: Adult Inpatient Plan of Care  Goal: Absence of Hospital-Acquired Illness or Injury  Intervention: Identify and Manage Fall Risk  Recent Flowsheet Documentation  Taken 7/2/2024 2020 by Kika Zamorano RN  Safety Promotion/Fall Prevention:   activity supervised   assistive device/personal items within reach   nonskid shoes/slippers when out of bed   safety round/check completed     Problem: Risk for Delirium  Goal: Optimal Coping  Outcome: Progressing  Intervention: Optimize Psychosocial Adjustment to Delirium  Recent Flowsheet Documentation  Taken 7/2/2024 2020 by Kika Zamorano RN  Supportive Measures:   active listening utilized   decision-making supported   relaxation techniques promoted   verbalization of feelings encouraged     Problem: Pain Acute  Goal: Optimal Pain Control and Function  Outcome: Progressing  Intervention: Prevent or Manage Pain  Recent Flowsheet Documentation  Taken 7/2/2024 2020 by Kika Zamorano RN  Sensory Stimulation Regulation: care clustered  Medication Review/Management: medications reviewed  Intervention: Optimize Psychosocial Wellbeing  Recent Flowsheet Documentation  Taken 7/2/2024 2020 by Kika Zamorano, RN  Supportive Measures:   active listening utilized   decision-making supported   relaxation techniques promoted   verbalization of feelings encouraged   Goal Outcome Evaluation:

## 2024-07-03 NOTE — PROGRESS NOTES
[facility]  Infectious Disease   Progress Note     Date of Admission:  7/2/2024    Assessment & Plan   Admitted with nonresolving syx in her infected arthritic/gouty knee, no systemic syx, with declining CRP as below-while on OPAT  MRI no osteomyelitis  Staph lugdenensis native septic knee , diagnosed during her admission on 5/24, Dr. Reza  5/23 : S/p Right knee arthroscopic irrigation and debridement   Receiving outpatient IV cefazolin  Hx steroid injection  Severe OA, and extensive complex tearing of the body and posterior horn medial meniscus , ACL tear-which can cause effusions  Gout (positive crystals in May )    7/2 Right knee open irrigation and debridement with partial synovectomy      Pacemaker  Left TKA     PLAN  Knee aspirated, cell count improved though still very neutrophilic, no crystals this time  Continue IV cefazolin-ordered    Will follow cultures to see if any change indicated   (not likely)  Case management to arrange for continued OPAT (iv abx outpt)    ID followup-dr Reza  Discharge: TCU    Diane Espino M.D.    ______________________________________________________________________    Interval History   Postop  No new complaints     Latest Reference Range & Units 05/22/24 17:22 07/02/24 15:32   Cell Count Fluid Source  Knee, Right Knee, Right   Total Nucleated Cells /uL 66,726 18,434   % Neutrophils Fluid % 92 99   % Lymphocytes Fluid % 0 1   % Mono/Macro Fluid % 8 0   Color Fluid Colorless, Yellow  Red ! Orange !   Appearance Fluid Clear  Turbid ! Cloudy !   !: Data is abnormal    Physical Exam   Vital Signs: Temp: 97.9  F (36.6  C) Temp src: Oral BP: 125/56 Pulse: 87   Resp: 16 SpO2: 95 % O2 Device: Nasal cannula Oxygen Delivery: 1 LPM  Weight: 145 lbs 0 oz  Gen. appearance nontoxic  Eyes no conjunctivitis or icterus  Neck no stiffness or neck vein distention, no LN  Heart  No edema  Lungs breathing comfortably  Abdomen soft not tender  Extremities wrapped  knee  Skin  no rash or emboli  Neurologic alert oriented no focal deficits      Data   Results for orders placed or performed during the hospital encounter of 07/02/24 (from the past 24 hour(s))   CBC with platelets differential    Narrative    The following orders were created for panel order CBC with platelets differential.  Procedure                               Abnormality         Status                     ---------                               -----------         ------                     CBC with platelets and d...[[id number]]  Abnormal            Final result                 Please view results for these tests on the individual orders.   Basic metabolic panel   Result Value Ref Range    Sodium 138 135 - 145 mmol/L    Potassium 4.1 3.4 - 5.3 mmol/L    Chloride 100 98 - 107 mmol/L    Carbon Dioxide (CO2) 25 22 - 29 mmol/L    Anion Gap 13 7 - 15 mmol/L    Urea Nitrogen 22.3 8.0 - 23.0 mg/dL    Creatinine 0.87 0.51 - 0.95 mg/dL    GFR Estimate 62 >60 mL/min/1.73m2    Calcium 9.4 8.2 - 9.6 mg/dL    Glucose 128 (H) 70 - 99 mg/dL   CRP inflammation   Result Value Ref Range    CRP Inflammation 63.10 (H) <5.00 mg/L   Erythrocyte sedimentation rate auto   Result Value Ref Range    Erythrocyte Sedimentation Rate 65 (H) 0 - 30 mm/hr   CBC with platelets and differential   Result Value Ref Range    WBC Count 7.4 4.0 - 11.0 10e3/uL    RBC Count 4.14 3.80 - 5.20 10e6/uL    Hemoglobin 11.4 (L) 11.7 - 15.7 g/dL    Hematocrit 37.8 35.0 - 47.0 %    MCV 91 78 - 100 fL    MCH 27.5 26.5 - 33.0 pg    MCHC 30.2 (L) 31.5 - 36.5 g/dL    RDW 15.6 (H) 10.0 - 15.0 %    Platelet Count 323 150 - 450 10e3/uL    % Neutrophils 63 %    % Lymphocytes 19 %    % Monocytes 12 %    % Eosinophils 4 %    % Basophils 1 %    % Immature Granulocytes 1 %    NRBCs per 100 WBC 0 <1 /100    Absolute Neutrophils 4.7 1.6 - 8.3 10e3/uL    Absolute Lymphocytes 1.4 0.8 - 5.3 10e3/uL    Absolute Monocytes 0.9 0.0 - 1.3 10e3/uL    Absolute Eosinophils 0.3 0.0 -  0.7 10e3/uL    Absolute Basophils 0.1 0.0 - 0.2 10e3/uL    Absolute Immature Granulocytes 0.1 <=0.4 10e3/uL    Absolute NRBCs 0.0 10e3/uL   MR Knee Right w/o Contrast    Narrative    EXAM: MR KNEE RIGHT W/O CONTRAST  LOCATION: [facility]  DATE: 7/2/2024    INDICATION: Suspected septic knee, knee pain. Recent knee surgery.  COMPARISON: 4/30/2024 radiographs, 5/13/2024 CT.  TECHNIQUE: Unenhanced.    FINDINGS:    MEDIAL COMPARTMENT:   -Meniscus: There is extensive complex tearing of the body and posterior horn medial meniscus with resulting deformity and attenuation.  -Cartilage: Broad-based full-thickness articular cartilage loss in the medial compartment with mild subchondral edema and cystic change.    LATERAL COMPARTMENT:  -Meniscus: Extensive complex multidirectional tearing of the entire lateral meniscus with resulting deformity and attenuation.   -Cartilage: Broad-based full-thickness articular cartilage loss involving the lateral femoral condyle and lateral tibial plateau with subchondral edema and cystic change. There is extensive degenerative remodeling of the lateral tibial plateau.    PATELLOFEMORAL COMPARTMENT:   -Alignment: Patella midline. No subluxation or tilting.   -Cartilage: Broad-based full-thickness articular cartilage loss in the patellofemoral compartment.    CRUCIATE LIGAMENTS:   -ACL: Complete chronic tear of the ACL.  -PCL: Normal.    COLLATERAL LIGAMENTS:   -Medial collateral ligament: Superficial and deep fibers are normal.  -Lateral collateral ligament: Normal.    POSTEROMEDIAL CORNER:  -Distal semimembranosus tendon is normal.   -Pes anserine tendons are normal. Posteromedial corner complex ligaments are intact.    POSTEROLATERAL CORNER:   -Popliteal tendon is intact. No tendinopathy.  -Biceps femoris tendon and posterolateral corner complex ligaments are intact.    EXTENSOR MECHANISM:   -Quadriceps tendon: Normal.  -Patellar tendon: Normal.  -Patellofemoral  ligaments and retinacula: Intact.    JOINT:   -Large effusion with extensive synovitis. No loose bodies.    BONES:  -No fracture or concerning marrow replacing lesion.    SOFT TISSUES:   -Moderate global atrophy of the surrounding knee musculature. Small popliteal cyst.       Impression    IMPRESSION:  1.  Large joint effusion with extensive synovitis. Subchondral edema and cystic change in the medial and lateral compartments likely reflects the patient's septic arthritis.    2.  No fracture or contusion.    3.  Advanced degenerative changes in the medial and lateral compartments.    4.  Complex degenerative tearing of the entire lateral meniscus.    5.  Complex tearing of the body of the medial meniscus.    6.  Complete chronic ACL tear.   US Joint Injection Aspiration Major Right    Narrative    EXAMS:  1. RIGHT KNEE JOINT ASPIRATION  2. ULTRASOUND GUIDANCE  LOCATION: [facility]  DATE: 7/2/2024    INDICATION: Right knee pain and redness with concern for ongoing septic arthritis    PROCEDURE: Procedure and risks explained and consent received. The skin was prepped and draped in sterile fashion. 5 mL 1% lidocaine infused in local soft tissues.    Under direct ultrasound guidance, a 22 gauge needle was introduced into the joint space.     SPECIMEN: 5 mL of clear red/yellow fluid    COMPLICATIONS: None.      Impression    IMPRESSION:  Ultrasound-guided right knee aspiration.   Cell count with differential fluid    Narrative    The following orders were created for panel order Cell count with differential fluid.  Procedure                               Abnormality         Status                     ---------                               -----------         ------                     Cell Count Body Fluid[[id number]]        Abnormal            Final result               Differential Body Fluid[[id number]]                          Final result                 Please view results for these tests on  the individual orders.   Synovial fluid Aerobic Bacterial Culture Routine With Gram Stain    Specimen: Knee, Right; Synovial fluid   Result Value Ref Range    Gram Stain Result No organisms seen     Gram Stain Result 1+ WBC seen    Joint Fluid Exam    Narrative    The following orders were created for panel order Joint Fluid Exam.  Procedure                               Abnormality         Status                     ---------                               -----------         ------                     Cell Count Body Fluid[[id number]]                                                       Crystal ID Synovial Fluid[[id number]]    Normal              Final result                 Please view results for these tests on the individual orders.   Cell Count Body Fluid   Result Value Ref Range    Color Orange (A) Colorless, Yellow    Clarity Cloudy (A) Clear    Cell Count Fluid Source Knee, Right     Total Nucleated Cells 18,434 /uL    Narrative    No reference ranges have been established.  This result  should be interpreted in the context of the patient's clinical condition and   compared to simultaneous measurement in the patient's blood.         Crystal ID Synovial Fluid   Result Value Ref Range    Crystals Analysis No clinically significant crystals seen. No clinically significant crystals seen.   Differential Body Fluid   Result Value Ref Range    % Neutrophils 99 %    % Lymphocytes 1 %    % Monocyte/Macrophages 0 %    Narrative    No reference ranges have been established. This result should be interpreted in the context of the patient's clinical condition and compared to simultaneous measurement in the patient's blood.   Blood Culture Peripheral Blood    Specimen: Peripheral Blood   Result Value Ref Range    Culture No growth after 12 hours     Narrative    Only an Aerobic Blood Culture Bottle was collected, interpret results with caution.      Blood Culture Arm, Left    Specimen: Arm, Left; Blood   Result Value Ref  Range    Culture No growth after 12 hours    Gram Stain    Specimen: Knee, Right; Swab   Result Value Ref Range    GS Culture See corresponding culture for results     Gram Stain Result No organisms seen     Gram Stain Result 4+ WBC seen    Gram Stain    Specimen: Knee, Right; Tissue   Result Value Ref Range    GS Culture See corresponding culture for results     Gram Stain Result No organisms seen     Gram Stain Result 4+ WBC seen    CBC with platelets differential    Narrative    The following orders were created for panel order CBC with platelets differential.  Procedure                               Abnormality         Status                     ---------                               -----------         ------                     CBC with platelets and d...[[id number]]  Abnormal            Final result                 Please view results for these tests on the individual orders.   Comprehensive metabolic panel   Result Value Ref Range    Sodium 141 135 - 145 mmol/L    Potassium 4.1 3.4 - 5.3 mmol/L    Carbon Dioxide (CO2) 28 22 - 29 mmol/L    Anion Gap 11 7 - 15 mmol/L    Urea Nitrogen 18.0 8.0 - 23.0 mg/dL    Creatinine 0.81 0.51 - 0.95 mg/dL    GFR Estimate 67 >60 mL/min/1.73m2    Calcium 9.2 8.2 - 9.6 mg/dL    Chloride 102 98 - 107 mmol/L    Glucose 119 (H) 70 - 99 mg/dL    Alkaline Phosphatase 141 40 - 150 U/L    AST 15 0 - 45 U/L    ALT <5 0 - 50 U/L    Protein Total 6.2 (L) 6.4 - 8.3 g/dL    Albumin 3.1 (L) 3.5 - 5.2 g/dL    Bilirubin Total 0.4 <=1.2 mg/dL   CBC with platelets and differential   Result Value Ref Range    WBC Count 7.1 4.0 - 11.0 10e3/uL    RBC Count 3.75 (L) 3.80 - 5.20 10e6/uL    Hemoglobin 10.1 (L) 11.7 - 15.7 g/dL    Hematocrit 34.1 (L) 35.0 - 47.0 %    MCV 91 78 - 100 fL    MCH 26.9 26.5 - 33.0 pg    MCHC 29.6 (L) 31.5 - 36.5 g/dL    RDW 15.8 (H) 10.0 - 15.0 %    Platelet Count 324 150 - 450 10e3/uL    % Neutrophils 64 %    % Lymphocytes 19 %    % Monocytes 14 %    % Eosinophils 2 %     % Basophils 1 %    % Immature Granulocytes 1 %    NRBCs per 100 WBC 0 <1 /100    Absolute Neutrophils 4.6 1.6 - 8.3 10e3/uL    Absolute Lymphocytes 1.3 0.8 - 5.3 10e3/uL    Absolute Monocytes 1.0 0.0 - 1.3 10e3/uL    Absolute Eosinophils 0.2 0.0 - 0.7 10e3/uL    Absolute Basophils 0.0 0.0 - 0.2 10e3/uL    Absolute Immature Granulocytes 0.0 <=0.4 10e3/uL    Absolute NRBCs 0.0 10e3/uL   Glucose by meter   Result Value Ref Range    GLUCOSE BY METER POCT 125 (H) 70 - 99 mg/dL

## 2024-07-03 NOTE — PROGRESS NOTES
[facility]    Medicine Progress Note - Hospitalist Service    Date of Admission:  7/2/2024    Assessment & Plan   Gladys Ramirez is a 89 year old female admitted on 7/2/2024 for knee washout in setting on ongoing septic arthritis.      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  Right knee pain secondary to above  -s/p I&D per ortho surgery on 7/2/24, cultures pending   -ID following, continue IV ancef and follow microbiological data   -pain control as outlined  -will schedule bowel regimen      Paroxysmal atrial fibrillation  Status post dual-chamber pacemaker  -Hold DOAC pending surg recs, can likely restart if no further washout   -PTA amiodarone, metoprolol     HFrEF without acute decompensated heart failure  HTN  -Last TTE showing LVEF 20-25%  -restart home lasix, metoprolol, lisinopril  -not on home spironolactone, defer to outpatient cardiology for GDMT     CKD stage III  -no acute concerns  -monitor volume status and avoid nephrotoxins           Diet: Advance Diet as Tolerated: Regular Diet Adult    DVT Prophylaxis: SCDs  Reyes Catheter: Not present  Lines: PRESENT      PICC 05/24/24 Single Lumen Right Brachial vein medial Antibiotic(s)-Site Assessment: WDL      Cardiac Monitoring: None  Code Status: No CPR- Do NOT Intubate      Clinically Significant Risk Factors Present on Admission           # Hypercalcemia: corrected calcium is >10.1, will monitor as appropriate    # Hypoalbuminemia: Lowest albumin = 3.1 g/dL at 7/3/2024  7:06 AM, will monitor as appropriate  # Drug Induced Coagulation Defect: home medication list includes an anticoagulant medication    # Hypertension: Noted on problem list  # Chronic heart failure with reduced ejection fraction: last echo with EF <40%   # Anemia: based on hgb <11           # Overweight: Estimated body mass index is 28.32 kg/m  as calculated from the following:    Height as of this  encounter: 1.524 m (5').    Weight as of this encounter: 65.8 kg (145 lb).       # Financial/Environmental Concerns:     # Pacemaker present       Disposition Plan     Medically Ready for Discharge: Anticipated in 2-4 Days             Alexx Comer DO  Hospitalist Service  [facility]  Securely message with [employer] (more info)  Text page via [employer] Paging/Directory   ______________________________________________________________________    Interval History   NAOE. Doing well this morning abut to eat breakfast. In some pain but denies acute changes and denies fevers, chills, nausea, vomiting, trouble breathing.     Physical Exam   Vital Signs: Temp: 97.9  F (36.6  C) Temp src: Oral BP: 125/56 Pulse: 87   Resp: 16 SpO2: 95 % O2 Device: Nasal cannula Oxygen Delivery: 1 LPM  Weight: 145 lbs 0 oz    General Appearance: Cooperative, no acute distress, nontoxic  Respiratory: CTAB, good effort  Cardiovascular: no S3 with normal S1 and S2, RRR, no JVD or LE edema  GI: no pain  Skin: incision site R knee clean with dried blood. Knee is warm to touch and swollen  Other: Alert and oriented    Medical Decision Making       55 MINUTES SPENT BY ME on the date of service doing chart review, history, exam, documentation & further activities per the note.      Data     I have personally reviewed the following data over the past 24 hrs:    7.1  \   10.1 (L)   / 324     141 102 18.0 /  125 (H)   4.1 28 0.81 \     ALT: <5 AST: 15 AP: 141 TBILI: 0.4   ALB: 3.1 (L) TOT PROTEIN: 6.2 (L) LIPASE: N/A     Procal: N/A CRP: 63.10 (H) Lactic Acid: N/A         Imaging results reviewed over the past 24 hrs:   Recent Results (from the past 24 hour(s))   MR Knee Right w/o Contrast    Narrative    EXAM: MR KNEE RIGHT W/O CONTRAST  LOCATION: [facility]  DATE: 7/2/2024    INDICATION: Suspected septic knee, knee pain. Recent knee surgery.  COMPARISON: 4/30/2024 radiographs, 5/13/2024 CT.  TECHNIQUE:  Unenhanced.    FINDINGS:    MEDIAL COMPARTMENT:   -Meniscus: There is extensive complex tearing of the body and posterior horn medial meniscus with resulting deformity and attenuation.  -Cartilage: Broad-based full-thickness articular cartilage loss in the medial compartment with mild subchondral edema and cystic change.    LATERAL COMPARTMENT:  -Meniscus: Extensive complex multidirectional tearing of the entire lateral meniscus with resulting deformity and attenuation.   -Cartilage: Broad-based full-thickness articular cartilage loss involving the lateral femoral condyle and lateral tibial plateau with subchondral edema and cystic change. There is extensive degenerative remodeling of the lateral tibial plateau.    PATELLOFEMORAL COMPARTMENT:   -Alignment: Patella midline. No subluxation or tilting.   -Cartilage: Broad-based full-thickness articular cartilage loss in the patellofemoral compartment.    CRUCIATE LIGAMENTS:   -ACL: Complete chronic tear of the ACL.  -PCL: Normal.    COLLATERAL LIGAMENTS:   -Medial collateral ligament: Superficial and deep fibers are normal.  -Lateral collateral ligament: Normal.    POSTEROMEDIAL CORNER:  -Distal semimembranosus tendon is normal.   -Pes anserine tendons are normal. Posteromedial corner complex ligaments are intact.    POSTEROLATERAL CORNER:   -Popliteal tendon is intact. No tendinopathy.  -Biceps femoris tendon and posterolateral corner complex ligaments are intact.    EXTENSOR MECHANISM:   -Quadriceps tendon: Normal.  -Patellar tendon: Normal.  -Patellofemoral ligaments and retinacula: Intact.    JOINT:   -Large effusion with extensive synovitis. No loose bodies.    BONES:  -No fracture or concerning marrow replacing lesion.    SOFT TISSUES:   -Moderate global atrophy of the surrounding knee musculature. Small popliteal cyst.       Impression    IMPRESSION:  1.  Large joint effusion with extensive synovitis. Subchondral edema and cystic change in the medial and lateral  compartments likely reflects the patient's septic arthritis.    2.  No fracture or contusion.    3.  Advanced degenerative changes in the medial and lateral compartments.    4.  Complex degenerative tearing of the entire lateral meniscus.    5.  Complex tearing of the body of the medial meniscus.    6.  Complete chronic ACL tear.   US Joint Injection Aspiration Major Right    Narrative    EXAMS:  1. RIGHT KNEE JOINT ASPIRATION  2. ULTRASOUND GUIDANCE  LOCATION: [facility]  DATE: 7/2/2024    INDICATION: Right knee pain and redness with concern for ongoing septic arthritis    PROCEDURE: Procedure and risks explained and consent received. The skin was prepped and draped in sterile fashion. 5 mL 1% lidocaine infused in local soft tissues.    Under direct ultrasound guidance, a 22 gauge needle was introduced into the joint space.     SPECIMEN: 5 mL of clear red/yellow fluid    COMPLICATIONS: None.      Impression    IMPRESSION:  Ultrasound-guided right knee aspiration.

## 2024-07-03 NOTE — ANESTHESIA POSTPROCEDURE EVALUATION
Patient: Gladys Ramirez    Procedure: Procedure(s):  Right knee open irrigation and debridement       Anesthesia Type:  General    Note:  Disposition: Inpatient   Postop Pain Control: Uneventful            Sign Out: Well controlled pain   PONV: No   Neuro/Psych: Uneventful            Sign Out: Acceptable/Baseline neuro status   Airway/Respiratory: Uneventful            Sign Out: Acceptable/Baseline resp. status   CV/Hemodynamics: Uneventful            Sign Out: Acceptable CV status; No obvious hypovolemia; No obvious fluid overload   Other NRE: NONE   DID A NON-ROUTINE EVENT OCCUR? No           Last vitals:  Vitals Value Taken Time   /55 07/02/24 1915   Temp     Pulse 84 07/02/24 1923   Resp 15 07/02/24 1922   SpO2 94 % 07/02/24 1923   Vitals shown include unfiled device data.    Electronically Signed By: Umesh Dhaliwal MD  July 2, 2024  9:36 PM

## 2024-07-03 NOTE — PROGRESS NOTES
Care Management Follow Up    Length of Stay (days): 1    Expected Discharge Date: 07/05/2024    Anticipated Discharge Plan:       Transportation: Anticipate medical transport    PT Recommendations:    OT Recommendations:        Barriers to Discharge: medical stability    Prior Living Situation:   with alone    Advanced Directive on File:  (states she has HCD)     Patient/Spokesperson Updated: Yes. Who? TCU Liaison    Additional Information:  RNCM sent communication to TCU, confirmed patient is on a bed hold at [facility]. TCU liaison will be out of office 7/4 and 7/5. For patient to return coordination should be done via, nurses station at [phone] or to Liaison number ([phone]) and the DON will be covering.     CM will continue to follow care progression and aide in discharge planning as needed.     Rossana Dow RN

## 2024-07-03 NOTE — PLAN OF CARE
Problem: Adult Inpatient Plan of Care  Goal: Absence of Hospital-Acquired Illness or Injury  Intervention: Identify and Manage Fall Risk  Recent Flowsheet Documentation  Taken 7/3/2024 0012 by Debbie Mac RN  Safety Promotion/Fall Prevention:   activity supervised   assistive device/personal items within reach   nonskid shoes/slippers when out of bed   safety round/check completed  Intervention: Prevent Skin Injury  Recent Flowsheet Documentation  Taken 7/3/2024 0012 by Debbie Mac RN  Body Position:   position changed independently   turned   right  Taken 7/3/2024 0010 by Debbie Mac RN  Body Position: position changed independently  Intervention: Prevent Infection  Recent Flowsheet Documentation  Taken 7/3/2024 0012 by Debbie Mac RN  Infection Prevention:   rest/sleep promoted   hand hygiene promoted   Goal Outcome Evaluation:  Patient's VSS and afebrile. She denies pain at rest. CMS on the right LE WNL. Dressing on the the right knee CDI. The right knee is swollen. Patient slept between cares.

## 2024-07-03 NOTE — PLAN OF CARE
Goal Outcome Evaluation:    Pt is A&Ox4, assist x1 with transfers, up in chair for a few hours today and participating in therapies. Reported pain in rt knee 6/10, received prn tylenol x1 and prn oxycodone x1 which has been effective, also applied ice. CMS intact, rt knee swelling , dressing and ACE wrap in place. VSS, afebrile, received IV abx. Denied SOB and cough, LS clear. Eating/drinking well. Voiding WNL, passing gas.        Problem: Adult Inpatient Plan of Care  Goal: Optimal Comfort and Wellbeing  Outcome: Progressing  Intervention: Monitor Pain and Promote Comfort  Recent Flowsheet Documentation  Taken 7/3/2024 0826 by Yusra Amaya RN  Pain Management Interventions:   medication (see MAR)   ambulation/increased activity   breathing exercises   cold applied   distraction   emotional support   relaxation techniques promoted   repositioned     Problem: Risk for Delirium  Goal: Improved Attention and Thought Clarity  Outcome: Progressing     Problem: Skin Injury Risk Increased  Goal: Skin Health and Integrity  Outcome: Progressing  Intervention: Plan: Nurse Driven Intervention: Friction and Shear  Recent Flowsheet Documentation  Taken 7/3/2024 0827 by Yusra Amaya RN  Friction/Shear Interventions: HOB 30 degrees or less  Intervention: Optimize Skin Protection  Recent Flowsheet Documentation  Taken 7/3/2024 0827 by Yusra Amaya RN  Activity Management: activity encouraged     Problem: Pain Acute  Goal: Optimal Pain Control and Function  Outcome: Progressing  Intervention: Develop Pain Management Plan  Recent Flowsheet Documentation  Taken 7/3/2024 0826 by Yusra Amaya RN  Pain Management Interventions:   medication (see MAR)   ambulation/increased activity   breathing exercises   cold applied   distraction   emotional support   relaxation techniques promoted   repositioned     Problem: Adult Inpatient Plan of Care  Goal: Absence of Hospital-Acquired Illness or Injury  Intervention: Identify and  Manage Fall Risk  Recent Flowsheet Documentation  Taken 7/3/2024 0827 by Yusra Amaya, RN  Safety Promotion/Fall Prevention:   activity supervised   assistive device/personal items within reach   mobility aid in reach   nonskid shoes/slippers when out of bed   patient and family education  Intervention: Prevent Infection  Recent Flowsheet Documentation  Taken 7/3/2024 0827 by Yusra Amaya, RN  Infection Prevention:   equipment surfaces disinfected   hand hygiene promoted   personal protective equipment utilized   rest/sleep promoted   single patient room provided     Problem: Risk for Delirium  Goal: Improved Behavioral Control  Intervention: Minimize Safety Risk  Recent Flowsheet Documentation  Taken 7/3/2024 0827 by Yusra Amaya, RN  Enhanced Safety Measures:   assistive devices when indicated   pain management   patient/family teach back on injury risk

## 2024-07-03 NOTE — PROGRESS NOTES
07/03/24 0840   Appointment Info   Signing Clinician's Name / Credentials (PT) Felisha Schmidt PT   Rehab Comments (PT) Patient in bed .Left up in chair with chair alarm activated and call light in reach   Living Environment   People in Home alone   Current Living Arrangements apartment;independent living facility   Home Accessibility no concerns   Transportation Anticipated health plan transportation;family or friend will provide   Self-Care   Usual Activity Tolerance good   Current Activity Tolerance fair   Equipment Currently Used at Home walker, rolling;wheelchair, manual   Fall history within last six months no   Activity/Exercise/Self-Care Comment patient independent with mobility and ADL ,Gets [facility] 3 x week ,does some light cooking,Assist with housekeeping.Sister [place], assist pt  with transportatioas needed   General Information   Onset of Illness/Injury or Date of Surgery 07/03/24   Referring Physician Alexx Comer   Patient/Family Therapy Goals Statement (PT) return to TCU for rehab   Pertinent History of Current Problem (include personal factors and/or comorbidities that impact the POC) Admitted from TCU due to recurrent septic arthritis-underwent I & D R kneee on 07/02.Nelson has a hx of ppacemaker,HTN,CKD   Existing Precautions/Restrictions fall   Weight-Bearing Status - LLE full weight-bearing   Weight-Bearing Status - RLE weight-bearing as tolerated   Cognition   Affect/Mental Status (Cognition) WFL   Orientation Status (Cognition) oriented x 4   Pain Assessment   Patient Currently in Pain Yes, see Vital Sign flowsheet   Range of Motion (ROM)   Range of Motion ROM deficits secondary to pain   Strength (Manual Muscle Testing)   Strength (Manual Muscle Testing) Deficits observed during functional mobility   Bed Mobility   Supine-Sit [place] (Bed Mobility) minimum assist (75% patient effort)  (for R leg)   Bed Mobility Limitations decreased ability to use legs for bridging/pushing    Impairments Contributing to Impaired Bed Mobility pain   Assistive Device (Bed Mobility) bed rails   Comment, (Bed Mobility) needed extra time   Sit-Stand Transfer   Sit-Stand [place] (Transfers) minimum assist (75% patient effort)   Assistive Device (Sit-Stand Transfers) walker, front-wheeled   Stand-Sit Transfer   Stand-Sit [place] (Transfers) contact guard;verbal cues   Assistive Device (Stand-Sit Transfers) walker, front-wheeled   Gait/Stairs (Locomotion)   [place] Level (Gait) minimum assist (75% patient effort)   Assistive Device (Gait) walker, front-wheeled   Distance in Feet (Gait) 15 feet   Pattern (Gait) step-to   Deviations/Abnormal Patterns (Gait) esteban decreased;antalgic;gait speed decreased;stride length decreased;weight shifting decreased   Maintains Weight-bearing Status (Gait) able to maintain   Clinical Impression   Criteria for Skilled Therapeutic Intervention Yes, treatment indicated   PT Diagnosis (PT) impaired functional mobility   Influenced by the following impairments surgery,pain   Functional limitations due to impairments bed mobility ,transfers,gait ,ROM ,strength   Clinical Presentation (PT Evaluation Complexity) stable   Clinical Presentation Rationale patient presents as med diagnosed   Clinical Decision Making (Complexity) low complexity   Planned Therapy Interventions (PT) bed mobility training;gait training;strengthening;ROM (range of motion);transfer training;risk factor education   Risk & Benefits of therapy have been explained evaluation/treatment results reviewed;care plan/treatment goals reviewed;participants voiced agreement with care plan   PT Total Evaluation Time   PT Torie Low Complexity Minutes (93760) 15   Physical Therapy Goals   PT Frequency 4x/week   PT Predicted Duration/Target Date for Goal Attainment 07/10/24   PT Goals Bed Mobility;Transfers;Gait   PT: Bed Mobility Modified independent;Supine to/from sit   PT: Transfers Modified independent;Sit  to/from stand;Assistive device   PT: Gait Supervision/stand-by assist;Rolling walker;50 feet   Interventions   Interventions Quick Adds Therapeutic Procedure   Therapeutic Procedure/Exercise   Ther. Procedure: strength, endurance, ROM, flexibillity Minutes (17528) 8   Symptoms Noted During/After Treatment increased pain   Treatment Detail/Skilled Intervention Initiated R L/E ex x 10 reps ,seated knee flx and LAQ ,in reclined pos AP.QS.GS,hip abd ,SLR ,heel slides   PT Discharge Planning   PT Plan mobilize ,ROM R knee as pedro luis ,incresae distance of amb with FWW   PT Discharge Recommendation (DC Rec) Transitional Care Facility   PT Rationale for DC Rec patient neds assist with mobility ,not ready to return home yet   PT Brief overview of current status Min assist with mobility and amb x 15 feet with FWW   PT Equipment Needed at Discharge walker, rolling;wheelchair  (pt has both)   Total Session Time   Timed Code Treatment Minutes 8   Total Session Time (sum of timed and untimed services) 23

## 2024-07-04 LAB
CREAT SERPL-MCNC: 0.92 MG/DL (ref 0.51–0.95)
EGFRCR SERPLBLD CKD-EPI 2021: 58 ML/MIN/1.73M2
ERYTHROCYTE [DISTWIDTH] IN BLOOD BY AUTOMATED COUNT: 15.7 % (ref 10–15)
HCT VFR BLD AUTO: 32.1 % (ref 35–47)
HGB BLD-MCNC: 9.8 G/DL (ref 11.7–15.7)
MCH RBC QN AUTO: 27.8 PG (ref 26.5–33)
MCHC RBC AUTO-ENTMCNC: 30.5 G/DL (ref 31.5–36.5)
MCV RBC AUTO: 91 FL (ref 78–100)
PLATELET # BLD AUTO: 289 10E3/UL (ref 150–450)
RBC # BLD AUTO: 3.53 10E6/UL (ref 3.8–5.2)
WBC # BLD AUTO: 6.5 10E3/UL (ref 4–11)

## 2024-07-04 PROCEDURE — 82565 ASSAY OF CREATININE: CPT | Performed by: STUDENT IN AN ORGANIZED HEALTH CARE EDUCATION/TRAINING PROGRAM

## 2024-07-04 PROCEDURE — 36415 COLL VENOUS BLD VENIPUNCTURE: CPT | Performed by: STUDENT IN AN ORGANIZED HEALTH CARE EDUCATION/TRAINING PROGRAM

## 2024-07-04 PROCEDURE — 120N000001 HC R&B MED SURG/OB

## 2024-07-04 PROCEDURE — 250N000011 HC RX IP 250 OP 636: Performed by: STUDENT IN AN ORGANIZED HEALTH CARE EDUCATION/TRAINING PROGRAM

## 2024-07-04 PROCEDURE — 250N000011 HC RX IP 250 OP 636: Mod: JZ | Performed by: INTERNAL MEDICINE

## 2024-07-04 PROCEDURE — 99232 SBSQ HOSP IP/OBS MODERATE 35: CPT | Performed by: STUDENT IN AN ORGANIZED HEALTH CARE EDUCATION/TRAINING PROGRAM

## 2024-07-04 PROCEDURE — 85027 COMPLETE CBC AUTOMATED: CPT | Performed by: STUDENT IN AN ORGANIZED HEALTH CARE EDUCATION/TRAINING PROGRAM

## 2024-07-04 PROCEDURE — 250N000013 HC RX MED GY IP 250 OP 250 PS 637: Performed by: STUDENT IN AN ORGANIZED HEALTH CARE EDUCATION/TRAINING PROGRAM

## 2024-07-04 RX ORDER — CEFAZOLIN SODIUM 2 G/100ML
2 INJECTION, SOLUTION INTRAVENOUS EVERY 12 HOURS
Status: DISCONTINUED | OUTPATIENT
Start: 2024-07-04 | End: 2024-07-05

## 2024-07-04 RX ADMIN — AMIODARONE HYDROCHLORIDE 100 MG: 100 TABLET ORAL at 08:57

## 2024-07-04 RX ADMIN — CEFAZOLIN SODIUM 2 G: 2 INJECTION, SOLUTION INTRAVENOUS at 10:12

## 2024-07-04 RX ADMIN — OXYCODONE HYDROCHLORIDE 5 MG: 5 TABLET ORAL at 23:58

## 2024-07-04 RX ADMIN — METOPROLOL SUCCINATE 25 MG: 25 TABLET, EXTENDED RELEASE ORAL at 08:57

## 2024-07-04 RX ADMIN — OXYCODONE HYDROCHLORIDE 5 MG: 5 TABLET ORAL at 18:32

## 2024-07-04 RX ADMIN — APIXABAN 5 MG: 5 TABLET, FILM COATED ORAL at 21:39

## 2024-07-04 RX ADMIN — DULOXETINE HYDROCHLORIDE 60 MG: 60 CAPSULE, DELAYED RELEASE PELLETS ORAL at 08:57

## 2024-07-04 RX ADMIN — OXYCODONE HYDROCHLORIDE 5 MG: 5 TABLET ORAL at 08:56

## 2024-07-04 RX ADMIN — LISINOPRIL 2.5 MG: 2.5 TABLET ORAL at 08:57

## 2024-07-04 RX ADMIN — DICLOFENAC EPOLAMINE 1 PATCH: 0.01 SYSTEM TOPICAL at 21:56

## 2024-07-04 RX ADMIN — FUROSEMIDE 40 MG: 20 TABLET ORAL at 08:57

## 2024-07-04 RX ADMIN — MICONAZOLE NITRATE ANTIFUNGAL POWDER: 2 POWDER TOPICAL at 21:52

## 2024-07-04 RX ADMIN — APIXABAN 5 MG: 5 TABLET, FILM COATED ORAL at 08:57

## 2024-07-04 RX ADMIN — CEFAZOLIN SODIUM 2 G: 2 INJECTION, SOLUTION INTRAVENOUS at 01:42

## 2024-07-04 RX ADMIN — CEFAZOLIN SODIUM 2 G: 2 INJECTION, SOLUTION INTRAVENOUS at 21:41

## 2024-07-04 ASSESSMENT — ACTIVITIES OF DAILY LIVING (ADL)
ADLS_ACUITY_SCORE: 39
ADLS_ACUITY_SCORE: 37
ADLS_ACUITY_SCORE: 39
ADLS_ACUITY_SCORE: 39
ADLS_ACUITY_SCORE: 37
ADLS_ACUITY_SCORE: 37
ADLS_ACUITY_SCORE: 39
ADLS_ACUITY_SCORE: 37
ADLS_ACUITY_SCORE: 37
ADLS_ACUITY_SCORE: 39
ADLS_ACUITY_SCORE: 37
ADLS_ACUITY_SCORE: 37
ADLS_ACUITY_SCORE: 39
ADLS_ACUITY_SCORE: 37
ADLS_ACUITY_SCORE: 37
ADLS_ACUITY_SCORE: 39
ADLS_ACUITY_SCORE: 39

## 2024-07-04 NOTE — PLAN OF CARE
Problem: Adult Inpatient Plan of Care  Goal: Optimal Comfort and Wellbeing  Outcome: Progressing     Problem: Risk for Delirium  Goal: Optimal Coping  Outcome: Progressing   Goal Outcome Evaluation:    Patient AO x4, up assist 1x with walker, 1L o2 overnight, reports some pain in R knee, swollen. Denies nausea. Abd folds reddened. IV abx given per order, new order placed ok to draw labs from PICC. VSS.     Miryam Ro RN

## 2024-07-04 NOTE — PLAN OF CARE
Problem: Adult Inpatient Plan of Care  Goal: Absence of Hospital-Acquired Illness or Injury  Intervention: Identify and Manage Fall Risk  Recent Flowsheet Documentation  Taken 7/3/2024 1700 by Miladys Gatica RN  Safety Promotion/Fall Prevention:   activity supervised   assistive device/personal items within reach   nonskid shoes/slippers when out of bed   patient and family education   room door open   safety round/check completed  Goal: Optimal Comfort and Wellbeing  Intervention: Monitor Pain and Promote Comfort  Recent Flowsheet Documentation  Taken 7/3/2024 1925 by Miladys Gatica RN  Pain Management Interventions:   repositioned   pillow support provided  Taken 7/3/2024 1915 by Miladys Gatica RN  Pain Management Interventions:   medication (see MAR)   cold applied  Taken 7/3/2024 1750 by Miladys Gatica RN  Pain Management Interventions: MD notified (comment)  Taken 7/3/2024 1719 by Miladys Gatica RN  Pain Management Interventions:   medication (see MAR)   cold applied  Taken 7/3/2024 1706 by Miladys Gatica RN  Pain Management Interventions: medication (see MAR)     Problem: Risk for Delirium  Goal: Improved Behavioral Control  Intervention: Minimize Safety Risk  Recent Flowsheet Documentation  Taken 7/3/2024 1700 by Miladys Gatica RN  Enhanced Safety Measures:   assistive devices when indicated   pain management   review medications for side effects with activity  Goal: Improved Attention and Thought Clarity  Intervention: Maximize Cognitive Function  Recent Flowsheet Documentation  Taken 7/3/2024 1700 by Miladys Gatica RN  Reorientation Measures:   calendar in view   clock in view   reorientation provided   glasses use encouraged     Problem: Skin Injury Risk Increased  Goal: Skin Health and Integrity  Intervention: Plan: Nurse Driven Intervention: Moisture Management  Recent Flowsheet Documentation  Taken 7/3/2024 1700 by Miladys Gatica RN  Moisture Interventions:   Incontinence pad    Urinary collection device  Intervention: Plan: Nurse Driven Intervention: Friction and Shear  Recent Flowsheet Documentation  Taken 7/3/2024 1700 by Miladys Gatica RN  Friction/Shear Interventions: HOB 30 degrees or less  Intervention: Optimize Skin Protection  Recent Flowsheet Documentation  Taken 7/3/2024 1925 by Miladys Gatica RN  Activity Management: sitting, edge of bed  Taken 7/3/2024 1700 by Miladys Gatica RN  Activity Management:   activity adjusted per tolerance   activity encouraged     Problem: Pain Acute  Goal: Optimal Pain Control and Function  Intervention: Develop Pain Management Plan  Recent Flowsheet Documentation  Taken 7/3/2024 1925 by Miladys Gatica RN  Pain Management Interventions:   repositioned   pillow support provided  Taken 7/3/2024 1915 by Miladys Gatica RN  Pain Management Interventions:   medication (see MAR)   cold applied  Taken 7/3/2024 1750 by Miladys Gatica RN  Pain Management Interventions: MD notified (comment)  Taken 7/3/2024 1719 by Miladys Gatica RN  Pain Management Interventions:   medication (see MAR)   cold applied  Taken 7/3/2024 1706 by Miladys Gatica RN  Pain Management Interventions: medication (see MAR)  Intervention: Prevent or Manage Pain  Recent Flowsheet Documentation  Taken 7/3/2024 1700 by Miladys Gatica RN  Medication Review/Management: medications reviewed     Problem: Mobility Impairment  Goal: Optimal Mobility  Intervention: Optimize Mobility  Recent Flowsheet Documentation  Taken 7/3/2024 1925 by Miladys Gatica RN  Activity Management: sitting, edge of bed  Taken 7/3/2024 1700 by Miladys Gatica RN  Activity Management:   activity adjusted per tolerance   activity encouraged   Goal Outcome Evaluation:       Aox4, forgetful, calm and cooperative. Moderate to severe pain in right knee, stable and controlled with meds. CMS intact, dressing clean/dry/intact. Refused to get out of bed this shift due to pain, able to sit at edge of bed for few  minutes. Use of purewick. Desat when sleeping, O2sat stable on 1lpm O2.

## 2024-07-04 NOTE — PLAN OF CARE
Goal Outcome Evaluation:     Pt is A&Ox4, assist x1 with transfers, up in chair for a few hours today and participating in therapies. Reported pain in rt knee 6/10, received prn oxycodone x1 which has been effective, also applied ice. CMS intact, rt knee swelling , dressing and ACE wrap in place. VSS, afebrile, received IV abx. Denied SOB and cough, LS clear. Eating/drinking well. Voiding WNL, passing gas.     Problem: Adult Inpatient Plan of Care  Goal: Optimal Comfort and Wellbeing  Outcome: Progressing  Intervention: Monitor Pain and Promote Comfort  Recent Flowsheet Documentation  Taken 7/4/2024 0856 by Yusra Amaya RN  Pain Management Interventions:   medication (see MAR)   ambulation/increased activity   breathing exercises   distraction   emotional support   pillow support provided   repositioned     Problem: Risk for Delirium  Goal: Improved Behavioral Control  Outcome: Progressing  Intervention: Minimize Safety Risk  Recent Flowsheet Documentation  Taken 7/4/2024 0908 by Yusra Amaya RN  Enhanced Safety Measures:   assistive devices when indicated   pain management   patient/family teach back on injury risk  Goal: Improved Attention and Thought Clarity  Outcome: Progressing     Problem: Skin Injury Risk Increased  Goal: Skin Health and Integrity  Outcome: Progressing  Intervention: Plan: Nurse Driven Intervention: Moisture Management  Recent Flowsheet Documentation  Taken 7/4/2024 0908 by Yusra Amaya RN  Moisture Interventions:   Encourage regular toileting   Urinary collection device   Incontinence pad  Intervention: Plan: Nurse Driven Intervention: Friction and Shear  Recent Flowsheet Documentation  Taken 7/4/2024 0908 by Yusra Amaya RN  Friction/Shear Interventions: HOB 30 degrees or less  Intervention: Optimize Skin Protection  Recent Flowsheet Documentation  Taken 7/4/2024 0908 by Yusra Amaya RN  Activity Management: activity encouraged     Problem: Pain Acute  Goal: Optimal Pain  Control and Function  Outcome: Progressing  Intervention: Develop Pain Management Plan  Recent Flowsheet Documentation  Taken 7/4/2024 0856 by Yusra Amaya RN  Pain Management Interventions:   medication (see MAR)   ambulation/increased activity   breathing exercises   distraction   emotional support   pillow support provided   repositioned     Problem: Mobility Impairment  Goal: Optimal Mobility  Outcome: Progressing  Intervention: Optimize Mobility  Recent Flowsheet Documentation  Taken 7/4/2024 0908 by Yusra Amaya RN  Assistive Device Utilized:   gait belt   walker  Activity Management: activity encouraged     Problem: Adult Inpatient Plan of Care  Goal: Absence of Hospital-Acquired Illness or Injury  Intervention: Identify and Manage Fall Risk  Recent Flowsheet Documentation  Taken 7/4/2024 0908 by Yusra Amaya, RN  Safety Promotion/Fall Prevention:   activity supervised   assistive device/personal items within reach   mobility aid in reach   nonskid shoes/slippers when out of bed   patient and family education  Intervention: Prevent Infection  Recent Flowsheet Documentation  Taken 7/4/2024 0908 by Yusra Amaya, RN  Infection Prevention:   equipment surfaces disinfected   hand hygiene promoted   personal protective equipment utilized   rest/sleep promoted   single patient room provided

## 2024-07-04 NOTE — PROGRESS NOTES
Orthopedic Progress Note      Assessment: 2 Day Post-Op  S/P Procedure(s):  RIGHT KNEE OPEN IRRIGATION AND DEBRIDEMENT     Plan:   Pain Control: Continue per pain protocol.  Weight Bearing: Weightbearing as tolerated.  No knee range of motion restrictions.  DVT Prophylaxis: Eliquis on postop day 1 restarted  Antibiotics: Continue IV Ancef per infectious disease recommendations.  Will follow intraoperative cultures and follow-up on recommendations for further IV antibiotic management  GI: Plan for aggressive bowel regimen to prevent constipation from narcotic medications  Lines: HLIV once tolerating PO  PT/OT: Eval and treatment. Will follow up on recommendations.  Follow up:  in 2 weeks in clinic with either myself or Dr. Monahan's team.  Discharge plan: Likely return to transitional care facility.      Subjective:  Reports pain in her right knee.  Feels like she has some difficulty with weightbearing yesterday.  Has not been up walking yet today.    Objective:  BP (!) 142/75 (BP Location: Left arm)   Pulse 72   Temp 98  F (36.7  C) (Oral)   Resp 16   Ht 1.524 m (5')   Wt 65.8 kg (145 lb)   SpO2 96%   BMI 28.32 kg/m    The patient is A&Ox3. Appears comfortable. Sitting in chair eating lunch.  Sensation is intact.  Dorsiflexion and plantar flexion is intact.  Dorsalis pedis pulse intact.  Calves are soft and non-tender. Negative Chance's.  The incision is covered with Ace wrap.  This is windowed to reveal a Mepilex dressing with dried drainage and a small area over the central portions.  Tolerates knee range of motion 10 to 60 degrees with pain at extremes of motion.  Erythema present lateral over the knee.  No significant tenderness to palpation over the knee.      Pertinent Labs   Lab Results: personally reviewed.   Lab Results   Component Value Date    INR 1.33 (H) 03/04/2023    INR 1.37 (H) 08/23/2022    INR 1.50 (H) 06/05/2019     Lab Results   Component Value Date    WBC 6.5 07/04/2024    HGB 9.8 (L)  07/04/2024    HCT 32.1 (L) 07/04/2024    MCV 91 07/04/2024     07/04/2024     Lab Results   Component Value Date     07/03/2024    CO2 28 07/03/2024     Cultures from OR 7/2: NGTD    Report completed by:  Migue Flynn MD  [facility]       Quality 431: Preventive Care And Screening: Unhealthy Alcohol Use - Screening: Patient not identified as an unhealthy alcohol user when screened for unhealthy alcohol use using a systematic screening method Quality 130: Documentation Of Current Medications In The Medical Record: Current Medications Documented Quality 137: Melanoma: Continuity Of Care - Recall System: Documentation of system reason(s) for not entering patient's information into a recall system (eg, melanoma being monitored by another physician provider) Detail Level: Detailed Quality 226: Preventive Care And Screening: Tobacco Use: Screening And Cessation Intervention: Patient screened for tobacco use and is an ex/non-smoker Quality 110: Preventive Care And Screening: Influenza Immunization: Influenza Immunization not Administered because Patient Refused.

## 2024-07-04 NOTE — PROGRESS NOTES
[facility]    Medicine Progress Note - Hospitalist Service    Date of Admission:  7/2/2024    Assessment & Plan   Gladys Ramirez is a 89 year old female admitted on 7/2/2024 for knee washout in setting on ongoing septic arthritis.      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  Right knee pain secondary to above  -s/p I&D per ortho surgery on 7/2/24, cultures pending   -ID following, continue IV ancef and follow microbiological data   -pain control as outlined  -continue scheduled bowel regimen     Acute hypoxic respiratory failure  -requiring 1L oxygen overnight, no documented low O2 readings  -O2 goal >90-92%  -wean to goal  -encourage movement, OOB and incentive spirometry   -if still requiring O2 will obtain further work-up     Paroxysmal atrial fibrillation  Status post dual-chamber pacemaker  -continue Eliquis   -PTA amiodarone, metoprolol     HFrEF without acute decompensated heart failure  HTN  -Last TTE showing LVEF 20-25%  -continue home lasix, metoprolol, lisinopril  -not on home spironolactone, defer to outpatient cardiology for GDMT     CKD stage III  -no acute concerns  -monitor volume status and avoid nephrotoxins           Diet: Advance Diet as Tolerated: Regular Diet Adult  Diet    DVT Prophylaxis: SCDs  Reyes Catheter: Not present  Lines: PRESENT      PICC 05/24/24 Single Lumen Right Brachial vein medial Antibiotic(s)-Site Assessment: WDL      Cardiac Monitoring: None  Code Status: No CPR- Do NOT Intubate      Clinically Significant Risk Factors           # Hypercalcemia: corrected calcium is >10.1, will monitor as appropriate    # Hypoalbuminemia: Lowest albumin = 3.1 g/dL at 7/3/2024  7:06 AM, will monitor as appropriate       # Hypertension: Noted on problem list  # Chronic heart failure with reduced ejection fraction: last echo with EF <40%               # Overweight: Estimated body mass index is 28.32 kg/m  as  calculated from the following:    Height as of this encounter: 1.524 m (5').    Weight as of this encounter: 65.8 kg (145 lb)., PRESENT ON ADMISSION       # Financial/Environmental Concerns:     # Pacemaker present       Disposition Plan     Medically Ready for Discharge: Anticipated in 2-4 Days             Alexx Comer DO  Hospitalist Service  [facility]  Securely message with [employer] (more info)  Text page via [employer] Paging/Directory   ______________________________________________________________________    Interval History   NAOE. No pain at rest. Has pain with movement. No changes in bowel or bladder habit. No fever or chills.     Physical Exam   Vital Signs: Temp: 98  F (36.7  C) Temp src: Oral BP: (!) 142/75 Pulse: 72   Resp: 16 SpO2: 96 % O2 Device: Nasal cannula Oxygen Delivery: 1 LPM  Weight: 145 lbs 0 oz    General Appearance: Cooperative, no acute distress, nontoxic  Respiratory: good effort, no use of accessory muscles, no basilar crackles   Cardiovascular: no S3 with normal S1 and S2, RRR, no JVD or LE edema  GI: no pain  Skin: incision site R knee clean with dried blood. Knee is warm to touch and swollen  Other: Alert and oriented    Medical Decision Making       55 MINUTES SPENT BY ME on the date of service doing chart review, history, exam, documentation & further activities per the note.      Data     I have personally reviewed the following data over the past 24 hrs:    6.5  \   9.8 (L)   / 289     N/A N/A N/A /  122 (H)   N/A N/A 0.92 \       Imaging results reviewed over the past 24 hrs:   No results found for this or any previous visit (from the past 24 hour(s)).

## 2024-07-05 ENCOUNTER — APPOINTMENT (OUTPATIENT)
Dept: OCCUPATIONAL THERAPY | Facility: HOSPITAL | Age: 89
DRG: 485 | End: 2024-07-05
Payer: COMMERCIAL

## 2024-07-05 LAB
CREAT SERPL-MCNC: 0.8 MG/DL (ref 0.51–0.95)
EGFRCR SERPLBLD CKD-EPI 2021: 68 ML/MIN/1.73M2
HGB BLD-MCNC: 10.1 G/DL (ref 11.7–15.7)

## 2024-07-05 PROCEDURE — 99232 SBSQ HOSP IP/OBS MODERATE 35: CPT | Performed by: STUDENT IN AN ORGANIZED HEALTH CARE EDUCATION/TRAINING PROGRAM

## 2024-07-05 PROCEDURE — 250N000013 HC RX MED GY IP 250 OP 250 PS 637: Performed by: STUDENT IN AN ORGANIZED HEALTH CARE EDUCATION/TRAINING PROGRAM

## 2024-07-05 PROCEDURE — 120N000001 HC R&B MED SURG/OB

## 2024-07-05 PROCEDURE — 99232 SBSQ HOSP IP/OBS MODERATE 35: CPT | Performed by: INTERNAL MEDICINE

## 2024-07-05 PROCEDURE — 97530 THERAPEUTIC ACTIVITIES: CPT | Mod: GO

## 2024-07-05 PROCEDURE — 82565 ASSAY OF CREATININE: CPT | Performed by: STUDENT IN AN ORGANIZED HEALTH CARE EDUCATION/TRAINING PROGRAM

## 2024-07-05 PROCEDURE — 250N000011 HC RX IP 250 OP 636: Mod: JZ | Performed by: STUDENT IN AN ORGANIZED HEALTH CARE EDUCATION/TRAINING PROGRAM

## 2024-07-05 PROCEDURE — 36415 COLL VENOUS BLD VENIPUNCTURE: CPT | Performed by: STUDENT IN AN ORGANIZED HEALTH CARE EDUCATION/TRAINING PROGRAM

## 2024-07-05 PROCEDURE — 250N000011 HC RX IP 250 OP 636: Performed by: STUDENT IN AN ORGANIZED HEALTH CARE EDUCATION/TRAINING PROGRAM

## 2024-07-05 PROCEDURE — 85018 HEMOGLOBIN: CPT | Performed by: STUDENT IN AN ORGANIZED HEALTH CARE EDUCATION/TRAINING PROGRAM

## 2024-07-05 RX ORDER — CEFAZOLIN SODIUM 2 G/100ML
2 INJECTION, SOLUTION INTRAVENOUS EVERY 8 HOURS
Status: DISCONTINUED | OUTPATIENT
Start: 2024-07-05 | End: 2024-07-09 | Stop reason: HOSPADM

## 2024-07-05 RX ADMIN — FUROSEMIDE 40 MG: 20 TABLET ORAL at 08:56

## 2024-07-05 RX ADMIN — POLYETHYLENE GLYCOL 3350 17 G: 17 POWDER, FOR SOLUTION ORAL at 08:53

## 2024-07-05 RX ADMIN — DICLOFENAC EPOLAMINE 1 PATCH: 0.01 SYSTEM TOPICAL at 21:26

## 2024-07-05 RX ADMIN — DULOXETINE HYDROCHLORIDE 60 MG: 60 CAPSULE, DELAYED RELEASE PELLETS ORAL at 08:56

## 2024-07-05 RX ADMIN — LISINOPRIL 2.5 MG: 2.5 TABLET ORAL at 08:56

## 2024-07-05 RX ADMIN — FUROSEMIDE 20 MG: 20 TABLET ORAL at 18:06

## 2024-07-05 RX ADMIN — CEFAZOLIN SODIUM 2 G: 2 INJECTION, SOLUTION INTRAVENOUS at 18:06

## 2024-07-05 RX ADMIN — DICLOFENAC EPOLAMINE 1 PATCH: 0.01 SYSTEM TOPICAL at 08:49

## 2024-07-05 RX ADMIN — SENNOSIDES AND DOCUSATE SODIUM 1 TABLET: 50; 8.6 TABLET ORAL at 10:29

## 2024-07-05 RX ADMIN — APIXABAN 5 MG: 5 TABLET, FILM COATED ORAL at 08:57

## 2024-07-05 RX ADMIN — OXYCODONE HYDROCHLORIDE 5 MG: 5 TABLET ORAL at 21:27

## 2024-07-05 RX ADMIN — CEFAZOLIN SODIUM 2 G: 2 INJECTION, SOLUTION INTRAVENOUS at 10:29

## 2024-07-05 RX ADMIN — AMIODARONE HYDROCHLORIDE 100 MG: 100 TABLET ORAL at 08:57

## 2024-07-05 RX ADMIN — APIXABAN 5 MG: 5 TABLET, FILM COATED ORAL at 21:09

## 2024-07-05 RX ADMIN — OXYCODONE HYDROCHLORIDE 5 MG: 5 TABLET ORAL at 10:28

## 2024-07-05 RX ADMIN — HYDROMORPHONE HYDROCHLORIDE 0.2 MG: 0.2 INJECTION, SOLUTION INTRAMUSCULAR; INTRAVENOUS; SUBCUTANEOUS at 16:55

## 2024-07-05 RX ADMIN — METOPROLOL SUCCINATE 25 MG: 25 TABLET, EXTENDED RELEASE ORAL at 08:55

## 2024-07-05 ASSESSMENT — ACTIVITIES OF DAILY LIVING (ADL)
ADLS_ACUITY_SCORE: 38
ADLS_ACUITY_SCORE: 37
ADLS_ACUITY_SCORE: 38
ADLS_ACUITY_SCORE: 39
ADLS_ACUITY_SCORE: 38
ADLS_ACUITY_SCORE: 38
ADLS_ACUITY_SCORE: 39

## 2024-07-05 NOTE — PROGRESS NOTES
Orthopedic Progress Note      Assessment: 3 Day Post-Op  S/P Procedure(s):  RIGHT KNEE OPEN IRRIGATION AND DEBRIDEMENT     Plan:   Pain Control: Continue per pain protocol.  Weight Bearing: Weightbearing as tolerated.  No knee range of motion restrictions.  DVT Prophylaxis: Eliquis 5 mg BID  Antibiotics: Continue IV Ancef per infectious disease recommendations.  Will follow intraoperative cultures and follow-up on recommendations for further IV antibiotic management  GI: Plan for aggressive bowel regimen to prevent constipation from narcotic medications  Lines: HLIV once tolerating PO  PT/OT: Eval and treatment. Will follow up on recommendations.  Follow up:  in 2 weeks in clinic with either Dr. Flynn or Hero.  Discharge plan: Likely return to transitional care facility.      Subjective:  Patient is resting in bed this morning. States her pain has improved since her surgery. Only pain with weightbearing, but is eager to work on this and ROM. Denies SOB, nausea, vomiting.    Objective:  BP (!) 155/72 (BP Location: Left arm)   Pulse 93   Temp 98.5  F (36.9  C) (Oral)   Resp 18   Ht 1.524 m (5')   Wt 65.8 kg (145 lb)   SpO2 90%   BMI 28.32 kg/m    The patient is A&Ox3. Appears comfortable. Sitting in chair eating lunch.  Sensation is intact.  Dorsiflexion and plantar flexion is intact.  Dorsalis pedis pulse intact.  Calves are soft and non-tender. Negative Chance's.  The incision is covered with Ace wrap.  This is windowed to reveal a Mepilex dressing with dried drainage and a small area over the central portions.  Tolerates knee range of motion 10 to 60 degrees with pain at extremes of motion.  Ecchymosis mainly over lateral knee. No marc erythema present.  No significant tenderness to palpation over the knee.      Pertinent Labs   Lab Results: personally reviewed.   Lab Results   Component Value Date    INR 1.33 (H) 03/04/2023    INR 1.37 (H) 08/23/2022    INR 1.50 (H) 06/05/2019     Lab Results   Component  Value Date    WBC 6.5 07/04/2024    HGB 9.8 (L) 07/04/2024    HCT 32.1 (L) 07/04/2024    MCV 91 07/04/2024     07/04/2024     Lab Results   Component Value Date     07/03/2024    CO2 28 07/03/2024     Cultures from OR 7/2: NGTD    Claudette Pitt PA-C / Dr. Rakan Syed  Date: 7/5/2024  Time: 7:42 AM  [facility]  [phone]

## 2024-07-05 NOTE — PLAN OF CARE
Goal Outcome Evaluation:    Pt rates R knee pain 7/10 PRN 5mg Oxycodone given bringing pain down to about a 5/10. Pt ate well for dinner. She did not want to get oob due to weakness. Ace bandage clean dry and intact. A&O x4 VSS.      Problem: Adult Inpatient Plan of Care  Goal: Plan of Care Review  Description: The Plan of Care Review/Shift note should be completed every shift.  The Outcome Evaluation is a brief statement about your assessment that the patient is improving, declining, or no change.  This information will be displayed automatically on your shift  note.  Outcome: Progressing  Flowsheets (Taken 7/5/2024 0008)  Plan of Care Reviewed With: patient  Overall Patient Progress: improving     Problem: Adult Inpatient Plan of Care  Goal: Optimal Comfort and Wellbeing  Outcome: Progressing     Problem: Skin Injury Risk Increased  Goal: Skin Health and Integrity  Intervention: Plan: Nurse Driven Intervention: Friction and Shear  Recent Flowsheet Documentation  Taken 7/4/2024 1718 by Joann Anders, RN  Friction/Shear Interventions: HOB 30 degrees or less     Problem: Skin Injury Risk Increased  Goal: Skin Health and Integrity  Intervention: Optimize Skin Protection  Recent Flowsheet Documentation  Taken 7/4/2024 1718 by Joann Anders, RN  Activity Management: activity encouraged         Plan of Care Reviewed With: patient    Overall Patient Progress: improvingOverall Patient Progress: improving

## 2024-07-05 NOTE — PLAN OF CARE
Problem: Pain Acute  Goal: Optimal Pain Control and Function  Intervention: Develop Pain Management Plan  Recent Flowsheet Documentation  Taken 7/4/2024 2358 by Michele Ayers RN  Pain Management Interventions:   medication (see MAR)   ambulation/increased activity   breathing exercises   distraction   emotional support   pillow support provided   repositioned  Intervention: Prevent or Manage Pain  Recent Flowsheet Documentation  Taken 7/5/2024 0000 by Michele Ayers, RN  Medication Review/Management: medications reviewed     Problem: Pain Acute  Goal: Optimal Pain Control and Function  Intervention: Prevent or Manage Pain  Recent Flowsheet Documentation  Taken 7/5/2024 0000 by Michele Ayers RN  Medication Review/Management: medications reviewed     Problem: Mobility Impairment  Goal: Optimal Mobility  Intervention: Optimize Mobility  Recent Flowsheet Documentation  Taken 7/5/2024 0430 by Michele Ayers RN  Positioning/Transfer Devices:   in use   pillows  Taken 7/5/2024 0000 by Michele Ayers RN  Positioning/Transfer Devices:   in use   pillows  Taken 7/4/2024 2358 by Michele Ayers RN  Positioning/Transfer Devices:   in use   pillows   Goal Outcome Evaluation:       Pt is alert and oriented  x 4, ace bandage on right leg, purewick in place. PICC single lumen in place dry, clean. Pt reported pain 7/10 PRN oxycodone given and was effective because pt denied pain after all.

## 2024-07-05 NOTE — PROGRESS NOTES
[facility]    Medicine Progress Note - Hospitalist Service    Date of Admission:  7/2/2024    Assessment & Plan   Gladys Ramirez is a 89 year old female admitted on 7/2/2024 for knee washout in setting on ongoing septic arthritis.      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  Right knee pain secondary to above  -s/p I&D per ortho surgery on 7/2/24, cultures pending so far no growth to date   -ID following, continue IV ancef and follow microbiological data   -pain control as outlined  -continue scheduled bowel regimen     Acute hypoxic respiratory failure- resolved   -requiring 1L oxygen overnight, no documented low O2 readings  -O2 goal >90-92%  -wean to goal  -encourage movement, OOB and incentive spirometry   -if still requiring O2 will obtain further work-up     Paroxysmal atrial fibrillation  Status post dual-chamber pacemaker  -continue Eliquis   -PTA amiodarone, metoprolol     HFrEF without acute decompensated heart failure  HTN  -Last TTE showing LVEF 20-25%  -continue home lasix, metoprolol, lisinopril  -not on home spironolactone, defer to outpatient cardiology for GDMT     CKD stage III  -no acute concerns  -monitor volume status and avoid nephrotoxins           Diet: Advance Diet as Tolerated: Regular Diet Adult  Diet    DVT Prophylaxis: SCDs  Reyes Catheter: Not present  Lines: PRESENT      PICC 05/24/24 Single Lumen Right Brachial vein medial Antibiotic(s)-Site Assessment: WDL      Cardiac Monitoring: None  Code Status: No CPR- Do NOT Intubate      Clinically Significant Risk Factors              # Hypoalbuminemia: Lowest albumin = 3.1 g/dL at 7/3/2024  7:06 AM, will monitor as appropriate       # Hypertension: Noted on problem list  # Chronic heart failure with reduced ejection fraction: last echo with EF <40%               # Overweight: Estimated body mass index is 28.32 kg/m  as calculated from the following:     Height as of this encounter: 1.524 m (5').    Weight as of this encounter: 65.8 kg (145 lb)., PRESENT ON ADMISSION       # Financial/Environmental Concerns:     # Pacemaker present       Disposition Plan     Medically Ready for Discharge: Anticipated in 2-4 Days             Alexx Comer DO  Hospitalist Service  [facility]  Securely message with [employer] (more info)  Text page via [employer] Paging/Directory   ______________________________________________________________________    Interval History   NAOE. Sitting in bedside chair this AM in more pain compared to yesterday. No new characteristics to her pain. No fever or chills.     Physical Exam   Vital Signs: Temp: 98.5  F (36.9  C) Temp src: Oral BP: (!) 155/72 Pulse: 93   Resp: 18 SpO2: 90 % O2 Device: None (Room air)    Weight: 145 lbs 0 oz    General Appearance: Cooperative, no acute distress, nontoxic  Respiratory: good effort, no use of accessory muscles, no basilar crackles   Cardiovascular: no S3 with normal S1 and S2, RRR, no JVD or LE edema  GI: no pain  Skin: incision site R knee clean with dried blood. Knee is warm to touch and swollen  Other: Alert and oriented    Medical Decision Making       40 MINUTES SPENT BY ME on the date of service doing chart review, history, exam, documentation & further activities per the note.      Data     I have personally reviewed the following data over the past 24 hrs:    N/A  \   10.1 (L)   / N/A     N/A N/A N/A /  N/A   N/A N/A 0.80 \       Imaging results reviewed over the past 24 hrs:   No results found for this or any previous visit (from the past 24 hour(s)).

## 2024-07-05 NOTE — PLAN OF CARE
Health Maintenance       Pneumococcal Vaccine 0-64 (1 of 2 - PCV)  Never done    DTaP/Tdap/Td Vaccine (1 - Tdap)  Never done    Hepatitis B Vaccine (1 of 3 - 19+ 3-dose series)  Never done    Shingles Vaccine (1 of 2)  Never done    Cervical Cancer Screening (HPV/Cotest - Every 5 Years)  Overdue since 4/3/2020    Breast Cancer Screening (Every 2 Years)  Ordered on 1/9/2024    COVID-19 Vaccine (1 - 2023-24 season)  Never done           Following review of the above:  Patient is not proceeding with: COVID-19, Dtap/Tdap/Td, Hep B, Pneumococcal, and Shingles    Note: Refer to final orders and clinician documentation.          Depression screen done   Goal Outcome Evaluation:  Pt is alert and oriented with stable VS-she is afebrile and on RA. Leg is not painful at rest-but is a 7/10 with movement. Medicated with oxycodone 5mg before moving. She transfers-taking a few steps with assist of 1 and walker. She is using the commode to void during the day.Dressing is dry and intact to right leg.

## 2024-07-05 NOTE — PROGRESS NOTES
[facility]  Infectious Disease   Progress Note     Date of Admission:  7/2/2024    Assessment & Plan   Admitted with nonresolving syx in her infected arthritic/gouty knee, no systemic syx, with declining CRP as below-while on OPAT  MRI no osteomyelitis  Staph lugdenensis native septic knee , diagnosed during her admission on 5/24, Dr. Reza  5/23 : S/p Right knee arthroscopic irrigation and debridement   Receiving outpatient IV cefazolin  Hx steroid injection  Severe OA, and extensive complex tearing of the body and posterior horn medial meniscus , ACL tear-which can cause effusions  Gout (positive crystals in May )    7/2 Right knee open irrigation and debridement with partial synovectomy      Pacemaker  Left TKA     PLAN  Knee aspirated, cell count improved though still very neutrophilic, no crystals this time  Continue IV cefazolin-ordered    All cutures negative    Case management to arrange for continued OPAT (iv abx outpt)    ID followup-dr Reza  Discharge: TCU    Will sign off, please call with questions      Diane Espino M.D.  Visit time 35 min >50% counseling and coordination of care    ______________________________________________________________________    Interval History   Postop  No new complaints     Latest Reference Range & Units 05/22/24 17:22 07/02/24 15:32   Cell Count Fluid Source  Knee, Right Knee, Right   Total Nucleated Cells /uL 66,726 18,434   % Neutrophils Fluid % 92 99   % Lymphocytes Fluid % 0 1   % Mono/Macro Fluid % 8 0   Color Fluid Colorless, Yellow  Red ! Orange !   Appearance Fluid Clear  Turbid ! Cloudy !   !: Data is abnormal    Physical Exam   Vital Signs: Temp: 98.5  F (36.9  C) Temp src: Oral BP: (!) 155/72 Pulse: 93   Resp: 18 SpO2: 90 % O2 Device: None (Room air)    Weight: 145 lbs 0 oz  Gen. appearance nontoxic  Eyes no conjunctivitis or icterus  Neck no stiffness or neck vein distention, no LN  Heart  No edema  Lungs breathing  comfortably  Abdomen soft not tender  Extremities wrapped knee  Skin  no rash or emboli  Neurologic alert oriented no focal deficits      Data   Results for orders placed or performed during the hospital encounter of 07/02/24 (from the past 24 hour(s))   Creatinine   Result Value Ref Range    Creatinine 0.80 0.51 - 0.95 mg/dL    GFR Estimate 68 >60 mL/min/1.73m2   Hemoglobin   Result Value Ref Range    Hemoglobin 10.1 (L) 11.7 - 15.7 g/dL

## 2024-07-05 NOTE — PROGRESS NOTES
Care Management Follow Up    Length of Stay (days): 3    Expected Discharge Date: 07/08/2024    Anticipated Discharge Plan:  TCU     Transportation: Confirmed Wheelchair. Transportation costs discussed? Yes. Discussed with Sister Theresa.     PT Recommendations: Transitional Care Facility  OT Recommendations:  Transitional Care Facility     Barriers to Discharge: medical stability    Prior Living Situation:  [facility] She was hospitalized 5/22-5/29 and discharged to [place]. She had been progressing with therapy and ambulating with her walker. They have a bed hold with [facility]. [facility] to transport. Discussed potential transportation cost.  Sister (pola Cardenas.     Advanced Directive on File:  states she has HCD     Patient/Spokesperson Updated: No    Additional Information:  Medical: Patient presenting with recurrent right septic knee arthropathy.  Orthopedic and ID following.      7/5/24:  Patient to return to [facility] TCU. No admission on the weekend.      Lisa Ascencio RN

## 2024-07-06 LAB
CREAT SERPL-MCNC: 0.86 MG/DL (ref 0.51–0.95)
EGFRCR SERPLBLD CKD-EPI 2021: 63 ML/MIN/1.73M2

## 2024-07-06 PROCEDURE — 120N000001 HC R&B MED SURG/OB

## 2024-07-06 PROCEDURE — 250N000011 HC RX IP 250 OP 636: Mod: JZ | Performed by: STUDENT IN AN ORGANIZED HEALTH CARE EDUCATION/TRAINING PROGRAM

## 2024-07-06 PROCEDURE — 99232 SBSQ HOSP IP/OBS MODERATE 35: CPT | Performed by: HOSPITALIST

## 2024-07-06 PROCEDURE — 250N000013 HC RX MED GY IP 250 OP 250 PS 637: Performed by: HOSPITALIST

## 2024-07-06 PROCEDURE — 82565 ASSAY OF CREATININE: CPT | Performed by: STUDENT IN AN ORGANIZED HEALTH CARE EDUCATION/TRAINING PROGRAM

## 2024-07-06 PROCEDURE — 250N000013 HC RX MED GY IP 250 OP 250 PS 637: Performed by: STUDENT IN AN ORGANIZED HEALTH CARE EDUCATION/TRAINING PROGRAM

## 2024-07-06 PROCEDURE — 99232 SBSQ HOSP IP/OBS MODERATE 35: CPT | Performed by: INTERNAL MEDICINE

## 2024-07-06 RX ORDER — BISACODYL 10 MG
10 SUPPOSITORY, RECTAL RECTAL DAILY PRN
Status: DISCONTINUED | OUTPATIENT
Start: 2024-07-06 | End: 2024-07-09 | Stop reason: HOSPADM

## 2024-07-06 RX ADMIN — APIXABAN 5 MG: 5 TABLET, FILM COATED ORAL at 09:26

## 2024-07-06 RX ADMIN — CEFAZOLIN SODIUM 2 G: 2 INJECTION, SOLUTION INTRAVENOUS at 00:46

## 2024-07-06 RX ADMIN — CEFAZOLIN SODIUM 2 G: 2 INJECTION, SOLUTION INTRAVENOUS at 18:01

## 2024-07-06 RX ADMIN — OXYCODONE HYDROCHLORIDE 5 MG: 5 TABLET ORAL at 21:40

## 2024-07-06 RX ADMIN — BISACODYL 10 MG: 10 SUPPOSITORY RECTAL at 17:23

## 2024-07-06 RX ADMIN — LISINOPRIL 2.5 MG: 2.5 TABLET ORAL at 09:15

## 2024-07-06 RX ADMIN — CEFAZOLIN SODIUM 2 G: 2 INJECTION, SOLUTION INTRAVENOUS at 10:05

## 2024-07-06 RX ADMIN — SENNOSIDES AND DOCUSATE SODIUM 2 TABLET: 50; 8.6 TABLET ORAL at 09:14

## 2024-07-06 RX ADMIN — AMIODARONE HYDROCHLORIDE 100 MG: 100 TABLET ORAL at 09:13

## 2024-07-06 RX ADMIN — OXYCODONE HYDROCHLORIDE 5 MG: 5 TABLET ORAL at 09:11

## 2024-07-06 RX ADMIN — DULOXETINE HYDROCHLORIDE 60 MG: 60 CAPSULE, DELAYED RELEASE PELLETS ORAL at 09:14

## 2024-07-06 RX ADMIN — OXYCODONE HYDROCHLORIDE 5 MG: 5 TABLET ORAL at 14:47

## 2024-07-06 RX ADMIN — POLYETHYLENE GLYCOL 3350 17 G: 17 POWDER, FOR SOLUTION ORAL at 09:27

## 2024-07-06 RX ADMIN — DICLOFENAC EPOLAMINE 1 PATCH: 0.01 SYSTEM TOPICAL at 20:03

## 2024-07-06 RX ADMIN — DICLOFENAC EPOLAMINE 1 PATCH: 0.01 SYSTEM TOPICAL at 09:18

## 2024-07-06 RX ADMIN — FUROSEMIDE 40 MG: 20 TABLET ORAL at 09:12

## 2024-07-06 RX ADMIN — APIXABAN 5 MG: 5 TABLET, FILM COATED ORAL at 21:30

## 2024-07-06 RX ADMIN — FUROSEMIDE 20 MG: 20 TABLET ORAL at 18:02

## 2024-07-06 RX ADMIN — DICLOFENAC 2 G: 10 GEL TOPICAL at 09:18

## 2024-07-06 ASSESSMENT — ACTIVITIES OF DAILY LIVING (ADL)
ADLS_ACUITY_SCORE: 39
ADLS_ACUITY_SCORE: 39
ADLS_ACUITY_SCORE: 38
ADLS_ACUITY_SCORE: 39
ADLS_ACUITY_SCORE: 38
ADLS_ACUITY_SCORE: 39
ADLS_ACUITY_SCORE: 38
ADLS_ACUITY_SCORE: 39
ADLS_ACUITY_SCORE: 39
ADLS_ACUITY_SCORE: 38
ADLS_ACUITY_SCORE: 39
ADLS_ACUITY_SCORE: 38
ADLS_ACUITY_SCORE: 38
ADLS_ACUITY_SCORE: 39
ADLS_ACUITY_SCORE: 38

## 2024-07-06 NOTE — PROGRESS NOTES
Orthopedic Progress Note      Assessment: 4 Day Post-Op  S/P Procedure(s):  RIGHT KNEE OPEN IRRIGATION AND DEBRIDEMENT     Plan:   Pain Control: Continue per pain protocol.  Weight Bearing: Weightbearing as tolerated.  No knee range of motion restrictions.  DVT Prophylaxis: Eliquis 5 mg BID  Antibiotics: Continue IV Ancef per infectious disease recommendations.  Will follow intraoperative cultures and follow-up on recommendations for further IV antibiotic management  GI: Plan for aggressive bowel regimen to prevent constipation from narcotic medications  Lines: HLIV once tolerating PO  PT/OT: Eval and treatment. Will follow up on recommendations.  Follow up:  in 2 weeks in clinic with Dr. Monahan for wound check  Discharge plan: Likely return to transitional care facility.      Subjective:  No acute events overnight.  Minimal pain at rest, or with passive movement of the knee.  Denies fevers, chills, sweats.  Tolerating antibiotic.  Tolerating oral intake.    Objective:  /61 (BP Location: Left arm)   Pulse 64   Temp 98.4  F (36.9  C) (Oral)   Resp 16   Ht 1.524 m (5')   Wt 65.8 kg (145 lb)   SpO2 92%   BMI 28.32 kg/m    The patient is A&Ox3.  Resting in bed.  Sensation is intact.  Dorsiflexion and plantar flexion is intact.  Dorsalis pedis pulse intact.  Calves are soft and non-tender. Negative Chance's.  The incision is covered with Ace wrap.  This is windowed to reveal a Mepilex dressing with dried drainage and a small area over the central portions.  Tolerates knee range of motion 10 to 80 degrees with pain at extremes of motion.        Pertinent Labs   Lab Results: personally reviewed.   Lab Results   Component Value Date    INR 1.33 (H) 03/04/2023    INR 1.37 (H) 08/23/2022    INR 1.50 (H) 06/05/2019     Lab Results   Component Value Date    WBC 6.5 07/04/2024    HGB 10.1 (L) 07/05/2024    HCT 32.1 (L) 07/04/2024    MCV 91 07/04/2024     07/04/2024     Lab Results   Component Value Date    NA  141 07/03/2024    CO2 28 07/03/2024     Cultures from OR 7/2: NGTD    Sanchez Monahan MD  [facility]

## 2024-07-06 NOTE — PLAN OF CARE
"  Problem: Adult Inpatient Plan of Care  Goal: Plan of Care Review  Description: The Plan of Care Review/Shift note should be completed every shift.  The Outcome Evaluation is a brief statement about your assessment that the patient is improving, declining, or no change.  This information will be displayed automatically on your shift  note.  Outcome: Progressing     Problem: Adult Inpatient Plan of Care  Goal: Patient-Specific Goal (Individualized)  Description: You can add care plan individualizations to a care plan. Examples of Individualization might be:  \"Parent requests to be called daily at 9am for status\", \"I have a hard time hearing out of my right ear\", or \"Do not touch me to wake me up as it startles  me\".  Outcome: Progressing     Problem: Risk for Delirium  Goal: Improved Attention and Thought Clarity  Outcome: Progressing     Problem: Risk for Delirium  Goal: Improved Sleep  Outcome: Progressing     Problem: Pain Acute  Goal: Optimal Pain Control and Function  Outcome: Progressing   Goal Outcome Evaluation:  Pt is alert and oriented x3. Pleasant and comfortable.BP 90/49, not in any distress. At the start of shift O2 sat was 87% RA, O2 2LPM nasal given, O2 sat went up to 91%. O2 sat 93% at 1LPM at 02AM . Repositioning done. Rt knee joint wound with primapore CDI. Using purewick urinating adequately. Repositioned every 2 hrs A1. Urinating adequately through purewick.                      "

## 2024-07-06 NOTE — PROGRESS NOTES
[facility]  Infectious Disease   Progress Note     Date of Admission:  7/2/2024    Assessment & Plan   Admitted with nonresolving syx in her infected arthritic/gouty knee, no systemic syx, with declining CRP as below-while on OPAT  MRI no osteomyelitis  Staph lugdenensis native septic knee , diagnosed during her admission on 5/24, Dr. Reza  5/23 : S/p Right knee arthroscopic irrigation and debridement   Receiving outpatient IV cefazolin  Hx steroid injection  Severe OA, and extensive complex tearing of the body and posterior horn medial meniscus , ACL tear-which can cause effusions  Gout (positive crystals in May )    7/2 Right knee open irrigation and debridement with partial synovectomy      Pacemaker  Left TKA     PLAN  Knee aspirated, cell count improved though still very neutrophilic, no crystals this time  Continue IV cefazolin-ordered    All cutures negative    Case management to arrange for continued OPAT (iv abx outpt)    ID followup with me in about 3 wks.  Plan is 4 wks more of IV abx from this washout (aug 2).  The Discharge orders are reviewed and ok by me.     Discharge: TCU    Will sign off, please call with questions      EMERALD Reza MD  ______________________________________________________________________    Interval History   This is my pt.  I have reviewed the records of this admission today.       Latest Reference Range & Units 05/22/24 17:22 07/02/24 15:32   Cell Count Fluid Source  Knee, Right Knee, Right   Total Nucleated Cells /uL 66,726 18,434   % Neutrophils Fluid % 92 99   % Lymphocytes Fluid % 0 1   % Mono/Macro Fluid % 8 0   Color Fluid Colorless, Yellow  Red ! Orange !   Appearance Fluid Clear  Turbid ! Cloudy !   !: Data is abnormal    Physical Exam   Vital Signs: Temp: 98.4  F (36.9  C) Temp src: Oral BP: 101/61 Pulse: 64   Resp: 16 SpO2: 92 % O2 Device: Nasal cannula Oxygen Delivery: 1 LPM  Weight: 145 lbs 0 oz  Gen. appearance nontoxic  Eyes no conjunctivitis  or icterus  Neck no stiffness or neck vein distention, no LN  Heart  No edema  Lungs breathing comfortably  Abdomen soft not tender  Extremities wrapped knee  Skin  no rash or emboli  Neurologic alert oriented no focal deficits      Data   Results for orders placed or performed during the hospital encounter of 07/02/24 (from the past 24 hour(s))   Creatinine   Result Value Ref Range    Creatinine 0.86 0.51 - 0.95 mg/dL    GFR Estimate 63 >60 mL/min/1.73m2

## 2024-07-06 NOTE — PLAN OF CARE
Problem: Adult Inpatient Plan of Care  Goal: Plan of Care Review  Description: The Plan of Care Review/Shift note should be completed every shift.  The Outcome Evaluation is a brief statement about your assessment that the patient is improving, declining, or no change.  This information will be displayed automatically on your shift  note.  Outcome: Progressing   Goal Outcome Evaluation:  Plan of care reviewed with Pt in am. BP lower today -metoprolol held per parameters. Pt is afebrile. Dressing dry and intact to right knee-pink and bruised on lateral side of knee. Ace rewrapped and cold pack applied. Minimal pain at rest-painful with standing. Up to commode and chair today. Oxycodone 5mg given in am and pm before activity. PICC line dressing changed.

## 2024-07-06 NOTE — PROGRESS NOTES
[facility]    Medicine Progress Note - Hospitalist Service    Date of Admission:  7/2/2024    Assessment & Plan                Gladys Ramirez is a 89 year old female with A-fib, chronic CHF, hypertension, CKD 3 presented for evaluation of right knee pain and erythema in the setting of recent septic arthritis, found to have recurrent septic arthritis. Hospital Day: 5      Recurrent right knee septic arthritis  Staphylococcus lugdunensis right septic knee in May 2024  Right knee pain secondary to above  -Original infection May 2024 with Staph lugdunensis.  Had washout and was on outpatient IV Ancef, when at outpatient Ortho follow-up was noted with worsening erythema and swelling, sent in for repeat washout.   - S/p I&D per ortho on 7/2/24, cultures pending so far no growth to date   -ID following, continue IV ancef and follow microbiological data   -pain control as outlined  -continue scheduled bowel regimen   -Plan at discharge is to continue IV antibiotics for 4 weeks from most recent washout     Acute hypoxic respiratory failure  -requiring 1L oxygen at times, spO2 sharath 85% on 7/3  -O2 goal >90-92%  -wean to goal  -encourage movement, OOB and incentive spirometry   -if requires O2 again would get CXR     Paroxysmal atrial fibrillation  Status post dual-chamber pacemaker  -continue Eliquis   -PTA amiodarone, metoprolol     HFrEF without acute decompensated heart failure  HTN  -Last TTE showing LVEF 20-25%  -continue home lasix, metoprolol, lisinopril  -not on home spironolactone, defer to outpatient cardiology for GDMT     CKD stage III  -no acute concerns  -monitor volume status and avoid nephrotoxins     Constipation  -miralax  -senna  -supp PRN          Diet: Advance Diet as Tolerated: Regular Diet Adult  Diet    DVT Prophylaxis: Moderate risk.   DOAC  Reyes Catheter: Not present  Lines: PRESENT      PICC 05/24/24 Single Lumen Right Brachial vein medial Antibiotic(s)-Site Assessment:  WDL      Cardiac Monitoring: None  Code Status: No CPR- Do NOT Intubate      Clinically Significant Risk Factors              # Hypoalbuminemia: Lowest albumin = 3.1 g/dL at 7/3/2024  7:06 AM, will monitor as appropriate     # Hypertension: Noted on problem list  # Chronic heart failure with reduced ejection fraction: last echo with EF <40%             # Overweight: Estimated body mass index is 29.15 kg/m  as calculated from the following:    Height as of this encounter: 1.524 m (5').    Weight as of this encounter: 67.7 kg (149 lb 4 oz).      # Financial/Environmental Concerns:     # Pacemaker present       Disposition Plan     Medically Ready for Discharge: Anticipated in 2-4 Days         Discharge barrier(s): placement  Care discussed with: patient, RN      Shyla Babcock MD  Hospitalist Service  [facility]  Securely message with [employer] (more info)  Text page via [employer] Paging/Directory   ______________________________________________________________________      Physical Exam   Vital Signs: Temp: 97.7  F (36.5  C) Temp src: Oral BP: 129/74 Pulse: 68   Resp: 17 SpO2: 92 % O2 Device: None (Room air) Oxygen Delivery: 1 LPM  Weight: 149 lbs 4.02 oz    General: in no apparent distress, non-toxic, and alert female lying in hospital bed oriented x3  HEENT: Head normocephalic atraumatic, oral mucosa moist. Sclerae anicteric  CV: Regular rhythm, normal rate, no murmurs  Resp: No wheezes, no rales or rhonchi, no focal consolidations  GI: Belly soft, nondistended, nontender, bowel sounds present  Skin:  faint erythema of R knee at the lateral distal aspect of incision  Extremities:  mild swelling of R knee, no significant warmth  Psych: Normal affect, mood euthymic  Neuro: Grossly normal      Medical Decision Making               Data   Recent Results (from the past 16 hour(s))   Creatinine    Collection Time: 07/06/24  5:31 AM   Result Value Ref Range    Creatinine 0.86 0.51 - 0.95 mg/dL    GFR  Estimate 63 >60 mL/min/1.73m2       Interval History     Patient doing okay today.  Complains he is still painful.  Nurse and I helped her take a look at her knee and she noted that the erythema seems much improved from when she came in.  Patient is medically stable awaiting TCU placement, sounds like this will not occur until 7/8.

## 2024-07-06 NOTE — PLAN OF CARE
Patient is A&Ox4. She is cooperative with cares. Patient states that when she gets up the Oxycodone does not help and that she has shooting pain. Patient given prn IV Dilaudid and patient was able to get up and walk to commode. Small amt of drainage on dressing and redness around dressing which she states was there previously. Ice applied. She was given 5 mg oral Oxycodone at bedtime.  Problem: Adult Inpatient Plan of Care  Goal: Absence of Hospital-Acquired Illness or Injury  Outcome: Progressing  Intervention: Identify and Manage Fall Risk  Recent Flowsheet Documentation  Taken 7/5/2024 1700 by Acacia Almonte RN  Safety Promotion/Fall Prevention:   activity supervised   assistive device/personal items within reach   mobility aid in reach   nonskid shoes/slippers when out of bed   patient and family education  Intervention: Prevent Skin Injury  Recent Flowsheet Documentation  Taken 7/5/2024 1655 by Acacia Almonte RN  Body Position: position changed independently  Intervention: Prevent and Manage VTE (Venous Thromboembolism) Risk  Recent Flowsheet Documentation  Taken 7/5/2024 1700 by Acacia Almonte RN  VTE Prevention/Management: compression stockings off  Intervention: Prevent Infection  Recent Flowsheet Documentation  Taken 7/5/2024 1700 by Acacia Almonte RN  Infection Prevention:   equipment surfaces disinfected   hand hygiene promoted   personal protective equipment utilized   rest/sleep promoted   single patient room provided  Goal: Optimal Comfort and Wellbeing  Outcome: Progressing  Intervention: Monitor Pain and Promote Comfort  Recent Flowsheet Documentation  Taken 7/5/2024 2127 by Acacia Almonte RN  Pain Management Interventions:   medication (see MAR)   cold applied  Taken 7/5/2024 1655 by Acacia Almonte, RN  Pain Management Interventions:   medication (see MAR)   cold applied     Problem: Risk for Delirium  Goal: Optimal Coping  Outcome: Progressing  Intervention: Optimize Psychosocial Adjustment to  Delirium  Recent Flowsheet Documentation  Taken 7/5/2024 1700 by Acacia Almonte RN  Supportive Measures:   active listening utilized   decision-making supported   relaxation techniques promoted   verbalization of feelings encouraged  Goal: Improved Behavioral Control  Outcome: Progressing  Intervention: Prevent and Manage Agitation  Recent Flowsheet Documentation  Taken 7/5/2024 1700 by Acacia Almonte RN  Environment Familiarity/Consistency: daily routine followed  Intervention: Minimize Safety Risk  Recent Flowsheet Documentation  Taken 7/5/2024 1700 by Acacia Almonte RN  Communication Enhancement Strategies:   call light answered in person   verbal and visual cues paired  Enhanced Safety Measures:   assistive devices when indicated   pain management   patient/family teach back on injury risk     Problem: Pain Acute  Goal: Optimal Pain Control and Function  Outcome: Progressing  Intervention: Develop Pain Management Plan  Recent Flowsheet Documentation  Taken 7/5/2024 2127 by Acacia Almonte RN  Pain Management Interventions:   medication (see MAR)   cold applied  Taken 7/5/2024 1655 by Acacia Almonte RN  Pain Management Interventions:   medication (see MAR)   cold applied  Intervention: Prevent or Manage Pain  Recent Flowsheet Documentation  Taken 7/5/2024 1700 by Acacia Almonte RN  Medication Review/Management: medications reviewed  Intervention: Optimize Psychosocial Wellbeing  Recent Flowsheet Documentation  Taken 7/5/2024 1700 by Acacia Almonte RN  Spiritual Activities Assistance: affirmation provided  Supportive Measures:   active listening utilized   decision-making supported   relaxation techniques promoted   verbalization of feelings encouraged     Problem: Mobility Impairment  Goal: Optimal Mobility  Outcome: Progressing  Intervention: Optimize Mobility  Recent Flowsheet Documentation  Taken 7/5/2024 2127 by Acacia Almonte RN  Assistive Device Utilized:   gait belt   walker  Activity Management:  activity adjusted per tolerance  Taken 7/5/2024 1655 by Acacia Almonte, RN  Assistive Device Utilized:   gait belt   walker  Activity Management: up to bedside commode  Positioning/Transfer Devices:   in use   pillows     Problem: Risk for Delirium  Goal: Improved Attention and Thought Clarity  Intervention: Maximize Cognitive Function  Recent Flowsheet Documentation  Taken 7/5/2024 1700 by Acacia Almonte, RN  Reorientation Measures:   calendar in view   clock in view   reorientation provided   glasses use encouraged     Problem: Skin Injury Risk Increased  Goal: Skin Health and Integrity  Intervention: Plan: Nurse Driven Intervention: Moisture Management  Recent Flowsheet Documentation  Taken 7/5/2024 1700 by Acacia Almonte, RN  Moisture Interventions:   Encourage regular toileting   Incontinence pad  Taken 7/5/2024 1655 by Acacia Almonte, RN  Bathing/Skin Care:   wipes, CHG   linen changed   incontinence care   dressed/undressed  Intervention: Plan: Nurse Driven Intervention: Friction and Shear  Recent Flowsheet Documentation  Taken 7/5/2024 1700 by Acacia Almonte, RN  Friction/Shear Interventions: HOB 30 degrees or less  Intervention: Optimize Skin Protection  Recent Flowsheet Documentation  Taken 7/5/2024 2127 by Acacia Almonte, RN  Activity Management: activity adjusted per tolerance  Taken 7/5/2024 1655 by Acacia Almonte, RN  Activity Management: up to bedside commode  Head of Bed (HOB) Positioning: HOB at 20 degrees   Goal Outcome Evaluation:

## 2024-07-07 ENCOUNTER — APPOINTMENT (OUTPATIENT)
Dept: PHYSICAL THERAPY | Facility: HOSPITAL | Age: 89
DRG: 485 | End: 2024-07-07
Payer: COMMERCIAL

## 2024-07-07 LAB
BACTERIA BLD CULT: NO GROWTH
BACTERIA BLD CULT: NO GROWTH
BACTERIA SNV CULT: NO GROWTH
BACTERIA TISS BX CULT: NO GROWTH
GRAM STAIN RESULT: NORMAL
GRAM STAIN RESULT: NORMAL

## 2024-07-07 PROCEDURE — 99232 SBSQ HOSP IP/OBS MODERATE 35: CPT | Performed by: HOSPITALIST

## 2024-07-07 PROCEDURE — 120N000001 HC R&B MED SURG/OB

## 2024-07-07 PROCEDURE — 250N000013 HC RX MED GY IP 250 OP 250 PS 637: Performed by: STUDENT IN AN ORGANIZED HEALTH CARE EDUCATION/TRAINING PROGRAM

## 2024-07-07 PROCEDURE — 250N000011 HC RX IP 250 OP 636: Mod: JZ | Performed by: STUDENT IN AN ORGANIZED HEALTH CARE EDUCATION/TRAINING PROGRAM

## 2024-07-07 PROCEDURE — 97116 GAIT TRAINING THERAPY: CPT | Mod: GP

## 2024-07-07 PROCEDURE — 97110 THERAPEUTIC EXERCISES: CPT | Mod: GP

## 2024-07-07 PROCEDURE — 97530 THERAPEUTIC ACTIVITIES: CPT | Mod: GP

## 2024-07-07 PROCEDURE — 250N000013 HC RX MED GY IP 250 OP 250 PS 637: Performed by: HOSPITALIST

## 2024-07-07 RX ORDER — FUROSEMIDE 20 MG
20 TABLET ORAL
Status: DISCONTINUED | OUTPATIENT
Start: 2024-07-07 | End: 2024-07-09 | Stop reason: HOSPADM

## 2024-07-07 RX ADMIN — OXYCODONE HYDROCHLORIDE 5 MG: 5 TABLET ORAL at 08:39

## 2024-07-07 RX ADMIN — OXYCODONE HYDROCHLORIDE 5 MG: 5 TABLET ORAL at 22:13

## 2024-07-07 RX ADMIN — APIXABAN 5 MG: 5 TABLET, FILM COATED ORAL at 20:28

## 2024-07-07 RX ADMIN — CEFAZOLIN SODIUM 2 G: 2 INJECTION, SOLUTION INTRAVENOUS at 10:23

## 2024-07-07 RX ADMIN — DULOXETINE HYDROCHLORIDE 60 MG: 60 CAPSULE, DELAYED RELEASE PELLETS ORAL at 08:41

## 2024-07-07 RX ADMIN — FUROSEMIDE 20 MG: 20 TABLET ORAL at 14:04

## 2024-07-07 RX ADMIN — CEFAZOLIN SODIUM 2 G: 2 INJECTION, SOLUTION INTRAVENOUS at 01:19

## 2024-07-07 RX ADMIN — AMIODARONE HYDROCHLORIDE 100 MG: 100 TABLET ORAL at 08:41

## 2024-07-07 RX ADMIN — CEFAZOLIN SODIUM 2 G: 2 INJECTION, SOLUTION INTRAVENOUS at 17:24

## 2024-07-07 RX ADMIN — METOPROLOL SUCCINATE 25 MG: 25 TABLET, EXTENDED RELEASE ORAL at 08:41

## 2024-07-07 RX ADMIN — DICLOFENAC EPOLAMINE 1 PATCH: 0.01 SYSTEM TOPICAL at 08:39

## 2024-07-07 RX ADMIN — DICLOFENAC EPOLAMINE 1 PATCH: 0.01 SYSTEM TOPICAL at 20:31

## 2024-07-07 RX ADMIN — DICLOFENAC 2 G: 10 GEL TOPICAL at 08:39

## 2024-07-07 RX ADMIN — DICLOFENAC 2 G: 10 GEL TOPICAL at 08:42

## 2024-07-07 RX ADMIN — APIXABAN 5 MG: 5 TABLET, FILM COATED ORAL at 08:42

## 2024-07-07 RX ADMIN — FUROSEMIDE 40 MG: 20 TABLET ORAL at 08:41

## 2024-07-07 RX ADMIN — POLYETHYLENE GLYCOL 3350 17 G: 17 POWDER, FOR SOLUTION ORAL at 08:40

## 2024-07-07 RX ADMIN — LISINOPRIL 2.5 MG: 2.5 TABLET ORAL at 08:41

## 2024-07-07 ASSESSMENT — ACTIVITIES OF DAILY LIVING (ADL)
ADLS_ACUITY_SCORE: 39
ADLS_ACUITY_SCORE: 42
ADLS_ACUITY_SCORE: 43
ADLS_ACUITY_SCORE: 42
ADLS_ACUITY_SCORE: 43
ADLS_ACUITY_SCORE: 42
ADLS_ACUITY_SCORE: 43
ADLS_ACUITY_SCORE: 42
ADLS_ACUITY_SCORE: 38
ADLS_ACUITY_SCORE: 39
ADLS_ACUITY_SCORE: 39
ADLS_ACUITY_SCORE: 43
ADLS_ACUITY_SCORE: 42
ADLS_ACUITY_SCORE: 39
ADLS_ACUITY_SCORE: 42
ADLS_ACUITY_SCORE: 39

## 2024-07-07 NOTE — PLAN OF CARE
Patient is A&Ox4. She is up in chair most of shift. Patient continues to have reddened area around dressing. Suppository given with small results. Patient still has pain when standing and is weak. Given 1 prn Oxycodone at bedtime. Asleep at end of shift.    Problem: Adult Inpatient Plan of Care  Goal: Optimal Comfort and Wellbeing  Outcome: Progressing  Intervention: Monitor Pain and Promote Comfort  Recent Flowsheet Documentation  Taken 7/6/2024 1524 by Acacia Almonte RN  Pain Management Interventions: premedicated for activity     Problem: Risk for Delirium  Goal: Optimal Coping  Outcome: Progressing  Intervention: Optimize Psychosocial Adjustment to Delirium  Recent Flowsheet Documentation  Taken 7/6/2024 1700 by Acacia Almonte RN  Supportive Measures:   active listening utilized   decision-making supported   relaxation techniques promoted   verbalization of feelings encouraged  Goal: Improved Behavioral Control  Outcome: Progressing  Intervention: Prevent and Manage Agitation  Recent Flowsheet Documentation  Taken 7/6/2024 1700 by Acacia Almonte RN  Environment Familiarity/Consistency: daily routine followed  Intervention: Minimize Safety Risk  Recent Flowsheet Documentation  Taken 7/6/2024 1700 by Acacia Almonte RN  Communication Enhancement Strategies:   call light answered in person   verbal and visual cues paired  Enhanced Safety Measures:   assistive devices when indicated   pain management   patient/family teach back on injury risk  Goal: Improved Sleep  Outcome: Progressing     Problem: Skin Injury Risk Increased  Goal: Skin Health and Integrity  Outcome: Progressing  Intervention: Plan: Nurse Driven Intervention: Moisture Management  Recent Flowsheet Documentation  Taken 7/6/2024 1700 by Acacia Almonte, RN  Moisture Interventions: Encourage regular toileting  Bathing/Skin Care:   incontinence care   wipes, CHG  Intervention: Plan: Nurse Driven Intervention: Friction and Shear  Recent Flowsheet  Documentation  Taken 7/6/2024 1700 by Acacia Almonte RN  Friction/Shear Interventions: HOB 30 degrees or less  Intervention: Optimize Skin Protection  Recent Flowsheet Documentation  Taken 7/6/2024 1700 by Acacia Almonte RN  Skin Protection: incontinence pads utilized  Activity Management: up to bedside commode  Head of Bed (HOB) Positioning: HOB at 20-30 degrees     Problem: Pain Acute  Goal: Optimal Pain Control and Function  Outcome: Progressing  Intervention: Develop Pain Management Plan  Recent Flowsheet Documentation  Taken 7/6/2024 1524 by Acacia Almonte RN  Pain Management Interventions: premedicated for activity  Intervention: Prevent or Manage Pain  Recent Flowsheet Documentation  Taken 7/6/2024 1700 by Acacia Almonte RN  Sensory Stimulation Regulation: care clustered  Medication Review/Management: medications reviewed  Intervention: Optimize Psychosocial Wellbeing  Recent Flowsheet Documentation  Taken 7/6/2024 1700 by Acacia Almonte RN  Spiritual Activities Assistance: affirmation provided  Supportive Measures:   active listening utilized   decision-making supported   relaxation techniques promoted   verbalization of feelings encouraged     Problem: Mobility Impairment  Goal: Optimal Mobility  Outcome: Progressing  Intervention: Optimize Mobility  Recent Flowsheet Documentation  Taken 7/6/2024 1700 by Acacia Almonte RN  Assistive Device Utilized:   gait belt   walker  Activity Management: up to bedside commode  Positioning/Transfer Devices: pillows     Problem: Adult Inpatient Plan of Care  Goal: Absence of Hospital-Acquired Illness or Injury  Intervention: Identify and Manage Fall Risk  Recent Flowsheet Documentation  Taken 7/6/2024 1700 by Acacia Almonte RN  Safety Promotion/Fall Prevention:   activity supervised   assistive device/personal items within reach   mobility aid in reach   nonskid shoes/slippers when out of bed   patient and family education  Intervention: Prevent Skin Injury  Recent  Flowsheet Documentation  Taken 7/6/2024 1700 by Acacia Almonte RN  Body Position:   position changed independently   heels elevated  Skin Protection: incontinence pads utilized  Intervention: Prevent and Manage VTE (Venous Thromboembolism) Risk  Recent Flowsheet Documentation  Taken 7/6/2024 1700 by Acacia Almonte RN  VTE Prevention/Management:   SCDs on (sequential compression devices)   SCDs off (sequential compression devices)  Intervention: Prevent Infection  Recent Flowsheet Documentation  Taken 7/6/2024 1700 by Acacia Almonte RN  Infection Prevention:   equipment surfaces disinfected   hand hygiene promoted   personal protective equipment utilized   rest/sleep promoted   single patient room provided     Problem: Risk for Delirium  Goal: Improved Attention and Thought Clarity  Intervention: Maximize Cognitive Function  Recent Flowsheet Documentation  Taken 7/6/2024 1700 by Acacia Almonte RN  Sensory Stimulation Regulation: care clustered  Reorientation Measures:   calendar in view   clock in view   reorientation provided   glasses use encouraged   Goal Outcome Evaluation:

## 2024-07-07 NOTE — PLAN OF CARE
"  Problem: Adult Inpatient Plan of Care  Goal: Plan of Care Review  Description: The Plan of Care Review/Shift note should be completed every shift.  The Outcome Evaluation is a brief statement about your assessment that the patient is improving, declining, or no change.  This information will be displayed automatically on your shift  note.  Outcome: Progressing     Problem: Adult Inpatient Plan of Care  Goal: Patient-Specific Goal (Individualized)  Description: You can add care plan individualizations to a care plan. Examples of Individualization might be:  \"Parent requests to be called daily at 9am for status\", \"I have a hard time hearing out of my right ear\", or \"Do not touch me to wake me up as it startles  me\".  Outcome: Progressing     Problem: Risk for Delirium  Goal: Improved Sleep  Outcome: Progressing     Problem: Pain Acute  Goal: Optimal Pain Control and Function  Outcome: Progressing     Problem: Mobility Impairment  Goal: Optimal Mobility  Outcome: Progressing   Goal Outcome Evaluation:  Pt is alert and oriented. Pleasant and cooperative. VSS. CMS intact, primapore dressing to rt knee clean, dry and intact. Denies pain. A1 pivot transfer to commode at bedside. BM x1 medium, hard brown stool. Urinating adequately using purewick and to the commode x1. Rt knee elevated on 1 pillow. Slept good this shift.                      "

## 2024-07-07 NOTE — PLAN OF CARE
Problem: Adult Inpatient Plan of Care  Goal: Plan of Care Review  Description: The Plan of Care Review/Shift note should be completed every shift.  The Outcome Evaluation is a brief statement about your assessment that the patient is improving, declining, or no change.  This information will be displayed automatically on your shift  note.  Outcome: Progressing   Goal Outcome Evaluation:  VS remain stable-she is afebrile and on RA. Right knee has minimal pain at rest-painful with walking and ROM. It is slightly less pink on lateral side of knee. Ice and oxycone 5mg given for pain. Wlked partway down andre with PT and has been up to the bathroom to void. She is tolerating a regular diet and had a large bm today.

## 2024-07-07 NOTE — PROGRESS NOTES
Orthopedic Progress Note      Assessment: 4 Day Post-Op  S/P Procedure(s):  RIGHT KNEE OPEN IRRIGATION AND DEBRIDEMENT     Plan:   Pain Control: Continue per pain protocol.  Weight Bearing: Weightbearing as tolerated.  No knee range of motion restrictions.  DVT Prophylaxis: Eliquis 5 mg BID  Antibiotics: Continue IV Ancef per infectious disease recommendations.  Will follow intraoperative cultures and follow-up on recommendations for further IV antibiotic management  GI: Plan for aggressive bowel regimen to prevent constipation from narcotic medications  Lines: HLIV once tolerating PO  PT/OT: Eval and treatment. Will follow up on recommendations.  Follow up:  in 2 weeks in clinic with Dr. Monahan for wound check  Discharge plan: Likely return to transitional care facility.      Subjective:  Doing well this morning.  No complaints.  No fevers or chills.  Minimal to no pain in knee at rest, and with only minimal pain with range of motion.    Objective:  BP (!) 142/80 (BP Location: Left arm)   Pulse 82   Temp 98  F (36.7  C) (Oral)   Resp 16   Ht 1.524 m (5')   Wt 67.7 kg (149 lb 4 oz)   SpO2 92%   BMI 29.15 kg/m    The patient is A&Ox3.  Resting in bed.  Sensation is intact.  Dorsiflexion and plantar flexion is intact.  Dorsalis pedis pulse intact.  Calves are soft and non-tender. Negative Chance's.  The incision is covered with Ace wrap.  This is windowed to reveal a Mepilex dressing with dried drainage and a small area over the central portions.  Tolerates knee range of motion 10 to 80 degrees with pain at extremes of motion.        Pertinent Labs   Lab Results: personally reviewed.   Lab Results   Component Value Date    INR 1.33 (H) 03/04/2023    INR 1.37 (H) 08/23/2022    INR 1.50 (H) 06/05/2019     Lab Results   Component Value Date    WBC 6.5 07/04/2024    HGB 10.1 (L) 07/05/2024    HCT 32.1 (L) 07/04/2024    MCV 91 07/04/2024     07/04/2024     Lab Results   Component Value Date     07/03/2024     CO2 28 07/03/2024     Cultures from OR 7/2: NGTD    Wesly Soliz MD ................. 7/7/2024 9:10 AM  [facility]

## 2024-07-07 NOTE — PROGRESS NOTES
[facility]    Medicine Progress Note - Hospitalist Service    Date of Admission:  7/2/2024    Assessment & Plan                Gladys Ramirez is a 89 year old female with A-fib, chronic CHF, hypertension, CKD 3 presented for evaluation of right knee pain and erythema in the setting of recent septic arthritis, found to have recurrent septic arthritis. Hospital Day: 6      Recurrent right knee septic arthritis  Staphylococcus lugdunensis right septic knee in May 2024  Right knee pain secondary to above  -Original infection May 2024 with Staph lugdunensis.  Had washout and was on outpatient IV Ancef, when at outpatient Ortho follow-up was noted with worsening erythema and swelling, sent in for repeat washout.   - S/p I&D per ortho on 7/2/24, cultures pending so far no growth to date   -ID following, continue IV ancef  -pain control as outlined  -continue scheduled bowel regimen   -Plan at discharge is to continue IV antibiotics for 4 weeks from most recent washout  -patient is worried about worse erythema of knee today, monitor closely. Repeat CBC and CRP in AM     Acute hypoxic respiratory failure  -requiring 1L oxygen at times, spO2 sharath 85% on 7/3  -O2 goal >90-92%  -wean to goal  -encourage movement, OOB and incentive spirometry   -if requires O2 again would get CXR     Paroxysmal atrial fibrillation  Status post dual-chamber pacemaker  -continue Eliquis   -PTA amiodarone, metoprolol     HFrEF without acute decompensated heart failure  HTN  -Last TTE showing LVEF 20-25%  -continue home lasix, metoprolol, lisinopril     CKD stage III  -no acute concerns  -monitor volume status and avoid nephrotoxins     Constipation  -miralax  -senna  -supp PRN  -stooling ok          Diet: Advance Diet as Tolerated: Regular Diet Adult  Diet    DVT Prophylaxis: Moderate risk.   DOAC  Reyes Catheter: Not present  Lines: PRESENT      PICC 05/24/24 Single Lumen Right Brachial vein medial Antibiotic(s)-Site  Assessment: WDL      Cardiac Monitoring: None  Code Status: No CPR- Do NOT Intubate      Clinically Significant Risk Factors              # Hypoalbuminemia: Lowest albumin = 3.1 g/dL at 7/3/2024  7:06 AM, will monitor as appropriate     # Hypertension: Noted on problem list    # Chronic heart failure with reduced ejection fraction: last echo with EF <40%             # Overweight: Estimated body mass index is 29.15 kg/m  as calculated from the following:    Height as of this encounter: 1.524 m (5').    Weight as of this encounter: 67.7 kg (149 lb 4 oz).        # Financial/Environmental Concerns:     # Pacemaker present       Disposition Plan     Medically Ready for Discharge: Anticipated Tomorrow         Discharge barrier(s): placement  Care discussed with: patient      Shyla Babcock MD  Hospitalist Service  [facility]  Securely message with [employer] (more info)  Text page via [employer] Paging/Directory   ______________________________________________________________________      Physical Exam   Vital Signs: Temp: 98  F (36.7  C) Temp src: Oral BP: (!) 142/80 Pulse: 82   Resp: 16 SpO2: 92 % O2 Device: None (Room air)    Weight: 149 lbs 4.02 oz    General: in no apparent distress, non-toxic, and alert female lying in hospital bed oriented x3  HEENT: Head normocephalic atraumatic, oral mucosa moist. Sclerae anicteric  Skin:  more dense erythema of R knee at the lateral distal aspect of incision  Extremities:  mild swelling of R knee, no significant warmth  Psych: Normal affect, mood euthymic  Neuro: Grossly normal      Medical Decision Making               Data   No results found for this or any previous visit (from the past 16 hour(s)).      Interval History     Patient states doing about the same today.  He continues to be painful when she is up, but this is stable.  She is very concerned about the coloration of her mood, concerned that it is more purple compared to yesterday and I agree it does  look more purple and discoloration is extending over a wider area.  Does not have fluctuance or crepitus on exam.  I will plan to reexamine her knee tomorrow, if exam concerning we may need to consider reimaging and possibly a third washout.  Tentatively would continue with current plan which is TCU tomorrow.

## 2024-07-08 ENCOUNTER — APPOINTMENT (OUTPATIENT)
Dept: ULTRASOUND IMAGING | Facility: HOSPITAL | Age: 89
DRG: 485 | End: 2024-07-08
Attending: RADIOLOGY
Payer: COMMERCIAL

## 2024-07-08 ENCOUNTER — APPOINTMENT (OUTPATIENT)
Dept: OCCUPATIONAL THERAPY | Facility: HOSPITAL | Age: 89
DRG: 485 | End: 2024-07-08
Payer: COMMERCIAL

## 2024-07-08 LAB
CRP SERPL-MCNC: 99.8 MG/L
ERYTHROCYTE [DISTWIDTH] IN BLOOD BY AUTOMATED COUNT: 16 % (ref 10–15)
HCT VFR BLD AUTO: 31.4 % (ref 35–47)
HGB BLD-MCNC: 9.6 G/DL (ref 11.7–15.7)
MCH RBC QN AUTO: 27.1 PG (ref 26.5–33)
MCHC RBC AUTO-ENTMCNC: 30.6 G/DL (ref 31.5–36.5)
MCV RBC AUTO: 89 FL (ref 78–100)
PLATELET # BLD AUTO: 365 10E3/UL (ref 150–450)
RBC # BLD AUTO: 3.54 10E6/UL (ref 3.8–5.2)
WBC # BLD AUTO: 6.8 10E3/UL (ref 4–11)

## 2024-07-08 PROCEDURE — 250N000011 HC RX IP 250 OP 636: Mod: JZ | Performed by: STUDENT IN AN ORGANIZED HEALTH CARE EDUCATION/TRAINING PROGRAM

## 2024-07-08 PROCEDURE — 120N000001 HC R&B MED SURG/OB

## 2024-07-08 PROCEDURE — 250N000013 HC RX MED GY IP 250 OP 250 PS 637: Performed by: STUDENT IN AN ORGANIZED HEALTH CARE EDUCATION/TRAINING PROGRAM

## 2024-07-08 PROCEDURE — 97535 SELF CARE MNGMENT TRAINING: CPT | Mod: GO

## 2024-07-08 PROCEDURE — 250N000013 HC RX MED GY IP 250 OP 250 PS 637: Performed by: HOSPITALIST

## 2024-07-08 PROCEDURE — 86140 C-REACTIVE PROTEIN: CPT | Performed by: HOSPITALIST

## 2024-07-08 PROCEDURE — 99232 SBSQ HOSP IP/OBS MODERATE 35: CPT | Performed by: HOSPITALIST

## 2024-07-08 PROCEDURE — 99232 SBSQ HOSP IP/OBS MODERATE 35: CPT | Performed by: INTERNAL MEDICINE

## 2024-07-08 PROCEDURE — 76882 US LMTD JT/FCL EVL NVASC XTR: CPT | Mod: RT

## 2024-07-08 PROCEDURE — 85027 COMPLETE CBC AUTOMATED: CPT | Performed by: HOSPITALIST

## 2024-07-08 RX ORDER — ACETAMINOPHEN 325 MG/1
650 TABLET ORAL EVERY 4 HOURS PRN
Qty: 100 TABLET | Refills: 0 | Status: SHIPPED | OUTPATIENT
Start: 2024-07-08

## 2024-07-08 RX ORDER — OXYCODONE HYDROCHLORIDE 5 MG/1
2.5 TABLET ORAL EVERY 4 HOURS PRN
Qty: 10 TABLET | Refills: 0 | Status: SHIPPED | OUTPATIENT
Start: 2024-07-08 | End: 2024-09-04

## 2024-07-08 RX ADMIN — APIXABAN 5 MG: 5 TABLET, FILM COATED ORAL at 20:19

## 2024-07-08 RX ADMIN — OXYCODONE HYDROCHLORIDE 5 MG: 5 TABLET ORAL at 21:31

## 2024-07-08 RX ADMIN — ACETAMINOPHEN 650 MG: 325 TABLET ORAL at 21:32

## 2024-07-08 RX ADMIN — LISINOPRIL 2.5 MG: 2.5 TABLET ORAL at 08:40

## 2024-07-08 RX ADMIN — SENNOSIDES AND DOCUSATE SODIUM 1 TABLET: 50; 8.6 TABLET ORAL at 08:41

## 2024-07-08 RX ADMIN — METOPROLOL SUCCINATE 25 MG: 25 TABLET, EXTENDED RELEASE ORAL at 08:40

## 2024-07-08 RX ADMIN — APIXABAN 5 MG: 5 TABLET, FILM COATED ORAL at 08:40

## 2024-07-08 RX ADMIN — CEFAZOLIN SODIUM 2 G: 2 INJECTION, SOLUTION INTRAVENOUS at 10:20

## 2024-07-08 RX ADMIN — FUROSEMIDE 40 MG: 20 TABLET ORAL at 08:40

## 2024-07-08 RX ADMIN — CEFAZOLIN SODIUM 2 G: 2 INJECTION, SOLUTION INTRAVENOUS at 18:19

## 2024-07-08 RX ADMIN — DULOXETINE HYDROCHLORIDE 60 MG: 60 CAPSULE, DELAYED RELEASE PELLETS ORAL at 08:40

## 2024-07-08 RX ADMIN — AMIODARONE HYDROCHLORIDE 100 MG: 100 TABLET ORAL at 08:39

## 2024-07-08 RX ADMIN — OXYCODONE HYDROCHLORIDE 2.5 MG: 5 TABLET ORAL at 08:39

## 2024-07-08 RX ADMIN — CEFAZOLIN SODIUM 2 G: 2 INJECTION, SOLUTION INTRAVENOUS at 01:02

## 2024-07-08 RX ADMIN — DICLOFENAC EPOLAMINE 1 PATCH: 0.01 SYSTEM TOPICAL at 20:20

## 2024-07-08 RX ADMIN — FUROSEMIDE 20 MG: 20 TABLET ORAL at 13:15

## 2024-07-08 RX ADMIN — DICLOFENAC 2 G: 10 GEL TOPICAL at 08:42

## 2024-07-08 RX ADMIN — DICLOFENAC EPOLAMINE 1 PATCH: 0.01 SYSTEM TOPICAL at 08:41

## 2024-07-08 ASSESSMENT — ACTIVITIES OF DAILY LIVING (ADL)
ADLS_ACUITY_SCORE: 43
ADLS_ACUITY_SCORE: 42
ADLS_ACUITY_SCORE: 43
ADLS_ACUITY_SCORE: 42
ADLS_ACUITY_SCORE: 43
ADLS_ACUITY_SCORE: 42
ADLS_ACUITY_SCORE: 42
ADLS_ACUITY_SCORE: 43
ADLS_ACUITY_SCORE: 43
ADLS_ACUITY_SCORE: 42
ADLS_ACUITY_SCORE: 43
ADLS_ACUITY_SCORE: 42
ADLS_ACUITY_SCORE: 42
ADLS_ACUITY_SCORE: 43
ADLS_ACUITY_SCORE: 42
ADLS_ACUITY_SCORE: 43
ADLS_ACUITY_SCORE: 42
ADLS_ACUITY_SCORE: 43
ADLS_ACUITY_SCORE: 43

## 2024-07-08 NOTE — PROGRESS NOTES
Orthopedic Progress Note      Assessment: 5 Day Post-Op  S/P Procedure(s):  RIGHT KNEE OPEN IRRIGATION AND DEBRIDEMENT   Due to ongoing right lateral knee swelling and erythema we would recommend getting a diagnostic ultrasound over this area of swelling to determine if there is a fluid collection present, if so this should be aspirated and sent for cultures to determine if there is an antibiotic resistant organism present in this area.    Plan:   Pain Control: Continue per pain protocol.  Weight Bearing: Weightbearing as tolerated.  No knee range of motion restrictions.  DVT Prophylaxis: Eliquis 5 mg BID  Antibiotics: Continue IV Ancef per infectious disease recommendations.  Will follow intraoperative cultures and follow-up on recommendations for further IV antibiotic management  GI: Plan for aggressive bowel regimen to prevent constipation from narcotic medications  Lines: HLIV once tolerating PO  PT/OT: Eval and treatment. Will follow up on recommendations.  Follow up:  in 2 weeks in clinic with Dr. Monahan for wound check  Discharge plan: Likely return to transitional care facility.      Subjective:  Doing well this morning.  She has concern regarding increasing erythema and swelling over the lateral knee as well as persistent pain in the knee ever since the time of surgery.  Denies fever/chills this morning.  Was examined this morning which shows well-healing incision.    Objective:  /64 (BP Location: Left arm)   Pulse 81   Temp 97.5  F (36.4  C) (Oral)   Resp 16   Ht 1.524 m (5')   Wt 69.7 kg (153 lb 10.6 oz)   SpO2 95%   BMI 30.01 kg/m    The patient is A&Ox3.  Resting in bed.  Sensation is intact.  Dorsiflexion and plantar flexion is intact.  Dorsalis pedis pulse intact.  Calves are soft and non-tender. Negative Chance's.  The incision was examined this morning revealing a well-healing incision without any present drainage.  Lateral knee erythema and swelling does not extend to the  incision  Tolerates knee range of motion 10 to 80 degrees with pain at extremes of motion.        Pertinent Labs   Lab Results: personally reviewed.   Lab Results   Component Value Date    INR 1.33 (H) 03/04/2023    INR 1.37 (H) 08/23/2022    INR 1.50 (H) 06/05/2019     Lab Results   Component Value Date    WBC 6.8 07/08/2024    HGB 9.6 (L) 07/08/2024    HCT 31.4 (L) 07/08/2024    MCV 89 07/08/2024     07/08/2024     Lab Results   Component Value Date     07/03/2024    CO2 28 07/03/2024     Cultures from OR 7/2: NGTD    SALVATORE NAVA PA-C  [facility]

## 2024-07-08 NOTE — PROGRESS NOTES
"Care Management Follow Up    Length of Stay (days): 6    Expected Discharge Date: 07/08/2024    Anticipated Discharge Plan:   TCU     Transportation: Anticipate medical transport, W/c transport confirmed Per CM note cost discussed with sister Theresa     PT Recommendations: Transitional Care Facility, Per plan established by the PT  OT Recommendations:  Transitional Care Facility     Barriers to Discharge: medical stability/ Ortho plans for MRI today, likely be able to discharge tomorrow per hosp.       Prior Living Situation:  \"Admitted from [facility] (TCU) - has bed hold. Plan is to DC back to TCU. No PAS needed. Needs hospital transport. \"      Advanced Directive on File:  (states she has HCD)     Patient/Spokesperson Updated: No    Additional Information:  Chart reviewed.    Cm updates:  Pt may be more med ready tomorrow for discharge.     [facility] if [facility] can [facility] pt tomorrow, pt is on a bed hold.  awaiting response.        Cm will continue to follow plan of care,review recommendations, and assist with any discharge needs anticipated.       Doreen Carpio RN      "

## 2024-07-08 NOTE — PLAN OF CARE
Patient is alert and oriented. Patient is on RA. Patient's VSS. Patient went on commode and pivot transfers with assist of 1. Patient uses Purewick at night. Patient rated pain as 7/10 with activity. Patient received PRN oxycodone 5 mg.       Gilmar Vernon RN      Problem: Adult Inpatient Plan of Care  Goal: Plan of Care Review  Description: The Plan of Care Review/Shift note should be completed every shift.  The Outcome Evaluation is a brief statement about your assessment that the patient is improving, declining, or no change.  This information will be displayed automatically on your shift  note.  Outcome: Progressing   Goal Outcome Evaluation:

## 2024-07-08 NOTE — PROGRESS NOTES
[facility]    Medicine Progress Note - Hospitalist Service    Date of Admission:  7/2/2024    Assessment & Plan                Gladys Ramirez is a 89 year old female with A-fib, chronic CHF, hypertension, CKD 3 presented for evaluation of right knee pain and erythema in the setting of recent septic arthritis, found to have recurrent septic arthritis. Hospital Day: 7      Recurrent right knee septic arthritis  Staphylococcus lugdunensis right septic knee in May 2024  Right knee pain secondary to above  -Original infection May 2024 with Staph lugdunensis.  Had washout and was on outpatient IV Ancef, when at outpatient Ortho follow-up was noted with worsening erythema and swelling, sent in for repeat washout.   - S/p I&D per ortho on 7/2/24, cultures pending so far no growth to date   -ID following, continue IV ancef  -pain control as outlined  -continue scheduled bowel regimen   -Plan at discharge is to continue IV antibiotics for 4 weeks from most recent washout  -patient is worried about worse erythema of knee past few days, monitor closely.  CRP has increased 63-->99, discussed with orthopedics who ordered ultrasound, which showed subcentimeter fluid collection insufficient for sampling.  Area will need to be monitored closely at the transitional care unit, could still end up needing additional aspiration and/or I&D depending on progress.  Could possibly need slightly longer course of antibiotics as well.  -Anticipate she can likely go to TCU tomorrow     Acute hypoxic respiratory failure  -requiring 1L oxygen at times, spO2 sharath 85% on 7/3  -O2 goal >90-92%  -wean to goal  -encourage movement, OOB and incentive spirometry   -if requires O2 again would get CXR  -Doing well now past few days     Paroxysmal atrial fibrillation  Status post dual-chamber pacemaker  -continue Eliquis   -PTA amiodarone, metoprolol     HFrEF without acute decompensated heart failure  HTN  -Last TTE showing  LVEF 20-25%  -continue home lasix, metoprolol, lisinopril     CKD stage III  -no acute concerns  -monitor volume status and avoid nephrotoxins     Constipation  -miralax  -senna  -supp PRN  -stooling ok          Diet: Advance Diet as Tolerated: Regular Diet Adult  Diet    DVT Prophylaxis: Moderate risk.   DOAC  Reyes Catheter: Not present  Lines: PRESENT      PICC 05/24/24 Single Lumen Right Brachial vein medial Antibiotic(s)-Site Assessment: WDL      Cardiac Monitoring: None  Code Status: No CPR- Do NOT Intubate      Clinically Significant Risk Factors              # Hypoalbuminemia: Lowest albumin = 3.1 g/dL at 7/3/2024  7:06 AM, will monitor as appropriate     # Hypertension: Noted on problem list    # Chronic heart failure with reduced ejection fraction: last echo with EF <40%             # Obesity: Estimated body mass index is 30.01 kg/m  as calculated from the following:    Height as of this encounter: 1.524 m (5').    Weight as of this encounter: 69.7 kg (153 lb 10.6 oz).        # Financial/Environmental Concerns:     # Pacemaker present       Disposition Plan     Medically Ready for Discharge: Anticipated Tomorrow         Discharge barrier(s): placement  Care discussed with: patient, RN      Shyla Babcock MD  Hospitalist Service  [facility]  Securely message with [employer] (more info)  Text page via enrich-in Paging/Directory   ______________________________________________________________________      Physical Exam   Vital Signs: Temp: 98.2  F (36.8  C) Temp src: Oral BP: 127/76 Pulse: 82   Resp: 18 SpO2: 96 % O2 Device: None (Room air)    Weight: 153 lbs 10.57 oz    General: in no apparent distress, non-toxic, and alert female lying in hospital bed oriented x3  HEENT: Head normocephalic atraumatic, oral mucosa moist. Sclerae anicteric  Skin:  more dense erythema of R knee at the lateral distal aspect of incision , lighter in color compared to 7/7  Extremities:  mild swelling of R knee, no  significant warmth  Psych: Normal affect, mood euthymic  Neuro: Grossly normal      Medical Decision Making               Data   Recent Results (from the past 16 hour(s))   CBC with platelets    Collection Time: 07/08/24  5:32 AM   Result Value Ref Range    WBC Count 6.8 4.0 - 11.0 10e3/uL    RBC Count 3.54 (L) 3.80 - 5.20 10e6/uL    Hemoglobin 9.6 (L) 11.7 - 15.7 g/dL    Hematocrit 31.4 (L) 35.0 - 47.0 %    MCV 89 78 - 100 fL    MCH 27.1 26.5 - 33.0 pg    MCHC 30.6 (L) 31.5 - 36.5 g/dL    RDW 16.0 (H) 10.0 - 15.0 %    Platelet Count 365 150 - 450 10e3/uL   CRP inflammation    Collection Time: 07/08/24  5:32 AM   Result Value Ref Range    CRP Inflammation 99.80 (H) <5.00 mg/L         Interval History     Patient states doing fine today.  Knee pain is about the same.  Participating well in physical therapy.  Still concerned about erythema on the lateral aspect of her knee.

## 2024-07-08 NOTE — PLAN OF CARE
"  Problem: Adult Inpatient Plan of Care  Goal: Plan of Care Review  Description: The Plan of Care Review/Shift note should be completed every shift.  The Outcome Evaluation is a brief statement about your assessment that the patient is improving, declining, or no change.  This information will be displayed automatically on your shift  note.  Outcome: Progressing     Problem: Adult Inpatient Plan of Care  Goal: Patient-Specific Goal (Individualized)  Description: You can add care plan individualizations to a care plan. Examples of Individualization might be:  \"Parent requests to be called daily at 9am for status\", \"I have a hard time hearing out of my right ear\", or \"Do not touch me to wake me up as it startles  me\".  Outcome: Progressing     Problem: Pain Acute  Goal: Optimal Pain Control and Function  Outcome: Progressing   Goal Outcome Evaluation:      Pt is alert and oriented. Can make needs known. No c/o pain. VSS. O2 sat 91% on RA. Rt knee dressing CDI, Diclofenac patch on to rt knee. SCD applied to bilateral legs. IV abx given. Slept comfortably.                  "

## 2024-07-08 NOTE — PLAN OF CARE
Problem: Adult Inpatient Plan of Care  Goal: Plan of Care Review  Description: The Plan of Care Review/Shift note should be completed every shift.  The Outcome Evaluation is a brief statement about your assessment that the patient is improving, declining, or no change.  This information will be displayed automatically on your shift  note.  Outcome: Progressing   Goal Outcome Evaluation:  Reviewed plan of care with pt-she is awaiting an US-guided aspiration of right knee-which remains swollen and pink/bruised around knee area. Knee is comfortable at rest-painful with walking. Medicated in am with oxycodone 2.5mg in am -able to get up to bathroom -sat in chair with leg elevated for breakfast and lunch. She is eating well ,voiding well and had a bm this am. Cold packs on and off to knee.

## 2024-07-08 NOTE — PROGRESS NOTES
[facility]  Infectious Disease   Progress Note     Date of Admission:  7/2/2024    Assessment & Plan   Admitted with nonresolving syx in her infected arthritic/gouty knee, no systemic syx, with declining CRP as below-while on OPAT  MRI no osteomyelitis  Staph lugdenensis native septic knee , diagnosed during her admission on 5/24, Dr. Reza  5/23 : S/p Right knee arthroscopic irrigation and debridement   Receiving outpatient IV cefazolin  Hx steroid injection  Severe OA, and extensive complex tearing of the body and posterior horn medial meniscus , ACL tear-which can cause effusions  Gout (positive crystals in May )    7/2 Right knee open irrigation and debridement with partial synovectomy   Increased knee pain, inflammatory markers. Aspiration planned.      Pacemaker  Left TKA     PLAN  Knee aspirated, cell count improved though still very neutrophilic, no crystals this time  Continue IV cefazolin-ordered    All cutures negative    Case management to arrange for continued OPAT (iv abx outpt)    ID followup with Dr Reza in about 3 wks.  Plan is 4 wks more of IV abx from this washout (aug 2).     Now worsening pain, aspiration planned     Discharge: TCU      Angelique Sen MD  ______________________________________________________________________    Interval History   Tolerating antibiotics. Worsening medical knee pain.      Latest Reference Range & Units 05/22/24 17:22 07/02/24 15:32   Cell Count Fluid Source  Knee, Right Knee, Right   Total Nucleated Cells /uL 66,726 18,434   % Neutrophils Fluid % 92 99   % Lymphocytes Fluid % 0 1   % Mono/Macro Fluid % 8 0   Color Fluid Colorless, Yellow  Red ! Orange !   Appearance Fluid Clear  Turbid ! Cloudy !   !: Data is abnormal    Physical Exam   Vital Signs: Temp: 97.5  F (36.4  C) Temp src: Oral BP: 122/64 Pulse: 81   Resp: 16 SpO2: 95 % O2 Device: None (Room air)    Weight: 153 lbs 10.57 oz  Gen. appearance nontoxic  Eyes no conjunctivitis or  icterus  Neck no stiffness or neck vein distention, no LN  Heart  No edema  Lungs breathing comfortably  Abdomen soft not tender  Extremities wrapped knee, TTP.   Skin  no rash or emboli  Neurologic alert oriented no focal deficits      Data   Results for orders placed or performed during the hospital encounter of 07/02/24 (from the past 24 hour(s))   CBC with platelets   Result Value Ref Range    WBC Count 6.8 4.0 - 11.0 10e3/uL    RBC Count 3.54 (L) 3.80 - 5.20 10e6/uL    Hemoglobin 9.6 (L) 11.7 - 15.7 g/dL    Hematocrit 31.4 (L) 35.0 - 47.0 %    MCV 89 78 - 100 fL    MCH 27.1 26.5 - 33.0 pg    MCHC 30.6 (L) 31.5 - 36.5 g/dL    RDW 16.0 (H) 10.0 - 15.0 %    Platelet Count 365 150 - 450 10e3/uL   CRP inflammation   Result Value Ref Range    CRP Inflammation 99.80 (H) <5.00 mg/L

## 2024-07-09 ENCOUNTER — TELEPHONE (OUTPATIENT)
Dept: INFECTIOUS DISEASES | Facility: CLINIC | Age: 89
End: 2024-07-09
Payer: COMMERCIAL

## 2024-07-09 VITALS
HEIGHT: 60 IN | WEIGHT: 153.66 LBS | DIASTOLIC BLOOD PRESSURE: 71 MMHG | RESPIRATION RATE: 10 BRPM | TEMPERATURE: 97.5 F | BODY MASS INDEX: 30.17 KG/M2 | OXYGEN SATURATION: 91 % | SYSTOLIC BLOOD PRESSURE: 131 MMHG | HEART RATE: 84 BPM

## 2024-07-09 LAB
BACTERIA SPEC CULT: NO GROWTH
BACTERIA TISS BX CULT: NORMAL

## 2024-07-09 PROCEDURE — 99239 HOSP IP/OBS DSCHRG MGMT >30: CPT | Performed by: HOSPITALIST

## 2024-07-09 PROCEDURE — 250N000011 HC RX IP 250 OP 636: Mod: JZ | Performed by: STUDENT IN AN ORGANIZED HEALTH CARE EDUCATION/TRAINING PROGRAM

## 2024-07-09 PROCEDURE — 250N000013 HC RX MED GY IP 250 OP 250 PS 637: Performed by: STUDENT IN AN ORGANIZED HEALTH CARE EDUCATION/TRAINING PROGRAM

## 2024-07-09 PROCEDURE — 99232 SBSQ HOSP IP/OBS MODERATE 35: CPT | Performed by: INTERNAL MEDICINE

## 2024-07-09 RX ORDER — POLYETHYLENE GLYCOL 3350 17 G/17G
17 POWDER, FOR SOLUTION ORAL DAILY
DISCHARGE
Start: 2024-07-09 | End: 2024-09-11

## 2024-07-09 RX ORDER — NYSTATIN 100000 [USP'U]/G
POWDER TOPICAL 2 TIMES DAILY PRN
DISCHARGE
Start: 2024-07-09

## 2024-07-09 RX ADMIN — CEFAZOLIN SODIUM 2 G: 2 INJECTION, SOLUTION INTRAVENOUS at 09:38

## 2024-07-09 RX ADMIN — DICLOFENAC EPOLAMINE 1 PATCH: 0.01 SYSTEM TOPICAL at 09:28

## 2024-07-09 RX ADMIN — LISINOPRIL 2.5 MG: 2.5 TABLET ORAL at 09:26

## 2024-07-09 RX ADMIN — FUROSEMIDE 40 MG: 20 TABLET ORAL at 09:26

## 2024-07-09 RX ADMIN — CEFAZOLIN SODIUM 2 G: 2 INJECTION, SOLUTION INTRAVENOUS at 02:54

## 2024-07-09 RX ADMIN — AMIODARONE HYDROCHLORIDE 100 MG: 100 TABLET ORAL at 09:26

## 2024-07-09 RX ADMIN — OXYCODONE HYDROCHLORIDE 5 MG: 5 TABLET ORAL at 03:01

## 2024-07-09 RX ADMIN — METOPROLOL SUCCINATE 25 MG: 25 TABLET, EXTENDED RELEASE ORAL at 09:27

## 2024-07-09 RX ADMIN — DULOXETINE HYDROCHLORIDE 60 MG: 60 CAPSULE, DELAYED RELEASE PELLETS ORAL at 09:26

## 2024-07-09 RX ADMIN — POLYETHYLENE GLYCOL 3350 17 G: 17 POWDER, FOR SOLUTION ORAL at 09:30

## 2024-07-09 RX ADMIN — APIXABAN 5 MG: 5 TABLET, FILM COATED ORAL at 09:32

## 2024-07-09 RX ADMIN — OXYCODONE HYDROCHLORIDE 5 MG: 5 TABLET ORAL at 12:56

## 2024-07-09 ASSESSMENT — ACTIVITIES OF DAILY LIVING (ADL)
ADLS_ACUITY_SCORE: 42

## 2024-07-09 NOTE — PLAN OF CARE
"  Problem: Adult Inpatient Plan of Care  Goal: Plan of Care Review  Description: The Plan of Care Review/Shift note should be completed every shift.  The Outcome Evaluation is a brief statement about your assessment that the patient is improving, declining, or no change.  This information will be displayed automatically on your shift  note.  Outcome: Progressing  Goal: Patient-Specific Goal (Individualized)  Description: You can add care plan individualizations to a care plan. Examples of Individualization might be:  \"Parent requests to be called daily at 9am for status\", \"I have a hard time hearing out of my right ear\", or \"Do not touch me to wake me up as it startles  me\".  Outcome: Progressing  Goal: Absence of Hospital-Acquired Illness or Injury  Outcome: Progressing  Intervention: Prevent Skin Injury  Recent Flowsheet Documentation  Taken 7/9/2024 0711 by Abi Browne RN  Body Position: turned  Intervention: Prevent Infection  Recent Flowsheet Documentation  Taken 7/9/2024 0836 by Abi Browne RN  Infection Prevention:   equipment surfaces disinfected   hand hygiene promoted   personal protective equipment utilized   rest/sleep promoted   single patient room provided  Goal: Optimal Comfort and Wellbeing  Outcome: Progressing  Goal: Readiness for Transition of Care  Outcome: Progressing     Problem: Risk for Delirium  Goal: Optimal Coping  Outcome: Progressing  Intervention: Optimize Psychosocial Adjustment to Delirium  Recent Flowsheet Documentation  Taken 7/9/2024 0836 by Abi Browne RN  Supportive Measures: active listening utilized  Goal: Improved Behavioral Control  Outcome: Progressing  Intervention: Prevent and Manage Agitation  Recent Flowsheet Documentation  Taken 7/9/2024 0836 by Abi Browne RN  Environment Familiarity/Consistency:   daily routine followed   familiar objects from home provided  Intervention: Minimize Safety Risk  Recent Flowsheet Documentation  Taken 7/9/2024 " 0836 by Abi Browne RN  Communication Enhancement Strategies:   call light answered in person   verbal and visual cues paired  Goal: Improved Attention and Thought Clarity  Outcome: Progressing  Intervention: Maximize Cognitive Function  Recent Flowsheet Documentation  Taken 7/9/2024 0836 by Abi Browne RN  Sensory Stimulation Regulation: care clustered  Reorientation Measures:   calendar in view   clock in view   reorientation provided   glasses use encouraged  Goal: Improved Sleep  Outcome: Progressing     Problem: Skin Injury Risk Increased  Goal: Skin Health and Integrity  Outcome: Progressing  Intervention: Plan: Nurse Driven Intervention: Moisture Management  Recent Flowsheet Documentation  Taken 7/9/2024 0836 by Abi Browne RN  Moisture Interventions: Encourage regular toileting  Intervention: Plan: Nurse Driven Intervention: Friction and Shear  Recent Flowsheet Documentation  Taken 7/9/2024 0836 by Abi Browne RN  Friction/Shear Interventions: HOB 30 degrees or less  Intervention: Optimize Skin Protection  Recent Flowsheet Documentation  Taken 7/9/2024 0711 by Abi Browne RN  Head of Bed (HOB) Positioning: HOB at 30 degrees     Problem: Pain Acute  Goal: Optimal Pain Control and Function  Outcome: Progressing  Intervention: Prevent or Manage Pain  Recent Flowsheet Documentation  Taken 7/9/2024 0836 by Abi Browne RN  Sensory Stimulation Regulation: care clustered  Intervention: Optimize Psychosocial Wellbeing  Recent Flowsheet Documentation  Taken 7/9/2024 0836 by Abi Browne RN  Supportive Measures: active listening utilized     Problem: Mobility Impairment  Goal: Optimal Mobility  Outcome: Progressing   Goal Outcome Evaluation:  Pt alert and oriented x4 with minor forgetfulness. Pt reports minimal pain 1-3/10 in right knee, refused any PRN medication. Pain managed with rest, repo, scheduled tyelnol and diclofenac patch. Dressing to knee changed by ortho to  gauze with paper tape. Incision stitches intact, slight bruising nearby, no drainage noted. Pt able to transfer A1 with FWW. Pt voiding spontaneously. Plan for pt to discharge to [facility]CU this afternoon.

## 2024-07-09 NOTE — DISCHARGE SUMMARY
[facility] MEDICINE  DISCHARGE SUMMARY     Primary Care Physician: Margret Flores  Admission Date: 7/2/2024   Discharge Provider: Shyla Babcock MD Discharge Date: 7/9/2024   Diet:   Active Diet and Nourishment Order   Procedures    Advance Diet as Tolerated: Regular Diet Adult    Diet    Diet       Code Status: No CPR- Do NOT Intubate   Activity: DCACTIVITY: Activity as tolerated        Condition at Discharge: Good     REASON FOR PRESENTATION(See Admission Note for Details)   Knee pain, erythema, swelling    PRINCIPAL & ACTIVE DISCHARGE DIAGNOSES     Principal Problem:    Pyogenic arthritis of right knee joint (H)  Active Problems:    Benign essential hypertension    DNR (do not resuscitate)    Stage 3a chronic kidney disease (CKD) (H)    Persistent atrial fibrillation (H)    Non-ischemic cardiomyopathy (H)    Long term (current) use of anticoagulants    Staphylococcal arthritis of right knee (H)      PENDING LABS     Unresulted Labs Ordered in the Past 30 Days of this Admission       Date and Time Order Name Status Description    7/2/2024  5:19 PM Anaerobic Bacterial Culture Routine Preliminary     7/2/2024  1:01 PM Anaerobic Bacterial Culture Routine Preliminary             PROCEDURES ( this hospitalization only)      Procedure(s):  RIGHT KNEE OPEN IRRIGATION AND DEBRIDEMENT    RECOMMENDATIONS TO OUTPATIENT PROVIDER FOR F/U VISIT     Follow-up Appointments     Follow Up Care      Follow-up with Dr. Monahan in 2 weeks for wound check.        Follow Up and recommended labs and tests      Follow up with prison physician.  The following labs/tests are   recommended: CBC with plt, BMP, CRP weekly while on IV antibiotics. Fax   results to Dr Reza at [phone].            DISPOSITION     Skilled Nursing Facility    SUMMARY OF HOSPITAL COURSE:      Gladys Ramirez is a 89 year old female with A-fib, chronic CHF, hypertension, CKD 3 presented for evaluation of right knee pain  and erythema in the setting of recent septic arthritis, found to have recurrent septic arthritis. Hospital Day: 8        Recurrent right knee septic arthritis  Staphylococcus lugdunensis right septic knee in May 2024  Right knee pain secondary to above  -Original infection May 2024 with Staph lugdunensis.  Had I&D and was on outpatient IV Ancef, when at outpatient Ortho follow-up was noted with worsening erythema and swelling, sent in for repeat washout.   - S/p I&D per ortho on 7/2/24, cultures pending so far no growth to date   -ID saw here, rec'd continue IV ancef  -Plan at discharge is to continue IV antibiotics for 4 weeks from most recent washout  -worse erythema of knee past few days, monitor closely.  CRP increased 63-->99, discussed with orthopedics who ordered ultrasound, which showed subcentimeter fluid collection insufficient for sampling.  Area will need to be monitored closely at the transitional care unit, could still end up needing additional aspiration and/or I&D depending on progress.  Could possibly need slightly longer course of antibiotics as well.  -tylenol, oxycodone as needed     Constipation  -miralax  -senna  -supp PRN  -stooling ok       Discharge Medications with Med changes:     Current Discharge Medication List        START taking these medications    Details   ceFAZolin (ANCEF) intermittent infusion 2 g in 100 mL dextrose PRE-MIX Inject 100 mLs (2 g) into the vein every 8 hours for 28 days    Comments: Weekly CBC LFT creatinine CRP fax Dr. Reza  Associated Diagnoses: Arthritis of right knee due to other bacteria (H)      polyethylene glycol (MIRALAX) 17 GM/Dose powder Take 17 g by mouth daily    Associated Diagnoses: Therapeutic opioid induced constipation           CONTINUE these medications which have CHANGED    Details   acetaminophen (TYLENOL) 325 MG tablet Take 2 tablets (650 mg) by mouth every 4 hours as needed for mild pain  Qty: 100 tablet, Refills: 0    Associated Diagnoses:  Staphylococcal arthritis of right knee (H)      nystatin (MYCOSTATIN) 243853 UNIT/GM external powder Apply topically 2 times daily as needed (rash in skin folds)    Associated Diagnoses: Candidal intertrigo      oxyCODONE (ROXICODONE) 5 MG tablet Take 0.5 tablets (2.5 mg) by mouth every 4 hours as needed for severe pain  Qty: 10 tablet, Refills: 0    Associated Diagnoses: Staphylococcal arthritis of right knee (H)           CONTINUE these medications which have NOT CHANGED    Details   amiodarone (PACERONE) 200 MG tablet Take 0.5 tablets (100 mg) by mouth daily  Qty: 45 tablet, Refills: 0    Associated Diagnoses: Paroxysmal atrial fibrillation (H)      apixaban ANTICOAGULANT (ELIQUIS) 5 MG tablet Take 1 tablet (5 mg) by mouth 2 times daily  Qty: 60 tablet, Refills: 3    Associated Diagnoses: Paroxysmal atrial fibrillation (H)      cholecalciferol, vitamin D3, (VITAMIN D3) 2,000 unit Tab [CHOLECALCIFEROL, VITAMIN D3, (VITAMIN D3) 2,000 UNIT TAB] Take 1 tablet (2,000 Units total) by mouth daily.  Qty: 90 tablet, Refills: 3    Comments: Replaces weekly ergo 50,000 units  Associated Diagnoses: Vitamin D deficiency      diclofenac (FLECTOR) 1.3 % patch Externally apply 1 patch topically 2 times daily      DULoxetine (CYMBALTA) 60 MG capsule Take 60 mg by mouth daily      !! furosemide (LASIX) 20 MG tablet Take 20 mg by mouth every evening In addition, take 40 mg in the morning.      !! furosemide (LASIX) 40 MG tablet Take 40 mg by mouth every morning In addition, take 20 mg in the evening.      lisinopril (ZESTRIL) 2.5 MG tablet TAKE 1 TABLET BY MOUTH ONE TIME DAILY  Qty: 90 tablet, Refills: 0    Associated Diagnoses: Secondary cardiomyopathy (H); Benign essential hypertension      magnesium hydroxide (MILK OF MAGNESIA) 400 MG/5ML suspension Take 30 mLs by mouth daily as needed for constipation (Use if polyethylene glycol (Miralax) is not effective after 24 hours.)    Associated Diagnoses: Drug-induced constipation       metoprolol succinate ER (TOPROL XL) 25 MG 24 hr tablet Take 1 tablet (25 mg) by mouth daily  Qty: 90 tablet, Refills: 3    Associated Diagnoses: Persistent atrial fibrillation (H)      potassium chloride tamiko ER (KLOR-CON M20) 20 MEQ CR tablet Take 1 tablet (20 mEq) by mouth daily  Qty: 90 tablet, Refills: 3    Associated Diagnoses: Dyspnea on exertion      senna-docusate (SENOKOT-S/PERICOLACE) 8.6-50 MG tablet Take 2 tablets by mouth 2 times daily as needed for constipation  Qty: 60 tablet, Refills: 0    Associated Diagnoses: Staphylococcal arthritis of right knee (H)       !! - Potential duplicate medications found. Please discuss with provider.        STOP taking these medications       cephALEXin (KEFLEX) 500 MG capsule Comments:   Reason for Stopping:         sterile water (preservative free) SOLN 10 mL with ceFAZolin 1 G SOLR 1 g soln for IVP Comments:   Reason for Stopping:                 Consults     INFECTIOUS DISEASES IP CONSULT  PHYSICAL THERAPY ADULT IP CONSULT  OCCUPATIONAL THERAPY ADULT IP CONSULT  CARE MANAGEMENT / SOCIAL WORK IP CONSULT  PHYSICAL THERAPY ADULT IP CONSULT  OCCUPATIONAL THERAPY ADULT IP CONSULT  PHYSICAL THERAPY ADULT IP CONSULT  OCCUPATIONAL THERAPY ADULT IP CONSULT  PHYSICAL THERAPY ADULT IP CONSULT  OCCUPATIONAL THERAPY ADULT IP CONSULT  PHYSICAL THERAPY ADULT IP CONSULT  OCCUPATIONAL THERAPY ADULT IP CONSULT  INTERVENTIONAL RADIOLOGY ADULT/PEDS IP CONSULT      SIGNIFICANT IMAGING FINDINGS     Results for orders placed or performed during the hospital encounter of 07/02/24   MR Knee Right w/o Contrast    Impression    IMPRESSION:  1.  Large joint effusion with extensive synovitis. Subchondral edema and cystic change in the medial and lateral compartments likely reflects the patient's septic arthritis.    2.  No fracture or contusion.    3.  Advanced degenerative changes in the medial and lateral compartments.    4.  Complex degenerative tearing of the entire lateral  meniscus.    5.  Complex tearing of the body of the medial meniscus.    6.  Complete chronic ACL tear.   US Joint Injection Aspiration Major Right    Impression    IMPRESSION:  Ultrasound-guided right knee aspiration.   US Lower Extremity Non Vascular Right    Impression    IMPRESSION:    Continued findings of tissue heterogeneity around the right knee joint. Scant fluid, insufficient for sampling.       SIGNIFICANT LABORATORY FINDINGS     Most Recent 3 CBC's:  Recent Labs   Lab Test 07/08/24  0532 07/05/24  0809 07/04/24  0606 07/03/24  0706   WBC 6.8  --  6.5 7.1   HGB 9.6* 10.1* 9.8* 10.1*   MCV 89  --  91 91     --  289 324     Most Recent 3 BMP's:  Recent Labs   Lab Test 07/06/24  0531 07/05/24  0809 07/04/24  0606 07/03/24  1229 07/03/24  0809 07/03/24  0706 07/02/24  1143 07/01/24  0823   NA  --   --   --   --   --  141 138 140   POTASSIUM  --   --   --   --   --  4.1 4.1 3.5   CHLORIDE  --   --   --   --   --  102 100 100   CO2  --   --   --   --   --  28 25 25   BUN  --   --   --   --   --  18.0 22.3 19.3   CR 0.86 0.80 0.92  --   --  0.81 0.87 0.80   ANIONGAP  --   --   --   --   --  11 13 15   MARLENE  --   --   --   --   --  9.2 9.4 9.9*   GLC  --   --   --  122* 125* 119* 128* 126*     Most Recent ESR & CRP:  Recent Labs   Lab Test 07/08/24  0532 07/02/24  1143 05/22/24  1506 03/05/18  0906   SED  --  65*   < > 38*   CRP  --   --   --  0.8   CRPI 99.80* 63.10*   < >  --     < > = values in this interval not displayed.         Discharge Orders        Comfort and Pain Management - Pain after Surgery    Pain after surgery is normal and expected.  You will have some amount of pain for several weeks after surgery.  Your pain will improve with time.  There are several things you can do to help reduce your pain including: rest, ice, elevation, and using pain medications as needed. Contact your Surgeon Team if you have pain that persists or worsens after surgery despite rest, ice, elevation, and taking your  medication(s) as prescribed. Contact your Surgeon Team if you have new numbness, tingling, or weakness in your operative extremity.     Medication Instructions - Acetaminophen (TYLENOL) Instructions    You were discharged with acetaminophen (TYLENOL) for pain management after surgery. Acetaminophen most effectively manages pain symptoms when it is taken on a schedule without missing doses (every four, six, or eight hours). Your Provider will prescribe a safe daily dose between 3000 - 4000 mg.  Do NOT exceed this daily dose. Most patients use acetaminophen for pain control for the first four weeks after surgery.  You can wean from this medication as your pain decreases.     Follow Up Care    Follow-up with Dr. Monahan in 2 weeks for wound check.     Shower with wound/dressing covered    You must COVER your dressing or incision with saran wrap (or any other non-permeable covering) to allow the incision to remain dry while showering.  You may shower 3 days after surgery as long as the surgical wound stays dry. Continue to cover your dressing or incision for showering until your first office visit.  You are strictly prohibited from soaking   or submerging the surgical wound underwater.     General info for SNF    Length of Stay Estimate: Short Term Care: Estimated # of Days <30 Condition at Discharge: Improving Level of care:skilled  Rehabilitation Potential: Good Admission H&P remains valid and up-to-date: Yes Recent Chemotherapy: N/A Use Nursing Home Standing Orders: Yes     Mantoux Instructions    Give two-step Mantoux (PPD) Per Facility Policy {[id number]     Reason for your hospital stay    Incision and drainage with partial synovectomy of right knee     Symptoms - Constipation management    Constipation (hard, dry bowel movements) is expected after surgery due to the combination of being less active, the anesthetic, and the opioid pain medication.  You can do the following to help reduce constipation:  ~  FLUIDS:   "Drink clear liquids (water or Gatorade), or juice (apple/prune).  ~  DIET:  Eat a fiber rich diet.    ~  ACTIVITY:  Get up and move around several times a day.  Increase your activity as you are able.  MEDICATIONS:  Reduce the risk of constipation by starting medications before you are constipated.  You can take Miralax   (1 packet as directed) and/or a stool softener (Senokot 1-2 tablets 1-2 times a day).  If you already have constipation and these medications are not working, you can get magnesium citrate and use as directed.  If you continue to have constipation you can try an over the counter suppository or enema.  Call your Surgeon Team if it has been greater than 3 days since your last bowel movement.     Comfort and Pain Management - Swelling after Surgery    Swelling and/or bruising of the surgical extremity is common and may persist for several months after surgery. In addition to frequent icing and elevation, gentle compressive support with an ACE wrap or tubigrip may help with swelling. Apply compression regularly, removing at least twice daily to perform skin checks. Contact your Surgeon Team if your swelling increases and is NOT associated with an increase in your activity level, or if your swelling increases and is associated with redness and pain.     Comfort and Pain Management - Cold therapy    Ice can be used to control swelling and discomfort after surgery. Place a thin towel over your operative site and apply the ice pack overtop. Leave ice pack in place for 20 minutes, then remove for 20 minutes. Repeat this 20 minutes on/20 minutes off routine as often as tolerated.     Comfort and Pain Management - LOWER Extremity Elevation    Swelling is expected for several months after surgery. This type of swelling is usually associated with gravity and activity, and can be improved with elevation.   The best way to do this is to get your \"toes above your nose\" by laying down and placing several pillows " "lengthwise under your calf and heel. When elevating your leg keep your knee completely straight. Perform this elevation as often as possible especially for the first two weeks after surgery.     Continue anticoagulation as prior to admission     When to call - Contact Surgeon Team    You may experience symptoms that require follow-up before your scheduled appointment. Refer to the \"Stoplight Tool\" for instructions on when to contact your Surgeon Team if you are concerned about pain control, blood clots, constipation, or if you are unable to urinate.     When to call - Reach out to Urgent Care    If you are not able to reach your Surgeon Team and you need immediate care, go to the Orthopedic Walk-in Clinic or Urgent Care at your Surgeon's office.  Do NOT go to the Emergency Room unless you have shortness of breath, chest pain, or other signs of a medical emergency.     When to call - Reasons to Call 911    Call 911 immediately if you experience sudden-onset chest pain, arm weakness/numbness, slurred speech, or shortness of breath     Symptoms - Fever Management    A low grade fever can be expected after surgery.  Use acetaminophen (TYLENOL) as needed for fever management.  Contact your Surgeon Team if you have a fever greater than 101.5 F, chills, and/or night sweats.     Symptoms - Reduced Urine Output    Changes in the amount of fluids you drank before and after surgery may result in problems urinating.  It is important to stay well-hydrated after surgery and drink plenty of water. If it has been greater than 8 hours since you have urinated despite drinking plenty of water, call your Surgeon Team.     Activity - Exercises to prevent blood clots    Unless otherwise directed by your Surgeon team, perform the following exercises at least three times per day for the first four weeks after surgery to prevent blood clots in your legs: 1) Point and flex your feet (Ankle Pumps), 2) Move your ankle around in big circles, 3) " Wiggle your toes, 4) Walk, even for short distances, several times a day, will help decrease the risk of blood clots.     Resume anticoagulation as prior to admission    Resume Eliquis  on post-op day 1     Incentive Spirometry    Incentive Spirometry 10 times per hour, 4 times per day.     General info for SNF    Length of Stay Estimate: Short Term Care: Estimated # of Days <30  Condition at Discharge: Improving  Level of care:skilled   Rehabilitation Potential: Good  Admission H&P remains valid and up-to-date: Yes  Recent Chemotherapy: N/A  Use Nursing Home Standing Orders: Yes  Free of communicable diseases: Yes     Mantoux instructions    Give two-step Mantoux (PPD) Per Facility Policy Yes     Follow Up and recommended labs and tests    Follow up with group home physician.  The following labs/tests are recommended: CBC with plt, BMP, CRP weekly while on IV antibiotics. Fax results to Dr Reza at [phone].     Reason for your hospital stay    Knee infection     Activity - Up with nursing assistance     No CPR- Do NOT Intubate     Physical Therapy Adult Consult    Evaluate and treat as clinically indicated.    Reason: Status Post Knee Surgery     Occupational Therapy Adult Consult    Evaluate and treat as clinically indicated.    Reason: Status Post Knee Surgery     Crutches DME    DME Documentation: Describe the reason for need to support medical necessity: Impaired gait status post knee surgery. I, the undersigned, certify that the above prescribed supplies are medically necessary for this patient and is both reasonable and necessary in reference to accepted standards of medical practice in the treatment of this patient's condition and is not prescribed as a convenience.     Cane DME    DME Documentation: Describe the reason for need to support medical necessity: Impaired gait status post knee surgery. I, the undersigned, certify that the above prescribed supplies are medically necessary for this patient  and is both reasonable and necessary in reference to accepted standards of medical practice in the treatment of this patient's condition and is not prescribed as a convenience.     Walker DME    DME Documentation: Describe the reason for need to support medical necessity: Impaired gait status post knee surgery. I, the undersigned, certify that the above prescribed supplies are medically necessary for this patient and is both reasonable and necessary in reference to accepted standards of medical practice in the treatment of this patient's condition and is not prescribed as a convenience.     Diet    Follow this diet upon discharge: Orders Placed This Encounter      Advance Diet as Tolerated: Regular Diet Adult      Diet     Diet    Follow this diet upon discharge: regular diet       Examination   Physical Exam   Temp:  [97.5  F (36.4  C)-98.2  F (36.8  C)] 97.5  F (36.4  C)  Pulse:  [81-84] 84  Resp:  [10-20] 10  BP: (123-131)/(60-76) 131/71  FiO2 (%):  [6 %] 6 %  SpO2:  [90 %-96 %] 91 %  Wt Readings from Last 1 Encounters:   07/07/24 69.7 kg (153 lb 10.6 oz)       General: in no apparent distress, non-toxic, and alert female lying in hospital bed oriented x3  HEENT: Head normocephalic atraumatic, oral mucosa moist. Sclerae anicteric  Skin:  mild erythema of R knee, ecchymosis extending distally to tibial tuberosity  Extremities:  mild swelling of R knee, no significant warmth  Psych: Normal affect, mood euthymic  Neuro: Grossly normal    Please see EMR for more detailed significant labs, imaging, consultant notes etc.    I, Shyla Babcock MD, personally saw the patient today and spent greater than 30 minutes discharging this patient.    Shyla Babcock MD  [facility]    CC:Margret Flores

## 2024-07-09 NOTE — PLAN OF CARE
Physical Therapy Discharge Summary    Reason for therapy discharge:    Discharged to transitional care facility.    Progress towards therapy goal(s). See goals on Care Plan in [facility] electronic health record for goal details.  Goals not met.  Barriers to achieving goals:   limited tolerance for therapy and discharge from facility.    Therapy recommendation(s):    Continued therapy is recommended.  Rationale/Recommendations:  PT goals not met.

## 2024-07-09 NOTE — PROGRESS NOTES
Care Management Discharge Note    Discharge Date: 07/09/2024       Discharge Disposition: Transitional Care    Discharge Services:  TCU     Discharge DME:  None     Discharge Transportation: health plan transportation    Private pay costs discussed: transportation costs, RNCM talked with pt about cost of w/c transport. Pt is agreeable to private cost of w/c transport , and Private Room fee cost at TCU of $25/ day.       Does the patient's insurance plan have a 3 day qualifying hospital stay waiver?  No    PAS Confirmation Code:  Pt was at TCU prior to admission no new PAS needed   Patient/family educated on Medicare website which has current facility and service quality ratings:  Yes    Education Provided on the Discharge Plan:  Per Care Team   Persons Notified of Discharge Plans: Yes  Patient/Family in Agreement with the Plan: yes    Handoff Referral Completed: Yes    Additional Information:    Final discharge plan is for pt to go to [facility] TCU today 7/9. [facility] w/c transport set up for 1:10pm-1:50pm. Bedside, provider, facility , pt, and pt's sister Theresa updated.       Doreen Carpio RN

## 2024-07-09 NOTE — PROGRESS NOTES
[facility]  Infectious Disease   Progress Note     Date of Admission:  7/2/2024    Assessment & Plan   Admitted with nonresolving syx in her infected arthritic/gouty knee, no systemic syx, with declining CRP as below-while on OPAT. Active issue.   MRI no osteomyelitis  Staph uzielenensis native septic knee , diagnosed during her admission on 5/24, Dr. Reza  5/23 : S/p Right knee arthroscopic irrigation and debridement   Receiving outpatient IV cefazolin  Hx steroid injection  Severe OA, and extensive complex tearing of the body and posterior horn medial meniscus , ACL tear-which can cause effusions  Gout (positive crystals in May )    7/2 Right knee open irrigation and debridement with partial synovectomy   Increased knee pain, inflammatory markers. Aspiration planned.      Pacemaker  Left TKA     PLAN  Knee aspirated, cell count improved though still very neutrophilic, no crystals this time  Continue IV cefazolin-ordered    All cutures negative    Case management to arrange for continued OPAT (iv abx outpt)    ID followup with Dr Reza in about 3 wks-message sent to schedulers.  Plan is 4 wks more of IV abx from this washout (aug 2). Dr Reza previously placed these orders    Now worsening pain, no fluid pocked on US-no aspiration. ?bruise    Discharge: TCU    - ID will sign off. Please call with additional questions or change in clinical status.     Angelique Sen MD  ______________________________________________________________________    Interval History   Doing ok. Knee about the same. No fluid to aspirate on US yesterday.      Latest Reference Range & Units 05/22/24 17:22 07/02/24 15:32   Cell Count Fluid Source  Knee, Right Knee, Right   Total Nucleated Cells /uL 66,726 18,434   % Neutrophils Fluid % 92 99   % Lymphocytes Fluid % 0 1   % Mono/Macro Fluid % 8 0   Color Fluid Colorless, Yellow  Red ! Orange !   Appearance Fluid Clear  Turbid ! Cloudy !   !: Data is  abnormal    Physical Exam   Vital Signs: Temp: 97.5  F (36.4  C) Temp src: Oral BP: 131/71 Pulse: 84   Resp: 10 SpO2: 91 % O2 Device: None (Room air)    Weight: 153 lbs 10.57 oz  Gen. appearance nontoxic  Eyes no conjunctivitis or icterus  Neck no stiffness or neck vein distention, no LN  Heart  No edema  Lungs breathing comfortably  Abdomen soft not tender  Extremities wrapped knee, no induration. Not much erythema, looks more like brusing?  Skin  no rash or emboli  Neurologic alert oriented no focal deficits      Data   Results for orders placed or performed during the hospital encounter of 07/02/24 (from the past 24 hour(s))   US Lower Extremity Non Vascular Right    Narrative    EXAM: US LOWER EXTREMITY NON VASCULAR RIGHT  LOCATION: [facility]  DATE: 7/8/2024    INDICATION: post knee joint irrigation and debride 7/2; continued knee swelling and question lateral knee fluid collection;  COMPARISON: MRI of the right knee of aspiration 7/2/2024  TECHNIQUE: Routine.    FINDINGS:     Tissues of around the right knee were evaluated with ultrasound. There are similar findings of thickening and heterogeneous echogenicity of the joint capsule. No abnormal vascularity on color Doppler. A subcentimeter focus of anechoic fluid was present,   insufficient for sampling. There is mild edema within the subcutaneous tissues particularly lateral to the knee. No soft tissue fluid collection.      Impression    IMPRESSION:    Continued findings of tissue heterogeneity around the right knee joint. Scant fluid, insufficient for sampling.

## 2024-07-09 NOTE — PLAN OF CARE
Problem: Adult Inpatient Plan of Care  Goal: Plan of Care Review  Description: The Plan of Care Review/Shift note should be completed every shift.  The Outcome Evaluation is a brief statement about your assessment that the patient is improving, declining, or no change.  This information will be displayed automatically on your shift  note.  Outcome: Progressing     Problem: Pain Acute  Goal: Optimal Pain Control and Function  Outcome: Progressing  Intervention: Prevent or Manage Pain  Recent Flowsheet Documentation  Taken 7/9/2024 0310 by Erin Corral RN  Sensory Stimulation Regulation: care clustered  Medication Review/Management: medications reviewed  Intervention: Optimize Psychosocial Wellbeing  Recent Flowsheet Documentation  Taken 7/9/2024 0310 by Erin Corral, RN  Supportive Measures:   active listening utilized   self-care encouraged   relaxation techniques promoted   Goal Outcome Evaluation:               Pt alert and oriented x 4,,can make needs known,PRN oxycodone 5 mg given for right knee pain with relief,ambulated to bathroom with assist of one and voided ,calm and cooperative with cares.

## 2024-07-09 NOTE — PROGRESS NOTES
Occupational Therapy Discharge Summary    Reason for therapy discharge:    Discharged to transitional care facility.    Progress towards therapy goal(s). See goals on Care Plan in [facility] electronic health record for goal details.  Goals not met.  Barriers to achieving goals:   discharge from facility.    Therapy recommendation(s):    Continued therapy is recommended.  Rationale/Recommendations:  At TCU to progress ind and safety with ADLs.

## 2024-07-09 NOTE — PROGRESS NOTES
Orthopedic Progress Note      Assessment: 5 Day Post-Op  S/P Procedure(s):  RIGHT KNEE OPEN IRRIGATION AND DEBRIDEMENT   No fluid collection present on R knee US - no fluid aspirated. Possibly just ecchymosis to lateral knee    Plan:   Pain Control: Continue per pain protocol.  Weight Bearing: Weightbearing as tolerated.  No knee range of motion restrictions.  DVT Prophylaxis: Eliquis 5 mg BID  Antibiotics: Continue IV Ancef per infectious disease recommendations.  Discussed possible need to broaden spectrum but plan to continue current regimen  GI: Plan for aggressive bowel regimen to prevent constipation from narcotic medications  Lines: HLIV once tolerating PO  PT/OT: Eval and treatment. Will follow up on recommendations.  Follow up:  in 2 weeks in clinic with Dr. Monahan for wound check  Discharge plan: Likely return to transitional care facility. Okay to discharge from an orthopedic standpoint.        Subjective:  Doing well this morning.  Erythema to lateral knee is slightly improving.  Pain is about the same.  No new concerns.  Denies fevers/chills today.  Answered all questions/concerns.    Objective:  /71 (BP Location: Left arm)   Pulse 84   Temp 97.5  F (36.4  C) (Oral)   Resp 10   Ht 1.524 m (5')   Wt 69.7 kg (153 lb 10.6 oz)   SpO2 91%   BMI 30.01 kg/m    The patient is A&Ox3.  Resting in bed.  Sensation is intact.  Dorsiflexion and plantar flexion is intact.  Dorsalis pedis pulse intact.  Calves are soft and non-tender. Negative Chance's.  The incision was examined this morning revealing a well-healing incision without any present drainage.  Lateral knee erythema improving today, taking on appearance more so of ecchymosis  Tolerates knee range of motion 10 to 80 degrees with pain at extremes of motion.        Pertinent Labs   Lab Results: personally reviewed.   Lab Results   Component Value Date    INR 1.33 (H) 03/04/2023    INR 1.37 (H) 08/23/2022    INR 1.50 (H) 06/05/2019     Lab Results    Component Value Date    WBC 6.8 07/08/2024    HGB 9.6 (L) 07/08/2024    HCT 31.4 (L) 07/08/2024    MCV 89 07/08/2024     07/08/2024     Lab Results   Component Value Date     07/03/2024    CO2 28 07/03/2024     Cultures from OR 7/2: NGTD    SALVATORE NAVA PA-C  [facility]

## 2024-07-09 NOTE — TELEPHONE ENCOUNTER
----- Message from Angelique Sen sent at 7/9/2024 10:34 AM CDT -----  Please schedule follow up with Dr Reza in ~3 weeks, thanks!

## 2024-07-09 NOTE — PLAN OF CARE
Problem: Pain Acute  Goal: Optimal Pain Control and Function  Outcome: Progressing  Intervention: Prevent or Manage Pain  Recent Flowsheet Documentation  Taken 7/8/2024 1635 by Rosmery Braden RN  Sensory Stimulation Regulation: care clustered  Medication Review/Management: medications reviewed  Intervention: Optimize Psychosocial Wellbeing  Recent Flowsheet Documentation  Taken 7/8/2024 1635 by Rosmery Braden RN  Supportive Measures:   active listening utilized   self-care encouraged   relaxation techniques promoted     Problem: Mobility Impairment  Goal: Optimal Mobility  Outcome: Progressing  Intervention: Optimize Mobility  Recent Flowsheet Documentation  Taken 7/8/2024 1911 by Rosmery Braden RN  Positioning/Transfer Devices:   pillows   in use   Goal Outcome Evaluation:  Patient spent a quiet evening. Patient is alert and oriented x 4, with minor forgetfulness. Patient was up in the chair  upon return from ultrasound per her request.  Ate well for supper. Requested for prn pain medication to be at 2130 when she goes to bed. Patient rated right knee pain at 7/10. Pain and discomfort managed with prn Oxycodone 5 mg and tylenol 650 mg with good effect. Assist of 1 staff to ambulate to the bathroom and to bed. PureWick in use at bedtime.

## 2024-07-09 NOTE — PROGRESS NOTES
[facility] brief update    Patient states doing the same today. Knee still painful with ambulation. No new issues. Plan is TCU later today.    Notified by Ortho that needing input from ID on whether abx should be adjusted before discharge. Plan discharge once we hear about this.    Shyla Babcock MD  [facility] Medicine Service

## 2024-07-10 ENCOUNTER — PATIENT OUTREACH (OUTPATIENT)
Dept: CARE COORDINATION | Facility: CLINIC | Age: 89
End: 2024-07-10
Payer: COMMERCIAL

## 2024-07-13 ENCOUNTER — LAB REQUISITION (OUTPATIENT)
Dept: LAB | Facility: CLINIC | Age: 89
End: 2024-07-13
Payer: COMMERCIAL

## 2024-07-13 DIAGNOSIS — Z51.81 ENCOUNTER FOR THERAPEUTIC DRUG LEVEL MONITORING: ICD-10-CM

## 2024-07-15 ENCOUNTER — APPOINTMENT (OUTPATIENT)
Dept: ULTRASOUND IMAGING | Facility: HOSPITAL | Age: 89
DRG: 291 | End: 2024-07-15
Payer: COMMERCIAL

## 2024-07-15 ENCOUNTER — APPOINTMENT (OUTPATIENT)
Dept: OCCUPATIONAL THERAPY | Facility: HOSPITAL | Age: 89
DRG: 291 | End: 2024-07-15
Payer: COMMERCIAL

## 2024-07-15 ENCOUNTER — ANCILLARY PROCEDURE (OUTPATIENT)
Dept: CARDIOLOGY | Facility: CLINIC | Age: 89
End: 2024-07-15
Attending: INTERNAL MEDICINE
Payer: COMMERCIAL

## 2024-07-15 ENCOUNTER — HOSPITAL ENCOUNTER (INPATIENT)
Facility: HOSPITAL | Age: 89
LOS: 2 days | Discharge: NURSING, REHAB OR LONG-TERM CARE | DRG: 291 | End: 2024-07-17
Attending: FAMILY MEDICINE | Admitting: INTERNAL MEDICINE
Payer: COMMERCIAL

## 2024-07-15 ENCOUNTER — APPOINTMENT (OUTPATIENT)
Dept: CT IMAGING | Facility: HOSPITAL | Age: 89
DRG: 291 | End: 2024-07-15
Payer: COMMERCIAL

## 2024-07-15 DIAGNOSIS — Z95.0 PACEMAKER: ICD-10-CM

## 2024-07-15 DIAGNOSIS — Z79.01 ANTICOAGULATED BY ANTICOAGULATION TREATMENT: ICD-10-CM

## 2024-07-15 DIAGNOSIS — I42.8 NON-ISCHEMIC CARDIOMYOPATHY (H): Primary | ICD-10-CM

## 2024-07-15 DIAGNOSIS — R00.1 BRADYCARDIA: ICD-10-CM

## 2024-07-15 DIAGNOSIS — Z95.0 CARDIAC PACEMAKER IN SITU: ICD-10-CM

## 2024-07-15 DIAGNOSIS — I48.0 PAROXYSMAL ATRIAL FIBRILLATION (H): ICD-10-CM

## 2024-07-15 DIAGNOSIS — I50.43 ACUTE ON CHRONIC COMBINED SYSTOLIC AND DIASTOLIC CONGESTIVE HEART FAILURE (H): ICD-10-CM

## 2024-07-15 LAB
ALBUMIN SERPL BCG-MCNC: 3.4 G/DL (ref 3.5–5.2)
ALP SERPL-CCNC: 134 U/L (ref 40–150)
ALT SERPL W P-5'-P-CCNC: 7 U/L (ref 0–50)
ANION GAP SERPL CALCULATED.3IONS-SCNC: 16 MMOL/L (ref 7–15)
AST SERPL W P-5'-P-CCNC: 40 U/L (ref 0–45)
BASOPHILS # BLD AUTO: 0.1 10E3/UL (ref 0–0.2)
BASOPHILS NFR BLD AUTO: 1 %
BILIRUB DIRECT SERPL-MCNC: <0.2 MG/DL (ref 0–0.3)
BILIRUB SERPL-MCNC: 0.4 MG/DL
BUN SERPL-MCNC: 26.4 MG/DL (ref 8–23)
CALCIUM SERPL-MCNC: 9.2 MG/DL (ref 8.2–9.6)
CHLORIDE SERPL-SCNC: 102 MMOL/L (ref 98–107)
CREAT SERPL-MCNC: 0.86 MG/DL (ref 0.51–0.95)
CRP SERPL-MCNC: 26.8 MG/L
EGFRCR SERPLBLD CKD-EPI 2021: 63 ML/MIN/1.73M2
EOSINOPHIL # BLD AUTO: 0 10E3/UL (ref 0–0.7)
EOSINOPHIL NFR BLD AUTO: 0 %
ERYTHROCYTE [DISTWIDTH] IN BLOOD BY AUTOMATED COUNT: 16.6 % (ref 10–15)
ERYTHROCYTE [SEDIMENTATION RATE] IN BLOOD BY WESTERGREN METHOD: 57 MM/HR (ref 0–30)
GLUCOSE SERPL-MCNC: 142 MG/DL (ref 70–99)
HCO3 SERPL-SCNC: 25 MMOL/L (ref 22–29)
HCT VFR BLD AUTO: 33.5 % (ref 35–47)
HGB BLD-MCNC: 10.1 G/DL (ref 11.7–15.7)
HOLD SPECIMEN: NORMAL
IMM GRANULOCYTES # BLD: 0.1 10E3/UL
IMM GRANULOCYTES NFR BLD: 1 %
LIPASE SERPL-CCNC: 25 U/L (ref 13–60)
LYMPHOCYTES # BLD AUTO: 1 10E3/UL (ref 0.8–5.3)
LYMPHOCYTES NFR BLD AUTO: 10 %
MAGNESIUM SERPL-MCNC: 1.8 MG/DL (ref 1.7–2.3)
MCH RBC QN AUTO: 27.1 PG (ref 26.5–33)
MCHC RBC AUTO-ENTMCNC: 30.1 G/DL (ref 31.5–36.5)
MCV RBC AUTO: 90 FL (ref 78–100)
MONOCYTES # BLD AUTO: 1 10E3/UL (ref 0–1.3)
MONOCYTES NFR BLD AUTO: 10 %
NEUTROPHILS # BLD AUTO: 7.6 10E3/UL (ref 1.6–8.3)
NEUTROPHILS NFR BLD AUTO: 78 %
NRBC # BLD AUTO: 0.1 10E3/UL
NRBC BLD AUTO-RTO: 1 /100
NT-PROBNP SERPL-MCNC: ABNORMAL PG/ML (ref 0–1800)
PLATELET # BLD AUTO: 483 10E3/UL (ref 150–450)
POTASSIUM SERPL-SCNC: 4.3 MMOL/L (ref 3.4–5.3)
PROCALCITONIN SERPL IA-MCNC: 0.13 NG/ML
PROT SERPL-MCNC: 6.4 G/DL (ref 6.4–8.3)
RBC # BLD AUTO: 3.73 10E6/UL (ref 3.8–5.2)
SODIUM SERPL-SCNC: 143 MMOL/L (ref 135–145)
TROPONIN T SERPL HS-MCNC: 33 NG/L
TROPONIN T SERPL HS-MCNC: 35 NG/L
WBC # BLD AUTO: 9.8 10E3/UL (ref 4–11)

## 2024-07-15 PROCEDURE — 36415 COLL VENOUS BLD VENIPUNCTURE: CPT

## 2024-07-15 PROCEDURE — 99255 IP/OBS CONSLTJ NEW/EST HI 80: CPT | Performed by: INTERNAL MEDICINE

## 2024-07-15 PROCEDURE — 76705 ECHO EXAM OF ABDOMEN: CPT

## 2024-07-15 PROCEDURE — 83690 ASSAY OF LIPASE: CPT | Performed by: FAMILY MEDICINE

## 2024-07-15 PROCEDURE — 250N000011 HC RX IP 250 OP 636

## 2024-07-15 PROCEDURE — 85041 AUTOMATED RBC COUNT: CPT

## 2024-07-15 PROCEDURE — 250N000011 HC RX IP 250 OP 636: Mod: JZ

## 2024-07-15 PROCEDURE — 84484 ASSAY OF TROPONIN QUANT: CPT

## 2024-07-15 PROCEDURE — 36415 COLL VENOUS BLD VENIPUNCTURE: CPT | Performed by: STUDENT IN AN ORGANIZED HEALTH CARE EDUCATION/TRAINING PROGRAM

## 2024-07-15 PROCEDURE — 85652 RBC SED RATE AUTOMATED: CPT

## 2024-07-15 PROCEDURE — 99223 1ST HOSP IP/OBS HIGH 75: CPT | Mod: FS | Performed by: INTERNAL MEDICINE

## 2024-07-15 PROCEDURE — 83735 ASSAY OF MAGNESIUM: CPT | Performed by: INTERNAL MEDICINE

## 2024-07-15 PROCEDURE — 93005 ELECTROCARDIOGRAM TRACING: CPT | Performed by: STUDENT IN AN ORGANIZED HEALTH CARE EDUCATION/TRAINING PROGRAM

## 2024-07-15 PROCEDURE — 99418 PROLNG IP/OBS E/M EA 15 MIN: CPT | Mod: FS | Performed by: INTERNAL MEDICINE

## 2024-07-15 PROCEDURE — 84145 PROCALCITONIN (PCT): CPT

## 2024-07-15 PROCEDURE — 71275 CT ANGIOGRAPHY CHEST: CPT

## 2024-07-15 PROCEDURE — 80053 COMPREHEN METABOLIC PANEL: CPT | Performed by: FAMILY MEDICINE

## 2024-07-15 PROCEDURE — 83880 ASSAY OF NATRIURETIC PEPTIDE: CPT | Performed by: FAMILY MEDICINE

## 2024-07-15 PROCEDURE — 250N000013 HC RX MED GY IP 250 OP 250 PS 637: Performed by: INTERNAL MEDICINE

## 2024-07-15 PROCEDURE — 99207 PR APP CREDIT; MD BILLING SHARED VISIT: CPT | Mod: FS

## 2024-07-15 PROCEDURE — 99285 EMERGENCY DEPT VISIT HI MDM: CPT | Mod: 25

## 2024-07-15 PROCEDURE — 120N000004 HC R&B MS OVERFLOW

## 2024-07-15 PROCEDURE — 250N000011 HC RX IP 250 OP 636: Performed by: FAMILY MEDICINE

## 2024-07-15 PROCEDURE — 86140 C-REACTIVE PROTEIN: CPT

## 2024-07-15 PROCEDURE — 97535 SELF CARE MNGMENT TRAINING: CPT | Mod: GO

## 2024-07-15 PROCEDURE — 80048 BASIC METABOLIC PNL TOTAL CA: CPT | Performed by: FAMILY MEDICINE

## 2024-07-15 PROCEDURE — 97165 OT EVAL LOW COMPLEX 30 MIN: CPT | Mod: GO

## 2024-07-15 PROCEDURE — 84484 ASSAY OF TROPONIN QUANT: CPT | Performed by: FAMILY MEDICINE

## 2024-07-15 PROCEDURE — 250N000013 HC RX MED GY IP 250 OP 250 PS 637

## 2024-07-15 PROCEDURE — 82040 ASSAY OF SERUM ALBUMIN: CPT | Performed by: FAMILY MEDICINE

## 2024-07-15 RX ORDER — LISINOPRIL 2.5 MG/1
2.5 TABLET ORAL DAILY
Status: DISCONTINUED | OUTPATIENT
Start: 2024-07-15 | End: 2024-07-17 | Stop reason: HOSPADM

## 2024-07-15 RX ORDER — LIDOCAINE 40 MG/G
CREAM TOPICAL
Status: DISCONTINUED | OUTPATIENT
Start: 2024-07-15 | End: 2024-07-17 | Stop reason: HOSPADM

## 2024-07-15 RX ORDER — CEFAZOLIN SODIUM 1 G/3ML
1 INJECTION, POWDER, FOR SOLUTION INTRAMUSCULAR; INTRAVENOUS EVERY 8 HOURS
Status: DISCONTINUED | OUTPATIENT
Start: 2024-07-15 | End: 2024-07-15

## 2024-07-15 RX ORDER — FUROSEMIDE 10 MG/ML
40 INJECTION INTRAMUSCULAR; INTRAVENOUS
Status: DISCONTINUED | OUTPATIENT
Start: 2024-07-15 | End: 2024-07-16

## 2024-07-15 RX ORDER — DULOXETIN HYDROCHLORIDE 60 MG/1
60 CAPSULE, DELAYED RELEASE ORAL EVERY MORNING
Status: DISCONTINUED | OUTPATIENT
Start: 2024-07-15 | End: 2024-07-17 | Stop reason: HOSPADM

## 2024-07-15 RX ORDER — CALCIUM CARBONATE 500 MG/1
1000 TABLET, CHEWABLE ORAL 4 TIMES DAILY PRN
Status: DISCONTINUED | OUTPATIENT
Start: 2024-07-15 | End: 2024-07-17 | Stop reason: HOSPADM

## 2024-07-15 RX ORDER — CEFAZOLIN SODIUM 2 G/100ML
2 INJECTION, SOLUTION INTRAVENOUS EVERY 8 HOURS
Status: DISCONTINUED | OUTPATIENT
Start: 2024-07-15 | End: 2024-07-17 | Stop reason: HOSPADM

## 2024-07-15 RX ORDER — AMIODARONE HYDROCHLORIDE 100 MG/1
100 TABLET ORAL DAILY
Status: DISCONTINUED | OUTPATIENT
Start: 2024-07-15 | End: 2024-07-17 | Stop reason: HOSPADM

## 2024-07-15 RX ORDER — HYDRALAZINE HYDROCHLORIDE 10 MG/1
10 TABLET, FILM COATED ORAL EVERY 4 HOURS PRN
Status: DISCONTINUED | OUTPATIENT
Start: 2024-07-15 | End: 2024-07-17 | Stop reason: HOSPADM

## 2024-07-15 RX ORDER — ACETAMINOPHEN 325 MG/1
650 TABLET ORAL EVERY 4 HOURS PRN
Status: DISCONTINUED | OUTPATIENT
Start: 2024-07-15 | End: 2024-07-17 | Stop reason: HOSPADM

## 2024-07-15 RX ORDER — POLYETHYLENE GLYCOL 3350 17 G/17G
17 POWDER, FOR SOLUTION ORAL DAILY
Status: DISCONTINUED | OUTPATIENT
Start: 2024-07-15 | End: 2024-07-17 | Stop reason: HOSPADM

## 2024-07-15 RX ORDER — ACETAMINOPHEN 650 MG/1
650 SUPPOSITORY RECTAL EVERY 4 HOURS PRN
Status: DISCONTINUED | OUTPATIENT
Start: 2024-07-15 | End: 2024-07-17 | Stop reason: HOSPADM

## 2024-07-15 RX ORDER — NALOXONE HYDROCHLORIDE 0.4 MG/ML
0.4 INJECTION, SOLUTION INTRAMUSCULAR; INTRAVENOUS; SUBCUTANEOUS
Status: DISCONTINUED | OUTPATIENT
Start: 2024-07-15 | End: 2024-07-17 | Stop reason: HOSPADM

## 2024-07-15 RX ORDER — IOPAMIDOL 755 MG/ML
90 INJECTION, SOLUTION INTRAVASCULAR ONCE
Status: COMPLETED | OUTPATIENT
Start: 2024-07-15 | End: 2024-07-15

## 2024-07-15 RX ORDER — SPIRONOLACTONE 25 MG
12.5 TABLET ORAL DAILY
Status: DISCONTINUED | OUTPATIENT
Start: 2024-07-15 | End: 2024-07-15

## 2024-07-15 RX ORDER — HYDRALAZINE HYDROCHLORIDE 20 MG/ML
10 INJECTION INTRAMUSCULAR; INTRAVENOUS EVERY 4 HOURS PRN
Status: DISCONTINUED | OUTPATIENT
Start: 2024-07-15 | End: 2024-07-17 | Stop reason: HOSPADM

## 2024-07-15 RX ORDER — POLYETHYLENE GLYCOL 3350 17 G/17G
17 POWDER, FOR SOLUTION ORAL DAILY
Status: DISCONTINUED | OUTPATIENT
Start: 2024-07-15 | End: 2024-07-15

## 2024-07-15 RX ORDER — POTASSIUM CHLORIDE 1500 MG/1
20 TABLET, EXTENDED RELEASE ORAL DAILY
Status: DISCONTINUED | OUTPATIENT
Start: 2024-07-15 | End: 2024-07-15

## 2024-07-15 RX ORDER — FUROSEMIDE 10 MG/ML
40 INJECTION INTRAMUSCULAR; INTRAVENOUS ONCE
Status: COMPLETED | OUTPATIENT
Start: 2024-07-15 | End: 2024-07-15

## 2024-07-15 RX ORDER — AMOXICILLIN 250 MG
2 CAPSULE ORAL 2 TIMES DAILY PRN
Status: DISCONTINUED | OUTPATIENT
Start: 2024-07-15 | End: 2024-07-17 | Stop reason: HOSPADM

## 2024-07-15 RX ORDER — NALOXONE HYDROCHLORIDE 0.4 MG/ML
0.2 INJECTION, SOLUTION INTRAMUSCULAR; INTRAVENOUS; SUBCUTANEOUS
Status: DISCONTINUED | OUTPATIENT
Start: 2024-07-15 | End: 2024-07-17 | Stop reason: HOSPADM

## 2024-07-15 RX ORDER — METOPROLOL SUCCINATE 25 MG/1
25 TABLET, EXTENDED RELEASE ORAL DAILY
Status: DISCONTINUED | OUTPATIENT
Start: 2024-07-15 | End: 2024-07-17 | Stop reason: HOSPADM

## 2024-07-15 RX ORDER — AMOXICILLIN 250 MG
1 CAPSULE ORAL 2 TIMES DAILY PRN
Status: DISCONTINUED | OUTPATIENT
Start: 2024-07-15 | End: 2024-07-17 | Stop reason: HOSPADM

## 2024-07-15 RX ORDER — SPIRONOLACTONE 25 MG
12.5 TABLET ORAL DAILY
Status: DISCONTINUED | OUTPATIENT
Start: 2024-07-16 | End: 2024-07-17 | Stop reason: HOSPADM

## 2024-07-15 RX ADMIN — METOPROLOL SUCCINATE 25 MG: 25 TABLET, EXTENDED RELEASE ORAL at 15:34

## 2024-07-15 RX ADMIN — DULOXETINE HYDROCHLORIDE 60 MG: 60 CAPSULE, DELAYED RELEASE PELLETS ORAL at 15:37

## 2024-07-15 RX ADMIN — POLYETHYLENE GLYCOL 3350 17 G: 17 POWDER, FOR SOLUTION ORAL at 15:35

## 2024-07-15 RX ADMIN — LISINOPRIL 2.5 MG: 2.5 TABLET ORAL at 15:37

## 2024-07-15 RX ADMIN — CEFAZOLIN SODIUM 2 G: 2 INJECTION, SOLUTION INTRAVENOUS at 21:59

## 2024-07-15 RX ADMIN — FUROSEMIDE 40 MG: 10 INJECTION, SOLUTION INTRAMUSCULAR; INTRAVENOUS at 18:02

## 2024-07-15 RX ADMIN — IOPAMIDOL 90 ML: 755 INJECTION, SOLUTION INTRAVENOUS at 11:58

## 2024-07-15 RX ADMIN — APIXABAN 5 MG: 5 TABLET, FILM COATED ORAL at 20:07

## 2024-07-15 RX ADMIN — AMIODARONE HYDROCHLORIDE 100 MG: 100 TABLET ORAL at 15:37

## 2024-07-15 RX ADMIN — POTASSIUM CHLORIDE 20 MEQ: 1500 TABLET, EXTENDED RELEASE ORAL at 15:34

## 2024-07-15 RX ADMIN — FUROSEMIDE 40 MG: 10 INJECTION, SOLUTION INTRAMUSCULAR; INTRAVENOUS at 12:18

## 2024-07-15 RX ADMIN — CEFAZOLIN SODIUM 2 G: 2 INJECTION, SOLUTION INTRAVENOUS at 15:05

## 2024-07-15 ASSESSMENT — ACTIVITIES OF DAILY LIVING (ADL)
ADLS_ACUITY_SCORE: 44
ADLS_ACUITY_SCORE: 38
ADLS_ACUITY_SCORE: 38
ADLS_ACUITY_SCORE: 42
ADLS_ACUITY_SCORE: 38
ADLS_ACUITY_SCORE: 38
ADLS_ACUITY_SCORE: 44
DEPENDENT_IADLS:: SHOPPING;TRANSPORTATION
ADLS_ACUITY_SCORE: 38
ADLS_ACUITY_SCORE: 44
ADLS_ACUITY_SCORE: 38
ADLS_ACUITY_SCORE: 38
ADLS_ACUITY_SCORE: 44
ADLS_ACUITY_SCORE: 38
ADLS_ACUITY_SCORE: 44

## 2024-07-15 NOTE — H&P
[facility]    History and Physical - Hospitalist Service       Date of Admission:  7/15/2024    Assessment & Plan      Gladys Ramirez is a 89 year old female with a pertinent history of hypertension, hyperlipidemia, CHF, stage 3 chronic kidney disease, non-ischemic cardiomyopathy, GERD, and A-fib who presents with shortness of breath x 2 weeks worsening over the last few days.  Patient was 86% on room air. Increasedt to 95% on 2 L nasal cannula.  Patient states she does not have a history of using home oxygen . She had right knee surgery in May and has had recurrent infections with several more surgeries since. Patient has been getting antibiotics via PICC line.  Patient states she has difficulty walking on right knee due to pain.  Patient states shortness of breath increases with activity.  Patient states in June she needed cardioversion for atrial fibrillation and pacemaker placement.Patient states she does not feel short of breath at rest.  Patient denies chest pain or dizziness.  Patient denies nausea vomiting or diarrhea.  Denies urinary symptoms.  Patient states she feels improved now and has no shortness of breath or knee pain at rest.  IV diuresis started.  Cardiology consult recommendations pending.  Will continue IV Ancef for right knee infection.     # Dyspnea on exertion  # Acute on chronic heart failure  # Hypoxia  BNP 29,737  IV Lasix  Last echo May 2024-EF 20-25 with hypokinesis  Will defer for need for repeat echo to cardiology  Cardiology consult recommendations pending  Chest CT results: No obvious pulmonary embolism.  Pulmonary edema present. Cardiomegaly  Reflux of contrast into the IVC; correlate for elevated right heart pressure.  Moderate bilateral pleural effusions, perhaps slightly larger on the right  Indeterminate pericholecystic stranding in the right upper quadrant abdomen. Recommend right upper quadrant ultrasound. Trace ascites.  Abdominal ultrasound results  pending  Oxygen 86% on room air, increased to 95% on 2 L  Patient does not have a history of home oxygen use  Troponin 35, repeat troponin pending  Cardiac telemetry monitoring    # Right knee pyogenic arthritis  Chart Review: 7/2/24-7/9/24 Admission at [facility] for pyogenic arthritis of right knee joint. Right knee open irrigation and debridement performed. Discharged on IV ancef and miralax.  Continue IV Ancef  Right knee tissue cultures taken on July 2, 2024: negative for infection  Blood cultures results - negative on July 2, 2024  Oxycodone as needed for pain  Pro-Zeus, sed rate, CRP results pending    # Atrial fibrillation  # Pacemaker placement April 8, 2024  Interrogate pacemaker results pending  Continue home Eliquis  Resume home amiodarone  Resume home metoprolol  EKG reviewed atrial fibrillation rate , left bundle branch block, no pacemaker activity    # Hypertension  Lisinopril  Blood pressure stable, continue to monitor  Hydralazine as needed    # Chronic anemia  Hemoglobin 10.1 today, approximate baseline hemoglobin between 9 and 11  Trend CBC in a.m.          Diet: Combination Diet 2 gm NA Diet; No Caffeine Diet (and additional linked orders)  Fluid restriction 2000 ML FLUID (and additional linked orders)    DVT Prophylaxis: DOAC  Reyes Catheter: Not present  Lines: PRESENT             Cardiac Monitoring: ACTIVE order. Indication: Acute decompensated heart failure (48 hours)  Code Status: Full Code      Clinically Significant Risk Factors Present on Admission              # Hypoalbuminemia: Lowest albumin = 3.4 g/dL at 7/15/2024 10:23 AM, will monitor as appropriate  # Drug Induced Coagulation Defect: home medication list includes an anticoagulant medication    # Hypertension: Noted on problem list  # Acute heart failure with reduced ejection fraction: last echo with EF <40% and receiving IV diuretics   # Anemia: based on hgb <11           # Overweight: Estimated body mass index is 29.88 kg/m  as  calculated from the following:    Height as of 7/2/24: 1.524 m (5').    Weight as of this encounter: 69.4 kg (153 lb).       # Financial/Environmental Concerns:     # Pacemaker present       Disposition Plan     Medically Ready for Discharge: Anticipated in 2-4 Days         The patient's care was discussed with the Attending Physician, Dr. Owen .    Heena Land NP  Hospitalist Service  [facility]  Securely message with [employer] (more info)  Text page via [employer] Paging/Directory     ______________________________________________________________________    Chief Complaint   Shortness of breath    History is obtained from the patient and chart    History of Present Illness   Gladys Ramirez is a 89 year old female with a pertinent history of hypertension, hyperlipidemia, CHF, stage 3 chronic kidney disease, non-ischemic cardiomyopathy, GERD, and A-fib who presents with shortness of breath x 2 weeks worsening over the last few days.  Patient comes from TCU with increased shortness of breath. She has had shortness of breath for 2 weeks but it became much worse last night. Patient was 86% on room air. Increasedt to 95% on 2 L nasal cannula.  Patient states she does not have a history of using home oxygen . She had right knee surgery in May and has had recurrent infections with several more surgeries since. Patient has been getting antibiotics via PICC line.  Patient states she has difficulty walking on right knee due to pain.  Patient states she in June she needed cardioversion for atrial fibrillation and pacemaker placement. Patient states shortness of breath increases with activity.  Patient states she does not feel short of breath at rest.  Patient denies chest pain or dizziness.  Patient denies nausea vomiting or diarrhea.  Denies urinary symptoms.  Patient states she feels improved now and has no shortness of breath or knee pain at rest.       Past Medical History    Past Medical  History:   Diagnosis Date    Angina pectoris (H24)     Atrial fibrillation (H)     Chest pain 03/09/2017    Chronic kidney disease     Congestive heart failure (H)     Cough     Hyperlipidemia     Hypertension     Osteoarthritis        Past Surgical History   Past Surgical History:   Procedure Laterality Date    APPENDECTOMY      ARTHROSCOPY KNEE Right 5/23/2024    Procedure: ARTHROSCOPY, KNEE;  Surgeon: Sanchez Monahan MD;  [place] [facility] [place]AL CELL CARCINOMA EXCISION      nose    CARDIOVERSION  06/19/2024    EP PACEMAKER DEVICE & IMPLANT- HIS LEAD DUAL N/A 4/8/2024    Procedure: Pacemaker Device & Lead Implant - HIS Lead Dual;  Surgeon: Jeannie Rivera MD;  Location: [facility] CATH LAB CV    INCISION AND DRAINAGE KNEE, COMBINED Right 5/23/2024    Procedure: INCISION AND DRAINAGE, KNEE;  Surgeon: Sanchez Monahan MD;  Location: [facility] OR    IRRIGATION AND DEBRIDEMENT KNEE, COMBINED Right 7/2/2024    Procedure: RIGHT KNEE OPEN IRRIGATION AND DEBRIDEMENT;  Surgeon: Rakan Syed MD;  [place] [facility] [place]    LAPAROSCOPY DIAGNOSTIC (GENERAL) N/A 11/04/2014    Procedure: LAPAROSCOPY BILATERAL SALPINGO-OOPHORECTOMY ;  Surgeon: Sofia Harper MD;  Location: [facility] OR;  Service:     OPEN REDUCTION INTERNAL FIXATION HIP BIPOLAR Right 3/5/2023    Procedure: HEMIARTHROPLASTY, HIP, BIPOLAR;  Surgeon: Jose G Martinez MD;  [place] [facility] [place]    PICC SINGLE LUMEN PLACEMENT  05/24/2024    TONSILLECTOMY      10 years old    [facility] TOTAL KNEE ARTHROPLASTY Left     2011       Prior to Admission Medications   Prior to Admission Medications   Prescriptions Last Dose Informant Patient Reported? Taking?   DULoxetine (CYMBALTA) 60 MG capsule 7/14/2024 at am  Yes Yes   Sig: Take 60 mg by mouth every morning   acetaminophen (TYLENOL) 325 MG tablet Unknown at prn  No Yes   Sig: Take 2 tablets (650 mg) by mouth every 4 hours as needed for mild pain   amiodarone (PACERONE)  200 MG tablet 7/14/2024 at am  No Yes   Sig: Take 0.5 tablets (100 mg) by mouth daily   apixaban ANTICOAGULANT (ELIQUIS) 5 MG tablet 7/14/2024 at pm  No Yes   Sig: Take 1 tablet (5 mg) by mouth 2 times daily   ceFAZolin (ANCEF) intermittent infusion 2 g in 100 mL dextrose PRE-MIX 7/14/2024 at pm  No Yes   Sig: Inject 100 mLs (2 g) into the vein every 8 hours for 28 days   cholecalciferol, vitamin D3, (VITAMIN D3) 2,000 unit Tab 7/14/2024 at am  No Yes   Sig: [CHOLECALCIFEROL, VITAMIN D3, (VITAMIN D3) 2,000 UNIT TAB] Take 1 tablet (2,000 Units total) by mouth daily.   diclofenac (FLECTOR) 1.3 % patch 7/14/2024 at pm  Yes Yes   Sig: Externally apply 1 patch topically 2 times daily   furosemide (LASIX) 20 MG tablet 7/14/2024 at pm  Yes Yes   Sig: Take 20 mg by mouth every evening In addition, take 40 mg in the morning.   furosemide (LASIX) 40 MG tablet 7/14/2024 at am  Yes Yes   Sig: Take 40 mg by mouth every morning In addition, take 20 mg in the evening.   lisinopril (ZESTRIL) 2.5 MG tablet 7/14/2024 at am  No Yes   Sig: TAKE 1 TABLET BY MOUTH ONE TIME DAILY   magnesium hydroxide (MILK OF MAGNESIA) 400 MG/5ML suspension Unknown at prn  No Yes   Sig: Take 30 mLs by mouth daily as needed for constipation (Use if polyethylene glycol (Miralax) is not effective after 24 hours.)   metoprolol succinate ER (TOPROL XL) 25 MG 24 hr tablet 7/14/2024 at am  No Yes   Sig: Take 1 tablet (25 mg) by mouth daily   nystatin (MYCOSTATIN) 096964 UNIT/GM external powder Unknown at prn  No Yes   Sig: Apply topically 2 times daily as needed (rash in skin folds)   oxyCODONE (ROXICODONE) 5 MG tablet 7/14/2024 at pm  No Yes   Sig: Take 0.5 tablets (2.5 mg) by mouth every 4 hours as needed for severe pain   polyethylene glycol (MIRALAX) 17 GM/Dose powder 7/14/2024 at am  No Yes   Sig: Take 17 g by mouth daily   potassium chloride tamiko ER (KLOR-CON M20) 20 MEQ CR tablet 7/14/2024 at am  No Yes   Sig: Take 1 tablet (20 mEq) by mouth daily  "  senna-docusate (SENOKOT-S/PERICOLACE) 8.6-50 MG tablet Unknown at prn  No Yes   Sig: Take 2 tablets by mouth 2 times daily as needed for constipation      Facility-Administered Medications: None        Review of Systems    The 10 point Review of Systems is negative other than noted in the HPI or here.     Social History   I have reviewed this patient's social history and updated it with pertinent information if needed.  Social History     Tobacco Use    Smoking status: Never     Passive exposure: Never    Smokeless tobacco: Never    Tobacco comments:     no passive exposure   Vaping Use    Vaping status: Never Used   Substance Use Topics    Alcohol use: Yes     Comment: Alcoholic Drinks/day: Rarely a glass of wine    Drug use: No         Family History   I have reviewed this patient's family history and updated it with pertinent information if needed.  Family History   Problem Relation Age of Onset    Heart Disease Mother     Rheumatic Heart Disease Mother     No Known Problems Father     Cancer Brother         brain    Lung Cancer Brother     Lung Cancer Brother     Cancer Sister         lung    Lung Cancer Sister          Allergies   Allergies   Allergen Reactions    Trazodone Shortness Of Breath and Unknown     Allergic to trazodone and deriv., Other: trouble swallowing      Clindamycin Diarrhea     C-diff    Gabapentin Other (See Comments)     \"Internal tremors\"    Temazepam Other (See Comments)     Annotation: Nightmares          Physical Exam   Vital Signs: Temp: 97.7  F (36.5  C) Temp src: Oral BP: 133/69 Pulse: 93   Resp: 27 SpO2: 90 % O2 Device: Nasal cannula Oxygen Delivery: 2 LPM  Weight: 153 lbs 0 oz    Constitutional: awake, alert, cooperative, no apparent distress, and appears stated age  Hematologic / Lymphatic: no cervical lymphadenopathy and no supraclavicular lymphadenopathy  Respiratory: No increased work of breathing, good air exchange, clear to auscultation bilaterally, no crackles or " wheezing  Cardiovascular: Normal apical impulse, regular rate and rhythm, normal S1 and S2, no S3 or S4, and no murmur noted, +1 pedal edema  GI: No scars, normal bowel sounds, soft, non-distended, non-tender, no masses palpated, no hepatosplenomegally  Skin: no bruising or bleeding, normal skin color, texture, turgor, no redness, warmth, or swelling, and no rashes. Right knee incision sutures. Clean dry intact   Musculoskeletal: There is no redness, warmth, or swelling of the joints.  Right knee small swelling.   Neurologic: Awake, alert, oriented to name, place and time.  Cranial nerves II-XII are grossly intact.  Motor is 5 out of 5 bilaterally.  Cerebellar finger to nose, heel to shin intact.  Sensory is intact.  Babinski down going, Romberg negative, and gait is normal.  Neuropsychiatric: General: normal, calm, and normal eye contact    Medical Decision Making       75 MINUTES SPENT BY ME on the date of service doing chart review, history, exam, documentation & further activities per the note.      Data     I have personally reviewed the following data over the past 24 hrs:    9.8  \   10.1 (L)   / 483 (H)     143 102 26.4 (H) /  142 (H)   4.3 25 0.86 \     ALT: 7 AST: 40 AP: 134 TBILI: 0.4   ALB: 3.4 (L) TOT PROTEIN: 6.4 LIPASE: 25     Trop: 35 (H) BNP: 29,737 (H)       Imaging results reviewed over the past 24 hrs:   Recent Results (from the past 24 hour(s))   CT Chest Pulmonary Embolism w Contrast    Narrative    EXAM: CT CHEST PULMONARY EMBOLISM W CONTRAST  LOCATION: [facility]  DATE: 7/15/2024    INDICATION: Dyspnea.  COMPARISON: 8/23/2022.  TECHNIQUE: CT chest pulmonary angiogram during arterial phase injection of IV contrast. Multiplanar reformats and MIP reconstructions were performed. Dose reduction techniques were used.   CONTRAST: [id number] 90 ml.    FINDINGS:  ANGIOGRAM CHEST: Motion artifact. No pulmonary embolism seen. Nonaneurysmal aorta, though suboptimal opacification  for dissection evaluation.    LUNGS AND PLEURA: Motion artifact. Bilateral groundglass opacities and septal thickening. Moderate basilar atelectasis. Moderate bilateral pleural effusions. No pneumothorax.    MEDIASTINUM/AXILLAE: No significant adenopathy. Mild-moderate cardiomegaly. No pericardial effusion. Right PICC tip in the distal SVC. Dual-chamber pacer. Reflux of contrast into the IVC. Small hiatus hernia.    CORONARY ARTERY CALCIFICATION: Mild.    UPPER ABDOMEN: Mild pericholecystic stranding. 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    MUSCULOSKELETAL: Degenerative changes spine.      Impression    IMPRESSION:    1.  Motion artifact.    2.  No obvious pulmonary embolism.    3.  Pulmonary edema present. Cardiomegaly.    4.  Reflux of contrast into the IVC; correlate for elevated right heart pressure.    5.  Moderate bilateral pleural effusions, perhaps slightly larger on the right.    6.  Indeterminate pericholecystic stranding in the right upper quadrant abdomen. Recommend right upper quadrant ultrasound. Trace ascites.

## 2024-07-15 NOTE — CONSULTS
Care Management Initial Consult    General Information  Assessment completed with: Patient, pt  Type of CM/SW Visit: Initial Assessment    Primary Care Provider verified and updated as needed: Yes   Readmission within the last 30 days: current reason for admission unrelated to previous admission, previous discharge plan unsuccessful   Return Category: Exacerbation of disease  Reason for Consult: discharge planning  Advance Care Planning: Advance Care Planning Reviewed: no concerns identified  POLST from TCU but not completely signed for  submission to [employer]       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  No    Lifestyle & Psychosocial Needs:  Social Determinants of Health     Food Insecurity: Low Risk  (4/5/2024)    Food Insecurity     Within the past 12 months, did you worry that your food would run out before  you got money to buy more?: No     Within the past 12 months, did the food you bought just not last and you didn t have money to get more?: No   Depression: Not at risk (4/5/2024)    PHQ-2     PHQ-2 Score: 0   Housing Stability: Low Risk  (4/5/2024)    Housing Stability     Do you have housing? : Yes     Are you worried about losing your housing?: No   Tobacco Use: Low Risk  (7/2/2024)    Patient History     Smoking Tobacco Use: Never     Smokeless Tobacco Use: Never     Passive Exposure: Never   Financial Resource Strain: Low Risk  (4/5/2024)    Financial Resource Strain     Within the past 12 months, have you or your family members you live with been unable to get utilities (heat, electricity) when it was really needed?: No   Alcohol Use: Not At Risk (3/27/2023)    AUDIT-C     Frequency of Alcohol Consumption: Never     Average Number of Drinks: Patient does not drink     Frequency of Binge Drinking: Never   Transportation Needs: Low Risk  (4/5/2024)    Transportation Needs     Within the past 12 months, has lack of transportation kept you from medical appointments, getting your medicines, non-medical meetings or appointments, work, or from getting things that you need?: No   Physical Activity: Insufficiently Active (3/27/2023)    Exercise Vital Sign     Days of Exercise per Week: 3 days     Minutes of Exercise per Session: 10 min   Interpersonal Safety: Low Risk  (4/5/2024)    Interpersonal Safety     Do you feel physically and emotionally safe where you currently live?: Yes     Within the past 12 months, have you been hit, slapped, kicked or otherwise physically hurt by someone?: No     Within the past 12 months, have you been humiliated or emotionally abused in other ways by your partner or ex-partner?: No   Stress: No Stress Concern Present (4/5/2024)    [employer] - Occupational Stress Questionnaire     Feeling of Stress : Not at all   Social Connections: Moderately Integrated  (3/27/2023)    Social Connection and Isolation Panel [NHANES]     Frequency of Communication with Friends and Family: More than three times a week     Frequency of Social Gatherings with Friends and Family: More than three times a week     Attends Holiness Services: More than 4 times per year     Active Member of Clubs or Organizations: Yes     Attends Club or Organization Meetings: More than 4 times per year     Marital Status: Never    Health Literacy: Not on file       Functional Status:  Prior to admission patient needed assistance:   Dependent ADLs:: Bathing, Ambulation-walker, Dressing, Toileting  Dependent IADLs:: Shopping, Transportation  Assesssment of Functional Status: Not at baseline with ADL Functioning, Not at baseline with mobility, Not at  functional baseline    Mental Health Status:  Mental Health Status: No Current Concerns       Chemical Dependency Status:                Values/Beliefs:  Spiritual, Cultural Beliefs, Holiness Practices, Values that affect care:                 Additional Information:  Assessed, currently at [facility] and will return there at end of hospitalization via WC transport. Otherwise lived independently prior to recently hospitalizations. CM to follow for discharge needs.    Latrice Cottrell RN

## 2024-07-15 NOTE — PLAN OF CARE
Goal Outcome Evaluation:         Heart Failure Care Map  GOALS TO BE MET BEFORE DISCHARGE:    1. Decrease congestion and/or edema with diuretic therapy to achieve near optimal volume status.     Dyspnea improved: No, further care required to meet this goal. Please explain IV lasix and O2 per NC   Edema improved: No, further care required to meet this goal. Please explain further diuresis needed        Last 24 hour I/O:   Intake/Output Summary (Last 24 hours) at 7/15/2024 1822  Last data filed at 7/15/2024 1503  Gross per 24 hour   Intake --   Output 500 ml   Net -500 ml           Net I/O and Weights since admission:   06/15 2300 - 07/15 2259  In: -   Out: 500 [Urine:500]  Net: -500     Vitals:    07/15/24 1002 07/15/24 1700   Weight: 69.4 kg (153 lb) 67.7 kg (149 lb 4.8 oz)       2.  O2 sats > 90% on room air, or at prior home O2 therapy level.      Able to wean O2 this shift to keep sats above 90%?: No, further care required to meet this goal. Please explain on O2 2L to keep sats above 88%   Does patient use Home O2? No          Current oxygenation status:   SpO2: 98 %     O2 Device: Nasal cannula, Oxygen Delivery: 2 LPM    3.  Tolerates ambulation and mobility near baseline.     Ambulation: No, further care required to meet this goal. Please explain in ER    Times patient ambulated with staff this shift: 1    Please review the Heart Failure Care Map for additional HF goal outcomes.    Elizabeth Jones RN  7/15/2024

## 2024-07-15 NOTE — ED PROVIDER NOTES
EMERGENCY DEPARTMENT ENCOUNTER      NAME: Gladys Ramirez  AGE: 89 year old female  YOB: 1930  MRN: [id number]  EVALUATION DATE & TIME: 7/15/2024 10:01 AM    PCP: Margret Flores    ED PROVIDER: John Smith M.D.    Chief Complaint   Patient presents with    Shortness of Breath              FINAL IMPRESSION:  1. Non-ischemic cardiomyopathy (H)    2. 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 Cardiac pacemaker in situ    4. Anticoagulated by anticoagulation treatment        ED COURSE & MEDICAL DECISION MAKING:    Pertinent Labs & Imaging studies independently interpreted by me. (See chart for details)  Reviewed most recent admission from July 2 to July 9 which was for pyogenic arthritis of the right knee, also known history of nonischemic cardiomyopathy with most recent echocardiogram April 2024 showing ejection fraction 20 to 25% with diffuse hypokinesis.    ED Course as of 07/18/24 1505   Mon Jul 15, 2024   1017 EKG:    Independently reviewed and interpreted by me  Performed at: 10:14 AM  Impression: Atrial fibrillation with left bundle branch block  Rate: 91  Rhythm: Atrial fibrillation  Axis: Normal  QRS Interval: 154  QTc Interval: 519  ST Changes: No acute ischemic changes  Comparison: Prior of June 2024, atrial fibrillation with left bundle branch block has replaced paced rhythm     1043 Patient seen and examined, presents with several weeks of shortness of breath but worse in the last 24 hours while.  She is on IV Ancef pyogenic arthritis of the right knee, has pain with ambulation but otherwise is doing well.   1136 Labs ordered and independently interpreted by me with BNP 29,739, normal CBC, normal basic panel, elevated troponin which will be reviewed.  CT scan is pending.   1208 CT PE independently interpreted by me with moderate bilateral pleural effusions, no acute pulmonary embolism.  Patient be given Lasix IV and plan for admission due to  increased oxygen needs and CHF exacerbation   1228 Care discussed with Dr. Owen, hospitalist who accepts the patient for admission.   1239 Reviewed radiology interpretation of CT scan, agrees with my initial interpretation with addition of some stranding in the right upper quadrant.  Patient has no right upper quadrant pain or tenderness, no white blood cell count but right upper quadrant ultrasound as well as lipase and hepatic panel ordered.       10:06 AM I met with the patient, obtained history, performed an initial exam, and discussed options and plan for diagnostics and treatment here in the ED.     At the conclusion of the encounter I discussed the results of all of the tests and the disposition. The questions were answered. The patient or family acknowledged understanding and was agreeable with the care plan.     Medical Decision Making  Obtained supplemental history:Supplemental history obtained?: Documented in chart  Reviewed external records: External records reviewed?: Documented in chart  Care impacted by chronic illness:Chronic Kidney Disease, Heart Disease, Hyperlipidemia, and Hypertension  Care significantly affected by social determinants of health:Access to Affordable Health Care  Did you consider but not order tests?: Work up considered but not performed and documented in chart, if applicable  Did you interpret images independently?: Independent interpretation of ECG and images noted in documentation, when applicable.  Consultation discussion with other provider:Did you involve another provider (consultant, MH, pharmacy, etc.)?: I discussed the care with another health care provider, see documentation for details.  Admit.    MEDICATIONS GIVEN IN THE EMERGENCY:  Medications   iopamidol (ISOVUE-370) solution 90 mL (90 mLs Intravenous $Given 7/15/24 1158)   furosemide (LASIX) injection 40 mg (40 mg Intravenous $Given 7/15/24 1218)   potassium chloride tamiko ER (KLOR-CON M20) CR tablet 40 mEq (40  mEq Oral $Given 7/17/24 0801)       NEW PRESCRIPTIONS STARTED AT TODAY'S ER VISIT  Discharge Medication List as of 7/17/2024  2:17 PM          =================================================================    HPI    Patient information was obtained from: Patient and EMS      Gladys Ramirez is a 89 year old female with a pertinent history of hypertension, hyperlipidemia, CHF, stage 3 chronic kidney disease, non-ischemic cardiomyopathy, GERD, and A-fib who presents to this ED by EMS for evaluation of shortness of breath.    Per EMS, patient comes from TCU with increased shortness of breath. She has had shortness of breath for 2 weeks but it became much worse last night. Patient was 86% on room air. Increasedt o 95% on 2 L nasal cannula. She had right knee surgery in May and has had recurrent infections with several more surgeries since. Patient has been getting antibiotics via PICC line.    Patient reports she was feeling more short of breath last night and considered calling EMS but waited until the morning. She says she's been able to ambulate normally but has right knee pain when she does.    Chart Review: 7/2/24-7/9/24 Admission at [facility] for pyogenic arthritis of right knee joint. Right knee open irrigation and debridement performed. Discharged on IV ancef and miralax.    REVIEW OF SYSTEMS   Review of Systems   All other systems reviewed and negative    PAST MEDICAL HISTORY:  Past Medical History:   Diagnosis Date    Angina pectoris (H24)     Atrial fibrillation (H)     Chest pain 03/09/2017    Chronic kidney disease     Congestive heart failure (H)     Cough     Hyperlipidemia     Hypertension     Osteoarthritis        PAST SURGICAL HISTORY:  Past Surgical History:   Procedure Laterality Date    APPENDECTOMY      ARTHROSCOPY KNEE Right 5/23/2024    Procedure: ARTHROSCOPY, KNEE;  Surgeon: Sanchez Monahan MD;  Location: [facility] OR    BASAL CELL CARCINOMA EXCISION      nose    CARDIOVERSION   06/19/2024    EP PACEMAKER DEVICE & IMPLANT- HIS LEAD DUAL N/A 4/8/2024    Procedure: Pacemaker Device & Lead Implant - HIS Lead Dual;  Surgeon: Jeannie Rivera MD;  Location: [facility] CATH LAB CV    INCISION AND DRAINAGE KNEE, COMBINED Right 5/23/2024    Procedure: INCISION AND DRAINAGE, KNEE;  Surgeon: Sanchez Monahan MD;  Location: [facility] OR    IRRIGATION AND DEBRIDEMENT KNEE, COMBINED Right 7/2/2024    Procedure: RIGHT KNEE OPEN IRRIGATION AND DEBRIDEMENT;  Surgeon: Rakan Syed MD;  Location: [facility] OR    LAPAROSCOPY DIAGNOSTIC (GENERAL) N/A 11/04/2014    Procedure: LAPAROSCOPY BILATERAL SALPINGO-OOPHORECTOMY ;  Surgeon: Sofia Harper MD;  Location: [facility] OR;  Service:     OPEN REDUCTION INTERNAL FIXATION HIP BIPOLAR Right 3/5/2023    Procedure: HEMIARTHROPLASTY, HIP, BIPOLAR;  Surgeon: Jose G Martinez MD;  Location: [facility] OR    PICC SINGLE LUMEN PLACEMENT  05/24/2024    TONSILLECTOMY      10 years old    [facility] TOTAL KNEE ARTHROPLASTY Left     2011       CURRENT MEDICATIONS:    No current facility-administered medications for this encounter.     Current Outpatient Medications   Medication Sig Dispense Refill    acetaminophen (TYLENOL) 325 MG tablet Take 2 tablets (650 mg) by mouth every 4 hours as needed for mild pain 100 tablet 0    amiodarone (PACERONE) 200 MG tablet Take 0.5 tablets (100 mg) by mouth daily 45 tablet 0    apixaban ANTICOAGULANT (ELIQUIS) 5 MG tablet Take 1 tablet (5 mg) by mouth 2 times daily 60 tablet 3    ceFAZolin (ANCEF) intermittent infusion 2 g in 100 mL dextrose PRE-MIX Inject 100 mLs (2 g) into the vein every 8 hours for 28 days      cholecalciferol, vitamin D3, (VITAMIN D3) 2,000 unit Tab [CHOLECALCIFEROL, VITAMIN D3, (VITAMIN D3) 2,000 UNIT TAB] Take 1 tablet (2,000 Units total) by mouth daily. 90 tablet 3    diclofenac (FLECTOR) 1.3 % patch Externally apply 1 patch topically 2 times daily      DULoxetine (CYMBALTA) 60 MG  "capsule Take 60 mg by mouth every morning      furosemide (LASIX) 40 MG tablet Take 1 tablet (40 mg) by mouth 2 times daily      lisinopril (ZESTRIL) 2.5 MG tablet TAKE 1 TABLET BY MOUTH ONE TIME DAILY 90 tablet 0    magnesium hydroxide (MILK OF MAGNESIA) 400 MG/5ML suspension Take 30 mLs by mouth daily as needed for constipation (Use if polyethylene glycol (Miralax) is not effective after 24 hours.)      metoprolol succinate ER (TOPROL XL) 25 MG 24 hr tablet Take 1 tablet (25 mg) by mouth daily 90 tablet 3    nystatin (MYCOSTATIN) 422380 UNIT/GM external powder Apply topically 2 times daily as needed (rash in skin folds)      oxyCODONE (ROXICODONE) 5 MG tablet Take 0.5 tablets (2.5 mg) by mouth every 4 hours as needed for severe pain 10 tablet 0    polyethylene glycol (MIRALAX) 17 GM/Dose powder Take 17 g by mouth daily      potassium chloride tamiko ER (KLOR-CON M20) 20 MEQ CR tablet Take 1 tablet (20 mEq) by mouth daily 90 tablet 3    senna-docusate (SENOKOT-S/PERICOLACE) 8.6-50 MG tablet Take 2 tablets by mouth 2 times daily as needed for constipation 60 tablet 0     Facility-Administered Medications Ordered in Other Encounters   Medication Dose Route Frequency Provider Last Rate Last Admin    sodium chloride (PF) 0.9% PF flush 10-40 mL  10-40 mL Intracatheter Once PRN Denae Hopkins MD           ALLERGIES:  Allergies   Allergen Reactions    Trazodone Shortness Of Breath and Unknown     Allergic to trazodone and deriv., Other: trouble swallowing      Clindamycin Diarrhea     C-diff    Gabapentin Other (See Comments)     \"Internal tremors\"    Temazepam Other (See Comments)     Annotation: Nightmares         FAMILY HISTORY:  Family History   Problem Relation Age of Onset    Heart Disease Mother     Rheumatic Heart Disease Mother     No Known Problems Father     Cancer Brother         brain    Lung Cancer Brother     Lung Cancer Brother     Cancer Sister         lung    Lung Cancer Sister        SOCIAL HISTORY: "   Social History     Socioeconomic History    Marital status: Single   Tobacco Use    Smoking status: Never     Passive exposure: Never    Smokeless tobacco: Never    Tobacco comments:     no passive exposure   Vaping Use    Vaping status: Never Used   Substance and Sexual Activity    Alcohol use: Yes     Comment: Alcoholic Drinks/day: Rarely a glass of wine    Drug use: No   Social History Narrative    The patient is a nun.     Social Determinants of Health     Financial Resource Strain: Low Risk  (4/5/2024)    Financial Resource Strain     Within the past 12 months, have you or your family members you live with been unable to get utilities (heat, electricity) when it was really needed?: No   Food Insecurity: Low Risk  (4/5/2024)    Food Insecurity     Within the past 12 months, did you worry that your food would run out before you got money to buy more?: No     Within the past 12 months, did the food you bought just not last and you didn t have money to get more?: No   Transportation Needs: Low Risk  (4/5/2024)    Transportation Needs     Within the past 12 months, has lack of transportation kept you from medical appointments, getting your medicines, non-medical meetings or appointments, work, or from getting things that you need?: No   Physical Activity: Insufficiently Active (3/27/2023)    Exercise Vital Sign     Days of Exercise per Week: 3 days     Minutes of Exercise per Session: 10 min   Stress: No Stress Concern Present (4/5/2024)    [employer] [place] [employer] - Occupational Stress Questionnaire     Feeling of Stress : Not at all   Social Connections: Moderately Integrated (3/27/2023)    Social Connection and Isolation Panel [NHANES]     Frequency of Communication with Friends and Family: More than three times a week     Frequency of Social Gatherings with Friends and Family: More than three times a week     Attends [employer] Services: More than 4 times per year     Active Member of Clubs or  Organizations: Yes     Attends Club or Organization Meetings: More than 4 times per year     Marital Status: Never    Interpersonal Safety: Low Risk  (4/5/2024)    Interpersonal Safety     Do you feel physically and emotionally safe where you currently live?: Yes     Within the past 12 months, have you been hit, slapped, kicked or otherwise physically hurt by someone?: No     Within the past 12 months, have you been humiliated or emotionally abused in other ways by your partner or ex-partner?: No   Housing Stability: Low Risk  (4/5/2024)    Housing Stability     Do you have housing? : Yes     Are you worried about losing your housing?: No       VITALS:  /62 (BP Location: Left arm)   Pulse 75   Temp 97.6  F (36.4  C) (Oral)   Resp 18   Ht 1.524 m (5')   Wt 66.4 kg (146 lb 4.8 oz)   SpO2 96%   BMI 28.57 kg/m      PHYSICAL EXAM:  Physical Exam  Vitals and nursing note reviewed.   Constitutional:       Appearance: Normal appearance.   HENT:      Head: Normocephalic and atraumatic.      Right Ear: External ear normal.      Left Ear: External ear normal.      Nose: Nose normal.      Mouth/Throat:      Mouth: Mucous membranes are moist.   Eyes:      Extraocular Movements: Extraocular movements intact.      Conjunctiva/sclera: Conjunctivae normal.      Pupils: Pupils are equal, round, and reactive to light.   Cardiovascular:      Rate and Rhythm: Normal rate and regular rhythm.   Pulmonary:      Effort: Pulmonary effort is normal.      Breath sounds: Rales present. No wheezing.      Comments: Diminished in the bases, trace crackles bilaterally  Abdominal:      General: Abdomen is flat. There is no distension.      Palpations: Abdomen is soft.      Tenderness: There is no abdominal tenderness. There is no guarding.   Musculoskeletal:         General: Normal range of motion.      Cervical back: Normal range of motion and neck supple.      Right lower leg: No edema.      Left lower leg: No edema.    Lymphadenopathy:      Cervical: No cervical adenopathy.   Skin:     General: Skin is warm and dry.   Neurological:      General: No focal deficit present.      Mental Status: She is alert and oriented to person, place, and time. Mental status is at baseline.      Comments: No gross focal neurologic deficits   Psychiatric:         Mood and Affect: Mood normal.         Behavior: Behavior normal.         Thought Content: Thought content normal.          LAB:  All pertinent labs reviewed and interpreted.  Results for orders placed or performed during the hospital encounter of 07/15/24   CT Chest Pulmonary Embolism w Contrast    Impression    IMPRESSION:    1.  Motion artifact.    2.  No obvious pulmonary embolism.    3.  Pulmonary edema present. Cardiomegaly.    4.  Reflux of contrast into the IVC; correlate for elevated right heart pressure.    5.  Moderate bilateral pleural effusions, perhaps slightly larger on the right.    6.  Indeterminate pericholecystic stranding in the right upper quadrant abdomen. Recommend right upper quadrant ultrasound. Trace ascites.     US Abdomen Limited    Impression    IMPRESSION:  1.  Gallbladder wall thickening and pericholecystic fluid. No gallstones or sludge. No sonographic Garcia's sign. Cholecystitis is not excluded.  2.  Right pleural fluid noted.   Extra Blue Top Tube   Result Value Ref Range    Hold Specimen JIC    Extra Green Top (Lithium Heparin) Tube   Result Value Ref Range    Hold Specimen JIC    Extra Purple Top Tube   Result Value Ref Range    Hold Specimen JIC    Extra Green Top (Lithium Heparin) ON ICE   Result Value Ref Range    Hold Specimen JIC    CBC with platelets and differential   Result Value Ref Range    WBC Count 9.8 4.0 - 11.0 10e3/uL    RBC Count 3.73 (L) 3.80 - 5.20 10e6/uL    Hemoglobin 10.1 (L) 11.7 - 15.7 g/dL    Hematocrit 33.5 (L) 35.0 - 47.0 %    MCV 90 78 - 100 fL    MCH 27.1 26.5 - 33.0 pg    MCHC 30.1 (L) 31.5 - 36.5 g/dL    RDW 16.6 (H) 10.0 -  15.0 %    Platelet Count 483 (H) 150 - 450 10e3/uL    % Neutrophils 78 %    % Lymphocytes 10 %    % Monocytes 10 %    % Eosinophils 0 %    % Basophils 1 %    % Immature Granulocytes 1 %    NRBCs per 100 WBC 1 (H) <1 /100    Absolute Neutrophils 7.6 1.6 - 8.3 10e3/uL    Absolute Lymphocytes 1.0 0.8 - 5.3 10e3/uL    Absolute Monocytes 1.0 0.0 - 1.3 10e3/uL    Absolute Eosinophils 0.0 0.0 - 0.7 10e3/uL    Absolute Basophils 0.1 0.0 - 0.2 10e3/uL    Absolute Immature Granulocytes 0.1 <=0.4 10e3/uL    Absolute NRBCs 0.1 10e3/uL   Nt probnp inpatient   Result Value Ref Range    N terminal Pro BNP Inpatient 29,737 (H) 0 - 1,800 pg/mL   Result Value Ref Range    Troponin T, High Sensitivity 35 (H) <=14 ng/L   Basic metabolic panel   Result Value Ref Range    Sodium 143 135 - 145 mmol/L    Potassium 4.3 3.4 - 5.3 mmol/L    Chloride 102 98 - 107 mmol/L    Carbon Dioxide (CO2) 25 22 - 29 mmol/L    Anion Gap 16 (H) 7 - 15 mmol/L    Urea Nitrogen 26.4 (H) 8.0 - 23.0 mg/dL    Creatinine 0.86 0.51 - 0.95 mg/dL    GFR Estimate 63 >60 mL/min/1.73m2    Calcium 9.2 8.2 - 9.6 mg/dL    Glucose 142 (H) 70 - 99 mg/dL   Hepatic function panel   Result Value Ref Range    Protein Total 6.4 6.4 - 8.3 g/dL    Albumin 3.4 (L) 3.5 - 5.2 g/dL    Bilirubin Total 0.4 <=1.2 mg/dL    Alkaline Phosphatase 134 40 - 150 U/L    AST 40 0 - 45 U/L    ALT 7 0 - 50 U/L    Bilirubin Direct <0.20 0.00 - 0.30 mg/dL   Result Value Ref Range    Lipase 25 13 - 60 U/L   Result Value Ref Range    Troponin T, High Sensitivity 33 (H) <=14 ng/L   Result Value Ref Range    Procalcitonin 0.13 <0.50 ng/mL   Erythrocyte sedimentation rate auto   Result Value Ref Range    Erythrocyte Sedimentation Rate 57 (H) 0 - 30 mm/hr   Result Value Ref Range    CRP Inflammation 26.80 (H) <5.00 mg/L   Result Value Ref Range    Magnesium 1.8 1.7 - 2.3 mg/dL   Basic metabolic panel   Result Value Ref Range    Sodium 145 135 - 145 mmol/L    Potassium 3.6 3.4 - 5.3 mmol/L    Chloride 103 98  - 107 mmol/L    Carbon Dioxide (CO2) 29 22 - 29 mmol/L    Anion Gap 13 7 - 15 mmol/L    Urea Nitrogen 25.1 (H) 8.0 - 23.0 mg/dL    Creatinine 0.87 0.51 - 0.95 mg/dL    GFR Estimate 62 >60 mL/min/1.73m2    Calcium 9.2 8.2 - 9.6 mg/dL    Glucose 131 (H) 70 - 99 mg/dL   CBC with platelets   Result Value Ref Range    WBC Count 9.3 4.0 - 11.0 10e3/uL    RBC Count 3.60 (L) 3.80 - 5.20 10e6/uL    Hemoglobin 9.8 (L) 11.7 - 15.7 g/dL    Hematocrit 32.3 (L) 35.0 - 47.0 %    MCV 90 78 - 100 fL    MCH 27.2 26.5 - 33.0 pg    MCHC 30.3 (L) 31.5 - 36.5 g/dL    RDW 16.6 (H) 10.0 - 15.0 %    Platelet Count 463 (H) 150 - 450 10e3/uL   Renal panel   Result Value Ref Range    Sodium 142 135 - 145 mmol/L    Potassium 3.2 (L) 3.4 - 5.3 mmol/L    Chloride 101 98 - 107 mmol/L    Carbon Dioxide (CO2) 29 22 - 29 mmol/L    Anion Gap 12 7 - 15 mmol/L    Glucose 117 (H) 70 - 99 mg/dL    Urea Nitrogen 22.7 8.0 - 23.0 mg/dL    Creatinine 0.89 0.51 - 0.95 mg/dL    GFR Estimate 60 (L) >60 mL/min/1.73m2    Calcium 9.0 8.8 - 10.4 mg/dL    Albumin 3.2 (L) 3.5 - 5.2 g/dL    Phosphorus 2.9 2.5 - 4.5 mg/dL   Result Value Ref Range    Potassium 3.8 3.4 - 5.3 mmol/L   Nt probnp inpatient   Result Value Ref Range    N terminal Pro BNP Inpatient 13,247 (H) 0 - 1,800 pg/mL       RADIOLOGY:  Reviewed all pertinent imaging. Please see official radiology report.  US Lower Extremity Non Vascular Right   Final Result      US Abdomen Limited   Final Result   IMPRESSION:   1.  Gallbladder wall thickening and pericholecystic fluid. No gallstones or sludge. No sonographic Garcia's sign. Cholecystitis is not excluded.   2.  Right pleural fluid noted.      CT Chest Pulmonary Embolism w Contrast   Final Result   IMPRESSION:      1.  Motion artifact.      2.  No obvious pulmonary embolism.      3.  Pulmonary edema present. Cardiomegaly.      4.  Reflux of contrast into the IVC; correlate for elevated right heart pressure.      5.  Moderate bilateral pleural effusions,  perhaps slightly larger on the right.      6.  Indeterminate pericholecystic stranding in the right upper quadrant abdomen. Recommend right upper quadrant ultrasound. Trace ascites.             I, Donna Carpio, am serving as a scribe to document services personally performed by Dr. Smith based on my observation and the provider's statements to me. I, John Smith MD attest that Donna Carpio is acting in a scribe capacity, has observed my performance of the services and has documented them in accordance with my direction.    John Smith M.D.  Emergency Medicine  [facility] EMERGENCY DEPARTMENT  [facility] [facility] [address]  [phone]  Dept: [phone]       John Smith MD  07/15/24 7286       John Smith MD  07/18/24 6519

## 2024-07-15 NOTE — MEDICATION SCRIBE - ADMISSION MEDICATION HISTORY
Medication Scribe Admission Medication History    Admission medication history is complete. The information provided in this note is only as accurate as the sources available at the time of the update.    Information Source(s): Facility ([facility]/) medication list/MAR via N/A    Pertinent Information: patient's medications are being managed by [facility]. Received MAR.     Last doses given: called [facility]. Staffed confirmed patient did NOT receive any medications today.     Changes made to PTA medication list:  Added: None  Deleted: None  Changed: None    Allergies reviewed with patient and updates made in EHR: yes    Medication History Completed By: John Campos 7/15/2024 1:15 PM    PTA Med List   Medication Sig Last Dose    acetaminophen (TYLENOL) 325 MG tablet Take 2 tablets (650 mg) by mouth every 4 hours as needed for mild pain Unknown at prn    amiodarone (PACERONE) 200 MG tablet Take 0.5 tablets (100 mg) by mouth daily 7/14/2024 at am    apixaban ANTICOAGULANT (ELIQUIS) 5 MG tablet Take 1 tablet (5 mg) by mouth 2 times daily 7/14/2024 at pm    ceFAZolin (ANCEF) intermittent infusion 2 g in 100 mL dextrose PRE-MIX Inject 100 mLs (2 g) into the vein every 8 hours for 28 days 7/14/2024 at pm    cholecalciferol, vitamin D3, (VITAMIN D3) 2,000 unit Tab [CHOLECALCIFEROL, VITAMIN D3, (VITAMIN D3) 2,000 UNIT TAB] Take 1 tablet (2,000 Units total) by mouth daily. 7/14/2024 at am    diclofenac (FLECTOR) 1.3 % patch Externally apply 1 patch topically 2 times daily 7/14/2024 at pm    DULoxetine (CYMBALTA) 60 MG capsule Take 60 mg by mouth every morning 7/14/2024 at am    furosemide (LASIX) 20 MG tablet Take 20 mg by mouth every evening In addition, take 40 mg in the morning. 7/14/2024 at pm    furosemide (LASIX) 40 MG tablet Take 40 mg by mouth every morning In addition, take 20 mg in the evening. 7/14/2024 at am    lisinopril (ZESTRIL) 2.5 MG tablet TAKE 1 TABLET BY MOUTH ONE TIME DAILY 7/14/2024 at am     magnesium hydroxide (MILK OF MAGNESIA) 400 MG/5ML suspension Take 30 mLs by mouth daily as needed for constipation (Use if polyethylene glycol (Miralax) is not effective after 24 hours.) Unknown at prn    metoprolol succinate ER (TOPROL XL) 25 MG 24 hr tablet Take 1 tablet (25 mg) by mouth daily 7/14/2024 at am    nystatin (MYCOSTATIN) 333697 UNIT/GM external powder Apply topically 2 times daily as needed (rash in skin folds) Unknown at prn    oxyCODONE (ROXICODONE) 5 MG tablet Take 0.5 tablets (2.5 mg) by mouth every 4 hours as needed for severe pain 7/14/2024 at pm    polyethylene glycol (MIRALAX) 17 GM/Dose powder Take 17 g by mouth daily 7/14/2024 at am    potassium chloride tamiko ER (KLOR-CON M20) 20 MEQ CR tablet Take 1 tablet (20 mEq) by mouth daily 7/14/2024 at am    senna-docusate (SENOKOT-S/PERICOLACE) 8.6-50 MG tablet Take 2 tablets by mouth 2 times daily as needed for constipation Unknown at prn

## 2024-07-15 NOTE — CONSULTS
HEART CARE NOTE        Thank you, Dr. Owen, for asking the [facility] team to see Gladys Ramirez to evaluate ADHF.    Assessment/Recommendations     1.  Severe/end stage CM HFrEF c/b ADHF  Assessment / Plan  Hypervolemic on physical exam; continue IV diuresis and continue to monitor UOP and renal function closely  Patient is high risk for adverse cardiac events 2/2 advanced age, frailty, elevated NTproNP, end stage systolic HF  GDMT as detailed below    Current Pharmacotherapy AHA Guideline-Directed Medical Therapy   Lisinopril 2.5 mg daily Lisinopril 20 mg twice daily   Metoprolol succinate 25 mg daily Carvedilol 25 mg twice daily   Spironolactone 12.5 mg daily  Spironolactone 25 mg once daily   Hydralazine NA Hydralazine 100 mg three times daily   Isosorbide dinitrate NA Isosorbide dinitrate 40 mg three times daily   SGLT2 inhibitor:Dapagliflozin/Empagliflozin - not started Dapagliflozin or Empagliflozin 10 mg daily     2. Afib  Assessment / Plan  Rate controlled; continue apixaban and metoprolol    3. CKD  Assessment / Plan  Diuresis as above; continue to monitor UOP and renal function closely     5. HTN  Assessment / Plan  Titrate oral afterload reduction as tolerated    Clinically Significant Risk Factors Present on Admission              # Hypoalbuminemia: Lowest albumin = 3.4 g/dL at 7/15/2024 10:23 AM, will monitor as appropriate  # Drug Induced Coagulation Defect: home medication list includes an anticoagulant medication    # Hypertension: Noted on problem list  # Acute heart failure with reduced ejection fraction: last echo with EF <40% and receiving IV diuretics   # Anemia: based on hgb <11           # Overweight: Estimated body mass index is 29.88 kg/m  as calculated from the following:    Height as of 7/2/24: 1.524 m (5').    Weight as of this encounter: 69.4 kg (153 lb).       # Financial/Environmental Concerns:     # Pacemaker present      Cardiac Arrhythmia: Atrial  fibrillation: Longstanding  Cardiomyopathy  Systolic  Systolic acute    Fluid overload, unspecified, Other fluid overload, and Other disorders of electrolyte and fluid balance, not elsewhere classified    CKD POA List: Stage 3 (unspecified if a or b) (GFR 30 - 59)    85 minutes spent reviewing prior records (including documentation, laboratory studies, cardiac testing/imaging), history and physical exam, planning, and subsequent documentation.      History of Present Illness/Subjective    Ms. Gladys Ramirez is a 89 year old female with a PMHx significant for (per Epic notation) hypertension, hyperlipidemia, CHF, stage 3 chronic kidney disease, non-ischemic cardiomyopathy, GERD, and A-fib who presents to this ED by EMS for evaluation of shortness of breath.     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 atrial fibrillation, rate controlled.    ECHO (personnaly Reviewed on 7/15/24):   The left ventricle is normal in size. There is mild concentric left  ventricular hypertrophy.  Left ventricular function is decreased. The ejection fraction is 20-25%  (severely reduced). There is diffuse hypokinesis of the left ventricle. Septal  motion is consistent with conduction abnormality.     The right ventricle is normal in size and function.  The left atrium is moderate to severely dilated. Borderline right atrial  enlargement.  There is mild (1+) mitral regurgitation.  There is mild (1+) tricuspid regurgitation.  Right ventricle systolic pressure estimate normal  IVC diameter and respiratory changes fall into an intermediate range  suggesting an RA pressure of 8 mmHg.  Compared to prior study, there is no significant change.    Telemetry: personally reviewed July 15, 2024; notable for afib     Lab results: personally reviewed July 15, 2024; notable for elevated NTproBNP    Medical  history and pertinent documents reviewed in Care Everywhere please where applicable see details above          Physical Examination Review of Systems   /60   Pulse 90   Temp 97.7  F (36.5  C) (Oral)   Resp 27   Wt 69.4 kg (153 lb)   SpO2 94%   BMI 29.88 kg/m    Body mass index is 29.88 kg/m .  Wt Readings from Last 3 Encounters:   07/15/24 69.4 kg (153 lb)   07/07/24 69.7 kg (153 lb 10.6 oz)   06/19/24 69.4 kg (153 lb)     General Appearance:   no distress, normal body habitus   ENT/Mouth: membranes moist, no oral lesions or bleeding gums.      EYES:  no scleral icterus, normal conjunctivae   Neck: no carotid bruits or thyromegaly   Chest/Lungs:   lungs are clear to auscultation, no rales or wheezing, equal chest wall expansion    Cardiovascular:   Irregular. Normal first and second heart sounds with no murmurs, rubs, or gallops; the carotid, radial and posterior tibial pulses are intact, + JVD and LE edema bilaterally    Abdomen:  no tenderness; distended   Extremities: no cyanosis or clubbing   Skin: no xanthelasma, warm.    Neurologic: NAD     Psychiatric: alert and calm     A complete 10 systems ROS was reviewed  And is negative except what is listed in the HPI.          Medical History  Surgical History Family History Social History   Past Medical History:   Diagnosis Date    Angina pectoris (H24)     Atrial fibrillation (H)     Chest pain 03/09/2017    Chronic kidney disease     Congestive heart failure (H)     Cough     Hyperlipidemia     Hypertension     Osteoarthritis     Past Surgical History:   Procedure Laterality Date    APPENDECTOMY      ARTHROSCOPY KNEE Right 5/23/2024    Procedure: ARTHROSCOPY, KNEE;  Surgeon: Sanchez Monahan MD;  [place] [facility] [place]    BASAL CELL CARCINOMA EXCISION      nose    CARDIOVERSION  06/19/2024    EP PACEMAKER DEVICE & IMPLANT- HIS LEAD DUAL N/A 4/8/2024    Procedure: Pacemaker Device & Lead Implant - HIS Lead Dual;  Surgeon: Jeannie Rivera MD;   Location: [facility] CATH LAB CV    INCISION AND DRAINAGE KNEE, COMBINED Right 5/23/2024    Procedure: INCISION AND DRAINAGE, KNEE;  Surgeon: Sanchez Monahan MD;  Location: [facility] OR    IRRIGATION AND DEBRIDEMENT KNEE, COMBINED Right 7/2/2024    Procedure: RIGHT KNEE OPEN IRRIGATION AND DEBRIDEMENT;  Surgeon: Rakan Syed MD;  Location: [facility] OR    LAPAROSCOPY DIAGNOSTIC (GENERAL) N/A 11/04/2014    Procedure: LAPAROSCOPY BILATERAL SALPINGO-OOPHORECTOMY ;  Surgeon: Sofia Harper MD;  Location: [facility] OR;  Service:     OPEN REDUCTION INTERNAL FIXATION HIP BIPOLAR Right 3/5/2023    Procedure: HEMIARTHROPLASTY, HIP, BIPOLAR;  Surgeon: Jose G Martinez MD;  Location: [facility] [place]    PICC SINGLE LUMEN PLACEMENT  05/24/2024    TONSILLECTOMY      10 years old    ZZC TOTAL KNEE ARTHROPLASTY Left     2011    no family history of premature coronary artery disease Social History     Socioeconomic History    Marital status: Single     Spouse name: Not on file    Number of children: Not on file    Years of education: Not on file    Highest education level: Not on file   Occupational History    Not on file   Tobacco Use    Smoking status: Never     Passive exposure: Never    Smokeless tobacco: Never    Tobacco comments:     no passive exposure   Vaping Use    Vaping status: Never Used   Substance and Sexual Activity    Alcohol use: Yes     Comment: Alcoholic Drinks/day: Rarely a glass of wine    Drug use: No    Sexual activity: Not on file   Other Topics Concern    Not on file   Social History Narrative    The patient is a nun.     Social Determinants of Health     Financial Resource Strain: Low Risk  (4/5/2024)    Financial Resource Strain     Within the past 12 months, have you or your family members you live with been unable to get utilities (heat, electricity) when it was really needed?: No   Food Insecurity: Low Risk  (4/5/2024)    Food Insecurity     Within the past 12 months,  did you worry that your food would run out before you got money to buy more?: No     Within the past 12 months, did the food you bought just not last and you didn t have money to get more?: No   Transportation Needs: Low Risk  (4/5/2024)    Transportation Needs     Within the past 12 months, has lack of transportation kept you from medical appointments, getting your medicines, non-medical meetings or appointments, work, or from getting things that you need?: No   Physical Activity: Insufficiently Active (3/27/2023)    Exercise Vital Sign     Days of Exercise per Week: 3 days     Minutes of Exercise per Session: 10 min   Stress: No Stress Concern Present (4/5/2024)    [place] [employer] - Occupational Stress Questionnaire     Feeling of Stress : Not at all   Social Connections: Moderately Integrated (3/27/2023)    Social Connection and Isolation Panel [NHANES]     Frequency of Communication with Friends and Family: More than three times a week     Frequency of Social Gatherings with Friends and Family: More than three times a week     Attends Oriental orthodox Services: More than 4 times per year     Active Member of Clubs or Organizations: Yes     Attends Club or Organization Meetings: More than 4 times per year     Marital Status: Never    Interpersonal Safety: Low Risk  (4/5/2024)    Interpersonal Safety     Do you feel physically and emotionally safe where you currently live?: Yes     Within the past 12 months, have you been hit, slapped, kicked or otherwise physically hurt by someone?: No     Within the past 12 months, have you been humiliated or emotionally abused in other ways by your partner or ex-partner?: No   Housing Stability: Low Risk  (4/5/2024)    Housing Stability     Do you have housing? : Yes     Are you worried about losing your housing?: No           Lab Results    Chemistry/lipid CBC Cardiac Enzymes/BNP/TSH/INR   Lab Results   Component Value Date    CHOL 179 03/12/2015     "HDL 64 03/12/2015    TRIG 176 (H) 03/12/2015    BUN 26.4 (H) 07/15/2024     07/15/2024    CO2 25 07/15/2024    Lab Results   Component Value Date    WBC 9.8 07/15/2024    HGB 10.1 (L) 07/15/2024    HCT 33.5 (L) 07/15/2024    MCV 90 07/15/2024     (H) 07/15/2024    Lab Results   Component Value Date    TROPONINI 0.16 08/23/2022    BNP 1,933 (H) 08/23/2022    TSH 6.25 (H) 12/29/2023    INR 1.33 (H) 03/04/2023     Lab Results   Component Value Date    TROPONINI 0.16 08/23/2022          Weight:    Wt Readings from Last 3 Encounters:   07/15/24 69.4 kg (153 lb)   07/07/24 69.7 kg (153 lb 10.6 oz)   06/19/24 69.4 kg (153 lb)       Allergies  Allergies   Allergen Reactions    Trazodone Shortness Of Breath and Unknown     Allergic to trazodone and deriv., Other: trouble swallowing      Clindamycin Diarrhea     C-diff    Gabapentin Other (See Comments)     \"Internal tremors\"    Temazepam Other (See Comments)     Annotation: Nightmares           Surgical History  Past Surgical History:   Procedure Laterality Date    APPENDECTOMY      ARTHROSCOPY KNEE Right 5/23/2024    Procedure: ARTHROSCOPY, KNEE;  Surgeon: Sanchez Monahan MD;  Location: [facility] [place]    BASAL CELL CARCINOMA EXCISION      nose    CARDIOVERSION  06/19/2024    EP PACEMAKER DEVICE & IMPLANT- HIS LEAD DUAL N/A 4/8/2024    Procedure: Pacemaker Device & Lead Implant - HIS Lead Dual;  Surgeon: Jeannie Rivera MD;  Location: [facility] CATH LAB CV    INCISION AND DRAINAGE KNEE, COMBINED Right 5/23/2024    Procedure: INCISION AND DRAINAGE, KNEE;  Surgeon: Sanchez Monahan MD;  Location: [facility] [place] OR    IRRIGATION AND DEBRIDEMENT KNEE, COMBINED Right 7/2/2024    Procedure: RIGHT KNEE OPEN IRRIGATION AND DEBRIDEMENT;  Surgeon: Rakan Syed MD;  Location: [facility] [place]    LAPAROSCOPY DIAGNOSTIC (GENERAL) N/A 11/04/2014    Procedure: LAPAROSCOPY BILATERAL SALPINGO-OOPHORECTOMY ;  Surgeon: Sofia Harper MD;  Location: [facility];  Service:     OPEN REDUCTION INTERNAL FIXATION HIP BIPOLAR Right 3/5/2023    Procedure: HEMIARTHROPLASTY, HIP, BIPOLAR;  Surgeon: Jose G Martinez MD;  Location: [facility] [place]    PICC SINGLE LUMEN PLACEMENT  05/24/2024    TONSILLECTOMY      10 years old    ZZC TOTAL KNEE ARTHROPLASTY Left     2011       Social History  Tobacco:   History   Smoking Status    Never   Smokeless Tobacco    Never    Alcohol:   Social History    Substance and Sexual Activity      Alcohol use: Yes        Comment: Alcoholic Drinks/day: Rarely a glass of wine   Illicit Drugs:   History   Drug Use No       Family History  Family History   Problem Relation Age of Onset    Heart Disease Mother     Rheumatic Heart Disease Mother     No Known Problems Father     Cancer Brother         brain    Lung Cancer Brother     Lung Cancer Brother     Cancer Sister         lung    Lung Cancer Sister           Thad Corral MD on 7/15/2024      cc: Margret Flores,

## 2024-07-15 NOTE — ED TRIAGE NOTES
The pt arrives via EMS from a TCU with increased SOB. She had knee surgery in May that resulted in an infection and several more surgeries to follow. Is currently getting antibiotics via PICC line. She reports SOB for the last two weeks but became much worse last night. O2 saturations on RA were 86%, increased to 95 on 2 lpm via NC.      Triage Assessment (Adult)       Row Name 07/15/24 1006          Triage Assessment    Airway WDL WDL        Cognitive/Neuro/Behavioral WDL    Cognitive/Neuro/Behavioral WDL WDL

## 2024-07-15 NOTE — PROGRESS NOTES
Occupational Therapy        07/15/24 1430   Appointment Info   Signing Clinician's Name / Credentials (OT) Mounika Dumont OTR/L   Living Environment   People in Home alone   Current Living Arrangements apartment;independent living facility   Home Accessibility no concerns   Self-Care   Equipment Currently Used at Home walker, rolling;wheelchair, manual   Activity/Exercise/Self-Care Comment Patient had recent hospital stay (discharged last week to TCU) previously was independent with ADLs   General Information   Onset of Illness/Injury or Date of Surgery 07/15/24   Referring Physician Heena Land NP   Patient/Family Therapy Goal Statement (OT) none stated   Additional Occupational Profile Info/Pertinent History of Current Problem Gladys Ramirez is a 89 year old female with a pertinent history of hypertension, hyperlipidemia, CHF, stage 3 chronic kidney disease, non-ischemic cardiomyopathy, GERD, and A-fib who presents with shortness of breath x 2 weeks worsening over the last few days.   Existing Precautions/Restrictions fall   Cognitive Status Examination   Orientation Status orientation to person, place and time   Range of Motion Comprehensive   General Range of Motion no range of motion deficits identified   Strength Comprehensive (MMT)   General Manual Muscle Testing (MMT) Assessment no strength deficits identified   Bed Mobility   Bed Mobility supine-sit;sit-supine   Supine-Sit [place] (Bed Mobility) minimum assist (75% patient effort)   Sit-Supine [place] (Bed Mobility) minimum assist (75% patient effort);2 person assist   Transfers   Transfers sit-stand transfer   Sit-Stand Transfer   Sit-Stand [place] (Transfers) contact guard   Activities of Daily Living   BADL Assessment/Intervention lower body dressing   Lower Body Dressing Assessment/Training   [place] Level (Lower Body Dressing) maximum assist (25% patient effort)   Clinical Impression   Criteria for Skilled Therapeutic  Interventions Met (OT) Yes, treatment indicated   OT Diagnosis Decreased ADL independence   OT Problem List-Impairments impacting ADL problems related to;activity tolerance impaired   Assessment of Occupational Performance 1-3 Performance Deficits   Identified Performance Deficits Toileting, functional mobility, dressing   Planned Therapy Interventions (OT) ADL retraining;bed mobility training;transfer training   Clinical Decision Making Complexity (OT) problem focused assessment/low complexity   Risk & Benefits of therapy have been explained evaluation/treatment results reviewed;care plan/treatment goals reviewed   OT Total Evaluation Time   OT Eval, Low Complexity Minutes (18199) 10   OT Goals   Therapy Frequency (OT) 5 times/week   OT Predicted Duration/Target Date for Goal Attainment 07/22/24   OT Goals Toilet Transfer/Toileting;Lower Body Dressing;Bed Mobility   OT: Lower Body Dressing Supervision/stand-by assist;using adaptive equipment   OT: Bed Mobility Supervision/stand-by assist   OT: Toilet Transfer/Toileting Supervision/stand-by assist   Self-Care/Home Management   Self-Care/Home Mgmt/ADL, Compensatory, Meal Prep Minutes (77013) 10   Treatment Detail/Skilled Intervention Eval completed, treatment started. Repeat STS from bed with CGA, cues for hand placement. Static standing for g/h with CGA, using counter for support. Patient unable to ambulate to BR (started feeling dizzy/SOB). Placed on bedpan with NA.   OT Discharge Planning   OT Plan Standing @ sink, LE dress   OT Discharge Recommendation (DC Rec) Transitional Care Facility   OT Rationale for DC Rec Patient continues to need assistance with ADLs, recommend return to TCU to improve ADL independence   OT Brief overview of current status Min A for bed mobility, limtied by SOB   Total Session Time   Timed Code Treatment Minutes 10   Total Session Time (sum of timed and untimed services) 20

## 2024-07-16 ENCOUNTER — APPOINTMENT (OUTPATIENT)
Dept: ULTRASOUND IMAGING | Facility: HOSPITAL | Age: 89
DRG: 291 | End: 2024-07-16
Attending: HOSPITALIST
Payer: COMMERCIAL

## 2024-07-16 ENCOUNTER — APPOINTMENT (OUTPATIENT)
Dept: PHYSICAL THERAPY | Facility: HOSPITAL | Age: 89
DRG: 291 | End: 2024-07-16
Attending: INTERNAL MEDICINE
Payer: COMMERCIAL

## 2024-07-16 ENCOUNTER — TELEPHONE (OUTPATIENT)
Dept: CARDIOLOGY | Facility: CLINIC | Age: 89
End: 2024-07-16
Payer: COMMERCIAL

## 2024-07-16 DIAGNOSIS — I50.9 ACUTE DECOMPENSATED HEART FAILURE (H): Primary | ICD-10-CM

## 2024-07-16 LAB
ANION GAP SERPL CALCULATED.3IONS-SCNC: 13 MMOL/L (ref 7–15)
BACTERIA SNV CULT: NORMAL
BACTERIA SPEC CULT: NORMAL
BUN SERPL-MCNC: 25.1 MG/DL (ref 8–23)
CALCIUM SERPL-MCNC: 9.2 MG/DL (ref 8.2–9.6)
CHLORIDE SERPL-SCNC: 103 MMOL/L (ref 98–107)
CREAT SERPL-MCNC: 0.87 MG/DL (ref 0.51–0.95)
EGFRCR SERPLBLD CKD-EPI 2021: 62 ML/MIN/1.73M2
ERYTHROCYTE [DISTWIDTH] IN BLOOD BY AUTOMATED COUNT: 16.6 % (ref 10–15)
GLUCOSE SERPL-MCNC: 131 MG/DL (ref 70–99)
HCO3 SERPL-SCNC: 29 MMOL/L (ref 22–29)
HCT VFR BLD AUTO: 32.3 % (ref 35–47)
HGB BLD-MCNC: 9.8 G/DL (ref 11.7–15.7)
MCH RBC QN AUTO: 27.2 PG (ref 26.5–33)
MCHC RBC AUTO-ENTMCNC: 30.3 G/DL (ref 31.5–36.5)
MCV RBC AUTO: 90 FL (ref 78–100)
MDC_IDC_EPISODE_DTM: NORMAL
MDC_IDC_EPISODE_ID: 3
MDC_IDC_EPISODE_TYPE: NORMAL
MDC_IDC_LEAD_CONNECTION_STATUS: NORMAL
MDC_IDC_LEAD_CONNECTION_STATUS: NORMAL
MDC_IDC_LEAD_IMPLANT_DT: NORMAL
MDC_IDC_LEAD_IMPLANT_DT: NORMAL
MDC_IDC_LEAD_LOCATION: NORMAL
MDC_IDC_LEAD_LOCATION: NORMAL
MDC_IDC_LEAD_LOCATION_DETAIL_1: NORMAL
MDC_IDC_LEAD_LOCATION_DETAIL_1: NORMAL
MDC_IDC_LEAD_MFG: NORMAL
MDC_IDC_LEAD_MFG: NORMAL
MDC_IDC_LEAD_MODEL: NORMAL
MDC_IDC_LEAD_MODEL: NORMAL
MDC_IDC_LEAD_POLARITY_TYPE: NORMAL
MDC_IDC_LEAD_POLARITY_TYPE: NORMAL
MDC_IDC_LEAD_SERIAL: NORMAL
MDC_IDC_LEAD_SERIAL: NORMAL
MDC_IDC_LEAD_SPECIAL_FUNCTION: NORMAL
MDC_IDC_LEAD_SPECIAL_FUNCTION: NORMAL
MDC_IDC_MSMT_BATTERY_DTM: NORMAL
MDC_IDC_MSMT_BATTERY_REMAINING_LONGEVITY: 167 MO
MDC_IDC_MSMT_BATTERY_RRT_TRIGGER: 2.62
MDC_IDC_MSMT_BATTERY_STATUS: NORMAL
MDC_IDC_MSMT_BATTERY_VOLTAGE: 3.19 V
MDC_IDC_MSMT_LEADCHNL_RA_IMPEDANCE_VALUE: 304 OHM
MDC_IDC_MSMT_LEADCHNL_RA_IMPEDANCE_VALUE: 380 OHM
MDC_IDC_MSMT_LEADCHNL_RA_PACING_THRESHOLD_AMPLITUDE: 0.62 V
MDC_IDC_MSMT_LEADCHNL_RA_PACING_THRESHOLD_PULSEWIDTH: 0.4 MS
MDC_IDC_MSMT_LEADCHNL_RA_SENSING_INTR_AMPL: 0.38 MV
MDC_IDC_MSMT_LEADCHNL_RA_SENSING_INTR_AMPL: 0.38 MV
MDC_IDC_MSMT_LEADCHNL_RV_IMPEDANCE_VALUE: 456 OHM
MDC_IDC_MSMT_LEADCHNL_RV_IMPEDANCE_VALUE: 570 OHM
MDC_IDC_MSMT_LEADCHNL_RV_PACING_THRESHOLD_AMPLITUDE: 0.62 V
MDC_IDC_MSMT_LEADCHNL_RV_PACING_THRESHOLD_PULSEWIDTH: 0.4 MS
MDC_IDC_MSMT_LEADCHNL_RV_SENSING_INTR_AMPL: 9.75 MV
MDC_IDC_MSMT_LEADCHNL_RV_SENSING_INTR_AMPL: 9.75 MV
MDC_IDC_PG_IMPLANT_DTM: NORMAL
MDC_IDC_PG_MFG: NORMAL
MDC_IDC_PG_MODEL: NORMAL
MDC_IDC_PG_SERIAL: NORMAL
MDC_IDC_PG_TYPE: NORMAL
MDC_IDC_SESS_CLINIC_NAME: NORMAL
MDC_IDC_SESS_DTM: NORMAL
MDC_IDC_SESS_TYPE: NORMAL
MDC_IDC_SET_BRADY_AT_MODE_SWITCH_RATE: 171 {BEATS}/MIN
MDC_IDC_SET_BRADY_HYSTRATE: NORMAL
MDC_IDC_SET_BRADY_LOWRATE: 60 {BEATS}/MIN
MDC_IDC_SET_BRADY_MAX_SENSOR_RATE: 110 {BEATS}/MIN
MDC_IDC_SET_BRADY_MAX_TRACKING_RATE: 110 {BEATS}/MIN
MDC_IDC_SET_BRADY_MODE: NORMAL
MDC_IDC_SET_BRADY_PAV_DELAY_LOW: 120 MS
MDC_IDC_SET_BRADY_SAV_DELAY_LOW: 100 MS
MDC_IDC_SET_LEADCHNL_RA_PACING_AMPLITUDE: 2 V
MDC_IDC_SET_LEADCHNL_RA_PACING_ANODE_ELECTRODE_1: NORMAL
MDC_IDC_SET_LEADCHNL_RA_PACING_ANODE_LOCATION_1: NORMAL
MDC_IDC_SET_LEADCHNL_RA_PACING_CAPTURE_MODE: NORMAL
MDC_IDC_SET_LEADCHNL_RA_PACING_CATHODE_ELECTRODE_1: NORMAL
MDC_IDC_SET_LEADCHNL_RA_PACING_CATHODE_LOCATION_1: NORMAL
MDC_IDC_SET_LEADCHNL_RA_PACING_POLARITY: NORMAL
MDC_IDC_SET_LEADCHNL_RA_PACING_PULSEWIDTH: 0.4 MS
MDC_IDC_SET_LEADCHNL_RA_SENSING_ANODE_ELECTRODE_1: NORMAL
MDC_IDC_SET_LEADCHNL_RA_SENSING_ANODE_LOCATION_1: NORMAL
MDC_IDC_SET_LEADCHNL_RA_SENSING_CATHODE_ELECTRODE_1: NORMAL
MDC_IDC_SET_LEADCHNL_RA_SENSING_CATHODE_LOCATION_1: NORMAL
MDC_IDC_SET_LEADCHNL_RA_SENSING_POLARITY: NORMAL
MDC_IDC_SET_LEADCHNL_RA_SENSING_SENSITIVITY: 0.15 MV
MDC_IDC_SET_LEADCHNL_RV_PACING_AMPLITUDE: 1.5 V
MDC_IDC_SET_LEADCHNL_RV_PACING_ANODE_ELECTRODE_1: NORMAL
MDC_IDC_SET_LEADCHNL_RV_PACING_ANODE_LOCATION_1: NORMAL
MDC_IDC_SET_LEADCHNL_RV_PACING_CAPTURE_MODE: NORMAL
MDC_IDC_SET_LEADCHNL_RV_PACING_CATHODE_ELECTRODE_1: NORMAL
MDC_IDC_SET_LEADCHNL_RV_PACING_CATHODE_LOCATION_1: NORMAL
MDC_IDC_SET_LEADCHNL_RV_PACING_POLARITY: NORMAL
MDC_IDC_SET_LEADCHNL_RV_PACING_PULSEWIDTH: 0.4 MS
MDC_IDC_SET_LEADCHNL_RV_SENSING_ANODE_ELECTRODE_1: NORMAL
MDC_IDC_SET_LEADCHNL_RV_SENSING_ANODE_LOCATION_1: NORMAL
MDC_IDC_SET_LEADCHNL_RV_SENSING_CATHODE_ELECTRODE_1: NORMAL
MDC_IDC_SET_LEADCHNL_RV_SENSING_CATHODE_LOCATION_1: NORMAL
MDC_IDC_SET_LEADCHNL_RV_SENSING_POLARITY: NORMAL
MDC_IDC_SET_LEADCHNL_RV_SENSING_SENSITIVITY: 0.9 MV
MDC_IDC_SET_ZONE_DETECTION_INTERVAL: 350 MS
MDC_IDC_SET_ZONE_DETECTION_INTERVAL: 400 MS
MDC_IDC_SET_ZONE_STATUS: NORMAL
MDC_IDC_SET_ZONE_TYPE: NORMAL
MDC_IDC_SET_ZONE_VENDOR_TYPE: NORMAL
MDC_IDC_STAT_AT_BURDEN_PERCENT: 85.9 %
MDC_IDC_STAT_AT_DTM_END: NORMAL
MDC_IDC_STAT_AT_DTM_START: NORMAL
MDC_IDC_STAT_BRADY_AP_VP_PERCENT: 4.12 %
MDC_IDC_STAT_BRADY_AP_VS_PERCENT: 0.32 %
MDC_IDC_STAT_BRADY_AS_VP_PERCENT: 88.72 %
MDC_IDC_STAT_BRADY_AS_VS_PERCENT: 7.11 %
MDC_IDC_STAT_BRADY_DTM_END: NORMAL
MDC_IDC_STAT_BRADY_DTM_START: NORMAL
MDC_IDC_STAT_BRADY_RA_PERCENT_PACED: 0.69 %
MDC_IDC_STAT_BRADY_RV_PERCENT_PACED: 19.41 %
MDC_IDC_STAT_EPISODE_RECENT_COUNT: 0
MDC_IDC_STAT_EPISODE_RECENT_COUNT: 1
MDC_IDC_STAT_EPISODE_RECENT_COUNT_DTM_END: NORMAL
MDC_IDC_STAT_EPISODE_RECENT_COUNT_DTM_START: NORMAL
MDC_IDC_STAT_EPISODE_TOTAL_COUNT: 0
MDC_IDC_STAT_EPISODE_TOTAL_COUNT: 3
MDC_IDC_STAT_EPISODE_TOTAL_COUNT_DTM_END: NORMAL
MDC_IDC_STAT_EPISODE_TOTAL_COUNT_DTM_START: NORMAL
MDC_IDC_STAT_EPISODE_TYPE: NORMAL
MDC_IDC_STAT_TACHYTHERAPY_RECENT_DTM_END: NORMAL
MDC_IDC_STAT_TACHYTHERAPY_RECENT_DTM_START: NORMAL
MDC_IDC_STAT_TACHYTHERAPY_TOTAL_DTM_END: NORMAL
MDC_IDC_STAT_TACHYTHERAPY_TOTAL_DTM_START: NORMAL
PLATELET # BLD AUTO: 463 10E3/UL (ref 150–450)
POTASSIUM SERPL-SCNC: 3.6 MMOL/L (ref 3.4–5.3)
RBC # BLD AUTO: 3.6 10E6/UL (ref 3.8–5.2)
SODIUM SERPL-SCNC: 145 MMOL/L (ref 135–145)
WBC # BLD AUTO: 9.3 10E3/UL (ref 4–11)

## 2024-07-16 PROCEDURE — 250N000013 HC RX MED GY IP 250 OP 250 PS 637

## 2024-07-16 PROCEDURE — 85027 COMPLETE CBC AUTOMATED: CPT

## 2024-07-16 PROCEDURE — 99233 SBSQ HOSP IP/OBS HIGH 50: CPT | Performed by: INTERNAL MEDICINE

## 2024-07-16 PROCEDURE — 97162 PT EVAL MOD COMPLEX 30 MIN: CPT | Mod: GP | Performed by: PHYSICAL THERAPIST

## 2024-07-16 PROCEDURE — 250N000013 HC RX MED GY IP 250 OP 250 PS 637: Performed by: INTERNAL MEDICINE

## 2024-07-16 PROCEDURE — 80048 BASIC METABOLIC PNL TOTAL CA: CPT

## 2024-07-16 PROCEDURE — 120N000004 HC R&B MS OVERFLOW

## 2024-07-16 PROCEDURE — 250N000011 HC RX IP 250 OP 636: Mod: JZ

## 2024-07-16 PROCEDURE — 99233 SBSQ HOSP IP/OBS HIGH 50: CPT | Performed by: HOSPITALIST

## 2024-07-16 PROCEDURE — 76882 US LMTD JT/FCL EVL NVASC XTR: CPT | Mod: RT

## 2024-07-16 PROCEDURE — 97116 GAIT TRAINING THERAPY: CPT | Mod: GP | Performed by: PHYSICAL THERAPIST

## 2024-07-16 PROCEDURE — 36415 COLL VENOUS BLD VENIPUNCTURE: CPT

## 2024-07-16 RX ORDER — FUROSEMIDE 40 MG
40 TABLET ORAL
Status: DISCONTINUED | OUTPATIENT
Start: 2024-07-16 | End: 2024-07-17 | Stop reason: HOSPADM

## 2024-07-16 RX ADMIN — FUROSEMIDE 40 MG: 10 INJECTION, SOLUTION INTRAMUSCULAR; INTRAVENOUS at 08:37

## 2024-07-16 RX ADMIN — LISINOPRIL 2.5 MG: 2.5 TABLET ORAL at 08:31

## 2024-07-16 RX ADMIN — CEFAZOLIN SODIUM 2 G: 2 INJECTION, SOLUTION INTRAVENOUS at 13:15

## 2024-07-16 RX ADMIN — METOPROLOL SUCCINATE 25 MG: 25 TABLET, EXTENDED RELEASE ORAL at 08:36

## 2024-07-16 RX ADMIN — SPIRONOLACTONE 12.5 MG: 25 TABLET, FILM COATED ORAL at 08:31

## 2024-07-16 RX ADMIN — AMIODARONE HYDROCHLORIDE 100 MG: 100 TABLET ORAL at 08:31

## 2024-07-16 RX ADMIN — CEFAZOLIN SODIUM 2 G: 2 INJECTION, SOLUTION INTRAVENOUS at 21:03

## 2024-07-16 RX ADMIN — OXYCODONE HYDROCHLORIDE 2.5 MG: 5 TABLET ORAL at 20:56

## 2024-07-16 RX ADMIN — APIXABAN 5 MG: 5 TABLET, FILM COATED ORAL at 08:36

## 2024-07-16 RX ADMIN — DULOXETINE HYDROCHLORIDE 60 MG: 60 CAPSULE, DELAYED RELEASE PELLETS ORAL at 08:32

## 2024-07-16 RX ADMIN — MICONAZOLE NITRATE ANTIFUNGAL POWDER: 2 POWDER TOPICAL at 08:42

## 2024-07-16 RX ADMIN — CEFAZOLIN SODIUM 2 G: 2 INJECTION, SOLUTION INTRAVENOUS at 06:19

## 2024-07-16 RX ADMIN — MICONAZOLE NITRATE ANTIFUNGAL POWDER: 2 POWDER TOPICAL at 01:34

## 2024-07-16 RX ADMIN — MICONAZOLE NITRATE ANTIFUNGAL POWDER: 2 POWDER TOPICAL at 20:56

## 2024-07-16 RX ADMIN — OXYCODONE HYDROCHLORIDE 2.5 MG: 5 TABLET ORAL at 06:56

## 2024-07-16 RX ADMIN — POLYETHYLENE GLYCOL 3350 17 G: 17 POWDER, FOR SOLUTION ORAL at 08:37

## 2024-07-16 RX ADMIN — APIXABAN 5 MG: 5 TABLET, FILM COATED ORAL at 20:56

## 2024-07-16 RX ADMIN — FUROSEMIDE 40 MG: 40 TABLET ORAL at 15:56

## 2024-07-16 ASSESSMENT — ACTIVITIES OF DAILY LIVING (ADL)
ADLS_ACUITY_SCORE: 48
ADLS_ACUITY_SCORE: 44
ADLS_ACUITY_SCORE: 48
ADLS_ACUITY_SCORE: 48
ADLS_ACUITY_SCORE: 50
ADLS_ACUITY_SCORE: 50
ADLS_ACUITY_SCORE: 48
ADLS_ACUITY_SCORE: 50
ADLS_ACUITY_SCORE: 44
ADLS_ACUITY_SCORE: 44
ADLS_ACUITY_SCORE: 48
ADLS_ACUITY_SCORE: 50
ADLS_ACUITY_SCORE: 44
ADLS_ACUITY_SCORE: 50
ADLS_ACUITY_SCORE: 48

## 2024-07-16 NOTE — PLAN OF CARE
Problem: Adult Inpatient Plan of Care  Goal: Absence of Hospital-Acquired Illness or Injury  Outcome: Progressing  Intervention: Identify and Manage Fall Risk  Recent Flowsheet Documentation  Taken 7/15/2024 2329 by Satya Angulo RN  Safety Promotion/Fall Prevention:   room door open   nonskid shoes/slippers when out of bed   clutter free environment maintained   assistive device/personal items within reach  Taken 7/15/2024 1950 by Satya Angulo RN  Safety Promotion/Fall Prevention:   room door open   nonskid shoes/slippers when out of bed   clutter free environment maintained   assistive device/personal items within reach  Intervention: Prevent and Manage VTE (Venous Thromboembolism) Risk  Recent Flowsheet Documentation  Taken 7/15/2024 2329 by Satya Angulo RN  VTE Prevention/Management: SCDs off (sequential compression devices)  Taken 7/15/2024 1950 by Satya Angulo RN  VTE Prevention/Management: SCDs off (sequential compression devices)  Intervention: Prevent Infection  Recent Flowsheet Documentation  Taken 7/15/2024 2329 by Satya Angulo RN  Infection Prevention:   equipment surfaces disinfected   hand hygiene promoted   personal protective equipment utilized   rest/sleep promoted   single patient room provided  Taken 7/15/2024 1950 by Satya Angulo RN  Infection Prevention:   equipment surfaces disinfected   hand hygiene promoted   personal protective equipment utilized   rest/sleep promoted   single patient room provided     Problem: Heart Failure  Goal: Effective Oxygenation and Ventilation  Outcome: Progressing  Intervention: Promote Airway Secretion Clearance  Recent Flowsheet Documentation  Taken 7/15/2024 2329 by Satya Angulo RN  Cough And Deep Breathing: done independently per patient  Taken 7/15/2024 1950 by Satya Angulo RN  Cough And Deep Breathing: done independently per patient   Goal Outcome Evaluation:  VSS on 2L NC, A&Ox4, denies pain. Tele afib. BLE edema +2. Red skin on groin and  under R breast, miconazole powder applied. R PICC WDL. Not OOB this shift, periwick in place. Will continue to monitor.

## 2024-07-16 NOTE — PLAN OF CARE
Problem: Heart Failure  Goal: Optimal Coping  Outcome: Progressing     Problem: Heart Failure  Goal: Optimal Cardiac Output  Outcome: Progressing     Problem: Heart Failure  Goal: Stable Heart Rate and Rhythm  Outcome: Progressing     Problem: Heart Failure  Goal: Fluid and Electrolyte Balance  Outcome: Progressing     Problem: Heart Failure  Goal: Improved Oral Intake  Outcome: Progressing     Problem: Heart Failure  Goal: Effective Oxygenation and Ventilation  Outcome: Progressing   Goal Outcome Evaluation:        Pt is AOX4 and denies pain. Pt states she has pain with ambulation in her knees. VSS. 1L NC, RA baseline. AO1 w/ walker/gait belt. Purewick in place. Pt is able to make needs known.

## 2024-07-16 NOTE — UTILIZATION REVIEW
Admission Status; Secondary Review Determination     Under the authority of the Utilization Management Committee, the utilization review process indicated a secondary review on the above patient. The review outcome is based on review of the medical records, discussions with staff, and applying clinical experience noted on the date of the review.     (x) Inpatient Status Appropriate - This patient's medical care is consistent with medical management for inpatient care and reasonable inpatient medical practice.     RATIONALE FOR DETERMINATION:     89-year-old female with hypertension, hyperlipidemia, CHF, stage 3 CKD, non-ischemic cardiomyopathy, GERD, and A-fib presented with worsening shortness of breath over two weeks, exacerbated by exertion. On admission, her oxygen saturation was 86% on room air, improving to 95% on 2L nasal cannula. BNP was significantly elevated at 29,737. She had a recent history of knee surgery with recurrent infections, managed with IV antibiotics via PICC line. She also underwent cardioversion and pacemaker placement in June. Chest CT indicated pulmonary edema, cardiomegaly, and moderate bilateral pleural effusions. Her last echocardiogram in May 2024 showed an EF of 20-25% with hypokinesis. Troponin was mildly elevated at 35. She began IV diuresis and continued IV Ancef for her right knee infection. Cardiology consultation and further imaging studies were pending.    Inpatient admission is necessary for this patient due to her acute on chronic heart failure, hypoxia in patient otherwise non-02 dependent, and markedly elevated BNP, indicating severe cardiac stress. The need for continuous oxygen therapy, IV diuresis, and close cardiac monitoring, including telemetry, cardiology consult, and repeat troponin assessments, underscores the risk of adverse outcomes if managed as an outpatient. The complexity of her comorbid conditions, including recent infections and surgical interventions,  further necessitates inpatient care to prevent potential decompensation and ensure timely multidisciplinary intervention.    At the time of admission with the information available to the attending physician more than 2 nights Hospital complex care was anticipated, based on patient risk of adverse outcome if treated as outpatient and complex care required. Inpatient admission is appropriate based on the Medicare guidelines.     This document was produced using voice recognition software     The information on this document is developed by the utilization review team in order for the business office to ensure compliance. This only denotes the appropriateness of proper admission status and does not reflect the quality of care rendered.     The definitions of Inpatient Status and Observation Status used in making the determination above are those provided in the CMS Coverage Manual, Chapter 1 and Chapter 6, section 70.4.     Sincerely,     Jose Roberto Romo MD, MPH  [facility]  Office # [phone]

## 2024-07-16 NOTE — PROGRESS NOTES
GILA     Early follow up for declining vision  DLS: 01/20/2020 Dr. Lopez     Patient states her vision has been blurry for a couple of weeks. Patient's    states pt's eyes are typically red in the am prior to taking   Cosopt.  states once he puts in the drops her eyes clear up  Denies pain   (-)Flashes (-)Floaters  (+)Photophobia  (+)Glare    Eye Med(s) - Latanoprost QHS - OU            Cosopt BID OU               Brimonidine BID OU        OCT -stable cicatrix  Drusen OD      A/P    1. Wet AMD OS  S/p Avastin OS x 15, Eylea x 14    Persistent SRF OS - improving  With RPE tear OS  Central fibrosis with atrophy - poor central potential  10/17 - increased SRH - will keep at 8 weeks for now  9/18 - stable cicatrix - try observation  12/18 - no activity      2. Dry AMD OD  AREDS/AG    3. PCIOL OU  CTR OS - but saw 20/30 with correction, in spite of sub RPE fibrosis      4. Glc Suspect OU   Good IOP control on Timolol    5. Floaters OU        12  Months OCT   [facility]    Medicine Progress Note - Hospitalist Service    Date of Admission:  7/15/2024    Assessment & Plan   89 female with hypertension, hyperlipidemia, systolic heart failure, permanent atrial fibrillation, recently dealing with bouts of right knee septic arthritis requiring washouts and long-term antibiotics, admitted from TCU for acute exacerbated heart failure.    #Acute exacerbated systolic heart failure  -Etiology favored to be inadequate diuresis  -Workup revealing of chest congestion, elevated BNP, hypoxia  -Continue with IV diuresis Lasix 40 mg twice daily, improving on this  -Cardiology consult  -Defer further echo since recently had one, shows 20 to 25% ejection fraction, global hypokinesis  -Monitor on telemetry  -Device check pending, appears irregularly irregular with wide-complex on monitor  -Strict I's and O's  -Continue GDMT with Toprol succinate 25 mg, lisinopril 2.5 mg spironolactone 12.5 mg  -Defer to cardiology whether ICD and/or CRT worthwhile    #Acute plastic respiratory failure  -Suspect from systolic heart failure as above  -Has maintained normal saturations on 2 L  -Monitor with improvement in heart failure    #Right knee septic arthritis  -Recently admitted early July, status post I&D and discharged on IV Ancef via PICC  -Inflammatory markers elevated, but downtrending on admission  -Supportive measures, PT and OT, pain control  -Continue with IV Ancef  -Repeat [facility] ultrasound ordered 7/16 to evaluate for any fluid collections    #Paroxysmal atrial fibrillation  -Longstanding issue, manages with metoprolol, amiodarone, Eliquis, all resumed  -Dual-chamber pacemaker placed in April 2024 for slow ventricular response  -Notes reviewed, patient normally with normal rates, at that time 30 to 40% burden of atrial fibrillation when device reviewed in May 2024  -Admission EKG with rate controlled atrial fibrillation with aberrancy  -Defer to cardiology whether  antiarrhythmic adjustment is needed    #Essential hypertension  -BP high normal, resume usual lisinopril, spironolactone, as needed hydralazine    #Chronic anemia  -Monitor clinically for losses            Diet: Combination Diet 2 gm NA Diet; No Caffeine Diet (and additional linked orders)  Fluid restriction 2000 ML FLUID (and additional linked orders)  Fluid restriction 1800 ML FLUID    DVT Prophylaxis: DOAC  Reyes Catheter: Not present  Lines: PRESENT      PICC 05/24/24 Single Lumen Right Brachial vein medial Antibiotic(s)-Site Assessment: WDL      Cardiac Monitoring: ACTIVE order. Indication: Acute decompensated heart failure (48 hours)  Code Status: No CPR- Do NOT Intubate      Clinically Significant Risk Factors Present on Admission              # Hypoalbuminemia: Lowest albumin = 3.4 g/dL at 7/15/2024 10:23 AM, will monitor as appropriate  # Drug Induced Coagulation Defect: home medication list includes an anticoagulant medication    # Hypertension: Noted on problem list  # Acute heart failure with reduced ejection fraction: last echo with EF <40% and receiving IV diuretics   # Anemia: based on hgb <11           # Overweight: Estimated body mass index is 28.9 kg/m  as calculated from the following:    Height as of this encounter: 1.524 m (5').    Weight as of this encounter: 67.1 kg (148 lb).       # Financial/Environmental Concerns: none   # Pacemaker present       Disposition Plan     Medically Ready for Discharge: Anticipated Tomorrow             Ellis Olson MD  Hospitalist Service  [facility]  Securely message with [employer] (more info)  Text page via [employer] Paging/Directory   ______________________________________________________________________    Interval History   No overnight events, patient feels improved, breathing better, knee pain persist but is similar to previous weeks.  No fevers.    Physical Exam   Vital Signs: Temp: 97.4  F (36.3  C) Temp src: Oral BP: (!) 141/98  Pulse: 87   Resp: 23 SpO2: 99 % O2 Device: None (Room air) Oxygen Delivery: 2 LPM  Weight: 148 lbs 0 oz    Alert, no acute distress, membranes moist, heart rate irregular, borderline tachycardic, lungs with fleeting basilar crackles, abdomen soft, trace bilateral leg edema, right knee warm with benign incisions, no drainage, normal affect and mood    Medical Decision Making       56 MINUTES SPENT BY ME on the date of service doing chart review, history, exam, documentation & further activities per the note.  NOTE(S)/MEDICAL RECORDS REVIEWED over the past 24 hours: Vitals, labs, new imaging, documentation and medication administrations       Data     I have personally reviewed the following data over the past 24 hrs:    9.3  \   9.8 (L)   / 463 (H)     145 103 25.1 (H) /  131 (H)   3.6 29 0.87 \     ALT: N/A AST: N/A AP: N/A TBILI: N/A   ALB: N/A TOT PROTEIN: N/A LIPASE: N/A     Trop: 33 (H) BNP: N/A     Procal: 0.13 CRP: 26.80 (H) Lactic Acid: N/A         Imaging results reviewed over the past 24 hrs:   Recent Results (from the past 24 hour(s))   US Abdomen Limited    Narrative    EXAM: US ABDOMEN LIMITED  LOCATION: [facility]  DATE: 7/15/2024    INDICATION: Abnormal appearance of gallbladder on CT scan  COMPARISON: CT 8/23/2022. CT 7/15/2024 at 1156 hours.  TECHNIQUE: Limited abdominal ultrasound. The patient terminated the exam before the right kidney could be imaged.    FINDINGS:    GALLBLADDER: No gallstones or sludge. There is pericholecystic fluid and gallbladder wall thickening. No sonographic Garcia's sign.    BILE DUCTS: No biliary dilatation. The common duct measures 4 mm.    LIVER: Normal parenchyma with smooth contour. No focal mass.    RIGHT KIDNEY: Not imaged; the patient terminated the exam.    PANCREAS: The visualized portions are normal.    No ascites. Right pleural fluid is incidentally noted.      Impression    IMPRESSION:  1.  Gallbladder wall thickening and  pericholecystic fluid. No gallstones or sludge. No sonographic Garcia's sign. Cholecystitis is not excluded.  2.  Right pleural fluid noted.   Cardiac Device Check - Remote   Result Value    Date Time Interrogation Session [id number]    Implantable Pulse Generator  Medtronic    Implantable Pulse Generator Model [id number] Carley XT DR MRI    Implantable Pulse Generator Serial Number [id number]    Type Interrogation Session Remote Patient Initiated    Clinic Name [facility]    Implantable Pulse Generator Type Pacemaker    Implantable Pulse Generator Implant Date 20240408    Implantable Lead  Medtronic    Implantable Lead Model 3830 SelectSecure MRI SureScan    Implantable Lead Serial Number [id number]    Implantable Lead Implant Date 20240408    Implantable Lead Polarity Type Bipolar Lead    Implantable Lead Location Detail 1 UNKNOWN    Implantable Lead Special Function 69 cm - Left Bundle Branch (LBB)    Implantable Lead Location Right Ventricle    Implantable Lead Connection Status Connected    Implantable Lead  Medtronic    Implantable Lead Model 5076 CapSureFix Novus MRI SureScan    Implantable Lead Serial Number [id number]    Implantable Lead Implant Date 20240408    Implantable Lead Polarity Type Bipolar Lead    Implantable Lead Location Detail 1 APPENDAGE    Implantable Lead Special Function 52 cm    Implantable Lead Location Right Atrium    Implantable Lead Connection Status Connected    Anthony Setting Mode (NBG Code) DDD    Anthony Setting Lower Rate Limit 60    Anthony Setting Maximum Tracking Rate 110    Anthony Setting Maximum Sensor Rate 110    Anthony Setting Hysterisis Rate DISABLED    Anthony Setting JULIO Delay Low 100    Anthony Setting PAV Delay Low 120    Anthony Setting AT Mode Switch Rate 171    Lead Channel Setting Sensing Polarity Bipolar    Lead Channel Setting Sensing Anode Location Right Atrium    Lead Channel Setting Sensing Anode Terminal Ring    Lead  Channel Setting Sensing Cathode Location Right Atrium    Lead Channel Setting Sensing Cathode Terminal Tip    Lead Channel Setting Sensing Sensitivity 0.15    Lead Channel Setting Sensing Polarity Bipolar    Lead Channel Setting Sensing Anode Location Right Ventricle    Lead Channel Setting Sensing Anode Terminal Ring    Lead Channel Setting Sensing Cathode Location Right Ventricle    Lead Channel Setting Sensing Cathode Terminal Tip    Lead Channel Setting Sensing Sensitivity 0.9    Lead Channel Setting Pacing Polarity Bipolar    Lead Channel Setting Pacing Anode Location Right Atrium    Lead Channel Setting Pacing Anode Terminal Ring    Lead Channel Setting Sensing Cathode Location Right Atrium    Lead Channel Setting Sensing Cathode Terminal Tip    Lead Channel Setting Pacing Pulse Width 0.4    Lead Channel Setting Pacing Amplitude 2    Lead Channel Setting Pacing Capture Mode Adaptive    Lead Channel Setting Pacing Polarity Bipolar    Lead Channel Setting Pacing Anode Location Right Ventricle    Lead Channel Setting Pacing Anode Terminal Ring    Lead Channel Setting Sensing Cathode Location Right Ventricle    Lead Channel Setting Sensing Cathode Terminal Tip    Lead Channel Setting Pacing Pulse Width 0.4    Lead Channel Setting Pacing Amplitude 1.5    Lead Channel Setting Pacing Capture Mode Adaptive    Zone Setting Type Category VF    Zone Setting Vendor Type Category V High Rate    Zone Setting Type Category VT    Zone Setting Vendor Type Category FastVT    Zone Setting Type Category VT    Zone Setting Vendor Type Category VT    Zone Setting Type Category VT    Zone Setting Vendor Type Category MonVT    Zone Setting Status Monitor    Zone Setting Detection Interval 400    Zone Setting Type Category ATRIAL_FIBRILLATION    Zone Setting Vendor Type Category FastATAF    Zone Setting Status Inactive    Zone Setting Type Category AT/AF    Zone Setting Status Active    Zone Setting Detection Interval 350    Lead  Channel Impedance Value 380    Lead Channel Impedance Value 304    Lead Channel Sensing Intrinsic Amplitude 0.375    Lead Channel Sensing Intrinsic Amplitude 0.375    Lead Channel Pacing Threshold Amplitude 0.625    Lead Channel Pacing Threshold Pulse Width 0.4    Lead Channel Impedance Value 570    Lead Channel Impedance Value 456    Lead Channel Sensing Intrinsic Amplitude 9.75    Lead Channel Sensing Intrinsic Amplitude 9.75    Lead Channel Pacing Threshold Amplitude 0.625    Lead Channel Pacing Threshold Pulse Width 0.4    Battery Date Time of Measurements [id number]    Battery Status OK    Battery RRT Trigger 2.625    Battery Remaining Longevity 167    Battery Voltage 3.19    Anthony Statistic Date Time Start 20240619090202    Anthony Statistic Date Time End 59208057782574    Anthony Statistic RA Percent Paced 0.69    Anthony Statistic RV Percent Paced 19.41    Anthony Statistic AP  Percent 4.12    Anthony Statistic AS  Percent 88.72    Anthony Statistic AP VS Percent 0.32    Anthony Statistic AS VS Percent 7.11    Atrial Tachy Statistic Date Time Start 48010529351457    Atrial Tachy Statistic Date Time End 45338410098310    Atrial Tachy Statistic AT/AF [place] Percent 85.9    Therapy Statistic Recent Date Time Start 15990681246030    Therapy Statistic Recent Date Time End 98192792208712    Therapy Statistic Total  Date Time Start 41005522226417    Therapy Statistic Total  Date Time End 36748985848844    Episode Statistic Recent Count 1    Episode Statistic Type Category AT/AF    Episode Statistic Recent Count 0    Episode Statistic Type Category Patient Activated    Episode Statistic Recent Count 0    Episode Statistic Type Category SVT    Episode Statistic Recent Count 0    Episode Statistic Type Category VT    Episode Statistic Recent Count 0    Episode Statistic Type Category VT    Episode Statistic Recent Date Time Start 40795249934912    Episode Statistic Recent Date Time End 31398614089120    Episode Statistic  Recent Date Time Start 74233455861715    Episode Statistic Recent Date Time End 25313286963136    Episode Statistic Recent Date Time Start 25804630280711    Episode Statistic Recent Date Time End 98057157646405    Episode Statistic Recent Date Time Start 20240619090202    Episode Statistic Recent Date Time End 70533143412470    Episode Statistic Recent Date Time Start 02898216753088    Episode Statistic Recent Date Time End 44702082151742    Episode Statistic Total Count 3    Episode Statistic Type Category AT/AF    Episode Statistic Total Count 0    Episode Statistic Type Category Patient Activated    Episode Statistic Total Count 0    Episode Statistic Type Category SVT    Episode Statistic Total Count 0    Episode Statistic Type Category VT    Episode Statistic Total Count 0    Episode Statistic Type Category VT    Episode Statistic Total Date Time Start 75992254135956    Episode Statistic Total Date Time End 08891940844806    Episode Statistic Total Date Time Start 45416553873707    Episode Statistic Total Date Time End 70440550533967    Episode Statistic Total Date Time Start 75566041772405    Episode Statistic Total Date Time End 19619368359657    Episode Statistic Total Date Time Start 58110137020988    Episode Statistic Total Date Time End 08889247780042    Episode Statistic Total Date Time Start 83412861904195    Episode Statistic Total Date Time End 46105617301223    Episode Identifier 3    Episode Type Category AT/AF    Episode Date Time [id number]    Narrative    Type: [employer] remote pacemaker transmission done at [facility].  Courtesy check.  Rep notes: N/A   Presenting rhythm: AFib VS  bpm   Battery longevity: 13 years, 11 months estimatd   Lead status: Stable measurements and trends.   Atrial high rates: Since 6/19/2024 1 mode switch episode, episode still in   progress starting 6/23/2024 @ 02:57, v rates >/=120 bpm ~5%, burden 85.9%.     Anticoagulant:  Eliquis  Ventricular high rates: Since 6/19/2024 none.   Comments: Normal device function. MRI SureScan on 7/2/24 @ 13:57 to 14:35.     Plan: Next clinic check scheduled on 7/24/24 @ 1:00PM at our [place]   location. Yves, Device Specialist    Device follow up for the entirety of having the device, based on best   practices determined by [employer] and in Compliance with   Medicare guidelines. Continue remote device monitoring per patient plan.  I have reviewed and interpreted the device interrogation, settings,   programming, and encounter summary. The device is functioning within   normal device parameters. I agree with the current findings, assessment   and plan.

## 2024-07-16 NOTE — PROGRESS NOTES
HEART CARE NOTE          Assessment/Recommendations   1.  Severe/end stage CM HFrEF c/b ADHF  Assessment / Plan  Transition to oral diuretic regimen and continue to monitor UOP and renal function closely  ; no changes at this time; continue to monitor UOP and renal function closely  Patient is high risk for adverse cardiac events 2/2 advanced age, frailty, elevated NTproNP, end stage systolic HF  GDMT as detailed below     Current Pharmacotherapy AHA Guideline-Directed Medical Therapy   Lisinopril 2.5 mg daily Lisinopril 20 mg twice daily   Metoprolol succinate 25 mg daily Carvedilol 25 mg twice daily   Spironolactone 12.5 mg daily  Spironolactone 25 mg once daily   Hydralazine NA Hydralazine 100 mg three times daily   Isosorbide dinitrate NA Isosorbide dinitrate 40 mg three times daily   SGLT2 inhibitor:Dapagliflozin/Empagliflozin - not started Dapagliflozin or Empagliflozin 10 mg daily      2. Afib  Assessment / Plan  Rate controlled; continue apixaban and metoprolol     3. CKD  Assessment / Plan  Diuresis as above; continue to monitor UOP and renal function closely      5. HTN  Assessment / Plan  Titrate oral afterload reduction as tolerated       Plan of care discussed on July 16, 2024 with patient at bedside, and primary team overseeing patient's care      History of Present Illness/Subjective    Ms. Gladys Ramirez is a 89 year old female with a PMHx significant for (per Epic notation) hypertension, hyperlipidemia, CHF, stage 3 chronic kidney disease, non-ischemic cardiomyopathy, GERD, and A-fib who presents to this ED by EMS for evaluation of shortness of breath.      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 atrial fibrillation, rate controlled.     ECHO (personnaly Reviewed on 7/15/24):   The left ventricle is normal in size. There is mild concentric left  ventricular hypertrophy.  Left ventricular function is  decreased. The ejection fraction is 20-25%  (severely reduced). There is diffuse hypokinesis of the left ventricle. Septal  motion is consistent with conduction abnormality.     The right ventricle is normal in size and function.  The left atrium is moderate to severely dilated. Borderline right atrial  enlargement.  There is mild (1+) mitral regurgitation.  There is mild (1+) tricuspid regurgitation.  Right ventricle systolic pressure estimate normal  IVC diameter and respiratory changes fall into an intermediate range  suggesting an RA pressure of 8 mmHg.  Compared to prior study, there is no significant change.    Telemetry: personally reviewed July 16, 2024; notable for afib     Lab results: personally reviewed July 16, 2024; notable for stable renal function    Medical history and pertinent documents reviewed in Care Everywhere please where applicable see details above        Physical Examination Review of Systems   BP (!) 141/67 (BP Location: Left arm, Patient Position: Supine)   Pulse 95   Temp 98  F (36.7  C) (Oral)   Resp 27   Ht 1.524 m (5')   Wt 67.7 kg (149 lb 4.8 oz)   SpO2 90%   BMI 29.16 kg/m    Body mass index is 29.16 kg/m .  Wt Readings from Last 3 Encounters:   07/15/24 67.7 kg (149 lb 4.8 oz)   07/07/24 69.7 kg (153 lb 10.6 oz)   06/19/24 69.4 kg (153 lb)     General Appearance:   no distress, normal body habitus   ENT/Mouth: membranes moist, no oral lesions or bleeding gums.      EYES:  no scleral icterus, normal conjunctivae   Neck: no carotid bruits or thyromegaly   Chest/Lungs:   lungs are clear to auscultation, no rales or wheezing, equal chest wall expansion    Cardiovascular:   Irregular. Normal first and second heart sounds with no murmurs, rubs, or gallops; the carotid, radial and posterior tibial pulses are intact, no JVD and trace LE edema bilaterally    Abdomen:  no organomegaly, masses, bruits, or tenderness; bowel sounds are present   Extremities: no cyanosis or clubbing    Skin: no xanthelasma, warm.    Neurologic: NAD     Psychiatric: alert and calm     A complete 10 systems ROS was reviewed  And is negative except what is listed in the HPI.          Medical History  Surgical History Family History Social History   Past Medical History:   Diagnosis Date    Angina pectoris (H24)     Atrial fibrillation (H)     Chest pain 03/09/2017    Chronic kidney disease     Congestive heart failure (H)     Cough     Hyperlipidemia     Hypertension     Osteoarthritis     Past Surgical History:   Procedure Laterality Date    APPENDECTOMY      ARTHROSCOPY KNEE Right 5/23/2024    Procedure: ARTHROSCOPY, KNEE;  Surgeon: Sanchez Monahan MD;  Location: [facility] [place] OR    BASAL CELL CARCINOMA EXCISION      nose    CARDIOVERSION  06/19/2024    EP PACEMAKER DEVICE & IMPLANT- HIS LEAD DUAL N/A 4/8/2024    Procedure: Pacemaker Device & Lead Implant - HIS Lead Dual;  Surgeon: Jeannie Rivera MD;  Location: [facility] CATH LAB CV    INCISION AND DRAINAGE KNEE, COMBINED Right 5/23/2024    Procedure: INCISION AND DRAINAGE, KNEE;  Surgeon: Sanchez Monahan MD;  Location: [facility] [place] OR    IRRIGATION AND DEBRIDEMENT KNEE, COMBINED Right 7/2/2024    Procedure: RIGHT KNEE OPEN IRRIGATION AND DEBRIDEMENT;  Surgeon: Rakan Syed MD;  Location: [facility]    LAPAROSCOPY DIAGNOSTIC (GENERAL) N/A 11/04/2014    Procedure: LAPAROSCOPY BILATERAL SALPINGO-OOPHORECTOMY ;  Surgeon: Sofia Harper MD;  Location: [facility] OR;  Service:     OPEN REDUCTION INTERNAL FIXATION HIP BIPOLAR Right 3/5/2023    Procedure: HEMIARTHROPLASTY, HIP, BIPOLAR;  Surgeon: Jose G Martinez MD;  Location: [facility] OR    PICC SINGLE LUMEN PLACEMENT  05/24/2024    TONSILLECTOMY      10 years old    ZC TOTAL KNEE ARTHROPLASTY Left     2011    no family history of premature coronary artery disease Social History     Socioeconomic History    Marital status: Single     Spouse name: Not on file    Number of  children: Not on file    Years of education: Not on file    Highest education level: Not on file   Occupational History    Not on file   Tobacco Use    Smoking status: Never     Passive exposure: Never    Smokeless tobacco: Never    Tobacco comments:     no passive exposure   Vaping Use    Vaping status: Never Used   Substance and Sexual Activity    Alcohol use: Yes     Comment: Alcoholic Drinks/day: Rarely a glass of wine    Drug use: No    Sexual activity: Not on file   Other Topics Concern    Not on file   Social History Narrative    The patient is a nun.     Social Determinants of Health     Financial Resource Strain: Low Risk  (4/5/2024)    Financial Resource Strain     Within the past 12 months, have you or your family members you live with been unable to get utilities (heat, electricity) when it was really needed?: No   Food Insecurity: Low Risk  (4/5/2024)    Food Insecurity     Within the past 12 months, did you worry that your food would run out before you got money to buy more?: No     Within the past 12 months, did the food you bought just not last and you didn t have money to get more?: No   Transportation Needs: Low Risk  (4/5/2024)    Transportation Needs     Within the past 12 months, has lack of transportation kept you from medical appointments, getting your medicines, non-medical meetings or appointments, work, or from getting things that you need?: No   Physical Activity: Insufficiently Active (3/27/2023)    Exercise Vital Sign     Days of Exercise per Week: 3 days     Minutes of Exercise per Session: 10 min   Stress: No Stress Concern Present (4/5/2024)    [employer] [place] [employer] - Occupational Stress Questionnaire     Feeling of Stress : Not at all   Social Connections: Moderately Integrated (3/27/2023)    Social Connection and Isolation Panel [NHANES]     Frequency of Communication with Friends and Family: More than three times a week     Frequency of Social Gatherings with  "Friends and Family: More than three times a week     Attends Yarsani Services: More than 4 times per year     Active Member of Clubs or Organizations: Yes     Attends Club or Organization Meetings: More than 4 times per year     Marital Status: Never    Interpersonal Safety: Low Risk  (4/5/2024)    Interpersonal Safety     Do you feel physically and emotionally safe where you currently live?: Yes     Within the past 12 months, have you been hit, slapped, kicked or otherwise physically hurt by someone?: No     Within the past 12 months, have you been humiliated or emotionally abused in other ways by your partner or ex-partner?: No   Housing Stability: Low Risk  (4/5/2024)    Housing Stability     Do you have housing? : Yes     Are you worried about losing your housing?: No           Lab Results    Chemistry/lipid CBC Cardiac Enzymes/BNP/TSH/INR   Lab Results   Component Value Date    CHOL 179 03/12/2015    HDL 64 03/12/2015    TRIG 176 (H) 03/12/2015    BUN 26.4 (H) 07/15/2024     07/15/2024    CO2 25 07/15/2024    Lab Results   Component Value Date    WBC 9.8 07/15/2024    HGB 10.1 (L) 07/15/2024    HCT 33.5 (L) 07/15/2024    MCV 90 07/15/2024     (H) 07/15/2024    Lab Results   Component Value Date    TROPONINI 0.16 08/23/2022    BNP 1,933 (H) 08/23/2022    TSH 6.25 (H) 12/29/2023    INR 1.33 (H) 03/04/2023     Lab Results   Component Value Date    TROPONINI 0.16 08/23/2022          Weight:    Wt Readings from Last 3 Encounters:   07/15/24 67.7 kg (149 lb 4.8 oz)   07/07/24 69.7 kg (153 lb 10.6 oz)   06/19/24 69.4 kg (153 lb)       Allergies  Allergies   Allergen Reactions    Trazodone Shortness Of Breath and Unknown     Allergic to trazodone and deriv., Other: trouble swallowing      Clindamycin Diarrhea     C-diff    Gabapentin Other (See Comments)     \"Internal tremors\"    Temazepam Other (See Comments)     Annotation: Nightmares           Surgical History  Past Surgical History: "   Procedure Laterality Date    APPENDECTOMY      ARTHROSCOPY KNEE Right 5/23/2024    Procedure: ARTHROSCOPY, KNEE;  Surgeon: Sanchez Monahan MD;  Location: [facility] [place] OR    BASAL CELL CARCINOMA EXCISION      nose    CARDIOVERSION  06/19/2024    EP PACEMAKER DEVICE & IMPLANT- HIS LEAD DUAL N/A 4/8/2024    Procedure: Pacemaker Device & Lead Implant - HIS Lead Dual;  Surgeon: Jeannie Rivera MD;  Location: [facility] CATH LAB CV    INCISION AND DRAINAGE KNEE, COMBINED Right 5/23/2024    Procedure: INCISION AND DRAINAGE, KNEE;  Surgeon: Sanchez Monahan MD;  Location: [facility] [place] OR    IRRIGATION AND DEBRIDEMENT KNEE, COMBINED Right 7/2/2024    Procedure: RIGHT KNEE OPEN IRRIGATION AND DEBRIDEMENT;  Surgeon: Rakan Syed MD;  Location: [facility] [place] OR    LAPAROSCOPY DIAGNOSTIC (GENERAL) N/A 11/04/2014    Procedure: LAPAROSCOPY BILATERAL SALPINGO-OOPHORECTOMY ;  Surgeon: Sofia Harper MD;  Location: [facility] OR;  Service:     OPEN REDUCTION INTERNAL FIXATION HIP BIPOLAR Right 3/5/2023    Procedure: HEMIARTHROPLASTY, HIP, BIPOLAR;  Surgeon: Jose G Martinez MD;  Location: [facility] [place] OR    PICC SINGLE LUMEN PLACEMENT  05/24/2024    TONSILLECTOMY      10 years old    ZZC TOTAL KNEE ARTHROPLASTY Left     2011       Social History  Tobacco:   History   Smoking Status    Never   Smokeless Tobacco    Never    Alcohol:   Social History    Substance and Sexual Activity      Alcohol use: Yes        Comment: Alcoholic Drinks/day: Rarely a glass of wine   Illicit Drugs:   History   Drug Use No       Family History  Family History   Problem Relation Age of Onset    Heart Disease Mother     Rheumatic Heart Disease Mother     No Known Problems Father     Cancer Brother         brain    Lung Cancer Brother     Lung Cancer Brother     Cancer Sister         lung    Lung Cancer Sister           Thad Corral MD on 7/16/2024      cc: Margret Flores

## 2024-07-16 NOTE — PROGRESS NOTES
"   07/16/24 0830   Appointment Info   Signing Clinician's Name / Credentials (PT) Bina Downing, PT, DPT   Living Environment   People in Home alone   Current Living Arrangements independent living facility   Home Accessibility no concerns   Self-Care   Equipment Currently Used at Home walker, rolling   Activity/Exercise/Self-Care Comment Pt admitted from TCU.   General Information   Onset of Illness/Injury or Date of Surgery 07/15/24   Referring Physician Eva Owen MD   Patient/Family Therapy Goals Statement (PT) None stated.   Pertinent History of Current Problem (include personal factors and/or comorbidities that impact the POC) Per H&P: \"89 year old female with a pertinent history of hypertension, hyperlipidemia, CHF, stage 3 chronic kidney disease, non-ischemic cardiomyopathy, GERD, and A-fib who presents with shortness of breath x 2 weeks worsening over the last few days.\"   Existing Precautions/Restrictions fall   Weight-Bearing Status - RLE weight-bearing as tolerated   Pain Assessment   Patient Currently in Pain Yes, see Vital Sign flowsheet  (knee pain from recent I&D)   Range of Motion (ROM)   Range of Motion ROM deficits secondary to weakness;ROM deficits secondary to pain;ROM deficits secondary to surgical procedure   Strength (Manual Muscle Testing)   Strength (Manual Muscle Testing) Deficits observed during functional mobility   Bed Mobility   Comment, (Bed Mobility) Patient seated up in chair upon therapist arrival. Currently requiring assist of 1 for mobility, anticipate pt will need assist of 1 for bed mobility.   Transfers   Transfers sit-stand transfer   Impairments Contributing to Impaired Transfers decreased strength   Sit-Stand Transfer   Sit-Stand [place] (Transfers) contact guard;verbal cues   Assistive Device (Sit-Stand Transfers) walker, front-wheeled   Stand-Sit Transfer   Stand-Sit [place] (Transfers) contact guard;verbal cues   Assistive Device (Stand-Sit " Transfers) walker, front-wheeled   Gait/Stairs (Locomotion)   [place] Level (Gait) contact guard;verbal cues   Assistive Device (Gait) walker, front-wheeled   Distance in Feet (Gait) 15'   Pattern (Gait) step-to   Deviations/Abnormal Patterns (Gait) esteban decreased;gait speed decreased;antalgic   Clinical Impression   Criteria for Skilled Therapeutic Intervention Yes, treatment indicated   PT Diagnosis (PT) impaired functional mobility   Influenced by the following impairments pain, decreased strength, impaired balance   Functional limitations due to impairments transfers, ambulation   Clinical Presentation (PT Evaluation Complexity) evolving   Clinical Presentation Rationale Clinical judgment.   Clinical Decision Making (Complexity) moderate complexity   Planned Therapy Interventions (PT) balance training;bed mobility training;gait training;home exercise program;neuromuscular re-education;patient/family education;strengthening;ROM (range of motion);transfer training   Risk & Benefits of therapy have been explained evaluation/treatment results reviewed;participants voiced agreement with care plan;participants included;patient   PT Total Evaluation Time   PT Eval, Moderate Complexity Minutes (42418) 15   Physical Therapy Goals   PT Frequency 5x/week   PT Predicted Duration/Target Date for Goal Attainment 07/23/24   PT Goals Bed Mobility;Transfers;Gait   PT: Bed Mobility Supervision/stand-by assist;Supine to/from sit   PT: Transfers Supervision/stand-by assist;Sit to/from stand;Assistive device   PT: Gait Supervision/stand-by assist;Assistive device;Rolling walker;150 feet   Interventions   Interventions Quick Adds Gait Training   Gait Training   Gait Training Minutes (62070) 8   Symptoms Noted During/After Treatment (Gait Training) shortness of breath   Treatment Detail/Skilled Intervention Pt winded at longterm point of walk, requires verbal cues for pursed lip breathing for recovery.   Distance in Feet  additional 15'   [place] Level (Gait Training) contact guard   Physical Assistance Level (Gait Training) verbal cues;1 person assist   Assistive Device (Gait Training) rolling walker   Gait Analysis Deviations decreased esteban;decreased step length   Impairments (Gait Analysis/Training) balance impaired;pain;strength decreased   PT Discharge Planning   PT Plan transfers, gait with FWW and chair follow, LE strengthening/ROM (recent R knee I&D)   PT Discharge Recommendation (DC Rec) Transitional Care Facility   PT Rationale for DC Rec Patient continue to require assist of 1 for mobility and fatigues quickly with ambulation, becoming short of breath after approximately 15'. Recommend TCU at discharge.   PT Brief overview of current status Sit <> stand, CGA. Ambulates 30' total with FWW, CGA.   Total Session Time   Timed Code Treatment Minutes 8   Total Session Time (sum of timed and untimed services) 23

## 2024-07-16 NOTE — PLAN OF CARE
Goal Outcome Evaluation:      Plan of Care Reviewed With: patient    Overall Patient Progress: improvingOverall Patient Progress: improving    Outcome Evaluation: decreased work of breathing today.    Heart Failure Care Map  GOALS TO BE MET BEFORE DISCHARGE:    1. Decrease congestion and/or edema with diuretic therapy to achieve near optimal volume status.     Dyspnea improved: No, further care required to meet this goal. Please explain po lasix   Edema improved: Yes, satisfactory for discharge.        Last 24 hour I/O:   Intake/Output Summary (Last 24 hours) at 7/16/2024 1320  Last data filed at 7/16/2024 0900  Gross per 24 hour   Intake 300 ml   Output 800 ml   Net -500 ml           Net I/O and Weights since admission:   06/16 1500 - 07/16 1459  In: 300 [P.O.:300]  Out: 800 [Urine:800]  Net: -500     Vitals:    07/15/24 1002 07/15/24 1700 07/16/24 0720   Weight: 69.4 kg (153 lb) 67.7 kg (149 lb 4.8 oz) 67.1 kg (148 lb)       2.  O2 sats > 90% on room air, or at prior home O2 therapy level.      Able to wean O2 this shift to keep sats above 90%?: No, further care required to meet this goal. Please explain 2L/NC   Does patient use Home O2? No          Current oxygenation status:   SpO2: 99 %     O2 Device: Nasal cannula, Oxygen Delivery: 2 LPM    3.  Tolerates ambulation and mobility near baseline.     Ambulation: Yes, satisfactory for discharge.   Times patient ambulated with staff this shift: 3    Please review the Heart Failure Care Map for additional HF goal outcomes.    Elizabeth Jones RN  7/16/2024

## 2024-07-17 ENCOUNTER — APPOINTMENT (OUTPATIENT)
Dept: OCCUPATIONAL THERAPY | Facility: HOSPITAL | Age: 89
DRG: 291 | End: 2024-07-17
Payer: COMMERCIAL

## 2024-07-17 VITALS
DIASTOLIC BLOOD PRESSURE: 62 MMHG | OXYGEN SATURATION: 96 % | WEIGHT: 146.3 LBS | RESPIRATION RATE: 18 BRPM | HEART RATE: 75 BPM | TEMPERATURE: 97.6 F | HEIGHT: 60 IN | BODY MASS INDEX: 28.72 KG/M2 | SYSTOLIC BLOOD PRESSURE: 138 MMHG

## 2024-07-17 PROBLEM — I50.43 ACUTE ON CHRONIC COMBINED SYSTOLIC AND DIASTOLIC CONGESTIVE HEART FAILURE (H): Status: RESOLVED | Noted: 2024-07-15 | Resolved: 2024-07-17

## 2024-07-17 LAB
ALBUMIN SERPL BCG-MCNC: 3.2 G/DL (ref 3.5–5.2)
ANION GAP SERPL CALCULATED.3IONS-SCNC: 12 MMOL/L (ref 7–15)
BUN SERPL-MCNC: 22.7 MG/DL (ref 8–23)
CALCIUM SERPL-MCNC: 9 MG/DL (ref 8.8–10.4)
CHLORIDE SERPL-SCNC: 101 MMOL/L (ref 98–107)
CREAT SERPL-MCNC: 0.89 MG/DL (ref 0.51–0.95)
EGFRCR SERPLBLD CKD-EPI 2021: 60 ML/MIN/1.73M2
GLUCOSE SERPL-MCNC: 117 MG/DL (ref 70–99)
HCO3 SERPL-SCNC: 29 MMOL/L (ref 22–29)
NT-PROBNP SERPL-MCNC: ABNORMAL PG/ML (ref 0–1800)
PHOSPHATE SERPL-MCNC: 2.9 MG/DL (ref 2.5–4.5)
POTASSIUM SERPL-SCNC: 3.2 MMOL/L (ref 3.4–5.3)
POTASSIUM SERPL-SCNC: 3.8 MMOL/L (ref 3.4–5.3)
SODIUM SERPL-SCNC: 142 MMOL/L (ref 135–145)

## 2024-07-17 PROCEDURE — 250N000013 HC RX MED GY IP 250 OP 250 PS 637: Performed by: HOSPITALIST

## 2024-07-17 PROCEDURE — 250N000013 HC RX MED GY IP 250 OP 250 PS 637: Performed by: INTERNAL MEDICINE

## 2024-07-17 PROCEDURE — 80069 RENAL FUNCTION PANEL: CPT | Performed by: HOSPITALIST

## 2024-07-17 PROCEDURE — 250N000013 HC RX MED GY IP 250 OP 250 PS 637

## 2024-07-17 PROCEDURE — 84132 ASSAY OF SERUM POTASSIUM: CPT | Performed by: HOSPITALIST

## 2024-07-17 PROCEDURE — 36415 COLL VENOUS BLD VENIPUNCTURE: CPT | Performed by: HOSPITALIST

## 2024-07-17 PROCEDURE — 99239 HOSP IP/OBS DSCHRG MGMT >30: CPT | Performed by: HOSPITALIST

## 2024-07-17 PROCEDURE — 99233 SBSQ HOSP IP/OBS HIGH 50: CPT | Performed by: INTERNAL MEDICINE

## 2024-07-17 PROCEDURE — 250N000011 HC RX IP 250 OP 636: Mod: JZ

## 2024-07-17 PROCEDURE — 83880 ASSAY OF NATRIURETIC PEPTIDE: CPT | Performed by: INTERNAL MEDICINE

## 2024-07-17 PROCEDURE — 97535 SELF CARE MNGMENT TRAINING: CPT | Mod: GO

## 2024-07-17 RX ORDER — POTASSIUM CHLORIDE 1500 MG/1
40 TABLET, EXTENDED RELEASE ORAL ONCE
Status: COMPLETED | OUTPATIENT
Start: 2024-07-17 | End: 2024-07-17

## 2024-07-17 RX ORDER — FUROSEMIDE 40 MG
40 TABLET ORAL
DISCHARGE
Start: 2024-07-17

## 2024-07-17 RX ADMIN — FUROSEMIDE 40 MG: 40 TABLET ORAL at 08:01

## 2024-07-17 RX ADMIN — DULOXETINE HYDROCHLORIDE 60 MG: 60 CAPSULE, DELAYED RELEASE PELLETS ORAL at 08:02

## 2024-07-17 RX ADMIN — AMIODARONE HYDROCHLORIDE 100 MG: 100 TABLET ORAL at 08:01

## 2024-07-17 RX ADMIN — APIXABAN 5 MG: 5 TABLET, FILM COATED ORAL at 08:02

## 2024-07-17 RX ADMIN — POTASSIUM CHLORIDE 40 MEQ: 1500 TABLET, EXTENDED RELEASE ORAL at 08:01

## 2024-07-17 RX ADMIN — LISINOPRIL 2.5 MG: 2.5 TABLET ORAL at 08:01

## 2024-07-17 RX ADMIN — CEFAZOLIN SODIUM 2 G: 2 INJECTION, SOLUTION INTRAVENOUS at 06:06

## 2024-07-17 RX ADMIN — METOPROLOL SUCCINATE 25 MG: 25 TABLET, EXTENDED RELEASE ORAL at 08:01

## 2024-07-17 RX ADMIN — SPIRONOLACTONE 12.5 MG: 25 TABLET, FILM COATED ORAL at 08:01

## 2024-07-17 RX ADMIN — MICONAZOLE NITRATE ANTIFUNGAL POWDER: 2 POWDER TOPICAL at 08:02

## 2024-07-17 ASSESSMENT — ACTIVITIES OF DAILY LIVING (ADL)
ADLS_ACUITY_SCORE: 50

## 2024-07-17 NOTE — DISCHARGE SUMMARY
[facility]  Hospitalist Discharge Summary      Date of Admission:  7/15/2024  Date of Discharge:  7/17/2024  2:30 PM  Discharging Provider: Ellis Olson MD  Discharge Service: Hospitalist Service    Discharge Diagnoses   Acute on chronic systolic heart failure  Acute hypoxia, resolved prior to discharge  Recent septic arthritis right knee  Paroxysmal atrial fibrillation  Permanent pacemaker    Clinically Significant Risk Factors     # Overweight: Estimated body mass index is 28.57 kg/m  as calculated from the following:    Height as of this encounter: 1.524 m (5').    Weight as of this encounter: 66.4 kg (146 lb 4.8 oz).       Follow-ups Needed After Discharge   Follow-up Appointments     Follow Up and recommended labs and tests      Follow up with care home physician.  The following labs/tests are   recommended: chemistry panel.        Continue IV antibiotics as previously planned.    Unresulted Labs Ordered in the Past 30 Days of this Admission       No orders found from 6/15/2024 to 7/16/2024.        These results will be followed up by na    Discharge Disposition   Discharged to nursing home  Condition at discharge: Stable    Hospital Course   89 female with hypertension, hyperlipidemia, systolic heart failure, permanent atrial fibrillation, recently dealing with bouts of right knee septic arthritis requiring washouts and long-term antibiotics, admitted from TCU for acute exacerbated heart failure.    #Acute exacerbated systolic heart failure  -Etiology favored to be inadequate diuresis  -Workup revealing of chest congestion, elevated BNP, hypoxia  -Improved rather quickly with 1 day of IV diuretics, discharged on increased dose of oral Lasix  -Cardiology consult  -Defer further echo since recently had one, shows 20 to 25% ejection fraction, global hypokinesis  -Monitor on telemetry  -Device check pending, appears irregularly irregular with wide-complex on monitor  -Strict I's and  O's  -Continue GDMT with Toprol succinate 25 mg, lisinopril 2.5 mg spironolactone 12.5 mg  -Defer to cardiology whether ICD and/or CRT worthwhile    #Acute hypoxic respiratory failure  -Suspect from systolic heart failure as above  -Has maintained normal saturations on 2 L  -Was not needing oxygen upon discharge    #Right knee septic arthritis  -Recently admitted early July, status post I&D and discharged on IV Ancef via PICC  -Inflammatory markers elevated, but downtrending on admission  -Supportive measures, PT and OT, pain control  -Continue with IV Ancef  -Repeat [facility] ultrasound ordered 7/16 negative for any new fluid collections  -Monitor clinically for any worsening of knee pain and fevers    #Paroxysmal atrial fibrillation  -Longstanding issue, manages with metoprolol, amiodarone, Eliquis, all resumed  -Dual-chamber pacemaker placed in April 2024 for slow ventricular response  -Notes reviewed, patient normally with normal rates, at that time 30 to 40% burden of atrial fibrillation when device reviewed in May 2024  -Admission EKG with rate controlled atrial fibrillation with aberrancy  -Defer to cardiology whether antiarrhythmic adjustment is needed    #Essential hypertension  -BP high normal, resume usual lisinopril, spironolactone, as needed hydralazine    #Chronic anemia  -Monitor clinically for losses      Consultations This Hospital Stay   CORE CLINIC EVALUATION IP CONSULT  OCCUPATIONAL THERAPY ADULT IP CONSULT  CARDIOLOGY IP CONSULT  PHYSICAL THERAPY ADULT IP CONSULT  CARE MANAGEMENT / SOCIAL WORK IP CONSULT  PHYSICAL THERAPY ADULT IP CONSULT  OCCUPATIONAL THERAPY ADULT IP CONSULT    Code Status   No CPR- Do NOT Intubate    Time Spent on this Encounter   I, Ellis Olson MD, personally saw the patient today and spent greater than 30 minutes discharging this patient.       Ellis Olson MD  [facility]  [address] [place] [id number]  Phone:  [phone]  Fax: [phone]  ______________________________________________________________________    Physical Exam   Vital Signs: Temp: 97.6  F (36.4  C) Temp src: Oral BP: 138/62 Pulse: 75   Resp: 18 SpO2: 96 % O2 Device: Nasal cannula Oxygen Delivery: 1 LPM  Weight: 146 lbs 4.8 oz  Well-appearing, no acute distress, membranes moist, heart rate irregular, breathing unlabored, abdomen soft, legs without edema, right knee incisions benign, less tender, normal affect and mood       Primary Care Physician   Margret Flores    Discharge Orders      Primary Care - Care Coordination Referral      General info for SNF    Length of Stay Estimate: Short Term Care: Estimated # of Days <30  Condition at Discharge: Improving  Level of care:skilled   Rehabilitation Potential: Good  Admission H&P remains valid and up-to-date: No (If no, please update H&P)  Recent Chemotherapy: N/A  Use Nursing Home Standing Orders: Yes     Follow Up and recommended labs and tests    Follow up with correction physician.  The following labs/tests are recommended: chemistry panel.     Reason for your hospital stay    You were admitted for heart failure, too much fluid in your system, you improved with diuretics which are medications that make you pee.     Intake and output    Every shift     Activity - Up with assistive device     Activity - Up with nursing assistance     No CPR- Do NOT Intubate     Physical Therapy Adult Consult    Evaluate and treat as clinically indicated.    Reason:  knee arthritis, heart failure     Occupational Therapy Adult Consult    Evaluate and treat as clinically indicated.    Reason:  knee arthritis, heart failure     Fall precautions     Diet    Follow this diet upon discharge: Orders Placed This Encounter      Fluid restriction 2000 ML FLUID      Combination Diet 2 gm NA Diet; No Caffeine Diet       Significant Results and Procedures   Most Recent 3 CBC's:  Recent Labs   Lab Test 07/16/24  0620 07/15/24  1023  07/08/24  0532   WBC 9.3 9.8 6.8   HGB 9.8* 10.1* 9.6*   MCV 90 90 89   * 483* 365     Most Recent 3 BMP's:  Recent Labs   Lab Test 07/17/24  1145 07/17/24  0606 07/16/24  0620 07/15/24  1023   NA  --  142 145 143   POTASSIUM 3.8 3.2* 3.6 4.3   CHLORIDE  --  101 103 102   CO2  --  29 29 25   BUN  --  22.7 25.1* 26.4*   CR  --  0.89 0.87 0.86   ANIONGAP  --  12 13 16*   MARLENE  --  9.0 9.2 9.2   GLC  --  117* 131* 142*     Most Recent 2 LFT's:  Recent Labs   Lab Test 07/15/24  1023 07/03/24  0706   AST 40 15   ALT 7 <5   ALKPHOS 134 141   BILITOTAL 0.4 0.4   ,   Results for orders placed or performed during the hospital encounter of 07/15/24   CT Chest Pulmonary Embolism w Contrast    Narrative    EXAM: CT CHEST PULMONARY EMBOLISM W CONTRAST  LOCATION: [facility]  DATE: 7/15/2024    INDICATION: Dyspnea.  COMPARISON: 8/23/2022.  TECHNIQUE: CT chest pulmonary angiogram during arterial phase injection of IV contrast. Multiplanar reformats and MIP reconstructions were performed. Dose reduction techniques were used.   CONTRAST: [id number] 90 ml.    FINDINGS:  ANGIOGRAM CHEST: Motion artifact. No pulmonary embolism seen. Nonaneurysmal aorta, though suboptimal opacification for dissection evaluation.    LUNGS AND PLEURA: Motion artifact. Bilateral groundglass opacities and septal thickening. Moderate basilar atelectasis. Moderate bilateral pleural effusions. No pneumothorax.    MEDIASTINUM/AXILLAE: No significant adenopathy. Mild-moderate cardiomegaly. No pericardial effusion. Right PICC tip in the distal SVC. Dual-chamber pacer. Reflux of contrast into the IVC. Small hiatus hernia.    CORONARY ARTERY CALCIFICATION: Mild.    UPPER ABDOMEN: Mild pericholecystic stranding. 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    MUSCULOSKELETAL: Degenerative changes spine.      Impression    IMPRESSION:    1.  Motion artifact.    2.  No obvious pulmonary embolism.    3.  Pulmonary edema  present. Cardiomegaly.    4.  Reflux of contrast into the IVC; correlate for elevated right heart pressure.    5.  Moderate bilateral pleural effusions, perhaps slightly larger on the right.    6.  Indeterminate pericholecystic stranding in the right upper quadrant abdomen. Recommend right upper quadrant ultrasound. Trace ascites.     US Abdomen Limited    Narrative    EXAM: US ABDOMEN LIMITED  LOCATION: [facility]  DATE: 7/15/2024    INDICATION: Abnormal appearance of gallbladder on CT scan  COMPARISON: CT 8/23/2022. CT 7/15/2024 at 1156 hours.  TECHNIQUE: Limited abdominal ultrasound. The patient terminated the exam before the right kidney could be imaged.    FINDINGS:    GALLBLADDER: No gallstones or sludge. There is pericholecystic fluid and gallbladder wall thickening. No sonographic Garcia's sign.    BILE DUCTS: No biliary dilatation. The common duct measures 4 mm.    LIVER: Normal parenchyma with smooth contour. No focal mass.    RIGHT KIDNEY: Not imaged; the patient terminated the exam.    PANCREAS: The visualized portions are normal.    No ascites. Right pleural fluid is incidentally noted.      Impression    IMPRESSION:  1.  Gallbladder wall thickening and pericholecystic fluid. No gallstones or sludge. No sonographic Garcia's sign. Cholecystitis is not excluded.  2.  Right pleural fluid noted.   US Lower Extremity Non Vascular Right    Narrative    EXAM: US LOWER EXTREMITY NON VASCULAR RIGHT  LOCATION: [facility]  DATE: 7/16/2024    INDICATION: known septic arthritis, following fluid collection. 5 mL's fluid aspirated 2 weeks ago. The baseline MRI demonstrated a large effusion with extensive synovitis.  COMPARISON: Ultrasound 7/8/2024 and 7/2/2024. MRI 7/2/2024  TECHNIQUE: Routine.    FINDINGS: No anechoic fluid collection seen. However, complex fluid due to synovitis would be better assessed by repeat MRI.         Discharge Medications   Discharge  Medication List as of 7/17/2024  2:17 PM        CONTINUE these medications which have CHANGED    Details   furosemide (LASIX) 40 MG tablet Take 1 tablet (40 mg) by mouth 2 times daily, Transitional           CONTINUE these medications which have NOT CHANGED    Details   acetaminophen (TYLENOL) 325 MG tablet Take 2 tablets (650 mg) by mouth every 4 hours as needed for mild pain, Disp-100 tablet, R-0, E-Prescribe      amiodarone (PACERONE) 200 MG tablet Take 0.5 tablets (100 mg) by mouth daily, Disp-45 tablet, R-0, E-Prescribe      apixaban ANTICOAGULANT (ELIQUIS) 5 MG tablet Take 1 tablet (5 mg) by mouth 2 times daily, Disp-60 tablet, R-3, E-Prescribe      ceFAZolin (ANCEF) intermittent infusion 2 g in 100 mL dextrose PRE-MIX Inject 100 mLs (2 g) into the vein every 8 hours for 28 days, No Print OutWeekly CBC LFT creatinine CRP fax Dr. Reza      cholecalciferol, vitamin D3, (VITAMIN D3) 2,000 unit Tab [CHOLECALCIFEROL, VITAMIN D3, (VITAMIN D3) 2,000 UNIT TAB] Take 1 tablet (2,000 Units total) by mouth daily., Disp-90 tablet, R-3, NormalReplaces weekly ergo 50,000 units      diclofenac (FLECTOR) 1.3 % patch Externally apply 1 patch topically 2 times daily, Historical      DULoxetine (CYMBALTA) 60 MG capsule Take 60 mg by mouth every morning, Historical      lisinopril (ZESTRIL) 2.5 MG tablet TAKE 1 TABLET BY MOUTH ONE TIME DAILY, Disp-90 tablet, R-0, E-Prescribe      magnesium hydroxide (MILK OF MAGNESIA) 400 MG/5ML suspension Take 30 mLs by mouth daily as needed for constipation (Use if polyethylene glycol (Miralax) is not effective after 24 hours.), Transitional      metoprolol succinate ER (TOPROL XL) 25 MG 24 hr tablet Take 1 tablet (25 mg) by mouth daily, Disp-90 tablet, R-3, E-Prescribe      nystatin (MYCOSTATIN) 388599 UNIT/GM external powder Apply topically 2 times daily as needed (rash in skin folds)Transitional      oxyCODONE (ROXICODONE) 5 MG tablet Take 0.5 tablets (2.5 mg) by mouth every 4 hours as  "needed for severe pain, Disp-10 tablet, R-0, Local Print      polyethylene glycol (MIRALAX) 17 GM/Dose powder Take 17 g by mouth daily, Transitional      potassium chloride tamiko ER (KLOR-CON M20) 20 MEQ CR tablet Take 1 tablet (20 mEq) by mouth daily, Disp-90 tablet, R-3, E-Prescribe      senna-docusate (SENOKOT-S/PERICOLACE) 8.6-50 MG tablet Take 2 tablets by mouth 2 times daily as needed for constipation, Disp-60 tablet, R-0, E-Prescribe           Allergies   Allergies   Allergen Reactions    Trazodone Shortness Of Breath and Unknown     Allergic to trazodone and deriv., Other: trouble swallowing      Clindamycin Diarrhea     C-diff    Gabapentin Other (See Comments)     \"Internal tremors\"    Temazepam Other (See Comments)     Annotation: Nightmares       "

## 2024-07-17 NOTE — PLAN OF CARE
Problem: Heart Failure  Goal: Effective Oxygenation and Ventilation  Outcome: Progressing   Goal Outcome Evaluation:    Patient remains on 1 liter/NC.-91 to 95%  Denies any pain or discomfort.  Afib with BBB- HR controlled.

## 2024-07-17 NOTE — PROGRESS NOTES
HEART CARE NOTE          Assessment/Recommendations     1.  Severe/end stage CM HFrEF c/b ADHF  Assessment / Plan  Tolerating oral diuretic regimen - no changes at this time; continue to monitor UOP and renal function closely; no changes at this time; continue to monitor UOP and renal function closely  Patient is high risk for adverse cardiac events 2/2 advanced age, frailty, elevated NTproNP, end stage systolic HF  GDMT as detailed below     Current Pharmacotherapy AHA Guideline-Directed Medical Therapy   Lisinopril 2.5 mg daily Lisinopril 20 mg twice daily   Metoprolol succinate 25 mg daily Carvedilol 25 mg twice daily   Spironolactone 12.5 mg daily  Spironolactone 25 mg once daily   Hydralazine NA Hydralazine 100 mg three times daily   Isosorbide dinitrate NA Isosorbide dinitrate 40 mg three times daily   SGLT2 inhibitor:Dapagliflozin/Empagliflozin - not started Dapagliflozin or Empagliflozin 10 mg daily      2. Afib  Assessment / Plan  Rate controlled; continue apixaban and metoprolol     3. CKD  Assessment / Plan  Diuresis as above; continue to monitor UOP and renal function closely      5. HTN  Assessment / Plan  Titrate oral afterload reduction as tolerated       Plan of care discussed on July 17, 2024 with patient at bedside, and primary team overseeing patient's care    Cardiology team will sign-off for now. Please do not hesitate to consult us again if new questions or concerns arise. Follow-up appointment will be arranged by CORE/HF clinic.         History of Present Illness/Subjective    Ms. Gladys Ramirez is a 89 year old female with a PMHx significant for (per Epic notation) hypertension, hyperlipidemia, CHF, stage 3 chronic kidney disease, non-ischemic cardiomyopathy, GERD, and A-fib who presents to this ED by EMS for evaluation of shortness of breath.      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 atrial  fibrillation, rate controlled.     ECHO (personnaly Reviewed on 7/15/24):   The left ventricle is normal in size. There is mild concentric left  ventricular hypertrophy.  Left ventricular function is decreased. The ejection fraction is 20-25%  (severely reduced). There is diffuse hypokinesis of the left ventricle. Septal  motion is consistent with conduction abnormality.     The right ventricle is normal in size and function.  The left atrium is moderate to severely dilated. Borderline right atrial  enlargement.  There is mild (1+) mitral regurgitation.  There is mild (1+) tricuspid regurgitation.  Right ventricle systolic pressure estimate normal  IVC diameter and respiratory changes fall into an intermediate range  suggesting an RA pressure of 8 mmHg.  Compared to prior study, there is no significant change.       Telemetry: personally reviewed July 17, 2024; notable for afib     Lab results: personally reviewed July 17, 2024; notable for stable renal function/wnl    Medical history and pertinent documents reviewed in Care Everywhere please where applicable see details above        Physical Examination Review of Systems   /59 (BP Location: Left arm)   Pulse 81   Temp 98.1  F (36.7  C) (Oral)   Resp 18   Ht 1.524 m (5')   Wt 66.4 kg (146 lb 4.8 oz)   SpO2 91%   BMI 28.57 kg/m    Body mass index is 28.57 kg/m .  Wt Readings from Last 3 Encounters:   07/17/24 66.4 kg (146 lb 4.8 oz)   07/07/24 69.7 kg (153 lb 10.6 oz)   06/19/24 69.4 kg (153 lb)     General Appearance:   no distress, normal body habitus   ENT/Mouth: membranes moist, no oral lesions or bleeding gums.      EYES:  no scleral icterus, normal conjunctivae   Neck: no carotid bruits or thyromegaly   Chest/Lungs:   lungs are clear to auscultation, no rales or wheezing, equal chest wall expansion    Cardiovascular:   Irregular. Normal first and second heart sounds with no murmurs, rubs, or gallops; the carotid, radial and posterior tibial pulses  are intact, no JVD or LE edema bilaterally    Abdomen:  no organomegaly, masses, bruits, or tenderness; bowel sounds are present   Extremities: no cyanosis or clubbing   Skin: no xanthelasma, warm.    Neurologic: NAD     Psychiatric: alert and calm     A complete 10 systems ROS was reviewed  And is negative except what is listed in the HPI.          Medical History  Surgical History Family History Social History   Past Medical History:   Diagnosis Date    Angina pectoris (H24)     Atrial fibrillation (H)     Chest pain 03/09/2017    Chronic kidney disease     Congestive heart failure (H)     Cough     Hyperlipidemia     Hypertension     Osteoarthritis     Past Surgical History:   Procedure Laterality Date    APPENDECTOMY      ARTHROSCOPY KNEE Right 5/23/2024    Procedure: ARTHROSCOPY, KNEE;  Surgeon: Sanchez Monahan MD;  Location: [facility] OR    BASAL CELL CARCINOMA EXCISION      nose    CARDIOVERSION  06/19/2024    EP PACEMAKER DEVICE & IMPLANT- HIS LEAD DUAL N/A 4/8/2024    Procedure: Pacemaker Device & Lead Implant - HIS Lead Dual;  Surgeon: Jeannie Rivera MD;  Location: [facility] CATH LAB CV    INCISION AND DRAINAGE KNEE, COMBINED Right 5/23/2024    Procedure: INCISION AND DRAINAGE, KNEE;  Surgeon: Sanchez Monahan MD;  Location: [facility] OR    IRRIGATION AND DEBRIDEMENT KNEE, COMBINED Right 7/2/2024    Procedure: RIGHT KNEE OPEN IRRIGATION AND DEBRIDEMENT;  Surgeon: Rakan Syed MD;  Location: [facility] OR    LAPAROSCOPY DIAGNOSTIC (GENERAL) N/A 11/04/2014    Procedure: LAPAROSCOPY BILATERAL SALPINGO-OOPHORECTOMY ;  Surgeon: Sofia Harper MD;  Location: [facility] OR;  Service:     OPEN REDUCTION INTERNAL FIXATION HIP BIPOLAR Right 3/5/2023    Procedure: HEMIARTHROPLASTY, HIP, BIPOLAR;  Surgeon: Jose G Martinez MD;  Location: [facility] OR    PICC SINGLE LUMEN PLACEMENT  05/24/2024    TONSILLECTOMY      10 years old    [facility] TOTAL KNEE ARTHROPLASTY Left     2011     no family history of premature coronary artery disease Social History     Socioeconomic History    Marital status: Single     Spouse name: Not on file    Number of children: Not on file    Years of education: Not on file    Highest education level: Not on file   Occupational History    Not on file   Tobacco Use    Smoking status: Never     Passive exposure: Never    Smokeless tobacco: Never    Tobacco comments:     no passive exposure   Vaping Use    Vaping status: Never Used   Substance and Sexual Activity    Alcohol use: Yes     Comment: Alcoholic Drinks/day: Rarely a glass of wine    Drug use: No    Sexual activity: Not on file   Other Topics Concern    Not on file   Social History Narrative    The patient is a nun.     Social Determinants of Health     Financial Resource Strain: Low Risk  (4/5/2024)    Financial Resource Strain     Within the past 12 months, have you or your family members you live with been unable to get utilities (heat, electricity) when it was really needed?: No   Food Insecurity: Low Risk  (4/5/2024)    Food Insecurity     Within the past 12 months, did you worry that your food would run out before you got money to buy more?: No     Within the past 12 months, did the food you bought just not last and you didn t have money to get more?: No   Transportation Needs: Low Risk  (4/5/2024)    Transportation Needs     Within the past 12 months, has lack of transportation kept you from medical appointments, getting your medicines, non-medical meetings or appointments, work, or from getting things that you need?: No   Physical Activity: Insufficiently Active (3/27/2023)    Exercise Vital Sign     Days of Exercise per Week: 3 days     Minutes of Exercise per Session: 10 min   Stress: No Stress Concern Present (4/5/2024)    [employer] [place] [employer] - Occupational Stress Questionnaire     Feeling of Stress : Not at all   Social Connections: Moderately Integrated (3/27/2023)    Social  Connection and Isolation Panel [NHANES]     Frequency of Communication with Friends and Family: More than three times a week     Frequency of Social Gatherings with Friends and Family: More than three times a week     Attends Rastafari Services: More than 4 times per year     Active Member of Clubs or Organizations: Yes     Attends Club or Organization Meetings: More than 4 times per year     Marital Status: Never    Interpersonal Safety: Low Risk  (4/5/2024)    Interpersonal Safety     Do you feel physically and emotionally safe where you currently live?: Yes     Within the past 12 months, have you been hit, slapped, kicked or otherwise physically hurt by someone?: No     Within the past 12 months, have you been humiliated or emotionally abused in other ways by your partner or ex-partner?: No   Housing Stability: Low Risk  (4/5/2024)    Housing Stability     Do you have housing? : Yes     Are you worried about losing your housing?: No           Lab Results    Chemistry/lipid CBC Cardiac Enzymes/BNP/TSH/INR   Lab Results   Component Value Date    CHOL 179 03/12/2015    HDL 64 03/12/2015    TRIG 176 (H) 03/12/2015    BUN 25.1 (H) 07/16/2024     07/16/2024    CO2 29 07/16/2024    Lab Results   Component Value Date    WBC 9.3 07/16/2024    HGB 9.8 (L) 07/16/2024    HCT 32.3 (L) 07/16/2024    MCV 90 07/16/2024     (H) 07/16/2024    Lab Results   Component Value Date    TROPONINI 0.16 08/23/2022    BNP 1,933 (H) 08/23/2022    TSH 6.25 (H) 12/29/2023    INR 1.33 (H) 03/04/2023     Lab Results   Component Value Date    TROPONINI 0.16 08/23/2022          Weight:    Wt Readings from Last 3 Encounters:   07/17/24 66.4 kg (146 lb 4.8 oz)   07/07/24 69.7 kg (153 lb 10.6 oz)   06/19/24 69.4 kg (153 lb)       Allergies  Allergies   Allergen Reactions    Trazodone Shortness Of Breath and Unknown     Allergic to trazodone and deriv., Other: trouble swallowing      Clindamycin Diarrhea     C-diff    Gabapentin  "Other (See Comments)     \"Internal tremors\"    Temazepam Other (See Comments)     Annotation: Nightmares           Surgical History  Past Surgical History:   Procedure Laterality Date    APPENDECTOMY      ARTHROSCOPY KNEE Right 5/23/2024    Procedure: ARTHROSCOPY, KNEE;  Surgeon: Sanchez Monahan MD;  Location: [facility]    BASAL CELL CARCINOMA EXCISION      nose    CARDIOVERSION  06/19/2024    EP PACEMAKER DEVICE & IMPLANT- HIS LEAD DUAL N/A 4/8/2024    Procedure: Pacemaker Device & Lead Implant - HIS Lead Dual;  Surgeon: Jeannie Rivera MD;  Location: [facility] CATH LAB CV    INCISION AND DRAINAGE KNEE, COMBINED Right 5/23/2024    Procedure: INCISION AND DRAINAGE, KNEE;  Surgeon: Sanchez Monahan MD;  Location: [facility] OR    IRRIGATION AND DEBRIDEMENT KNEE, COMBINED Right 7/2/2024    Procedure: RIGHT KNEE OPEN IRRIGATION AND DEBRIDEMENT;  Surgeon: Rakan Syed MD;  Location: [facility]    LAPAROSCOPY DIAGNOSTIC (GENERAL) N/A 11/04/2014    Procedure: LAPAROSCOPY BILATERAL SALPINGO-OOPHORECTOMY ;  Surgeon: Sofia Harper MD;  Location: [facility];  Service:     OPEN REDUCTION INTERNAL FIXATION HIP BIPOLAR Right 3/5/2023    Procedure: HEMIARTHROPLASTY, HIP, BIPOLAR;  Surgeon: Jose G Martinez MD;  Location: [facility]    PICC SINGLE LUMEN PLACEMENT  05/24/2024    TONSILLECTOMY      10 years old    [facility] TOTAL KNEE ARTHROPLASTY Left     2011       Social History  Tobacco:   History   Smoking Status    Never   Smokeless Tobacco    Never    Alcohol:   Social History    Substance and Sexual Activity      Alcohol use: Yes        Comment: Alcoholic Drinks/day: Rarely a glass of wine   Illicit Drugs:   History   Drug Use No       Family History  Family History   Problem Relation Age of Onset    Heart Disease Mother     Rheumatic Heart Disease Mother     No Known Problems Father     Cancer Brother         brain    Lung Cancer Brother     Lung Cancer Brother     Cancer " Sister         lung    Lung Cancer Sister           Thad Corral MD on 7/17/2024      cc: Margret Flores

## 2024-07-17 NOTE — PLAN OF CARE
Physical Therapy Discharge Summary    Reason for therapy discharge:    Discharged to transitional care facility.    Progress towards therapy goal(s). See goals on Care Plan in [facility] electronic health record for goal details.  Goals not met.  Barriers to achieving goals:   limited tolerance for therapy and discharge from facility.    Therapy recommendation(s):    Continued therapy is recommended.  Rationale/Recommendations:  PT goals not met.

## 2024-07-17 NOTE — PROGRESS NOTES
Care Management Discharge Note    Discharge Date: 07/17/2024       Discharge Disposition: Transitional Care    Discharge Services: None    Discharge DME: None    Discharge Transportation: health plan transportation    Private pay costs discussed: transportation costs    Does the patient's insurance plan have a 3 day qualifying hospital stay waiver?  No    PAS Confirmation Code: [id number]  Patient/family educated on Medicare website which has current facility and service quality ratings: yes    Education Provided on the Discharge Plan: Yes  Persons Notified of Discharge Plans: Patient  Patient/Family in Agreement with the Plan: yes    Handoff Referral Completed: Yes    Additional Information:  Pt to discharge back to [facility] via [facility] wheelchair transport between 2:07-2:52PM. SW updated facility and Pt. SW informed Pt regarding transportation costs; Pt in agreement. No other requested or anticipated CM needs at time of discharge.     FRANCOISE OrtegaW

## 2024-07-17 NOTE — PLAN OF CARE
Occupational Therapy Discharge Summary    Reason for therapy discharge:    Discharged to transitional care facility.    Progress towards therapy goal(s). See goals on Care Plan in [facility] electronic health record for goal details.  Goals not met.  Barriers to achieving goals:   discharge from facility.    Therapy recommendation(s):    Continued therapy is recommended.  Rationale/Recommendations:  Pt is going to TCU for further therapy.

## 2024-07-17 NOTE — PLAN OF CARE
Problem: Heart Failure  Goal: Optimal Coping  Outcome: Progressing     Problem: Heart Failure  Goal: Optimal Cardiac Output  Outcome: Progressing     Problem: Heart Failure  Goal: Stable Heart Rate and Rhythm  Outcome: Progressing     Problem: Heart Failure  Goal: Optimal Functional Ability  Outcome: Progressing     Problem: Heart Failure  Goal: Optimal Functional Ability  Intervention: Optimize Functional Ability  Recent Flowsheet Documentation  Taken 7/17/2024 0802 by Lo Lawrence RN  Activity Management: activity adjusted per tolerance     Problem: Heart Failure  Goal: Improved Oral Intake  Outcome: Progressing     Problem: Heart Failure  Goal: Effective Oxygenation and Ventilation  Outcome: Progressing  Intervention: Promote Airway Secretion Clearance  Recent Flowsheet Documentation  Taken 7/17/2024 0802 by Lo Lawrence RN  Activity Management: activity adjusted per tolerance     Problem: Comorbidity Management  Goal: Maintenance of Heart Failure Symptom Control  Outcome: Progressing  Intervention: Maintain Heart Failure Management  Recent Flowsheet Documentation  Taken 7/17/2024 0802 by Lo Lawrence RN  Medication Review/Management: medications reviewed   Goal Outcome Evaluation:       Pt is AOX4 and denies pain. Tele Afib w/ BBB, rate controlled. Lung sounds diminished. Pt on 1L NC, sating well. AO1 w/ walker and gaitbelt. Purewick in place. Pt is incontinent. Call light within reach. Bed alarm on. Home O2 assessment completed and pt does not need home O2, MD aware.       Discharged to [facility].

## 2024-07-17 NOTE — CARE PLAN
Patient has been assessed for Home Oxygen needs. Oxygen readings:    *Pulse oximetry (SpO2) = 95% on room air at rest while awake.    *SpO2 improved to 96% on 1liters/minute at rest.    *SpO2 = 92% on room air during activity/with exercise.    *SpO2 improved to 95% on 1liters/minute during activity/with exercise.

## 2024-07-18 ENCOUNTER — LAB REQUISITION (OUTPATIENT)
Dept: LAB | Facility: CLINIC | Age: 89
End: 2024-07-18
Payer: COMMERCIAL

## 2024-07-18 DIAGNOSIS — Z51.81 ENCOUNTER FOR THERAPEUTIC DRUG LEVEL MONITORING: ICD-10-CM

## 2024-07-22 ENCOUNTER — OFFICE VISIT (OUTPATIENT)
Dept: ORTHOPEDICS | Facility: CLINIC | Age: 89
End: 2024-07-22
Payer: COMMERCIAL

## 2024-07-22 VITALS — BODY MASS INDEX: 28.66 KG/M2 | WEIGHT: 146 LBS | HEIGHT: 60 IN

## 2024-07-22 DIAGNOSIS — M17.11 OSTEOARTHRITIS OF RIGHT KNEE, UNSPECIFIED OSTEOARTHRITIS TYPE: Primary | ICD-10-CM

## 2024-07-22 DIAGNOSIS — M00.061 STAPHYLOCOCCAL ARTHRITIS OF RIGHT KNEE (H): ICD-10-CM

## 2024-07-22 DIAGNOSIS — G89.29 CHRONIC PAIN OF RIGHT KNEE: ICD-10-CM

## 2024-07-22 DIAGNOSIS — M25.561 CHRONIC PAIN OF RIGHT KNEE: ICD-10-CM

## 2024-07-22 LAB
ALBUMIN SERPL BCG-MCNC: 3.7 G/DL (ref 3.5–5.2)
ALP SERPL-CCNC: 128 U/L (ref 40–150)
ALT SERPL W P-5'-P-CCNC: <5 U/L (ref 0–50)
ANION GAP SERPL CALCULATED.3IONS-SCNC: 14 MMOL/L (ref 7–15)
AST SERPL W P-5'-P-CCNC: 18 U/L (ref 0–45)
BILIRUB DIRECT SERPL-MCNC: <0.2 MG/DL (ref 0–0.3)
BILIRUB SERPL-MCNC: 0.3 MG/DL
BUN SERPL-MCNC: 17.7 MG/DL (ref 8–23)
CALCIUM SERPL-MCNC: 9.9 MG/DL (ref 8.8–10.4)
CHLORIDE SERPL-SCNC: 99 MMOL/L (ref 98–107)
CREAT SERPL-MCNC: 0.81 MG/DL (ref 0.51–0.95)
CREAT SERPL-MCNC: 0.81 MG/DL (ref 0.51–0.95)
CRP SERPL-MCNC: 18 MG/L
EGFRCR SERPLBLD CKD-EPI 2021: 67 ML/MIN/1.73M2
EGFRCR SERPLBLD CKD-EPI 2021: 67 ML/MIN/1.73M2
ERYTHROCYTE [DISTWIDTH] IN BLOOD BY AUTOMATED COUNT: 16.7 % (ref 10–15)
GLUCOSE SERPL-MCNC: 133 MG/DL (ref 70–99)
HCO3 SERPL-SCNC: 25 MMOL/L (ref 22–29)
HCT VFR BLD AUTO: 36.3 % (ref 35–47)
HGB BLD-MCNC: 10.8 G/DL (ref 11.7–15.7)
MCH RBC QN AUTO: 26.6 PG (ref 26.5–33)
MCHC RBC AUTO-ENTMCNC: 29.8 G/DL (ref 31.5–36.5)
MCV RBC AUTO: 89 FL (ref 78–100)
PLATELET # BLD AUTO: 492 10E3/UL (ref 150–450)
POTASSIUM SERPL-SCNC: 3.6 MMOL/L (ref 3.4–5.3)
PROT SERPL-MCNC: 6.8 G/DL (ref 6.4–8.3)
RBC # BLD AUTO: 4.06 10E6/UL (ref 3.8–5.2)
SODIUM SERPL-SCNC: 138 MMOL/L (ref 135–145)
WBC # BLD AUTO: 9.5 10E3/UL (ref 4–11)

## 2024-07-22 PROCEDURE — 82248 BILIRUBIN DIRECT: CPT | Mod: ORL | Performed by: INTERNAL MEDICINE

## 2024-07-22 PROCEDURE — 99214 OFFICE O/P EST MOD 30 MIN: CPT | Mod: 25 | Performed by: STUDENT IN AN ORGANIZED HEALTH CARE EDUCATION/TRAINING PROGRAM

## 2024-07-22 PROCEDURE — 76882 US LMTD JT/FCL EVL NVASC XTR: CPT | Mod: RT | Performed by: STUDENT IN AN ORGANIZED HEALTH CARE EDUCATION/TRAINING PROGRAM

## 2024-07-22 PROCEDURE — 86140 C-REACTIVE PROTEIN: CPT | Mod: ORL | Performed by: INTERNAL MEDICINE

## 2024-07-22 PROCEDURE — 85027 COMPLETE CBC AUTOMATED: CPT | Mod: ORL | Performed by: INTERNAL MEDICINE

## 2024-07-22 PROCEDURE — P9604 ONE-WAY ALLOW PRORATED TRIP: HCPCS | Mod: ORL | Performed by: INTERNAL MEDICINE

## 2024-07-22 PROCEDURE — 80053 COMPREHEN METABOLIC PANEL: CPT | Mod: ORL | Performed by: INTERNAL MEDICINE

## 2024-07-22 PROCEDURE — 36415 COLL VENOUS BLD VENIPUNCTURE: CPT | Mod: ORL | Performed by: INTERNAL MEDICINE

## 2024-07-22 NOTE — PROGRESS NOTES
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    Diagnoses and all orders for this visit:    Osteoarthritis of right knee, unspecified osteoarthritis type  -     Sports Med Adult Follow-Up Clinic Order (Blank); Future    Chronic pain of right knee  -     Sports Med Adult Follow-Up Clinic Order (Blank); Future    Staphylococcal arthritis of right knee (H)  -     Sports Med Adult Follow-Up Clinic Order (Blank); Future        89-year-old female with severe right knee osteoarthritis presents to follow-up on  knee pain. She has had I&D x2 secondary to septic arthritis by Dr. Monahan and Dr. Syed with [facility], most recently 7/2/24, has not yet followed up in clinic with ortho. Today, she has embedded sutures of the right knee with associated redness, warmth, and a knee effusion.  Brief diagnostic ultrasound today shows a large suprapatellar effusion with significant synovitis.  She denies increase in pain, fever/chills, or systemic symptoms.  I briefly discussed this with Dr. Syed today and will defer joint aspiration today and have her follow-up at [facility] later this week for reevaluation and potential re-aspiration.  Embedded sutures were removed in clinic today without complication.     Plan:  -Return to [facility] for postop visit, consider repeat aspiration and synovial fluid analysis at that time  --Continue current antibiotics as scheduled, being followed by infectious disease and labs were obtained this a.m., results pending  - Discussed with patient that if she has worsening symptoms including increased pain, redness, swelling, or fevers she needs to present to the ER urgently. Paperwork with this information sent to nursing facility  - After resolution of current infection/inflammatory issues, I will plan to see Gladys back in clinic and we can discuss genicular nerve blocks/RFA and medial offloader bracing, however will defer this until she has completed  antibiotics and is cleared by orthopedic surgery      Dr. Estrella Walker, DO  [facility]  Sports Medicine     -----  Chief Complaint   Patient presents with    Right Knee - Follow Up       SUBJECTIVE  Gladys Ramirez is a/an 89 year old female who is seen to follow-up on right knee pain.The patient was last seen on 5/20/24, sent to ER due to concern for septic arthritis at that time, now s/p I&D x2.. Since last visit, she reports that her knee pain is a 6/10 with ambulation. The knee continues to swell but it is not as bad as before. She would like to discuss another injection. Had an I&D 3  weeks ago, has not yet followed up with ortho for post-op visit. Complains of significant pain with ambulation, no significant pain at rest. Denies systemic symptoms.     The patient is seen alone      REVIEW OF SYSTEMS:  Pertinent positives/negative: As stated above in HPI    OBJECTIVE:  Ht 1.524 m (5')   Wt 66.2 kg (146 lb)   BMI 28.51 kg/m     General: Alert and in no distress  Skin: no visable rashes  CV: Extremities appear well perfused   Resp: normal respiratory effort, no conversational dyspnea   Psych: normal mood, affect  MSK:  R knee with moderate effusion, warmth, and erythema  Diffuse tenderness to palpation, severe valgus deformity  Crepitus with ROM       RADIOLOGY:  Final results and radiologist's interpretation available in the [facility] health record.  Images below were personally reviewed and discussed with the patient in the office today.  No new imaging    I personally performed a limited diagnostic ultrasound of the patient's right knee in clinic today. This showed a large intra-articular joint effusion with significant synovitis. Permanent images are stored in the patient's record.                     Disclaimer: This note consists of symbols derived from dictation and/or voice recognition software. As a result, there may be errors in the script that have gone undetected. Please  consider this when interpreting information found in this chart.

## 2024-07-22 NOTE — PATIENT INSTRUCTIONS
1. Osteoarthritis of right knee, unspecified osteoarthritis type    2. Chronic pain of right knee    3. Staphylococcal arthritis of right knee (H)        Plan:  -Return to [facility] for postop visit, consider repeat aspiration and synovial fluid analysis at that time  --Continue current antibiotics as scheduled, being followed by infectious disease and labs were obtained this a.m., results pending  - Discussed with patient that if she has worsening symptoms including increased pain, redness, swelling, or fevers she needs to present to the ER urgently. Paperwork with this information sent to nursing facility  - After resolution of current infection/inflammatory issues, I will plan to see Gladys back in clinic and we can discuss genicular nerve blocks/RFA and medial offloader bracing, however will defer this until she has completed antibiotics and is cleared by orthopedic surgery    If you have any questions or concerns after your appointment, please send my team a Sprig message or call the clinic at [phone]   Thank you for choosing [facility]!    Dr. Estrella Walker, DO, [facility]  Sports Medicine and Orthopedics    Dr. Walker's Clinic Locations:   [place] APPOINTMENTS: [phone] [address] [facility] RADIOLOGY: [phone]   [place] [address] PHYSICAL THERAPY: [phone]    HAND THERAPY: [phone]   [place] BILLING QUESTIONS: [phone] [address] FAX: [phone]   [place] [address]

## 2024-07-22 NOTE — LETTER
7/22/2024      Gladys Ramirez  [address]  [facility] [address]      Dear Colleague,    Thank you for referring your patient, Gladys Ramirez, to the [facility]. Please see a copy of my visit note below.    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    Diagnoses and all orders for this visit:    Osteoarthritis of right knee, unspecified osteoarthritis type  -     Sports Med Adult Follow-Up Clinic Order (Blank); Future    Chronic pain of right knee  -     Sports Med Adult Follow-Up Clinic Order (Blank); Future    Staphylococcal arthritis of right knee (H)  -     Sports Med Adult Follow-Up Clinic Order (Blank); Future        89-year-old female with severe right knee osteoarthritis presents to follow-up on  knee pain. She has had I&D x2 secondary to septic arthritis by Dr. Monahan and Dr. Syed with [facility], most recently 7/2/24, has not yet followed up in clinic with ortho. Today, she has embedded sutures of the right knee with associated redness, warmth, and a knee effusion.  Brief diagnostic ultrasound today shows a large suprapatellar effusion with significant synovitis.  She denies increase in pain, fever/chills, or systemic symptoms.  I briefly discussed this with Dr. Syed today and will defer joint aspiration today and have her follow-up at [facility] later this week for reevaluation and potential re-aspiration.  Embedded sutures were removed in clinic today without complication.     Plan:  -Return to [facility] for postop visit, consider repeat aspiration and synovial fluid analysis at that time  --Continue current antibiotics as scheduled, being followed by infectious disease and labs were obtained this a.m., results pending  - Discussed with patient that if she has worsening symptoms including increased pain, redness, swelling, or fevers she needs to present to the ER urgently. Paperwork with  this information sent to nursing facility  - After resolution of current infection/inflammatory issues, I will plan to see Gladys back in clinic and we can discuss genicular nerve blocks/RFA and medial offloader bracing, however will defer this until she has completed antibiotics and is cleared by orthopedic surgery      Dr. Estrella Walker, DO  [facility]  Sports Medicine     -----  Chief Complaint   Patient presents with     Right Knee - Follow Up       SUBJECTIVE  Gladys Ramirez is a/an 89 year old female who is seen to follow-up on right knee pain.The patient was last seen on 5/20/24, sent to ER due to concern for septic arthritis at that time, now s/p I&D x2.. Since last visit, she reports that her knee pain is a 6/10 with ambulation. The knee continues to swell but it is not as bad as before. She would like to discuss another injection. Had an I&D 3  weeks ago, has not yet followed up with ortho for post-op visit. Complains of significant pain with ambulation, no significant pain at rest. Denies systemic symptoms.     The patient is seen alone      REVIEW OF SYSTEMS:  Pertinent positives/negative: As stated above in HPI    OBJECTIVE:  Ht 1.524 m (5')   Wt 66.2 kg (146 lb)   BMI 28.51 kg/m     General: Alert and in no distress  Skin: no visable rashes  CV: Extremities appear well perfused   Resp: normal respiratory effort, no conversational dyspnea   Psych: normal mood, affect  MSK:  R knee with moderate effusion, warmth, and erythema  Diffuse tenderness to palpation, severe valgus deformity  Crepitus with ROM       RADIOLOGY:  Final results and radiologist's interpretation available in the [facility] health record.  Images below were personally reviewed and discussed with the patient in the office today.  No new imaging    I personally performed a limited diagnostic ultrasound of the patient's right knee in clinic today. This showed a large intra-articular joint effusion with  significant synovitis. Permanent images are stored in the patient's record.                     Disclaimer: This note consists of symbols derived from dictation and/or voice recognition software. As a result, there may be errors in the script that have gone undetected. Please consider this when interpreting information found in this chart.        Again, thank you for allowing me to participate in the care of your patient.        Sincerely,        Estrella Walker, DO

## 2024-07-24 ENCOUNTER — OFFICE VISIT (OUTPATIENT)
Dept: CARDIOLOGY | Facility: CLINIC | Age: 89
End: 2024-07-24
Attending: INTERNAL MEDICINE
Payer: COMMERCIAL

## 2024-07-24 ENCOUNTER — LAB REQUISITION (OUTPATIENT)
Dept: LAB | Facility: CLINIC | Age: 89
End: 2024-07-24
Payer: COMMERCIAL

## 2024-07-24 ENCOUNTER — TELEPHONE (OUTPATIENT)
Dept: CARDIOLOGY | Facility: CLINIC | Age: 89
End: 2024-07-24

## 2024-07-24 ENCOUNTER — DOCUMENTATION ONLY (OUTPATIENT)
Dept: OTHER | Facility: CLINIC | Age: 89
End: 2024-07-24

## 2024-07-24 VITALS — DIASTOLIC BLOOD PRESSURE: 68 MMHG | SYSTOLIC BLOOD PRESSURE: 112 MMHG | HEART RATE: 78 BPM | RESPIRATION RATE: 18 BRPM

## 2024-07-24 DIAGNOSIS — Z57.9 OCCUPATIONAL EXPOSURE TO UNSPECIFIED RISK FACTOR: ICD-10-CM

## 2024-07-24 DIAGNOSIS — R00.1 BRADYCARDIA: ICD-10-CM

## 2024-07-24 DIAGNOSIS — I48.0 PAROXYSMAL ATRIAL FIBRILLATION (H): ICD-10-CM

## 2024-07-24 DIAGNOSIS — I42.8 NON-ISCHEMIC CARDIOMYOPATHY (H): ICD-10-CM

## 2024-07-24 DIAGNOSIS — I48.19 PERSISTENT ATRIAL FIBRILLATION (H): ICD-10-CM

## 2024-07-24 DIAGNOSIS — Z95.0 PACEMAKER: ICD-10-CM

## 2024-07-24 DIAGNOSIS — I44.7 LBBB (LEFT BUNDLE BRANCH BLOCK): Primary | ICD-10-CM

## 2024-07-24 LAB
ATRIAL RATE - MUSE: 94 BPM
DIASTOLIC BLOOD PRESSURE - MUSE: 76 MMHG
INTERPRETATION ECG - MUSE: NORMAL
P AXIS - MUSE: NORMAL DEGREES
PR INTERVAL - MUSE: NORMAL MS
QRS DURATION - MUSE: 154 MS
QT - MUSE: 422 MS
QTC - MUSE: 519 MS
R AXIS - MUSE: 50 DEGREES
SYSTOLIC BLOOD PRESSURE - MUSE: 139 MMHG
T AXIS - MUSE: 191 DEGREES
VENTRICULAR RATE- MUSE: 91 BPM

## 2024-07-24 PROCEDURE — 93280 PM DEVICE PROGR EVAL DUAL: CPT | Performed by: INTERNAL MEDICINE

## 2024-07-24 PROCEDURE — G2211 COMPLEX E/M VISIT ADD ON: HCPCS | Performed by: INTERNAL MEDICINE

## 2024-07-24 PROCEDURE — 99214 OFFICE O/P EST MOD 30 MIN: CPT | Performed by: INTERNAL MEDICINE

## 2024-07-24 SDOH — HEALTH STABILITY - PHYSICAL HEALTH: OCCUPATIONAL EXPOSURE TO UNSPECIFIED RISK FACTOR: Z57.9

## 2024-07-24 NOTE — PROGRESS NOTES
HEART CARE ENCOUNTER CONSULTATON NOTE      [facility]  [phone]      Assessment/Recommendations   Assessment/Plan:    Gladys Ramirez is a very pleasant 89 year old female with non ischemic cardiomyopathy(LVEF 25-30%), HTN, h/o PE, LBBB who presents today to discuss device therapy.    1. Non ischemic cardiomyopathy  - s/p pacemaker with LBBB pacing  - QRS post device ~110 ms improved from 150 ms  -We discussed how her atrial fibrillation is preventing resynchronization offered by her pacemaker  -She was on amiodarone in the past and now appears to have become resistant to it  -We discussed the indications, procedural details, possible complications and recovery of an AV node ablation.  Reading material about this procedure was also provided.  She will think about it and get back to us.    2. Persistent atrial fibrillation  - On Amiodarone 100 mg daily and Eliquis for anticoagulation  - On chronic anticoagulation for her h/p PE also, so Watchman not offered    3. HTN  - well controlled    Time spent: 30 minutes spent on the date of the encounter doing chart review, history and exam, documentation and further activities as noted above.    The longitudinal plan of care for the condition(s) below were addressed during this visit. Due to the added complexity in care, I will continue support in the subsequent management of this condition(s) and with the ongoing continuity of care of this condition(s).        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    She had a fall after the last appointment oin 3/4/2023. She hit her head but no LOC. Had partial  hip replacement surgery on 3/5/23 and  went to TCU from 3/27/23-4/10/23. She comes today for follow up.    February 2024    She was hospitalized twice during the month of December. She was first admitted for decompensated heart failure and was started on IV Lasix. She also underwent cardioversion on December 22 which was successful. He had a limited echocardiogram which showed ejection fraction of 25%. Discussion of CRT was done but patient declined. She was then readmitted 5 days later for bradycardia with heart rate in the 30s. Her metoprolol was discontinued and heart rates improved to 50s. Again CRT was discussed but patient declined.     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 She was hospitalized from May 22 through May 29 with septic arthritis. Patient is status post right knee arthroscopic irrigation and debridement. ID was consulted and patient was started on antibiotic.  She underwent a cardioversion in June(cardioverted on June 19 and went back into [facility] June) but then was hospitalized twice in the month of July for exacerbation of her heart failure.  She was found to be in atrial fibrillation during both these admissions.    Recent Device check (personally reviewed):      Device: Medtronic, Carley (D) PPM  Pacing %/Programmed: AP- 0.1%, CSP- 13.9%, DDD 60/110   Lead(s): Stable  Battery longevity: Estimating 13.9 years remaining.  Presenting rhythm: AF/VS @ 67- 105 bpm  Underlying rhythm: AF, w/V-rates @   Heart rates: Histograms show primarily  bpm.  Atrial arrhythmias: Since 6/19/2024- on going AT/AF episode since 6/23/2024. No EGM available. AT/AF burden = 85.9%. V-rates >= 120 bpm ~5%.  Patient states she has felt more fatigued over the last month. Dr. Rivera was updated with today's findings.   Anticoagulant: Eliquis      Antiarythmic: Amioderone  Ventricular arrhythmias: Since 6/19/2024- no VT/VF  Comments: Normal device function. No changes made.     Recent Echocardiogram Results (personally reviewed):    December 2023    Interpretation Summary     There is mild concentric left ventricular hypertrophy.  Biplane LVEF is 25%.  Septal motion is consistent with conduction abnormality.  Mildly decreased right ventricular systolic function  No significant valve disease.  Compared to the prior study dated 11/30/2023, there have been no changes.      Labs below reviewed personally     Physical Examination  Review of Systems   Vitals: /68 (BP Location: Right arm, Patient Position: Sitting, Cuff Size: Adult Regular)   Pulse 78   Resp 18   BMI= There is no height or weight on file to calculate BMI.  Wt Readings from Last 3 Encounters:   07/22/24 66.2 kg (146 lb)   07/17/24 66.4 kg (146 lb 4.8 oz)   07/07/24 69.7 kg (153 lb 10.6 oz)       General Appearance:   no distress, normal body habitus   ENT/Mouth: membranes moist, no oral lesions or bleeding gums.      EYES:  no scleral icterus, normal conjunctivae   Neck: no carotid bruits or thyromegaly   Chest/Lungs:   lungs are clear to auscultation, no rales or wheezing, no sternal scar, equal chest wall expansion    Cardiovascular:   Irregular. Normal first and second heart sounds with no murmurs, rubs, or gallops; the carotid, radial and posterior tibial pulses are intact, no edema bilaterally    Abdomen:  no organomegaly, masses, bruits, or tenderness; bowel sounds are present   Extremities: no cyanosis or clubbing. Left knee in brace   Skin: no xanthelasma, warm.    Neurologic: normal  bilateral, no tremors     Psychiatric: alert and oriented x3, calm        Please refer above for cardiac ROS details.        Medical History  Surgical History Family History Social History   Past Medical History:   Diagnosis Date    Angina pectoris (H24)     Atrial fibrillation (H)     Chest pain  03/09/2017    Chronic kidney disease     Congestive heart failure (H)     Cough     Hyperlipidemia     Hypertension     Osteoarthritis      Past Surgical History:   Procedure Laterality Date    APPENDECTOMY      ARTHROSCOPY KNEE Right 5/23/2024    Procedure: ARTHROSCOPY, KNEE;  Surgeon: Sanchez Monahan MD;  Location: [facility]    BASAL CELL CARCINOMA EXCISION      nose    CARDIOVERSION  06/19/2024    EP PACEMAKER DEVICE & IMPLANT- HIS LEAD DUAL N/A 4/8/2024    Procedure: Pacemaker Device & Lead Implant - HIS Lead Dual;  Surgeon: Jeannie Rivera MD;  Location: [facility] CATH LAB CV    INCISION AND DRAINAGE KNEE, COMBINED Right 5/23/2024    Procedure: INCISION AND DRAINAGE, KNEE;  Surgeon: Sanchez Monahan MD;  Location: [facility] OR    IRRIGATION AND DEBRIDEMENT KNEE, COMBINED Right 7/2/2024    Procedure: RIGHT KNEE OPEN IRRIGATION AND DEBRIDEMENT;  Surgeon: Rakan Syed MD;  Location: [facility]    LAPAROSCOPY DIAGNOSTIC (GENERAL) N/A 11/04/2014    Procedure: LAPAROSCOPY BILATERAL SALPINGO-OOPHORECTOMY ;  Surgeon: Sofia Harper MD;  Location: [facility] OR;  Service:     OPEN REDUCTION INTERNAL FIXATION HIP BIPOLAR Right 3/5/2023    Procedure: HEMIARTHROPLASTY, HIP, BIPOLAR;  Surgeon: Jose G Martinez MD;  Location: [facility]    PICC SINGLE LUMEN PLACEMENT  05/24/2024    TONSILLECTOMY      10 years old    ZZC TOTAL KNEE ARTHROPLASTY Left     2011     Family History   Problem Relation Age of Onset    Heart Disease Mother     Rheumatic Heart Disease Mother     No Known Problems Father     Cancer Brother         brain    Lung Cancer Brother     Lung Cancer Brother     Cancer Sister         lung    Lung Cancer Sister         Social History     Socioeconomic History    Marital status: Single     Spouse name: Not on file    Number of children: Not on file    Years of education: Not on file    Highest education level: Not on file   Occupational History    Not on file    Tobacco Use    Smoking status: Never     Passive exposure: Never    Smokeless tobacco: Never    Tobacco comments:     no passive exposure   Vaping Use    Vaping status: Never Used   Substance and Sexual Activity    Alcohol use: Yes     Comment: Alcoholic Drinks/day: Rarely a glass of wine    Drug use: No    Sexual activity: Not on file   Other Topics Concern    Not on file   Social History Narrative    The patient is a nun.     Social Determinants of Health     Financial Resource Strain: Low Risk  (4/5/2024)    Financial Resource Strain     Within the past 12 months, have you or your family members you live with been unable to get utilities (heat, electricity) when it was really needed?: No   Food Insecurity: Low Risk  (4/5/2024)    Food Insecurity     Within the past 12 months, did you worry that your food would run out before you got money to buy more?: No     Within the past 12 months, did the food you bought just not last and you didn t have money to get more?: No   Transportation Needs: Low Risk  (4/5/2024)    Transportation Needs     Within the past 12 months, has lack of transportation kept you from medical appointments, getting your medicines, non-medical meetings or appointments, work, or from getting things that you need?: No   Physical Activity: Insufficiently Active (3/27/2023)    Exercise Vital Sign     Days of Exercise per Week: 3 days     Minutes of Exercise per Session: 10 min   Stress: No Stress Concern Present (4/5/2024)    [employer] - Occupational Stress Questionnaire     Feeling of Stress : Not at all   Social Connections: Moderately Integrated (3/27/2023)    Social Connection and Isolation Panel [NHANES]     Frequency of Communication with Friends and Family: More than three times a week     Frequency of Social Gatherings with Friends and Family: More than three times a week     Attends [employer] Services: More than 4 times per year     Active Member of Clubs or  Organizations: Yes     Attends Club or Organization Meetings: More than 4 times per year     Marital Status: Never    Interpersonal Safety: Low Risk  (4/5/2024)    Interpersonal Safety     Do you feel physically and emotionally safe where you currently live?: Yes     Within the past 12 months, have you been hit, slapped, kicked or otherwise physically hurt by someone?: No     Within the past 12 months, have you been humiliated or emotionally abused in other ways by your partner or ex-partner?: No   Housing Stability: Low Risk  (4/5/2024)    Housing Stability     Do you have housing? : Yes     Are you worried about losing your housing?: No           Medications  Allergies   Current Outpatient Medications   Medication Sig Dispense Refill    acetaminophen (TYLENOL) 325 MG tablet Take 2 tablets (650 mg) by mouth every 4 hours as needed for mild pain 100 tablet 0    amiodarone (PACERONE) 200 MG tablet Take 0.5 tablets (100 mg) by mouth daily 45 tablet 0    apixaban ANTICOAGULANT (ELIQUIS) 5 MG tablet Take 1 tablet (5 mg) by mouth 2 times daily 60 tablet 3    ceFAZolin (ANCEF) intermittent infusion 2 g in 100 mL dextrose PRE-MIX Inject 100 mLs (2 g) into the vein every 8 hours for 28 days      cholecalciferol, vitamin D3, (VITAMIN D3) 2,000 unit Tab [CHOLECALCIFEROL, VITAMIN D3, (VITAMIN D3) 2,000 UNIT TAB] Take 1 tablet (2,000 Units total) by mouth daily. 90 tablet 3    diclofenac (FLECTOR) 1.3 % patch Externally apply 1 patch topically 2 times daily      DULoxetine (CYMBALTA) 60 MG capsule Take 60 mg by mouth every morning      furosemide (LASIX) 40 MG tablet Take 1 tablet (40 mg) by mouth 2 times daily      lisinopril (ZESTRIL) 2.5 MG tablet TAKE 1 TABLET BY MOUTH ONE TIME DAILY 90 tablet 0    magnesium hydroxide (MILK OF MAGNESIA) 400 MG/5ML suspension Take 30 mLs by mouth daily as needed for constipation (Use if polyethylene glycol (Miralax) is not effective after 24 hours.)      metoprolol succinate ER  "(TOPROL XL) 25 MG 24 hr tablet Take 1 tablet (25 mg) by mouth daily 90 tablet 3    nystatin (MYCOSTATIN) 618086 UNIT/GM external powder Apply topically 2 times daily as needed (rash in skin folds)      oxyCODONE (ROXICODONE) 5 MG tablet Take 0.5 tablets (2.5 mg) by mouth every 4 hours as needed for severe pain 10 tablet 0    polyethylene glycol (MIRALAX) 17 GM/Dose powder Take 17 g by mouth daily      potassium chloride tamiko ER (KLOR-CON M20) 20 MEQ CR tablet Take 1 tablet (20 mEq) by mouth daily 90 tablet 3    senna-docusate (SENOKOT-S/PERICOLACE) 8.6-50 MG tablet Take 2 tablets by mouth 2 times daily as needed for constipation 60 tablet 0       Allergies   Allergen Reactions    Trazodone Shortness Of Breath and Unknown     Allergic to trazodone and deriv., Other: trouble swallowing      Clindamycin Diarrhea     C-diff    Gabapentin Other (See Comments)     \"Internal tremors\"    Temazepam Other (See Comments)     Annotation: Nightmares            Lab Results    Chemistry/lipid CBC Cardiac Enzymes/BNP/TSH/INR   Recent Labs   Lab Test 03/12/15  1230   CHOL 179   HDL 64   LDL 80   TRIG 176*     Recent Labs   Lab Test 03/12/15  1230   LDL 80     Recent Labs   Lab Test 02/08/23  1345      POTASSIUM 4.2   CHLORIDE 105   CO2 27   GLC 82   BUN 20.6   CR 1.16*   GFRESTIMATED 44*   MARLENE 9.8*     Recent Labs   Lab Test 02/08/23  1345 09/07/22  1112 08/28/22  0442   CR 1.16* 1.14* 0.81     Recent Labs   Lab Test 06/15/21  0910   A1C 6.0*          Recent Labs   Lab Test 08/28/22  0442   WBC 6.2   HGB 12.1   HCT 36.1   MCV 93        Recent Labs   Lab Test 08/28/22  0442 08/26/22  0723 08/24/22  0718   HGB 12.1 12.6 12.8    Recent Labs   Lab Test 08/23/22  1154 08/17/22  1215 11/21/19  0451   TROPONINI 0.16 0.04 0.01     Recent Labs   Lab Test 02/08/23  1345 08/23/22  1154 08/17/22  1215 03/03/21  1222 02/21/19  0713   BNP  --  1,933*  --  177* 95   NTBNP 5,373*  --  5,101*  --   --      Recent Labs   Lab Test " 02/21/19  0648   TSH 2.05     Recent Labs   Lab Test 08/23/22  1154 06/05/19  0052 02/21/19  0648   INR 1.37* 1.50* 1.20*        Jeannie Rivera MD

## 2024-07-24 NOTE — PATIENT INSTRUCTIONS
[facility]  Cardiac Electrophysiology  [address]  [place] [address]   Office: [phone]  Fax: [phone]       Thank you for seeing us in clinic today - it is a pleasure to be a part of your care team.  Below is a summary of our plan from today's visit.      Continue current meds  Think about the AV node ablation procedure to control your atrial fibrillation and let us know how you would like to proceed.    Please do not hesitate to be in touch with our office at [phone] with any questions that may arise.      Thank you for trusting us with your care,    Jeannie Rivera MD  Clinical Cardiac Electrophysiology  [facility]  [address]  [place] [address]   Office: [phone]  Fax: [phone]

## 2024-07-24 NOTE — LETTER
7/24/2024    Margret Flores MD  [address] [place] [id number]    RE: Gladys Ramirez       Dear Colleague,     I had the pleasure of seeing Gladys Ramirez in the [facility].    HEART CARE ENCOUNTER CONSULTATON NOTE      [facility]  [phone]      Assessment/Recommendations   Assessment/Plan:    Gladys Ramirez is a very pleasant 89 year old female with non ischemic cardiomyopathy(LVEF 25-30%), HTN, h/o PE, LBBB who presents today to discuss device therapy.    1. Non ischemic cardiomyopathy  - s/p pacemaker with LBBB pacing  - QRS post device ~110 ms improved from 150 ms  -We discussed how her atrial fibrillation is preventing resynchronization offered by her pacemaker  -She was on amiodarone in the past and now appears to have become resistant to it  -We discussed the indications, procedural details, possible complications and recovery of an AV node ablation.  Reading material about this procedure was also provided.  She will think about it and get back to us.    2. Persistent atrial fibrillation  - On Amiodarone 100 mg daily and Eliquis for anticoagulation  - On chronic anticoagulation for her h/p PE also, so Watchman not offered    3. HTN  - well controlled    Time spent: 30 minutes spent on the date of the encounter doing chart review, history and exam, documentation and further activities as noted above.    The longitudinal plan of care for the condition(s) below were addressed during this visit. Due to the added complexity in care, I will continue support in the subsequent management of this condition(s) and with the ongoing continuity of care of this condition(s).        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    She had a fall after the last appointment oin 3/4/2023. She hit her head but no LOC. Had partial  hip replacement surgery on 3/5/23 and went to TCU from 3/27/23-4/10/23. She comes today for follow up.    February 2024    She was hospitalized twice during the month of December. She was first admitted for decompensated heart failure and was started on IV Lasix. She also underwent cardioversion on December 22 which was successful. He had a limited echocardiogram which showed ejection fraction of 25%. Discussion of CRT was done but patient declined. She was then readmitted 5 days later for bradycardia with heart rate in the 30s. Her metoprolol was discontinued and heart rates improved to 50s. Again CRT was discussed but patient declined.     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 She was hospitalized from May 22 through May 29 with septic arthritis. Patient is status post right knee arthroscopic irrigation and debridement. ID was consulted and patient was started on antibiotic.  She underwent a cardioversion in June(cardioverted on June 19 and went back into A-fib on 23 June) but then was hospitalized twice in the month of July for exacerbation of her heart failure.  She was found to be in atrial fibrillation during both these admissions.    Recent Device check (personally reviewed):      Device: Medtronic, Carley (D) PPM  Pacing %/Programmed: AP- 0.1%, CSP- 13.9%, DDD 60/110   Lead(s): Stable  Battery longevity: Estimating 13.9 years remaining.  Presenting rhythm: AF/VS @ 67- 105 bpm  Underlying rhythm: AF, w/V-rates @   Heart rates: Histograms show primarily  bpm.  Atrial arrhythmias: Since 6/19/2024- on going AT/AF episode since  6/23/2024. No EGM available. AT/AF burden = 85.9%. V-rates >= 120 bpm ~5%.  Patient states she has felt more fatigued over the last month. Dr. Rivera was updated with today's findings.   Anticoagulant: Eliquis     Antiarythmic: Amioderone  Ventricular arrhythmias: Since 6/19/2024- no VT/VF  Comments: Normal device function. No changes made.     Recent Echocardiogram Results (personally reviewed):    December 2023    Interpretation Summary     There is mild concentric left ventricular hypertrophy.  Biplane LVEF is 25%.  Septal motion is consistent with conduction abnormality.  Mildly decreased right ventricular systolic function  No significant valve disease.  Compared to the prior study dated 11/30/2023, there have been no changes.      Labs below reviewed personally     Physical Examination  Review of Systems   Vitals: /68 (BP Location: Right arm, Patient Position: Sitting, Cuff Size: Adult Regular)   Pulse 78   Resp 18   BMI= There is no height or weight on file to calculate BMI.  Wt Readings from Last 3 Encounters:   07/22/24 66.2 kg (146 lb)   07/17/24 66.4 kg (146 lb 4.8 oz)   07/07/24 69.7 kg (153 lb 10.6 oz)       General Appearance:   no distress, normal body habitus   ENT/Mouth: membranes moist, no oral lesions or bleeding gums.      EYES:  no scleral icterus, normal conjunctivae   Neck: no carotid bruits or thyromegaly   Chest/Lungs:   lungs are clear to auscultation, no rales or wheezing, no sternal scar, equal chest wall expansion    Cardiovascular:   Irregular. Normal first and second heart sounds with no murmurs, rubs, or gallops; the carotid, radial and posterior tibial pulses are intact, no edema bilaterally    Abdomen:  no organomegaly, masses, bruits, or tenderness; bowel sounds are present   Extremities: no cyanosis or clubbing. Left knee in brace   Skin: no xanthelasma, warm.    Neurologic: normal  bilateral, no tremors     Psychiatric: alert and oriented x3, calm        Please  refer above for cardiac ROS details.        Medical History  Surgical History Family History Social History   Past Medical History:   Diagnosis Date    Angina pectoris (H24)     Atrial fibrillation (H)     Chest pain 03/09/2017    Chronic kidney disease     Congestive heart failure (H)     Cough     Hyperlipidemia     Hypertension     Osteoarthritis      Past Surgical History:   Procedure Laterality Date    APPENDECTOMY      ARTHROSCOPY KNEE Right 5/23/2024    Procedure: ARTHROSCOPY, KNEE;  Surgeon: Sanchez Monahan MD;  Location: [facility]    BASAL CELL CARCINOMA EXCISION      nose    CARDIOVERSION  06/19/2024    EP PACEMAKER DEVICE & IMPLANT- HIS LEAD DUAL N/A 4/8/2024    Procedure: Pacemaker Device & Lead Implant - HIS Lead Dual;  Surgeon: Jeannie Rivera MD;  Location: [facility] CATH LAB CV    INCISION AND DRAINAGE KNEE, COMBINED Right 5/23/2024    Procedure: INCISION AND DRAINAGE, KNEE;  Surgeon: Sanchez Monahan MD;  Location: [facility] OR    IRRIGATION AND DEBRIDEMENT KNEE, COMBINED Right 7/2/2024    Procedure: RIGHT KNEE OPEN IRRIGATION AND DEBRIDEMENT;  Surgeon: Rakan Syed MD;  Location: [facility] OR    LAPAROSCOPY DIAGNOSTIC (GENERAL) N/A 11/04/2014    Procedure: LAPAROSCOPY BILATERAL SALPINGO-OOPHORECTOMY ;  Surgeon: Sofia Harper MD;  Location: [facility] OR;  Service:     OPEN REDUCTION INTERNAL FIXATION HIP BIPOLAR Right 3/5/2023    Procedure: HEMIARTHROPLASTY, HIP, BIPOLAR;  Surgeon: Jose G Martinez MD;  Location: [facility] OR    PICC SINGLE LUMEN PLACEMENT  05/24/2024    TONSILLECTOMY      10 years old    ZZC TOTAL KNEE ARTHROPLASTY Left     2011     Family History   Problem Relation Age of Onset    Heart Disease Mother     Rheumatic Heart Disease Mother     No Known Problems Father     Cancer Brother         brain    Lung Cancer Brother     Lung Cancer Brother     Cancer Sister         lung    Lung Cancer Sister         Social History      Socioeconomic History    Marital status: Single     Spouse name: Not on file    Number of children: Not on file    Years of education: Not on file    Highest education level: Not on file   Occupational History    Not on file   Tobacco Use    Smoking status: Never     Passive exposure: Never    Smokeless tobacco: Never    Tobacco comments:     no passive exposure   Vaping Use    Vaping status: Never Used   Substance and Sexual Activity    Alcohol use: Yes     Comment: Alcoholic Drinks/day: Rarely a glass of wine    Drug use: No    Sexual activity: Not on file   Other Topics Concern    Not on file   Social History Narrative    The patient is a nun.     Social Determinants of Health     Financial Resource Strain: Low Risk  (4/5/2024)    Financial Resource Strain     Within the past 12 months, have you or your family members you live with been unable to get utilities (heat, electricity) when it was really needed?: No   Food Insecurity: Low Risk  (4/5/2024)    Food Insecurity     Within the past 12 months, did you worry that your food would run out before you got money to buy more?: No     Within the past 12 months, did the food you bought just not last and you didn t have money to get more?: No   Transportation Needs: Low Risk  (4/5/2024)    Transportation Needs     Within the past 12 months, has lack of transportation kept you from medical appointments, getting your medicines, non-medical meetings or appointments, work, or from getting things that you need?: No   Physical Activity: Insufficiently Active (3/27/2023)    Exercise Vital Sign     Days of Exercise per Week: 3 days     Minutes of Exercise per Session: 10 min   Stress: No Stress Concern Present (4/5/2024)    [employer] [employer] - Occupational Stress Questionnaire     Feeling of Stress : Not at all   Social Connections: Moderately Integrated (3/27/2023)    Social Connection and Isolation Panel [NHANES]     Frequency of Communication  with Friends and Family: More than three times a week     Frequency of Social Gatherings with Friends and Family: More than three times a week     Attends Restorationist Services: More than 4 times per year     Active Member of Clubs or Organizations: Yes     Attends Club or Organization Meetings: More than 4 times per year     Marital Status: Never    Interpersonal Safety: Low Risk  (4/5/2024)    Interpersonal Safety     Do you feel physically and emotionally safe where you currently live?: Yes     Within the past 12 months, have you been hit, slapped, kicked or otherwise physically hurt by someone?: No     Within the past 12 months, have you been humiliated or emotionally abused in other ways by your partner or ex-partner?: No   Housing Stability: Low Risk  (4/5/2024)    Housing Stability     Do you have housing? : Yes     Are you worried about losing your housing?: No           Medications  Allergies   Current Outpatient Medications   Medication Sig Dispense Refill    acetaminophen (TYLENOL) 325 MG tablet Take 2 tablets (650 mg) by mouth every 4 hours as needed for mild pain 100 tablet 0    amiodarone (PACERONE) 200 MG tablet Take 0.5 tablets (100 mg) by mouth daily 45 tablet 0    apixaban ANTICOAGULANT (ELIQUIS) 5 MG tablet Take 1 tablet (5 mg) by mouth 2 times daily 60 tablet 3    ceFAZolin (ANCEF) intermittent infusion 2 g in 100 mL dextrose PRE-MIX Inject 100 mLs (2 g) into the vein every 8 hours for 28 days      cholecalciferol, vitamin D3, (VITAMIN D3) 2,000 unit Tab [CHOLECALCIFEROL, VITAMIN D3, (VITAMIN D3) 2,000 UNIT TAB] Take 1 tablet (2,000 Units total) by mouth daily. 90 tablet 3    diclofenac (FLECTOR) 1.3 % patch Externally apply 1 patch topically 2 times daily      DULoxetine (CYMBALTA) 60 MG capsule Take 60 mg by mouth every morning      furosemide (LASIX) 40 MG tablet Take 1 tablet (40 mg) by mouth 2 times daily      lisinopril (ZESTRIL) 2.5 MG tablet TAKE 1 TABLET BY MOUTH ONE TIME DAILY 90  "tablet 0    magnesium hydroxide (MILK OF MAGNESIA) 400 MG/5ML suspension Take 30 mLs by mouth daily as needed for constipation (Use if polyethylene glycol (Miralax) is not effective after 24 hours.)      metoprolol succinate ER (TOPROL XL) 25 MG 24 hr tablet Take 1 tablet (25 mg) by mouth daily 90 tablet 3    nystatin (MYCOSTATIN) 813999 UNIT/GM external powder Apply topically 2 times daily as needed (rash in skin folds)      oxyCODONE (ROXICODONE) 5 MG tablet Take 0.5 tablets (2.5 mg) by mouth every 4 hours as needed for severe pain 10 tablet 0    polyethylene glycol (MIRALAX) 17 GM/Dose powder Take 17 g by mouth daily      potassium chloride tamiko ER (KLOR-CON M20) 20 MEQ CR tablet Take 1 tablet (20 mEq) by mouth daily 90 tablet 3    senna-docusate (SENOKOT-S/PERICOLACE) 8.6-50 MG tablet Take 2 tablets by mouth 2 times daily as needed for constipation 60 tablet 0       Allergies   Allergen Reactions    Trazodone Shortness Of Breath and Unknown     Allergic to trazodone and deriv., Other: trouble swallowing      Clindamycin Diarrhea     C-diff    Gabapentin Other (See Comments)     \"Internal tremors\"    Temazepam Other (See Comments)     Annotation: Nightmares            Lab Results    Chemistry/lipid CBC Cardiac Enzymes/BNP/TSH/INR   Recent Labs   Lab Test 03/12/15  1230   CHOL 179   HDL 64   LDL 80   TRIG 176*     Recent Labs   Lab Test 03/12/15  1230   LDL 80     Recent Labs   Lab Test 02/08/23  1345      POTASSIUM 4.2   CHLORIDE 105   CO2 27   GLC 82   BUN 20.6   CR 1.16*   GFRESTIMATED 44*   MARLENE 9.8*     Recent Labs   Lab Test 02/08/23  1345 09/07/22  1112 08/28/22  0442   CR 1.16* 1.14* 0.81     Recent Labs   Lab Test 06/15/21  0910   A1C 6.0*          Recent Labs   Lab Test 08/28/22  0442   WBC 6.2   HGB 12.1   HCT 36.1   MCV 93        Recent Labs   Lab Test 08/28/22  0442 08/26/22  0723 08/24/22  0718   HGB 12.1 12.6 12.8    Recent Labs   Lab Test 08/23/22  1154 08/17/22  1215 11/21/19  0451 "   TROPONINI 0.16 0.04 0.01     Recent Labs   Lab Test 02/08/23  1345 08/23/22  1154 08/17/22  1215 03/03/21  1222 02/21/19  0713   BNP  --  1,933*  --  177* 95   NTBNP 5,373*  --  5,101*  --   --      Recent Labs   Lab Test 02/21/19  0648   TSH 2.05     Recent Labs   Lab Test 08/23/22  1154 06/05/19  0052 02/21/19  0648   INR 1.37* 1.50* 1.20*        Jeannie Rivera MD                      Thank you for allowing me to participate in the care of your patient.      Sincerely,     Jeannie Rivera MD     [facility]  cc:   Saray Coyle MD  [address]  [place] [address]

## 2024-07-24 NOTE — TELEPHONE ENCOUNTER
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      If yes,    Moderate sedation  Continue current medications  No blood work needed on the day of the ablation      ANAHI Pineda

## 2024-07-25 ENCOUNTER — PATIENT OUTREACH (OUTPATIENT)
Dept: CARE COORDINATION | Facility: CLINIC | Age: 89
End: 2024-07-25
Payer: COMMERCIAL

## 2024-07-25 NOTE — PROGRESS NOTES
Clinic Care Coordination Contact  Care Coordination Transition Communication    Clinical Data: Patient was hospitalized at [place] [facility] from 7/15 to 7/17 with diagnosis of Acute on chronic systolic heart failure .     Assessment: Patient has transitioned to TCU/ARU for short term rehabilitation:    [facility]: [facility]  Transition Communication:  Notified facility of Primary Care- Care Coordination support via Telephone.    Plan: Care Coordinator will await notification from facility staff informing of patient's discharge plans/needs. Care Coordinator will review chart and outreach to facility staff every 4 weeks and as needed.     DARIUS Knowles, SW  Social Work Care Coordinator

## 2024-07-25 NOTE — PROGRESS NOTES
Clinic Care Coordination Contact  Care Coordination Transition Communication    Clinical Data: Patient was hospitalized at [facility] from 7/15 to 7/17 with diagnosis of Acute on chronic systolic heart failure  Acute hypoxia, resolved prior to discharge  Recent septic arthritis right knee  Paroxysmal atrial fibrillation  Permanent pacemaker.     Assessment: Patient has transitioned to TCU/ARU for short term rehabilitation:    Facility Name: [facility]  Transition Communication:  Notified facility of Primary Care- Care Coordination support via Telephone.    Plan: Care Coordinator will await notification from facility staff informing of patient's discharge plans/needs. Care Coordinator will review chart and outreach to facility staff every 4 weeks and as needed.     Pt had discharged to [facility] after previous hospital. She admitted on the 15th from [facility], and returned there on the 17th    DARIUS Knowles, GIA  Social Work Care Coordinator

## 2024-07-26 NOTE — TELEPHONE ENCOUNTER
Reviewed with the MD, extend IV abx to 8/15, which is when the follow up is scheduled. I called [facility] at [phone] and provided the v.o.

## 2024-07-29 DIAGNOSIS — M25.561 CHRONIC PAIN OF RIGHT KNEE: ICD-10-CM

## 2024-07-29 DIAGNOSIS — M17.11 PRIMARY LOCALIZED OSTEOARTHRITIS OF RIGHT KNEE: Primary | ICD-10-CM

## 2024-07-29 DIAGNOSIS — G89.29 CHRONIC PAIN OF RIGHT KNEE: ICD-10-CM

## 2024-07-29 DIAGNOSIS — M21.061 GENU VALGUM, ACQUIRED, RIGHT: ICD-10-CM

## 2024-07-29 LAB
ALBUMIN SERPL BCG-MCNC: 3.7 G/DL (ref 3.5–5.2)
ALP SERPL-CCNC: 127 U/L (ref 40–150)
ALT SERPL W P-5'-P-CCNC: <5 U/L (ref 0–50)
ANION GAP SERPL CALCULATED.3IONS-SCNC: 13 MMOL/L (ref 7–15)
AST SERPL W P-5'-P-CCNC: 18 U/L (ref 0–45)
BILIRUB DIRECT SERPL-MCNC: <0.2 MG/DL (ref 0–0.3)
BILIRUB SERPL-MCNC: 0.3 MG/DL
BUN SERPL-MCNC: 20.8 MG/DL (ref 8–23)
CALCIUM SERPL-MCNC: 9.8 MG/DL (ref 8.8–10.4)
CHLORIDE SERPL-SCNC: 100 MMOL/L (ref 98–107)
CREAT SERPL-MCNC: 0.96 MG/DL (ref 0.51–0.95)
CREAT SERPL-MCNC: 0.96 MG/DL (ref 0.51–0.95)
CRP SERPL-MCNC: 19.3 MG/L
EGFRCR SERPLBLD CKD-EPI 2021: 55 ML/MIN/1.73M2
EGFRCR SERPLBLD CKD-EPI 2021: 55 ML/MIN/1.73M2
ERYTHROCYTE [DISTWIDTH] IN BLOOD BY AUTOMATED COUNT: 16.4 % (ref 10–15)
GLUCOSE SERPL-MCNC: 107 MG/DL (ref 70–99)
HCO3 SERPL-SCNC: 28 MMOL/L (ref 22–29)
HCT VFR BLD AUTO: 36.1 % (ref 35–47)
HGB BLD-MCNC: 10.7 G/DL (ref 11.7–15.7)
MCH RBC QN AUTO: 26.8 PG (ref 26.5–33)
MCHC RBC AUTO-ENTMCNC: 29.6 G/DL (ref 31.5–36.5)
MCV RBC AUTO: 90 FL (ref 78–100)
PLATELET # BLD AUTO: 331 10E3/UL (ref 150–450)
POTASSIUM SERPL-SCNC: 3.7 MMOL/L (ref 3.4–5.3)
PROT SERPL-MCNC: 6.6 G/DL (ref 6.4–8.3)
RBC # BLD AUTO: 4 10E6/UL (ref 3.8–5.2)
SODIUM SERPL-SCNC: 141 MMOL/L (ref 135–145)
WBC # BLD AUTO: 7.1 10E3/UL (ref 4–11)

## 2024-07-29 PROCEDURE — 82248 BILIRUBIN DIRECT: CPT | Mod: ORL | Performed by: INTERNAL MEDICINE

## 2024-07-29 PROCEDURE — 85027 COMPLETE CBC AUTOMATED: CPT | Mod: ORL | Performed by: INTERNAL MEDICINE

## 2024-07-29 PROCEDURE — 80053 COMPREHEN METABOLIC PANEL: CPT | Mod: ORL | Performed by: INTERNAL MEDICINE

## 2024-07-29 PROCEDURE — 86140 C-REACTIVE PROTEIN: CPT | Mod: ORL | Performed by: INTERNAL MEDICINE

## 2024-07-29 PROCEDURE — P9604 ONE-WAY ALLOW PRORATED TRIP: HCPCS | Mod: ORL | Performed by: INTERNAL MEDICINE

## 2024-07-29 PROCEDURE — 36415 COLL VENOUS BLD VENIPUNCTURE: CPT | Mod: ORL | Performed by: INTERNAL MEDICINE

## 2024-07-31 ENCOUNTER — LAB REQUISITION (OUTPATIENT)
Dept: LAB | Facility: CLINIC | Age: 89
End: 2024-07-31
Payer: COMMERCIAL

## 2024-07-31 DIAGNOSIS — Z51.81 ENCOUNTER FOR THERAPEUTIC DRUG LEVEL MONITORING: ICD-10-CM

## 2024-08-01 ENCOUNTER — VIRTUAL VISIT (OUTPATIENT)
Dept: INFECTIOUS DISEASES | Facility: CLINIC | Age: 89
End: 2024-08-01
Payer: COMMERCIAL

## 2024-08-01 DIAGNOSIS — M00.061 STAPHYLOCOCCAL ARTHRITIS OF RIGHT KNEE (H): Primary | ICD-10-CM

## 2024-08-01 PROCEDURE — 99213 OFFICE O/P EST LOW 20 MIN: CPT | Mod: 95 | Performed by: INTERNAL MEDICINE

## 2024-08-01 NOTE — PROGRESS NOTES
I called [facility] at [phone] and informed of the below plan to stop IV abx today 8/1/24 and remove the PICC line. Order faxed to [phone]

## 2024-08-01 NOTE — PROGRESS NOTES
Gladys is a 89 year old female presents today via video visit, Verbal consent has been obtained for this service by care team member.    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  A readmission July for aspiration again had no growth.  Aug 2 is the planned stop date.  PICC in on ancef.    CRP is better:  Component      Latest Ref Rng 6/24/2024  7:30 AM 7/1/2024  8:23 AM 7/2/2024  11:43 AM 7/8/2024  5:32 AM 7/15/2024  1:37 PM 7/22/2024  8:10 AM   CRP Inflammation      <5.00 mg/L 92.20 (H)  68.60 (H)  63.10 (H)  99.80 (H)  26.80 (H)  18.00 (H)      Component      Latest Ref Rng 7/29/2024  7:00 AM   CRP Inflammation      <5.00 mg/L 19.30 (H)       Legend:  (H) High    Still can't walk well but I doubt more abx are the answer to this    IMP:  Hx of staph native knee infection, not the fastest recovery thus far    REC:  Pull picc line  Stop abx  See [facility] tomorrow    Can see me again if needed.     EMERALD Reza MD

## 2024-08-02 ENCOUNTER — MEDICAL CORRESPONDENCE (OUTPATIENT)
Dept: HEALTH INFORMATION MANAGEMENT | Facility: CLINIC | Age: 89
End: 2024-08-02
Payer: COMMERCIAL

## 2024-08-02 ENCOUNTER — TRANSFERRED RECORDS (OUTPATIENT)
Dept: HEALTH INFORMATION MANAGEMENT | Facility: CLINIC | Age: 89
End: 2024-08-02
Payer: COMMERCIAL

## 2024-08-06 NOTE — TELEPHONE ENCOUNTER
4th attempt to contact pt  PC to [facility] as we have not heard back from pt [phone]   They report pt makes her own decision, and should be contacted  Confirmed that home number was her cell, and to contact her at that number  PC to home number, no answer and mailbox if full  Will attempt 1 more time to reach out to pt, then mail letter  8/6/2024 8:47 AM  Larissa Aguila RN

## 2024-08-07 NOTE — TELEPHONE ENCOUNTER
5th Attempt to contact pt. No answer and mailbox is full. Will mail letter at this time.    Aleja Tripp RN

## 2024-08-09 ENCOUNTER — PATIENT OUTREACH (OUTPATIENT)
Dept: CARE COORDINATION | Facility: CLINIC | Age: 89
End: 2024-08-09
Payer: COMMERCIAL

## 2024-08-09 NOTE — PROGRESS NOTES
Clinic Care Coordination Contact  [facility]/Voicemail    Clinical Data: Care Coordinator Outreach    Outreach Documentation Number of Outreach Attempt   8/9/2024   1:32 PM 1       Unable to leave message on patient's voicemail with call back information and requested return call.    Plan: Care Coordinator sent care coordination introduction letter on 8/9 via mail. Care Coordinator will try to reach patient again in 10 business days.    DARIUS Knowles, [facility]  Social Work Care Coordinator

## 2024-08-09 NOTE — LETTER
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 I recently tried to call and was unable to reach you. Below is a description of clinic care coordination and how I can further assist you.       The clinic care coordination team is made up of a registered nurse, , financial resource worker and community health worker who understand the health care system. The goal of clinic care coordination is to help you manage your health and improve access to the health care system. Our team works alongside your provider to assist you in determining your health and social needs. We can help you obtain health care and community resources, providing you with necessary information and education. We can work with you through any barriers and develop a care plan that helps coordinate and strengthen the communication between you and your care team.  Our services are voluntary and are offered without charge to you personally.    Please feel free to contact me with any questions or concerns regarding care coordination and what we can offer.      We are focused on providing you with the highest-quality healthcare experience possible.    Sincerely,     Rina Holly, DARIUS, [facility]  Social Work Care Coordinator

## 2024-08-11 ENCOUNTER — MEDICAL CORRESPONDENCE (OUTPATIENT)
Dept: HEALTH INFORMATION MANAGEMENT | Facility: CLINIC | Age: 89
End: 2024-08-11

## 2024-08-12 ENCOUNTER — TELEPHONE (OUTPATIENT)
Dept: FAMILY MEDICINE | Facility: CLINIC | Age: 89
End: 2024-08-12
Payer: COMMERCIAL

## 2024-08-12 DIAGNOSIS — F51.01 PRIMARY INSOMNIA: ICD-10-CM

## 2024-08-12 LAB
MDC_IDC_LEAD_CONNECTION_STATUS: NORMAL
MDC_IDC_LEAD_CONNECTION_STATUS: NORMAL
MDC_IDC_LEAD_IMPLANT_DT: NORMAL
MDC_IDC_LEAD_IMPLANT_DT: NORMAL
MDC_IDC_LEAD_LOCATION: NORMAL
MDC_IDC_LEAD_LOCATION: NORMAL
MDC_IDC_LEAD_LOCATION_DETAIL_1: NORMAL
MDC_IDC_LEAD_LOCATION_DETAIL_1: NORMAL
MDC_IDC_LEAD_MFG: NORMAL
MDC_IDC_LEAD_MFG: NORMAL
MDC_IDC_LEAD_MODEL: NORMAL
MDC_IDC_LEAD_MODEL: NORMAL
MDC_IDC_LEAD_POLARITY_TYPE: NORMAL
MDC_IDC_LEAD_POLARITY_TYPE: NORMAL
MDC_IDC_LEAD_SERIAL: NORMAL
MDC_IDC_LEAD_SERIAL: NORMAL
MDC_IDC_LEAD_SPECIAL_FUNCTION: NORMAL
MDC_IDC_LEAD_SPECIAL_FUNCTION: NORMAL
MDC_IDC_MSMT_BATTERY_DTM: NORMAL
MDC_IDC_MSMT_BATTERY_REMAINING_LONGEVITY: 168 MO
MDC_IDC_MSMT_BATTERY_RRT_TRIGGER: 2.62
MDC_IDC_MSMT_BATTERY_STATUS: NORMAL
MDC_IDC_MSMT_BATTERY_VOLTAGE: 3.19 V
MDC_IDC_MSMT_LEADCHNL_RA_IMPEDANCE_VALUE: 323 OHM
MDC_IDC_MSMT_LEADCHNL_RA_IMPEDANCE_VALUE: 418 OHM
MDC_IDC_MSMT_LEADCHNL_RA_IMPEDANCE_VALUE: 418 OHM
MDC_IDC_MSMT_LEADCHNL_RA_PACING_THRESHOLD_AMPLITUDE: 0.62 V
MDC_IDC_MSMT_LEADCHNL_RA_PACING_THRESHOLD_PULSEWIDTH: 0.4 MS
MDC_IDC_MSMT_LEADCHNL_RA_SENSING_INTR_AMPL: 0.62 MV
MDC_IDC_MSMT_LEADCHNL_RA_SENSING_INTR_AMPL: 0.88 MV
MDC_IDC_MSMT_LEADCHNL_RA_SENSING_INTR_AMPL: 0.9 MV
MDC_IDC_MSMT_LEADCHNL_RV_IMPEDANCE_VALUE: 456 OHM
MDC_IDC_MSMT_LEADCHNL_RV_IMPEDANCE_VALUE: 570 OHM
MDC_IDC_MSMT_LEADCHNL_RV_IMPEDANCE_VALUE: 570 OHM
MDC_IDC_MSMT_LEADCHNL_RV_PACING_THRESHOLD_AMPLITUDE: 0.62 V
MDC_IDC_MSMT_LEADCHNL_RV_PACING_THRESHOLD_AMPLITUDE: 0.75 V
MDC_IDC_MSMT_LEADCHNL_RV_PACING_THRESHOLD_PULSEWIDTH: 0.4 MS
MDC_IDC_MSMT_LEADCHNL_RV_PACING_THRESHOLD_PULSEWIDTH: 0.4 MS
MDC_IDC_MSMT_LEADCHNL_RV_SENSING_INTR_AMPL: 10.62 MV
MDC_IDC_MSMT_LEADCHNL_RV_SENSING_INTR_AMPL: 11.25 MV
MDC_IDC_MSMT_LEADCHNL_RV_SENSING_INTR_AMPL: 11.3 MV
MDC_IDC_PG_IMPLANT_DTM: NORMAL
MDC_IDC_PG_MFG: NORMAL
MDC_IDC_PG_MODEL: NORMAL
MDC_IDC_PG_SERIAL: NORMAL
MDC_IDC_PG_TYPE: NORMAL
MDC_IDC_SESS_CLINIC_NAME: NORMAL
MDC_IDC_SESS_DTM: NORMAL
MDC_IDC_SESS_TYPE: NORMAL
MDC_IDC_SET_BRADY_AT_MODE_SWITCH_RATE: 171 {BEATS}/MIN
MDC_IDC_SET_BRADY_HYSTRATE: NORMAL
MDC_IDC_SET_BRADY_LOWRATE: 60 {BEATS}/MIN
MDC_IDC_SET_BRADY_MAX_SENSOR_RATE: 110 {BEATS}/MIN
MDC_IDC_SET_BRADY_MAX_TRACKING_RATE: 110 {BEATS}/MIN
MDC_IDC_SET_BRADY_MODE: NORMAL
MDC_IDC_SET_BRADY_PAV_DELAY_LOW: 120 MS
MDC_IDC_SET_BRADY_SAV_DELAY_LOW: 100 MS
MDC_IDC_SET_LEADCHNL_RA_PACING_AMPLITUDE: 2 V
MDC_IDC_SET_LEADCHNL_RA_PACING_ANODE_ELECTRODE_1: NORMAL
MDC_IDC_SET_LEADCHNL_RA_PACING_ANODE_LOCATION_1: NORMAL
MDC_IDC_SET_LEADCHNL_RA_PACING_CAPTURE_MODE: NORMAL
MDC_IDC_SET_LEADCHNL_RA_PACING_CATHODE_ELECTRODE_1: NORMAL
MDC_IDC_SET_LEADCHNL_RA_PACING_CATHODE_LOCATION_1: NORMAL
MDC_IDC_SET_LEADCHNL_RA_PACING_POLARITY: NORMAL
MDC_IDC_SET_LEADCHNL_RA_PACING_PULSEWIDTH: 0.4 MS
MDC_IDC_SET_LEADCHNL_RA_SENSING_ANODE_ELECTRODE_1: NORMAL
MDC_IDC_SET_LEADCHNL_RA_SENSING_ANODE_LOCATION_1: NORMAL
MDC_IDC_SET_LEADCHNL_RA_SENSING_CATHODE_ELECTRODE_1: NORMAL
MDC_IDC_SET_LEADCHNL_RA_SENSING_CATHODE_LOCATION_1: NORMAL
MDC_IDC_SET_LEADCHNL_RA_SENSING_POLARITY: NORMAL
MDC_IDC_SET_LEADCHNL_RA_SENSING_SENSITIVITY: 0.15 MV
MDC_IDC_SET_LEADCHNL_RV_PACING_AMPLITUDE: 1.5 V
MDC_IDC_SET_LEADCHNL_RV_PACING_ANODE_ELECTRODE_1: NORMAL
MDC_IDC_SET_LEADCHNL_RV_PACING_ANODE_LOCATION_1: NORMAL
MDC_IDC_SET_LEADCHNL_RV_PACING_CAPTURE_MODE: NORMAL
MDC_IDC_SET_LEADCHNL_RV_PACING_CATHODE_ELECTRODE_1: NORMAL
MDC_IDC_SET_LEADCHNL_RV_PACING_CATHODE_LOCATION_1: NORMAL
MDC_IDC_SET_LEADCHNL_RV_PACING_POLARITY: NORMAL
MDC_IDC_SET_LEADCHNL_RV_PACING_PULSEWIDTH: 0.4 MS
MDC_IDC_SET_LEADCHNL_RV_SENSING_ANODE_ELECTRODE_1: NORMAL
MDC_IDC_SET_LEADCHNL_RV_SENSING_ANODE_LOCATION_1: NORMAL
MDC_IDC_SET_LEADCHNL_RV_SENSING_CATHODE_ELECTRODE_1: NORMAL
MDC_IDC_SET_LEADCHNL_RV_SENSING_CATHODE_LOCATION_1: NORMAL
MDC_IDC_SET_LEADCHNL_RV_SENSING_POLARITY: NORMAL
MDC_IDC_SET_LEADCHNL_RV_SENSING_SENSITIVITY: 0.9 MV
MDC_IDC_SET_ZONE_DETECTION_INTERVAL: 350 MS
MDC_IDC_SET_ZONE_DETECTION_INTERVAL: 400 MS
MDC_IDC_SET_ZONE_STATUS: NORMAL
MDC_IDC_SET_ZONE_TYPE: NORMAL
MDC_IDC_SET_ZONE_VENDOR_TYPE: NORMAL
MDC_IDC_STAT_AT_BURDEN_PERCENT: 100 %
MDC_IDC_STAT_AT_DTM_END: NORMAL
MDC_IDC_STAT_AT_DTM_START: NORMAL
MDC_IDC_STAT_BRADY_DTM_END: NORMAL
MDC_IDC_STAT_BRADY_DTM_START: NORMAL
MDC_IDC_STAT_BRADY_RA_PERCENT_PACED: 0.14 %
MDC_IDC_STAT_BRADY_RV_PERCENT_PACED: 13.86 %
MDC_IDC_STAT_EPISODE_RECENT_COUNT: 0
MDC_IDC_STAT_EPISODE_RECENT_COUNT_DTM_END: NORMAL
MDC_IDC_STAT_EPISODE_RECENT_COUNT_DTM_START: NORMAL
MDC_IDC_STAT_EPISODE_TOTAL_COUNT: 0
MDC_IDC_STAT_EPISODE_TOTAL_COUNT: 3
MDC_IDC_STAT_EPISODE_TOTAL_COUNT_DTM_END: NORMAL
MDC_IDC_STAT_EPISODE_TOTAL_COUNT_DTM_START: NORMAL
MDC_IDC_STAT_EPISODE_TYPE: NORMAL
MDC_IDC_STAT_TACHYTHERAPY_RECENT_DTM_END: NORMAL
MDC_IDC_STAT_TACHYTHERAPY_RECENT_DTM_START: NORMAL
MDC_IDC_STAT_TACHYTHERAPY_TOTAL_DTM_END: NORMAL
MDC_IDC_STAT_TACHYTHERAPY_TOTAL_DTM_START: NORMAL

## 2024-08-12 RX ORDER — ZOLPIDEM TARTRATE 6.25 MG/1
TABLET, FILM COATED, EXTENDED RELEASE ORAL
Qty: 45 TABLET | Refills: 0 | OUTPATIENT
Start: 2024-08-12

## 2024-08-12 NOTE — TELEPHONE ENCOUNTER
Reviewed . Has not had recent refill. Last fill was 4/14/24  Per note from hospitalization medication reconciliation with pharmacist 5/22/24- zolpidem was removed.   Declined refill.

## 2024-08-12 NOTE — TELEPHONE ENCOUNTER
Home Health Care    Reason for call:  Verbal order    Orders are needed for this patient.  PT: 3x a week for 1 week, 2x a week for 2 week, 1x a week for 3 week.    Pt Provider: Dr. Flores    Phone Number Homecare Nurse can be reached at: [phone], secure line ok to leave message.      Okay to leave a detailed message?: Yes at [phone].

## 2024-08-13 NOTE — TELEPHONE ENCOUNTER
Spoke to Dyan, PT with [facility], and relayed Dr. Amaro's message to Dyan.    No further action needed.    Robert Adamson, BSN, RN   [facility]

## 2024-08-15 ENCOUNTER — MEDICAL CORRESPONDENCE (OUTPATIENT)
Dept: HEALTH INFORMATION MANAGEMENT | Facility: CLINIC | Age: 89
End: 2024-08-15

## 2024-08-16 DIAGNOSIS — Z53.9 DIAGNOSIS NOT YET DEFINED: Primary | ICD-10-CM

## 2024-08-16 PROCEDURE — G0180 MD CERTIFICATION HHA PATIENT: HCPCS | Performed by: FAMILY MEDICINE

## 2024-08-21 ENCOUNTER — MEDICAL CORRESPONDENCE (OUTPATIENT)
Dept: HEALTH INFORMATION MANAGEMENT | Facility: CLINIC | Age: 89
End: 2024-08-21
Payer: COMMERCIAL

## 2024-08-21 DIAGNOSIS — Z53.9 DIAGNOSIS NOT YET DEFINED: Primary | ICD-10-CM

## 2024-08-21 PROCEDURE — G0180 MD CERTIFICATION HHA PATIENT: HCPCS | Performed by: FAMILY MEDICINE

## 2024-08-21 NOTE — PROGRESS NOTES
Assessment/Recommendations     Assessment:    1.  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  Previous echo in December 2023 showed LVEF severely reduced at 25%.    Patient was hospitalized from July 15 through 17 with acute on chronic systolic heart failure.  Workup showed chest congestion, elevated BNP and hypoxia.  Patient responded well to IV diuretic therapy and oral Lasix was increased at discharge.    Discharge weight was 146 pounds.  Current weight is 140 lbs     Lives in an independent senior apartment.  Patient lives alone.    Her friends bring food for her.  They try to make low-sodium diet for her.  She reports adequate fluid intake.    Patient is well compensated on exam except noted mild swelling in the right lower extremity and she reports fatigue.    Heart Failure Regimen:  -On beta-blocker therapy with metoprolol succinate 25 mg daily in a.m.  -On ACE inhibitor with lisinopril 2.5 mg daily  -Not on aldosterone blocker therapy  -On diuretic therapy with furosemide 40 mg twice a day  -On potassium chloride 20 mEq daily    2.  Persistent atrial fibrillation: Device check on 7/24/2024 showed significant A-fib burden.  Patient was discussed about AV node ablation by Dr. Rivera.  She states she is not interested in having any further procedure at this time.  On amiodarone 100 mg daily.  On Eliquis for stroke prophylaxis.    3.  Hypertension: Blood pressure stable.    4.   Chronic kidney disease stage IIIa: BMP on on 7/29/2024 showed sodium 141, potassium 3.7, and creatinine 0.96-stable    5.  Septic arthritis, right knee with status post I&D: On IV antibiotic.  Stable CBC on 7/29/2024  Patient saw [facility] on 8-24.  Per their note, patient has  "completed IV antibiotic and PICC line was removed and she is doing well.  Undergoing physical therapy.    6.  Chronic anemia: Hemoglobin stable at 10.7 on 7/29/2024.  Denies active bleeding.    Plan/Recommendation:  -We discussed about increasing her ACE inhibitor for low EF.  Patient is agreeable.  -Increase lisinopril to 2.5 mg twice a day  -Monitor for adverse effect and call us  -Take metoprolol succinate bedtime and see if this helps with fatigue  -BMP in 2 weeks-lab order placed  -Schedule Limited echo in 2 weeks to reassess LVEF  -Patient is interested in Open arm HF diet program-Will need application    Communicated to Dr. Rivera and EP pool support that patient doesn't want to have ablation or any further procedure.     Follow up with Dr. Holley in September as scheduled.  Follow-up with me in 2-3 months in heart failure clinic     The longitudinal plan of care for nonischemic cardiomyopathy, heart failure with reduced ejection fraction, chronic kidney disease stage III, persistent atrial fibrillation was addressed during this visit.?Due to the added   complexity in care, I will continue to support Gladys Ramirez in the subsequent management of this   condition(s) and with the ongoing continuity of care of this condition(s)\".     History of Present Illness/Subjective    Ms. Gladys Ramirez is a 89 year old female with a past medical history of hypertension, persistent atrial fibrillation, left bundle branch block, chronic kidney disease stage III, hyperlipidemia, DVT, status post dual-chamber pacemaker and chronic heart failure with reduced ejection fraction who is seen at [facility] for continued heart failure follow-up.    Patient was hospitalized from May 22 through May 29 with septic arthritis.   Patient was rehospitalized in July with acute on chronic systolic heart failure.    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  She reports feeling much better with her heart failure symptoms since recent hospitalization.  She reports some fatigue.  She denies fatigue, shortness of breath, orthopnea, PND, palpitations, chest pain, and abdominal fullness/bloating.  She does not report any fever or chills.  She is still experiencing some pain in her right knee.  She is undergoing physical therapy.  She takes Tylenol.    ECHO on 5/23/2024-Reviewed:   Interpretation Summary     The left ventricle is normal in size. There is mild concentric left  ventricular hypertrophy.  Left ventricular function is decreased. The ejection fraction is 20-25%  (severely reduced). There is diffuse hypokinesis of the left ventricle. Septal  motion is consistent with conduction abnormality.     The right ventricle is normal in size and function.  The left atrium is moderate to severely dilated. Borderline right atrial  enlargement.  There is mild (1+) mitral regurgitation.  There is mild (1+) tricuspid regurgitation.  Right ventricle systolic pressure estimate normal  IVC diameter and respiratory changes fall into an intermediate range  suggesting an RA pressure of 8 mmHg.  Compared to prior study, there is no significant change.     Physical Examination Review of Systems   /68 (BP Location: Left arm, Patient Position: Sitting, Cuff Size: Adult Large)   Pulse 66   Resp 14   Ht 1.524 m (5')   Wt 63.5 kg (140 lb)   BMI 27.34 kg/m    Body mass index is 27.34 kg/m .  Wt Readings from Last 3 Encounters:   08/22/24 63.5 kg (140 lb)   07/22/24 66.2 kg (146 lb)   07/17/24 66.4 kg (146 lb 4.8 oz)     General Appearance:   no distress, normal body habitus   ENT/Mouth: membranes moist, no oral lesions or bleeding gums.      EYES:  no scleral icterus, normal conjunctivae   Neck: no carotid bruits or thyromegaly   Chest/Lungs:   lungs are clear to auscultation, no rales or wheezing, equal chest wall expansion    Cardiovascular:    Normal first  and second heart sounds with no murmurs, rubs, or gallops; neck vein assessment limited as patient was examined in the chair due to safety concerns of getting on the exam table, trace to mild RLE   Abdomen:  no organomegaly, masses, bruits, or tenderness; bowel sounds are present   Extremities   no cyanosis or clubbing; CMS intact.   Skin: no xanthelasma, warm.    Neurologic:  no tremors   Psychiatric: alert and oriented x3, calm                                                   Negative unless noted in HPI     Medical History  Surgical History Family History Social History   Past Medical History:   Diagnosis Date    Angina pectoris (H24)     Atrial fibrillation (H)     Chest pain 03/09/2017    Chronic kidney disease     Congestive heart failure (H)     Cough     Hyperlipidemia     Hypertension     Osteoarthritis     Past Surgical History:   Procedure Laterality Date    APPENDECTOMY      ARTHROSCOPY KNEE Right 5/23/2024    Procedure: ARTHROSCOPY, KNEE;  Surgeon: Sanchez Monahan MD;  Location: [facility] [place]    BASAL CELL CARCINOMA EXCISION      nose    CARDIOVERSION  06/19/2024    EP PACEMAKER DEVICE & IMPLANT- HIS LEAD DUAL N/A 4/8/2024    Procedure: Pacemaker Device & Lead Implant - HIS Lead Dual;  Surgeon: Jeannie Rivera MD;  Location: [facility] CATH LAB CV    INCISION AND DRAINAGE KNEE, COMBINED Right 5/23/2024    Procedure: INCISION AND DRAINAGE, KNEE;  Surgeon: Sanchez Monahan MD;  [place] [facility] [place]    IRRIGATION AND DEBRIDEMENT KNEE, COMBINED Right 7/2/2024    Procedure: RIGHT KNEE OPEN IRRIGATION AND DEBRIDEMENT;  Surgeon: Rakan Syed MD;  Location: [facility] [place]    LAPAROSCOPY DIAGNOSTIC (GENERAL) N/A 11/04/2014    Procedure: LAPAROSCOPY BILATERAL SALPINGO-OOPHORECTOMY ;  Surgeon: Sofia Harper MD;  Location: [facility] OR;  Service:     OPEN REDUCTION INTERNAL FIXATION HIP BIPOLAR Right 3/5/2023    Procedure: HEMIARTHROPLASTY, HIP, BIPOLAR;  Surgeon:  Jose G Martinez MD;  Location: [facility] [place]    PICC SINGLE LUMEN PLACEMENT  05/24/2024    TONSILLECTOMY      10 years old    ZZC TOTAL KNEE ARTHROPLASTY Left     2011    Family History   Problem Relation Age of Onset    Heart Disease Mother     Rheumatic Heart Disease Mother     No Known Problems Father     Cancer Brother         brain    Lung Cancer Brother     Lung Cancer Brother     Cancer Sister         lung    Lung Cancer Sister     Social History     Socioeconomic History    Marital status: Single     Spouse name: Not on file    Number of children: Not on file    Years of education: Not on file    Highest education level: Not on file   Occupational History    Not on file   Tobacco Use    Smoking status: Never     Passive exposure: Never    Smokeless tobacco: Never    Tobacco comments:     no passive exposure   Vaping Use    Vaping status: Never Used   Substance and Sexual Activity    Alcohol use: Yes     Comment: Alcoholic Drinks/day: Rarely a glass of wine    Drug use: No    Sexual activity: Not on file   Other Topics Concern    Not on file   Social History Narrative    The patient is a nun.     Social Determinants of Health     Financial Resource Strain: Low Risk  (4/5/2024)    Financial Resource Strain     Within the past 12 months, have you or your family members you live with been unable to get utilities (heat, electricity) when it was really needed?: No   Food Insecurity: Low Risk  (4/5/2024)    Food Insecurity     Within the past 12 months, did you worry that your food would run out before you got money to buy more?: No     Within the past 12 months, did the food you bought just not last and you didn t have money to get more?: No   Transportation Needs: Low Risk  (4/5/2024)    Transportation Needs     Within the past 12 months, has lack of transportation kept you from medical appointments, getting your medicines, non-medical meetings or appointments, work, or from getting things that you need?: No    Physical Activity: Insufficiently Active (3/27/2023)    Exercise Vital Sign     Days of Exercise per Week: 3 days     Minutes of Exercise per Session: 10 min   Stress: No Stress Concern Present (4/5/2024)    Citizen of [place] of Occupational Health - Occupational Stress Questionnaire     Feeling of Stress : Not at all   Social Connections: Moderately Integrated (3/27/2023)    Social Connection and Isolation Panel [NHANES]     Frequency of Communication with Friends and Family: More than three times a week     Frequency of Social Gatherings with Friends and Family: More than three times a week     Attends [employer] Services: More than 4 times per year     Active Member of Clubs or Organizations: Yes     Attends Club or Organization Meetings: More than 4 times per year     Marital Status: Never    Interpersonal Safety: Low Risk  (4/5/2024)    Interpersonal Safety     Do you feel physically and emotionally safe where you currently live?: Yes     Within the past 12 months, have you been hit, slapped, kicked or otherwise physically hurt by someone?: No     Within the past 12 months, have you been humiliated or emotionally abused in other ways by your partner or ex-partner?: No   Housing Stability: Low Risk  (4/5/2024)    Housing Stability     Do you have housing? : Yes     Are you worried about losing your housing?: No          Medications  Allergies   Current Outpatient Medications   Medication Sig Dispense Refill    acetaminophen (TYLENOL) 325 MG tablet Take 2 tablets (650 mg) by mouth every 4 hours as needed for mild pain 100 tablet 0    amiodarone (PACERONE) 200 MG tablet Take 0.5 tablets (100 mg) by mouth daily 45 tablet 0    apixaban ANTICOAGULANT (ELIQUIS) 5 MG tablet Take 1 tablet (5 mg) by mouth 2 times daily 60 tablet 3    cholecalciferol, vitamin D3, (VITAMIN D3) 2,000 unit Tab [CHOLECALCIFEROL, VITAMIN D3, (VITAMIN D3) 2,000 UNIT TAB] Take 1 tablet (2,000 Units total) by mouth daily. 90 tablet 3     "diclofenac (FLECTOR) 1.3 % patch Externally apply 1 patch topically 2 times daily      DULoxetine (CYMBALTA) 60 MG capsule Take 60 mg by mouth every morning      furosemide (LASIX) 40 MG tablet Take 1 tablet (40 mg) by mouth 2 times daily      lisinopril (ZESTRIL) 2.5 MG tablet Take 1 tablet (2.5 mg) by mouth 2 times daily. 180 tablet 0    metoprolol succinate ER (TOPROL XL) 25 MG 24 hr tablet Take 1 tablet (25 mg) by mouth at bedtime.      potassium chloride tamiko ER (KLOR-CON M20) 20 MEQ CR tablet Take 1 tablet (20 mEq) by mouth daily 90 tablet 3    magnesium hydroxide (MILK OF MAGNESIA) 400 MG/5ML suspension Take 30 mLs by mouth daily as needed for constipation (Use if polyethylene glycol (Miralax) is not effective after 24 hours.) (Patient not taking: Reported on 8/22/2024)      nystatin (MYCOSTATIN) 872231 UNIT/GM external powder Apply topically 2 times daily as needed (rash in skin folds) (Patient not taking: Reported on 8/22/2024)      oxyCODONE (ROXICODONE) 5 MG tablet Take 0.5 tablets (2.5 mg) by mouth every 4 hours as needed for severe pain (Patient not taking: Reported on 8/22/2024) 10 tablet 0    polyethylene glycol (MIRALAX) 17 GM/Dose powder Take 17 g by mouth daily (Patient not taking: Reported on 8/22/2024)      senna-docusate (SENOKOT-S/PERICOLACE) 8.6-50 MG tablet Take 2 tablets by mouth 2 times daily as needed for constipation (Patient not taking: Reported on 8/22/2024) 60 tablet 0    Allergies   Allergen Reactions    Trazodone Shortness Of Breath and Unknown     Allergic to trazodone and deriv., Other: trouble swallowing      Clindamycin Diarrhea     C-diff    Gabapentin Other (See Comments)     \"Internal tremors\"    Temazepam Other (See Comments)     Annotation: Nightmares           Lab Results    Chemistry/lipid CBC Cardiac Enzymes/BNP/TSH/INR   Lab Results   Component Value Date    CHOL 179 03/12/2015    HDL 64 03/12/2015    TRIG 176 (H) 03/12/2015    BUN 20.8 07/29/2024     07/29/2024    " CO2 28 07/29/2024    Lab Results   Component Value Date    WBC 7.1 07/29/2024    HGB 10.7 (L) 07/29/2024    HCT 36.1 07/29/2024    MCV 90 07/29/2024     07/29/2024    Lab Results   Component Value Date    TROPONINI 0.16 08/23/2022    BNP 1,933 (H) 08/23/2022    TSH 6.25 (H) 12/29/2023    INR 1.33 (H) 03/04/2023        40 minutes spent on the date of encounter doing chart review, review of outside records, review of test results, interpretation with above tests, patient visit, documentation, discussion with other provider, and discussion with family.        This note has been dictated using voice recognition software. Any grammatical, typographical, or context distortions are unintentional and inherent to the software

## 2024-08-22 ENCOUNTER — OFFICE VISIT (OUTPATIENT)
Dept: CARDIOLOGY | Facility: CLINIC | Age: 89
End: 2024-08-22
Payer: COMMERCIAL

## 2024-08-22 VITALS
RESPIRATION RATE: 14 BRPM | DIASTOLIC BLOOD PRESSURE: 68 MMHG | HEIGHT: 60 IN | WEIGHT: 140 LBS | SYSTOLIC BLOOD PRESSURE: 115 MMHG | HEART RATE: 66 BPM | BODY MASS INDEX: 27.48 KG/M2

## 2024-08-22 DIAGNOSIS — N18.31 STAGE 3A CHRONIC KIDNEY DISEASE (CKD) (H): ICD-10-CM

## 2024-08-22 DIAGNOSIS — I50.9 ACUTE DECOMPENSATED HEART FAILURE (H): Primary | ICD-10-CM

## 2024-08-22 DIAGNOSIS — I48.19 PERSISTENT ATRIAL FIBRILLATION (H): ICD-10-CM

## 2024-08-22 DIAGNOSIS — I42.8 NON-ISCHEMIC CARDIOMYOPATHY (H): ICD-10-CM

## 2024-08-22 DIAGNOSIS — Z95.0 CARDIAC PACEMAKER IN SITU: ICD-10-CM

## 2024-08-22 DIAGNOSIS — D64.9 CHRONIC ANEMIA: ICD-10-CM

## 2024-08-22 DIAGNOSIS — Z87.39 H/O SEPTIC ARTHRITIS: ICD-10-CM

## 2024-08-22 DIAGNOSIS — I50.23 ACUTE ON CHRONIC SYSTOLIC (CONGESTIVE) HEART FAILURE (H): ICD-10-CM

## 2024-08-22 DIAGNOSIS — I10 BENIGN ESSENTIAL HYPERTENSION: ICD-10-CM

## 2024-08-22 PROBLEM — M19.071 ARTHRITIS OF MIDTARSAL JOINT OF RIGHT FOOT: Status: RESOLVED | Noted: 2019-01-17 | Resolved: 2024-08-22

## 2024-08-22 PROBLEM — R04.0 FREQUENT NOSEBLEEDS: Status: RESOLVED | Noted: 2019-01-08 | Resolved: 2024-08-22

## 2024-08-22 PROBLEM — R06.09 EXERTIONAL DYSPNEA: Status: RESOLVED | Noted: 2022-08-23 | Resolved: 2024-08-22

## 2024-08-22 PROBLEM — M00.061 STAPHYLOCOCCAL ARTHRITIS OF RIGHT KNEE (H): Status: RESOLVED | Noted: 2024-05-22 | Resolved: 2024-08-22

## 2024-08-22 PROBLEM — M00.9 PYOGENIC ARTHRITIS OF RIGHT KNEE JOINT (H): Status: RESOLVED | Noted: 2024-06-18 | Resolved: 2024-08-22

## 2024-08-22 PROCEDURE — 99215 OFFICE O/P EST HI 40 MIN: CPT | Performed by: NURSE PRACTITIONER

## 2024-08-22 PROCEDURE — G2211 COMPLEX E/M VISIT ADD ON: HCPCS | Performed by: NURSE PRACTITIONER

## 2024-08-22 RX ORDER — METOPROLOL SUCCINATE 25 MG/1
25 TABLET, EXTENDED RELEASE ORAL AT BEDTIME
Status: SHIPPED
Start: 2024-08-22 | End: 2024-09-11

## 2024-08-22 RX ORDER — LISINOPRIL 2.5 MG/1
2.5 TABLET ORAL 2 TIMES DAILY
Qty: 180 TABLET | Refills: 0 | Status: SHIPPED | OUTPATIENT
Start: 2024-08-22

## 2024-08-22 NOTE — LETTER
8/22/2024    Margret Flores MD  [address] [place] [id number]    RE: Gladys Ramirez       Dear Colleague,     I had the pleasure of seeing Gladys Ramirez in the [facility].          Assessment/Recommendations     Assessment:    1.  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  Previous echo in December 2023 showed LVEF severely reduced at 25%.    Patient was hospitalized from July 15 through 17 with acute on chronic systolic heart failure.  Workup showed chest congestion, elevated BNP and hypoxia.  Patient responded well to IV diuretic therapy and oral Lasix was increased at discharge.    Discharge weight was 146 pounds.  Current weight is 140 lbs     Lives in an independent senior apartment.  Patient lives alone.    Her friends bring food for her.  They try to make low-sodium diet for her.  She reports adequate fluid intake.    Patient is well compensated on exam except noted mild swelling in the right lower extremity and she reports fatigue.    Heart Failure Regimen:  -On beta-blocker therapy with metoprolol succinate 25 mg daily in a.m.  -On ACE inhibitor with lisinopril 2.5 mg daily  -Not on aldosterone blocker therapy  -On diuretic therapy with furosemide 40 mg twice a day  -On potassium chloride 20 mEq daily    2.  Persistent atrial fibrillation: Device check on 7/24/2024 showed significant A-fib burden.  Patient was discussed about AV node ablation by Dr. Rivera.  She states she is not interested in having any further procedure at this time.  On amiodarone 100 mg daily.  On Eliquis for stroke prophylaxis.    3.  Hypertension: Blood pressure stable.    4.   Chronic kidney disease stage IIIa: BMP on on 7/29/2024 showed sodium 141,  "potassium 3.7, and creatinine 0.96-stable    5.  Septic arthritis, right knee with status post I&D: On IV antibiotic.  Stable CBC on 7/29/2024  Patient saw [facility] on 8-24.  Per their note, patient has completed IV antibiotic and PICC line was removed and she is doing well.  Undergoing physical therapy.    6.  Chronic anemia: Hemoglobin stable at 10.7 on 7/29/2024.  Denies active bleeding.    Plan/Recommendation:  -We discussed about increasing her ACE inhibitor for low EF.  Patient is agreeable.  -Increase lisinopril to 2.5 mg twice a day  -Monitor for adverse effect and call us  -Take metoprolol succinate bedtime and see if this helps with fatigue  -BMP in 2 weeks-lab order placed  -Schedule Limited echo in 2 weeks to reassess LVEF  -Patient is interested in Open arm HF diet program-Will need application    Communicated to Dr. Rivera and EP pool support that patient doesn't want to have ablation or any further procedure.     Follow up with Dr. Holley in September as scheduled.  Follow-up with me in 2-3 months in heart failure clinic     The longitudinal plan of care for nonischemic cardiomyopathy, heart failure with reduced ejection fraction, chronic kidney disease stage III, persistent atrial fibrillation was addressed during this visit.?Due to the added   complexity in care, I will continue to support Gladys Ramirez in the subsequent management of this   condition(s) and with the ongoing continuity of care of this condition(s)\".     History of Present Illness/Subjective    Ms. Gladys Ramirez is a 89 year old female with a past medical history of hypertension, persistent atrial fibrillation, left bundle branch block, chronic kidney disease stage III, hyperlipidemia, DVT, status post dual-chamber pacemaker and chronic heart failure with reduced ejection fraction who is seen at [facility] for continued heart failure follow-up.    Patient was hospitalized from May " 22 through May 29 with septic arthritis.   Patient was rehospitalized in July with acute on chronic systolic heart failure.    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  She reports feeling much better with her heart failure symptoms since recent hospitalization.  She reports some fatigue.  She denies fatigue, shortness of breath, orthopnea, PND, palpitations, chest pain, and abdominal fullness/bloating.  She does not report any fever or chills.  She is still experiencing some pain in her right knee.  She is undergoing physical therapy.  She takes Tylenol.    ECHO on 5/23/2024-Reviewed:   Interpretation Summary     The left ventricle is normal in size. There is mild concentric left  ventricular hypertrophy.  Left ventricular function is decreased. The ejection fraction is 20-25%  (severely reduced). There is diffuse hypokinesis of the left ventricle. Septal  motion is consistent with conduction abnormality.     The right ventricle is normal in size and function.  The left atrium is moderate to severely dilated. Borderline right atrial  enlargement.  There is mild (1+) mitral regurgitation.  There is mild (1+) tricuspid regurgitation.  Right ventricle systolic pressure estimate normal  IVC diameter and respiratory changes fall into an intermediate range  suggesting an RA pressure of 8 mmHg.  Compared to prior study, there is no significant change.     Physical Examination Review of Systems   /68 (BP Location: Left arm, Patient Position: Sitting, Cuff Size: Adult Large)   Pulse 66   Resp 14   Ht 1.524 m (5')   Wt 63.5 kg (140 lb)   BMI 27.34 kg/m    Body mass index is 27.34 kg/m .  Wt Readings from Last 3 Encounters:   08/22/24 63.5 kg (140 lb)   07/22/24 66.2 kg (146 lb)   07/17/24 66.4 kg (146 lb 4.8 oz)     General Appearance:   no distress, normal body habitus   ENT/Mouth: membranes moist, no oral lesions or bleeding gums.      EYES:  no scleral  icterus, normal conjunctivae   Neck: no carotid bruits or thyromegaly   Chest/Lungs:   lungs are clear to auscultation, no rales or wheezing, equal chest wall expansion    Cardiovascular:    Normal first and second heart sounds with no murmurs, rubs, or gallops; neck vein assessment limited as patient was examined in the chair due to safety concerns of getting on the exam table, trace to mild RLE   Abdomen:  no organomegaly, masses, bruits, or tenderness; bowel sounds are present   Extremities   no cyanosis or clubbing; CMS intact.   Skin: no xanthelasma, warm.    Neurologic:  no tremors   Psychiatric: alert and oriented x3, calm                                                   Negative unless noted in HPI     Medical History  Surgical History Family History Social History   Past Medical History:   Diagnosis Date     Angina pectoris (H24)      Atrial fibrillation (H)      Chest pain 03/09/2017     Chronic kidney disease      Congestive heart failure (H)      Cough      Hyperlipidemia      Hypertension      Osteoarthritis     Past Surgical History:   Procedure Laterality Date     APPENDECTOMY       ARTHROSCOPY KNEE Right 5/23/2024    Procedure: ARTHROSCOPY, KNEE;  Surgeon: Sanchez Monahan MD;  Location: [facility] OR     BASAL CELL CARCINOMA EXCISION      nose     CARDIOVERSION  06/19/2024     EP PACEMAKER DEVICE & IMPLANT- HIS LEAD DUAL N/A 4/8/2024    Procedure: Pacemaker Device & Lead Implant - HIS Lead Dual;  Surgeon: Jeannie Rivera MD;  Location: [facility] CATH LAB CV     INCISION AND DRAINAGE KNEE, COMBINED Right 5/23/2024    Procedure: INCISION AND DRAINAGE, KNEE;  Surgeon: Sanchez Monahan MD;  Location: [facility] OR     IRRIGATION AND DEBRIDEMENT KNEE, COMBINED Right 7/2/2024    Procedure: RIGHT KNEE OPEN IRRIGATION AND DEBRIDEMENT;  Surgeon: Rakan Syed MD;  Location: [facility] OR     LAPAROSCOPY DIAGNOSTIC (GENERAL) N/A 11/04/2014    Procedure: LAPAROSCOPY BILATERAL  SALPINGO-OOPHORECTOMY ;  Surgeon: Sofia Harper MD;  Location: [facility] OR;  Service:      OPEN REDUCTION INTERNAL FIXATION HIP BIPOLAR Right 3/5/2023    Procedure: HEMIARTHROPLASTY, HIP, BIPOLAR;  Surgeon: Jose G Martinez MD;  Location: [facility]     PICC SINGLE LUMEN PLACEMENT  05/24/2024     TONSILLECTOMY      10 years old     ZZC TOTAL KNEE ARTHROPLASTY Left     2011    Family History   Problem Relation Age of Onset     Heart Disease Mother      Rheumatic Heart Disease Mother      No Known Problems Father      Cancer Brother         brain     Lung Cancer Brother      Lung Cancer Brother      Cancer Sister         lung     Lung Cancer Sister     Social History     Socioeconomic History     Marital status: Single     Spouse name: Not on file     Number of children: Not on file     Years of education: Not on file     Highest education level: Not on file   Occupational History     Not on file   Tobacco Use     Smoking status: Never     Passive exposure: Never     Smokeless tobacco: Never     Tobacco comments:     no passive exposure   Vaping Use     Vaping status: Never Used   Substance and Sexual Activity     Alcohol use: Yes     Comment: Alcoholic Drinks/day: Rarely a glass of wine     Drug use: No     Sexual activity: Not on file   Other Topics Concern     Not on file   Social History Narrative    The patient is a nun.     Social Determinants of Health     Financial Resource Strain: Low Risk  (4/5/2024)    Financial Resource Strain      Within the past 12 months, have you or your family members you live with been unable to get utilities (heat, electricity) when it was really needed?: No   Food Insecurity: Low Risk  (4/5/2024)    Food Insecurity      Within the past 12 months, did you worry that your food would run out before you got money to buy more?: No      Within the past 12 months, did the food you bought just not last and you didn t have money to get more?: No   Transportation  Needs: Low Risk  (4/5/2024)    Transportation Needs      Within the past 12 months, has lack of transportation kept you from medical appointments, getting your medicines, non-medical meetings or appointments, work, or from getting things that you need?: No   Physical Activity: Insufficiently Active (3/27/2023)    Exercise Vital Sign      Days of Exercise per Week: 3 days      Minutes of Exercise per Session: 10 min   Stress: No Stress Concern Present (4/5/2024)    [employer] [place] [employer] - Occupational Stress Questionnaire      Feeling of Stress : Not at all   Social Connections: Moderately Integrated (3/27/2023)    Social Connection and Isolation Panel [NHANES]      Frequency of Communication with Friends and Family: More than three times a week      Frequency of Social Gatherings with Friends and Family: More than three times a week      Attends Buddhism Services: More than 4 times per year      Active Member of Clubs or Organizations: Yes      Attends Club or Organization Meetings: More than 4 times per year      Marital Status: Never    Interpersonal Safety: Low Risk  (4/5/2024)    Interpersonal Safety      Do you feel physically and emotionally safe where you currently live?: Yes      Within the past 12 months, have you been hit, slapped, kicked or otherwise physically hurt by someone?: No      Within the past 12 months, have you been humiliated or emotionally abused in other ways by your partner or ex-partner?: No   Housing Stability: Low Risk  (4/5/2024)    Housing Stability      Do you have housing? : Yes      Are you worried about losing your housing?: No          Medications  Allergies   Current Outpatient Medications   Medication Sig Dispense Refill     acetaminophen (TYLENOL) 325 MG tablet Take 2 tablets (650 mg) by mouth every 4 hours as needed for mild pain 100 tablet 0     amiodarone (PACERONE) 200 MG tablet Take 0.5 tablets (100 mg) by mouth daily 45 tablet 0     apixaban  "ANTICOAGULANT (ELIQUIS) 5 MG tablet Take 1 tablet (5 mg) by mouth 2 times daily 60 tablet 3     cholecalciferol, vitamin D3, (VITAMIN D3) 2,000 unit Tab [CHOLECALCIFEROL, VITAMIN D3, (VITAMIN D3) 2,000 UNIT TAB] Take 1 tablet (2,000 Units total) by mouth daily. 90 tablet 3     diclofenac (FLECTOR) 1.3 % patch Externally apply 1 patch topically 2 times daily       DULoxetine (CYMBALTA) 60 MG capsule Take 60 mg by mouth every morning       furosemide (LASIX) 40 MG tablet Take 1 tablet (40 mg) by mouth 2 times daily       lisinopril (ZESTRIL) 2.5 MG tablet Take 1 tablet (2.5 mg) by mouth 2 times daily. 180 tablet 0     metoprolol succinate ER (TOPROL XL) 25 MG 24 hr tablet Take 1 tablet (25 mg) by mouth at bedtime.       potassium chloride tamiko ER (KLOR-CON M20) 20 MEQ CR tablet Take 1 tablet (20 mEq) by mouth daily 90 tablet 3     magnesium hydroxide (MILK OF MAGNESIA) 400 MG/5ML suspension Take 30 mLs by mouth daily as needed for constipation (Use if polyethylene glycol (Miralax) is not effective after 24 hours.) (Patient not taking: Reported on 8/22/2024)       nystatin (MYCOSTATIN) 306617 UNIT/GM external powder Apply topically 2 times daily as needed (rash in skin folds) (Patient not taking: Reported on 8/22/2024)       oxyCODONE (ROXICODONE) 5 MG tablet Take 0.5 tablets (2.5 mg) by mouth every 4 hours as needed for severe pain (Patient not taking: Reported on 8/22/2024) 10 tablet 0     polyethylene glycol (MIRALAX) 17 GM/Dose powder Take 17 g by mouth daily (Patient not taking: Reported on 8/22/2024)       senna-docusate (SENOKOT-S/PERICOLACE) 8.6-50 MG tablet Take 2 tablets by mouth 2 times daily as needed for constipation (Patient not taking: Reported on 8/22/2024) 60 tablet 0    Allergies   Allergen Reactions     Trazodone Shortness Of Breath and Unknown     Allergic to trazodone and deriv., Other: trouble swallowing       Clindamycin Diarrhea     C-diff     Gabapentin Other (See Comments)     \"Internal " "tremors\"     Temazepam Other (See Comments)     Annotation: Nightmares           Lab Results    Chemistry/lipid CBC Cardiac Enzymes/BNP/TSH/INR   Lab Results   Component Value Date    CHOL 179 03/12/2015    HDL 64 03/12/2015    TRIG 176 (H) 03/12/2015    BUN 20.8 07/29/2024     07/29/2024    CO2 28 07/29/2024    Lab Results   Component Value Date    WBC 7.1 07/29/2024    HGB 10.7 (L) 07/29/2024    HCT 36.1 07/29/2024    MCV 90 07/29/2024     07/29/2024    Lab Results   Component Value Date    TROPONINI 0.16 08/23/2022    BNP 1,933 (H) 08/23/2022    TSH 6.25 (H) 12/29/2023    INR 1.33 (H) 03/04/2023        40 minutes spent on the date of encounter doing chart review, review of outside records, review of test results, interpretation with above tests, patient visit, documentation, discussion with other provider, and discussion with family.        This note has been dictated using voice recognition software. Any grammatical, typographical, or context distortions are unintentional and inherent to the software           Thank you for allowing me to participate in the care of your patient.      Sincerely,     SHASHA Quiroz CNP     [facility]  cc:   SHASHA Quiroz CNP  [address]  [place] [address]      "

## 2024-08-22 NOTE — PATIENT INSTRUCTIONS
Gladys Ramirez,    It was a pleasure to see you today at the [facility].     My recommendations after this visit include:    - Increase Lisinopril from 2.5 mg 1 tablet daily to 1 tablet twice a day    - Please call if you develop lightheaded, dizziness or any new side effects    - Take your metoprolol succinate at bed time or supper time and see if this helps with your tiredness     - Schedule limited ECHO in 2 weeks    - Please get your lab work (BMP) during your appointment for ECHO in 2 weeks (lab order placed)    - Low sodium diet < 2000 mg/day, daily weight monitoring, and maintain adequate fluid intake at 50 to 60 ounces per day    -Please call if you experience persistent weight gain 2 to 3 pounds 2 days in a row or 5 pounds in a week with shortness of breath, abdominal bloating and leg swelling    - Follow up with  Dr. Holley in September    - Follow up with me in 2-3 months     - Please call Heart Failure Nurse Line at [phone], if you have any questions or concerns

## 2024-08-26 ENCOUNTER — PATIENT OUTREACH (OUTPATIENT)
Dept: CARE COORDINATION | Facility: CLINIC | Age: 89
End: 2024-08-26
Payer: COMMERCIAL

## 2024-08-27 ENCOUNTER — TELEPHONE (OUTPATIENT)
Dept: INFECTIOUS DISEASES | Facility: CLINIC | Age: 89
End: 2024-08-27
Payer: COMMERCIAL

## 2024-08-27 NOTE — TELEPHONE ENCOUNTER
Spoke to pt- pt states she spoke to her surgery doctor and they are having her come in tomorrow 8/28 to be evaluated. Pt states she will have him evaluate, do not need call back from . Pt will call after appt if surgeon sees necessary or any questions.    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 A readmission July for aspiration again had no growth. Aug 2 is the planned stop date. PICC in on ancef. CRP is better:   Still can't walk well but I doubt more abx are the answer to this     IMP:  Hx of staph native knee infection, not the fastest recovery thus far     REC:  Pull picc line  Stop abx  See [facility] tomorrow

## 2024-08-27 NOTE — TELEPHONE ENCOUNTER
[facility] Call Center    Phone Message    May a detailed message be left on voicemail: yes     Reason for Call: Symptoms or Concerns       Current symptom or concern: Pt reports worsening below R knee pain radiating to her calf. She reports pain is on the same leg she had her surgery and resultingly, an infection. She notes she can hardly stand and can only walk a few steps and has to stop. Pt reports she has been doing therapy but with no relief. Pt is concerned that the infection might be back. She denies fever, rash or any accompanying symptoms.Please advise and call her back asap    Symptoms have been present for: pt did not specify      Has patient previously been seen for this? Yes    By : Libia    Date: 08/01/2024    Are there any new or worsening symptoms? Yes: R leg pain    Action Taken: Other: ID    Travel Screening: Not Applicable     Date of Service:

## 2024-08-28 ENCOUNTER — MEDICAL CORRESPONDENCE (OUTPATIENT)
Dept: HEALTH INFORMATION MANAGEMENT | Facility: CLINIC | Age: 89
End: 2024-08-28
Payer: COMMERCIAL

## 2024-08-28 ENCOUNTER — TRANSFERRED RECORDS (OUTPATIENT)
Dept: HEALTH INFORMATION MANAGEMENT | Facility: CLINIC | Age: 89
End: 2024-08-28
Payer: COMMERCIAL

## 2024-08-30 ENCOUNTER — OFFICE VISIT (OUTPATIENT)
Dept: PALLIATIVE MEDICINE | Facility: OTHER | Age: 89
End: 2024-08-30
Attending: NURSE PRACTITIONER
Payer: COMMERCIAL

## 2024-08-30 VITALS — HEART RATE: 74 BPM | SYSTOLIC BLOOD PRESSURE: 127 MMHG | OXYGEN SATURATION: 97 % | DIASTOLIC BLOOD PRESSURE: 55 MMHG

## 2024-08-30 DIAGNOSIS — Z79.891 LONG TERM (CURRENT) USE OF OPIATE ANALGESIC: ICD-10-CM

## 2024-08-30 DIAGNOSIS — G89.29 CHRONIC INTRACTABLE PAIN: Primary | ICD-10-CM

## 2024-08-30 DIAGNOSIS — S83.511S RUPTURE OF ANTERIOR CRUCIATE LIGAMENT OF RIGHT KNEE, SEQUELA: ICD-10-CM

## 2024-08-30 DIAGNOSIS — M17.11 PRIMARY OSTEOARTHRITIS OF RIGHT KNEE: ICD-10-CM

## 2024-08-30 PROCEDURE — G2211 COMPLEX E/M VISIT ADD ON: HCPCS | Performed by: NURSE PRACTITIONER

## 2024-08-30 PROCEDURE — 99213 OFFICE O/P EST LOW 20 MIN: CPT | Performed by: NURSE PRACTITIONER

## 2024-08-30 PROCEDURE — G0463 HOSPITAL OUTPT CLINIC VISIT: HCPCS | Performed by: NURSE PRACTITIONER

## 2024-08-30 PROCEDURE — 80375 DRUG/SUBSTANCE NOS 1-3: CPT | Performed by: NURSE PRACTITIONER

## 2024-08-30 RX ORDER — OXYCODONE HYDROCHLORIDE 5 MG/1
5 TABLET ORAL 3 TIMES DAILY PRN
Qty: 90 TABLET | Refills: 0 | Status: SHIPPED | OUTPATIENT
Start: 2024-08-30

## 2024-08-30 ASSESSMENT — PAIN SCALES - GENERAL: PAINLEVEL: NO PAIN (0)

## 2024-08-30 NOTE — LETTER

## 2024-08-30 NOTE — PROGRESS NOTES
"[facility]    CHIEF COMPLAINT: Chronic Pain.    INTERVAL HISTORY:  Last seen on 2024.       Recommendations/plan at the last visit included:  30 minutes Clinic follow-up with SHASHA Simpson NP-C a few days after the CT  Imaging: CT of right knee: [facility] ([place]): [phone]   Other: Order for wheelchair provider. Look into buy a transport chair  Medication Management :   Start taking OxyContin 10 m tablet every 12 hours.   REDUCE the Oxycodone 5 mg to two tablets per day  START Gabapentin 100 m capsule twice per day    Since last visit:   - Taking very little pain medication. When she was in the TCU she was given Oxycodone 2.5 mg TID which was \"absolutely worthless\". Has not been taking Oxycodone very often because she was worried about running out. Forget that she could request a refill.  Was hospitalized with right septic knee. Has Home PT.     Pain Information today: No Pain (0)/10. Location of pain: While sitting, pain is 6/10 when she stands/walks.    Annual requirements last collected:  2024      Current Pain Relevant Medications:    Acetaminophen 1000 M tabs 1-2 times daily, not every day  Compounded cream: Ketamine and Ketoprofen, 3-4 x/day PRN  Duloxetine 60 mg daily   Flector Patch:2 patches daily PRN. (When not using topical cream)       Current Controlled Substance Medications:   Ambien 5 m tab at HS  Oxycodone 5 m tablet TID PRN: 22.5 MME/day  OxyContin 10 mg at HS = 15 MME/day  Total opiate dose =  37.5 MME/day     Previous Pain Relevant Medications: (H--helped; HI--Helped initially; [facility]--Somewhat helpful; [facility]--No help; W--worse; SE--side effects; ?--Unsure if helpful)   Opiates: Tramadol: [facility], Buprenorphine:Allergic  NSAIDS: Can't take, on blood thinner  Migraine medications: N/A  Muscle Relaxants: none  Neuropathics: Gabapentin: SE  Anti-depressants for pain: Duloxetine: NH for pain  Anxiety medications: " N/A  Topicals: Compounded cream: Lidocaine, ibuprofen, diclofenac:  OTC medications: Tylenol: takes 4000 mg/day  Sleep Medications: Temazepam: Allergy, Ambien:H  Other medications not covered above:      SUBSTANCE HISTORY:   Past or current illegal drug use: none  Past or current ETOH use: Rarely, glass of wine  Nicotine/tobacco use: none  Daily Caffeine intake: never     CURRENT FAMILY/SOCIAL SITUATION:  Past/Present occupation: [employer]:   Housing status: apartment alone  Emotional/Physical support: Sister Yanyd Reyes, neighbor who is an RN  Safety Concerns: falls risk   Current stressors: Pain     THE 4 As OF OPIOID MAINTENANCE ANALGESIA    Analgesia: Is pain relief clinically significant? NO   Activity: Is patient functional and able to perform Activities of Daily Living? N/A   Adverse effects: Is patient free from adverse side effects from opiates? N/A   Adherence to Rx protocol: Is patient adhering to Controlled Substance Agreement and taking medications ONLY as ordered? N/A     Is Narcan prescribed for opiate use >50 MME daily or concurrent use of opiates and benzodiazepines? N/A    [place] Board of Pharmacy Data Base Reviewed:    YES; No concern for abuse or misuse of controlled medications based on this report. Reviewed [facility] August 29, 2024- no concerning fills.      PHYSICAL EXAM    Vitals:    08/30/24 0934   BP: 127/55   Pulse: 74   SpO2: 97%       Constitutional: healthy, alert, and no distress A&O.   Patient is appropriate.  Psychiatric/mental status: Alert, without lethargy or stupor. Appropriate affect.     Neurologic exam:  CN:  Cranial nerves 2-12 are grossly normal.    MUSCULOSKELETAL:     Posture: Upright, shoulders and pelvis are leveled. No  Antalgic Gait Pattern?: Yes     DIRE Score for ongoing opioid management is calculated as follows:    Diagnosis = 2 pts (slowly progressive; moderate pain/objective findings)    Intractability = 3 pts (patient fully engaged but inadequate response  to treatments)    Risk        Psych = 3 pts (no significant personality dysfunction/mental illness; good communication with clinic)         Chem Hlth = 3 pts (no history of chemical dependency; not drug-focused)       Reliability = 3 pts (highly reliable with meds, appointments, treatments)       Social = 2 pts (reduction in some relationships/life rolls)       (Psych + Chem hlth + Reliability + Social) = 16    Efficacy = 2 pts (moderate benefit/function; low med dose; too early/not tried meds)    DIRE Score = 18        7-13: likely NOT suitable candidate for long-term opioid analgesia       14-21: may be a suitable candidate for long-term opioid analgesia     DIAGNOSTIC RESULTS:     11/15/22: MRI right knee w/o contrast   IMPRESSION:  1.  Horizontal cleavage tear of the posteromedial corner of the meniscus.  2.  Grade 2 cartilage loss both sides of the medial compartment.  3.  Full-thickness cartilage loss along the majority of both sides of the lateral compartment with bony remodeling of the lateral tibial plateau and extensive reactive edema.  4.  Large portions of the lateral meniscal body are not identified and may be completely degenerated. Anterior and posterior subluxation of the anterior and posterior horn, respectively.  5.  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  6.  Semimembranosus and medial gastrocnemius tendinopathy without tearing.  7.  Popliteus tendinopathy without tearing.  8.  Mild quadriceps and patellar tendinopathy without tearing.  9.  Small effusion.  10.  No evidence for acute fracture.     1/20/21: Left hip x-ray  IMPRESSION:  Mild primary degenerative narrowing both hip joints. Mild generalized degenerative change base of the spine and both SI joints.Pelvis and left hip otherwise negative. No fractures. No dislocations.     1/2021: XR KNEE RIGHT 1 OR 2 VWS   IMPRESSION:  Moderate primary osteoarthritis all 3 compartments but most prominent in the lateral compartment. Knee  otherwise negative. No fractures. No joint effusion.     PAIN RELAVENT CONDITIONS:   1.  OA: severe right knee, Baker's cyst.  2.  PMH: CKD Stage 2, A fib, CHF, primary insomnia    DIAGNOSIS AND PLAN:     (G89.29) Chronic intractable pain  (primary encounter diagnosis)  (M17.11) Primary osteoarthritis of right knee  (S83.511S) Rupture of anterior cruciate ligament of right knee, sequela  Comment: Gladys has not been taking OxyContin because she was not sure if she should. She has also taking very little Oxycodone because she was worried about running out. I reminded her that she does not need an appointment to request a refill or ask a question. We will have her restart OxyContin 10 mg at HS, then Oxycodone 5 mg TID PRN with one Tylenol 500 mg or 650 mg. Follow up in 1-2 months or sooner as needed.   Plan: oxyCODONE (ROXICODONE) 5 MG tablet      (Z79.891) Long term (current) use of opiate analgesic  Comment: Annual requirements   Plan: Drug Confirmation Panel Urine with Creat         PATIENT INSTRUCTIONS:     Diagnosis reviewed, treatment option addressed, and risk/benefits discussed.  Self-care instructions given.  I am recommending a multidisciplinary treatment plan to help this patient better manage pain.    Remember to request ALL medication refills 5 BUSINESS days before you run out.     Physical therapy: YES Continue with home PT.   30 minutes Video or Clinic follow-up with SHASHA Simpson NP-C on 24 at 2:30 PM  Labs: Annual required labs and controlled substance agreement   Medication Management :   RESTART OxyContin ER 10 mg: One tablet at bedtime.   CONTINUE Oxycodone 5 m tablet  up to three times per day. Must have 4 hours between doses of Oxycodone 5 mg.   Ok to take Tylenol 500 mg or 650 mg with the Oxycodone 5 mg and OxyContin 10 mg.   Max of 4 tabs or Tylenol 500 mg or 650 mg     I have reviewed the note as documented above.  This accurately captures the substance of my  conversation with the patient.  A total of 26 minutes of preparation, care, and consultation were spent on this visit today.     SHASHA Domínguez, NP-C  [facility]    (Information in italics and blue color are taken from previous pain and consulting medical providers notes and are documented as such)

## 2024-08-30 NOTE — PATIENT INSTRUCTIONS
After Visit Instructions:     Thank you for coming to [facility] for your care. It is my goal to partner with you to help you reach your optimal state of health.   Continue daily self-care, identifying contributing factors, and monitoring variations in pain level. Continue to integrate self-care into your life.      Physical therapy: YES Continue with home PT.   30 minutes Video or Clinic follow-up with SHASHA Simpson NP-C on 24 at 2:30 PM  Labs: Annual required labs and controlled substance agreement   Medication Management :   RESTART OxyContin ER 10 mg: One tablet at bedtime.   CONTINUE Oxycodone 5 m tablet  up to three times per day. Must have 4 hours between doses of Oxycodone 5 mg.   Ok to take Tylenol 500 mg or 650 mg with the Oxycodone 5 mg and OxyContin 10 mg.   Max of 4 tabs or Tylenol 500 mg or 650 mg       SHASHA Domínguez NP-C  [facility]  [facility] - Monday, Thursday and Friday  [facility] - Tuesday      Be sure to request ALL medication refills 5 days prior to the due date whether or not you will see your medical provider in an appointment before the due date.      Do not expect same day refills. If you do not plan ahead you may run out of medications.     Early refills are not provided.  It is your responsibility to manage your medications responsibility and keep them safely stored. Lost or destroyed medications WILL NOT be replaced    Scheduling/Clinic telephone Number for ALL locations:  [phone]    After Hours On-Call Service:  [phone]    Call with any questions about your care and for scheduling assistance.   Calls are returned Monday through Friday between 8 AM and 4:00 PM. We usually get back to you within 2 business days depending on the issue/request.    If we are prescribing your medications:  For opioid medication refills, call the clinic or send a [employer] message 7 days in advance.  Please  include:  Your name and date of birth.   Name of requested medication  Name of the pharmacy.  For non-opioid medications, call your pharmacy directly to request a refill. Please allow 3-4 days to be processed.   Per [place] State Law:  All controlled substance prescriptions must be filled within 30 days of being written.    For those controlled substances allowing refills, pickup must occur within 30 days of last fill.      We believe regular attendance is key to your success in our program!    Any time you are unable to keep your appointment we ask that you call us at least 24 hours in advance to cancel.This will allow us to offer the appointment time to another patient.   Multiple missed appointments may lead to dismissal from the clinic.

## 2024-08-30 NOTE — PROGRESS NOTES
Patient presents to the clinic today for a visit with SHASHA Sims CNP regarding Pain Management.          2/23/2024     2:29 PM 5/6/2024    11:33 AM 8/30/2024     9:37 AM   PEG Score   PEG Total Score 2.67 10 6.67        UDS/CSA- 08.30.2024    Medications- Oxycodone she is trying to save it for a very very very bad day    Notes She is asking how to use the pain meds    [facility] Clinical Assistant

## 2024-08-31 LAB — CREAT UR-MCNC: 98 MG/DL

## 2024-09-01 ENCOUNTER — APPOINTMENT (OUTPATIENT)
Dept: CT IMAGING | Facility: HOSPITAL | Age: 89
End: 2024-09-01
Attending: EMERGENCY MEDICINE
Payer: COMMERCIAL

## 2024-09-01 ENCOUNTER — HOSPITAL ENCOUNTER (EMERGENCY)
Facility: HOSPITAL | Age: 89
Discharge: HOME | End: 2024-09-01
Attending: EMERGENCY MEDICINE | Admitting: EMERGENCY MEDICINE
Payer: COMMERCIAL

## 2024-09-01 VITALS
BODY MASS INDEX: 26.93 KG/M2 | SYSTOLIC BLOOD PRESSURE: 117 MMHG | DIASTOLIC BLOOD PRESSURE: 53 MMHG | WEIGHT: 137.9 LBS | OXYGEN SATURATION: 96 % | HEART RATE: 77 BPM | TEMPERATURE: 97.6 F | RESPIRATION RATE: 23 BRPM

## 2024-09-01 DIAGNOSIS — S01.01XA SCALP LACERATION, INITIAL ENCOUNTER: ICD-10-CM
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
## 2024-09-01 LAB
ANION GAP SERPL CALCULATED.3IONS-SCNC: 12 MMOL/L (ref 7–15)
BASOPHILS # BLD AUTO: 0 10E3/UL (ref 0–0.2)
BASOPHILS NFR BLD AUTO: 1 %
BUN SERPL-MCNC: 23.5 MG/DL (ref 8–23)
CALCIUM SERPL-MCNC: 9.4 MG/DL (ref 8.8–10.4)
CHLORIDE SERPL-SCNC: 100 MMOL/L (ref 98–107)
CREAT SERPL-MCNC: 1.21 MG/DL (ref 0.51–0.95)
EGFRCR SERPLBLD CKD-EPI 2021: 42 ML/MIN/1.73M2
EOSINOPHIL # BLD AUTO: 0.3 10E3/UL (ref 0–0.7)
EOSINOPHIL NFR BLD AUTO: 4 %
ERYTHROCYTE [DISTWIDTH] IN BLOOD BY AUTOMATED COUNT: 16.6 % (ref 10–15)
GLUCOSE SERPL-MCNC: 101 MG/DL (ref 70–99)
HCO3 SERPL-SCNC: 28 MMOL/L (ref 22–29)
HCT VFR BLD AUTO: 34.2 % (ref 35–47)
HGB BLD-MCNC: 10.2 G/DL (ref 11.7–15.7)
IMM GRANULOCYTES # BLD: 0 10E3/UL
IMM GRANULOCYTES NFR BLD: 0 %
LYMPHOCYTES # BLD AUTO: 1.1 10E3/UL (ref 0.8–5.3)
LYMPHOCYTES NFR BLD AUTO: 15 %
MCH RBC QN AUTO: 26.2 PG (ref 26.5–33)
MCHC RBC AUTO-ENTMCNC: 29.8 G/DL (ref 31.5–36.5)
MCV RBC AUTO: 88 FL (ref 78–100)
MONOCYTES # BLD AUTO: 0.7 10E3/UL (ref 0–1.3)
MONOCYTES NFR BLD AUTO: 10 %
NEUTROPHILS # BLD AUTO: 5.3 10E3/UL (ref 1.6–8.3)
NEUTROPHILS NFR BLD AUTO: 71 %
NRBC # BLD AUTO: 0 10E3/UL
NRBC BLD AUTO-RTO: 0 /100
PLATELET # BLD AUTO: 338 10E3/UL (ref 150–450)
POTASSIUM SERPL-SCNC: 4.7 MMOL/L (ref 3.4–5.3)
RBC # BLD AUTO: 3.9 10E6/UL (ref 3.8–5.2)
SODIUM SERPL-SCNC: 140 MMOL/L (ref 135–145)
WBC # BLD AUTO: 7.5 10E3/UL (ref 4–11)

## 2024-09-01 PROCEDURE — 12001 RPR S/N/AX/GEN/TRNK 2.5CM/<: CPT

## 2024-09-01 PROCEDURE — 93005 ELECTROCARDIOGRAM TRACING: CPT | Performed by: EMERGENCY MEDICINE

## 2024-09-01 PROCEDURE — 72125 CT NECK SPINE W/O DYE: CPT

## 2024-09-01 PROCEDURE — 85025 COMPLETE CBC W/AUTO DIFF WBC: CPT | Performed by: EMERGENCY MEDICINE

## 2024-09-01 PROCEDURE — 80048 BASIC METABOLIC PNL TOTAL CA: CPT | Performed by: EMERGENCY MEDICINE

## 2024-09-01 PROCEDURE — 250N000009 HC RX 250: Performed by: EMERGENCY MEDICINE

## 2024-09-01 PROCEDURE — 36415 COLL VENOUS BLD VENIPUNCTURE: CPT | Performed by: EMERGENCY MEDICINE

## 2024-09-01 PROCEDURE — 70450 CT HEAD/BRAIN W/O DYE: CPT

## 2024-09-01 PROCEDURE — 72128 CT CHEST SPINE W/O DYE: CPT

## 2024-09-01 PROCEDURE — 99285 EMERGENCY DEPT VISIT HI MDM: CPT | Mod: 25

## 2024-09-01 RX ADMIN — Medication 3 ML: at 18:22

## 2024-09-01 ASSESSMENT — ACTIVITIES OF DAILY LIVING (ADL)
ADLS_ACUITY_SCORE: 38

## 2024-09-01 ASSESSMENT — COLUMBIA-SUICIDE SEVERITY RATING SCALE - C-SSRS
1. IN THE PAST MONTH, HAVE YOU WISHED YOU WERE DEAD OR WISHED YOU COULD GO TO SLEEP AND NOT WAKE UP?: NO
6. HAVE YOU EVER DONE ANYTHING, STARTED TO DO ANYTHING, OR PREPARED TO DO ANYTHING TO END YOUR LIFE?: NO
2. HAVE YOU ACTUALLY HAD ANY THOUGHTS OF KILLING YOURSELF IN THE PAST MONTH?: NO

## 2024-09-01 NOTE — ED PROVIDER NOTES
EMERGENCY DEPARTMENT NOTE     Name: Gladys Ramirez    Age/Sex: 89 year old female   MRN: [id number]   Evaluation Date & Time:  9/1/2024  4:51 PM    PCP:    Margret Flores   ED Provider: Kel Calixto D.O.       CHIEF COMPLAINT    Fall and Head Laceration     HISTORY OF PRESENT ILLNESS   Gladys Ramirez is a 89 year old year old female with a relevant past history of hypertension, hyperlipidemia stage III chronic kidney disease, atrial fibrillation on Eliquis, nonischemic cardiomyopathy with systolic dysfunction, who presents to the ED  for evaluation of closed head injury after fall.  Patient was preparing for gas this afternoon and was setting her table.  Patient reached forward for candle francis and lost her balance falling backwards striking her head.  No loss of consciousness.  Subsequent bleeding from her scalp.  No neck pain.  Patient denies pain in axial skeleton pelvis or hips mid or lower back.  She was concerned about possible mass in her thoracic area which she has noted over the past several months.  No other review of systems denies symptoms of systemic illness including recent fever, URI, cough, shortness of breath, chest pain, abdominal pain vomiting, diarrhea, rectal bleeding or melena or urinary symptoms.  Patient has history of arthritis in her right knee and has some mild swelling unchanged, no reported trauma to the knee      DIAGNOSIS & DISPOSITION/MEDICAL DECISION MAKING     1. Fall, initial encounter    2. Scalp laceration, initial encounter        EMERGENCY DEPARTMENT COURSE   6:41 PM I met with the patient to gather history and to perform my initial exam.  We discussed treatment options and the plan for care while in the Emergency Department.  Triage vital signs: /59 pulse 85 respiratory rate 18 SpO2 RA 93% temp 97.6  Differential diagnosis considered included but not limited to:  Traumatic process including subdural, ICH, cervical or thoracic spine fracture    MDM:  ED Course  as of 09/01/24 1912   Sun Sep 01, 2024   1837 CT of the head, cervical spine, thoracic spine reviewed by myself and no traumatic process identified including ICH, subdural, skull, cervical spine or thoracic spine fracture.  Formal radiology read pending   1910 Radiology report resulted with no traumatic findings and CT of the head, CT cervical spine, CT thoracic spine   1911 Labs reviewed:     1911 Hemoglobin(!): 10.2   1911 MCV: 88   1911 Creatinine(!): 1.21   1911 GFR Estimate(!): 42  Hemoglobin stable chronic anemia, renal function stable for chronic kidney disease   Laceration repaired as per procedure note.  Patient will be discharged.  Sutures will dissolve.  Patient has a walker at home and is encouraged to continue use of this.  Head injury instructions are given.  If the patient were to have severe headache not improved with Tylenol or develop vomiting she will return to the emergency department.  Discharge Vital Signs:/53   Pulse 72   Temp 97.6  F (36.4  C) (Oral)   Resp 17   Wt 62.6 kg (137 lb 14.4 oz)   SpO2 93%   BMI 26.93 kg/m     PROCEDURES:     PROCEDURE: Laceration Repair   INDICATIONS: Laceration   PROCEDURE PROVIDER: Dr Kel Calixto   SITE: Occipital Scalp   TYPE/SIZE: simple, clean, and no foreign body visualized  2 cm (total length)   FUNCTIONAL ASSESSMENT: Distal sensation and circulation intact   MEDICATION: Topical LET   PREPARATION: scrubbing with Normal saline   DEBRIDEMENT: no debridement   CLOSURE:  Superficial layer closed with 3 stitches of 5-0 Vycril running subcutaneous    Total number of sutures/staples placed: 3        Diagnostic studies:  CT Thoracic Spine w/o Contrast   Final Result   IMPRESSION:   1.  No fracture or posttraumatic subluxation.         CT Cervical Spine w/o Contrast   Final Result   IMPRESSION:   1.  No fracture or posttraumatic subluxation.         CT Head w/o Contrast   Final Result   IMPRESSION:   1.  No CT evidence for acute intracranial process.    2.  Mild chronic microvascular ischemic changes similar to prior.        Labs Ordered and Resulted from Time of ED Arrival to Time of ED Departure   BASIC METABOLIC PANEL - Abnormal       Result Value    Sodium 140      Potassium 4.7      Chloride 100      Carbon Dioxide (CO2) 28      Anion Gap 12      Urea Nitrogen 23.5 (*)     Creatinine 1.21 (*)     GFR Estimate 42 (*)     Calcium 9.4      Glucose 101 (*)    CBC WITH PLATELETS AND DIFFERENTIAL - Abnormal    WBC Count 7.5      RBC Count 3.90      Hemoglobin 10.2 (*)     Hematocrit 34.2 (*)     MCV 88      MCH 26.2 (*)     MCHC 29.8 (*)     RDW 16.6 (*)     Platelet Count 338      % Neutrophils 71      % Lymphocytes 15      % Monocytes 10      % Eosinophils 4      % Basophils 1      % Immature Granulocytes 0      NRBCs per 100 WBC 0      Absolute Neutrophils 5.3      Absolute Lymphocytes 1.1      Absolute Monocytes 0.7      Absolute Eosinophils 0.3      Absolute Basophils 0.0      Absolute Immature Granulocytes 0.0      Absolute NRBCs 0.0       ED INTERVENTIONS     Medications   lido-EPINEPHrine-tetracaine (LET) topical gel GEL (3 mLs Topical $Given 9/1/24 7800)     TOTAL CRITICAL CARE TIME (EXCLUDING PROCEDURES): Not applicable      DISCHARGE MEDICATIONS        Review of your medicines        UNREVIEWED medicines. Ask your doctor about these medicines        Dose / Directions   acetaminophen 325 MG tablet  Commonly known as: TYLENOL  Indication: Pain  Used for: Staphylococcal arthritis of right knee (H)      Dose: 650 mg  Take 2 tablets (650 mg) by mouth every 4 hours as needed for mild pain  Quantity: 100 tablet  Refills: 0     amiodarone 200 MG tablet  Commonly known as: PACERONE  Indication: Atrial Fibrillation  Used for: Paroxysmal atrial fibrillation (H)      Dose: 100 mg  Take 0.5 tablets (100 mg) by mouth daily  Quantity: 45 tablet  Refills: 0     apixaban ANTICOAGULANT 5 MG tablet  Commonly known as: ELIQUIS  Indication: Atrial Fibrillation Not Caused By  A Heart Valve Problem  Used for: Paroxysmal atrial fibrillation (H)      Dose: 5 mg  Take 1 tablet (5 mg) by mouth 2 times daily  Quantity: 60 tablet  Refills: 3     cholecalciferol 50 MCG (2000 UT) tablet  Indication: Osteoporosis, Vitamin D Deficiency  Used for: Vitamin D deficiency      Dose: 1 tablet  [CHOLECALCIFEROL, VITAMIN D3, (VITAMIN D3) 2,000 UNIT TAB] Take 1 tablet (2,000 Units total) by mouth daily.  Quantity: 90 tablet  Refills: 3     diclofenac 1.3 % patch  Commonly known as: FLECTOR  Indication: Acute Pain      Dose: 1 patch  Externally apply 1 patch topically 2 times daily  Refills: 0     DULoxetine 60 MG capsule  Commonly known as: CYMBALTA  Indication: Peripheral Nerve Disease      Dose: 60 mg  Take 60 mg by mouth every morning  Refills: 0     furosemide 40 MG tablet  Commonly known as: LASIX  Indication: Edema, Cardiac Failure  Used for: Non-ischemic cardiomyopathy (H)      Dose: 40 mg  Take 1 tablet (40 mg) by mouth 2 times daily  Refills: 0     lisinopril 2.5 MG tablet  Commonly known as: ZESTRIL  Indication: High Blood Pressure Disorder  Used for: Benign essential hypertension      Dose: 2.5 mg  Take 1 tablet (2.5 mg) by mouth 2 times daily.  Quantity: 180 tablet  Refills: 0     magnesium hydroxide 400 MG/5ML suspension  Commonly known as: MILK OF MAGNESIA  Indication: Constipation  Used for: Drug-induced constipation      Dose: 30 mL  Take 30 mLs by mouth daily as needed for constipation (Use if polyethylene glycol (Miralax) is not effective after 24 hours.)  Refills: 0     metoprolol succinate ER 25 MG 24 hr tablet  Commonly known as: TOPROL XL  Indication: Atrial Fibrillation  Used for: Persistent atrial fibrillation (H)      Dose: 25 mg  Take 1 tablet (25 mg) by mouth at bedtime.  Refills: 0     nystatin 087173 UNIT/GM external powder  Commonly known as: MYCOSTATIN  Indication: Skin Infection due to Candida Yeast  Used for: Candidal intertrigo      Apply topically 2 times daily as needed  (rash in skin folds)  Refills: 0     * oxyCODONE 5 MG tablet  Commonly known as: ROXICODONE  Indication: Acute Pain  Used for: Staphylococcal arthritis of right knee (H)      Dose: 2.5 mg  Take 0.5 tablets (2.5 mg) by mouth every 4 hours as needed for severe pain  Quantity: 10 tablet  Refills: 0     * oxyCODONE 5 MG tablet  Commonly known as: ROXICODONE  Used for: Chronic intractable pain, Primary osteoarthritis of right knee, Rupture of anterior cruciate ligament of right knee, sequela      Dose: 5 mg  Take 1 tablet (5 mg) by mouth 3 times daily as needed for moderate to severe pain. Ok to fill/start August 30, 2024 #90 tabs to last 30 days.  Quantity: 90 tablet  Refills: 0     polyethylene glycol 17 GM/Dose powder  Commonly known as: MIRALAX  Indication: Constipation  Used for: Therapeutic opioid induced constipation      Dose: 17 g  Take 17 g by mouth daily  Refills: 0     potassium chloride tamiko ER 20 MEQ CR tablet  Commonly known as: KLOR-CON M20  Indication: Low Amount of Potassium in the Blood  Used for: Dyspnea on exertion      Dose: 20 mEq  Take 1 tablet (20 mEq) by mouth daily  Quantity: 90 tablet  Refills: 3     senna-docusate 8.6-50 MG tablet  Commonly known as: SENOKOT-S/PERICOLACE  Indication: Constipation  Used for: Staphylococcal arthritis of right knee (H)      Dose: 2 tablet  Take 2 tablets by mouth 2 times daily as needed for constipation  Quantity: 60 tablet  Refills: 0           * This list has 2 medication(s) that are the same as other medications prescribed for you. Read the directions carefully, and ask your doctor or other care provider to review them with you.                DISPOSITION: Home    Medical Decision Making    History:  Supplemental history from: NA  External Record(s) reviewed:   Pain clinic office visit August 30, 2024 for discussion of chronic pain medication management  Outpatient cardiology office visit August 22, 2024:  Assessment:    1.  Nonischemic Cardiomyopathy/Heart  Failure with Reduced Ejection Fraction with Left bundle branch block with LVEF of 20 to 25% per echo on 5/23/2024 with status post pacemaker with LBBB pacing, NYHA class I:  Patient was hospitalized twice with decompensated heart failure and December 2023.  Previous echo in December 2023 showed LVEF severely reduced at 25%.     Patient was hospitalized from July 15 through 17 with acute on chronic systolic heart failure.  Workup showed chest congestion, elevated BNP and hypoxia.  Patient responded well to IV diuretic therapy and oral Lasix was increased at discharge.  Infectious disease virtual visit August 1, 2024 reviewed:  Check up with Sister Gladys. 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 A readmission July for aspiration again had no growth. Aug 2 is the planned stop date. PICC in on ancef. CRP is better:     Work Up:  Emergent/Severe conditions considered and evaluated for: See differential diagnosis  I independently reviewed and interpreted EKG  In additional to work up documented, I considered the following work up: NA  Medications given that require intensive monitoring for toxicity: NA    External consultation:  Discussion of management with another provider: NA    Complicating factors:  Care impacted by chronic illness:   Care affected by social determinants of health: Access to medical care    Disposition considerations: Discharged home without consideration for admission  Prescriptions considered/prescribed: N/A    No MIPS measures identified.  At the conclusion of the encounter I discussed the results of all of the tests and the disposition. The questions were answered. The patient or family acknowledged understanding and was agreeable with the care plan.            INFORMATION SOURCE AND LIMITATIONS    History/Exam limitations: None  Patient information was obtained from: Patient  Use of : N/A        REVIEW OF SYSTEMS:   All other systems reviewed and are  negative except as noted above in HPI.    PATIENT HISTORY     Past Medical History:   Diagnosis Date    Angina pectoris (H24)     Atrial fibrillation (H)     Chest pain 03/09/2017    Chronic kidney disease     Congestive heart failure (H)     Cough     Hyperlipidemia     Hypertension     Osteoarthritis     Pyogenic arthritis of right knee joint (H) 06/18/2024    Staphylococcal arthritis of right knee (H) 05/22/2024     Patient Active Problem List   Diagnosis    Benign essential hypertension    Serum Enzyme Levels - Alkaline Phosphatase Elevated    Hyperlipidemia    Cataract    Insomnia    BCC (basal cell carcinoma of skin)    DNR (do not resuscitate)    Arthrosis of foot - bilateral    Controlled substance agreement signed - for tramadol and ambien signed 1/30/20    Vitamin D deficiency    Lung nodule < 6cm on CT    Chronic pain    Chronic anemia    Gastroesophageal reflux disease without esophagitis    Thyroid nodule    Anticoagulated by anticoagulation treatment    HFrEF (heart failure with reduced ejection fraction) (H)    Stage 3a chronic kidney disease (CKD) (H)    Persistent atrial fibrillation (H)    Non-ischemic cardiomyopathy (H)    History of falling    Long term (current) use of anticoagulants    Cardiac pacemaker in situ    Episodic opioid dependence (H) - F11.2    Primary osteoarthritis of right knee    H/O right knee septic arthritis     Past Surgical History:   Procedure Laterality Date    APPENDECTOMY      ARTHROSCOPY KNEE Right 5/23/2024    Procedure: ARTHROSCOPY, KNEE;  Surgeon: Sanchez Monahan MD;  Location: [facility] [place] OR    BASAL CELL CARCINOMA EXCISION      nose    CARDIOVERSION  06/19/2024    EP PACEMAKER DEVICE & IMPLANT- HIS LEAD DUAL N/A 4/8/2024    Procedure: Pacemaker Device & Lead Implant - HIS Lead Dual;  Surgeon: Jeannie Rivera MD;  Location: [facility] CATH LAB CV    INCISION AND DRAINAGE KNEE, COMBINED Right 5/23/2024    Procedure: INCISION AND DRAINAGE, KNEE;  Surgeon:  "Sanchez Monahan MD;  Location: [facility] [place] OR    IRRIGATION AND DEBRIDEMENT KNEE, COMBINED Right 7/2/2024    Procedure: RIGHT KNEE OPEN IRRIGATION AND DEBRIDEMENT;  Surgeon: Rakan Syed MD;  Location: [facility] [place] OR    LAPAROSCOPY DIAGNOSTIC (GENERAL) N/A 11/04/2014    Procedure: LAPAROSCOPY BILATERAL SALPINGO-OOPHORECTOMY ;  Surgeon: Sofia Harper MD;  Location: [facility] OR;  Service:     OPEN REDUCTION INTERNAL FIXATION HIP BIPOLAR Right 3/5/2023    Procedure: HEMIARTHROPLASTY, HIP, BIPOLAR;  Surgeon: Jose G Martinez MD;  Location: [facility] [place] OR    PICC SINGLE LUMEN PLACEMENT  05/24/2024    TONSILLECTOMY      10 years old    ZZC TOTAL KNEE ARTHROPLASTY Left     2011       Allergies   Allergen Reactions    Trazodone Shortness Of Breath and Unknown     Allergic to trazodone and deriv., Other: trouble swallowing      Clindamycin Diarrhea     C-diff    Gabapentin Other (See Comments)     \"Internal tremors\"    Temazepam Other (See Comments)     Annotation: Nightmares         OUTPATIENT MEDICATIONS     New Prescriptions    No medications on file      Vitals:    09/01/24 1656 09/01/24 1715   BP: 102/59 109/53   Pulse: 85 72   Resp: 18 17   Temp: 97.6  F (36.4  C)    TempSrc: Oral    SpO2: 99% 93%   Weight: 62.6 kg (137 lb 14.4 oz)        Physical Exam   Constitutional: Oriented to person, place, and time. Appears well-developed and well-nourished.   HEENT:    Head: Occipital scalp laceration no active bleeding, no bony step-off  Neck: No cervical spine tenderness  Cardiovascular: Normal rate, regular rhythm and normal heart sounds.    Pulmonary/Chest: Normal effort  and breath sounds normal.  Chest wall atraumatic  Abdominal: Soft. Bowel sounds are normal.  No abdominal wall ecchymosis  Musculoskeletal: Slight swelling of the right knee without erythema or warmth.  No bony tenderness of the axial skeleton, pelvis hips, thoracic or lumbar spine  Neurological: Alert and oriented to " person, place, and time. Normal strength. No sensory deficit. No cranial nerve deficit.  Skin: Skin is warm and dry.   Psychiatric: Normal mood and affect. Behavior is normal. Thought content normal.     DIAGNOSTICS    LABORATORY FINDINGS (REVIEWED AND INTERPRETED):  Labs Ordered and Resulted from Time of ED Arrival to Time of ED Departure   BASIC METABOLIC PANEL - Abnormal       Result Value    Sodium 140      Potassium 4.7      Chloride 100      Carbon Dioxide (CO2) 28      Anion Gap 12      Urea Nitrogen 23.5 (*)     Creatinine 1.21 (*)     GFR Estimate 42 (*)     Calcium 9.4      Glucose 101 (*)    CBC WITH PLATELETS AND DIFFERENTIAL - Abnormal    WBC Count 7.5      RBC Count 3.90      Hemoglobin 10.2 (*)     Hematocrit 34.2 (*)     MCV 88      MCH 26.2 (*)     MCHC 29.8 (*)     RDW 16.6 (*)     Platelet Count 338      % Neutrophils 71      % Lymphocytes 15      % Monocytes 10      % Eosinophils 4      % Basophils 1      % Immature Granulocytes 0      NRBCs per 100 WBC 0      Absolute Neutrophils 5.3      Absolute Lymphocytes 1.1      Absolute Monocytes 0.7      Absolute Eosinophils 0.3      Absolute Basophils 0.0      Absolute Immature Granulocytes 0.0      Absolute NRBCs 0.0           IMAGING (REVIEWED AND INTERPRETED):  CT Thoracic Spine w/o Contrast   Final Result   IMPRESSION:   1.  No fracture or posttraumatic subluxation.         CT Cervical Spine w/o Contrast   Final Result   IMPRESSION:   1.  No fracture or posttraumatic subluxation.         CT Head w/o Contrast   Final Result   IMPRESSION:   1.  No CT evidence for acute intracranial process.   2.  Mild chronic microvascular ischemic changes similar to prior.            ECG (REVIEWED AND INTERPRETED):   ECG:   Performed at: 1650 6 PM  HR: 72 bpm  Rhythm: Paced rhythm  Interpretation: Paced rhythm  Compared to most recent ECG from: July 15, 2024 paced rhythm.  Replaces atrial fibrillation heart rate decreased by 19 bpm    I have reviewed the patient's  ECG, with comments made as listed above. Please see scanned image for full interpretation.             Kel Calixto D.O.  EMERGENCY MEDICINE   09/01/24  [facility] EMERGENCY DEPARTMENT  [address] [place] [id number] [phone]  Dept: [phone]     Kel Calixto DO  09/01/24 1918

## 2024-09-01 NOTE — ED NOTES
Bed: JNEDH-G  Expected date: 9/1/24  Expected time: 4:40 PM  Means of arrival: Ambulance  Comments:  [place]  89M  Fall  Hit head   +thinners

## 2024-09-01 NOTE — ED TRIAGE NOTES
Arrival [facility] ems from home tripped when standing hit back of head, denies loc. Has lac to back of head, bleeding controlled. Pt on thinners, trauma alert called.      Triage Assessment (Adult)       Row Name 09/01/24 0300          Triage Assessment    Airway WDL WDL        Respiratory WDL    Respiratory WDL WDL        Peripheral/Neurovascular WDL    Peripheral Neurovascular WDL WDL

## 2024-09-02 NOTE — DISCHARGE INSTRUCTIONS
Continue usual walker for ambulation assistance.  Sutures placed should dissolve but if they are bothersome they can be removed at your primary care clinic.  If you have any concerning symptoms regarding the head injury including headache not improved with Tylenol or vomiting return to the emergency department.

## 2024-09-03 ENCOUNTER — TELEPHONE (OUTPATIENT)
Dept: FAMILY MEDICINE | Facility: CLINIC | Age: 89
End: 2024-09-03
Payer: COMMERCIAL

## 2024-09-03 DIAGNOSIS — F51.01 PRIMARY INSOMNIA: Primary | ICD-10-CM

## 2024-09-03 LAB
OXYCODONE UR CFM-MCNC: 981 NG/ML
OXYCODONE/CREAT UR: 1001 NG/MG {CREAT}
OXYMORPHONE UR CFM-MCNC: 486 NG/ML
OXYMORPHONE/CREAT UR: 496 NG/MG {CREAT}

## 2024-09-03 NOTE — TELEPHONE ENCOUNTER
Medication Question or Refill    Contacts       Contact Date/Time Type Contact Phone/Fax    09/03/2024 01:58 PM CDT Phone (Incoming) Ashley, Sister Gladys NAJERA (Self) [phone] (M)          What medication are you calling about (include dose and sig)?: zolpidem ER (AMBIEN CR) 6.25 MG CR tablet     Preferred Pharmacy:  [id number] - [place] [[place]], [place] - [address] [place] [id number]  Phone: [phone] Fax: [phone]    Controlled Substance Agreement on file:   CSA -- Patient Level:     [Media Unavailable] Controlled Substance Agreement - Opioid - Scan on 8/30/2024  1:24 PM   [Media Unavailable] Controlled Substance Agreement - Opioid - Scan on 11/15/2022  1:07 PM   [Media Unavailable] Controlled Substance Agreement - Opioid - Scan on 1/30/2020   [Media Unavailable] Controlled Substance Agreement - Opioid - Scan on 3/21/2019   [Media Unavailable] Controlled Substance Agreement - Non - Opioid - Scan on 3/15/2019   [Media Unavailable] Controlled Substance Agreement - Opioid - Scan on 1/16/2019       Who prescribed the medication?: Dr. Flores     Do you need a refill? Yes    Patient offered an appointment? Yes: Ambien has fallen off of patient's active medication list. States she was in a TCU for 3 months post knee surgery. She is requesting PCP to place new order for Ambien to help her sleep.     6.25 MG tablet   She takes 1 and 1/4 tablet by mouth at bedtime     Do you have any questions or concerns?  No      Okay to leave a detailed message?: Yes at Cell number on file:    Telephone Information:   Mobile [phone]

## 2024-09-04 ENCOUNTER — MEDICAL CORRESPONDENCE (OUTPATIENT)
Dept: HEALTH INFORMATION MANAGEMENT | Facility: CLINIC | Age: 89
End: 2024-09-04
Payer: COMMERCIAL

## 2024-09-04 LAB
ATRIAL RATE - MUSE: 312 BPM
DIASTOLIC BLOOD PRESSURE - MUSE: 59 MMHG
INTERPRETATION ECG - MUSE: NORMAL
P AXIS - MUSE: NORMAL DEGREES
PR INTERVAL - MUSE: NORMAL MS
QRS DURATION - MUSE: 114 MS
QT - MUSE: 450 MS
QTC - MUSE: 492 MS
R AXIS - MUSE: -27 DEGREES
SYSTOLIC BLOOD PRESSURE - MUSE: 102 MMHG
T AXIS - MUSE: 146 DEGREES
VENTRICULAR RATE- MUSE: 72 BPM

## 2024-09-04 RX ORDER — ZOLPIDEM TARTRATE 6.25 MG/1
TABLET, FILM COATED, EXTENDED RELEASE ORAL
Qty: 45 TABLET | Refills: 5 | Status: SHIPPED | OUTPATIENT
Start: 2024-09-04

## 2024-09-04 NOTE — TELEPHONE ENCOUNTER
"Author contacted patient to ask prescriber's questions below.     Patient states that she did not receive while she was in the TCU. While at the TCU patient was given Oxycodone.     Patient has been taking Ambien for \"a long time.\" Patient would like prescriber to send the Ambien to preferred pharmacy below.     [id number] [[address]     "

## 2024-09-04 NOTE — TELEPHONE ENCOUNTER
Reviewed  database- no longer on oxycodone- last fill 6/25/24. Need to be cautious in elderly patient with zolpidem. However, since patient has been on this long-term, will refill.

## 2024-09-06 ENCOUNTER — MEDICAL CORRESPONDENCE (OUTPATIENT)
Dept: HEALTH INFORMATION MANAGEMENT | Facility: CLINIC | Age: 89
End: 2024-09-06

## 2024-09-06 ENCOUNTER — HOSPITAL ENCOUNTER (OUTPATIENT)
Dept: CARDIOLOGY | Facility: HOSPITAL | Age: 89
Discharge: HOME | End: 2024-09-06
Attending: NURSE PRACTITIONER | Admitting: NURSE PRACTITIONER
Payer: COMMERCIAL

## 2024-09-06 DIAGNOSIS — I50.23 ACUTE ON CHRONIC SYSTOLIC (CONGESTIVE) HEART FAILURE (H): ICD-10-CM

## 2024-09-06 DIAGNOSIS — I42.8 NON-ISCHEMIC CARDIOMYOPATHY (H): ICD-10-CM

## 2024-09-06 LAB — LVEF ECHO: NORMAL

## 2024-09-06 PROCEDURE — 93308 TTE F-UP OR LMTD: CPT | Mod: 26 | Performed by: INTERNAL MEDICINE

## 2024-09-06 PROCEDURE — 93321 DOPPLER ECHO F-UP/LMTD STD: CPT | Mod: 26 | Performed by: INTERNAL MEDICINE

## 2024-09-06 PROCEDURE — 93325 DOPPLER ECHO COLOR FLOW MAPG: CPT | Mod: 26 | Performed by: INTERNAL MEDICINE

## 2024-09-06 PROCEDURE — 255N000002 HC RX 255 OP 636: Performed by: NURSE PRACTITIONER

## 2024-09-06 RX ADMIN — PERFLUTREN 2 ML: 6.52 INJECTION, SUSPENSION INTRAVENOUS at 13:22

## 2024-09-06 NOTE — TELEPHONE ENCOUNTER
----- Message from William Olveragregdorota sent at 9/6/2024  3:41 PM CDT -----  LVEF has declined further.  She declined further procedure when I saw her on 8/22/24.  I increased her lisinopril on 8/22/24.  Cr slightly up on 9/1.  If tolerating, continue current HF regimen.  Consider repeat BMP during next follow up Dr. Holley on 9/11.  Thank you!  ARCHANA VERA to Dr. Holley

## 2024-09-09 ENCOUNTER — TELEPHONE (OUTPATIENT)
Dept: CARDIOLOGY | Facility: CLINIC | Age: 89
End: 2024-09-09

## 2024-09-09 ENCOUNTER — ANCILLARY PROCEDURE (OUTPATIENT)
Dept: CARDIOLOGY | Facility: CLINIC | Age: 89
End: 2024-09-09
Attending: INTERNAL MEDICINE
Payer: COMMERCIAL

## 2024-09-09 ENCOUNTER — TELEPHONE (OUTPATIENT)
Dept: CARDIOLOGY | Facility: CLINIC | Age: 89
End: 2024-09-09
Payer: COMMERCIAL

## 2024-09-09 DIAGNOSIS — I50.20 HFREF (HEART FAILURE WITH REDUCED EJECTION FRACTION) (H): Primary | ICD-10-CM

## 2024-09-09 DIAGNOSIS — Z95.0 PACEMAKER: ICD-10-CM

## 2024-09-09 DIAGNOSIS — I48.0 PAROXYSMAL ATRIAL FIBRILLATION (H): ICD-10-CM

## 2024-09-09 DIAGNOSIS — R00.1 BRADYCARDIA: ICD-10-CM

## 2024-09-09 LAB
MDC_IDC_LEAD_CONNECTION_STATUS: NORMAL
MDC_IDC_LEAD_CONNECTION_STATUS: NORMAL
MDC_IDC_LEAD_IMPLANT_DT: NORMAL
MDC_IDC_LEAD_IMPLANT_DT: NORMAL
MDC_IDC_LEAD_LOCATION: NORMAL
MDC_IDC_LEAD_LOCATION: NORMAL
MDC_IDC_LEAD_LOCATION_DETAIL_1: NORMAL
MDC_IDC_LEAD_LOCATION_DETAIL_1: NORMAL
MDC_IDC_LEAD_MFG: NORMAL
MDC_IDC_LEAD_MFG: NORMAL
MDC_IDC_LEAD_MODEL: NORMAL
MDC_IDC_LEAD_MODEL: NORMAL
MDC_IDC_LEAD_POLARITY_TYPE: NORMAL
MDC_IDC_LEAD_POLARITY_TYPE: NORMAL
MDC_IDC_LEAD_SERIAL: NORMAL
MDC_IDC_LEAD_SERIAL: NORMAL
MDC_IDC_LEAD_SPECIAL_FUNCTION: NORMAL
MDC_IDC_LEAD_SPECIAL_FUNCTION: NORMAL
MDC_IDC_MSMT_BATTERY_DTM: NORMAL
MDC_IDC_MSMT_BATTERY_REMAINING_LONGEVITY: 168 MO
MDC_IDC_MSMT_BATTERY_RRT_TRIGGER: 2.62
MDC_IDC_MSMT_BATTERY_STATUS: NORMAL
MDC_IDC_MSMT_BATTERY_VOLTAGE: 3.17 V
MDC_IDC_MSMT_LEADCHNL_RA_IMPEDANCE_VALUE: 304 OHM
MDC_IDC_MSMT_LEADCHNL_RA_IMPEDANCE_VALUE: 437 OHM
MDC_IDC_MSMT_LEADCHNL_RA_PACING_THRESHOLD_AMPLITUDE: 0.62 V
MDC_IDC_MSMT_LEADCHNL_RA_PACING_THRESHOLD_PULSEWIDTH: 0.4 MS
MDC_IDC_MSMT_LEADCHNL_RA_SENSING_INTR_AMPL: 0.25 MV
MDC_IDC_MSMT_LEADCHNL_RA_SENSING_INTR_AMPL: 0.25 MV
MDC_IDC_MSMT_LEADCHNL_RV_IMPEDANCE_VALUE: 418 OHM
MDC_IDC_MSMT_LEADCHNL_RV_IMPEDANCE_VALUE: 513 OHM
MDC_IDC_MSMT_LEADCHNL_RV_PACING_THRESHOLD_AMPLITUDE: 0.75 V
MDC_IDC_MSMT_LEADCHNL_RV_PACING_THRESHOLD_PULSEWIDTH: 0.4 MS
MDC_IDC_MSMT_LEADCHNL_RV_SENSING_INTR_AMPL: 9.62 MV
MDC_IDC_MSMT_LEADCHNL_RV_SENSING_INTR_AMPL: 9.62 MV
MDC_IDC_PG_IMPLANT_DTM: NORMAL
MDC_IDC_PG_MFG: NORMAL
MDC_IDC_PG_MODEL: NORMAL
MDC_IDC_PG_SERIAL: NORMAL
MDC_IDC_PG_TYPE: NORMAL
MDC_IDC_SESS_CLINIC_NAME: NORMAL
MDC_IDC_SESS_DTM: NORMAL
MDC_IDC_SESS_TYPE: NORMAL
MDC_IDC_SET_BRADY_AT_MODE_SWITCH_RATE: 171 {BEATS}/MIN
MDC_IDC_SET_BRADY_HYSTRATE: NORMAL
MDC_IDC_SET_BRADY_LOWRATE: 60 {BEATS}/MIN
MDC_IDC_SET_BRADY_MAX_SENSOR_RATE: 110 {BEATS}/MIN
MDC_IDC_SET_BRADY_MAX_TRACKING_RATE: 110 {BEATS}/MIN
MDC_IDC_SET_BRADY_MODE: NORMAL
MDC_IDC_SET_BRADY_PAV_DELAY_LOW: 120 MS
MDC_IDC_SET_BRADY_SAV_DELAY_LOW: 100 MS
MDC_IDC_SET_LEADCHNL_RA_PACING_AMPLITUDE: 2 V
MDC_IDC_SET_LEADCHNL_RA_PACING_ANODE_ELECTRODE_1: NORMAL
MDC_IDC_SET_LEADCHNL_RA_PACING_ANODE_LOCATION_1: NORMAL
MDC_IDC_SET_LEADCHNL_RA_PACING_CAPTURE_MODE: NORMAL
MDC_IDC_SET_LEADCHNL_RA_PACING_CATHODE_ELECTRODE_1: NORMAL
MDC_IDC_SET_LEADCHNL_RA_PACING_CATHODE_LOCATION_1: NORMAL
MDC_IDC_SET_LEADCHNL_RA_PACING_POLARITY: NORMAL
MDC_IDC_SET_LEADCHNL_RA_PACING_PULSEWIDTH: 0.4 MS
MDC_IDC_SET_LEADCHNL_RA_SENSING_ANODE_ELECTRODE_1: NORMAL
MDC_IDC_SET_LEADCHNL_RA_SENSING_ANODE_LOCATION_1: NORMAL
MDC_IDC_SET_LEADCHNL_RA_SENSING_CATHODE_ELECTRODE_1: NORMAL
MDC_IDC_SET_LEADCHNL_RA_SENSING_CATHODE_LOCATION_1: NORMAL
MDC_IDC_SET_LEADCHNL_RA_SENSING_POLARITY: NORMAL
MDC_IDC_SET_LEADCHNL_RA_SENSING_SENSITIVITY: 0.15 MV
MDC_IDC_SET_LEADCHNL_RV_PACING_AMPLITUDE: 1.5 V
MDC_IDC_SET_LEADCHNL_RV_PACING_ANODE_ELECTRODE_1: NORMAL
MDC_IDC_SET_LEADCHNL_RV_PACING_ANODE_LOCATION_1: NORMAL
MDC_IDC_SET_LEADCHNL_RV_PACING_CAPTURE_MODE: NORMAL
MDC_IDC_SET_LEADCHNL_RV_PACING_CATHODE_ELECTRODE_1: NORMAL
MDC_IDC_SET_LEADCHNL_RV_PACING_CATHODE_LOCATION_1: NORMAL
MDC_IDC_SET_LEADCHNL_RV_PACING_POLARITY: NORMAL
MDC_IDC_SET_LEADCHNL_RV_PACING_PULSEWIDTH: 0.4 MS
MDC_IDC_SET_LEADCHNL_RV_SENSING_ANODE_ELECTRODE_1: NORMAL
MDC_IDC_SET_LEADCHNL_RV_SENSING_ANODE_LOCATION_1: NORMAL
MDC_IDC_SET_LEADCHNL_RV_SENSING_CATHODE_ELECTRODE_1: NORMAL
MDC_IDC_SET_LEADCHNL_RV_SENSING_CATHODE_LOCATION_1: NORMAL
MDC_IDC_SET_LEADCHNL_RV_SENSING_POLARITY: NORMAL
MDC_IDC_SET_LEADCHNL_RV_SENSING_SENSITIVITY: 0.9 MV
MDC_IDC_SET_ZONE_DETECTION_INTERVAL: 350 MS
MDC_IDC_SET_ZONE_DETECTION_INTERVAL: 400 MS
MDC_IDC_SET_ZONE_STATUS: NORMAL
MDC_IDC_SET_ZONE_TYPE: NORMAL
MDC_IDC_SET_ZONE_VENDOR_TYPE: NORMAL
MDC_IDC_STAT_AT_BURDEN_PERCENT: 100 %
MDC_IDC_STAT_AT_DTM_END: NORMAL
MDC_IDC_STAT_AT_DTM_START: NORMAL
MDC_IDC_STAT_BRADY_DTM_END: NORMAL
MDC_IDC_STAT_BRADY_DTM_START: NORMAL
MDC_IDC_STAT_BRADY_RA_PERCENT_PACED: 0.2 %
MDC_IDC_STAT_BRADY_RV_PERCENT_PACED: 21.91 %
MDC_IDC_STAT_EPISODE_RECENT_COUNT: 0
MDC_IDC_STAT_EPISODE_RECENT_COUNT_DTM_END: NORMAL
MDC_IDC_STAT_EPISODE_RECENT_COUNT_DTM_START: NORMAL
MDC_IDC_STAT_EPISODE_TOTAL_COUNT: 0
MDC_IDC_STAT_EPISODE_TOTAL_COUNT: 3
MDC_IDC_STAT_EPISODE_TOTAL_COUNT_DTM_END: NORMAL
MDC_IDC_STAT_EPISODE_TOTAL_COUNT_DTM_START: NORMAL
MDC_IDC_STAT_EPISODE_TYPE: NORMAL
MDC_IDC_STAT_TACHYTHERAPY_RECENT_DTM_END: NORMAL
MDC_IDC_STAT_TACHYTHERAPY_RECENT_DTM_START: NORMAL
MDC_IDC_STAT_TACHYTHERAPY_TOTAL_DTM_END: NORMAL
MDC_IDC_STAT_TACHYTHERAPY_TOTAL_DTM_START: NORMAL

## 2024-09-09 NOTE — TELEPHONE ENCOUNTER
Reached patient by phone this morning.    -Patient unable to get morning weight without assistance due to pain at her right knee from surgery related to staff infection.    -Patient reports swelling at her right leg, but thinks it is related to recent surgery.    -Patient reports having decreased appetite, energy is not improving, needs to urinate 1 to 2 times a night, and is following up with her post surgical rehab.    -Patient has an appointment with Dr Holley 9/11/24.  EP consult ordered and sent to scheduling pool.  BMP lab ordered.    Rohan Dang RN

## 2024-09-09 NOTE — TELEPHONE ENCOUNTER
"Encounter Type: courtesy device check, per request for Dr. Holley.   Device: [employer], [place] (D) PPM  Pacing %/Programmed: AP- 0.3%, - 21.9%, DDD 60/110  Lead (s): Stable  Battery longevity: Estimated 14 years remaining  Presenting: AF/ & VS @  bpm  Atrial arrhythmias: since 7/26/2024- 1 mode switch; on going AT/AF (see presenting rhythm for EGM). Per trends- persistent AF since May, 2024. Patient reports feeling fatigued \"most all the time\"  Anticoagulant: Eliquis (pt confirms)       Antiarythmics: Amiodarone (pt confirms)   Ventricular arrhythmias: since 7/26/2024- no VHRs.   Comments: Normal Device Function  Plan: routed to Dr. Holley in prep for 9/11/2024 apt (pt confirms), per William HF-NP.  Device follow up for the entirety of having the PPM/ICD, based on best practices determined by [employer] and in Compliance with Medicare guidelines. Continue remote device monitoring per patient plan.     Kyree Vera RN on 9/9/2024 at 8:53 AM            "

## 2024-09-09 NOTE — TELEPHONE ENCOUNTER
----- Message from MARISA HOLLEY sent at 9/6/2024  4:42 PM CDT -----  I have not seen her since she has had her device implanted.  From what I am hearing, her symptoms are worse?  If so, I assume she is pacing all the time and may be we should try to correct this, involve EP?  Maybe less ventricular pacing will improve ejection fraction?  We should set her up to see me as well.  LF  ----- Message -----  From: William Lamas APRN CNP  Sent: 9/6/2024   3:41 PM CDT  To: Elizabeth Holley MD; [facility]    LVEF has declined further.  She declined further procedure when I saw her on 8/22/24.  I increased her lisinopril on 8/22/24.  Cr slightly up on 9/1.  If tolerating, continue current HF regimen.  Consider repeat BMP during next follow up Dr. Holley on 9/11.  Thank you!  ARCHANA VERA to Dr. Holley

## 2024-09-09 NOTE — RESULT ENCOUNTER NOTE
Remote received. Report routed to Dr. Holley per request.     Kyree Vera RN on 9/9/2024 at 8:49 AM

## 2024-09-10 DIAGNOSIS — Z53.9 DIAGNOSIS NOT YET DEFINED: Primary | ICD-10-CM

## 2024-09-10 PROCEDURE — G0180 MD CERTIFICATION HHA PATIENT: HCPCS | Performed by: FAMILY MEDICINE

## 2024-09-11 ENCOUNTER — OFFICE VISIT (OUTPATIENT)
Dept: CARDIOLOGY | Facility: CLINIC | Age: 89
End: 2024-09-11
Payer: COMMERCIAL

## 2024-09-11 VITALS
BODY MASS INDEX: 27.85 KG/M2 | SYSTOLIC BLOOD PRESSURE: 113 MMHG | WEIGHT: 142.6 LBS | RESPIRATION RATE: 16 BRPM | OXYGEN SATURATION: 97 % | DIASTOLIC BLOOD PRESSURE: 70 MMHG | HEART RATE: 78 BPM

## 2024-09-11 DIAGNOSIS — E78.00 PURE HYPERCHOLESTEROLEMIA: ICD-10-CM

## 2024-09-11 DIAGNOSIS — I50.20 HFREF (HEART FAILURE WITH REDUCED EJECTION FRACTION) (H): Primary | ICD-10-CM

## 2024-09-11 DIAGNOSIS — Z95.0 CARDIAC PACEMAKER IN SITU: ICD-10-CM

## 2024-09-11 DIAGNOSIS — I10 BENIGN ESSENTIAL HYPERTENSION: ICD-10-CM

## 2024-09-11 DIAGNOSIS — N18.31 STAGE 3A CHRONIC KIDNEY DISEASE (CKD) (H): ICD-10-CM

## 2024-09-11 DIAGNOSIS — I48.19 PERSISTENT ATRIAL FIBRILLATION (H): ICD-10-CM

## 2024-09-11 DIAGNOSIS — I42.8 NON-ISCHEMIC CARDIOMYOPATHY (H): ICD-10-CM

## 2024-09-11 PROCEDURE — G2211 COMPLEX E/M VISIT ADD ON: HCPCS | Performed by: INTERNAL MEDICINE

## 2024-09-11 PROCEDURE — 99214 OFFICE O/P EST MOD 30 MIN: CPT | Performed by: INTERNAL MEDICINE

## 2024-09-11 NOTE — TELEPHONE ENCOUNTER
Called patient and left a message with regards to why we would like to to see an EP visit.  It is reported patient has scheduled a time for this.  It was recommended by Dr Holley to possibly help improve her heart EF.  In the voice mail we left the HF Nurse Line phone number so she can call with questions if she has any.    Rohan Dang RN

## 2024-09-11 NOTE — LETTER
9/11/2024    Margret Flores MD  [address] [place] [id number]    RE: Gladys Ramirez       Dear Colleague,     I had the pleasure of seeing Gladys Ramirez in the [facility].      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  On low-dose vasodilator lisinopril.  Had upgrade to CRT device, unfortunately due to atrial fibrillation only 22% pacing CRT.  She is not interested in any further procedures and continue current plan and meds.       (I50.20) HFrEF (heart failure with reduced ejection fraction) (H)  Comment: Was hospitalized for this in December and again in July, cleared and now taking Lasix 40 the morning and 20 afternoon.  No significant signs or symptoms on exam, continue fluid and salt restriction.  I assume it could recur.      (I48.19) Persistent atrial fibrillation (H)  Comment: Not valvular, symptomatic, and persistent and not controlled on amiodarone 100 mg a day, LFTs looked good in July 2024, discussed pursuing PVI ablation for A-fib and she is not interested.    (R00.1) Bradycardia  Comment: Has had the amiodarone dose lowered and unable to tolerate beta-blocker for heart failure given this.       (I10) Benign essential hypertension  Comment: Under good control currently on low-dose lisinopril.     (E78.00) Pure hypercholesterolemia  Comment: No recent cholesterol with a total of 179.     (N18.31) Stage 3a chronic kidney disease (CKD) (H)  Comment: Fairly good control with a creatinine of 1.21 which is stable for her.     Right knee infection-given the warmth I suggest she speak with her orthopedist to get looked at right away.    Plan  1.  Continue current medications.  2.  Follow-up me in 6 months or sooner if needed.  3.  If significant  decompensation will need to consider PVI ablation.  At this point time she declines.  4.  Connect with orthopedics concerning right knee issues.    The longitudinal plan of care for the diagnosis(es)/condition(s) as documented were addressed during this visit. 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     Subjective  CC: 89-year-old white female being seen in 6-month follow-up.  Since I seen her she has had 2 hospitalizations for right knee infection, comes in today still living independently in an apartment but looking to get into assisted living.  Biggest complaint is discomfort and pain on the right knee with redness involving the right lower extremity.  No syncope, presyncope, shortness of breath, PND, orthopnea, chest pains, palpitations.    Medications  Current Outpatient Medications   Medication Sig Dispense Refill     acetaminophen (TYLENOL) 325 MG tablet Take 2 tablets (650 mg) by mouth every 4 hours as needed for mild pain 100 tablet 0     amiodarone (PACERONE) 200 MG tablet Take 0.5 tablets (100 mg) by mouth daily 45 tablet 0     apixaban ANTICOAGULANT (ELIQUIS) 5 MG tablet Take 1 tablet (5 mg) by mouth 2 times daily 60 tablet 3     cholecalciferol, vitamin D3, (VITAMIN D3) 2,000 unit Tab [CHOLECALCIFEROL, VITAMIN D3, (VITAMIN D3) 2,000 UNIT TAB] Take 1 tablet (2,000 Units total) by mouth daily. 90 tablet 3     DULoxetine (CYMBALTA) 60 MG capsule Take 60 mg by mouth every morning       furosemide (LASIX) 40 MG tablet Take 1 tablet (40 mg) by mouth 2 times daily       lisinopril (ZESTRIL) 2.5 MG tablet Take 1 tablet (2.5 mg) by mouth 2 times daily. 180 tablet 0     nystatin (MYCOSTATIN) 629785 UNIT/GM external powder Apply topically 2 times daily as needed (rash in skin folds)       oxyCODONE (ROXICODONE) 5 MG tablet Take 1 tablet (5 mg) by mouth 3 times daily as needed for moderate to severe pain. Ok to  "fill/start August 30, 2024 #90 tabs to last 30 days. 90 tablet 0     potassium chloride tamiko ER (KLOR-CON M20) 20 MEQ CR tablet Take 1 tablet (20 mEq) by mouth daily 90 tablet 3     zolpidem ER (AMBIEN CR) 6.25 MG CR tablet Take 1 and 1/4 tablet at bedtime (Patient taking differently: Take 1 tablet at bedtime) 45 tablet 5     diclofenac (FLECTOR) 1.3 % patch Externally apply 1 patch topically 2 times daily (Patient not taking: Reported on 9/11/2024)         Objective  /70 (BP Location: Left arm, Patient Position: Sitting, Cuff Size: Adult Regular)   Pulse 78   Resp 16   Wt 64.7 kg (142 lb 9.6 oz)   SpO2 97%   BMI 27.85 kg/m      General Appearance:    Alert, cooperative, no distress, appears stated age   Head:    Normocephalic, without obvious abnormality, atraumatic   Throat:   Lips, mucosa, and tongue normal; teeth and gums normal   Neck:   Supple, symmetrical, trachea midline, no adenopathy;        thyroid:  No enlargement/tenderness/nodules; no carotid    bruit or JVD   Back:     Symmetric, no curvature, ROM normal, no CVA tenderness   Lungs:     Clear to auscultation bilaterally, respirations unlabored   Chest wall:    No tenderness, left-sided pacer   Heart:  Irregularly irregular, S1 and S2 normal, no murmur, rub   or gallop   Abdomen:     Soft, non-tender, bowel sounds active all four quadrants,     no masses, no organomegaly   Extremities:   Normal, atraumatic, no cyanosis right lower extremity erythema warmth and edema   Pulses:   2+ and symmetric all extremities   Skin:   Skin color, texture, turgor normal, no rashes or lesions     Results    Lab Results personally reviewed   Lab Results   Component Value Date    CHOL 179 03/12/2015    CHOL 179 09/30/2013     Lab Results   Component Value Date    HDL 64 03/12/2015    HDL 64 09/30/2013     No components found for: \"LDLCALC\"  Lab Results   Component Value Date    TRIG 176 (H) 03/12/2015    TRIG 155 (H) 09/30/2013     Lab Results   Component Value " Date    WBC 7.5 09/01/2024    HGB 10.2 (L) 09/01/2024    HCT 34.2 (L) 09/01/2024     09/01/2024     Lab Results   Component Value Date    BUN 23.5 (H) 09/01/2024     09/01/2024    CO2 28 09/01/2024               Thank you for allowing me to participate in the care of your patient.      Sincerely,     MARISA SANZ MD     [facility]  cc:   SHASHA Quiroz CNP  [address] [facility]  [place] [address]

## 2024-09-11 NOTE — PROGRESS NOTES
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  On low-dose vasodilator lisinopril.  Had upgrade to CRT device, unfortunately due to atrial fibrillation only 22% pacing CRT.  She is not interested in any further procedures and continue current plan and meds.       (I50.20) HFrEF (heart failure with reduced ejection fraction) (H)  Comment: Was hospitalized for this in December and again in July, cleared and now taking Lasix 40 the morning and 20 afternoon.  No significant signs or symptoms on exam, continue fluid and salt restriction.  I assume it could recur.      (I48.19) Persistent atrial fibrillation (H)  Comment: Not valvular, symptomatic, and persistent and not controlled on amiodarone 100 mg a day, LFTs looked good in July 2024, discussed pursuing PVI ablation for A-fib and she is not interested.    (R00.1) Bradycardia  Comment: Has had the amiodarone dose lowered and unable to tolerate beta-blocker for heart failure given this.       (I10) Benign essential hypertension  Comment: Under good control currently on low-dose lisinopril.     (E78.00) Pure hypercholesterolemia  Comment: No recent cholesterol with a total of 179.     (N18.31) Stage 3a chronic kidney disease (CKD) (H)  Comment: Fairly good control with a creatinine of 1.21 which is stable for her.     Right knee infection-given the warmth I suggest she speak with her orthopedist to get looked at right away.    Plan  1.  Continue current medications.  2.  Follow-up me in 6 months or sooner if needed.  3.  If significant decompensation will need to consider PVI ablation.  At this point time she declines.  4.  Connect with orthopedics concerning right knee issues.    The longitudinal plan of care for the diagnosis(es)/condition(s)  as documented were addressed during this visit. 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     Subjective  CC: 89-year-old white female being seen in 6-month follow-up.  Since I seen her she has had 2 hospitalizations for right knee infection, comes in today still living independently in an apartment but looking to get into assisted living.  Biggest complaint is discomfort and pain on the right knee with redness involving the right lower extremity.  No syncope, presyncope, shortness of breath, PND, orthopnea, chest pains, palpitations.    Medications  Current Outpatient Medications   Medication Sig Dispense Refill    acetaminophen (TYLENOL) 325 MG tablet Take 2 tablets (650 mg) by mouth every 4 hours as needed for mild pain 100 tablet 0    amiodarone (PACERONE) 200 MG tablet Take 0.5 tablets (100 mg) by mouth daily 45 tablet 0    apixaban ANTICOAGULANT (ELIQUIS) 5 MG tablet Take 1 tablet (5 mg) by mouth 2 times daily 60 tablet 3    cholecalciferol, vitamin D3, (VITAMIN D3) 2,000 unit Tab [CHOLECALCIFEROL, VITAMIN D3, (VITAMIN D3) 2,000 UNIT TAB] Take 1 tablet (2,000 Units total) by mouth daily. 90 tablet 3    DULoxetine (CYMBALTA) 60 MG capsule Take 60 mg by mouth every morning      furosemide (LASIX) 40 MG tablet Take 1 tablet (40 mg) by mouth 2 times daily      lisinopril (ZESTRIL) 2.5 MG tablet Take 1 tablet (2.5 mg) by mouth 2 times daily. 180 tablet 0    nystatin (MYCOSTATIN) 192125 UNIT/GM external powder Apply topically 2 times daily as needed (rash in skin folds)      oxyCODONE (ROXICODONE) 5 MG tablet Take 1 tablet (5 mg) by mouth 3 times daily as needed for moderate to severe pain. Ok to fill/start August 30, 2024 #90 tabs to last 30 days. 90 tablet 0    potassium chloride tamiko ER (KLOR-CON M20) 20 MEQ CR tablet Take 1 tablet (20 mEq) by mouth daily 90 tablet 3    zolpidem ER (AMBIEN CR) 6.25 MG CR tablet  "Take 1 and 1/4 tablet at bedtime (Patient taking differently: Take 1 tablet at bedtime) 45 tablet 5    diclofenac (FLECTOR) 1.3 % patch Externally apply 1 patch topically 2 times daily (Patient not taking: Reported on 9/11/2024)         Objective  /70 (BP Location: Left arm, Patient Position: Sitting, Cuff Size: Adult Regular)   Pulse 78   Resp 16   Wt 64.7 kg (142 lb 9.6 oz)   SpO2 97%   BMI 27.85 kg/m      General Appearance:    Alert, cooperative, no distress, appears stated age   Head:    Normocephalic, without obvious abnormality, atraumatic   Throat:   Lips, mucosa, and tongue normal; teeth and gums normal   Neck:   Supple, symmetrical, trachea midline, no adenopathy;        thyroid:  No enlargement/tenderness/nodules; no carotid    bruit or JVD   Back:     Symmetric, no curvature, ROM normal, no CVA tenderness   Lungs:     Clear to auscultation bilaterally, respirations unlabored   Chest wall:    No tenderness, left-sided pacer   Heart:  Irregularly irregular, S1 and S2 normal, no murmur, rub   or gallop   Abdomen:     Soft, non-tender, bowel sounds active all four quadrants,     no masses, no organomegaly   Extremities:   Normal, atraumatic, no cyanosis right lower extremity erythema warmth and edema   Pulses:   2+ and symmetric all extremities   Skin:   Skin color, texture, turgor normal, no rashes or lesions     Results    Lab Results personally reviewed   Lab Results   Component Value Date    CHOL 179 03/12/2015    CHOL 179 09/30/2013     Lab Results   Component Value Date    HDL 64 03/12/2015    HDL 64 09/30/2013     No components found for: \"LDLCALC\"  Lab Results   Component Value Date    TRIG 176 (H) 03/12/2015    TRIG 155 (H) 09/30/2013     Lab Results   Component Value Date    WBC 7.5 09/01/2024    HGB 10.2 (L) 09/01/2024    HCT 34.2 (L) 09/01/2024     09/01/2024     Lab Results   Component Value Date    BUN 23.5 (H) 09/01/2024     09/01/2024    CO2 28 09/01/2024           "

## 2024-09-11 NOTE — PATIENT INSTRUCTIONS
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  I am glad to hear you are doing well other then the knee issues.  Per our conversation continue the same meds.  I will plan on seeing you 6 months although I am worried about you filling up with fluid.  Estrada Holley

## 2024-09-13 ENCOUNTER — MEDICAL CORRESPONDENCE (OUTPATIENT)
Dept: HEALTH INFORMATION MANAGEMENT | Facility: CLINIC | Age: 89
End: 2024-09-13
Payer: COMMERCIAL

## 2024-09-18 ENCOUNTER — MEDICAL CORRESPONDENCE (OUTPATIENT)
Dept: HEALTH INFORMATION MANAGEMENT | Facility: CLINIC | Age: 89
End: 2024-09-18
Payer: COMMERCIAL

## 2024-09-23 ENCOUNTER — TELEPHONE (OUTPATIENT)
Dept: FAMILY MEDICINE | Facility: CLINIC | Age: 89
End: 2024-09-23
Payer: COMMERCIAL

## 2024-09-23 NOTE — TELEPHONE ENCOUNTER
Home Care is calling regarding an established patient with [facility].     Requesting orders from: Margret Flores  Provider is following patient: Yes  Is this a 60-day recertification request?  No    Orders Requested    Social Work  Request for initial certification (first set of orders)   Frequency:  See  one more time for coordination of care with the [facility]    [place]  [phone]   Confirmed ok to leave a detailed message with call back.  Contact information confirmed and updated as needed.    Routed to PCP for review    Benny BAKER RN  [facility]

## 2024-09-23 NOTE — TELEPHONE ENCOUNTER
Please approve verbal orders below    Margret Flores MD      Phone call placed to HAILE Ding with no answer. Provider approval message above provided.      Abelino David RN  [facility] [place]

## 2024-09-25 ENCOUNTER — MEDICAL CORRESPONDENCE (OUTPATIENT)
Dept: HEALTH INFORMATION MANAGEMENT | Facility: CLINIC | Age: 89
End: 2024-09-25
Payer: COMMERCIAL

## 2024-09-29 NOTE — PROGRESS NOTES
[facility]    CHIEF COMPLAINT: Chronic Pain.    INTERVAL HISTORY:  Last seen on 24.       Recommendations/plan at the last visit included:  Physical therapy: YES Continue with home PT.   30 minutes Video or Clinic follow-up with SHASHA Simpson NP-C on 24 at 2:30 PM  Labs: Annual required labs and controlled substance agreement   Medication Management :   RESTART OxyContin ER 10 mg: One tablet at bedtime.   CONTINUE Oxycodone 5 m tablet  up to three times per day. Must have 4 hours between doses of Oxycodone 5 mg.   Ok to take Tylenol 500 mg or 650 mg with the Oxycodone 5 mg and OxyContin 10 mg.   Max of 4 tabs or Tylenol 500 mg or 650 mg     Since last visit:   - 2024: In ED for fall and hitting her head. Treated for scalp lac. Full spine CTs did not show any acute injury. Because   - Has been up a lot, walking because she is moving into an asst living. Will be going [facility].  Will have a 1 bedroom.   - Saw a knee surgeon who told her that the only thing that would help her knee pain is a replacement.   - Has not been taking OxyContin because she did not think needed it but does still have uncontrolled pain.    Pain Information today: Severe Pain (6)/10. Location of pain: right knee.    Annual requirements last collected:  2024      Current Pain Relevant Medications:    Acetaminophen 1000 M tabs 1-2 times daily, not every day  Compounded cream: Ketamine and Ketoprofen, 3-4 x/day PRN  Duloxetine 60 mg daily   Flector Patch:2 patches daily PRN. (When not using topical cream)       Current Controlled Substance Medications:   Ambien 6.25 m tab at HS  Oxycodone 5 m tablet TID PRN: 22.5 MME/day  OxyContin 10 mg at HS = 15 MME/day  Total opiate dose =  37.5 MME/day     Previous Pain Relevant Medications: (H--helped; HI--Helped initially; SWH--Somewhat helpful; NH--No help; W--worse; SE--side effects; ?--Unsure if  helpful)   Opiates: Tramadol: SWH, Buprenorphine:Allergic  NSAIDS: Can't take, on blood thinner  Migraine medications: N/A  Muscle Relaxants: none  Neuropathics: Gabapentin: SE  Anti-depressants for pain: Duloxetine: NH for pain  Anxiety medications: N/A  Topicals: Compounded cream: Lidocaine, ibuprofen, diclofenac:  OTC medications: Tylenol: takes 4000 mg/day  Sleep Medications: Temazepam: Allergy, Ambien:H  Other medications not covered above:      SUBSTANCE HISTORY:   Past or current illegal drug use: none  Past or current ETOH use: Rarely, glass of wine  Nicotine/tobacco use: none  Daily Caffeine intake: never     CURRENT FAMILY/SOCIAL SITUATION:  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 NO   Activity: Is patient functional and able to perform Activities of Daily Living? N/A   Adverse effects: Is patient free from adverse side effects from opiates? N/A   Adherence to Rx protocol: Is patient adhering to Controlled Substance Agreement and taking medications ONLY as ordered? N/A     Is Narcan prescribed for opiate use >50 MME daily or concurrent use of opiates and benzodiazepines? N/A    [place] Board of Pharmacy Data Base Reviewed:    YES; No concern for abuse or misuse of controlled medications based on this report. Reviewed [facility] September 29, 2024- no concerning fills.      PHYSICAL EXAM    Vitals:    09/30/24 1408   BP: 117/56   Pulse: 95   SpO2: 96%       Constitutional: healthy, alert, and no distress A&O.   Patient is appropriate.  Psychiatric/mental status: Alert, without lethargy or stupor. Appropriate affect.     Neurologic exam:  CN:  Cranial nerves 2-12 are grossly normal.    MUSCULOSKELETAL:     Posture: Upright, shoulders and pelvis are  leveled. Yes  Antalgic Gait Pattern?: Seated in a wheelchair throughout the visit. Unable to walk without a 4WW and standby assist.        DIRE Score for ongoing opioid management is calculated as follows:    Diagnosis = 2 pts (slowly progressive; moderate pain/objective findings)    Intractability = 3 pts (patient fully engaged but inadequate response to treatments)    Risk        Psych = 3 pts (no significant personality dysfunction/mental illness; good communication with clinic)         Chem Hlth = 3 pts (no history of chemical dependency; not drug-focused)       Reliability = 3 pts (highly reliable with meds, appointments, treatments)       Social = 2 pts (reduction in some relationships/life rolls)       (Psych + Chem hlth + Reliability + Social) = 16    Efficacy = 2 pts (moderate benefit/function; low med dose; too early/not tried meds)    DIRE Score = 18        7-13: likely NOT suitable candidate for long-term opioid analgesia       14-21: may be a suitable candidate for long-term opioid analgesia     DIAGNOSTIC RESULTS:     11/15/22: MRI right knee w/o contrast   IMPRESSION:  1.  Horizontal cleavage tear of the posteromedial corner of the meniscus.  2.  Grade 2 cartilage loss both sides of the medial compartment.  3.  Full-thickness cartilage loss along the majority of both sides of the lateral compartment with bony remodeling of the lateral tibial plateau and extensive reactive edema.  4.  Large portions of the lateral meniscal body are not identified and may be completely degenerated. Anterior and posterior subluxation of the anterior and posterior horn, respectively.  5.  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  6.  Semimembranosus and medial gastrocnemius tendinopathy without tearing.  7.  Popliteus tendinopathy without tearing.  8.  Mild quadriceps and patellar tendinopathy without tearing.  9.  Small effusion.  10.  No evidence for acute fracture.     1/20/21: Left hip  x-ray  IMPRESSION:  Mild primary degenerative narrowing both hip joints. Mild generalized degenerative change base of the spine and both SI joints.Pelvis and left hip otherwise negative. No fractures. No dislocations.     1/2021: XR KNEE RIGHT 1 OR 2 VWS   IMPRESSION:  Moderate primary osteoarthritis all 3 compartments but most prominent in the lateral compartment. Knee otherwise negative. No fractures. No joint effusion.     PAIN RELAVENT CONDITIONS:   1.  OA: severe right knee, Baker's cyst.  2.  PMH: CKD Stage 2, A fib, CHF, primary insomnia    DIAGNOSIS AND PLAN:     (G89.29) Chronic intractable pain  (primary encounter diagnosis)  (S83.386S) Rupture of anterior cruciate ligament of right knee, sequela  (M17.11) Primary osteoarthritis of right knee  Comment: Adding diclofenac patch in generic form to see if it is better covered by her insurance. Discussed opiate pain medication options. She'd like to try OxyContin 10 once per day in the morning because walking is so painful. I also prefer to separate OxyContin from Ambien. We will also reduce Oxycodone IR 5 mg to 2 tabs/day   Plan:   Diclofenac Epolamine 1.3 % PT 24  oxyCODONE (OXYCONTIN) 10 MG 12 hr tablet           PATIENT INSTRUCTIONS:     Diagnosis reviewed, treatment option addressed, and risk/benefits discussed.  Self-care instructions given.  I am recommending a multidisciplinary treatment plan to help this patient better manage pain.    Remember to request ALL medication refills 5 BUSINESS days before you run out.     Physical therapy: YES Continue with current therapy.   30 minutes Video or Clinic follow-up with SHASHA Simpson NP-C in 2 months.   Other: [facility] will look into ordering an electric scooter for you. We may need to have your primary care provider order the scooter.   Medication Management :   Diclofenac Patch: apply to knee once daily.   Start OxyContin 10 mg: OK to take first thing in the morning. Only 1 tablet per day.   Continue  Oxycodone 5 mg: Use sparingly until you know how you feel taking the OxyContin. No more than 2 tabs of Oxycodone 5 mg when taking OxyContin 10 mg.     I have reviewed the note as documented above.  This accurately captures the substance of my conversation with the patient.  A total of 35 minutes of preparation, care, and consultation were spent on this visit today.     SHASHA Domínguez, NP-C  [facility]    (Information in italics and blue color are taken from previous pain and consulting medical providers notes and are documented as such)

## 2024-09-30 ENCOUNTER — OFFICE VISIT (OUTPATIENT)
Dept: PALLIATIVE MEDICINE | Facility: OTHER | Age: 89
End: 2024-09-30
Attending: NURSE PRACTITIONER
Payer: COMMERCIAL

## 2024-09-30 VITALS — HEART RATE: 95 BPM | OXYGEN SATURATION: 96 % | SYSTOLIC BLOOD PRESSURE: 117 MMHG | DIASTOLIC BLOOD PRESSURE: 56 MMHG

## 2024-09-30 DIAGNOSIS — M17.11 PRIMARY OSTEOARTHRITIS OF RIGHT KNEE: ICD-10-CM

## 2024-09-30 DIAGNOSIS — G89.29 CHRONIC INTRACTABLE PAIN: Primary | ICD-10-CM

## 2024-09-30 DIAGNOSIS — S83.511S RUPTURE OF ANTERIOR CRUCIATE LIGAMENT OF RIGHT KNEE, SEQUELA: ICD-10-CM

## 2024-09-30 PROCEDURE — 99214 OFFICE O/P EST MOD 30 MIN: CPT | Performed by: NURSE PRACTITIONER

## 2024-09-30 PROCEDURE — G2211 COMPLEX E/M VISIT ADD ON: HCPCS | Performed by: NURSE PRACTITIONER

## 2024-09-30 PROCEDURE — G0463 HOSPITAL OUTPT CLINIC VISIT: HCPCS | Performed by: NURSE PRACTITIONER

## 2024-09-30 RX ORDER — OXYCODONE HCL 10 MG/1
10 TABLET, FILM COATED, EXTENDED RELEASE ORAL EVERY 24 HOURS
Qty: 30 TABLET | Refills: 0 | Status: SHIPPED | OUTPATIENT
Start: 2024-09-30 | End: 2024-10-30

## 2024-09-30 ASSESSMENT — PAIN SCALES - GENERAL: PAINLEVEL: SEVERE PAIN (6)

## 2024-09-30 NOTE — PATIENT INSTRUCTIONS
After Visit Instructions:     Thank you for coming to [facility] for your care. It is my goal to partner with you to help you reach your optimal state of health.   Continue daily self-care, identifying contributing factors, and monitoring variations in pain level. Continue to integrate self-care into your life.      Physical therapy: YES Continue with current therapy.   30 minutes Video or Clinic follow-up with SHASHA Simpson NP-C in 2 months.   Other: [facility] will look into ordering an electric scooter for you. We may need to have your primary care provider order the scooter.   Medication Management :   Diclofenac Patch: apply to knee once daily.   Start OxyContin 10 mg: OK to take first thing in the morning. Only 1 tablet per day.   Continue Oxycodone 5 mg: Use sparingly until you know how you feel taking the OxyContin. No more than 2 tabs of Oxycodone 5 mg when taking OxyContin 10 mg.       SHASHA Domínguez NP-C  [facility]  [facility] - Monday, Thursday and Friday  [facility] - Tuesday      Be sure to request ALL medication refills 5 days prior to the due date whether or not you will see your medical provider in an appointment before the due date.      Do not expect same day refills. If you do not plan ahead you may run out of medications.     Early refills are not provided.  It is your responsibility to manage your medications responsibility and keep them safely stored. Lost or destroyed medications WILL NOT be replaced    Scheduling/Clinic telephone Number for ALL locations:  [phone]    After Hours On-Call Service:  [phone]    Call with any questions about your care and for scheduling assistance.   Calls are returned Monday through Friday between 8 AM and 4:00 PM. We usually get back to you within 2 business days depending on the issue/request.    If we are prescribing your medications:  For opioid medication refills, call the clinic or  send a TrackR message 7 days in advance.  Please include:  Your name and date of birth.   Name of requested medication  Name of the pharmacy.  For non-opioid medications, call your pharmacy directly to request a refill. Please allow 3-4 days to be processed.   Per [place] State Law:  All controlled substance prescriptions must be filled within 30 days of being written.    For those controlled substances allowing refills, pickup must occur within 30 days of last fill.      We believe regular attendance is key to your success in our program!    Any time you are unable to keep your appointment we ask that you call us at least 24 hours in advance to cancel.This will allow us to offer the appointment time to another patient.   Multiple missed appointments may lead to dismissal from the clinic.

## 2024-09-30 NOTE — PROGRESS NOTES
Patient presents to the clinic today for a visit  with SHASHA Sims CNP            5/6/2024    11:33 AM 8/30/2024     9:37 AM 9/30/2024     2:09 PM   PEG Score   PEG Total Score 10 6.67 6       UDS/CSA-8/30/24    Medications-oxycodone     QUESTIONS:    Kiana Li MA  [facility]

## 2024-10-02 ENCOUNTER — MEDICAL CORRESPONDENCE (OUTPATIENT)
Dept: HEALTH INFORMATION MANAGEMENT | Facility: CLINIC | Age: 89
End: 2024-10-02
Payer: COMMERCIAL

## 2024-10-04 ENCOUNTER — TRANSFERRED RECORDS (OUTPATIENT)
Dept: HEALTH INFORMATION MANAGEMENT | Facility: CLINIC | Age: 89
End: 2024-10-04

## 2024-10-08 ENCOUNTER — TELEPHONE (OUTPATIENT)
Dept: FAMILY MEDICINE | Facility: CLINIC | Age: 89
End: 2024-10-08
Payer: COMMERCIAL

## 2024-10-09 ENCOUNTER — MEDICAL CORRESPONDENCE (OUTPATIENT)
Dept: HEALTH INFORMATION MANAGEMENT | Facility: CLINIC | Age: 89
End: 2024-10-09
Payer: COMMERCIAL

## 2024-10-09 DIAGNOSIS — I48.0 PAROXYSMAL ATRIAL FIBRILLATION (H): ICD-10-CM

## 2024-10-09 DIAGNOSIS — Z76.0 ENCOUNTER FOR MEDICATION REFILL: Primary | ICD-10-CM

## 2024-10-09 RX ORDER — APIXABAN 5 MG/1
5 TABLET, FILM COATED ORAL 2 TIMES DAILY
Qty: 60 TABLET | Refills: 5 | Status: SHIPPED | OUTPATIENT
Start: 2024-10-09

## 2024-10-10 ENCOUNTER — MEDICAL CORRESPONDENCE (OUTPATIENT)
Dept: HEALTH INFORMATION MANAGEMENT | Facility: CLINIC | Age: 89
End: 2024-10-10

## 2024-10-10 RX ORDER — DULOXETIN HYDROCHLORIDE 60 MG/1
60 CAPSULE, DELAYED RELEASE ORAL DAILY
Qty: 90 CAPSULE | Refills: 3 | Status: SHIPPED | OUTPATIENT
Start: 2024-10-10

## 2024-10-10 NOTE — TELEPHONE ENCOUNTER
Reviewed chart- will send refill of duloxetine but at dose of 60 mg daily, and NOT twice daily. 60 mg is maximum recommended dose, especially with her other medications.

## 2024-10-14 ENCOUNTER — MEDICAL CORRESPONDENCE (OUTPATIENT)
Dept: HEALTH INFORMATION MANAGEMENT | Facility: CLINIC | Age: 89
End: 2024-10-14
Payer: COMMERCIAL

## 2024-10-14 DIAGNOSIS — Z53.9 DIAGNOSIS NOT YET DEFINED: Primary | ICD-10-CM

## 2024-10-14 PROCEDURE — G0179 MD RECERTIFICATION HHA PT: HCPCS | Performed by: FAMILY MEDICINE

## 2024-10-14 NOTE — TELEPHONE ENCOUNTER
Reason for Call:  Appointment Request    Patient requesting this type of appt:  Wellness check, go over her health and flu shot.    Requested provider: Margret Flores    Reason patient unable to be scheduled: Not within requested timeframe    When does patient want to be seen/preferred time: 3-7 days    Comments: She will be moving into assisted living by end of Oct. Would like to schedule an appt by Oct 25th.     Okay to leave a detailed message?: Yes at Cell number on file:    Telephone Information:   Mobile [phone]       Call taken on 10/8/2024 at 2:38 PM by Karol Quispe  
Phone Encounter was sent to [address]. Flores is at [place] forwarding encounter to [place]  
Spoke to patient and scheduled.     10/23 Wed. 9:10 am arrival   
numerical 0-10

## 2024-10-15 ENCOUNTER — ANCILLARY PROCEDURE (OUTPATIENT)
Dept: CARDIOLOGY | Facility: CLINIC | Age: 89
End: 2024-10-15
Attending: INTERNAL MEDICINE
Payer: COMMERCIAL

## 2024-10-15 VITALS
OXYGEN SATURATION: 96 % | DIASTOLIC BLOOD PRESSURE: 65 MMHG | HEART RATE: 80 BPM | SYSTOLIC BLOOD PRESSURE: 106 MMHG | RESPIRATION RATE: 16 BRPM

## 2024-10-15 DIAGNOSIS — R00.1 BRADYCARDIA: ICD-10-CM

## 2024-10-15 DIAGNOSIS — I48.0 PAROXYSMAL ATRIAL FIBRILLATION (H): ICD-10-CM

## 2024-10-15 DIAGNOSIS — Z95.0 PACEMAKER: Primary | ICD-10-CM

## 2024-10-15 DIAGNOSIS — I50.20 HFREF (HEART FAILURE WITH REDUCED EJECTION FRACTION) (H): ICD-10-CM

## 2024-10-15 LAB
MDC_IDC_LEAD_CONNECTION_STATUS: NORMAL
MDC_IDC_LEAD_CONNECTION_STATUS: NORMAL
MDC_IDC_LEAD_IMPLANT_DT: NORMAL
MDC_IDC_LEAD_IMPLANT_DT: NORMAL
MDC_IDC_LEAD_LOCATION: NORMAL
MDC_IDC_LEAD_LOCATION: NORMAL
MDC_IDC_LEAD_LOCATION_DETAIL_1: NORMAL
MDC_IDC_LEAD_LOCATION_DETAIL_1: NORMAL
MDC_IDC_LEAD_MFG: NORMAL
MDC_IDC_LEAD_MFG: NORMAL
MDC_IDC_LEAD_MODEL: NORMAL
MDC_IDC_LEAD_MODEL: NORMAL
MDC_IDC_LEAD_POLARITY_TYPE: NORMAL
MDC_IDC_LEAD_POLARITY_TYPE: NORMAL
MDC_IDC_LEAD_SERIAL: NORMAL
MDC_IDC_LEAD_SERIAL: NORMAL
MDC_IDC_LEAD_SPECIAL_FUNCTION: NORMAL
MDC_IDC_LEAD_SPECIAL_FUNCTION: NORMAL
MDC_IDC_MSMT_BATTERY_DTM: NORMAL
MDC_IDC_MSMT_BATTERY_REMAINING_LONGEVITY: 169 MO
MDC_IDC_MSMT_BATTERY_RRT_TRIGGER: 2.62
MDC_IDC_MSMT_BATTERY_STATUS: NORMAL
MDC_IDC_MSMT_BATTERY_VOLTAGE: 3.16 V
MDC_IDC_MSMT_LEADCHNL_RA_IMPEDANCE_VALUE: 304 OHM
MDC_IDC_MSMT_LEADCHNL_RA_IMPEDANCE_VALUE: 418 OHM
MDC_IDC_MSMT_LEADCHNL_RA_IMPEDANCE_VALUE: 418 OHM
MDC_IDC_MSMT_LEADCHNL_RA_PACING_THRESHOLD_AMPLITUDE: 0.62 V
MDC_IDC_MSMT_LEADCHNL_RA_PACING_THRESHOLD_PULSEWIDTH: 0.4 MS
MDC_IDC_MSMT_LEADCHNL_RA_SENSING_INTR_AMPL: 0.25 MV
MDC_IDC_MSMT_LEADCHNL_RA_SENSING_INTR_AMPL: 0.38 MV
MDC_IDC_MSMT_LEADCHNL_RA_SENSING_INTR_AMPL: 0.4 MV
MDC_IDC_MSMT_LEADCHNL_RV_IMPEDANCE_VALUE: 418 OHM
MDC_IDC_MSMT_LEADCHNL_RV_IMPEDANCE_VALUE: 532 OHM
MDC_IDC_MSMT_LEADCHNL_RV_IMPEDANCE_VALUE: 532 OHM
MDC_IDC_MSMT_LEADCHNL_RV_PACING_THRESHOLD_AMPLITUDE: 1 V
MDC_IDC_MSMT_LEADCHNL_RV_PACING_THRESHOLD_AMPLITUDE: 1 V
MDC_IDC_MSMT_LEADCHNL_RV_PACING_THRESHOLD_PULSEWIDTH: 0.4 MS
MDC_IDC_MSMT_LEADCHNL_RV_PACING_THRESHOLD_PULSEWIDTH: 0.4 MS
MDC_IDC_MSMT_LEADCHNL_RV_SENSING_INTR_AMPL: 10.1 MV
MDC_IDC_MSMT_LEADCHNL_RV_SENSING_INTR_AMPL: 10.12 MV
MDC_IDC_MSMT_LEADCHNL_RV_SENSING_INTR_AMPL: 8.5 MV
MDC_IDC_PG_IMPLANT_DTM: NORMAL
MDC_IDC_PG_MFG: NORMAL
MDC_IDC_PG_MODEL: NORMAL
MDC_IDC_PG_SERIAL: NORMAL
MDC_IDC_PG_TYPE: NORMAL
MDC_IDC_SESS_CLINIC_NAME: NORMAL
MDC_IDC_SESS_DTM: NORMAL
MDC_IDC_SESS_TYPE: NORMAL
MDC_IDC_SET_BRADY_AT_MODE_SWITCH_RATE: 171 {BEATS}/MIN
MDC_IDC_SET_BRADY_HYSTRATE: NORMAL
MDC_IDC_SET_BRADY_LOWRATE: 60 {BEATS}/MIN
MDC_IDC_SET_BRADY_MAX_SENSOR_RATE: 110 {BEATS}/MIN
MDC_IDC_SET_BRADY_MAX_TRACKING_RATE: 110 {BEATS}/MIN
MDC_IDC_SET_BRADY_MODE: NORMAL
MDC_IDC_SET_BRADY_PAV_DELAY_LOW: 120 MS
MDC_IDC_SET_BRADY_SAV_DELAY_LOW: 100 MS
MDC_IDC_SET_LEADCHNL_RA_PACING_AMPLITUDE: 2 V
MDC_IDC_SET_LEADCHNL_RA_PACING_ANODE_ELECTRODE_1: NORMAL
MDC_IDC_SET_LEADCHNL_RA_PACING_ANODE_LOCATION_1: NORMAL
MDC_IDC_SET_LEADCHNL_RA_PACING_CAPTURE_MODE: NORMAL
MDC_IDC_SET_LEADCHNL_RA_PACING_CATHODE_ELECTRODE_1: NORMAL
MDC_IDC_SET_LEADCHNL_RA_PACING_CATHODE_LOCATION_1: NORMAL
MDC_IDC_SET_LEADCHNL_RA_PACING_POLARITY: NORMAL
MDC_IDC_SET_LEADCHNL_RA_PACING_PULSEWIDTH: 0.4 MS
MDC_IDC_SET_LEADCHNL_RA_SENSING_ANODE_ELECTRODE_1: NORMAL
MDC_IDC_SET_LEADCHNL_RA_SENSING_ANODE_LOCATION_1: NORMAL
MDC_IDC_SET_LEADCHNL_RA_SENSING_CATHODE_ELECTRODE_1: NORMAL
MDC_IDC_SET_LEADCHNL_RA_SENSING_CATHODE_LOCATION_1: NORMAL
MDC_IDC_SET_LEADCHNL_RA_SENSING_POLARITY: NORMAL
MDC_IDC_SET_LEADCHNL_RA_SENSING_SENSITIVITY: 0.15 MV
MDC_IDC_SET_LEADCHNL_RV_PACING_AMPLITUDE: 1.5 V
MDC_IDC_SET_LEADCHNL_RV_PACING_ANODE_ELECTRODE_1: NORMAL
MDC_IDC_SET_LEADCHNL_RV_PACING_ANODE_LOCATION_1: NORMAL
MDC_IDC_SET_LEADCHNL_RV_PACING_CAPTURE_MODE: NORMAL
MDC_IDC_SET_LEADCHNL_RV_PACING_CATHODE_ELECTRODE_1: NORMAL
MDC_IDC_SET_LEADCHNL_RV_PACING_CATHODE_LOCATION_1: NORMAL
MDC_IDC_SET_LEADCHNL_RV_PACING_POLARITY: NORMAL
MDC_IDC_SET_LEADCHNL_RV_PACING_PULSEWIDTH: 0.4 MS
MDC_IDC_SET_LEADCHNL_RV_SENSING_ANODE_ELECTRODE_1: NORMAL
MDC_IDC_SET_LEADCHNL_RV_SENSING_ANODE_LOCATION_1: NORMAL
MDC_IDC_SET_LEADCHNL_RV_SENSING_CATHODE_ELECTRODE_1: NORMAL
MDC_IDC_SET_LEADCHNL_RV_SENSING_CATHODE_LOCATION_1: NORMAL
MDC_IDC_SET_LEADCHNL_RV_SENSING_POLARITY: NORMAL
MDC_IDC_SET_LEADCHNL_RV_SENSING_SENSITIVITY: 0.9 MV
MDC_IDC_SET_ZONE_DETECTION_INTERVAL: 350 MS
MDC_IDC_SET_ZONE_DETECTION_INTERVAL: 400 MS
MDC_IDC_SET_ZONE_STATUS: NORMAL
MDC_IDC_SET_ZONE_TYPE: NORMAL
MDC_IDC_SET_ZONE_VENDOR_TYPE: NORMAL
MDC_IDC_STAT_AT_BURDEN_PERCENT: 100 %
MDC_IDC_STAT_AT_DTM_END: NORMAL
MDC_IDC_STAT_AT_DTM_START: NORMAL
MDC_IDC_STAT_BRADY_DTM_END: NORMAL
MDC_IDC_STAT_BRADY_DTM_START: NORMAL
MDC_IDC_STAT_BRADY_RA_PERCENT_PACED: 0.13 %
MDC_IDC_STAT_BRADY_RV_PERCENT_PACED: 15.21 %
MDC_IDC_STAT_EPISODE_RECENT_COUNT: 0
MDC_IDC_STAT_EPISODE_RECENT_COUNT_DTM_END: NORMAL
MDC_IDC_STAT_EPISODE_RECENT_COUNT_DTM_START: NORMAL
MDC_IDC_STAT_EPISODE_TOTAL_COUNT: 0
MDC_IDC_STAT_EPISODE_TOTAL_COUNT: 3
MDC_IDC_STAT_EPISODE_TOTAL_COUNT_DTM_END: NORMAL
MDC_IDC_STAT_EPISODE_TOTAL_COUNT_DTM_START: NORMAL
MDC_IDC_STAT_EPISODE_TYPE: NORMAL
MDC_IDC_STAT_TACHYTHERAPY_RECENT_DTM_END: NORMAL
MDC_IDC_STAT_TACHYTHERAPY_RECENT_DTM_START: NORMAL
MDC_IDC_STAT_TACHYTHERAPY_TOTAL_DTM_END: NORMAL
MDC_IDC_STAT_TACHYTHERAPY_TOTAL_DTM_START: NORMAL

## 2024-10-15 PROCEDURE — 99214 OFFICE O/P EST MOD 30 MIN: CPT | Performed by: INTERNAL MEDICINE

## 2024-10-15 PROCEDURE — G2211 COMPLEX E/M VISIT ADD ON: HCPCS | Performed by: INTERNAL MEDICINE

## 2024-10-15 PROCEDURE — 93280 PM DEVICE PROGR EVAL DUAL: CPT | Performed by: INTERNAL MEDICINE

## 2024-10-15 NOTE — PATIENT INSTRUCTIONS
[facility]  Cardiac Electrophysiology  [address]  [place] [address]   Office: [phone]  Fax: [phone]       Thank you for seeing us in clinic today - it is a pleasure to be a part of your care team.  Below is a summary of our plan from today's visit.      Continue taking all your meds  Follow up with general cardiology  Follow up with me in one year    Please do not hesitate to be in touch with our office at [phone] with any questions that may arise.      Thank you for trusting us with your care,    Jeannie Rivera MD  Clinical Cardiac Electrophysiology  [facility]  [address]  [place] [address]   Office: [phone]  Fax: [phone]

## 2024-10-15 NOTE — LETTER
10/15/2024    Margret Flores MD  [address] [place] [id number]    RE: Gladys Ramirez       Dear Colleague,     I had the pleasure of seeing Gladys Ramirez in the [facility].    HEART CARE ENCOUNTER CONSULTATON NOTE      [facility]  [phone]      Assessment/Recommendations   Assessment/Plan:    Gladys Ramirez is a very pleasant 89 year old female with non ischemic cardiomyopathy(LVEF 25-30%), HTN, h/o PE, LBBB who presents today to discuss device therapy.    1. Non ischemic cardiomyopathy  - s/p pacemaker with LBBB pacing  - QRS post device ~110 ms improved from 150 ms  -We discussed how her atrial fibrillation is preventing resynchronization offered by her pacemaker  -We discussed the indications, procedural details, possible complications and recovery of an AV node ablation.  Reading material about this procedure was also provided. She is not interested in pursuing any further porcedures at this time    2. Persistent atrial fibrillation  - On Amiodarone 100 mg daily and Eliquis for anticoagulation  - On chronic anticoagulation for her h/p PE also, so Watchman not offered    3. HTN  - well controlled    Follow-up in 1 year    Time spent: 30 minutes spent on the date of the encounter doing chart review, history and exam, documentation and further activities as noted above.    The longitudinal plan of care for the condition(s) below were addressed during this visit. Due to the added complexity in care, I will continue support in the subsequent management of this condition(s) and with the ongoing continuity of care of this condition(s).        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    She had a fall after the last appointment oin 3/4/2023. She hit her head but no LOC. Had partial  hip replacement surgery on 3/5/23 and went to TCU from 3/27/23-4/10/23. She comes today for follow up.    February 2024    She was hospitalized twice during the month of December. She was first admitted for decompensated heart failure and was started on IV Lasix. She also underwent cardioversion on December 22 which was successful. He had a limited echocardiogram which showed ejection fraction of 25%. Discussion of CRT was done but patient declined. She was then readmitted 5 days later for bradycardia with heart rate in the 30s. Her metoprolol was discontinued and heart rates improved to 50s. Again CRT was discussed but patient declined.     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 She was hospitalized from May 22 through May 29 with septic arthritis. Patient is status post right knee arthroscopic irrigation and debridement. ID was consulted and patient was started on antibiotic.  She underwent a cardioversion in June(cardioverted on June 19 and went back into A-fib on 23 June) but then was hospitalized twice in the month of July for exacerbation of her heart failure.  She was found to be in atrial fibrillation during both these admissions.    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  Overall she feels she is doing all right.  She is definitely not interested in pursuing any further procedures at this time.  She feels she is ready to die and is at peace.  She feels she has too many doctors appointments and would like to avoid or cut down on some appointments if possible.    Recent Device check (personally reviewed):      Device: MedCarley sanford (RAHAT) PPM  Pacing  %/Programmed: AP- 0.1%, CSP- 15.2%, DDD 60/110   Lead(s): Stable  Battery longevity: Estimating 14.1 years remaining.  Presenting rhythm: AF/VS @  bpm  Underlying rhythm: AF, w/v-rates @   Heart rates: Histograms show primarily  bpm.  Atrial arrhythmias: Since 7/24/2024- on going AT/AF since May, 2024. V-rates well controlled. Patient states she dose feel SOB w/exertion and fatigued most all the time. However, denies feeling palpation's, dizziness, or lightheadedness.   Anticoagulant: Eliquis        Aniarythmic: Amioderone  Ventricular arrhythmias: Since 7/24/2024- no VHRs logged.   Comments: Normal device function. No changes made.     Recent Echocardiogram Results (personally reviewed):    December 2023    Interpretation Summary     There is mild concentric left ventricular hypertrophy.  Biplane LVEF is 25%.  Septal motion is consistent with conduction abnormality.  Mildly decreased right ventricular systolic function  No significant valve disease.  Compared to the prior study dated 11/30/2023, there have been no changes.      Labs below reviewed personally     Physical Examination  Review of Systems   Vitals: /65 (BP Location: Left arm, Patient Position: Sitting, Cuff Size: Adult Regular)   Pulse 80   Resp 16   SpO2 96%   BMI= There is no height or weight on file to calculate BMI.  Wt Readings from Last 3 Encounters:   09/11/24 64.7 kg (142 lb 9.6 oz)   09/01/24 62.6 kg (137 lb 14.4 oz)   08/22/24 63.5 kg (140 lb)       General Appearance:   no distress, normal body habitus   ENT/Mouth: membranes moist, no oral lesions or bleeding gums.      EYES:  no scleral icterus, normal conjunctivae   Neck: no carotid bruits or thyromegaly   Chest/Lungs:   lungs are clear to auscultation, no rales or wheezing, no sternal scar, equal chest wall expansion    Cardiovascular:   Irregular. Normal first and second heart sounds with no murmurs, rubs, or gallops; the carotid, radial and posterior tibial  pulses are intact, no edema bilaterally    Abdomen:  no organomegaly, masses, bruits, or tenderness; bowel sounds are present   Extremities: no cyanosis or clubbing. Left knee in brace   Skin: no xanthelasma, warm.    Neurologic: normal  bilateral, no tremors     Psychiatric: alert and oriented x3, calm        Please refer above for cardiac ROS details.        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     Past Surgical History:   Procedure Laterality Date     APPENDECTOMY       ARTHROSCOPY KNEE Right 5/23/2024    Procedure: ARTHROSCOPY, KNEE;  Surgeon: Sanchez Monahan MD;  Location: [facility] [place]     BASAL CELL CARCINOMA EXCISION      nose     CARDIOVERSION  06/19/2024     EP PACEMAKER DEVICE & IMPLANT- HIS LEAD DUAL N/A 4/8/2024    Procedure: Pacemaker Device & Lead Implant - HIS Lead Dual;  Surgeon: Jeannie Rivera MD;  Location: [facility] CATH LAB CV     INCISION AND DRAINAGE KNEE, COMBINED Right 5/23/2024    Procedure: INCISION AND DRAINAGE, KNEE;  Surgeon: Sanchez Monahan MD;  Location: [facility] OR     IRRIGATION AND DEBRIDEMENT KNEE, COMBINED Right 7/2/2024    Procedure: RIGHT KNEE OPEN IRRIGATION AND DEBRIDEMENT;  Surgeon: Rakan Syed MD;  Location: [facility]     LAPAROSCOPY DIAGNOSTIC (GENERAL) N/A 11/04/2014    Procedure: LAPAROSCOPY BILATERAL SALPINGO-OOPHORECTOMY ;  Surgeon: Sofia Harper MD;  Location: [facility] OR;  Service:      OPEN REDUCTION INTERNAL FIXATION HIP BIPOLAR Right 3/5/2023    Procedure: HEMIARTHROPLASTY, HIP, BIPOLAR;  Surgeon: Jose G Martinez MD;   Location: [facility] OR     PICC SINGLE LUMEN PLACEMENT  05/24/2024     TONSILLECTOMY      10 years old     ZZC TOTAL KNEE ARTHROPLASTY Left     2011     Family History   Problem Relation Age of Onset     Heart Disease Mother      Rheumatic Heart Disease Mother      No Known Problems Father      Cancer Brother         brain     Lung Cancer Brother      Lung Cancer Brother      Cancer Sister         lung     Lung Cancer Sister         Social History     Socioeconomic History     Marital status: Single     Spouse name: Not on file     Number of children: Not on file     Years of education: Not on file     Highest education level: Not on file   Occupational History     Not on file   Tobacco Use     Smoking status: Never     Passive exposure: Never     Smokeless tobacco: Never     Tobacco comments:     no passive exposure   Vaping Use     Vaping status: Never Used   Substance and Sexual Activity     Alcohol use: Yes     Comment: Alcoholic Drinks/day: Rarely a glass of wine     Drug use: No     Sexual activity: Not on file   Other Topics Concern     Not on file   Social History Narrative    The patient is a nun.     Social Determinants of Health     Financial Resource Strain: Low Risk  (4/5/2024)    Financial Resource Strain      Within the past 12 months, have you or your family members you live with been unable to get utilities (heat, electricity) when it was really needed?: No   Food Insecurity: Low Risk  (4/5/2024)    Food Insecurity      Within the past 12 months, did you worry that your food would run out before you got money to buy more?: No      Within the past 12 months, did the food you bought just not last and you didn t have money to get more?: No   Transportation Needs: Low Risk  (4/5/2024)    Transportation Needs      Within the past 12 months, has lack of transportation kept you from medical appointments, getting your medicines, non-medical meetings or appointments, work, or from getting things that you  need?: No   Physical Activity: Insufficiently Active (3/27/2023)    Exercise Vital Sign      Days of Exercise per Week: 3 days      Minutes of Exercise per Session: 10 min   Stress: No Stress Concern Present (4/5/2024)    [employer] - Occupational Stress Questionnaire      Feeling of Stress : Not at all   Social Connections: Moderately Integrated (3/27/2023)    Social Connection and Isolation Panel [NHANES]      Frequency of Communication with Friends and Family: More than three times a week      Frequency of Social Gatherings with Friends and Family: More than three times a week      Attends [employer] Services: More than 4 times per year      Active Member of Clubs or Organizations: Yes      Attends Club or Organization Meetings: More than 4 times per year      Marital Status: Never    Interpersonal Safety: Low Risk  (4/5/2024)    Interpersonal Safety      Do you feel physically and emotionally safe where you currently live?: Yes      Within the past 12 months, have you been hit, slapped, kicked or otherwise physically hurt by someone?: No      Within the past 12 months, have you been humiliated or emotionally abused in other ways by your partner or ex-partner?: No   Housing Stability: Low Risk  (4/5/2024)    Housing Stability      Do you have housing? : Yes      Are you worried about losing your housing?: No           Medications  Allergies   Current Outpatient Medications   Medication Sig Dispense Refill     acetaminophen (TYLENOL) 325 MG tablet Take 2 tablets (650 mg) by mouth every 4 hours as needed for mild pain 100 tablet 0     amiodarone (PACERONE) 200 MG tablet Take 0.5 tablets (100 mg) by mouth daily 45 tablet 0     apixaban ANTICOAGULANT (ELIQUIS ANTICOAGULANT) 5 MG tablet Take 1 tablet (5 mg) by mouth 2 times daily 60 tablet 5     cholecalciferol, vitamin D3, (VITAMIN D3) 2,000 unit Tab [CHOLECALCIFEROL, VITAMIN D3, (VITAMIN D3) 2,000 UNIT TAB] Take 1 tablet (2,000  "Units total) by mouth daily. 90 tablet 3     Diclofenac Epolamine 1.3 % PT24 Externally apply 1 patch topically daily. 30 patch 3     DULoxetine (CYMBALTA) 60 MG capsule Take 1 capsule (60 mg) by mouth daily. 90 capsule 3     furosemide (LASIX) 40 MG tablet Take 1 tablet (40 mg) by mouth 2 times daily       lisinopril (ZESTRIL) 2.5 MG tablet Take 1 tablet (2.5 mg) by mouth 2 times daily. 180 tablet 0     nystatin (MYCOSTATIN) 642212 UNIT/GM external powder Apply topically 2 times daily as needed (rash in skin folds)       oxyCODONE (OXYCONTIN) 10 MG 12 hr tablet Take 1 tablet (10 mg) by mouth every 24 hours. Ok to fill/start September 30, 2024 Use Oxycodone 5 mg sparingly when starting this medication. (Patient taking differently: Take 10 mg by mouth every 12 hours. Ok to fill/start September 30, 2024 Use Oxycodone 5 mg sparingly when starting this medication.) 30 tablet 0     oxyCODONE (ROXICODONE) 5 MG tablet Take 1 tablet (5 mg) by mouth 3 times daily as needed for moderate to severe pain. Ok to fill/start August 30, 2024 #90 tabs to last 30 days. 90 tablet 0     potassium chloride tamiko ER (KLOR-CON M20) 20 MEQ CR tablet Take 1 tablet (20 mEq) by mouth daily 90 tablet 3     zolpidem ER (AMBIEN CR) 6.25 MG CR tablet Take 1 and 1/4 tablet at bedtime (Patient not taking: Reported on 10/15/2024) 45 tablet 5       Allergies   Allergen Reactions     Trazodone Shortness Of Breath and Unknown     Allergic to trazodone and deriv., Other: trouble swallowing       Clindamycin Diarrhea     C-diff     Gabapentin Other (See Comments)     \"Internal tremors\"     Temazepam Other (See Comments)     Annotation: Nightmares            Lab Results    Chemistry/lipid CBC Cardiac Enzymes/BNP/TSH/INR   Recent Labs   Lab Test 03/12/15  1230   CHOL 179   HDL 64   LDL 80   TRIG 176*     Recent Labs   Lab Test 03/12/15  1230   LDL 80     Recent Labs   Lab Test 02/08/23  1345      POTASSIUM 4.2   CHLORIDE 105   CO2 27   GLC 82   BUN " 20.6   CR 1.16*   GFRESTIMATED 44*   MARLENE 9.8*     Recent Labs   Lab Test 02/08/23  1345 09/07/22  1112 08/28/22  0442   CR 1.16* 1.14* 0.81     Recent Labs   Lab Test 06/15/21  0910   A1C 6.0*          Recent Labs   Lab Test 08/28/22  0442   WBC 6.2   HGB 12.1   HCT 36.1   MCV 93        Recent Labs   Lab Test 08/28/22  0442 08/26/22  0723 08/24/22  0718   HGB 12.1 12.6 12.8    Recent Labs   Lab Test 08/23/22  1154 08/17/22  1215 11/21/19  0451   TROPONINI 0.16 0.04 0.01     Recent Labs   Lab Test 02/08/23  1345 08/23/22  1154 08/17/22  1215 03/03/21  1222 02/21/19  0713   BNP  --  1,933*  --  177* 95   NTBNP 5,373*  --  5,101*  --   --      Recent Labs   Lab Test 02/21/19  0648   TSH 2.05     Recent Labs   Lab Test 08/23/22  1154 06/05/19  0052 02/21/19  0648   INR 1.37* 1.50* 1.20*        Jeannie Rivera MD                                        Thank you for allowing me to participate in the care of your patient.      Sincerely,     Jeannie Rivera MD     [facility]  cc:   Saray Coyle MD  [address]  [place] [address]

## 2024-10-15 NOTE — PROGRESS NOTES
HEART CARE ENCOUNTER CONSULTATON NOTE      [facility]  [phone]      Assessment/Recommendations   Assessment/Plan:    Gladys Ramirez is a very pleasant 89 year old female with non ischemic cardiomyopathy(LVEF 25-30%), HTN, h/o PE, LBBB who presents today to discuss device therapy.    1. Non ischemic cardiomyopathy  - s/p pacemaker with LBBB pacing  - QRS post device ~110 ms improved from 150 ms  -We discussed how her atrial fibrillation is preventing resynchronization offered by her pacemaker  -We discussed the indications, procedural details, possible complications and recovery of an AV node ablation.  Reading material about this procedure was also provided. She is not interested in pursuing any further porcedures at this time    2. Persistent atrial fibrillation  - On Amiodarone 100 mg daily and Eliquis for anticoagulation  - On chronic anticoagulation for her h/p PE also, so Watchman not offered    3. HTN  - well controlled    Follow-up in 1 year    Time spent: 30 minutes spent on the date of the encounter doing chart review, history and exam, documentation and further activities as noted above.    The longitudinal plan of care for the condition(s) below were addressed during this visit. Due to the added complexity in care, I will continue support in the subsequent management of this condition(s) and with the ongoing continuity of care of this condition(s).        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    She had a fall after the last appointment oin 3/4/2023. She hit her head but no LOC. Had partial  hip replacement surgery on 3/5/23 and went to TCU from 3/27/23-4/10/23.  She comes today for follow up.    February 2024    She was hospitalized twice during the month of December. She was first admitted for decompensated heart failure and was started on IV Lasix. She also underwent cardioversion on December 22 which was successful. He had a limited echocardiogram which showed ejection fraction of 25%. Discussion of CRT was done but patient declined. She was then readmitted 5 days later for bradycardia with heart rate in the 30s. Her metoprolol was discontinued and heart rates improved to 50s. Again CRT was discussed but patient declined.     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 She was hospitalized from May 22 through May 29 with septic arthritis. Patient is status post right knee arthroscopic irrigation and debridement. ID was consulted and patient was started on antibiotic.  She underwent a cardioversion in June(cardioverted on June 19 and went back into A-fib on 23 June) but then was hospitalized twice in the month of July for exacerbation of her heart failure.  She was found to be in atrial fibrillation during both these admissions.    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  Overall she feels she is doing all right.  She is definitely not interested in pursuing any further procedures at this time.  She feels she is ready to die and is at peace.  She feels she has too many doctors appointments and would like to avoid or cut down on some appointments if possible.    Recent Device check (personally reviewed):      Device: Medtronic, [place] (D) PPM  Pacing %/Programmed: AP- 0.1%, CSP- 15.2%, DDD 60/110   Lead(s): Stable  Battery longevity: Estimating 14.1 years remaining.  Presenting rhythm: AF/VS @  bpm  Underlying rhythm: AF, w/v-rates @   Heart  rates: Histograms show primarily  bpm.  Atrial arrhythmias: Since 7/24/2024- on going AT/AF since May, 2024. V-rates well controlled. Patient states she dose feel SOB w/exertion and fatigued most all the time. However, denies feeling palpation's, dizziness, or lightheadedness.   Anticoagulant: Eliquis        Aniarythmic: Amioderone  Ventricular arrhythmias: Since 7/24/2024- no VHRs logged.   Comments: Normal device function. No changes made.     Recent Echocardiogram Results (personally reviewed):    December 2023    Interpretation Summary     There is mild concentric left ventricular hypertrophy.  Biplane LVEF is 25%.  Septal motion is consistent with conduction abnormality.  Mildly decreased right ventricular systolic function  No significant valve disease.  Compared to the prior study dated 11/30/2023, there have been no changes.      Labs below reviewed personally     Physical Examination  Review of Systems   Vitals: /65 (BP Location: Left arm, Patient Position: Sitting, Cuff Size: Adult Regular)   Pulse 80   Resp 16   SpO2 96%   BMI= There is no height or weight on file to calculate BMI.  Wt Readings from Last 3 Encounters:   09/11/24 64.7 kg (142 lb 9.6 oz)   09/01/24 62.6 kg (137 lb 14.4 oz)   08/22/24 63.5 kg (140 lb)       General Appearance:   no distress, normal body habitus   ENT/Mouth: membranes moist, no oral lesions or bleeding gums.      EYES:  no scleral icterus, normal conjunctivae   Neck: no carotid bruits or thyromegaly   Chest/Lungs:   lungs are clear to auscultation, no rales or wheezing, no sternal scar, equal chest wall expansion    Cardiovascular:   Irregular. Normal first and second heart sounds with no murmurs, rubs, or gallops; the carotid, radial and posterior tibial pulses are intact, no edema bilaterally    Abdomen:  no organomegaly, masses, bruits, or tenderness; bowel sounds are present   Extremities: no cyanosis or clubbing. Left knee in brace   Skin: no xanthelasma,  warm.    Neurologic: normal  bilateral, no tremors     Psychiatric: alert and oriented x3, calm        Please refer above for cardiac ROS details.        Medical History  Surgical History Family History Social History   Past Medical History:   Diagnosis Date    Angina pectoris (H)     Atrial fibrillation (H)     Chest pain 03/09/2017    Chronic kidney disease     Congestive heart failure (H)     Cough     Hyperlipidemia     Hypertension     Osteoarthritis     Pyogenic arthritis of right knee joint (H) 06/18/2024    Staphylococcal arthritis of right knee (H) 05/22/2024     Past Surgical History:   Procedure Laterality Date    APPENDECTOMY      ARTHROSCOPY KNEE Right 5/23/2024    Procedure: ARTHROSCOPY, KNEE;  Surgeon: Sanchez Monahan MD;  Location: [facility]    BASAL CELL CARCINOMA EXCISION      nose    CARDIOVERSION  06/19/2024    EP PACEMAKER DEVICE & IMPLANT- HIS LEAD DUAL N/A 4/8/2024    Procedure: Pacemaker Device & Lead Implant - HIS Lead Dual;  Surgeon: Jeannie Rivera MD;  Location: [facility] CATH LAB CV    INCISION AND DRAINAGE KNEE, COMBINED Right 5/23/2024    Procedure: INCISION AND DRAINAGE, KNEE;  Surgeon: Sanchez Monahan MD;  Location: [facility] OR    IRRIGATION AND DEBRIDEMENT KNEE, COMBINED Right 7/2/2024    Procedure: RIGHT KNEE OPEN IRRIGATION AND DEBRIDEMENT;  Surgeon: Rakan Seyd MD;  Location: [facility] OR    LAPAROSCOPY DIAGNOSTIC (GENERAL) N/A 11/04/2014    Procedure: LAPAROSCOPY BILATERAL SALPINGO-OOPHORECTOMY ;  Surgeon: Sofia Harper MD;  Location: [facility] OR;  Service:     OPEN REDUCTION INTERNAL FIXATION HIP BIPOLAR Right 3/5/2023    Procedure: HEMIARTHROPLASTY, HIP, BIPOLAR;  Surgeon: Jose G Martinez MD;  Location: [facility] OR    PICC SINGLE LUMEN PLACEMENT  05/24/2024    TONSILLECTOMY      10 years old    ZZC TOTAL KNEE ARTHROPLASTY Left     2011     Family History   Problem Relation Age of Onset    Heart Disease Mother      Rheumatic Heart Disease Mother     No Known Problems Father     Cancer Brother         brain    Lung Cancer Brother     Lung Cancer Brother     Cancer Sister         lung    Lung Cancer Sister         Social History     Socioeconomic History    Marital status: Single     Spouse name: Not on file    Number of children: Not on file    Years of education: Not on file    Highest education level: Not on file   Occupational History    Not on file   Tobacco Use    Smoking status: Never     Passive exposure: Never    Smokeless tobacco: Never    Tobacco comments:     no passive exposure   Vaping Use    Vaping status: Never Used   Substance and Sexual Activity    Alcohol use: Yes     Comment: Alcoholic Drinks/day: Rarely a glass of wine    Drug use: No    Sexual activity: Not on file   Other Topics Concern    Not on file   Social History Narrative    The patient is a nun.     Social Determinants of Health     Financial Resource Strain: Low Risk  (4/5/2024)    Financial Resource Strain     Within the past 12 months, have you or your family members you live with been unable to get utilities (heat, electricity) when it was really needed?: No   Food Insecurity: Low Risk  (4/5/2024)    Food Insecurity     Within the past 12 months, did you worry that your food would run out before you got money to buy more?: No     Within the past 12 months, did the food you bought just not last and you didn t have money to get more?: No   Transportation Needs: Low Risk  (4/5/2024)    Transportation Needs     Within the past 12 months, has lack of transportation kept you from medical appointments, getting your medicines, non-medical meetings or appointments, work, or from getting things that you need?: No   Physical Activity: Insufficiently Active (3/27/2023)    Exercise Vital Sign     Days of Exercise per Week: 3 days     Minutes of Exercise per Session: 10 min   Stress: No Stress Concern Present (4/5/2024)    [employer] [place] [employer] - Occupational Stress Questionnaire     Feeling of Stress : Not at all   Social Connections: Moderately Integrated (3/27/2023)    Social Connection and Isolation Panel [NHANES]     Frequency of Communication with Friends and Family: More than three times a week     Frequency of Social Gatherings with Friends and Family: More than three times a week     Attends Religion Services: More than 4 times per year     Active Member of Clubs or Organizations: Yes     Attends Club or Organization Meetings: More than 4 times per year     Marital Status: Never    Interpersonal Safety: Low Risk  (4/5/2024)    Interpersonal Safety     Do you feel physically and emotionally safe where you currently live?: Yes     Within the past 12 months, have you been hit, slapped, kicked or otherwise physically hurt by someone?: No     Within the past 12 months, have you been humiliated or emotionally abused in other ways by your partner or ex-partner?: No   Housing Stability: Low Risk  (4/5/2024)    Housing Stability     Do you have housing? : Yes     Are you worried about losing your housing?: No           Medications  Allergies   Current Outpatient Medications   Medication Sig Dispense Refill    acetaminophen (TYLENOL) 325 MG tablet Take 2 tablets (650 mg) by mouth every 4 hours as needed for mild pain 100 tablet 0    amiodarone (PACERONE) 200 MG tablet Take 0.5 tablets (100 mg) by mouth daily 45 tablet 0    apixaban ANTICOAGULANT (ELIQUIS ANTICOAGULANT) 5 MG tablet Take 1 tablet (5 mg) by mouth 2 times daily 60 tablet 5    cholecalciferol, vitamin D3, (VITAMIN D3) 2,000 unit Tab [CHOLECALCIFEROL, VITAMIN D3, (VITAMIN D3) 2,000 UNIT TAB] Take 1 tablet (2,000 Units total) by mouth daily. 90 tablet 3    Diclofenac Epolamine 1.3 % PT24 Externally apply 1 patch topically daily. 30 patch 3    DULoxetine (CYMBALTA) 60 MG capsule Take 1 capsule (60 mg) by mouth daily. 90 capsule 3    furosemide (LASIX) 40 MG tablet Take 1 tablet (40  "mg) by mouth 2 times daily      lisinopril (ZESTRIL) 2.5 MG tablet Take 1 tablet (2.5 mg) by mouth 2 times daily. 180 tablet 0    nystatin (MYCOSTATIN) 027323 UNIT/GM external powder Apply topically 2 times daily as needed (rash in skin folds)      oxyCODONE (OXYCONTIN) 10 MG 12 hr tablet Take 1 tablet (10 mg) by mouth every 24 hours. Ok to fill/start September 30, 2024 Use Oxycodone 5 mg sparingly when starting this medication. (Patient taking differently: Take 10 mg by mouth every 12 hours. Ok to fill/start September 30, 2024 Use Oxycodone 5 mg sparingly when starting this medication.) 30 tablet 0    oxyCODONE (ROXICODONE) 5 MG tablet Take 1 tablet (5 mg) by mouth 3 times daily as needed for moderate to severe pain. Ok to fill/start August 30, 2024 #90 tabs to last 30 days. 90 tablet 0    potassium chloride tamiko ER (KLOR-CON M20) 20 MEQ CR tablet Take 1 tablet (20 mEq) by mouth daily 90 tablet 3    zolpidem ER (AMBIEN CR) 6.25 MG CR tablet Take 1 and 1/4 tablet at bedtime (Patient not taking: Reported on 10/15/2024) 45 tablet 5       Allergies   Allergen Reactions    Trazodone Shortness Of Breath and Unknown     Allergic to trazodone and deriv., Other: trouble swallowing      Clindamycin Diarrhea     C-diff    Gabapentin Other (See Comments)     \"Internal tremors\"    Temazepam Other (See Comments)     Annotation: Nightmares            Lab Results    Chemistry/lipid CBC Cardiac Enzymes/BNP/TSH/INR   Recent Labs   Lab Test 03/12/15  1230   CHOL 179   HDL 64   LDL 80   TRIG 176*     Recent Labs   Lab Test 03/12/15  1230   LDL 80     Recent Labs   Lab Test 02/08/23  1345      POTASSIUM 4.2   CHLORIDE 105   CO2 27   GLC 82   BUN 20.6   CR 1.16*   GFRESTIMATED 44*   MARLENE 9.8*     Recent Labs   Lab Test 02/08/23  1345 09/07/22  1112 08/28/22  0442   CR 1.16* 1.14* 0.81     Recent Labs   Lab Test 06/15/21  0910   A1C 6.0*          Recent Labs   Lab Test 08/28/22  0442   WBC 6.2   HGB 12.1   HCT 36.1   MCV 93   PLT " 270     Recent Labs   Lab Test 08/28/22  0442 08/26/22  0723 08/24/22  0718   HGB 12.1 12.6 12.8    Recent Labs   Lab Test 08/23/22  1154 08/17/22  1215 11/21/19  0451   TROPONINI 0.16 0.04 0.01     Recent Labs   Lab Test 02/08/23  1345 08/23/22  1154 08/17/22  1215 03/03/21  1222 02/21/19  0713   BNP  --  1,933*  --  177* 95   NTBNP 5,373*  --  5,101*  --   --      Recent Labs   Lab Test 02/21/19  0648   TSH 2.05     Recent Labs   Lab Test 08/23/22  1154 06/05/19  0052 02/21/19  0648   INR 1.37* 1.50* 1.20*        Jeannie Rivera MD

## 2024-10-23 ENCOUNTER — OFFICE VISIT (OUTPATIENT)
Dept: FAMILY MEDICINE | Facility: CLINIC | Age: 89
End: 2024-10-23
Payer: COMMERCIAL

## 2024-10-23 VITALS
RESPIRATION RATE: 16 BRPM | TEMPERATURE: 98.1 F | SYSTOLIC BLOOD PRESSURE: 100 MMHG | DIASTOLIC BLOOD PRESSURE: 60 MMHG | HEIGHT: 60 IN | HEART RATE: 115 BPM | OXYGEN SATURATION: 93 % | BODY MASS INDEX: 26.31 KG/M2 | WEIGHT: 134 LBS

## 2024-10-23 DIAGNOSIS — Z79.899 ENCOUNTER FOR LONG-TERM (CURRENT) USE OF MEDICATIONS: ICD-10-CM

## 2024-10-23 DIAGNOSIS — I10 BENIGN ESSENTIAL HYPERTENSION: ICD-10-CM

## 2024-10-23 DIAGNOSIS — F51.01 PRIMARY INSOMNIA: ICD-10-CM

## 2024-10-23 DIAGNOSIS — Z87.39 H/O SEPTIC ARTHRITIS: ICD-10-CM

## 2024-10-23 DIAGNOSIS — G89.29 OTHER CHRONIC PAIN: ICD-10-CM

## 2024-10-23 DIAGNOSIS — I48.19 PERSISTENT ATRIAL FIBRILLATION (H): ICD-10-CM

## 2024-10-23 DIAGNOSIS — N18.31 STAGE 3A CHRONIC KIDNEY DISEASE (CKD) (H): ICD-10-CM

## 2024-10-23 DIAGNOSIS — Z00.00 ENCOUNTER FOR MEDICARE ANNUAL WELLNESS EXAM: Primary | ICD-10-CM

## 2024-10-23 DIAGNOSIS — I50.20 HFREF (HEART FAILURE WITH REDUCED EJECTION FRACTION) (H): ICD-10-CM

## 2024-10-23 LAB
ALBUMIN SERPL BCG-MCNC: 3.2 G/DL (ref 3.5–5.2)
ALP SERPL-CCNC: 106 U/L (ref 40–150)
ALT SERPL W P-5'-P-CCNC: 6 U/L (ref 0–50)
ANION GAP SERPL CALCULATED.3IONS-SCNC: 12 MMOL/L (ref 7–15)
AST SERPL W P-5'-P-CCNC: 20 U/L (ref 0–45)
BASOPHILS # BLD AUTO: 0 10E3/UL (ref 0–0.2)
BASOPHILS NFR BLD AUTO: 0 %
BILIRUB SERPL-MCNC: 0.5 MG/DL
BUN SERPL-MCNC: 15.7 MG/DL (ref 8–23)
CALCIUM SERPL-MCNC: 9.7 MG/DL (ref 8.8–10.4)
CHLORIDE SERPL-SCNC: 98 MMOL/L (ref 98–107)
CREAT SERPL-MCNC: 0.98 MG/DL (ref 0.51–0.95)
EGFRCR SERPLBLD CKD-EPI 2021: 53 ML/MIN/1.73M2
EOSINOPHIL # BLD AUTO: 0.4 10E3/UL (ref 0–0.7)
EOSINOPHIL NFR BLD AUTO: 6 %
ERYTHROCYTE [DISTWIDTH] IN BLOOD BY AUTOMATED COUNT: 18.1 % (ref 10–15)
GLUCOSE SERPL-MCNC: 100 MG/DL (ref 70–99)
HCO3 SERPL-SCNC: 26 MMOL/L (ref 22–29)
HCT VFR BLD AUTO: 32.9 % (ref 35–47)
HGB BLD-MCNC: 10.6 G/DL (ref 11.7–15.7)
IMM GRANULOCYTES # BLD: 0 10E3/UL
IMM GRANULOCYTES NFR BLD: 0 %
LYMPHOCYTES # BLD AUTO: 0.8 10E3/UL (ref 0.8–5.3)
LYMPHOCYTES NFR BLD AUTO: 11 %
MCH RBC QN AUTO: 26.4 PG (ref 26.5–33)
MCHC RBC AUTO-ENTMCNC: 32.2 G/DL (ref 31.5–36.5)
MCV RBC AUTO: 82 FL (ref 78–100)
MONOCYTES # BLD AUTO: 0.8 10E3/UL (ref 0–1.3)
MONOCYTES NFR BLD AUTO: 11 %
NEUTROPHILS # BLD AUTO: 5.5 10E3/UL (ref 1.6–8.3)
NEUTROPHILS NFR BLD AUTO: 73 %
PLATELET # BLD AUTO: 394 10E3/UL (ref 150–450)
POTASSIUM SERPL-SCNC: 3.8 MMOL/L (ref 3.4–5.3)
PROT SERPL-MCNC: 6.7 G/DL (ref 6.4–8.3)
RBC # BLD AUTO: 4.01 10E6/UL (ref 3.8–5.2)
SODIUM SERPL-SCNC: 136 MMOL/L (ref 135–145)
WBC # BLD AUTO: 7.6 10E3/UL (ref 4–11)

## 2024-10-23 PROCEDURE — 36415 COLL VENOUS BLD VENIPUNCTURE: CPT | Performed by: FAMILY MEDICINE

## 2024-10-23 PROCEDURE — G0008 ADMIN INFLUENZA VIRUS VAC: HCPCS | Performed by: FAMILY MEDICINE

## 2024-10-23 PROCEDURE — 90480 ADMN SARSCOV2 VAC 1/ONLY CMP: CPT | Performed by: FAMILY MEDICINE

## 2024-10-23 PROCEDURE — 90662 IIV NO PRSV INCREASED AG IM: CPT | Performed by: FAMILY MEDICINE

## 2024-10-23 PROCEDURE — 91320 SARSCV2 VAC 30MCG TRS-SUC IM: CPT | Performed by: FAMILY MEDICINE

## 2024-10-23 PROCEDURE — 85025 COMPLETE CBC W/AUTO DIFF WBC: CPT | Performed by: FAMILY MEDICINE

## 2024-10-23 PROCEDURE — G0439 PPPS, SUBSEQ VISIT: HCPCS | Performed by: FAMILY MEDICINE

## 2024-10-23 PROCEDURE — 80053 COMPREHEN METABOLIC PANEL: CPT | Performed by: FAMILY MEDICINE

## 2024-10-23 NOTE — PROGRESS NOTES
Preventive Care Visit  [facility] MALDONADO Flores MD, Family Medicine  Oct 23, 2024      Assessment & Plan     Encounter for Medicare annual wellness exam    Stage 3a chronic kidney disease (CKD) (H): Check labs. Avoid nephrotoxic agents. On ace-inhibitor.    Benign essential hypertension: BP at goal- follows with cardiology. Continue medications    Primary insomnia: Continue ambien- high risk medication in elderly, however agreed to continue after risk-benefit discussion to focus on quality of life.    Other chronic pain  H/O right knee septic arthritis  Chronic osteoarthritis knees: R knee with erythema and significant pain with passive range of motion- she is non-ambulatory due to pain. She continues with therapy and will have this continued when she moves to [facility] tomorrow. Follows with orthopedics- not a surgical candidate. Continue pain management per palliative care. Will obtain CBC today to help rule out infectious etiology  - CBC with platelets and differential  - CBC with platelets and differential    HFrEF (heart failure with reduced ejection fraction) (H)  Persistent atrial fibrillation (H): On chronic anticoagulation- follows with cardiology- reviewed recent note and has follow-up. Stable.    Encounter for long-term (current) use of medications  - Comprehensive metabolic panel            BMI  Estimated body mass index is 26.17 kg/m  as calculated from the following:    Height as of this encounter: 1.524 m (5').    Weight as of this encounter: 60.8 kg (134 lb).       Counseling  Appropriate preventive services were addressed with this patient via screening, questionnaire, or discussion as appropriate for fall prevention, nutrition, physical activity, Tobacco-use cessation, social engagement, weight loss and cognition.  Checklist reviewing preventive services available has been given to the patient.  Reviewed patient's diet, addressing concerns and/or questions.  "  Discussed possible causes of fatigue. Updated plan of care.  Patient reported difficulty with activities of daily living were addressed today.The patient was provided with written information regarding signs of hearing loss.   Information on urinary incontinence and treatment options given to patient.           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  Her family has been assisting with packing her things  She continues to have severe pain in her knees, especially her R knee. Cannot walk anymore or put weight on it. She will have PT/OT continued when she goes to [facility]  She follows with palliative care for pain management  Has noticed more redness on R knee- she reports the \"orthopedic doctor thinks its from my heart and the heart doctor thinks its from my knee\"  Lasix was increased to help with peripheral edema    Health Care Directive  Patient has a Health Care Directive on file  Advance care planning document is on file and is current.      10/23/2024   General Health   How would you rate your overall physical health? Good   Feel stress (tense, anxious, or unable to sleep) Not at all            10/23/2024   Nutrition   Diet: Regular (no restrictions)             No data to display                  10/23/2024   Social Factors   Worry food won't last until get money to buy more No   Food not last or not have enough money for food? No   Do you have housing? (Housing is defined as stable permanent housing and does not include staying ouside in a car, in a tent, in an abandoned building, in an overnight shelter, or couch-surfing.) Yes   Are you worried about losing your housing? No   Lack of transportation? No   Unable to get utilities " (heat,electricity)? No            10/23/2024   Fall Risk   Fallen 2 or more times in the past year? No    Trouble with walking or balance? Yes        Patient-reported           10/23/2024   Activities of Daily Living- Home Safety   Needs help with the following daily activites Telephone use    Transportation    Shopping    Preparing meals    Housework    Bathing    Laundry    Medication administration    Money management    Toileting    Feeding    Dressing   Safety concerns in the home None of the above       Multiple values from one day are sorted in reverse-chronological order         10/23/2024   Dental   Dentist two times every year? Yes            10/23/2024   Hearing Screening   Hearing concerns? (!) I NEED TO ASK PEOPLE TO SPEAK UP OR REPEAT THEMSELVES.            10/23/2024   Driving Risk Screening   Patient/family members have concerns about driving No            10/23/2024   General Alertness/Fatigue Screening   Have you been more tired than usual lately? (!) YES            10/23/2024   Urinary Incontinence Screening   Bothered by leaking urine in past 6 months Yes            4/5/2024   TB Screening   Were you born outside of the [place]? No            Today's PHQ-2 Score:       10/23/2024     9:05 AM   PHQ-2 ( 1999 [employer])   Q1: Little interest or pleasure in doing things 0    Q2: Feeling down, depressed or hopeless 0    PHQ-2 Score 0    Q1: Little interest or pleasure in doing things Not at all   Q2: Feeling down, depressed or hopeless Not at all   PHQ-2 Score 0       Patient-reported           10/23/2024   Substance Use   Alcohol more than 3/day or more than 7/wk Not Applicable   Do you have a current opioid prescription? No   How severe/bad is pain from 1 to 10? 7/10   Do you use any other substances recreationally? No        Social History     Tobacco Use    Smoking status: Never     Passive exposure: Never    Smokeless tobacco: Never    Tobacco comments:     no passive exposure   Vaping Use    Vaping  status: Never Used   Substance Use Topics    Alcohol use: Yes     Comment: Alcoholic Drinks/day: Rarely a glass of wine    Drug use: No                Reviewed and updated as needed this visit by Provider                      Current providers sharing in care for this patient include:  Patient Care Team:  Margret Flores MD as PCP - General (Family Medicine)  Abdifatah Clark, PharmD as Pharmacist (Pharmacist)  Margret Flores MD as Assigned PCP  Valentine Howard APRN CNP as Assigned Pain Medication Provider  Estrella Walker DO as Assigned Neuroscience Provider  Aakash Reza MD as Physician (Infectious Diseases)  Aakash Reza MD as Assigned Infectious Disease Provider  Elizabeth Holley MD as Assigned Heart and Vascular Provider    The following health maintenance items are reviewed in Epic and correct as of today:  Health Maintenance   Topic Date Due    HF ACTION PLAN  Never done    HEPATITIS A IMMUNIZATION (1 of 2 - Risk 2-dose series) Never done    RSV VACCINE (1 - 1-dose 75+ series) Never done    ZOSTER IMMUNIZATION (2 of 3) 11/19/2012    MEDICARE ANNUAL WELLNESS VISIT  09/28/2023    INFLUENZA VACCINE (1) 09/01/2024    COVID-19 Vaccine (6 - 2024-25 season) 09/01/2024    ANNUAL REVIEW OF HM ORDERS  09/06/2024    MICROALBUMIN  11/01/2024    BMP  03/01/2025    ALFREDO ASSESSMENT  04/05/2025    PHQ-9  04/05/2025    ALT  07/29/2025    URINE DRUG SCREEN  08/30/2025    CONTROLLED SUBSTANCE AGREEMENT FOR CHRONIC PAIN MANAGEMENT  08/30/2025    CBC  09/01/2025    HEMOGLOBIN  09/01/2025    FALL RISK ASSESSMENT  10/23/2025    ADVANCE CARE PLANNING  07/24/2029    DTAP/TDAP/TD IMMUNIZATION (4 - Td or Tdap) 10/14/2029    TSH W/FREE T4 REFLEX  Completed    PHQ-2 (once per calendar year)  Completed    Pneumococcal Vaccine: 65+ Years  Completed    URINALYSIS  Completed    HPV IMMUNIZATION  Aged Out    MENINGITIS IMMUNIZATION  Aged Out    RSV MONOCLONAL ANTIBODY  Aged Out    LIPID  Discontinued            Objective     Exam  /60   Pulse 115   Temp 98.1  F (36.7  C) (Oral)   Resp 16   Ht 1.524 m (5')   Wt 60.8 kg (134 lb)   SpO2 93%   BMI 26.17 kg/m     Estimated body mass index is 26.17 kg/m  as calculated from the following:    Height as of this encounter: 1.524 m (5').    Weight as of this encounter: 60.8 kg (134 lb).  Wt Readings from Last 3 Encounters:   10/23/24 60.8 kg (134 lb)   09/11/24 64.7 kg (142 lb 9.6 oz)   09/01/24 62.6 kg (137 lb 14.4 oz)       Physical Exam  GENERAL: alert and no distress  EYES: Eyes grossly normal to inspection, PERRL and conjunctivae and sclerae normal  HENT: ear canals and TM's normal, nose and mouth without ulcers or lesions  NECK: no adenopathy, no asymmetry, masses, or scars  RESP: lungs clear to auscultation - no rales, rhonchi or wheezes  CV: regular rate, irregular rhythm, normal S1 S2, no S3 or S4, no murmur, click or rub, 2+ pitting edema on RLE, 1+ on LLE  MS: R knee and lower leg with erythema, pain with passive range of motion  NEURO: mentation intact and speech normal  PSYCH: mentation appears normal    Mini-cog- unable to complete due to patient declined         Signed Electronically by: Margret Flores MD     jacqui sullivan

## 2024-10-23 NOTE — PATIENT INSTRUCTIONS
Patient Education   Preventive Care Advice   This is general advice given by our system to help you stay healthy. However, your care team may have specific advice just for you. Please talk to your care team about your preventive care needs.  Nutrition  Eat 5 or more servings of fruits and vegetables each day.  Try wheat bread, brown rice and whole grain pasta (instead of white bread, rice, and pasta).  Get enough calcium and vitamin D. Check the label on foods and aim for 100% of the RDA (recommended daily allowance).  Lifestyle  Exercise at least 150 minutes each week  (30 minutes a day, 5 days a week).  Do muscle strengthening activities 2 days a week. These help control your weight and prevent disease.  No smoking.  Wear sunscreen to prevent skin cancer.  Have a dental exam and cleaning every 6 months.  Yearly exams  See your health care team every year to talk about:  Any changes in your health.  Any medicines your care team has prescribed.  Preventive care, family planning, and ways to prevent chronic diseases.  Shots (vaccines)   HPV shots (up to age 26), if you've never had them before.  Hepatitis B shots (up to age 59), if you've never had them before.  COVID-19 shot: Get this shot when it's due.  Flu shot: Get a flu shot every year.  Tetanus shot: Get a tetanus shot every 10 years.  Pneumococcal, hepatitis A, and RSV shots: Ask your care team if you need these based on your risk.  Shingles shot (for age 50 and up)  General health tests  Diabetes screening:  Starting at age 35, Get screened for diabetes at least every 3 years.  If you are younger than age 35, ask your care team if you should be screened for diabetes.  Cholesterol test: At age 39, start having a cholesterol test every 5 years, or more often if advised.  Bone density scan (DEXA): At age 50, ask your care team if you should have this scan for osteoporosis (brittle bones).  Hepatitis C: Get tested at least once in your life.  STIs (sexually  transmitted infections)  Before age 24: Ask your care team if you should be screened for STIs.  After age 24: Get screened for STIs if you're at risk. You are at risk for STIs (including HIV) if:  You are sexually active with more than one person.  You don't use condoms every time.  You or a partner was diagnosed with a sexually transmitted infection.  If you are at risk for HIV, ask about PrEP medicine to prevent HIV.  Get tested for HIV at least once in your life, whether you are at risk for HIV or not.  Cancer screening tests  Cervical cancer screening: If you have a cervix, begin getting regular cervical cancer screening tests starting at age 21.  Breast cancer scan (mammogram): If you've ever had breasts, begin having regular mammograms starting at age 40. This is a scan to check for breast cancer.  Colon cancer screening: It is important to start screening for colon cancer at age 45.  Have a colonoscopy test every 10 years (or more often if you're at risk) Or, ask your provider about stool tests like a FIT test every year or Cologuard test every 3 years.  To learn more about your testing options, visit:   .  For help making a decision, visit:   https://bit.ly/xq73913.  Prostate cancer screening test: If you have a prostate, ask your care team if a prostate cancer screening test (PSA) at age 55 is right for you.  Lung cancer screening: If you are a current or former smoker ages 50 to 80, ask your care team if ongoing lung cancer screenings are right for you.  For informational purposes only. Not to replace the advice of your health care provider. Copyright   2023 [facility]. All rights reserved. Clinically reviewed by the [facility]. [employer] 866532 - REV 01/24.  Learning About Activities of Daily Living  What are activities of daily living?     Activities of daily living (ADLs) are the basic self-care tasks you do every day. These include eating, bathing, dressing,  and moving around.  As you age, and if you have health problems, you may find that it's harder to do some of these tasks. If so, your doctor can suggest ideas that may help.  To measure what kind of help you may need, your doctor will ask how well you are able to do ADLs. Let your doctor know if there are any tasks that you are having trouble doing. This is an important first step to getting help. And when you have the help you need, you can stay as independent as possible.  How will a doctor assess your ADLs?  Asking about ADLs is part of a routine health checkup your doctor will likely do as you age. Your health check might be done in a doctor's office, in your home, or at a hospital. The goal is to find out if you are having any problems that could make it hard to care for yourself or that make it unsafe for you to be on your own.  To measure your ADLs, your doctor will ask how hard it is for you to do routine tasks. Your doctor may also want to know if you have changed the way you do a task because of a health problem. Your doctor may watch how you:  Walk back and forth.  Keep your balance while you stand or walk.  Move from sitting to standing or from a bed to a chair.  Button or unbutton a shirt or sweater.  Remove and put on your shoes.  It's common to feel a little worried or anxious if you find you can't do all the things you used to be able to do. Talking with your doctor about ADLs is a way to make sure you're as safe as possible and able to care for yourself as well as you can. You may want to bring a caregiver, friend, or family member to your checkup. They can help you talk to your doctor.  Follow-up care is a key part of your treatment and safety. Be sure to make and go to all appointments, and call your doctor if you are having problems. It's also a good idea to know your test results and keep a list of the medicines you take.  Current as of: October 24, 2023  Content Version: 14.2 2024 [facility]  [employer]   Care instructions adapted under license by your healthcare professional. If you have questions about a medical condition or this instruction, always ask your healthcare professional. Healthwise, Incorporated disclaims any warranty or liability for your use of this information.    Preventing Falls: Care Instructions  Injuries and health problems such as trouble walking or poor eyesight can increase your risk of falling. So can some medicines. But there are things you can do to help prevent falls. You can exercise to get stronger. You can also arrange your home to make it safer.    Talk to your doctor about the medicines you take. Ask if any of them increase the risk of falls and whether they can be changed or stopped.   Try to exercise regularly. It can help improve your strength and balance. This can help lower your risk of falling.         Practice fall safety and prevention.   Wear low-heeled shoes that fit well and give your feet good support. Talk to your doctor if you have foot problems that make this hard.  Carry a cellphone or wear a medical alert device that you can use to call for help.  Use stepladders instead of chairs to reach high objects. Don't climb if you're at risk for falls. Ask for help, if needed.  Wear the correct eyeglasses, if you need them.        Make your home safer.   Remove rugs, cords, clutter, and furniture from walkways.  Keep your house well lit. Use night-lights in hallways and bathrooms.  Install and use sturdy handrails on stairways.  Wear nonskid footwear, even inside. Don't walk barefoot or in socks without shoes.        Be safe outside.   Use handrails, curb cuts, and ramps whenever possible.  Keep your hands free by using a shoulder bag or backpack.  Try to walk in well-lit areas. Watch out for uneven ground, changes in pavement, and debris.  Be careful in the winter. Walk on the grass or gravel when sidewalks are slippery. Use de-icer on steps and walkways.  "Add non-slip devices to shoes.    Put grab bars and nonskid mats in your shower or tub and near the toilet. Try to use a shower chair or bath bench when bathing.   Get into a tub or shower by putting in your weaker leg first. Get out with your strong side first. Have a phone or medical alert device in the bathroom with you.   Where can you learn more?  Go to https://www.[employer]Mund[employer].net/patiented  Enter G117 in the search box to learn more about \"Preventing Falls: Care Instructions.\"  Current as of: July 17, 2023  Content Version: 14.2 2024 [employer].   Care instructions adapted under license by your healthcare professional. If you have questions about a medical condition or this instruction, always ask your healthcare professional. Healthwise, Incorporated disclaims any warranty or liability for your use of this information.    Hearing Loss: Care Instructions  Overview     Hearing loss is a sudden or slow decrease in how well you hear. It can range from slight to profound. Permanent hearing loss can occur with aging. It also can happen when you are exposed long-term to loud noise. Examples include listening to loud music, riding motorcycles, or being around other loud machines.  Hearing loss can affect your work and home life. It can make you feel lonely or depressed. You may feel that you have lost your independence. But hearing aids and other devices can help you hear better and feel connected to others.  Follow-up care is a key part of your treatment and safety. Be sure to make and go to all appointments, and call your doctor if you are having problems. It's also a good idea to know your test results and keep a list of the medicines you take.  How can you care for yourself at home?  Avoid loud noises whenever possible. This helps keep your hearing from getting worse.  Always wear hearing protection around loud noises.  Wear a hearing aid as directed.  A professional can help you pick a hearing aid " "that will work best for you.  You can also get hearing aids over the counter for mild to moderate hearing loss.  Have hearing tests as your doctor suggests. They can show whether your hearing has changed. Your hearing aid may need to be adjusted.  Use other devices as needed. These may include:  Telephone amplifiers and hearing aids that can connect to a television, stereo, radio, or microphone.  Devices that use lights or vibrations. These alert you to the doorbell, a ringing telephone, or a baby monitor.  Television closed-captioning. This shows the words at the bottom of the screen. Most new TVs can do this.  TTY (text telephone). This lets you type messages back and forth on the telephone instead of talking or listening. These devices are also called TDD. When messages are typed on the keyboard, they are sent over the phone line to a receiving TTY. The message is shown on a monitor.  Use text messaging, social media, and email if it is hard for you to communicate by telephone.  Try to learn a listening technique called speechreading. It is not lipreading. You pay attention to people's gestures, expressions, posture, and tone of voice. These clues can help you understand what a person is saying. Face the person you are talking to, and have them face you. Make sure the lighting is good. You need to see the other person's face clearly.  Think about counseling if you need help to adjust to your hearing loss.  When should you call for help?  Watch closely for changes in your health, and be sure to contact your doctor if:    You think your hearing is getting worse.     You have new symptoms, such as dizziness or nausea.   Where can you learn more?  Go to https://www.healthINSOMENIA.net/patiented  Enter R798 in the search box to learn more about \"Hearing Loss: Care Instructions.\"  Current as of: September 27, 2023  Content Version: 14.2 2024 [employer].   Care instructions adapted under license by your " healthcare professional. If you have questions about a medical condition or this instruction, always ask your healthcare professional. Healthwise, [facility] disclaims any warranty or liability for your use of this information.    Learning About Sleeping Well  What does sleeping well mean?     Sleeping well means getting enough sleep to feel good and stay healthy. How much sleep is enough varies among people.  The number of hours you sleep and how you feel when you wake up are both important. If you do not feel refreshed, you probably need more sleep. Another sign of not getting enough sleep is feeling tired during the day.  Experts recommend that adults get at least 7 or more hours of sleep per day. Children and older adults need more sleep.  Why is getting enough sleep important?  Getting enough quality sleep is a basic part of good health. When your sleep suffers, your physical health, mood, and your thoughts can suffer too. You may find yourself feeling more grumpy or stressed. Not getting enough sleep also can lead to serious problems, including injury, accidents, anxiety, and depression.  What might cause poor sleeping?  Many things can cause sleep problems, including:  Changes to your sleep schedule.  Stress. Stress can be caused by fear about a single event, such as giving a speech. Or you may have ongoing stress, such as worry about work or school.  Depression, anxiety, and other mental or emotional conditions.  Changes in your sleep habits or surroundings. This includes changes that happen where you sleep, such as noise, light, or sleeping in a different bed. It also includes changes in your sleep pattern, such as having jet lag or working a late shift.  Health problems, such as pain, breathing problems, and restless legs syndrome.  Lack of regular exercise.  Using alcohol, nicotine, or caffeine before bed.  How can you help yourself?  Here are some tips that may help you sleep more soundly and wake up  "feeling more refreshed.  Your sleeping area   Use your bedroom only for sleeping and sex. A bit of light reading may help you fall asleep. But if it doesn't, do your reading elsewhere in the house. Try not to use your TV, computer, smartphone, or tablet while you are in bed.  Be sure your bed is big enough to stretch out comfortably, especially if you have a sleep partner.  Keep your bedroom quiet, dark, and cool. Use curtains, blinds, or a sleep mask to block out light. To block out noise, use earplugs, soothing music, or a \"white noise\" machine.  Your evening and bedtime routine   Create a relaxing bedtime routine. You might want to take a warm shower or bath, or listen to soothing music.  Go to bed at the same time every night. And get up at the same time every morning, even if you feel tired.  What to avoid   Limit caffeine (coffee, tea, caffeinated sodas) during the day, and don't have any for at least 6 hours before bedtime.  Avoid drinking alcohol before bedtime. Alcohol can cause you to wake up more often during the night.  Try not to smoke or use tobacco, especially in the evening. Nicotine can keep you awake.  Limit naps during the day, especially close to bedtime.  Avoid lying in bed awake for too long. If you can't fall asleep or if you wake up in the middle of the night and can't get back to sleep within about 20 minutes, get out of bed and go to another room until you feel sleepy.  Avoid taking medicine right before bed that may keep you awake or make you feel hyper or energized. Your doctor can tell you if your medicine may do this and if you can take it earlier in the day.  If you can't sleep   Imagine yourself in a peaceful, pleasant scene. Focus on the details and feelings of being in a place that is relaxing.  Get up and do a quiet or boring activity until you feel sleepy.  Avoid drinking any liquids before going to bed to help prevent waking up often to use the bathroom.  Where can you learn " "more?  Go to https://www.[employer].net/patiented  Enter J942 in the search box to learn more about \"Learning About Sleeping Well.\"  Current as of: July 10, 2023  Content Version: 14.2 2024 [employer].   Care instructions adapted under license by your healthcare professional. If you have questions about a medical condition or this instruction, always ask your healthcare professional. Healthwise, Incorporated disclaims any warranty or liability for your use of this information.    Bladder Training: Care Instructions  Your Care Instructions     Bladder training is used to treat urge incontinence and stress incontinence. Urge incontinence means that the need to urinate comes on so fast that you can't get to a toilet in time. Stress incontinence means that you leak urine because of pressure on your bladder. For example, it may happen when you laugh, cough, or lift something heavy.  Bladder training can increase how long you can wait before you have to urinate. It can also help your bladder hold more urine. And it can give you better control over the urge to urinate.  It is important to remember that bladder training takes a few weeks to a few months to make a difference. You may not see results right away, but don't give up.  Follow-up care is a key part of your treatment and safety. Be sure to make and go to all appointments, and call your doctor if you are having problems. It's also a good idea to know your test results and keep a list of the medicines you take.  How can you care for yourself at home?  Work with your doctor to come up with a bladder training program that is right for you. You may use one or more of the following methods.  Delayed urination  In the beginning, try to keep from urinating for 5 minutes after you first feel the need to go.  While you wait, take deep, slow breaths to relax. Kegel exercises can also help you delay the need to go to the bathroom.  After some practice, when you " "can easily wait 5 minutes to urinate, try to wait 10 minutes before you urinate.  Slowly increase the waiting period until you are able to control when you have to urinate.  Scheduled urination  Empty your bladder when you first wake up in the morning.  Schedule times throughout the day when you will urinate.  Start by going to the bathroom every hour, even if you don't need to go.  Slowly increase the time between trips to the bathroom.  When you have found a schedule that works well for you, keep doing it.  If you wake up during the night and have to urinate, do it. Apply your schedule to waking hours only.  Kegel exercises  These tighten and strengthen pelvic muscles, which can help you control the flow of urine. (If doing these exercises causes pain, stop doing them and talk with your doctor.) To do Kegel exercises:  Squeeze your muscles as if you were trying not to pass gas. Or squeeze your muscles as if you were stopping the flow of urine. Your belly, legs, and buttocks shouldn't move.  Hold the squeeze for 3 seconds, then relax for 5 to 10 seconds.  Start with 3 seconds, then add 1 second each week until you are able to squeeze for 10 seconds.  Repeat the exercise 10 times a session. Do 3 to 8 sessions a day.  When should you call for help?  Watch closely for changes in your health, and be sure to contact your doctor if:    Your incontinence is getting worse.     You do not get better as expected.   Where can you learn more?  Go to https://www.[employer].net/patiented  Enter V684 in the search box to learn more about \"Bladder Training: Care Instructions.\"  Current as of: November 15, 2023  Content Version: 14.2 2024 [facility] [employer].   Care instructions adapted under license by your healthcare professional. If you have questions about a medical condition or this instruction, always ask your healthcare professional. Healthwise, Incorporated disclaims any warranty or liability for your use of this " information.

## 2024-10-24 ENCOUNTER — MEDICAL CORRESPONDENCE (OUTPATIENT)
Dept: HEALTH INFORMATION MANAGEMENT | Facility: CLINIC | Age: 89
End: 2024-10-24
Payer: COMMERCIAL

## 2024-10-29 ENCOUNTER — TELEPHONE (OUTPATIENT)
Dept: FAMILY MEDICINE | Facility: CLINIC | Age: 89
End: 2024-10-29
Payer: COMMERCIAL

## 2024-10-29 NOTE — TELEPHONE ENCOUNTER
Order/Referral Request    Who is requesting: Harinder Murray with [facility]    Orders being requested: Home Pt, 1x a week for one week, 2x week for 2 weeks, 1x week for 2 weeks, therapeutic exercise, gait and transfer training, balance, caregiver education. Pt had a fall with nursing staff this morning trying to get off the scale.    Reason service is needed/diagnosis: Pt was hospitalized 2 months ago for infection and is working on gaining strength. Maximize function    When are orders needed by: ASAP    Has this been discussed with Provider: No    Does patient have a preference on a Group/Provider/Facility? [facility]    Does patient have an appointment scheduled?: No    Where to send orders:  Call [phone] and speak to Terri    Okay to leave a detailed message?: Yes at Other phone number: [phone]

## 2024-10-31 ENCOUNTER — MEDICAL CORRESPONDENCE (OUTPATIENT)
Dept: HEALTH INFORMATION MANAGEMENT | Facility: CLINIC | Age: 89
End: 2024-10-31
Payer: COMMERCIAL

## 2024-11-05 ENCOUNTER — TELEPHONE (OUTPATIENT)
Dept: FAMILY MEDICINE | Facility: CLINIC | Age: 89
End: 2024-11-05
Payer: COMMERCIAL

## 2024-11-05 NOTE — TELEPHONE ENCOUNTER
[facility] reached out to request medications be routed to new mail order pharmacy. She did not know which medications were needed and will call back to supply that info.

## 2024-11-06 ENCOUNTER — MEDICAL CORRESPONDENCE (OUTPATIENT)
Dept: HEALTH INFORMATION MANAGEMENT | Facility: CLINIC | Age: 89
End: 2024-11-06
Payer: COMMERCIAL

## 2024-11-11 ENCOUNTER — MEDICAL CORRESPONDENCE (OUTPATIENT)
Dept: HEALTH INFORMATION MANAGEMENT | Facility: CLINIC | Age: 89
End: 2024-11-11
Payer: COMMERCIAL

## 2024-11-13 ENCOUNTER — MEDICAL CORRESPONDENCE (OUTPATIENT)
Dept: HEALTH INFORMATION MANAGEMENT | Facility: CLINIC | Age: 89
End: 2024-11-13
Payer: COMMERCIAL

## 2024-11-13 ENCOUNTER — TELEPHONE (OUTPATIENT)
Dept: FAMILY MEDICINE | Facility: CLINIC | Age: 89
End: 2024-11-13
Payer: COMMERCIAL

## 2024-11-13 NOTE — TELEPHONE ENCOUNTER
"  General Call    Contacts       Contact Date/Time Type Contact Phone/Fax    11/13/2024 03:56 PM CST Phone (Incoming) [facility] [phone]          Reason for Call:  Medication clarification    What are your questions or concerns:    Aleja is calling from [facility] to obtain clarification regarding \"Zolpidem ER 6.25 mg CR tablet.\"    1) Aleja states Zolpidem is an extended release and cannot be split.  2) Also, Zolpidem is currently on  back-ordered as well.      Disp Refills Start End SIMEON   zolpidem ER (AMBIEN CR) 6.25 MG CR tablet 45 tablet 5 9/4/2024 -- No   Sig: Take 1 and 1/4 tablet at bedtime   Sent to pharmacy as: Zolpidem Tartrate ER 6.25 MG Oral Tablet Extended Release (AMBIEN CR)   Class: E-Prescribe   Order: [id number]   E-Prescribing Status: Receipt confirmed by pharmacy (9/4/2024  3:27 PM CDT)     Patient is brand new, and admitting to their Assisted facility, so they are just trying to clarify patient's medications.    Date of last appointment with provider: 10/23/2024    Routing message to Dr. Flores to review and advise.    Robert Adamson, FRANCOISEN, RN, PHN   [facility]      "

## 2024-11-13 NOTE — TELEPHONE ENCOUNTER
Called RN back. Okay for zolpidem immediate release 5 mg at bedtime until the zolpidem CR 6.25 mg is no longer on back -order. Gave verbal order.    Margret Flores MD

## 2024-11-14 DIAGNOSIS — S83.511S RUPTURE OF ANTERIOR CRUCIATE LIGAMENT OF RIGHT KNEE, SEQUELA: ICD-10-CM

## 2024-11-14 DIAGNOSIS — M17.11 PRIMARY OSTEOARTHRITIS OF RIGHT KNEE: ICD-10-CM

## 2024-11-14 DIAGNOSIS — G89.29 CHRONIC INTRACTABLE PAIN: ICD-10-CM

## 2024-11-14 RX ORDER — OXYCODONE HYDROCHLORIDE 5 MG/1
5 TABLET ORAL 3 TIMES DAILY PRN
Qty: 60 TABLET | Refills: 0 | Status: SHIPPED | OUTPATIENT
Start: 2024-11-14

## 2024-11-14 NOTE — TELEPHONE ENCOUNTER
Script Eprescribed to pharmacy    Signed Prescriptions:                        Disp   Refills    oxyCODONE (ROXICODONE) 5 MG tablet         60 tab*0        Sig: TAKE 1 TABLET BY MOUTH THREE TIMES DAILY AS NEEDED  Authorizing Provider: QUIANA HERNANDEZ APRN, NP-C  [facility] [place]

## 2024-11-14 NOTE — TELEPHONE ENCOUNTER
Nutrition consulted. Most recent weight and BMI monitored-     Measurements:  Wt Readings from Last 1 Encounters:   06/04/24 105.5 kg (232 lb 9.4 oz)   Body mass index is 36.43 kg/m².    Patient has been screened and assessed by RD.    Malnutrition Type:  Context: acute illness or injury  Level: moderate    Malnutrition Characteristic Summary:  Weight Loss (Malnutrition): 10% in 6 months  Subcutaneous Fat (Malnutrition): mild depletion  Muscle Mass (Malnutrition): mild depletion  Fluid Accumulation (Malnutrition): mild    Interventions/Recommendations (treatment strategy):  1.    - on tube feeds   - previous history of failed swallow studies   Sent to prescribing provider.

## 2024-11-20 ENCOUNTER — LAB REQUISITION (OUTPATIENT)
Dept: LAB | Facility: CLINIC | Age: 89
End: 2024-11-20
Payer: COMMERCIAL

## 2024-11-20 DIAGNOSIS — R60.9 EDEMA, UNSPECIFIED: ICD-10-CM

## 2024-11-22 LAB
ANION GAP SERPL CALCULATED.3IONS-SCNC: 9 MMOL/L (ref 7–15)
BUN SERPL-MCNC: 14.4 MG/DL (ref 8–23)
CALCIUM SERPL-MCNC: 9.5 MG/DL (ref 8.8–10.4)
CHLORIDE SERPL-SCNC: 98 MMOL/L (ref 98–107)
CREAT SERPL-MCNC: 0.88 MG/DL (ref 0.51–0.95)
EGFRCR SERPLBLD CKD-EPI 2021: 61 ML/MIN/1.73M2
GLUCOSE SERPL-MCNC: 97 MG/DL (ref 70–99)
HCO3 SERPL-SCNC: 31 MMOL/L (ref 22–29)
POTASSIUM SERPL-SCNC: 2.8 MMOL/L (ref 3.4–5.3)
SODIUM SERPL-SCNC: 138 MMOL/L (ref 135–145)

## 2024-11-22 PROCEDURE — 80048 BASIC METABOLIC PNL TOTAL CA: CPT | Mod: ORL | Performed by: NURSE PRACTITIONER

## 2024-11-22 PROCEDURE — 36415 COLL VENOUS BLD VENIPUNCTURE: CPT | Mod: ORL | Performed by: NURSE PRACTITIONER

## 2024-11-22 PROCEDURE — P9604 ONE-WAY ALLOW PRORATED TRIP: HCPCS | Mod: ORL | Performed by: NURSE PRACTITIONER

## 2024-11-29 DIAGNOSIS — F51.01 PRIMARY INSOMNIA: Primary | ICD-10-CM

## 2024-12-02 ENCOUNTER — LAB REQUISITION (OUTPATIENT)
Dept: LAB | Facility: CLINIC | Age: 89
End: 2024-12-02
Payer: COMMERCIAL

## 2024-12-02 DIAGNOSIS — N18.30 CHRONIC KIDNEY DISEASE, STAGE 3 UNSPECIFIED (H): ICD-10-CM

## 2024-12-02 RX ORDER — ZOLPIDEM TARTRATE 5 MG/1
5 TABLET ORAL AT BEDTIME
Qty: 30 TABLET | Refills: 5 | Status: SHIPPED | OUTPATIENT
Start: 2024-12-02

## 2024-12-02 NOTE — TELEPHONE ENCOUNTER
PDMP reviewed, zolpidem tartrage 5mg last filled on 11/13/24 for 30 day supply. Per note from pharmacy, needs refills sent to keep on file.

## 2024-12-03 ENCOUNTER — LAB REQUISITION (OUTPATIENT)
Dept: LAB | Facility: CLINIC | Age: 89
End: 2024-12-03
Payer: COMMERCIAL

## 2024-12-03 DIAGNOSIS — N18.30 CHRONIC KIDNEY DISEASE, STAGE 3 UNSPECIFIED (H): ICD-10-CM

## 2024-12-04 ENCOUNTER — TELEPHONE (OUTPATIENT)
Dept: PALLIATIVE MEDICINE | Facility: CLINIC | Age: 89
End: 2024-12-04
Payer: COMMERCIAL

## 2024-12-04 LAB
ANION GAP SERPL CALCULATED.3IONS-SCNC: 12 MMOL/L (ref 7–15)
BUN SERPL-MCNC: 18.5 MG/DL (ref 8–23)
CALCIUM SERPL-MCNC: 9.5 MG/DL (ref 8.8–10.4)
CHLORIDE SERPL-SCNC: 101 MMOL/L (ref 98–107)
CREAT SERPL-MCNC: 0.89 MG/DL (ref 0.51–0.95)
EGFRCR SERPLBLD CKD-EPI 2021: 60 ML/MIN/1.73M2
GLUCOSE SERPL-MCNC: 83 MG/DL (ref 70–99)
HCO3 SERPL-SCNC: 24 MMOL/L (ref 22–29)
POTASSIUM SERPL-SCNC: 3.9 MMOL/L (ref 3.4–5.3)
SODIUM SERPL-SCNC: 137 MMOL/L (ref 135–145)

## 2024-12-04 PROCEDURE — 36415 COLL VENOUS BLD VENIPUNCTURE: CPT | Mod: ORL | Performed by: NURSE PRACTITIONER

## 2024-12-04 PROCEDURE — P9604 ONE-WAY ALLOW PRORATED TRIP: HCPCS | Mod: ORL | Performed by: NURSE PRACTITIONER

## 2024-12-04 PROCEDURE — 80048 BASIC METABOLIC PNL TOTAL CA: CPT | Mod: ORL | Performed by: NURSE PRACTITIONER

## 2024-12-04 NOTE — TELEPHONE ENCOUNTER
Switched to telephone visit. Please notify patient.     FRANCOISE CastañedaN, RN  Care Coordinator  [facility] [place]

## 2024-12-04 NOTE — TELEPHONE ENCOUNTER
[facility] Call Center    Phone Message    May a detailed message be left on voicemail: yes     Reason for Call: Other: Pt unable to come into clinic for visit tomorrow but would still like to keep her appointment. Please review and assist pt if able to reschedule tomorrow's appointment as a phone visit. Number on file confirmed.      Action Taken: Other: [facility] PAIN    Travel Screening: Not Applicable     Date of Service:

## 2024-12-04 NOTE — TELEPHONE ENCOUNTER
Valentine, please advise if you are ok with doing a telephone visit tomorrow, 12/05/24 with patient ?

## 2024-12-05 ENCOUNTER — VIRTUAL VISIT (OUTPATIENT)
Dept: PALLIATIVE MEDICINE | Facility: OTHER | Age: 89
End: 2024-12-05
Attending: NURSE PRACTITIONER
Payer: COMMERCIAL

## 2024-12-05 ENCOUNTER — TELEPHONE (OUTPATIENT)
Dept: PALLIATIVE MEDICINE | Facility: OTHER | Age: 89
End: 2024-12-05

## 2024-12-05 DIAGNOSIS — M17.11 PRIMARY OSTEOARTHRITIS OF RIGHT KNEE: ICD-10-CM

## 2024-12-05 DIAGNOSIS — S83.511S RUPTURE OF ANTERIOR CRUCIATE LIGAMENT OF RIGHT KNEE, SEQUELA: ICD-10-CM

## 2024-12-05 DIAGNOSIS — G89.29 CHRONIC INTRACTABLE PAIN: Primary | ICD-10-CM

## 2024-12-05 PROCEDURE — 99442 PR PHYSICIAN TELEPHONE EVALUATION 11-20 MIN: CPT | Mod: 93 | Performed by: NURSE PRACTITIONER

## 2024-12-05 RX ORDER — DICLOFENAC EPOLAMINE 0.01 G/1
SYSTEM TOPICAL
COMMUNITY
Start: 2024-11-13 | End: 2024-12-05 | Stop reason: DRUGHIGH

## 2024-12-05 RX ORDER — OXYCODONE HYDROCHLORIDE 5 MG/1
5 TABLET ORAL 3 TIMES DAILY PRN
Qty: 90 TABLET | Refills: 0 | Status: SHIPPED | OUTPATIENT
Start: 2024-12-05

## 2024-12-05 NOTE — PROGRESS NOTES
[facility]      Gladys Ramirez is a 89 year old female who is being evaluated via a billable virtual telephone visit.        TELEPHONE VISIT  What phone number would you like to be contacted at? [phone]  Is patient CURRENTLY in [place]? Yes Patient's location: Home   Provider's location: [facility]   How would you like to obtain your AVS? Mail a copy  Phone call contact time  Call Started at 1:10 PM  Call Ended at 1:28 PM  Phone call duration: 18 minutes    {Rooming staff  Please complete the PEG Assessment  Assess Pain, Function, and Quality of Life. Complete every pain visit :192188}      9/30/2024     2:09 PM 12/5/2024    12:56 PM 12/5/2024    12:58 PM   PEG Score   PEG Total Score 6 5 4.33          CHIEF COMPLAINT: Chronic pain    INTERVAL HISTORY:  Last seen on 9/30/24.        Recommendations/plan at the last visit included:  Physical therapy: YES Continue with current therapy.   30 minutes Video or Clinic follow-up with SHASHA Simpson NP-C in 2 months.   Other: Valentine will look into ordering an electric scooter for you. We may need to have your primary care provider order the scooter.   Medication Management :   Diclofenac Patch: apply to knee once daily.   Start OxyContin 10 mg: OK to take first thing in the morning. Only 1 tablet per day.   Continue Oxycodone 5 mg: Use sparingly until you know how you feel taking the OxyContin. No more than 2 tabs of Oxycodone 5 mg when taking OxyContin 10 mg.     Since last visit:   - Loves her new apartment at assisted living, loves that she can go to [facility] daily.   - Pain is 0/10 when sitting, has a lot of pain at night when she lays down and pain can go up to 8/10   - Notes that she feels very tired a lot, falls asleep sitting up in her chair, even while exercising.   - Bilateral legs swell quite a bit during the day but go down at night when they are up.     Pain Information Today: Score:  0/8-/10. Location of pain: right knee is worst and left is more mild pain. No pain at rest most of the time. 8/    Annual requirements last collected:  2024      Current Pain Relevant Medications:    Acetaminophen 1000 M tabs 1-2 times daily, not every day  Compounded cream: Ketamine and Ketoprofen, 3-4 x/day PRN  Duloxetine 60 mg daily   Flector Patch:2 patches daily PRN. (When not using topical cream)       Current Controlled Substance Medications:   Ambien 6.25 m tab at HS  Oxycodone 5 m tablet TID PRN: 22.5 MME/day     Previous Pain Relevant Medications: (H--helped; HI--Helped initially; [facility]--Somewhat helpful; NH--No help; W--worse; SE--side effects; ?--Unsure if helpful)   Opiates: Tramadol: [facility], Buprenorphine:Allergic  NSAIDS: Can't take, on blood thinner  Migraine medications: N/A  Muscle Relaxants: none  Neuropathics: Gabapentin: SE  Anti-depressants for pain: Duloxetine: NH for pain  Anxiety medications: N/A  Topicals: Compounded cream: Lidocaine, ibuprofen, diclofenac:  OTC medications: Tylenol: takes 4000 mg/day  Sleep Medications: Temazepam: Allergy, Ambien:H  Other medications not covered above:      SUBSTANCE HISTORY:   Past or current illegal drug use: none  Past or current ETOH use: Rarely, glass of wine  Nicotine/tobacco use: none  Daily Caffeine intake: never     CURRENT FAMILY/SOCIAL SITUATION:  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 NO   Activity: Is patient functional and able to perform Activities of Daily Living? N/A   Adverse effects: Is patient free from adverse side effects from opiates? N/A   Adherence to Rx protocol: Is patient adhering to Controlled Substance Agreement and taking medications ONLY as ordered?  N/A     Is Narcan prescribed for opiate use >50 MME daily or concurrent use of opiates and benzodiazepines? N/A    [place] Board of Pharmacy Data Base Reviewed:    YES; As expected, no concern for misuse/abuse of controlled medications based on this report. Reviewed [facility] December 4, 2024- no concerning fills.    _______________________________________________      Physical Exam and Vital Signs not completed due to virtual visit.     General: Verbal, alert and no distress   Psychiatric:  affect and mood normal, mentation appears normal.     DIRE Score for ongoing opioid management is calculated as follows:    Diagnosis = 2 pts (slowly progressive; moderate pain/objective findings)    Intractability = 3 pts (patient fully engaged but inadequate response to treatments)    Risk        Psych = 3 pts (no significant personality dysfunction/mental illness; good communication with clinic)         Chem Hlth = 3 pts (no history of chemical dependency; not drug-focused)       Reliability = 3 pts (highly reliable with meds, appointments, treatments)       Social = 2 pts (reduction in some relationships/life rolls)       (Psych + Chem hlth + Reliability + Social) = 16    Efficacy = 2 pts (moderate benefit/function; low med dose; too early/not tried meds)    DIRE Score = 18        7-13: likely NOT suitable candidate for long-term opioid analgesia       14-21: may be a suitable candidate for long-term opioid analgesia     DIAGNOSTIC RESULTS:     11/15/22: MRI right knee w/o contrast   IMPRESSION:  1.  Horizontal cleavage tear of the posteromedial corner of the meniscus.  2.  Grade 2 cartilage loss both sides of the medial compartment.  3.  Full-thickness cartilage loss along the majority of both sides of the lateral compartment with bony remodeling of the lateral tibial plateau and extensive reactive edema.  4.  Large portions of the lateral meniscal body are not identified and may be completely degenerated. Anterior and  posterior subluxation of the anterior and posterior horn, respectively.  5.  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  6.  Semimembranosus and medial gastrocnemius tendinopathy without tearing.  7.  Popliteus tendinopathy without tearing.  8.  Mild quadriceps and patellar tendinopathy without tearing.  9.  Small effusion.  10.  No evidence for acute fracture.     1/20/21: Left hip x-ray  IMPRESSION:  Mild primary degenerative narrowing both hip joints. Mild generalized degenerative change base of the spine and both SI joints.Pelvis and left hip otherwise negative. No fractures. No dislocations.     1/2021: XR KNEE RIGHT 1 OR 2 VWS   IMPRESSION:  Moderate primary osteoarthritis all 3 compartments but most prominent in the lateral compartment. Knee otherwise negative. No fractures. No joint effusion.     PAIN RELAVENT CONDITIONS:   1.  OA: severe right knee, Baker's cyst.  2.  PMH: CKD Stage 2, A fib, CHF, primary insomnia    ASSESSMENT AND PLAN    ***    PATIENT INSTRUCTIONS:     Diagnosis reviewed, treatment option addressed, and risk/benefits discussed.  Self-care instructions given.  I am recommending a multidisciplinary treatment plan to help this patient better manage pain.    Remember to request ALL medication refills 5-7 BUSINESS days before you run out.     Physical therapy: Continue current daily home exercises   30 minute Clinic follow-up with SHASHA Simpson NP-C on 2/14/25 at 1:00 PM at the [facility]   Medication Management :   Oxycodone 5 mg: Ok to take 1 tablet in the morning and at bedtime. Ok to take 1 tablet in the middle of the night if you wake up with pain.   Apply Diclofenac patch to right knee at bedtime. Ok to leave in place and change the next day. Put compression stocking over the patch.   If your insurance covers two Diclofenac patches, you can put one on your left knee too.   PLEASE bring all pain related medications to your next clinic appointment with  Valentine.    I have reviewed the note as documented above.  This accurately captures the substance of my conversation with the patient.    BILLING TIME DOCUMENTATION:   TOTAL TIME on the date of service includes:   Time spent preparing to see the patient: *** minutes (reviewing records and tests)  Time spend face to face with the patient: *** minutes  Time spent ordering tests, medications, procedures and referrals: *** minutes  Time spent Referring and communicating with other healthcare professionals: *** minutes  Documenting clinical information in Epic: *** minutes    The total TIME spent on this patient on the day of the appointment was *** minutes.     SHASHA Domínguez, NP-C  [facility]    (Information in italics and blue color are taken from previous pain and consulting medical providers notes and are documented as such)

## 2024-12-05 NOTE — PATIENT INSTRUCTIONS
After Visit Instructions:     Thank you for coming to [facility] for your care. It is my goal to partner with you to help you reach your optimal state of health.   Continue daily self-care, identifying contributing factors, and monitoring variations in pain level. Continue to integrate self-care into your life.      Physical therapy: Continue current daily home exercises   30 minute Clinic follow-up with SHASHA Simpson NP-C on 2/14/25 at 1:00 PM at the [facility]   Medication Management :   Oxycodone 5 mg: Ok to take 1 tablet in the morning and at bedtime. Ok to take 1 tablet in the middle of the night if you wake up with pain.   Apply Diclofenac patch to right knee at bedtime. Ok to leave in place and change the next day. Put compression stocking over the patch.   If your insurance covers two Diclofenac patches, you can put one on your left knee too.   PLEASE bring all pain related medications to your next clinic appointment with SHASHA Morales NP-C  [facility] - Monday, Thursday and Friday  [facility] - Tuesday      Be sure to request ALL medication refills 5 days prior to the due date whether or not you will see your medical provider in an appointment before the due date.      Do not expect same day refills. If you do not plan ahead you may run out of medications.     Early refills are not provided.  It is your responsibility to manage your medications responsibility and keep them safely stored. Lost or destroyed medications WILL NOT be replaced    Scheduling/Clinic telephone Number for ALL locations:  [phone]    After Hours On-Call Service:  [phone]    Call with any questions about your care and for scheduling assistance.   Calls are returned Monday through Friday between 8 AM and 4:00 PM. We usually get back to you within 2 business days depending on the issue/request.    If we are prescribing your  medications:  For opioid medication refills, call the clinic or send a [email or web address] message 7 days in advance.  Please include:  Your name and date of birth.   Name of requested medication  Name of the pharmacy.  For non-opioid medications, call your pharmacy directly to request a refill. Please allow 3-4 days to be processed.   Per [place] State Law:  All controlled substance prescriptions must be filled within 30 days of being written.    For those controlled substances allowing refills, pickup must occur within 30 days of last fill.      We believe regular attendance is key to your success in our program!    Any time you are unable to keep your appointment we ask that you call us at least 24 hours in advance to cancel.This will allow us to offer the appointment time to another patient.   Multiple missed appointments may lead to dismissal from the clinic.

## 2024-12-05 NOTE — TELEPHONE ENCOUNTER
"Sister lives at [facility]. She is in room 211    Please call the nurses station and ask if they can find out the name and dose of a medication that she said I prescribed. She spelled in \"Phervetol\"? I'm not sure what this is but she said it helps with her pain.     Please let me know how often she is taking it as well.     Thanks, Valentine  "

## 2024-12-06 NOTE — TELEPHONE ENCOUNTER
Call placed to nurse at [facility] at [phone]. Nurse states she'll go to  the pt's apartment and check her medications and call back if she can determine the med.

## 2024-12-12 ENCOUNTER — MEDICAL CORRESPONDENCE (OUTPATIENT)
Dept: HEALTH INFORMATION MANAGEMENT | Facility: CLINIC | Age: 89
End: 2024-12-12
Payer: COMMERCIAL

## 2024-12-16 ENCOUNTER — LAB REQUISITION (OUTPATIENT)
Dept: LAB | Facility: CLINIC | Age: 89
End: 2024-12-16
Payer: COMMERCIAL

## 2024-12-16 DIAGNOSIS — E87.6 HYPOKALEMIA: ICD-10-CM

## 2024-12-18 LAB
ANION GAP SERPL CALCULATED.3IONS-SCNC: 15 MMOL/L (ref 7–15)
BUN SERPL-MCNC: 17.7 MG/DL (ref 8–23)
CALCIUM SERPL-MCNC: 9.5 MG/DL (ref 8.8–10.4)
CHLORIDE SERPL-SCNC: 101 MMOL/L (ref 98–107)
CREAT SERPL-MCNC: 0.96 MG/DL (ref 0.51–0.95)
EGFRCR SERPLBLD CKD-EPI 2021: 55 ML/MIN/1.73M2
GLUCOSE SERPL-MCNC: 134 MG/DL (ref 70–99)
HCO3 SERPL-SCNC: 23 MMOL/L (ref 22–29)
POTASSIUM SERPL-SCNC: 3.7 MMOL/L (ref 3.4–5.3)
SODIUM SERPL-SCNC: 139 MMOL/L (ref 135–145)

## 2024-12-18 PROCEDURE — 80048 BASIC METABOLIC PNL TOTAL CA: CPT | Mod: ORL | Performed by: NURSE PRACTITIONER

## 2024-12-18 PROCEDURE — P9603 ONE-WAY ALLOW PRORATED MILES: HCPCS | Mod: ORL | Performed by: NURSE PRACTITIONER

## 2024-12-18 PROCEDURE — 36415 COLL VENOUS BLD VENIPUNCTURE: CPT | Mod: ORL | Performed by: NURSE PRACTITIONER

## 2025-01-02 DIAGNOSIS — G89.29 CHRONIC INTRACTABLE PAIN: ICD-10-CM

## 2025-01-02 DIAGNOSIS — S83.511S RUPTURE OF ANTERIOR CRUCIATE LIGAMENT OF RIGHT KNEE, SEQUELA: ICD-10-CM

## 2025-01-02 DIAGNOSIS — M17.11 PRIMARY OSTEOARTHRITIS OF RIGHT KNEE: ICD-10-CM

## 2025-01-02 RX ORDER — OXYCODONE HYDROCHLORIDE 5 MG/1
5 TABLET ORAL 3 TIMES DAILY PRN
Qty: 90 TABLET | Refills: 0 | Status: SHIPPED | OUTPATIENT
Start: 2025-01-02

## 2025-01-02 RX ORDER — OXYCODONE HYDROCHLORIDE 5 MG/1
5 TABLET ORAL 3 TIMES DAILY PRN
Qty: 90 TABLET | Refills: 0 | OUTPATIENT
Start: 2025-01-02

## 2025-01-02 NOTE — TELEPHONE ENCOUNTER
Script Eprescribed to pharmacy    Signed Prescriptions:                        Disp   Refills    oxyCODONE (ROXICODONE) 5 MG tablet         90 tab*0        Sig: Take 1 tablet (5 mg) by mouth 3 times daily as needed           for moderate to severe pain. Take 1 tab BID PRN.           Ok to take overnight if waking with pain. OK to           fill 1/3/24 to start 1/5/24  Authorizing Provider: QUIANA HERNANDEZ    Refused Prescriptions:                       Disp   Refills    oxyCODONE (ROXICODONE) 5 MG tablet [Pharma*90 tab*0        Sig: Take 1 tablet (5 mg) by mouth 3 times daily as           needed.  Refused By: KIMMY FELICIANO  Reason for Refusal: OTHER      SHASHA Domínguez, NP-C  [facility]

## 2025-01-02 NOTE — TELEPHONE ENCOUNTER
Please process a refill of oxyCODONE HCl 5 MG Tablet  to       [facility], [place] - [address] [address]  [address] [address] [address]  Phone: [phone] Fax: [phone]

## 2025-01-02 NOTE — TELEPHONE ENCOUNTER
Received call from patient requesting refill(s) of     OXYCODONE HCL 5 MG PO TABS          Last dispensed from pharmacy on: Dec. 5, 2024     Patient's last office/virtual visit by prescribing provider on: Dec. 5, 2024   Next office/virtual appointment scheduled for: Feb. 14, 2025       UDT: Aug. 30, 2024   CSA: Aug. 30, 2024         E-prescribe to      [facility] [place] [place] - [address] [address],    Will route to nursing [place] for review and preparation of prescription(s).

## 2025-01-07 ENCOUNTER — LAB REQUISITION (OUTPATIENT)
Dept: LAB | Facility: CLINIC | Age: OVER 89
End: 2025-01-07
Payer: COMMERCIAL

## 2025-01-07 DIAGNOSIS — E55.9 VITAMIN D DEFICIENCY, UNSPECIFIED: ICD-10-CM

## 2025-01-08 LAB
ANION GAP SERPL CALCULATED.3IONS-SCNC: 13 MMOL/L (ref 7–15)
BACTERIA UR CULT: NORMAL
BUN SERPL-MCNC: 26.1 MG/DL (ref 8–23)
CALCIUM SERPL-MCNC: 9.8 MG/DL (ref 8.8–10.4)
CHLORIDE SERPL-SCNC: 102 MMOL/L (ref 98–107)
CREAT SERPL-MCNC: 1.16 MG/DL (ref 0.51–0.95)
EGFRCR SERPLBLD CKD-EPI 2021: 43 ML/MIN/1.73M2
ERYTHROCYTE [DISTWIDTH] IN BLOOD BY AUTOMATED COUNT: 20.5 % (ref 10–15)
GLUCOSE SERPL-MCNC: 160 MG/DL (ref 70–99)
HCO3 SERPL-SCNC: 24 MMOL/L (ref 22–29)
HCT VFR BLD AUTO: 34.4 % (ref 35–47)
HGB BLD-MCNC: 10.2 G/DL (ref 11.7–15.7)
MCH RBC QN AUTO: 24.8 PG (ref 26.5–33)
MCHC RBC AUTO-ENTMCNC: 29.7 G/DL (ref 31.5–36.5)
MCV RBC AUTO: 84 FL (ref 78–100)
PLATELET # BLD AUTO: 386 10E3/UL (ref 150–450)
POTASSIUM SERPL-SCNC: 4.3 MMOL/L (ref 3.4–5.3)
RBC # BLD AUTO: 4.12 10E6/UL (ref 3.8–5.2)
SODIUM SERPL-SCNC: 139 MMOL/L (ref 135–145)
WBC # BLD AUTO: 4.9 10E3/UL (ref 4–11)

## 2025-01-08 PROCEDURE — 36415 COLL VENOUS BLD VENIPUNCTURE: CPT | Mod: ORL | Performed by: NURSE PRACTITIONER

## 2025-01-08 PROCEDURE — P9604 ONE-WAY ALLOW PRORATED TRIP: HCPCS | Mod: ORL | Performed by: NURSE PRACTITIONER

## 2025-01-08 PROCEDURE — 85027 COMPLETE CBC AUTOMATED: CPT | Mod: ORL | Performed by: NURSE PRACTITIONER

## 2025-01-08 PROCEDURE — 80048 BASIC METABOLIC PNL TOTAL CA: CPT | Mod: ORL | Performed by: NURSE PRACTITIONER

## 2025-01-27 RX ORDER — MORPHINE SULFATE 15 MG/1
TABLET ORAL
Qty: 60 TABLET | Refills: 0 | OUTPATIENT
Start: 2025-01-27

## 2025-01-27 NOTE — TELEPHONE ENCOUNTER
Declined refill request for morphine- I have never prescribed this. On  database, appears she was prescribed by Brendon Luna DO.

## 2025-06-17 NOTE — TELEPHONE ENCOUNTER
- Oxycodone 5 mg 1 tab TID PRN ordered. Ok to take Tylenol 1000 mg between doses of Oxycodone. Last liver labs 8/2022 were normal.    - MRI show NO baker's cyst. If there was a cyst in the past, it likely ruptured on it's own and cyst contents reabsorbed.  She has extensive degenerative disease throughout the right knee including a meniscus tear, full thickness loss of cartilage in parts of the knee joint, tendonitis, full thickness tear of ACL (likely chronic). Recommend a second opinion from Ortho and urgent referral placed.    Representative [phone]    SHASHA Domínguez, NP-C  [facility]           Improve po intake to 75% or more of most meals  Weight not to go above 113 lbs

## (undated) DEVICE — ELECTRODE DEFIB CADENCE 22550R

## (undated) DEVICE — BUR ARTHREX COOLCUT DISSECTOR 4.0MMX13CM AR-8400DS

## (undated) DEVICE — TUBING SUCTION MEDI-VAC 1/4"X20' N620A

## (undated) DEVICE — DRSG XEROFORM 1X8"

## (undated) DEVICE — GOWN IMPERVIOUS BREATHABLE 2XL/XLONG

## (undated) DEVICE — SU ETHILON 2-0 FS 18" 664G

## (undated) DEVICE — DRESSING MEPILEX BORDER POST-OP 4X10

## (undated) DEVICE — DRSG TELFA 3X8" 1238

## (undated) DEVICE — SU STRATAFIX PDS PLUS 1 CT-1 18" SXPP1A404

## (undated) DEVICE — ESU PENCIL SMOKE EVAC W/ROCKER SWITCH 0703-047-000

## (undated) DEVICE — HOLDER LIMB VELCRO OR 0814-1533

## (undated) DEVICE — SUCTION IRR SYSTEM W/O TIP INTERPULSE HANDPIECE 0210-100-000

## (undated) DEVICE — CUSTOM PACK TOTAL KNEE SOP5BTKHEC

## (undated) DEVICE — SUTURE PDS 0 27IN CT1 + VIOLET PDP340H

## (undated) DEVICE — GLOVE UNDER INDICATOR PI SZ 8.5 LF 41685

## (undated) DEVICE — SUCTION MANIFOLD NEPTUNE 2 SYS 1 PORT 702-025-000

## (undated) DEVICE — DRAPE U-POUCH 34X29" 1067

## (undated) DEVICE — BLADE SAW SAGITTAL STRK WIDE 25.4X85X1.2MM 2108-151-000

## (undated) DEVICE — ESU BLADE PEAK PLASMA 3.0S PS210-030S

## (undated) DEVICE — STPL SKIN 35W 6.9MM  PXW35

## (undated) DEVICE — PILLOW HIP ABDUCTION CONCAVE 1.9

## (undated) DEVICE — ARTHROSCOPIC CANNULA 5.5X70MM GRAY  9718

## (undated) DEVICE — ESU GROUND PAD ADULT REM W/15' CORD E7507DB

## (undated) DEVICE — MAT FLOOR SURGICAL 40X38 0702140238

## (undated) DEVICE — DRAPE STERI TOWEL LG 1010

## (undated) DEVICE — GLOVE BIOGEL PI INDICATOR 8.0 LF 41680

## (undated) DEVICE — SOL NACL 0.9% IRRIG 1000ML BOTTLE 2F7124

## (undated) DEVICE — SOLUTION IRRIG 2B7127 .9NS 3000ML BAG

## (undated) DEVICE — SU MONOCRYL 3-0 PS-2 18" UND Y497G

## (undated) DEVICE — GLOVE BIOGEL PI ULTRATOUCH G SZ 7.5 42175

## (undated) DEVICE — TUBING SYSTEM ARTHREX PUMP REDEUCE AR-6411

## (undated) DEVICE — BONE CLEANING TIP INTERPULSE FEMORAL CANAL 0210-008-000

## (undated) DEVICE — SU ETHIBOND 1 CT-1 30" X425H

## (undated) DEVICE — GLOVE BIOGEL PI SZ 8.5 40885

## (undated) DEVICE — DRAPE IOBAN INCISE 36X23" 6651EZ

## (undated) DEVICE — SOL WATER IRRIG 1000ML BOTTLE 2F7114

## (undated) DEVICE — SU ETHIBOND 5 V-37 4X30" MB66G

## (undated) DEVICE — PREP CHLORAPREP 26ML TINTED HI-LITE ORANGE 930815

## (undated) DEVICE — SHEATH PRELUDE SNAP 7FRX13CM PLS-1007

## (undated) DEVICE — SLITTER ADJSTBL 6232ADJ

## (undated) DEVICE — PLATE GROUNDING ADULT W/CORD 9165L

## (undated) DEVICE — CUFF TOURN 34IN STRL DISP

## (undated) DEVICE — CUSTOM PACK ARTHROSCOPY KNEE SOP5BAKHEA

## (undated) DEVICE — BLADE KNIFE SURG 11 371111

## (undated) DEVICE — SUCTION MANIFOLD NEPTUNE 2 SYS 4 PORT 0702-020-000

## (undated) DEVICE — CUSTOM PACK TOTAL HIP SOP5BTHHEA

## (undated) DEVICE — PAD HIP POSITIONING MCGUIRE 707-CPM

## (undated) DEVICE — GLOVE BIOGEL PI ULTRATOUCH G SZ 8.0 42180

## (undated) DEVICE — CONTAINER URINE SPEC 4OZ STRL 1053

## (undated) DEVICE — CATH, QUADRAPOLAR DEFLECTABLE EP 5MM SPACING REPROCESSED

## (undated) DEVICE — SOL NACL 0.9% INJ 1000ML BAG 2B1324X

## (undated) DEVICE — PACK MINOR SINGLE BASIN SSK3001

## (undated) DEVICE — TAMPON SUCTION FEMORAL CANAL 110037

## (undated) DEVICE — TUBING SYSTEM ARTHREX PATIENT REDEUCE AR-6421

## (undated) DEVICE — SUTURE MONOCRYL+ 2-0 27IN SH UND MCP417H

## (undated) DEVICE — GUIDEWIRE JTIP 3MM .035 180CM IQ35F180J3

## (undated) DEVICE — TUBING IRRIG TUR Y TYPE 96" LF 6543-01

## (undated) DEVICE — COUNTER NEEDLE ADH & FOAM 20CT 9106

## (undated) DEVICE — COLLECTION KIT E SWAB REG 220245

## (undated) DEVICE — GLOVE BIOGEL PI SZ 8.0 40880

## (undated) DEVICE — CUSTOM PACK PACER ICD SAN5BPCHEA

## (undated) DEVICE — RETRIVER SUTURE LASSO HOFFEE BLUE 710000

## (undated) DEVICE — DECANTER VIAL 2006S

## (undated) DEVICE — SUTURE VICRYL+ 2-0 27IN CT-1 UND VCP259H

## (undated) DEVICE — SHEATH PRELUDE SNAP 13CM 9FR

## (undated) DEVICE — SU PDS II 2-0 SH 27" Z317H

## (undated) DEVICE — CUFF TOURN 30IN STRL DISP 5921030235

## (undated) DEVICE — INTRO MICRO MINI STICK 4FR STIFF NITINOL 45-753

## (undated) DEVICE — BONE CLEANING TIP INTERPULSE  0210-010-000

## (undated) DEVICE — SUTURE VICRYL+ 1 27IN CT-1 UND VCP261H

## (undated) DEVICE — CATH 5.7FRID/8.4FROD 9FR X 43CM INTRO SELECTSITE DEFLECT

## (undated) DEVICE — GLOVE BIOGEL INDICATOR 7.5 LF 41675

## (undated) DEVICE — BONE CEMENT MIXEVAC HI VAC W/CARTRIDGE 0306-563-000

## (undated) DEVICE — DRSG MEPILEX BORDER ADHS 10CMX20CM STRL 496405

## (undated) DEVICE — SU ETHILON 3-0 PS-2 18" 1669H

## (undated) DEVICE — SU PDS II 0 CT-1 27" Z340H

## (undated) RX ORDER — FENTANYL CITRATE 50 UG/ML
INJECTION, SOLUTION INTRAMUSCULAR; INTRAVENOUS
Status: DISPENSED
Start: 2024-05-23

## (undated) RX ORDER — KETAMINE HYDROCHLORIDE 10 MG/ML
INJECTION INTRAMUSCULAR; INTRAVENOUS
Status: DISPENSED
Start: 2023-03-05

## (undated) RX ORDER — FENTANYL CITRATE-0.9 % NACL/PF 10 MCG/ML
PLASTIC BAG, INJECTION (ML) INTRAVENOUS
Status: DISPENSED
Start: 2024-07-02

## (undated) RX ORDER — ONDANSETRON 2 MG/ML
INJECTION INTRAMUSCULAR; INTRAVENOUS
Status: DISPENSED
Start: 2023-03-05

## (undated) RX ORDER — FENTANYL CITRATE 50 UG/ML
INJECTION, SOLUTION INTRAMUSCULAR; INTRAVENOUS
Status: DISPENSED
Start: 2024-07-02

## (undated) RX ORDER — METHOHEXITAL IN WATER/PF 100MG/10ML
SYRINGE (ML) INTRAVENOUS
Status: DISPENSED
Start: 2024-06-19

## (undated) RX ORDER — CEFAZOLIN SODIUM 1 G/3ML
INJECTION, POWDER, FOR SOLUTION INTRAMUSCULAR; INTRAVENOUS
Status: DISPENSED
Start: 2023-03-05

## (undated) RX ORDER — FENTANYL CITRATE 50 UG/ML
INJECTION, SOLUTION INTRAMUSCULAR; INTRAVENOUS
Status: DISPENSED
Start: 2023-03-05

## (undated) RX ORDER — EPHEDRINE SULFATE 50 MG/ML
INJECTION, SOLUTION INTRAMUSCULAR; INTRAVENOUS; SUBCUTANEOUS
Status: DISPENSED
Start: 2023-03-05

## (undated) RX ORDER — VASOPRESSIN 20 U/ML
INJECTION PARENTERAL
Status: DISPENSED
Start: 2024-05-23

## (undated) RX ORDER — ONDANSETRON 2 MG/ML
INJECTION INTRAMUSCULAR; INTRAVENOUS
Status: DISPENSED
Start: 2024-07-02

## (undated) RX ORDER — PROPOFOL 10 MG/ML
INJECTION, EMULSION INTRAVENOUS
Status: DISPENSED
Start: 2023-03-05

## (undated) RX ORDER — BUPIVACAINE HYDROCHLORIDE AND EPINEPHRINE 2.5; 5 MG/ML; UG/ML
INJECTION, SOLUTION INFILTRATION; PERINEURAL
Status: DISPENSED
Start: 2023-03-05

## (undated) RX ORDER — CEFAZOLIN SODIUM/WATER 2 G/20 ML
SYRINGE (ML) INTRAVENOUS
Status: DISPENSED
Start: 2024-04-08

## (undated) RX ORDER — LIDOCAINE HYDROCHLORIDE 10 MG/ML
INJECTION, SOLUTION EPIDURAL; INFILTRATION; INTRACAUDAL; PERINEURAL
Status: DISPENSED
Start: 2024-07-02

## (undated) RX ORDER — PROPOFOL 10 MG/ML
INJECTION, EMULSION INTRAVENOUS
Status: DISPENSED
Start: 2024-05-23

## (undated) RX ORDER — HEPARIN SODIUM (PORCINE) LOCK FLUSH IV SOLN 100 UNIT/ML 100 UNIT/ML
SOLUTION INTRAVENOUS
Status: DISPENSED
Start: 2022-09-13

## (undated) RX ORDER — PROPOFOL 10 MG/ML
INJECTION, EMULSION INTRAVENOUS
Status: DISPENSED
Start: 2024-07-02

## (undated) RX ORDER — CEFAZOLIN SODIUM 1 G/3ML
INJECTION, POWDER, FOR SOLUTION INTRAMUSCULAR; INTRAVENOUS
Status: DISPENSED
Start: 2024-05-23

## (undated) RX ORDER — LIDOCAINE HYDROCHLORIDE 10 MG/ML
INJECTION, SOLUTION EPIDURAL; INFILTRATION; INTRACAUDAL; PERINEURAL
Status: DISPENSED
Start: 2024-05-23